# Patient Record
Sex: FEMALE | Race: WHITE | NOT HISPANIC OR LATINO | Employment: OTHER | URBAN - METROPOLITAN AREA
[De-identification: names, ages, dates, MRNs, and addresses within clinical notes are randomized per-mention and may not be internally consistent; named-entity substitution may affect disease eponyms.]

---

## 2017-01-04 ENCOUNTER — CLINICAL SUPPORT (OUTPATIENT)
Dept: FAMILY MEDICINE | Facility: CLINIC | Age: 77
End: 2017-01-04
Payer: MEDICARE

## 2017-01-04 DIAGNOSIS — I48.91 NEW ONSET ATRIAL FIBRILLATION: Primary | ICD-10-CM

## 2017-01-04 LAB
CTP QC/QA: NORMAL
INR PPP: 2.4 (ref 2–3)

## 2017-01-04 PROCEDURE — 85610 PROTHROMBIN TIME: CPT | Mod: ,,, | Performed by: FAMILY MEDICINE

## 2017-01-13 RX ORDER — WARFARIN 6 MG/1
TABLET ORAL
Qty: 90 TABLET | Refills: 3 | Status: SHIPPED | OUTPATIENT
Start: 2017-01-13 | End: 2017-09-15

## 2017-01-13 RX ORDER — PRAMIPEXOLE DIHYDROCHLORIDE 0.25 MG/1
TABLET ORAL
Qty: 90 TABLET | Refills: 3 | Status: SHIPPED | OUTPATIENT
Start: 2017-01-13 | End: 2018-01-18 | Stop reason: SDUPTHER

## 2017-01-28 RX ORDER — ALPRAZOLAM 0.25 MG/1
TABLET ORAL
Qty: 90 TABLET | Refills: 1 | Status: SHIPPED | OUTPATIENT
Start: 2017-01-28 | End: 2017-05-19 | Stop reason: SDUPTHER

## 2017-02-03 ENCOUNTER — CLINICAL SUPPORT (OUTPATIENT)
Dept: FAMILY MEDICINE | Facility: CLINIC | Age: 77
End: 2017-02-03
Payer: MEDICARE

## 2017-02-03 DIAGNOSIS — I48.91 NEW ONSET ATRIAL FIBRILLATION: Primary | ICD-10-CM

## 2017-02-03 LAB — INR PPP: 2.6 (ref 2–3)

## 2017-02-03 PROCEDURE — 85610 PROTHROMBIN TIME: CPT | Mod: ,,, | Performed by: FAMILY MEDICINE

## 2017-02-09 ENCOUNTER — TELEPHONE (OUTPATIENT)
Dept: GASTROENTEROLOGY | Facility: CLINIC | Age: 77
End: 2017-02-09

## 2017-02-09 NOTE — TELEPHONE ENCOUNTER
Patient was cleared per  [facility]ovascular [facility] to hold Coumadin 5 days prior to procedure and to restart as soon as possible after procedure. Patient was called and notified approval was scan in under media.

## 2017-02-09 NOTE — TELEPHONE ENCOUNTER
Patient is scheduled for Colonoscopy at [facility] on 3/3/17, prep information was given and explained. Patient verbalize understanding.

## 2017-03-02 ENCOUNTER — CLINICAL SUPPORT (OUTPATIENT)
Dept: FAMILY MEDICINE | Facility: CLINIC | Age: 77
End: 2017-03-02
Payer: MEDICARE

## 2017-03-02 ENCOUNTER — TELEPHONE (OUTPATIENT)
Dept: FAMILY MEDICINE | Facility: CLINIC | Age: 77
End: 2017-03-02

## 2017-03-02 DIAGNOSIS — I48.0 PAROXYSMAL ATRIAL FIBRILLATION: Primary | ICD-10-CM

## 2017-03-02 LAB — INR PPP: 1.3 (ref 2–3)

## 2017-03-02 PROCEDURE — 85610 PROTHROMBIN TIME: CPT | Mod: ,,, | Performed by: FAMILY MEDICINE

## 2017-03-02 RX ORDER — SCOLOPAMINE TRANSDERMAL SYSTEM 1 MG/1
1 PATCH, EXTENDED RELEASE TRANSDERMAL
Qty: 4 PATCH | Refills: 1 | Status: SHIPPED | OUTPATIENT
Start: 2017-03-02 | End: 2017-07-10

## 2017-03-08 ENCOUNTER — OFFICE VISIT (OUTPATIENT)
Dept: FAMILY MEDICINE | Facility: CLINIC | Age: 77
End: 2017-03-08
Payer: MEDICARE

## 2017-03-08 VITALS
SYSTOLIC BLOOD PRESSURE: 128 MMHG | RESPIRATION RATE: 18 BRPM | BODY MASS INDEX: 36.37 KG/M2 | HEIGHT: 63 IN | DIASTOLIC BLOOD PRESSURE: 72 MMHG | OXYGEN SATURATION: 95 % | WEIGHT: 205.25 LBS | HEART RATE: 74 BPM | TEMPERATURE: 98 F

## 2017-03-08 DIAGNOSIS — G47.33 OSA (OBSTRUCTIVE SLEEP APNEA): ICD-10-CM

## 2017-03-08 DIAGNOSIS — G89.29 CHRONIC BILATERAL BACK PAIN, UNSPECIFIED BACK LOCATION: ICD-10-CM

## 2017-03-08 DIAGNOSIS — I77.1 TORTUOUS AORTA: ICD-10-CM

## 2017-03-08 DIAGNOSIS — M54.31 BILATERAL SCIATICA: ICD-10-CM

## 2017-03-08 DIAGNOSIS — F41.9 ANXIETY: ICD-10-CM

## 2017-03-08 DIAGNOSIS — Z79.01 ANTICOAGULATED ON COUMADIN: ICD-10-CM

## 2017-03-08 DIAGNOSIS — I49.3 PVC (PREMATURE VENTRICULAR CONTRACTION): ICD-10-CM

## 2017-03-08 DIAGNOSIS — I10 HTN (HYPERTENSION), BENIGN: ICD-10-CM

## 2017-03-08 DIAGNOSIS — M54.32 BILATERAL SCIATICA: ICD-10-CM

## 2017-03-08 DIAGNOSIS — M54.9 CHRONIC BILATERAL BACK PAIN, UNSPECIFIED BACK LOCATION: ICD-10-CM

## 2017-03-08 DIAGNOSIS — Z86.010 HISTORY OF ADENOMATOUS POLYP OF COLON: ICD-10-CM

## 2017-03-08 DIAGNOSIS — K57.30 DIVERTICULOSIS OF LARGE INTESTINE WITHOUT HEMORRHAGE: Chronic | ICD-10-CM

## 2017-03-08 DIAGNOSIS — K21.9 GASTROESOPHAGEAL REFLUX DISEASE WITHOUT ESOPHAGITIS: ICD-10-CM

## 2017-03-08 DIAGNOSIS — G25.81 RESTLESS LEG SYNDROME: ICD-10-CM

## 2017-03-08 DIAGNOSIS — I48.91 NEW ONSET ATRIAL FIBRILLATION: ICD-10-CM

## 2017-03-08 DIAGNOSIS — I48.0 PAROXYSMAL ATRIAL FIBRILLATION: ICD-10-CM

## 2017-03-08 DIAGNOSIS — E66.9 NON MORBID OBESITY, UNSPECIFIED OBESITY TYPE: ICD-10-CM

## 2017-03-08 DIAGNOSIS — E78.5 HYPERLIPIDEMIA, UNSPECIFIED HYPERLIPIDEMIA TYPE: ICD-10-CM

## 2017-03-08 DIAGNOSIS — D69.2 SENILE PURPURA: ICD-10-CM

## 2017-03-08 DIAGNOSIS — I10 ESSENTIAL HYPERTENSION: ICD-10-CM

## 2017-03-08 DIAGNOSIS — R00.1 BRADYCARDIA: ICD-10-CM

## 2017-03-08 LAB — INR PPP: 1 (ref 2–3)

## 2017-03-08 PROCEDURE — 3078F DIAST BP <80 MM HG: CPT | Mod: S$GLB,,, | Performed by: FAMILY MEDICINE

## 2017-03-08 PROCEDURE — 1157F ADVNC CARE PLAN IN RCRD: CPT | Mod: S$GLB,,, | Performed by: FAMILY MEDICINE

## 2017-03-08 PROCEDURE — 99213 OFFICE O/P EST LOW 20 MIN: CPT | Mod: S$GLB,,, | Performed by: FAMILY MEDICINE

## 2017-03-08 PROCEDURE — 1126F AMNT PAIN NOTED NONE PRSNT: CPT | Mod: S$GLB,,, | Performed by: FAMILY MEDICINE

## 2017-03-08 PROCEDURE — 1160F RVW MEDS BY RX/DR IN RCRD: CPT | Mod: S$GLB,,, | Performed by: FAMILY MEDICINE

## 2017-03-08 PROCEDURE — 85610 PROTHROMBIN TIME: CPT | Mod: ,,, | Performed by: FAMILY MEDICINE

## 2017-03-08 PROCEDURE — 1159F MED LIST DOCD IN RCRD: CPT | Mod: S$GLB,,, | Performed by: FAMILY MEDICINE

## 2017-03-08 PROCEDURE — 3074F SYST BP LT 130 MM HG: CPT | Mod: S$GLB,,, | Performed by: FAMILY MEDICINE

## 2017-03-08 PROCEDURE — 99499 UNLISTED E&M SERVICE: CPT | Mod: S$GLB,,, | Performed by: FAMILY MEDICINE

## 2017-03-08 NOTE — PROGRESS NOTES
Subjective:      Patient ID: Mis Ca is a 76 y.o. female.    Chief Complaint: Follow-up (3 month follow up)    HPI Comments: 3 month check up; going on a cruise; needs new supplies cpap;     Review of Systems   Constitutional: Negative.    HENT: Negative.    Respiratory: Negative.    Cardiovascular: Negative.    Gastrointestinal: Negative.    Endocrine: Negative.    Genitourinary: Negative.    Musculoskeletal: Negative.    Psychiatric/Behavioral: Negative.    All other systems reviewed and are negative.    Objective:     Physical Exam   Constitutional: She is oriented to person, place, and time. She appears well-developed and well-nourished.   HENT:   Head: Normocephalic.   Eyes: Conjunctivae and EOM are normal. Pupils are equal, round, and reactive to light.   Neck: Normal range of motion. Neck supple.   Cardiovascular: Normal rate, regular rhythm and normal heart sounds.    Pulmonary/Chest: Effort normal and breath sounds normal.   Musculoskeletal: Normal range of motion.   Neurological: She is alert and oriented to person, place, and time. She has normal reflexes.   Skin: Skin is warm and dry.   Psychiatric: She has a normal mood and affect. Her behavior is normal. Judgment and thought content normal.   Nursing note and vitals reviewed.    Assessment:     1. Anxiety    2. Chronic bilateral back pain, unspecified back location    3. JACQUELYN (obstructive sleep apnea)    4. Bilateral sciatica    5. Essential hypertension    6. HTN (hypertension), benign    7. New onset atrial fibrillation    8. Paroxysmal atrial fibrillation    9. PVC (premature ventricular contraction)    10. Diverticulosis of large intestine without hemorrhage    11. Gastroesophageal reflux disease without esophagitis    12. Hyperlipidemia, unspecified hyperlipidemia type    13. Non morbid obesity, unspecified obesity type    14. Anticoagulated on Coumadin    15. Bradycardia    16. History of adenomatous polyp of colon    17. Restless leg  syndrome    18. Tortuous aorta    19. Senile purpura      Plan:     New Prescriptions    No medications on file     Discontinued Medications    No medications on file     Modified Medications    No medications on file       Anxiety  -     POCT INR    Chronic bilateral back pain, unspecified back location  -     POCT INR    JACQUELYN (obstructive sleep apnea)  -     POCT INR  -     CPAP/BIPAP SUPPLIES    Bilateral sciatica  -     POCT INR    Essential hypertension  -     POCT INR    HTN (hypertension), benign  -     POCT INR    New onset atrial fibrillation  -     POCT INR    Paroxysmal atrial fibrillation  -     POCT INR    PVC (premature ventricular contraction)  -     POCT INR    Diverticulosis of large intestine without hemorrhage  -     POCT INR    Gastroesophageal reflux disease without esophagitis  -     POCT INR    Hyperlipidemia, unspecified hyperlipidemia type  -     POCT INR    Non morbid obesity, unspecified obesity type  -     POCT INR    Anticoagulated on Coumadin  -     POCT INR    Bradycardia  -     POCT INR    History of adenomatous polyp of colon  -     POCT INR    Restless leg syndrome  -     POCT INR    Tortuous aorta  -     POCT INR    Senile purpura  -     POCT INR

## 2017-03-08 NOTE — MR AVS SNAPSHOT
[facility]  [address] [place] [address]  Phone: [phone]  Fax: [phone]                  Mis Ca   3/8/2017 1:40 PM   Office Visit    Description:  Female : 1940   Provider:  Scott Dillard MD   Department:  [facility]           Reason for Visit     Follow-up           Diagnoses this Visit        Comments    Anxiety         Chronic bilateral back pain, unspecified back location         JACQUELYN (obstructive sleep apnea)         Bilateral sciatica         Essential hypertension         HTN (hypertension), benign         New onset atrial fibrillation         Paroxysmal atrial fibrillation         PVC (premature ventricular contraction)         Diverticulosis of large intestine without hemorrhage         Gastroesophageal reflux disease without esophagitis         Hyperlipidemia, unspecified hyperlipidemia type         Non morbid obesity, unspecified obesity type         Anticoagulated on Coumadin         Bradycardia         History of adenomatous polyp of colon         Restless leg syndrome         Tortuous aorta         Senile purpura                To Do List           Goals (5 Years of Data)     None      Follow-Up and Disposition     Return in about 4 months (around 2017).      [facility] On Call     [facility] -  Assistance  Registered nurses in the [facility] On Call Center provide clinical advisement, health education, appointment booking, and other advisory services.  Call for this free service at [phone].             Medications           Message regarding Medications     Verify the changes and/or additions to your medication regime listed below are the same as discussed with your clinician today.  If any of these changes or additions are incorrect, please notify your healthcare provider.             Verify that the below list of medications is an accurate representation of the medications you are currently taking.  " If none reported, the list may be blank. If incorrect, please contact your healthcare provider. Carry this list with you in case of emergency.           Current Medications     acetaminophen (TYLENOL) 500 MG tablet Take 1,000 mg by mouth 2 (two) times daily as needed for Pain.    alprazolam (XANAX) 0.25 MG tablet TAKE 1 TABLET ONE TIME DAILY    aspirin (ECOTRIN) 81 MG EC tablet Take 81 mg by mouth once daily.      CARTIA  mg 24 hr capsule TAKE 1 CAPSULE ONE TIME DAILY    gabapentin (NEURONTIN) 300 MG capsule Take 1 capsule (300 mg total) by mouth 2 (two) times daily.    hydrochlorothiazide (MICROZIDE) 12.5 mg capsule TAKE 1 CAPSULE ONE TIME DAILY    latanoprost 0.005 % ophthalmic solution 1 drop every evening.    losartan (COZAAR) 100 MG tablet TAKE 1 TABLET ONE TIME DAILY    lovastatin (MEVACOR) 40 MG tablet TAKE 1 TABLET EVERY EVENING    omeprazole (PRILOSEC) 20 MG capsule TAKE 1 CAPSULE ONE TIME DAILY    oxybutynin (DITROPAN) 5 MG Tab TAKE 1 TABLET ONE TIME DAILY    pramipexole (MIRAPEX) 0.25 MG tablet TAKE 1 TABLET ONE TIME DAILY    scopolamine (TRANSDERM-SCOP) 1.5 mg (1 mg over 3 days) Place 1 patch (1.5 mg total) onto the skin every 72 hours.    timolol maleate 0.5% (TIMOPTIC) 0.5 % Drop Place 1 drop into both eyes once daily.    tramadol (ULTRAM) 50 mg tablet Take 1 tablet (50 mg total) by mouth daily as needed for Pain.    warfarin (COUMADIN) 6 MG tablet TAKE 1 TABLET EVERY DAY           Clinical Reference Information           Your Vitals Were     BP Pulse Temp Resp Height Weight    128/72 (BP Location: Left arm, Patient Position: Sitting, BP Method: Manual) 74 97.7 °F (36.5 °C) (Oral) 18 5' 2.5" (1.588 m) 93.1 kg (205 lb 4 oz)    SpO2 BMI             95% 36.94 kg/m2         Blood Pressure          Most Recent Value    BP  128/72      Allergies as of 3/8/2017     Penicillins    Sulfa (Sulfonamide Antibiotics)      Immunizations Administered on Date of Encounter - 3/8/2017     None      Orders Placed " During Today's Visit      Normal Orders This Visit    CPAP/BIPAP SUPPLIES     POCT INR       Language Assistance Services     ATTENTION: Language assistance services are available, free of charge. Please call [phone].      ATENCIÓN: Si afua alex, tiene a bell disposición servicios gratuitos de asistencia lingüística. Llame al [phone].     CHÚ Ý: N?u b?n nói Ti?ng Vi?t, có các d?ch v? h? tr? ngôn ng? mi?n phí dành cho b?n. G?i s? [phone].         [facility] complies with applicable Federal civil rights laws and does not discriminate on the basis of race, color, national origin, age, disability, or sex.

## 2017-03-09 NOTE — PROGRESS NOTES
Patient, Mis Ca (MRN #[id number]), presented with a recorded BMI of 36.94 kg/m^2 and a documented comorbidity(s):  - Obstructive Sleep Apnea  - Hypertension  - Hyperlipidemia  - Atrial Fibrillation  to which the severe obesity is a contributing factor. This is consistent with the definition of severe obesity (BMI 35.0-35.9) with comorbidity (ICD-10 E66.01, Z68.35). The patient's severe obesity was monitored, evaluated, addressed and/or treated. This addendum to the medical record is made on 03/09/2017.

## 2017-03-13 ENCOUNTER — TELEPHONE (OUTPATIENT)
Dept: GASTROENTEROLOGY | Facility: CLINIC | Age: 77
End: 2017-03-13

## 2017-03-13 NOTE — TELEPHONE ENCOUNTER
Review of pathology report from colonoscopy dated 3 March 2017:  -Descending colon polyps: Tubular adenomas, without high-grade dysplasia or malignancy    IMP: -Benign colon polyps removed    REC: -Follow-up colonoscopy in 3 years    PCP: MD Efren Gillette MD, FACP, FACG, AGAF  [facility]  [address], [address], [place] [address]  Phone: [phone] Fax: [phone]    [address], [address], [place] [address]  Phone: [phone] Fax: [phone]    - Portions of this note were dictated using voice recognition software and may contain dictation related errors in spelling / grammar / syntax not discovered on text review.

## 2017-03-16 ENCOUNTER — TELEPHONE (OUTPATIENT)
Dept: GASTROENTEROLOGY | Facility: CLINIC | Age: 77
End: 2017-03-16

## 2017-03-21 ENCOUNTER — TELEPHONE (OUTPATIENT)
Dept: GASTROENTEROLOGY | Facility: CLINIC | Age: 77
End: 2017-03-21

## 2017-03-22 ENCOUNTER — TELEPHONE (OUTPATIENT)
Dept: GASTROENTEROLOGY | Facility: CLINIC | Age: 77
End: 2017-03-22

## 2017-03-22 NOTE — TELEPHONE ENCOUNTER
Spoke with patient in regards to her Pathology Report results. Patient is aware to f/u in 3 years.

## 2017-03-28 ENCOUNTER — TELEPHONE (OUTPATIENT)
Dept: FAMILY MEDICINE | Facility: CLINIC | Age: 77
End: 2017-03-28

## 2017-03-28 NOTE — TELEPHONE ENCOUNTER
----- Message from Courtney Morrell MA sent at 3/28/2017 10:51 AM CDT -----  Contact: Self / [phone]  Patient states she is having eye surgery within the next 2 weeks. Would like to know how long should she be off of her coumadin?

## 2017-04-06 ENCOUNTER — OFFICE VISIT (OUTPATIENT)
Dept: FAMILY MEDICINE | Facility: CLINIC | Age: 77
End: 2017-04-06
Payer: MEDICARE

## 2017-04-06 VITALS
WEIGHT: 206.13 LBS | SYSTOLIC BLOOD PRESSURE: 124 MMHG | HEIGHT: 63 IN | BODY MASS INDEX: 36.52 KG/M2 | TEMPERATURE: 98 F | OXYGEN SATURATION: 98 % | DIASTOLIC BLOOD PRESSURE: 76 MMHG | HEART RATE: 75 BPM

## 2017-04-06 DIAGNOSIS — F41.9 ANXIETY: ICD-10-CM

## 2017-04-06 DIAGNOSIS — I77.1 TORTUOUS AORTA: ICD-10-CM

## 2017-04-06 DIAGNOSIS — E78.5 HYPERLIPIDEMIA, UNSPECIFIED HYPERLIPIDEMIA TYPE: ICD-10-CM

## 2017-04-06 DIAGNOSIS — I48.0 PAROXYSMAL ATRIAL FIBRILLATION: ICD-10-CM

## 2017-04-06 DIAGNOSIS — G25.81 RESTLESS LEG SYNDROME: ICD-10-CM

## 2017-04-06 DIAGNOSIS — G47.33 OSA (OBSTRUCTIVE SLEEP APNEA): ICD-10-CM

## 2017-04-06 DIAGNOSIS — D69.2 SENILE PURPURA: ICD-10-CM

## 2017-04-06 DIAGNOSIS — Z86.010 HISTORY OF ADENOMATOUS POLYP OF COLON: ICD-10-CM

## 2017-04-06 DIAGNOSIS — I48.91 NEW ONSET ATRIAL FIBRILLATION: ICD-10-CM

## 2017-04-06 DIAGNOSIS — I10 HTN (HYPERTENSION), BENIGN: ICD-10-CM

## 2017-04-06 DIAGNOSIS — H40.9 GLAUCOMA, UNSPECIFIED GLAUCOMA, UNSPECIFIED LATERALITY: ICD-10-CM

## 2017-04-06 DIAGNOSIS — K57.30 DIVERTICULOSIS OF LARGE INTESTINE WITHOUT HEMORRHAGE: Chronic | ICD-10-CM

## 2017-04-06 DIAGNOSIS — I10 ESSENTIAL HYPERTENSION: ICD-10-CM

## 2017-04-06 DIAGNOSIS — Z79.01 ANTICOAGULATED ON COUMADIN: ICD-10-CM

## 2017-04-06 DIAGNOSIS — Z01.818 PRE-OP EXAM: Primary | ICD-10-CM

## 2017-04-06 DIAGNOSIS — K21.9 GASTROESOPHAGEAL REFLUX DISEASE WITHOUT ESOPHAGITIS: ICD-10-CM

## 2017-04-06 PROCEDURE — 99213 OFFICE O/P EST LOW 20 MIN: CPT | Mod: S$GLB,,, | Performed by: FAMILY MEDICINE

## 2017-04-06 PROCEDURE — 1125F AMNT PAIN NOTED PAIN PRSNT: CPT | Mod: S$GLB,,, | Performed by: FAMILY MEDICINE

## 2017-04-06 PROCEDURE — 99499 UNLISTED E&M SERVICE: CPT | Mod: S$GLB,,, | Performed by: FAMILY MEDICINE

## 2017-04-06 PROCEDURE — 3078F DIAST BP <80 MM HG: CPT | Mod: S$GLB,,, | Performed by: FAMILY MEDICINE

## 2017-04-06 PROCEDURE — 3074F SYST BP LT 130 MM HG: CPT | Mod: S$GLB,,, | Performed by: FAMILY MEDICINE

## 2017-04-06 PROCEDURE — 1159F MED LIST DOCD IN RCRD: CPT | Mod: S$GLB,,, | Performed by: FAMILY MEDICINE

## 2017-04-06 PROCEDURE — 1160F RVW MEDS BY RX/DR IN RCRD: CPT | Mod: S$GLB,,, | Performed by: FAMILY MEDICINE

## 2017-04-06 PROCEDURE — 1157F ADVNC CARE PLAN IN RCRD: CPT | Mod: S$GLB,,, | Performed by: FAMILY MEDICINE

## 2017-04-06 RX ORDER — PREDNISOLONE ACETATE 10 MG/ML
SUSPENSION/ DROPS OPHTHALMIC
Refills: 0 | COMMUNITY
Start: 2017-03-20 | End: 2017-08-31

## 2017-04-06 RX ORDER — ACETAZOLAMIDE 250 MG/1
250 TABLET ORAL 3 TIMES DAILY
Refills: 0 | Status: ON HOLD | COMMUNITY
Start: 2017-03-27 | End: 2017-09-05 | Stop reason: HOSPADM

## 2017-04-06 RX ORDER — CIPROFLOXACIN HYDROCHLORIDE 3 MG/ML
SOLUTION/ DROPS OPHTHALMIC
Refills: 0 | COMMUNITY
Start: 2017-04-03 | End: 2017-07-10

## 2017-04-06 NOTE — MR AVS SNAPSHOT
[address] [address]  Phone: [phone]  Fax: [phone]                  Mis Ca   2017 3:40 PM   Office Visit    Description:  Female : 1940   Provider:  Scott Dillard MD   Department:  [facility]           Reason for Visit     Pre-op Exam           Diagnoses this Visit        Comments    Pre-op exam    -  Primary     Diverticulosis of large intestine without hemorrhage         New onset atrial fibrillation         Paroxysmal atrial fibrillation         HTN (hypertension), benign         JACQUELYN (obstructive sleep apnea)         Anticoagulated on Coumadin         Anxiety         Restless leg syndrome         Hyperlipidemia, unspecified hyperlipidemia type         Gastroesophageal reflux disease without esophagitis         Essential hypertension         History of adenomatous polyp of colon         Senile purpura         Tortuous aorta         Glaucoma, unspecified glaucoma, unspecified laterality                To Do List           Future Appointments        Provider Department Dept Phone    7/10/2017 1:40 PM Scott Dillard MD [facility] [phone]      Goals (5 Years of Data)     None      Follow-Up and Disposition     Return in about 3 months (around 2017).      [facility] On Call     [facility] On Call Nurse Care Line -  Assistance  Unless otherwise directed by your provider, please contact [facility] On-Call, our nurse care line that is available for  assistance.     Registered nurses in the [facility] On Call Center provide: appointment scheduling, clinical advisement, health education, and other advisory services.  Call: [phone] (toll free)               Medications           Message regarding Medications     Verify the changes and/or additions to your medication regime listed below are the same as discussed with your clinician today.  If any of these changes or additions are incorrect, please  notify your healthcare provider.             Verify that the below list of medications is an accurate representation of the medications you are currently taking.  If none reported, the list may be blank. If incorrect, please contact your healthcare provider. Carry this list with you in case of emergency.           Current Medications     acetaminophen (TYLENOL) 500 MG tablet Take 1,000 mg by mouth 2 (two) times daily as needed for Pain.    acetaZOLAMIDE (DIAMOX) 250 MG tablet Take 250 mg by mouth 3 (three) times daily.    alprazolam (XANAX) 0.25 MG tablet TAKE 1 TABLET ONE TIME DAILY    aspirin (ECOTRIN) 81 MG EC tablet Take 81 mg by mouth once daily.      CARTIA  mg 24 hr capsule TAKE 1 CAPSULE ONE TIME DAILY    ciprofloxacin HCl (CILOXAN) 0.3 % ophthalmic solution Instill 1 drop in right eye four times a day    gabapentin (NEURONTIN) 300 MG capsule Take 1 capsule (300 mg total) by mouth 2 (two) times daily.    hydrochlorothiazide (MICROZIDE) 12.5 mg capsule TAKE 1 CAPSULE ONE TIME DAILY    latanoprost 0.005 % ophthalmic solution 1 drop every evening.    losartan (COZAAR) 100 MG tablet TAKE 1 TABLET ONE TIME DAILY    lovastatin (MEVACOR) 40 MG tablet TAKE 1 TABLET EVERY EVENING    omeprazole (PRILOSEC) 20 MG capsule TAKE 1 CAPSULE ONE TIME DAILY    oxybutynin (DITROPAN) 5 MG Tab TAKE 1 TABLET ONE TIME DAILY    pramipexole (MIRAPEX) 0.25 MG tablet TAKE 1 TABLET ONE TIME DAILY    prednisoLONE acetate (PRED FORTE) 1 % DrpS place 1 drop in right eye three times a day    scopolamine (TRANSDERM-SCOP) 1.5 mg (1 mg over 3 days) Place 1 patch (1.5 mg total) onto the skin every 72 hours.    timolol maleate 0.5% (TIMOPTIC) 0.5 % Drop Place 1 drop into both eyes once daily.    tramadol (ULTRAM) 50 mg tablet Take 1 tablet (50 mg total) by mouth daily as needed for Pain.    warfarin (COUMADIN) 6 MG tablet TAKE 1 TABLET EVERY DAY           Clinical Reference Information           Your Vitals Were     BP Pulse Temp Height  "Weight SpO2    124/76 75 98.1 °F (36.7 °C) (Oral) 5' 2.5" (1.588 m) 93.5 kg (206 lb 2.1 oz) 98%    BMI                37.1 kg/m2          Blood Pressure          Most Recent Value    BP  124/76      Allergies as of 4/6/2017     Penicillins    Sulfa (Sulfonamide Antibiotics)      Immunizations Administered on Date of Encounter - 4/6/2017     None      Language Assistance Services     ATTENTION: Language assistance services are available, free of charge. Please call [phone].      ATENCIÓN: Si faua alex, tiene a bell disposición servicios gratuitos de asistencia lingüística. Llame al [phone].     CHÚ Ý: N?u b?n nói Ti?ng Vi?t, có các d?ch v? h? tr? ngôn ng? mi?n phí dành cho b?n. G?i s? [phone].         [facility] complies with applicable Federal civil rights laws and does not discriminate on the basis of race, color, national origin, age, disability, or sex.        "

## 2017-04-06 NOTE — PROGRESS NOTES
Subjective:      Patient ID: Mis Ca is a 76 y.o. female.    Chief Complaint: Pre-op Exam (surgery clearance (eye surgery))    HPI Comments: preop clearance for eye surgery for glaucoma;no history of surgery complications in past    Review of Systems   Constitutional: Negative.    HENT: Negative.    Respiratory: Negative.    Cardiovascular: Negative.    Gastrointestinal: Negative.    Endocrine: Negative.    Genitourinary: Negative.    Musculoskeletal: Negative.    Psychiatric/Behavioral: Negative.    All other systems reviewed and are negative.    Objective:     Physical Exam   Constitutional: She is oriented to person, place, and time. She appears well-developed and well-nourished.   obese   HENT:   Head: Normocephalic.   Eyes: Conjunctivae and EOM are normal. Pupils are equal, round, and reactive to light.   Neck: Normal range of motion. Neck supple.   Cardiovascular: Normal rate, regular rhythm and intact distal pulses.  Exam reveals no gallop and no friction rub.    Murmur heard.  Pulmonary/Chest: Effort normal and breath sounds normal.   Musculoskeletal: Normal range of motion.   Neurological: She is alert and oriented to person, place, and time. She has normal reflexes.   Skin: Skin is warm and dry.   Psychiatric: She has a normal mood and affect. Her behavior is normal. Judgment and thought content normal.   Nursing note and vitals reviewed.    Assessment:     1. Pre-op exam    2. Diverticulosis of large intestine without hemorrhage    3. New onset atrial fibrillation    4. Paroxysmal atrial fibrillation    5. HTN (hypertension), benign    6. JACQUELYN (obstructive sleep apnea)    7. Anticoagulated on Coumadin    8. Anxiety    9. Restless leg syndrome    10. Hyperlipidemia, unspecified hyperlipidemia type    11. Gastroesophageal reflux disease without esophagitis    12. Essential hypertension    13. History of adenomatous polyp of colon    14. Senile purpura    15. Tortuous aorta    16. Glaucoma,  unspecified glaucoma, unspecified laterality      Plan:     New Prescriptions    No medications on file     Discontinued Medications    No medications on file     Modified Medications    No medications on file       Pre-op exam    Diverticulosis of large intestine without hemorrhage    New onset atrial fibrillation    Paroxysmal atrial fibrillation    HTN (hypertension), benign    JACQUELYN (obstructive sleep apnea)    Anticoagulated on Coumadin    Anxiety    Restless leg syndrome    Hyperlipidemia, unspecified hyperlipidemia type    Gastroesophageal reflux disease without esophagitis    Essential hypertension    History of adenomatous polyp of colon    Senile purpura    Tortuous aorta    Glaucoma, unspecified glaucoma, unspecified laterality    Pt medically clear for eye surgery and general anesthesia; Is off coumadin.

## 2017-04-18 ENCOUNTER — CLINICAL SUPPORT (OUTPATIENT)
Dept: FAMILY MEDICINE | Facility: CLINIC | Age: 77
End: 2017-04-18
Payer: MEDICARE

## 2017-04-18 DIAGNOSIS — I48.0 PAROXYSMAL ATRIAL FIBRILLATION: Primary | ICD-10-CM

## 2017-04-18 LAB — INR PPP: 1.1 (ref 2–3)

## 2017-04-18 PROCEDURE — 85610 PROTHROMBIN TIME: CPT | Mod: ,,, | Performed by: FAMILY MEDICINE

## 2017-05-02 ENCOUNTER — CLINICAL SUPPORT (OUTPATIENT)
Dept: FAMILY MEDICINE | Facility: CLINIC | Age: 77
End: 2017-05-02
Payer: MEDICARE

## 2017-05-02 DIAGNOSIS — Z79.01 ANTICOAGULATED ON COUMADIN: Primary | ICD-10-CM

## 2017-05-02 DIAGNOSIS — I48.0 PAROXYSMAL ATRIAL FIBRILLATION: ICD-10-CM

## 2017-05-02 LAB — INR PPP: 2.4 (ref 2–3)

## 2017-05-02 PROCEDURE — 85610 PROTHROMBIN TIME: CPT | Mod: ,,, | Performed by: FAMILY MEDICINE

## 2017-05-19 DIAGNOSIS — I48.0 PAROXYSMAL ATRIAL FIBRILLATION: ICD-10-CM

## 2017-05-19 RX ORDER — LOVASTATIN 40 MG/1
TABLET ORAL
Qty: 90 TABLET | Refills: 3 | Status: SHIPPED | OUTPATIENT
Start: 2017-05-19 | End: 2017-05-19 | Stop reason: SDUPTHER

## 2017-05-19 RX ORDER — LOVASTATIN 40 MG/1
TABLET ORAL
Qty: 90 TABLET | Refills: 3 | Status: SHIPPED | OUTPATIENT
Start: 2017-05-19 | End: 2017-05-23 | Stop reason: SDUPTHER

## 2017-05-19 RX ORDER — ALPRAZOLAM 0.25 MG/1
TABLET ORAL
Qty: 90 TABLET | Refills: 1 | Status: SHIPPED | OUTPATIENT
Start: 2017-05-19 | End: 2017-11-16 | Stop reason: SDUPTHER

## 2017-05-19 RX ORDER — LOSARTAN POTASSIUM 100 MG/1
TABLET ORAL
Qty: 90 TABLET | Refills: 3 | Status: SHIPPED | OUTPATIENT
Start: 2017-05-19 | End: 2017-09-15 | Stop reason: SDUPTHER

## 2017-05-19 RX ORDER — LOSARTAN POTASSIUM 100 MG/1
TABLET ORAL
Qty: 90 TABLET | Refills: 3 | Status: SHIPPED | OUTPATIENT
Start: 2017-05-19 | End: 2017-05-19 | Stop reason: SDUPTHER

## 2017-05-21 RX ORDER — DILTIAZEM HYDROCHLORIDE 180 MG/1
CAPSULE, EXTENDED RELEASE ORAL
Qty: 90 CAPSULE | Refills: 3 | OUTPATIENT
Start: 2017-05-21

## 2017-05-25 RX ORDER — LOVASTATIN 40 MG/1
TABLET ORAL
Qty: 90 TABLET | Refills: 3 | Status: SHIPPED | OUTPATIENT
Start: 2017-05-25 | End: 2018-08-31 | Stop reason: SDUPTHER

## 2017-05-26 ENCOUNTER — CLINICAL SUPPORT (OUTPATIENT)
Dept: FAMILY MEDICINE | Facility: CLINIC | Age: 77
End: 2017-05-26
Payer: MEDICARE

## 2017-05-26 DIAGNOSIS — I48.91 NEW ONSET ATRIAL FIBRILLATION: ICD-10-CM

## 2017-05-26 DIAGNOSIS — Z79.01 ANTICOAGULATED ON COUMADIN: Primary | ICD-10-CM

## 2017-05-26 PROCEDURE — 85610 PROTHROMBIN TIME: CPT | Mod: ,,, | Performed by: FAMILY MEDICINE

## 2017-05-30 LAB — INR PPP: 3 (ref 2–3)

## 2017-06-16 ENCOUNTER — CLINICAL SUPPORT (OUTPATIENT)
Dept: FAMILY MEDICINE | Facility: CLINIC | Age: 77
End: 2017-06-16
Payer: MEDICARE

## 2017-06-16 DIAGNOSIS — I48.0 PAROXYSMAL ATRIAL FIBRILLATION: ICD-10-CM

## 2017-06-16 DIAGNOSIS — I48.91 NEW ONSET ATRIAL FIBRILLATION: ICD-10-CM

## 2017-06-16 DIAGNOSIS — Z79.01 ANTICOAGULATED ON COUMADIN: Primary | ICD-10-CM

## 2017-06-16 LAB — INR PPP: 2.5 (ref 2–3)

## 2017-06-16 PROCEDURE — 85610 PROTHROMBIN TIME: CPT | Mod: ,,, | Performed by: FAMILY MEDICINE

## 2017-07-10 ENCOUNTER — OFFICE VISIT (OUTPATIENT)
Dept: FAMILY MEDICINE | Facility: CLINIC | Age: 77
End: 2017-07-10
Payer: MEDICARE

## 2017-07-10 VITALS
OXYGEN SATURATION: 92 % | SYSTOLIC BLOOD PRESSURE: 130 MMHG | TEMPERATURE: 99 F | DIASTOLIC BLOOD PRESSURE: 82 MMHG | BODY MASS INDEX: 37.85 KG/M2 | HEIGHT: 63 IN | RESPIRATION RATE: 18 BRPM | HEART RATE: 89 BPM | WEIGHT: 213.63 LBS

## 2017-07-10 DIAGNOSIS — Z12.31 ENCOUNTER FOR SCREENING MAMMOGRAM FOR BREAST CANCER: ICD-10-CM

## 2017-07-10 DIAGNOSIS — E66.9 NON MORBID OBESITY, UNSPECIFIED OBESITY TYPE: ICD-10-CM

## 2017-07-10 DIAGNOSIS — G47.33 OSA (OBSTRUCTIVE SLEEP APNEA): Primary | ICD-10-CM

## 2017-07-10 DIAGNOSIS — Z79.01 ANTICOAGULATED ON COUMADIN: ICD-10-CM

## 2017-07-10 DIAGNOSIS — H54.40 BLIND RIGHT EYE: ICD-10-CM

## 2017-07-10 DIAGNOSIS — I48.0 PAROXYSMAL ATRIAL FIBRILLATION: ICD-10-CM

## 2017-07-10 DIAGNOSIS — G25.81 RESTLESS LEG SYNDROME: ICD-10-CM

## 2017-07-10 LAB — INR PPP: 2.4 (ref 2–3)

## 2017-07-10 PROCEDURE — 1159F MED LIST DOCD IN RCRD: CPT | Mod: S$GLB,,, | Performed by: FAMILY MEDICINE

## 2017-07-10 PROCEDURE — 99499 UNLISTED E&M SERVICE: CPT | Mod: S$GLB,,, | Performed by: FAMILY MEDICINE

## 2017-07-10 PROCEDURE — 99213 OFFICE O/P EST LOW 20 MIN: CPT | Mod: S$GLB,,, | Performed by: FAMILY MEDICINE

## 2017-07-10 PROCEDURE — 1126F AMNT PAIN NOTED NONE PRSNT: CPT | Mod: S$GLB,,, | Performed by: FAMILY MEDICINE

## 2017-07-10 PROCEDURE — 85610 PROTHROMBIN TIME: CPT | Mod: ,,, | Performed by: FAMILY MEDICINE

## 2017-07-10 RX ORDER — DILTIAZEM HYDROCHLORIDE 180 MG/1
180 CAPSULE, COATED, EXTENDED RELEASE ORAL DAILY
Qty: 90 CAPSULE | Refills: 3 | Status: SHIPPED | OUTPATIENT
Start: 2017-07-10 | End: 2017-07-10 | Stop reason: SDUPTHER

## 2017-07-10 RX ORDER — KETOROLAC TROMETHAMINE 5 MG/ML
SOLUTION OPHTHALMIC
COMMUNITY
Start: 2017-04-14 | End: 2017-07-10

## 2017-07-10 RX ORDER — DILTIAZEM HYDROCHLORIDE 180 MG/1
180 CAPSULE, COATED, EXTENDED RELEASE ORAL DAILY
Qty: 90 CAPSULE | Refills: 3 | Status: ON HOLD | OUTPATIENT
Start: 2017-07-10 | End: 2017-09-05 | Stop reason: HOSPADM

## 2017-07-10 NOTE — PROGRESS NOTES
Subjective:      Patient ID: Mis Ca is a 76 y.o. female.    Chief Complaint: Follow-up    Check up; fell out of sisters bed; pt is gaining weight; cpap not working right; machine not working correctly; ends up coming off, apprently due to now working; feels better when using it and it is working properly; dull ache down both legs when sittig; and restless elgs; has to stand due to not abeing able to sit, miserable and has to fidget; dry mouth, with or with out cpap; clumps of dried uup stuff in throat that comes up when drinks; hoarse for years;       Review of Systems   Constitutional: Negative.    HENT: Negative.    Respiratory: Negative.    Cardiovascular: Positive for palpitations.   Gastrointestinal: Negative.    Endocrine: Negative.    Genitourinary: Negative.    Musculoskeletal: Negative.    Psychiatric/Behavioral: Negative.    All other systems reviewed and are negative.    Objective:     Physical Exam   Constitutional: She is oriented to person, place, and time. She appears well-developed and well-nourished.   obese   HENT:   Head: Normocephalic.   Eyes: Conjunctivae and EOM are normal. Pupils are equal, round, and reactive to light.   Neck: Normal range of motion. Neck supple.   Cardiovascular: Normal rate, regular rhythm and normal heart sounds.    Pulmonary/Chest: Effort normal and breath sounds normal.   Musculoskeletal: Normal range of motion.   Neurological: She is alert and oriented to person, place, and time. She has normal reflexes.   Skin: Skin is warm and dry.   Psychiatric: She has a normal mood and affect. Her behavior is normal. Judgment and thought content normal.   Nursing note and vitals reviewed.    Assessment:     1. JACQUELYN (obstructive sleep apnea)    2. Paroxysmal atrial fibrillation    3. Blind right eye    4. Non morbid obesity, unspecified obesity type    5. Anticoagulated on Coumadin    6. Restless leg syndrome    7. Encounter for screening mammogram for breast cancer       Plan:     New Prescriptions    No medications on file     Discontinued Medications    CIPROFLOXACIN HCL (CILOXAN) 0.3 % OPHTHALMIC SOLUTION    Instill 1 drop in right eye four times a day    KETOROLAC 0.5% (ACULAR) 0.5 % DROP        SCOPOLAMINE (TRANSDERM-SCOP) 1.5 MG (1 MG OVER 3 DAYS)    Place 1 patch (1.5 mg total) onto the skin every 72 hours.     Modified Medications    Modified Medication Previous Medication    DILTIAZEM (CARTIA XT) 180 MG 24 HR CAPSULE CARTIA  mg 24 hr capsule       Take 1 capsule (180 mg total) by mouth once daily.    TAKE 1 CAPSULE ONE TIME DAILY       JACQUELYN (obstructive sleep apnea)  -     CPAP titration (Must have diagnosis of JACQUELYN from previous sleep study.); Future  -     CBC auto differential; Future; Expected date: 07/10/2017  -     Comprehensive metabolic panel; Future; Expected date: 07/10/2017  -     Lipid panel; Future  -     TSH; Future  -     Ambulatory Referral to Sleep Education    Paroxysmal atrial fibrillation  -     Discontinue: diltiaZEM (CARTIA XT) 180 MG 24 hr capsule; Take 1 capsule (180 mg total) by mouth once daily.  Dispense: 90 capsule; Refill: 3  -     POCT INR  -     CPAP titration (Must have diagnosis of JACQUELYN from previous sleep study.); Future  -     CBC auto differential; Future; Expected date: 07/10/2017  -     Comprehensive metabolic panel; Future; Expected date: 07/10/2017  -     Lipid panel; Future  -     TSH; Future  -     diltiaZEM (CARTIA XT) 180 MG 24 hr capsule; Take 1 capsule (180 mg total) by mouth once daily.  Dispense: 90 capsule; Refill: 3    Blind right eye  -     CPAP titration (Must have diagnosis of JACQUELYN from previous sleep study.); Future  -     CBC auto differential; Future; Expected date: 07/10/2017  -     Comprehensive metabolic panel; Future; Expected date: 07/10/2017  -     Lipid panel; Future  -     TSH; Future    Non morbid obesity, unspecified obesity type  -     CBC auto differential; Future; Expected date: 07/10/2017  -      Comprehensive metabolic panel; Future; Expected date: 07/10/2017  -     Lipid panel; Future  -     TSH; Future    Anticoagulated on Coumadin  -     CBC auto differential; Future; Expected date: 07/10/2017  -     Comprehensive metabolic panel; Future; Expected date: 07/10/2017  -     Lipid panel; Future  -     TSH; Future    Restless leg syndrome  -     CBC auto differential; Future; Expected date: 07/10/2017  -     Comprehensive metabolic panel; Future; Expected date: 07/10/2017  -     Lipid panel; Future  -     TSH; Future    Encounter for screening mammogram for breast cancer  -     Mammo Digital Screening Bilateral With CAD; Future; Expected date: 07/10/2017

## 2017-07-17 ENCOUNTER — TELEPHONE (OUTPATIENT)
Dept: SLEEP MEDICINE | Facility: OTHER | Age: 77
End: 2017-07-17

## 2017-07-18 ENCOUNTER — HOSPITAL ENCOUNTER (OUTPATIENT)
Dept: RADIOLOGY | Facility: HOSPITAL | Age: 77
Discharge: HOME | End: 2017-07-18
Attending: FAMILY MEDICINE
Payer: MEDICARE

## 2017-07-18 VITALS — HEIGHT: 62 IN | BODY MASS INDEX: 39.2 KG/M2 | WEIGHT: 213 LBS

## 2017-07-18 DIAGNOSIS — Z12.31 ENCOUNTER FOR SCREENING MAMMOGRAM FOR BREAST CANCER: ICD-10-CM

## 2017-07-18 PROCEDURE — 77067 SCR MAMMO BI INCL CAD: CPT | Mod: TC

## 2017-07-19 ENCOUNTER — TELEPHONE (OUTPATIENT)
Dept: FAMILY MEDICINE | Facility: CLINIC | Age: 77
End: 2017-07-19

## 2017-07-19 ENCOUNTER — OFFICE VISIT (OUTPATIENT)
Dept: SLEEP MEDICINE | Facility: CLINIC | Age: 77
End: 2017-07-19
Payer: MEDICARE

## 2017-07-19 VITALS
HEART RATE: 78 BPM | SYSTOLIC BLOOD PRESSURE: 140 MMHG | BODY MASS INDEX: 39.19 KG/M2 | DIASTOLIC BLOOD PRESSURE: 78 MMHG | WEIGHT: 212.94 LBS | HEIGHT: 62 IN

## 2017-07-19 DIAGNOSIS — G47.33 OSA (OBSTRUCTIVE SLEEP APNEA): Primary | ICD-10-CM

## 2017-07-19 PROCEDURE — 1159F MED LIST DOCD IN RCRD: CPT | Mod: S$GLB,,, | Performed by: PSYCHIATRY & NEUROLOGY

## 2017-07-19 PROCEDURE — 99214 OFFICE O/P EST MOD 30 MIN: CPT | Mod: S$GLB,,, | Performed by: PSYCHIATRY & NEUROLOGY

## 2017-07-19 PROCEDURE — 1125F AMNT PAIN NOTED PAIN PRSNT: CPT | Mod: S$GLB,,, | Performed by: PSYCHIATRY & NEUROLOGY

## 2017-07-19 PROCEDURE — 99499 UNLISTED E&M SERVICE: CPT | Mod: S$GLB,,, | Performed by: PSYCHIATRY & NEUROLOGY

## 2017-07-19 PROCEDURE — 99999 PR PBB SHADOW E&M-EST. PATIENT-LVL III: CPT | Mod: PBBFAC,,, | Performed by: PSYCHIATRY & NEUROLOGY

## 2017-07-19 NOTE — PATIENT INSTRUCTIONS
1. Pressure 7.5-16  Ramp 6.5  Will get a full face mask  Try to do nasal rinse.  Will see you back in 2 months

## 2017-07-19 NOTE — TELEPHONE ENCOUNTER
----- Message from Scott Dillard MD sent at 7/19/2017  6:44 AM CDT -----  CALL PT TESTS ARE NORMAL

## 2017-07-19 NOTE — PROGRESS NOTES
Mis Ca  was seen as a follow up.    CHIEF COMPLAINT:    Chief Complaint   Patient presents with    Sleep Apnea       HISTORY OF PRESENT ILLNESS: Mis Ca is a 76 y.o. female is here for sleep evaluation.   Patient often wake up after 4-4.5 hours sleep in a panic.  + numbness in foot, fingers and arms.  +palpitation upon awake.  Diagnosed with afib 11/14.  Symptoms improve after wake getting up and walk around.  +occasional tremor upon awake.  +loud snoring.  +witnessed sleep apnea by sister.  +fatigue upon awake.  No parasomnia.     +crawling sensation in legs.  Worse at night.  Improve with mirapex.    Muir Sleepiness Scale score during initial sleep evaluation was 10.  Patient s/p sleep study 9/15.  No new issue.      INTERVAL HISTORY:    07/19/2017 Dr Burnett:   The patient has not presented any new complaints since the previous visit. Not able to use her machine later - pressure feels too low - but still trying to put it on once in awhile. Had been battling with glaucoma.   CPAP 5-12. 90% - 7.5-9 cm.  JACQUELYN seems to run in her family. Nasal congestion on  And off. Has not tried [facility] yet.   Gained 10 lbs since last visit.   RLS still controlled by Mirapex at night, but worse during the day.    Therapy Event Summary  Date Range: 5/22/2017 - 6/20/2017    Hide            Compliance Summary  Apnea Indices  Ventilator Statistics       Days with Device Usage:  7 days  Average AHI:  4.0  Average Breath Rate:  15.4 bpm       Percentage of Days >=4 Hours:  3.3%  Average OA Index:  0.7  Average % Patient Triggered Breaths:  N/A       Average Usage (Days Used):  2 hrs. 6 mins. 22 secs.  Average CA Index:  0.1  Average Tidal Volume:  371.2 ml       Average Usage (All Days):  29 mins. 29 secs.    Average Minute Vent:  N/A              Large Leak  Periodic Breathing        Average Time in Large Leak:  9 secs.  Average % of Night in PB:  0.7%        Average % of Night in Large Leak:  0.1%                                 SLEEP ROUTINE:  Activity the hour prior to sleep: angelita or SRE [place] - 2    Bed partner:  alone  Time to bed:  10-11 pm   Lights off:  tv is on  Sleep onset latency:  2 minutes        Disruptions or awakenings:    4 minutes (few minutes to 60 minutes to go back to sleep)    Wakeup time:      7 am   Perceived sleep quality:  tire       Daytime naps:      none  Weekend sleep routine:      same  Caffeine use: none  exercise habit:   none      PAST MEDICAL HISTORY:    Active Ambulatory Problems     Diagnosis Date Noted    New onset atrial fibrillation 11/29/2014    Paroxysmal atrial fibrillation 12/16/2014    HTN (hypertension), benign 12/16/2014    JACQUELYN (obstructive sleep apnea) 02/17/2016    Anticoagulated on Coumadin 02/17/2016    Anxiety 02/17/2016    Restless leg syndrome 02/17/2016    Hyperlipidemia 02/17/2016    Non morbid obesity 02/17/2016    Essential hypertension 02/17/2016    Gastroesophageal reflux disease without esophagitis 02/17/2016    History of adenomatous polyp of colon 12/09/2016    Diverticulosis of large intestine without hemorrhage 12/09/2016    Senile purpura 03/08/2017    Tortuous aorta 03/08/2017    Glaucoma 04/06/2017    Blind right eye 07/10/2017     Resolved Ambulatory Problems     Diagnosis Date Noted    Bradycardia 12/03/2012    PVC (premature ventricular contraction) 12/03/2012    Bilateral back pain 02/17/2016    Sciatica 02/17/2016    Lipoma of right upper extremity 06/09/2016     Past Medical History:   Diagnosis Date    Anxiety     Atrial fibrillation     Blind right eye     Bradycardia     Hyperlipidemia     Hypertension     PVC (premature ventricular contraction)     RLS (restless legs syndrome)                 PAST SURGICAL HISTORY:    Past Surgical History:   Procedure Laterality Date    APPENDECTOMY      BREAST BIOPSY Left     times two    CATARACT EXTRACTION BILATERAL W/ ANTERIOR VITRECTOMY      EYE SURGERY      HYSTERECTOMY      orif  of left forearm           FAMILY HISTORY:                Family History   Problem Relation Age of Onset    Aneurysm Mother     Cancer Father     Cancer Sister      Breast    Colon polyps Sister     Colon polyps Brother     COPD Sister     Heart disease Sister     Kidney disease Sister     Colon polyps Sister     Breast cancer Sister        SOCIAL HISTORY:          Tobacco:   History   Smoking Status    Never Smoker   Smokeless Tobacco    Not on file       alcohol use:    History   Alcohol Use No                 Occupation:  Former  and book keeper    ALLERGIES:    Allergies   Allergen Reactions    Penicillins     Sulfa (Sulfonamide Antibiotics)        CURRENT MEDICATIONS:    Current Outpatient Prescriptions   Medication Sig Dispense Refill    acetaminophen (TYLENOL) 500 MG tablet Take 1,000 mg by mouth 2 (two) times daily as needed for Pain.      acetaZOLAMIDE (DIAMOX) 250 MG tablet Take 250 mg by mouth 3 (three) times daily.  0    alprazolam (XANAX) 0.25 MG tablet TAKE 1 TABLET ONE TIME DAILY 90 tablet 1    aspirin (ECOTRIN) 81 MG EC tablet Take 81 mg by mouth once daily.        diltiaZEM (CARTIA XT) 180 MG 24 hr capsule Take 1 capsule (180 mg total) by mouth once daily. 90 capsule 3    gabapentin (NEURONTIN) 300 MG capsule Take 1 capsule (300 mg total) by mouth 2 (two) times daily. 180 capsule 3    hydrochlorothiazide (MICROZIDE) 12.5 mg capsule TAKE 1 CAPSULE ONE TIME DAILY 90 capsule 3    latanoprost 0.005 % ophthalmic solution 1 drop every evening.      losartan (COZAAR) 100 MG tablet TAKE 1 TABLET ONE TIME DAILY 90 tablet 3    lovastatin (MEVACOR) 40 MG tablet TAKE 1 TABLET EVERY EVENING 90 tablet 3    omeprazole (PRILOSEC) 20 MG capsule TAKE 1 CAPSULE ONE TIME DAILY 90 capsule 3    oxybutynin (DITROPAN) 5 MG Tab TAKE 1 TABLET ONE TIME DAILY 90 tablet 3    pramipexole (MIRAPEX) 0.25 MG tablet TAKE 1 TABLET ONE TIME DAILY 90 tablet 3    prednisoLONE acetate (PRED FORTE) 1 %  "DrpS place 1 drop in right eye three times a day  0    timolol maleate 0.5% (TIMOPTIC) 0.5 % Drop Place 1 drop into both eyes once daily.  0    tramadol (ULTRAM) 50 mg tablet Take 1 tablet (50 mg total) by mouth daily as needed for Pain. 30 tablet 0    warfarin (COUMADIN) 6 MG tablet TAKE 1 TABLET EVERY DAY 90 tablet 3     No current facility-administered medications for this visit.                   REVIEW OF SYSTEMS:   No acute changes from previous encounter dated 7/13/2015 with exceptions mentioned in HPI.      PHYSICAL EXAM:  Vitals:    07/19/17 1330   BP: (!) 140/78   Pulse: 78   Weight: 96.6 kg (212 lb 15.4 oz)   Height: 5' 2" (1.575 m)   PainSc:   2   PainLoc: Chest     Body mass index is 38.95 kg/m².     GENERAL: Normal development, well groomed  HEENT:  Conjunctivae are non-erythematous; Pupils equal, round, and reactive to light; Nose is symmetrical; Nasal mucosa is pink and moist; Septum is midline; Inferior turbinates are normal; Nasal airflow is normal; Posterior pharynx is pink; Modified Mallampati: 1; Posterior palate is normal; Tonsils +1; Uvula is normal and pink;Tongue is normal; Dentition is fair; No TMJ tenderness; Jaw opening and protrusion without click and without discomfort.  NECK: Supple. Neck circumference is 15 inches. No thyromegaly. No palpable nodes.     SKIN: On face and neck: No abrasions, no rashes, no lesions.  No subcutaneous nodules are palpable.  RESPIRATORY: Chest is clear to auscultation.  Normal chest expansion and non-labored breathing at rest.  CARDIOVASCULAR: Normal S1, S2.  No murmurs, gallops or rubs. No carotid bruits bilaterally.  EXTREMITIES: No edema. No clubbing. No cyanosis. Station normal. Gait normal.        NEURO/PSYCH: Oriented to time, place and person. Normal attention span and concentration. Affect is full. Mood is normal.                                              DATA   9/28/15 ahi of 8.9 with rem ahi of 25  Echo 12/1/14  1 - Normal left ventricular " systolic function (EF 60-65%).   2 - Normal right ventricular systolic function .   3 - Moderate left atrial enlargement.   4 - Atrial septal aneurysm .   5 - Concentric remodeling.   Lab Results   Component Value Date    TSH 1.780 06/09/2016     ASSESSMENT  No diagnosis found.    PLAN:    Sleep Apnea - result of sleep study d/w patient.  +mild severity a/w htn and pulmonary htn.  Recommend treatment.  Patient agree to pap.  Will increase APAPfrom 6-12 to 7.5-16 cm H2o. Will increase ramp to 6.5. Will advise on increasing humidity. Will order a FFM.     Numbness - improved    Insomnia - maintenance is the issue.  DDx: hyper-arousal from untreated gabriel vs conditioned vs neuropathic pain.  Will address after pap.    rls - mirapex at bedtime 0.25 mg - but sometimes RLS bothers her during the day      Education: During our discussion today, we talked about the etiology of obstructive sleep apnea as well as the potential ramifications of untreated sleep apnea, which could include daytime sleepiness, hypertension, heart disease and/or stroke.     Precautions: The patient was advised to abstain from driving should they feel sleepy or drowsy.       Patient will No Follow-up on file.    This is 25 minutes visit, over 50% of time spent in direct consultation with patient.

## 2017-07-28 ENCOUNTER — TELEPHONE (OUTPATIENT)
Dept: SLEEP MEDICINE | Facility: OTHER | Age: 77
End: 2017-07-28

## 2017-08-01 ENCOUNTER — TELEPHONE (OUTPATIENT)
Dept: SLEEP MEDICINE | Facility: OTHER | Age: 77
End: 2017-08-01

## 2017-08-10 ENCOUNTER — CLINICAL SUPPORT (OUTPATIENT)
Dept: FAMILY MEDICINE | Facility: CLINIC | Age: 77
End: 2017-08-10
Payer: MEDICARE

## 2017-08-10 DIAGNOSIS — I48.0 PAROXYSMAL ATRIAL FIBRILLATION: Primary | ICD-10-CM

## 2017-08-10 LAB — INR PPP: 1.6 (ref 2–3)

## 2017-08-10 PROCEDURE — 85610 PROTHROMBIN TIME: CPT | Mod: ,,, | Performed by: FAMILY MEDICINE

## 2017-08-11 LAB
ALBUMIN SERPL-MCNC: 4.6 G/DL (ref 3.6–5.1)
ALBUMIN/GLOB SERPL: 2 (CALC) (ref 1–2.5)
ALP SERPL-CCNC: 85 U/L (ref 33–130)
ALT SERPL-CCNC: 14 U/L (ref 6–29)
AST SERPL-CCNC: 15 U/L (ref 10–35)
BASOPHILS # BLD AUTO: 29 CELLS/UL (ref 0–200)
BASOPHILS NFR BLD AUTO: 0.5 %
BILIRUB SERPL-MCNC: 0.4 MG/DL (ref 0.2–1.2)
BUN SERPL-MCNC: 20 MG/DL (ref 7–25)
BUN/CREAT SERPL: ABNORMAL (CALC) (ref 6–22)
CALCIUM SERPL-MCNC: 10.1 MG/DL (ref 8.6–10.4)
CHLORIDE SERPL-SCNC: 104 MMOL/L (ref 98–110)
CHOLEST SERPL-MCNC: 166 MG/DL (ref 125–200)
CHOLEST/HDLC SERPL: 4.3 (CALC)
CO2 SERPL-SCNC: 30 MMOL/L (ref 20–31)
CREAT SERPL-MCNC: 0.83 MG/DL (ref 0.6–0.93)
EOSINOPHIL # BLD AUTO: 131 CELLS/UL (ref 15–500)
EOSINOPHIL NFR BLD AUTO: 2.3 %
ERYTHROCYTE [DISTWIDTH] IN BLOOD BY AUTOMATED COUNT: 12.7 % (ref 11–15)
GFR SERPL CREATININE-BSD FRML MDRD: 69 ML/MIN/1.73M2
GLOBULIN SER CALC-MCNC: 2.3 G/DL (CALC) (ref 1.9–3.7)
GLUCOSE SERPL-MCNC: 106 MG/DL (ref 65–99)
HCT VFR BLD AUTO: 40.1 % (ref 35–45)
HDLC SERPL-MCNC: 39 MG/DL
HGB BLD-MCNC: 13.5 G/DL (ref 11.7–15.5)
LDLC SERPL CALC-MCNC: 101 MG/DL (CALC)
LYMPHOCYTES # BLD AUTO: 1887 CELLS/UL (ref 850–3900)
LYMPHOCYTES NFR BLD AUTO: 33.1 %
MCH RBC QN AUTO: 30.8 PG (ref 27–33)
MCHC RBC AUTO-ENTMCNC: 33.7 G/DL (ref 32–36)
MCV RBC AUTO: 91.6 FL (ref 80–100)
MONOCYTES # BLD AUTO: 519 CELLS/UL (ref 200–950)
MONOCYTES NFR BLD AUTO: 9.1 %
NEUTROPHILS # BLD AUTO: 3135 CELLS/UL (ref 1500–7800)
NEUTROPHILS NFR BLD AUTO: 55 %
NONHDLC SERPL-MCNC: 127 MG/DL (CALC)
PLATELET # BLD AUTO: 221 THOUSAND/UL (ref 140–400)
PMV BLD REES-ECKER: 10.9 FL (ref 7.5–12.5)
POTASSIUM SERPL-SCNC: 4 MMOL/L (ref 3.5–5.3)
PROT SERPL-MCNC: 6.9 G/DL (ref 6.1–8.1)
RBC # BLD AUTO: 4.38 MILLION/UL (ref 3.8–5.1)
SODIUM SERPL-SCNC: 142 MMOL/L (ref 135–146)
TRIGL SERPL-MCNC: 130 MG/DL
TSH SERPL-ACNC: 1.95 MIU/L (ref 0.4–4.5)
WBC # BLD AUTO: 5.7 THOUSAND/UL (ref 3.8–10.8)

## 2017-08-25 ENCOUNTER — CLINICAL SUPPORT (OUTPATIENT)
Dept: FAMILY MEDICINE | Facility: CLINIC | Age: 77
End: 2017-08-25
Payer: MEDICARE

## 2017-08-25 ENCOUNTER — TELEPHONE (OUTPATIENT)
Dept: SLEEP MEDICINE | Facility: OTHER | Age: 77
End: 2017-08-25

## 2017-08-25 DIAGNOSIS — I48.0 PAROXYSMAL ATRIAL FIBRILLATION: Primary | ICD-10-CM

## 2017-08-25 LAB — INR PPP: 1.8 (ref 2–3)

## 2017-08-25 PROCEDURE — 85610 PROTHROMBIN TIME: CPT | Mod: ,,, | Performed by: FAMILY MEDICINE

## 2017-08-27 RX ORDER — OMEPRAZOLE 20 MG/1
CAPSULE, DELAYED RELEASE ORAL
Qty: 90 CAPSULE | Refills: 3 | Status: SHIPPED | OUTPATIENT
Start: 2017-08-27 | End: 2018-09-04 | Stop reason: SDUPTHER

## 2017-08-31 ENCOUNTER — OFFICE VISIT (OUTPATIENT)
Dept: CARDIOLOGY | Facility: CLINIC | Age: 77
End: 2017-08-31
Payer: MEDICARE

## 2017-08-31 VITALS
DIASTOLIC BLOOD PRESSURE: 76 MMHG | WEIGHT: 199.31 LBS | HEART RATE: 65 BPM | BODY MASS INDEX: 36.68 KG/M2 | OXYGEN SATURATION: 96 % | SYSTOLIC BLOOD PRESSURE: 113 MMHG | HEIGHT: 62 IN

## 2017-08-31 DIAGNOSIS — I10 ESSENTIAL HYPERTENSION: ICD-10-CM

## 2017-08-31 DIAGNOSIS — I48.0 PAROXYSMAL ATRIAL FIBRILLATION: Primary | ICD-10-CM

## 2017-08-31 DIAGNOSIS — I10 HTN (HYPERTENSION), BENIGN: ICD-10-CM

## 2017-08-31 DIAGNOSIS — G47.33 OSA (OBSTRUCTIVE SLEEP APNEA): ICD-10-CM

## 2017-08-31 DIAGNOSIS — Z79.01 ANTICOAGULATED ON COUMADIN: ICD-10-CM

## 2017-08-31 DIAGNOSIS — E78.2 MIXED HYPERLIPIDEMIA: ICD-10-CM

## 2017-08-31 PROCEDURE — 3078F DIAST BP <80 MM HG: CPT | Mod: S$GLB,,, | Performed by: INTERNAL MEDICINE

## 2017-08-31 PROCEDURE — 3008F BODY MASS INDEX DOCD: CPT | Mod: S$GLB,,, | Performed by: INTERNAL MEDICINE

## 2017-08-31 PROCEDURE — 99499 UNLISTED E&M SERVICE: CPT | Mod: S$GLB,,, | Performed by: INTERNAL MEDICINE

## 2017-08-31 PROCEDURE — 93000 ELECTROCARDIOGRAM COMPLETE: CPT | Mod: S$GLB,,, | Performed by: INTERNAL MEDICINE

## 2017-08-31 PROCEDURE — 1159F MED LIST DOCD IN RCRD: CPT | Mod: S$GLB,,, | Performed by: INTERNAL MEDICINE

## 2017-08-31 PROCEDURE — 99999 PR PBB SHADOW E&M-EST. PATIENT-LVL III: CPT | Mod: PBBFAC,,, | Performed by: INTERNAL MEDICINE

## 2017-08-31 PROCEDURE — 1126F AMNT PAIN NOTED NONE PRSNT: CPT | Mod: S$GLB,,, | Performed by: INTERNAL MEDICINE

## 2017-08-31 PROCEDURE — 99214 OFFICE O/P EST MOD 30 MIN: CPT | Mod: S$GLB,,, | Performed by: INTERNAL MEDICINE

## 2017-08-31 PROCEDURE — 3074F SYST BP LT 130 MM HG: CPT | Mod: S$GLB,,, | Performed by: INTERNAL MEDICINE

## 2017-08-31 NOTE — PROGRESS NOTES
Subjective:   Patient ID:  Mis Ca is a 76 y.o. female who presents for follow-up of [facility]      Problem List Items Addressed This Visit        Pulmonary    JACQUELYN (obstructive sleep apnea)       Cardiac/Vascular    Paroxysmal atrial fibrillation - Primary    HTN (hypertension), benign    Hyperlipidemia    Essential hypertension       Hematology    Anticoagulated on Coumadin      Other Visit Diagnoses    None.         HPI: Patient here for f/u of HTN, and PAF. Patient is doing well with acute complaints. No chest pain or dyspnea.   She has JACQUELYN and uses CPAP nightly. BP and HR is controlled. No orthopnea or PND. She is not very active.      Review of Systems   Constitution: Negative.   HENT: Negative.    Eyes: Negative.    Cardiovascular: Negative.    Respiratory: Negative.    Endocrine: Negative.    Hematologic/Lymphatic: Negative.    Skin: Negative.    Musculoskeletal: Negative.    Gastrointestinal: Negative.    Neurological: Negative.    Psychiatric/Behavioral: Negative.      Patient's Medications   New Prescriptions    No medications on file   Previous Medications    ACETAMINOPHEN (TYLENOL) 500 MG TABLET    Take 1,000 mg by mouth 2 (two) times daily as needed for Pain.    ACETAZOLAMIDE (DIAMOX) 250 MG TABLET    Take 250 mg by mouth 3 (three) times daily.    ALPRAZOLAM (XANAX) 0.25 MG TABLET    TAKE 1 TABLET ONE TIME DAILY    ASPIRIN (ECOTRIN) 81 MG EC TABLET    Take 81 mg by mouth once daily.      DILTIAZEM (CARTIA XT) 180 MG 24 HR CAPSULE    Take 1 capsule (180 mg total) by mouth once daily.    GABAPENTIN (NEURONTIN) 300 MG CAPSULE    Take 1 capsule (300 mg total) by mouth 2 (two) times daily.    HYDROCHLOROTHIAZIDE (MICROZIDE) 12.5 MG CAPSULE    TAKE 1 CAPSULE ONE TIME DAILY    LATANOPROST 0.005 % OPHTHALMIC SOLUTION    1 drop every evening.    LOSARTAN (COZAAR) 100 MG TABLET    TAKE 1 TABLET ONE TIME DAILY    LOVASTATIN (MEVACOR) 40 MG TABLET    TAKE 1 TABLET EVERY EVENING    OMEPRAZOLE  (PRILOSEC) 20 MG CAPSULE    TAKE 1 CAPSULE ONE TIME DAILY    OXYBUTYNIN (DITROPAN) 5 MG TAB    TAKE 1 TABLET ONE TIME DAILY    PRAMIPEXOLE (MIRAPEX) 0.25 MG TABLET    TAKE 1 TABLET ONE TIME DAILY    TIMOLOL MALEATE 0.5% (TIMOPTIC) 0.5 % DROP    Place 1 drop into both eyes once daily.    TRAMADOL (ULTRAM) 50 MG TABLET    Take 1 tablet (50 mg total) by mouth daily as needed for Pain.    WARFARIN (COUMADIN) 6 MG TABLET    TAKE 1 TABLET EVERY DAY   Modified Medications    No medications on file   Discontinued Medications    PREDNISOLONE ACETATE (PRED FORTE) 1 % DRPS    place 1 drop in right eye three times a day       Objective:   Physical Exam   Constitutional: She is oriented to person, place, and time. She appears well-developed and well-nourished. No distress.   Examination of the digits showed no clubbing or cyanosis   HENT:   Head: Normocephalic and atraumatic.   Eyes: Conjunctivae are normal. Pupils are equal, round, and reactive to light. Right eye exhibits no discharge.   Neck: Normal range of motion. Neck supple. No JVD present. No thyromegaly present.   No carotid bruits   Cardiovascular: Normal rate, regular rhythm, S1 normal, S2 normal, normal heart sounds, intact distal pulses and normal pulses.  PMI is not displaced.  Exam reveals no gallop, no friction rub and no opening snap.    No murmur heard.  Pulmonary/Chest: Effort normal and breath sounds normal. No respiratory distress. She has no wheezes. She has no rales. She exhibits no tenderness.   Abdominal: Soft. Bowel sounds are normal. She exhibits no distension and no mass. There is no tenderness. There is no guarding.   No hepatosplenomegaly   Musculoskeletal: Normal range of motion. She exhibits no edema or tenderness.   Lymphadenopathy:     She has no cervical adenopathy.   Neurological: She is alert and oriented to person, place, and time.   Skin: Skin is warm. No rash noted. She is not diaphoretic. No erythema.   Psychiatric: She has a normal mood  and affect.   Nursing note and vitals reviewed.      ECGs reviewed-NSR with LAFB  LABS reviewed  Imaging including Echoes reviewed- ef 60%    Assessment:     1. Paroxysmal atrial fibrillation    2. HTN (hypertension), benign    3. PVC (premature ventricular contraction)    4. Edema of left lower extremity -       Plan:     Continue current medications  Encourage weight loss and exercise  2d echo complete  Low salt diet  F/u in 6 months.

## 2017-09-01 DIAGNOSIS — I10 ESSENTIAL HYPERTENSION: Primary | ICD-10-CM

## 2017-09-04 ENCOUNTER — HOSPITAL ENCOUNTER (INPATIENT)
Facility: HOSPITAL | Age: 77
LOS: 1 days | Discharge: HOME | DRG: 309 | End: 2017-09-05
Attending: EMERGENCY MEDICINE | Admitting: INTERNAL MEDICINE
Payer: MEDICARE

## 2017-09-04 DIAGNOSIS — I48.0 PAROXYSMAL ATRIAL FIBRILLATION: ICD-10-CM

## 2017-09-04 DIAGNOSIS — G24.02 DYSTONIC DRUG REACTION: ICD-10-CM

## 2017-09-04 DIAGNOSIS — I48.91 ATRIAL FIBRILLATION WITH RVR: Primary | ICD-10-CM

## 2017-09-04 DIAGNOSIS — I48.91 A-FIB: ICD-10-CM

## 2017-09-04 LAB
ALBUMIN SERPL BCP-MCNC: 4 G/DL
ALP SERPL-CCNC: 94 U/L
ALT SERPL W/O P-5'-P-CCNC: 13 U/L
ANION GAP SERPL CALC-SCNC: 11 MMOL/L
APTT BLDCRRT: 39.5 SEC
AST SERPL-CCNC: 14 U/L
BASOPHILS # BLD AUTO: 0.03 K/UL
BASOPHILS NFR BLD: 0.5 %
BILIRUB SERPL-MCNC: 0.3 MG/DL
BILIRUB UR QL STRIP: NEGATIVE
BNP SERPL-MCNC: 118 PG/ML
BUN SERPL-MCNC: 23 MG/DL
CALCIUM SERPL-MCNC: 10.2 MG/DL
CHLORIDE SERPL-SCNC: 106 MMOL/L
CLARITY UR: CLEAR
CO2 SERPL-SCNC: 22 MMOL/L
COLOR UR: YELLOW
CREAT SERPL-MCNC: 1 MG/DL
DIFFERENTIAL METHOD: ABNORMAL
EOSINOPHIL # BLD AUTO: 0.2 K/UL
EOSINOPHIL NFR BLD: 2.4 %
ERYTHROCYTE [DISTWIDTH] IN BLOOD BY AUTOMATED COUNT: 13.3 %
EST. GFR  (AFRICAN AMERICAN): >60 ML/MIN/1.73 M^2
EST. GFR  (NON AFRICAN AMERICAN): 55 ML/MIN/1.73 M^2
GLUCOSE SERPL-MCNC: 117 MG/DL
GLUCOSE UR QL STRIP: NEGATIVE
HCT VFR BLD AUTO: 41.5 %
HGB BLD-MCNC: 13.9 G/DL
HGB UR QL STRIP: ABNORMAL
INR PPP: 2.5
KETONES UR QL STRIP: ABNORMAL
LEUKOCYTE ESTERASE UR QL STRIP: NEGATIVE
LYMPHOCYTES # BLD AUTO: 2.4 K/UL
LYMPHOCYTES NFR BLD: 37.2 %
MCH RBC QN AUTO: 31.4 PG
MCHC RBC AUTO-ENTMCNC: 33.5 G/DL
MCV RBC AUTO: 94 FL
MONOCYTES # BLD AUTO: 0.6 K/UL
MONOCYTES NFR BLD: 9.5 %
NEUTROPHILS # BLD AUTO: 3.2 K/UL
NEUTROPHILS NFR BLD: 50.2 %
NITRITE UR QL STRIP: NEGATIVE
PH UR STRIP: 6 [PH] (ref 5–8)
PLATELET # BLD AUTO: 209 K/UL
PMV BLD AUTO: 10.5 FL
POTASSIUM SERPL-SCNC: 3.9 MMOL/L
PROT SERPL-MCNC: 7.2 G/DL
PROT UR QL STRIP: NEGATIVE
PROTHROMBIN TIME: 25.4 SEC
RBC # BLD AUTO: 4.42 M/UL
SODIUM SERPL-SCNC: 139 MMOL/L
SP GR UR STRIP: 1.01 (ref 1–1.03)
TROPONIN I SERPL DL<=0.01 NG/ML-MCNC: 0.15 NG/ML
TROPONIN I SERPL DL<=0.01 NG/ML-MCNC: 0.18 NG/ML
TROPONIN I SERPL DL<=0.01 NG/ML-MCNC: <0.006 NG/ML
TROPONIN I SERPL DL<=0.01 NG/ML-MCNC: <0.006 NG/ML
URN SPEC COLLECT METH UR: ABNORMAL
UROBILINOGEN UR STRIP-ACNC: NEGATIVE EU/DL
WBC # BLD AUTO: 6.31 K/UL

## 2017-09-04 PROCEDURE — 94761 N-INVAS EAR/PLS OXIMETRY MLT: CPT

## 2017-09-04 PROCEDURE — 85610 PROTHROMBIN TIME: CPT

## 2017-09-04 PROCEDURE — 85025 COMPLETE CBC W/AUTO DIFF WBC: CPT

## 2017-09-04 PROCEDURE — 83880 ASSAY OF NATRIURETIC PEPTIDE: CPT

## 2017-09-04 PROCEDURE — 25000003 PHARM REV CODE 250: Performed by: INTERNAL MEDICINE

## 2017-09-04 PROCEDURE — 63600175 PHARM REV CODE 636 W HCPCS: Performed by: EMERGENCY MEDICINE

## 2017-09-04 PROCEDURE — 81003 URINALYSIS AUTO W/O SCOPE: CPT

## 2017-09-04 PROCEDURE — 84484 ASSAY OF TROPONIN QUANT: CPT | Mod: 91

## 2017-09-04 PROCEDURE — 96365 THER/PROPH/DIAG IV INF INIT: CPT

## 2017-09-04 PROCEDURE — 96361 HYDRATE IV INFUSION ADD-ON: CPT

## 2017-09-04 PROCEDURE — 93005 ELECTROCARDIOGRAM TRACING: CPT

## 2017-09-04 PROCEDURE — 25000003 PHARM REV CODE 250: Performed by: EMERGENCY MEDICINE

## 2017-09-04 PROCEDURE — 85730 THROMBOPLASTIN TIME PARTIAL: CPT

## 2017-09-04 PROCEDURE — 96375 TX/PRO/DX INJ NEW DRUG ADDON: CPT

## 2017-09-04 PROCEDURE — 20000000 HC ICU ROOM

## 2017-09-04 PROCEDURE — 80053 COMPREHEN METABOLIC PANEL: CPT

## 2017-09-04 PROCEDURE — 93010 ELECTROCARDIOGRAM REPORT: CPT | Mod: ,,, | Performed by: INTERNAL MEDICINE

## 2017-09-04 PROCEDURE — 96376 TX/PRO/DX INJ SAME DRUG ADON: CPT

## 2017-09-04 PROCEDURE — 93010 ELECTROCARDIOGRAM REPORT: CPT | Mod: 77,,, | Performed by: INTERNAL MEDICINE

## 2017-09-04 PROCEDURE — 99285 EMERGENCY DEPT VISIT HI MDM: CPT | Mod: 25

## 2017-09-04 PROCEDURE — 36415 COLL VENOUS BLD VENIPUNCTURE: CPT

## 2017-09-04 RX ORDER — DILTIAZEM HCL 1 MG/ML
5 INJECTION, SOLUTION INTRAVENOUS CONTINUOUS
Status: DISCONTINUED | OUTPATIENT
Start: 2017-09-04 | End: 2017-09-05

## 2017-09-04 RX ORDER — HYDROCHLOROTHIAZIDE 12.5 MG/1
12.5 TABLET ORAL DAILY
Status: DISCONTINUED | OUTPATIENT
Start: 2017-09-05 | End: 2017-09-05 | Stop reason: HOSPADM

## 2017-09-04 RX ORDER — DILTIAZEM HYDROCHLORIDE 5 MG/ML
25 INJECTION INTRAVENOUS
Status: COMPLETED | OUTPATIENT
Start: 2017-09-04 | End: 2017-09-04

## 2017-09-04 RX ORDER — PREDNISONE 20 MG/1
40 TABLET ORAL
Status: COMPLETED | OUTPATIENT
Start: 2017-09-04 | End: 2017-09-04

## 2017-09-04 RX ORDER — MORPHINE SULFATE 2 MG/ML
4 INJECTION, SOLUTION INTRAMUSCULAR; INTRAVENOUS EVERY 4 HOURS PRN
Status: DISCONTINUED | OUTPATIENT
Start: 2017-09-04 | End: 2017-09-05 | Stop reason: HOSPADM

## 2017-09-04 RX ORDER — LORAZEPAM 2 MG/ML
INJECTION INTRAMUSCULAR
Status: DISPENSED
Start: 2017-09-04 | End: 2017-09-04

## 2017-09-04 RX ORDER — LOSARTAN POTASSIUM 50 MG/1
100 TABLET ORAL DAILY
Status: DISCONTINUED | OUTPATIENT
Start: 2017-09-05 | End: 2017-09-05 | Stop reason: HOSPADM

## 2017-09-04 RX ORDER — DILTIAZEM HCL 1 MG/ML
5 INJECTION, SOLUTION INTRAVENOUS
Status: DISCONTINUED | OUTPATIENT
Start: 2017-09-04 | End: 2017-09-04

## 2017-09-04 RX ORDER — DILTIAZEM HCL 1 MG/ML
5 INJECTION, SOLUTION INTRAVENOUS
Status: COMPLETED | OUTPATIENT
Start: 2017-09-04 | End: 2017-09-04

## 2017-09-04 RX ORDER — ACETAZOLAMIDE 250 MG/1
250 TABLET ORAL 3 TIMES DAILY
Status: DISCONTINUED | OUTPATIENT
Start: 2017-09-04 | End: 2017-09-05 | Stop reason: HOSPADM

## 2017-09-04 RX ORDER — GABAPENTIN 300 MG/1
300 CAPSULE ORAL 2 TIMES DAILY
Status: DISCONTINUED | OUTPATIENT
Start: 2017-09-04 | End: 2017-09-05 | Stop reason: HOSPADM

## 2017-09-04 RX ORDER — SODIUM CHLORIDE 9 MG/ML
500 INJECTION, SOLUTION INTRAVENOUS
Status: COMPLETED | OUTPATIENT
Start: 2017-09-04 | End: 2017-09-04

## 2017-09-04 RX ORDER — ONDANSETRON 2 MG/ML
4 INJECTION INTRAMUSCULAR; INTRAVENOUS EVERY 6 HOURS PRN
Status: DISCONTINUED | OUTPATIENT
Start: 2017-09-04 | End: 2017-09-05 | Stop reason: HOSPADM

## 2017-09-04 RX ORDER — ALPRAZOLAM 0.25 MG/1
0.25 TABLET ORAL ONCE
Status: COMPLETED | OUTPATIENT
Start: 2017-09-04 | End: 2017-09-04

## 2017-09-04 RX ORDER — ACETAMINOPHEN 325 MG/1
650 TABLET ORAL EVERY 6 HOURS PRN
Status: DISCONTINUED | OUTPATIENT
Start: 2017-09-04 | End: 2017-09-05 | Stop reason: HOSPADM

## 2017-09-04 RX ORDER — DIPHENHYDRAMINE HYDROCHLORIDE 50 MG/ML
12.5 INJECTION INTRAMUSCULAR; INTRAVENOUS
Status: COMPLETED | OUTPATIENT
Start: 2017-09-04 | End: 2017-09-04

## 2017-09-04 RX ORDER — DILTIAZEM HYDROCHLORIDE 5 MG/ML
20 INJECTION INTRAVENOUS
Status: COMPLETED | OUTPATIENT
Start: 2017-09-04 | End: 2017-09-04

## 2017-09-04 RX ORDER — WARFARIN 6 MG/1
6 TABLET ORAL DAILY
Status: DISCONTINUED | OUTPATIENT
Start: 2017-09-04 | End: 2017-09-05 | Stop reason: HOSPADM

## 2017-09-04 RX ORDER — PANTOPRAZOLE SODIUM 40 MG/1
40 TABLET, DELAYED RELEASE ORAL DAILY
Status: DISCONTINUED | OUTPATIENT
Start: 2017-09-05 | End: 2017-09-05 | Stop reason: HOSPADM

## 2017-09-04 RX ORDER — PROCHLORPERAZINE EDISYLATE 5 MG/ML
10 INJECTION INTRAMUSCULAR; INTRAVENOUS
Status: COMPLETED | OUTPATIENT
Start: 2017-09-04 | End: 2017-09-04

## 2017-09-04 RX ORDER — SODIUM CHLORIDE 9 MG/ML
INJECTION, SOLUTION INTRAVENOUS CONTINUOUS
Status: DISCONTINUED | OUTPATIENT
Start: 2017-09-04 | End: 2017-09-05

## 2017-09-04 RX ORDER — MORPHINE SULFATE 2 MG/ML
2 INJECTION, SOLUTION INTRAMUSCULAR; INTRAVENOUS EVERY 6 HOURS PRN
Status: DISCONTINUED | OUTPATIENT
Start: 2017-09-04 | End: 2017-09-05 | Stop reason: HOSPADM

## 2017-09-04 RX ORDER — HALOPERIDOL 5 MG/ML
5 INJECTION INTRAMUSCULAR
Status: COMPLETED | OUTPATIENT
Start: 2017-09-04 | End: 2017-09-04

## 2017-09-04 RX ORDER — BENZTROPINE MESYLATE 1 MG/ML
1 INJECTION, SOLUTION INTRAMUSCULAR; INTRAVENOUS
Status: COMPLETED | OUTPATIENT
Start: 2017-09-04 | End: 2017-09-04

## 2017-09-04 RX ORDER — PRAMIPEXOLE DIHYDROCHLORIDE 0.12 MG/1
0.25 TABLET ORAL ONCE
Status: COMPLETED | OUTPATIENT
Start: 2017-09-04 | End: 2017-09-04

## 2017-09-04 RX ORDER — ASPIRIN 325 MG
325 TABLET ORAL
Status: COMPLETED | OUTPATIENT
Start: 2017-09-04 | End: 2017-09-04

## 2017-09-04 RX ADMIN — PROMETHAZINE HYDROCHLORIDE 12.5 MG: 25 INJECTION, SOLUTION INTRAMUSCULAR; INTRAVENOUS at 02:09

## 2017-09-04 RX ADMIN — ALPRAZOLAM 0.25 MG: 0.25 TABLET ORAL at 10:09

## 2017-09-04 RX ADMIN — WARFARIN SODIUM 6 MG: 6 TABLET ORAL at 04:09

## 2017-09-04 RX ADMIN — DIPHENHYDRAMINE HYDROCHLORIDE 12.5 MG: 50 INJECTION, SOLUTION INTRAMUSCULAR; INTRAVENOUS at 10:09

## 2017-09-04 RX ADMIN — DILTIAZEM HYDROCHLORIDE 5 MG/HR: 5 INJECTION INTRAVENOUS at 03:09

## 2017-09-04 RX ADMIN — HALOPERIDOL LACTATE 5 MG: 5 INJECTION, SOLUTION INTRAMUSCULAR at 11:09

## 2017-09-04 RX ADMIN — SODIUM CHLORIDE 500 ML: 0.9 INJECTION, SOLUTION INTRAVENOUS at 12:09

## 2017-09-04 RX ADMIN — GABAPENTIN 300 MG: 300 CAPSULE ORAL at 10:09

## 2017-09-04 RX ADMIN — DILTIAZEM HYDROCHLORIDE 10 MG/HR: 5 INJECTION INTRAVENOUS at 10:09

## 2017-09-04 RX ADMIN — SODIUM CHLORIDE 500 ML: 0.9 INJECTION, SOLUTION INTRAVENOUS at 10:09

## 2017-09-04 RX ADMIN — ACETAZOLAMIDE 250 MG: 250 TABLET ORAL at 10:09

## 2017-09-04 RX ADMIN — LORAZEPAM 1 MG: 2 INJECTION INTRAMUSCULAR; INTRAVENOUS at 10:09

## 2017-09-04 RX ADMIN — DILTIAZEM HYDROCHLORIDE 25 MG: 5 INJECTION INTRAVENOUS at 02:09

## 2017-09-04 RX ADMIN — ASPIRIN 325 MG ORAL TABLET 325 MG: 325 PILL ORAL at 08:09

## 2017-09-04 RX ADMIN — ACETAMINOPHEN 650 MG: 325 TABLET ORAL at 10:09

## 2017-09-04 RX ADMIN — BENZTROPINE MESYLATE 1 MG: 1 INJECTION INTRAMUSCULAR; INTRAVENOUS at 11:09

## 2017-09-04 RX ADMIN — LORAZEPAM 1 MG: 2 INJECTION INTRAMUSCULAR; INTRAVENOUS at 11:09

## 2017-09-04 RX ADMIN — PREDNISONE 40 MG: 20 TABLET ORAL at 11:09

## 2017-09-04 RX ADMIN — PROCHLORPERAZINE EDISYLATE 10 MG: 5 INJECTION INTRAMUSCULAR; INTRAVENOUS at 10:09

## 2017-09-04 RX ADMIN — DILTIAZEM HYDROCHLORIDE 20 MG: 5 INJECTION INTRAVENOUS at 12:09

## 2017-09-04 RX ADMIN — PRAMIPEXOLE DIHYDROCHLORIDE 0.25 MG: 0.12 TABLET ORAL at 10:09

## 2017-09-04 RX ADMIN — SODIUM CHLORIDE: 0.9 INJECTION, SOLUTION INTRAVENOUS at 04:09

## 2017-09-04 NOTE — ED NOTES
Pt continues to have uncontrollable jerky movements. Dr. Moore is aware. On cardiac monitor, sister is at bedside. SR up and pt is sitting on side of bed with legs through the side rails. Instructed pt that this is not safe. Pt verbalizes understanding however will not get back into bed properly.

## 2017-09-04 NOTE — ED PROVIDER NOTES
Encounter Date: 9/4/2017       History     Chief Complaint   Patient presents with    atrial fib     c/o rapid heart rate, h/o afib, + chest heaviness and sob.     76-year-old female presents the emergency department complaining of palpitations, chest pressure, shortness of breath.  Onset this morning, when she woke up.  Reports the chest pressure is constant.  Somewhat worse with activity, no alleviating factors reported.  Reports the palpitations with her initially when she woke up but have resolved, as has the shortness of breath.  She reports some nausea as well.  Has a history of paroxysmal A. fib.  No other symptoms reported.  Denies any headache, lightheadedness, dizziness, sore throat, cough, congestion, abdominal pain, constipation, diarrhea, dysuria, hematuria, fever, chills, vomiting.          Review of patient's allergies indicates:   Allergen Reactions    Penicillins     Sulfa (sulfonamide antibiotics)     Compazine [prochlorperazine edisylate] Anxiety     Past Medical History:   Diagnosis Date    Anxiety     Atrial fibrillation     Blind right eye     Bradycardia     Hyperlipidemia     Hypertension     PVC (premature ventricular contraction)     RLS (restless legs syndrome)      Past Surgical History:   Procedure Laterality Date    APPENDECTOMY      BREAST BIOPSY Left     times two    CATARACT EXTRACTION BILATERAL W/ ANTERIOR VITRECTOMY      EYE SURGERY      HYSTERECTOMY      orif of left forearm       Family History   Problem Relation Age of Onset    Aneurysm Mother     Cancer Father     Cancer Sister      Breast    Colon polyps Sister     Colon polyps Brother     COPD Sister     Heart disease Sister     Kidney disease Sister     Colon polyps Sister     Breast cancer Sister      Social History   Substance Use Topics    Smoking status: Never Smoker    Smokeless tobacco: Never Used    Alcohol use No     Review of Systems   Constitutional: Negative for chills, fatigue and  fever.   HENT: Negative for congestion, sore throat and voice change.    Eyes: Negative for photophobia, pain and redness.   Respiratory: Positive for shortness of breath. Negative for cough and choking.    Cardiovascular: Positive for chest pain and palpitations. Negative for leg swelling.   Gastrointestinal: Positive for nausea. Negative for abdominal pain, constipation, diarrhea and vomiting.   Genitourinary: Negative for dysuria, frequency and urgency.   Musculoskeletal: Negative for back pain, neck pain and neck stiffness.   Neurological: Negative for seizures, speech difficulty, light-headedness, numbness and headaches.   All other systems reviewed and are negative.      Physical Exam     Initial Vitals [09/04/17 0827]   BP Pulse Resp Temp SpO2   (!) 141/68 93 18 98.2 °F (36.8 °C) 97 %      MAP       92.33         Physical Exam    Nursing note and vitals reviewed.  Constitutional: She appears well-developed and well-nourished. She appears distressed (Somewhat anxious).   HENT:   Head: Normocephalic and atraumatic.   Oropharynx clear; Dry MM   Eyes: Conjunctivae and EOM are normal. Pupils are equal, round, and reactive to light.   Neck: Normal range of motion. Neck supple. No tracheal deviation present.   Cardiovascular: Normal heart sounds and intact distal pulses.   Tachycardic; Irregular   Pulmonary/Chest: Breath sounds normal. No respiratory distress. She has no wheezes. She has no rhonchi. She has no rales.   Abdominal: Soft. Bowel sounds are normal. She exhibits no distension. There is no tenderness. There is no rebound and no guarding.   Musculoskeletal: Normal range of motion. She exhibits no edema or tenderness.   Neurological: She is alert and oriented to person, place, and time. No cranial nerve deficit or sensory deficit.   Skin: Skin is warm and dry. Capillary refill takes less than 2 seconds.         ED Course   Critical Care  Date/Time: 9/4/2017 3:36 PM  Performed by: BLOSSOM SANDERS  LOI.  Authorized by: BLOSSOM SANDERS   Direct patient critical care time: 15 minutes  Additional history critical care time: 15 minutes  Ordering / reviewing critical care time: 15 minutes  Documentation critical care time: 15 minutes  Consulting other physicians critical care time: 15 minutes  Consult with family critical care time: 10 minutes  Total critical care time (exclusive of procedural time) : 85 minutes  Critical care time was exclusive of separately billable procedures and treating other patients.  Critical care was necessary to treat or prevent imminent or life-threatening deterioration of the following conditions: cardiac failure.  Critical care was time spent personally by me on the following activities: discussions with consultants, evaluation of patient's response to treatment, obtaining history from patient or surrogate, ordering and review of laboratory studies, pulse oximetry, review of old charts, re-evaluation of patient's condition, ordering and review of radiographic studies, ordering and performing treatments and interventions, examination of patient and interpretation of cardiac output measurements.        Labs Reviewed   CBC W/ AUTO DIFFERENTIAL - Abnormal; Notable for the following:        Result Value    MCH 31.4 (*)     All other components within normal limits   COMPREHENSIVE METABOLIC PANEL - Abnormal; Notable for the following:     CO2 22 (*)     Glucose 117 (*)     eGFR if non  55 (*)     All other components within normal limits   URINALYSIS - Abnormal; Notable for the following:     Ketones, UA 1+ (*)     Occult Blood UA Trace (*)     All other components within normal limits   APTT - Abnormal; Notable for the following:     aPTT 39.5 (*)     All other components within normal limits   PROTIME-INR - Abnormal; Notable for the following:     Prothrombin Time 25.4 (*)     INR 2.5 (*)     All other components within normal limits   B-TYPE NATRIURETIC PEPTIDE -  Abnormal; Notable for the following:      (*)     All other components within normal limits   TROPONIN I   TROPONIN I     EKG Readings: (Independently Interpreted)   Initial Reading: No STEMI. Previous EKG: Compared with most recent EKG Previous EKG Date: 8/31/17 (Unchanged). Rhythm: Atrial Fibrillation. Heart Rate: 81. Ectopy: No Ectopy. Conduction: LAFP. ST Segments: Normal ST Segments. T Waves: Normal. Axis: Normal.   Repeat EKG at rate of 150 - Afib with RVR       X-Rays:   Independently Interpreted Readings:   Chest X-Ray: Chest x-ray interpreted by radiologist and visualized by me:     Imaging Results          X-Ray Chest AP Portable (Final result)  Result time 09/04/17 09:16:16    Final result by Lizet Dorado MD (09/04/17 09:16:16)                 Impression:     No acute finding to correlate with history of pain.      Electronically signed by: Lizet Dorado MD  Date:     09/04/17  Time:    09:16              Narrative:    Single view compared to November 2014.    Cardiac size is not enlarged.  There is calcification along the wall of the aorta.  No pleural fluid is present on this single view.  Lungs are clear with some minor bibasilar atelectasis..  Osseous structures are intact.  There is degenerative change in the spine.                              Medical Decision Making:   Initial Assessment:   76-year-old female presents the emergency department complaining of chest pain and palpitations and shortness of breath  Differential Diagnosis:   ACS, dissection, pneumonia, dehydration, A. fib with RVR, demand ischemia, arrhythmia  Independently Interpreted Test(s):   I have ordered and independently interpreted X-rays - see prior notes.  I have ordered and independently interpreted EKG Reading(s) - see prior notes  Clinical Tests:   Lab Tests: Reviewed       <> Summary of Lab: Benign  ED Management:  After the initial negative troponin, patient was given some IV fluid with some Compazine to  which she had a dystonic reaction.  She was given several medications afterward, and in the midst of her evaluation she again went into A. fib with RVR, which did not respond to the Cardizem 20 and 25 g IV.  She was started on a Cardizem drip.  I discussed the case with Dr. Benton who agrees with admission into the ICU at this time.  I discussed this with the family and they're comfortable with admission at this time.                   ED Course      Clinical Impression:   The primary encounter diagnosis was Atrial fibrillation with RVR. A diagnosis of Dystonic drug reaction was also pertinent to this visit.    Disposition:   Disposition: Admitted  Condition: Stable                        Ariel Moore MD  09/04/17 4605

## 2017-09-04 NOTE — ED NOTES
Pt is not responding to medications. Family at bedside. Dr. Moore informed. Radha RN at bedside. SR up and CB in reach. On cardiac monitor. Heart rate is elevated. Pt is moving about the bed, legs are uncontrollable kicking movements, arms are having uncontrollable movements. Continuing to monitor.

## 2017-09-04 NOTE — ED NOTES
Pt presents to ED with c/o A Fib since 2 am this morning. Pt states this has happened to her only one other time. Denies any dizziness, weakness, N/V. States she could feel it in her chest and that she felt a heaviness. States that her HR got up to 120 at home. Pt AAO at present. Pt placed on cardiac monitor, bp cuff and continuous pulse ox. Will continue to monitor.

## 2017-09-04 NOTE — ED NOTES
Pt continues with the uncontrollable body movements. Sister at bedside. Pt on cardiac monitor. SR up and CB in reach.

## 2017-09-04 NOTE — ED NOTES
Pt is now a fall risk. Fall precautions initiated. Pt refuses to adhere to fall precatutions- sister got her up, side rails down, put her in a chair by the bed. Instructed pt and sister that pt is a fall risk and needs to be in bed with side rails up. They Verbalize understanding however pt remains on chair. Dr. Moore notified.

## 2017-09-04 NOTE — ED NOTES
Pt is very anxious at this time, unable to sit still, sister at bedside and also very anxious. Pt heart rate up. Dr. Moore notified.

## 2017-09-04 NOTE — ED NOTES
Pt continues to have uncontrollable body movements, jerking leg and arm movements, on Cardiac monitor. Instructed pt she must stay in bed due to fall risk. Pt placed in bed and side rails up. Sister at bedside. SR up and CB in reach.

## 2017-09-04 NOTE — ED NOTES
Pt continues to have uncontrollable movements. Pt is very tired and face is red. HR is elevated. Dr. Moore aware. SR up and CB in reach. Sister at bedside.

## 2017-09-04 NOTE — ED NOTES
Cold compresses on her forehead and neck, pillows given, head of bed up. Pt continues to have uncontrollable movements, face is red. Pt states she can't lie down because she gets short of breath. Dr. Moore informed. Radha Charge Nurse RN informed.

## 2017-09-04 NOTE — ED NOTES
Pt continues with the uncontrollable movements. Pt had an incontinent episode at this time. Pt cleaned and dried. Pt on cardiac monitor. SR up and CB in reach. Family at bedside.

## 2017-09-04 NOTE — ED NOTES
Pt states she is having some sort of reaction to the Compazine- restless leg syndrome- kicking her legs and can't control the movements. Dr. Moore informed.

## 2017-09-05 VITALS
HEART RATE: 71 BPM | DIASTOLIC BLOOD PRESSURE: 53 MMHG | SYSTOLIC BLOOD PRESSURE: 112 MMHG | WEIGHT: 201.75 LBS | TEMPERATURE: 98 F | RESPIRATION RATE: 11 BRPM | BODY MASS INDEX: 37.13 KG/M2 | HEIGHT: 62 IN | OXYGEN SATURATION: 96 %

## 2017-09-05 LAB
ANION GAP SERPL CALC-SCNC: 9 MMOL/L
AORTIC VALVE REGURGITATION: ABNORMAL
BASOPHILS # BLD AUTO: 0.01 K/UL
BASOPHILS NFR BLD: 0.1 %
BUN SERPL-MCNC: 14 MG/DL
CALCIUM SERPL-MCNC: 9.6 MG/DL
CHLORIDE SERPL-SCNC: 110 MMOL/L
CO2 SERPL-SCNC: 23 MMOL/L
CREAT SERPL-MCNC: 0.9 MG/DL
DIASTOLIC DYSFUNCTION: YES
DIFFERENTIAL METHOD: ABNORMAL
EOSINOPHIL # BLD AUTO: 0 K/UL
EOSINOPHIL NFR BLD: 0.1 %
ERYTHROCYTE [DISTWIDTH] IN BLOOD BY AUTOMATED COUNT: 13.6 %
EST. GFR  (AFRICAN AMERICAN): >60 ML/MIN/1.73 M^2
EST. GFR  (NON AFRICAN AMERICAN): >60 ML/MIN/1.73 M^2
ESTIMATED PA SYSTOLIC PRESSURE: 35.72
GLUCOSE SERPL-MCNC: 102 MG/DL
HCT VFR BLD AUTO: 37.8 %
HGB BLD-MCNC: 12.5 G/DL
INR PPP: 3.2
LYMPHOCYTES # BLD AUTO: 2.1 K/UL
LYMPHOCYTES NFR BLD: 19.3 %
MCH RBC QN AUTO: 31.1 PG
MCHC RBC AUTO-ENTMCNC: 33.1 G/DL
MCV RBC AUTO: 94 FL
MITRAL VALVE MOBILITY: NORMAL
MITRAL VALVE REGURGITATION: ABNORMAL
MONOCYTES # BLD AUTO: 1.7 K/UL
MONOCYTES NFR BLD: 15.3 %
NEUTROPHILS # BLD AUTO: 7.1 K/UL
NEUTROPHILS NFR BLD: 65.2 %
PLATELET # BLD AUTO: 214 K/UL
PMV BLD AUTO: 10.8 FL
POTASSIUM SERPL-SCNC: 3.6 MMOL/L
PROTHROMBIN TIME: 32.8 SEC
RBC # BLD AUTO: 4.02 M/UL
RETIRED EF AND QEF - SEE NOTES: 60 (ref 55–65)
SODIUM SERPL-SCNC: 142 MMOL/L
TRICUSPID VALVE REGURGITATION: ABNORMAL
TROPONIN I SERPL DL<=0.01 NG/ML-MCNC: 0.19 NG/ML
WBC # BLD AUTO: 10.81 K/UL

## 2017-09-05 PROCEDURE — 25000003 PHARM REV CODE 250: Performed by: NURSE PRACTITIONER

## 2017-09-05 PROCEDURE — 93010 ELECTROCARDIOGRAM REPORT: CPT | Mod: ,,, | Performed by: INTERNAL MEDICINE

## 2017-09-05 PROCEDURE — 99239 HOSP IP/OBS DSCHRG MGMT >30: CPT | Mod: ,,, | Performed by: INTERNAL MEDICINE

## 2017-09-05 PROCEDURE — 85610 PROTHROMBIN TIME: CPT

## 2017-09-05 PROCEDURE — 93306 TTE W/DOPPLER COMPLETE: CPT

## 2017-09-05 PROCEDURE — 85025 COMPLETE CBC W/AUTO DIFF WBC: CPT

## 2017-09-05 PROCEDURE — 93005 ELECTROCARDIOGRAM TRACING: CPT

## 2017-09-05 PROCEDURE — 80048 BASIC METABOLIC PNL TOTAL CA: CPT

## 2017-09-05 PROCEDURE — 94761 N-INVAS EAR/PLS OXIMETRY MLT: CPT

## 2017-09-05 PROCEDURE — 93306 TTE W/DOPPLER COMPLETE: CPT | Mod: 26,,, | Performed by: INTERNAL MEDICINE

## 2017-09-05 PROCEDURE — 25000003 PHARM REV CODE 250: Performed by: EMERGENCY MEDICINE

## 2017-09-05 PROCEDURE — 36415 COLL VENOUS BLD VENIPUNCTURE: CPT

## 2017-09-05 RX ORDER — DILTIAZEM HYDROCHLORIDE 240 MG/1
240 CAPSULE, COATED, EXTENDED RELEASE ORAL DAILY
Qty: 30 CAPSULE | Refills: 11 | Status: SHIPPED | OUTPATIENT
Start: 2017-09-06 | End: 2017-09-20 | Stop reason: SDUPTHER

## 2017-09-05 RX ORDER — DILTIAZEM HYDROCHLORIDE 120 MG/1
240 CAPSULE, COATED, EXTENDED RELEASE ORAL ONCE
Status: COMPLETED | OUTPATIENT
Start: 2017-09-05 | End: 2017-09-05

## 2017-09-05 RX ORDER — OXYBUTYNIN CHLORIDE 5 MG/1
5 TABLET ORAL DAILY
Status: DISCONTINUED | OUTPATIENT
Start: 2017-09-05 | End: 2017-09-05 | Stop reason: HOSPADM

## 2017-09-05 RX ORDER — WARFARIN 6 MG/1
6 TABLET ORAL DAILY
COMMUNITY
End: 2018-01-18 | Stop reason: SDUPTHER

## 2017-09-05 RX ORDER — DILTIAZEM HYDROCHLORIDE 120 MG/1
240 CAPSULE, COATED, EXTENDED RELEASE ORAL DAILY
Status: DISCONTINUED | OUTPATIENT
Start: 2017-09-06 | End: 2017-09-05 | Stop reason: HOSPADM

## 2017-09-05 RX ORDER — DILTIAZEM HYDROCHLORIDE 30 MG/1
60 TABLET, FILM COATED ORAL EVERY 6 HOURS
Status: DISCONTINUED | OUTPATIENT
Start: 2017-09-05 | End: 2017-09-05

## 2017-09-05 RX ADMIN — ACETAZOLAMIDE 250 MG: 250 TABLET ORAL at 06:09

## 2017-09-05 RX ADMIN — LOSARTAN POTASSIUM 100 MG: 50 TABLET, FILM COATED ORAL at 09:09

## 2017-09-05 RX ADMIN — DILTIAZEM HYDROCHLORIDE 240 MG: 120 CAPSULE, EXTENDED RELEASE ORAL at 01:09

## 2017-09-05 RX ADMIN — DILTIAZEM HYDROCHLORIDE 60 MG: 30 TABLET, FILM COATED ORAL at 10:09

## 2017-09-05 RX ADMIN — HYDROCHLOROTHIAZIDE 12.5 MG: 12.5 TABLET ORAL at 09:09

## 2017-09-05 RX ADMIN — GABAPENTIN 300 MG: 300 CAPSULE ORAL at 09:09

## 2017-09-05 RX ADMIN — OXYBUTYNIN CHLORIDE 5 MG: 5 TABLET ORAL at 10:09

## 2017-09-05 RX ADMIN — SODIUM CHLORIDE: 0.9 INJECTION, SOLUTION INTRAVENOUS at 04:09

## 2017-09-05 RX ADMIN — PANTOPRAZOLE SODIUM 40 MG: 40 TABLET, DELAYED RELEASE ORAL at 09:09

## 2017-09-05 NOTE — NURSING
Converted to SR-SA. Pt still with jerky movements and unable to lay still. Bed alarm on. Family member at .

## 2017-09-05 NOTE — PLAN OF CARE
Patient ambulated in hallway, no signs of comfort/distress; HR 70s-80s; no CP or SOB; meds to be administered them patient will be d/c'd to home

## 2017-09-05 NOTE — ASSESSMENT & PLAN NOTE
-admitted with afib with RVR with HR up to 140s  -spontaneous conversion this AM to NSR  -place on Cardizem 60mg po Q6hrs with plans to increase Cardizem CD to 240mg from 180mg upon discharge  -continue Coumadin at home dose with daily INR monitoring

## 2017-09-05 NOTE — SUBJECTIVE & OBJECTIVE
Past Medical History:   Diagnosis Date    Anxiety     Atrial fibrillation     Blind right eye     Bradycardia     Hyperlipidemia     Hypertension     PVC (premature ventricular contraction)     RLS (restless legs syndrome)        Past Surgical History:   Procedure Laterality Date    APPENDECTOMY      BREAST BIOPSY Left     times two    CATARACT EXTRACTION BILATERAL W/ ANTERIOR VITRECTOMY      EYE SURGERY      HYSTERECTOMY      orif of left forearm         Review of patient's allergies indicates:   Allergen Reactions    Penicillins     Sulfa (sulfonamide antibiotics)     Compazine [prochlorperazine edisylate] Anxiety       No current facility-administered medications on file prior to encounter.      Current Outpatient Prescriptions on File Prior to Encounter   Medication Sig    acetaminophen (TYLENOL) 500 MG tablet Take 1,000 mg by mouth 2 (two) times daily as needed for Pain.    acetaZOLAMIDE (DIAMOX) 250 MG tablet Take 250 mg by mouth 3 (three) times daily.    alprazolam (XANAX) 0.25 MG tablet TAKE 1 TABLET ONE TIME DAILY    aspirin (ECOTRIN) 81 MG EC tablet Take 81 mg by mouth once daily.      diltiaZEM (CARTIA XT) 180 MG 24 hr capsule Take 1 capsule (180 mg total) by mouth once daily.    gabapentin (NEURONTIN) 300 MG capsule Take 1 capsule (300 mg total) by mouth 2 (two) times daily.    hydrochlorothiazide (MICROZIDE) 12.5 mg capsule TAKE 1 CAPSULE ONE TIME DAILY    latanoprost 0.005 % ophthalmic solution 1 drop every evening.    losartan (COZAAR) 100 MG tablet TAKE 1 TABLET ONE TIME DAILY    lovastatin (MEVACOR) 40 MG tablet TAKE 1 TABLET EVERY EVENING    omeprazole (PRILOSEC) 20 MG capsule TAKE 1 CAPSULE ONE TIME DAILY    oxybutynin (DITROPAN) 5 MG Tab TAKE 1 TABLET ONE TIME DAILY    pramipexole (MIRAPEX) 0.25 MG tablet TAKE 1 TABLET ONE TIME DAILY    timolol maleate 0.5% (TIMOPTIC) 0.5 % Drop Place 1 drop into both eyes once daily.    tramadol (ULTRAM) 50 mg tablet Take 1 tablet  (50 mg total) by mouth daily as needed for Pain.    warfarin (COUMADIN) 6 MG tablet TAKE 1 TABLET EVERY DAY     Family History     Problem Relation (Age of Onset)    Aneurysm Mother    Breast cancer Sister    COPD Sister    Cancer Father, Sister    Colon polyps Sister, Brother, Sister    Heart disease Sister    Kidney disease Sister        Social History Main Topics    Smoking status: Never Smoker    Smokeless tobacco: Never Used    Alcohol use No    Drug use: No    Sexual activity: Yes     Partners: Male     Review of Systems   Constitution: Negative for chills, decreased appetite, diaphoresis, fever and weakness.   Cardiovascular: Positive for palpitations. Negative for chest pain, claudication, cyanosis, dyspnea on exertion, irregular heartbeat, leg swelling, near-syncope, orthopnea, paroxysmal nocturnal dyspnea and syncope.   Respiratory: Negative for cough, hemoptysis, shortness of breath and wheezing.    Gastrointestinal: Negative for bloating, abdominal pain, constipation, diarrhea, melena, nausea and vomiting.   Neurological: Negative for dizziness.     Objective:     Vital Signs (Most Recent):  Temp: 97.8 °F (36.6 °C) (09/05/17 0705)  Pulse: 65 (09/05/17 0830)  Resp: 17 (09/05/17 0830)  BP: 135/73 (09/05/17 0700)  SpO2: (!) 93 % (09/05/17 0700) Vital Signs (24h Range):  Temp:  [97.8 °F (36.6 °C)-99.6 °F (37.6 °C)] 97.8 °F (36.6 °C)  Pulse:  [] 65  Resp:  [17-80] 17  SpO2:  [73 %-98 %] 93 %  BP: (107-200)/() 135/73     Weight: 91.5 kg (201 lb 11.5 oz)  Body mass index is 36.9 kg/m².    SpO2: (!) 93 %  O2 Device (Oxygen Therapy): room air      Intake/Output Summary (Last 24 hours) at 09/05/17 1037  Last data filed at 09/05/17 0600   Gross per 24 hour   Intake          1713.67 ml   Output              750 ml   Net           963.67 ml       Lines/Drains/Airways     Peripheral Intravenous Line                 Peripheral IV - Single Lumen 09/04/17 0843 Right Forearm 1 day         Peripheral IV -  Single Lumen 09/04/17 1613 Left Wrist less than 1 day                Physical Exam   Constitutional: She is oriented to person, place, and time. She appears well-developed and well-nourished. No distress.   Neck: No JVD present.   Cardiovascular: Normal rate and regular rhythm.  Exam reveals no gallop.    No murmur heard.  Pulmonary/Chest: Effort normal and breath sounds normal. No respiratory distress. She has no wheezes.   Abdominal: Soft. Bowel sounds are normal. She exhibits no distension. There is no tenderness.   Musculoskeletal: She exhibits no edema.   Neurological: She is alert and oriented to person, place, and time.   Skin: Skin is warm and dry.       Significant Labs:       Recent Labs  Lab 09/05/17  0303      K 3.6      CO2 23   BUN 14   CREATININE 0.9       Recent Labs  Lab 09/05/17  0303   WBC 10.81   RBC 4.02   HGB 12.5   HCT 37.8      MCV 94   MCH 31.1*   MCHC 33.1     No results for input(s): INR, APTT in the last 24 hours.    Invalid input(s): PT    Significant Imaging: Echocardiogram:   2D echo with color flow doppler:   Results for orders placed or performed during the hospital encounter of 11/29/14   2D echo with color flow doppler   Result Value Ref Range    EF 60 55 - 65    Diastolic Dysfunction No     Est. PA Systolic Pressure 21.84     Mitral Valve Mobility NORMAL    repeat echocardiogram today with results pending

## 2017-09-05 NOTE — ASSESSMENT & PLAN NOTE
-on Mevacor 40mg daily at home  -recent FLP in August 2017 with   -continue current statin therapy

## 2017-09-05 NOTE — H&P
[facility]  Cardiology  History and Physical     Patient Name: Mis Ca  MRN: [id number]  Admission Date: 9/4/2017  Code Status: Full Code   Attending Provider: Pantera Benton MD   Primary Care Physician: Scott Dillard MD  Principal Problem:Atrial fibrillation with RVR    Patient information was obtained from patient and past medical records.     Subjective:     Chief Complaint:  palpitations     HPI:  77yo female with history of paroxysmal atrial fibrillation, HTN, HLP, JACQUELYN, GERD, obesity and diverticulosis who presented to the ER with complaints of palpitations. Ms. Ca reports awakening yesterday morning at 230am with palpitations described as racing sensation. The palpitations were persisting and not relieving therefore she presented to the ER. She reported mild SOB with the palpitations but denied any chest pain, dizziness or syncope. She is independent, active and does all of her own housework and errand running with no complaints of chest pain or SOB with activity. She denies any orthopnea, PND or edema and is faithful with her CPAP machine nightly. She denies any decreased appetite or po fluid intake, nausea, vomiting, diarrhea, fever, chills, increased caffeine intake or missed medications    Past Medical History:   Diagnosis Date    Atrial fibrillation     Hyperlipidemia     Hypertension     PVC (premature ventricular contraction)     RLS (restless legs syndrome)        Past Surgical History:   Procedure Laterality Date    APPENDECTOMY      BREAST BIOPSY Left     times two    CATARACT EXTRACTION BILATERAL W/ ANTERIOR VITRECTOMY      EYE SURGERY      HYSTERECTOMY      orif of left forearm         Review of patient's allergies indicates:   Allergen Reactions    Penicillins     Sulfa (sulfonamide antibiotics)     Compazine [prochlorperazine edisylate] Anxiety         Current Outpatient Prescriptions on File Prior to Encounter   Medication Sig         acetaZOLAMIDE  (DIAMOX) 250 MG tablet Take 250 mg by mouth 3 (three) times daily.    alprazolam (XANAX) 0.25 MG tablet TAKE 1 TABLET ONE TIME DAILY    aspirin (ECOTRIN) 81 MG EC tablet Take 81 mg by mouth once daily.      diltiaZEM (CARTIA XT) 180 MG 24 hr capsule Take 1 capsule (180 mg total) by mouth once daily.         hydrochlorothiazide (MICROZIDE) 12.5 mg capsule TAKE 1 CAPSULE ONE TIME DAILY    latanoprost 0.005 % ophthalmic solution 1 drop every evening.    losartan (COZAAR) 100 MG tablet TAKE 1 TABLET ONE TIME DAILY    lovastatin (MEVACOR) 40 MG tablet TAKE 1 TABLET EVERY EVENING    omeprazole (PRILOSEC) 20 MG capsule TAKE 1 CAPSULE ONE TIME DAILY    oxybutynin (DITROPAN) 5 MG Tab TAKE 1 TABLET ONE TIME DAILY    pramipexole (MIRAPEX) 0.25 MG tablet TAKE 1 TABLET ONE TIME DAILY    timolol maleate 0.5% (TIMOPTIC) 0.5 % Drop Place 1 drop into both eyes once daily.    tramadol (ULTRAM) 50 mg tablet Take 1 tablet (50 mg total) by mouth daily as needed for Pain.    warfarin (COUMADIN) 6 MG tablet TAKE 1 TABLET EVERY DAY     Family History     Problem Relation (Age of Onset)    Aneurysm Mother    Breast cancer Sister    COPD Sister    Cancer Father, Sister    Colon polyps Sister, Brother, Sister    Heart disease Sister    Kidney disease Sister        Social History Main Topics    Smoking status: Never Smoker    Smokeless tobacco: Never Used    Alcohol use No    Drug use: No    Sexual activity: Yes     Partners: Male     Review of Systems   Constitution: Negative for chills, decreased appetite, diaphoresis, fever and weakness.   Cardiovascular: Positive for palpitations. Negative for chest pain, claudication, cyanosis, dyspnea on exertion, irregular heartbeat, leg swelling, near-syncope, orthopnea, paroxysmal nocturnal dyspnea and syncope.   Respiratory: Negative for cough, hemoptysis, shortness of breath and wheezing.    Gastrointestinal: Negative for bloating, abdominal pain, constipation, diarrhea, melena,  nausea and vomiting.   Neurological: Negative for dizziness.     Objective:     Vital Signs (Most Recent):  Temp: 97.8 °F (36.6 °C) (09/05/17 0705)  Pulse: 65 (09/05/17 0830)  Resp: 17 (09/05/17 0830)  BP: 135/73 (09/05/17 0700)  SpO2: (!) 93 % (09/05/17 0700) Vital Signs (24h Range):  Temp:  [97.8 °F (36.6 °C)-99.6 °F (37.6 °C)] 97.8 °F (36.6 °C)  Pulse:  [] 65  Resp:  [17-80] 17  SpO2:  [73 %-98 %] 93 %  BP: (107-200)/() 135/73     Weight: 91.5 kg (201 lb 11.5 oz)  Body mass index is 36.9 kg/m².    SpO2: (!) 93 %  O2 Device (Oxygen Therapy): room air      Intake/Output Summary (Last 24 hours) at 09/05/17 1037  Last data filed at 09/05/17 0600   Gross per 24 hour   Intake          1713.67 ml   Output              750 ml   Net           963.67 ml       Lines/Drains/Airways     Peripheral Intravenous Line                 Peripheral IV - Single Lumen 09/04/17 0843 Right Forearm 1 day         Peripheral IV - Single Lumen 09/04/17 1613 Left Wrist less than 1 day                Physical Exam   Constitutional: She is oriented to person, place, and time. She appears well-developed and well-nourished. No distress.   Neck: No JVD present.   Cardiovascular: Normal rate and regular rhythm.  Exam reveals no gallop.    No murmur heard.  Pulmonary/Chest: Effort normal and breath sounds normal. No respiratory distress. She has no wheezes.   Abdominal: Soft. Bowel sounds are normal. She exhibits no distension. There is no tenderness.   Musculoskeletal: She exhibits no edema.   Neurological: She is alert and oriented to person, place, and time.   Skin: Skin is warm and dry.       Significant Labs:       Recent Labs  Lab 09/05/17  0303      K 3.6      CO2 23   BUN 14   CREATININE 0.9       Recent Labs  Lab 09/05/17  0303   WBC 10.81   RBC 4.02   HGB 12.5   HCT 37.8      MCV 94   MCH 31.1*   MCHC 33.1     No results for input(s): INR, APTT in the last 24 hours.    Invalid input(s): PT    Significant  Imaging: Echocardiogram:   2D echo with color flow doppler:   Results for orders placed or performed during the hospital encounter of 11/29/14   2D echo with color flow doppler   Result Value Ref Range    EF 60 55 - 65    Diastolic Dysfunction No     Est. PA Systolic Pressure 21.84     Mitral Valve Mobility NORMAL    repeat echocardiogram today with results pending     Assessment and Plan:     Hyperlipidemia    -on Mevacor 40mg daily at home  -recent FLP in August 2017 with   -continue current statin therapy         JACQUELYN (obstructive sleep apnea)    -continue nightly CPAP         HTN (hypertension), benign    -/57 currently  -on HCTZ, Losartan and Cardizem CD at home as well as Diamox TID but patient reports no longer takine  -will resume Losartan and HCTZ along with increased dose of Cardizem with close BP monitoring         Paroxysmal atrial fibrillation    -admitted with afib with RVR with HR up to 140s  -spontaneous conversion this AM to NSR  -place on Cardizem 60mg po Q6hrs with plans to increase Cardizem CD to 240mg from 180mg upon discharge  -continue Coumadin at home dose with daily INR monitoring         * Atrial fibrillation with RVR    -presented with afib with RVR with HR up to 140s  -placed on IV Cardizem drip with spontaneous conversion to NSR this AM with HR 70s-80s  -will discontinue IV Cardizem drip and place on Cardizem 60mg po Q6hrs with plans for transition to Cardizem CD 240mg from 180mg upon discharge  -PAUL/DCCV cancelled; will proceed with echocardiogram today for evaluation of LVEF and valve structure  -CHADSVAS score 4 (HTN, age 76, female)  -on Coumadin 6mg po daily; INR 2.5 yesterday; will continue home Coumadin dose with daily INR monitoring while admitted             VTE Risk Mitigation         Ordered     Medium Risk of VTE  Once      09/04/17 1546     Place sequential compression device  Until discontinued      09/04/17 1546          ANURAG Menezes, ANP  Cardiology    [facility]

## 2017-09-05 NOTE — HPI
77yo female with history of paroxysmal atrial fibrillation, HTN, HLP, JACQUELYN, GERD, obesity and diverticulosis who presented to the ER with complaints of palpitations. Ms. Ca reports awakening yesterday morning at 230am with palpitations described as racing sensation. The palpitations were persisting and not relieving therefore she presented to the ER. She reported mild SOB with the palpitations but denied any chest pain, dizziness or syncope. She is independent, active and does all of her own housework and errand running with no complaints of chest pain or SOB with activity. She denies any orthopnea, PND or edema and is faithful with her CPAP machine nightly. She denies any decreased appetite or po fluid intake, nausea, vomiting, diarrhea, fever, chills, increased caffeine intake or missed medications

## 2017-09-05 NOTE — NURSING
CASE MANAGEMENT UPDATE  PATIENT IN THE ICU. TEAM MEMBER AT BEDSIDE DOING A TEST ON PATIENT AT THIS TIME.

## 2017-09-05 NOTE — DISCHARGE SUMMARY
[facility]  Cardiology  Discharge Summary      Patient Name: Mis Ca  MRN: [id number]  Admission Date: 9/4/2017  Hospital Length of Stay: 1 days  Discharge Date and Time: 9/5/2017  Attending Physician: Pantera Benton MD  Discharging Provider: ANURAG Menezes, ANP  Primary Care Physician: Scott Dillard MD    HPI: 75yo female with history of paroxysmal atrial fibrillation, HTN, HLP, JACQUELYN, GERD, obesity and diverticulosis who presented to the ER with complaints of palpitations. Ms. Ca reports awakening yesterday morning at 230am with palpitations described as racing sensation. The palpitations were persisting and not relieving therefore she presented to the ER. She reported mild SOB with the palpitations but denied any chest pain, dizziness or syncope. She is independent, active and does all of her own housework and errand running with no complaints of chest pain or SOB with activity. She denies any orthopnea, PND or edema and is faithful with her CPAP machine nightly. She denies any decreased appetite or po fluid intake, nausea, vomiting, diarrhea, fever, chills, increased caffeine intake or missed medications    Procedure(s) (LRB):  TRANSESOPHAGEAL ECHOCARDIOGRAM (PAUL) (N/A) cancelled spontaneously converted to NSR     Indwelling Lines/Drains at time of discharge: none       Hospital Course 9/4/2017 Presented with palpitations with no relief. Presented to the ER with afib with RVR with HR up to 140s. Placed on IVF as well as Cardizem drip for rate control. CBC WNL, Troponin <.006 with normal BMP. INR 2.5. Admitted to ICU under care of [facility] 9/5/2017 Spontaneous conversion overnight with HR to 70s this AM. Remains on IV Cardizem drip with plans for conversion to oral Cardizem today. Echocardiogram today as well. Troponin .184-.188 this AM with no complaints of chest pain. Felt to be related to demand etiology in setting of RVR. Will get OOB to chair this morning and  evaluate for discharge vs transfer to the floor this afternoon. Ambulated entire hallway in ICU with continue NSR with HR 80s with no recurrent afib. No complaints of chest pain or SOB. Echocardiogram with normal LVEF and no WMA. Deemed ready for discharge and sent home on current medication regimen with increased dose of Cardizem CD to 240mg from 180mg. Will follow up with PCP as scheduled on Friday for INR management and will follow up with Dr. Benton in one month     Consults: none    Significant Diagnostic Studies: Cardiac Graphics: Echocardiogram:   2D echo with color flow doppler:   Results for orders placed or performed during the hospital encounter of 09/04/17   2D echo with color flow doppler   Result Value Ref Range    EF 60 55 - 65    Mitral Valve Regurgitation TRIVIAL     Diastolic Dysfunction Yes (A)     Aortic Valve Regurgitation TRIVIAL     Est. PA Systolic Pressure 35.72     Mitral Valve Mobility NORMAL     Tricuspid Valve Regurgitation TRIVIAL        Pending Diagnostic Studies:     Procedure Component Value Units Date/Time    Transesophageal echo [[id number]]     Order Status:  Sent Lab Status:  No result           Final Active Diagnoses:    Diagnosis Date Noted POA    PRINCIPAL PROBLEM:  Atrial fibrillation with RVR [I48.91] 09/04/2017 Yes    JACQUELYN (obstructive sleep apnea) [G47.33] 02/17/2016 Yes    Hyperlipidemia [E78.5] 02/17/2016 Yes    Paroxysmal atrial fibrillation [I48.0] 12/16/2014 Yes    HTN (hypertension), benign [I10] 12/16/2014 Yes      Problems Resolved During this Admission:    Diagnosis Date Noted Date Resolved POA       Discharged Condition: good    Follow Up:  Follow-up Information     Pantera Benton MD. Schedule an appointment as soon as possible for a visit in 2 weeks.    Specialty:  Cardiology  Contact information:  [address]  [address]  [place] [address] [phone]                 Patient Instructions:     Diet general   Order Specific Question Answer Comments    Additional restrictions: Cardiac (Low Na/Chol)      Activity as tolerated       Medications:  Reconciled Home Medications:   Current Discharge Medication List      CONTINUE these medications which have CHANGED    Details   diltiaZEM (CARDIZEM CD) 240 MG 24 hr capsule Take 1 capsule (240 mg total) by mouth once daily.  Qty: 30 capsule, Refills: 11         CONTINUE these medications which have NOT CHANGED    Details   acetaminophen (TYLENOL) 500 MG tablet Take 1,000 mg by mouth 2 (two) times daily as needed for Pain.      alprazolam (XANAX) 0.25 MG tablet TAKE 1 TABLET ONE TIME DAILY  Qty: 90 tablet, Refills: 1      aspirin (ECOTRIN) 81 MG EC tablet Take 81 mg by mouth once daily.        gabapentin (NEURONTIN) 300 MG capsule Take 1 capsule (300 mg total) by mouth 2 (two) times daily.  Qty: 180 capsule, Refills: 3      hydrochlorothiazide (MICROZIDE) 12.5 mg capsule TAKE 1 CAPSULE ONE TIME DAILY  Qty: 90 capsule, Refills: 3      latanoprost 0.005 % ophthalmic solution 1 drop every evening.      losartan (COZAAR) 100 MG tablet TAKE 1 TABLET ONE TIME DAILY  Qty: 90 tablet, Refills: 3      lovastatin (MEVACOR) 40 MG tablet TAKE 1 TABLET EVERY EVENING  Qty: 90 tablet, Refills: 3      omeprazole (PRILOSEC) 20 MG capsule TAKE 1 CAPSULE ONE TIME DAILY  Qty: 90 capsule, Refills: 3      oxybutynin (DITROPAN) 5 MG Tab TAKE 1 TABLET ONE TIME DAILY  Qty: 90 tablet, Refills: 3      pramipexole (MIRAPEX) 0.25 MG tablet TAKE 1 TABLET ONE TIME DAILY  Qty: 90 tablet, Refills: 3      timolol maleate 0.5% (TIMOPTIC) 0.5 % Drop Place 1 drop into both eyes once daily.  Refills: 0      tramadol (ULTRAM) 50 mg tablet Take 1 tablet (50 mg total) by mouth daily as needed for Pain.  Qty: 30 tablet, Refills: 0      !! warfarin (COUMADIN) 6 MG tablet Take 6 mg by mouth once daily.      !! warfarin (COUMADIN) 6 MG tablet TAKE 1 TABLET EVERY DAY  Qty: 90 tablet, Refills: 3       !! - Potential duplicate medications found. Please discuss with  provider.      STOP taking these medications       acetaZOLAMIDE (DIAMOX) 250 MG tablet Comments:   Reason for Stopping:               Time spent on the discharge of patient: 30> minutes    ANURAG Menezes, ANP  Cardiology  [facility]

## 2017-09-05 NOTE — ASSESSMENT & PLAN NOTE
-/57 currently  -on HCTZ, Losartan and Cardizem CD at home as well as Diamox TID but patient reports no longer takine  -will resume Losartan and HCTZ along with increased dose of Cardizem with close BP monitoring

## 2017-09-05 NOTE — ASSESSMENT & PLAN NOTE
-presented with afib with RVR with HR up to 140s  -placed on IV Cardizem drip with spontaneous conversion to NSR this AM with HR 70s-80s  -will discontinue IV Cardizem drip and place on Cardizem 60mg po Q6hrs with plans for transition to Cardizem CD 240mg from 180mg upon discharge  -PAUL/DCCV cancelled; will proceed with echocardiogram today for evaluation of LVEF and valve structure  -CHADSVAS score 4 (HTN, age 76, female)  -on Coumadin 6mg po daily; INR 2.5 yesterday; will continue home Coumadin dose with daily INR monitoring while admitted

## 2017-09-05 NOTE — HOSPITAL COURSE
9/4/2017 Presented with palpitations with no relief. Presented to the ER with afib with RVR with HR up to 140s. Placed on IVF as well as Cardizem drip for rate control. CBC WNL, Troponin <.006 with normal BMP. INR 2.5. Admitted to ICU under care of [facility] 9/5/2017 Spontaneous conversion overnight with HR to 70s this AM. Remains on IV Cardizem drip with plans for conversion to oral Cardizem today. Echocardiogram today as well. Troponin .184-.188 this AM with no complaints of chest pain. Felt to be related to demand etiology in setting of RVR. Will get OOB to chair this morning and evaluate for discharge vs transfer to the floor this afternoon

## 2017-09-08 ENCOUNTER — PATIENT OUTREACH (OUTPATIENT)
Dept: ADMINISTRATIVE | Facility: CLINIC | Age: 77
End: 2017-09-08

## 2017-09-08 ENCOUNTER — CLINICAL SUPPORT (OUTPATIENT)
Dept: FAMILY MEDICINE | Facility: CLINIC | Age: 77
End: 2017-09-08
Payer: MEDICARE

## 2017-09-08 DIAGNOSIS — I48.0 PAROXYSMAL ATRIAL FIBRILLATION: Primary | ICD-10-CM

## 2017-09-08 LAB — INR PPP: 2.7 (ref 2–3)

## 2017-09-08 PROCEDURE — 85610 PROTHROMBIN TIME: CPT | Mod: ,,, | Performed by: FAMILY MEDICINE

## 2017-09-08 NOTE — PATIENT INSTRUCTIONS
Discharge Instructions for Atrial Fibrillation  You have been diagnosed with an abnormal heart rhythm called atrial fibrillation. With this condition, your hearts 2 upper chambers quiver rather than squeeze the blood out in a normal pattern. This leads to an irregular and sometimes rapid heartbeat. Some people will develop associated symptoms such as a flip-flopping heartbeat, chest pain, lightheadedness, or shortness of breath. Other people may have no symptoms at all. Atrial fibrillation is serious because it affects the hearts ability to fill with blood as it should. Blood clots may form. This increases the risk for stroke. Untreated atrial fibrillation can also lead to heart failure. Atrial fibrillation can be controlled. With treatment, most people with atrial fibrillation lead normal lives.  Treatment options  Recommended treatment for atrial fibrillation depends on your age, symptoms, how long you have had atrial fibrillation, and other factors. You will have a complete evaluation to find out if you have any abnormalities that caused your heart to go into atrial fibrillation. This might be blocked heart arteries or a thyroid problem. Your doctor will assess your particular case and discuss choices with you.  Treatment choices may include:  · Treating an underlying disorder that puts you at risk for atrial fibrillation. For example, correcting an abnormal thyroid or electrolyte problem, or treating a blocked heart artery.  · Restoring a normal heart rhythm with an electrical shock (cardioversion) or with an antiarrhythmic medicine (chemical cardioversion)  · Using medicine to control your heart rate in atrial fibrillation.  · Preventing the risk for blood clot and stroke using blood-thinning medicines. Your doctor will tell you what he or she recommends. Choices may include aspirin, clopidogrel, warfarin, dabigatran, rivaroxaban, apixaban, and edoxaban.  · Doing catheter ablation or a surgical maze  procedure. These use different methods to destroy certain areas of heart tissue. This interrupts the electrical signals causing atrial fibrillation. One of these procedures may be a choice when medicines do not work, or as an alternative to long-term medicine.  · Other treatment choices may be recommended for you by your doctor.  Managing risk factors for stroke and preventing heart failure are important parts of any treatment plan for atrial fibrillation.  Home care  · Take your medicines exactly as directed. Dont skip doses.  · Work with your doctor to find the right medicines and doses for you.  · Learn to take your own pulse. Keep a record of your results. Ask your doctor which pulse rates mean that you need medical attention. Slowing your pulse is often the goal of treatment. Ask your doctor if its OK for you to use an automatic machine to check your pulse at home. Sometimes these machines dont count the pulse correctly when you have atrial fibrillation.  · Limit your intake of coffee, tea, cola, and other beverages with caffeine. Talk with your doctor about whether you should eliminate caffeine.  · Avoid over-the-counter medicines that have caffeine in them.  · Let your doctor know what medicines you take, including prescription and over-the-counter medicines, as well as any supplements. They interfere with some medicines given for atrial fibrillation.  · Ask your doctor about whether you can drink alcohol. Some people need to avoid alcohol to better treat atrial fibrillation. If you are taking blood-thinner medicines, alcohol may interfere with them by increasing their effect.  · Never take stimulants such as amphetamines or cocaine. These drugs can speed up your heart rate and trigger atrial fibrillation.  Follow-up care  Follow up with your doctor, or as advised.     When should I call my healthcare provider  Call your healthcare provider right away if you have any of the  following:  · Weakness  · Dizziness  · Fainting  · Fatigue  · Shortness of breath  · Chest pain with increased activity  · A change in the usual regularity of your heartbeat, or an unusually fast heartbeat   Date Last Reviewed: 4/23/2017  © 5830-7990 [employer]. [address] [place] [address], [place] [address]. All rights reserved. This information is not intended as a substitute for professional medical care. Always follow your healthcare professional's instructions.

## 2017-09-15 ENCOUNTER — OFFICE VISIT (OUTPATIENT)
Dept: FAMILY MEDICINE | Facility: CLINIC | Age: 77
End: 2017-09-15
Payer: MEDICARE

## 2017-09-15 VITALS
HEART RATE: 67 BPM | OXYGEN SATURATION: 95 % | WEIGHT: 195.75 LBS | DIASTOLIC BLOOD PRESSURE: 64 MMHG | BODY MASS INDEX: 34.68 KG/M2 | TEMPERATURE: 98 F | SYSTOLIC BLOOD PRESSURE: 108 MMHG | HEIGHT: 63 IN

## 2017-09-15 DIAGNOSIS — I48.0 PAROXYSMAL ATRIAL FIBRILLATION: Primary | ICD-10-CM

## 2017-09-15 DIAGNOSIS — Z23 NEED FOR PNEUMOCOCCAL VACCINATION: ICD-10-CM

## 2017-09-15 LAB — INR PPP: 2.9 (ref 2–3)

## 2017-09-15 PROCEDURE — 99213 OFFICE O/P EST LOW 20 MIN: CPT | Mod: S$GLB,,, | Performed by: FAMILY MEDICINE

## 2017-09-15 PROCEDURE — 99499 UNLISTED E&M SERVICE: CPT | Mod: S$GLB,,, | Performed by: FAMILY MEDICINE

## 2017-09-15 PROCEDURE — G0009 ADMIN PNEUMOCOCCAL VACCINE: HCPCS | Mod: S$GLB,,, | Performed by: FAMILY MEDICINE

## 2017-09-15 PROCEDURE — 3078F DIAST BP <80 MM HG: CPT | Mod: S$GLB,,, | Performed by: FAMILY MEDICINE

## 2017-09-15 PROCEDURE — 1159F MED LIST DOCD IN RCRD: CPT | Mod: S$GLB,,, | Performed by: FAMILY MEDICINE

## 2017-09-15 PROCEDURE — 3074F SYST BP LT 130 MM HG: CPT | Mod: S$GLB,,, | Performed by: FAMILY MEDICINE

## 2017-09-15 PROCEDURE — 1126F AMNT PAIN NOTED NONE PRSNT: CPT | Mod: S$GLB,,, | Performed by: FAMILY MEDICINE

## 2017-09-15 PROCEDURE — 90732 PPSV23 VACC 2 YRS+ SUBQ/IM: CPT | Mod: S$GLB,,, | Performed by: FAMILY MEDICINE

## 2017-09-15 PROCEDURE — 3008F BODY MASS INDEX DOCD: CPT | Mod: S$GLB,,, | Performed by: FAMILY MEDICINE

## 2017-09-15 PROCEDURE — 85610 PROTHROMBIN TIME: CPT | Mod: ,,, | Performed by: FAMILY MEDICINE

## 2017-09-15 RX ORDER — LOSARTAN POTASSIUM 100 MG/1
50 TABLET ORAL DAILY
Qty: 45 TABLET | Refills: 3 | Status: SHIPPED | OUTPATIENT
Start: 2017-09-15 | End: 2018-10-04 | Stop reason: SDUPTHER

## 2017-09-15 NOTE — PROGRESS NOTES
Subjective:      Patient ID: Mis Ca is a 76 y.o. female.    Chief Complaint: hospital follow up    Was in hospital for a fib; here for follow up; converted spontaneously; cardizem increased to 240; INR today 2.9; no symptoms since home; lost 20 pounds since July; on purpose; bp a little on the low side now since lost weight; will decrease losartan to 50 form 100      Review of Systems   Constitutional: Negative.    HENT: Negative.    Respiratory: Negative.    Cardiovascular: Negative.    Gastrointestinal: Negative.    Endocrine: Negative.    Genitourinary: Negative.    Musculoskeletal: Negative.    Neurological: Positive for dizziness.   Psychiatric/Behavioral: Negative.    All other systems reviewed and are negative.    Objective:     Physical Exam   Constitutional: She is oriented to person, place, and time. She appears well-developed and well-nourished.   HENT:   Head: Normocephalic.   Eyes: Conjunctivae and EOM are normal. Pupils are equal, round, and reactive to light.   Neck: Normal range of motion. Neck supple.   Cardiovascular: Normal rate, regular rhythm and normal heart sounds.    Pulmonary/Chest: Effort normal and breath sounds normal.   Musculoskeletal: Normal range of motion.   Neurological: She is alert and oriented to person, place, and time. She has normal reflexes.   Skin: Skin is warm and dry.   Psychiatric: She has a normal mood and affect. Her behavior is normal. Judgment and thought content normal.   Nursing note and vitals reviewed.    Assessment:     1. Paroxysmal atrial fibrillation    2. Need for pneumococcal vaccination      Plan:     New Prescriptions    No medications on file     Discontinued Medications    WARFARIN (COUMADIN) 6 MG TABLET    TAKE 1 TABLET EVERY DAY     Modified Medications    Modified Medication Previous Medication    LOSARTAN (COZAAR) 100 MG TABLET losartan (COZAAR) 100 MG tablet       Take 0.5 tablets (50 mg total) by mouth once daily.    TAKE 1 TABLET ONE  TIME DAILY       Paroxysmal atrial fibrillation  -     POCT INR    Need for pneumococcal vaccination    Other orders  -     (In Office Administered) Pneumococcal Polysaccharide Vaccine (23 Valent) (SQ/IM)  -     losartan (COZAAR) 100 MG tablet; Take 0.5 tablets (50 mg total) by mouth once daily.  Dispense: 45 tablet; Refill: 3    decrease losartan to 50 mg from;

## 2017-09-19 ENCOUNTER — OFFICE VISIT (OUTPATIENT)
Dept: SLEEP MEDICINE | Facility: CLINIC | Age: 77
End: 2017-09-19
Payer: MEDICARE

## 2017-09-19 VITALS
HEIGHT: 63 IN | DIASTOLIC BLOOD PRESSURE: 63 MMHG | WEIGHT: 194.44 LBS | BODY MASS INDEX: 34.45 KG/M2 | SYSTOLIC BLOOD PRESSURE: 120 MMHG | HEART RATE: 55 BPM

## 2017-09-19 DIAGNOSIS — G47.33 OSA (OBSTRUCTIVE SLEEP APNEA): Primary | ICD-10-CM

## 2017-09-19 PROCEDURE — 1159F MED LIST DOCD IN RCRD: CPT | Mod: S$GLB,,, | Performed by: PSYCHIATRY & NEUROLOGY

## 2017-09-19 PROCEDURE — 3074F SYST BP LT 130 MM HG: CPT | Mod: S$GLB,,, | Performed by: PSYCHIATRY & NEUROLOGY

## 2017-09-19 PROCEDURE — 3078F DIAST BP <80 MM HG: CPT | Mod: S$GLB,,, | Performed by: PSYCHIATRY & NEUROLOGY

## 2017-09-19 PROCEDURE — 3008F BODY MASS INDEX DOCD: CPT | Mod: S$GLB,,, | Performed by: PSYCHIATRY & NEUROLOGY

## 2017-09-19 PROCEDURE — 99214 OFFICE O/P EST MOD 30 MIN: CPT | Mod: S$GLB,,, | Performed by: PSYCHIATRY & NEUROLOGY

## 2017-09-19 PROCEDURE — 1126F AMNT PAIN NOTED NONE PRSNT: CPT | Mod: S$GLB,,, | Performed by: PSYCHIATRY & NEUROLOGY

## 2017-09-19 PROCEDURE — 99499 UNLISTED E&M SERVICE: CPT | Mod: S$GLB,,, | Performed by: PSYCHIATRY & NEUROLOGY

## 2017-09-19 PROCEDURE — 99999 PR PBB SHADOW E&M-EST. PATIENT-LVL III: CPT | Mod: PBBFAC,,, | Performed by: PSYCHIATRY & NEUROLOGY

## 2017-09-19 NOTE — PROGRESS NOTES
Mis Ca  was seen as a follow up.    CHIEF COMPLAINT:    Chief Complaint   Patient presents with    Sleep Apnea       HISTORY OF PRESENT ILLNESS: Mis Ca is a 76 y.o. female is here for sleep evaluation.   Patient often wake up after 4-4.5 hours sleep in a panic.  + numbness in foot, fingers and arms.  +palpitation upon awake.  Diagnosed with afib 11/14.  Symptoms improve after wake getting up and walk around.  +occasional tremor upon awake.  +loud snoring.  +witnessed sleep apnea by sister.  +fatigue upon awake.  No parasomnia.     +crawling sensation in legs.  Worse at night.  Improve with mirapex.    [place] Sleepiness Scale score during initial sleep evaluation was 10.  Patient s/p sleep study 9/15.  No new issue.      INTERVAL HISTORY:    07/19/2017 Dr Burnett:   The patient has not presented any new complaints since the previous visit. Not able to use her machine later - pressure feels too low - but still trying to put it on once in awhile. Had been battling with glaucoma.   CPAP 5-12. 90% - 7.5-9 cm.  JACQUELYN seems to run in her family. Nasal congestion on  And off. Has not tried Neil[facility] yet.   Gained 10 lbs since last visit.   RLS still controlled by Mirapex at night, but worse during the day.    Therapy Event Summary  Date Range: 5/22/2017 - 6/20/2017    Hide            Compliance Summary  Apnea Indices  Ventilator Statistics       Days with Device Usage:  7 days  Average AHI:  4.0  Average Breath Rate:  15.4 bpm       Percentage of Days >=4 Hours:  3.3%  Average OA Index:  0.7  Average % Patient Triggered Breaths:  N/A       Average Usage (Days Used):  2 hrs. 6 mins. 22 secs.  Average CA Index:  0.1  Average Tidal Volume:  371.2 ml       Average Usage (All Days):  29 mins. 29 secs.    Average Minute Vent:  N/A              Large Leak  Periodic Breathing        Average Time in Large Leak:  9 secs.  Average % of Night in PB:  0.7%        Average % of Night in Large Leak:  0.1%                     "      09/19/2017  Lost 21lbs; more exercise   ESS is 12/24 - sometimes tired in the afternoon. Increased CPAP.  Use to 6 hrs - benefits from using CPAP and remains complaints.    Has 2 masks - FFm and nasal  APAP 7.5 - 15 - once in a great while 15 is not enough. 90% is 9-10 cm H2O.    Some mask leaks with both masks.    Occasionally gasping for air whne on her back. Recently went to ER for AFIB episode in the middle of night.    RLS well controlled on Mirapex.    Therapy Event Summary (Last 14 Days)     Hide      Compliance Summary  Apnea Indices  Ventilator Statistics    Days with Device Usage:  13 days  Average AHI:  3.9  Average Breath Rate:  15.4 bpm    Percentage of Days >=4 Hours:  85.7%  Average OA Index:  1.1  Average % Patient Triggered Breaths:  N/A    Average Usage (Days Used):  6 hrs. 36 mins. 39 secs.  Average CA Index:  0.2  Average Tidal Volume:  334.3 ml    Average Usage (All Days):  6 hrs. 8 mins. 19 secs.    Average Minute Vent:  N/A        Large Leak  Periodic Breathing     Average Time in Large Leak:  0 secs.  Average % of Night in PB:  0.6%     Average % of Night in Large Leak:  0.0%              ADDENDUM 11/22/2017: Mis Ca states that recent pressure increase ws beneficial, however lately was unable to use her machine, because it would give her "runny nose" in the morning on many occasions.     Download showed good JACQUELYN control before she developed that problem.     I have suggested to try cleaning her CPAp water chamber with vinegar (since she already tried Joanna and baby shampoo), and, since she was not using humidifier lately, adding some humidity. She was also suggested switching between the mask that she has at home. She is currently in [place] - will be back in town December 15.       SLEEP ROUTINE:  Activity the hour prior to sleep: angelita or facebook    Bed partner:  alone  Time to bed:  10-11 pm   Lights off:  tv is on  Sleep onset latency:  2 minutes      "   Disruptions or awakenings:    4 minutes (few minutes to 60 minutes to go back to sleep)    Wakeup time:      7 am   Perceived sleep quality:  tire       Daytime naps:      none  Weekend sleep routine:      same  Caffeine use: none  exercise habit:   none      PAST MEDICAL HISTORY:    Active Ambulatory Problems     Diagnosis Date Noted    New onset atrial fibrillation 11/29/2014    Paroxysmal atrial fibrillation 12/16/2014    HTN (hypertension), benign 12/16/2014    JACQUELYN (obstructive sleep apnea) 02/17/2016    Anticoagulated on Coumadin 02/17/2016    Anxiety 02/17/2016    Restless leg syndrome 02/17/2016    Hyperlipidemia 02/17/2016    Non morbid obesity 02/17/2016    Essential hypertension 02/17/2016    Gastroesophageal reflux disease without esophagitis 02/17/2016    History of adenomatous polyp of colon 12/09/2016    Diverticulosis of large intestine without hemorrhage 12/09/2016    Senile purpura 03/08/2017    Tortuous aorta 03/08/2017    Glaucoma 04/06/2017    Blind right eye 07/10/2017    Atrial fibrillation with RVR 09/04/2017     Resolved Ambulatory Problems     Diagnosis Date Noted    Bradycardia 12/03/2012    PVC (premature ventricular contraction) 12/03/2012    Bilateral back pain 02/17/2016    Sciatica 02/17/2016    Lipoma of right upper extremity 06/09/2016     Past Medical History:   Diagnosis Date    Anxiety     Atrial fibrillation     Blind right eye     Bradycardia     Hyperlipidemia     Hypertension     PVC (premature ventricular contraction)     RLS (restless legs syndrome)                 PAST SURGICAL HISTORY:    Past Surgical History:   Procedure Laterality Date    APPENDECTOMY      BREAST BIOPSY Left     times two    CATARACT EXTRACTION BILATERAL W/ ANTERIOR VITRECTOMY      EYE SURGERY      HYSTERECTOMY      orif of left forearm           FAMILY HISTORY:                Family History   Problem Relation Age of Onset    Aneurysm Mother     Cancer Father      Cancer Sister      Breast    Colon polyps Sister     Colon polyps Brother     COPD Sister     Heart disease Sister     Kidney disease Sister     Colon polyps Sister     Breast cancer Sister        SOCIAL HISTORY:          Tobacco:   History   Smoking Status    Never Smoker   Smokeless Tobacco    Never Used       alcohol use:    History   Alcohol Use No                 Occupation:  Former  and book keeper    ALLERGIES:    Allergies   Allergen Reactions    Penicillins     Sulfa (Sulfonamide Antibiotics)        CURRENT MEDICATIONS:    Current Outpatient Prescriptions   Medication Sig Dispense Refill    acetaminophen (TYLENOL) 500 MG tablet Take 1,000 mg by mouth 2 (two) times daily as needed for Pain.      alprazolam (XANAX) 0.25 MG tablet TAKE 1 TABLET ONE TIME DAILY 90 tablet 1    aspirin (ECOTRIN) 81 MG EC tablet Take 81 mg by mouth once daily.        diltiaZEM (CARDIZEM CD) 240 MG 24 hr capsule Take 1 capsule (240 mg total) by mouth once daily. 30 capsule 11    gabapentin (NEURONTIN) 300 MG capsule Take 1 capsule (300 mg total) by mouth 2 (two) times daily. 180 capsule 3    hydrochlorothiazide (MICROZIDE) 12.5 mg capsule TAKE 1 CAPSULE ONE TIME DAILY 90 capsule 3    latanoprost 0.005 % ophthalmic solution 1 drop every evening.      losartan (COZAAR) 100 MG tablet Take 0.5 tablets (50 mg total) by mouth once daily. 45 tablet 3    lovastatin (MEVACOR) 40 MG tablet TAKE 1 TABLET EVERY EVENING 90 tablet 3    omeprazole (PRILOSEC) 20 MG capsule TAKE 1 CAPSULE ONE TIME DAILY 90 capsule 3    oxybutynin (DITROPAN) 5 MG Tab TAKE 1 TABLET ONE TIME DAILY 90 tablet 3    pramipexole (MIRAPEX) 0.25 MG tablet TAKE 1 TABLET ONE TIME DAILY 90 tablet 3    timolol maleate 0.5% (TIMOPTIC) 0.5 % Drop Place 1 drop into both eyes once daily.  0    tramadol (ULTRAM) 50 mg tablet Take 1 tablet (50 mg total) by mouth daily as needed for Pain. 30 tablet 0    warfarin (COUMADIN) 6 MG tablet Take 6 mg by  "mouth once daily.        No current facility-administered medications for this visit.                   REVIEW OF SYSTEMS:   No acute changes from previous encounter dated 7/13/2015 with exceptions mentioned in HPI.      PHYSICAL EXAM:  Vitals:    09/19/17 1309   BP: 120/63   Pulse: (!) 55   Weight: 88.2 kg (194 lb 7.1 oz)   Height: 5' 2.5" (1.588 m)   PainSc: 0-No pain     Body mass index is 35 kg/m².     GENERAL: Normal development, well groomed  HEENT:  Conjunctivae are non-erythematous; Pupils equal, round, and reactive to light; Nose is symmetrical; Nasal mucosa is pink and moist; Septum is midline; Inferior turbinates are normal; Nasal airflow is normal; Posterior pharynx is pink; Modified Mallampati: 1; Posterior palate is normal; Tonsils +1; Uvula is normal and pink;Tongue is normal; Dentition is fair; No TMJ tenderness; Jaw opening and protrusion without click and without discomfort.  NECK: Supple. Neck circumference is 15 inches. No thyromegaly. No palpable nodes.     SKIN: On face and neck: No abrasions, no rashes, no lesions.  No subcutaneous nodules are palpable.  RESPIRATORY: Chest is clear to auscultation.  Normal chest expansion and non-labored breathing at rest.  CARDIOVASCULAR: Normal S1, S2.  No murmurs, gallops or rubs. No carotid bruits bilaterally.  EXTREMITIES: No edema. No clubbing. No cyanosis. Station normal. Gait normal.        NEURO/PSYCH: Oriented to time, place and person. Normal attention span and concentration. Affect is full. Mood is normal.                                              DATA   9/28/15 ahi of 8.9 with rem ahi of 25  Echo 12/1/14  1 - Normal left ventricular systolic function (EF 60-65%).   2 - Normal right ventricular systolic function .   3 - Moderate left atrial enlargement.   4 - Atrial septal aneurysm .   5 - Concentric remodeling.   Lab Results   Component Value Date    TSH 1.95 08/10/2017     ASSESSMENT:    JACQUELYN. Improved compliance; mild residual " sleepiness.    PLAN:    Will recommend tightening top Wisp mask traps. May consider CPAP comfort cover.  Will change pressure to 8.5-18 cm H2O to address residual sleepiness.    Sleep Apnea - result of sleep study d/w patient.  +mild severity a/w htn and pulmonary htn.  Recommend treatment.  Patient agree to pap.  Will increase APAPfrom 6-12 to 7.5-16 cm H2o. Will increase ramp to 6.5. Will advise on increasing humidity. Will order a FFM.     Numbness - improved    Insomnia - maintenance is the issue.  DDx: hyper-arousal from untreated gabriel vs conditioned vs neuropathic pain.  Will address after pap.    rls - mirapex at bedtime 0.25 mg - but sometimes RLS bothers her during the day      Education: During our discussion today, we talked about the etiology of obstructive sleep apnea as well as the potential ramifications of untreated sleep apnea, which could include daytime sleepiness, hypertension, heart disease and/or stroke.     Precautions: The patient was advised to abstain from driving should they feel sleepy or drowsy.       Patient will  6 months.    This is 25 minutes visit, over 50% of time spent in direct consultation with patient.

## 2017-09-20 ENCOUNTER — OFFICE VISIT (OUTPATIENT)
Dept: CARDIOLOGY | Facility: CLINIC | Age: 77
End: 2017-09-20
Payer: MEDICARE

## 2017-09-20 VITALS
WEIGHT: 196.63 LBS | DIASTOLIC BLOOD PRESSURE: 69 MMHG | HEART RATE: 54 BPM | BODY MASS INDEX: 34.84 KG/M2 | SYSTOLIC BLOOD PRESSURE: 110 MMHG | OXYGEN SATURATION: 95 % | HEIGHT: 63 IN

## 2017-09-20 DIAGNOSIS — E78.2 MIXED HYPERLIPIDEMIA: ICD-10-CM

## 2017-09-20 DIAGNOSIS — I51.89 DIASTOLIC DYSFUNCTION: ICD-10-CM

## 2017-09-20 DIAGNOSIS — I48.0 PAROXYSMAL ATRIAL FIBRILLATION: Primary | ICD-10-CM

## 2017-09-20 DIAGNOSIS — I10 HTN (HYPERTENSION), BENIGN: ICD-10-CM

## 2017-09-20 DIAGNOSIS — I10 ESSENTIAL HYPERTENSION: ICD-10-CM

## 2017-09-20 DIAGNOSIS — I48.91 ATRIAL FIBRILLATION WITH RVR: ICD-10-CM

## 2017-09-20 PROCEDURE — 99214 OFFICE O/P EST MOD 30 MIN: CPT | Mod: S$GLB,,, | Performed by: INTERNAL MEDICINE

## 2017-09-20 PROCEDURE — 3078F DIAST BP <80 MM HG: CPT | Mod: S$GLB,,, | Performed by: INTERNAL MEDICINE

## 2017-09-20 PROCEDURE — 99499 UNLISTED E&M SERVICE: CPT | Mod: S$GLB,,, | Performed by: INTERNAL MEDICINE

## 2017-09-20 PROCEDURE — 99999 PR PBB SHADOW E&M-EST. PATIENT-LVL III: CPT | Mod: PBBFAC,,, | Performed by: INTERNAL MEDICINE

## 2017-09-20 PROCEDURE — 1126F AMNT PAIN NOTED NONE PRSNT: CPT | Mod: S$GLB,,, | Performed by: INTERNAL MEDICINE

## 2017-09-20 PROCEDURE — 3074F SYST BP LT 130 MM HG: CPT | Mod: S$GLB,,, | Performed by: INTERNAL MEDICINE

## 2017-09-20 PROCEDURE — 3008F BODY MASS INDEX DOCD: CPT | Mod: S$GLB,,, | Performed by: INTERNAL MEDICINE

## 2017-09-20 PROCEDURE — 1159F MED LIST DOCD IN RCRD: CPT | Mod: S$GLB,,, | Performed by: INTERNAL MEDICINE

## 2017-09-20 RX ORDER — DILTIAZEM HYDROCHLORIDE 240 MG/1
240 CAPSULE, COATED, EXTENDED RELEASE ORAL DAILY
Qty: 90 CAPSULE | Refills: 3 | Status: SHIPPED | OUTPATIENT
Start: 2017-09-20 | End: 2018-07-06 | Stop reason: SDUPTHER

## 2017-09-20 NOTE — PROGRESS NOTES
Subjective:   Patient ID:  Mis Ca is a 76 y.o. female who presents for follow-up of Hospital Follow Up      Problem List Items Addressed This Visit        Cardiac/Vascular    Paroxysmal atrial fibrillation - Primary    Relevant Medications    diltiaZEM (CARDIZEM CD) 240 MG 24 hr capsule    HTN (hypertension), benign    Hyperlipidemia    Essential hypertension    Atrial fibrillation with RVR    Diastolic dysfunction      Other Visit Diagnoses    None.         HPI: Patient here for f/u of HTN, and PAF. Since previous visit she was hospitalized for Afib with RVR. She was placed on Cardizem drip and cardioverted. No PAUL/DCCV needed. She is doing well except for URI. No chest pain, dyspnea or palpitations. BP and HR is controlled. Losartan was decreased  She has JACQUELYN and uses CPAP nightly.      Review of Systems   Constitution: Negative.   HENT: Negative.    Eyes: Negative.    Cardiovascular: Negative.    Respiratory: Negative.    Endocrine: Negative.    Hematologic/Lymphatic: Negative.    Skin: Negative.    Musculoskeletal: Negative.    Gastrointestinal: Negative.    Neurological: Negative.    Psychiatric/Behavioral: Negative.      Patient's Medications   New Prescriptions    No medications on file   Previous Medications    ACETAMINOPHEN (TYLENOL) 500 MG TABLET    Take 1,000 mg by mouth 2 (two) times daily as needed for Pain.    ALPRAZOLAM (XANAX) 0.25 MG TABLET    TAKE 1 TABLET ONE TIME DAILY    ASPIRIN (ECOTRIN) 81 MG EC TABLET    Take 81 mg by mouth once daily.      GABAPENTIN (NEURONTIN) 300 MG CAPSULE    Take 1 capsule (300 mg total) by mouth 2 (two) times daily.    HYDROCHLOROTHIAZIDE (MICROZIDE) 12.5 MG CAPSULE    TAKE 1 CAPSULE ONE TIME DAILY    LATANOPROST 0.005 % OPHTHALMIC SOLUTION    1 drop every evening.    LOSARTAN (COZAAR) 100 MG TABLET    Take 0.5 tablets (50 mg total) by mouth once daily.    LOVASTATIN (MEVACOR) 40 MG TABLET    TAKE 1 TABLET EVERY EVENING    OMEPRAZOLE (PRILOSEC) 20 MG CAPSULE     TAKE 1 CAPSULE ONE TIME DAILY    OXYBUTYNIN (DITROPAN) 5 MG TAB    TAKE 1 TABLET ONE TIME DAILY    PRAMIPEXOLE (MIRAPEX) 0.25 MG TABLET    TAKE 1 TABLET ONE TIME DAILY    TIMOLOL MALEATE 0.5% (TIMOPTIC) 0.5 % DROP    Place 1 drop into both eyes once daily.    TRAMADOL (ULTRAM) 50 MG TABLET    Take 1 tablet (50 mg total) by mouth daily as needed for Pain.    WARFARIN (COUMADIN) 6 MG TABLET    Take 6 mg by mouth once daily.    Modified Medications    Modified Medication Previous Medication    DILTIAZEM (CARDIZEM CD) 240 MG 24 HR CAPSULE diltiaZEM (CARDIZEM CD) 240 MG 24 hr capsule       Take 1 capsule (240 mg total) by mouth once daily.    Take 1 capsule (240 mg total) by mouth once daily.   Discontinued Medications    No medications on file       Objective:   Physical Exam   Constitutional: She is oriented to person, place, and time. She appears well-developed and well-nourished. No distress.   Examination of the digits showed no clubbing or cyanosis   HENT:   Head: Normocephalic and atraumatic.   Eyes: Conjunctivae are normal. Pupils are equal, round, and reactive to light. Right eye exhibits no discharge.   Neck: Normal range of motion. Neck supple. No JVD present. No thyromegaly present.   No carotid bruits   Cardiovascular: Normal rate, regular rhythm, S1 normal, S2 normal, normal heart sounds, intact distal pulses and normal pulses.  PMI is not displaced.  Exam reveals no gallop, no friction rub and no opening snap.    No murmur heard.  Pulmonary/Chest: Effort normal and breath sounds normal. No respiratory distress. She has no wheezes. She has no rales. She exhibits no tenderness.   Abdominal: Soft. Bowel sounds are normal. She exhibits no distension and no mass. There is no tenderness. There is no guarding.   No hepatosplenomegaly   Musculoskeletal: Normal range of motion. She exhibits no edema or tenderness.   Lymphadenopathy:     She has no cervical adenopathy.   Neurological: She is alert and oriented to  person, place, and time.   Skin: Skin is warm. No rash noted. She is not diaphoretic. No erythema.   Psychiatric: She has a normal mood and affect.   Nursing note and vitals reviewed.      ECGs reviewed-NSR with LAFB  LABS reviewed  Imaging including Echoes reviewed- ef 60%    Assessment:     1. Paroxysmal atrial fibrillation    2. HTN (hypertension), benign    3. PVC (premature ventricular contraction)    4. Edema of left lower extremity -   5. AFib with RVR  6. Diastolic dysfunction without CHF    Plan:     Continue current medications  Encourage weight loss and exercise  Low salt diet  F/u in 3 months.

## 2017-10-02 ENCOUNTER — TELEPHONE (OUTPATIENT)
Dept: SLEEP MEDICINE | Facility: OTHER | Age: 77
End: 2017-10-02

## 2017-10-04 ENCOUNTER — CLINICAL SUPPORT (OUTPATIENT)
Dept: FAMILY MEDICINE | Facility: CLINIC | Age: 77
End: 2017-10-04
Payer: MEDICARE

## 2017-10-04 DIAGNOSIS — I48.0 PAROXYSMAL ATRIAL FIBRILLATION: Primary | ICD-10-CM

## 2017-10-04 LAB — INR PPP: 3.1 (ref 2–3)

## 2017-10-04 PROCEDURE — G0008 ADMIN INFLUENZA VIRUS VAC: HCPCS | Mod: S$GLB,,, | Performed by: FAMILY MEDICINE

## 2017-10-04 PROCEDURE — 85610 PROTHROMBIN TIME: CPT | Mod: ,,, | Performed by: FAMILY MEDICINE

## 2017-10-04 PROCEDURE — 90662 IIV NO PRSV INCREASED AG IM: CPT | Mod: S$GLB,,, | Performed by: FAMILY MEDICINE

## 2017-10-16 ENCOUNTER — TELEPHONE (OUTPATIENT)
Dept: FAMILY MEDICINE | Facility: CLINIC | Age: 77
End: 2017-10-16

## 2017-10-16 NOTE — TELEPHONE ENCOUNTER
----- Message from Joanna Mccain sent at 10/16/2017  3:29 PM CDT -----  Patient wanted you to know she did her INR in [place]. They will be sending you the results  If there is any change, please call her on her sister's phone @ [phone]

## 2017-10-24 ENCOUNTER — TELEPHONE (OUTPATIENT)
Dept: SLEEP MEDICINE | Facility: OTHER | Age: 77
End: 2017-10-24

## 2017-11-15 ENCOUNTER — TELEPHONE (OUTPATIENT)
Dept: FAMILY MEDICINE | Facility: CLINIC | Age: 77
End: 2017-11-15

## 2017-11-15 NOTE — TELEPHONE ENCOUNTER
----- Message from Joanna Mccain sent at 11/15/2017  2:10 PM CST -----  Please call patient with INR results. Did at [facility] in [place].

## 2017-11-16 NOTE — TELEPHONE ENCOUNTER
INR done in [place] on 11/14/2017 - It was 2.1    Current dose is 6mg po qd except 9mg on Friday.    Pt notified to continue the same dose and repeat in 1 month.     _________________________________________________    Pt states that she had an episode on yesterday where she had a stinging feeling in her toes followed by a sharp pain in her head.  She checked her BP and it was 146/90.      It lasted for a lil while and then it got better.  Then she had another episode on this morning.  She wanted to let you know.      Please advise!

## 2017-11-18 RX ORDER — HYDROCHLOROTHIAZIDE 12.5 MG/1
CAPSULE ORAL
Qty: 90 CAPSULE | Refills: 3 | Status: SHIPPED | OUTPATIENT
Start: 2017-11-18 | End: 2018-03-14 | Stop reason: SDUPTHER

## 2017-11-18 RX ORDER — ALPRAZOLAM 0.25 MG/1
TABLET ORAL
Qty: 90 TABLET | Refills: 1 | Status: SHIPPED | OUTPATIENT
Start: 2017-11-18 | End: 2018-01-23 | Stop reason: SDUPTHER

## 2017-11-18 RX ORDER — OXYBUTYNIN CHLORIDE 5 MG/1
TABLET ORAL
Qty: 90 TABLET | Refills: 3 | Status: SHIPPED | OUTPATIENT
Start: 2017-11-18 | End: 2018-07-12

## 2017-11-22 ENCOUNTER — TELEPHONE (OUTPATIENT)
Dept: SLEEP MEDICINE | Facility: CLINIC | Age: 77
End: 2017-11-22

## 2017-11-22 ENCOUNTER — TELEPHONE (OUTPATIENT)
Dept: FAMILY MEDICINE | Facility: CLINIC | Age: 77
End: 2017-11-22

## 2017-11-22 NOTE — TELEPHONE ENCOUNTER
"Mis Ca states that recent pressure increase ws beneficial, however lately was unable to use her machine, because it would give her "runny nose" in the morning on many occasions.    Download showed good JACQUELYN control before she developed that problem.    I have suggested to try cleaning her CPAp water chamber with vinegar (since she already tried Joanna and baby shampoo), and, since she was not using humidifier lately, adding some humidity. She was also suggested switching between the mask that she has at home. She is currently in [place] - will be back in town December 15.   "

## 2017-11-24 ENCOUNTER — TELEPHONE (OUTPATIENT)
Dept: SLEEP MEDICINE | Facility: OTHER | Age: 77
End: 2017-11-24

## 2017-12-03 ENCOUNTER — TELEPHONE (OUTPATIENT)
Dept: FAMILY MEDICINE | Facility: CLINIC | Age: 77
End: 2017-12-03

## 2017-12-18 ENCOUNTER — HOSPITAL ENCOUNTER (OUTPATIENT)
Dept: CARDIOLOGY | Facility: HOSPITAL | Age: 77
Discharge: HOME | End: 2017-12-18
Attending: INTERNAL MEDICINE
Payer: MEDICARE

## 2017-12-18 DIAGNOSIS — I10 HTN (HYPERTENSION), BENIGN: ICD-10-CM

## 2017-12-18 DIAGNOSIS — I48.0 PAROXYSMAL ATRIAL FIBRILLATION: ICD-10-CM

## 2017-12-18 LAB
DIASTOLIC DYSFUNCTION: YES
ESTIMATED PA SYSTOLIC PRESSURE: 28
MITRAL VALVE MOBILITY: NORMAL
RETIRED EF AND QEF - SEE NOTES: 65 (ref 55–65)
TRICUSPID VALVE REGURGITATION: ABNORMAL

## 2017-12-18 PROCEDURE — 93306 TTE W/DOPPLER COMPLETE: CPT | Mod: PO

## 2017-12-18 PROCEDURE — 93306 TTE W/DOPPLER COMPLETE: CPT | Mod: 26,,, | Performed by: INTERNAL MEDICINE

## 2017-12-19 ENCOUNTER — OFFICE VISIT (OUTPATIENT)
Dept: FAMILY MEDICINE | Facility: CLINIC | Age: 77
End: 2017-12-19
Payer: MEDICARE

## 2017-12-19 VITALS
SYSTOLIC BLOOD PRESSURE: 122 MMHG | DIASTOLIC BLOOD PRESSURE: 84 MMHG | HEART RATE: 58 BPM | BODY MASS INDEX: 33.18 KG/M2 | WEIGHT: 180.31 LBS | HEIGHT: 62 IN | OXYGEN SATURATION: 96 % | TEMPERATURE: 98 F

## 2017-12-19 DIAGNOSIS — I70.0 AORTIC ATHEROSCLEROSIS: ICD-10-CM

## 2017-12-19 DIAGNOSIS — I10 ESSENTIAL HYPERTENSION: ICD-10-CM

## 2017-12-19 DIAGNOSIS — Z00.00 ENCOUNTER FOR PREVENTIVE HEALTH EXAMINATION: Primary | ICD-10-CM

## 2017-12-19 DIAGNOSIS — E78.2 MIXED HYPERLIPIDEMIA: ICD-10-CM

## 2017-12-19 DIAGNOSIS — K21.9 GASTROESOPHAGEAL REFLUX DISEASE WITHOUT ESOPHAGITIS: ICD-10-CM

## 2017-12-19 DIAGNOSIS — G47.33 OSA (OBSTRUCTIVE SLEEP APNEA): ICD-10-CM

## 2017-12-19 DIAGNOSIS — F41.9 ANXIETY: ICD-10-CM

## 2017-12-19 DIAGNOSIS — E66.9 OBESITY (BMI 30.0-34.9): ICD-10-CM

## 2017-12-19 DIAGNOSIS — I48.0 PAROXYSMAL ATRIAL FIBRILLATION: ICD-10-CM

## 2017-12-19 PROCEDURE — 99999 PR PBB SHADOW E&M-EST. PATIENT-LVL V: CPT | Mod: PBBFAC,,, | Performed by: NURSE PRACTITIONER

## 2017-12-19 PROCEDURE — 99499 UNLISTED E&M SERVICE: CPT | Mod: S$GLB,,, | Performed by: NURSE PRACTITIONER

## 2017-12-19 PROCEDURE — G0439 PPPS, SUBSEQ VISIT: HCPCS | Mod: S$GLB,,, | Performed by: NURSE PRACTITIONER

## 2017-12-19 RX ORDER — MULTIVITAMIN
1 TABLET ORAL DAILY
Status: ON HOLD | COMMUNITY
End: 2022-02-11 | Stop reason: CLARIF

## 2017-12-19 RX ORDER — PREDNISOLONE ACETATE 10 MG/ML
1 SUSPENSION/ DROPS OPHTHALMIC DAILY
COMMUNITY
End: 2018-01-09

## 2017-12-19 NOTE — PATIENT INSTRUCTIONS
Counseling and Referral of Other Preventative  (Italic type indicates deductible and co-insurance are waived)    Patient Name: Mis Ca  Today's Date: 12/19/2017      SERVICE LIMITATIONS RECOMMENDATION    Vaccines    · Pneumococcal (once after 65)    · Influenza (annually)    · Hepatitis B (if medium/high risk)    · Prevnar 13      Hepatitis B medium/high risk factors:       - End-stage renal disease       - Hemophiliacs who received Factor VII or         IX concentrates       - Clients of institutions for the mentally             retarded       - Persons who live in the same house as          a HepB carrier       - Homosexual men       - Illicit injectable drug abusers     Pneumococcal: Done, no repeat necessary     Influenza: Done, repeat in one year     Hepatitis B: N/A     Prevnar 13: Done, no repeat necessary    Mammogram (biennial age 50-74)  Annually (age 40 or over)  Done this year, repeat every year    Pap (up to age 70 and after 70 if unknown history or abnormal study last 10 years)    N/A     The USPSTF recommends against screening for cervical cancer in women older than age 65 years who have had adequate prior screening and are not otherwise at high risk for cervical cancer.      Colorectal cancer screening (to age 75)    · Fecal occult blood test (annual)  · Flexible sigmoidoscopy (5y)  · Screening colonoscopy (10y)  · Barium enema   Last done 2017, recommend to repeat every 10  years    Diabetes self-management training (no USPSTF recommendations)  Requires referral by treating physician for patient with diabetes or renal disease. 10 hours of initial DSMT sessions of no less than 30 minutes each in a continuous 12-month period. 2 hours of follow-up DSMT in subsequent years.  N/A    Bone mass measurements (age 65 & older, biennial)  Requires diagnosis related to osteoporosis or estrogen deficiency. Biennial benefit unless patient has history of long-term glucocorticoid  Last done 06/28/2016,  recommend to repeat every 3  years    Glaucoma screening (no USPSTF recommendation)  Diabetes mellitus, family history   , age 50 or over    American, age 65 or over  Done this year, repeat every year    Medical nutrition therapy for diabetes or renal disease (no recommended schedule)  Requires referral by treating physician for patient with diabetes or renal disease or kidney transplant within the past 3 years.  Can be provided in same year as diabetes self-management training (DSMT), and CMS recommends medical nutrition therapy take place after DSMT. Up to 3 hours for initial year and 2 hours in subsequent years.  N/A    Cardiovascular screening blood tests (every 5 years)  · Fasting lipid panel  Order as a panel if possible  Done this year, repeat every year    Diabetes screening tests (at least every 3 years, Medicare covers annually or at 6-month intervals for prediabetic patients)  · Fasting blood sugar (FBS) or glucose tolerance test (GTT)  Patient must be diagnosed with one of the following:       - Hypertension       - Dyslipidemia       - Obesity (BMI 30kg/m2)       - Previous elevated impaired FBS or GTT       ... or any two of the following:       - Overweight (BMI 25 but <30)       - Family history of diabetes       - Age 65 or older       - History of gestational diabetes or birth of baby weighing more than 9 pounds  Done this year, repeat every year    HIV screening (annually for increased risk patients)  · HIV-1 and HIV-2 by EIA, or LANI, rapid antibody test or oral mucosa transudate  Patients must be at increased risk for HIV infection per USPSTF guidelines or pregnant. Tests covered annually for patient at increased risk or as requested by the patient. Pregnant patients may receive up to 3 tests during pregnancy.  Risks discussed, screening is not recommended    Smoking cessation counseling (up to 8 sessions per year)  Patients must be asymptomatic of tobacco-related  conditions to receive as a preventative service.  Non-smoker    Subsequent annual wellness visit  At least 12 months since last AWV  Return in one year     The following information is provided to all patients.  This information is to help you find resources for any of the problems found today that may be affecting your health:                Living healthy guide: www.[facility].[place].[facility]      Understanding Diabetes: www.diabetes.org      Eating healthy: www.cdc.gov/healthyweight      CDC home safety checklist: www.cdc.gov/steadi/patient.html      Agency on Aging: www.goea.[place].[facility]      Alcoholics anonymous (AA): www.aa.org      Physical Activity: www.delfino.nih.gov/ud4nydb      Tobacco use: www.quitwithusla.org

## 2017-12-20 ENCOUNTER — OFFICE VISIT (OUTPATIENT)
Dept: CARDIOLOGY | Facility: CLINIC | Age: 77
End: 2017-12-20
Payer: MEDICARE

## 2017-12-20 VITALS
SYSTOLIC BLOOD PRESSURE: 113 MMHG | HEIGHT: 62 IN | WEIGHT: 180.19 LBS | BODY MASS INDEX: 33.16 KG/M2 | DIASTOLIC BLOOD PRESSURE: 71 MMHG | OXYGEN SATURATION: 95 % | HEART RATE: 58 BPM

## 2017-12-20 DIAGNOSIS — I51.89 DIASTOLIC DYSFUNCTION: ICD-10-CM

## 2017-12-20 DIAGNOSIS — E78.2 MIXED HYPERLIPIDEMIA: ICD-10-CM

## 2017-12-20 DIAGNOSIS — I10 HTN (HYPERTENSION), BENIGN: ICD-10-CM

## 2017-12-20 DIAGNOSIS — G47.33 OSA (OBSTRUCTIVE SLEEP APNEA): ICD-10-CM

## 2017-12-20 DIAGNOSIS — I48.0 PAROXYSMAL ATRIAL FIBRILLATION: Primary | ICD-10-CM

## 2017-12-20 PROCEDURE — 99499 UNLISTED E&M SERVICE: CPT | Mod: S$GLB,,, | Performed by: INTERNAL MEDICINE

## 2017-12-20 PROCEDURE — 99999 PR PBB SHADOW E&M-EST. PATIENT-LVL III: CPT | Mod: PBBFAC,,, | Performed by: INTERNAL MEDICINE

## 2017-12-20 PROCEDURE — 99214 OFFICE O/P EST MOD 30 MIN: CPT | Mod: S$GLB,,, | Performed by: INTERNAL MEDICINE

## 2017-12-20 NOTE — PROGRESS NOTES
Subjective:   Patient ID:  Mis Ca is a 77 y.o. female who presents for follow-up of Follow-up      Problem List Items Addressed This Visit        Cardiac/Vascular    Paroxysmal atrial fibrillation - Primary    HTN (hypertension), benign    Hyperlipidemia    Diastolic dysfunction       Other    JACQUELYN (obstructive sleep apnea)          HPI: Patient here for f/u of HTN, and PAF. Since previous she is doing well with no complaints. No chest pain, dyspnea, palpitations, dizziness or syncope. No orthopnea or PND.  BP and HR is controlled.  She has JACQUELYN and uses CPAP nightly. She have lost 16 lbs since previous visit.     Review of Systems   Constitution: Negative.   HENT: Negative.    Eyes: Negative.    Cardiovascular: Negative.    Respiratory: Negative.    Endocrine: Negative.    Hematologic/Lymphatic: Negative.    Skin: Negative.    Musculoskeletal: Negative.    Gastrointestinal: Negative.    Neurological: Negative.    Psychiatric/Behavioral: Negative.      Patient's Medications   New Prescriptions    No medications on file   Previous Medications    ACETAMINOPHEN (TYLENOL) 500 MG TABLET    Take 1,000 mg by mouth 2 (two) times daily as needed for Pain.    ALPRAZOLAM (XANAX) 0.25 MG TABLET    TAKE 1 TABLET EVERY DAY    ASPIRIN (ECOTRIN) 81 MG EC TABLET    Take 81 mg by mouth once daily.      DILTIAZEM (CARDIZEM CD) 240 MG 24 HR CAPSULE    Take 1 capsule (240 mg total) by mouth once daily.    GABAPENTIN (NEURONTIN) 300 MG CAPSULE    Take 1 capsule (300 mg total) by mouth 2 (two) times daily.    HYDROCHLOROTHIAZIDE (MICROZIDE) 12.5 MG CAPSULE    TAKE 1 CAPSULE ONE TIME DAILY    LATANOPROST 0.005 % OPHTHALMIC SOLUTION    1 drop every evening.    LOSARTAN (COZAAR) 100 MG TABLET    Take 0.5 tablets (50 mg total) by mouth once daily.    LOVASTATIN (MEVACOR) 40 MG TABLET    TAKE 1 TABLET EVERY EVENING    MULTIVITAMIN (ONE DAILY MULTIVITAMIN) PER TABLET    Take 1 tablet by mouth once daily.    OMEPRAZOLE (PRILOSEC) 20 MG  CAPSULE    TAKE 1 CAPSULE ONE TIME DAILY    OXYBUTYNIN (DITROPAN) 5 MG TAB    TAKE 1 TABLET ONE TIME DAILY    PRAMIPEXOLE (MIRAPEX) 0.25 MG TABLET    TAKE 1 TABLET ONE TIME DAILY    PREDNISOLONE ACETATE (PRED FORTE) 1 % DRPS    Place 1 drop into the right eye once daily.    TIMOLOL MALEATE 0.5% (TIMOPTIC) 0.5 % DROP    Place 1 drop into both eyes once daily.    WARFARIN (COUMADIN) 6 MG TABLET    Take 6 mg by mouth once daily.    Modified Medications    No medications on file   Discontinued Medications    No medications on file       Objective:   Physical Exam   Constitutional: She is oriented to person, place, and time. She appears well-developed and well-nourished. No distress.   Examination of the digits showed no clubbing or cyanosis   HENT:   Head: Normocephalic and atraumatic.   Eyes: Conjunctivae are normal. Pupils are equal, round, and reactive to light. Right eye exhibits no discharge.   Neck: Normal range of motion. Neck supple. No JVD present. No thyromegaly present.   No carotid bruits   Cardiovascular: Normal rate, regular rhythm, S1 normal, S2 normal, normal heart sounds, intact distal pulses and normal pulses.  PMI is not displaced.  Exam reveals no gallop, no friction rub and no opening snap.    No murmur heard.  Pulmonary/Chest: Effort normal and breath sounds normal. No respiratory distress. She has no wheezes. She has no rales. She exhibits no tenderness.   Abdominal: Soft. Bowel sounds are normal. She exhibits no distension and no mass. There is no tenderness. There is no guarding.   No hepatosplenomegaly   Musculoskeletal: Normal range of motion. She exhibits no edema or tenderness.   Lymphadenopathy:     She has no cervical adenopathy.   Neurological: She is alert and oriented to person, place, and time.   Skin: Skin is warm. No rash noted. She is not diaphoretic. No erythema.   Psychiatric: She has a normal mood and affect.   Nursing note and vitals reviewed.      ECGs reviewed-NSR with  LAFB  LABS reviewed  Imaging including Echoes reviewed- ef 60% with diastolic dysfunction    Assessment:     1. Paroxysmal atrial fibrillation    2. HTN (hypertension), benign    3. PVC (premature ventricular contraction)    4. Edema of left lower extremity -   5. AFib with RVR  6. Diastolic dysfunction without CHF    Plan:     Continue current medications  Encourage weight loss and exercise  Low salt diet  F/u in 6 months.

## 2017-12-21 PROBLEM — E66.9 OBESITY (BMI 30.0-34.9): Status: ACTIVE | Noted: 2017-12-21

## 2017-12-21 PROBLEM — E66.811 OBESITY (BMI 30.0-34.9): Status: ACTIVE | Noted: 2017-12-21

## 2017-12-21 NOTE — PROGRESS NOTES
"Mis Ca presented for a  Medicare AWV and comprehensive Health Risk Assessment today. The following components were reviewed and updated:    · Medical history  · Family History  · Social history  · Allergies and Current Medications  · Health Risk Assessment  · Health Maintenance  · Care Team     ** See Completed Assessments for Annual Wellness Visit within the encounter summary.**       The following assessments were completed:  · Living Situation  · CAGE  · Depression Screening  · Timed Get Up and Go  · Whisper Test  · Cognitive Function Screening  · Nutrition Screening  · ADL Screening  · PAQ Screening    Vitals:    12/19/17 1312   BP: 122/84   BP Location: Right arm   Patient Position: Sitting   BP Method: Medium (Manual)   Pulse: (!) 58   Temp: 98.2 °F (36.8 °C)   TempSrc: Oral   SpO2: 96%   Weight: 81.8 kg (180 lb 5.4 oz)   Height: 5' 2" (1.575 m)     Body mass index is 32.98 kg/m².     Physical Exam   Constitutional: She is oriented to person, place, and time. She appears well-developed. No distress.   obese   HENT:   Head: Normocephalic and atraumatic.   Eyes: EOM are normal. Pupils are equal, round, and reactive to light.   Neck: Neck supple. No JVD present. No tracheal deviation present.   Cardiovascular: Normal rate, regular rhythm, normal heart sounds and intact distal pulses.    No murmur heard.  Pulmonary/Chest: Effort normal and breath sounds normal. No respiratory distress. She has no wheezes. She has no rales.   Abdominal: Soft. Bowel sounds are normal. She exhibits no distension and no mass. There is no tenderness.   Musculoskeletal: Normal range of motion. She exhibits no edema or tenderness.   Neurological: She is alert and oriented to person, place, and time. Coordination normal.   Skin: Skin is warm and dry. No erythema. No pallor.   Psychiatric: She has a normal mood and affect. Her behavior is normal. Judgment and thought content normal. Cognition and memory are normal. She expresses no " homicidal and no suicidal ideation.   Nursing note and vitals reviewed.        Diagnoses and health risks identified today and associated recommendations/orders:    1. Encounter for preventive health examination    2. Essential hypertension  Chronic; stable on medication.  Followed by PCP.    3. Mixed hyperlipidemia  Chronic; stable on medication.  Followed by PCP.    4. Aortic atherosclerosis  Chronic; stable.  As seen on imaging dated 09/04/17.  Followed by Cardiology.    5. Paroxysmal atrial fibrillation  Chronic; stable on medication.  Followed by Cardiology.    6. JACQUELYN (obstructive sleep apnea)  Chronic; stable.  Followed by Sleep Medicine.    7. Gastroesophageal reflux disease without esophagitis  Chronic; stable on medication.  Followed by PCP.    8. Anxiety  Chronic; stable on medication.  Followed by PCP.    9. Obesity (BMI 30.0-34.9)  Chronic, stable. Therapeutic lifestyle changes discussed. Followed by PCP.      Provided Mis with a 5-10 year written screening schedule and personal prevention plan. Recommendations were developed using the USPSTF age appropriate recommendations. Education, counseling, and referrals were provided as needed. After Visit Summary printed and given to patient which includes a list of additional screenings\tests needed.    Return in about 3 weeks (around 1/9/2018) for follow-up with PCP, HRA visit in 1 year.    Deepa Loya NP

## 2017-12-22 ENCOUNTER — CLINICAL SUPPORT (OUTPATIENT)
Dept: FAMILY MEDICINE | Facility: CLINIC | Age: 77
End: 2017-12-22
Payer: MEDICARE

## 2017-12-22 DIAGNOSIS — I48.0 PAROXYSMAL ATRIAL FIBRILLATION: Primary | ICD-10-CM

## 2017-12-22 LAB — INR PPP: 2 (ref 2–3)

## 2017-12-22 PROCEDURE — 85610 PROTHROMBIN TIME: CPT | Mod: ,,, | Performed by: FAMILY MEDICINE

## 2017-12-26 ENCOUNTER — TELEPHONE (OUTPATIENT)
Dept: SLEEP MEDICINE | Facility: OTHER | Age: 77
End: 2017-12-26

## 2017-12-26 ENCOUNTER — TELEPHONE (OUTPATIENT)
Dept: SLEEP MEDICINE | Facility: CLINIC | Age: 77
End: 2017-12-26

## 2018-01-09 ENCOUNTER — OFFICE VISIT (OUTPATIENT)
Dept: FAMILY MEDICINE | Facility: CLINIC | Age: 78
End: 2018-01-09
Payer: MEDICARE

## 2018-01-09 VITALS
OXYGEN SATURATION: 97 % | BODY MASS INDEX: 32.79 KG/M2 | WEIGHT: 178.19 LBS | TEMPERATURE: 99 F | HEART RATE: 65 BPM | SYSTOLIC BLOOD PRESSURE: 112 MMHG | HEIGHT: 62 IN | DIASTOLIC BLOOD PRESSURE: 70 MMHG

## 2018-01-09 DIAGNOSIS — E66.9 OBESITY (BMI 30.0-34.9): ICD-10-CM

## 2018-01-09 DIAGNOSIS — D69.2 SENILE PURPURA: ICD-10-CM

## 2018-01-09 DIAGNOSIS — F41.9 ANXIETY: ICD-10-CM

## 2018-01-09 DIAGNOSIS — I51.89 DIASTOLIC DYSFUNCTION: ICD-10-CM

## 2018-01-09 DIAGNOSIS — I10 ESSENTIAL HYPERTENSION: ICD-10-CM

## 2018-01-09 DIAGNOSIS — I48.91 ATRIAL FIBRILLATION WITH RVR: ICD-10-CM

## 2018-01-09 DIAGNOSIS — K21.9 GASTROESOPHAGEAL REFLUX DISEASE WITHOUT ESOPHAGITIS: ICD-10-CM

## 2018-01-09 DIAGNOSIS — I48.0 PAROXYSMAL ATRIAL FIBRILLATION: ICD-10-CM

## 2018-01-09 DIAGNOSIS — G44.1 OTHER VASCULAR HEADACHE: Primary | ICD-10-CM

## 2018-01-09 DIAGNOSIS — I77.1 TORTUOUS AORTA: ICD-10-CM

## 2018-01-09 DIAGNOSIS — G25.81 RESTLESS LEG SYNDROME: ICD-10-CM

## 2018-01-09 DIAGNOSIS — Z79.01 ANTICOAGULATED ON COUMADIN: ICD-10-CM

## 2018-01-09 DIAGNOSIS — I70.0 AORTIC ATHEROSCLEROSIS: ICD-10-CM

## 2018-01-09 DIAGNOSIS — Z86.010 HISTORY OF ADENOMATOUS POLYP OF COLON: ICD-10-CM

## 2018-01-09 DIAGNOSIS — E78.2 MIXED HYPERLIPIDEMIA: ICD-10-CM

## 2018-01-09 DIAGNOSIS — H54.40 BLIND RIGHT EYE: ICD-10-CM

## 2018-01-09 DIAGNOSIS — K57.30 DIVERTICULOSIS OF LARGE INTESTINE WITHOUT HEMORRHAGE: Chronic | ICD-10-CM

## 2018-01-09 DIAGNOSIS — G47.33 OSA (OBSTRUCTIVE SLEEP APNEA): ICD-10-CM

## 2018-01-09 DIAGNOSIS — H40.9 GLAUCOMA, UNSPECIFIED GLAUCOMA TYPE, UNSPECIFIED LATERALITY: ICD-10-CM

## 2018-01-09 PROCEDURE — 99499 UNLISTED E&M SERVICE: CPT | Mod: S$GLB,,, | Performed by: FAMILY MEDICINE

## 2018-01-09 PROCEDURE — 99213 OFFICE O/P EST LOW 20 MIN: CPT | Mod: S$GLB,,, | Performed by: FAMILY MEDICINE

## 2018-01-09 NOTE — PROGRESS NOTES
Subjective:      Patient ID: Mis Ca is a 77 y.o. female.    Chief Complaint: Follow-up and Palpitations (last night)    Check up; saw cardiologist; lost40 pounds since August on purpose; 2 months ago had sharp pain right parietal associated with bees stinging sensation left forefoot; bp was high diastolic 101; this occurred in [place]; did not go to hospital; felt weak with first spell; lasted 10 mintues and resolved; no more in 2 months; pt not worries about it; had energy when got home; will need MRI and CT angiogram if happens again; on coumadin for atrial fibrillation; it was therpaeutic in [place];       Palpitations        Review of Systems   Constitutional: Negative.    HENT: Negative.    Respiratory: Negative.    Cardiovascular: Positive for palpitations.   Gastrointestinal: Negative.    Endocrine: Negative.    Genitourinary: Negative.    Musculoskeletal: Negative.    Psychiatric/Behavioral: Negative.    All other systems reviewed and are negative.    Objective:     Physical Exam   Constitutional: She is oriented to person, place, and time. She appears well-developed and well-nourished.   HENT:   Head: Normocephalic.   Eyes: Conjunctivae and EOM are normal. Pupils are equal, round, and reactive to light.   Neck: Normal range of motion. Neck supple.   Cardiovascular: Normal rate, regular rhythm and normal heart sounds.    Pulmonary/Chest: Effort normal and breath sounds normal.   Musculoskeletal: Normal range of motion.   Neurological: She is alert and oriented to person, place, and time. She has normal reflexes.   Skin: Skin is warm and dry.   Psychiatric: She has a normal mood and affect. Her behavior is normal. Judgment and thought content normal.   Nursing note and vitals reviewed.    Assessment:     1. Other vascular headache    2. History of adenomatous polyp of colon    3. Restless leg syndrome    4. Anxiety    5. Blind right eye    6. Glaucoma, unspecified glaucoma type, unspecified  laterality    7. Tortuous aorta    8. Paroxysmal atrial fibrillation    9. Mixed hyperlipidemia    10. Essential hypertension    11. Diastolic dysfunction    12. Atrial fibrillation with RVR    13. Aortic atherosclerosis    14. Senile purpura    15. Anticoagulated on Coumadin    16. Obesity (BMI 30.0-34.9)    17. Gastroesophageal reflux disease without esophagitis    18. Diverticulosis of large intestine without hemorrhage    19. JACQUELYN (obstructive sleep apnea)      Plan:     New Prescriptions    No medications on file     Discontinued Medications    PREDNISOLONE ACETATE (PRED FORTE) 1 % DRPS    Place 1 drop into the right eye once daily.     Modified Medications    Modified Medication Previous Medication    GABAPENTIN (NEURONTIN) 300 MG CAPSULE gabapentin (NEURONTIN) 300 MG capsule       TAKE 1 CAPSULE TWICE DAILY    Take 1 capsule (300 mg total) by mouth 2 (two) times daily.    PRAMIPEXOLE (MIRAPEX) 0.25 MG TABLET pramipexole (MIRAPEX) 0.25 MG tablet       TAKE 1 TABLET EVERY DAY    TAKE 1 TABLET ONE TIME DAILY    WARFARIN (COUMADIN) 6 MG TABLET warfarin (COUMADIN) 6 MG tablet       TAKE 1 TABLET EVERY DAY    Take 6 mg by mouth once daily.        Other vascular headache    History of adenomatous polyp of colon    Restless leg syndrome    Anxiety    Blind right eye    Glaucoma, unspecified glaucoma type, unspecified laterality    Tortuous aorta    Paroxysmal atrial fibrillation    Mixed hyperlipidemia    Essential hypertension    Diastolic dysfunction    Atrial fibrillation with RVR    Aortic atherosclerosis    Senile purpura    Anticoagulated on Coumadin    Obesity (BMI 30.0-34.9)    Gastroesophageal reflux disease without esophagitis    Diverticulosis of large intestine without hemorrhage    JACQUELYN (obstructive sleep apnea)

## 2018-01-18 RX ORDER — PRAMIPEXOLE DIHYDROCHLORIDE 0.25 MG/1
TABLET ORAL
Qty: 90 TABLET | Refills: 3 | Status: SHIPPED | OUTPATIENT
Start: 2018-01-18 | End: 2019-01-29 | Stop reason: SDUPTHER

## 2018-01-18 RX ORDER — GABAPENTIN 300 MG/1
CAPSULE ORAL
Qty: 180 CAPSULE | Refills: 3 | Status: SHIPPED | OUTPATIENT
Start: 2018-01-18 | End: 2019-01-28 | Stop reason: SDUPTHER

## 2018-01-18 RX ORDER — WARFARIN 6 MG/1
TABLET ORAL
Qty: 90 TABLET | Refills: 3 | Status: SHIPPED | OUTPATIENT
Start: 2018-01-18 | End: 2019-01-28 | Stop reason: SDUPTHER

## 2018-01-19 ENCOUNTER — CLINICAL SUPPORT (OUTPATIENT)
Dept: FAMILY MEDICINE | Facility: CLINIC | Age: 78
End: 2018-01-19
Payer: MEDICARE

## 2018-01-19 DIAGNOSIS — I48.0 PAROXYSMAL ATRIAL FIBRILLATION: Primary | ICD-10-CM

## 2018-01-19 LAB — INR PPP: 1.9 (ref 2–3)

## 2018-01-19 PROCEDURE — 85610 PROTHROMBIN TIME: CPT | Mod: ,,, | Performed by: FAMILY MEDICINE

## 2018-01-23 RX ORDER — ALPRAZOLAM 0.25 MG/1
TABLET ORAL
Qty: 90 TABLET | Refills: 1 | Status: SHIPPED | OUTPATIENT
Start: 2018-01-23 | End: 2018-12-07 | Stop reason: SDUPTHER

## 2018-02-09 ENCOUNTER — CLINICAL SUPPORT (OUTPATIENT)
Dept: FAMILY MEDICINE | Facility: CLINIC | Age: 78
End: 2018-02-09
Payer: MEDICARE

## 2018-02-09 DIAGNOSIS — D64.9 ANEMIA, UNSPECIFIED TYPE: ICD-10-CM

## 2018-02-09 DIAGNOSIS — I48.0 PAROXYSMAL ATRIAL FIBRILLATION: ICD-10-CM

## 2018-02-09 LAB — INR PPP: 2.3 (ref 2–3)

## 2018-02-09 PROCEDURE — 96372 THER/PROPH/DIAG INJ SC/IM: CPT | Mod: S$GLB,,, | Performed by: FAMILY MEDICINE

## 2018-02-09 PROCEDURE — 85610 PROTHROMBIN TIME: CPT | Mod: ,,, | Performed by: FAMILY MEDICINE

## 2018-02-09 RX ORDER — CYANOCOBALAMIN 1000 UG/ML
1000 INJECTION, SOLUTION INTRAMUSCULAR; SUBCUTANEOUS ONCE
Status: COMPLETED | OUTPATIENT
Start: 2018-02-09 | End: 2018-02-09

## 2018-02-09 RX ADMIN — CYANOCOBALAMIN 1000 MCG: 1000 INJECTION, SOLUTION INTRAMUSCULAR; SUBCUTANEOUS at 08:02

## 2018-02-27 ENCOUNTER — HOSPITAL ENCOUNTER (OUTPATIENT)
Dept: SLEEP MEDICINE | Facility: HOSPITAL | Age: 78
Discharge: HOME | End: 2018-02-27
Attending: FAMILY MEDICINE
Payer: MEDICARE

## 2018-02-27 DIAGNOSIS — H54.40 BLIND RIGHT EYE: ICD-10-CM

## 2018-02-27 DIAGNOSIS — G47.33 OSA (OBSTRUCTIVE SLEEP APNEA): ICD-10-CM

## 2018-02-27 DIAGNOSIS — I48.0 PAROXYSMAL ATRIAL FIBRILLATION: ICD-10-CM

## 2018-02-27 PROCEDURE — 95811 PR POLYSOMNOGRAPHY W/CPAP: ICD-10-PCS | Mod: 26,,, | Performed by: INTERNAL MEDICINE

## 2018-02-27 PROCEDURE — 95811 POLYSOM 6/>YRS CPAP 4/> PARM: CPT | Mod: 26,,, | Performed by: INTERNAL MEDICINE

## 2018-02-27 PROCEDURE — 95811 POLYSOM 6/>YRS CPAP 4/> PARM: CPT

## 2018-02-28 NOTE — PROGRESS NOTES
Patient was educated about the purpose of the wires and what data was being collected.  Patient was informed that the technician may need to enter the room during the night to fix leads or make adjustments to the equipment.  Patient started the study on cpap with a full face mask. Patient uses a full face mask at home and prefers this device. Frequent leg movement was observed during the study.       EKG appears to be A-Fib/Bradycardia    Small Simplus full face mask in use. Humidification3/Cflex3    EKG wires and electrodes changed for poor reading. Leg electrode resecured for lost signal    Optimal pressure 11 cmH20    Supine Rem observed at optimal pressure    Follow up instructions were provided the patient    Patient stated that cpap was fine

## 2018-03-08 ENCOUNTER — CLINICAL SUPPORT (OUTPATIENT)
Dept: FAMILY MEDICINE | Facility: CLINIC | Age: 78
End: 2018-03-08
Payer: MEDICARE

## 2018-03-08 DIAGNOSIS — L23.7 POISON IVY: ICD-10-CM

## 2018-03-08 DIAGNOSIS — I48.0 PAROXYSMAL ATRIAL FIBRILLATION: Primary | ICD-10-CM

## 2018-03-08 LAB — INR PPP: 2 (ref 2–3)

## 2018-03-08 PROCEDURE — 85610 PROTHROMBIN TIME: CPT | Mod: QW,,, | Performed by: FAMILY MEDICINE

## 2018-03-08 PROCEDURE — 96372 THER/PROPH/DIAG INJ SC/IM: CPT | Mod: S$GLB,,, | Performed by: FAMILY MEDICINE

## 2018-03-08 RX ORDER — TRIAMCINOLONE ACETONIDE 40 MG/ML
80 INJECTION, SUSPENSION INTRA-ARTICULAR; INTRAMUSCULAR ONCE
Status: COMPLETED | OUTPATIENT
Start: 2018-03-08 | End: 2018-03-08

## 2018-03-08 RX ADMIN — TRIAMCINOLONE ACETONIDE 80 MG: 40 INJECTION, SUSPENSION INTRA-ARTICULAR; INTRAMUSCULAR at 02:03

## 2018-03-08 NOTE — PROGRESS NOTES
Pt here for inr check  Pt c/o poison ivy  2 cc kenalog 40 mg/ml given to the pt per dr leon  [place] of [place] sister who is having open heart sx next week  Orders placed at [facility] for the pt to have INR checks x 2 while out of town

## 2018-03-08 NOTE — PROCEDURES
"See imported Sleep Study result in "Chart Review" under the "Media tab."    (This Sleep Study was interpreted by a Board Certified Sleep Specialist who conducted an epoch-by-epoch review of the entire raw data recording.)    (The indication for this sleep study was reviewed and deemed appropriate by AASM practice Parameters or other reasons by a Board Certified Sleep Specialist.) n  "

## 2018-03-14 NOTE — TELEPHONE ENCOUNTER
----- Message from Chiqui Fernandez sent at 3/14/2018  4:23 PM CDT -----  Contact: The pt   Patient is requesting a medication refill.     RX name:  hydroCHLOROthiazide   Strength: 12.5 mg  Quantity: 1 month supply  Directions:  TAKE 1 CAPSULE ONE TIME DAILY    [facility]      Phone number where patient can be reached: [phone]    In [place] with her sister that is in ICU.  1 pill left.

## 2018-03-15 RX ORDER — HYDROCHLOROTHIAZIDE 12.5 MG/1
12.5 CAPSULE ORAL DAILY
Qty: 90 CAPSULE | Refills: 0 | Status: SHIPPED | OUTPATIENT
Start: 2018-03-15 | End: 2018-06-20 | Stop reason: SDUPTHER

## 2018-04-12 ENCOUNTER — TELEPHONE (OUTPATIENT)
Dept: FAMILY MEDICINE | Facility: CLINIC | Age: 78
End: 2018-04-12

## 2018-04-18 ENCOUNTER — TELEPHONE (OUTPATIENT)
Dept: FAMILY MEDICINE | Facility: CLINIC | Age: 78
End: 2018-04-18

## 2018-04-18 NOTE — TELEPHONE ENCOUNTER
----- Message from Scott Dillard MD sent at 4/17/2018 10:15 PM CDT -----  INR normal  ----- Message -----  From: Joanna Mccain  Sent: 4/16/2018   3:06 PM  To: Scott Dillard MD     - [facility]/INR 4/11/2018

## 2018-05-03 ENCOUNTER — OFFICE VISIT (OUTPATIENT)
Dept: FAMILY MEDICINE | Facility: CLINIC | Age: 78
End: 2018-05-03
Payer: MEDICARE

## 2018-05-03 VITALS
TEMPERATURE: 98 F | HEART RATE: 65 BPM | BODY MASS INDEX: 32.92 KG/M2 | SYSTOLIC BLOOD PRESSURE: 121 MMHG | RESPIRATION RATE: 15 BRPM | DIASTOLIC BLOOD PRESSURE: 82 MMHG | OXYGEN SATURATION: 98 % | WEIGHT: 180 LBS

## 2018-05-03 DIAGNOSIS — I48.0 PAROXYSMAL ATRIAL FIBRILLATION: ICD-10-CM
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
## 2018-05-03 LAB — INR PPP: 2.5 (ref 2–3)

## 2018-05-03 PROCEDURE — 3079F DIAST BP 80-89 MM HG: CPT | Mod: CPTII,S$GLB,, | Performed by: NURSE PRACTITIONER

## 2018-05-03 PROCEDURE — 3074F SYST BP LT 130 MM HG: CPT | Mod: CPTII,S$GLB,, | Performed by: NURSE PRACTITIONER

## 2018-05-03 PROCEDURE — 99213 OFFICE O/P EST LOW 20 MIN: CPT | Mod: S$GLB,,, | Performed by: NURSE PRACTITIONER

## 2018-05-03 PROCEDURE — 85610 PROTHROMBIN TIME: CPT | Mod: QW,,, | Performed by: NURSE PRACTITIONER

## 2018-05-03 PROCEDURE — 99499 UNLISTED E&M SERVICE: CPT | Mod: S$GLB,,, | Performed by: NURSE PRACTITIONER

## 2018-05-03 RX ORDER — TRIAMCINOLONE ACETONIDE 1 MG/G
CREAM TOPICAL 2 TIMES DAILY
Qty: 80 G | Refills: 0 | Status: SHIPPED | OUTPATIENT
Start: 2018-05-03 | End: 2018-07-16 | Stop reason: ALTCHOICE

## 2018-05-03 NOTE — PROGRESS NOTES
Subjective:       Patient ID: Mis Ca is a 77 y.o. female.    Chief Complaint: Insect Bite    Insect Bite   This is a new problem. The current episode started yesterday. The problem occurs constantly. The problem has been unchanged. Pertinent negatives include no abdominal pain, anorexia, arthralgias, change in bowel habit, chest pain, chills, congestion, coughing, diaphoresis, fatigue, fever, headaches, joint swelling, myalgias, nausea, neck pain, numbness, rash, sore throat, swollen glands, urinary symptoms, vertigo, visual change, vomiting or weakness. Nothing aggravates the symptoms. Treatments tried: antiseptic. The treatment provided no relief.     Review of Systems   Constitutional: Negative for chills, diaphoresis, fatigue and fever.   HENT: Negative for congestion and sore throat.    Respiratory: Negative for cough.    Cardiovascular: Negative for chest pain.   Gastrointestinal: Negative for abdominal pain, anorexia, change in bowel habit, nausea and vomiting.   Musculoskeletal: Negative for arthralgias, joint swelling, myalgias and neck pain.   Skin: Negative for rash.        Bites to right and left lower legs     Neurological: Negative for vertigo, weakness, numbness and headaches.       Objective:      Physical Exam   Constitutional: She is oriented to person, place, and time. She appears well-developed and well-nourished. No distress.   HENT:   Right Ear: External ear normal.   Left Ear: External ear normal.   Cardiovascular: Normal rate, regular rhythm and normal heart sounds.    Pulmonary/Chest: Effort normal and breath sounds normal. No respiratory distress. She has no wheezes.   Neurological: She is alert and oriented to person, place, and time.   Skin: Skin is warm. No rash noted. She is not diaphoretic. There is erythema. No pallor.   Redness, papular bites to bilateral lower legs     Psychiatric: She has a normal mood and affect. Her behavior is normal. Judgment and thought content  normal.   Vitals reviewed.      Assessment:       1. Insect bite, initial encounter    2. Paroxysmal atrial fibrillation        Plan:       Insect bite, initial encounter    Paroxysmal atrial fibrillation  -     POCT INR    Other orders  -     triamcinolone acetonide 0.1% (KENALOG) 0.1 % cream; Apply topically 2 (two) times daily.  Dispense: 80 g; Refill: 0      Monitor for increase redness, swelling, and warmth if worsens call the clinic

## 2018-05-31 ENCOUNTER — TELEPHONE (OUTPATIENT)
Dept: FAMILY MEDICINE | Facility: CLINIC | Age: 78
End: 2018-05-31

## 2018-05-31 ENCOUNTER — CLINICAL SUPPORT (OUTPATIENT)
Dept: FAMILY MEDICINE | Facility: CLINIC | Age: 78
End: 2018-05-31
Payer: MEDICARE

## 2018-05-31 DIAGNOSIS — I48.91 ATRIAL FIBRILLATION WITH RVR: Primary | ICD-10-CM

## 2018-05-31 LAB — INR PPP: 1.7 (ref 2–3)

## 2018-05-31 PROCEDURE — 85610 PROTHROMBIN TIME: CPT | Mod: QW,,, | Performed by: FAMILY MEDICINE

## 2018-05-31 NOTE — TELEPHONE ENCOUNTER
Patient is here for INR check  Patient is currently taking 6mg of coumadin daily  Patient denies any changes in diet, pt denies any missed doses, and pt denies any signs of bleeding.  INR is 1.7  Will send Dr Dillard a message in regards to INR today and see what he would like her to do and when he would like her to come back in for a recheck      Lab Results       Component                Value               Date                       INR                      1.7                 05/31/2018                 INR                      2.5                 05/03/2018                 INR                      2.0                 03/08/2018                 INR                      2.3                 02/09/2018                 INR                      1.9                 01/19/2018         She told to call her if needing an extra dose and when she should come back

## 2018-05-31 NOTE — PROGRESS NOTES
Patient is here for INR check  Patient is currently taking 6mg of coumadin daily  Patient denies any changes in diet, pt denies any missed doses, and pt denies any signs of bleeding.  INR is 1.7  Will send Dr Dillard a message in regards to INR today and see what he would like her to do and when he would like her to come back in for a recheck      Lab Results       Component                Value               Date                       INR                      1.7                 05/31/2018                 INR                      2.5                 05/03/2018                 INR                      2.0                 03/08/2018                 INR                      2.3                 02/09/2018                 INR                      1.9                 01/19/2018

## 2018-06-01 NOTE — TELEPHONE ENCOUNTER
----- Message from Joanna Mccain sent at 6/1/2018 10:31 AM CDT -----  Patient following up. Was here yesterday to have INR done. Was told by nurse that she would get a call after they discussed results with Dr Dillard

## 2018-06-01 NOTE — PROGRESS NOTES
Pt notified of Dr Dillard's recommendation.     Take an extra 6mg and then resume 6mg qd.   Repeat in 2 weeks.

## 2018-06-01 NOTE — TELEPHONE ENCOUNTER
Take an extra 6 mg today, so that would be a total of 12 mg today, then resume 6 mg daily starting Saturday and repeat INR 2 weeks

## 2018-06-14 ENCOUNTER — CLINICAL SUPPORT (OUTPATIENT)
Dept: FAMILY MEDICINE | Facility: CLINIC | Age: 78
End: 2018-06-14
Payer: MEDICARE

## 2018-06-14 DIAGNOSIS — I48.91 ATRIAL FIBRILLATION WITH RVR: Primary | ICD-10-CM

## 2018-06-14 LAB — INR PPP: 2.5 (ref 2–3)

## 2018-06-14 PROCEDURE — 85610 PROTHROMBIN TIME: CPT | Mod: QW,,, | Performed by: FAMILY MEDICINE

## 2018-06-20 NOTE — TELEPHONE ENCOUNTER
----- Message from Chiqui Fernandez sent at 6/20/2018  3:03 PM CDT -----  Contact: self  Patient is requesting a medication refill.     RX name: hydroCHLOROthiazide  Strength: 12.5 mg capsule  Quantity: 90 pills  Directions: Take 1 capsule (12.5 mg total) by mouth once daily    Pharmacy name: [facility]      Phone number where patient can be reached: [phone]

## 2018-06-21 RX ORDER — HYDROCHLOROTHIAZIDE 12.5 MG/1
12.5 CAPSULE ORAL DAILY
Qty: 90 CAPSULE | Refills: 0 | Status: SHIPPED | OUTPATIENT
Start: 2018-06-21 | End: 2018-06-21 | Stop reason: SDUPTHER

## 2018-06-25 RX ORDER — HYDROCHLOROTHIAZIDE 12.5 MG/1
CAPSULE ORAL
Qty: 90 CAPSULE | Refills: 1 | Status: SHIPPED | OUTPATIENT
Start: 2018-06-25 | End: 2019-01-29 | Stop reason: SDUPTHER

## 2018-07-05 ENCOUNTER — PES CALL (OUTPATIENT)
Dept: ADMINISTRATIVE | Facility: CLINIC | Age: 78
End: 2018-07-05

## 2018-07-05 ENCOUNTER — LAB VISIT (OUTPATIENT)
Dept: LAB | Facility: HOSPITAL | Age: 78
End: 2018-07-05
Attending: INTERNAL MEDICINE
Payer: MEDICARE

## 2018-07-05 ENCOUNTER — OFFICE VISIT (OUTPATIENT)
Dept: CARDIOLOGY | Facility: CLINIC | Age: 78
End: 2018-07-05
Payer: MEDICARE

## 2018-07-05 ENCOUNTER — CLINICAL SUPPORT (OUTPATIENT)
Dept: FAMILY MEDICINE | Facility: CLINIC | Age: 78
End: 2018-07-05
Payer: MEDICARE

## 2018-07-05 VITALS
BODY MASS INDEX: 34.78 KG/M2 | DIASTOLIC BLOOD PRESSURE: 73 MMHG | WEIGHT: 189 LBS | HEIGHT: 62 IN | HEART RATE: 61 BPM | SYSTOLIC BLOOD PRESSURE: 115 MMHG | OXYGEN SATURATION: 98 %

## 2018-07-05 DIAGNOSIS — I10 ESSENTIAL HYPERTENSION: ICD-10-CM

## 2018-07-05 DIAGNOSIS — Z79.01 ANTICOAGULATED ON COUMADIN: ICD-10-CM

## 2018-07-05 DIAGNOSIS — I48.0 PAROXYSMAL ATRIAL FIBRILLATION: ICD-10-CM

## 2018-07-05 DIAGNOSIS — I48.0 PAROXYSMAL ATRIAL FIBRILLATION: Primary | ICD-10-CM

## 2018-07-05 DIAGNOSIS — I51.89 DIASTOLIC DYSFUNCTION: ICD-10-CM

## 2018-07-05 DIAGNOSIS — E78.2 MIXED HYPERLIPIDEMIA: ICD-10-CM

## 2018-07-05 DIAGNOSIS — I48.91 ATRIAL FIBRILLATION WITH RVR: Primary | ICD-10-CM

## 2018-07-05 DIAGNOSIS — G47.33 OSA (OBSTRUCTIVE SLEEP APNEA): ICD-10-CM

## 2018-07-05 LAB
ALBUMIN SERPL BCP-MCNC: 3.8 G/DL
ALP SERPL-CCNC: 76 U/L
ALT SERPL W/O P-5'-P-CCNC: 17 U/L
ANION GAP SERPL CALC-SCNC: 7 MMOL/L
AST SERPL-CCNC: 17 U/L
BASOPHILS # BLD AUTO: 0.02 K/UL
BASOPHILS NFR BLD: 0.3 %
BILIRUB SERPL-MCNC: 0.3 MG/DL
BUN SERPL-MCNC: 26 MG/DL
CALCIUM SERPL-MCNC: 9.7 MG/DL
CHLORIDE SERPL-SCNC: 103 MMOL/L
CO2 SERPL-SCNC: 31 MMOL/L
CREAT SERPL-MCNC: 0.9 MG/DL
DIFFERENTIAL METHOD: ABNORMAL
EOSINOPHIL # BLD AUTO: 0.1 K/UL
EOSINOPHIL NFR BLD: 1.8 %
ERYTHROCYTE [DISTWIDTH] IN BLOOD BY AUTOMATED COUNT: 13.7 %
EST. GFR  (AFRICAN AMERICAN): >60 ML/MIN/1.73 M^2
EST. GFR  (NON AFRICAN AMERICAN): >60 ML/MIN/1.73 M^2
GLUCOSE SERPL-MCNC: 88 MG/DL
HCT VFR BLD AUTO: 37.1 %
HGB BLD-MCNC: 12.1 G/DL
INR PPP: 1.9 (ref 2–3)
LYMPHOCYTES # BLD AUTO: 2.4 K/UL
LYMPHOCYTES NFR BLD: 33.3 %
MCH RBC QN AUTO: 31.8 PG
MCHC RBC AUTO-ENTMCNC: 32.6 G/DL
MCV RBC AUTO: 98 FL
MONOCYTES # BLD AUTO: 0.7 K/UL
MONOCYTES NFR BLD: 10 %
NEUTROPHILS # BLD AUTO: 3.9 K/UL
NEUTROPHILS NFR BLD: 54.3 %
PLATELET # BLD AUTO: 231 K/UL
PMV BLD AUTO: 10.2 FL
POTASSIUM SERPL-SCNC: 4 MMOL/L
PROT SERPL-MCNC: 6.7 G/DL
RBC # BLD AUTO: 3.8 M/UL
SODIUM SERPL-SCNC: 141 MMOL/L
TSH SERPL DL<=0.005 MIU/L-ACNC: 1.3 UIU/ML
WBC # BLD AUTO: 7.21 K/UL

## 2018-07-05 PROCEDURE — 80053 COMPREHEN METABOLIC PANEL: CPT

## 2018-07-05 PROCEDURE — 85610 PROTHROMBIN TIME: CPT | Mod: QW,,, | Performed by: FAMILY MEDICINE

## 2018-07-05 PROCEDURE — 3078F DIAST BP <80 MM HG: CPT | Mod: CPTII,S$GLB,, | Performed by: INTERNAL MEDICINE

## 2018-07-05 PROCEDURE — 36415 COLL VENOUS BLD VENIPUNCTURE: CPT

## 2018-07-05 PROCEDURE — 84443 ASSAY THYROID STIM HORMONE: CPT

## 2018-07-05 PROCEDURE — 99999 PR PBB SHADOW E&M-EST. PATIENT-LVL III: CPT | Mod: PBBFAC,,, | Performed by: INTERNAL MEDICINE

## 2018-07-05 PROCEDURE — 99214 OFFICE O/P EST MOD 30 MIN: CPT | Mod: S$GLB,,, | Performed by: INTERNAL MEDICINE

## 2018-07-05 PROCEDURE — 3074F SYST BP LT 130 MM HG: CPT | Mod: CPTII,S$GLB,, | Performed by: INTERNAL MEDICINE

## 2018-07-05 PROCEDURE — 85025 COMPLETE CBC W/AUTO DIFF WBC: CPT

## 2018-07-05 NOTE — PROGRESS NOTES
Subjective:   Patient ID:  Mis Ca is a 77 y.o. female who presents for follow-up of Follow-up      Problem List Items Addressed This Visit        Cardiac/Vascular    Paroxysmal atrial fibrillation - Primary    Relevant Orders    CBC auto differential    Comprehensive metabolic panel    TSH    Hyperlipidemia    Essential hypertension    Relevant Orders    CBC auto differential    Comprehensive metabolic panel    TSH    Diastolic dysfunction       Hematology    Anticoagulated on Coumadin       Other    JACQUELYN (obstructive sleep apnea)          HPI: Patient here for f/u of HTN, and PAF. Since previous visit she is doing well with no complaints. No chest pain, dyspnea, palpitations, dizziness or syncope. No orthopnea or PND.  BP and HR is controlled.  She has JACQUELYN and uses CPAP nightly. She is compliant with medications.      Review of Systems   Constitution: Negative.   HENT: Negative.    Eyes: Negative.    Cardiovascular: Negative.    Respiratory: Negative.    Endocrine: Negative.    Hematologic/Lymphatic: Negative.    Skin: Negative.    Musculoskeletal: Negative.    Gastrointestinal: Negative.    Neurological: Negative.    Psychiatric/Behavioral: Negative.      Patient's Medications   New Prescriptions    No medications on file   Previous Medications    ACETAMINOPHEN (TYLENOL) 500 MG TABLET    Take 1,000 mg by mouth 2 (two) times daily as needed for Pain.    ALPRAZOLAM (XANAX) 0.25 MG TABLET    TAKE 1 TABLET EVERY DAY    ASPIRIN (ECOTRIN) 81 MG EC TABLET    Take 81 mg by mouth once daily.      DILTIAZEM (CARDIZEM CD) 240 MG 24 HR CAPSULE    Take 1 capsule (240 mg total) by mouth once daily.    GABAPENTIN (NEURONTIN) 300 MG CAPSULE    TAKE 1 CAPSULE TWICE DAILY    HYDROCHLOROTHIAZIDE (MICROZIDE) 12.5 MG CAPSULE    Take 1 capsule by mouth once daily.    LATANOPROST 0.005 % OPHTHALMIC SOLUTION    1 drop every evening.    LOSARTAN (COZAAR) 100 MG TABLET    Take 0.5 tablets (50 mg total) by mouth once daily.     LOVASTATIN (MEVACOR) 40 MG TABLET    TAKE 1 TABLET EVERY EVENING    MULTIVITAMIN (ONE DAILY MULTIVITAMIN) PER TABLET    Take 1 tablet by mouth once daily.    OMEPRAZOLE (PRILOSEC) 20 MG CAPSULE    TAKE 1 CAPSULE ONE TIME DAILY    OXYBUTYNIN (DITROPAN) 5 MG TAB    TAKE 1 TABLET ONE TIME DAILY    PRAMIPEXOLE (MIRAPEX) 0.25 MG TABLET    TAKE 1 TABLET EVERY DAY    TIMOLOL MALEATE 0.5% (TIMOPTIC) 0.5 % DROP    Place 1 drop into both eyes once daily.    TRIAMCINOLONE ACETONIDE 0.1% (KENALOG) 0.1 % CREAM    Apply topically 2 (two) times daily.    WARFARIN (COUMADIN) 6 MG TABLET    TAKE 1 TABLET EVERY DAY   Modified Medications    No medications on file   Discontinued Medications    No medications on file       Objective:   Physical Exam   Constitutional: She is oriented to person, place, and time. She appears well-developed and well-nourished. No distress.   Examination of the digits showed no clubbing or cyanosis   HENT:   Head: Normocephalic and atraumatic.   Eyes: Conjunctivae are normal. Pupils are equal, round, and reactive to light. Right eye exhibits no discharge.   Neck: Normal range of motion. Neck supple. No JVD present. No thyromegaly present.   No carotid bruits   Cardiovascular: Normal rate, regular rhythm, S1 normal, S2 normal, normal heart sounds, intact distal pulses and normal pulses.  PMI is not displaced.  Exam reveals no gallop, no friction rub and no opening snap.    No murmur heard.  Pulmonary/Chest: Effort normal and breath sounds normal. No respiratory distress. She has no wheezes. She has no rales. She exhibits no tenderness.   Abdominal: Soft. Bowel sounds are normal. She exhibits no distension and no mass. There is no tenderness. There is no guarding.   No hepatosplenomegaly   Musculoskeletal: Normal range of motion. She exhibits no edema or tenderness.   Lymphadenopathy:     She has no cervical adenopathy.   Neurological: She is alert and oriented to person, place, and time.   Skin: Skin is  warm. No rash noted. She is not diaphoretic. No erythema.   Psychiatric: She has a normal mood and affect.   Nursing note and vitals reviewed.      ECGs reviewed-NSR with LAFB  LABS reviewed  Imaging including Echoes reviewed- ef 60% with diastolic dysfunction    Assessment:     1. Paroxysmal atrial fibrillation    2. HTN (hypertension), benign    3. PVC (premature ventricular contraction)    4. Edema of left lower extremity -   5. AFib with RVR  6. Diastolic dysfunction without CHF    Plan:   Patient doing well  Continue current medications  CBC, CMP  Encourage weight loss and exercise  Low salt diet  F/u in 6 months.

## 2018-07-06 DIAGNOSIS — I48.0 PAROXYSMAL ATRIAL FIBRILLATION: ICD-10-CM

## 2018-07-06 RX ORDER — DILTIAZEM HYDROCHLORIDE 240 MG/1
240 CAPSULE, COATED, EXTENDED RELEASE ORAL DAILY
Qty: 30 CAPSULE | Refills: 5 | Status: SHIPPED | OUTPATIENT
Start: 2018-07-06 | End: 2018-09-04 | Stop reason: SDUPTHER

## 2018-07-06 NOTE — TELEPHONE ENCOUNTER
----- Message from Odilia Landeros sent at 7/6/2018  8:21 AM CDT -----  Contact: self/[phone]  Patient is requesting a 1 week refill on her medication below as she is waiting on her mail order to come and it will not be to her for another week.      Please call and advise when sent to pharmacy.    diltiaZEM (CARDIZEM CD) 240 MG 24 hr capsule    [facility] - [place] - [address]

## 2018-07-12 ENCOUNTER — OFFICE VISIT (OUTPATIENT)
Dept: FAMILY MEDICINE | Facility: CLINIC | Age: 78
End: 2018-07-12
Payer: MEDICARE

## 2018-07-12 VITALS
BODY MASS INDEX: 35.15 KG/M2 | SYSTOLIC BLOOD PRESSURE: 132 MMHG | WEIGHT: 191 LBS | HEIGHT: 62 IN | HEART RATE: 75 BPM | TEMPERATURE: 98 F | OXYGEN SATURATION: 95 % | DIASTOLIC BLOOD PRESSURE: 76 MMHG

## 2018-07-12 DIAGNOSIS — H40.9 GLAUCOMA, UNSPECIFIED GLAUCOMA TYPE, UNSPECIFIED LATERALITY: ICD-10-CM

## 2018-07-12 DIAGNOSIS — H54.40 BLIND RIGHT EYE: ICD-10-CM

## 2018-07-12 DIAGNOSIS — F41.9 ANXIETY: ICD-10-CM

## 2018-07-12 DIAGNOSIS — I48.91 ATRIAL FIBRILLATION WITH RVR: ICD-10-CM

## 2018-07-12 DIAGNOSIS — I10 ESSENTIAL HYPERTENSION: ICD-10-CM

## 2018-07-12 DIAGNOSIS — E78.2 MIXED HYPERLIPIDEMIA: ICD-10-CM

## 2018-07-12 DIAGNOSIS — Z79.01 ANTICOAGULATED ON COUMADIN: ICD-10-CM

## 2018-07-12 DIAGNOSIS — E66.9 OBESITY (BMI 30.0-34.9): ICD-10-CM

## 2018-07-12 DIAGNOSIS — G25.81 RESTLESS LEG SYNDROME: Primary | ICD-10-CM

## 2018-07-12 DIAGNOSIS — I51.89 DIASTOLIC DYSFUNCTION: ICD-10-CM

## 2018-07-12 DIAGNOSIS — I48.0 PAROXYSMAL ATRIAL FIBRILLATION: ICD-10-CM

## 2018-07-12 DIAGNOSIS — K14.0 GLOSSITIS: ICD-10-CM

## 2018-07-12 DIAGNOSIS — Z12.39 BREAST CANCER SCREENING: ICD-10-CM

## 2018-07-12 DIAGNOSIS — I77.1 TORTUOUS AORTA: ICD-10-CM

## 2018-07-12 DIAGNOSIS — D69.2 SENILE PURPURA: ICD-10-CM

## 2018-07-12 PROCEDURE — 99213 OFFICE O/P EST LOW 20 MIN: CPT | Mod: S$GLB,,, | Performed by: FAMILY MEDICINE

## 2018-07-12 PROCEDURE — 3078F DIAST BP <80 MM HG: CPT | Mod: CPTII,S$GLB,, | Performed by: FAMILY MEDICINE

## 2018-07-12 PROCEDURE — 3075F SYST BP GE 130 - 139MM HG: CPT | Mod: CPTII,S$GLB,, | Performed by: FAMILY MEDICINE

## 2018-07-12 NOTE — PROGRESS NOTES
Subjective:      Patient ID: Mis Ca is a 77 y.o. female.    Chief Complaint: Follow-up (dry mouth)      HPI   Burning mouth still hurts, wven to drink cold water, balance is off, no falls yet, stumbling, bruising a lot on arms, saw Dr Benton last week and had labs, salt taste corner of mouth on outside, tongue with fissuring, throat burning today;     Review of Systems   Constitutional: Negative.    HENT: Negative.    Respiratory: Negative.    Cardiovascular: Negative.    Gastrointestinal: Negative.    Endocrine: Negative.    Genitourinary: Negative.    Musculoskeletal: Negative.    Hematological: Bruises/bleeds easily.   Psychiatric/Behavioral: Negative.    All other systems reviewed and are negative.    Objective:     Physical Exam   Constitutional: She is oriented to person, place, and time. She appears well-developed and well-nourished.   HENT:   Head: Normocephalic.   Eyes: Conjunctivae and EOM are normal. Pupils are equal, round, and reactive to light.   Neck: Normal range of motion. Neck supple.   Cardiovascular: Normal rate, regular rhythm and normal heart sounds.    Pulmonary/Chest: Effort normal and breath sounds normal.   Musculoskeletal: Normal range of motion.   Neurological: She is alert and oriented to person, place, and time. She has normal reflexes.   Skin: Skin is warm and dry.   Psychiatric: She has a normal mood and affect. Her behavior is normal. Judgment and thought content normal.   Nursing note and vitals reviewed.    Assessment:     1. Restless leg syndrome    2. Anxiety    3. Blind right eye    4. Glaucoma, unspecified glaucoma type, unspecified laterality    5. Atrial fibrillation with RVR    6. Diastolic dysfunction    7. Essential hypertension    8. Mixed hyperlipidemia    9. 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 Anticoagulated on Coumadin    12. Senile purpura    13. Obesity (BMI 30.0-34.9)    14. Breast cancer screening    15. Glossitis       Plan:      New Prescriptions    No medications on file     Discontinued Medications    OXYBUTYNIN (DITROPAN) 5 MG TAB    TAKE 1 TABLET ONE TIME DAILY     Modified Medications    No medications on file       Restless leg syndrome  -     VITAMIN B1; Future; Expected date: 07/12/2018  -     Vitamin B12; Future; Expected date: 07/12/2018    Anxiety  -     VITAMIN B1; Future; Expected date: 07/12/2018  -     Vitamin B12; Future; Expected date: 07/12/2018    Blind right eye  -     VITAMIN B1; Future; Expected date: 07/12/2018  -     Vitamin B12; Future; Expected date: 07/12/2018    Glaucoma, unspecified glaucoma type, unspecified laterality  -     VITAMIN B1; Future; Expected date: 07/12/2018  -     Vitamin B12; Future; Expected date: 07/12/2018    Atrial fibrillation with RVR  -     VITAMIN B1; Future; Expected date: 07/12/2018  -     Vitamin B12; Future; Expected date: 07/12/2018    Diastolic dysfunction  -     VITAMIN B1; Future; Expected date: 07/12/2018  -     Vitamin B12; Future; Expected date: 07/12/2018    Essential hypertension  -     VITAMIN B1; Future; Expected date: 07/12/2018  -     Vitamin B12; Future; Expected date: 07/12/2018    Mixed hyperlipidemia  -     VITAMIN B1; Future; Expected date: 07/12/2018  -     Vitamin B12; Future; Expected date: 07/12/2018    Paroxysmal atrial fibrillation  -     VITAMIN B1; Future; Expected date: 07/12/2018  -     Vitamin B12; Future; Expected date: 07/12/2018    Tortuous aorta  -     VITAMIN B1; Future; Expected date: 07/12/2018  -     Vitamin B12; Future; Expected date: 07/12/2018    Anticoagulated on Coumadin  -     VITAMIN B1; Future; Expected date: 07/12/2018  -     Vitamin B12; Future; Expected date: 07/12/2018    Senile purpura  -     VITAMIN B1; Future; Expected date: 07/12/2018  -     Vitamin B12; Future; Expected date: 07/12/2018    Obesity (BMI 30.0-34.9)  -     VITAMIN B1; Future; Expected date: 07/12/2018  -     Vitamin B12; Future; Expected date: 07/12/2018    Breast  cancer screening  -     Mammo Digital Screening Bilat with Tomosynthesis CAD; Future; Expected date: 07/12/2018  -     VITAMIN B1; Future; Expected date: 07/12/2018  -     Vitamin B12; Future; Expected date: 07/12/2018    Glossitis   -     Vitamin B12; Future; Expected date: 07/12/2018

## 2018-07-16 ENCOUNTER — OFFICE VISIT (OUTPATIENT)
Dept: INTERNAL MEDICINE | Facility: CLINIC | Age: 78
End: 2018-07-16
Payer: MEDICARE

## 2018-07-16 VITALS
HEIGHT: 62 IN | DIASTOLIC BLOOD PRESSURE: 94 MMHG | WEIGHT: 193.69 LBS | SYSTOLIC BLOOD PRESSURE: 138 MMHG | RESPIRATION RATE: 20 BRPM | BODY MASS INDEX: 35.64 KG/M2 | HEART RATE: 72 BPM

## 2018-07-16 DIAGNOSIS — D69.2 SENILE PURPURA: ICD-10-CM

## 2018-07-16 DIAGNOSIS — I70.0 AORTIC ATHEROSCLEROSIS: ICD-10-CM

## 2018-07-16 DIAGNOSIS — H40.9 GLAUCOMA, UNSPECIFIED GLAUCOMA TYPE, UNSPECIFIED LATERALITY: ICD-10-CM

## 2018-07-16 DIAGNOSIS — Z85.828 HISTORY OF SKIN CANCER: ICD-10-CM

## 2018-07-16 DIAGNOSIS — Z79.01 ANTICOAGULATED ON COUMADIN: ICD-10-CM

## 2018-07-16 DIAGNOSIS — E78.2 MIXED HYPERLIPIDEMIA: ICD-10-CM

## 2018-07-16 DIAGNOSIS — G47.33 OSA (OBSTRUCTIVE SLEEP APNEA): ICD-10-CM

## 2018-07-16 DIAGNOSIS — E66.01 SEVERE OBESITY (BMI 35.0-39.9): ICD-10-CM

## 2018-07-16 DIAGNOSIS — I48.0 PAROXYSMAL ATRIAL FIBRILLATION: ICD-10-CM

## 2018-07-16 DIAGNOSIS — H54.40 BLIND RIGHT EYE: ICD-10-CM

## 2018-07-16 DIAGNOSIS — F41.9 ANXIETY: ICD-10-CM

## 2018-07-16 DIAGNOSIS — I10 ESSENTIAL HYPERTENSION: ICD-10-CM

## 2018-07-16 DIAGNOSIS — Z86.010 HISTORY OF ADENOMATOUS POLYP OF COLON: ICD-10-CM

## 2018-07-16 DIAGNOSIS — G25.81 RESTLESS LEG SYNDROME: ICD-10-CM

## 2018-07-16 DIAGNOSIS — I77.1 TORTUOUS AORTA: ICD-10-CM

## 2018-07-16 DIAGNOSIS — Z00.00 ENCOUNTER FOR PREVENTIVE HEALTH EXAMINATION: Primary | ICD-10-CM

## 2018-07-16 DIAGNOSIS — K21.9 GASTROESOPHAGEAL REFLUX DISEASE WITHOUT ESOPHAGITIS: ICD-10-CM

## 2018-07-16 PROCEDURE — 3080F DIAST BP >= 90 MM HG: CPT | Mod: CPTII,S$GLB,, | Performed by: NURSE PRACTITIONER

## 2018-07-16 PROCEDURE — 3075F SYST BP GE 130 - 139MM HG: CPT | Mod: CPTII,S$GLB,, | Performed by: NURSE PRACTITIONER

## 2018-07-16 PROCEDURE — 99499 UNLISTED E&M SERVICE: CPT | Mod: HCNC,S$GLB,, | Performed by: NURSE PRACTITIONER

## 2018-07-16 PROCEDURE — 99999 PR PBB SHADOW E&M-EST. PATIENT-LVL IV: CPT | Mod: PBBFAC,,, | Performed by: NURSE PRACTITIONER

## 2018-07-16 PROCEDURE — G0439 PPPS, SUBSEQ VISIT: HCPCS | Mod: S$GLB,,, | Performed by: NURSE PRACTITIONER

## 2018-07-16 NOTE — PROGRESS NOTES
"Mis Ca presented for a  Medicare AWV and comprehensive Health Risk Assessment today. The following components were reviewed and updated:    · Medical history  · Family History  · Social history  · Allergies and Current Medications  · Health Risk Assessment  · Health Maintenance  · Care Team     ** See Completed Assessments for Annual Wellness Visit within the encounter summary.**       The following assessments were completed:  · Living Situation  · CAGE  · Depression Screening  · Timed Get Up and Go  · Whisper Test  · Cognitive Function Screening  · Nutrition Screening  · ADL Screening  · PAQ Screening    Vitals:    07/16/18 1307   BP: (!) 138/94   Pulse: 72   Resp: 20   Weight: 87.9 kg (193 lb 10.8 oz)   Height: 5' 2" (1.575 m)     Body mass index is 35.42 kg/m².  Physical Exam   Constitutional: She is oriented to person, place, and time. She appears well-developed and well-nourished.   HENT:   Head: Normocephalic and atraumatic.   Eyes: EOM are normal. Pupils are equal, round, and reactive to light.   Neck: Normal range of motion.   Cardiovascular: Normal rate, regular rhythm and normal heart sounds.    Pulmonary/Chest: Effort normal and breath sounds normal. No respiratory distress.   Musculoskeletal: Normal range of motion.   Neurological: She is alert and oriented to person, place, and time. Coordination normal.   Skin: Skin is warm and dry.   Psychiatric: She has a normal mood and affect. Her behavior is normal. Judgment and thought content normal.   Nursing note and vitals reviewed.        Diagnoses and health risks identified today and associated recommendations/orders:    1. Encounter for preventive health examination    2. Severe obesity (BMI 35.0-39.9)  Current BMI 35.42. Lifestyle modifications discussed with patient.      3. Senile purpura  Chronic; stable. Continue current treatment plan as previously prescribed by PCP.     4. Paroxysmal atrial fibrillation  Chronic; stable. Continue current " treatment plan as previously prescribed by Cardiology (Dr. Benton).     5. Tortuous aorta  Noted on chest xray dated 11/29/2014. Patient followed by Cardiology.     6. Aortic atherosclerosis  Noted on chest xray dated 9/4/2017. Patient followed by Cardiology.    7. Anticoagulated on Coumadin  Chronic; stable. Continue current treatment plan as previously prescribed by PCP.     8. Essential hypertension  Chronic; stable. Continue current treatment plan as previously prescribed by PCP.     9. Mixed hyperlipidemia  Chronic; stable. Continue current treatment plan as previously prescribed by PCP.     10. Gastroesophageal reflux disease without esophagitis  Chronic; stable. Continue current treatment plan as previously prescribed by PCP.     11. History of adenomatous polyp of colon  Chronic; stable. Patient was followed by Gastroenterology (Dr. Rizo).    12. JACQUELYN (obstructive sleep apnea)  Chronic; stable. Patient has a CPAP machine, but currently not using. Continue current treatment plan as previously prescribed by Sleep Medicine (Dr. Burnett).     13. Glaucoma, unspecified glaucoma type, unspecified laterality  Chronic; stable. Continue current treatment plan as previously prescribed by Ophthalmology (Dr. Coles).    14. Blind right eye  Chronic; stable. Continue current treatment plan as previously prescribed by Ophthalmology.    15. Anxiety  Chronic; stable. Continue current treatment plan as previously prescribed by PCP.     16. Restless leg syndrome  Chronic; stable. Continue current treatment plan as previously prescribed by PCP.     17. History of skin cancer  Chronic; stable. Patient was followed by Dermatology (Dr. Levin).        Provided Mis with a 5-10 year written screening schedule and personal prevention plan. Recommendations were developed using the USPSTF age appropriate recommendations. Education, counseling, and referrals were provided as needed. After Visit Summary printed and given to  patient which includes a list of additional screenings\tests needed.    Follow-up for HRA visit in 1 year.    Harrison Bocanegra NP

## 2018-07-16 NOTE — PATIENT INSTRUCTIONS
Counseling and Referral of Other Preventative  (Italic type indicates deductible and co-insurance are waived)    Patient Name: Mis Ca  Today's Date: 7/16/2018    Health Maintenance       Date Due Completion Date    Influenza Vaccine 08/01/2018 10/4/2017    Override on 12/2/2016: Done    DEXA SCAN 06/28/2019 6/28/2016    Override on 6/9/2016: Done    Lipid Panel 08/10/2022 8/10/2017    TETANUS VACCINE 12/13/2026 12/13/2016    Override on 12/2/2016: Done    Colonoscopy 03/03/2027 3/3/2017 (Done)    Override on 3/3/2017: Done ([facility]    Override on 6/9/2016: Done        No orders of the defined types were placed in this encounter.    The following information is provided to all patients.  This information is to help you find resources for any of the problems found today that may be affecting your health:                Living healthy guide: www.[facility].[place].[facility]      Understanding Diabetes: www.diabetes.org      Eating healthy: www.cdc.gov/healthyweight      CDC home safety checklist: www.cdc.gov/steadi/patient.html      Agency on Aging: www.goea.[place].gov      Alcoholics anonymous (AA): www.aa.org      Physical Activity: www.delfino.nih.gov/cm3pjlq      Tobacco use: www.quitwithusla.org

## 2018-07-19 ENCOUNTER — CLINICAL SUPPORT (OUTPATIENT)
Dept: FAMILY MEDICINE | Facility: CLINIC | Age: 78
End: 2018-07-19
Payer: MEDICARE

## 2018-07-19 DIAGNOSIS — I48.91 ATRIAL FIBRILLATION WITH RVR: Primary | ICD-10-CM

## 2018-07-19 LAB — INR PPP: 2.4 (ref 2–3)

## 2018-07-19 PROCEDURE — 85610 PROTHROMBIN TIME: CPT | Mod: QW,,, | Performed by: FAMILY MEDICINE

## 2018-07-25 ENCOUNTER — HOSPITAL ENCOUNTER (OUTPATIENT)
Dept: RADIOLOGY | Facility: HOSPITAL | Age: 78
Discharge: HOME | End: 2018-07-25
Attending: FAMILY MEDICINE
Payer: MEDICARE

## 2018-07-25 DIAGNOSIS — Z12.39 BREAST CANCER SCREENING: ICD-10-CM

## 2018-07-25 PROCEDURE — 77067 SCR MAMMO BI INCL CAD: CPT | Mod: TC,PO

## 2018-07-26 ENCOUNTER — TELEPHONE (OUTPATIENT)
Dept: FAMILY MEDICINE | Facility: CLINIC | Age: 78
End: 2018-07-26

## 2018-07-26 DIAGNOSIS — R92.8 ABNORMAL MAMMOGRAM: Primary | ICD-10-CM

## 2018-07-26 RX ORDER — CYANOCOBALAMIN 1000 UG/ML
INJECTION, SOLUTION INTRAMUSCULAR; SUBCUTANEOUS
Qty: 10 ML | Refills: 1 | Status: SHIPPED | OUTPATIENT
Start: 2018-07-26 | End: 2019-08-12 | Stop reason: SDUPTHER

## 2018-07-27 ENCOUNTER — TELEPHONE (OUTPATIENT)
Dept: FAMILY MEDICINE | Facility: CLINIC | Age: 78
End: 2018-07-27

## 2018-07-27 ENCOUNTER — HOSPITAL ENCOUNTER (OUTPATIENT)
Dept: RADIOLOGY | Facility: HOSPITAL | Age: 78
Discharge: HOME | End: 2018-07-27
Attending: FAMILY MEDICINE
Payer: MEDICARE

## 2018-07-27 DIAGNOSIS — R92.8 ABNORMAL MAMMOGRAM: ICD-10-CM

## 2018-07-27 DIAGNOSIS — R92.0 BREAST MICROCALCIFICATIONS: Primary | ICD-10-CM

## 2018-07-27 PROCEDURE — 77065 DX MAMMO INCL CAD UNI: CPT | Mod: TC,PO,LT

## 2018-07-27 NOTE — TELEPHONE ENCOUNTER
Pt will call Dr Vergara for possible brest Bx since this mammogram shows microcalcifications; has had twice already same breast    Referral placed; report sent to Dr Vergara.

## 2018-07-30 ENCOUNTER — TELEPHONE (OUTPATIENT)
Dept: RADIOLOGY | Facility: HOSPITAL | Age: 78
End: 2018-07-30

## 2018-07-30 NOTE — TELEPHONE ENCOUNTER
Called patient in reference to scheduling a breast biopsy at the breast center. Patient called and scheduled an appointment Dr Vergara for breast biopsy.

## 2018-08-02 ENCOUNTER — TELEPHONE (OUTPATIENT)
Dept: FAMILY MEDICINE | Facility: CLINIC | Age: 78
End: 2018-08-02

## 2018-08-02 ENCOUNTER — TELEPHONE (OUTPATIENT)
Dept: CARDIOLOGY | Facility: CLINIC | Age: 78
End: 2018-08-02

## 2018-08-02 NOTE — TELEPHONE ENCOUNTER
Called pt back she states that she has to have a breast biopsy on 8/9 so she needed to reschedule her INR/BP check they want her to hold the coumadin for 5 days prior to the biopsy and maybe a day or so after so I rescheduled her for INR/BP check on the 16th and she wants to start the B12 shots tomorrow and she will need to come every week for one month and then see what her labs say

## 2018-08-02 NOTE — TELEPHONE ENCOUNTER
Dr. Omalley's office  [phone] fax [phone]  Calling to see if its okay for the patient to stop taking her coumadin for 5 days before her procedure.   Please advice

## 2018-08-02 NOTE — TELEPHONE ENCOUNTER
Natalia notified that patient can hold blood thinner for 5 days and start back after the procedure.

## 2018-08-02 NOTE — TELEPHONE ENCOUNTER
----- Message from Negrita Cleveland sent at 8/2/2018  3:30 PM CDT -----  Contact: Self. [phone]  Patient would like to speak with you about rescheduling her appointment and receiving the B12 shots and she does not know what to do with them. Please advise

## 2018-08-02 NOTE — TELEPHONE ENCOUNTER
----- Message from Linda Lane sent at 8/2/2018  8:20 AM CDT -----  Contact: Natalia Calling from Dr. Omalley [phone] fax [phone]  Calling to talk to nurse concerning to see if its okay for the patient to stop taking her coumadin for 5 days before her procedure. Please advice

## 2018-08-03 ENCOUNTER — CLINICAL SUPPORT (OUTPATIENT)
Dept: FAMILY MEDICINE | Facility: CLINIC | Age: 78
End: 2018-08-03
Payer: MEDICARE

## 2018-08-03 DIAGNOSIS — I48.91 ATRIAL FIBRILLATION WITH RVR: Primary | ICD-10-CM

## 2018-08-03 PROCEDURE — 96372 THER/PROPH/DIAG INJ SC/IM: CPT | Mod: S$GLB,,, | Performed by: FAMILY MEDICINE

## 2018-08-03 RX ORDER — CYANOCOBALAMIN 1000 UG/ML
1000 INJECTION, SOLUTION INTRAMUSCULAR; SUBCUTANEOUS ONCE
Status: COMPLETED | OUTPATIENT
Start: 2018-08-03 | End: 2018-08-03

## 2018-08-03 RX ADMIN — CYANOCOBALAMIN 1000 MCG: 1000 INJECTION, SOLUTION INTRAMUSCULAR; SUBCUTANEOUS at 04:08

## 2018-08-03 NOTE — PROGRESS NOTES
Pt comes in for B12 injection. Pt is to come in once a week for 1 month and then re-test. Pt supplied medication. Right dose, right route, right medication all verified. 2 pt identifier used. No pain or redness at injection site. Given ULG. Pt scheduled for remaining injections this month. Instructed to wait 15 mins following injection

## 2018-08-10 ENCOUNTER — CLINICAL SUPPORT (OUTPATIENT)
Dept: FAMILY MEDICINE | Facility: CLINIC | Age: 78
End: 2018-08-10
Payer: MEDICARE

## 2018-08-10 DIAGNOSIS — D64.9 ANEMIA, UNSPECIFIED TYPE: Primary | ICD-10-CM

## 2018-08-10 PROCEDURE — 96372 THER/PROPH/DIAG INJ SC/IM: CPT | Mod: S$GLB,,, | Performed by: FAMILY MEDICINE

## 2018-08-10 RX ORDER — CYANOCOBALAMIN 1000 UG/ML
1000 INJECTION, SOLUTION INTRAMUSCULAR; SUBCUTANEOUS ONCE
Status: COMPLETED | OUTPATIENT
Start: 2018-08-10 | End: 2018-08-10

## 2018-08-10 RX ADMIN — CYANOCOBALAMIN 1000 MCG: 1000 INJECTION, SOLUTION INTRAMUSCULAR; SUBCUTANEOUS at 09:08

## 2018-08-16 ENCOUNTER — CLINICAL SUPPORT (OUTPATIENT)
Dept: FAMILY MEDICINE | Facility: CLINIC | Age: 78
End: 2018-08-16
Payer: MEDICARE

## 2018-08-16 DIAGNOSIS — I48.91 ATRIAL FIBRILLATION WITH RVR: ICD-10-CM

## 2018-08-16 DIAGNOSIS — D64.9 ANEMIA, UNSPECIFIED TYPE: Primary | ICD-10-CM

## 2018-08-16 LAB — INR PPP: 1.3 (ref 2–3)

## 2018-08-16 PROCEDURE — 96372 THER/PROPH/DIAG INJ SC/IM: CPT | Mod: S$GLB,,, | Performed by: FAMILY MEDICINE

## 2018-08-16 PROCEDURE — 85610 PROTHROMBIN TIME: CPT | Mod: QW,,, | Performed by: FAMILY MEDICINE

## 2018-08-16 RX ORDER — CYANOCOBALAMIN 1000 UG/ML
1000 INJECTION, SOLUTION INTRAMUSCULAR; SUBCUTANEOUS
Status: COMPLETED | OUTPATIENT
Start: 2018-08-16 | End: 2018-08-16

## 2018-08-16 RX ADMIN — CYANOCOBALAMIN 1000 MCG: 1000 INJECTION, SOLUTION INTRAMUSCULAR; SUBCUTANEOUS at 02:08

## 2018-08-21 ENCOUNTER — TELEPHONE (OUTPATIENT)
Dept: RADIOLOGY | Facility: HOSPITAL | Age: 78
End: 2018-08-21

## 2018-08-24 ENCOUNTER — CLINICAL SUPPORT (OUTPATIENT)
Dept: FAMILY MEDICINE | Facility: CLINIC | Age: 78
End: 2018-08-24
Payer: MEDICARE

## 2018-08-24 DIAGNOSIS — D64.9 ANEMIA, UNSPECIFIED TYPE: Primary | ICD-10-CM

## 2018-08-24 PROCEDURE — 96372 THER/PROPH/DIAG INJ SC/IM: CPT | Mod: S$GLB,,, | Performed by: FAMILY MEDICINE

## 2018-08-24 RX ORDER — CYANOCOBALAMIN 1000 UG/ML
1000 INJECTION, SOLUTION INTRAMUSCULAR; SUBCUTANEOUS ONCE
Status: COMPLETED | OUTPATIENT
Start: 2018-08-24 | End: 2018-08-24

## 2018-08-24 RX ADMIN — CYANOCOBALAMIN 1000 MCG: 1000 INJECTION, SOLUTION INTRAMUSCULAR; SUBCUTANEOUS at 02:08

## 2018-08-29 ENCOUNTER — TELEPHONE (OUTPATIENT)
Dept: RADIOLOGY | Facility: HOSPITAL | Age: 78
End: 2018-08-29

## 2018-08-31 NOTE — TELEPHONE ENCOUNTER
----- Message from Ana Vasquez sent at 8/31/2018  9:09 AM CDT -----  Contact: [phone]/self  Patient is requesting to have a refill of   lovastatin (MEVACOR) 40 MG tablet. TAKE 1 TABLET EVERY EVENING      Send a 30 day supply sent to [facility] - [place] - [address]     Please send refills to [facility] - [facility] [address] Please advise.

## 2018-09-03 RX ORDER — LOVASTATIN 40 MG/1
40 TABLET ORAL NIGHTLY
Qty: 90 TABLET | Refills: 1 | Status: SHIPPED | OUTPATIENT
Start: 2018-09-03 | End: 2018-11-12

## 2018-09-04 DIAGNOSIS — I48.0 PAROXYSMAL ATRIAL FIBRILLATION: ICD-10-CM

## 2018-09-04 RX ORDER — DILTIAZEM HYDROCHLORIDE 240 MG/1
CAPSULE, EXTENDED RELEASE ORAL
Qty: 90 CAPSULE | Refills: 3 | Status: SHIPPED | OUTPATIENT
Start: 2018-09-04 | End: 2019-09-05 | Stop reason: SDUPTHER

## 2018-09-04 RX ORDER — OMEPRAZOLE 20 MG/1
CAPSULE, DELAYED RELEASE ORAL
Qty: 90 CAPSULE | Refills: 3 | Status: SHIPPED | OUTPATIENT
Start: 2018-09-04 | End: 2019-11-07 | Stop reason: SDUPTHER

## 2018-09-05 RX ORDER — OXYBUTYNIN CHLORIDE 5 MG/1
TABLET ORAL
Qty: 90 TABLET | Refills: 1 | Status: SHIPPED | OUTPATIENT
Start: 2018-09-05 | End: 2019-04-29 | Stop reason: SDUPTHER

## 2018-09-11 ENCOUNTER — CLINICAL SUPPORT (OUTPATIENT)
Dept: FAMILY MEDICINE | Facility: CLINIC | Age: 78
End: 2018-09-11
Payer: MEDICARE

## 2018-09-11 DIAGNOSIS — I48.91 ATRIAL FIBRILLATION WITH RVR: Primary | ICD-10-CM

## 2018-09-11 DIAGNOSIS — D64.9 ANEMIA, UNSPECIFIED TYPE: ICD-10-CM

## 2018-09-11 DIAGNOSIS — Z79.01 ANTICOAGULATED ON COUMADIN: ICD-10-CM

## 2018-09-11 LAB — INR PPP: 3.2 (ref 2–3)

## 2018-09-11 PROCEDURE — 96372 THER/PROPH/DIAG INJ SC/IM: CPT | Mod: S$GLB,,, | Performed by: FAMILY MEDICINE

## 2018-09-11 PROCEDURE — 85610 PROTHROMBIN TIME: CPT | Mod: QW,,, | Performed by: FAMILY MEDICINE

## 2018-09-11 RX ORDER — CYANOCOBALAMIN 1000 UG/ML
1000 INJECTION, SOLUTION INTRAMUSCULAR; SUBCUTANEOUS ONCE
Status: COMPLETED | OUTPATIENT
Start: 2018-09-11 | End: 2018-09-11

## 2018-09-11 RX ADMIN — CYANOCOBALAMIN 1000 MCG: 1000 INJECTION, SOLUTION INTRAMUSCULAR; SUBCUTANEOUS at 04:09

## 2018-09-11 NOTE — PROGRESS NOTES
Patient is here for INR check  Patient is currently taking 6mg of coumadin Monday, Tuesday, Wednesday, Thursday, Saturday, and Sunday. 9mg of coumadin on Fridays.  Patient denies any changes in diet, pt denies any missed doses, and pt denies any signs of bleeding.  INR is 3.2   Patient was instructed to speak to Dr. Dillard on what should do in regards to dose of coumadin.  Patient was instructed to return to the clinic 2 weeks to recheck INR.    Lab Results       Component                Value               Date                       INR                      1.3                 08/16/2018                 INR                      2.4                 07/19/2018                 INR                      1.9                 07/05/2018                 INR                      2.5                 06/14/2018                 INR                      1.7                 05/31/2018

## 2018-09-21 ENCOUNTER — HOSPITAL ENCOUNTER (OUTPATIENT)
Dept: RADIOLOGY | Facility: HOSPITAL | Age: 78
Discharge: HOME | End: 2018-09-21
Attending: PODIATRIST
Payer: MEDICARE

## 2018-09-21 ENCOUNTER — OFFICE VISIT (OUTPATIENT)
Dept: PODIATRY | Facility: CLINIC | Age: 78
End: 2018-09-21
Payer: MEDICARE

## 2018-09-21 VITALS
DIASTOLIC BLOOD PRESSURE: 63 MMHG | SYSTOLIC BLOOD PRESSURE: 116 MMHG | BODY MASS INDEX: 35.51 KG/M2 | WEIGHT: 193 LBS | HEART RATE: 70 BPM | HEIGHT: 62 IN

## 2018-09-21 DIAGNOSIS — M20.12 HALLUX VALGUS WITH BUNIONS, LEFT: Primary | ICD-10-CM

## 2018-09-21 DIAGNOSIS — M21.612 HALLUX VALGUS WITH BUNIONS, LEFT: ICD-10-CM

## 2018-09-21 DIAGNOSIS — I87.2 VENOUS INSUFFICIENCY OF BOTH LOWER EXTREMITIES: ICD-10-CM

## 2018-09-21 DIAGNOSIS — M62.462 GASTROCNEMIUS EQUINUS OF LEFT LOWER EXTREMITY: ICD-10-CM

## 2018-09-21 DIAGNOSIS — M20.12 HALLUX VALGUS WITH BUNIONS, LEFT: ICD-10-CM

## 2018-09-21 DIAGNOSIS — M21.612 HALLUX VALGUS WITH BUNIONS, LEFT: Primary | ICD-10-CM

## 2018-09-21 PROCEDURE — 73630 X-RAY EXAM OF FOOT: CPT | Mod: 26,LT,, | Performed by: RADIOLOGY

## 2018-09-21 PROCEDURE — 1101F PT FALLS ASSESS-DOCD LE1/YR: CPT | Mod: CPTII,,, | Performed by: PODIATRIST

## 2018-09-21 PROCEDURE — 99203 OFFICE O/P NEW LOW 30 MIN: CPT | Mod: S$PBB,,, | Performed by: PODIATRIST

## 2018-09-21 PROCEDURE — 3078F DIAST BP <80 MM HG: CPT | Mod: CPTII,,, | Performed by: PODIATRIST

## 2018-09-21 PROCEDURE — 3074F SYST BP LT 130 MM HG: CPT | Mod: CPTII,,, | Performed by: PODIATRIST

## 2018-09-21 PROCEDURE — 99214 OFFICE O/P EST MOD 30 MIN: CPT | Mod: PBBFAC,25,PN | Performed by: PODIATRIST

## 2018-09-21 PROCEDURE — 99999 PR PBB SHADOW E&M-EST. PATIENT-LVL IV: CPT | Mod: PBBFAC,,, | Performed by: PODIATRIST

## 2018-09-21 PROCEDURE — 73630 X-RAY EXAM OF FOOT: CPT | Mod: TC,FY,LT

## 2018-09-23 PROBLEM — M20.12 HALLUX VALGUS WITH BUNIONS, LEFT: Status: ACTIVE | Noted: 2018-09-23

## 2018-09-23 PROBLEM — M21.612 HALLUX VALGUS WITH BUNIONS, LEFT: Status: ACTIVE | Noted: 2018-09-23

## 2018-09-24 NOTE — PROGRESS NOTES
"Subjective:      Patient ID: Mis Ca is a 77 y.o. female.    Chief Complaint: Foot Problem ( left foot bunion)    Complains of a bunion deformity left foot. Pain aggravated by enclosed shoes. Denies trauma. Relates it has been present for many years. Wearing sandals today. Wears toe separators which help only a little.    Vitals:    09/21/18 1410   BP: 116/63   Pulse: 70   Weight: 87.5 kg (193 lb)   Height: 5' 2" (1.575 m)   PainSc:   3      Past Medical History:   Diagnosis Date    Anxiety     Atrial fibrillation     Blind right eye     Bradycardia     Cancer     skin cancer left side of face under eye    Hyperlipidemia     Hypertension     PVC (premature ventricular contraction)     RLS (restless legs syndrome)     Sleep apnea     Does not use CPAP machine.       Past Surgical History:   Procedure Laterality Date    ADENOIDECTOMY      APPENDECTOMY      BREAST BIOPSY Left     times two    CATARACT EXTRACTION BILATERAL W/ ANTERIOR VITRECTOMY      EYE SURGERY Bilateral     cataracts extraction    EYE SURGERY Right     drainage tube (glaucoma)    FOOT SURGERY Left     lesion removed from dorsal area    FRACTURE SURGERY Left     arm    HAND TENDON SURGERY Left     HYSTERECTOMY      OOPHORECTOMY      ORIF FOREARM FRACTURE Left     PALATE SURGERY      Lesion removed    TONSILLECTOMY         Family History   Problem Relation Age of Onset    Aneurysm Mother         AAA    Cancer Father         lung cancer    Lung cancer Father     Cirrhosis Father     Cancer Sister         Breast    Diabetes Sister     Breast cancer Sister     Hypertension Sister     Hyperlipidemia Sister     Colon polyps Brother     Cancer Brother         lung cancer    Diabetes Brother     Hyperlipidemia Brother     Hypertension Brother     Cancer Brother         bladder cancer    Diabetes Brother     Glaucoma Brother     Heart disease Sister         Heart valve repair    Atrial fibrillation Sister     " Diabetes Sister     Heart disease Sister     Heart attack Sister     Bipolar disorder Sister     Depression Sister     Glaucoma Sister     Diabetes Sister     Other Sister         Pituitary tumor    Arthritis Sister     COPD Sister     Heart disease Sister     Kidney disease Sister     Cancer Sister         lung cancer    Diabetes Sister     Lung cancer Sister     Heart attack Sister        Social History     Socioeconomic History    Marital status:      Spouse name: Not on file    Number of children: Not on file    Years of education: Not on file    Highest education level: Not on file   Social Needs    Financial resource strain: Not on file    Food insecurity - worry: Not on file    Food insecurity - inability: Not on file    Transportation needs - medical: Not on file    Transportation needs - non-medical: Not on file   Occupational History    Not on file   Tobacco Use    Smoking status: Never Smoker    Smokeless tobacco: Never Used   Substance and Sexual Activity    Alcohol use: Yes     Comment: Seldomly drinks alcohol (wine)    Drug use: No    Sexual activity: Not Currently     Partners: Male   Other Topics Concern    Not on file   Social History Narrative    Not on file       Current Outpatient Medications   Medication Sig Dispense Refill    acetaminophen (TYLENOL) 500 MG tablet Take 1,000 mg by mouth 2 (two) times daily as needed for Pain.      ALPRAZolam (XANAX) 0.25 MG tablet TAKE 1 TABLET EVERY DAY 90 tablet 1    aspirin (ECOTRIN) 81 MG EC tablet Take 81 mg by mouth once daily.        CARTIA  mg 24 hr capsule TAKE 1 CAPSULE ONCE DAILY. 90 capsule 3    cyanocobalamin (VITAMIN B-12) 1,000 mcg/mL injection 1 cc IM weekly for 4 weeks, then monthly 10 mL 1    gabapentin (NEURONTIN) 300 MG capsule TAKE 1 CAPSULE TWICE DAILY 180 capsule 3    hydroCHLOROthiazide (MICROZIDE) 12.5 mg capsule Take 1 capsule by mouth once daily. 90 capsule 1    latanoprost 0.005 %  ophthalmic solution 1 drop every evening.      losartan (COZAAR) 100 MG tablet Take 0.5 tablets (50 mg total) by mouth once daily. 45 tablet 3    lovastatin (MEVACOR) 40 MG tablet Take 1 tablet (40 mg total) by mouth every evening. 90 tablet 1    lovastatin (MEVACOR) 40 MG tablet Take 1 tablet (40 mg total) by mouth every evening. 90 tablet 1    multivitamin (ONE DAILY MULTIVITAMIN) per tablet Take 1 tablet by mouth once daily.      omeprazole (PRILOSEC) 20 MG capsule TAKE 1 CAPSULE EVERY DAY 90 capsule 3    oxybutynin (DITROPAN) 5 MG Tab TAKE 1 TABLET EVERY DAY 90 tablet 1    pramipexole (MIRAPEX) 0.25 MG tablet TAKE 1 TABLET EVERY DAY 90 tablet 3    timolol maleate 0.5% (TIMOPTIC) 0.5 % Drop Place 1 drop into both eyes once daily.  0    warfarin (COUMADIN) 6 MG tablet TAKE 1 TABLET EVERY DAY 90 tablet 3     No current facility-administered medications for this visit.        Review of patient's allergies indicates:   Allergen Reactions    Penicillins     Sulfa (sulfonamide antibiotics)     Compazine [prochlorperazine edisylate] Anxiety         Review of Systems   Constitution: Negative for chills, fever, weakness and malaise/fatigue.   HENT: Negative for congestion.    Cardiovascular: Negative for chest pain, claudication and leg swelling.   Respiratory: Negative for cough and shortness of breath.    Skin: Negative for flushing, itching, poor wound healing and rash.   Musculoskeletal: Negative for back pain, joint pain, muscle cramps and muscle weakness.   Gastrointestinal: Negative for nausea and vomiting.   Neurological: Negative for numbness and paresthesias.   Psychiatric/Behavioral: Negative for altered mental status.           Objective:      Physical Exam   Constitutional: She is oriented to person, place, and time. She appears well-developed and well-nourished. No distress.   Cardiovascular: Intact distal pulses.   Pulses:       Dorsalis pedis pulses are 2+ on the right side, and 2+ on the left  side.        Posterior tibial pulses are 2+ on the right side, and 2+ on the left side.   CFT< 3 secs all toes bilateral foot, skin temp warm bilateral foot, no hair growth bilateral lower extremity, mild lower extremity edema with telangiectasias and varicosities bilateral.     Musculoskeletal:   Non-track bound hallux valgus with large bony prominence medial first met head left foot. 1 MTP ROM achieves 45-65 deg DF without pain, crepitus. Mild DF of first ray observed.    Left foot achieves -10 deg DF with knee extended and + 10 deg with knee flexed. Right foot achieves 0 deg DF knee extended and 10 deg DF knee flexed.    Mild collapse of the arch with standing bilateral foot.   Neurological: She is alert and oriented to person, place, and time. She has normal strength. No sensory deficit.   Skin: Skin is warm, dry and intact. Capillary refill takes less than 2 seconds. No ecchymosis and no rash noted. She is not diaphoretic. No cyanosis or erythema. No pallor. Nails show no clubbing.   Hyperkeratotic lesion medial left second toe DIPJ, medial hallux and plantar 1 MTP left foot.    No open lesions or macerations bilateral lower extremity.    Skin turgor and elasticity WNLs bilateral lower extremity               Assessment:       Encounter Diagnoses   Name Primary?    Hallux valgus with bunions, left Yes    Gastrocnemius equinus of left lower extremity     Venous insufficiency of both lower extremities          Plan:       Mis was seen today for foot problem.    Diagnoses and all orders for this visit:    Hallux valgus with bunions, left  -     X-Ray Foot Complete Left; Future    Gastrocnemius equinus of left lower extremity    Venous insufficiency of both lower extremities      I counseled the patient on her conditions, their implications and medical management.    Discussed conservative care options such as bunion splint, strapping, shoe gear modifications, orthotics vs surgical intervention in detail  consisting of EGR with bunionectomy.    Weightbearing foot xray ordered.    RTC 1-2 weeks or prn to further evaluate for surgery.    .

## 2018-09-25 ENCOUNTER — CLINICAL SUPPORT (OUTPATIENT)
Dept: FAMILY MEDICINE | Facility: CLINIC | Age: 78
End: 2018-09-25
Payer: MEDICARE

## 2018-09-25 DIAGNOSIS — I48.91 ATRIAL FIBRILLATION WITH RVR: Primary | ICD-10-CM

## 2018-09-25 DIAGNOSIS — Z79.01 ANTICOAGULATED ON COUMADIN: ICD-10-CM

## 2018-09-25 LAB — INR PPP: 2 (ref 2–3)

## 2018-09-25 PROCEDURE — 85610 PROTHROMBIN TIME: CPT | Mod: QW,,, | Performed by: FAMILY MEDICINE

## 2018-09-25 NOTE — PROGRESS NOTES
Patient is here for INR check  Patient is currently taking 6mg of coumadin Monday, Tuesday, Wednesday, Thursday, Saturday, and Sunday And 9mg on Friday  Patient denies any changes in diet, pt denies any missed doses, and pt denies any signs of bleeding.  INR is 2.0  Patient was instructed to take current dose of coumadin.  Patient was instructed to return to the clinic in 1 month to recheck INR.    Lab Results       Component                Value               Date                       INR                      3.2                 09/11/2018                 INR                      1.3                 08/16/2018                 INR                      2.4                 07/19/2018                 INR                      1.9                 07/05/2018                 INR                      2.5                 06/14/2018

## 2018-10-05 ENCOUNTER — OFFICE VISIT (OUTPATIENT)
Dept: PODIATRY | Facility: CLINIC | Age: 78
End: 2018-10-05
Payer: MEDICARE

## 2018-10-05 VITALS
HEART RATE: 76 BPM | SYSTOLIC BLOOD PRESSURE: 109 MMHG | DIASTOLIC BLOOD PRESSURE: 61 MMHG | WEIGHT: 193 LBS | BODY MASS INDEX: 35.51 KG/M2 | HEIGHT: 62 IN

## 2018-10-05 DIAGNOSIS — Z01.818 PREOP EXAMINATION: ICD-10-CM

## 2018-10-05 DIAGNOSIS — M89.9 DISORDER OF BONE: ICD-10-CM

## 2018-10-05 DIAGNOSIS — M62.462 GASTROCNEMIUS EQUINUS OF LEFT LOWER EXTREMITY: ICD-10-CM

## 2018-10-05 DIAGNOSIS — M20.12 HALLUX VALGUS WITH BUNIONS, LEFT: Primary | ICD-10-CM

## 2018-10-05 DIAGNOSIS — M21.612 HALLUX VALGUS WITH BUNIONS, LEFT: Primary | ICD-10-CM

## 2018-10-05 LAB
LEFT EYE DM RETINOPATHY: NEGATIVE
RIGHT EYE DM RETINOPATHY: NEGATIVE

## 2018-10-05 PROCEDURE — 99215 OFFICE O/P EST HI 40 MIN: CPT | Mod: PBBFAC,PN | Performed by: PODIATRIST

## 2018-10-05 PROCEDURE — 3078F DIAST BP <80 MM HG: CPT | Mod: CPTII,,, | Performed by: PODIATRIST

## 2018-10-05 PROCEDURE — 99999 PR PBB SHADOW E&M-EST. PATIENT-LVL V: CPT | Mod: PBBFAC,,, | Performed by: PODIATRIST

## 2018-10-05 PROCEDURE — 99214 OFFICE O/P EST MOD 30 MIN: CPT | Mod: S$PBB,,, | Performed by: PODIATRIST

## 2018-10-05 PROCEDURE — 3074F SYST BP LT 130 MM HG: CPT | Mod: CPTII,,, | Performed by: PODIATRIST

## 2018-10-05 PROCEDURE — 1101F PT FALLS ASSESS-DOCD LE1/YR: CPT | Mod: CPTII,,, | Performed by: PODIATRIST

## 2018-10-05 RX ORDER — LOSARTAN POTASSIUM 100 MG/1
TABLET ORAL
Qty: 45 TABLET | Refills: 3 | Status: SHIPPED | OUTPATIENT
Start: 2018-10-05 | End: 2019-10-21 | Stop reason: SDUPTHER

## 2018-10-06 ENCOUNTER — TELEPHONE (OUTPATIENT)
Dept: FAMILY MEDICINE | Facility: CLINIC | Age: 78
End: 2018-10-06

## 2018-10-06 NOTE — TELEPHONE ENCOUNTER
Left message advising patient to contact office to schedule pre-op appointment.       ----- Message from Camelia Kay MA sent at 10/5/2018  5:14 PM CDT -----  Good afternoon we have a mutual patient and she is having surgery with Dr. Cardozo on 10/31/18, she needs a pre-op appointment before 10/31/18 for clearance. Could you please contact patient to schedule? Her labs and xray has been schedule. Thanks in advance.

## 2018-10-08 ENCOUNTER — TELEPHONE (OUTPATIENT)
Dept: PODIATRY | Facility: CLINIC | Age: 78
End: 2018-10-08

## 2018-10-08 ENCOUNTER — HOSPITAL ENCOUNTER (OUTPATIENT)
Dept: RADIOLOGY | Facility: HOSPITAL | Age: 78
Discharge: HOME | End: 2018-10-08
Attending: PODIATRIST
Payer: MEDICARE

## 2018-10-08 ENCOUNTER — TELEPHONE (OUTPATIENT)
Dept: FAMILY MEDICINE | Facility: CLINIC | Age: 78
End: 2018-10-08

## 2018-10-08 DIAGNOSIS — Z01.818 PREOP EXAMINATION: ICD-10-CM

## 2018-10-08 PROCEDURE — 71045 X-RAY EXAM CHEST 1 VIEW: CPT | Mod: TC,FY,PO

## 2018-10-08 RX ORDER — SODIUM CHLORIDE 0.9 % (FLUSH) 0.9 %
5 SYRINGE (ML) INJECTION
Status: CANCELLED | OUTPATIENT
Start: 2018-10-08

## 2018-10-08 RX ORDER — LIDOCAINE HYDROCHLORIDE 10 MG/ML
1 INJECTION, SOLUTION EPIDURAL; INFILTRATION; INTRACAUDAL; PERINEURAL ONCE
Status: CANCELLED | OUTPATIENT
Start: 2018-10-08 | End: 2018-10-08

## 2018-10-08 RX ORDER — CLINDAMYCIN PHOSPHATE 900 MG/50ML
900 INJECTION, SOLUTION INTRAVENOUS
Status: CANCELLED | OUTPATIENT
Start: 2018-10-08

## 2018-10-08 NOTE — TELEPHONE ENCOUNTER
----- Message from Ana Vasquez sent at 10/8/2018 11:50 AM CDT -----  Contact: [phone]  Patient is coming in tomorrow for B12 shot and would like to get clearance to have surgery also. Please call her.

## 2018-10-08 NOTE — PROGRESS NOTES
"Subjective:      Patient ID: Mis Ca is a 77 y.o. female.    Chief Complaint: Follow-up (left foot bunion)    Complains of a bunion deformity left foot. Pain aggravated by enclosed shoes. Denies trauma. Relates it has been present for many years. Wearing sandals today. Wears toe separators which help only a little.    10/5/18: Returns to review her xray and discuss surgical intervention for her painful bunion left foot. No new complaints.    Vitals:    10/05/18 1607   BP: 109/61   Pulse: 76   Weight: 87.5 kg (193 lb)   Height: 5' 2" (1.575 m)   PainSc:   8      Past Medical History:   Diagnosis Date    Anxiety     Atrial fibrillation     Blind right eye     Bradycardia     Cancer     skin cancer left side of face under eye    Hyperlipidemia     Hypertension     PVC (premature ventricular contraction)     RLS (restless legs syndrome)     Sleep apnea     Does not use CPAP machine.       Past Surgical History:   Procedure Laterality Date    ADENOIDECTOMY      APPENDECTOMY      BREAST BIOPSY Left     times two    CATARACT EXTRACTION BILATERAL W/ ANTERIOR VITRECTOMY      EYE SURGERY Bilateral     cataracts extraction    EYE SURGERY Right     drainage tube (glaucoma)    FOOT SURGERY Left     lesion removed from dorsal area    FRACTURE SURGERY Left     arm    HAND TENDON SURGERY Left     HYSTERECTOMY      OOPHORECTOMY      ORIF FOREARM FRACTURE Left     PALATE SURGERY      Lesion removed    TONSILLECTOMY         Family History   Problem Relation Age of Onset    Aneurysm Mother         AAA    Cancer Father         lung cancer    Lung cancer Father     Cirrhosis Father     Cancer Sister         Breast    Diabetes Sister     Breast cancer Sister     Hypertension Sister     Hyperlipidemia Sister     Colon polyps Brother     Cancer Brother         lung cancer    Diabetes Brother     Hyperlipidemia Brother     Hypertension Brother     Cancer Brother         bladder cancer    " Diabetes Brother     Glaucoma Brother     Heart disease Sister         Heart valve repair    Atrial fibrillation Sister     Diabetes Sister     Heart disease Sister     Heart attack Sister     Bipolar disorder Sister     Depression Sister     Glaucoma Sister     Diabetes Sister     Other Sister         Pituitary tumor    Arthritis Sister     COPD Sister     Heart disease Sister     Kidney disease Sister     Cancer Sister         lung cancer    Diabetes Sister     Lung cancer Sister     Heart attack Sister        Social History     Socioeconomic History    Marital status:      Spouse name: Not on file    Number of children: Not on file    Years of education: Not on file    Highest education level: Not on file   Social Needs    Financial resource strain: Not on file    Food insecurity - worry: Not on file    Food insecurity - inability: Not on file    Transportation needs - medical: Not on file    Transportation needs - non-medical: Not on file   Occupational History    Not on file   Tobacco Use    Smoking status: Never Smoker    Smokeless tobacco: Never Used   Substance and Sexual Activity    Alcohol use: Yes     Comment: Seldomly drinks alcohol (wine)    Drug use: No    Sexual activity: Not Currently     Partners: Male   Other Topics Concern    Not on file   Social History Narrative    Not on file       Current Outpatient Medications   Medication Sig Dispense Refill    acetaminophen (TYLENOL) 500 MG tablet Take 1,000 mg by mouth 2 (two) times daily as needed for Pain.      ALPRAZolam (XANAX) 0.25 MG tablet TAKE 1 TABLET EVERY DAY 90 tablet 1    aspirin (ECOTRIN) 81 MG EC tablet Take 81 mg by mouth once daily.        CARTIA  mg 24 hr capsule TAKE 1 CAPSULE ONCE DAILY. 90 capsule 3    cyanocobalamin (VITAMIN B-12) 1,000 mcg/mL injection 1 cc IM weekly for 4 weeks, then monthly 10 mL 1    gabapentin (NEURONTIN) 300 MG capsule TAKE 1 CAPSULE TWICE DAILY 180 capsule  3    hydroCHLOROthiazide (MICROZIDE) 12.5 mg capsule Take 1 capsule by mouth once daily. 90 capsule 1    latanoprost 0.005 % ophthalmic solution 1 drop every evening.      losartan (COZAAR) 100 MG tablet TAKE 1/2 TABLET EVERY DAY 45 tablet 3    lovastatin (MEVACOR) 40 MG tablet Take 1 tablet (40 mg total) by mouth every evening. 90 tablet 1    lovastatin (MEVACOR) 40 MG tablet Take 1 tablet (40 mg total) by mouth every evening. 90 tablet 1    multivitamin (ONE DAILY MULTIVITAMIN) per tablet Take 1 tablet by mouth once daily.      omeprazole (PRILOSEC) 20 MG capsule TAKE 1 CAPSULE EVERY DAY 90 capsule 3    oxybutynin (DITROPAN) 5 MG Tab TAKE 1 TABLET EVERY DAY 90 tablet 1    pramipexole (MIRAPEX) 0.25 MG tablet TAKE 1 TABLET EVERY DAY 90 tablet 3    timolol maleate 0.5% (TIMOPTIC) 0.5 % Drop Place 1 drop into both eyes once daily.  0    warfarin (COUMADIN) 6 MG tablet TAKE 1 TABLET EVERY DAY 90 tablet 3     No current facility-administered medications for this visit.        Review of patient's allergies indicates:   Allergen Reactions    Penicillins     Sulfa (sulfonamide antibiotics)     Compazine [prochlorperazine edisylate] Anxiety         Review of Systems   Constitution: Negative for chills, fever, weakness and malaise/fatigue.   HENT: Negative for congestion.    Cardiovascular: Negative for chest pain, claudication and leg swelling.   Respiratory: Negative for cough and shortness of breath.    Skin: Negative for flushing, itching, poor wound healing and rash.   Musculoskeletal: Negative for back pain, joint pain, muscle cramps and muscle weakness.   Gastrointestinal: Negative for nausea and vomiting.   Neurological: Negative for numbness and paresthesias.   Psychiatric/Behavioral: Negative for altered mental status.           Objective:      Physical Exam   Constitutional: She is oriented to person, place, and time. She appears well-developed and well-nourished. No distress.   Cardiovascular:  Intact distal pulses.   Pulses:       Dorsalis pedis pulses are 2+ on the right side, and 2+ on the left side.        Posterior tibial pulses are 2+ on the right side, and 2+ on the left side.   CFT< 3 secs all toes bilateral foot, skin temp warm bilateral foot, no hair growth bilateral lower extremity, mild lower extremity edema with telangiectasias and varicosities bilateral.     Musculoskeletal:   Non-track bound hallux valgus with large bony prominence medial first met head left foot. 1 MTP ROM achieves 45-65 deg DF without pain, crepitus. Mild DF of first ray observed.    Left foot achieves -10 deg DF with knee extended and + 10 deg with knee flexed. Right foot achieves 0 deg DF knee extended and 10 deg DF knee flexed.    Mild collapse of the arch with standing bilateral foot.   Neurological: She is alert and oriented to person, place, and time. She has normal strength. No sensory deficit.   Skin: Skin is warm, dry and intact. Capillary refill takes less than 2 seconds. No ecchymosis and no rash noted. She is not diaphoretic. No cyanosis or erythema. No pallor. Nails show no clubbing.   Hyperkeratotic lesion medial left second toe DIPJ, medial hallux and plantar 1 MTP left foot.    No open lesions or macerations bilateral lower extremity.    Skin turgor and elasticity WNLs bilateral lower extremity               Assessment:       Encounter Diagnoses   Name Primary?    Hallux valgus with bunions, left Yes    Gastrocnemius equinus of left lower extremity     Preop examination     Disorder of bone          Plan:       Mis was seen today for follow-up.    Diagnoses and all orders for this visit:    Hallux valgus with bunions, left  -     Ambulatory referral to Family Practice  -     Ambulatory Referral to Cardiology  -     WALKER FOR HOME USE  -     Ambulatory Referral to Physical/Occupational Therapy  -     Case Request Operating Room: BUNIONECTOMY, LAPIDUS, RECESSION, GASTROCNEMIUS, ENDOSCOPIC  -     Full  code; Standing  -     Place in Outpatient; Standing  -     Full code    Gastrocnemius equinus of left lower extremity  -     Ambulatory referral to Family Practice  -     Ambulatory Referral to Cardiology  -     WALKER FOR HOME USE  -     Ambulatory Referral to Physical/Occupational Therapy  -     Case Request Operating Room: BUNIONECTOMY, LAPIDUS, RECESSION, GASTROCNEMIUS, ENDOSCOPIC  -     Full code; Standing  -     Place in Outpatient; Standing  -     Full code    Preop examination  -     Ambulatory referral to Family Practice  -     Ambulatory Referral to Cardiology  -     X-Ray Chest 1 View; Future  -     EKG 12-lead; Future  -     CBC auto differential; Future  -     Comprehensive metabolic panel; Future  -     Urinalysis; Future  -     WALKER FOR HOME USE  -     Ambulatory Referral to Physical/Occupational Therapy    Disorder of bone  -     VITAMIN D; Future    Other orders  -     lidocaine (PF) 10 mg/ml (1%) injection 10 mg; Inject 1 mL (10 mg total) into the skin once.  -     Weight bearing Non Weight Bearing; Lower Extremity; Left; Standing  -     sodium chloride 0.9% flush 5 mL; Inject 5 mLs into the vein as needed for Line Care.  -     clindamycin 900 MG/50 ML D5W 900 mg/50 mL IVPB 900 mg; Inject 50 mLs (900 mg total) into the vein On call Procedure (Surgery).  -     Weight bearing Non Weight Bearing; Lower Extremity; Left      I counseled the patient on her conditions, their implications and medical management.    Reviewed left foot xray noting 1-2 YAKOV 21.7 deg, TSP 4, HAV 35  Deg, TSP 5    Discussed conservative care options such as bunion splint, strapping, shoe gear modifications, orthotics vs surgical intervention in detail consisting of gastrocnemius recession with lapidus bunionectomy. Associated risks, benefits and postop course discussed. No guarantees given or implied.    This procedure has been fully reviewed with the patient and written informed consent has been obtained.    Referred to PCP  and Cardiologist for medical optimization and risk stratification    Scheduled at [facility] on 10/31/18 as MAC with regional anesthesia.    Discussed she will be NWB left foot x 3 weeks followed by protected weightbearing.    RTC 1 week postop.    .

## 2018-10-08 NOTE — H&P (VIEW-ONLY)
"Subjective:      Patient ID: Mis Ca is a 77 y.o. female.    Chief Complaint: Follow-up (left foot bunion)    Complains of a bunion deformity left foot. Pain aggravated by enclosed shoes. Denies trauma. Relates it has been present for many years. Wearing sandals today. Wears toe separators which help only a little.    10/5/18: Returns to review her xray and discuss surgical intervention for her painful bunion left foot. No new complaints.    Vitals:    10/05/18 1607   BP: 109/61   Pulse: 76   Weight: 87.5 kg (193 lb)   Height: 5' 2" (1.575 m)   PainSc:   8      Past Medical History:   Diagnosis Date    Anxiety     Atrial fibrillation     Blind right eye     Bradycardia     Cancer     skin cancer left side of face under eye    Hyperlipidemia     Hypertension     PVC (premature ventricular contraction)     RLS (restless legs syndrome)     Sleep apnea     Does not use CPAP machine.       Past Surgical History:   Procedure Laterality Date    ADENOIDECTOMY      APPENDECTOMY      BREAST BIOPSY Left     times two    CATARACT EXTRACTION BILATERAL W/ ANTERIOR VITRECTOMY      EYE SURGERY Bilateral     cataracts extraction    EYE SURGERY Right     drainage tube (glaucoma)    FOOT SURGERY Left     lesion removed from dorsal area    FRACTURE SURGERY Left     arm    HAND TENDON SURGERY Left     HYSTERECTOMY      OOPHORECTOMY      ORIF FOREARM FRACTURE Left     PALATE SURGERY      Lesion removed    TONSILLECTOMY         Family History   Problem Relation Age of Onset    Aneurysm Mother         AAA    Cancer Father         lung cancer    Lung cancer Father     Cirrhosis Father     Cancer Sister         Breast    Diabetes Sister     Breast cancer Sister     Hypertension Sister     Hyperlipidemia Sister     Colon polyps Brother     Cancer Brother         lung cancer    Diabetes Brother     Hyperlipidemia Brother     Hypertension Brother     Cancer Brother         bladder cancer    " Diabetes Brother     Glaucoma Brother     Heart disease Sister         Heart valve repair    Atrial fibrillation Sister     Diabetes Sister     Heart disease Sister     Heart attack Sister     Bipolar disorder Sister     Depression Sister     Glaucoma Sister     Diabetes Sister     Other Sister         Pituitary tumor    Arthritis Sister     COPD Sister     Heart disease Sister     Kidney disease Sister     Cancer Sister         lung cancer    Diabetes Sister     Lung cancer Sister     Heart attack Sister        Social History     Socioeconomic History    Marital status:      Spouse name: Not on file    Number of children: Not on file    Years of education: Not on file    Highest education level: Not on file   Social Needs    Financial resource strain: Not on file    Food insecurity - worry: Not on file    Food insecurity - inability: Not on file    Transportation needs - medical: Not on file    Transportation needs - non-medical: Not on file   Occupational History    Not on file   Tobacco Use    Smoking status: Never Smoker    Smokeless tobacco: Never Used   Substance and Sexual Activity    Alcohol use: Yes     Comment: Seldomly drinks alcohol (wine)    Drug use: No    Sexual activity: Not Currently     Partners: Male   Other Topics Concern    Not on file   Social History Narrative    Not on file       Current Outpatient Medications   Medication Sig Dispense Refill    acetaminophen (TYLENOL) 500 MG tablet Take 1,000 mg by mouth 2 (two) times daily as needed for Pain.      ALPRAZolam (XANAX) 0.25 MG tablet TAKE 1 TABLET EVERY DAY 90 tablet 1    aspirin (ECOTRIN) 81 MG EC tablet Take 81 mg by mouth once daily.        CARTIA  mg 24 hr capsule TAKE 1 CAPSULE ONCE DAILY. 90 capsule 3    cyanocobalamin (VITAMIN B-12) 1,000 mcg/mL injection 1 cc IM weekly for 4 weeks, then monthly 10 mL 1    gabapentin (NEURONTIN) 300 MG capsule TAKE 1 CAPSULE TWICE DAILY 180 capsule  3    hydroCHLOROthiazide (MICROZIDE) 12.5 mg capsule Take 1 capsule by mouth once daily. 90 capsule 1    latanoprost 0.005 % ophthalmic solution 1 drop every evening.      losartan (COZAAR) 100 MG tablet TAKE 1/2 TABLET EVERY DAY 45 tablet 3    lovastatin (MEVACOR) 40 MG tablet Take 1 tablet (40 mg total) by mouth every evening. 90 tablet 1    lovastatin (MEVACOR) 40 MG tablet Take 1 tablet (40 mg total) by mouth every evening. 90 tablet 1    multivitamin (ONE DAILY MULTIVITAMIN) per tablet Take 1 tablet by mouth once daily.      omeprazole (PRILOSEC) 20 MG capsule TAKE 1 CAPSULE EVERY DAY 90 capsule 3    oxybutynin (DITROPAN) 5 MG Tab TAKE 1 TABLET EVERY DAY 90 tablet 1    pramipexole (MIRAPEX) 0.25 MG tablet TAKE 1 TABLET EVERY DAY 90 tablet 3    timolol maleate 0.5% (TIMOPTIC) 0.5 % Drop Place 1 drop into both eyes once daily.  0    warfarin (COUMADIN) 6 MG tablet TAKE 1 TABLET EVERY DAY 90 tablet 3     No current facility-administered medications for this visit.        Review of patient's allergies indicates:   Allergen Reactions    Penicillins     Sulfa (sulfonamide antibiotics)     Compazine [prochlorperazine edisylate] Anxiety         Review of Systems   Constitution: Negative for chills, fever, weakness and malaise/fatigue.   HENT: Negative for congestion.    Cardiovascular: Negative for chest pain, claudication and leg swelling.   Respiratory: Negative for cough and shortness of breath.    Skin: Negative for flushing, itching, poor wound healing and rash.   Musculoskeletal: Negative for back pain, joint pain, muscle cramps and muscle weakness.   Gastrointestinal: Negative for nausea and vomiting.   Neurological: Negative for numbness and paresthesias.   Psychiatric/Behavioral: Negative for altered mental status.           Objective:      Physical Exam   Constitutional: She is oriented to person, place, and time. She appears well-developed and well-nourished. No distress.   Cardiovascular:  Intact distal pulses.   Pulses:       Dorsalis pedis pulses are 2+ on the right side, and 2+ on the left side.        Posterior tibial pulses are 2+ on the right side, and 2+ on the left side.   CFT< 3 secs all toes bilateral foot, skin temp warm bilateral foot, no hair growth bilateral lower extremity, mild lower extremity edema with telangiectasias and varicosities bilateral.     Musculoskeletal:   Non-track bound hallux valgus with large bony prominence medial first met head left foot. 1 MTP ROM achieves 45-65 deg DF without pain, crepitus. Mild DF of first ray observed.    Left foot achieves -10 deg DF with knee extended and + 10 deg with knee flexed. Right foot achieves 0 deg DF knee extended and 10 deg DF knee flexed.    Mild collapse of the arch with standing bilateral foot.   Neurological: She is alert and oriented to person, place, and time. She has normal strength. No sensory deficit.   Skin: Skin is warm, dry and intact. Capillary refill takes less than 2 seconds. No ecchymosis and no rash noted. She is not diaphoretic. No cyanosis or erythema. No pallor. Nails show no clubbing.   Hyperkeratotic lesion medial left second toe DIPJ, medial hallux and plantar 1 MTP left foot.    No open lesions or macerations bilateral lower extremity.    Skin turgor and elasticity WNLs bilateral lower extremity               Assessment:       Encounter Diagnoses   Name Primary?    Hallux valgus with bunions, left Yes    Gastrocnemius equinus of left lower extremity     Preop examination     Disorder of bone          Plan:       Mis was seen today for follow-up.    Diagnoses and all orders for this visit:    Hallux valgus with bunions, left  -     Ambulatory referral to Family Practice  -     Ambulatory Referral to Cardiology  -     WALKER FOR HOME USE  -     Ambulatory Referral to Physical/Occupational Therapy  -     Case Request Operating Room: BUNIONECTOMY, LAPIDUS, RECESSION, GASTROCNEMIUS, ENDOSCOPIC  -     Full  code; Standing  -     Place in Outpatient; Standing  -     Full code    Gastrocnemius equinus of left lower extremity  -     Ambulatory referral to Family Practice  -     Ambulatory Referral to Cardiology  -     WALKER FOR HOME USE  -     Ambulatory Referral to Physical/Occupational Therapy  -     Case Request Operating Room: BUNIONECTOMY, LAPIDUS, RECESSION, GASTROCNEMIUS, ENDOSCOPIC  -     Full code; Standing  -     Place in Outpatient; Standing  -     Full code    Preop examination  -     Ambulatory referral to Family Practice  -     Ambulatory Referral to Cardiology  -     X-Ray Chest 1 View; Future  -     EKG 12-lead; Future  -     CBC auto differential; Future  -     Comprehensive metabolic panel; Future  -     Urinalysis; Future  -     WALKER FOR HOME USE  -     Ambulatory Referral to Physical/Occupational Therapy    Disorder of bone  -     VITAMIN D; Future    Other orders  -     lidocaine (PF) 10 mg/ml (1%) injection 10 mg; Inject 1 mL (10 mg total) into the skin once.  -     Weight bearing Non Weight Bearing; Lower Extremity; Left; Standing  -     sodium chloride 0.9% flush 5 mL; Inject 5 mLs into the vein as needed for Line Care.  -     clindamycin 900 MG/50 ML D5W 900 mg/50 mL IVPB 900 mg; Inject 50 mLs (900 mg total) into the vein On call Procedure (Surgery).  -     Weight bearing Non Weight Bearing; Lower Extremity; Left      I counseled the patient on her conditions, their implications and medical management.    Reviewed left foot xray noting 1-2 YAKOV 21.7 deg, TSP 4, HAV 35  Deg, TSP 5    Discussed conservative care options such as bunion splint, strapping, shoe gear modifications, orthotics vs surgical intervention in detail consisting of gastrocnemius recession with lapidus bunionectomy. Associated risks, benefits and postop course discussed. No guarantees given or implied.    This procedure has been fully reviewed with the patient and written informed consent has been obtained.    Referred to PCP  and Cardiologist for medical optimization and risk stratification    Scheduled at [facility] on 10/31/18 as MAC with regional anesthesia.    Discussed she will be NWB left foot x 3 weeks followed by protected weightbearing.    RTC 1 week postop.    .

## 2018-10-08 NOTE — TELEPHONE ENCOUNTER
Spoke with pt and advised her that I tentatively scheduled her to see Dr. Dillard on 10/19/18. Pt stated that she would prefer to see Dr. Dillard, and that she would keep appt with him on 10/19/18.

## 2018-10-09 ENCOUNTER — CLINICAL SUPPORT (OUTPATIENT)
Dept: FAMILY MEDICINE | Facility: CLINIC | Age: 78
End: 2018-10-09
Payer: MEDICARE

## 2018-10-09 DIAGNOSIS — Z23 NEED FOR INFLUENZA VACCINATION: ICD-10-CM

## 2018-10-09 DIAGNOSIS — D64.9 ANEMIA, UNSPECIFIED TYPE: Primary | ICD-10-CM

## 2018-10-09 PROCEDURE — 90662 IIV NO PRSV INCREASED AG IM: CPT | Mod: S$GLB,,, | Performed by: FAMILY MEDICINE

## 2018-10-09 PROCEDURE — G0008 ADMIN INFLUENZA VIRUS VAC: HCPCS | Mod: S$GLB,,, | Performed by: FAMILY MEDICINE

## 2018-10-09 PROCEDURE — 96372 THER/PROPH/DIAG INJ SC/IM: CPT | Mod: 59,S$GLB,, | Performed by: FAMILY MEDICINE

## 2018-10-09 RX ORDER — CYANOCOBALAMIN 1000 UG/ML
1000 INJECTION, SOLUTION INTRAMUSCULAR; SUBCUTANEOUS ONCE
Status: COMPLETED | OUTPATIENT
Start: 2018-10-09 | End: 2018-10-09

## 2018-10-09 RX ADMIN — CYANOCOBALAMIN 1000 MCG: 1000 INJECTION, SOLUTION INTRAMUSCULAR; SUBCUTANEOUS at 11:10

## 2018-10-09 NOTE — PROGRESS NOTES
Pt here for flu vaccine and B12 injection   1000 mcg given IM to the [facility]  Pt supplied meds

## 2018-10-10 ENCOUNTER — LAB VISIT (OUTPATIENT)
Dept: LAB | Facility: HOSPITAL | Age: 78
End: 2018-10-10
Attending: PODIATRIST
Payer: MEDICARE

## 2018-10-10 DIAGNOSIS — Z01.818 PREOP EXAMINATION: ICD-10-CM

## 2018-10-10 LAB
ALBUMIN SERPL BCP-MCNC: 4 G/DL
ALP SERPL-CCNC: 86 U/L
ALT SERPL W/O P-5'-P-CCNC: 18 U/L
ANION GAP SERPL CALC-SCNC: 9 MMOL/L
AST SERPL-CCNC: 19 U/L
BASOPHILS # BLD AUTO: 0.01 K/UL
BASOPHILS NFR BLD: 0.2 %
BILIRUB SERPL-MCNC: 0.2 MG/DL
BUN SERPL-MCNC: 23 MG/DL
CALCIUM SERPL-MCNC: 9.1 MG/DL
CHLORIDE SERPL-SCNC: 102 MMOL/L
CO2 SERPL-SCNC: 27 MMOL/L
CREAT SERPL-MCNC: 0.84 MG/DL
DIFFERENTIAL METHOD: ABNORMAL
EOSINOPHIL # BLD AUTO: 0.1 K/UL
EOSINOPHIL NFR BLD: 2.1 %
ERYTHROCYTE [DISTWIDTH] IN BLOOD BY AUTOMATED COUNT: 13.2 %
EST. GFR  (AFRICAN AMERICAN): >60 ML/MIN/1.73 M^2
EST. GFR  (NON AFRICAN AMERICAN): >60 ML/MIN/1.73 M^2
GLUCOSE SERPL-MCNC: 109 MG/DL
HCT VFR BLD AUTO: 39.2 %
HGB BLD-MCNC: 12.5 G/DL
LYMPHOCYTES # BLD AUTO: 1.8 K/UL
LYMPHOCYTES NFR BLD: 27.2 %
MCH RBC QN AUTO: 31.3 PG
MCHC RBC AUTO-ENTMCNC: 31.9 G/DL
MCV RBC AUTO: 98 FL
MONOCYTES # BLD AUTO: 0.7 K/UL
MONOCYTES NFR BLD: 9.9 %
NEUTROPHILS # BLD AUTO: 3.9 K/UL
NEUTROPHILS NFR BLD: 60.3 %
PLATELET # BLD AUTO: 213 K/UL
PMV BLD AUTO: 10.8 FL
POTASSIUM SERPL-SCNC: 3.8 MMOL/L
PROT SERPL-MCNC: 6.8 G/DL
RBC # BLD AUTO: 3.99 M/UL
SODIUM SERPL-SCNC: 138 MMOL/L
WBC # BLD AUTO: 6.54 K/UL

## 2018-10-10 PROCEDURE — 80053 COMPREHEN METABOLIC PANEL: CPT | Mod: PO

## 2018-10-10 PROCEDURE — 85025 COMPLETE CBC W/AUTO DIFF WBC: CPT | Mod: PO

## 2018-10-11 ENCOUNTER — OFFICE VISIT (OUTPATIENT)
Dept: CARDIOLOGY | Facility: CLINIC | Age: 78
End: 2018-10-11
Payer: MEDICARE

## 2018-10-11 VITALS
BODY MASS INDEX: 36.25 KG/M2 | DIASTOLIC BLOOD PRESSURE: 80 MMHG | HEART RATE: 66 BPM | SYSTOLIC BLOOD PRESSURE: 126 MMHG | WEIGHT: 198.19 LBS | OXYGEN SATURATION: 96 %

## 2018-10-11 DIAGNOSIS — I51.89 DIASTOLIC DYSFUNCTION: ICD-10-CM

## 2018-10-11 DIAGNOSIS — Z01.818 PREOP EXAMINATION: ICD-10-CM

## 2018-10-11 DIAGNOSIS — I70.0 AORTIC ATHEROSCLEROSIS: ICD-10-CM

## 2018-10-11 DIAGNOSIS — I10 ESSENTIAL HYPERTENSION: ICD-10-CM

## 2018-10-11 DIAGNOSIS — E78.2 MIXED HYPERLIPIDEMIA: ICD-10-CM

## 2018-10-11 DIAGNOSIS — I48.0 PAROXYSMAL ATRIAL FIBRILLATION: Primary | ICD-10-CM

## 2018-10-11 PROCEDURE — 99999 PR PBB SHADOW E&M-EST. PATIENT-LVL III: CPT | Mod: PBBFAC,,, | Performed by: INTERNAL MEDICINE

## 2018-10-11 PROCEDURE — 93005 ELECTROCARDIOGRAM TRACING: CPT | Mod: PBBFAC,PO | Performed by: INTERNAL MEDICINE

## 2018-10-11 PROCEDURE — 99214 OFFICE O/P EST MOD 30 MIN: CPT | Mod: S$PBB,25,, | Performed by: INTERNAL MEDICINE

## 2018-10-11 PROCEDURE — 3074F SYST BP LT 130 MM HG: CPT | Mod: CPTII,,, | Performed by: INTERNAL MEDICINE

## 2018-10-11 PROCEDURE — 3079F DIAST BP 80-89 MM HG: CPT | Mod: CPTII,,, | Performed by: INTERNAL MEDICINE

## 2018-10-11 PROCEDURE — 1101F PT FALLS ASSESS-DOCD LE1/YR: CPT | Mod: CPTII,,, | Performed by: INTERNAL MEDICINE

## 2018-10-11 PROCEDURE — 99213 OFFICE O/P EST LOW 20 MIN: CPT | Mod: PBBFAC,PO | Performed by: INTERNAL MEDICINE

## 2018-10-11 PROCEDURE — 93010 ELECTROCARDIOGRAM REPORT: CPT | Mod: S$PBB,,, | Performed by: INTERNAL MEDICINE

## 2018-10-11 NOTE — PROGRESS NOTES
Subjective:   Patient ID:  Mis Ca is a 77 y.o. female who presents for follow-up of Pre-op Exam      Problem List Items Addressed This Visit        Cardiac/Vascular    Paroxysmal atrial fibrillation - Primary    Hyperlipidemia    Essential hypertension    Diastolic dysfunction    Aortic atherosclerosis          HPI: Patient with the above medical problems is here for f/u and doing well since previous visit. She has no complaints and denies chest pain or dyspnea. BP is controlled and HR is controlled. No orthopnea or PND. She is complaint with medications and low salt diet. No bleeding on coumadin.   She is planning on having left foot surgery.     Review of Systems   Constitution: Negative.   HENT: Negative.    Eyes: Negative.    Cardiovascular: Negative.    Respiratory: Negative.    Endocrine: Negative.    Hematologic/Lymphatic: Negative.    Skin: Negative.    Musculoskeletal: Negative.    Gastrointestinal: Negative.    Neurological: Negative.    Psychiatric/Behavioral: Negative.    Allergic/Immunologic: Negative.           Medication List           Accurate as of 10/11/18 10:39 AM. If you have any questions, ask your nurse or doctor.               CONTINUE taking these medications    acetaminophen 500 MG tablet  Commonly known as:  TYLENOL     ALPRAZolam 0.25 MG tablet  Commonly known as:  XANAX  TAKE 1 TABLET EVERY DAY     aspirin 81 MG EC tablet  Commonly known as:  ECOTRIN     CARTIA  MG 24 hr capsule  Generic drug:  diltiaZEM  TAKE 1 CAPSULE ONCE DAILY.     cyanocobalamin 1,000 mcg/mL injection  Commonly known as:  VITAMIN B-12  1 cc IM weekly for 4 weeks, then monthly     gabapentin 300 MG capsule  Commonly known as:  NEURONTIN  TAKE 1 CAPSULE TWICE DAILY     hydroCHLOROthiazide 12.5 mg capsule  Commonly known as:  MICROZIDE  Take 1 capsule by mouth once daily.     latanoprost 0.005 % ophthalmic solution     losartan 100 MG tablet  Commonly known as:  COZAAR  TAKE 1/2 TABLET EVERY DAY     *  lovastatin 40 MG tablet  Commonly known as:  MEVACOR  Take 1 tablet (40 mg total) by mouth every evening.     * lovastatin 40 MG tablet  Commonly known as:  MEVACOR  Take 1 tablet (40 mg total) by mouth every evening.     omeprazole 20 MG capsule  Commonly known as:  PRILOSEC  TAKE 1 CAPSULE EVERY DAY     ONE DAILY MULTIVITAMIN per tablet  Generic drug:  multivitamin     oxybutynin 5 MG Tab  Commonly known as:  DITROPAN  TAKE 1 TABLET EVERY DAY     pramipexole 0.25 MG tablet  Commonly known as:  MIRAPEX  TAKE 1 TABLET EVERY DAY     timolol maleate 0.5% 0.5 % Drop  Commonly known as:  TIMOPTIC     warfarin 6 MG tablet  Commonly known as:  COUMADIN  TAKE 1 TABLET EVERY DAY         * This list has 2 medication(s) that are the same as other medications prescribed for you. Read the directions carefully, and ask your doctor or other care provider to review them with you.                Objective:   Physical Exam   Constitutional: She is oriented to person, place, and time. She appears well-developed and well-nourished. No distress.   Examination of the digits showed no clubbing or cyanosis   HENT:   Head: Normocephalic and atraumatic.   Eyes: Conjunctivae are normal. Pupils are equal, round, and reactive to light. Right eye exhibits no discharge.   Neck: Normal range of motion. Neck supple. No JVD present. No thyromegaly present.   No carotid bruits   Cardiovascular: Normal rate, regular rhythm, S1 normal, S2 normal, normal heart sounds, intact distal pulses and normal pulses. PMI is not displaced. Exam reveals no gallop, no friction rub and no opening snap.   No murmur heard.  Pulmonary/Chest: Effort normal and breath sounds normal. No respiratory distress. She has no wheezes. She has no rales. She exhibits no tenderness.   Abdominal: Soft. Bowel sounds are normal. She exhibits no distension and no mass. There is no tenderness. There is no guarding.   No hepatosplenomegaly   Musculoskeletal: Normal range of motion. She  exhibits no edema or tenderness.   Lymphadenopathy:     She has no cervical adenopathy.   Neurological: She is alert and oriented to person, place, and time.   Skin: Skin is warm. No rash noted. She is not diaphoretic. No erythema.   Psychiatric: She has a normal mood and affect.   Nursing note and vitals reviewed.      ECGs reviewed-NSR with LAD  LABS reviewed  Imaging including Echoes reviewed  CONCLUSIONS     1 - Normal left ventricular systolic function (EF 60-65%).     2 - Impaired LV relaxation, normal LAP (grade 1 diastolic dysfunction).     3 - Normal right ventricular systolic function .     4 - The estimated PA systolic pressure is 28 mmHg.     5 - Mild left atrial enlargement.     Assessment:     1. Paroxysmal atrial fibrillation    2. Mixed hyperlipidemia    3. Essential hypertension    4. Diastolic dysfunction    5. Aortic atherosclerosis        Plan:     Patient is moderate risk for intermediae risk surgery to left foot and leg. No contraindication to surgery such as recent MI, decompensated CHF, severe obstructive valve disease or tachyarrhythmias. Mets>4    Continue current medications  Weight loss  Activity as tolerate f/u in 6 months    The importance anticoagulation was explained to patient in details. Anticoagulation is to prevent CVA and PE and Sudden death. With anticoagulation there is risk of bleeding but benefits clearly out weight any risk. Patient advice to report any bleeding to cardiologist as soon as possible and do not discontinue medication without consent of cardiologist. All patient questions were answered regarding medication and patient verbalized understanding.      Clinic time spent with patient discussing and treating medical condition including reviewing images, ECG, labs and medications > 20 minutes.

## 2018-10-11 NOTE — LETTER
October 11, 2018      Rudolph Cardozo, DPM  [address] [address]           [facility]  [address], [address]  [place] [address]  Phone: [phone]          Patient: Mis Ca   MR Number: [id number]   YOB: 1940   Date of Visit: 10/11/2018       Dear Dr. Rudolph Cardozo:    Thank you for referring Mis Ca to me for evaluation. Attached you will find relevant portions of my assessment and plan of care.      ECGs reviewed-NSR with LAD  LABS reviewed  Imaging including Echoes reviewed  CONCLUSIONS     1 - Normal left ventricular systolic function (EF 60-65%).     2 - Impaired LV relaxation, normal LAP (grade 1 diastolic dysfunction).     3 - Normal right ventricular systolic function .     4 - The estimated PA systolic pressure is 28 mmHg.     5 - Mild left atrial enlargement.     Assessment:     1. Paroxysmal atrial fibrillation    2. Mixed hyperlipidemia    3. Essential hypertension    4. Diastolic dysfunction    5. Aortic atherosclerosis        Plan:     Patient is moderate risk for intermediae risk surgery to left foot and leg. No contraindication to surgery such as recent MI, decompensated CHF, severe obstructive valve disease or tachyarrhythmias. Mets>4    Continue current medications  Weight loss  Activity as tolerate f/u in 6 months  If you have questions, please do not hesitate to call me. I look forward to following Mis Ca along with you.    Sincerely,    Pantera Benton MD    Enclosure  CC:  No Recipients    If you would like to receive this communication electronically, please contact [email or web address] or [phone] to request more information on [employer] access.    For providers and/or their staff who would like to refer a patient to [facility], please contact us through our one-stop-shop provider referral line, [facility], at [phone].    If you feel you have received this communication in error  or would no longer like to receive these types of communications, please e-mail [email or web address]

## 2018-10-15 ENCOUNTER — PATIENT MESSAGE (OUTPATIENT)
Dept: PODIATRY | Facility: CLINIC | Age: 78
End: 2018-10-15

## 2018-10-15 RX ORDER — CHOLECALCIFEROL (VITAMIN D3) 1250 MCG
1 TABLET ORAL
Qty: 4 TABLET | Refills: 3 | Status: SHIPPED | OUTPATIENT
Start: 2018-10-15 | End: 2019-04-04 | Stop reason: ALTCHOICE

## 2018-10-19 ENCOUNTER — OFFICE VISIT (OUTPATIENT)
Dept: FAMILY MEDICINE | Facility: CLINIC | Age: 78
End: 2018-10-19
Payer: MEDICARE

## 2018-10-19 VITALS
SYSTOLIC BLOOD PRESSURE: 110 MMHG | BODY MASS INDEX: 36.29 KG/M2 | WEIGHT: 197.19 LBS | DIASTOLIC BLOOD PRESSURE: 72 MMHG | HEIGHT: 62 IN | HEART RATE: 82 BPM | OXYGEN SATURATION: 95 % | TEMPERATURE: 98 F

## 2018-10-19 DIAGNOSIS — I48.0 PAROXYSMAL ATRIAL FIBRILLATION: ICD-10-CM

## 2018-10-19 DIAGNOSIS — Z01.818 PRE-OPERATIVE CLEARANCE: Primary | ICD-10-CM

## 2018-10-19 LAB — INR PPP: 2.3 (ref 2–3)

## 2018-10-19 PROCEDURE — 1101F PT FALLS ASSESS-DOCD LE1/YR: CPT | Mod: CPTII,S$GLB,, | Performed by: FAMILY MEDICINE

## 2018-10-19 PROCEDURE — 3078F DIAST BP <80 MM HG: CPT | Mod: CPTII,S$GLB,, | Performed by: FAMILY MEDICINE

## 2018-10-19 PROCEDURE — 99213 OFFICE O/P EST LOW 20 MIN: CPT | Mod: S$GLB,,, | Performed by: FAMILY MEDICINE

## 2018-10-19 PROCEDURE — 3074F SYST BP LT 130 MM HG: CPT | Mod: CPTII,S$GLB,, | Performed by: FAMILY MEDICINE

## 2018-10-19 PROCEDURE — 85610 PROTHROMBIN TIME: CPT | Mod: QW,,, | Performed by: FAMILY MEDICINE

## 2018-10-19 RX ORDER — PREDNISOLONE ACETATE 10 MG/ML
SUSPENSION/ DROPS OPHTHALMIC
Refills: 0 | COMMUNITY
Start: 2018-10-05 | End: 2019-08-23

## 2018-10-19 NOTE — PROGRESS NOTES
Subjective:      Patient ID: Mis Ca is a 77 y.o. female.    Chief Complaint: Pre-op Exam (Left foot surgery DOS: 10/31/18; Dr. Cardozo)      HPI   Pre op for foot surgery; had labs, saw cardiologist  Will be getting of coumadin and asa for surgery  Had EKG  No hx of surgery complications       Review of Systems   Constitutional: Negative.    HENT: Positive for trouble swallowing.    Respiratory: Negative.    Cardiovascular: Negative.    Gastrointestinal: Negative.    Endocrine: Negative.    Genitourinary: Negative.    Musculoskeletal: Negative.    Psychiatric/Behavioral: Negative.    All other systems reviewed and are negative.    Objective:     Physical Exam   Constitutional: She is oriented to person, place, and time. She appears well-developed and well-nourished.   HENT:   Head: Normocephalic.   Eyes: Conjunctivae and EOM are normal. Pupils are equal, round, and reactive to light.   Neck: Normal range of motion. Neck supple.   Cardiovascular: Normal rate, regular rhythm and normal heart sounds.   Pulmonary/Chest: Effort normal and breath sounds normal.   Musculoskeletal: Normal range of motion.   Neurological: She is alert and oriented to person, place, and time. She has normal reflexes.   Skin: Skin is warm and dry.   Psychiatric: She has a normal mood and affect. Her behavior is normal. Judgment and thought content normal.   Nursing note and vitals reviewed.    Assessment:     1. Pre-operative clearance      Plan:        Medication List           Accurate as of 10/19/18 11:50 AM. If you have any questions, ask your nurse or doctor.               CONTINUE taking these medications    acetaminophen 500 MG tablet  Commonly known as:  TYLENOL     ALPRAZolam 0.25 MG tablet  Commonly known as:  XANAX  TAKE 1 TABLET EVERY DAY     aspirin 81 MG EC tablet  Commonly known as:  ECOTRIN     CARTIA  MG 24 hr capsule  Generic drug:  diltiaZEM  TAKE 1 CAPSULE ONCE DAILY.     cholecalciferol (vitamin D3) 50,000  unit Tab  Take 1 tablet by mouth every 7 days.     cyanocobalamin 1,000 mcg/mL injection  Commonly known as:  VITAMIN B-12  1 cc IM weekly for 4 weeks, then monthly     gabapentin 300 MG capsule  Commonly known as:  NEURONTIN  TAKE 1 CAPSULE TWICE DAILY     hydroCHLOROthiazide 12.5 mg capsule  Commonly known as:  MICROZIDE  Take 1 capsule by mouth once daily.     latanoprost 0.005 % ophthalmic solution     losartan 100 MG tablet  Commonly known as:  COZAAR  TAKE 1/2 TABLET EVERY DAY     * lovastatin 40 MG tablet  Commonly known as:  MEVACOR  Take 1 tablet (40 mg total) by mouth every evening.     * lovastatin 40 MG tablet  Commonly known as:  MEVACOR  Take 1 tablet (40 mg total) by mouth every evening.     omeprazole 20 MG capsule  Commonly known as:  PRILOSEC  TAKE 1 CAPSULE EVERY DAY     ONE DAILY MULTIVITAMIN per tablet  Generic drug:  multivitamin     oxybutynin 5 MG Tab  Commonly known as:  DITROPAN  TAKE 1 TABLET EVERY DAY     pramipexole 0.25 MG tablet  Commonly known as:  MIRAPEX  TAKE 1 TABLET EVERY DAY     prednisoLONE acetate 1 % Drps  Commonly known as:  PRED FORTE     timolol maleate 0.5% 0.5 % Drop  Commonly known as:  TIMOPTIC     warfarin 6 MG tablet  Commonly known as:  COUMADIN  TAKE 1 TABLET EVERY DAY         * This list has 2 medication(s) that are the same as other medications prescribed for you. Read the directions carefully, and ask your doctor or other care provider to review them with you.              Pre-operative clearance      Pt is cleared for general anesthesia and foot surgery

## 2018-10-25 ENCOUNTER — TELEPHONE (OUTPATIENT)
Dept: PODIATRY | Facility: CLINIC | Age: 78
End: 2018-10-25

## 2018-10-25 LAB
LEFT EYE DM RETINOPATHY: NEGATIVE
RIGHT EYE DM RETINOPATHY: NEGATIVE

## 2018-10-25 NOTE — TELEPHONE ENCOUNTER
spoke with pt and she thought she had an appt with pre op today but its the surgery department for tomorrow explain to pt and she understood.

## 2018-10-25 NOTE — TELEPHONE ENCOUNTER
----- Message from Roxie Herrera sent at 10/25/2018 11:02 AM CDT -----  Contact: Self/ [phone]  Patient asked to speak with you about her pre opt appointment.     Please call.

## 2018-10-26 ENCOUNTER — ANESTHESIA EVENT (OUTPATIENT)
Dept: SURGERY | Facility: HOSPITAL | Age: 78
End: 2018-10-26
Payer: MEDICARE

## 2018-10-26 ENCOUNTER — HOSPITAL ENCOUNTER (OUTPATIENT)
Dept: PREADMISSION TESTING | Facility: HOSPITAL | Age: 78
Discharge: HOME | End: 2018-10-26
Attending: PODIATRIST
Payer: MEDICARE

## 2018-10-26 NOTE — DISCHARGE INSTRUCTIONS
Your surgery is scheduled for 10/31/18.    Please report to Outpatient Surgery Intake Office on the 2nd FLOOR at 6:00 a.m.          INSTRUCTIONS IMPORTANT!!!  ¨ Do not eat or drink after 12 midnight-including water. OK to brush teeth, no   gum, candy or mints!    ¨ Take only these medicines with a small swallow of water-morning of surgery: Cartia, omeprazole, and losartan            ____  Do not wear makeup, including mascara.  ____  No powder, lotions or creams to surgical area.  ____  Please remove all jewelry, including piercings and leave at home.  ____  No money or valuables needed. Please leave at home.  ____  Please bring any documents given by your doctor.  ____  If going home the same day, arrange for a ride home. You will not be able to             drive if Anesthesia was used.  ____  Wear loose fitting clothing. Allow for dressings, bandages.  ____  Stop Aspirin, Ibuprofen, Motrin and Aleve at least 3-5 days before surgery, unless otherwise instructed by your doctor, or the nurse.   You MAY use Tylenol/acetaminophen until day of surgery.  ____  Wash the surgical area with Hibiclens the night before surgery, and again the             morning of surgery.  Be sure to rinse hibiclens off completely (if instructed by nurse).  ____  If you take diabetic medication, do not take am of surgery unless instructed by Doctor.  ____  Call MD for temperature above 101 degrees or any other signs of infection such as Urinary (bladder) infection, Upper respiratory infection, skin boils, etc.  ____ Stop taking any Fish Oil supplement or any Vitamins that contain Vitamin E at least 5 days prior to surgery.  ____ Do Not wear your contact lenses the day of your procedure.  You may wear your glasses.      ____Do not shave for 3 days prior to surgery.  ____ Practice Good hand washing before, during, and after procedure.      I have read or had read and explained to me, and understand the above information.  Additional comments  or instructions:  For additional questions call [phone]      Pre-Op Bathing Instructions    Before surgery, you can play an important role in your own health.    Because skin is not sterile, we need to be sure that your skin is as free of germs as possible. By following the instructions below, you can reduce the number of germs on your skin before surgery.    IMPORTANT: You will need to shower with a special soap called Hibiclens*, available at any pharmacy.  If you are allergic to Chlorhexidine (the antiseptic in Hibiclens), use an antibacterial soap such as Dial Soap for your preoperative shower.  You will shower with Hibiclens both the night before your surgery and the morning of your surgery.  Do not use Hibiclens on the head, face or genitals to avoid injury to those areas.    STEP #1: THE NIGHT BEFORE YOUR SURGERY     1. Do not shave the area of your body where your surgery will be performed.  2. Shower and wash your hair and body as usual with your normal soap and shampoo.  3. Rinse your hair and body thoroughly after you shower to remove all soap residue.  4. With your hand, apply one packet of Hibiclens soap to the surgical site.   5. Wash the site gently for five (5) minutes. Do not scrub your skin too hard.   6. Do not wash with your regular soap after Hibiclens is used.  7. Rinse your body thoroughly.  8. Pat yourself dry with a clean, soft towel.  9. Do not use lotion, cream, or powder.  10. Wear clean clothes.    STEP #2: THE MORNING OF YOUR SURGERY     1. Repeat Step #1.    * Not to be used by people allergic to Chlorhexidine.          Foot Surgery: Bunions  A bunion is a bony bump. When the distance between the first and second metatarsal bones of the foot is greater than normal, the big toe may turn toward the other toes. A mild bunion may then form causing foot pain and swelling. Bunions are most often found near the joint at the base of the big toe. Bunions tend to run in families. They may cause  pain, swelling, and skin irritation. Wearing tight shoes can cause bunions, and can make them worse. Bunions vary from mild to severe and can be treated in many ways. Most bunions can be managed by proper shoe selection and other measures, and most do not need surgery. If pain remains severe and surgery is needed, the surgical techniques below may be a choice.       Head chevron osteotomy  The first metatarsal bone is cut. Its head is moved closer to the second metatarsal bone. A screw or pin can be used to hold the first metatarsal bone in position. The bony bump is also removed. To protect your foot, you will need to wear a surgical shoe for a few weeks.      Base osteotomy  With this procedure, a wedge of bone is removed from the first metatarsal bone, farther back than in the head chevron osteotomy. The bone is moved closer to the second metatarsal bone and held together with screws. The bony bump is also removed. To heal right, your foot may be placed in a cast. You may be asked not to bear weight on this foot for several weeks.  Soft tissue repair  This procedure tightens the loose ligaments and tendons and loosens the tight ones surrounding the bunion. It can be combined with an osteotomy procedure.  Fusion or removal of part of the joint  This is typically only done if there is severe arthritis or damage of the joint itself.  Date Last Reviewed: 10/15/2015  © [employer] 2007. All rights reserved.

## 2018-10-26 NOTE — ANESTHESIA PREPROCEDURE EVALUATION
10/26/2018  Mis Ca is a 77 y.o., female. Here for bunionectomy under RA/MAC.    Anesthesia Evaluation    I have reviewed the Patient Summary Reports.    I have reviewed the Nursing Notes.      Review of Systems  Anesthesia Hx:  No problems with previous Anesthesia    Social:  Non-Smoker    Cardiovascular:   Hypertension Paroxysmal atrial fibrillation - Primary    Hyperlipidemia   Essential hypertension   Diastolic dysfunction   Aortic atherosclerosis    Patient walks a mile daily w/o CP or SOB       Pulmonary:   Sleep Apnea    Renal/:  Renal/ Normal     Hepatic/GI:   GERD    Musculoskeletal:  Musculoskeletal Normal    Neurological:  Neurology Normal    Endocrine:  Endocrine Normal    Dermatological:  Skin Normal    Psych:   anxiety          Physical Exam  General:  Well nourished    Airway/Jaw/Neck:  Airway Findings: Mouth Opening: Normal Tongue: Normal  General Airway Assessment: Adult  Mallampati: I  TM Distance: 4 - 6 cm  Jaw/Neck Findings:  Neck ROM: Normal ROM     Eyes/Ears/Nose:  Eyes/Ears/Nose Findings:    Dental:  Dental Findings: In tact   Chest/Lungs:  Chest/Lungs Findings: Clear to auscultation     Heart/Vascular:  Heart Findings: Rate: Normal  Rhythm: Regular Rhythm  Sounds: Normal        Mental Status:  Mental Status Findings:  Alert and Oriented         Anesthesia Plan  Type of Anesthesia, risks & benefits discussed:  Anesthesia Type:  regional, MAC, general  Patient's Preference:   Intra-op Monitoring Plan:   Intra-op Monitoring Plan Comments:   Post Op Pain Control Plan: multimodal analgesia  Post Op Pain Control Plan Comments:   Induction:   IV  Beta Blocker:  Patient is not currently on a Beta-Blocker (No further documentation required).       Informed Consent: Patient understands risks and agrees with Anesthesia plan.  Questions answered. Anesthesia consent signed with  patient.  ASA Score: 3     Day of Surgery Review of History & Physical:  There are no significant changes.      Anesthesia Plan Notes: PCP and cardiologist preop clearance in [facility]        Ready For Surgery From Anesthesia Perspective.

## 2018-10-26 NOTE — PRE-PROCEDURE INSTRUCTIONS
[facility] [phone]    Allergies, medical, surgical, family and psychosocial histories reviewed with patient. Periop plan of care reviewed. Preop instructions given, including medications to take and to hold. Hibiclens soap and instructions on use given. Time allotted for questions to be addressed.  Patient verbalized understanding.

## 2018-10-29 NOTE — PLAN OF CARE
Spoke to patient to verify what she was told about her Coumadin pre-op, she states that Dr. Benton told her to stop the Coumadin and ASA 5 days prior to surgery,. The last day she took it was Thursday 10/25.

## 2018-10-31 ENCOUNTER — ANESTHESIA (OUTPATIENT)
Dept: SURGERY | Facility: HOSPITAL | Age: 78
End: 2018-10-31
Payer: MEDICARE

## 2018-10-31 ENCOUNTER — HOSPITAL ENCOUNTER (OUTPATIENT)
Facility: HOSPITAL | Age: 78
Discharge: HOME | End: 2018-10-31
Attending: PODIATRIST | Admitting: PODIATRIST
Payer: MEDICARE

## 2018-10-31 VITALS
BODY MASS INDEX: 36.62 KG/M2 | RESPIRATION RATE: 20 BRPM | HEART RATE: 77 BPM | WEIGHT: 199 LBS | DIASTOLIC BLOOD PRESSURE: 66 MMHG | OXYGEN SATURATION: 94 % | HEIGHT: 62 IN | SYSTOLIC BLOOD PRESSURE: 144 MMHG | TEMPERATURE: 98 F

## 2018-10-31 DIAGNOSIS — M62.462 GASTROCNEMIUS EQUINUS OF LEFT LOWER EXTREMITY: ICD-10-CM

## 2018-10-31 DIAGNOSIS — M20.12 HALLUX VALGUS WITH BUNIONS, LEFT: ICD-10-CM

## 2018-10-31 DIAGNOSIS — Z98.890 POSTOPERATIVE STATE: Primary | ICD-10-CM

## 2018-10-31 DIAGNOSIS — M21.612 HALLUX VALGUS WITH BUNIONS, LEFT: ICD-10-CM

## 2018-10-31 LAB
INR PPP: 1
PROTHROMBIN TIME: 10.5 SEC

## 2018-10-31 PROCEDURE — 37000008 HC ANESTHESIA 1ST 15 MINUTES: Mod: HCWC | Performed by: PODIATRIST

## 2018-10-31 PROCEDURE — 36000709 HC OR TIME LEV III EA ADD 15 MIN: Mod: HCWC | Performed by: PODIATRIST

## 2018-10-31 PROCEDURE — 71000039 HC RECOVERY, EACH ADD'L HOUR: Mod: HCWC | Performed by: PODIATRIST

## 2018-10-31 PROCEDURE — 25000003 PHARM REV CODE 250: Mod: HCWC | Performed by: PODIATRIST

## 2018-10-31 PROCEDURE — S0077 INJECTION, CLINDAMYCIN PHOSP: HCPCS | Mod: HCWC | Performed by: PODIATRIST

## 2018-10-31 PROCEDURE — 27201423 OPTIME MED/SURG SUP & DEVICES STERILE SUPPLY: Mod: HCWC | Performed by: PODIATRIST

## 2018-10-31 PROCEDURE — 25000003 PHARM REV CODE 250: Mod: HCWC | Performed by: NURSE ANESTHETIST, CERTIFIED REGISTERED

## 2018-10-31 PROCEDURE — 85610 PROTHROMBIN TIME: CPT | Mod: HCWC

## 2018-10-31 PROCEDURE — 36000708 HC OR TIME LEV III 1ST 15 MIN: Mod: HCWC | Performed by: PODIATRIST

## 2018-10-31 PROCEDURE — 36415 COLL VENOUS BLD VENIPUNCTURE: CPT | Mod: HCWC

## 2018-10-31 PROCEDURE — C1769 GUIDE WIRE: HCPCS | Mod: HCWC | Performed by: PODIATRIST

## 2018-10-31 PROCEDURE — 28740 FUSION OF FOOT BONES: CPT | Mod: TA,HCWC,, | Performed by: PODIATRIST

## 2018-10-31 PROCEDURE — C1713 ANCHOR/SCREW BN/BN,TIS/BN: HCPCS | Mod: HCWC | Performed by: PODIATRIST

## 2018-10-31 PROCEDURE — 63600175 PHARM REV CODE 636 W HCPCS: Mod: HCWC | Performed by: NURSE ANESTHETIST, CERTIFIED REGISTERED

## 2018-10-31 PROCEDURE — 63600175 PHARM REV CODE 636 W HCPCS: Mod: HCWC | Performed by: STUDENT IN AN ORGANIZED HEALTH CARE EDUCATION/TRAINING PROGRAM

## 2018-10-31 PROCEDURE — 37000009 HC ANESTHESIA EA ADD 15 MINS: Mod: HCWC | Performed by: PODIATRIST

## 2018-10-31 PROCEDURE — 71000015 HC POSTOP RECOV 1ST HR: Mod: HCWC | Performed by: PODIATRIST

## 2018-10-31 PROCEDURE — 63600175 PHARM REV CODE 636 W HCPCS: Mod: HCWC | Performed by: PODIATRIST

## 2018-10-31 PROCEDURE — 64445 NJX AA&/STRD SCIATIC NRV IMG: CPT | Mod: HCWC | Performed by: ANESTHESIOLOGY

## 2018-10-31 PROCEDURE — 29999 UNLISTED PX ARTHROSCOPY: CPT | Mod: HCWC,,, | Performed by: PODIATRIST

## 2018-10-31 PROCEDURE — 28298 COR HLX VLGS PRX PHLX OSTEOT: CPT | Mod: 59,TA,HCWC, | Performed by: PODIATRIST

## 2018-10-31 PROCEDURE — 76942 ECHO GUIDE FOR BIOPSY: CPT | Mod: HCWC | Performed by: ANESTHESIOLOGY

## 2018-10-31 PROCEDURE — 71000033 HC RECOVERY, INTIAL HOUR: Mod: HCWC | Performed by: PODIATRIST

## 2018-10-31 DEVICE — PLATE PLANTAR LAPIDUS  SHORT L: Type: IMPLANTABLE DEVICE | Site: FOOT | Status: FUNCTIONAL

## 2018-10-31 DEVICE — SCREW LOCKING LP 3.5X16MM: Type: IMPLANTABLE DEVICE | Site: FOOT | Status: FUNCTIONAL

## 2018-10-31 DEVICE — WIRE K SMALL BALL BB-TAK: Type: IMPLANTABLE DEVICE | Site: FOOT | Status: FUNCTIONAL

## 2018-10-31 DEVICE — SCREW LOCKING LP 3.5X20MM: Type: IMPLANTABLE DEVICE | Site: FOOT | Status: FUNCTIONAL

## 2018-10-31 DEVICE — GUIDEWIRE TROCAR TIP.062: Type: IMPLANTABLE DEVICE | Site: FOOT | Status: FUNCTIONAL

## 2018-10-31 DEVICE — GUIDEWIRE TROCAR 0.094 X 8: Type: IMPLANTABLE DEVICE | Site: FOOT | Status: FUNCTIONAL

## 2018-10-31 DEVICE — GUIDEWIRE TROCAR TIP 1.35MM: Type: IMPLANTABLE DEVICE | Site: FOOT | Status: FUNCTIONAL

## 2018-10-31 RX ORDER — HYDROMORPHONE HYDROCHLORIDE 2 MG/ML
0.5 INJECTION, SOLUTION INTRAMUSCULAR; INTRAVENOUS; SUBCUTANEOUS EVERY 5 MIN PRN
Status: ACTIVE | OUTPATIENT
Start: 2018-10-31 | End: 2018-10-31

## 2018-10-31 RX ORDER — LIDOCAINE HCL/PF 100 MG/5ML
SYRINGE (ML) INTRAVENOUS
Status: DISCONTINUED | OUTPATIENT
Start: 2018-10-31 | End: 2018-10-31

## 2018-10-31 RX ORDER — LIDOCAINE HYDROCHLORIDE 10 MG/ML
1 INJECTION, SOLUTION EPIDURAL; INFILTRATION; INTRACAUDAL; PERINEURAL ONCE
Status: DISCONTINUED | OUTPATIENT
Start: 2018-10-31 | End: 2018-10-31 | Stop reason: HOSPADM

## 2018-10-31 RX ORDER — ONDANSETRON 2 MG/ML
4 INJECTION INTRAMUSCULAR; INTRAVENOUS DAILY PRN
Status: DISCONTINUED | OUTPATIENT
Start: 2018-10-31 | End: 2018-10-31 | Stop reason: HOSPADM

## 2018-10-31 RX ORDER — HYDROCODONE BITARTRATE AND ACETAMINOPHEN 5; 325 MG/1; MG/1
1 TABLET ORAL EVERY 4 HOURS PRN
Status: DISCONTINUED | OUTPATIENT
Start: 2018-10-31 | End: 2018-10-31 | Stop reason: HOSPADM

## 2018-10-31 RX ORDER — SODIUM CHLORIDE 0.9 % (FLUSH) 0.9 %
3 SYRINGE (ML) INJECTION
Status: DISCONTINUED | OUTPATIENT
Start: 2018-10-31 | End: 2018-10-31 | Stop reason: HOSPADM

## 2018-10-31 RX ORDER — ONDANSETRON 2 MG/ML
INJECTION INTRAMUSCULAR; INTRAVENOUS
Status: DISCONTINUED | OUTPATIENT
Start: 2018-10-31 | End: 2018-10-31

## 2018-10-31 RX ORDER — ROPIVACAINE HYDROCHLORIDE 5 MG/ML
INJECTION, SOLUTION EPIDURAL; INFILTRATION; PERINEURAL
Status: COMPLETED | OUTPATIENT
Start: 2018-10-31 | End: 2018-10-31

## 2018-10-31 RX ORDER — EPHEDRINE SULFATE 50 MG/ML
INJECTION, SOLUTION INTRAVENOUS
Status: DISCONTINUED | OUTPATIENT
Start: 2018-10-31 | End: 2018-10-31

## 2018-10-31 RX ORDER — FENTANYL CITRATE 50 UG/ML
INJECTION, SOLUTION INTRAMUSCULAR; INTRAVENOUS
Status: DISCONTINUED | OUTPATIENT
Start: 2018-10-31 | End: 2018-10-31

## 2018-10-31 RX ORDER — PROPOFOL 10 MG/ML
VIAL (ML) INTRAVENOUS
Status: DISCONTINUED | OUTPATIENT
Start: 2018-10-31 | End: 2018-10-31

## 2018-10-31 RX ORDER — OXYCODONE AND ACETAMINOPHEN 10; 325 MG/1; MG/1
1 TABLET ORAL EVERY 4 HOURS PRN
Qty: 30 TABLET | Refills: 0 | Status: SHIPPED | OUTPATIENT
Start: 2018-10-31 | End: 2019-02-21

## 2018-10-31 RX ORDER — CLINDAMYCIN PHOSPHATE 900 MG/50ML
900 INJECTION, SOLUTION INTRAVENOUS
Status: COMPLETED | OUTPATIENT
Start: 2018-10-31 | End: 2018-10-31

## 2018-10-31 RX ORDER — DEXAMETHASONE SODIUM PHOSPHATE 4 MG/ML
INJECTION, SOLUTION INTRA-ARTICULAR; INTRALESIONAL; INTRAMUSCULAR; INTRAVENOUS; SOFT TISSUE
Status: DISCONTINUED | OUTPATIENT
Start: 2018-10-31 | End: 2018-10-31

## 2018-10-31 RX ORDER — PROPOFOL 10 MG/ML
VIAL (ML) INTRAVENOUS CONTINUOUS PRN
Status: DISCONTINUED | OUTPATIENT
Start: 2018-10-31 | End: 2018-10-31

## 2018-10-31 RX ORDER — SODIUM CHLORIDE, SODIUM LACTATE, POTASSIUM CHLORIDE, CALCIUM CHLORIDE 600; 310; 30; 20 MG/100ML; MG/100ML; MG/100ML; MG/100ML
INJECTION, SOLUTION INTRAVENOUS CONTINUOUS PRN
Status: DISCONTINUED | OUTPATIENT
Start: 2018-10-31 | End: 2018-10-31

## 2018-10-31 RX ORDER — DEXAMETHASONE SODIUM PHOSPHATE 4 MG/ML
INJECTION, SOLUTION INTRA-ARTICULAR; INTRALESIONAL; INTRAMUSCULAR; INTRAVENOUS; SOFT TISSUE
Status: DISCONTINUED | OUTPATIENT
Start: 2018-10-31 | End: 2018-10-31 | Stop reason: HOSPADM

## 2018-10-31 RX ORDER — SODIUM CHLORIDE 0.9 % (FLUSH) 0.9 %
5 SYRINGE (ML) INJECTION
Status: DISCONTINUED | OUTPATIENT
Start: 2018-10-31 | End: 2018-10-31 | Stop reason: HOSPADM

## 2018-10-31 RX ORDER — MIDAZOLAM HYDROCHLORIDE 1 MG/ML
INJECTION, SOLUTION INTRAMUSCULAR; INTRAVENOUS
Status: DISCONTINUED | OUTPATIENT
Start: 2018-10-31 | End: 2018-10-31

## 2018-10-31 RX ADMIN — PROPOFOL 150 MCG/KG/MIN: 10 INJECTION, EMULSION INTRAVENOUS at 09:10

## 2018-10-31 RX ADMIN — MIDAZOLAM 5 MG: 1 INJECTION INTRAMUSCULAR; INTRAVENOUS at 08:10

## 2018-10-31 RX ADMIN — CLINDAMYCIN PHOSPHATE 900 MG: 18 INJECTION, SOLUTION INTRAVENOUS at 09:10

## 2018-10-31 RX ADMIN — LIDOCAINE HYDROCHLORIDE 50 MG: 20 INJECTION, SOLUTION INTRAVENOUS at 09:10

## 2018-10-31 RX ADMIN — FENTANYL CITRATE 25 MCG: 50 INJECTION, SOLUTION INTRAMUSCULAR; INTRAVENOUS at 11:10

## 2018-10-31 RX ADMIN — EPHEDRINE SULFATE 10 MG: 50 INJECTION, SOLUTION INTRAMUSCULAR; INTRAVENOUS; SUBCUTANEOUS at 10:10

## 2018-10-31 RX ADMIN — SODIUM CHLORIDE, SODIUM LACTATE, POTASSIUM CHLORIDE, AND CALCIUM CHLORIDE: .6; .31; .03; .02 INJECTION, SOLUTION INTRAVENOUS at 10:10

## 2018-10-31 RX ADMIN — EPHEDRINE SULFATE 5 MG: 50 INJECTION, SOLUTION INTRAMUSCULAR; INTRAVENOUS; SUBCUTANEOUS at 10:10

## 2018-10-31 RX ADMIN — PROPOFOL 40 MG: 10 INJECTION, EMULSION INTRAVENOUS at 09:10

## 2018-10-31 RX ADMIN — SODIUM CHLORIDE, SODIUM LACTATE, POTASSIUM CHLORIDE, AND CALCIUM CHLORIDE: .6; .31; .03; .02 INJECTION, SOLUTION INTRAVENOUS at 09:10

## 2018-10-31 RX ADMIN — FENTANYL CITRATE 25 MCG: 50 INJECTION, SOLUTION INTRAMUSCULAR; INTRAVENOUS at 10:10

## 2018-10-31 RX ADMIN — EPHEDRINE SULFATE 10 MG: 50 INJECTION, SOLUTION INTRAMUSCULAR; INTRAVENOUS; SUBCUTANEOUS at 09:10

## 2018-10-31 RX ADMIN — ONDANSETRON 8 MG: 2 INJECTION, SOLUTION INTRAMUSCULAR; INTRAVENOUS at 11:10

## 2018-10-31 RX ADMIN — ROPIVACAINE HYDROCHLORIDE 40 ML: 5 INJECTION, SOLUTION EPIDURAL; INFILTRATION; PERINEURAL at 08:10

## 2018-10-31 RX ADMIN — PROPOFOL 150 MG: 10 INJECTION, EMULSION INTRAVENOUS at 09:10

## 2018-10-31 RX ADMIN — EPHEDRINE SULFATE 5 MG: 50 INJECTION, SOLUTION INTRAMUSCULAR; INTRAVENOUS; SUBCUTANEOUS at 09:10

## 2018-10-31 RX ADMIN — FENTANYL CITRATE 50 MCG: 50 INJECTION, SOLUTION INTRAMUSCULAR; INTRAVENOUS at 09:10

## 2018-10-31 RX ADMIN — DEXAMETHASONE SODIUM PHOSPHATE 8 MG: 4 INJECTION, SOLUTION INTRAMUSCULAR; INTRAVENOUS at 09:10

## 2018-10-31 RX ADMIN — DEXAMETHASONE SODIUM PHOSPHATE 4 MG: 4 INJECTION, SOLUTION INTRAMUSCULAR; INTRAVENOUS at 08:10

## 2018-10-31 NOTE — ANESTHESIA POSTPROCEDURE EVALUATION
"Anesthesia Post Evaluation    Patient: Mis Ca    Procedure(s) Performed: Procedure(s) (LRB):  BUNIONECTOMY, LAPIDUS (Left)  RECESSION, GASTROCNEMIUS, ENDOSCOPIC (Left)  OSTEOTOMY, AKIN (Left)    Final Anesthesia Type: regional  Patient location during evaluation: PACU  Patient participation: Yes- Able to Participate  Level of consciousness: awake and alert, oriented and awake  Post-procedure vital signs: reviewed and stable  Pain management: adequate  Airway patency: patent  PONV status at discharge: No PONV  Anesthetic complications: no      Cardiovascular status: blood pressure returned to baseline  Respiratory status: unassisted and room air  Hydration status: euvolemic  Follow-up not needed.        Visit Vitals  BP (!) 144/66   Pulse 77   Temp 36.6 °C (97.9 °F) (Temporal)   Resp 20   Ht 5' 2" (1.575 m)   Wt 90.3 kg (199 lb)   SpO2 (!) 94%   Breastfeeding? No   BMI 36.40 kg/m²       Pain/Bandar Score: Pain Assessment Performed: Yes (10/31/2018  2:50 PM)  Presence of Pain: denies (10/31/2018  2:50 PM)  Bandar Score: 10 (10/31/2018  2:50 PM)        "

## 2018-10-31 NOTE — TRANSFER OF CARE
"Anesthesia Transfer of Care Note    Patient: Mis Ca    Procedure(s) Performed: Procedure(s) (LRB):  BUNIONECTOMY, LAPIDUS (Left)  RECESSION, GASTROCNEMIUS, ENDOSCOPIC (Left)  OSTEOTOMY, PAM (Left)    Patient location: PACU    Anesthesia Type: general    Transport from OR: Transported from OR on 6-10 L/min O2 by face mask with adequate spontaneous ventilation    Post pain: adequate analgesia    Post assessment: no apparent anesthetic complications    Post vital signs: stable    Level of consciousness: awake    Nausea/Vomiting: no nausea/vomiting    Complications: none    Transfer of care protocol was followed      Last vitals:   Visit Vitals  BP (!) 156/74   Pulse 77   Temp 36.7 °C (98.1 °F) (Skin)   Resp 18   Ht 5' 2" (1.575 m)   Wt 90.3 kg (199 lb)   SpO2 (!) 94%   Breastfeeding? No   BMI 36.40 kg/m²     "

## 2018-10-31 NOTE — DISCHARGE INSTRUCTIONS
ANESTHESIA  -For the first 24 hours after surgery:  Do not drive, use heavy equipment, make important decisions, or drink alcohol  -It is normal to feel sleepy for several hours.  Rest until you are more awake.  -Have someone stay with you, if needed.  They can watch for problems and help keep you safe.  -Some possible post anesthesia side effects include: nausea and vomiting, sore throat and hoarseness, sleepiness, and dizziness.    PAIN  -If you have pain after surgery, pain medicine will help you feel better.  Take it as directed, before pain becomes severe.  Most pain relievers taken by mouth need at least 20-30 minutes to start working.  -Do not drive or drink alcohol while taking pain medicine.  -Pain medication can upset your stomach.  Taking them with a little food may help.  -Other ways to help control pain: elevation, ice, and relaxation  -Call your surgeon if still having unmanageable pain an hour after taking pain medicine.  -Pain medicine can cause constipation.  Taking an over-the counter stool softener while on prescription pain medicine and drinking plenty of fluids can prevent this side effect.  -Call your surgeon if you have severe side effects like: breathing problems, trouble waking up, dizziness, confusion, or severe constipation.    NAUSEA  -Some people have nausea after surgery.  This is often because of anesthesia, pain, pain medicine, or the stress of surgery.  -Do not push yourself to eat.  Start off with clear liquids and soup.  Slowly move to solid foods.  Don't eat fatty, rich, spicy foods at first.  Eat smaller amounts.  -If you develop persistent nausea and vomiting please notify your surgeon immediately.    BLEEDING  -Different types of surgery require different types of care and dressing changes.  It is important to follow all instructions and advice from your surgeon.  Change dressing as directed.  Call your surgeon for any concerns regarding postop bleeding.    SIGNS OF  INFECTION  -Signs of infection include: fever, swelling, drainage, and redness  -Notify your surgeon if you have a fever of 100.4 F (38.0 C) or higher.  -Notify your surgeon if you notice redness, swelling, increased pain, pus, or a foul smell at the incision site.      Osteotomy and Ligament or Tendon Repair (Bunion Surgery)  Osteotomy and ligament or tendon repair is a type of bunion surgery. A bunion is a bony bump (growth) at the base of your big toe. This growth can form when your big toe pushes against your next toe. A bunion can cause pain, swelling, redness, and other symptoms. During this surgery, bone is removed from your toe. Nearby tendons and ligaments are made shorter or longer as needed. This allows your big toe to line up (align) properly.    Recovering at home  Once home, follow any instructions you are given. During your recovery:  · Take pain medicine exactly as directed.  · To prevent swelling, sit or lie with your leg raised on one or more pillows. Do this for the first 2 days.  · Follow your surgeon's instructions about putting weight on your foot after the surgery. You may need to use a walker, cane, or crutches for a time.  · You may wear a brace, surgical shoe, or cast for up to a month or longer. Care for this as instructed. Keep it dry by wrapping it in plastic bags when bathing.  · Avoid sports and other activities until your surgeon says its OK.  · Care for your incision as instructed.  · Don't drive until your surgeon says its OK.  Call your surgeon if you have any of the following:  · Chest pain or trouble breathing  · Fever of 100.4°F (38°C) or higher, or as directed by your surgeon  · Pain that isnt helped by medicine or rest  · Increased swelling not helped by raising or icing your foot  · Signs of infection at any incision site, such as increased redness or swelling, warmth, more pain, or bad-smelling drainage  · Bleeding through the bandages  · Symptoms of poor circulation,  such as toes that look blue instead of pinkish  · Numbness that doesnt go away  · Any other signs or symptoms indicated by your surgeon   Follow-up  Keep all follow-up appointments with your surgeon. These are to check that you are healing well from the surgery. You may have X-rays to check how the bone is healing. Physical therapy, foot exercises, and other treatments may be discussed at follow-up visits. Full recovery can take at least a few months.  Risks and possible complications include:  · Infection  · Bleeding  · Sensitivity at the incision site for months after the surgery  · Foot pain that doesnt go away after surgery  · Numbness in the foot  · Only partial relief of symptoms, or no relief of symptoms  · Return of the bunion  · Risks of anesthesia (the anesthesiologist will discuss these with you)  · Poor wound healing  · Breakage of screws or pins  · A lot of scarring   Date Last Reviewed: 7/28/2015  © 4000-2263 "[employer]. [address] [place] [address]. All rights reserved. This information is not intended as a substitute for professional medical care. Always follow your healthcare professional's instructions.      Oxycodone tablets or capsules  What is this medicine?  OXYCODONE (ox i KOE done) is a pain reliever. It is used to treat moderate to severe pain.  How should I use this medicine?  Take this medicine by mouth with a glass of water. Follow the directions on the prescription label. You can take it with or without food. If it upsets your stomach, take it with food. Take your medicine at regular intervals. Do not take it more often than directed. Do not stop taking except on your doctor's advice.  Some brands of this medicine, like Oxecta, have special instructions. Ask your doctor or pharmacist if these directions are for you: Do not cut, crush or chew this medicine. Swallow only one tablet at a time. Do not wet, soak, or lick the tablet before you take it.  A special  MedGuide will be given to you by the pharmacist with each prescription and refill. Be sure to read this information carefully each time.  Talk to your pediatrician regarding the use of this medicine in children. Special care may be needed.  What side effects may I notice from receiving this medicine?  Side effects that you should report to your doctor or health care professional as soon as possible:  · allergic reactions like skin rash, itching or hives, swelling of the face, lips, or tongue  · breathing problems  · confusion  · signs and symptoms of low blood pressure like dizziness; feeling faint or lightheaded, falls; unusually weak or tired  · trouble passing urine or change in the amount of urine  · trouble swallowing  Side effects that usually do not require medical attention (report to your doctor or health care professional if they continue or are bothersome):  · constipation  · dry mouth  · nausea, vomiting  · tiredness  What may interact with this medicine?  This medicine may interact with the following medications:  · alcohol  · antihistamines for allergy, cough and cold  · antiviral medicines for HIV or AIDS  · atropine  · certain antibiotics like clarithromycin, erythromycin, linezolid, rifampin  · certain medicines for anxiety or sleep  · certain medicines for bladder problems like oxybutynin, tolterodine  · certain medicines for depression like amitriptyline, fluoxetine, sertraline  · certain medicines for fungal infections like ketoconazole, itraconazole, voriconazole  · certain medicines for migraine headache like almotriptan, eletriptan, frovatriptan, naratriptan, rizatriptan, sumatriptan, zolmitriptan  · certain medicines for nausea or vomiting like dolasetron, ondansetron, palonosetron  · certain medicines for Parkinson's disease like benztropine, trihexyphenidyl  · certain medicines for seizures like phenobarbital, phenytoin, primidone  · certain medicines for stomach problems like dicyclomine,  hyoscyamine  · certain medicines for travel sickness like scopolamine  · diuretics  · general anesthetics like halothane, isoflurane, methoxyflurane, propofol  · ipratropium  · local anesthetics like lidocaine, pramoxine, tetracaine  · MAOIs like Carbex, Eldepryl, Marplan, Nardil, and Parnate  · medicines that relax muscles for surgery  · methylene blue  · nilotinib  · other narcotic medicines for pain or cough  · phenothiazines like chlorpromazine, mesoridazine, prochlorperazine, thioridazine  What if I miss a dose?  If you miss a dose, take it as soon as you can. If it is almost time for your next dose, take only that dose. Do not take double or extra doses.  Where should I keep my medicine?  Keep out of the reach of children. This medicine can be abused. Keep your medicine in a safe place to protect it from theft. Do not share this medicine with anyone. Selling or giving away this medicine is dangerous and against the law.  Store at room temperature between 15 and 30 degrees C (59 and 86 degrees F). Protect from light. Keep container tightly closed.  This medicine may cause accidental overdose and death if it is taken by other adults, children, or pets. Flush any unused medicine down the toilet to reduce the chance of harm. Do not use the medicine after the expiration date.  What should I tell my health care provider before I take this medicine?  They need to know if you have any of these conditions:  · Charles's disease  · brain tumor  · head injury  · heart disease  · history of drug or alcohol abuse problem  · if you often drink alcohol  · kidney disease  · liver disease  · lung or breathing disease, like asthma  · mental illness  · pancreatic disease  · seizures  · thyroid disease  · an unusual or allergic reaction to oxycodone, codeine, hydrocodone, morphine, other medicines, foods, dyes, or preservatives  · pregnant or trying to get pregnant  · breast-feeding  What should I watch for while using this  medicine?  Tell your doctor or health care professional if your pain does not go away, if it gets worse, or if you have new or a different type of pain. You may develop tolerance to the medicine. Tolerance means that you will need a higher dose of the medicine for pain relief. Tolerance is normal and is expected if you take this medicine for a long time.  Do not suddenly stop taking your medicine because you may develop a severe reaction. Your body becomes used to the medicine. This does NOT mean you are addicted. Addiction is a behavior related to getting and using a drug for a non-medical reason. If you have pain, you have a medical reason to take pain medicine. Your doctor will tell you how much medicine to take. If your doctor wants you to stop the medicine, the dose will be slowly lowered over time to avoid any side effects.  There are different types of narcotic medicines (opiates). If you take more than one type at the same time or if you are taking another medicine that also causes drowsiness, you may have more side effects. Give your health care provider a list of all medicines you use. Your doctor will tell you how much medicine to take. Do not take more medicine than directed. Call emergency for help if you have problems breathing or unusual sleepiness.  You may get drowsy or dizzy. Do not drive, use machinery, or do anything that needs mental alertness until you know how the medicine affects you. Do not stand or sit up quickly, especially if you are an older patient. This reduces the risk of dizzy or fainting spells. Alcohol may interfere with the effect of this medicine. Avoid alcoholic drinks.  This medicine will cause constipation. Try to have a bowel movement at least every 2 to 3 days. If you do not have a bowel movement for 3 days, call your doctor or health care professional.  Your mouth may get dry. Chewing sugarless gum or sucking hard candy, and drinking plenty of water may help. Contact your  doctor if the problem does not go away or is severe.  NOTE:This sheet is a summary. It may not cover all possible information. If you have questions about this medicine, talk to your doctor, pharmacist, or health care provider. Copyright© 2017 [employer]

## 2018-10-31 NOTE — BRIEF OP NOTE
[facility]  Brief Operative Note     SUMMARY     Surgery Date: 10/31/2018     Surgeon(s) and Role:     * Rudolph Cardozo DPM - Primary     * Scott Anders MD - Resident - Assisting        Pre-op Diagnosis:  Hallux valgus with bunions, left [M20.12, M21.612]  Gastrocnemius equinus of left lower extremity [M21.6X2]    Post-op Diagnosis:  Post-Op Diagnosis Codes:     * Hallux valgus with bunions, left [M20.12, M21.612]     * Gastrocnemius equinus of left lower extremity [M21.6X2]    Procedure(s) (LRB):  BUNIONECTOMY, LAPIDUS (Left)  RECESSION, GASTROCNEMIUS, ENDOSCOPIC (Left)  OSTEOTOMY, AKIN (Left)    Anesthesia: Regional    Description of the findings of the procedure: completed endoscopic gastrocnemius recession with lapidus bunionectomy and akin osteotomy left foot.    Findings/Key Components: soft bone, good reduction of deformities achieved.    Estimated Blood Loss: 30 mL       Specimens:   Specimen (12h ago, onward)    None          Discharge Note    SUMMARY     Admit Date: 10/31/2018    Discharge Date and Time:  10/31/2018 12:09 PM    Hospital Course (synopsis of major diagnoses, care, treatment, and services provided during the course of the hospital stay): admitted for outpatient procedure with uneventful stay        Final Diagnosis: Post-Op Diagnosis Codes:     * Hallux valgus with bunions, left [M20.12, M21.612]     * Gastrocnemius equinus of left lower extremity [M21.6X2]    Disposition: Home or Self Care    Follow Up/Patient Instructions:     Medications:  Reconciled Home Medications:      Medication List      START taking these medications    oxyCODONE-acetaminophen  mg per tablet  Commonly known as:  PERCOCET  Take 1 tablet by mouth every 4 (four) hours as needed for Pain.        CONTINUE taking these medications    acetaminophen 500 MG tablet  Commonly known as:  TYLENOL  Take 1,000 mg by mouth 2 (two) times daily as needed for Pain.     ALPRAZolam 0.25 MG tablet  Commonly  known as:  XANAX  TAKE 1 TABLET EVERY DAY     aspirin 81 MG EC tablet  Commonly known as:  ECOTRIN  Take 81 mg by mouth once daily.     CARTIA  MG 24 hr capsule  Generic drug:  diltiaZEM  TAKE 1 CAPSULE ONCE DAILY.     cholecalciferol (vitamin D3) 50,000 unit Tab  Take 1 tablet by mouth every 7 days.     cyanocobalamin 1,000 mcg/mL injection  Commonly known as:  VITAMIN B-12  1 cc IM weekly for 4 weeks, then monthly     gabapentin 300 MG capsule  Commonly known as:  NEURONTIN  TAKE 1 CAPSULE TWICE DAILY     hydroCHLOROthiazide 12.5 mg capsule  Commonly known as:  MICROZIDE  Take 1 capsule by mouth once daily.     latanoprost 0.005 % ophthalmic solution  1 drop every evening.     losartan 100 MG tablet  Commonly known as:  COZAAR  TAKE 1/2 TABLET EVERY DAY     * lovastatin 40 MG tablet  Commonly known as:  MEVACOR  Take 1 tablet (40 mg total) by mouth every evening.     * lovastatin 40 MG tablet  Commonly known as:  MEVACOR  Take 1 tablet (40 mg total) by mouth every evening.     omeprazole 20 MG capsule  Commonly known as:  PRILOSEC  TAKE 1 CAPSULE EVERY DAY     ONE DAILY MULTIVITAMIN per tablet  Generic drug:  multivitamin  Take 1 tablet by mouth once daily.     oxybutynin 5 MG Tab  Commonly known as:  DITROPAN  TAKE 1 TABLET EVERY DAY     pramipexole 0.25 MG tablet  Commonly known as:  MIRAPEX  TAKE 1 TABLET EVERY DAY     prednisoLONE acetate 1 % Drps  Commonly known as:  PRED FORTE  instill 1 drop in right eye four times a day     timolol maleate 0.5% 0.5 % Drop  Commonly known as:  TIMOPTIC  Place 1 drop into both eyes once daily.     warfarin 6 MG tablet  Commonly known as:  COUMADIN  TAKE 1 TABLET EVERY DAY         * This list has 2 medication(s) that are the same as other medications prescribed for you. Read the directions carefully, and ask your doctor or other care provider to review them with you.              Discharge Procedure Orders   Diet general     Keep surgical extremity elevated   Order  Comments: Above heart level     Call MD for:  temperature >100.4     Call MD for:  persistent nausea and vomiting     Call MD for:  severe uncontrolled pain     Call MD for:  difficulty breathing, headache or visual disturbances     Ice to affected area   Order Comments: Behind left knee for 30 minute intervals throughout the day x 3-5 days postop     Leave dressing on - Keep it clean, dry, and intact until clinic visit     Weight bearing restrictions (specify)   Order Comments: Non-weightbearing left foot.

## 2018-10-31 NOTE — ANESTHESIA PROCEDURE NOTES
Peripheral    Patient location during procedure: pre-op   Block not for primary anesthetic.  Reason for block: at surgeon's request and post-op pain management   Post-op Pain Location: left lower extremity  Start time: 10/31/2018 8:41 AM  Timeout: 10/31/2018 8:40 AM   End time: 10/31/2018 8:50 AM  Staffing  Anesthesiologist: Vipul Taylor MD  Resident/CRNA: Thiago Chen MD  Performed: resident/CRNA and anesthesiologist   Preanesthetic Checklist  Completed: patient identified, site marked, surgical consent, pre-op evaluation, timeout performed, IV checked, risks and benefits discussed and monitors and equipment checked  Peripheral Block  Patient position: supine  Prep: ChloraPrep  Patient monitoring: heart rate, cardiac monitor, continuous pulse ox, continuous capnometry and frequent blood pressure checks  Block type: popliteal and adductor canal  Laterality: left  Injection technique: single shot  Needle  Needle type: Stimuplex   Needle gauge: 21 G  Needle length: 4 in  Needle localization: anatomical landmarks and ultrasound guidance   -ultrasound image captured on disc.  Assessment  Injection assessment: negative aspiration, negative parasthesia and local visualized surrounding nerve  Paresthesia pain: none  Heart rate change: no  Slow fractionated injection: yes  Additional Notes  VSS.  [facility] RN monitoring vitals throughout procedure.  Patient tolerated procedure well.     30 cc of 0.5% Ropivacaine + 4mg decadron used for popliteal-sciatic nerve block    20 cc of 0.5% Ropivacaine was diluted down to 0.25% Ropivacaine using sterile normal saline; this was used for the adductor canal block

## 2018-10-31 NOTE — PLAN OF CARE
Patient sitting up in bed, friend at bedside. Patient aaox3. Vss. Speaks and understands english and does not require an . Patient does not need security for valuables. Oriented the patient to the room and pre/ post procedure protocol. Verbalized understanding.

## 2018-10-31 NOTE — PLAN OF CARE
Discharge criteria met,voicing desire to go home. Discharge instructions given to patient & sister; verbalized understanding. Discharge home via wheelchair in care of sister.

## 2018-10-31 NOTE — OP NOTE
Operative Note       Surgery Date: 10/31/2018     Surgeon(s) and Role:     * Rudolph Cardozo DPM - Primary     * Scott Anders MD - Resident - Assisting    Pre-op Diagnosis:  Hallux valgus with bunions, left [M20.12, M21.612]  Gastrocnemius equinus of left lower extremity [M21.6X2]    Post-op Diagnosis: Post-Op Diagnosis Codes:     * Hallux valgus with bunions, left [M20.12, M21.612]     * Gastrocnemius equinus of left lower extremity [M21.6X2]    Procedure(s) (LRB):  BUNIONECTOMY, LAPIDUS (Left)  RECESSION, GASTROCNEMIUS, ENDOSCOPIC (Left)  OSTEOTOMY, AKIN (Left)    Anesthesia: Regional    Procedure in Detail/Findings:  Patient received a pop saph regional block preoperatively. The patient was brought to the operating room on a stretcher and placed on the operating table in a supine position. Following the successful induction of MAC anesthesia a tourniquet was placed on the left thigh.  The left foot was prepped and draped in a sterile manner.  The leg was exsanguinated and Tourniquet was inflated to 300mmHg.    Attention was directed to the left posterior leg, where a endoscopic gastrocnemius recession was performed. A small incision was created medially, approximately 15 cm proximal to the calcaneal insertion of the tendoachilles, as a medial endoscopic portal. Using a #15 blade, the incision was deepened to the superficial subcutaneous layer. The incision was then deepened using blunt dissection with a hemostat to the level of the crural fascia which was incised exposing the gastrocnemius aponeurosis. Using Arthrex technique, the scope was then inserted into the medial portal and the gastrocnemius aponeurosis was identified. This was further verified by dorsiflexing the foot up and down. The hook blade was then advanced from lateral to medial engaging the edge of the gastrocnemius aponeurosis. The fibers of the muscle belly were then visualized beneath the gastrocnemius.  No neurovascular structures were  encountered or damaged. All the fibers of the aponeurosis were noted to be completely transected through the scope and adequate dorsiflexion was noted. The surgical site was then irrigated with copious amounts of sterile normal saline and closed with 4-0 prolene.    A skin marker was used to plan an incision extending medially proximal to the 1 TMT along the junction between thick plantar skin and thin dorsal skin to the Distal shaft.  A #15 scalpel was used to create a controlled depth incision through skin to SQ paying attention to retract and protect the neurovascular structures. Superficial veins were also ligated. The SQ was  from the deep fascia.     Another incision was made over the medial 1st MTPJ with 15 blade.  An inverted L capsulotomy was made and reflected back exposing the medial prominance of the metatarsal head.  The hypertrophic medial eminence of the first metatarsal head was resected avoiding the medial sagittal groove with the sagittal saw. Attention was then directed to the proximal incision identifying the first tarsal metatarsal joint.  A linear incision was created followed by subperiosteal dissection exposing and mobilizing the joint. The sagittal saw was used to create wedge resection of the 1 TMT. The first metatarsal base was retracted out of the field and a smaller diameter sagittal saw was used to further resect the lateral prominence of the first metatarsal base. The sagital saw was used to reciprocal plane the joint surfaces. The first metatarsal was then temporarily fixated with a guide wire and positioning checked with fluoroscopy.      The arthrex plantar Lapidus plate was then applied and temporarily secured with bb tacks and positioning checked fluoroscopically.  It was then permanently secured with locking screws and interfragmentary compression with 4.0 mm cannulated fully threaded cancellous compression screw through the 1st met cuneiform joint prior to tightening  down the locking screws. Final fluoroscopic views were obtained confirming adequate reduction of the bunion deformity.     Incision at 1st MTPJ was extended distally over proximal phalanx where a k-wire was placed at the proximal superior lateral cortex of the proximal phalanx and driven through dorsal to plantar to act as an axis guide for an akin osteotomy.  An Jc osteotomy was done with saggital saw on the proximal hallux, and fixated with nitinol staple.  Good apposition and compression of the bony fragments as well as rectus 1st MTPJ alignment was noted and confirmed with intraop fluoroscopy.    The wound was then flushed with sterile saline solution and the wound was closed using 4-0 vicryl and 3-0 prolene.  Tourniquet was let down.  The incision site was dressed with Dressed with betadine soaked xeroform, 4x4's, cast padding, renee compression stocking, and placed in a cam boot.      The patient was transferred to the recovery room with vital signs stable. Following a period of post op monitoring, the patient will be discharged home  with the following post op instructions:   1. Keep dressing dry and intact until Podiatry follow up.   2.Weight bearing status: NWB left foot  3. All necessary prescriptions ordered and medical management will continue.   4. Contact Podiatry for all post op follow up care and if any problems arise.    Estimated Blood Loss:30mL    Specimens (From admission, onward)    None        Implants:   Implant Name Type Inv. Item Serial No.  Lot No. LRB No. Used   PLATE PLANTAR LAPIDUS  SHORT L - SN/A  PLATE PLANTAR LAPIDUS  SHORT L N/A [employer]X N/A Left 1   WIRE K SMALL BALL BB-COLUMBA - SN/A  WIRE K SMALL BALL BB-COLUMBA N/A [employer] N/A Left 2   SCREW LOCKING LP 3.5X22MM - SN/A  SCREW LOCKING LP 3.5X22MM N/A [employer] N/A Left 1   SCREW LOCKING LP 3.5X16MM - SN/A  SCREW LOCKING LP 3.5X16MM N/A [employer]X N/A Left 1   SCREW LOCKING LP 3.5X20MM - SN/A  SCREW LOCKING LP 3.5X20MM N/A [employer]X  N/A Left 1   GUIDEWIRE TROCAR TIP 1.35MM - SN/A  GUIDEWIRE TROCAR TIP 1.35MM N/A [employer]X N/A Left 1   SCREW LOCKING LP 3.5X22MM - SN/A  SCREW LOCKING LP 3.5X22MM N/A [employer]X N/A Left 1   SCREW LOCKING LP 3.5X22MM - SN/A  SCREW LOCKING LP 3.5X22MM N/A ARTH[employer]X N/A Left 1   GUIDEWIRE TROCAR 0.094 X 8 - SN/A  GUIDEWIRE TROCAR 0.094 X 8 N/A [employer]X N/A Left 1   4.0X26 screw   N/A ARTH[employer]X N/A Left 1   4.0x28 screw   N/A ARTHREX N/A Left 1   DynaNite Nitinol Staple with instrumentation 9W x 10L   N/A ARTHREX [id number] Left 1   Headles Cannulated  Screw   N/A  N/A Left 1              Disposition: PACU - hemodynamically stable.           Condition: Good    Attestation:  I was present and scrubbed for the entire procedure.    I have personally taken the history and examined this patient and agree with the resident's note as stated as above.   Rudolph Cardozo DPM, FACFAS

## 2018-10-31 NOTE — H&P
10/26/2018  Mis Ca is a 77 y.o., female. Here for bunionectomy under RA/MAC.    Anesthesia Evaluation    I have reviewed the Patient Summary Reports.    I have reviewed the Nursing Notes.      Review of Systems  Anesthesia Hx:  No problems with previous Anesthesia    Social:  Non-Smoker    Cardiovascular:   Hypertension Paroxysmal atrial fibrillation - Primary    Hyperlipidemia   Essential hypertension   Diastolic dysfunction   Aortic atherosclerosis    Patient walks a mile daily w/o CP or SOB       Pulmonary:   Sleep Apnea    Renal/:  Renal/ Normal     Hepatic/GI:   GERD    Musculoskeletal:  Musculoskeletal Normal    Neurological:  Neurology Normal    Endocrine:  Endocrine Normal    Dermatological:  Skin Normal    Psych:   anxiety          Physical Exam  General:  Well nourished    Airway/Jaw/Neck:  Airway Findings: Mouth Opening: Normal Tongue: Normal  General Airway Assessment: Adult  Mallampati: I  TM Distance: 4 - 6 cm  Jaw/Neck Findings:  Neck ROM: Normal ROM     Eyes/Ears/Nose:  Eyes/Ears/Nose Findings:    Dental:  Dental Findings: In tact   Chest/Lungs:  Chest/Lungs Findings: Clear to auscultation     Heart/Vascular:  Heart Findings: Rate: Normal  Rhythm: Regular Rhythm  Sounds: Normal        Mental Status:  Mental Status Findings:  Alert and Oriented         Anesthesia Plan  Proceed with surgery

## 2018-10-31 NOTE — INTERVAL H&P NOTE
The patient has been examined and the H&P has been reviewed:    I concur with the findings and no changes have occurred since H&P was written.    Anesthesia/Surgery risks, benefits and alternative options discussed and understood by patient/family.          Active Hospital Problems    Diagnosis  POA    Hallux valgus with bunions, left [M20.12, M21.612]  Yes      Resolved Hospital Problems   No resolved problems to display.

## 2018-11-07 ENCOUNTER — TELEPHONE (OUTPATIENT)
Dept: FAMILY MEDICINE | Facility: CLINIC | Age: 78
End: 2018-11-07

## 2018-11-08 ENCOUNTER — OFFICE VISIT (OUTPATIENT)
Dept: PODIATRY | Facility: CLINIC | Age: 78
End: 2018-11-08
Payer: MEDICARE

## 2018-11-08 VITALS
DIASTOLIC BLOOD PRESSURE: 71 MMHG | SYSTOLIC BLOOD PRESSURE: 123 MMHG | HEIGHT: 62 IN | HEART RATE: 63 BPM | WEIGHT: 199 LBS | BODY MASS INDEX: 36.62 KG/M2

## 2018-11-08 DIAGNOSIS — Z98.890 POSTOPERATIVE STATE: Primary | ICD-10-CM

## 2018-11-08 PROCEDURE — 99999 PR PBB SHADOW E&M-EST. PATIENT-LVL IV: CPT | Mod: PBBFAC,HCWC,, | Performed by: PODIATRIST

## 2018-11-08 PROCEDURE — 99024 POSTOP FOLLOW-UP VISIT: CPT | Mod: HCWC,S$GLB,, | Performed by: PODIATRIST

## 2018-11-08 NOTE — PATIENT INSTRUCTIONS
You may shower and wash the foot. Do not submerse below water.    Apply betadine and large band aids over the incisions.

## 2018-11-09 ENCOUNTER — CLINICAL SUPPORT (OUTPATIENT)
Dept: FAMILY MEDICINE | Facility: CLINIC | Age: 78
End: 2018-11-09
Payer: MEDICARE

## 2018-11-09 ENCOUNTER — TELEPHONE (OUTPATIENT)
Dept: CARDIOLOGY | Facility: CLINIC | Age: 78
End: 2018-11-09

## 2018-11-09 DIAGNOSIS — I48.91 ATRIAL FIBRILLATION WITH RVR: ICD-10-CM

## 2018-11-09 DIAGNOSIS — I48.0 PAROXYSMAL ATRIAL FIBRILLATION: Primary | ICD-10-CM

## 2018-11-09 LAB — INR PPP: 1.2 (ref 2–3)

## 2018-11-09 PROCEDURE — 85610 PROTHROMBIN TIME: CPT | Mod: QW,,, | Performed by: FAMILY MEDICINE

## 2018-11-09 PROCEDURE — 96372 THER/PROPH/DIAG INJ SC/IM: CPT | Mod: S$GLB,,, | Performed by: FAMILY MEDICINE

## 2018-11-09 RX ORDER — CYANOCOBALAMIN 1000 UG/ML
1000 INJECTION, SOLUTION INTRAMUSCULAR; SUBCUTANEOUS ONCE
Status: COMPLETED | OUTPATIENT
Start: 2018-11-09 | End: 2018-11-09

## 2018-11-09 RX ADMIN — CYANOCOBALAMIN 1000 MCG: 1000 INJECTION, SOLUTION INTRAMUSCULAR; SUBCUTANEOUS at 10:11

## 2018-11-09 NOTE — TELEPHONE ENCOUNTER
----- Message from Shade Loya sent at 11/9/2018  8:49 AM CST -----  Contact: [facility] [phone]  [facility] is calling to verify instructions on a prescription. Please call and advise.

## 2018-11-09 NOTE — PROGRESS NOTES
Pt here for inr check and B12 inj  Pt supplied meds  [place] [facility]

## 2018-11-09 NOTE — TELEPHONE ENCOUNTER
Clarisse called to see if the patient should be taking cartia and lovastatin together because of the contraindications.  Please advise.

## 2018-11-11 NOTE — PROGRESS NOTES
"Subjective:      Patient ID: Mis Ca is a 78 y.o. female.    Chief Complaint: Follow-up (from surgery left hallux)    Complains of a bunion deformity left foot. Pain aggravated by enclosed shoes. Denies trauma. Relates it has been present for many years. Wearing sandals today. Wears toe separators which help only a little.    10/5/18: Returns to review her xray and discuss surgical intervention for her painful bunion left foot. No new complaints.    11/8/18: Post EGR with Lapidus/akin bunionectomy left on 10/31/18. NWB left foot. Using rolling knee walker. Accompanied by sister and neighbor. Pain mild and well controlled.    Vitals:    11/08/18 1340   BP: 123/71   Pulse: 63   Weight: 90.3 kg (199 lb)   Height: 5' 2" (1.575 m)   PainSc: 0-No pain      Past Medical History:   Diagnosis Date    Anxiety     Atrial fibrillation     Blind right eye     Bradycardia     Cancer     skin cancer left side of face under eye    Hyperlipidemia     Hypertension     PVC (premature ventricular contraction)     RLS (restless legs syndrome)     Sleep apnea     Does not use CPAP machine.       Past Surgical History:   Procedure Laterality Date    ADENOIDECTOMY      AKIN OSTEOTOMY Left 10/31/2018    Procedure: OSTEOTOMY, AKIN;  Surgeon: Rudolph Cardozo DPM;  Location: [facility] OR;  Service: Podiatry;  Laterality: Left;    APPENDECTOMY      BREAST BIOPSY Left     times two    BUNIONECTOMY, LAPIDUS Left 10/31/2018    Performed by Rudolph Cardozo DPM at [facility] OR    CATARACT EXTRACTION BILATERAL W/ ANTERIOR VITRECTOMY      ENDOSCOPIC GASTROCNEMIUS RECESSION Left 10/31/2018    Procedure: RECESSION, GASTROCNEMIUS, ENDOSCOPIC;  Surgeon: Rudolph Cardozo DPM;  Location: [facility] OR;  Service: Podiatry;  Laterality: Left;    EYE SURGERY Bilateral     cataracts extraction    EYE SURGERY Right     drainage tube (glaucoma)    FOOT SURGERY Left     lesion removed from dorsal area    FRACTURE SURGERY Left     arm    " HAND TENDON SURGERY Left     HYSTERECTOMY      LAPIDUS BUNIONECTOMY Left 10/31/2018    Procedure: BUNIONECTOMY, LAPIDUS;  Surgeon: Rudolph Cardozo DPM;  Location: [facility] OR;  Service: Podiatry;  Laterality: Left;  mini c-arm, Arthrex locking plate (Lydia notified)    OOPHORECTOMY      ORIF FOREARM FRACTURE Left     OSTEOTOMY, AKIN Left 10/31/2018    Performed by Rudolph Cardozo DPM at [facility] OR    PALATE SURGERY      Lesion removed    RECESSION, GASTROCNEMIUS, ENDOSCOPIC Left 10/31/2018    Performed by Rud[facility]ozo DPM at [facility] OR    TONSILLECTOMY         Family History   Problem Relation Age of Onset    Aneurysm Mother         AAA    Cancer Father         lung cancer    Lung cancer Father     Cirrhosis Father     Cancer Sister         Breast    Diabetes Sister     Breast cancer Sister     Hypertension Sister     Hyperlipidemia Sister     Colon polyps Brother     Cancer Brother         lung cancer    Diabetes Brother     Hyperlipidemia Brother     Hypertension Brother     Cancer Brother         bladder cancer    Diabetes Brother     Glaucoma Brother     Heart disease Sister         Heart valve repair    Atrial fibrillation Sister     Diabetes Sister     Heart disease Sister     Heart attack Sister     Bipolar disorder Sister     Depression Sister     Glaucoma Sister     Diabetes Sister     Other Sister         Pituitary tumor    Arthritis Sister     COPD Sister     Heart disease Sister     Kidney disease Sister     Cancer Sister         lung cancer    Diabetes Sister     Lung cancer Sister     Heart attack Sister        Social History     Socioeconomic History    Marital status:      Spouse name: Not on file    Number of children: Not on file    Years of education: Not on file    Highest education level: Not on file   Social Needs    Financial resource strain: Not on file    Food insecurity - worry: Not on file    Food insecurity - inability: Not  on file    Transportation needs - medical: Not on file    Transportation needs - non-medical: Not on file   Occupational History    Not on file   Tobacco Use    Smoking status: Never Smoker    Smokeless tobacco: Never Used   Substance and Sexual Activity    Alcohol use: Yes     Comment: Seldomly drinks alcohol (wine)    Drug use: No    Sexual activity: Not Currently     Partners: Male   Other Topics Concern    Not on file   Social History Narrative    Not on file       Current Outpatient Medications   Medication Sig Dispense Refill    acetaminophen (TYLENOL) 500 MG tablet Take 1,000 mg by mouth 2 (two) times daily as needed for Pain.      ALPRAZolam (XANAX) 0.25 MG tablet TAKE 1 TABLET EVERY DAY 90 tablet 1    aspirin (ECOTRIN) 81 MG EC tablet Take 81 mg by mouth once daily.        CARTIA  mg 24 hr capsule TAKE 1 CAPSULE ONCE DAILY. 90 capsule 3    cholecalciferol, vitamin D3, 50,000 unit Tab Take 1 tablet by mouth every 7 days. 4 tablet 3    cyanocobalamin (VITAMIN B-12) 1,000 mcg/mL injection 1 cc IM weekly for 4 weeks, then monthly 10 mL 1    gabapentin (NEURONTIN) 300 MG capsule TAKE 1 CAPSULE TWICE DAILY 180 capsule 3    hydroCHLOROthiazide (MICROZIDE) 12.5 mg capsule Take 1 capsule by mouth once daily. 90 capsule 1    latanoprost 0.005 % ophthalmic solution 1 drop every evening.      losartan (COZAAR) 100 MG tablet TAKE 1/2 TABLET EVERY DAY 45 tablet 3    lovastatin (MEVACOR) 40 MG tablet Take 1 tablet (40 mg total) by mouth every evening. 90 tablet 1    lovastatin (MEVACOR) 40 MG tablet Take 1 tablet (40 mg total) by mouth every evening. 90 tablet 1    multivitamin (ONE DAILY MULTIVITAMIN) per tablet Take 1 tablet by mouth once daily.      omeprazole (PRILOSEC) 20 MG capsule TAKE 1 CAPSULE EVERY DAY 90 capsule 3    oxybutynin (DITROPAN) 5 MG Tab TAKE 1 TABLET EVERY DAY 90 tablet 1    oxyCODONE-acetaminophen (PERCOCET)  mg per tablet Take 1 tablet by mouth every 4 (four)  hours as needed for Pain. 30 tablet 0    pramipexole (MIRAPEX) 0.25 MG tablet TAKE 1 TABLET EVERY DAY 90 tablet 3    prednisoLONE acetate (PRED FORTE) 1 % DrpS instill 1 drop in right eye four times a day  0    timolol maleate 0.5% (TIMOPTIC) 0.5 % Drop Place 1 drop into both eyes once daily.  0    warfarin (COUMADIN) 6 MG tablet TAKE 1 TABLET EVERY DAY 90 tablet 3     No current facility-administered medications for this visit.        Review of patient's allergies indicates:   Allergen Reactions    Penicillins     Sulfa (sulfonamide antibiotics)     Compazine [prochlorperazine edisylate] Anxiety         Review of Systems   Constitution: Negative for chills, fever, weakness and malaise/fatigue.   HENT: Negative for congestion.    Cardiovascular: Negative for chest pain, claudication and leg swelling.   Respiratory: Negative for cough and shortness of breath.    Skin: Negative for flushing, itching, poor wound healing and rash.   Musculoskeletal: Negative for back pain, joint pain, muscle cramps and muscle weakness.   Gastrointestinal: Negative for nausea and vomiting.   Neurological: Negative for numbness and paresthesias.   Psychiatric/Behavioral: Negative for altered mental status.           Objective:      Physical Exam   Constitutional: She is oriented to person, place, and time. She appears well-developed and well-nourished. No distress.   Cardiovascular: Intact distal pulses.   Pulses:       Dorsalis pedis pulses are 2+ on the right side, and 2+ on the left side.        Posterior tibial pulses are 2+ on the right side, and 2+ on the left side.   CFT< 3 secs all toes bilateral foot, skin temp warm bilateral foot, no hair growth bilateral lower extremity, mild lower extremity edema with telangiectasias and varicosities bilateral.     Musculoskeletal:   Mild localized pain and edema left foot surgical site. Left hallux well aligned.     Left foot achieves 0 deg DF with knee extended and + 10 deg with knee  flexed. Right foot achieves 0 deg DF knee extended and 10 deg DF knee flexed.    Mild collapse of the arch with standing bilateral foot.   Neurological: She is alert and oriented to person, place, and time. She has normal strength. No sensory deficit.   Skin: Skin is warm, dry and intact. Capillary refill takes less than 2 seconds. No ecchymosis and no rash noted. She is not diaphoretic. No cyanosis or erythema. No pallor. Nails show no clubbing.   Incision medial midfoot and dorsal medial 1 MTP left foot well approximated with sutures, no soi, no gapping, no drainage.     Left leg incision well approximated with sutures, no soi.               Assessment:       Encounter Diagnosis   Name Primary?    Postoperative state Yes         Plan:       Mis was seen today for follow-up.    Diagnoses and all orders for this visit:    Postoperative state      I counseled the patient on her conditions, their implications and medical management.    Dressing removed left lower extremity and cleansed with hibiclens. Applied mepilex Ag to incisions followed by sina [facility].Home care instructions reviewed in detail.    May shower but not submerse into water.    Demonstrated proper application of boot.    NWB x 2 weeks followed by PWB with crutches or walker x 1 week with eventual full weightbearing to tolerance using boot.    RTC 3 weeks or prn.      .

## 2018-11-12 ENCOUNTER — TELEPHONE (OUTPATIENT)
Dept: CARDIOLOGY | Facility: CLINIC | Age: 78
End: 2018-11-12

## 2018-11-12 RX ORDER — ATORVASTATIN CALCIUM 40 MG/1
40 TABLET, FILM COATED ORAL DAILY
Qty: 90 TABLET | Refills: 5 | Status: SHIPPED | OUTPATIENT
Start: 2018-11-12 | End: 2019-04-29 | Stop reason: SDUPTHER

## 2018-11-12 RX ORDER — ATORVASTATIN CALCIUM 40 MG/1
40 TABLET, FILM COATED ORAL DAILY
Qty: 90 TABLET | Refills: 5 | Status: SHIPPED | OUTPATIENT
Start: 2018-11-12 | End: 2018-11-12 | Stop reason: SDUPTHER

## 2018-11-12 NOTE — TELEPHONE ENCOUNTER
----- Message from Catynikki Jones sent at 11/12/2018 10:27 AM CST -----  [phone]  Called in requesting ok to fill Lovastatin 40 mg and Diltiazem 240 mg due to drug interaction. Reference # [id number]. Please advise.

## 2018-11-14 ENCOUNTER — TELEPHONE (OUTPATIENT)
Dept: PODIATRY | Facility: CLINIC | Age: 78
End: 2018-11-14

## 2018-11-14 ENCOUNTER — CLINICAL SUPPORT (OUTPATIENT)
Dept: FAMILY MEDICINE | Facility: CLINIC | Age: 78
End: 2018-11-14
Payer: MEDICARE

## 2018-11-14 DIAGNOSIS — I48.91 ATRIAL FIBRILLATION WITH RVR: Primary | ICD-10-CM

## 2018-11-14 PROCEDURE — 85610 PROTHROMBIN TIME: CPT | Mod: QW,,, | Performed by: FAMILY MEDICINE

## 2018-11-14 NOTE — TELEPHONE ENCOUNTER
Spoke with pt and advised her that she is NWB for 2wks and the after that she can PWD using clutches or a walker then eventually use boot on weightbearing..Pt understood this and advised she will comply

## 2018-11-14 NOTE — TELEPHONE ENCOUNTER
----- Message from Elle Dixon sent at 11/14/2018  3:01 PM CST -----  Contact: [phone]/self  Pt called wants to know is she can put pressure on her foot with a boot next week . Please advise

## 2018-11-15 LAB — INR PPP: 1.8 (ref 2–3)

## 2018-11-30 ENCOUNTER — HOSPITAL ENCOUNTER (OUTPATIENT)
Dept: RADIOLOGY | Facility: HOSPITAL | Age: 78
Discharge: HOME | End: 2018-11-30
Attending: PODIATRIST
Payer: MEDICARE

## 2018-11-30 ENCOUNTER — OFFICE VISIT (OUTPATIENT)
Dept: PODIATRY | Facility: CLINIC | Age: 78
End: 2018-11-30
Payer: MEDICARE

## 2018-11-30 VITALS
HEIGHT: 62 IN | WEIGHT: 199 LBS | DIASTOLIC BLOOD PRESSURE: 72 MMHG | SYSTOLIC BLOOD PRESSURE: 138 MMHG | HEART RATE: 71 BPM | BODY MASS INDEX: 36.62 KG/M2

## 2018-11-30 DIAGNOSIS — Z98.890 POSTOPERATIVE STATE: ICD-10-CM

## 2018-11-30 DIAGNOSIS — Z98.890 POSTOPERATIVE STATE: Primary | ICD-10-CM

## 2018-11-30 PROCEDURE — 73630 X-RAY EXAM OF FOOT: CPT | Mod: TC,FY,HCWC,LT

## 2018-11-30 PROCEDURE — 73630 X-RAY EXAM OF FOOT: CPT | Mod: 26,HCWC,LT, | Performed by: RADIOLOGY

## 2018-11-30 PROCEDURE — 99024 POSTOP FOLLOW-UP VISIT: CPT | Mod: HCWC,S$GLB,, | Performed by: PODIATRIST

## 2018-11-30 PROCEDURE — 99999 PR PBB SHADOW E&M-EST. PATIENT-LVL III: CPT | Mod: PBBFAC,HCWC,, | Performed by: PODIATRIST

## 2018-11-30 RX ORDER — CHOLECALCIFEROL (VITAMIN D3) 1250 MCG
50000 CAPSULE ORAL
Refills: 3 | COMMUNITY
Start: 2018-11-07 | End: 2019-04-04 | Stop reason: SDUPTHER

## 2018-11-30 NOTE — PATIENT INSTRUCTIONS
Transition to full weightbearing with orthopedic boot within 1-2 weeks to tolerance. You can start out partial weightbearing with a walker.

## 2018-12-02 NOTE — PROGRESS NOTES
"Subjective:      Patient ID: Mis Ca is a 78 y.o. female.    Chief Complaint: Post-op Evaluation (3 wk f/u suture removal)    Complains of a bunion deformity left foot. Pain aggravated by enclosed shoes. Denies trauma. Relates it has been present for many years. Wearing sandals today. Wears toe separators which help only a little.    10/5/18: Returns to review her xray and discuss surgical intervention for her painful bunion left foot. No new complaints.    11/8/18: Post EGR with Lapidus/akin bunionectomy left on 10/31/18. NWB left foot. Using rolling knee walker. Accompanied by sister and neighbor. Pain mild and well controlled.    11/30/18: 4 weeks postop. NWB left foot. Accompanied by her friends. Relates no pain today but gets intermittent aching to the foot.    Vitals:    11/30/18 1635   BP: 138/72   Pulse: 71   Weight: 90.3 kg (199 lb)   Height: 5' 2" (1.575 m)   PainSc: 0-No pain      Past Medical History:   Diagnosis Date    Anxiety     Atrial fibrillation     Blind right eye     Bradycardia     Cancer     skin cancer left side of face under eye    Hyperlipidemia     Hypertension     PVC (premature ventricular contraction)     RLS (restless legs syndrome)     Sleep apnea     Does not use CPAP machine.       Past Surgical History:   Procedure Laterality Date    ADENOIDECTOMY      AKIN OSTEOTOMY Left 10/31/2018    Procedure: OSTEOTOMY, AKIN;  Surgeon: Rudolph Cardozo DPM;  Location: [facility] OR;  Service: Podiatry;  Laterality: Left;    APPENDECTOMY      BREAST BIOPSY Left     times two    BUNIONECTOMY, LAPIDUS Left 10/31/2018    Performed by Rudolph Cardozo DPM at [facility] OR    CATARACT EXTRACTION BILATERAL W/ ANTERIOR VITRECTOMY      ENDOSCOPIC GASTROCNEMIUS RECESSION Left 10/31/2018    Procedure: RECESSION, GASTROCNEMIUS, ENDOSCOPIC;  Surgeon: Rudolph Cardozo DPM;  Location: [facility] OR;  Service: Podiatry;  Laterality: Left;    EYE SURGERY Bilateral     cataracts extraction    " EYE SURGERY Right     drainage tube (glaucoma)    FOOT SURGERY Left     lesion removed from dorsal area    FRACTURE SURGERY Left     arm    HAND TENDON SURGERY Left     HYSTERECTOMY      LAPIDUS BUNIONECTOMY Left 10/31/2018    Procedure: BUNIONECTOMY, LAPIDUS;  Surgeon: Rudolph Cardozo DPM;  Location: [facility] OR;  Service: Podiatry;  Laterality: Left;  mini c-arm, Arthrex locking plate (Lydia notified)    OOPHORECTOMY      ORIF FOREARM FRACTURE Left     OSTEOTOMY, AKIN Left 10/31/2018    Performed by Rud[facility]ozo DPM at [facility] OR    PALATE SURGERY      Lesion removed    RECESSION, GASTROCNEMIUS, ENDOSCOPIC Left 10/31/2018    Performed by [facility] D[facility] at [facility] OR    TONSILLECTOMY         Family History   Problem Relation Age of Onset    Aneurysm Mother         AAA    Cancer Father         lung cancer    Lung cancer Father     Cirrhosis Father     Cancer Sister         Breast    Diabetes Sister     Breast cancer Sister     Hypertension Sister     Hyperlipidemia Sister     Colon polyps Brother     Cancer Brother         lung cancer    Diabetes Brother     Hyperlipidemia Brother     Hypertension Brother     Cancer Brother         bladder cancer    Diabetes Brother     Glaucoma Brother     Heart disease Sister         Heart valve repair    Atrial fibrillation Sister     Diabetes Sister     Heart disease Sister     Heart attack Sister     Bipolar disorder Sister     Depression Sister     Glaucoma Sister     Diabetes Sister     Other Sister         Pituitary tumor    Arthritis Sister     COPD Sister     Heart disease Sister     Kidney disease Sister     Cancer Sister         lung cancer    Diabetes Sister     Lung cancer Sister     Heart attack Sister        Social History     Socioeconomic History    Marital status:      Spouse name: None    Number of children: None    Years of education: None    Highest education level: None   Social Needs     Financial resource strain: None    Food insecurity - worry: None    Food insecurity - inability: None    Transportation needs - medical: None    Transportation needs - non-medical: None   Occupational History    None   Tobacco Use    Smoking status: Never Smoker    Smokeless tobacco: Never Used   Substance and Sexual Activity    Alcohol use: Yes     Comment: Seldomly drinks alcohol (wine)    Drug use: No    Sexual activity: Not Currently     Partners: Male   Other Topics Concern    None   Social History Narrative    None       Current Outpatient Medications   Medication Sig Dispense Refill    acetaminophen (TYLENOL) 500 MG tablet Take 1,000 mg by mouth 2 (two) times daily as needed for Pain.      ALPRAZolam (XANAX) 0.25 MG tablet TAKE 1 TABLET EVERY DAY 90 tablet 1    aspirin (ECOTRIN) 81 MG EC tablet Take 81 mg by mouth once daily.        atorvastatin (LIPITOR) 40 MG tablet Take 1 tablet (40 mg total) by mouth once daily. 90 tablet 5    CARTIA  mg 24 hr capsule TAKE 1 CAPSULE ONCE DAILY. 90 capsule 3    cholecalciferol, vitamin D3, 50,000 unit Tab Take 1 tablet by mouth every 7 days. 4 tablet 3    cyanocobalamin (VITAMIN B-12) 1,000 mcg/mL injection 1 cc IM weekly for 4 weeks, then monthly 10 mL 1    DECARA 50,000 unit capsule Take 50,000 Units by mouth every 7 days.  3    gabapentin (NEURONTIN) 300 MG capsule TAKE 1 CAPSULE TWICE DAILY 180 capsule 3    hydroCHLOROthiazide (MICROZIDE) 12.5 mg capsule Take 1 capsule by mouth once daily. 90 capsule 1    latanoprost 0.005 % ophthalmic solution 1 drop every evening.      losartan (COZAAR) 100 MG tablet TAKE 1/2 TABLET EVERY DAY 45 tablet 3    multivitamin (ONE DAILY MULTIVITAMIN) per tablet Take 1 tablet by mouth once daily.      omeprazole (PRILOSEC) 20 MG capsule TAKE 1 CAPSULE EVERY DAY 90 capsule 3    oxybutynin (DITROPAN) 5 MG Tab TAKE 1 TABLET EVERY DAY 90 tablet 1    oxyCODONE-acetaminophen (PERCOCET)  mg per tablet Take 1  tablet by mouth every 4 (four) hours as needed for Pain. 30 tablet 0    pramipexole (MIRAPEX) 0.25 MG tablet TAKE 1 TABLET EVERY DAY 90 tablet 3    prednisoLONE acetate (PRED FORTE) 1 % DrpS instill 1 drop in right eye four times a day  0    timolol maleate 0.5% (TIMOPTIC) 0.5 % Drop Place 1 drop into both eyes once daily.  0    warfarin (COUMADIN) 6 MG tablet TAKE 1 TABLET EVERY DAY 90 tablet 3     No current facility-administered medications for this visit.        Review of patient's allergies indicates:   Allergen Reactions    Penicillins     Sulfa (sulfonamide antibiotics)     Compazine [prochlorperazine edisylate] Anxiety         Review of Systems   Constitution: Negative for chills, fever, weakness and malaise/fatigue.   HENT: Negative for congestion.    Cardiovascular: Negative for chest pain, claudication and leg swelling.   Respiratory: Negative for cough and shortness of breath.    Skin: Negative for flushing, itching, poor wound healing and rash.   Musculoskeletal: Negative for back pain, joint pain, muscle cramps and muscle weakness.   Gastrointestinal: Negative for nausea and vomiting.   Neurological: Negative for numbness and paresthesias.   Psychiatric/Behavioral: Negative for altered mental status.           Objective:      Physical Exam   Constitutional: She is oriented to person, place, and time. She appears well-developed and well-nourished. No distress.   Cardiovascular: Intact distal pulses.   Pulses:       Dorsalis pedis pulses are 2+ on the right side, and 2+ on the left side.        Posterior tibial pulses are 2+ on the right side, and 2+ on the left side.   CFT< 3 secs all toes bilateral foot, skin temp warm bilateral foot, no hair growth bilateral lower extremity, mild lower extremity edema with telangiectasias and varicosities bilateral.     Musculoskeletal:   Mild localized edema left foot surgical site. Left hallux well aligned. 1 MTP left achieves 30 deg DF.    Left foot achieves  0 deg DF with knee extended and + 10 deg with knee flexed. Right foot achieves 0 deg DF knee extended and 10 deg DF knee flexed.    Mild collapse of the arch with standing bilateral foot.   Neurological: She is alert and oriented to person, place, and time. She has normal strength. No sensory deficit.   Skin: Skin is warm, dry and intact. Capillary refill takes less than 2 seconds. No ecchymosis and no rash noted. She is not diaphoretic. No cyanosis or erythema. No pallor. Nails show no clubbing.   Incision medial midfoot and dorsal medial 1 MTP left foot healed with sutures, no soi, no gapping, no drainage.     Left leg incision healed with sutures, no soi.               Assessment:       Encounter Diagnosis   Name Primary?    Postoperative state Yes         Plan:       Mis was seen today for post-op evaluation.    Diagnoses and all orders for this visit:    Postoperative state  -     X-Ray Foot Complete Left; Future  -     X-Ray Foot Complete Left; Future      I counseled the patient on her conditions, their implications and medical management.    Sutures removed.     Patient did not follow previous instructions.    PWB left with boot x 1 week followed by full weightbearing with boot to tolerance.    1 MTP ROM exercises daily reviewed.    Sutures removed.    RTC 4 weeks or prn. Initiate PT at that time to allow for further bony consolidation in interm.     .

## 2018-12-03 ENCOUNTER — TELEPHONE (OUTPATIENT)
Dept: PODIATRY | Facility: CLINIC | Age: 78
End: 2018-12-03

## 2018-12-03 ENCOUNTER — PATIENT MESSAGE (OUTPATIENT)
Dept: PODIATRY | Facility: CLINIC | Age: 78
End: 2018-12-03

## 2018-12-03 NOTE — TELEPHONE ENCOUNTER
----- Message from Ana Vasquez sent at 12/3/2018  4:26 PM CST -----  Contact: [phone] /self  Patient called in returning your call. Please call cell phone.

## 2018-12-03 NOTE — TELEPHONE ENCOUNTER
Spoke with pt and she stated she did have the boot on when she heard a pop.. I advised the pt to rest ice and elevate foot until the appt with Dr. Cardozo for Friday.. Pt stated she been doing this since yesterday and will continue to do so

## 2018-12-03 NOTE — TELEPHONE ENCOUNTER
----- Message from Linda Lane sent at 12/3/2018 10:07 AM CST -----  Contact: Self [phone]  Patient is calling to talk to nurse in regards to her foot is swollen since she stepped on something and felt like her knee popped. Please advice

## 2018-12-04 NOTE — TELEPHONE ENCOUNTER
Patient was called per staff and appt given 12/7/18. She was instructed to rest, ice and elevate and use the orthopedic boot at all times. It is not clear if she stepped down with the boot on or off.

## 2018-12-07 ENCOUNTER — HOSPITAL ENCOUNTER (OUTPATIENT)
Dept: RADIOLOGY | Facility: HOSPITAL | Age: 78
Discharge: HOME | End: 2018-12-07
Attending: PODIATRIST
Payer: MEDICARE

## 2018-12-07 ENCOUNTER — OFFICE VISIT (OUTPATIENT)
Dept: PODIATRY | Facility: CLINIC | Age: 78
End: 2018-12-07
Payer: MEDICARE

## 2018-12-07 VITALS
SYSTOLIC BLOOD PRESSURE: 123 MMHG | BODY MASS INDEX: 36.62 KG/M2 | TEMPERATURE: 98 F | HEIGHT: 62 IN | HEART RATE: 80 BPM | DIASTOLIC BLOOD PRESSURE: 75 MMHG | WEIGHT: 199 LBS

## 2018-12-07 DIAGNOSIS — G89.18 POSTOPERATIVE PAIN: ICD-10-CM

## 2018-12-07 DIAGNOSIS — G89.18 POSTOPERATIVE PAIN: Primary | ICD-10-CM

## 2018-12-07 PROCEDURE — 73630 X-RAY EXAM OF FOOT: CPT | Mod: TC,HCWC,PN,LT

## 2018-12-07 PROCEDURE — 99024 POSTOP FOLLOW-UP VISIT: CPT | Mod: HCWC,S$GLB,, | Performed by: PODIATRIST

## 2018-12-07 PROCEDURE — 99999 PR PBB SHADOW E&M-EST. PATIENT-LVL IV: CPT | Mod: PBBFAC,HCWC,, | Performed by: PODIATRIST

## 2018-12-07 PROCEDURE — 73630 X-RAY EXAM OF FOOT: CPT | Mod: 26,HCWC,LT, | Performed by: RADIOLOGY

## 2018-12-07 NOTE — TELEPHONE ENCOUNTER
----- Message from Elle Dixon sent at 12/7/2018 10:28 AM CST -----  Contact: [phone]  Pt requesting a refill on rx xanax sent to [facility] pharmacy  . Please advise

## 2018-12-08 RX ORDER — ALPRAZOLAM 0.25 MG/1
0.25 TABLET ORAL DAILY
Qty: 90 TABLET | Refills: 1 | Status: SHIPPED | OUTPATIENT
Start: 2018-12-08 | End: 2019-01-31 | Stop reason: SDUPTHER

## 2018-12-09 NOTE — PROGRESS NOTES
"Subjective:      Patient ID: Mis Ca is a 78 y.o. female.    Chief Complaint: Post-op Evaluation (left foot )    Complains of a bunion deformity left foot. Pain aggravated by enclosed shoes. Denies trauma. Relates it has been present for many years. Wearing sandals today. Wears toe separators which help only a little.    10/5/18: Returns to review her xray and discuss surgical intervention for her painful bunion left foot. No new complaints.    11/8/18: Post EGR with Lapidus/akin bunionectomy left on 10/31/18. NWB left foot. Using rolling knee walker. Accompanied by sister and neighbor. Pain mild and well controlled.    11/30/18: 4 weeks postop. NWB left foot. Accompanied by her friends. Relates no pain today but gets intermittent aching to the foot.    12/7/18:  5 weeks postop. Recalls pain and a pop in her left leg a few days ago when putting weight down on the left foot. She has kept the boot on, rested and elevated. Pain improved since.        Vitals:    12/07/18 1535   BP: 123/75   Pulse: 80   Temp: 98 °F (36.7 °C)   TempSrc: Oral   Weight: 90.3 kg (199 lb)   Height: 5' 2" (1.575 m)   PainSc:   2      Past Medical History:   Diagnosis Date    Anxiety     Atrial fibrillation     Blind right eye     Bradycardia     Cancer     skin cancer left side of face under eye    Hyperlipidemia     Hypertension     PVC (premature ventricular contraction)     RLS (restless legs syndrome)     Sleep apnea     Does not use CPAP machine.       Past Surgical History:   Procedure Laterality Date    ADENOIDECTOMY      AKIN OSTEOTOMY Left 10/31/2018    Procedure: OSTEOTOMY, AKIN;  Surgeon: Rudolph Cardozo DPM;  Location: [facility] OR;  Service: Podiatry;  Laterality: Left;    APPENDECTOMY      BREAST BIOPSY Left     times two    BUNIONECTOMY, LAPIDUS Left 10/31/2018    Performed by Rudolph Cardozo DPM at [facility] OR    CATARACT EXTRACTION BILATERAL W/ ANTERIOR VITRECTOMY      ENDOSCOPIC GASTROCNEMIUS " RECESSION Left 10/31/2018    Procedure: RECESSION, GASTROCNEMIUS, ENDOSCOPIC;  Surgeon: Rudolph Cardozo DPM;  Location: [facility] OR;  Service: Podiatry;  Laterality: Left;    EYE SURGERY Bilateral     cataracts extraction    EYE SURGERY Right     drainage tube (glaucoma)    FOOT SURGERY Left     lesion removed from dorsal area    FRACTURE SURGERY Left     arm    HAND TENDON SURGERY Left     HYSTERECTOMY      LAPIDUS BUNIONECTOMY Left 10/31/2018    Procedure: BUNIONECTOMY, LAPIDUS;  Surgeon: Rudolph Cardozo DPM;  Location: [facility] OR;  Service: Podiatry;  Laterality: Left;  mini c-arm, Arthrex locking plate (Lydia notified)    OOPHORECTOMY      ORIF FOREARM FRACTURE Left     OSTEOTOMY, AKIN Left 10/31/2018    Performed by Rudolph Cardozo DPM at [facility]    PALATE SURGERY      Lesion removed    RECESSION, GASTROCNEMIUS, ENDOSCOPIC Left 10/31/2018    Performed by Rudolph Cardozo DPM at [facility]    TONSILLECTOMY         Family History   Problem Relation Age of Onset    Aneurysm Mother         AAA    Cancer Father         lung cancer    Lung cancer Father     Cirrhosis Father     Cancer Sister         Breast    Diabetes Sister     Breast cancer Sister     Hypertension Sister     Hyperlipidemia Sister     Colon polyps Brother     Cancer Brother         lung cancer    Diabetes Brother     Hyperlipidemia Brother     Hypertension Brother     Cancer Brother         bladder cancer    Diabetes Brother     Glaucoma Brother     Heart disease Sister         Heart valve repair    Atrial fibrillation Sister     Diabetes Sister     Heart disease Sister     Heart attack Sister     Bipolar disorder Sister     Depression Sister     Glaucoma Sister     Diabetes Sister     Other Sister         Pituitary tumor    Arthritis Sister     COPD Sister     Heart disease Sister     Kidney disease Sister     Cancer Sister         lung cancer    Diabetes Sister     Lung cancer Sister     Heart  attack Sister        Social History     Socioeconomic History    Marital status:      Spouse name: None    Number of children: None    Years of education: None    Highest education level: None   Social Needs    Financial resource strain: None    Food insecurity - worry: None    Food insecurity - inability: None    Transportation needs - medical: None    Transportation needs - non-medical: None   Occupational History    None   Tobacco Use    Smoking status: Never Smoker    Smokeless tobacco: Never Used   Substance and Sexual Activity    Alcohol use: Yes     Comment: Seldomly drinks alcohol (wine)    Drug use: No    Sexual activity: Not Currently     Partners: Male   Other Topics Concern    None   Social History Narrative    None       Current Outpatient Medications   Medication Sig Dispense Refill    acetaminophen (TYLENOL) 500 MG tablet Take 1,000 mg by mouth 2 (two) times daily as needed for Pain.      aspirin (ECOTRIN) 81 MG EC tablet Take 81 mg by mouth once daily.        atorvastatin (LIPITOR) 40 MG tablet Take 1 tablet (40 mg total) by mouth once daily. 90 tablet 5    CARTIA  mg 24 hr capsule TAKE 1 CAPSULE ONCE DAILY. 90 capsule 3    cholecalciferol, vitamin D3, 50,000 unit Tab Take 1 tablet by mouth every 7 days. 4 tablet 3    cyanocobalamin (VITAMIN B-12) 1,000 mcg/mL injection 1 cc IM weekly for 4 weeks, then monthly 10 mL 1    DECARA 50,000 unit capsule Take 50,000 Units by mouth every 7 days.  3    gabapentin (NEURONTIN) 300 MG capsule TAKE 1 CAPSULE TWICE DAILY 180 capsule 3    hydroCHLOROthiazide (MICROZIDE) 12.5 mg capsule Take 1 capsule by mouth once daily. 90 capsule 1    latanoprost 0.005 % ophthalmic solution 1 drop every evening.      losartan (COZAAR) 100 MG tablet TAKE 1/2 TABLET EVERY DAY 45 tablet 3    multivitamin (ONE DAILY MULTIVITAMIN) per tablet Take 1 tablet by mouth once daily.      omeprazole (PRILOSEC) 20 MG capsule TAKE 1 CAPSULE EVERY DAY  90 capsule 3    oxybutynin (DITROPAN) 5 MG Tab TAKE 1 TABLET EVERY DAY 90 tablet 1    oxyCODONE-acetaminophen (PERCOCET)  mg per tablet Take 1 tablet by mouth every 4 (four) hours as needed for Pain. 30 tablet 0    pramipexole (MIRAPEX) 0.25 MG tablet TAKE 1 TABLET EVERY DAY 90 tablet 3    prednisoLONE acetate (PRED FORTE) 1 % DrpS instill 1 drop in right eye four times a day  0    timolol maleate 0.5% (TIMOPTIC) 0.5 % Drop Place 1 drop into both eyes once daily.  0    warfarin (COUMADIN) 6 MG tablet TAKE 1 TABLET EVERY DAY 90 tablet 3    ALPRAZolam (XANAX) 0.25 MG tablet Take 1 tablet (0.25 mg total) by mouth once daily. 90 tablet 1     No current facility-administered medications for this visit.        Review of patient's allergies indicates:   Allergen Reactions    Penicillins     Sulfa (sulfonamide antibiotics)     Compazine [prochlorperazine edisylate] Anxiety         Review of Systems   Constitution: Negative for chills, fever, weakness and malaise/fatigue.   HENT: Negative for congestion.    Cardiovascular: Negative for chest pain, claudication and leg swelling.   Respiratory: Negative for cough and shortness of breath.    Skin: Negative for flushing, itching, poor wound healing and rash.   Musculoskeletal: Negative for back pain, joint pain, muscle cramps and muscle weakness.   Gastrointestinal: Negative for nausea and vomiting.   Neurological: Negative for numbness and paresthesias.   Psychiatric/Behavioral: Negative for altered mental status.           Objective:      Physical Exam   Constitutional: She is oriented to person, place, and time. She appears well-developed and well-nourished. No distress.   Cardiovascular: Intact distal pulses.   Pulses:       Dorsalis pedis pulses are 2+ on the right side, and 2+ on the left side.        Posterior tibial pulses are 2+ on the right side, and 2+ on the left side.   CFT< 3 secs all toes bilateral foot, skin temp warm bilateral foot, no hair  growth bilateral lower extremity, mild lower extremity edema with telangiectasias and varicosities bilateral.     Musculoskeletal:   Mild localized edema left foot surgical site. Left hallux well aligned. 1 MTP left achieves > 30 deg DF.    Left foot achieves 0 deg DF with knee extended and + 10 deg with knee flexed. Right foot achieves 0 deg DF knee extended and 10 deg DF knee flexed.    Localized pain of posterior mid portion of the leg overlying the gastrocnemius recession surgical site, no discoloration, no significant edema, mild gapping palpated, slight pain with active resisted PF foot. No pain overlying the achilles tendon or defect palpated.    Mild collapse of the arch with standing bilateral foot.   Neurological: She is alert and oriented to person, place, and time. She has normal strength. No sensory deficit.   Skin: Skin is warm, dry and intact. Capillary refill takes less than 2 seconds. No ecchymosis and no rash noted. She is not diaphoretic. No cyanosis or erythema. No pallor. Nails show no clubbing.   Incision medial midfoot and dorsal medial 1 MTP left foot healed with sutures, no soi, no gapping, no drainage.     Left leg incision healed with sutures, no soi.               Assessment:       Encounter Diagnosis   Name Primary?    Postoperative pain Yes         Plan:       Mis was seen today for post-op evaluation.    Diagnoses and all orders for this visit:    Postoperative pain  -     X-Ray Foot Complete Left; Future      I counseled the patient on her conditions, their implications and medical management.    Demonstrated proper application of the tall orthopedic boot and pateint related no pain with ambulation.    RTC as scheduled.    .

## 2018-12-10 LAB
LEFT EYE DM RETINOPATHY: NEGATIVE
RIGHT EYE DM RETINOPATHY: NEGATIVE

## 2018-12-12 ENCOUNTER — CLINICAL SUPPORT (OUTPATIENT)
Dept: FAMILY MEDICINE | Facility: CLINIC | Age: 78
End: 2018-12-12
Payer: MEDICARE

## 2018-12-12 DIAGNOSIS — D64.9 ANEMIA, UNSPECIFIED TYPE: ICD-10-CM

## 2018-12-12 DIAGNOSIS — Z79.01 ANTICOAGULATED ON COUMADIN: ICD-10-CM

## 2018-12-12 DIAGNOSIS — I48.91 ATRIAL FIBRILLATION WITH RVR: Primary | ICD-10-CM

## 2018-12-12 DIAGNOSIS — I48.0 PAROXYSMAL ATRIAL FIBRILLATION: ICD-10-CM

## 2018-12-12 LAB — INR PPP: 2.3 (ref 2–3)

## 2018-12-12 PROCEDURE — 85610 PROTHROMBIN TIME: CPT | Mod: QW,,, | Performed by: FAMILY MEDICINE

## 2018-12-12 PROCEDURE — 96372 THER/PROPH/DIAG INJ SC/IM: CPT | Mod: S$GLB,,, | Performed by: FAMILY MEDICINE

## 2018-12-12 RX ORDER — CYANOCOBALAMIN 1000 UG/ML
1000 INJECTION, SOLUTION INTRAMUSCULAR; SUBCUTANEOUS ONCE
Status: COMPLETED | OUTPATIENT
Start: 2018-12-12 | End: 2018-12-12

## 2018-12-12 RX ADMIN — CYANOCOBALAMIN 1000 MCG: 1000 INJECTION, SOLUTION INTRAMUSCULAR; SUBCUTANEOUS at 09:12

## 2018-12-12 NOTE — PROGRESS NOTES
Pt here for b12 injection. Right dose, right route, right medication all verified. 2 pt identifier used. Pt supplied medication. Instructed to wait 15 mins following injection. Given ULG.          Patient is here for INR check  Patient is currently taking 6mg of coumadin everyday but on Fridays takes 9mg of coumadin  Patient denies any changes in diet, pt denies any missed doses, and pt denies any signs of bleeding.  INR is 2.3  Patient was instructed to take same dose of coumadin.  Patient was instructed to return to the clinic in 1 month to recheck INR.    Lab Results       Component                Value               Date                       INR                      1.8                 11/15/2018                 INR                      1.2                 11/09/2018                 INR                      1.0                 10/31/2018                 INR                      2.3                 10/19/2018                 INR                      2.0                 09/25/2018

## 2018-12-28 ENCOUNTER — HOSPITAL ENCOUNTER (OUTPATIENT)
Dept: RADIOLOGY | Facility: HOSPITAL | Age: 78
Discharge: HOME | End: 2018-12-28
Attending: PODIATRIST
Payer: MEDICARE

## 2018-12-28 ENCOUNTER — OFFICE VISIT (OUTPATIENT)
Dept: PODIATRY | Facility: CLINIC | Age: 78
End: 2018-12-28
Payer: MEDICARE

## 2018-12-28 VITALS — HEIGHT: 62 IN | WEIGHT: 199 LBS | BODY MASS INDEX: 36.62 KG/M2

## 2018-12-28 DIAGNOSIS — M62.81 MUSCLE WEAKNESS OF LOWER EXTREMITY: ICD-10-CM

## 2018-12-28 DIAGNOSIS — Z98.890 POSTOPERATIVE STATE: Primary | ICD-10-CM

## 2018-12-28 DIAGNOSIS — Z98.890 POSTOPERATIVE STATE: ICD-10-CM

## 2018-12-28 PROCEDURE — 99024 POSTOP FOLLOW-UP VISIT: CPT | Mod: S$GLB,,, | Performed by: PODIATRIST

## 2018-12-28 PROCEDURE — 73630 X-RAY EXAM OF FOOT: CPT | Mod: TC,PN,LT

## 2018-12-28 PROCEDURE — 99999 PR PBB SHADOW E&M-EST. PATIENT-LVL V: CPT | Mod: PBBFAC,,, | Performed by: PODIATRIST

## 2018-12-28 PROCEDURE — 73630 X-RAY EXAM OF FOOT: CPT | Mod: 26,LT,, | Performed by: RADIOLOGY

## 2018-12-28 NOTE — PATIENT INSTRUCTIONS
Transition from orthopedic boot to tennis shoe with gradual 1 hour daily increments as discussed. Patient may gradually increase activity as discussed

## 2018-12-30 NOTE — PROGRESS NOTES
"Subjective:      Patient ID: Mis Ca is a 78 y.o. female.    Chief Complaint: Follow-up (left foot)    Complains of a bunion deformity left foot. Pain aggravated by enclosed shoes. Denies trauma. Relates it has been present for many years. Wearing sandals today. Wears toe separators which help only a little.    10/5/18: Returns to review her xray and discuss surgical intervention for her painful bunion left foot. No new complaints.    11/8/18: Post EGR with Lapidus/akin bunionectomy left on 10/31/18. NWB left foot. Using rolling knee walker. Accompanied by sister and neighbor. Pain mild and well controlled.    11/30/18: 4 weeks postop. NWB left foot. Accompanied by her friends. Relates no pain today but gets intermittent aching to the foot.    12/7/18:  5 weeks postop. Recalls pain and a pop in her left leg a few days ago when putting weight down on the left foot. She has kept the boot on, rested and elevated. Pain improved since.    12/28/18: 8 weeks postop. No pain. Ambulating well with orthopedic boot.        Vitals:    12/28/18 1053   Weight: 90.3 kg (199 lb)   Height: 5' 2" (1.575 m)   PainSc: 0-No pain      Past Medical History:   Diagnosis Date    Anxiety     Atrial fibrillation     Blind right eye     Bradycardia     Cancer     skin cancer left side of face under eye    Hyperlipidemia     Hypertension     PVC (premature ventricular contraction)     RLS (restless legs syndrome)     Sleep apnea     Does not use CPAP machine.       Past Surgical History:   Procedure Laterality Date    ADENOIDECTOMY      APPENDECTOMY      BREAST BIOPSY Left     times two    BUNIONECTOMY, LAPIDUS Left 10/31/2018    [facility] at [facility]    CATARACT EXTRACTION BILATERAL W/ ANTERIOR VITRECTOMY      EYE SURGERY Bilateral     cataracts extraction    EYE SURGERY Right     drainage tube (glaucoma)    FOOT SURGERY Left     lesion removed from dorsal area    FRACTURE SURGERY Left     " arm    HAND TENDON SURGERY Left     HYSTERECTOMY      OOPHORECTOMY      ORIF FOREARM FRACTURE Left     OSTEOTOMY, AKIN Left 10/31/2018    [facility]    PALATE SURGERY      Lesion removed    RECESSION, GASTROCNEMIUS, ENDOSCOPIC Left 10/31/2018    [facility] at [facility]    TONSILLECTOMY         Family History   Problem Relation Age of Onset    Aneurysm Mother         AAA    Cancer Father         lung cancer    Lung cancer Father     Cirrhosis Father     Cancer Sister         Breast    Diabetes Sister     Breast cancer Sister     Hypertension Sister     Hyperlipidemia Sister     Colon polyps Brother     Cancer Brother         lung cancer    Diabetes Brother     Hyperlipidemia Brother     Hypertension Brother     Cancer Brother         bladder cancer    Diabetes Brother     Glaucoma Brother     Heart disease Sister         Heart valve repair    Atrial fibrillation Sister     Diabetes Sister     Heart disease Sister     Heart attack Sister     Bipolar disorder Sister     Depression Sister     Glaucoma Sister     Diabetes Sister     Other Sister         Pituitary tumor    Arthritis Sister     COPD Sister     Heart disease Sister     Kidney disease Sister     Cancer Sister         lung cancer    Diabetes Sister     Lung cancer Sister     Heart attack Sister        Social History     Socioeconomic History    Marital status:      Spouse name: Not on file    Number of children: Not on file    Years of education: Not on file    Highest education level: Not on file   Social Needs    Financial resource strain: Not on file    Food insecurity - worry: Not on file    Food insecurity - inability: Not on file    Transportation needs - medical: Not on file    Transportation needs - non-medical: Not on file   Occupational History    Not on file   Tobacco Use    Smoking status: Never Smoker    Smokeless tobacco:  Never Used   Substance and Sexual Activity    Alcohol use: Yes     Comment: Seldomly drinks alcohol (wine)    Drug use: No    Sexual activity: Not Currently     Partners: Male   Other Topics Concern    Not on file   Social History Narrative    Not on file       Current Outpatient Medications   Medication Sig Dispense Refill    acetaminophen (TYLENOL) 500 MG tablet Take 1,000 mg by mouth 2 (two) times daily as needed for Pain.      ALPRAZolam (XANAX) 0.25 MG tablet Take 1 tablet (0.25 mg total) by mouth once daily. 90 tablet 1    aspirin (ECOTRIN) 81 MG EC tablet Take 81 mg by mouth once daily.        atorvastatin (LIPITOR) 40 MG tablet Take 1 tablet (40 mg total) by mouth once daily. 90 tablet 5    CARTIA  mg 24 hr capsule TAKE 1 CAPSULE ONCE DAILY. 90 capsule 3    cholecalciferol, vitamin D3, 50,000 unit Tab Take 1 tablet by mouth every 7 days. 4 tablet 3    cyanocobalamin (VITAMIN B-12) 1,000 mcg/mL injection 1 cc IM weekly for 4 weeks, then monthly 10 mL 1    DECARA 50,000 unit capsule Take 50,000 Units by mouth every 7 days.  3    gabapentin (NEURONTIN) 300 MG capsule TAKE 1 CAPSULE TWICE DAILY 180 capsule 3    hydroCHLOROthiazide (MICROZIDE) 12.5 mg capsule Take 1 capsule by mouth once daily. 90 capsule 1    latanoprost 0.005 % ophthalmic solution 1 drop every evening.      losartan (COZAAR) 100 MG tablet TAKE 1/2 TABLET EVERY DAY 45 tablet 3    multivitamin (ONE DAILY MULTIVITAMIN) per tablet Take 1 tablet by mouth once daily.      omeprazole (PRILOSEC) 20 MG capsule TAKE 1 CAPSULE EVERY DAY 90 capsule 3    oxybutynin (DITROPAN) 5 MG Tab TAKE 1 TABLET EVERY DAY 90 tablet 1    oxyCODONE-acetaminophen (PERCOCET)  mg per tablet Take 1 tablet by mouth every 4 (four) hours as needed for Pain. 30 tablet 0    pramipexole (MIRAPEX) 0.25 MG tablet TAKE 1 TABLET EVERY DAY 90 tablet 3    prednisoLONE acetate (PRED FORTE) 1 % DrpS instill 1 drop in right eye four times a day  0     timolol maleate 0.5% (TIMOPTIC) 0.5 % Drop Place 1 drop into both eyes once daily.  0    warfarin (COUMADIN) 6 MG tablet TAKE 1 TABLET EVERY DAY 90 tablet 3     No current facility-administered medications for this visit.        Review of patient's allergies indicates:   Allergen Reactions    Penicillins     Sulfa (sulfonamide antibiotics)     Compazine [prochlorperazine edisylate] Anxiety         Review of Systems   Constitution: Negative for chills, fever, weakness and malaise/fatigue.   HENT: Negative for congestion.    Cardiovascular: Negative for chest pain, claudication and leg swelling.   Respiratory: Negative for cough and shortness of breath.    Skin: Negative for flushing, itching, poor wound healing and rash.   Musculoskeletal: Negative for back pain, joint pain, muscle cramps and muscle weakness.   Gastrointestinal: Negative for nausea and vomiting.   Neurological: Negative for numbness and paresthesias.   Psychiatric/Behavioral: Negative for altered mental status.           Objective:      Physical Exam   Constitutional: She is oriented to person, place, and time. She appears well-developed and well-nourished. No distress.   Cardiovascular: Intact distal pulses.   Pulses:       Dorsalis pedis pulses are 2+ on the right side, and 2+ on the left side.        Posterior tibial pulses are 2+ on the right side, and 2+ on the left side.   CFT< 3 secs all toes bilateral foot, skin temp warm bilateral foot, no hair growth bilateral lower extremity, mild lower extremity edema with telangiectasias and varicosities bilateral.     Musculoskeletal:   No pain on palpation, MMT or ROM left foot. Left hallux well aligned. 1 MTP left achieves > 45 deg DF.    Left foot achieves 5 deg DF with knee extended and > 10 deg with knee flexed. Right foot achieves 0 deg DF knee extended and 10 deg DF knee flexed.    No pain on palpation of the left leg, no pain with active resisted PF foot.    Mild collapse of the arch with  standing bilateral foot.   Neurological: She is alert and oriented to person, place, and time. She has normal strength. No sensory deficit.   Skin: Skin is warm, dry and intact. Capillary refill takes less than 2 seconds. No ecchymosis and no rash noted. She is not diaphoretic. No cyanosis or erythema. No pallor. Nails show no clubbing.   Normal appearing scars left foot and leg.               Assessment:       Encounter Diagnoses   Name Primary?    Postoperative state Yes    Muscle weakness of lower extremity          Plan:       Mis was seen today for follow-up.    Diagnoses and all orders for this visit:    Postoperative state  -     Cancel: X-Ray Foot Complete Right; Future  -     X-Ray Foot Complete Left; Future  -     Ambulatory Referral to Physical/Occupational Therapy    Muscle weakness of lower extremity  -     Ambulatory Referral to Physical/Occupational Therapy      I counseled the patient on her conditions, their implications and medical management.    X-ray reviewed noting maintained alignment, no hardware failure or loosening and consolidation of 1 TMT arthrodesis left foot.    Transition from orthopedic boot to tennis shoe with gradual 1 hour daily increments as discussed. Patient may gradually increase activity as discussed    Refer to PT for lower extremity strengthening and ROM.    RTC 2 months or prn.    .

## 2019-01-08 ENCOUNTER — CLINICAL SUPPORT (OUTPATIENT)
Dept: REHABILITATION | Facility: HOSPITAL | Age: 79
End: 2019-01-08
Attending: PODIATRIST
Payer: MEDICARE

## 2019-01-08 DIAGNOSIS — M79.672 LEFT FOOT PAIN: ICD-10-CM

## 2019-01-08 DIAGNOSIS — R26.89 ANTALGIC GAIT: ICD-10-CM

## 2019-01-08 PROCEDURE — G8979 MOBILITY GOAL STATUS: HCPCS | Mod: CJ,PO,ER

## 2019-01-08 PROCEDURE — 97161 PT EVAL LOW COMPLEX 20 MIN: CPT | Mod: PO,ER

## 2019-01-08 PROCEDURE — G8978 MOBILITY CURRENT STATUS: HCPCS | Mod: CK,PO,ER

## 2019-01-08 PROCEDURE — 97110 THERAPEUTIC EXERCISES: CPT | Mod: PO,ER

## 2019-01-08 PROCEDURE — 97140 MANUAL THERAPY 1/> REGIONS: CPT | Mod: PO,ER

## 2019-01-09 NOTE — PLAN OF CARE
[facility]  Physical Therapy Initial Evaluation    [id number]    Therapy Diagnosis:   Encounter Diagnoses   Name Primary?    Left foot pain     Antalgic gait      Physician: Rudolph Cardozo DPM    Physician Orders: PT Eval and Treat   Medical Diagnosis from Referral:   Z98.890 (ICD-10-CM) - Postoperative state   M62.81 (ICD-10-CM) - Muscle weakness of lower extremity     Evaluation Date: 1/8/2019  Authorization Period Expiration: 12/28/19  Plan of Care Expiration: 3/7/19  Visit # / Visits authorized: 1/     Time In: 1300  Time Out: 1345  Total Billable Time: 45 minutes    Precautions: Standard    Subjective   Date of onset: Oct 31st, bunionectomy on the Left  History of current condition - Mis reports: that she has been transferring to walking in the boot. Pt sometimes uses the walker when she is at home. Pt reports that she will have some pain with walking at times. Pt reports that her foot is still swelling.        Medical History:   Past Medical History:   Diagnosis Date    Anxiety     Atrial fibrillation     Blind right eye     Bradycardia     Cancer     skin cancer left side of face under eye    Hyperlipidemia     Hypertension     PVC (premature ventricular contraction)     RLS (restless legs syndrome)     Sleep apnea     Does not use CPAP machine.     -Transition from orthopedic boot to tennis shoe with gradual 1 hour daily increments as discussed. Patient may gradually increase activity as discussed     11/8/18: Post EGR with Lapidus/akin bunionectomy left on 10/31/18. NWB left foot. Using rolling knee walker. Accompanied by sister and neighbor. Pain mild and well controlled.     11/30/18: 4 weeks postop. NWB left foot. Accompanied by her friends. Relates no pain today but gets intermittent aching to the foot.     12/7/18:  5 weeks postop. Recalls pain and a pop in her left leg a few days ago when putting weight down on the  left foot. She has kept the boot on, rested and elevated. Pain improved since.     12/28/18: 8 weeks postop. No pain. Ambulating well with orthopedic boot.     Surgical History:   Mis Ca  has a past surgical history that includes Hysterectomy; Appendectomy; Cataract extraction bilateral w/ anterior vitrectomy; Breast biopsy (Left); Fracture surgery (Left); ORIF forearm fracture (Left); Adenoidectomy; Tonsillectomy; Eye surgery (Bilateral); Eye surgery (Right); Palate surgery; Foot surgery (Left); Hand tendon surgery (Left); Oophorectomy; lapidus bunionectomy (Left, 10/31/2018); endoscopic gastrocnemius recession (Left, 10/31/2018); and akin osteotomy (Left, 10/31/2018).    Medications:   Mis has a current medication list which includes the following prescription(s): acetaminophen, alprazolam, aspirin, atorvastatin, cartia xt, cholecalciferol (vitamin d3), cyanocobalamin, decara, gabapentin, hydrochlorothiazide, latanoprost, losartan, multivitamin, omeprazole, oxybutynin, oxycodone-acetaminophen, pramipexole, prednisolone acetate, timolol maleate 0.5%, and warfarin.    Allergies:   Review of patient's allergies indicates:   Allergen Reactions    Adhesive     Penicillins     Sulfa (sulfonamide antibiotics)     Compazine [prochlorperazine edisylate] Anxiety        Imaging, :   bone scan filmsEXAMINATION:  XR FOOT COMPLETE 3 VIEW LEFT    CLINICAL HISTORY:  .  Other acute postprocedural pain    TECHNIQUE:  AP, lateral and oblique views of the left foot were performed.    COMPARISON:  11/30/2018    FINDINGS:  First TMT joint fusion with plate and screws.  Hardware intact.  No evidence of loosening.  Alignment is unchanged.  Prior 1st proximal phalanx osteotomy with staple noted.  First MTP joint degenerative change.  No acute fracture or marrow replacement process.  Prior Therapy: none  Social History:  lives with their family  Occupation: retired   Prior Level of Function: indep  Current Level of  "Function: indep    Pain:  Current 0/10, worst 4/10, best 0/10   Location: left feet   Description: Sharp  Aggravating Factors: Standing and Walking  Easing Factors: pain medication and rest    Pts goals:   To get back to walking     Objective     Postural examination in standing and sitting:  Limited stance time on the Left, step to pattern when coming to the R foot.    -  forward head  -  forward shoulders  -  hip high  - shoulder high  - genu valgus/varus  -increased pronation/Supination          Functional assessment:   - walking/gait:  Gait   Slight shift     - sit to stand:   - sit to supine:        - supine to sit:   - supine to prone:      Ankle Girth: (measured in centimeters)   Ankle RLE LLE   Mid malleoli 22 cm 26 cm   Mid foot 21 cm 24 cm   MT heads     Figure 8     Mid calf              Ankle  Right  Left Pain/Dysfunction with Movement    AROM PROM AROM PROM    Plantarflexion 40  28     Dorsiflexion 5  -2     Inversion 25  15     Eversion 12  5     4 sec pitting edema    Flexibility testing:          - gastrocnemius:   DF ankle R tightness present degrees L tightness present degrees                            1st MTP extension: L 28 R 55    Muscle Strength  MMT R L   Knee extension 4/5 4/5   Knee flexion 4-/5 4-/5   Ankle dorsiflexion 3+/5 3/5   Ankle plantar flexion 4-/5 3/5   Ankle inversion 4-/5 3/5   Ankle eversion 3+/5 3+/5     Endurance is fair    Special tests: Ines"s Sign (DVT)  neg                         Squeeze Test (Sendesmosis between fibula and tibia)neg                         Stoner Test (Aquilles Tendon Rupture)  neg    Palpation: tenderness at the 4th MTP on the L foot    Joint mobility: fair    Sensation: Intact  Reflexes: intact          CMS Impairment/Limitation/Restriction for FOTO Foot Survey    Therapist reviewed FOTO scores for Mis Ca on 1/8/2019.   FOTO documents entered into [employer] - see Media section.    Limitation Score: 55%  Category: Mobility    Current : CK = " at least 40% but < 60% impaired, limited or restricted  Goal: CJ = at least 20% but < 40% impaired, limited or restricted  Discharge: CK = at least 40% but < 60% impaired, limited or restricted         TREATMENT   Treatment Time In: 1315  Treatment Time Out: 1340  Total Treatment time separate from Evaluation: 25 minutes    Mis received therapeutic exercises to develop strength and endurance for 25 minutes including:  Date 1/9/2019   Visit 1   FOTO 1   Gcode Visits  1   POC Exp Date 3/8/19   Medicare Charge 140.66   Medicare Total 140.66   Face to Face    Supine  Ankle pumps X 30   Ankle alphabet X 2   Bridges 2 x 10   Mini squats X 10                           Initials [place]         Mis received the following manual therapy techniques: Joint mobilizations were applied to the: L foot for 10 minutes, including:  PROM into all 4 directions at the foot, soft tissue mob for swelling on the L foot    Home Exercises and Patient Education Provided    Education provided:   - Home exercises provided for pt to begin at home along with demonstration/practice  Limiting time in standing  Ice and elevation for the L foot  -Going into step through pattern for ambulation    Written Home Exercises Provided: Patient instructed to cont prior HEP.  Exercises were reviewed and Mis was able to demonstrate them prior to the end of the session.  Mis demonstrated good  understanding of the education provided.     See EMR under Patient Instructions for exercises provided 1/8/2019.    Assessment   Mis is a 78 y.o. female referred to outpatient Physical Therapy with a medical diagnosis of   Z98.890 (ICD-10-CM) - Postoperative state   M62.81 (ICD-10-CM) - Muscle weakness of lower extremity      Pt presents with s/[ bunionectomy on the Left foot. Pt presents with:  -Antalgic gait with step to pattern, trunk lean to the right to get off the Left foot, and minimal stance time on the left foot.  -Limited ROM on the L foot due to  time spent in boot  -Swelling of the L ankle due to coming back to normal walking time      Pt prognosis is Good.   Pt will benefit from skilled outpatient Physical Therapy to address the deficits stated above and in the chart below, provide pt/family education, and to maximize pt's level of independence.     Plan of care discussed with patient: Yes  Pt's spiritual, cultural and educational needs considered and patient is agreeable to the plan of care and goals as stated below:     Anticipated Barriers for therapy:     Medical Necessity is demonstrated by the following  History  Co-morbidities and personal factors that may impact the plan of care Co-morbidities:   advanced age and high BMI    Personal Factors:   age     moderate   Examination  Body Structures and Functions, activity limitations and participation restrictions that may impact the plan of care Body Regions:   lower extremities    Body Systems:    ROM  gait    Participation Restrictions:       Activity limitations:   Learning and applying knowledge  no deficits    General Tasks and Commands  no deficits    Communication  no deficits    Mobility  walking    Self care  no deficits    Domestic Life  no deficits    Interactions/Relationships  no deficits    Life Areas  no deficits    Community and Social Life  no deficits         low   Clinical Presentation stable and uncomplicated low   Decision Making/ Complexity Score: low     Goals:  Short Term Goals:  3 weeks  Increase range of motion at L 1st MTP 25%  Increase strength 1/2 muscle grade on L LE  Be able to perform HEP with minimal cueing required  Improve functional impairment of mobility to regular ambulation with a cane    Long Term Goals: 7 weeks  Increase range of motion to 75% to 100% full at MTP and L ankle  Improve muscle strength 1 muscle grade  Improve muscle strength with MMT to 4-/5 or better  Restore ability to ambulate with normal pain free gait  Walking for ADL and exercise will be  restored without increased pain  Restore ability to stand for ADL without increased pain  Restore ability to perform sit to stand transfer without increased pain  Restore ability to climb stairs in a reciprocal manner with min to 0 increased pain and/or difficulty  Restore normal sleep habits without disturbances due to pain  Restore ability to perform ADL's and household activities independently and without increased pain  Improve FOTO limitation to 10-15% decrease to show quality of life improvement      Plan   Plan of care Certification: 1/8/2019 to 3/8/19    Pt will be treated by physical therapy 1-3 times a week for 6 weeks to include: Therapeutic exercises to increase ROM, strength and stabilization; joint and soft tissue mobilization with manual therapy techniques to decrease muscle tightness, pain and improve joint mobility; neuromuscular re-education to improve balance, coordination, kinesthetic sense and proprioception, therapeutic activities to improve coordination, strength and function, therapeutic taping to decrease pain, provide support and improve function of the LE(s); modalities such as moist heat, ice, ultrasound and electrical stimulation to increase circulation, decrease pain and inflammation; dry needling with manual therapy techniques to decrease pain, inflammation and swelling, increase circulation and promote healing process will be considered and utilized as needed.  Pt may be seen by PTA to carry out plan of care as part of the Rehab team.    I certify the need for these services furnished under this plan of treatment and while under my care.    ____________________________________ Physician/Referring Practitioner                                  Date of Signature      Peter Zambrano DPT

## 2019-01-09 NOTE — PATIENT INSTRUCTIONS
Access Code: [email or web address]   URL: [email or web address]   Date: 01/08/2019   Prepared by: Peter Zambrano     Exercises  Supine Ankle Pumps - 20 reps - 2 sets - 2x daily - 7x weekly  Seated Ankle Alphabet - 1 reps - 2 sets - 1x daily - 7x weekly  Beginner Bridge - 10 reps - 2 sets - 2x daily - 7x weekly

## 2019-01-15 ENCOUNTER — CLINICAL SUPPORT (OUTPATIENT)
Dept: REHABILITATION | Facility: HOSPITAL | Age: 79
End: 2019-01-15
Attending: PODIATRIST
Payer: MEDICARE

## 2019-01-15 DIAGNOSIS — R26.89 ANTALGIC GAIT: ICD-10-CM

## 2019-01-15 DIAGNOSIS — M79.672 LEFT FOOT PAIN: ICD-10-CM

## 2019-01-15 PROCEDURE — 97140 MANUAL THERAPY 1/> REGIONS: CPT | Mod: PO,ER

## 2019-01-15 PROCEDURE — 97110 THERAPEUTIC EXERCISES: CPT | Mod: PO,ER

## 2019-01-15 NOTE — PROGRESS NOTES
Physical Therapy Daily Treatment Note     Name: Mis Ca  Clinic Number: [id number]    Therapy Diagnosis:   Encounter Diagnoses   Name Primary?    Left foot pain     Antalgic gait      Physician: Rudolph Cardozo DPM    Visit Date: 1/15/2019  Physician Orders: PT Eval and Treat   Medical Diagnosis from Referral:   Z98.890 (ICD-10-CM) - Postoperative state   M62.81 (ICD-10-CM) - Muscle weakness of lower extremity      Evaluation Date: 1/8/2019  Authorization Period Expiration: 12/28/19  Plan of Care Expiration: 3/7/19  Visit # / Visits authorized: 1/          Time In: 1300  Time Out: 1340  Total Billable Time: 30 minutes    Precautions: Standard    Subjective     Pt reports: that she has been wearing compression socks but her ankle is still hurting in places when she pushes off the foot.  She was compliant with home exercise program.  Response to previous treatment: less pain  Functional change: smoother ambulation pattern.    Pain: 3/10  Location: left ankles     Objective     Mis received therapeutic exercises to develop strength and ROM for 30 minutes including:  Date 1/15/19 1/9/2019   Visit 2 1   FOTO 2 1   Gcode Visits  2 1   POC Exp Date 3-8-19 3/8/19   Medicare Charge 57.78 140.66   Medicare Total 198.44 140.66   Face to Face      Supine  Ankle pumps  X 30   Ankle alphabet X 2 X 2   Bridges NT 2 x 10   Mini squats 2 x 10 X 10    BAPS-4 movements  -Rotation     4 x 1 min      4 way ankle  GTB 2 x 20 each way     Ankle circles X 20 each way      Partial forward lunge 2 x 10      Calf raises 3 x 8      Mini squats 3 x 10     Initials [place]       Mis received the following manual therapy techniques: Joint mobilizations were applied to the: L ankle for 10 minutes, including:  Grade 2 mob at the MTP 3rd on 4th joint, for ROM/pain relief        Home Exercises Provided and Patient Education Provided     Education provided:   - Nature of injury  -Making sure the foot feels loose with the new  partial lunge exercises    Written Home Exercises Provided: Patient instructed to cont prior HEP.  Exercises were reviewed and Mis was able to demonstrate them prior to the end of the session.  Mis demonstrated good  understanding of the education provided.     See EMR under Patient Instructions for exercises provided 1/15/2019.    Assessment     Pt still presents with soreness across the dorsal aspect of L foot at the 3rd-4th MTP, but after manual, she did not feel the pain during ambulation. Pt has reduced the step to pattern as well. New exercises added today.      Mis is progressing well towards her goals.   Pt prognosis is Good.     Pt will continue to benefit from skilled outpatient physical therapy to address the deficits listed in the problem list box on initial evaluation, provide pt/family education and to maximize pt's level of independence in the home and community environment.     Pt's spiritual, cultural and educational needs considered and pt agreeable to plan of care and goals.     Anticipated barriers to physical therapy:     Goals:   Goals:   Short Term Goals: 3 weeks   Increase range of motion at L 1st MTP 25%   Increase strength 1/2 muscle grade on L LE   Be able to perform HEP with minimal cueing required   Improve functional impairment of mobility to regular ambulation with a cane     Long Term Goals: 7 weeks   Increase range of motion to 75% to 100% full at MTP and L ankle   Improve muscle strength 1 muscle grade   Improve muscle strength with MMT to 4-/5 or better   Restore ability to ambulate with normal pain free gait   Walking for ADL and exercise will be restored without increased pain   Restore ability to stand for ADL without increased pain   Restore ability to perform sit to stand transfer without increased pain   Restore ability to climb stairs in a reciprocal manner with min to 0 increased pain and/or difficulty   Restore normal sleep habits without disturbances due to  pain   Restore ability to perform ADL's and household activities independently and without increased pain   Improve FOTO limitation to 10-15% decrease to show quality of life improvement       Plan     Cont POC as tolerated. Follow up on referred pain across the dorsal aspect of the foot.    Peter Briscoe, PT

## 2019-01-15 NOTE — PATIENT INSTRUCTIONS
Access Code: [email or web address]   URL: [email or web address]   Date: 01/15/2019   Prepared by: Peter Zambrano     Exercises  Ankle Eversion with Resistance - 10 reps - 1 sets - 1x daily - 7x weekly  Ankle and Toe Plantarflexion with Resistance - 10 reps - 1 sets - 1x daily - 7x weekly  Ankle Dorsiflexion with Resistance - 10 reps - 1 sets - 1x daily - 7x weekly  Ankle Inversion with Resistance - 10 reps - 1 sets - 1x daily - 7x weekly  Forward Lunge with Back Leg Straight and Counter Support - 10 reps - 1 sets - 1x daily - 7x weekly

## 2019-01-22 ENCOUNTER — CLINICAL SUPPORT (OUTPATIENT)
Dept: FAMILY MEDICINE | Facility: CLINIC | Age: 79
End: 2019-01-22
Payer: MEDICARE

## 2019-01-22 ENCOUNTER — CLINICAL SUPPORT (OUTPATIENT)
Dept: REHABILITATION | Facility: HOSPITAL | Age: 79
End: 2019-01-22
Attending: PODIATRIST
Payer: MEDICARE

## 2019-01-22 DIAGNOSIS — I48.91 ATRIAL FIBRILLATION WITH RVR: Primary | ICD-10-CM

## 2019-01-22 DIAGNOSIS — R26.89 ANTALGIC GAIT: ICD-10-CM

## 2019-01-22 DIAGNOSIS — M79.672 LEFT FOOT PAIN: ICD-10-CM

## 2019-01-22 DIAGNOSIS — Z79.01 ANTICOAGULATED ON COUMADIN: ICD-10-CM

## 2019-01-22 DIAGNOSIS — I48.0 PAROXYSMAL ATRIAL FIBRILLATION: ICD-10-CM

## 2019-01-22 DIAGNOSIS — D64.9 ANEMIA, UNSPECIFIED TYPE: ICD-10-CM

## 2019-01-22 LAB — INR PPP: 2.7 (ref 2–3)

## 2019-01-22 PROCEDURE — 97110 THERAPEUTIC EXERCISES: CPT | Mod: PO,ER

## 2019-01-22 PROCEDURE — 96372 THER/PROPH/DIAG INJ SC/IM: CPT | Mod: S$GLB,,, | Performed by: FAMILY MEDICINE

## 2019-01-22 PROCEDURE — 85610 POCT INR: ICD-10-PCS | Mod: QW,,, | Performed by: FAMILY MEDICINE

## 2019-01-22 PROCEDURE — 97140 MANUAL THERAPY 1/> REGIONS: CPT | Mod: PO,ER

## 2019-01-22 PROCEDURE — 96372 PR INJECTION,THERAP/PROPH/DIAG2ST, IM OR SUBCUT: ICD-10-PCS | Mod: S$GLB,,, | Performed by: FAMILY MEDICINE

## 2019-01-22 PROCEDURE — 85610 PROTHROMBIN TIME: CPT | Mod: QW,,, | Performed by: FAMILY MEDICINE

## 2019-01-22 RX ORDER — CYANOCOBALAMIN 1000 UG/ML
1000 INJECTION, SOLUTION INTRAMUSCULAR; SUBCUTANEOUS ONCE
Status: COMPLETED | OUTPATIENT
Start: 2019-01-22 | End: 2019-01-22

## 2019-01-22 RX ADMIN — CYANOCOBALAMIN 1000 MCG: 1000 INJECTION, SOLUTION INTRAMUSCULAR; SUBCUTANEOUS at 09:01

## 2019-01-22 NOTE — PROGRESS NOTES
Physical Therapy Daily Treatment Note     Name: Mis Ca  Clinic Number: [id number]    Therapy Diagnosis:   Encounter Diagnoses   Name Primary?    Left foot pain     Antalgic gait      Physician: Rudolph Cardozo DPM    Visit Date: 1/22/2019  Physician Orders: PT Eval and Treat   Medical Diagnosis from Referral:   Z98.890 (ICD-10-CM) - Postoperative state   M62.81 (ICD-10-CM) - Muscle weakness of lower extremity      Evaluation Date: 1/8/2019  Authorization Period Expiration: 12/28/19  Plan of Care Expiration: 3/7/19  Visit # / Visits authorized: 1/          Time In: 1300  Time Out: 1340  Total Billable Time: 30 minutes    Precautions: Standard    Subjective     Pt reports: that she has been wearing compression socks but her ankle is still hurting in places when she pushes off the foot. Pt has no pain from the surgery.  She was compliant with home exercise program.  Response to previous treatment: less pain  Functional change: smoother ambulation pattern.    Pain: 3/10  Location: left ankles     Objective     Mis received therapeutic exercises to develop strength and ROM for 30 minutes including:  Date 1/22/19 1/15/19 1/9/2019   Visit 3 2 1   FOTO 3 2 1   Gcode Visits  3 2 1   POC Exp Date 3-8-19 3-8-19 3/8/19   Medicare Charge 57.78 57.78 140.66   Medicare Total 256.22 198.44 140.66   Face to Face       Supine  Ankle pumps X 30  X 30   Ankle alphabet NT X 2 X 2   Bridges NT NT 2 x 10   Mini squats 3 x 10  2 x 10 X 10    BAPS-4 movements  -Rotation     4 x 1 min 4 x 1 min      4 way ankle  GTB 2 x 20 GTB 2 x 20 each way     Ankle circles  X 20 each way      Partial forward lunge 2 x 10 2 x 10      Calf raises 3 x 10 3 x 8     Initials [place]       Mis received the following manual therapy techniques: Joint mobilizations were applied to the: L ankle for 10 minutes, including:  Grade 2 mob at the MTP 3rd on 4th joint, for ROM/pain relief        Home Exercises Provided and Patient Education  Provided     Education provided:   - Nature of injury  -Making sure the foot feels loose with the new partial lunge exercises  -Making sure she wears comfortable shoes with ambulation with proper support.    Written Home Exercises Provided: Patient instructed to cont prior HEP.  Exercises were reviewed and Mis was able to demonstrate them prior to the end of the session.  Mis demonstrated good  understanding of the education provided.     See EMR under Patient Instructions for exercises provided 1/15/2019.    Assessment     Pt still presents with soreness across the dorsal aspect of L foot at the 3rd-4th MTP, but after manual, she did not feel the pain during ambulation.   Pt reports that her foot feels better at the end of session.    Mis is progressing well towards her goals.   Pt prognosis is Good.     Pt will continue to benefit from skilled outpatient physical therapy to address the deficits listed in the problem list box on initial evaluation, provide pt/family education and to maximize pt's level of independence in the home and community environment.     Pt's spiritual, cultural and educational needs considered and pt agreeable to plan of care and goals.     Anticipated barriers to physical therapy:     Goals:   Goals:   Short Term Goals: 3 weeks   Increase range of motion at L 1st MTP 25%   Increase strength 1/2 muscle grade on L LE   Be able to perform HEP with minimal cueing required   Improve functional impairment of mobility to regular ambulation with a cane     Long Term Goals: 7 weeks   Increase range of motion to 75% to 100% full at MTP and L ankle   Improve muscle strength 1 muscle grade   Improve muscle strength with MMT to 4-/5 or better   Restore ability to ambulate with normal pain free gait   Walking for ADL and exercise will be restored without increased pain   Restore ability to stand for ADL without increased pain   Restore ability to perform sit to stand transfer without  increased pain   Restore ability to climb stairs in a reciprocal manner with min to 0 increased pain and/or difficulty   Restore normal sleep habits without disturbances due to pain   Restore ability to perform ADL's and household activities independently and without increased pain   Improve FOTO limitation to 10-15% decrease to show quality of life improvement       Plan     Cont POC as tolerated.Progress strengthening on the LE gal Briscoe, PT

## 2019-01-22 NOTE — PROGRESS NOTES
Pt comes in for b12 injection. Pt supplied medication. Right dose, right route, right medication all verified. 2 pt identifier used. Instructed to wait 15 mins following injection. No pain or redness at injections site. Given ULG      Patient is here for INR check  Patient is currently taking 6mg every day but on fridays she takes 9mg of coumadin daily  Patient denies any changes in diet, pt denies any missed doses, and pt denies any signs of bleeding.  INR is 2.7  Patient was instructed to take current dose of coumadin.  Patient was instructed to return to the clinic in 1 month to recheck INR.    Lab Results       Component                Value               Date                       INR                      2.3                 12/12/2018                 INR                      1.8                 11/15/2018                 INR                      1.2                 11/09/2018                 INR                      1.0                 10/31/2018                 INR                      2.3                 10/19/2018

## 2019-01-28 RX ORDER — WARFARIN 6 MG/1
TABLET ORAL
Qty: 90 TABLET | Refills: 3 | Status: SHIPPED | OUTPATIENT
Start: 2019-01-28 | End: 2019-04-29 | Stop reason: SDUPTHER

## 2019-01-28 RX ORDER — GABAPENTIN 300 MG/1
300 CAPSULE ORAL 2 TIMES DAILY
Qty: 180 CAPSULE | Refills: 3 | Status: SHIPPED | OUTPATIENT
Start: 2019-01-28 | End: 2019-04-04 | Stop reason: SDUPTHER

## 2019-01-28 NOTE — TELEPHONE ENCOUNTER
----- Message from Oneida Sommers sent at 1/28/2019  2:39 PM CST -----  Patient would like a refill of gabapentin (NEURONTIN) 300 MG capsule and warfarin (COUMADIN) 6 MG tablet   sent to  [facility] in [place]. Please call.

## 2019-01-29 ENCOUNTER — CLINICAL SUPPORT (OUTPATIENT)
Dept: REHABILITATION | Facility: HOSPITAL | Age: 79
End: 2019-01-29
Attending: PODIATRIST
Payer: MEDICARE

## 2019-01-29 DIAGNOSIS — R26.89 ANTALGIC GAIT: ICD-10-CM

## 2019-01-29 DIAGNOSIS — M79.672 LEFT FOOT PAIN: ICD-10-CM

## 2019-01-29 PROCEDURE — 97110 THERAPEUTIC EXERCISES: CPT | Mod: HCNC,PO

## 2019-01-29 RX ORDER — HYDROCHLOROTHIAZIDE 12.5 MG/1
CAPSULE ORAL
Qty: 90 CAPSULE | Refills: 3 | Status: SHIPPED | OUTPATIENT
Start: 2019-01-29 | End: 2019-06-03

## 2019-01-29 RX ORDER — PRAMIPEXOLE DIHYDROCHLORIDE 0.25 MG/1
TABLET ORAL
Qty: 90 TABLET | Refills: 3 | Status: SHIPPED | OUTPATIENT
Start: 2019-01-29 | End: 2019-06-03 | Stop reason: SDUPTHER

## 2019-01-29 NOTE — PROGRESS NOTES
Physical Therapy Daily Treatment Note     Name: Mis Ca  Clinic Number: [id number]    Therapy Diagnosis:   Encounter Diagnoses   Name Primary?    Left foot pain     Antalgic gait      Physician: Rudolph Cardozo DPM    Visit Date: 1/29/2019  Physician Orders: PT Eval and Treat   Medical Diagnosis from Referral:   Z98.890 (ICD-10-CM) - Postoperative state   M62.81 (ICD-10-CM) - Muscle weakness of lower extremity      Evaluation Date: 1/8/2019  Authorization Period Expiration: 12/28/19  Plan of Care Expiration: 3/7/19  Visit # / Visits authorized: 4/          Time In: 1305  Time Out: 1340  Total Billable Time: 30 minutes    Precautions: Standard    Subjective     Pt reports: nothing from the surgical cut hurts, it is the other part of my foot. The foot is getting better everyday. Push off is the only problem at this time.  She was compliant with home exercise program.  Response to previous treatment: less pain  Functional change: smoother ambulation pattern.    Pain: 3/10  Location: left ankles     Objective     Mis received therapeutic exercises to develop strength and ROM for 30 minutes including:  Date 1/29/19 1/22/19 1/15/19 1/9/2019   Visit 4 3 2 1   FOTO 4 3 2 1   Gcode Visits  4 3 2 1   POC Exp Date 3-8-19 3-8-19 3-8-19 3/8/19   Medicare Charge 60.64 57.78 57.78 140.66   Medicare Total 316.86 256.22 198.44 140.66   Face to Face        Supine  Ankle pumps X 30  X 30  X 30   Ankle alphabet  NT X 2 X 2   Bridges  NT NT 2 x 10   Mini squats 3  X 10 3 x 10  2 x 10 X 10    BAPS-4 movements  -Rotation     4 x 1 min 4 x 1 min 4 x 1 min      4 way ankle  X 20 GTB GTB 2 x 20 GTB 2 x 20 each way     Ankle circles   X 20 each way      Partial forward lunge 2  x10  2 x 10 2 x 10     DF and PF with resisted Inv/sup 3 x 8 each      Step ups 2 x 12       Calf raises 2 x 12 3 x 10 3 x 8     Initials WV WV WV WV       Mis received the following manual therapy techniques: Joint mobilizations were applied to  the: L ankle for 10 minutes, including:      Ended session with ice today x 6 min    Home Exercises Provided and Patient Education Provided     Education provided:   - Nature of injury  -Making sure the foot feels loose with the new partial lunge exercises  -Making sure she wears comfortable shoes with ambulation with proper support.  Ice at home at the end of the day.    Written Home Exercises Provided: Patient instructed to cont prior HEP.  Exercises were reviewed and Mis was able to demonstrate them prior to the end of the session.  Mis demonstrated good  understanding of the education provided.     See EMR under Patient Instructions for exercises provided 1/15/2019.    Assessment     Pain with palpation between L 3-4 metarsal, pain with palpation during DF/inversion, DF/eversion, and PF/inversion. Pt has less pain with ambulation but still the PF is painful. Location of pain appears to be too far to be a neuroma, and there is 2 sec pitting edema in the spot of pain. Continue to monitor for possible Ext HL tendon inflammation.    Mis is progressing well towards her goals.   Pt prognosis is Good.     Pt will continue to benefit from skilled outpatient physical therapy to address the deficits listed in the problem list box on initial evaluation, provide pt/family education and to maximize pt's level of independence in the home and community environment.     Pt's spiritual, cultural and educational needs considered and pt agreeable to plan of care and goals.     Anticipated barriers to physical therapy:     Goals:   Goals:   Short Term Goals: 3 weeks   Increase range of motion at L 1st MTP 25%   Increase strength 1/2 muscle grade on L LE   Be able to perform HEP with minimal cueing required   Improve functional impairment of mobility to regular ambulation with a cane     Long Term Goals: 7 weeks   Increase range of motion to 75% to 100% full at MTP and L ankle   Improve muscle strength 1 muscle grade    Improve muscle strength with MMT to 4-/5 or better   Restore ability to ambulate with normal pain free gait   Walking for ADL and exercise will be restored without increased pain   Restore ability to stand for ADL without increased pain   Restore ability to perform sit to stand transfer without increased pain   Restore ability to climb stairs in a reciprocal manner with min to 0 increased pain and/or difficulty   Restore normal sleep habits without disturbances due to pain   Restore ability to perform ADL's and household activities independently and without increased pain   Improve FOTO limitation to 10-15% decrease to show quality of life improvement       Plan     FOTO on next session. Progress strengthening on the LE bilat and reassess the spot of pain on the foot.    Peter Briscoe, PT

## 2019-01-31 RX ORDER — ALPRAZOLAM 0.25 MG/1
0.25 TABLET ORAL DAILY
Qty: 90 TABLET | Refills: 1 | Status: SHIPPED | OUTPATIENT
Start: 2019-01-31 | End: 2019-03-19 | Stop reason: SDUPTHER

## 2019-01-31 NOTE — TELEPHONE ENCOUNTER
----- Message from Joanna Mccain sent at 1/31/2019 10:08 AM CST -----  Patient is requesting a medication refill.     RX name:ALPRAZolam (XANAX) 0.25 MG tablet   Strength:   Quantity: 90 day  Directions: Take 1 tablet (0.25 mg total) by mouth once daily. - Oral    Pharmacy name: [facility]      Phone number where patient can be reached:

## 2019-02-05 ENCOUNTER — PES CALL (OUTPATIENT)
Dept: ADMINISTRATIVE | Facility: CLINIC | Age: 79
End: 2019-02-05

## 2019-02-19 ENCOUNTER — CLINICAL SUPPORT (OUTPATIENT)
Dept: FAMILY MEDICINE | Facility: CLINIC | Age: 79
End: 2019-02-19
Payer: MEDICARE

## 2019-02-19 DIAGNOSIS — I48.91 ATRIAL FIBRILLATION WITH RVR: Primary | ICD-10-CM

## 2019-02-19 DIAGNOSIS — Z79.01 ANTICOAGULATED ON COUMADIN: ICD-10-CM

## 2019-02-19 DIAGNOSIS — D64.9 ANEMIA, UNSPECIFIED TYPE: ICD-10-CM

## 2019-02-19 LAB — INR PPP: 2 (ref 2–3)

## 2019-02-19 PROCEDURE — 96372 THER/PROPH/DIAG INJ SC/IM: CPT | Mod: S$GLB,,, | Performed by: FAMILY MEDICINE

## 2019-02-19 PROCEDURE — 96372 PR INJECTION,THERAP/PROPH/DIAG2ST, IM OR SUBCUT: ICD-10-PCS | Mod: S$GLB,,, | Performed by: FAMILY MEDICINE

## 2019-02-19 PROCEDURE — 85610 PROTHROMBIN TIME: CPT | Mod: QW,,, | Performed by: FAMILY MEDICINE

## 2019-02-19 PROCEDURE — 85610 POCT INR: ICD-10-PCS | Mod: QW,,, | Performed by: FAMILY MEDICINE

## 2019-02-19 RX ORDER — CYANOCOBALAMIN 1000 UG/ML
1000 INJECTION, SOLUTION INTRAMUSCULAR; SUBCUTANEOUS ONCE
Status: COMPLETED | OUTPATIENT
Start: 2019-02-19 | End: 2019-02-19

## 2019-02-19 RX ADMIN — CYANOCOBALAMIN 1000 MCG: 1000 INJECTION, SOLUTION INTRAMUSCULAR; SUBCUTANEOUS at 09:02

## 2019-02-19 NOTE — PROGRESS NOTES
Patient is here for INR check  Patient is currently taking 6mg of coumadin daily and 9mg of coumadin on Fridays   Patient denies any changes in diet, pt denies any missed doses, and pt denies any signs of bleeding.  INR is 2.0  Patient was instructed to take current dose of coumadin.  Patient was instructed to return to the clinic in 1 month to recheck INR.      Lab Results       Component                Value               Date                       INR                      2.7                 01/22/2019                 INR                      2.3                 12/12/2018                 INR                      1.8                 11/15/2018                 INR                      1.2                 11/09/2018                 INR                      1.0                 10/31/2018              Pt comes in for b12 injection. Pt supplied medication. Right dose, right route, right medication all verified. 2 pt identifier used. Instructed to wait 15 mins following injection. No pain or redness at injections site. Given URG

## 2019-02-21 ENCOUNTER — HOSPITAL ENCOUNTER (OUTPATIENT)
Dept: RADIOLOGY | Facility: HOSPITAL | Age: 79
Discharge: HOME | End: 2019-02-21
Attending: PODIATRIST
Payer: MEDICARE

## 2019-02-21 ENCOUNTER — OFFICE VISIT (OUTPATIENT)
Dept: PODIATRY | Facility: CLINIC | Age: 79
End: 2019-02-21
Payer: MEDICARE

## 2019-02-21 VITALS
BODY MASS INDEX: 36.62 KG/M2 | WEIGHT: 199 LBS | DIASTOLIC BLOOD PRESSURE: 71 MMHG | HEART RATE: 60 BPM | HEIGHT: 62 IN | SYSTOLIC BLOOD PRESSURE: 116 MMHG

## 2019-02-21 DIAGNOSIS — M79.672 LEFT FOOT PAIN: Primary | ICD-10-CM

## 2019-02-21 DIAGNOSIS — Z98.890 POSTOPERATIVE STATE: ICD-10-CM

## 2019-02-21 DIAGNOSIS — M96.89 DELAYED UNION AFTER OSTEOTOMY: ICD-10-CM

## 2019-02-21 DIAGNOSIS — Z98.890 HISTORY OF FOOT SURGERY: ICD-10-CM

## 2019-02-21 PROCEDURE — 73630 XR FOOT COMPLETE 3 VIEW LEFT: ICD-10-PCS | Mod: 26,HCNC,LT, | Performed by: RADIOLOGY

## 2019-02-21 PROCEDURE — 73630 X-RAY EXAM OF FOOT: CPT | Mod: 26,HCNC,LT, | Performed by: RADIOLOGY

## 2019-02-21 PROCEDURE — 73630 X-RAY EXAM OF FOOT: CPT | Mod: TC,HCNC,PN,LT

## 2019-02-21 PROCEDURE — 99213 PR OFFICE/OUTPT VISIT, EST, LEVL III, 20-29 MIN: ICD-10-PCS | Mod: HCNC,S$GLB,, | Performed by: PODIATRIST

## 2019-02-21 PROCEDURE — 3078F DIAST BP <80 MM HG: CPT | Mod: HCNC,CPTII,S$GLB, | Performed by: PODIATRIST

## 2019-02-21 PROCEDURE — 3074F SYST BP LT 130 MM HG: CPT | Mod: HCNC,CPTII,S$GLB, | Performed by: PODIATRIST

## 2019-02-21 PROCEDURE — 99213 OFFICE O/P EST LOW 20 MIN: CPT | Mod: HCNC,S$GLB,, | Performed by: PODIATRIST

## 2019-02-21 PROCEDURE — 3074F PR MOST RECENT SYSTOLIC BLOOD PRESSURE < 130 MM HG: ICD-10-PCS | Mod: HCNC,CPTII,S$GLB, | Performed by: PODIATRIST

## 2019-02-21 PROCEDURE — 3078F PR MOST RECENT DIASTOLIC BLOOD PRESSURE < 80 MM HG: ICD-10-PCS | Mod: HCNC,CPTII,S$GLB, | Performed by: PODIATRIST

## 2019-02-21 PROCEDURE — 1101F PT FALLS ASSESS-DOCD LE1/YR: CPT | Mod: HCNC,CPTII,S$GLB, | Performed by: PODIATRIST

## 2019-02-21 PROCEDURE — 1101F PR PT FALLS ASSESS DOC 0-1 FALLS W/OUT INJ PAST YR: ICD-10-PCS | Mod: HCNC,CPTII,S$GLB, | Performed by: PODIATRIST

## 2019-02-21 PROCEDURE — 99999 PR PBB SHADOW E&M-EST. PATIENT-LVL IV: ICD-10-PCS | Mod: PBBFAC,HCNC,, | Performed by: PODIATRIST

## 2019-02-21 PROCEDURE — 99999 PR PBB SHADOW E&M-EST. PATIENT-LVL IV: CPT | Mod: PBBFAC,HCNC,, | Performed by: PODIATRIST

## 2019-02-21 RX ORDER — LATANOPROST 50 UG/ML
SOLUTION/ DROPS OPHTHALMIC
COMMUNITY
End: 2019-08-23

## 2019-02-21 NOTE — PROGRESS NOTES
"Subjective:      Patient ID: Mis Ca is a 78 y.o. female.    Chief Complaint: Follow-up (Left foot)    Complains of a bunion deformity left foot. Pain aggravated by enclosed shoes. Denies trauma. Relates it has been present for many years. Wearing sandals today. Wears toe separators which help only a little.    10/5/18: Returns to review her xray and discuss surgical intervention for her painful bunion left foot. No new complaints.    11/8/18: Post EGR with Lapidus/akin bunionectomy left on 10/31/18. NWB left foot. Using rolling knee walker. Accompanied by sister and neighbor. Pain mild and well controlled.    11/30/18: 4 weeks postop. NWB left foot. Accompanied by her friends. Relates no pain today but gets intermittent aching to the foot.    12/7/18:  5 weeks postop. Recalls pain and a pop in her left leg a few days ago when putting weight down on the left foot. She has kept the boot on, rested and elevated. Pain improved since.    12/28/18: 8 weeks postop. No pain. Ambulating well with orthopedic boot.    5/21/19: 4 months post op.  No pain to surgical, ambulating well in shoes.  Complains of pain to midfoot that happened when she injured her foot while in boot shortly after surgery.  Pain worse with activity, better with rest. Walks with a limp due to pain.    Vitals:    02/21/19 0849   BP: 116/71   Pulse: 60   Weight: 90.3 kg (199 lb)   Height: 5' 2" (1.575 m)   PainSc: 0-No pain      Past Medical History:   Diagnosis Date    Anxiety     Atrial fibrillation     Blind right eye     Bradycardia     Cancer     skin cancer left side of face under eye    Hyperlipidemia     Hypertension     PVC (premature ventricular contraction)     RLS (restless legs syndrome)     Sleep apnea     Does not use CPAP machine.       Past Surgical History:   Procedure Laterality Date    ADENOIDECTOMY      APPENDECTOMY      BREAST BIOPSY Left     times two    BUNIONECTOMY, LAPIDUS Left 10/31/2018    Performed by " [facility] at [facility]    CATARACT EXTRACTION BILATERAL W/ ANTERIOR VITRECTOMY      EYE SURGERY Bilateral     cataracts extraction    EYE SURGERY Right     drainage tube (glaucoma)    FOOT SURGERY Left     lesion removed from dorsal area    FRACTURE SURGERY Left     arm    HAND TENDON SURGERY Left     HYSTERECTOMY      OOPHORECTOMY      ORIF FOREARM FRACTURE Left     OSTEOTOMY, AKIN Left 10/31/2018    [facility] at [facility]    PALATE SURGERY      Lesion removed    RECESSION, GASTROCNEMIUS, ENDOSCOPIC Left 10/31/2018    Per[facility] at [facility]    TONSILLECTOMY         Family History   Problem Relation Age of Onset    Aneurysm Mother         AAA    Cancer Father         lung cancer    Lung cancer Father     Cirrhosis Father     Cancer Sister         Breast    Diabetes Sister     Breast cancer Sister     Hypertension Sister     Hyperlipidemia Sister     Colon polyps Brother     Cancer Brother         lung cancer    Diabetes Brother     Hyperlipidemia Brother     Hypertension Brother     Cancer Brother         bladder cancer    Diabetes Brother     Glaucoma Brother     Heart disease Sister         Heart valve repair    Atrial fibrillation Sister     Diabetes Sister     Heart disease Sister     Heart attack Sister     Bipolar disorder Sister     Depression Sister     Glaucoma Sister     Diabetes Sister     Other Sister         Pituitary tumor    Arthritis Sister     COPD Sister     Heart disease Sister     Kidney disease Sister     Cancer Sister         lung cancer    Diabetes Sister     Lung cancer Sister     Heart attack Sister        Social History     Socioeconomic History    Marital status:      Spouse name: None    Number of children: None    Years of education: None    Highest education level: None   Social Needs    Financial resource strain: None    Food insecurity - worry: None    Food  insecurity - inability: None    Transportation needs - medical: None    Transportation needs - non-medical: None   Occupational History    None   Tobacco Use    Smoking status: Never Smoker    Smokeless tobacco: Never Used   Substance and Sexual Activity    Alcohol use: Yes     Comment: Seldomly drinks alcohol (wine)    Drug use: No    Sexual activity: Not Currently     Partners: Male   Other Topics Concern    None   Social History Narrative    None       Current Outpatient Medications   Medication Sig Dispense Refill    acetaminophen (TYLENOL) 500 MG tablet Take 1,000 mg by mouth 2 (two) times daily as needed for Pain.      ALPRAZolam (XANAX) 0.25 MG tablet Take 1 tablet (0.25 mg total) by mouth once daily. 90 tablet 1    aspirin (ECOTRIN) 81 MG EC tablet Take 81 mg by mouth once daily.        atorvastatin (LIPITOR) 40 MG tablet Take 1 tablet (40 mg total) by mouth once daily. 90 tablet 5    CARTIA  mg 24 hr capsule TAKE 1 CAPSULE ONCE DAILY. 90 capsule 3    cholecalciferol, vitamin D3, 50,000 unit Tab Take 1 tablet by mouth every 7 days. 4 tablet 3    cyanocobalamin (VITAMIN B-12) 1,000 mcg/mL injection 1 cc IM weekly for 4 weeks, then monthly 10 mL 1    DECARA 50,000 unit capsule Take 50,000 Units by mouth every 7 days.  3    gabapentin (NEURONTIN) 300 MG capsule Take 1 capsule (300 mg total) by mouth 2 (two) times daily. 180 capsule 3    hydroCHLOROthiazide (MICROZIDE) 12.5 mg capsule TAKE 1 CAPSULE EVERY DAY 90 capsule 3    latanoprost 0.005 % ophthalmic solution 1 drop every evening.      latanoprost 0.005 % ophthalmic solution latanoprost 0.005 % eye drops      losartan (COZAAR) 100 MG tablet TAKE 1/2 TABLET EVERY DAY 45 tablet 3    multivitamin (ONE DAILY MULTIVITAMIN) per tablet Take 1 tablet by mouth once daily.      omeprazole (PRILOSEC) 20 MG capsule TAKE 1 CAPSULE EVERY DAY 90 capsule 3    oxybutynin (DITROPAN) 5 MG Tab TAKE 1 TABLET EVERY DAY 90 tablet 1    pramipexole  (MIRAPEX) 0.25 MG tablet TAKE 1 TABLET EVERY DAY 90 tablet 3    prednisoLONE acetate (PRED FORTE) 1 % DrpS instill 1 drop in right eye four times a day  0    timolol maleate 0.5% (TIMOPTIC) 0.5 % Drop Place 1 drop into both eyes once daily.  0    warfarin (COUMADIN) 6 MG tablet TAKE 1 TABLET EVERY DAY 90 tablet 3     No current facility-administered medications for this visit.        Review of patient's allergies indicates:   Allergen Reactions    Penicillins     Sulfa (sulfonamide antibiotics)     Compazine [prochlorperazine edisylate] Anxiety         Review of Systems   Constitution: Negative for chills, fever, weakness and malaise/fatigue.   HENT: Negative for congestion.    Cardiovascular: Negative for chest pain, claudication and leg swelling.   Respiratory: Negative for cough and shortness of breath.    Skin: Negative for flushing, itching, poor wound healing and rash.   Musculoskeletal: Negative for back pain, joint pain, muscle cramps and muscle weakness.   Gastrointestinal: Negative for nausea and vomiting.   Neurological: Negative for numbness and paresthesias.   Psychiatric/Behavioral: Negative for altered mental status.           Objective:      Physical Exam   Constitutional: She is oriented to person, place, and time. She appears well-developed and well-nourished. No distress.   Cardiovascular: Intact distal pulses.   Pulses:       Dorsalis pedis pulses are 2+ on the right side, and 2+ on the left side.        Posterior tibial pulses are 2+ on the right side, and 2+ on the left side.   CFT< 3 secs all toes bilateral foot, skin temp warm bilateral foot, no hair growth bilateral lower extremity, mild lower extremity edema with telangiectasias and varicosities bilateral.     Musculoskeletal: She exhibits edema and tenderness.   No pain with MMT or ROM left foot. Left hallux well aligned. 1 MTP left achieves > 45 deg DF.    Left foot achieves 5 deg DF with knee extended and > 10 deg with knee flexed.  Right foot achieves 0 deg DF knee extended and 10 deg DF knee flexed.    No pain on palpation of the left leg, no pain with active resisted PF foot.    Mild collapse of the arch with standing bilateral foot.    Pain to palpation of 3rd metatarsal base left foot.    Neurological: She is alert and oriented to person, place, and time. She has normal strength. No sensory deficit.   Skin: Skin is warm, dry and intact. Capillary refill takes less than 2 seconds. No ecchymosis and no rash noted. She is not diaphoretic. No cyanosis or erythema. No pallor. Nails show no clubbing.   Normal appearing scars left foot and leg.               Assessment:       Encounter Diagnoses   Name Primary?   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    X-ray reviewed noting maintained alignment, no hardware failure or loosening and consolidation of 1 TMT arthrodesis left foot. Possible fracture in 3rd metatarsal space with radiolucency noted on oblique projection and possible cortical disruption on the AP view at lateral cortex.    -Ordered CT to evaluate possible 3rd metatarsal fracture and 1 TMT arthrodesis.    Activity to tolerance, Use boot for pain prn    RTC 4 weeks    Assisted by Scott Anders, PGY2    I have personally taken the history and examined this patient and agree with the resident's note as stated as above.   Rudolph Cardozo DPM, FACFAS        .

## 2019-02-27 ENCOUNTER — TELEPHONE (OUTPATIENT)
Dept: FAMILY MEDICINE | Facility: CLINIC | Age: 79
End: 2019-02-27

## 2019-02-27 NOTE — TELEPHONE ENCOUNTER
----- Message from Ana Vasquez sent at 2/27/2019  8:34 AM CST -----  Contact: [phone]/self  Patient is requesting to be seen today by any doctor. She has a sore throat with nasal drip for a week. Please call her to schedule. Thanks    I called the pt and LM and advised her to rtn call - I advised her to no appt available today but to rtn call and we can schedule her to see marissa  Tomorrow am

## 2019-02-28 ENCOUNTER — OFFICE VISIT (OUTPATIENT)
Dept: FAMILY MEDICINE | Facility: CLINIC | Age: 79
End: 2019-02-28
Payer: MEDICARE

## 2019-02-28 VITALS
DIASTOLIC BLOOD PRESSURE: 74 MMHG | OXYGEN SATURATION: 97 % | BODY MASS INDEX: 36.26 KG/M2 | SYSTOLIC BLOOD PRESSURE: 120 MMHG | WEIGHT: 197.06 LBS | HEIGHT: 62 IN | TEMPERATURE: 98 F | HEART RATE: 82 BPM

## 2019-02-28 DIAGNOSIS — J02.9 SORE THROAT: ICD-10-CM

## 2019-02-28 DIAGNOSIS — H01.002 BLEPHARITIS OF LOWER EYELIDS OF BOTH EYES, UNSPECIFIED TYPE: Primary | ICD-10-CM

## 2019-02-28 DIAGNOSIS — H65.92 MIDDLE EAR EFFUSION, LEFT: ICD-10-CM

## 2019-02-28 DIAGNOSIS — R09.82 POST-NASAL DRIP: ICD-10-CM

## 2019-02-28 DIAGNOSIS — H01.005 BLEPHARITIS OF LOWER EYELIDS OF BOTH EYES, UNSPECIFIED TYPE: Primary | ICD-10-CM

## 2019-02-28 PROCEDURE — 3078F DIAST BP <80 MM HG: CPT | Mod: CPTII,S$GLB,, | Performed by: NURSE PRACTITIONER

## 2019-02-28 PROCEDURE — 3074F SYST BP LT 130 MM HG: CPT | Mod: CPTII,S$GLB,, | Performed by: NURSE PRACTITIONER

## 2019-02-28 PROCEDURE — 99213 OFFICE O/P EST LOW 20 MIN: CPT | Mod: S$GLB,,, | Performed by: NURSE PRACTITIONER

## 2019-02-28 PROCEDURE — 1101F PT FALLS ASSESS-DOCD LE1/YR: CPT | Mod: CPTII,S$GLB,, | Performed by: NURSE PRACTITIONER

## 2019-02-28 PROCEDURE — 99213 PR OFFICE/OUTPT VISIT, EST, LEVL III, 20-29 MIN: ICD-10-PCS | Mod: S$GLB,,, | Performed by: NURSE PRACTITIONER

## 2019-02-28 PROCEDURE — 1101F PR PT FALLS ASSESS DOC 0-1 FALLS W/OUT INJ PAST YR: ICD-10-PCS | Mod: CPTII,S$GLB,, | Performed by: NURSE PRACTITIONER

## 2019-02-28 PROCEDURE — 3074F PR MOST RECENT SYSTOLIC BLOOD PRESSURE < 130 MM HG: ICD-10-PCS | Mod: CPTII,S$GLB,, | Performed by: NURSE PRACTITIONER

## 2019-02-28 PROCEDURE — 3078F PR MOST RECENT DIASTOLIC BLOOD PRESSURE < 80 MM HG: ICD-10-PCS | Mod: CPTII,S$GLB,, | Performed by: NURSE PRACTITIONER

## 2019-02-28 RX ORDER — LEVOCETIRIZINE DIHYDROCHLORIDE 5 MG/1
5 TABLET, FILM COATED ORAL NIGHTLY
Qty: 14 TABLET | Refills: 1 | Status: SHIPPED | OUTPATIENT
Start: 2019-02-28 | End: 2019-08-23

## 2019-02-28 NOTE — PROGRESS NOTES
Subjective:       Patient ID: Mis Ca is a 78 y.o. female.    Chief Complaint: Other (eyelid redness and sore throat)    Sore Throat    This is a new problem. Episode onset: 1 week ago. The problem has been gradually improving. Neither side of throat is experiencing more pain than the other. There has been no fever. The pain is at a severity of 2/10 (pain on yesterday was a 7/10). The pain is mild. Associated symptoms include congestion, coughing and a hoarse voice. Pertinent negatives include no abdominal pain, diarrhea, ear discharge, ear pain, headaches, plugged ear sensation, shortness of breath, swollen glands, trouble swallowing or vomiting. She has had no exposure to strep or mono. She has tried nothing for the symptoms.     Review of Systems   Constitutional: Negative for appetite change, chills and fever.   HENT: Positive for congestion, hoarse voice and sore throat. Negative for ear discharge, ear pain, hearing loss, postnasal drip, rhinorrhea, sinus pressure, sinus pain, sneezing and trouble swallowing.    Eyes: Positive for itching.        Eyelid crusting in the morning.   Respiratory: Positive for cough. Negative for shortness of breath.    Gastrointestinal: Positive for constipation. Negative for abdominal pain, diarrhea and vomiting.   Neurological: Negative for dizziness and headaches.   Psychiatric/Behavioral: Negative.        Objective:      Physical Exam   Constitutional: She is oriented to person, place, and time. She appears well-developed and well-nourished.   HENT:   Head: Normocephalic and atraumatic.   Left Ear: A middle ear effusion is present.   Nose: Mucosal edema present. No rhinorrhea. Right sinus exhibits no maxillary sinus tenderness and no frontal sinus tenderness. Left sinus exhibits no maxillary sinus tenderness and no frontal sinus tenderness.   Mouth/Throat: Uvula is midline, oropharynx is clear and moist and mucous membranes are normal.   Wax build-up partially blocking  right TM.    Eyes: Conjunctivae and EOM are normal. Pupils are equal, round, and reactive to light.   Bilateral lower eyelid redness.   Neck: Normal range of motion.   Cardiovascular: Normal rate, regular rhythm and normal heart sounds.   Pulmonary/Chest: Effort normal and breath sounds normal. No respiratory distress.   Abdominal: Soft. There is no tenderness.   Musculoskeletal: Normal range of motion. She exhibits no edema.   Neurological: She is alert and oriented to person, place, and time. Coordination normal.   Skin: Skin is warm and dry.   Psychiatric: She has a normal mood and affect. Her behavior is normal. Judgment and thought content normal.   Nursing note and vitals reviewed.      Assessment:       1. Blepharitis of lower eyelids of both eyes, unspecified type    2. Sore throat    3. Post-nasal drip    4. Middle ear effusion, left        Plan:       Mis was seen today for other.    Diagnoses and all orders for this visit:    Blepharitis of lower eyelids of both eyes, unspecified type    Sore throat    Post-nasal drip  -     levocetirizine (XYZAL) 5 MG tablet; Take 1 tablet (5 mg total) by mouth every evening. for 14 days    Middle ear effusion, left    Other orders  1. Warm salt water gargles and throat lozenges may help soothe throat.  2. Start taking Xyzal to help with post-nasal drip  3. Eyelid hygiene measures:  - Warm compresses for 5-10 minutes twice daily.  - Gently scrub the eyelids with a moist washcloth with diluted baby shampoo or other commercially available eyelid-cleansing agents twice daily.    Follow-up in 2 weeks if symptoms worsen or fail to improve.     Harrison Bocanegra NP

## 2019-02-28 NOTE — PATIENT INSTRUCTIONS
1. Warm salt water gargles and throat lozenges may help soothe throat.  2. Start taking Xyzal to help with post-nasal drip  3. Eyelid hygiene measures:  - Warm compresses for 5-10 minutes twice daily.  - Gently scrub the eyelids with a moist washcloth with diluted baby shampoo or other commercially available eyelid-cleansing agents twice daily.  4. Follow-up in 2 weeks if symptoms worsen or fail to improve.

## 2019-03-04 ENCOUNTER — HOSPITAL ENCOUNTER (OUTPATIENT)
Dept: RADIOLOGY | Facility: HOSPITAL | Age: 79
Discharge: HOME | End: 2019-03-04
Attending: PODIATRIST
Payer: MEDICARE

## 2019-03-04 DIAGNOSIS — M96.89 DELAYED UNION AFTER OSTEOTOMY: ICD-10-CM

## 2019-03-04 DIAGNOSIS — Z98.890 HISTORY OF FOOT SURGERY: ICD-10-CM

## 2019-03-04 DIAGNOSIS — M79.672 LEFT FOOT PAIN: ICD-10-CM

## 2019-03-04 PROCEDURE — 73700 CT LOWER EXTREMITY W/O DYE: CPT | Mod: TC,HCNC,LT

## 2019-03-04 PROCEDURE — 73700 CT FOOT WITHOUT CONTRAST LEFT: ICD-10-PCS | Mod: 26,HCNC,LT, | Performed by: RADIOLOGY

## 2019-03-04 PROCEDURE — 73700 CT LOWER EXTREMITY W/O DYE: CPT | Mod: 26,HCNC,LT, | Performed by: RADIOLOGY

## 2019-03-07 ENCOUNTER — PATIENT MESSAGE (OUTPATIENT)
Dept: PODIATRY | Facility: CLINIC | Age: 79
End: 2019-03-07

## 2019-03-19 ENCOUNTER — CLINICAL SUPPORT (OUTPATIENT)
Dept: FAMILY MEDICINE | Facility: CLINIC | Age: 79
End: 2019-03-19
Payer: MEDICARE

## 2019-03-19 DIAGNOSIS — D64.9 ANEMIA, UNSPECIFIED TYPE: Primary | ICD-10-CM

## 2019-03-19 DIAGNOSIS — I48.0 PAROXYSMAL ATRIAL FIBRILLATION: ICD-10-CM

## 2019-03-19 LAB — INR PPP: 2.7 (ref 2–3)

## 2019-03-19 PROCEDURE — 96372 THER/PROPH/DIAG INJ SC/IM: CPT | Mod: S$GLB,,, | Performed by: FAMILY MEDICINE

## 2019-03-19 PROCEDURE — 85610 PROTHROMBIN TIME: CPT | Mod: QW,,, | Performed by: FAMILY MEDICINE

## 2019-03-19 PROCEDURE — 85610 POCT INR: ICD-10-PCS | Mod: QW,,, | Performed by: FAMILY MEDICINE

## 2019-03-19 PROCEDURE — 96372 PR INJECTION,THERAP/PROPH/DIAG2ST, IM OR SUBCUT: ICD-10-PCS | Mod: S$GLB,,, | Performed by: FAMILY MEDICINE

## 2019-03-19 RX ORDER — CYANOCOBALAMIN 1000 UG/ML
1000 INJECTION, SOLUTION INTRAMUSCULAR; SUBCUTANEOUS ONCE
Status: COMPLETED | OUTPATIENT
Start: 2019-03-19 | End: 2019-03-19

## 2019-03-19 RX ADMIN — CYANOCOBALAMIN 1000 MCG: 1000 INJECTION, SOLUTION INTRAMUSCULAR; SUBCUTANEOUS at 10:03

## 2019-03-19 NOTE — PROGRESS NOTES
Pt here for inr check and b12 injection  B12 1000 mcg given IM to the [facility]  Pt supplied meds

## 2019-03-19 NOTE — TELEPHONE ENCOUNTER
----- Message from Ana Vasquez sent at 3/19/2019 10:26 AM CDT -----  Contact: [phone]/selfl  Patient is requesting a new rx for   ALPRAZolam (XANAX) 0.25 MG tablet.  Take 1 tablet (0.25 mg total) by mouth once daily. - Oral  90 tablet    [id number] [place] [facility] [id number] [facility] [address]

## 2019-03-20 RX ORDER — ALPRAZOLAM 0.25 MG/1
0.25 TABLET ORAL DAILY
Qty: 90 TABLET | Refills: 0 | Status: SHIPPED | OUTPATIENT
Start: 2019-03-20 | End: 2019-04-29 | Stop reason: SDUPTHER

## 2019-04-04 ENCOUNTER — TELEPHONE (OUTPATIENT)
Dept: FAMILY MEDICINE | Facility: CLINIC | Age: 79
End: 2019-04-04

## 2019-04-04 ENCOUNTER — OFFICE VISIT (OUTPATIENT)
Dept: FAMILY MEDICINE | Facility: CLINIC | Age: 79
End: 2019-04-04
Payer: MEDICARE

## 2019-04-04 VITALS
TEMPERATURE: 98 F | BODY MASS INDEX: 36.26 KG/M2 | SYSTOLIC BLOOD PRESSURE: 130 MMHG | WEIGHT: 197.06 LBS | DIASTOLIC BLOOD PRESSURE: 70 MMHG | OXYGEN SATURATION: 95 % | HEART RATE: 65 BPM | HEIGHT: 62 IN

## 2019-04-04 DIAGNOSIS — M79.2 NEURALGIA: Primary | ICD-10-CM

## 2019-04-04 PROCEDURE — 1101F PR PT FALLS ASSESS DOC 0-1 FALLS W/OUT INJ PAST YR: ICD-10-PCS | Mod: CPTII,S$GLB,, | Performed by: FAMILY MEDICINE

## 2019-04-04 PROCEDURE — 1101F PT FALLS ASSESS-DOCD LE1/YR: CPT | Mod: CPTII,S$GLB,, | Performed by: FAMILY MEDICINE

## 2019-04-04 PROCEDURE — 3075F SYST BP GE 130 - 139MM HG: CPT | Mod: CPTII,S$GLB,, | Performed by: FAMILY MEDICINE

## 2019-04-04 PROCEDURE — 3078F PR MOST RECENT DIASTOLIC BLOOD PRESSURE < 80 MM HG: ICD-10-PCS | Mod: CPTII,S$GLB,, | Performed by: FAMILY MEDICINE

## 2019-04-04 PROCEDURE — 3078F DIAST BP <80 MM HG: CPT | Mod: CPTII,S$GLB,, | Performed by: FAMILY MEDICINE

## 2019-04-04 PROCEDURE — 99213 OFFICE O/P EST LOW 20 MIN: CPT | Mod: S$GLB,,, | Performed by: FAMILY MEDICINE

## 2019-04-04 PROCEDURE — 3075F PR MOST RECENT SYSTOLIC BLOOD PRESS GE 130-139MM HG: ICD-10-PCS | Mod: CPTII,S$GLB,, | Performed by: FAMILY MEDICINE

## 2019-04-04 PROCEDURE — 99213 PR OFFICE/OUTPT VISIT, EST, LEVL III, 20-29 MIN: ICD-10-PCS | Mod: S$GLB,,, | Performed by: FAMILY MEDICINE

## 2019-04-04 RX ORDER — GABAPENTIN 300 MG/1
600 CAPSULE ORAL 2 TIMES DAILY
Qty: 360 CAPSULE | Refills: 3 | Status: SHIPPED | OUTPATIENT
Start: 2019-04-04 | End: 2020-04-03

## 2019-04-04 RX ORDER — METHYLPREDNISOLONE 4 MG/1
TABLET ORAL
Qty: 1 PACKAGE | Refills: 0 | Status: SHIPPED | OUTPATIENT
Start: 2019-04-04 | End: 2019-08-23

## 2019-04-04 NOTE — TELEPHONE ENCOUNTER
----- Message from Kraig Brown sent at 4/4/2019  3:16 PM CDT -----  Contact: [phone]  Type:  Patient Returning Call    Who Called: Pt    Who Left Message for Patient:Citlaly    Does the patient know what this is regarding?:    Would the patient rather a call back or a response via [facility]? Call back    Best Call Back Number: [phone]    Additional Information:  N/A

## 2019-04-04 NOTE — TELEPHONE ENCOUNTER
----- Message from Kraig Brown sent at 4/4/2019  3:16 PM CDT -----  Contact: [phone]  Type:  Patient Returning Call    Who Called: Pt    Who Left Message for Patient:Citlaly    Does the patient know what this is regarding?:    Would the patient rather a call back or a response via [facility]? Call back    Best Call Back Number: [phone]    Additional Information:  N/A

## 2019-04-04 NOTE — PROGRESS NOTES
Subjective:      Patient ID: Mis Ca is a 78 y.o. female.    Chief Complaint: Neck Pain      HPI   Neck, scalp pain of left since taking care of dying brother for 2 months; has been to urgent care; no trauma; right side ok; no weak or paralyzed    Review of Systems   Constitutional: Negative.    HENT: Negative.    Respiratory: Negative.    Cardiovascular: Negative.    Gastrointestinal: Negative.    Endocrine: Negative.    Genitourinary: Negative.    Musculoskeletal: Positive for neck pain and neck stiffness.   Neurological: Positive for headaches.   Psychiatric/Behavioral: Negative.    All other systems reviewed and are negative.    Objective:     Physical Exam   Constitutional: She is oriented to person, place, and time. She appears well-developed and well-nourished.   HENT:   Head: Normocephalic.   Eyes: Pupils are equal, round, and reactive to light. Conjunctivae and EOM are normal.   Neck: Normal range of motion. Neck supple.   Cardiovascular: Normal rate, regular rhythm and normal heart sounds.   Pulmonary/Chest: Effort normal and breath sounds normal.   Musculoskeletal:   dminished ROM C spine   Neurological: She is alert and oriented to person, place, and time. She has normal reflexes.   Skin: Skin is warm and dry.   Psychiatric: She has a normal mood and affect. Her behavior is normal. Judgment and thought content normal.   Nursing note and vitals reviewed.    Assessment:     1. Neuralgia      Plan:        Medication List           Accurate as of 4/4/19  5:08 PM. If you have any questions, ask your nurse or doctor.               CONTINUE taking these medications    acetaminophen 500 MG tablet  Commonly known as:  TYLENOL     ALPRAZolam 0.25 MG tablet  Commonly known as:  XANAX  Take 1 tablet (0.25 mg total) by mouth once daily.     aspirin 81 MG EC tablet  Commonly known as:  ECOTRIN     atorvastatin 40 MG tablet  Commonly known as:  LIPITOR  Take 1 tablet (40 mg total) by mouth once daily.      CARTIA  MG 24 hr capsule  Generic drug:  diltiaZEM  TAKE 1 CAPSULE ONCE DAILY.     cyanocobalamin 1,000 mcg/mL injection  Commonly known as:  VITAMIN B-12  1 cc IM weekly for 4 weeks, then monthly     gabapentin 300 MG capsule  Commonly known as:  NEURONTIN  Take 1 capsule (300 mg total) by mouth 2 (two) times daily.     hydroCHLOROthiazide 12.5 mg capsule  Commonly known as:  MICROZIDE  TAKE 1 CAPSULE EVERY DAY     * latanoprost 0.005 % ophthalmic solution     * latanoprost 0.005 % ophthalmic solution     levocetirizine 5 MG tablet  Commonly known as:  XYZAL  Take 1 tablet (5 mg total) by mouth every evening. for 14 days     losartan 100 MG tablet  Commonly known as:  COZAAR  TAKE 1/2 TABLET EVERY DAY     omeprazole 20 MG capsule  Commonly known as:  PRILOSEC  TAKE 1 CAPSULE EVERY DAY     ONE DAILY MULTIVITAMIN per tablet  Generic drug:  multivitamin     oxybutynin 5 MG Tab  Commonly known as:  DITROPAN  TAKE 1 TABLET EVERY DAY     pramipexole 0.25 MG tablet  Commonly known as:  MIRAPEX  TAKE 1 TABLET EVERY DAY     prednisoLONE acetate 1 % Drps  Commonly known as:  PRED FORTE     timolol maleate 0.5% 0.5 % Drop  Commonly known as:  TIMOPTIC     warfarin 6 MG tablet  Commonly known as:  COUMADIN  TAKE 1 TABLET EVERY DAY         * This list has 2 medication(s) that are the same as other medications prescribed for you. Read the directions carefully, and ask your doctor or other care provider to review them with you.            STOP taking these medications    cholecalciferol (vitamin D3) 50,000 unit Tab  Stopped by:  Scott Dillard MD          Neuralgia  Comments:  C2    heat, mary 600 bid, medrol dospak, c spine xray  Already has percocet from foot surgery  If persists, need MRI c spine

## 2019-04-05 ENCOUNTER — HOSPITAL ENCOUNTER (OUTPATIENT)
Dept: RADIOLOGY | Facility: HOSPITAL | Age: 79
Discharge: HOME | End: 2019-04-05
Attending: FAMILY MEDICINE
Payer: MEDICARE

## 2019-04-05 DIAGNOSIS — M79.2 NEURALGIA: ICD-10-CM

## 2019-04-05 PROCEDURE — 72050 X-RAY EXAM NECK SPINE 4/5VWS: CPT | Mod: TC,HCNC,FY,PO

## 2019-04-08 LAB
LEFT EYE DM RETINOPATHY: NEGATIVE
RIGHT EYE DM RETINOPATHY: NEGATIVE

## 2019-04-18 ENCOUNTER — CLINICAL SUPPORT (OUTPATIENT)
Dept: FAMILY MEDICINE | Facility: CLINIC | Age: 79
End: 2019-04-18
Payer: MEDICARE

## 2019-04-18 DIAGNOSIS — D64.9 ANEMIA, UNSPECIFIED TYPE: Primary | ICD-10-CM

## 2019-04-18 DIAGNOSIS — I48.0 PAROXYSMAL ATRIAL FIBRILLATION: ICD-10-CM

## 2019-04-18 LAB — INR PPP: 2.6 (ref 2–3)

## 2019-04-18 PROCEDURE — 96372 PR INJECTION,THERAP/PROPH/DIAG2ST, IM OR SUBCUT: ICD-10-PCS | Mod: S$GLB,,, | Performed by: FAMILY MEDICINE

## 2019-04-18 PROCEDURE — 85610 POCT INR: ICD-10-PCS | Mod: QW,,, | Performed by: FAMILY MEDICINE

## 2019-04-18 PROCEDURE — 96372 THER/PROPH/DIAG INJ SC/IM: CPT | Mod: S$GLB,,, | Performed by: FAMILY MEDICINE

## 2019-04-18 PROCEDURE — 85610 PROTHROMBIN TIME: CPT | Mod: QW,,, | Performed by: FAMILY MEDICINE

## 2019-04-18 RX ORDER — CYANOCOBALAMIN 1000 UG/ML
1000 INJECTION, SOLUTION INTRAMUSCULAR; SUBCUTANEOUS ONCE
Status: COMPLETED | OUTPATIENT
Start: 2019-04-18 | End: 2019-04-18

## 2019-04-18 RX ADMIN — CYANOCOBALAMIN 1000 MCG: 1000 INJECTION, SOLUTION INTRAMUSCULAR; SUBCUTANEOUS at 11:04

## 2019-04-18 NOTE — PROGRESS NOTES
Pt comes in for b12 injection. Pt supplied medication. Right dose, right route, right medication all verified. 2 pt identifier used. Instructed to wait 15 mins following injection. No pain or redness at injections site. Given URG       Patient is here for INR check  Patient is currently taking 6mg everyday and 9mg on Friday of coumadin  Patient denies any changes in diet, pt denies any missed doses, and pt denies any signs of bleeding.  INR is 2.6  Patient was instructed to take current dose of coumadin.  Patient was instructed to return to the clinic in 1 month to recheck INR.    Lab Results       Component                Value               Date                       INR                      2.7                 03/19/2019                 INR                      2.0                 02/19/2019                 INR                      2.7                 01/22/2019                 INR                      2.3                 12/12/2018                 INR                      1.8                 11/15/2018

## 2019-04-29 RX ORDER — OXYBUTYNIN CHLORIDE 5 MG/1
5 TABLET ORAL DAILY
Qty: 90 TABLET | Refills: 1 | Status: SHIPPED | OUTPATIENT
Start: 2019-04-29 | End: 2019-10-03 | Stop reason: SDUPTHER

## 2019-04-29 RX ORDER — WARFARIN 6 MG/1
TABLET ORAL
Qty: 90 TABLET | Refills: 3 | Status: SHIPPED | OUTPATIENT
Start: 2019-04-29 | End: 2020-05-18

## 2019-04-29 RX ORDER — ALPRAZOLAM 0.25 MG/1
0.25 TABLET ORAL DAILY
Qty: 90 TABLET | Refills: 0 | Status: SHIPPED | OUTPATIENT
Start: 2019-04-29 | End: 2019-06-03 | Stop reason: SDUPTHER

## 2019-04-29 RX ORDER — ATORVASTATIN CALCIUM 40 MG/1
40 TABLET, FILM COATED ORAL DAILY
Qty: 90 TABLET | Refills: 5 | Status: SHIPPED | OUTPATIENT
Start: 2019-04-29 | End: 2020-05-11

## 2019-04-29 NOTE — TELEPHONE ENCOUNTER
----- Message from Joanna Mccain sent at 4/29/2019  4:29 PM CDT -----  Patient requesting new Rx to go to [facility] mail order. 90 day supply with refills    Atorvastatin 40mg  Alprazolam 0.25mg  Coumadin 6mg  Oxybutinin 5mg

## 2019-05-17 ENCOUNTER — CLINICAL SUPPORT (OUTPATIENT)
Dept: FAMILY MEDICINE | Facility: CLINIC | Age: 79
End: 2019-05-17
Payer: MEDICARE

## 2019-05-17 DIAGNOSIS — D64.9 ANEMIA, UNSPECIFIED TYPE: Primary | ICD-10-CM

## 2019-05-17 DIAGNOSIS — I48.0 PAROXYSMAL ATRIAL FIBRILLATION: ICD-10-CM

## 2019-05-17 LAB — INR PPP: 2.3 (ref 2–3)

## 2019-05-17 PROCEDURE — 96372 PR INJECTION,THERAP/PROPH/DIAG2ST, IM OR SUBCUT: ICD-10-PCS | Mod: S$GLB,,, | Performed by: FAMILY MEDICINE

## 2019-05-17 PROCEDURE — 96372 THER/PROPH/DIAG INJ SC/IM: CPT | Mod: S$GLB,,, | Performed by: FAMILY MEDICINE

## 2019-05-17 PROCEDURE — 85610 POCT INR: ICD-10-PCS | Mod: QW,,, | Performed by: FAMILY MEDICINE

## 2019-05-17 PROCEDURE — 85610 PROTHROMBIN TIME: CPT | Mod: QW,,, | Performed by: FAMILY MEDICINE

## 2019-05-17 RX ORDER — CYANOCOBALAMIN 1000 UG/ML
1000 INJECTION, SOLUTION INTRAMUSCULAR; SUBCUTANEOUS
Status: DISCONTINUED | OUTPATIENT
Start: 2019-05-17 | End: 2020-11-11

## 2019-05-17 RX ADMIN — CYANOCOBALAMIN 1000 MCG: 1000 INJECTION, SOLUTION INTRAMUSCULAR; SUBCUTANEOUS at 11:05

## 2019-05-20 ENCOUNTER — OFFICE VISIT (OUTPATIENT)
Dept: PODIATRY | Facility: CLINIC | Age: 79
End: 2019-05-20
Payer: MEDICARE

## 2019-05-20 VITALS
DIASTOLIC BLOOD PRESSURE: 75 MMHG | WEIGHT: 197 LBS | HEIGHT: 62 IN | BODY MASS INDEX: 36.25 KG/M2 | HEART RATE: 63 BPM | SYSTOLIC BLOOD PRESSURE: 134 MMHG

## 2019-05-20 DIAGNOSIS — T84.84XA PAINFUL ORTHOPAEDIC HARDWARE: Primary | ICD-10-CM

## 2019-05-20 PROCEDURE — 1101F PR PT FALLS ASSESS DOC 0-1 FALLS W/OUT INJ PAST YR: ICD-10-PCS | Mod: HCNC,CPTII,S$GLB, | Performed by: PODIATRIST

## 2019-05-20 PROCEDURE — 3078F DIAST BP <80 MM HG: CPT | Mod: HCNC,CPTII,S$GLB, | Performed by: PODIATRIST

## 2019-05-20 PROCEDURE — 3075F PR MOST RECENT SYSTOLIC BLOOD PRESS GE 130-139MM HG: ICD-10-PCS | Mod: HCNC,CPTII,S$GLB, | Performed by: PODIATRIST

## 2019-05-20 PROCEDURE — 99999 PR PBB SHADOW E&M-EST. PATIENT-LVL IV: ICD-10-PCS | Mod: PBBFAC,HCNC,, | Performed by: PODIATRIST

## 2019-05-20 PROCEDURE — 99499 RISK ADDL DX/OHS AUDIT: ICD-10-PCS | Mod: HCNC,S$GLB,, | Performed by: PODIATRIST

## 2019-05-20 PROCEDURE — 99213 OFFICE O/P EST LOW 20 MIN: CPT | Mod: HCNC,S$GLB,, | Performed by: PODIATRIST

## 2019-05-20 PROCEDURE — 3078F PR MOST RECENT DIASTOLIC BLOOD PRESSURE < 80 MM HG: ICD-10-PCS | Mod: HCNC,CPTII,S$GLB, | Performed by: PODIATRIST

## 2019-05-20 PROCEDURE — 99499 UNLISTED E&M SERVICE: CPT | Mod: HCNC,S$GLB,, | Performed by: PODIATRIST

## 2019-05-20 PROCEDURE — 99213 PR OFFICE/OUTPT VISIT, EST, LEVL III, 20-29 MIN: ICD-10-PCS | Mod: HCNC,S$GLB,, | Performed by: PODIATRIST

## 2019-05-20 PROCEDURE — 1101F PT FALLS ASSESS-DOCD LE1/YR: CPT | Mod: HCNC,CPTII,S$GLB, | Performed by: PODIATRIST

## 2019-05-20 PROCEDURE — 3075F SYST BP GE 130 - 139MM HG: CPT | Mod: HCNC,CPTII,S$GLB, | Performed by: PODIATRIST

## 2019-05-20 PROCEDURE — 99999 PR PBB SHADOW E&M-EST. PATIENT-LVL IV: CPT | Mod: PBBFAC,HCNC,, | Performed by: PODIATRIST

## 2019-05-20 NOTE — PROGRESS NOTES
"Subjective:      Patient ID: Mis Ca is a 78 y.o. female.    Chief Complaint: Follow-up (ct results of left foot pain )    Complains of a bunion deformity left foot. Pain aggravated by enclosed shoes. Denies trauma. Relates it has been present for many years. Wearing sandals today. Wears toe separators which help only a little.    10/5/18: Returns to review her xray and discuss surgical intervention for her painful bunion left foot. No new complaints.    11/8/18: Post EGR with Lapidus/akin bunionectomy left on 10/31/18. NWB left foot. Using rolling knee walker. Accompanied by sister and neighbor. Pain mild and well controlled.    11/30/18: 4 weeks postop. NWB left foot. Accompanied by her friends. Relates no pain today but gets intermittent aching to the foot.    12/7/18:  5 weeks postop. Recalls pain and a pop in her left leg a few days ago when putting weight down on the left foot. She has kept the boot on, rested and elevated. Pain improved since.    12/28/18: 8 weeks postop. No pain. Ambulating well with orthopedic boot.    5/20/19: 4 months post op.  No pain to surgical, ambulating well in shoes.  Complains of pain to midfoot that happened when she injured her foot while in boot shortly after surgery.  Pain worse with activity, better with rest. Walks with a limp due to pain.        Vitals:    05/20/19 1134   BP: 134/75   Pulse: 63   Weight: 89.4 kg (197 lb)   Height: 5' 2" (1.575 m)   PainSc:   3      Past Medical History:   Diagnosis Date    Anxiety     Atrial fibrillation     Blind right eye     Bradycardia     Cancer     skin cancer left side of face under eye    Hyperlipidemia     Hypertension     PVC (premature ventricular contraction)     RLS (restless legs syndrome)     Sleep apnea     Does not use CPAP machine.       Past Surgical History:   Procedure Laterality Date    ADENOIDECTOMY      APPENDECTOMY      BREAST BIOPSY Left     times two    BUNIONECTOMY, LAPIDUS Left 10/31/2018 "    Performed by Rud[facility] at [facility]    CATARACT EXTRACTION BILATERAL W/ ANTERIOR VITRECTOMY      EYE SURGERY Bilateral     cataracts extraction    EYE SURGERY Right     drainage tube (glaucoma)    FOOT SURGERY Left     lesion removed from dorsal area    FRACTURE SURGERY Left     arm    HAND TENDON SURGERY Left     HYSTERECTOMY      OOPHORECTOMY      ORIF FOREARM FRACTURE Left     OSTEOTOMY, AKIN Left 10/31/2018    Performed by Rud[facility]ozo [facility] at [facility] OR    PALATE SURGERY      Lesion removed    RECESSION, GASTROCNEMIUS, ENDOSCOPIC Left 10/31/2018    Performed by Rud[facility]ozo [facility] at [facility] OR    TONSILLECTOMY         Family History   Problem Relation Age of Onset    Aneurysm Mother         AAA    Cancer Father         lung cancer    Lung cancer Father     Cirrhosis Father     Cancer Sister         Breast    Diabetes Sister     Breast cancer Sister     Hypertension Sister     Hyperlipidemia Sister     Colon polyps Brother     Cancer Brother         lung cancer    Diabetes Brother     Hyperlipidemia Brother     Hypertension Brother     Cancer Brother         bladder cancer    Diabetes Brother     Glaucoma Brother     Heart disease Sister         Heart valve repair    Atrial fibrillation Sister     Diabetes Sister     Heart disease Sister     Heart attack Sister     Bipolar disorder Sister     Depression Sister     Glaucoma Sister     Diabetes Sister     Other Sister         Pituitary tumor    Arthritis Sister     COPD Sister     Heart disease Sister     Kidney disease Sister     Cancer Sister         lung cancer    Diabetes Sister     Lung cancer Sister     Heart attack Sister        Social History     Socioeconomic History    Marital status:      Spouse name: Not on file    Number of children: Not on file    Years of education: Not on file    Highest education level: Not on file   Occupational History    Not on file   Social Needs     Financial resource strain: Not on file    Food insecurity:     Worry: Not on file     Inability: Not on file    Transportation needs:     Medical: Not on file     Non-medical: Not on file   Tobacco Use    Smoking status: Never Smoker    Smokeless tobacco: Never Used   Substance and Sexual Activity    Alcohol use: Yes     Comment: Seldomly drinks alcohol (wine)    Drug use: No    Sexual activity: Not Currently     Partners: Male   Lifestyle    Physical activity:     Days per week: Not on file     Minutes per session: Not on file    Stress: Not on file   Relationships    Social connections:     Talks on phone: Not on file     Gets together: Not on file     Attends [employer] service: Not on file     Active member of club or organization: Not on file     Attends meetings of clubs or organizations: Not on file     Relationship status: Not on file   Other Topics Concern    Not on file   Social History Narrative    Not on file       Current Outpatient Medications   Medication Sig Dispense Refill    acetaminophen (TYLENOL) 500 MG tablet Take 1,000 mg by mouth 2 (two) times daily as needed for Pain.      ALPRAZolam (XANAX) 0.25 MG tablet Take 1 tablet (0.25 mg total) by mouth once daily. 90 tablet 0    aspirin (ECOTRIN) 81 MG EC tablet Take 81 mg by mouth once daily.        atorvastatin (LIPITOR) 40 MG tablet Take 1 tablet (40 mg total) by mouth once daily. 90 tablet 5    CARTIA  mg 24 hr capsule TAKE 1 CAPSULE ONCE DAILY. 90 capsule 3    cyanocobalamin (VITAMIN B-12) 1,000 mcg/mL injection 1 cc IM weekly for 4 weeks, then monthly 10 mL 1    gabapentin (NEURONTIN) 300 MG capsule Take 2 capsules (600 mg total) by mouth 2 (two) times daily. 360 capsule 3    hydroCHLOROthiazide (MICROZIDE) 12.5 mg capsule TAKE 1 CAPSULE EVERY DAY 90 capsule 3    latanoprost 0.005 % ophthalmic solution 1 drop every evening.      latanoprost 0.005 % ophthalmic solution latanoprost 0.005 % eye drops       levocetirizine (XYZAL) 5 MG tablet Take 1 tablet (5 mg total) by mouth every evening. for 14 days 14 tablet 1    losartan (COZAAR) 100 MG tablet TAKE 1/2 TABLET EVERY DAY 45 tablet 3    multivitamin (ONE DAILY MULTIVITAMIN) per tablet Take 1 tablet by mouth once daily.      omeprazole (PRILOSEC) 20 MG capsule TAKE 1 CAPSULE EVERY DAY 90 capsule 3    oxybutynin (DITROPAN) 5 MG Tab Take 1 tablet (5 mg total) by mouth once daily. 90 tablet 1    pramipexole (MIRAPEX) 0.25 MG tablet TAKE 1 TABLET EVERY DAY 90 tablet 3    prednisoLONE acetate (PRED FORTE) 1 % DrpS instill 1 drop in right eye four times a day every other day  0    timolol maleate 0.5% (TIMOPTIC) 0.5 % Drop Place 1 drop into both eyes once daily.  0    warfarin (COUMADIN) 6 MG tablet TAKE 1 TABLET EVERY DAY 90 tablet 3    methylPREDNISolone (MEDROL DOSEPACK) 4 mg tablet use as directed 1 Package 0     Current Facility-Administered Medications   Medication Dose Route Frequency Provider Last Rate Last Dose    cyanocobalamin injection 1,000 mcg  1,000 mcg Intramuscular Q30 Days Scott Dillard MD   1,000 mcg at 05/17/19 1115       Review of patient's allergies indicates:   Allergen Reactions    Penicillins     Sulfa (sulfonamide antibiotics)     Compazine [prochlorperazine edisylate] Anxiety         Review of Systems   Constitution: Negative for chills, fever and malaise/fatigue.   HENT: Negative for congestion.    Cardiovascular: Negative for chest pain, claudication and leg swelling.   Respiratory: Negative for cough and shortness of breath.    Skin: Negative for flushing, itching, poor wound healing and rash.   Musculoskeletal: Negative for back pain, joint pain, muscle cramps and muscle weakness.   Gastrointestinal: Negative for nausea and vomiting.   Neurological: Negative for numbness, paresthesias and weakness.   Psychiatric/Behavioral: Negative for altered mental status.           Objective:      Physical Exam   Constitutional: She is  oriented to person, place, and time. She appears well-developed and well-nourished. No distress.   Cardiovascular: Intact distal pulses.   Pulses:       Dorsalis pedis pulses are 2+ on the right side, and 2+ on the left side.        Posterior tibial pulses are 2+ on the right side, and 2+ on the left side.   CFT< 3 secs all toes bilateral foot, skin temp warm bilateral foot, no hair growth bilateral lower extremity, mild lower extremity edema with telangiectasias and varicosities bilateral.     Musculoskeletal: She exhibits edema and tenderness.   No pain with MMT or ROM left foot. Left hallux well aligned. 1 MTP left achieves > 45 deg DF.    Left foot achieves 5 deg DF with knee extended and > 10 deg with knee flexed. Right foot achieves 0 deg DF knee extended and 10 deg DF knee flexed.    No pain on palpation of the left leg, no pain with active resisted PF foot.    Mild collapse of the arch with standing bilateral foot.    No localized pain on palpation to the base of the 3rd metatarsal left foot.    Pain on palpation overlying the medial midfoot at the location of hardware plantar 1st tarsometatarsal arthrodesis site left foot.    Neurological: She is alert and oriented to person, place, and time. She has normal strength. No sensory deficit.   Skin: Skin is warm, dry and intact. Capillary refill takes less than 2 seconds. No ecchymosis and no rash noted. She is not diaphoretic. No cyanosis or erythema. No pallor. Nails show no clubbing.   Normal appearing scars left foot and leg.               Assessment:       Encounter Diagnosis   Name Primary?    Painful orthopaedic hardware Yes         Plan:       Mis was seen today for follow-up.    Diagnoses and all orders for this visit:    Painful orthopaedic hardware  -     X-Ray Foot Complete Left; Future      I counseled the patient on her conditions, their implications and medical management.    CT reviewed noting consolidated 1st tarsometatarsal arthrodesis  site.  No fracture identified of the 3rd metatarsal.    Continue activity to tolerance in tennis shoes.    Discussed possible hardware removal with the patient. She would prefer to monitor at this time.    Return to clinic within 6 months or as needed.

## 2019-05-29 ENCOUNTER — TELEPHONE (OUTPATIENT)
Dept: FAMILY MEDICINE | Facility: CLINIC | Age: 79
End: 2019-05-29

## 2019-05-29 NOTE — TELEPHONE ENCOUNTER
----- Message from Ana Vasquez sent at 5/29/2019  8:45 AM CDT -----  Contact: [phone]/self  Type:  Same Day Appointment Request    Caller is requesting a same day appointment.  Caller declined first available appointment listed below.    Name of Caller:self  When is the first available appointment? 06/11  Symptoms:Pain in neck and left hand problems. Fluctuating BP   Best Call Back Number:  Additional Information: Only [facility]

## 2019-05-30 ENCOUNTER — PES CALL (OUTPATIENT)
Dept: ADMINISTRATIVE | Facility: CLINIC | Age: 79
End: 2019-05-30

## 2019-06-03 ENCOUNTER — OFFICE VISIT (OUTPATIENT)
Dept: FAMILY MEDICINE | Facility: CLINIC | Age: 79
End: 2019-06-03
Payer: MEDICARE

## 2019-06-03 VITALS
DIASTOLIC BLOOD PRESSURE: 88 MMHG | SYSTOLIC BLOOD PRESSURE: 138 MMHG | BODY MASS INDEX: 34.08 KG/M2 | OXYGEN SATURATION: 96 % | TEMPERATURE: 98 F | HEIGHT: 64 IN | WEIGHT: 199.63 LBS | HEART RATE: 65 BPM

## 2019-06-03 DIAGNOSIS — E78.2 MIXED HYPERLIPIDEMIA: ICD-10-CM

## 2019-06-03 DIAGNOSIS — I10 ESSENTIAL HYPERTENSION: ICD-10-CM

## 2019-06-03 DIAGNOSIS — M54.2 NECK PAIN: ICD-10-CM

## 2019-06-03 DIAGNOSIS — I70.0 AORTIC ATHEROSCLEROSIS: ICD-10-CM

## 2019-06-03 DIAGNOSIS — G25.81 RESTLESS LEG SYNDROME: ICD-10-CM

## 2019-06-03 DIAGNOSIS — I48.0 PAROXYSMAL ATRIAL FIBRILLATION: ICD-10-CM

## 2019-06-03 DIAGNOSIS — I51.89 DIASTOLIC DYSFUNCTION: ICD-10-CM

## 2019-06-03 DIAGNOSIS — Z78.0 POST-MENOPAUSAL: ICD-10-CM

## 2019-06-03 DIAGNOSIS — I48.91 ATRIAL FIBRILLATION WITH RVR: ICD-10-CM

## 2019-06-03 DIAGNOSIS — R13.19 ESOPHAGEAL DYSPHAGIA: ICD-10-CM

## 2019-06-03 DIAGNOSIS — F41.9 ANXIETY: ICD-10-CM

## 2019-06-03 DIAGNOSIS — M67.40 GANGLION CYST: ICD-10-CM

## 2019-06-03 DIAGNOSIS — K57.30 DIVERTICULOSIS OF LARGE INTESTINE WITHOUT HEMORRHAGE: Primary | Chronic | ICD-10-CM

## 2019-06-03 DIAGNOSIS — M19.032 ARTHRITIS OF BOTH WRISTS: ICD-10-CM

## 2019-06-03 DIAGNOSIS — K21.9 GASTROESOPHAGEAL REFLUX DISEASE WITHOUT ESOPHAGITIS: ICD-10-CM

## 2019-06-03 DIAGNOSIS — M19.031 ARTHRITIS OF BOTH WRISTS: ICD-10-CM

## 2019-06-03 DIAGNOSIS — M79.2 NEURALGIA: ICD-10-CM

## 2019-06-03 DIAGNOSIS — Z86.010 HISTORY OF ADENOMATOUS POLYP OF COLON: ICD-10-CM

## 2019-06-03 PROCEDURE — 99499 UNLISTED E&M SERVICE: CPT | Mod: S$GLB,,, | Performed by: FAMILY MEDICINE

## 2019-06-03 PROCEDURE — 99214 PR OFFICE/OUTPT VISIT, EST, LEVL IV, 30-39 MIN: ICD-10-PCS | Mod: S$GLB,,, | Performed by: FAMILY MEDICINE

## 2019-06-03 PROCEDURE — 3079F DIAST BP 80-89 MM HG: CPT | Mod: CPTII,S$GLB,, | Performed by: FAMILY MEDICINE

## 2019-06-03 PROCEDURE — 99499 RISK ADDL DX/OHS AUDIT: ICD-10-PCS | Mod: S$GLB,,, | Performed by: FAMILY MEDICINE

## 2019-06-03 PROCEDURE — 1101F PT FALLS ASSESS-DOCD LE1/YR: CPT | Mod: CPTII,S$GLB,, | Performed by: FAMILY MEDICINE

## 2019-06-03 PROCEDURE — 1101F PR PT FALLS ASSESS DOC 0-1 FALLS W/OUT INJ PAST YR: ICD-10-PCS | Mod: CPTII,S$GLB,, | Performed by: FAMILY MEDICINE

## 2019-06-03 PROCEDURE — 3079F PR MOST RECENT DIASTOLIC BLOOD PRESSURE 80-89 MM HG: ICD-10-PCS | Mod: CPTII,S$GLB,, | Performed by: FAMILY MEDICINE

## 2019-06-03 PROCEDURE — 3075F SYST BP GE 130 - 139MM HG: CPT | Mod: CPTII,S$GLB,, | Performed by: FAMILY MEDICINE

## 2019-06-03 PROCEDURE — 99214 OFFICE O/P EST MOD 30 MIN: CPT | Mod: S$GLB,,, | Performed by: FAMILY MEDICINE

## 2019-06-03 PROCEDURE — 3075F PR MOST RECENT SYSTOLIC BLOOD PRESS GE 130-139MM HG: ICD-10-PCS | Mod: CPTII,S$GLB,, | Performed by: FAMILY MEDICINE

## 2019-06-03 RX ORDER — PRAMIPEXOLE DIHYDROCHLORIDE 0.25 MG/1
0.25 TABLET ORAL 2 TIMES DAILY
Qty: 180 TABLET | Refills: 1 | Status: SHIPPED | OUTPATIENT
Start: 2019-06-03 | End: 2019-06-03

## 2019-06-03 RX ORDER — ALPRAZOLAM 0.25 MG/1
0.25 TABLET ORAL DAILY
Qty: 90 TABLET | Refills: 1 | Status: SHIPPED | OUTPATIENT
Start: 2019-06-03 | End: 2019-07-31 | Stop reason: SDUPTHER

## 2019-06-03 RX ORDER — PRAMIPEXOLE DIHYDROCHLORIDE 0.5 MG/1
0.5 TABLET ORAL 2 TIMES DAILY
Qty: 180 TABLET | Refills: 3 | Status: SHIPPED | OUTPATIENT
Start: 2019-06-03 | End: 2019-08-30

## 2019-06-03 NOTE — PROGRESS NOTES
Subjective:      Patient ID: Mis Ca is a 78 y.o. female.    Chief Complaint: Neck Pain; Hand Pain; and food been getting caught in throat      HPI   Pain left side head, ear and neck 3 weeks ago, better today  Was doing physical work caring for a dying sister in law  Will get MRI c spine  Also food gets stuck in throat, getting sworse, been quite a while    Had EGD with Dr Rizo few years ago    Both thumbs and wrist; had surgery on left years ago with ruby Rodríguez, plate    bp drops and feelsheavy and weak arms  Also gets high numbers; getting low numbers  Has 3 bp meds; cartia, hctz and losartan 50  Will stop hctz, doesn't have CHF; echo reviewed; she doesn't drink much water, it si summer, so stopping hctz for now  Restless durign day and night  Don't want to stay still, if sitting, uncomfortable, feels like needs to move them          Review of Systems   Constitutional: Negative.    HENT: Negative.    Respiratory: Negative.    Cardiovascular: Negative.    Gastrointestinal: Negative.    Endocrine: Negative.    Genitourinary: Negative.    Musculoskeletal: Negative.    Psychiatric/Behavioral: Negative.    All other systems reviewed and are negative.    Objective:     Physical Exam   Constitutional: She is oriented to person, place, and time. She appears well-developed and well-nourished.   HENT:   Head: Normocephalic.   Eyes: Pupils are equal, round, and reactive to light. Conjunctivae and EOM are normal.   Neck: Normal range of motion. Neck supple.   Cardiovascular: Normal rate, regular rhythm and normal heart sounds.   Pulmonary/Chest: Effort normal and breath sounds normal.   Musculoskeletal: Normal range of motion.   Neurological: She is alert and oriented to person, place, and time. She has normal reflexes.   Skin: Skin is warm and dry.   Psychiatric: She has a normal mood and affect. Her behavior is normal. Judgment and thought content normal.   Nursing note and vitals reviewed.    Assessment:      1. Diverticulosis of large intestine without hemorrhage    2. History of adenomatous polyp of colon    3. Esophageal dysphagia    4. Neuralgia    5. Neck pain    6. Arthritis of both wrists    7. Ganglion cyst    8. Paroxysmal atrial fibrillation    9. Mixed hyperlipidemia    10. Essential hypertension    11. Atrial fibrillation with RVR    12. Diastolic dysfunction    13. Aortic atherosclerosis    14. Restless leg syndrome    15. Anxiety    16. Gastroesophageal reflux disease without esophagitis    17. Post-menopausal      Plan:        Medication List           Accurate as of 6/3/19 10:27 AM. If you have any questions, ask your nurse or doctor.               CHANGE how you take these medications    pramipexole 0.5 MG tablet  Commonly known as:  MIRAPEX  Take 1 tablet (0.5 mg total) by mouth 2 (two) times daily.  What changed:    · medication strength  · how much to take  · when to take this  Changed by:  Scott Dillard MD        CONTINUE taking these medications    acetaminophen 500 MG tablet  Commonly known as:  TYLENOL     ALPRAZolam 0.25 MG tablet  Commonly known as:  XANAX  Take 1 tablet (0.25 mg total) by mouth once daily.     aspirin 81 MG EC tablet  Commonly known as:  ECOTRIN     atorvastatin 40 MG tablet  Commonly known as:  LIPITOR  Take 1 tablet (40 mg total) by mouth once daily.     CARTIA  MG 24 hr capsule  Generic drug:  diltiaZEM  TAKE 1 CAPSULE ONCE DAILY.     cyanocobalamin 1,000 mcg/mL injection  Commonly known as:  VITAMIN B-12  1 cc IM weekly for 4 weeks, then monthly     gabapentin 300 MG capsule  Commonly known as:  NEURONTIN  Take 2 capsules (600 mg total) by mouth 2 (two) times daily.     * latanoprost 0.005 % ophthalmic solution     * latanoprost 0.005 % ophthalmic solution     levocetirizine 5 MG tablet  Commonly known as:  XYZAL  Take 1 tablet (5 mg total) by mouth every evening. for 14 days     losartan 100 MG tablet  Commonly known as:  COZAAR  TAKE 1/2 TABLET EVERY DAY      methylPREDNISolone 4 mg tablet  Commonly known as:  MEDROL DOSEPACK  use as directed     omeprazole 20 MG capsule  Commonly known as:  PRILOSEC  TAKE 1 CAPSULE EVERY DAY     ONE DAILY MULTIVITAMIN per tablet  Generic drug:  multivitamin     oxybutynin 5 MG Tab  Commonly known as:  DITROPAN  Take 1 tablet (5 mg total) by mouth once daily.     prednisoLONE acetate 1 % Drps  Commonly known as:  PRED FORTE     timolol maleate 0.5% 0.5 % Drop  Commonly known as:  TIMOPTIC     warfarin 6 MG tablet  Commonly known as:  COUMADIN  TAKE 1 TABLET EVERY DAY         * This list has 2 medication(s) that are the same as other medications prescribed for you. Read the directions carefully, and ask your doctor or other care provider to review them with you.            STOP taking these medications    hydroCHLOROthiazide 12.5 mg capsule  Commonly known as:  MICROZIDE  Stopped by:  Scott Dillard MD           Where to Get Your Medications      [facility] [address]  [address] [place] [address]    Phone:  [phone]   · ALPRAZolam 0.25 MG tablet  · pramipexole 0.5 MG tablet       Diverticulosis of large intestine without hemorrhage  -     Ambulatory referral to Gastroenterology    History of adenomatous polyp of colon  -     Ambulatory referral to Gastroenterology    Esophageal dysphagia  -     Ambulatory referral to Gastroenterology    Neuralgia    Neck pain  -     MRI Cervical Spine Without Contrast; Future; Expected date: 06/03/2019    Arthritis of both wrists  -     Ambulatory referral to Orthopedics    Ganglion cyst  -     Ambulatory referral to Orthopedics    Paroxysmal atrial fibrillation    Mixed hyperlipidemia    Essential hypertension    Atrial fibrillation with RVR    Diastolic dysfunction    Aortic atherosclerosis    Restless leg syndrome    Anxiety    Gastroesophageal reflux disease without esophagitis    Post-menopausal  -     DXA  Bone Density Spine And Hip; Future; Expected date: 06/03/2019    Other orders  -     Discontinue: pramipexole (MIRAPEX) 0.25 MG tablet; Take 1 tablet (0.25 mg total) by mouth 2 (two) times daily.  Dispense: 180 tablet; Refill: 1  -     pramipexole (MIRAPEX) 0.5 MG tablet; Take 1 tablet (0.5 mg total) by mouth 2 (two) times daily.  Dispense: 180 tablet; Refill: 3  -     ALPRAZolam (XANAX) 0.25 MG tablet; Take 1 tablet (0.25 mg total) by mouth once daily.  Dispense: 90 tablet; Refill: 1

## 2019-06-05 ENCOUNTER — HOSPITAL ENCOUNTER (OUTPATIENT)
Dept: RADIOLOGY | Facility: HOSPITAL | Age: 79
Discharge: HOME | End: 2019-06-05
Attending: FAMILY MEDICINE
Payer: MEDICARE

## 2019-06-05 DIAGNOSIS — M54.2 NECK PAIN: ICD-10-CM

## 2019-06-05 DIAGNOSIS — Z78.0 POST-MENOPAUSAL: ICD-10-CM

## 2019-06-05 PROCEDURE — 72141 MRI NECK SPINE W/O DYE: CPT | Mod: TC,HCNC,PO

## 2019-06-05 PROCEDURE — 77080 DXA BONE DENSITY AXIAL: CPT | Mod: TC,HCNC,PO

## 2019-06-07 ENCOUNTER — TELEPHONE (OUTPATIENT)
Dept: FAMILY MEDICINE | Facility: CLINIC | Age: 79
End: 2019-06-07

## 2019-06-07 NOTE — TELEPHONE ENCOUNTER
----- Message from Scott Dillard MD sent at 6/7/2019  1:47 PM CDT -----  Neck MRI pinched nerves, worn discs and arthritis; no surgery needed    DEXA: Osteopenia; no change from 3 years ago; no new medicines

## 2019-06-14 ENCOUNTER — CLINICAL SUPPORT (OUTPATIENT)
Dept: FAMILY MEDICINE | Facility: CLINIC | Age: 79
End: 2019-06-14
Payer: MEDICARE

## 2019-06-14 DIAGNOSIS — D64.9 ANEMIA, UNSPECIFIED TYPE: ICD-10-CM

## 2019-06-14 DIAGNOSIS — I48.0 PAROXYSMAL ATRIAL FIBRILLATION: Primary | ICD-10-CM

## 2019-06-14 DIAGNOSIS — I48.91 ATRIAL FIBRILLATION WITH RVR: ICD-10-CM

## 2019-06-14 LAB — INR PPP: 2.4 (ref 2–3)

## 2019-06-14 PROCEDURE — 85610 PROTHROMBIN TIME: CPT | Mod: QW,,, | Performed by: FAMILY MEDICINE

## 2019-06-14 PROCEDURE — 96372 PR INJECTION,THERAP/PROPH/DIAG2ST, IM OR SUBCUT: ICD-10-PCS | Mod: S$GLB,,, | Performed by: FAMILY MEDICINE

## 2019-06-14 PROCEDURE — 85610 POCT INR: ICD-10-PCS | Mod: QW,,, | Performed by: FAMILY MEDICINE

## 2019-06-14 PROCEDURE — 96372 THER/PROPH/DIAG INJ SC/IM: CPT | Mod: S$GLB,,, | Performed by: FAMILY MEDICINE

## 2019-06-14 RX ORDER — CYANOCOBALAMIN 1000 UG/ML
1000 INJECTION, SOLUTION INTRAMUSCULAR; SUBCUTANEOUS ONCE
Status: COMPLETED | OUTPATIENT
Start: 2019-06-14 | End: 2019-06-14

## 2019-06-14 RX ADMIN — CYANOCOBALAMIN 1000 MCG: 1000 INJECTION, SOLUTION INTRAMUSCULAR; SUBCUTANEOUS at 09:06

## 2019-06-14 NOTE — PROGRESS NOTES
Pt comes in for b12 injection. Pt supplied medication. Right dose, right route, right medication all verified. 2 pt identifier used. Instructed to wait 15 mins following injection. No pain or redness at injections site. Given URG         Patient is here for INR check  Patient is currently taking 6mg of coumadin every day but 9mg on friday of coumadin  Patient denies any changes in diet, pt denies any missed doses, and pt denies any signs of bleeding.  INR is 2.4  Patient was instructed to take current dose of coumadin.  Patient was instructed to return to the clinic in one month to recheck INR.      Lab Results       Component                Value               Date                       INR                      2.3                 05/17/2019                 INR                      2.6                 04/18/2019                 INR                      2.7                 03/19/2019                 INR                      2.0                 02/19/2019                 INR                      2.7                 01/22/2019

## 2019-06-17 ENCOUNTER — TELEPHONE (OUTPATIENT)
Dept: UROLOGY | Facility: CLINIC | Age: 79
End: 2019-06-17

## 2019-06-17 NOTE — TELEPHONE ENCOUNTER
----- Message from Mamta Kilgore sent at 6/17/2019  1:58 PM CDT -----  Contact: self/[phone]  She is returning Giovanna's call.   PATIENT STATES SHE HAS FELT BAD THE PAST WEEK, STATES TONIGHT SHE STARTED HAVING ABDOMINAL PAIN, COMPLAINS OF NAUSEA, STATES NO VOMITING.      Freda Garnica RN  06/02/17 0027

## 2019-06-18 NOTE — TELEPHONE ENCOUNTER
Patient states she missed a call on 06/07 from office; patient advised of MRI & Bone density results.

## 2019-07-15 ENCOUNTER — CLINICAL SUPPORT (OUTPATIENT)
Dept: FAMILY MEDICINE | Facility: CLINIC | Age: 79
End: 2019-07-15
Payer: MEDICARE

## 2019-07-15 DIAGNOSIS — D64.9 ANEMIA, UNSPECIFIED TYPE: ICD-10-CM

## 2019-07-15 DIAGNOSIS — I48.91 ATRIAL FIBRILLATION WITH RVR: ICD-10-CM

## 2019-07-15 DIAGNOSIS — I48.0 PAROXYSMAL ATRIAL FIBRILLATION: Primary | ICD-10-CM

## 2019-07-15 LAB — INR PPP: 2.5 (ref 2–3)

## 2019-07-15 PROCEDURE — 96372 PR INJECTION,THERAP/PROPH/DIAG2ST, IM OR SUBCUT: ICD-10-PCS | Mod: S$GLB,,, | Performed by: FAMILY MEDICINE

## 2019-07-15 PROCEDURE — 85610 PROTHROMBIN TIME: CPT | Mod: QW,,, | Performed by: FAMILY MEDICINE

## 2019-07-15 PROCEDURE — 85610 POCT INR: ICD-10-PCS | Mod: QW,,, | Performed by: FAMILY MEDICINE

## 2019-07-15 PROCEDURE — 96372 THER/PROPH/DIAG INJ SC/IM: CPT | Mod: S$GLB,,, | Performed by: FAMILY MEDICINE

## 2019-07-15 RX ORDER — CYANOCOBALAMIN 1000 UG/ML
1000 INJECTION, SOLUTION INTRAMUSCULAR; SUBCUTANEOUS ONCE
Status: COMPLETED | OUTPATIENT
Start: 2019-07-15 | End: 2019-07-15

## 2019-07-15 RX ADMIN — CYANOCOBALAMIN 1000 MCG: 1000 INJECTION, SOLUTION INTRAMUSCULAR; SUBCUTANEOUS at 10:07

## 2019-07-15 NOTE — PROGRESS NOTES
Pt comes in for b12 injection. Pt supplied medication. Right dose, right route, right medication all verified. 2 pt identifier used. Instructed to wait 15 mins following injection. No pain or redness at injections site. Given ULG      Patient is here for INR check  Patient is currently taking 6mg every day minus Friday she takes 9mg of coumadin  Patient denies any changes in diet, pt denies any missed doses, and pt denies any signs of bleeding.  INR is 2.5  Patient was instructed to take current dose of coumadin.  Patient was instructed to return to the clinic in 1 month to recheck INR.      Lab Results       Component                Value               Date                       INR                      2.4                 06/14/2019                 INR                      2.3                 05/17/2019                 INR                      2.6                 04/18/2019                 INR                      2.7                 03/19/2019                 INR                      2.0                 02/19/2019

## 2019-07-31 NOTE — TELEPHONE ENCOUNTER
----- Message from Ana Vasquez sent at 7/31/2019  3:11 PM CDT -----  Contact: [phone]/self  Type:  RX Refill Request    Who Called:  self  Refill or New Rx: New  RX Name and Strength: ALPRAZolam (XANAX) 0.25 MG tablet  How is the patient currently taking it? (ex. 1XDay): Take 1 tablet (0.25 mg total) by mouth once daily. - Oral  Is this a 30 day or 90 day RX: 90  Preferred Pharmacy with phone number: [id number] - [id number] - [address] [facility]  Local or Mail Order: local  Ordering Provider:   Would the patient rather a call back or a response via [facility]?  call  Best Call Back Number:   Additional Information:

## 2019-08-01 RX ORDER — ALPRAZOLAM 0.25 MG/1
0.25 TABLET ORAL DAILY
Qty: 90 TABLET | Refills: 1 | Status: SHIPPED | OUTPATIENT
Start: 2019-08-01 | End: 2019-08-30 | Stop reason: SDUPTHER

## 2019-08-14 RX ORDER — CYANOCOBALAMIN 1000 UG/ML
INJECTION, SOLUTION INTRAMUSCULAR; SUBCUTANEOUS
Qty: 3 ML | Refills: 1 | Status: SHIPPED | OUTPATIENT
Start: 2019-08-14 | End: 2020-01-11

## 2019-08-15 LAB
LEFT EYE DM RETINOPATHY: NEGATIVE
RIGHT EYE DM RETINOPATHY: NEGATIVE

## 2019-08-23 ENCOUNTER — OFFICE VISIT (OUTPATIENT)
Dept: INTERNAL MEDICINE | Facility: CLINIC | Age: 79
End: 2019-08-23
Payer: MEDICARE

## 2019-08-23 VITALS
HEIGHT: 62 IN | BODY MASS INDEX: 38.33 KG/M2 | HEART RATE: 69 BPM | DIASTOLIC BLOOD PRESSURE: 88 MMHG | WEIGHT: 208.31 LBS | SYSTOLIC BLOOD PRESSURE: 130 MMHG | OXYGEN SATURATION: 94 %

## 2019-08-23 DIAGNOSIS — K21.9 GASTROESOPHAGEAL REFLUX DISEASE WITHOUT ESOPHAGITIS: ICD-10-CM

## 2019-08-23 DIAGNOSIS — Z85.828 HISTORY OF SKIN CANCER: ICD-10-CM

## 2019-08-23 DIAGNOSIS — I77.1 TORTUOUS AORTA: ICD-10-CM

## 2019-08-23 DIAGNOSIS — I70.0 AORTIC ATHEROSCLEROSIS: ICD-10-CM

## 2019-08-23 DIAGNOSIS — E66.01 SEVERE OBESITY WITH BODY MASS INDEX (BMI) OF 35.0 TO 39.9 WITH SERIOUS COMORBIDITY: ICD-10-CM

## 2019-08-23 DIAGNOSIS — Z00.00 ENCOUNTER FOR PREVENTIVE HEALTH EXAMINATION: Primary | ICD-10-CM

## 2019-08-23 DIAGNOSIS — D69.2 SENILE PURPURA: ICD-10-CM

## 2019-08-23 DIAGNOSIS — I48.0 PAROXYSMAL ATRIAL FIBRILLATION: ICD-10-CM

## 2019-08-23 DIAGNOSIS — I51.89 DIASTOLIC DYSFUNCTION: ICD-10-CM

## 2019-08-23 DIAGNOSIS — H40.9 GLAUCOMA, UNSPECIFIED GLAUCOMA TYPE, UNSPECIFIED LATERALITY: ICD-10-CM

## 2019-08-23 DIAGNOSIS — H54.40 BLIND RIGHT EYE: ICD-10-CM

## 2019-08-23 DIAGNOSIS — M47.819 MULTILEVEL FACET ARTHRITIS: ICD-10-CM

## 2019-08-23 DIAGNOSIS — Z79.01 ANTICOAGULATED ON COUMADIN: ICD-10-CM

## 2019-08-23 DIAGNOSIS — I10 ESSENTIAL HYPERTENSION: ICD-10-CM

## 2019-08-23 DIAGNOSIS — E78.2 MIXED HYPERLIPIDEMIA: ICD-10-CM

## 2019-08-23 DIAGNOSIS — K57.30 DIVERTICULOSIS OF LARGE INTESTINE WITHOUT HEMORRHAGE: Chronic | ICD-10-CM

## 2019-08-23 DIAGNOSIS — F41.9 ANXIETY: ICD-10-CM

## 2019-08-23 DIAGNOSIS — M79.2 NEURALGIA: ICD-10-CM

## 2019-08-23 DIAGNOSIS — M62.462 GASTROCNEMIUS EQUINUS OF LEFT LOWER EXTREMITY: ICD-10-CM

## 2019-08-23 DIAGNOSIS — G47.33 OSA (OBSTRUCTIVE SLEEP APNEA): ICD-10-CM

## 2019-08-23 DIAGNOSIS — G25.81 RESTLESS LEG SYNDROME: ICD-10-CM

## 2019-08-23 DIAGNOSIS — Z86.010 HISTORY OF ADENOMATOUS POLYP OF COLON: ICD-10-CM

## 2019-08-23 PROCEDURE — 3079F DIAST BP 80-89 MM HG: CPT | Mod: HCNC,CPTII,S$GLB, | Performed by: NURSE PRACTITIONER

## 2019-08-23 PROCEDURE — 99499 UNLISTED E&M SERVICE: CPT | Mod: HCNC,S$GLB,, | Performed by: NURSE PRACTITIONER

## 2019-08-23 PROCEDURE — 3079F PR MOST RECENT DIASTOLIC BLOOD PRESSURE 80-89 MM HG: ICD-10-PCS | Mod: HCNC,CPTII,S$GLB, | Performed by: NURSE PRACTITIONER

## 2019-08-23 PROCEDURE — 99999 PR PBB SHADOW E&M-EST. PATIENT-LVL V: ICD-10-PCS | Mod: PBBFAC,HCNC,, | Performed by: NURSE PRACTITIONER

## 2019-08-23 PROCEDURE — 99999 PR PBB SHADOW E&M-EST. PATIENT-LVL V: CPT | Mod: PBBFAC,HCNC,, | Performed by: NURSE PRACTITIONER

## 2019-08-23 PROCEDURE — 99499 RISK ADDL DX/OHS AUDIT: ICD-10-PCS | Mod: HCNC,S$GLB,, | Performed by: NURSE PRACTITIONER

## 2019-08-23 PROCEDURE — G0439 PR MEDICARE ANNUAL WELLNESS SUBSEQUENT VISIT: ICD-10-PCS | Mod: HCNC,S$GLB,, | Performed by: NURSE PRACTITIONER

## 2019-08-23 PROCEDURE — G0439 PPPS, SUBSEQ VISIT: HCPCS | Mod: HCNC,S$GLB,, | Performed by: NURSE PRACTITIONER

## 2019-08-23 PROCEDURE — 3075F SYST BP GE 130 - 139MM HG: CPT | Mod: HCNC,CPTII,S$GLB, | Performed by: NURSE PRACTITIONER

## 2019-08-23 PROCEDURE — 3075F PR MOST RECENT SYSTOLIC BLOOD PRESS GE 130-139MM HG: ICD-10-PCS | Mod: HCNC,CPTII,S$GLB, | Performed by: NURSE PRACTITIONER

## 2019-08-23 RX ORDER — LOTEPREDNOL ETABONATE 10 MG/ML
SUSPENSION TOPICAL
Refills: 3 | Status: ON HOLD | COMMUNITY
Start: 2019-08-19 | End: 2020-03-08 | Stop reason: ALTCHOICE

## 2019-08-23 RX ORDER — HYDROCHLOROTHIAZIDE 12.5 MG/1
CAPSULE ORAL
COMMUNITY
Start: 2019-08-01 | End: 2019-08-30

## 2019-08-23 NOTE — PATIENT INSTRUCTIONS
Counseling and Referral of Other Preventative  (Italic type indicates deductible and co-insurance are waived)    Patient Name: Mis Ca  Today's Date: 8/23/2019    Health Maintenance       Date Due Completion Date    Shingles Vaccine (1 of 2) 09/13/2019 (Originally 11/9/1990) ---    Influenza Vaccine (1) 09/01/2019 10/9/2018    Colonoscopy 03/03/2020 3/3/2017    Override on 3/3/2017: Done (due March 2020 tubular adenoma; [facility])    Override on 6/9/2016: Done    DEXA SCAN 06/05/2022 6/5/2019    Override on 6/9/2016: Done    Lipid Panel 08/10/2022 8/10/2017    TETANUS VACCINE 12/13/2026 12/13/2016    Override on 12/2/2016: Done        No orders of the defined types were placed in this encounter.    The following information is provided to all patients.  This information is to help you find resources for any of the problems found today that may be affecting your health:                Living healthy guide: www.[facility].[place].gov      Understanding Diabetes: www.diabetes.org      Eating healthy: www.cdc.gov/healthyweight      CDC home safety checklist: www.cdc.gov/steadi/patient.html      Agency on Aging: www.goea.[place].gov      Alcoholics anonymous (AA): www.aa.org      Physical Activity: www.delfino.nih.gov/yu2frvt      Tobacco use: www.quitwithusla.org

## 2019-08-23 NOTE — PROGRESS NOTES
"Mis Ca presented for a  Medicare AWV and comprehensive Health Risk Assessment today. The following components were reviewed and updated:    · Medical history  · Family History  · Social history  · Allergies and Current Medications  · Health Risk Assessment  · Health Maintenance  · Care Team     ** See Completed Assessments for Annual Wellness Visit within the encounter summary.**       The following assessments were completed:  · Living Situation  · CAGE  · Depression Screening  · Timed Get Up and Go  · Whisper Test  · Cognitive Function Screening  · Nutrition Screening  · ADL Screening  · PAQ Screening    Vitals:    08/23/19 0901   BP: 130/88   BP Location: Right arm   Patient Position: Sitting   Pulse: 69   SpO2: (!) 94%   Weight: 94.5 kg (208 lb 5.4 oz)   Height: 5' 2" (1.575 m)     Body mass index is 38.1 kg/m².  Physical Exam   Constitutional: She is oriented to person, place, and time. She appears well-developed and well-nourished. No distress.   HENT:   Head: Normocephalic and atraumatic.   Eyes: Pupils are equal, round, and reactive to light. EOM are normal.   Neck: Normal range of motion.   Cardiovascular: Normal rate.   Pulmonary/Chest: Effort normal. No respiratory distress.   Abdominal: Soft. There is no tenderness.   Musculoskeletal: Normal range of motion. She exhibits edema.        Right lower leg: She exhibits edema (+3 pitting).        Left lower leg: She exhibits edema (+3 pitting).   Neurological: She is alert and oriented to person, place, and time. Coordination normal.   Skin: Skin is warm and dry.   Psychiatric: She has a normal mood and affect. Her behavior is normal. Judgment and thought content normal.   Nursing note and vitals reviewed.        Diagnoses and health risks identified today and associated recommendations/orders:    1. Encounter for preventive health examination    2. Senile purpura  Chronic; stable. Continue current treatment plan as previously prescribed by PCP.     3. " Severe obesity with body mass index (BMI) of 35.0 to 39.9 with serious comorbidity  Current BMI 38.10. Lifestyle modifications discussed with patient.      4. Multilevel facet arthritis  Noted on xray cervical spine dated 4/5/19 and xray lumbar spine dated 1/6/16. Patient followed by PCP.    5. Paroxysmal atrial fibrillation  Chronic; stable. Continue current treatment plan as previously prescribed by Cardiology (Dr. Benton).    6. Aortic atherosclerosis  Noted on chest xray dated 10/8/2018. Patient followed by Cardiology.    7. Tortuous aorta  Noted on chest xray dated 10/8/2018. Patient followed by Cardiology.    8. Essential hypertension  Chronic; stable. Continue current treatment plan as previously prescribed by PCP.    9. Mixed hyperlipidemia  Chronic; stable. Continue current treatment plan as previously prescribed by PCP.    10. Diastolic dysfunction  Chronic; stable. Patient followed by Cardiology.    11. Anticoagulated on Coumadin  Chronic; stable. Continue current treatment plan as previously prescribed by Cardiology.    12. Glaucoma, unspecified glaucoma type, unspecified laterality  Chronic; stable. Continue current treatment plan as previously prescribed by Ophthalmology (Dr. Coles).    13. Blind right eye  Chronic; stable. Patient followed by Ophthalmology.    14. Anxiety  Chronic; stable. Continue current treatment plan as previously prescribed by PCP.    15. Restless leg syndrome  Chronic; stable. Continue current treatment plan as previously prescribed by PCP.    16. Neuralgia  Chronic; stable. Continue current treatment plan as previously prescribed by PCP.    17. Gastrocnemius equinus of left lower extremity  Chronic; stable. Continue current treatment plan as previously prescribed by Podiatry (Dr. Cardozo).    18. Gastroesophageal reflux disease without esophagitis  Chronic; stable. Continue current treatment plan as previously prescribed by PCP.    19. Diverticulosis of large intestine without  hemorrhage  Chronic; stable. Patient followed by PCP.    20. History of adenomatous polyp of colon  Chronic; stable. Patient folowed by PCP.    21. History of skin cancer  Chronic; stable. Patient followed by Dermatology (Dr. Levin). .    22. JACQUELYN (obstructive sleep apnea)  Chronic; stable. Continue current treatment plan as previously prescribed by Sleep Medicine (Dr. Burnett).       Provided Mis with a 5-10 year written screening schedule and personal prevention plan. Recommendations were developed using the USPSTF age appropriate recommendations. Education, counseling, and referrals were provided as needed. After Visit Summary printed and given to patient which includes a list of additional screenings\tests needed.    Follow up for HRA visit in 1 year.    Harrison Bocanegra NP  I offered to discuss end of life issues, including information on how to make advance directives that the patient could use to name someone who would make medical decisions on their behalf if they became too ill to make themselves.    ___Patient declined  _X_Patient is interested, I provided paper work and offered to discuss.

## 2019-08-25 PROBLEM — I48.91 ATRIAL FIBRILLATION WITH RVR: Status: RESOLVED | Noted: 2017-09-04 | Resolved: 2019-08-25

## 2019-08-25 PROBLEM — M47.819 MULTILEVEL FACET ARTHRITIS: Status: ACTIVE | Noted: 2019-08-25

## 2019-08-28 ENCOUNTER — TELEPHONE (OUTPATIENT)
Dept: FAMILY MEDICINE | Facility: CLINIC | Age: 79
End: 2019-08-28

## 2019-08-28 NOTE — TELEPHONE ENCOUNTER
----- Message from Marely Powell sent at 8/28/2019  1:27 PM CDT -----  Contact: pt  Type:  Sooner Apoointment Request    Caller is requesting a sooner appointment.  Caller declined first available appointment listed below.  Caller will not accept being placed on the waitlist and is requesting a message be sent to doctor.  Name of Caller: Pt  When is the first available appointment?  Symptoms B12 shot and INR check  Would the patient rather a call back or a response via [facility]?  Call back  Best Call Back [phone]  Additional Information:

## 2019-08-29 ENCOUNTER — CLINICAL SUPPORT (OUTPATIENT)
Dept: FAMILY MEDICINE | Facility: CLINIC | Age: 79
End: 2019-08-29
Payer: MEDICARE

## 2019-08-29 ENCOUNTER — TELEPHONE (OUTPATIENT)
Dept: FAMILY MEDICINE | Facility: CLINIC | Age: 79
End: 2019-08-29

## 2019-08-29 DIAGNOSIS — I48.91 ATRIAL FIBRILLATION WITH RVR: ICD-10-CM

## 2019-08-29 DIAGNOSIS — I48.0 PAROXYSMAL ATRIAL FIBRILLATION: Primary | ICD-10-CM

## 2019-08-29 DIAGNOSIS — D64.9 ANEMIA, UNSPECIFIED TYPE: ICD-10-CM

## 2019-08-29 LAB — INR PPP: 2.1 (ref 2–3)

## 2019-08-29 PROCEDURE — 85610 POCT INR: ICD-10-PCS | Mod: QW,,, | Performed by: FAMILY MEDICINE

## 2019-08-29 PROCEDURE — 85610 PROTHROMBIN TIME: CPT | Mod: QW,,, | Performed by: FAMILY MEDICINE

## 2019-08-29 PROCEDURE — 96372 THER/PROPH/DIAG INJ SC/IM: CPT | Mod: S$GLB,,, | Performed by: FAMILY MEDICINE

## 2019-08-29 PROCEDURE — 96372 PR INJECTION,THERAP/PROPH/DIAG2ST, IM OR SUBCUT: ICD-10-PCS | Mod: S$GLB,,, | Performed by: FAMILY MEDICINE

## 2019-08-29 RX ORDER — CYANOCOBALAMIN 1000 UG/ML
1000 INJECTION, SOLUTION INTRAMUSCULAR; SUBCUTANEOUS ONCE
Status: COMPLETED | OUTPATIENT
Start: 2019-08-29 | End: 2019-08-29

## 2019-08-29 RX ADMIN — CYANOCOBALAMIN 1000 MCG: 1000 INJECTION, SOLUTION INTRAMUSCULAR; SUBCUTANEOUS at 02:08

## 2019-08-29 NOTE — PROGRESS NOTES
Patient is here for INR check  Patient is currently taking Patient is currently taking 6mg every day minus Friday she takes 9mg of coumadin   Patient denies any changes in diet, pt denies any missed doses, and pt denies any signs of bleeding.  INR is 2.1  Patient was instructed to take current dose of coumadin.  Patient was instructed to return to the clinic in 1 month to recheck INR.    Lab Results       Component                Value               Date                       INR                      2.5                 07/15/2019                 INR                      2.4                 06/14/2019                 INR                      2.3                 05/17/2019                 INR                      2.6                 04/18/2019                 INR                      2.7                 03/19/2019              Pt comes in for b12 injection. Pt supplied medication. Right dose, right route, right medication all verified. 2 pt identifier used. Instructed to wait 15 mins following injection. No pain or redness at injections site. Given URG

## 2019-08-29 NOTE — TELEPHONE ENCOUNTER
Pt comes in today for INR check and B12 injection INR was 2.1 Patient is currently taking 6mg every day minus Friday she takes 9mg of coumadin. She states the mirapex is causing her to feel fuzzy headed and headaches, muscle cramps, swelling in her legs and ankles. She would like something different called in for restless legs

## 2019-08-30 ENCOUNTER — OFFICE VISIT (OUTPATIENT)
Dept: FAMILY MEDICINE | Facility: CLINIC | Age: 79
End: 2019-08-30
Payer: MEDICARE

## 2019-08-30 ENCOUNTER — TELEPHONE (OUTPATIENT)
Dept: FAMILY MEDICINE | Facility: CLINIC | Age: 79
End: 2019-08-30

## 2019-08-30 VITALS
BODY MASS INDEX: 38.09 KG/M2 | SYSTOLIC BLOOD PRESSURE: 138 MMHG | TEMPERATURE: 98 F | DIASTOLIC BLOOD PRESSURE: 80 MMHG | OXYGEN SATURATION: 95 % | WEIGHT: 207 LBS | HEIGHT: 62 IN | HEART RATE: 78 BPM

## 2019-08-30 DIAGNOSIS — I48.0 PAROXYSMAL ATRIAL FIBRILLATION: Primary | ICD-10-CM

## 2019-08-30 DIAGNOSIS — G25.81 RESTLESS LEG SYNDROME: ICD-10-CM

## 2019-08-30 DIAGNOSIS — F41.9 ANXIETY: ICD-10-CM

## 2019-08-30 DIAGNOSIS — I51.89 DIASTOLIC DYSFUNCTION: ICD-10-CM

## 2019-08-30 DIAGNOSIS — Z79.01 ANTICOAGULATED ON COUMADIN: ICD-10-CM

## 2019-08-30 DIAGNOSIS — E66.01 SEVERE OBESITY WITH BODY MASS INDEX (BMI) OF 35.0 TO 39.9 WITH SERIOUS COMORBIDITY: ICD-10-CM

## 2019-08-30 DIAGNOSIS — G47.33 OSA (OBSTRUCTIVE SLEEP APNEA): ICD-10-CM

## 2019-08-30 DIAGNOSIS — M94.9 DISORDER OF CARTILAGE: ICD-10-CM

## 2019-08-30 DIAGNOSIS — I10 ESSENTIAL HYPERTENSION: ICD-10-CM

## 2019-08-30 DIAGNOSIS — I48.91 ATRIAL FIBRILLATION WITH RVR: ICD-10-CM

## 2019-08-30 PROCEDURE — 99214 PR OFFICE/OUTPT VISIT, EST, LEVL IV, 30-39 MIN: ICD-10-PCS | Mod: S$GLB,,, | Performed by: FAMILY MEDICINE

## 2019-08-30 PROCEDURE — 1101F PT FALLS ASSESS-DOCD LE1/YR: CPT | Mod: CPTII,S$GLB,, | Performed by: FAMILY MEDICINE

## 2019-08-30 PROCEDURE — 3079F PR MOST RECENT DIASTOLIC BLOOD PRESSURE 80-89 MM HG: ICD-10-PCS | Mod: CPTII,S$GLB,, | Performed by: FAMILY MEDICINE

## 2019-08-30 PROCEDURE — 3079F DIAST BP 80-89 MM HG: CPT | Mod: CPTII,S$GLB,, | Performed by: FAMILY MEDICINE

## 2019-08-30 PROCEDURE — 3075F PR MOST RECENT SYSTOLIC BLOOD PRESS GE 130-139MM HG: ICD-10-PCS | Mod: CPTII,S$GLB,, | Performed by: FAMILY MEDICINE

## 2019-08-30 PROCEDURE — 1101F PR PT FALLS ASSESS DOC 0-1 FALLS W/OUT INJ PAST YR: ICD-10-PCS | Mod: CPTII,S$GLB,, | Performed by: FAMILY MEDICINE

## 2019-08-30 PROCEDURE — 99214 OFFICE O/P EST MOD 30 MIN: CPT | Mod: S$GLB,,, | Performed by: FAMILY MEDICINE

## 2019-08-30 PROCEDURE — 3075F SYST BP GE 130 - 139MM HG: CPT | Mod: CPTII,S$GLB,, | Performed by: FAMILY MEDICINE

## 2019-08-30 RX ORDER — PRAMIPEXOLE DIHYDROCHLORIDE 0.5 MG/1
0.5 TABLET ORAL NIGHTLY
Qty: 90 TABLET | Refills: 3 | Status: SHIPPED | OUTPATIENT
Start: 2019-08-30 | End: 2020-05-30 | Stop reason: SDUPTHER

## 2019-08-30 RX ORDER — ALPRAZOLAM 0.25 MG/1
0.25 TABLET ORAL DAILY
Qty: 90 TABLET | Refills: 1 | Status: SHIPPED | OUTPATIENT
Start: 2019-08-30 | End: 2020-01-10 | Stop reason: SDUPTHER

## 2019-08-30 RX ORDER — FUROSEMIDE 40 MG/1
40 TABLET ORAL 2 TIMES DAILY
Qty: 60 TABLET | Refills: 5 | Status: SHIPPED | OUTPATIENT
Start: 2019-08-30 | End: 2019-09-05

## 2019-08-30 NOTE — TELEPHONE ENCOUNTER
SOB, a lot, feet and ankles with fluid; lots of pain in joints  Come now   I spoke to pt and she is coming now for sob and edema

## 2019-08-30 NOTE — PROGRESS NOTES
"Subjective:      Patient ID: Mis Ca is a 78 y.o. female.    Chief Complaint: swelling of feet and Shortness of Breath      Vitals:    08/30/19 0935 08/30/19 1028 08/30/19 1107   BP: (!) 150/80 (!) 144/80 138/80   Pulse: 78     Temp: 98.3 °F (36.8 °C)     TempSrc: Oral     SpO2: 95%     Weight: 93.9 kg (207 lb)     Height: 5' 2" (1.575 m)          HPI   Swollen legs and sob for 6 weeks; she is taking larger doses of pramiipexole for restless legs; on this for 2 years; so taking 0.5 bid  Has a fivb and is on coumadin  Echo 2 years ago  appt with cardio next week  On coumadin,  No leg pain, just edema  On hctz,for diuretic  On CCB diltiazem    Review of Systems   Constitutional: Positive for unexpected weight change.        8 pouonds in 3 months, gained   HENT: Negative.    Respiratory: Positive for shortness of breath. Negative for chest tightness.    Cardiovascular: Positive for palpitations and leg swelling. Negative for chest pain.   Gastrointestinal: Negative.    Endocrine: Negative.    Genitourinary: Negative.    Musculoskeletal: Negative.    Psychiatric/Behavioral: Negative.    All other systems reviewed and are negative.    Objective:     Physical Exam   Constitutional: She is oriented to person, place, and time. She appears well-developed and well-nourished.   HENT:   Head: Normocephalic.   Eyes: Pupils are equal, round, and reactive to light. Conjunctivae and EOM are normal.   Neck: Normal range of motion. Neck supple.   Cardiovascular: Normal rate, regular rhythm and normal heart sounds.   Pulmonary/Chest: Effort normal and breath sounds normal.   Musculoskeletal: Normal range of motion. She exhibits edema.   Neurological: She is alert and oriented to person, place, and time. She has normal reflexes.   Skin: Skin is warm and dry.   Psychiatric: She has a normal mood and affect. Her behavior is normal. Judgment and thought content normal.   Nursing note and vitals reviewed.    Assessment:     1. " Paroxysmal atrial fibrillation    2. Essential hypertension    3. Diastolic dysfunction    4. Atrial fibrillation with RVR    5. Anticoagulated on Coumadin    6. JACQUELYN (obstructive sleep apnea)    7. Severe obesity with body mass index (BMI) of 35.0 to 39.9 with serious comorbidity    8. Restless leg syndrome    9. Anxiety    10. Disorder of cartilage       Plan:        Medication List           Accurate as of 8/30/19 11:59 PM. If you have any questions, ask your nurse or doctor.               START taking these medications    furosemide 40 MG tablet  Commonly known as:  LASIX  Take 1 tablet (40 mg total) by mouth 2 (two) times daily. As needed for edema  Started by:  Scott Dillard MD        CHANGE how you take these medications    pramipexole 0.5 MG tablet  Commonly known as:  MIRAPEX  Take 1 tablet (0.5 mg total) by mouth every evening.  What changed:  when to take this  Changed by:  Scott Dillard MD        CONTINUE taking these medications    acetaminophen 500 MG tablet  Commonly known as:  TYLENOL     ALPRAZolam 0.25 MG tablet  Commonly known as:  XANAX  Take 1 tablet (0.25 mg total) by mouth once daily.     aspirin 81 MG EC tablet  Commonly known as:  ECOTRIN     atorvastatin 40 MG tablet  Commonly known as:  LIPITOR  Take 1 tablet (40 mg total) by mouth once daily.     CARTIA  MG 24 hr capsule  Generic drug:  diltiaZEM  TAKE 1 CAPSULE ONCE DAILY.     cyanocobalamin 1,000 mcg/mL injection  INJECT 1ML INTRAMUSCULARLY WEEKLY FOR 4 WEEKS, THEN 1ML MONTHLY     gabapentin 300 MG capsule  Commonly known as:  NEURONTIN  Take 2 capsules (600 mg total) by mouth 2 (two) times daily.     INVELTYS 1 % Drps  Generic drug:  loteprednol etabonate     latanoprost 0.005 % ophthalmic solution     losartan 100 MG tablet  Commonly known as:  COZAAR  TAKE 1/2 TABLET EVERY DAY     omeprazole 20 MG capsule  Commonly known as:  PRILOSEC  TAKE 1 CAPSULE EVERY DAY     ONE DAILY MULTIVITAMIN per tablet  Generic drug:   multivitamin     oxybutynin 5 MG Tab  Commonly known as:  DITROPAN  Take 1 tablet (5 mg total) by mouth once daily.     timolol maleate 0.5% 0.5 % Drop  Commonly known as:  TIMOPTIC     warfarin 6 MG tablet  Commonly known as:  COUMADIN  TAKE 1 TABLET EVERY DAY        STOP taking these medications    hydroCHLOROthiazide 12.5 mg capsule  Commonly known as:  MICROZIDE  Stopped by:  Scott Dillard MD           Where to Get Your Medications      These medications were sent to [id number] - [place], [place] - [address], [address]  [address] [address] [facility], [address], [place] [place] [address]    Phone:  [phone]   · furosemide 40 MG tablet  · pramipexole 0.5 MG tablet     Information about where to get these medications is not yet available    Ask your nurse or doctor about these medications  · ALPRAZolam 0.25 MG tablet       Paroxysmal atrial fibrillation  -     Echo; Future  -     CBC auto differential; Future; Expected date: 08/30/2019  -     Comprehensive metabolic panel; Future; Expected date: 08/30/2019  -     Lipid panel; Future  -     TSH; Future  -     Urinalysis; Future  -     Vitamin D; Future  -     Cancel: Brain natriuretic peptide; Future; Expected date: 08/30/2019    Essential hypertension  -     Echo; Future  -     CBC auto differential; Future; Expected date: 08/30/2019  -     Comprehensive metabolic panel; Future; Expected date: 08/30/2019  -     Lipid panel; Future  -     TSH; Future  -     Urinalysis; Future  -     Vitamin D; Future  -     Cancel: Brain natriuretic peptide; Future; Expected date: 08/30/2019    Diastolic dysfunction  -     Echo; Future  -     CBC auto differential; Future; Expected date: 08/30/2019  -     Comprehensive metabolic panel; Future; Expected date: 08/30/2019  -     Lipid panel; Future  -     TSH; Future  -     Urinalysis; Future  -     Vitamin D; Future  -     Cancel: Brain natriuretic peptide; Future; Expected date: 08/30/2019    Atrial fibrillation with RVR  -     Echo;  Future  -     CBC auto differential; Future; Expected date: 08/30/2019  -     Comprehensive metabolic panel; Future; Expected date: 08/30/2019  -     Lipid panel; Future  -     TSH; Future  -     Urinalysis; Future  -     Vitamin D; Future  -     Cancel: Brain natriuretic peptide; Future; Expected date: 08/30/2019    Anticoagulated on Coumadin  -     Echo; Future  -     CBC auto differential; Future; Expected date: 08/30/2019  -     Comprehensive metabolic panel; Future; Expected date: 08/30/2019  -     Lipid panel; Future  -     TSH; Future  -     Urinalysis; Future  -     Vitamin D; Future  -     Cancel: Brain natriuretic peptide; Future; Expected date: 08/30/2019    JACQUELYN (obstructive sleep apnea)  -     Echo; Future  -     CBC auto differential; Future; Expected date: 08/30/2019  -     Comprehensive metabolic panel; Future; Expected date: 08/30/2019  -     Lipid panel; Future  -     TSH; Future  -     Urinalysis; Future  -     Vitamin D; Future  -     Cancel: Brain natriuretic peptide; Future; Expected date: 08/30/2019    Severe obesity with body mass index (BMI) of 35.0 to 39.9 with serious comorbidity  -     Echo; Future  -     CBC auto differential; Future; Expected date: 08/30/2019  -     Comprehensive metabolic panel; Future; Expected date: 08/30/2019  -     Lipid panel; Future  -     TSH; Future  -     Urinalysis; Future  -     Vitamin D; Future  -     Cancel: Brain natriuretic peptide; Future; Expected date: 08/30/2019    Restless leg syndrome  -     Echo; Future  -     CBC auto differential; Future; Expected date: 08/30/2019  -     Comprehensive metabolic panel; Future; Expected date: 08/30/2019  -     Lipid panel; Future  -     TSH; Future  -     Urinalysis; Future  -     Vitamin D; Future  -     Cancel: Brain natriuretic peptide; Future; Expected date: 08/30/2019    Anxiety  -     Echo; Future  -     CBC auto differential; Future; Expected date: 08/30/2019  -     Comprehensive metabolic panel; Future;  Expected date: 08/30/2019  -     Lipid panel; Future  -     TSH; Future  -     Urinalysis; Future  -     Vitamin D; Future  -     Cancel: Brain natriuretic peptide; Future; Expected date: 08/30/2019    Disorder of cartilage   -     Vitamin D; Future    Other orders  -     pramipexole (MIRAPEX) 0.5 MG tablet; Take 1 tablet (0.5 mg total) by mouth every evening.  Dispense: 90 tablet; Refill: 3  -     Discontinue: furosemide (LASIX) 40 MG tablet; Take 1 tablet (40 mg total) by mouth 2 (two) times daily. As needed for edema  Dispense: 60 tablet; Refill: 5  -     ALPRAZolam (XANAX) 0.25 MG tablet; Take 1 tablet (0.25 mg total) by mouth once daily.  Dispense: 90 tablet; Refill: 1    change hctz to lqasix, 40 bid prn edema  Get echo before seeing cardio  Get all labs  decrqase pramipexole to nightly only due to drowsiness.  Lose weight  Avoid salty foot

## 2019-09-03 ENCOUNTER — HOSPITAL ENCOUNTER (OUTPATIENT)
Dept: CARDIOLOGY | Facility: HOSPITAL | Age: 79
Discharge: HOME | End: 2019-09-03
Attending: FAMILY MEDICINE
Payer: MEDICARE

## 2019-09-03 DIAGNOSIS — G25.81 RESTLESS LEG SYNDROME: ICD-10-CM

## 2019-09-03 DIAGNOSIS — E66.01 SEVERE OBESITY WITH BODY MASS INDEX (BMI) OF 35.0 TO 39.9 WITH SERIOUS COMORBIDITY: ICD-10-CM

## 2019-09-03 DIAGNOSIS — Z79.01 ANTICOAGULATED ON COUMADIN: ICD-10-CM

## 2019-09-03 DIAGNOSIS — G47.33 OSA (OBSTRUCTIVE SLEEP APNEA): ICD-10-CM

## 2019-09-03 DIAGNOSIS — I51.89 DIASTOLIC DYSFUNCTION: ICD-10-CM

## 2019-09-03 DIAGNOSIS — I10 ESSENTIAL HYPERTENSION: ICD-10-CM

## 2019-09-03 DIAGNOSIS — I48.91 ATRIAL FIBRILLATION WITH RVR: ICD-10-CM

## 2019-09-03 DIAGNOSIS — I48.0 PAROXYSMAL ATRIAL FIBRILLATION: ICD-10-CM

## 2019-09-03 DIAGNOSIS — F41.9 ANXIETY: ICD-10-CM

## 2019-09-03 LAB
AORTIC ROOT ANNULUS: 2.18 CM
AORTIC VALVE CUSP SEPERATION: 1.29 CM
AV INDEX (PROSTH): 0.91
AV MEAN GRADIENT: 7 MMHG
AV PEAK GRADIENT: 12 MMHG
AV VALVE AREA: 2.83 CM2
AV VELOCITY RATIO: 0.88
CV ECHO LV RWT: 0.57 CM
DOP CALC AO PEAK VEL: 1.73 M/S
DOP CALC AO VTI: 38.87 CM
DOP CALC LVOT AREA: 3.1 CM2
DOP CALC LVOT DIAMETER: 1.99 CM
DOP CALC LVOT PEAK VEL: 1.53 M/S
DOP CALC LVOT STROKE VOLUME: 109.89 CM3
DOP CALCLVOT PEAK VEL VTI: 35.35 CM
E WAVE DECELERATION TIME: 204.44 MSEC
E/A RATIO: 1.34
E/E' RATIO: 12.29 M/S
ECHO LV POSTERIOR WALL: 1.23 CM (ref 0.6–1.1)
FRACTIONAL SHORTENING: 37 % (ref 28–44)
INTERVENTRICULAR SEPTUM: 1.5 CM (ref 0.6–1.1)
LA MAJOR: 5.53 CM
LA MINOR: 5.47 CM
LA WIDTH: 3.2 CM
LEFT ATRIUM SIZE: 3.02 CM
LEFT ATRIUM VOLUME: 45.18 CM3
LEFT INTERNAL DIMENSION IN SYSTOLE: 2.72 CM (ref 2.1–4)
LEFT VENTRICLE DIASTOLIC VOLUME: 85.11 ML
LEFT VENTRICLE SYSTOLIC VOLUME: 27.56 ML
LEFT VENTRICULAR INTERNAL DIMENSION IN DIASTOLE: 4.34 CM (ref 3.5–6)
LEFT VENTRICULAR MASS: 226.61 G
LV LATERAL E/E' RATIO: 9.56 M/S
LV SEPTAL E/E' RATIO: 17.2 M/S
MV A" WAVE DURATION": 128 MSEC
MV PEAK A VEL: 0.64 M/S
MV PEAK E VEL: 0.86 M/S
PISA TR MAX VEL: 2.52 M/S
PULM VEIN S/D RATIO: 1.31
PV PEAK D VEL: 0.45 M/S
PV PEAK S VEL: 0.59 M/S
PV PEAK VELOCITY: 0.94 CM/S
RA MAJOR: 4.64 CM
RA WIDTH: 3.25 CM
RIGHT VENTRICULAR END-DIASTOLIC DIMENSION: 2.84 CM
SINUS: 2.5 CM
TDI LATERAL: 0.09 M/S
TDI SEPTAL: 0.05 M/S
TDI: 0.07 M/S
TR MAX PG: 25 MMHG
TRICUSPID ANNULAR PLANE SYSTOLIC EXCURSION: 1.68 CM

## 2019-09-03 PROCEDURE — 93306 TTE W/DOPPLER COMPLETE: CPT | Mod: HCNC,PO

## 2019-09-03 PROCEDURE — 93306 TTE W/DOPPLER COMPLETE: CPT | Mod: 26,HCNC,, | Performed by: INTERNAL MEDICINE

## 2019-09-03 PROCEDURE — 93306 ECHO (CUPID ONLY): ICD-10-PCS | Mod: 26,HCNC,, | Performed by: INTERNAL MEDICINE

## 2019-09-04 ENCOUNTER — TELEPHONE (OUTPATIENT)
Dept: FAMILY MEDICINE | Facility: CLINIC | Age: 79
End: 2019-09-04

## 2019-09-04 DIAGNOSIS — I51.89 DIASTOLIC DYSFUNCTION: Primary | ICD-10-CM

## 2019-09-04 DIAGNOSIS — R79.89 AZOTEMIA: Primary | ICD-10-CM

## 2019-09-04 NOTE — TELEPHONE ENCOUNTER
I spoke to pt - she states that she started it on Sat and has only been taking it once a day.  Please advise!     ----- Message from Scott Dillard MD sent at 9/4/2019  6:02 AM CDT -----  Too much lasix, too dehydrated; decrease lasix to one a day and repeat BMP 1 month

## 2019-09-05 ENCOUNTER — OFFICE VISIT (OUTPATIENT)
Dept: CARDIOLOGY | Facility: CLINIC | Age: 79
End: 2019-09-05
Payer: MEDICARE

## 2019-09-05 ENCOUNTER — TELEPHONE (OUTPATIENT)
Dept: ADMINISTRATIVE | Facility: OTHER | Age: 79
End: 2019-09-05

## 2019-09-05 VITALS
BODY MASS INDEX: 37.83 KG/M2 | OXYGEN SATURATION: 96 % | HEIGHT: 62 IN | SYSTOLIC BLOOD PRESSURE: 147 MMHG | HEART RATE: 72 BPM | DIASTOLIC BLOOD PRESSURE: 81 MMHG | WEIGHT: 205.56 LBS

## 2019-09-05 DIAGNOSIS — I48.0 PAROXYSMAL ATRIAL FIBRILLATION: ICD-10-CM

## 2019-09-05 DIAGNOSIS — E78.2 MIXED HYPERLIPIDEMIA: ICD-10-CM

## 2019-09-05 DIAGNOSIS — I51.89 DIASTOLIC DYSFUNCTION: ICD-10-CM

## 2019-09-05 DIAGNOSIS — I10 ESSENTIAL HYPERTENSION: Primary | ICD-10-CM

## 2019-09-05 PROCEDURE — 99499 UNLISTED E&M SERVICE: CPT | Mod: HCNC,S$GLB,, | Performed by: INTERNAL MEDICINE

## 2019-09-05 PROCEDURE — 3077F SYST BP >= 140 MM HG: CPT | Mod: HCNC,CPTII,S$GLB, | Performed by: INTERNAL MEDICINE

## 2019-09-05 PROCEDURE — 99999 PR PBB SHADOW E&M-EST. PATIENT-LVL III: ICD-10-PCS | Mod: PBBFAC,HCNC,, | Performed by: INTERNAL MEDICINE

## 2019-09-05 PROCEDURE — 3077F PR MOST RECENT SYSTOLIC BLOOD PRESSURE >= 140 MM HG: ICD-10-PCS | Mod: HCNC,CPTII,S$GLB, | Performed by: INTERNAL MEDICINE

## 2019-09-05 PROCEDURE — 3079F DIAST BP 80-89 MM HG: CPT | Mod: HCNC,CPTII,S$GLB, | Performed by: INTERNAL MEDICINE

## 2019-09-05 PROCEDURE — 1101F PR PT FALLS ASSESS DOC 0-1 FALLS W/OUT INJ PAST YR: ICD-10-PCS | Mod: HCNC,CPTII,S$GLB, | Performed by: INTERNAL MEDICINE

## 2019-09-05 PROCEDURE — 3079F PR MOST RECENT DIASTOLIC BLOOD PRESSURE 80-89 MM HG: ICD-10-PCS | Mod: HCNC,CPTII,S$GLB, | Performed by: INTERNAL MEDICINE

## 2019-09-05 PROCEDURE — 1101F PT FALLS ASSESS-DOCD LE1/YR: CPT | Mod: HCNC,CPTII,S$GLB, | Performed by: INTERNAL MEDICINE

## 2019-09-05 PROCEDURE — 99214 PR OFFICE/OUTPT VISIT, EST, LEVL IV, 30-39 MIN: ICD-10-PCS | Mod: HCNC,S$GLB,, | Performed by: INTERNAL MEDICINE

## 2019-09-05 PROCEDURE — 99214 OFFICE O/P EST MOD 30 MIN: CPT | Mod: HCNC,S$GLB,, | Performed by: INTERNAL MEDICINE

## 2019-09-05 PROCEDURE — 99999 PR PBB SHADOW E&M-EST. PATIENT-LVL III: CPT | Mod: PBBFAC,HCNC,, | Performed by: INTERNAL MEDICINE

## 2019-09-05 PROCEDURE — 99499 RISK ADDL DX/OHS AUDIT: ICD-10-PCS | Mod: HCNC,S$GLB,, | Performed by: INTERNAL MEDICINE

## 2019-09-05 RX ORDER — HYDROCHLOROTHIAZIDE 25 MG/1
25 TABLET ORAL DAILY
Qty: 30 TABLET | Refills: 11 | Status: SHIPPED | OUTPATIENT
Start: 2019-09-05 | End: 2020-01-31 | Stop reason: SDUPTHER

## 2019-09-05 RX ORDER — DILTIAZEM HYDROCHLORIDE 240 MG/1
240 CAPSULE, COATED, EXTENDED RELEASE ORAL DAILY
Qty: 90 CAPSULE | Refills: 3 | Status: SHIPPED | OUTPATIENT
Start: 2019-09-05 | End: 2020-08-06 | Stop reason: SDUPTHER

## 2019-09-05 NOTE — PROGRESS NOTES
Subjective:   Patient ID:  Mis Ca is a 78 y.o. female who presents for follow-up of No chief complaint on file.      Problem List Items Addressed This Visit        Cardiac/Vascular    Paroxysmal atrial fibrillation    Hyperlipidemia    Essential hypertension - Primary    Diastolic dysfunction          HPI: Patient with the above medical problems is here for f/u. She is currently suffering a URI. She was placed on lasix by PCP and now have some TREVOR. Lasix discontinued. No chest pain or dyspnea. No orthopnea or PND. BP is marginally controlled. She is compliant with medication but not on a low salt diet.    Review of Systems   Constitution: Negative.   HENT: Negative.    Eyes: Negative.    Cardiovascular: Negative.    Respiratory: Negative.    Endocrine: Negative.    Hematologic/Lymphatic: Negative.    Skin: Negative.    Musculoskeletal: Negative.    Gastrointestinal: Negative.    Neurological: Negative.    Psychiatric/Behavioral: Negative.    Allergic/Immunologic: Negative.        Patient's Medications   New Prescriptions    No medications on file   Previous Medications    ACETAMINOPHEN (TYLENOL) 500 MG TABLET    Take 1,000 mg by mouth 2 (two) times daily as needed for Pain.    ALPRAZOLAM (XANAX) 0.25 MG TABLET    Take 1 tablet (0.25 mg total) by mouth once daily.    ASPIRIN (ECOTRIN) 81 MG EC TABLET    Take 81 mg by mouth once daily.      ATORVASTATIN (LIPITOR) 40 MG TABLET    Take 1 tablet (40 mg total) by mouth once daily.    CARTIA  MG 24 HR CAPSULE    TAKE 1 CAPSULE ONCE DAILY.    CYANOCOBALAMIN 1,000 MCG/ML INJECTION    INJECT 1ML INTRAMUSCULARLY WEEKLY FOR 4 WEEKS, THEN 1ML MONTHLY    GABAPENTIN (NEURONTIN) 300 MG CAPSULE    Take 2 capsules (600 mg total) by mouth 2 (two) times daily.    INVELTYS 1 % DRPS    INSTILL 1 DROP INTO THE RIGHT EYE THREE TIMES A DAY    LATANOPROST 0.005 % OPHTHALMIC SOLUTION    1 drop every evening.    LOSARTAN (COZAAR) 100 MG TABLET    TAKE 1/2 TABLET EVERY DAY     MULTIVITAMIN (ONE DAILY MULTIVITAMIN) PER TABLET    Take 1 tablet by mouth once daily.    OMEPRAZOLE (PRILOSEC) 20 MG CAPSULE    TAKE 1 CAPSULE EVERY DAY    OXYBUTYNIN (DITROPAN) 5 MG TAB    Take 1 tablet (5 mg total) by mouth once daily.    PRAMIPEXOLE (MIRAPEX) 0.5 MG TABLET    Take 1 tablet (0.5 mg total) by mouth every evening.    TIMOLOL MALEATE 0.5% (TIMOPTIC) 0.5 % DROP    Place 1 drop into both eyes once daily.    WARFARIN (COUMADIN) 6 MG TABLET    TAKE 1 TABLET EVERY DAY   Modified Medications    No medications on file   Discontinued Medications    No medications on file       Objective:   Physical Exam   Constitutional: She is oriented to person, place, and time. She appears well-developed and well-nourished. No distress.   Examination of the digits showed no clubbing or cyanosis   HENT:   Head: Normocephalic and atraumatic.   Eyes: Pupils are equal, round, and reactive to light. Conjunctivae are normal. Right eye exhibits no discharge.   Neck: Normal range of motion. Neck supple. No JVD present. No thyromegaly present.   No carotid bruits   Cardiovascular: Normal rate, regular rhythm, S1 normal, S2 normal, normal heart sounds, intact distal pulses and normal pulses. PMI is not displaced. Exam reveals no gallop, no friction rub and no opening snap.   No murmur heard.  Pulmonary/Chest: Effort normal and breath sounds normal. No respiratory distress. She has no wheezes. She has no rales. She exhibits no tenderness.   Abdominal: Soft. Bowel sounds are normal. She exhibits no distension and no mass. There is no tenderness. There is no guarding.   No hepatosplenomegaly   Musculoskeletal: Normal range of motion. She exhibits no edema or tenderness.   Lymphadenopathy:     She has no cervical adenopathy.   Neurological: She is alert and oriented to person, place, and time.   Skin: Skin is warm. No rash noted. She is not diaphoretic. No erythema.   Psychiatric: She has a normal mood and affect.   Nursing note and  vitals reviewed.      ECGs reviewed-NSR with LAD  LABS reviewed  Imaging including Echoes reviewed  CONCLUSIONS     1 - Normal left ventricular systolic function (EF 60-65%).     2 - Impaired LV relaxation, normal LAP (grade 1 diastolic dysfunction).     3 - Normal right ventricular systolic function .     4 - The estimated PA systolic pressure is 28 mmHg.     5 - Mild left atrial enlargement.     Assessment:     1. Essential hypertension    2. Mixed hyperlipidemia    3. Paroxysmal atrial fibrillation    4. Diastolic dysfunction        Plan:     Patient is doing better except for URI  Continue current medications. Restart HCTZ at 25 mg po daily. Start on Saturday. Give kidneys a few days to recover. No lasix. Leg elevation and compression stockings.   Weight loss  Activity as tolerate f/u in 6 months    The importance anticoagulation was explained to patient in details. Anticoagulation is to prevent CVA and PE and Sudden death. With anticoagulation there is risk of bleeding but benefits clearly out weight any risk. Patient advice to report any bleeding to cardiologist as soon as possible and do not discontinue medication without consent of cardiologist. All patient questions were answered regarding medication and patient verbalized understanding.

## 2019-09-05 NOTE — TELEPHONE ENCOUNTER
Stop lasix all together; get ultrasound of kidneys and drink lots of water and fluid, about 2 quarts/day, at least and repeat BMP 1 week

## 2019-09-10 ENCOUNTER — HOSPITAL ENCOUNTER (OUTPATIENT)
Dept: RADIOLOGY | Facility: HOSPITAL | Age: 79
Discharge: HOME | End: 2019-09-10
Attending: FAMILY MEDICINE
Payer: MEDICARE

## 2019-09-10 ENCOUNTER — TELEPHONE (OUTPATIENT)
Dept: FAMILY MEDICINE | Facility: CLINIC | Age: 79
End: 2019-09-10

## 2019-09-10 DIAGNOSIS — R79.89 AZOTEMIA: ICD-10-CM

## 2019-09-10 DIAGNOSIS — N28.89 RENAL MASS, LEFT: Primary | ICD-10-CM

## 2019-09-10 PROCEDURE — 76770 US EXAM ABDO BACK WALL COMP: CPT | Mod: TC,HCNC,PO

## 2019-09-10 NOTE — TELEPHONE ENCOUNTER
Please assist with scheduling for Urology.  Pt has been notified.     ----- Message from Scott Dillard MD sent at 9/10/2019  4:37 PM CDT -----  Pt has 2 masses on left kidney; pt notified; referral placed; help make appt with urology

## 2019-09-17 ENCOUNTER — OFFICE VISIT (OUTPATIENT)
Dept: UROLOGY | Facility: CLINIC | Age: 79
End: 2019-09-17
Payer: MEDICARE

## 2019-09-17 VITALS
HEIGHT: 62 IN | BODY MASS INDEX: 38.14 KG/M2 | DIASTOLIC BLOOD PRESSURE: 61 MMHG | HEART RATE: 78 BPM | SYSTOLIC BLOOD PRESSURE: 132 MMHG | WEIGHT: 207.25 LBS

## 2019-09-17 DIAGNOSIS — N28.1 RENAL CYST: Primary | ICD-10-CM

## 2019-09-17 PROCEDURE — 99999 PR PBB SHADOW E&M-EST. PATIENT-LVL III: ICD-10-PCS | Mod: PBBFAC,HCNC,, | Performed by: UROLOGY

## 2019-09-17 PROCEDURE — 99203 OFFICE O/P NEW LOW 30 MIN: CPT | Mod: HCNC,S$GLB,, | Performed by: UROLOGY

## 2019-09-17 PROCEDURE — 3078F DIAST BP <80 MM HG: CPT | Mod: HCNC,CPTII,S$GLB, | Performed by: UROLOGY

## 2019-09-17 PROCEDURE — 3075F SYST BP GE 130 - 139MM HG: CPT | Mod: HCNC,CPTII,S$GLB, | Performed by: UROLOGY

## 2019-09-17 PROCEDURE — 99203 PR OFFICE/OUTPT VISIT, NEW, LEVL III, 30-44 MIN: ICD-10-PCS | Mod: HCNC,S$GLB,, | Performed by: UROLOGY

## 2019-09-17 PROCEDURE — 99999 PR PBB SHADOW E&M-EST. PATIENT-LVL III: CPT | Mod: PBBFAC,HCNC,, | Performed by: UROLOGY

## 2019-09-17 PROCEDURE — 1101F PR PT FALLS ASSESS DOC 0-1 FALLS W/OUT INJ PAST YR: ICD-10-PCS | Mod: HCNC,CPTII,S$GLB, | Performed by: UROLOGY

## 2019-09-17 PROCEDURE — 3078F PR MOST RECENT DIASTOLIC BLOOD PRESSURE < 80 MM HG: ICD-10-PCS | Mod: HCNC,CPTII,S$GLB, | Performed by: UROLOGY

## 2019-09-17 PROCEDURE — 1101F PT FALLS ASSESS-DOCD LE1/YR: CPT | Mod: HCNC,CPTII,S$GLB, | Performed by: UROLOGY

## 2019-09-17 PROCEDURE — 3075F PR MOST RECENT SYSTOLIC BLOOD PRESS GE 130-139MM HG: ICD-10-PCS | Mod: HCNC,CPTII,S$GLB, | Performed by: UROLOGY

## 2019-09-17 NOTE — LETTER
September 17, 2019      Scott Dillard MD  [address] [place] [id number]           [facility]  [address]  [place] [address]  Phone: [phone]          Patient: Mis Ca   MR Number: [id number]   YOB: 1940   Date of Visit: 9/17/2019       Dear Dr. Scott Dillard:    Thank you for referring Mis Ca to me for evaluation. Attached you will find relevant portions of my assessment and plan of care.    If you have questions, please do not hesitate to call me. I look forward to following Mis Ca along with you.    Sincerely,    Mitch Garcia MD    Enclosure  CC:  No Recipients    If you would like to receive this communication electronically, please contact [email or web address] or [phone] to request more information on [employer] access.    For providers and/or their staff who would like to refer a patient to [facility], please contact us through our one-stop-shop provider referral line, [facility], at [phone].    If you feel you have received this communication in error or would no longer like to receive these types of communications, please e-mail [email or web address]

## 2019-09-17 NOTE — PROGRESS NOTES
Subjective:       Patient ID: Mis Ca is a 78 y.o. female.    Chief Complaint:  Renal mass.      History of Present Illness  HPI  Patient is a 78 y.o. female who is new to our clinic and referred by their PCP, Dr. Dillard for evaluation of a renal mass.  Patient reports left flank pain. Onset 6 months ago. Has resolved spontaneously.     She previously underwent a hysterectomy remotely for fibroids.    Also has some stress urinary incontinence.   She has a h/o HTN, A-fib, HLD.  Denies any h/o MI or CVA.  She takes coumadin (has been on for 4-5 years).  She has stopped this for a procedure in the past.  She is a former smoker---quit 60 years ago.        Review of Systems  Review of Systems  All other systems reviewed and negative except pertinent positives noted in HPI.       Objective:     Physical Exam   Constitutional: She is oriented to person, place, and time. She appears well-developed and well-nourished. She is cooperative.  Non-toxic appearance.   HENT:   Head: Normocephalic.   Cardiovascular: Normal rate, intact distal pulses and normal pulses.    Pulmonary/Chest: Effort normal. No accessory muscle usage. No tachypnea. No respiratory distress.   Abdominal: Soft. Normal appearance. She exhibits no distension, no fluid wave, no ascites and no mass. There is no tenderness. There is no rebound and no CVA tenderness. No hernia.   Liver/spleen non-palpable   Musculoskeletal: Normal range of motion.   Neurological: She is alert and oriented to person, place, and time. She has normal strength.   Skin: No bruising and no rash noted.     Psychiatric: She has a normal mood and affect. Her speech is normal and behavior is normal. Thought content normal.       Lab Review  Lab Results   Component Value Date    COLORU Yellow 09/03/2019    SPECGRAV 1.015 09/03/2019    PHUR 6.0 09/03/2019    NITRITE Negative 09/03/2019    KETONESU 1+ (A) 09/03/2019    UROBILINOGEN Negative 09/03/2019         Assessment:        1.  Renal cyst            Plan:     Renal cyst  -     CT Abdomen With Without Contrast; Future; Expected date: 09/17/2019    -Ultrasound was independently reviewed today and reveals left renal cysts---minimally complex by my read but will obtain CT Scan.   -will call with results.

## 2019-09-20 ENCOUNTER — HOSPITAL ENCOUNTER (OUTPATIENT)
Dept: RADIOLOGY | Facility: HOSPITAL | Age: 79
Discharge: HOME | End: 2019-09-20
Attending: UROLOGY
Payer: MEDICARE

## 2019-09-20 DIAGNOSIS — N28.1 RENAL CYST: ICD-10-CM

## 2019-09-20 PROCEDURE — 25500020 PHARM REV CODE 255: Mod: HCNC,PO | Performed by: UROLOGY

## 2019-09-20 PROCEDURE — 74170 CT ABD WO CNTRST FLWD CNTRST: CPT | Mod: TC,HCNC,PO

## 2019-09-20 RX ADMIN — IOHEXOL 75 ML: 350 INJECTION, SOLUTION INTRAVENOUS at 10:09

## 2019-09-23 ENCOUNTER — TELEPHONE (OUTPATIENT)
Dept: FAMILY MEDICINE | Facility: CLINIC | Age: 79
End: 2019-09-23

## 2019-09-23 NOTE — TELEPHONE ENCOUNTER
Pt has been scheduled for BP ck.     ----- Message from Odilia Landeros sent at 9/23/2019  1:27 PM CDT -----  Contact: patient  Patient called to speak with Citlaly in regard to her blood pressure.    Please call [phone] to discuss today.

## 2019-09-27 ENCOUNTER — PATIENT OUTREACH (OUTPATIENT)
Dept: ADMINISTRATIVE | Facility: OTHER | Age: 79
End: 2019-09-27

## 2019-09-30 ENCOUNTER — OFFICE VISIT (OUTPATIENT)
Dept: UROLOGY | Facility: CLINIC | Age: 79
End: 2019-09-30
Payer: MEDICARE

## 2019-09-30 ENCOUNTER — CLINICAL SUPPORT (OUTPATIENT)
Dept: FAMILY MEDICINE | Facility: CLINIC | Age: 79
End: 2019-09-30
Payer: MEDICARE

## 2019-09-30 VITALS
WEIGHT: 209.44 LBS | HEIGHT: 62 IN | HEART RATE: 80 BPM | BODY MASS INDEX: 38.54 KG/M2 | SYSTOLIC BLOOD PRESSURE: 137 MMHG | DIASTOLIC BLOOD PRESSURE: 65 MMHG

## 2019-09-30 DIAGNOSIS — Z79.01 ANTICOAGULATED ON COUMADIN: ICD-10-CM

## 2019-09-30 DIAGNOSIS — N28.1 RENAL CYST: Primary | ICD-10-CM

## 2019-09-30 DIAGNOSIS — D64.9 ANEMIA, UNSPECIFIED TYPE: ICD-10-CM

## 2019-09-30 DIAGNOSIS — I48.0 PAROXYSMAL ATRIAL FIBRILLATION: Primary | ICD-10-CM

## 2019-09-30 DIAGNOSIS — I10 ESSENTIAL HYPERTENSION: ICD-10-CM

## 2019-09-30 DIAGNOSIS — I48.91 ATRIAL FIBRILLATION WITH RVR: ICD-10-CM

## 2019-09-30 PROCEDURE — 3075F SYST BP GE 130 - 139MM HG: CPT | Mod: HCNC,CPTII,S$GLB, | Performed by: UROLOGY

## 2019-09-30 PROCEDURE — 99999 PR PBB SHADOW E&M-EST. PATIENT-LVL III: ICD-10-PCS | Mod: PBBFAC,HCNC,, | Performed by: UROLOGY

## 2019-09-30 PROCEDURE — 1101F PR PT FALLS ASSESS DOC 0-1 FALLS W/OUT INJ PAST YR: ICD-10-PCS | Mod: HCNC,CPTII,S$GLB, | Performed by: UROLOGY

## 2019-09-30 PROCEDURE — 3078F PR MOST RECENT DIASTOLIC BLOOD PRESSURE < 80 MM HG: ICD-10-PCS | Mod: HCNC,CPTII,S$GLB, | Performed by: UROLOGY

## 2019-09-30 PROCEDURE — 85610 PROTHROMBIN TIME: CPT | Mod: QW,,, | Performed by: FAMILY MEDICINE

## 2019-09-30 PROCEDURE — 99999 PR PBB SHADOW E&M-EST. PATIENT-LVL III: CPT | Mod: PBBFAC,HCNC,, | Performed by: UROLOGY

## 2019-09-30 PROCEDURE — 96372 PR INJECTION,THERAP/PROPH/DIAG2ST, IM OR SUBCUT: ICD-10-PCS | Mod: S$GLB,,, | Performed by: FAMILY MEDICINE

## 2019-09-30 PROCEDURE — 3075F PR MOST RECENT SYSTOLIC BLOOD PRESS GE 130-139MM HG: ICD-10-PCS | Mod: HCNC,CPTII,S$GLB, | Performed by: UROLOGY

## 2019-09-30 PROCEDURE — 1101F PT FALLS ASSESS-DOCD LE1/YR: CPT | Mod: HCNC,CPTII,S$GLB, | Performed by: UROLOGY

## 2019-09-30 PROCEDURE — 3078F DIAST BP <80 MM HG: CPT | Mod: HCNC,CPTII,S$GLB, | Performed by: UROLOGY

## 2019-09-30 PROCEDURE — 96372 THER/PROPH/DIAG INJ SC/IM: CPT | Mod: S$GLB,,, | Performed by: FAMILY MEDICINE

## 2019-09-30 PROCEDURE — 85610 POCT INR: ICD-10-PCS | Mod: QW,,, | Performed by: FAMILY MEDICINE

## 2019-09-30 PROCEDURE — 99213 OFFICE O/P EST LOW 20 MIN: CPT | Mod: HCNC,S$GLB,, | Performed by: UROLOGY

## 2019-09-30 PROCEDURE — 99213 PR OFFICE/OUTPT VISIT, EST, LEVL III, 20-29 MIN: ICD-10-PCS | Mod: HCNC,S$GLB,, | Performed by: UROLOGY

## 2019-09-30 RX ORDER — CYANOCOBALAMIN 1000 UG/ML
1000 INJECTION, SOLUTION INTRAMUSCULAR; SUBCUTANEOUS ONCE
Status: COMPLETED | OUTPATIENT
Start: 2019-09-30 | End: 2019-09-30

## 2019-09-30 RX ADMIN — CYANOCOBALAMIN 1000 MCG: 1000 INJECTION, SOLUTION INTRAMUSCULAR; SUBCUTANEOUS at 04:09

## 2019-09-30 NOTE — PROGRESS NOTES
Subjective:       Patient ID: Mis Ca is a 78 y.o. female.    Chief Complaint:  Renal mass.      History of Present Illness  HPI  Patient is a 78 y.o. female who is established to our clinic and referred by their PCP, Dr. Dillard for evaluation of a renal mass.  Patient reports left flank pain. Onset 6 months ago. Has resolved spontaneously.     She returns today to review her CT scan results.     She previously underwent a hysterectomy remotely for fibroids.    Also has some stress urinary incontinence.   She has a h/o HTN, A-fib, HLD.  Denies any h/o MI or CVA.  She takes coumadin (has been on for 4-5 years).  She has stopped this for a procedure in the past.  She is a former smoker---quit 60 years ago.        Review of Systems  Review of Systems  All other systems reviewed and negative except pertinent positives noted in HPI.       Objective:     Physical Exam   Constitutional: She is oriented to person, place, and time. She appears well-developed and well-nourished. No distress.   HENT:   Head: Normocephalic and atraumatic.   Eyes: No scleral icterus.   Neck: No tracheal deviation present.   Pulmonary/Chest: Effort normal. No respiratory distress.   Neurological: She is alert and oriented to person, place, and time.   Psychiatric: She has a normal mood and affect. Her behavior is normal. Judgment and thought content normal.       Lab Review  Lab Results   Component Value Date    COLORU Yellow 09/03/2019    SPECGRAV 1.015 09/03/2019    PHUR 6.0 09/03/2019    NITRITE Negative 09/03/2019    KETONESU 1+ (A) 09/03/2019    UROBILINOGEN Negative 09/03/2019         Assessment:        1. Renal cyst            Plan:     Renal cyst  -     CT Abdomen With Without Contrast; Future; Expected date: 03/30/2020  -     Basic metabolic panel; Future; Expected date: 03/30/2020    -CT reviewed with patient.  Explained that this is an atypical/complex left renal cyst.  However, the patient is interested in  surveillance.  -plan CT in 6 months.   -BMP in 6 months.   F/u after imaging.

## 2019-09-30 NOTE — PROGRESS NOTES
Mis Herediasalud 78 y.o. female is here today for Blood Pressure check.   History of HTN yes.    Review of patient's allergies indicates:   Allergen Reactions    Adhesive     Penicillins     Sulfa (sulfonamide antibiotics)     Compazine [prochlorperazine edisylate] Anxiety     Creatinine   Date Value Ref Range Status   09/10/2019 0.85 0.50 - 1.40 mg/dL Final     Sodium   Date Value Ref Range Status   09/10/2019 138 136 - 145 mmol/L Final     Potassium   Date Value Ref Range Status   09/10/2019 4.4 3.5 - 5.1 mmol/L Final   ]  Patient verifies taking blood pressure medications on a regular basis at the same time of the day.     Current Outpatient Medications:     acetaminophen (TYLENOL) 500 MG tablet, Take 1,000 mg by mouth 2 (two) times daily as needed for Pain., Disp: , Rfl:     ALPRAZolam (XANAX) 0.25 MG tablet, Take 1 tablet (0.25 mg total) by mouth once daily., Disp: 90 tablet, Rfl: 1    aspirin (ECOTRIN) 81 MG EC tablet, Take 81 mg by mouth once daily.  , Disp: , Rfl:     atorvastatin (LIPITOR) 40 MG tablet, Take 1 tablet (40 mg total) by mouth once daily., Disp: 90 tablet, Rfl: 5    cyanocobalamin 1,000 mcg/mL injection, INJECT 1ML INTRAMUSCULARLY WEEKLY FOR 4 WEEKS, THEN 1ML MONTHLY, Disp: 3 mL, Rfl: 1    diltiaZEM (CARTIA XT) 240 MG 24 hr capsule, Take 1 capsule (240 mg total) by mouth once daily., Disp: 90 capsule, Rfl: 3    gabapentin (NEURONTIN) 300 MG capsule, Take 2 capsules (600 mg total) by mouth 2 (two) times daily., Disp: 360 capsule, Rfl: 3    hydroCHLOROthiazide (HYDRODIURIL) 25 MG tablet, Take 1 tablet (25 mg total) by mouth once daily., Disp: 30 tablet, Rfl: 11    INVELTYS 1 % DrpS, INSTILL 1 DROP INTO THE RIGHT EYE THREE TIMES A DAY, Disp: , Rfl: 3    latanoprost 0.005 % ophthalmic solution, 1 drop every evening., Disp: , Rfl:     losartan (COZAAR) 100 MG tablet, TAKE 1/2 TABLET EVERY DAY, Disp: 45 tablet, Rfl: 3    multivitamin (ONE DAILY MULTIVITAMIN) per tablet, Take 1 tablet  by mouth once daily., Disp: , Rfl:     omeprazole (PRILOSEC) 20 MG capsule, TAKE 1 CAPSULE EVERY DAY, Disp: 90 capsule, Rfl: 3    oxybutynin (DITROPAN) 5 MG Tab, Take 1 tablet (5 mg total) by mouth once daily., Disp: 90 tablet, Rfl: 1    pramipexole (MIRAPEX) 0.5 MG tablet, Take 1 tablet (0.5 mg total) by mouth every evening., Disp: 90 tablet, Rfl: 3    timolol maleate 0.5% (TIMOPTIC) 0.5 % Drop, Place 1 drop into both eyes once daily., Disp: , Rfl: 0    warfarin (COUMADIN) 6 MG tablet, TAKE 1 TABLET EVERY DAY, Disp: 90 tablet, Rfl: 3    Current Facility-Administered Medications:     cyanocobalamin injection 1,000 mcg, 1,000 mcg, Intramuscular, Q30 Days, Scott Dillard MD, 1,000 mcg at 05/17/19 1115    cyanocobalamin injection 1,000 mcg, 1,000 mcg, Intramuscular, Once, Scott Dillard MD  Does patient have record of home blood pressure readings no.   Last dose of blood pressure medication was taken at 8am.  Patient is asymptomatic.   Complains of nothing at this time.     136 ,86   .      Dr. Dillard notified.                 Patient is here for INR check  Patient is currently taking 6mg of coumadin daily and 9mg on fridays  Patient denies any changes in diet, pt denies any missed doses, and pt denies any signs of bleeding.  INR is 2.0  Patient was instructed to take current dose of coumadin.  Patient was instructed to return to the clinic in 1 month to recheck INR.      Lab Results       Component                Value               Date                       INR                      2.1                 08/29/2019                 INR                      2.5                 07/15/2019                 INR                      2.4                 06/14/2019                 INR                      2.3                 05/17/2019                 INR                      2.6                 04/18/2019                Pt comes in for b12 injection. Pt supplied medication. Right dose, right route, right medication  all verified. 2 pt identifier used. Instructed to wait 15 mins following injection. No pain or redness at injections site. Given URG

## 2019-10-05 RX ORDER — OXYBUTYNIN CHLORIDE 5 MG/1
5 TABLET ORAL DAILY
Qty: 90 TABLET | Refills: 1 | Status: SHIPPED | OUTPATIENT
Start: 2019-10-05 | End: 2020-06-07

## 2019-10-07 ENCOUNTER — OFFICE VISIT (OUTPATIENT)
Dept: FAMILY MEDICINE | Facility: CLINIC | Age: 79
End: 2019-10-07
Payer: MEDICARE

## 2019-10-07 VITALS
DIASTOLIC BLOOD PRESSURE: 70 MMHG | WEIGHT: 207.81 LBS | TEMPERATURE: 99 F | OXYGEN SATURATION: 93 % | HEART RATE: 85 BPM | BODY MASS INDEX: 38.24 KG/M2 | SYSTOLIC BLOOD PRESSURE: 122 MMHG | HEIGHT: 62 IN

## 2019-10-07 DIAGNOSIS — E66.01 SEVERE OBESITY WITH BODY MASS INDEX (BMI) OF 35.0 TO 39.9 WITH SERIOUS COMORBIDITY: ICD-10-CM

## 2019-10-07 DIAGNOSIS — G89.29 CHRONIC NONINTRACTABLE HEADACHE, UNSPECIFIED HEADACHE TYPE: ICD-10-CM

## 2019-10-07 DIAGNOSIS — K57.30 DIVERTICULOSIS OF LARGE INTESTINE WITHOUT HEMORRHAGE: Chronic | ICD-10-CM

## 2019-10-07 DIAGNOSIS — H54.40 BLIND RIGHT EYE: ICD-10-CM

## 2019-10-07 DIAGNOSIS — K80.20 CALCULUS OF GALLBLADDER WITHOUT CHOLECYSTITIS WITHOUT OBSTRUCTION: ICD-10-CM

## 2019-10-07 DIAGNOSIS — R26.89 ANTALGIC GAIT: ICD-10-CM

## 2019-10-07 DIAGNOSIS — K21.9 GASTROESOPHAGEAL REFLUX DISEASE WITHOUT ESOPHAGITIS: ICD-10-CM

## 2019-10-07 DIAGNOSIS — K57.10 DUODENAL DIVERTICULUM: ICD-10-CM

## 2019-10-07 DIAGNOSIS — Z86.010 HISTORY OF ADENOMATOUS POLYP OF COLON: ICD-10-CM

## 2019-10-07 DIAGNOSIS — R51.9 CHRONIC NONINTRACTABLE HEADACHE, UNSPECIFIED HEADACHE TYPE: ICD-10-CM

## 2019-10-07 DIAGNOSIS — I48.0 PAROXYSMAL ATRIAL FIBRILLATION: ICD-10-CM

## 2019-10-07 DIAGNOSIS — Z79.01 ANTICOAGULATED ON COUMADIN: ICD-10-CM

## 2019-10-07 DIAGNOSIS — K76.0 FATTY LIVER: ICD-10-CM

## 2019-10-07 DIAGNOSIS — I10 ESSENTIAL HYPERTENSION: ICD-10-CM

## 2019-10-07 DIAGNOSIS — R79.89 CREATININE ELEVATION: ICD-10-CM

## 2019-10-07 DIAGNOSIS — R53.83 FATIGUE, UNSPECIFIED TYPE: ICD-10-CM

## 2019-10-07 DIAGNOSIS — G47.33 OSA (OBSTRUCTIVE SLEEP APNEA): ICD-10-CM

## 2019-10-07 DIAGNOSIS — R26.89 BALANCE DISORDER: Primary | ICD-10-CM

## 2019-10-07 DIAGNOSIS — G44.89 CHRONIC MIXED HEADACHE SYNDROME: ICD-10-CM

## 2019-10-07 PROCEDURE — 1101F PR PT FALLS ASSESS DOC 0-1 FALLS W/OUT INJ PAST YR: ICD-10-PCS | Mod: CPTII,S$GLB,, | Performed by: FAMILY MEDICINE

## 2019-10-07 PROCEDURE — 99214 PR OFFICE/OUTPT VISIT, EST, LEVL IV, 30-39 MIN: ICD-10-PCS | Mod: S$GLB,,, | Performed by: FAMILY MEDICINE

## 2019-10-07 PROCEDURE — 3074F PR MOST RECENT SYSTOLIC BLOOD PRESSURE < 130 MM HG: ICD-10-PCS | Mod: CPTII,S$GLB,, | Performed by: FAMILY MEDICINE

## 2019-10-07 PROCEDURE — 3078F DIAST BP <80 MM HG: CPT | Mod: CPTII,S$GLB,, | Performed by: FAMILY MEDICINE

## 2019-10-07 PROCEDURE — 1101F PT FALLS ASSESS-DOCD LE1/YR: CPT | Mod: CPTII,S$GLB,, | Performed by: FAMILY MEDICINE

## 2019-10-07 PROCEDURE — 3078F PR MOST RECENT DIASTOLIC BLOOD PRESSURE < 80 MM HG: ICD-10-PCS | Mod: CPTII,S$GLB,, | Performed by: FAMILY MEDICINE

## 2019-10-07 PROCEDURE — 3074F SYST BP LT 130 MM HG: CPT | Mod: CPTII,S$GLB,, | Performed by: FAMILY MEDICINE

## 2019-10-07 PROCEDURE — 99214 OFFICE O/P EST MOD 30 MIN: CPT | Mod: S$GLB,,, | Performed by: FAMILY MEDICINE

## 2019-10-07 RX ORDER — FUROSEMIDE 40 MG/1
TABLET ORAL
COMMUNITY
Start: 2019-09-28 | End: 2019-10-07

## 2019-10-07 NOTE — PROGRESS NOTES
"Subjective:      Patient ID: Mis Ca is a 78 y.o. female.    Chief Complaint: Follow-up; feeling fuzzy headed; and off balance      Vitals:    10/07/19 1053   BP: 122/70   Pulse: 85   Temp: 98.6 °F (37 °C)   SpO2: (!) 93%   Weight: 94.3 kg (207 lb 12.5 oz)   Height: 5' 2" (1.575 m)        HPI   Not doing well; this pt moved to [facility] from house in [place]  Saw Dr Garcia for renal cysts  Also has duodenal diverticulum, gallstones  Report sent to her GI, Darrius, and she'll see him  Last creatinine was 1.6, usually normal, repeat creatinine back to ezequiel  Off lasix and taking hctz 25, taking 2 daily of the 12.5 daily    Review of Systems   Constitutional: Positive for fatigue.   HENT: Negative.    Respiratory: Negative.    Cardiovascular: Positive for leg swelling.   Gastrointestinal: Negative.    Endocrine: Positive for heat intolerance.   Genitourinary: Negative.    Musculoskeletal: Positive for arthralgias.        Feet hurt and rosalind  Had left foot surgery, has metal plate  Sees Dr Cardozo   Neurological:        Off balance  Head doesn't feel right,  occ left sided parietal pain     Psychiatric/Behavioral: Negative.    All other systems reviewed and are negative.    Objective:     Physical Exam   Constitutional: She is oriented to person, place, and time. She appears well-developed and well-nourished.   HENT:   Head: Normocephalic.   Eyes: Pupils are equal, round, and reactive to light. Conjunctivae and EOM are normal.   Neck: Normal range of motion. Neck supple.   Cardiovascular: Normal rate, regular rhythm and normal heart sounds.   Pulmonary/Chest: Effort normal and breath sounds normal.   Musculoskeletal: Normal range of motion.   Neurological: She is alert and oriented to person, place, and time. She has normal reflexes.   Skin: Skin is warm and dry.   Psychiatric: She has a normal mood and affect. Her behavior is normal. Judgment and thought content normal.   Nursing note and vitals " reviewed.    Assessment:     1. Balance disorder    2. Chronic mixed headache syndrome    3. Chronic nonintractable headache, unspecified headache type    4. Fatigue, unspecified type    5. Antalgic gait    6. Blind right eye    7. Paroxysmal atrial fibrillation    8. Essential hypertension    9. Anticoagulated on Coumadin    10. History of adenomatous polyp of colon    11. Gastroesophageal reflux disease without esophagitis    12. Diverticulosis of large intestine without hemorrhage    13. JACQUELYN (obstructive sleep apnea)    14. Severe obesity with body mass index (BMI) of 35.0 to 39.9 with serious comorbidity    15. Fatty liver    16. 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 Creatinine elevation      Plan:        Medication List           Accurate as of October 7, 2019 11:32 AM. If you have any questions, ask your nurse or doctor.               CONTINUE taking these medications    acetaminophen 500 MG tablet  Commonly known as:  TYLENOL     ALPRAZolam 0.25 MG tablet  Commonly known as:  XANAX  Take 1 tablet (0.25 mg total) by mouth once daily.     aspirin 81 MG EC tablet  Commonly known as:  ECOTRIN     atorvastatin 40 MG tablet  Commonly known as:  LIPITOR  Take 1 tablet (40 mg total) by mouth once daily.     cyanocobalamin 1,000 mcg/mL injection  INJECT 1ML INTRAMUSCULARLY WEEKLY FOR 4 WEEKS, THEN 1ML MONTHLY     diltiaZEM 240 MG 24 hr capsule  Commonly known as:  CARTIA XT  Take 1 capsule (240 mg total) by mouth once daily.     gabapentin 300 MG capsule  Commonly known as:  NEURONTIN  Take 2 capsules (600 mg total) by mouth 2 (two) times daily.     hydroCHLOROthiazide 25 MG tablet  Commonly known as:  HYDRODIURIL  Take 1 tablet (25 mg total) by mouth once daily.     INVELTYS 1 % Drkaron  Generic drug:  loteprednol etabonate     latanoprost 0.005 % ophthalmic solution     losartan 100 MG tablet  Commonly known as:  COZAAR  TAKE 1/2 TABLET EVERY DAY      omeprazole 20 MG capsule  Commonly known as:  PRILOSEC  TAKE 1 CAPSULE EVERY DAY     ONE DAILY MULTIVITAMIN per tablet  Generic drug:  multivitamin     oxybutynin 5 MG Tab  Commonly known as:  DITROPAN  TAKE 1 TABLET (5 MG TOTAL) BY MOUTH ONCE DAILY.     pramipexole 0.5 MG tablet  Commonly known as:  MIRAPEX  Take 1 tablet (0.5 mg total) by mouth every evening.     timolol maleate 0.5% 0.5 % Drop  Commonly known as:  TIMOPTIC     warfarin 6 MG tablet  Commonly known as:  COUMADIN  TAKE 1 TABLET EVERY DAY        STOP taking these medications    furosemide 40 MG tablet  Commonly known as:  LASIX  Stopped by:  Scott Dillard MD          Balance disorder    Chronic mixed headache syndrome  -     MRI Brain Without Contrast; Future; Expected date: 10/07/2019    Chronic nonintractable headache, unspecified headache type    Fatigue, unspecified type    Antalgic gait    Blind right eye    Paroxysmal atrial fibrillation    Essential hypertension  -     Cancel: Basic metabolic panel; Future; Expected date: 10/07/2019    Anticoagulated on Coumadin    History of adenomatous polyp of colon    Gastroesophageal reflux disease without esophagitis    Diverticulosis of large intestine without hemorrhage    JACQUELYN (obstructive sleep apnea)    Severe obesity with body mass index (BMI) of 35.0 to 39.9 with serious comorbidity    Fatty liver    Calculus of gallbladder without cholecystitis without obstruction    Duodenal diverticulum    Creatinine elevation  -     Cancel: Basic metabolic panel; Future; Expected date: 10/07/2019

## 2019-10-14 ENCOUNTER — HOSPITAL ENCOUNTER (OUTPATIENT)
Dept: RADIOLOGY | Facility: HOSPITAL | Age: 79
Discharge: HOME | End: 2019-10-14
Attending: FAMILY MEDICINE
Payer: MEDICARE

## 2019-10-14 DIAGNOSIS — G44.89 CHRONIC MIXED HEADACHE SYNDROME: ICD-10-CM

## 2019-10-14 PROCEDURE — 70551 MRI BRAIN STEM W/O DYE: CPT | Mod: TC,HCNC,PO

## 2019-10-22 RX ORDER — LOSARTAN POTASSIUM 100 MG/1
TABLET ORAL
Qty: 45 TABLET | Refills: 3 | Status: SHIPPED | OUTPATIENT
Start: 2019-10-22 | End: 2020-05-27 | Stop reason: SDUPTHER

## 2019-10-29 ENCOUNTER — CLINICAL SUPPORT (OUTPATIENT)
Dept: FAMILY MEDICINE | Facility: CLINIC | Age: 79
End: 2019-10-29
Payer: MEDICARE

## 2019-10-29 DIAGNOSIS — I48.0 PAROXYSMAL ATRIAL FIBRILLATION: Primary | ICD-10-CM

## 2019-10-29 DIAGNOSIS — D64.9 ANEMIA, UNSPECIFIED TYPE: ICD-10-CM

## 2019-10-29 LAB — INR PPP: 2 (ref 2–3)

## 2019-10-29 PROCEDURE — 96372 THER/PROPH/DIAG INJ SC/IM: CPT | Mod: S$GLB,,, | Performed by: FAMILY MEDICINE

## 2019-10-29 PROCEDURE — 85610 POCT INR: ICD-10-PCS | Mod: QW,,, | Performed by: FAMILY MEDICINE

## 2019-10-29 PROCEDURE — 85610 PROTHROMBIN TIME: CPT | Mod: QW,,, | Performed by: FAMILY MEDICINE

## 2019-10-29 PROCEDURE — 96372 PR INJECTION,THERAP/PROPH/DIAG2ST, IM OR SUBCUT: ICD-10-PCS | Mod: S$GLB,,, | Performed by: FAMILY MEDICINE

## 2019-10-29 RX ORDER — CYANOCOBALAMIN 1000 UG/ML
1000 INJECTION, SOLUTION INTRAMUSCULAR; SUBCUTANEOUS ONCE
Status: COMPLETED | OUTPATIENT
Start: 2019-10-29 | End: 2019-10-29

## 2019-10-29 RX ADMIN — CYANOCOBALAMIN 1000 MCG: 1000 INJECTION, SOLUTION INTRAMUSCULAR; SUBCUTANEOUS at 05:10

## 2019-11-08 RX ORDER — OMEPRAZOLE 20 MG/1
20 CAPSULE, DELAYED RELEASE ORAL DAILY
Qty: 90 CAPSULE | Refills: 3 | Status: SHIPPED | OUTPATIENT
Start: 2019-11-08 | End: 2020-12-29 | Stop reason: SDUPTHER

## 2019-11-20 ENCOUNTER — HOSPITAL ENCOUNTER (OUTPATIENT)
Dept: RADIOLOGY | Facility: HOSPITAL | Age: 79
Discharge: HOME | End: 2019-11-20
Attending: PODIATRIST
Payer: MEDICARE

## 2019-11-20 DIAGNOSIS — T84.84XA PAINFUL ORTHOPAEDIC HARDWARE: ICD-10-CM

## 2019-11-20 PROCEDURE — 73630 X-RAY EXAM OF FOOT: CPT | Mod: TC,HCNC,FY,PO,LT

## 2019-11-22 ENCOUNTER — OFFICE VISIT (OUTPATIENT)
Dept: PODIATRY | Facility: CLINIC | Age: 79
End: 2019-11-22
Payer: MEDICARE

## 2019-11-22 VITALS
WEIGHT: 205 LBS | DIASTOLIC BLOOD PRESSURE: 77 MMHG | HEART RATE: 64 BPM | SYSTOLIC BLOOD PRESSURE: 122 MMHG | BODY MASS INDEX: 37.49 KG/M2

## 2019-11-22 DIAGNOSIS — M79.672 LEFT FOOT PAIN: Primary | ICD-10-CM

## 2019-11-22 DIAGNOSIS — Z98.890 HISTORY OF FOOT SURGERY: ICD-10-CM

## 2019-11-22 PROCEDURE — 1101F PR PT FALLS ASSESS DOC 0-1 FALLS W/OUT INJ PAST YR: ICD-10-PCS | Mod: HCNC,CPTII,S$GLB, | Performed by: PODIATRIST

## 2019-11-22 PROCEDURE — 1159F MED LIST DOCD IN RCRD: CPT | Mod: HCNC,S$GLB,, | Performed by: PODIATRIST

## 2019-11-22 PROCEDURE — 3078F PR MOST RECENT DIASTOLIC BLOOD PRESSURE < 80 MM HG: ICD-10-PCS | Mod: HCNC,CPTII,S$GLB, | Performed by: PODIATRIST

## 2019-11-22 PROCEDURE — 1125F PR PAIN SEVERITY QUANTIFIED, PAIN PRESENT: ICD-10-PCS | Mod: HCNC,S$GLB,, | Performed by: PODIATRIST

## 2019-11-22 PROCEDURE — 99213 OFFICE O/P EST LOW 20 MIN: CPT | Mod: HCNC,S$GLB,, | Performed by: PODIATRIST

## 2019-11-22 PROCEDURE — 1101F PT FALLS ASSESS-DOCD LE1/YR: CPT | Mod: HCNC,CPTII,S$GLB, | Performed by: PODIATRIST

## 2019-11-22 PROCEDURE — 3078F DIAST BP <80 MM HG: CPT | Mod: HCNC,CPTII,S$GLB, | Performed by: PODIATRIST

## 2019-11-22 PROCEDURE — 99213 PR OFFICE/OUTPT VISIT, EST, LEVL III, 20-29 MIN: ICD-10-PCS | Mod: HCNC,S$GLB,, | Performed by: PODIATRIST

## 2019-11-22 PROCEDURE — 3074F SYST BP LT 130 MM HG: CPT | Mod: HCNC,CPTII,S$GLB, | Performed by: PODIATRIST

## 2019-11-22 PROCEDURE — 99999 PR PBB SHADOW E&M-EST. PATIENT-LVL IV: CPT | Mod: PBBFAC,HCNC,, | Performed by: PODIATRIST

## 2019-11-22 PROCEDURE — 1159F PR MEDICATION LIST DOCUMENTED IN MEDICAL RECORD: ICD-10-PCS | Mod: HCNC,S$GLB,, | Performed by: PODIATRIST

## 2019-11-22 PROCEDURE — 1125F AMNT PAIN NOTED PAIN PRSNT: CPT | Mod: HCNC,S$GLB,, | Performed by: PODIATRIST

## 2019-11-22 PROCEDURE — 3074F PR MOST RECENT SYSTOLIC BLOOD PRESSURE < 130 MM HG: ICD-10-PCS | Mod: HCNC,CPTII,S$GLB, | Performed by: PODIATRIST

## 2019-11-22 PROCEDURE — 99999 PR PBB SHADOW E&M-EST. PATIENT-LVL IV: ICD-10-PCS | Mod: PBBFAC,HCNC,, | Performed by: PODIATRIST

## 2019-11-22 NOTE — PROGRESS NOTES
Subjective:      Patient ID: Mis Ca is a 79 y.o. female.    Chief Complaint: No chief complaint on file.    Complains of a bunion deformity left foot. Pain aggravated by enclosed shoes. Denies trauma. Relates it has been present for many years. Wearing sandals today. Wears toe separators which help only a little.    10/5/18: Returns to review her xray and discuss surgical intervention for her painful bunion left foot. No new complaints.    11/8/18: Post EGR with Lapidus/akin bunionectomy left on 10/31/18. NWB left foot. Using rolling knee walker. Accompanied by sister and neighbor. Pain mild and well controlled.    11/30/18: 4 weeks postop. NWB left foot. Accompanied by her friends. Relates no pain today but gets intermittent aching to the foot.    12/7/18:  5 weeks postop. Recalls pain and a pop in her left leg a few days ago when putting weight down on the left foot. She has kept the boot on, rested and elevated. Pain improved since.    12/28/18: 8 weeks postop. No pain. Ambulating well with orthopedic boot.    5/20/19: 4 months post op.  No pain to surgical, ambulating well in shoes.  Complains of pain to midfoot that happened when she injured her foot while in boot shortly after surgery.  Pain worse with activity, better with rest. Walks with a limp due to pain.    11/22/2019:  Complains of intermittent burning pain along the bottom of the foot that varies in intensity.  Also complains of pins and needles sensation that travels to the right great toe that is not constant.  No pain at rest.  She is wearing a clog type shoe as recommended.        Vitals:    11/22/19 1127   BP: 122/77   Pulse: 64   Weight: 93 kg (205 lb)   PainSc:   8      Past Medical History:   Diagnosis Date    Anxiety     Atrial fibrillation     Blind right eye     Bradycardia     Cancer     skin cancer left side of face under eye    Hyperlipidemia     Hypertension     PVC (premature ventricular contraction)     RLS (restless  legs syndrome)     Sleep apnea     Does not use CPAP machine.       Past Surgical History:   Procedure Laterality Date    ADENOIDECTOMY      AKIN OSTEOTOMY Left 10/31/2018    Procedure: OSTEOTOMY, AKIN;  Surgeon: Rudolph Cardozo DPM;  Location: [facility] OR;  Service: Podiatry;  Laterality: Left;    APPENDECTOMY      BREAST BIOPSY Left     x3    BREAST SURGERY Left     breast biopsy x3    CATARACT EXTRACTION BILATERAL W/ ANTERIOR VITRECTOMY      ENDOSCOPIC GASTROCNEMIUS RECESSION Left 10/31/2018    Procedure: RECESSION, GASTROCNEMIUS, ENDOSCOPIC;  Surgeon: Rudolph Cardozo DPM;  Location: [facility] OR;  Service: Podiatry;  Laterality: Left;    EYE SURGERY Bilateral     cataracts extraction    EYE SURGERY Right     drainage tube (glaucoma)    FOOT SURGERY Left     lesion removed from dorsal area    FRACTURE SURGERY Left     arm    HAND TENDON SURGERY Left     HYSTERECTOMY      LAPIDUS BUNIONECTOMY Left 10/31/2018    Procedure: BUNIONECTOMY, LAPIDUS;  Surgeon: Rudolph Cardozo DPM;  Location: [facility] OR;  Service: Podiatry;  Laterality: Left;  mini c-arm, Arthrex locking plate (Lydia notified)    LAPIDUS BUNIONECTOMY Left 10/31/2018    Dr. Cardozo    Lymph Gland Removed  Right     groin (performed by Dr. Vergara)    OOPHORECTOMY      ORIF FOREARM FRACTURE Left     PALATE SURGERY      Lesion removed    TONSILLECTOMY         Family History   Problem Relation Age of Onset    Aneurysm Mother         AAA    Cancer Father         lung cancer    Lung cancer Father     Cirrhosis Father     Cancer Sister         Breast and Brain     Diabetes Sister     Breast cancer Sister     Hypertension Sister     Hyperlipidemia Sister     Brain cancer Sister     Colon polyps Brother     Cancer Brother         lung cancer    Diabetes Brother     Hyperlipidemia Brother     Hypertension Brother     Cancer Brother         bladder cancer    Diabetes Brother     Glaucoma Brother     Heart disease Sister          Heart valve repair    Atrial fibrillation Sister     Diabetes Sister     Heart disease Sister     Heart attack Sister     Bipolar disorder Sister     Depression Sister     Glaucoma Sister     Diabetes Sister     Other Sister         Pituitary tumor    Arthritis Sister     COPD Sister     Heart disease Sister     Kidney disease Sister     Cancer Sister         lung cancer    Diabetes Sister     Lung cancer Sister     Heart attack Sister        Social History     Socioeconomic History    Marital status:      Spouse name: Not on file    Number of children: Not on file    Years of education: Not on file    Highest education level: Not on file   Occupational History    Not on file   Social Needs    Financial resource strain: Not on file    Food insecurity:     Worry: Not on file     Inability: Not on file    Transportation needs:     Medical: Not on file     Non-medical: Not on file   Tobacco Use    Smoking status: Never Smoker    Smokeless tobacco: Never Used   Substance and Sexual Activity    Alcohol use: Yes     Comment: Seldomly drinks alcohol (wine)    Drug use: No    Sexual activity: Not Currently     Partners: Male   Lifestyle    Physical activity:     Days per week: Not on file     Minutes per session: Not on file    Stress: Not at all   Relationships    Social connections:     Talks on phone: Not on file     Gets together: Not on file     Attends [employer] service: Not on file     Active member of club or organization: Not on file     Attends meetings of clubs or organizations: Not on file     Relationship status: Not on file   Other Topics Concern    Not on file   Social History Narrative    Not on file       Current Outpatient Medications   Medication Sig Dispense Refill    acetaminophen (TYLENOL) 500 MG tablet Take 1,000 mg by mouth 2 (two) times daily as needed for Pain.      ALPRAZolam (XANAX) 0.25 MG tablet Take 1 tablet (0.25 mg total) by mouth once daily. 90  tablet 1    aspirin (ECOTRIN) 81 MG EC tablet Take 81 mg by mouth once daily.        atorvastatin (LIPITOR) 40 MG tablet Take 1 tablet (40 mg total) by mouth once daily. 90 tablet 5    cyanocobalamin 1,000 mcg/mL injection INJECT 1ML INTRAMUSCULARLY WEEKLY FOR 4 WEEKS, THEN 1ML MONTHLY 3 mL 1    diltiaZEM (CARTIA XT) 240 MG 24 hr capsule Take 1 capsule (240 mg total) by mouth once daily. 90 capsule 3    gabapentin (NEURONTIN) 300 MG capsule Take 2 capsules (600 mg total) by mouth 2 (two) times daily. 360 capsule 3    hydroCHLOROthiazide (HYDRODIURIL) 25 MG tablet Take 1 tablet (25 mg total) by mouth once daily. (Patient taking differently: Take 50 mg by mouth once daily. ) 30 tablet 11    INVELTYS 1 % DrpS INSTILL 1 DROP INTO THE RIGHT EYE THREE TIMES A DAY  3    latanoprost 0.005 % ophthalmic solution 1 drop every evening.      losartan (COZAAR) 100 MG tablet TAKE 1/2 TABLET EVERY DAY 45 tablet 3    multivitamin (ONE DAILY MULTIVITAMIN) per tablet Take 1 tablet by mouth once daily.      omeprazole (PRILOSEC) 20 MG capsule Take 1 capsule (20 mg total) by mouth once daily. 90 capsule 3    oxybutynin (DITROPAN) 5 MG Tab TAKE 1 TABLET (5 MG TOTAL) BY MOUTH ONCE DAILY. 90 tablet 1    pramipexole (MIRAPEX) 0.5 MG tablet Take 1 tablet (0.5 mg total) by mouth every evening. 90 tablet 3    timolol maleate 0.5% (TIMOPTIC) 0.5 % Drop Place 1 drop into both eyes once daily.  0    warfarin (COUMADIN) 6 MG tablet TAKE 1 TABLET EVERY DAY 90 tablet 3     Current Facility-Administered Medications   Medication Dose Route Frequency Provider Last Rate Last Dose    cyanocobalamin injection 1,000 mcg  1,000 mcg Intramuscular Q30 Days Scott Dillard MD   1,000 mcg at 05/17/19 1115       Review of patient's allergies indicates:   Allergen Reactions    Penicillins     Sulfa (sulfonamide antibiotics)     Compazine [prochlorperazine edisylate] Anxiety         Review of Systems   Constitution: Negative for chills, fever  and malaise/fatigue.   HENT: Negative for congestion.    Cardiovascular: Negative for chest pain, claudication and leg swelling.   Respiratory: Negative for cough and shortness of breath.    Skin: Negative for flushing, itching, poor wound healing and rash.   Musculoskeletal: Negative for back pain, joint pain, muscle cramps and muscle weakness.   Gastrointestinal: Negative for nausea and vomiting.   Neurological: Negative for numbness, paresthesias and weakness.   Psychiatric/Behavioral: Negative for altered mental status.           Objective:      Physical Exam   Constitutional: She is oriented to person, place, and time. She appears well-developed and well-nourished. No distress.   Cardiovascular: Intact distal pulses.   Pulses:       Dorsalis pedis pulses are 2+ on the right side, and 2+ on the left side.        Posterior tibial pulses are 2+ on the right side, and 2+ on the left side.   CFT< 3 secs all toes bilateral foot, skin temp warm bilateral foot, no hair growth bilateral lower extremity, mild lower extremity edema with telangiectasias and varicosities bilateral.     Musculoskeletal: She exhibits edema and tenderness.   No pain with MMT or ROM left foot. Left hallux well aligned. 1 MTP left achieves > 45 deg DF.    Left foot achieves 5 deg DF with knee extended and > 10 deg with knee flexed. Right foot achieves 0 deg DF knee extended and 10 deg DF knee flexed.    No pain on palpation of the left leg, no pain with active resisted PF foot.    Mild collapse of the arch with standing bilateral foot.    No localized pain on palpation to the base of the 3rd metatarsal left foot.    Pain on palpation overlying the medial midfoot at the location of hardware plantar 1st tarsometatarsal arthrodesis site left foot.     Positive Tinel sign overlying the dorsal central left midfoot with tingling and pins and needle sensation radiating to the 1st and 2nd toes.    Note medial deviation of toes 2 and 3 left foot when the  forefoot is loaded.    Mild recurrence clinically of hallux valgus left foot.   Neurological: She is alert and oriented to person, place, and time. She has normal strength. No sensory deficit.   Skin: Skin is warm, dry and intact. Capillary refill takes less than 2 seconds. No ecchymosis and no rash noted. She is not diaphoretic. No cyanosis or erythema. No pallor. Nails show no clubbing.   Normal appearing scars left foot and leg.               Assessment:       Encounter Diagnoses   Name Primary?    Left foot pain Yes    History of foot surgery          Plan:       Diagnoses and all orders for this visit:    Left foot pain  -     CT Foot Without Contrast Left; Future    History of foot surgery  -     CT Foot Without Contrast Left; Future      I counseled the patient on her conditions, their implications and medical management.    Previous CT scan of the left foot performed 03/04/2019 had revealed consolidation of the TMT 1 arthrodesis site.  This was noted after the patient encounter and chart check.  We will cancel today CT.  Discussed further with the patient over the phone.  Will most likely plan for hardware removal with external neurolysis/decompression of the deep peroneal nerve and medial dorsal cutaneous nerves left foot. Will also plan for possible base wedge osteotomy 1st metatarsal to reduce the intermetatarsal angle.    Reviewed left foot x-ray noting increase in 1-2 intermetatarsal angle.  Tibial sesamoid position of 3.  Subluxation of the 1 MTP.  There appears to be stable TMT 1 arthrodesis site with no lucency or shifting of the hardware.    Continue activity to tolerance in tennis shoes.    Will contact the patient directly and determined follow-up.    A portion of this note was generated by voice recognition software and may contain topical graphical errors.

## 2019-11-25 ENCOUNTER — HOSPITAL ENCOUNTER (OUTPATIENT)
Dept: RADIOLOGY | Facility: HOSPITAL | Age: 79
Discharge: HOME | End: 2019-11-25
Attending: PODIATRIST
Payer: MEDICARE

## 2019-11-25 ENCOUNTER — TELEPHONE (OUTPATIENT)
Dept: FAMILY MEDICINE | Facility: CLINIC | Age: 79
End: 2019-11-25

## 2019-11-25 ENCOUNTER — CLINICAL SUPPORT (OUTPATIENT)
Dept: FAMILY MEDICINE | Facility: CLINIC | Age: 79
End: 2019-11-25
Payer: MEDICARE

## 2019-11-25 DIAGNOSIS — G25.81 RESTLESS LEG SYNDROME: ICD-10-CM

## 2019-11-25 DIAGNOSIS — I10 ESSENTIAL HYPERTENSION: ICD-10-CM

## 2019-11-25 DIAGNOSIS — E66.01 SEVERE OBESITY WITH BODY MASS INDEX (BMI) OF 35.0 TO 39.9 WITH SERIOUS COMORBIDITY: ICD-10-CM

## 2019-11-25 DIAGNOSIS — D64.9 ANEMIA, UNSPECIFIED TYPE: ICD-10-CM

## 2019-11-25 DIAGNOSIS — M79.2 NEURALGIA: ICD-10-CM

## 2019-11-25 DIAGNOSIS — K57.10 DUODENAL DIVERTICULUM: ICD-10-CM

## 2019-11-25 DIAGNOSIS — M79.672 LEFT FOOT PAIN: ICD-10-CM

## 2019-11-25 DIAGNOSIS — I48.0 PAROXYSMAL ATRIAL FIBRILLATION: Primary | ICD-10-CM

## 2019-11-25 DIAGNOSIS — K76.0 FATTY LIVER: ICD-10-CM

## 2019-11-25 DIAGNOSIS — R53.83 FATIGUE, UNSPECIFIED TYPE: ICD-10-CM

## 2019-11-25 DIAGNOSIS — Z79.01 ANTICOAGULATED ON COUMADIN: ICD-10-CM

## 2019-11-25 DIAGNOSIS — G25.89 OTHER SPECIFIED EXTRAPYRAMIDAL AND MOVEMENT DISORDERS: ICD-10-CM

## 2019-11-25 DIAGNOSIS — Z98.890 HISTORY OF FOOT SURGERY: ICD-10-CM

## 2019-11-25 DIAGNOSIS — Z86.010 HISTORY OF ADENOMATOUS POLYP OF COLON: ICD-10-CM

## 2019-11-25 DIAGNOSIS — I51.89 DIASTOLIC DYSFUNCTION: ICD-10-CM

## 2019-11-25 DIAGNOSIS — R79.89 CREATININE ELEVATION: ICD-10-CM

## 2019-11-25 DIAGNOSIS — I48.0 PAROXYSMAL ATRIAL FIBRILLATION: ICD-10-CM

## 2019-11-25 DIAGNOSIS — K21.9 GASTROESOPHAGEAL REFLUX DISEASE WITHOUT ESOPHAGITIS: ICD-10-CM

## 2019-11-25 DIAGNOSIS — D64.9 ANEMIA, UNSPECIFIED TYPE: Primary | ICD-10-CM

## 2019-11-25 LAB — INR PPP: 2.2 (ref 2–3)

## 2019-11-25 PROCEDURE — 96372 THER/PROPH/DIAG INJ SC/IM: CPT | Mod: S$GLB,,, | Performed by: FAMILY MEDICINE

## 2019-11-25 PROCEDURE — 73700 CT LOWER EXTREMITY W/O DYE: CPT | Mod: TC,HCNC,PO,LT

## 2019-11-25 PROCEDURE — 96372 PR INJECTION,THERAP/PROPH/DIAG2ST, IM OR SUBCUT: ICD-10-PCS | Mod: S$GLB,,, | Performed by: FAMILY MEDICINE

## 2019-11-25 RX ADMIN — CYANOCOBALAMIN 1000 MCG: 1000 INJECTION, SOLUTION INTRAMUSCULAR; SUBCUTANEOUS at 04:11

## 2019-11-25 NOTE — TELEPHONE ENCOUNTER
Pt has been receiving b12 injections since august 2018  Last b12 level was July 2018  When do you want to check it again?  Please advise pt

## 2019-12-05 LAB
LEFT EYE DM RETINOPATHY: NEGATIVE
RIGHT EYE DM RETINOPATHY: NEGATIVE

## 2019-12-09 ENCOUNTER — TELEPHONE (OUTPATIENT)
Dept: FAMILY MEDICINE | Facility: CLINIC | Age: 79
End: 2019-12-09

## 2019-12-09 ENCOUNTER — OFFICE VISIT (OUTPATIENT)
Dept: PODIATRY | Facility: CLINIC | Age: 79
End: 2019-12-09
Payer: MEDICARE

## 2019-12-09 VITALS
HEART RATE: 78 BPM | WEIGHT: 205 LBS | DIASTOLIC BLOOD PRESSURE: 73 MMHG | SYSTOLIC BLOOD PRESSURE: 125 MMHG | HEIGHT: 62 IN | BODY MASS INDEX: 37.73 KG/M2

## 2019-12-09 DIAGNOSIS — M96.89 MALUNION OF BONE AFTER OSTEOTOMY: ICD-10-CM

## 2019-12-09 DIAGNOSIS — G57.92 NERVE ENTRAPMENT SYNDROME OF FOOT, LEFT: ICD-10-CM

## 2019-12-09 DIAGNOSIS — Z01.818 PREOP TESTING: ICD-10-CM

## 2019-12-09 DIAGNOSIS — M20.12 HALLUX VALGUS, LEFT: Primary | ICD-10-CM

## 2019-12-09 PROCEDURE — 1101F PR PT FALLS ASSESS DOC 0-1 FALLS W/OUT INJ PAST YR: ICD-10-PCS | Mod: HCNC,CPTII,S$GLB, | Performed by: PODIATRIST

## 2019-12-09 PROCEDURE — 99214 PR OFFICE/OUTPT VISIT, EST, LEVL IV, 30-39 MIN: ICD-10-PCS | Mod: HCNC,S$GLB,, | Performed by: PODIATRIST

## 2019-12-09 PROCEDURE — 3078F DIAST BP <80 MM HG: CPT | Mod: HCNC,CPTII,S$GLB, | Performed by: PODIATRIST

## 2019-12-09 PROCEDURE — 1125F AMNT PAIN NOTED PAIN PRSNT: CPT | Mod: HCNC,S$GLB,, | Performed by: PODIATRIST

## 2019-12-09 PROCEDURE — 99999 PR PBB SHADOW E&M-EST. PATIENT-LVL V: ICD-10-PCS | Mod: PBBFAC,HCNC,, | Performed by: PODIATRIST

## 2019-12-09 PROCEDURE — 1125F PR PAIN SEVERITY QUANTIFIED, PAIN PRESENT: ICD-10-PCS | Mod: HCNC,S$GLB,, | Performed by: PODIATRIST

## 2019-12-09 PROCEDURE — 3074F SYST BP LT 130 MM HG: CPT | Mod: HCNC,CPTII,S$GLB, | Performed by: PODIATRIST

## 2019-12-09 PROCEDURE — 3078F PR MOST RECENT DIASTOLIC BLOOD PRESSURE < 80 MM HG: ICD-10-PCS | Mod: HCNC,CPTII,S$GLB, | Performed by: PODIATRIST

## 2019-12-09 PROCEDURE — 3074F PR MOST RECENT SYSTOLIC BLOOD PRESSURE < 130 MM HG: ICD-10-PCS | Mod: HCNC,CPTII,S$GLB, | Performed by: PODIATRIST

## 2019-12-09 PROCEDURE — 1159F PR MEDICATION LIST DOCUMENTED IN MEDICAL RECORD: ICD-10-PCS | Mod: HCNC,S$GLB,, | Performed by: PODIATRIST

## 2019-12-09 PROCEDURE — 99214 OFFICE O/P EST MOD 30 MIN: CPT | Mod: HCNC,S$GLB,, | Performed by: PODIATRIST

## 2019-12-09 PROCEDURE — 99999 PR PBB SHADOW E&M-EST. PATIENT-LVL V: CPT | Mod: PBBFAC,HCNC,, | Performed by: PODIATRIST

## 2019-12-09 PROCEDURE — 1159F MED LIST DOCD IN RCRD: CPT | Mod: HCNC,S$GLB,, | Performed by: PODIATRIST

## 2019-12-09 PROCEDURE — 1101F PT FALLS ASSESS-DOCD LE1/YR: CPT | Mod: HCNC,CPTII,S$GLB, | Performed by: PODIATRIST

## 2019-12-09 RX ORDER — CLINDAMYCIN PHOSPHATE 900 MG/50ML
900 INJECTION, SOLUTION INTRAVENOUS
Status: CANCELLED | OUTPATIENT
Start: 2019-12-09

## 2019-12-09 RX ORDER — LIDOCAINE HYDROCHLORIDE 10 MG/ML
1 INJECTION, SOLUTION EPIDURAL; INFILTRATION; INTRACAUDAL; PERINEURAL ONCE
Status: CANCELLED | OUTPATIENT
Start: 2019-12-09 | End: 2019-12-09

## 2019-12-09 RX ORDER — SODIUM CHLORIDE 0.9 % (FLUSH) 0.9 %
10 SYRINGE (ML) INJECTION
Status: DISCONTINUED | OUTPATIENT
Start: 2019-12-09 | End: 2020-03-16 | Stop reason: HOSPADM

## 2019-12-09 NOTE — TELEPHONE ENCOUNTER
----- Message from Camelia Kay MA sent at 12/9/2019  2:39 PM CST -----  Good afternoon, We have a mutual patient and she is having surgery on 1/15/20 with Dr. Cardozo. She needs a pre-op appointment to get cleared for surgery within 30 day before her surgery The referral is in [facility]. Can you please contact the patient at [phone] to schedule?. Thanks in advance

## 2019-12-09 NOTE — PROGRESS NOTES
"Subjective:      Patient ID: Mis Ca is a 79 y.o. female.    Chief Complaint: Follow-up (left foot )    Complains of a bunion deformity left foot. Pain aggravated by enclosed shoes. Denies trauma. Relates it has been present for many years. Wearing sandals today. Wears toe separators which help only a little.    10/5/18: Returns to review her xray and discuss surgical intervention for her painful bunion left foot. No new complaints.    11/8/18: Post EGR with Lapidus/akin bunionectomy left on 10/31/18. NWB left foot. Using rolling knee walker. Accompanied by sister and neighbor. Pain mild and well controlled.    11/30/18: 4 weeks postop. NWB left foot. Accompanied by her friends. Relates no pain today but gets intermittent aching to the foot.    12/7/18:  5 weeks postop. Recalls pain and a pop in her left leg a few days ago when putting weight down on the left foot. She has kept the boot on, rested and elevated. Pain improved since.    12/28/18: 8 weeks postop. No pain. Ambulating well with orthopedic boot.    5/20/19: 4 months post op.  No pain to surgical, ambulating well in shoes.  Complains of pain to midfoot that happened when she injured her foot while in boot shortly after surgery.  Pain worse with activity, better with rest. Walks with a limp due to pain.    11/22/2019:  Complains of intermittent burning pain along the bottom of the foot that varies in intensity.  Also complains of pins and needles sensation that travels to the right great toe that is not constant.  No pain at rest.  She is wearing a clog type shoe as recommended.    12/9/2019: Pain is largely unchanged since last visit. She had CT scan done.  No new complaints.      Vitals:    12/09/19 1352   BP: 125/73   Pulse: 78   Weight: 93 kg (205 lb)   Height: 5' 2" (1.575 m)   PainSc: 10-Worst pain ever      Past Medical History:   Diagnosis Date    Anxiety     Atrial fibrillation     Blind right eye     Bradycardia     Cancer     skin " cancer left side of face under eye    Hyperlipidemia     Hypertension     PVC (premature ventricular contraction)     RLS (restless legs syndrome)     Sleep apnea     Does not use CPAP machine.       Past Surgical History:   Procedure Laterality Date    ADENOIDECTOMY      AKIN OSTEOTOMY Left 10/31/2018    Procedure: OSTEOTOMY, AKIN;  Surgeon: Rudolph Cardozo DPM;  Location: [facility] OR;  Service: Podiatry;  Laterality: Left;    APPENDECTOMY      BREAST BIOPSY Left     x3    BREAST SURGERY Left     breast biopsy x3    CATARACT EXTRACTION BILATERAL W/ ANTERIOR VITRECTOMY      ENDOSCOPIC GASTROCNEMIUS RECESSION Left 10/31/2018    Procedure: RECESSION, GASTROCNEMIUS, ENDOSCOPIC;  Surgeon: Rudolph Cardozo DPM;  Location: [facility] OR;  Service: Podiatry;  Laterality: Left;    EYE SURGERY Bilateral     cataracts extraction    EYE SURGERY Right     drainage tube (glaucoma)    FOOT SURGERY Left     lesion removed from dorsal area    FRACTURE SURGERY Left     arm    HAND TENDON SURGERY Left     HYSTERECTOMY      LAPIDUS BUNIONECTOMY Left 10/31/2018    Procedure: BUNIONECTOMY, LAPIDUS;  Surgeon: Rudolph Cardozo DPM;  Location: [facility] OR;  Service: Podiatry;  Laterality: Left;  mini c-arm, Arthrex locking plate (Lydia notified)    LAPIDUS BUNIONECTOMY Left 10/31/2018    Dr. Cardozo    Lymph Gland Removed  Right     groin (performed by Dr. Vergara)    OOPHORECTOMY      ORIF FOREARM FRACTURE Left     PALATE SURGERY      Lesion removed    TONSILLECTOMY         Family History   Problem Relation Age of Onset    Aneurysm Mother         AAA    Cancer Father         lung cancer    Lung cancer Father     Cirrhosis Father     Cancer Sister         Breast and Brain     Diabetes Sister     Breast cancer Sister     Hypertension Sister     Hyperlipidemia Sister     Brain cancer Sister     Colon polyps Brother     Cancer Brother         lung cancer    Diabetes Brother     Hyperlipidemia Brother      Hypertension Brother     Cancer Brother         bladder cancer    Diabetes Brother     Glaucoma Brother     Heart disease Sister         Heart valve repair    Atrial fibrillation Sister     Diabetes Sister     Heart disease Sister     Heart attack Sister     Bipolar disorder Sister     Depression Sister     Glaucoma Sister     Diabetes Sister     Other Sister         Pituitary tumor    Arthritis Sister     COPD Sister     Heart disease Sister     Kidney disease Sister     Cancer Sister         lung cancer    Diabetes Sister     Lung cancer Sister     Heart attack Sister        Social History     Socioeconomic History    Marital status:      Spouse name: Not on file    Number of children: Not on file    Years of education: Not on file    Highest education level: Not on file   Occupational History    Not on file   Social Needs    Financial resource strain: Not on file    Food insecurity:     Worry: Not on file     Inability: Not on file    Transportation needs:     Medical: Not on file     Non-medical: Not on file   Tobacco Use    Smoking status: Never Smoker    Smokeless tobacco: Never Used   Substance and Sexual Activity    Alcohol use: Yes     Comment: Seldomly drinks alcohol (wine)    Drug use: No    Sexual activity: Not Currently     Partners: Male   Lifestyle    Physical activity:     Days per week: Not on file     Minutes per session: Not on file    Stress: Not at all   Relationships    Social connections:     Talks on phone: Not on file     Gets together: Not on file     Attends Orthodox service: Not on file     Active member of club or organization: Not on file     Attends meetings of clubs or organizations: Not on file     Relationship status: Not on file   Other Topics Concern    Not on file   Social History Narrative    Not on file       Current Outpatient Medications   Medication Sig Dispense Refill    acetaminophen (TYLENOL) 500 MG tablet Take 1,000 mg by  mouth 2 (two) times daily as needed for Pain.      ALPRAZolam (XANAX) 0.25 MG tablet Take 1 tablet (0.25 mg total) by mouth once daily. 90 tablet 1    aspirin (ECOTRIN) 81 MG EC tablet Take 81 mg by mouth once daily.        atorvastatin (LIPITOR) 40 MG tablet Take 1 tablet (40 mg total) by mouth once daily. 90 tablet 5    cyanocobalamin 1,000 mcg/mL injection INJECT 1ML INTRAMUSCULARLY WEEKLY FOR 4 WEEKS, THEN 1ML MONTHLY 3 mL 1    diltiaZEM (CARTIA XT) 240 MG 24 hr capsule Take 1 capsule (240 mg total) by mouth once daily. 90 capsule 3    gabapentin (NEURONTIN) 300 MG capsule Take 2 capsules (600 mg total) by mouth 2 (two) times daily. 360 capsule 3    hydroCHLOROthiazide (HYDRODIURIL) 25 MG tablet Take 1 tablet (25 mg total) by mouth once daily. (Patient taking differently: Take 50 mg by mouth once daily. ) 30 tablet 11    INVELTYS 1 % DrpS INSTILL 1 DROP INTO THE RIGHT EYE THREE TIMES A DAY  3    latanoprost 0.005 % ophthalmic solution 1 drop every evening.      losartan (COZAAR) 100 MG tablet TAKE 1/2 TABLET EVERY DAY 45 tablet 3    multivitamin (ONE DAILY MULTIVITAMIN) per tablet Take 1 tablet by mouth once daily.      omeprazole (PRILOSEC) 20 MG capsule Take 1 capsule (20 mg total) by mouth once daily. 90 capsule 3    oxybutynin (DITROPAN) 5 MG Tab TAKE 1 TABLET (5 MG TOTAL) BY MOUTH ONCE DAILY. 90 tablet 1    pramipexole (MIRAPEX) 0.5 MG tablet Take 1 tablet (0.5 mg total) by mouth every evening. 90 tablet 3    timolol maleate 0.5% (TIMOPTIC) 0.5 % Drop Place 1 drop into both eyes once daily.  0    warfarin (COUMADIN) 6 MG tablet TAKE 1 TABLET EVERY DAY 90 tablet 3     Current Facility-Administered Medications   Medication Dose Route Frequency Provider Last Rate Last Dose    cyanocobalamin injection 1,000 mcg  1,000 mcg Intramuscular Q30 Days Scott Dillard MD   1,000 mcg at 11/25/19 1611    sodium chloride 0.9% flush 10 mL  10 mL Intravenous PRN Rudolph Cardozo DPM           Review of  patient's allergies indicates:   Allergen Reactions    Penicillins     Sulfa (sulfonamide antibiotics)     Compazine [prochlorperazine edisylate] Anxiety         Review of Systems   Constitution: Negative for chills, fever and malaise/fatigue.   HENT: Negative for congestion.    Cardiovascular: Negative for chest pain, claudication and leg swelling.   Respiratory: Negative for cough and shortness of breath.    Skin: Negative for flushing, itching, poor wound healing and rash.   Musculoskeletal: Positive for joint pain. Negative for back pain, muscle cramps and muscle weakness.   Gastrointestinal: Negative for nausea and vomiting.   Neurological: Positive for numbness. Negative for paresthesias and weakness.        Burning sensation   Psychiatric/Behavioral: Negative for altered mental status.           Objective:      Physical Exam   Constitutional: She is oriented to person, place, and time. She appears well-developed and well-nourished. No distress.   Cardiovascular: Intact distal pulses.   Pulses:       Dorsalis pedis pulses are 2+ on the right side, and 2+ on the left side.        Posterior tibial pulses are 2+ on the right side, and 2+ on the left side.   CFT< 3 secs all toes bilateral foot, skin temp warm bilateral foot, no hair growth bilateral lower extremity, mild lower extremity edema with telangiectasias and varicosities bilateral.     Musculoskeletal: She exhibits edema and tenderness.   No pain with MMT or ROM left foot. Left hallux well aligned. 1 MTP left achieves > 45 deg DF.    Left foot achieves 5 deg DF with knee extended and > 10 deg with knee flexed. Right foot achieves 0 deg DF knee extended and 10 deg DF knee flexed.    No pain on palpation of the left leg, no pain with active resisted PF foot.    Mild collapse of the arch with standing bilateral foot.    No localized pain on palpation to the base of the 3rd metatarsal left foot.    Pain on palpation overlying the medial midfoot at the  location of hardware plantar 1st tarsometatarsal arthrodesis site left foot.     Positive Tinel sign overlying the dorsal central left midfoot with tingling and pins and needle sensation radiating to the 1st and 2nd toes.    Note medial deviation of toes 2 and 3 left foot when the forefoot is loaded.    Mild recurrence clinically of hallux valgus left foot.   Neurological: She is alert and oriented to person, place, and time. She has normal strength. No sensory deficit.   Skin: Skin is warm, dry and intact. Capillary refill takes less than 2 seconds. No ecchymosis and no rash noted. She is not diaphoretic. No cyanosis or erythema. No pallor. Nails show no clubbing.   Normal appearing scars left foot and leg.               Assessment:       Encounter Diagnoses   Name Primary?   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    Diagnoses and all orders for this visit:    Hallux valgus, left  -     Ambulatory referral to [facility]  -     Case Request Operating Room: FUSION, FOOT, NEURECTOMY  -     Full code; Standing  -     Place in Outpatient; Standing  -     Insert peripheral IV; Standing  -     Verify surgical site; Standing  -     Verify informed consent; Standing  -     Full code  -     Insert peripheral IV    Nerve entrapment syndrome of foot, left  -     Ambulatory referral to Family Practice  -     Case Request Operating Room: FUSION, FOOT, NEURECTOMY  -     Full code; Standing  -     Place in Outpatient; Standing  -     Insert peripheral IV; Standing  -     Verify surgical site; Standing  -     Verify informed consent; Standing  -     Full code  -     Insert peripheral IV    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  -     Ambulatory referral to Family Practice  -     Case Request Operating Room: FUSION, FOOT, NEURECTOMY  -     Full code; Standing  -     Place in Outpatient;  Standing  -     Insert peripheral IV; Standing  -     Verify surgical site; Standing  -     Verify informed consent; Standing  -     Full code  -     Insert peripheral IV    Preop testing  -     Ambulatory referral to [facility]  -     CBC auto differential; Future  -     Comprehensive metabolic panel; Future  -     Urinalysis; Future  -     EKG 12-lead; Future  -     X-Ray Chest 1 View; Future    Other orders  -     Progressive Mobility Protocol (mobilize patient to their highest level of functioning at least twice daily); Standing  -     sodium chloride 0.9% flush 10 mL  -     lidocaine (PF) 10 mg/ml (1%) injection 10 mg  -     Weight bearing Partial Weight Bearing; Lower Extremity; Left; Standing  -     clindamycin 900 MG/50 ML D5W 900 mg/50 mL IVPB 900 mg  -     Progressive Mobility Protocol (mobilize patient to their highest level of functioning at least twice daily)  -     Progressive Mobility Protocol (mobilize patient to their highest level of functioning at least twice daily)  -     Weight bearing Partial Weight Bearing; Lower Extremity; Left      I counseled the patient on her conditions, their implications and medical management.    Follow up CT scan with partial healing of the 1st TMT athrodesis. Plan for hardware removal with revision of the 1st TMT arthrodesis with external fixator and  external neurolysis/decompression of the deep peroneal nerve and medial dorsal cutaneous nerves left foot.     This procedure has been fully reviewed with the patient and written informed consent has been obtained.      Reviewed left foot x-ray noting increase in 1-2 intermetatarsal angle.  Tibial sesamoid position of 3.  Subluxation of the 1 MTP.  There appears to be stable TMT 1 arthrodesis site with no lucency or shifting of the hardware.    Scheduled at [facility] on 01/15/2020 post mac with local anesthesia around the ankle.  Consider regional versus general.    Continue activity to tolerance in tennis  shoes.    Patient will need surgical clearance from PCP.    Kevin Cat DPM, PGY-2    I have personally taken the history and examined this patient and agree with the resident's note as stated as above.   Rudolph Cardozo DPM, FACFAS        A portion of this note was generated by voice recognition software and may contain topical graphical errors.

## 2019-12-09 NOTE — PATIENT INSTRUCTIONS
1/15/20 for revision surgery left foot. Discussed applying mini external fixator device on foot for 6-8 weeks on average. Also will perform decompression of the deep peroneal nerve.    Discontinue coumadin 1 week prior to surgery. Stop ASA 3 days prior to surgery.

## 2019-12-13 ENCOUNTER — LAB VISIT (OUTPATIENT)
Dept: LAB | Facility: HOSPITAL | Age: 79
End: 2019-12-13
Attending: FAMILY MEDICINE
Payer: MEDICARE

## 2019-12-13 DIAGNOSIS — R79.89 CREATININE ELEVATION: ICD-10-CM

## 2019-12-13 DIAGNOSIS — Z86.010 HISTORY OF ADENOMATOUS POLYP OF COLON: ICD-10-CM

## 2019-12-13 DIAGNOSIS — K57.10 DUODENAL DIVERTICULUM: ICD-10-CM

## 2019-12-13 DIAGNOSIS — R53.83 FATIGUE, UNSPECIFIED TYPE: ICD-10-CM

## 2019-12-13 DIAGNOSIS — I51.89 DIASTOLIC DYSFUNCTION: ICD-10-CM

## 2019-12-13 DIAGNOSIS — D64.9 ANEMIA, UNSPECIFIED TYPE: ICD-10-CM

## 2019-12-13 DIAGNOSIS — I10 ESSENTIAL HYPERTENSION: ICD-10-CM

## 2019-12-13 DIAGNOSIS — Z79.01 ANTICOAGULATED ON COUMADIN: ICD-10-CM

## 2019-12-13 DIAGNOSIS — I48.0 PAROXYSMAL ATRIAL FIBRILLATION: ICD-10-CM

## 2019-12-13 DIAGNOSIS — K76.0 FATTY LIVER: ICD-10-CM

## 2019-12-13 DIAGNOSIS — G25.89 OTHER SPECIFIED EXTRAPYRAMIDAL AND MOVEMENT DISORDERS: ICD-10-CM

## 2019-12-13 DIAGNOSIS — M79.2 NEURALGIA: ICD-10-CM

## 2019-12-13 DIAGNOSIS — E66.01 SEVERE OBESITY WITH BODY MASS INDEX (BMI) OF 35.0 TO 39.9 WITH SERIOUS COMORBIDITY: ICD-10-CM

## 2019-12-13 DIAGNOSIS — G25.81 RESTLESS LEG SYNDROME: ICD-10-CM

## 2019-12-13 DIAGNOSIS — K21.9 GASTROESOPHAGEAL REFLUX DISEASE WITHOUT ESOPHAGITIS: ICD-10-CM

## 2019-12-13 LAB — VIT B12 SERPL-MCNC: 753 PG/ML (ref 210–950)

## 2019-12-13 PROCEDURE — 82607 VITAMIN B-12: CPT | Mod: HCNC,PO

## 2019-12-13 PROCEDURE — 36415 COLL VENOUS BLD VENIPUNCTURE: CPT | Mod: HCNC,PO

## 2019-12-18 ENCOUNTER — TELEPHONE (OUTPATIENT)
Dept: PODIATRY | Facility: CLINIC | Age: 79
End: 2019-12-18

## 2019-12-18 NOTE — TELEPHONE ENCOUNTER
Left message to see when the patient was going to reschedule her pre-op appointment with Dr. Dillard to get cleared for surgery with Dr. Cardozo.

## 2019-12-23 ENCOUNTER — CLINICAL SUPPORT (OUTPATIENT)
Dept: FAMILY MEDICINE | Facility: CLINIC | Age: 79
End: 2019-12-23
Payer: MEDICARE

## 2019-12-23 ENCOUNTER — TELEPHONE (OUTPATIENT)
Dept: FAMILY MEDICINE | Facility: CLINIC | Age: 79
End: 2019-12-23

## 2019-12-23 DIAGNOSIS — I48.0 PAROXYSMAL ATRIAL FIBRILLATION: ICD-10-CM

## 2019-12-23 DIAGNOSIS — D64.9 ANEMIA, UNSPECIFIED TYPE: Primary | ICD-10-CM

## 2019-12-23 LAB — INR PPP: 0.9 (ref 2–3)

## 2019-12-23 PROCEDURE — 96372 THER/PROPH/DIAG INJ SC/IM: CPT | Mod: S$GLB,,, | Performed by: FAMILY MEDICINE

## 2019-12-23 PROCEDURE — 96372 PR INJECTION,THERAP/PROPH/DIAG2ST, IM OR SUBCUT: ICD-10-PCS | Mod: S$GLB,,, | Performed by: FAMILY MEDICINE

## 2019-12-23 PROCEDURE — 85610 POCT INR: ICD-10-PCS | Mod: QW,,, | Performed by: FAMILY MEDICINE

## 2019-12-23 PROCEDURE — 85610 PROTHROMBIN TIME: CPT | Mod: QW,,, | Performed by: FAMILY MEDICINE

## 2019-12-23 RX ORDER — CYANOCOBALAMIN 1000 UG/ML
1000 INJECTION, SOLUTION INTRAMUSCULAR; SUBCUTANEOUS
Status: COMPLETED | OUTPATIENT
Start: 2019-12-23 | End: 2019-12-23

## 2019-12-23 RX ADMIN — CYANOCOBALAMIN 1000 MCG: 1000 INJECTION, SOLUTION INTRAMUSCULAR; SUBCUTANEOUS at 02:12

## 2019-12-23 NOTE — TELEPHONE ENCOUNTER
Pt takes B12 IM monthly. She wants to know if she should continue the injections since her level is now up to 753.

## 2019-12-27 ENCOUNTER — PATIENT OUTREACH (OUTPATIENT)
Dept: ADMINISTRATIVE | Facility: HOSPITAL | Age: 79
End: 2019-12-27

## 2019-12-31 ENCOUNTER — HOSPITAL ENCOUNTER (OUTPATIENT)
Dept: RADIOLOGY | Facility: HOSPITAL | Age: 79
Discharge: HOME | End: 2019-12-31
Attending: PODIATRIST
Payer: MEDICARE

## 2019-12-31 ENCOUNTER — CLINICAL SUPPORT (OUTPATIENT)
Dept: LAB | Facility: HOSPITAL | Age: 79
End: 2019-12-31
Attending: PODIATRIST
Payer: MEDICARE

## 2019-12-31 DIAGNOSIS — Z01.818 PREOP TESTING: ICD-10-CM

## 2019-12-31 PROCEDURE — 71045 X-RAY EXAM CHEST 1 VIEW: CPT | Mod: TC,HCNC,FY

## 2019-12-31 PROCEDURE — 71045 XR CHEST 1 VIEW: ICD-10-PCS | Mod: 26,HCNC,, | Performed by: RADIOLOGY

## 2019-12-31 PROCEDURE — 71045 X-RAY EXAM CHEST 1 VIEW: CPT | Mod: 26,HCNC,, | Performed by: RADIOLOGY

## 2019-12-31 PROCEDURE — 93010 ELECTROCARDIOGRAM REPORT: CPT | Mod: HCNC,,, | Performed by: INTERNAL MEDICINE

## 2019-12-31 PROCEDURE — 93005 ELECTROCARDIOGRAM TRACING: CPT | Mod: HCNC

## 2019-12-31 PROCEDURE — 93010 EKG 12-LEAD: ICD-10-PCS | Mod: HCNC,,, | Performed by: INTERNAL MEDICINE

## 2020-01-08 ENCOUNTER — HOSPITAL ENCOUNTER (OUTPATIENT)
Dept: PREADMISSION TESTING | Facility: HOSPITAL | Age: 80
Discharge: HOME | End: 2020-01-08
Attending: PODIATRIST
Payer: MEDICARE

## 2020-01-08 ENCOUNTER — ANESTHESIA EVENT (OUTPATIENT)
Dept: SURGERY | Facility: HOSPITAL | Age: 80
End: 2020-01-08
Payer: MEDICARE

## 2020-01-08 VITALS
DIASTOLIC BLOOD PRESSURE: 62 MMHG | HEART RATE: 63 BPM | OXYGEN SATURATION: 99 % | SYSTOLIC BLOOD PRESSURE: 135 MMHG | RESPIRATION RATE: 16 BRPM

## 2020-01-08 RX ORDER — SODIUM CHLORIDE, SODIUM LACTATE, POTASSIUM CHLORIDE, CALCIUM CHLORIDE 600; 310; 30; 20 MG/100ML; MG/100ML; MG/100ML; MG/100ML
INJECTION, SOLUTION INTRAVENOUS CONTINUOUS
Status: CANCELLED | OUTPATIENT
Start: 2020-01-08

## 2020-01-08 RX ORDER — LIDOCAINE HYDROCHLORIDE 10 MG/ML
1 INJECTION, SOLUTION EPIDURAL; INFILTRATION; INTRACAUDAL; PERINEURAL ONCE
Status: CANCELLED | OUTPATIENT
Start: 2020-01-08 | End: 2020-01-08

## 2020-01-08 NOTE — PRE-PROCEDURE INSTRUCTIONS
michelle - Daughter - [phone]    Allergies, medical, surgical, family and psychosocial histories reviewed with patient. Periop plan of care reviewed. Preop instructions given, including medications to take and to hold. Hibiclens soap and instructions on use given. Time allotted for questions to be addressed.  Patient verbalized understanding.

## 2020-01-08 NOTE — DISCHARGE INSTRUCTIONS
Your surgery is scheduled for 1/15/20.    Please report to Procedure Check In Room on the 2nd FLOOR at 10:30 a.m.          INSTRUCTIONS IMPORTANT!!!  ¨ Do not eat or drink after 12 midnight-including water. OK to brush teeth, no   gum, candy or mints!    ¨ Take only these medicines with a small swallow of water-morning of surgery: diltiazem, losartan and omeprazole            ____  Do not wear makeup, including mascara.  ____  No powder, lotions or creams to surgical area.  ____  Please remove all jewelry, including piercings and leave at home.  ____  No money or valuables needed. Please leave at home.  ____  Please bring any documents given by your doctor.  ____  If going home the same day, arrange for a ride home. You will not be able to             drive if Anesthesia was used.  ____  Wear loose fitting clothing. Allow for dressings, bandages.  ____  Stop Aspirin, Ibuprofen, Motrin and Aleve at least 3-5 days before surgery, unless otherwise instructed by your doctor, or the nurse.   You MAY use Tylenol/acetaminophen until day of surgery.  ____  Wash the surgical area with Hibiclens the night before surgery, and again the             morning of surgery.  Be sure to rinse hibiclens off completely (if instructed by nurse).  ____  If you take diabetic medication, do not take am of surgery unless instructed by Doctor.  ____  Call MD for temperature above 101 degrees or any other signs of infection such as Urinary (bladder) infection, Upper respiratory infection, skin boils, etc.  ____ Stop taking any Fish Oil supplement or any Vitamins that contain Vitamin E at least 5 days prior to surgery.  ____ Do Not wear your contact lenses the day of your procedure.  You may wear your glasses.      ____Do not shave surgical site for 3 days prior to surgery.  ____ Practice Good hand washing before, during, and after procedure.      I have read or had read and explained to me, and understand the above information.  Additional  comments or instructions:  For additional questions call [phone]      ANESTHESIA SIDE EFFECTS  -For the first 24 hours after surgery:  Do not drive, use heavy equipment, make important decisions, or drink alcohol  -It is normal to feel sleepy for several hours.  Rest until you are more awake.  -Have someone stay with you, if needed.  They can watch for problems and help keep you safe.  -Some possible post anesthesia side effects include: nausea and vomiting, sore throat and hoarseness, sleepiness, and dizziness.        Pre-Op Bathing Instructions    Before surgery, you can play an important role in your own health.    Because skin is not sterile, we need to be sure that your skin is as free of germs as possible. By following the instructions below, you can reduce the number of germs on your skin before surgery.    IMPORTANT: You will need to shower with a special soap called Hibiclens*, available at any pharmacy.  If you are allergic to Chlorhexidine (the antiseptic in Hibiclens), use an antibacterial soap such as Dial Soap for your preoperative shower.  You will shower with Hibiclens both the night before your surgery and the morning of your surgery.  Do not use Hibiclens on the head, face or genitals to avoid injury to those areas.    STEP #1: THE NIGHT BEFORE YOUR SURGERY     1. Do not shave the area of your body where your surgery will be performed.  2. Shower and wash your hair and body as usual with your normal soap and shampoo.  3. Rinse your hair and body thoroughly after you shower to remove all soap residue.  4. With your hand, apply one packet of Hibiclens soap to the surgical site.   5. Wash the site gently for five (5) minutes. Do not scrub your skin too hard.   6. Do not wash with your regular soap after Hibiclens is used.  7. Rinse your body thoroughly.  8. Pat yourself dry with a clean, soft towel.  9. Do not use lotion, cream, or powder.  10. Wear clean clothes.    STEP #2: THE MORNING OF YOUR  SURGERY     1. Repeat Step #1.    * Not to be used by people allergic to Chlorhexidine.

## 2020-01-08 NOTE — ANESTHESIA PREPROCEDURE EVALUATION
01/08/2020  Mis Ca is a 79 y.o., female scheduled for left foot fusion and left neurectomy under local/MAC on 1/15/2020.    Past Medical History:   Diagnosis Date    Anxiety     Atrial fibrillation     Blind right eye     Bradycardia     Cancer     skin cancer left side of face under eye    Hyperlipidemia     Hypertension     PVC (premature ventricular contraction)     RLS (restless legs syndrome)     Sleep apnea     Does not use CPAP machine.     Past Surgical History:   Procedure Laterality Date    ADENOIDECTOMY      AKIN OSTEOTOMY Left 10/31/2018    Procedure: OSTEOTOMY, AKIN;  Surgeon: Rudolph Cardozo DPM;  Location: [facility] OR;  Service: Podiatry;  Laterality: Left;    APPENDECTOMY      BREAST BIOPSY Left     x3    BREAST SURGERY Left     breast biopsy x3    CATARACT EXTRACTION BILATERAL W/ ANTERIOR VITRECTOMY      ENDOSCOPIC GASTROCNEMIUS RECESSION Left 10/31/2018    Procedure: RECESSION, GASTROCNEMIUS, ENDOSCOPIC;  Surgeon: Rudolph Cardozo DPM;  Location: [facility] OR;  Service: Podiatry;  Laterality: Left;    EYE SURGERY Bilateral     cataracts extraction    EYE SURGERY Right     drainage tube (glaucoma)    FOOT SURGERY Left     lesion removed from dorsal area    FRACTURE SURGERY Left     arm    HAND TENDON SURGERY Left     HYSTERECTOMY      LAPIDUS BUNIONECTOMY Left 10/31/2018    Procedure: BUNIONECTOMY, LAPIDUS;  Surgeon: Rudolph Cardozo DPM;  Location: [facility] OR;  Service: Podiatry;  Laterality: Left;  mini c-arm, Arthrex locking plate (Lydia notified)    LAPIDUS BUNIONECTOMY Left 10/31/2018    Dr. Cardozo    Lymph Gland Removed  Right     groin (performed by Dr. Vergara)    OOPHORECTOMY      ORIF FOREARM FRACTURE Left     PALATE SURGERY      Lesion removed    TONSILLECTOMY           Anesthesia Evaluation    I have reviewed the Patient Summary Reports.    I  have reviewed the Nursing Notes.   I have reviewed the Medications.     Review of Systems  Anesthesia Hx:  No problems with previous Anesthesia  Denies Family Hx of Anesthesia complications.    Social:  Non-Smoker, Social Alcohol Use    Hematology/Oncology:  Hematology Normal      Hematology Comments: On Coumadin, will get instructions from PCP   Cardiovascular:   Hypertension Dysrhythmias (PAF)  Denies Angina.    Pulmonary:   Sleep Apnea    Renal/:  Renal/ Normal     Hepatic/GI:   GERD    Neurological:  Neurology Normal    Endocrine:  Endocrine Normal        Physical Exam  General:  Well nourished    Airway/Jaw/Neck:  Airway Findings: Mouth Opening: Normal Mallampati: III       Chest/Lungs:  Chest/Lungs Findings: Clear to auscultation, Normal Respiratory Rate     Heart/Vascular:  Heart Findings: Rate: Normal  Rhythm: Regular Rhythm  Sounds: Normal             Anesthesia Plan  Type of Anesthesia, risks & benefits discussed:  Anesthesia Type:  MAC  Patient's Preference:   Intra-op Monitoring Plan: standard ASA monitors  Intra-op Monitoring Plan Comments:   Post Op Pain Control Plan: multimodal analgesia  Post Op Pain Control Plan Comments:   Induction:   IV  Beta Blocker:  Patient is not currently on a Beta-Blocker (No further documentation required).       Informed Consent:    ASA Score: 3     Day of Surgery Review of History & Physical:        Anesthesia Plan Notes: Anesthesia consent to be signed prior to procedure on 1/15/2020  Pending PCP optimization, appointment scheduled 1/10/20  Reports feeling shaky after colonoscopy 2 months ago, told it was due to propofol.            Ready For Surgery From Anesthesia Perspective.

## 2020-01-10 ENCOUNTER — OFFICE VISIT (OUTPATIENT)
Dept: FAMILY MEDICINE | Facility: CLINIC | Age: 80
End: 2020-01-10
Payer: MEDICARE

## 2020-01-10 VITALS
SYSTOLIC BLOOD PRESSURE: 124 MMHG | TEMPERATURE: 98 F | WEIGHT: 205.69 LBS | HEIGHT: 62 IN | BODY MASS INDEX: 37.85 KG/M2 | HEART RATE: 70 BPM | DIASTOLIC BLOOD PRESSURE: 70 MMHG | OXYGEN SATURATION: 95 %

## 2020-01-10 DIAGNOSIS — Z01.818 PRE-OP EVALUATION: ICD-10-CM

## 2020-01-10 DIAGNOSIS — I48.0 PAROXYSMAL ATRIAL FIBRILLATION: ICD-10-CM

## 2020-01-10 DIAGNOSIS — Z79.01 ANTICOAGULATED ON COUMADIN: Primary | ICD-10-CM

## 2020-01-10 PROCEDURE — 99213 PR OFFICE/OUTPT VISIT, EST, LEVL III, 20-29 MIN: ICD-10-PCS | Mod: S$GLB,,, | Performed by: FAMILY MEDICINE

## 2020-01-10 PROCEDURE — 1126F PR PAIN SEVERITY QUANTIFIED, NO PAIN PRESENT: ICD-10-PCS | Mod: S$GLB,,, | Performed by: FAMILY MEDICINE

## 2020-01-10 PROCEDURE — 1159F PR MEDICATION LIST DOCUMENTED IN MEDICAL RECORD: ICD-10-PCS | Mod: S$GLB,,, | Performed by: FAMILY MEDICINE

## 2020-01-10 PROCEDURE — 99499 RISK ADDL DX/OHS AUDIT: ICD-10-PCS | Mod: S$GLB,,, | Performed by: FAMILY MEDICINE

## 2020-01-10 PROCEDURE — 1126F AMNT PAIN NOTED NONE PRSNT: CPT | Mod: S$GLB,,, | Performed by: FAMILY MEDICINE

## 2020-01-10 PROCEDURE — 3074F SYST BP LT 130 MM HG: CPT | Mod: CPTII,S$GLB,, | Performed by: FAMILY MEDICINE

## 2020-01-10 PROCEDURE — 99499 UNLISTED E&M SERVICE: CPT | Mod: S$GLB,,, | Performed by: FAMILY MEDICINE

## 2020-01-10 PROCEDURE — 3078F PR MOST RECENT DIASTOLIC BLOOD PRESSURE < 80 MM HG: ICD-10-PCS | Mod: CPTII,S$GLB,, | Performed by: FAMILY MEDICINE

## 2020-01-10 PROCEDURE — 1101F PT FALLS ASSESS-DOCD LE1/YR: CPT | Mod: CPTII,S$GLB,, | Performed by: FAMILY MEDICINE

## 2020-01-10 PROCEDURE — 3078F DIAST BP <80 MM HG: CPT | Mod: CPTII,S$GLB,, | Performed by: FAMILY MEDICINE

## 2020-01-10 PROCEDURE — 3074F PR MOST RECENT SYSTOLIC BLOOD PRESSURE < 130 MM HG: ICD-10-PCS | Mod: CPTII,S$GLB,, | Performed by: FAMILY MEDICINE

## 2020-01-10 PROCEDURE — 1159F MED LIST DOCD IN RCRD: CPT | Mod: S$GLB,,, | Performed by: FAMILY MEDICINE

## 2020-01-10 PROCEDURE — 99213 OFFICE O/P EST LOW 20 MIN: CPT | Mod: S$GLB,,, | Performed by: FAMILY MEDICINE

## 2020-01-10 PROCEDURE — 1101F PR PT FALLS ASSESS DOC 0-1 FALLS W/OUT INJ PAST YR: ICD-10-PCS | Mod: CPTII,S$GLB,, | Performed by: FAMILY MEDICINE

## 2020-01-10 RX ORDER — ALPRAZOLAM 0.25 MG/1
0.25 TABLET ORAL 2 TIMES DAILY PRN
Qty: 180 TABLET | Refills: 1 | Status: SHIPPED | OUTPATIENT
Start: 2020-01-10 | End: 2020-07-27

## 2020-01-10 NOTE — PROGRESS NOTES
"Subjective:      Patient ID: Mis Ca is a 79 y.o. female.    Chief Complaint: Pre-op Exam      Vitals:    01/10/20 0932   BP: 124/70   Pulse: 70   Temp: 98 °F (36.7 °C)   TempSrc: Oral   SpO2: 95%   Weight: 93.3 kg (205 lb 11 oz)   Height: 5' 2" (1.575 m)        HPI   HAVING plate removed from left foot and more next week with Dr Cardozo; pt c/o colonoscopy last month post porcedure sensation. i'll call them about what was used and any complications; just got off coumadin, takes for fibrillation.  Never got back to therapeutic dose of coumadin after the colonoscopy;    i'm telling her now, when safe to resume, take 12 mg for 2 days, then 6 mg daily and check one week    revewed labs, cXR, EKG, U/A  INR 1.3 today  Pt has no SOB, CP; no hx of DVT  EKG sinus recently    Review of Systems   Constitutional: Negative.    HENT: Negative.    Respiratory: Negative.    Cardiovascular: Negative.    Gastrointestinal: Negative.    Endocrine: Negative.    Genitourinary: Negative.    Musculoskeletal: Positive for arthralgias and gait problem.   Psychiatric/Behavioral: Negative.    All other systems reviewed and are negative.    Objective:     Physical Exam   Constitutional: She is oriented to person, place, and time. She appears well-developed and well-nourished.   HENT:   Head: Normocephalic.   Eyes: Pupils are equal, round, and reactive to light. Conjunctivae and EOM are normal.   Neck: Normal range of motion. Neck supple.   Cardiovascular: Normal rate, regular rhythm and normal heart sounds.   Pulmonary/Chest: Effort normal and breath sounds normal.   Musculoskeletal: Normal range of motion.   Neurological: She is alert and oriented to person, place, and time. She has normal reflexes.   Skin: Skin is warm and dry.   Psychiatric: She has a normal mood and affect. Her behavior is normal. Judgment and thought content normal.   Nursing note and vitals reviewed.    Assessment:     1. Anticoagulated on Coumadin    2. " Paroxysmal atrial fibrillation    3. Pre-op evaluation      Plan:        Medication List           Accurate as of January 10, 2020 10:08 AM. If you have any questions, ask your nurse or doctor.               CHANGE how you take these medications    hydroCHLOROthiazide 25 MG tablet  Commonly known as:  HYDRODIURIL  Take 1 tablet (25 mg total) by mouth once daily.  What changed:  how much to take        CONTINUE taking these medications    acetaminophen 500 MG tablet  Commonly known as:  TYLENOL     ALPRAZolam 0.25 MG tablet  Commonly known as:  XANAX  Take 1 tablet (0.25 mg total) by mouth once daily.     aspirin 81 MG EC tablet  Commonly known as:  ECOTRIN     atorvastatin 40 MG tablet  Commonly known as:  LIPITOR  Take 1 tablet (40 mg total) by mouth once daily.     cyanocobalamin 1,000 mcg/mL injection  INJECT 1ML INTRAMUSCULARLY WEEKLY FOR 4 WEEKS, THEN 1ML MONTHLY     diltiaZEM 240 MG 24 hr capsule  Commonly known as:  Cartia XT  Take 1 capsule (240 mg total) by mouth once daily.     gabapentin 300 MG capsule  Commonly known as:  NEURONTIN  Take 2 capsules (600 mg total) by mouth 2 (two) times daily.     Inveltys 1 % Drps  Generic drug:  loteprednol etabonate     latanoprost 0.005 % ophthalmic solution     losartan 100 MG tablet  Commonly known as:  COZAAR  TAKE 1/2 TABLET EVERY DAY     omeprazole 20 MG capsule  Commonly known as:  PRILOSEC  Take 1 capsule (20 mg total) by mouth once daily.     One Daily Multivitamin per tablet  Generic drug:  multivitamin     oxybutynin 5 MG Tab  Commonly known as:  DITROPAN  TAKE 1 TABLET (5 MG TOTAL) BY MOUTH ONCE DAILY.     pramipexole 0.5 MG tablet  Commonly known as:  MIRAPEX  Take 1 tablet (0.5 mg total) by mouth every evening.     timolol maleate 0.5% 0.5 % Drop  Commonly known as:  TIMOPTIC     warfarin 6 MG tablet  Commonly known as:  COUMADIN  TAKE 1 TABLET EVERY DAY          Anticoagulated on Coumadin  -     POCT INR    Paroxysmal atrial fibrillation  -     POCT  INR    Pre-op evaluation    pt cleared for foot surgery and MAC and general anesthesia.    She know how to resume coumadin when given clearance by surgeon and recheck INR one week after resuming.

## 2020-01-11 RX ORDER — CYANOCOBALAMIN 1000 UG/ML
INJECTION, SOLUTION INTRAMUSCULAR; SUBCUTANEOUS
Qty: 3 ML | Refills: 1 | Status: SHIPPED | OUTPATIENT
Start: 2020-01-11 | End: 2020-07-30

## 2020-01-14 ENCOUNTER — TELEPHONE (OUTPATIENT)
Dept: FAMILY MEDICINE | Facility: CLINIC | Age: 80
End: 2020-01-14

## 2020-01-14 NOTE — TELEPHONE ENCOUNTER
BLAZE...    [1/14/2020 12:08 PM]  Scott Dillard:    CALL Dr Cardozo's office to tell them Mis Ca that at [facility] after waking up from EGD and colon with propofol, Pt woke up screaming, like a psychosis, Versed helped so he can alert anesthesia team.     [1/14/2020 12:11 PM]  Scott Dillard:    she is having hardward removed by Juliane from her foot and other procedures and pt was worried about having the same reaction as she had at [facility]  i spoke to someone at [facility] and she told me about it and what they did about it and what they would do next time, which would be to premedicate with versed.

## 2020-01-14 NOTE — TELEPHONE ENCOUNTER
Spoke to pt and she states she is having a colonoscopy on tomorrow and she has concerns about the anesthesia. Pt would like a call back from you.

## 2020-01-14 NOTE — TELEPHONE ENCOUNTER
I left message with Bebe at [facility] on 1/10/2020 for one of the anesthesiologist at [facility] to call you back.

## 2020-01-14 NOTE — TELEPHONE ENCOUNTER
----- Message from Betzy Covington sent at 1/14/2020 10:52 AM CST -----  Contact: self / [phone]  Needs to speak with you on the anesthesia for her surgery. Please advise

## 2020-01-15 ENCOUNTER — HOSPITAL ENCOUNTER (OUTPATIENT)
Facility: HOSPITAL | Age: 80
Discharge: HOME | End: 2020-01-15
Attending: PODIATRIST | Admitting: PODIATRIST
Payer: MEDICARE

## 2020-01-15 ENCOUNTER — ANESTHESIA (OUTPATIENT)
Dept: SURGERY | Facility: HOSPITAL | Age: 80
End: 2020-01-15
Payer: MEDICARE

## 2020-01-15 VITALS
TEMPERATURE: 98 F | OXYGEN SATURATION: 93 % | BODY MASS INDEX: 37.73 KG/M2 | HEIGHT: 62 IN | DIASTOLIC BLOOD PRESSURE: 57 MMHG | SYSTOLIC BLOOD PRESSURE: 115 MMHG | RESPIRATION RATE: 20 BRPM | HEART RATE: 86 BPM | WEIGHT: 205 LBS

## 2020-01-15 DIAGNOSIS — G57.92 NERVE ENTRAPMENT SYNDROME OF FOOT, LEFT: ICD-10-CM

## 2020-01-15 DIAGNOSIS — M20.12 HALLUX VALGUS, LEFT: ICD-10-CM

## 2020-01-15 DIAGNOSIS — M96.89 MALUNION OF BONE AFTER OSTEOTOMY: ICD-10-CM

## 2020-01-15 DIAGNOSIS — Z98.890 POSTOPERATIVE STATE: Primary | ICD-10-CM

## 2020-01-15 PROCEDURE — 71000016 HC POSTOP RECOV ADDL HR: Mod: HCNC | Performed by: PODIATRIST

## 2020-01-15 PROCEDURE — 28288 PARTIAL REMOVAL OF FOOT BONE: CPT | Mod: 51,TA,HCNC, | Performed by: PODIATRIST

## 2020-01-15 PROCEDURE — 71000015 HC POSTOP RECOV 1ST HR: Mod: HCNC | Performed by: PODIATRIST

## 2020-01-15 PROCEDURE — 25000242 PHARM REV CODE 250 ALT 637 W/ HCPCS: Mod: HCNC | Performed by: NURSE ANESTHETIST, CERTIFIED REGISTERED

## 2020-01-15 PROCEDURE — 71000039 HC RECOVERY, EACH ADD'L HOUR: Mod: HCNC | Performed by: PODIATRIST

## 2020-01-15 PROCEDURE — 25000003 PHARM REV CODE 250: Mod: HCNC | Performed by: PODIATRIST

## 2020-01-15 PROCEDURE — 37000009 HC ANESTHESIA EA ADD 15 MINS: Mod: HCNC | Performed by: PODIATRIST

## 2020-01-15 PROCEDURE — 28740 FUSION OF FOOT BONES: CPT | Mod: HCNC,LT,, | Performed by: PODIATRIST

## 2020-01-15 PROCEDURE — 20680 PR REMOVAL DEEP IMPLANT: ICD-10-PCS | Mod: 51,HCNC,LT, | Performed by: PODIATRIST

## 2020-01-15 PROCEDURE — C1769 GUIDE WIRE: HCPCS | Mod: HCNC | Performed by: PODIATRIST

## 2020-01-15 PROCEDURE — 71000033 HC RECOVERY, INTIAL HOUR: Mod: HCNC | Performed by: PODIATRIST

## 2020-01-15 PROCEDURE — 28288 PR PART REMV BONE METATARSAL HEAD,EA: ICD-10-PCS | Mod: 51,TA,HCNC, | Performed by: PODIATRIST

## 2020-01-15 PROCEDURE — 64726: ICD-10-PCS | Mod: 51,HCNC,LT, | Performed by: PODIATRIST

## 2020-01-15 PROCEDURE — 27201423 OPTIME MED/SURG SUP & DEVICES STERILE SUPPLY: Mod: HCNC | Performed by: PODIATRIST

## 2020-01-15 PROCEDURE — 36000709 HC OR TIME LEV III EA ADD 15 MIN: Mod: HCNC | Performed by: PODIATRIST

## 2020-01-15 PROCEDURE — 37000008 HC ANESTHESIA 1ST 15 MINUTES: Mod: HCNC | Performed by: PODIATRIST

## 2020-01-15 PROCEDURE — 63600175 PHARM REV CODE 636 W HCPCS: Mod: HCNC | Performed by: NURSE ANESTHETIST, CERTIFIED REGISTERED

## 2020-01-15 PROCEDURE — 20680 REMOVAL OF IMPLANT DEEP: CPT | Mod: 51,HCNC,LT, | Performed by: PODIATRIST

## 2020-01-15 PROCEDURE — S0077 INJECTION, CLINDAMYCIN PHOSP: HCPCS | Mod: HCNC | Performed by: PODIATRIST

## 2020-01-15 PROCEDURE — 28740 PR FUSION FOOT BONE,MIDTARSAL,1 JT: ICD-10-PCS | Mod: HCNC,LT,, | Performed by: PODIATRIST

## 2020-01-15 PROCEDURE — 25000003 PHARM REV CODE 250: Mod: HCNC | Performed by: NURSE ANESTHETIST, CERTIFIED REGISTERED

## 2020-01-15 PROCEDURE — 36000708 HC OR TIME LEV III 1ST 15 MIN: Mod: HCNC | Performed by: PODIATRIST

## 2020-01-15 PROCEDURE — 64726 DCMPRN PLANTAR DIGITAL NERVE: CPT | Mod: 51,HCNC,LT, | Performed by: PODIATRIST

## 2020-01-15 PROCEDURE — 27800903 OPTIME MED/SURG SUP & DEVICES OTHER IMPLANTS: Mod: HCNC | Performed by: PODIATRIST

## 2020-01-15 PROCEDURE — C1713 ANCHOR/SCREW BN/BN,TIS/BN: HCPCS | Mod: HCNC | Performed by: PODIATRIST

## 2020-01-15 DEVICE — IMPLANTABLE DEVICE: Type: IMPLANTABLE DEVICE | Site: FOOT | Status: FUNCTIONAL

## 2020-01-15 DEVICE — PUTTY DBX 1CC: Type: IMPLANTABLE DEVICE | Site: FOOT | Status: FUNCTIONAL

## 2020-01-15 RX ORDER — EPHEDRINE SULFATE 50 MG/ML
INJECTION, SOLUTION INTRAVENOUS
Status: DISCONTINUED | OUTPATIENT
Start: 2020-01-15 | End: 2020-01-15

## 2020-01-15 RX ORDER — GLYCOPYRROLATE 0.2 MG/ML
INJECTION INTRAMUSCULAR; INTRAVENOUS
Status: DISCONTINUED | OUTPATIENT
Start: 2020-01-15 | End: 2020-01-15

## 2020-01-15 RX ORDER — SODIUM CHLORIDE, SODIUM LACTATE, POTASSIUM CHLORIDE, CALCIUM CHLORIDE 600; 310; 30; 20 MG/100ML; MG/100ML; MG/100ML; MG/100ML
INJECTION, SOLUTION INTRAVENOUS CONTINUOUS PRN
Status: DISCONTINUED | OUTPATIENT
Start: 2020-01-15 | End: 2020-01-15

## 2020-01-15 RX ORDER — MIDAZOLAM HYDROCHLORIDE 1 MG/ML
INJECTION, SOLUTION INTRAMUSCULAR; INTRAVENOUS
Status: DISCONTINUED | OUTPATIENT
Start: 2020-01-15 | End: 2020-01-15

## 2020-01-15 RX ORDER — HYDROMORPHONE HYDROCHLORIDE 2 MG/ML
INJECTION, SOLUTION INTRAMUSCULAR; INTRAVENOUS; SUBCUTANEOUS
Status: DISCONTINUED | OUTPATIENT
Start: 2020-01-15 | End: 2020-01-15

## 2020-01-15 RX ORDER — LIDOCAINE HCL/PF 100 MG/5ML
SYRINGE (ML) INTRAVENOUS
Status: DISCONTINUED | OUTPATIENT
Start: 2020-01-15 | End: 2020-01-15

## 2020-01-15 RX ORDER — BUPIVACAINE HYDROCHLORIDE 2.5 MG/ML
INJECTION, SOLUTION EPIDURAL; INFILTRATION; INTRACAUDAL
Status: DISCONTINUED | OUTPATIENT
Start: 2020-01-15 | End: 2020-01-15 | Stop reason: HOSPADM

## 2020-01-15 RX ORDER — DEXAMETHASONE SODIUM PHOSPHATE 4 MG/ML
INJECTION, SOLUTION INTRA-ARTICULAR; INTRALESIONAL; INTRAMUSCULAR; INTRAVENOUS; SOFT TISSUE
Status: DISCONTINUED | OUTPATIENT
Start: 2020-01-15 | End: 2020-01-15

## 2020-01-15 RX ORDER — ETOMIDATE 2 MG/ML
INJECTION INTRAVENOUS
Status: DISCONTINUED | OUTPATIENT
Start: 2020-01-15 | End: 2020-01-15

## 2020-01-15 RX ORDER — HYDROCODONE BITARTRATE AND ACETAMINOPHEN 5; 325 MG/1; MG/1
1 TABLET ORAL EVERY 4 HOURS PRN
Status: DISCONTINUED | OUTPATIENT
Start: 2020-01-15 | End: 2020-01-15 | Stop reason: HOSPADM

## 2020-01-15 RX ORDER — HYDROMORPHONE HYDROCHLORIDE 2 MG/ML
0.5 INJECTION, SOLUTION INTRAMUSCULAR; INTRAVENOUS; SUBCUTANEOUS EVERY 5 MIN PRN
Status: ACTIVE | OUTPATIENT
Start: 2020-01-15 | End: 2020-01-15

## 2020-01-15 RX ORDER — ONDANSETRON 2 MG/ML
INJECTION INTRAMUSCULAR; INTRAVENOUS
Status: DISCONTINUED | OUTPATIENT
Start: 2020-01-15 | End: 2020-01-15

## 2020-01-15 RX ORDER — SUCCINYLCHOLINE CHLORIDE 20 MG/ML
INJECTION INTRAMUSCULAR; INTRAVENOUS
Status: DISCONTINUED | OUTPATIENT
Start: 2020-01-15 | End: 2020-01-15

## 2020-01-15 RX ORDER — ALBUTEROL SULFATE 90 UG/1
AEROSOL, METERED RESPIRATORY (INHALATION)
Status: DISCONTINUED | OUTPATIENT
Start: 2020-01-15 | End: 2020-01-15

## 2020-01-15 RX ORDER — LIDOCAINE HYDROCHLORIDE 10 MG/ML
1 INJECTION, SOLUTION EPIDURAL; INFILTRATION; INTRACAUDAL; PERINEURAL ONCE
Status: DISCONTINUED | OUTPATIENT
Start: 2020-01-15 | End: 2020-01-15 | Stop reason: HOSPADM

## 2020-01-15 RX ORDER — KETOROLAC TROMETHAMINE 30 MG/ML
INJECTION, SOLUTION INTRAMUSCULAR; INTRAVENOUS
Status: DISCONTINUED | OUTPATIENT
Start: 2020-01-15 | End: 2020-01-15

## 2020-01-15 RX ORDER — FENTANYL CITRATE 50 UG/ML
INJECTION, SOLUTION INTRAMUSCULAR; INTRAVENOUS
Status: DISCONTINUED | OUTPATIENT
Start: 2020-01-15 | End: 2020-01-15

## 2020-01-15 RX ORDER — NEOSTIGMINE METHYLSULFATE 1 MG/ML
INJECTION, SOLUTION INTRAVENOUS
Status: DISCONTINUED | OUTPATIENT
Start: 2020-01-15 | End: 2020-01-15

## 2020-01-15 RX ORDER — CLINDAMYCIN PHOSPHATE 900 MG/50ML
900 INJECTION, SOLUTION INTRAVENOUS
Status: COMPLETED | OUTPATIENT
Start: 2020-01-15 | End: 2020-01-15

## 2020-01-15 RX ORDER — ONDANSETRON 2 MG/ML
4 INJECTION INTRAMUSCULAR; INTRAVENOUS ONCE AS NEEDED
Status: DISCONTINUED | OUTPATIENT
Start: 2020-01-15 | End: 2020-01-15 | Stop reason: HOSPADM

## 2020-01-15 RX ORDER — SODIUM CHLORIDE 0.9 % (FLUSH) 0.9 %
10 SYRINGE (ML) INJECTION
Status: DISCONTINUED | OUTPATIENT
Start: 2020-01-15 | End: 2020-01-15 | Stop reason: HOSPADM

## 2020-01-15 RX ORDER — SODIUM CHLORIDE, SODIUM LACTATE, POTASSIUM CHLORIDE, CALCIUM CHLORIDE 600; 310; 30; 20 MG/100ML; MG/100ML; MG/100ML; MG/100ML
INJECTION, SOLUTION INTRAVENOUS CONTINUOUS
Status: DISCONTINUED | OUTPATIENT
Start: 2020-01-15 | End: 2020-01-15 | Stop reason: HOSPADM

## 2020-01-15 RX ORDER — ROCURONIUM BROMIDE 10 MG/ML
INJECTION, SOLUTION INTRAVENOUS
Status: DISCONTINUED | OUTPATIENT
Start: 2020-01-15 | End: 2020-01-15

## 2020-01-15 RX ORDER — OXYCODONE AND ACETAMINOPHEN 5; 325 MG/1; MG/1
1 TABLET ORAL EVERY 4 HOURS PRN
Qty: 30 TABLET | Refills: 0 | Status: SHIPPED | OUTPATIENT
Start: 2020-01-15 | End: 2020-01-23

## 2020-01-15 RX ORDER — CLINDAMYCIN HYDROCHLORIDE 300 MG/1
300 CAPSULE ORAL 3 TIMES DAILY
Qty: 30 CAPSULE | Refills: 0 | Status: ON HOLD | OUTPATIENT
Start: 2020-01-15 | End: 2020-03-08 | Stop reason: ALTCHOICE

## 2020-01-15 RX ADMIN — MIDAZOLAM 2 MG: 1 INJECTION INTRAMUSCULAR; INTRAVENOUS at 07:01

## 2020-01-15 RX ADMIN — DEXAMETHASONE SODIUM PHOSPHATE 4 MG: 4 INJECTION, SOLUTION INTRAMUSCULAR; INTRAVENOUS at 08:01

## 2020-01-15 RX ADMIN — KETOROLAC TROMETHAMINE 30 MG: 30 INJECTION, SOLUTION INTRAMUSCULAR; INTRAVENOUS at 09:01

## 2020-01-15 RX ADMIN — ROCURONIUM BROMIDE 25 MG: 10 INJECTION, SOLUTION INTRAVENOUS at 07:01

## 2020-01-15 RX ADMIN — ROCURONIUM BROMIDE 5 MG: 10 INJECTION, SOLUTION INTRAVENOUS at 07:01

## 2020-01-15 RX ADMIN — EPHEDRINE SULFATE 5 MG: 50 INJECTION, SOLUTION INTRAMUSCULAR; INTRAVENOUS; SUBCUTANEOUS at 09:01

## 2020-01-15 RX ADMIN — LIDOCAINE HYDROCHLORIDE 50 MG: 20 INJECTION, SOLUTION INTRAVENOUS at 07:01

## 2020-01-15 RX ADMIN — HYDROMORPHONE HYDROCHLORIDE 0.4 MG: 2 INJECTION INTRAMUSCULAR; INTRAVENOUS; SUBCUTANEOUS at 09:01

## 2020-01-15 RX ADMIN — EPHEDRINE SULFATE 5 MG: 50 INJECTION, SOLUTION INTRAMUSCULAR; INTRAVENOUS; SUBCUTANEOUS at 08:01

## 2020-01-15 RX ADMIN — CLINDAMYCIN PHOSPHATE 900 MG: 18 INJECTION, SOLUTION INTRAVENOUS at 07:01

## 2020-01-15 RX ADMIN — SODIUM CHLORIDE, SODIUM LACTATE, POTASSIUM CHLORIDE, AND CALCIUM CHLORIDE: .6; .31; .03; .02 INJECTION, SOLUTION INTRAVENOUS at 07:01

## 2020-01-15 RX ADMIN — NEOSTIGMINE METHYLSULFATE 5 MG: 1 INJECTION INTRAVENOUS at 10:01

## 2020-01-15 RX ADMIN — EPHEDRINE SULFATE 15 MG: 50 INJECTION, SOLUTION INTRAMUSCULAR; INTRAVENOUS; SUBCUTANEOUS at 07:01

## 2020-01-15 RX ADMIN — FENTANYL CITRATE 100 MCG: 50 INJECTION, SOLUTION INTRAMUSCULAR; INTRAVENOUS at 07:01

## 2020-01-15 RX ADMIN — EPHEDRINE SULFATE 10 MG: 50 INJECTION, SOLUTION INTRAMUSCULAR; INTRAVENOUS; SUBCUTANEOUS at 08:01

## 2020-01-15 RX ADMIN — ALBUTEROL SULFATE 3 PUFF: 90 AEROSOL, METERED RESPIRATORY (INHALATION) at 09:01

## 2020-01-15 RX ADMIN — GLYCOPYRROLATE 0.2 MG: 0.2 INJECTION, SOLUTION INTRAMUSCULAR; INTRAVENOUS at 07:01

## 2020-01-15 RX ADMIN — ONDANSETRON 8 MG: 2 INJECTION, SOLUTION INTRAMUSCULAR; INTRAVENOUS at 07:01

## 2020-01-15 RX ADMIN — GLYCOPYRROLATE 0.6 MG: 0.2 INJECTION, SOLUTION INTRAMUSCULAR; INTRAVENOUS at 10:01

## 2020-01-15 RX ADMIN — EPHEDRINE SULFATE 10 MG: 50 INJECTION, SOLUTION INTRAMUSCULAR; INTRAVENOUS; SUBCUTANEOUS at 09:01

## 2020-01-15 RX ADMIN — ETOMIDATE 12 MG: 2 INJECTION, SOLUTION INTRAVENOUS at 07:01

## 2020-01-15 RX ADMIN — SUCCINYLCHOLINE CHLORIDE 120 MG: 20 INJECTION, SOLUTION INTRAMUSCULAR; INTRAVENOUS at 07:01

## 2020-01-15 RX ADMIN — SODIUM CHLORIDE, SODIUM LACTATE, POTASSIUM CHLORIDE, AND CALCIUM CHLORIDE: .6; .31; .03; .02 INJECTION, SOLUTION INTRAVENOUS at 10:01

## 2020-01-15 NOTE — OP NOTE
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 Following the successful induction of general anesthesia an ankle tourniquet was placed on the left ankle.  When MAC anesthesia was achieved a block was given using 20ml of 1% lidocaine plain and 0.5% Bupivicain plain.  The left foot was prepped and draped in a sterile manner. Tourniquet was inflated to 250mmHg.    Attention was then directed to the dorsum of the left foot where an incision was performed over the deep peroneal nerve. Meticulous sharp and blunt dissection were utilized to carry the incision down to deep fascia. Care was taken to cauterize all vasculature as necessary with bipolar cautery. Deep peroneal nerve was identified and deep fascia overlying it was transected gently. Fibro-edematous changes were appreciated that the deep peroneal nerve. The extensor hallucis brevis tendon was identified and noted to be lying over top of the deep peroneal nerve. Because of the pathological changes to the nerve, it was necessary to transect the extensor hallucis brevis tendon. At this point vaso nervosa was noted to resanguinate when it and good decompression was achieved. Operative site was lavaged  with copious amounts of saline. 4-0 Monocryl was used to close subcutaneous tissues and 4-0 nylon was used to close skin.    Attention was directed to left dorsal first ray where an incision extending medially proximal to the 1 TMT along the prior surgical scar,  Superficial veins were ligated as necessary. The SQ was  from the deep fascia   plate and screws were then removed using a screwdriver, and using osteotome to free up overgrowth of bone.    Upon removal of hardware the joint which was noted to be partially fused and was opened with a sagittal saw using fluoroscopy  as guidance.  A laterally based wedge was utilized to resect the joint with a microsagittal saw.  A rongeur was then utilized to excise the bony fragments on the resected joint.  Using fluoroscopy the 1st ray was placed and proper rectus position,  And K-wire was used for temporary fixation.     Another incision was made over the medial 1st MTPJ with 15 blade.  A capsulotomy was made and reflected back exposing the medial prominance of the metatarsal head.  The hypertrophic medial eminence of the first metatarsal head was resected with sagittal saw avoiding the medial sagittal groove with the sagittal saw.       The incisions were then washed with sterile saline and closed with 4   Vicryl and Monocryl suture material.     next an Orthofix mono rail   External fixator system was then applied along the 1st ray in a dorsal medial alignment.  Two pins were placed in the medial cuneifrm,  and another 2 pins in the distal 1st metatarsal shaft using proper Ilizarov technique.   Fluoroscopy showed proper alignment and 2 mm of compression was applied by dressing the monorail external fixator.      Incisions were then covered with Steri-Strips, the foot was then dressed with Betadine-soaked Xeroform over the incisions  And around pin sites.  Then was dressed with 4x4s, Adriano, and Ace.    The patient was transferred to the recovery room with vital  signs stable. Following a period of post op monitoring, the patient will be   Discharged home  with the following post op instructions:   1. Keep dressing dry and intact until Podiatry follow up.   2.Weight bearing status:  Minimal  Weight-bearing in Darco shoe  3. All necessary prescriptions ordered and medical management will continue.   4. Contact Podiatry for all post op follow up care and if any problems arise.        Estimated Blood Loss: 20 mL         Specimens (From admission, onward)    None        Implants:   Implant Name Type Inv. Item Serial No.  Lot No. LRB No. Used   K-wire with wire guide [id number]    [employer] [id number] Left 2   [id number]  PUTTY DBX 1CC [id number] MUSCULOSKELETAL TRANSPLANT FND  Left 1   Minirail Template kit    ORTHOFIX  Left 4   Minirail lengthener, Std, 57/5mm    ORTHOFIX  Left 1   Screw, Cortical, 60-25, 3.0/2.5 (Self Drilling)    ORTHOFIX  Left 2   Screw, Cortical, 70/25, 3.0- 2.5mm (Self Drilling)    ORTHOFIX  Left 2              Disposition: PACU - hemodynamically stable.           Condition: Good    Attestation:  I was present and scrubbed for the entire procedure.    I have personally taken the history and examined this patient and agree with the resident's note as stated as above.   Rudolph Cardozo DPM, FACFAS

## 2020-01-15 NOTE — H&P
SUBJECTIVE:     Procedure: hardware removal with revision Lapidus bunionectomy left foot. Release/possible neurectomy of the deep peroneal nerve dorsal left midfoot.    Chief Complaint/Diagnosis: painful hardware left foot and recurrent bunion. 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    Facility-Administered Medications Prior to Admission   Medication    cyanocobalamin injection 1,000 mcg    sodium chloride 0.9% flush 10 mL     PTA Medications   Medication Sig    acetaminophen (TYLENOL) 500 MG tablet Take 1,000 mg by mouth 2 (two) times daily as needed for Pain.    ALPRAZolam (XANAX) 0.25 MG tablet Take 1 tablet (0.25 mg total) by mouth 2 (two) times daily as needed for Anxiety.    aspirin (ECOTRIN) 81 MG EC tablet Take 81 mg by mouth once daily.      atorvastatin (LIPITOR) 40 MG tablet Take 1 tablet (40 mg total) by mouth once daily.    cyanocobalamin 1,000 mcg/mL injection INJECT 1ML INTRAMUSCULARLY WEEKLY FOR 4 WEEKS, THEN 1ML MONTHLY    diltiaZEM (CARTIA XT) 240 MG 24 hr capsule Take 1 capsule (240 mg total) by mouth once daily.    gabapentin (NEURONTIN) 300 MG capsule Take 2 capsules (600 mg total) by mouth 2 (two) times daily.    hydroCHLOROthiazide (HYDRODIURIL) 25 MG tablet Take 1 tablet (25 mg total) by mouth once daily. (Patient taking differently: Take 50 mg by mouth once daily. )    INVELTYS 1 % DrpS INSTILL 1 DROP INTO THE RIGHT EYE THREE TIMES A DAY    latanoprost 0.005 % ophthalmic solution 1 drop every evening.    losartan (COZAAR) 100 MG tablet TAKE 1/2 TABLET EVERY DAY    multivitamin (ONE DAILY MULTIVITAMIN) per tablet Take 1 tablet by mouth once daily.    omeprazole (PRILOSEC) 20 MG capsule Take 1 capsule (20 mg total) by mouth once daily.    oxybutynin (DITROPAN) 5 MG Tab TAKE 1 TABLET (5 MG TOTAL) BY MOUTH ONCE DAILY.    pramipexole (MIRAPEX) 0.5 MG tablet Take 1 tablet (0.5 mg total) by mouth every evening.    timolol maleate 0.5% (TIMOPTIC) 0.5 % Drop  Place 1 drop into both eyes once daily.    warfarin (COUMADIN) 6 MG tablet TAKE 1 TABLET EVERY DAY       Review of patient's allergies indicates:   Allergen Reactions    Propofol analogues      Used for her Colonoscopy.  Extended delirium.  Advised not to take it again.      Adhesive     Penicillins     Sulfa (sulfonamide antibiotics)     Compazine [prochlorperazine edisylate] Anxiety       Past Medical History:   Diagnosis Date    Anxiety     Atrial fibrillation     Blind right eye     Bradycardia     Cancer     skin cancer left side of face under eye    Hyperlipidemia     Hypertension     PVC (premature ventricular contraction)     RLS (restless legs syndrome)     Sleep apnea     Does not use CPAP machine.     Past Surgical History:   Procedure Laterality Date    ADENOIDECTOMY      AKIN OSTEOTOMY Left 10/31/2018    Procedure: OSTEOTOMY, AKIN;  Surgeon: Rudolph Cardozo DPM;  Location: [facility] OR;  Service: Podiatry;  Laterality: Left;    APPENDECTOMY      BREAST BIOPSY Left     x3    BREAST SURGERY Left     breast biopsy x3    CATARACT EXTRACTION BILATERAL W/ ANTERIOR VITRECTOMY      ENDOSCOPIC GASTROCNEMIUS RECESSION Left 10/31/2018    Procedure: RECESSION, GASTROCNEMIUS, ENDOSCOPIC;  Surgeon: Rudolph Cardozo DPM;  Location: [facility] OR;  Service: Podiatry;  Laterality: Left;    EYE SURGERY Bilateral     cataracts extraction    EYE SURGERY Right     drainage tube (glaucoma)    FOOT SURGERY Left     lesion removed from dorsal area    FRACTURE SURGERY Left     arm    HAND TENDON SURGERY Left     HYSTERECTOMY      LAPIDUS BUNIONECTOMY Left 10/31/2018    Procedure: BUNIONECTOMY, LAPIDUS;  Surgeon: Rudolph Cardozo DPM;  Location: [facility] OR;  Service: Podiatry;  Laterality: Left;  mini c-arm, Arthrex locking plate (Lydia notified)    LAPIDUS BUNIONECTOMY Left 10/31/2018    Dr. Cardozo    Lymph Gland Removed  Right     groin (performed by Dr. Vergara)    OOPHORECTOMY      ORIF FOREARM  FRACTURE Left     PALATE SURGERY      Lesion removed    TONSILLECTOMY       Family History   Problem Relation Age of Onset    Aneurysm Mother         AAA    Cancer Father         lung cancer    Lung cancer Father     Cirrhosis Father     Cancer Sister         Breast and Brain     Diabetes Sister     Breast cancer Sister     Hypertension Sister     Hyperlipidemia Sister     Brain cancer Sister     Colon polyps Brother     Cancer Brother         lung cancer    Diabetes Brother     Hyperlipidemia Brother     Hypertension Brother     Cancer Brother         bladder cancer    Diabetes Brother     Glaucoma Brother     Heart disease Sister         Heart valve repair    Atrial fibrillation Sister     Diabetes Sister     Heart disease Sister     Heart attack Sister     Bipolar disorder Sister     Depression Sister     Glaucoma Sister     Diabetes Sister     Other Sister         Pituitary tumor    Arthritis Sister     COPD Sister     Heart disease Sister     Kidney disease Sister     Cancer Sister         lung cancer    Diabetes Sister     Lung cancer Sister     Heart attack Sister      Social History     Tobacco Use    Smoking status: Never Smoker    Smokeless tobacco: Never Used   Substance Use Topics    Alcohol use: Yes     Comment: Seldomly drinks alcohol (wine)    Drug use: No        Review of Systems:  Constitutional: no fever or chills, pain well controlled  Eyes: no visual changes  ENT: no nasal congestion or sore throat  Respiratory: no cough or shortness of breath  Cardiovascular: no chest pain or palpitations  Gastrointestinal: no nausea or vomiting, tolerating diet  Genitourinary: no hematuria or dysuria  Integument/Breast: no rash or pruritis  Hematologic/Lymphatic: no easy bruising or lymphadenopathy  Musculoskeletal: no arthralgias or myalgias  Neurological: no seizures or tremors  Behavioral/Psych: no auditory or visual hallucinations  Endocrine: no heat or cold  intolerance    OBJECTIVE:     Vital Signs (Most Recent)       Physical Exam:  General: well developed, no distress, moderately obese  Extremities: warm, well perfused and no cyanosis or edema, or clubbing  Pulses: 2+ and symmetric  Skin: Skin color, texture, turgor normal. No rashes or lesions  Neurologic: Alert and oriented. Thought content appropriate     No pain with MMT or ROM left foot. Left hallux well aligned. 1 MTP left achieves > 45 deg DF.    Left foot achieves 5 deg DF with knee extended and > 10 deg with knee flexed. Right foot achieves 0 deg DF knee extended and 10 deg DF knee flexed.    No pain on palpation of the left leg, no pain with active resisted PF foot.    Mild collapse of the arch with standing bilateral foot.    No localized pain on palpation to the base of the 3rd metatarsal left foot.    Pain on palpation overlying the medial midfoot at the location of hardware plantar 1st tarsometatarsal arthrodesis site left foot.     Positive Tinel sign overlying the dorsal central left midfoot with tingling and pins and needle sensation radiating to the 1st and 2nd toes.    Note medial deviation of toes 2 and 3 left foot when the forefoot is loaded.    Mild recurrence clinically of hallux valgus left foot.

## 2020-01-15 NOTE — ANESTHESIA POSTPROCEDURE EVALUATION
Anesthesia Post Evaluation    Patient: Mis Ca    Procedure(s) Performed: Procedure(s) (LRB):  FUSION, FOOT (Left)  NEURECTOMY (Left)    Final Anesthesia Type: general    Patient location during evaluation: PACU  Patient participation: Yes- Able to Participate  Level of consciousness: awake and alert  Post-procedure vital signs: reviewed and stable  Pain management: adequate  Airway patency: patent    PONV status at discharge: No PONV  Anesthetic complications: no      Cardiovascular status: blood pressure returned to baseline  Respiratory status: unassisted  Hydration status: euvolemic  Follow-up not needed.          Vitals Value Taken Time   /60 1/15/2020 12:00 PM   Temp 37.2 °C (99 °F) 1/15/2020 11:45 AM   Pulse 90 1/15/2020 12:02 PM   Resp 18 1/15/2020 12:01 PM   SpO2 92 % 1/15/2020 12:02 PM   Vitals shown include unvalidated device data.      Event Time     Out of Recovery 12:02:00          Pain/Bandar Score: Bandar Score: 9 (1/15/2020 11:46 AM)

## 2020-01-15 NOTE — DISCHARGE INSTRUCTIONS
Discharge Instructions for Foot Surgery  Arrange to have an adult drive you home after surgery. If you had general anesthesia, it may take a day or more to fully recover. So, for at least the next 24 hours: Do not drive or use machinery or power tools; do not drink alcohol; and do not make any major decisions.  Diet  Here are some dietary suggestions following surgery:   · Start with liquids and light foods (like dry toast, bananas, and applesauce). As you feel up to it, slowly return to your normal diet.  · Drink at least 6 to 8 glasses of water or other nonalcoholic fluids a day.  · To avoid nausea, eat before taking narcotic pain medicines.  Medicines  It is important to follow these directions:   · Take all medicines as instructed.  · Take pain medicines on time. Do not wait until the pain is bad before taking your medicines.  · Avoid alcohol while on pain medicines.  Activity  These instructions are to help with your recovery:   · Sit or lie down when possible. Put a pillow under your heel to raise your foot above the level of your heart.  · Wrap an ice pack or bag of frozen peas in a thin cloth. Place it over your bandaged foot for no longer than 20 minutes. Do this three times a day.  · You can drive again in seven days or as instructed by your healthcare provider.  · Wear your surgical shoe at all times unless told otherwise by your healthcare provider.  · Use crutches or a cane as directed.  · Follow your healthcare providers instructions about putting weight on your foot.  Bandage and cast care  Here are tips to follow:   · Do not shower for 48 hours.  · When you can shower again, cover the bandage or cast with a plastic bag to keep it dry.  · Dont remove your bandage until your healthcare provider tells you to. If your bandage gets wet or dirty, check with your healthcare provider. You can likely replace it with a clean, dry one.  What to expect  It is normal to have the following:  · Bruising and  slight swelling of the foot and toes  · A small amount of blood on the dressing  Call your healthcare provider   Contact your healthcare provider right away if you have any of the following:   · Continuous bleeding through the bandage  · Excessive swelling, increased bleeding, or redness  · Fever over 100.4°F (38°C) or chills  · Pain unrelieved by pain medicines  · Foot feels cold to the touch or numb  · Increased ache in your leg or foot  · Chest pain or shortness of breath  · Anything unusual that concerns you   Date Last Reviewed: 9/26/2015  © 5642-0073 [employer]. [address] [place] [address]. All rights reserved. This information is not intended as a substitute for professional medical care. Always follow your healthcare professional's instructions.

## 2020-01-15 NOTE — PLAN OF CARE
Patient has met PACU discharge criteria, VSS, pain well controlled. Family updated by phone. Released from PACU by Dr. Sims

## 2020-01-15 NOTE — BRIEF OP NOTE
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 Revision arthrodesis first metatarsal cuneiform joint left foot. Application of monorail external fixator left foot. Cheilectomy first metatarsal phalangeal joint left foot. External neurolysis of the deep peroneal nerve left dorsal midfoot.    Estimated Blood Loss: 20 mL       Specimens:   Specimen (12h ago, onward)    None            Discharge Note    OUTCOME: Patient tolerated treatment/procedure well without complication and is now ready for discharge.    DISPOSITION: Home or Self Care    FINAL DIAGNOSIS:  Hallux valgus, left    FOLLOWUP: In clinic

## 2020-01-16 ENCOUNTER — TELEPHONE (OUTPATIENT)
Dept: PODIATRY | Facility: HOSPITAL | Age: 80
End: 2020-01-16

## 2020-01-16 ENCOUNTER — PATIENT MESSAGE (OUTPATIENT)
Dept: PODIATRY | Facility: CLINIC | Age: 80
End: 2020-01-16

## 2020-01-16 ENCOUNTER — TELEPHONE (OUTPATIENT)
Dept: PODIATRY | Facility: CLINIC | Age: 80
End: 2020-01-16

## 2020-01-16 NOTE — TELEPHONE ENCOUNTER
Patient states that she fell earlier today and hit her head.  She is unsure if she hit her foot when she fell.  Denies any strike through on bandages.  States that she is icing her head and is getting better.  Reassured patient that the external fixation device is very strong, LS she has pain that is not controlled with her pain medicine or strike through of the dressings that she can just follow up with her regular scheduled appointment.  However urge patient to have her head checked out at our urgent care or emergency room, especially if she develops any symptoms such as headaches or dizziness.  Patient states she feels that head is doing better, but states she will go if it worsens.

## 2020-01-16 NOTE — TELEPHONE ENCOUNTER
Spoke with patient and she said she had a fall from the knee walker the wheels is on backwards and she said the external fixator is still intact she just wanted Dr. Cardozo to llok at the picture that she will upload via [facility].

## 2020-01-16 NOTE — TELEPHONE ENCOUNTER
----- Message from Senait Otero sent at 1/16/2020  8:05 AM CST -----  Contact: self  Type:  Needs Medical Advice    Who Called: self  Would the patient rather a call back or a response via [facility]? Call back   Best Call Back Number: [phone]  Additional Information: pt would like to speak with doctor about procedure that was done

## 2020-01-20 ENCOUNTER — TELEPHONE (OUTPATIENT)
Dept: FAMILY MEDICINE | Facility: CLINIC | Age: 80
End: 2020-01-20

## 2020-01-20 NOTE — TELEPHONE ENCOUNTER
----- Message from Melony Anderson sent at 1/20/2020  9:53 AM CST -----  Contact: self  Patient is requesting a call back concerning scheduling an appt for Thursday for a f/u visit after being on Coumadin for one week. Please call

## 2020-01-20 NOTE — TELEPHONE ENCOUNTER
Attempted to call pt.  No answer.  I scheduled her for a INR on Thursday.  I advised her to call us back if that is not what she needed.

## 2020-01-23 ENCOUNTER — CLINICAL SUPPORT (OUTPATIENT)
Dept: FAMILY MEDICINE | Facility: CLINIC | Age: 80
End: 2020-01-23
Payer: MEDICARE

## 2020-01-23 ENCOUNTER — OFFICE VISIT (OUTPATIENT)
Dept: PODIATRY | Facility: CLINIC | Age: 80
End: 2020-01-23
Payer: MEDICARE

## 2020-01-23 ENCOUNTER — TELEPHONE (OUTPATIENT)
Dept: FAMILY MEDICINE | Facility: CLINIC | Age: 80
End: 2020-01-23

## 2020-01-23 ENCOUNTER — HOSPITAL ENCOUNTER (OUTPATIENT)
Dept: RADIOLOGY | Facility: HOSPITAL | Age: 80
Discharge: HOME | End: 2020-01-23
Attending: FAMILY MEDICINE
Payer: MEDICARE

## 2020-01-23 VITALS
DIASTOLIC BLOOD PRESSURE: 65 MMHG | HEIGHT: 62 IN | HEART RATE: 53 BPM | WEIGHT: 205 LBS | SYSTOLIC BLOOD PRESSURE: 127 MMHG | BODY MASS INDEX: 37.73 KG/M2

## 2020-01-23 DIAGNOSIS — D64.9 ANEMIA, UNSPECIFIED TYPE: ICD-10-CM

## 2020-01-23 DIAGNOSIS — Z98.890 POSTOPERATIVE STATE: Primary | ICD-10-CM

## 2020-01-23 DIAGNOSIS — I48.0 PAROXYSMAL ATRIAL FIBRILLATION: Primary | ICD-10-CM

## 2020-01-23 DIAGNOSIS — R27.0 ATAXIA: ICD-10-CM

## 2020-01-23 LAB — INR PPP: 1.3 (ref 2–3)

## 2020-01-23 PROCEDURE — 99024 PR POST-OP FOLLOW-UP VISIT: ICD-10-PCS | Mod: HCNC,S$GLB,, | Performed by: PODIATRIST

## 2020-01-23 PROCEDURE — 85610 POCT INR: ICD-10-PCS | Mod: QW,,, | Performed by: FAMILY MEDICINE

## 2020-01-23 PROCEDURE — 85610 PROTHROMBIN TIME: CPT | Mod: QW,,, | Performed by: FAMILY MEDICINE

## 2020-01-23 PROCEDURE — 96372 THER/PROPH/DIAG INJ SC/IM: CPT | Mod: S$GLB,,, | Performed by: FAMILY MEDICINE

## 2020-01-23 PROCEDURE — 99999 PR PBB SHADOW E&M-EST. PATIENT-LVL III: ICD-10-PCS | Mod: PBBFAC,HCNC,, | Performed by: PODIATRIST

## 2020-01-23 PROCEDURE — 96372 PR INJECTION,THERAP/PROPH/DIAG2ST, IM OR SUBCUT: ICD-10-PCS | Mod: S$GLB,,, | Performed by: FAMILY MEDICINE

## 2020-01-23 PROCEDURE — 70450 CT HEAD/BRAIN W/O DYE: CPT | Mod: TC,HCNC,PO

## 2020-01-23 PROCEDURE — 99999 PR PBB SHADOW E&M-EST. PATIENT-LVL III: CPT | Mod: PBBFAC,HCNC,, | Performed by: PODIATRIST

## 2020-01-23 PROCEDURE — 99024 POSTOP FOLLOW-UP VISIT: CPT | Mod: HCNC,S$GLB,, | Performed by: PODIATRIST

## 2020-01-23 RX ORDER — CYANOCOBALAMIN 1000 UG/ML
1000 INJECTION, SOLUTION INTRAMUSCULAR; SUBCUTANEOUS ONCE
Status: COMPLETED | OUTPATIENT
Start: 2020-01-23 | End: 2020-01-23

## 2020-01-23 RX ADMIN — CYANOCOBALAMIN 1000 MCG: 1000 INJECTION, SOLUTION INTRAMUSCULAR; SUBCUTANEOUS at 02:01

## 2020-01-23 NOTE — TELEPHONE ENCOUNTER
Pt stated that she fell down nearly 1 week ago and hit her head. Today, she just doesn't feel right. She would like to have an xray of her head to see what's going on.     **Pt is currently at the imaging center at [facility]

## 2020-01-23 NOTE — TELEPHONE ENCOUNTER
----- Message from Lesley CHINayan Galindo sent at 1/23/2020  2:22 PM CST -----  Contact: [phone] self   Pt is requesting a x-ray order for her head. Pt stated that she fell and hit her forehead. Please advise

## 2020-01-24 NOTE — PROGRESS NOTES
Subjective:      Patient ID: Mis Ca is a 79 y.o. female.    Chief Complaint: Post-op Evaluation (1wk f/u)    Complains of a bunion deformity left foot. Pain aggravated by enclosed shoes. Denies trauma. Relates it has been present for many years. Wearing sandals today. Wears toe separators which help only a little.    10/5/18: Returns to review her xray and discuss surgical intervention for her painful bunion left foot. No new complaints.    11/8/18: Post EGR with Lapidus/akin bunionectomy left on 10/31/18. NWB left foot. Using rolling knee walker. Accompanied by sister and neighbor. Pain mild and well controlled.    11/30/18: 4 weeks postop. NWB left foot. Accompanied by her friends. Relates no pain today but gets intermittent aching to the foot.    12/7/18:  5 weeks postop. Recalls pain and a pop in her left leg a few days ago when putting weight down on the left foot. She has kept the boot on, rested and elevated. Pain improved since.    12/28/18: 8 weeks postop. No pain. Ambulating well with orthopedic boot.    5/20/19: 4 months post op.  No pain to surgical, ambulating well in shoes.  Complains of pain to midfoot that happened when she injured her foot while in boot shortly after surgery.  Pain worse with activity, better with rest. Walks with a limp due to pain.    11/22/2019:  Complains of intermittent burning pain along the bottom of the foot that varies in intensity.  Also complains of pins and needles sensation that travels to the right great toe that is not constant.  No pain at rest.  She is wearing a clog type shoe as recommended.    12/9/2019: Pain is largely unchanged since last visit. She had CT scan done.  No new complaints.    1/23/19  Patient s/p lapidus revision with mini rail ex fix and deep peroneal nerve decompression 1/15/20.  Patient states she fell and hit her head, states she did not hit her foot.  Otherwise denies F/c/N/V or other trauma.      Vitals:    01/23/20 1313   BP:  "127/65   Pulse: (!) 53   Weight: 93 kg (205 lb)   Height: 5' 2" (1.575 m)   PainSc: 0-No pain      Past Medical History:   Diagnosis Date    Anxiety     Atrial fibrillation     Blind right eye     Bradycardia     Cancer     skin cancer left side of face under eye    Hyperlipidemia     Hypertension     PVC (premature ventricular contraction)     RLS (restless legs syndrome)     Sleep apnea     Does not use CPAP machine.       Past Surgical History:   Procedure Laterality Date    ADENOIDECTOMY      AKIN OSTEOTOMY Left 10/31/2018    Procedure: OSTEOTOMY, AKIN;  Surgeon: Rudolph Cardozo DPM;  Location: [facility] OR;  Service: Podiatry;  Laterality: Left;    APPENDECTOMY      BREAST BIOPSY Left     x3    BREAST SURGERY Left     breast biopsy x3    CATARACT EXTRACTION BILATERAL W/ ANTERIOR VITRECTOMY      ENDOSCOPIC GASTROCNEMIUS RECESSION Left 10/31/2018    Procedure: RECESSION, GASTROCNEMIUS, ENDOSCOPIC;  Surgeon: Rudolph Cardozo DPM;  Location: [facility] OR;  Service: Podiatry;  Laterality: Left;    EYE SURGERY Bilateral     cataracts extraction    EYE SURGERY Right     drainage tube (glaucoma)    FOOT ARTHRODESIS Left 1/15/2020    Procedure: FUSION, FOOT;  Surgeon: Rudolph Cardozo DPM;  Location: [facility] OR;  Service: Podiatry;  Laterality: Left;  mini c-arm, Arthrex screw  for hardware removal (Lydia notified), [employer]ix (Niels notified) min ex-fix    FOOT SURGERY Left     lesion removed from dorsal area    FRACTURE SURGERY Left     arm    HAND TENDON SURGERY Left     HYSTERECTOMY      LAPIDUS BUNIONECTOMY Left 10/31/2018    Procedure: BUNIONECTOMY, LAPIDUS;  Surgeon: Rudolph Cardozo DPM;  Location: [facility] OR;  Service: Podiatry;  Laterality: Left;  mini c-arm, Arthrex locking plate (Lydia notified)    LAPIDUS BUNIONECTOMY Left 10/31/2018    Dr. Cardozo    Lymph Gland Removed  Right     groin (performed by Dr. Vergara)    NEURECTOMY Left 1/15/2020    Procedure: NEURECTOMY;  " Surgeon: Rudolph Cardozo DPM;  Location: [facility];  Service: Podiatry;  Laterality: Left;  bipolar bovie, vessel loop, possible [employer] nerve wrap. Moshe with [employer] notified and will be overnighting graft. MK 1/14/2020    OOPHORECTOMY      ORIF FOREARM FRACTURE Left     PALATE SURGERY      Lesion removed    TONSILLECTOMY         Family History   Problem Relation Age of Onset    Aneurysm Mother         AAA    Cancer Father         lung cancer    Lung cancer Father     Cirrhosis Father     Cancer Sister         Breast and Brain     Diabetes Sister     Breast cancer Sister     Hypertension Sister     Hyperlipidemia Sister     Brain cancer Sister     Colon polyps Brother     Cancer Brother         lung cancer    Diabetes Brother     Hyperlipidemia Brother     Hypertension Brother     Cancer Brother         bladder cancer    Diabetes Brother     Glaucoma Brother     Heart disease Sister         Heart valve repair    Atrial fibrillation Sister     Diabetes Sister     Heart disease Sister     Heart attack Sister     Bipolar disorder Sister     Depression Sister     Glaucoma Sister     Diabetes Sister     Other Sister         Pituitary tumor    Arthritis Sister     COPD Sister     Heart disease Sister     Kidney disease Sister     Cancer Sister         lung cancer    Diabetes Sister     Lung cancer Sister     Heart attack Sister        Social History     Socioeconomic History    Marital status:      Spouse name: Not on file    Number of children: Not on file    Years of education: Not on file    Highest education level: Not on file   Occupational History    Not on file   Social Needs    Financial resource strain: Not on file    Food insecurity:     Worry: Not on file     Inability: Not on file    Transportation needs:     Medical: Not on file     Non-medical: Not on file   Tobacco Use    Smoking status: Never Smoker    Smokeless tobacco: Never Used   Substance  and Sexual Activity    Alcohol use: Yes     Comment: Seldomly drinks alcohol (wine)    Drug use: No    Sexual activity: Not Currently     Partners: Male   Lifestyle    Physical activity:     Days per week: Not on file     Minutes per session: Not on file    Stress: Not at all   Relationships    Social connections:     Talks on phone: Not on file     Gets together: Not on file     Attends [employer] service: Not on file     Active member of club or organization: Not on file     Attends meetings of clubs or organizations: Not on file     Relationship status: Not on file   Other Topics Concern    Not on file   Social History Narrative    Not on file       Current Outpatient Medications   Medication Sig Dispense Refill    acetaminophen (TYLENOL) 500 MG tablet Take 1,000 mg by mouth 2 (two) times daily as needed for Pain.      ALPRAZolam (XANAX) 0.25 MG tablet Take 1 tablet (0.25 mg total) by mouth 2 (two) times daily as needed for Anxiety. 180 tablet 1    aspirin (ECOTRIN) 81 MG EC tablet Take 81 mg by mouth once daily.        atorvastatin (LIPITOR) 40 MG tablet Take 1 tablet (40 mg total) by mouth once daily. 90 tablet 5    clindamycin (CLEOCIN) 300 MG capsule Take 1 capsule (300 mg total) by mouth 3 (three) times daily. 30 capsule 0    cyanocobalamin 1,000 mcg/mL injection INJECT 1ML INTRAMUSCULARLY WEEKLY FOR 4 WEEKS, THEN 1ML MONTHLY 3 mL 1    diltiaZEM (CARTIA XT) 240 MG 24 hr capsule Take 1 capsule (240 mg total) by mouth once daily. 90 capsule 3    gabapentin (NEURONTIN) 300 MG capsule Take 2 capsules (600 mg total) by mouth 2 (two) times daily. 360 capsule 3    hydroCHLOROthiazide (HYDRODIURIL) 25 MG tablet Take 1 tablet (25 mg total) by mouth once daily. (Patient taking differently: Take 50 mg by mouth once daily. ) 30 tablet 11    INVELTYS 1 % DrpS INSTILL 1 DROP INTO THE RIGHT EYE THREE TIMES A DAY  3    latanoprost 0.005 % ophthalmic solution 1 drop every evening.      losartan (COZAAR)  100 MG tablet TAKE 1/2 TABLET EVERY DAY 45 tablet 3    multivitamin (ONE DAILY MULTIVITAMIN) per tablet Take 1 tablet by mouth once daily.      omeprazole (PRILOSEC) 20 MG capsule Take 1 capsule (20 mg total) by mouth once daily. 90 capsule 3    oxybutynin (DITROPAN) 5 MG Tab TAKE 1 TABLET (5 MG TOTAL) BY MOUTH ONCE DAILY. 90 tablet 1    pramipexole (MIRAPEX) 0.5 MG tablet Take 1 tablet (0.5 mg total) by mouth every evening. 90 tablet 3    timolol maleate 0.5% (TIMOPTIC) 0.5 % Drop Place 1 drop into both eyes once daily.  0    warfarin (COUMADIN) 6 MG tablet TAKE 1 TABLET EVERY DAY 90 tablet 3     Current Facility-Administered Medications   Medication Dose Route Frequency Provider Last Rate Last Dose    cyanocobalamin injection 1,000 mcg  1,000 mcg Intramuscular Q30 Days Scott Dillard MD   1,000 mcg at 11/25/19 1611    sodium chloride 0.9% flush 10 mL  10 mL Intravenous PRN Rudolph Cardozo DPM           Review of patient's allergies indicates:   Allergen Reactions    Penicillins     Sulfa (sulfonamide antibiotics)     Compazine [prochlorperazine edisylate] Anxiety         Review of Systems   Constitution: Negative for chills, fever and malaise/fatigue.   HENT: Negative for congestion.    Cardiovascular: Negative for chest pain, claudication and leg swelling.   Respiratory: Negative for cough and shortness of breath.    Skin: Negative for flushing, itching, poor wound healing and rash.   Musculoskeletal: Positive for joint pain. Negative for back pain, muscle cramps and muscle weakness.   Gastrointestinal: Negative for nausea and vomiting.   Neurological: Positive for numbness. Negative for paresthesias and weakness.        Burning sensation   Psychiatric/Behavioral: Negative for altered mental status.           Objective:      Physical Exam   Constitutional: She is oriented to person, place, and time. She appears well-developed and well-nourished. No distress.   Cardiovascular: Intact distal pulses.    Pulses:       Dorsalis pedis pulses are 2+ on the right side, and 2+ on the left side.        Posterior tibial pulses are 2+ on the right side, and 2+ on the left side.   CFT< 3 secs all toes bilateral foot, skin temp warm bilateral foot, no hair growth bilateral lower extremity, mild lower extremity edema with telangiectasias and varicosities bilateral.     Musculoskeletal: She exhibits edema and tenderness.   No pain with MMT or ROM left foot. Left hallux well aligned. 1 MTP left achieves > 45 deg DF.  Mini rail external fixation device is intact without signs of loosening at the pin sites.    Left foot achieves 5 deg DF with knee extended and > 10 deg with knee flexed. Right foot achieves 0 deg DF knee extended and 10 deg DF knee flexed.    No pain on palpation of the left leg, no pain with active resisted PF foot.    Mild collapse of the arch with standing bilateral foot.    No localized pain on palpation to the base of the 3rd metatarsal left foot.    Note medial deviation of toes 2 and 3 left foot when the forefoot is loaded.     Neurological: She is alert and oriented to person, place, and time. She has normal strength. No sensory deficit.   Skin: Skin is warm, dry and intact. Capillary refill takes less than 2 seconds. No ecchymosis and no rash noted. She is not diaphoretic. No cyanosis or erythema. No pallor. Nails show no clubbing.   Incision site dorsal left midfoot well-approximated suture no localized signs infection.  Incision medial 1 TMT surgical site and dorsal 1 MTP surgical sites are well approximated with suture without signs infection.             Assessment:       Encounter Diagnosis   Name Primary?    Postoperative state Yes         Plan:       Mis was seen today for post-op evaluation.    Diagnoses and all orders for this visit:    Postoperative state      I counseled the patient on her conditions, their implications and medical management.    Foot cleansed with Hibiclens.  Mini rail  was compressed a quarter turn until resistance was met.  Dressed with  Betadine and aquacel foam bandages.  Home care instructions reviewed in detail.    Patient ok to weight bear for short distances.  Ok to shower, but not to immerse foot in water.    -rtc as specified    Assisted by Scott Anders, PGY3    I have personally taken the history and examined this patient and agree with the resident's note as stated as above.   Rudolph HUNTERM, FACFAS      A portion of this note was generated by voice recognition software and may contain topical graphical errors.

## 2020-01-30 ENCOUNTER — CLINICAL SUPPORT (OUTPATIENT)
Dept: FAMILY MEDICINE | Facility: CLINIC | Age: 80
End: 2020-01-30
Payer: MEDICARE

## 2020-01-30 DIAGNOSIS — Z79.01 ANTICOAGULATED ON COUMADIN: ICD-10-CM

## 2020-01-30 DIAGNOSIS — I48.0 PAROXYSMAL ATRIAL FIBRILLATION: Primary | ICD-10-CM

## 2020-01-30 LAB — INR PPP: 1.5 (ref 2–3)

## 2020-01-30 PROCEDURE — 99499 UNLISTED E&M SERVICE: CPT | Mod: S$GLB,,, | Performed by: FAMILY MEDICINE

## 2020-01-30 PROCEDURE — 99499 NO LOS: ICD-10-PCS | Mod: S$GLB,,, | Performed by: FAMILY MEDICINE

## 2020-01-30 PROCEDURE — 85610 PROTHROMBIN TIME: CPT | Mod: QW,,, | Performed by: FAMILY MEDICINE

## 2020-01-30 PROCEDURE — 85610 POCT INR: ICD-10-PCS | Mod: QW,,, | Performed by: FAMILY MEDICINE

## 2020-01-30 NOTE — PROGRESS NOTES
Lab Results   Component Value Date    INR 1.5 01/30/2020    INR 1.3 01/23/2020    INR 0.9 12/23/2019    INR 2.2 11/25/2019    INR 2.0 10/29/2019       Patient is here for INR check  Patient is currently taking 6 mg of coumadin daily with 9 mg on Friday.  Patient denies any changes in diet, pt denies any missed doses, and pt denies any signs of bleeding.  INR is 1.5  Patient was instructed to take 12 mg of Coumadin today (1/30/2020), 9 mg of coumadin on Fridays and Tuesdays and continue taking 6 mg other days of the week.  Patient was instructed to return to the clinic 2 weeks to recheck INR.

## 2020-01-31 RX ORDER — HYDROCHLOROTHIAZIDE 25 MG/1
25 TABLET ORAL DAILY
Qty: 90 TABLET | Refills: 3 | Status: SHIPPED | OUTPATIENT
Start: 2020-01-31 | End: 2020-10-07 | Stop reason: SDUPTHER

## 2020-01-31 NOTE — TELEPHONE ENCOUNTER
----- Message from Melony Anderson sent at 1/31/2020 10:08 AM CST -----  Contact: self  Refill request for:  hydroCHLOROthiazide (HYDRODIURIL) 25 MG tablet    Be sent to:  [facility] in [place]

## 2020-02-06 ENCOUNTER — OFFICE VISIT (OUTPATIENT)
Dept: PODIATRY | Facility: CLINIC | Age: 80
End: 2020-02-06
Payer: MEDICARE

## 2020-02-06 VITALS
BODY MASS INDEX: 37.73 KG/M2 | WEIGHT: 205 LBS | HEIGHT: 62 IN | SYSTOLIC BLOOD PRESSURE: 105 MMHG | HEART RATE: 60 BPM | DIASTOLIC BLOOD PRESSURE: 61 MMHG

## 2020-02-06 DIAGNOSIS — Z98.890 POSTOPERATIVE STATE: Primary | ICD-10-CM

## 2020-02-06 PROCEDURE — 99999 PR PBB SHADOW E&M-EST. PATIENT-LVL III: ICD-10-PCS | Mod: PBBFAC,HCNC,, | Performed by: PODIATRIST

## 2020-02-06 PROCEDURE — 99024 POSTOP FOLLOW-UP VISIT: CPT | Mod: HCNC,S$GLB,, | Performed by: PODIATRIST

## 2020-02-06 PROCEDURE — 99999 PR PBB SHADOW E&M-EST. PATIENT-LVL III: CPT | Mod: PBBFAC,HCNC,, | Performed by: PODIATRIST

## 2020-02-06 PROCEDURE — 99024 PR POST-OP FOLLOW-UP VISIT: ICD-10-PCS | Mod: HCNC,S$GLB,, | Performed by: PODIATRIST

## 2020-02-06 NOTE — PATIENT INSTRUCTIONS
Daily soaking instructions: Fill basin with luke warm water to cover the foot. Place one cap full of bleach and 2 cap fulls of epson salk in water. May soak up to 10 minutes once a day.

## 2020-02-07 NOTE — PROGRESS NOTES
Subjective:      Patient ID: Mis Ca is a 79 y.o. female.    Chief Complaint: Follow-up (left foot )    Complains of a bunion deformity left foot. Pain aggravated by enclosed shoes. Denies trauma. Relates it has been present for many years. Wearing sandals today. Wears toe separators which help only a little.    10/5/18: Returns to review her xray and discuss surgical intervention for her painful bunion left foot. No new complaints.    11/8/18: Post EGR with Lapidus/akin bunionectomy left on 10/31/18. NWB left foot. Using rolling knee walker. Accompanied by sister and neighbor. Pain mild and well controlled.    11/30/18: 4 weeks postop. NWB left foot. Accompanied by her friends. Relates no pain today but gets intermittent aching to the foot.    12/7/18:  5 weeks postop. Recalls pain and a pop in her left leg a few days ago when putting weight down on the left foot. She has kept the boot on, rested and elevated. Pain improved since.    12/28/18: 8 weeks postop. No pain. Ambulating well with orthopedic boot.    5/20/19: 4 months post op.  No pain to surgical, ambulating well in shoes.  Complains of pain to midfoot that happened when she injured her foot while in boot shortly after surgery.  Pain worse with activity, better with rest. Walks with a limp due to pain.    11/22/2019:  Complains of intermittent burning pain along the bottom of the foot that varies in intensity.  Also complains of pins and needles sensation that travels to the right great toe that is not constant.  No pain at rest.  She is wearing a clog type shoe as recommended.    12/9/2019: Pain is largely unchanged since last visit. She had CT scan done.  No new complaints.    1/23/19  Patient s/p lapidus revision with mini rail ex fix and deep peroneal nerve decompression 1/15/20.  Patient states she fell and hit her head, states she did not hit her foot.  Otherwise denies F/c/N/V or other trauma.    02/06/2020:  Presents 3 weeks postop.   "Relates mild pain at the proximal pin site left medial longitudinal arch.  States she has been applying pressure towards the heel as instructed.  Denies any slips, trips or falls.  Utilizing a rolling knee walker.  No new complaints      Vitals:    02/06/20 1350   BP: 105/61   Pulse: 60   Weight: 93 kg (205 lb)   Height: 5' 2" (1.575 m)   PainSc:   1      Past Medical History:   Diagnosis Date    Anxiety     Atrial fibrillation     Blind right eye     Bradycardia     Cancer     skin cancer left side of face under eye    Hyperlipidemia     Hypertension     PVC (premature ventricular contraction)     RLS (restless legs syndrome)     Sleep apnea     Does not use CPAP machine.       Past Surgical History:   Procedure Laterality Date    ADENOIDECTOMY      AKIN OSTEOTOMY Left 10/31/2018    Procedure: OSTEOTOMY, AKIN;  Surgeon: Rudolph Cardozo DPM;  Location: [facility] OR;  Service: Podiatry;  Laterality: Left;    APPENDECTOMY      BREAST BIOPSY Left     x3    BREAST SURGERY Left     breast biopsy x3    CATARACT EXTRACTION BILATERAL W/ ANTERIOR VITRECTOMY      ENDOSCOPIC GASTROCNEMIUS RECESSION Left 10/31/2018    Procedure: RECESSION, GASTROCNEMIUS, ENDOSCOPIC;  Surgeon: Rudolph Cardozo DPM;  Location: [facility] OR;  Service: Podiatry;  Laterality: Left;    EYE SURGERY Bilateral     cataracts extraction    EYE SURGERY Right     drainage tube (glaucoma)    FOOT ARTHRODESIS Left 1/15/2020    Procedure: FUSION, FOOT;  Surgeon: Rudolph Cardozo DPM;  Location: [facility] OR;  Service: Podiatry;  Laterality: Left;  mini c-arm, Arthrex screw  for hardware removal (Lydia notified), [employer] (Niels notified) min ex-fix    FOOT SURGERY Left     lesion removed from dorsal area    FRACTURE SURGERY Left     arm    HAND TENDON SURGERY Left     HYSTERECTOMY      LAPIDUS BUNIONECTOMY Left 10/31/2018    Procedure: BUNIONECTOMY, LAPIDUS;  Surgeon: Rudolph Cardozo DPM;  Location: [facility] OR;  Service: Podiatry;  " Laterality: Left;  mini c-arm, Arthrex locking plate (Lydia notified)    LAPIDUS BUNIONECTOMY Left 10/31/2018    Dr. Cardozo    Lymph Gland Removed  Right     groin (performed by Dr. Vergara)    NEURECTOMY Left 1/15/2020    Procedure: NEURECTOMY;  Surgeon: Rudolph Cardozo DPM;  Location: [facility];  Service: Podiatry;  Laterality: Left;  bipolar bovie, vessel loop, possible [place] nerve wrap. Moshe with [employer] notified and will be overnighting graft. MK 1/14/2020    OOPHORECTOMY      ORIF FOREARM FRACTURE Left     PALATE SURGERY      Lesion removed    TONSILLECTOMY         Family History   Problem Relation Age of Onset    Aneurysm Mother         AAA    Cancer Father         lung cancer    Lung cancer Father     Cirrhosis Father     Cancer Sister         Breast and Brain     Diabetes Sister     Breast cancer Sister     Hypertension Sister     Hyperlipidemia Sister     Brain cancer Sister     Colon polyps Brother     Cancer Brother         lung cancer    Diabetes Brother     Hyperlipidemia Brother     Hypertension Brother     Cancer Brother         bladder cancer    Diabetes Brother     Glaucoma Brother     Heart disease Sister         Heart valve repair    Atrial fibrillation Sister     Diabetes Sister     Heart disease Sister     Heart attack Sister     Bipolar disorder Sister     Depression Sister     Glaucoma Sister     Diabetes Sister     Other Sister         Pituitary tumor    Arthritis Sister     COPD Sister     Heart disease Sister     Kidney disease Sister     Cancer Sister         lung cancer    Diabetes Sister     Lung cancer Sister     Heart attack Sister        Social History     Socioeconomic History    Marital status:      Spouse name: Not on file    Number of children: Not on file    Years of education: Not on file    Highest education level: Not on file   Occupational History    Not on file   Social Needs    Financial resource strain: Not on  file    Food insecurity:     Worry: Not on file     Inability: Not on file    Transportation needs:     Medical: Not on file     Non-medical: Not on file   Tobacco Use    Smoking status: Never Smoker    Smokeless tobacco: Never Used   Substance and Sexual Activity    Alcohol use: Yes     Comment: Seldomly drinks alcohol (wine)    Drug use: No    Sexual activity: Not Currently     Partners: Male   Lifestyle    Physical activity:     Days per week: Not on file     Minutes per session: Not on file    Stress: Not at all   Relationships    Social connections:     Talks on phone: Not on file     Gets together: Not on file     Attends Jainism service: Not on file     Active member of club or organization: Not on file     Attends meetings of clubs or organizations: Not on file     Relationship status: Not on file   Other Topics Concern    Not on file   Social History Narrative    Not on file       Current Outpatient Medications   Medication Sig Dispense Refill    acetaminophen (TYLENOL) 500 MG tablet Take 1,000 mg by mouth 2 (two) times daily as needed for Pain.      ALPRAZolam (XANAX) 0.25 MG tablet Take 1 tablet (0.25 mg total) by mouth 2 (two) times daily as needed for Anxiety. 180 tablet 1    aspirin (ECOTRIN) 81 MG EC tablet Take 81 mg by mouth once daily.        atorvastatin (LIPITOR) 40 MG tablet Take 1 tablet (40 mg total) by mouth once daily. 90 tablet 5    cyanocobalamin 1,000 mcg/mL injection INJECT 1ML INTRAMUSCULARLY WEEKLY FOR 4 WEEKS, THEN 1ML MONTHLY 3 mL 1    diltiaZEM (CARTIA XT) 240 MG 24 hr capsule Take 1 capsule (240 mg total) by mouth once daily. 90 capsule 3    gabapentin (NEURONTIN) 300 MG capsule Take 2 capsules (600 mg total) by mouth 2 (two) times daily. 360 capsule 3    hydroCHLOROthiazide (HYDRODIURIL) 25 MG tablet Take 1 tablet (25 mg total) by mouth once daily. 90 tablet 3    INVELTYS 1 % DrpS INSTILL 1 DROP INTO THE RIGHT EYE THREE TIMES A DAY  3    latanoprost 0.005 %  ophthalmic solution 1 drop every evening.      losartan (COZAAR) 100 MG tablet TAKE 1/2 TABLET EVERY DAY 45 tablet 3    multivitamin (ONE DAILY MULTIVITAMIN) per tablet Take 1 tablet by mouth once daily.      omeprazole (PRILOSEC) 20 MG capsule Take 1 capsule (20 mg total) by mouth once daily. 90 capsule 3    oxybutynin (DITROPAN) 5 MG Tab TAKE 1 TABLET (5 MG TOTAL) BY MOUTH ONCE DAILY. 90 tablet 1    pramipexole (MIRAPEX) 0.5 MG tablet Take 1 tablet (0.5 mg total) by mouth every evening. 90 tablet 3    timolol maleate 0.5% (TIMOPTIC) 0.5 % Drop Place 1 drop into both eyes once daily.  0    warfarin (COUMADIN) 6 MG tablet TAKE 1 TABLET EVERY DAY 90 tablet 3    clindamycin (CLEOCIN) 300 MG capsule Take 1 capsule (300 mg total) by mouth 3 (three) times daily. (Patient not taking: Reported on 2/6/2020) 30 capsule 0     Current Facility-Administered Medications   Medication Dose Route Frequency Provider Last Rate Last Dose    cyanocobalamin injection 1,000 mcg  1,000 mcg Intramuscular Q30 Days Scott Dillard MD   1,000 mcg at 11/25/19 1611    sodium chloride 0.9% flush 10 mL  10 mL Intravenous PRN Rudolph Cardozo DPM           Review of patient's allergies indicates:   Allergen Reactions    Penicillins     Sulfa (sulfonamide antibiotics)     Compazine [prochlorperazine edisylate] Anxiety         Review of Systems   Constitution: Negative for chills, fever and malaise/fatigue.   HENT: Negative for congestion.    Cardiovascular: Negative for chest pain, claudication and leg swelling.   Respiratory: Negative for cough and shortness of breath.    Skin: Negative for flushing, itching, poor wound healing and rash.   Musculoskeletal: Positive for joint pain. Negative for back pain, muscle cramps and muscle weakness.   Gastrointestinal: Negative for nausea and vomiting.   Neurological: Positive for numbness. Negative for paresthesias and weakness.        Burning sensation   Psychiatric/Behavioral: Negative for  altered mental status.           Objective:      Physical Exam   Constitutional: She is oriented to person, place, and time. She appears well-developed and well-nourished. No distress.   Cardiovascular: Intact distal pulses.   Pulses:       Dorsalis pedis pulses are 2+ on the right side, and 2+ on the left side.        Posterior tibial pulses are 2+ on the right side, and 2+ on the left side.   CFT< 3 secs all toes bilateral foot, skin temp warm bilateral foot, no hair growth bilateral lower extremity, mild lower extremity edema with telangiectasias and varicosities bilateral.     Musculoskeletal: She exhibits edema and tenderness.   No pain with MMT or ROM left foot. Left hallux well aligned. 1 MTP left achieves > 45 deg DF.  Mini rail external fixation device is intact without signs of loosening at the pin sites.    Left foot achieves 5 deg DF with knee extended and > 10 deg with knee flexed. Right foot achieves 0 deg DF knee extended and 10 deg DF knee flexed.    No pain on palpation of the left leg, no pain with active resisted PF foot.    Mild collapse of the arch with standing bilateral foot.    No localized pain on palpation to the base of the 3rd metatarsal left foot.    Note medial deviation of toes 2 and 3 left foot when the forefoot is loaded.     Neurological: She is alert and oriented to person, place, and time. She has normal strength. No sensory deficit.   Skin: Skin is warm, dry and intact. Capillary refill takes less than 2 seconds. No ecchymosis and no rash noted. She is not diaphoretic. No cyanosis or erythema. No pallor. Nails show no clubbing.   Incision site dorsal left midfoot well-approximated suture no localized signs infection.  Incision medial 1 TMT surgical site and dorsal 1 MTP surgical sites are well approximated with suture without signs infection.    Mild surrounding erythema at the proximal pin site left medial longitudinal arch.  No loosening of the external fixation device.              Assessment:       Encounter Diagnosis   Name Primary?    Postoperative state Yes         Plan:       Mis was seen today for follow-up.    Diagnoses and all orders for this visit:    Postoperative state      I counseled the patient on her conditions, their implications and medical management.    Foot cleansed with Hibiclens.  Mini rail was compressed a full turn until resistance was met.  Dressed with  Betadine and aquacel foam bandages.  Home care instructions reviewed in detail.    Patient ok to weight bear for short distances.  Ok to shower, but not to immerse foot in water.    Reviewed soaking instructions be performed once daily to help with the pin site irritation.    Continue rest and elevation p.r.n..    RTC 2 weeks or p.r.n. as discussed      A portion of this note was generated by voice recognition software and may contain topical graphical errors.

## 2020-02-13 ENCOUNTER — CLINICAL SUPPORT (OUTPATIENT)
Dept: FAMILY MEDICINE | Facility: CLINIC | Age: 80
End: 2020-02-13
Payer: MEDICARE

## 2020-02-13 DIAGNOSIS — I48.0 PAROXYSMAL ATRIAL FIBRILLATION: Primary | ICD-10-CM

## 2020-02-13 LAB — INR PPP: 1.9 (ref 2–3)

## 2020-02-13 PROCEDURE — 85610 POCT INR: ICD-10-PCS | Mod: QW,,, | Performed by: FAMILY MEDICINE

## 2020-02-13 PROCEDURE — 85610 PROTHROMBIN TIME: CPT | Mod: QW,,, | Performed by: FAMILY MEDICINE

## 2020-02-17 ENCOUNTER — OFFICE VISIT (OUTPATIENT)
Dept: PODIATRY | Facility: CLINIC | Age: 80
End: 2020-02-17
Payer: MEDICARE

## 2020-02-17 VITALS
SYSTOLIC BLOOD PRESSURE: 102 MMHG | HEART RATE: 68 BPM | BODY MASS INDEX: 37.73 KG/M2 | HEIGHT: 62 IN | DIASTOLIC BLOOD PRESSURE: 61 MMHG | WEIGHT: 205 LBS

## 2020-02-17 DIAGNOSIS — Z98.890 POSTOPERATIVE STATE: Primary | ICD-10-CM

## 2020-02-17 PROCEDURE — 99024 POSTOP FOLLOW-UP VISIT: CPT | Mod: HCNC,S$GLB,, | Performed by: PODIATRIST

## 2020-02-17 PROCEDURE — 99999 PR PBB SHADOW E&M-EST. PATIENT-LVL III: ICD-10-PCS | Mod: PBBFAC,HCNC,, | Performed by: PODIATRIST

## 2020-02-17 PROCEDURE — 99999 PR PBB SHADOW E&M-EST. PATIENT-LVL III: CPT | Mod: PBBFAC,HCNC,, | Performed by: PODIATRIST

## 2020-02-17 PROCEDURE — 99024 PR POST-OP FOLLOW-UP VISIT: ICD-10-PCS | Mod: HCNC,S$GLB,, | Performed by: PODIATRIST

## 2020-02-17 NOTE — PROGRESS NOTES
Subjective:      Patient ID: Mis Ca is a 79 y.o. female.    Chief Complaint: Post-op Evaluation (5wk f/u)    Complains of a bunion deformity left foot. Pain aggravated by enclosed shoes. Denies trauma. Relates it has been present for many years. Wearing sandals today. Wears toe separators which help only a little.    10/5/18: Returns to review her xray and discuss surgical intervention for her painful bunion left foot. No new complaints.    11/8/18: Post EGR with Lapidus/akin bunionectomy left on 10/31/18. NWB left foot. Using rolling knee walker. Accompanied by sister and neighbor. Pain mild and well controlled.    11/30/18: 4 weeks postop. NWB left foot. Accompanied by her friends. Relates no pain today but gets intermittent aching to the foot.    12/7/18:  5 weeks postop. Recalls pain and a pop in her left leg a few days ago when putting weight down on the left foot. She has kept the boot on, rested and elevated. Pain improved since.    12/28/18: 8 weeks postop. No pain. Ambulating well with orthopedic boot.    5/20/19: 4 months post op.  No pain to surgical, ambulating well in shoes.  Complains of pain to midfoot that happened when she injured her foot while in boot shortly after surgery.  Pain worse with activity, better with rest. Walks with a limp due to pain.    11/22/2019:  Complains of intermittent burning pain along the bottom of the foot that varies in intensity.  Also complains of pins and needles sensation that travels to the right great toe that is not constant.  No pain at rest.  She is wearing a clog type shoe as recommended.    12/9/2019: Pain is largely unchanged since last visit. She had CT scan done.  No new complaints.    1/23/19  Patient s/p lapidus revision with mini rail ex fix and deep peroneal nerve decompression 1/15/20.  Patient states she fell and hit her head, states she did not hit her foot.  Otherwise denies F/c/N/V or other trauma.    02/06/2020:  Presents 3 weeks postop.   "Relates mild pain at the proximal pin site left medial longitudinal arch.  States she has been applying pressure towards the heel as instructed.  Denies any slips, trips or falls.  Utilizing a rolling knee walker.  No new complaints    02/17/2020 20:1 month postop.  Relates no pain.  No new complaints.  She has been performing the daily foot soaks p.r.n..      Vitals:    02/17/20 1338   BP: 102/61   Pulse: 68   Weight: 93 kg (205 lb)   Height: 5' 2" (1.575 m)   PainSc: 0-No pain      Past Medical History:   Diagnosis Date    Anxiety     Atrial fibrillation     Blind right eye     Bradycardia     Cancer     skin cancer left side of face under eye    Hyperlipidemia     Hypertension     PVC (premature ventricular contraction)     RLS (restless legs syndrome)     Sleep apnea     Does not use CPAP machine.       Past Surgical History:   Procedure Laterality Date    ADENOIDECTOMY      AKIN OSTEOTOMY Left 10/31/2018    Procedure: OSTEOTOMY, AKIN;  Surgeon: Rudolph Cardozo DPM;  Location: [facility] OR;  Service: Podiatry;  Laterality: Left;    APPENDECTOMY      BREAST BIOPSY Left     x3    BREAST SURGERY Left     breast biopsy x3    CATARACT EXTRACTION BILATERAL W/ ANTERIOR VITRECTOMY      ENDOSCOPIC GASTROCNEMIUS RECESSION Left 10/31/2018    Procedure: RECESSION, GASTROCNEMIUS, ENDOSCOPIC;  Surgeon: Rudolph Cardozo DPM;  Location: [facility] OR;  Service: Podiatry;  Laterality: Left;    EYE SURGERY Bilateral     cataracts extraction    EYE SURGERY Right     drainage tube (glaucoma)    FOOT ARTHRODESIS Left 1/15/2020    Procedure: FUSION, FOOT;  Surgeon: Rudolph Cardozo DPM;  Location: [facility] OR;  Service: Podiatry;  Laterality: Left;  mini c-arm, Arthrex screw  for hardware removal (Lydia notified), Orthofix (Niels notified) min ex-fix    FOOT SURGERY Left     lesion removed from dorsal area    FRACTURE SURGERY Left     arm    HAND TENDON SURGERY Left     HYSTERECTOMY      LAPIDUS " BUNIONECTOMY Left 10/31/2018    Procedure: BUNIONECTOMY, LAPIDUS;  Surgeon: Rudolph Cardozo DPM;  Location: [facility] OR;  Service: Podiatry;  Laterality: Left;  mini c-arm, Arthrex locking plate (Lydia notified)    LAPIDUS BUNIONECTOMY Left 10/31/2018    Dr. Cardozo    Lymph Gland Removed  Right     groin (performed by Dr. Vergara)    NEURECTOMY Left 1/15/2020    Procedure: NEURECTOMY;  Surgeon: Rudolph Cardozo DPM;  Location: [facility] OR;  Service: Podiatry;  Laterality: Left;  bipolar bovie, vessel loop, possible [place] nerve wrap. Moshe with [place] notified and will be overnighting graft. MK 1/14/2020    OOPHORECTOMY      ORIF FOREARM FRACTURE Left     PALATE SURGERY      Lesion removed    TONSILLECTOMY         Family History   Problem Relation Age of Onset    Aneurysm Mother         AAA    Cancer Father         lung cancer    Lung cancer Father     Cirrhosis Father     Cancer Sister         Breast and Brain     Diabetes Sister     Breast cancer Sister     Hypertension Sister     Hyperlipidemia Sister     Brain cancer Sister     Colon polyps Brother     Cancer Brother         lung cancer    Diabetes Brother     Hyperlipidemia Brother     Hypertension Brother     Cancer Brother         bladder cancer    Diabetes Brother     Glaucoma Brother     Heart disease Sister         Heart valve repair    Atrial fibrillation Sister     Diabetes Sister     Heart disease Sister     Heart attack Sister     Bipolar disorder Sister     Depression Sister     Glaucoma Sister     Diabetes Sister     Other Sister         Pituitary tumor    Arthritis Sister     COPD Sister     Heart disease Sister     Kidney disease Sister     Cancer Sister         lung cancer    Diabetes Sister     Lung cancer Sister     Heart attack Sister        Social History     Socioeconomic History    Marital status:      Spouse name: Not on file    Number of children: Not on file    Years of education: Not  on file    Highest education level: Not on file   Occupational History    Not on file   Social Needs    Financial resource strain: Not on file    Food insecurity:     Worry: Not on file     Inability: Not on file    Transportation needs:     Medical: Not on file     Non-medical: Not on file   Tobacco Use    Smoking status: Never Smoker    Smokeless tobacco: Never Used   Substance and Sexual Activity    Alcohol use: Yes     Comment: Seldomly drinks alcohol (wine)    Drug use: No    Sexual activity: Not Currently     Partners: Male   Lifestyle    Physical activity:     Days per week: Not on file     Minutes per session: Not on file    Stress: Not at all   Relationships    Social connections:     Talks on phone: Not on file     Gets together: Not on file     Attends Yarsanism service: Not on file     Active member of club or organization: Not on file     Attends meetings of clubs or organizations: Not on file     Relationship status: Not on file   Other Topics Concern    Not on file   Social History Narrative    Not on file       Current Outpatient Medications   Medication Sig Dispense Refill    acetaminophen (TYLENOL) 500 MG tablet Take 1,000 mg by mouth 2 (two) times daily as needed for Pain.      ALPRAZolam (XANAX) 0.25 MG tablet Take 1 tablet (0.25 mg total) by mouth 2 (two) times daily as needed for Anxiety. 180 tablet 1    aspirin (ECOTRIN) 81 MG EC tablet Take 81 mg by mouth once daily.        atorvastatin (LIPITOR) 40 MG tablet Take 1 tablet (40 mg total) by mouth once daily. 90 tablet 5    clindamycin (CLEOCIN) 300 MG capsule Take 1 capsule (300 mg total) by mouth 3 (three) times daily. 30 capsule 0    cyanocobalamin 1,000 mcg/mL injection INJECT 1ML INTRAMUSCULARLY WEEKLY FOR 4 WEEKS, THEN 1ML MONTHLY 3 mL 1    diltiaZEM (CARTIA XT) 240 MG 24 hr capsule Take 1 capsule (240 mg total) by mouth once daily. 90 capsule 3    gabapentin (NEURONTIN) 300 MG capsule Take 2 capsules (600 mg total)  by mouth 2 (two) times daily. 360 capsule 3    hydroCHLOROthiazide (HYDRODIURIL) 25 MG tablet Take 1 tablet (25 mg total) by mouth once daily. 90 tablet 3    INVELTYS 1 % DrpS INSTILL 1 DROP INTO THE RIGHT EYE THREE TIMES A DAY  3    latanoprost 0.005 % ophthalmic solution 1 drop every evening.      losartan (COZAAR) 100 MG tablet TAKE 1/2 TABLET EVERY DAY 45 tablet 3    multivitamin (ONE DAILY MULTIVITAMIN) per tablet Take 1 tablet by mouth once daily.      omeprazole (PRILOSEC) 20 MG capsule Take 1 capsule (20 mg total) by mouth once daily. 90 capsule 3    oxybutynin (DITROPAN) 5 MG Tab TAKE 1 TABLET (5 MG TOTAL) BY MOUTH ONCE DAILY. 90 tablet 1    pramipexole (MIRAPEX) 0.5 MG tablet Take 1 tablet (0.5 mg total) by mouth every evening. 90 tablet 3    timolol maleate 0.5% (TIMOPTIC) 0.5 % Drop Place 1 drop into both eyes once daily.  0    warfarin (COUMADIN) 6 MG tablet TAKE 1 TABLET EVERY DAY 90 tablet 3     Current Facility-Administered Medications   Medication Dose Route Frequency Provider Last Rate Last Dose    cyanocobalamin injection 1,000 mcg  1,000 mcg Intramuscular Q30 Days Scott Dillard MD   1,000 mcg at 11/25/19 1611    sodium chloride 0.9% flush 10 mL  10 mL Intravenous PRN Rudolph Cardozo DPM           Review of patient's allergies indicates:   Allergen Reactions    Penicillins     Sulfa (sulfonamide antibiotics)     Compazine [prochlorperazine edisylate] Anxiety         Review of Systems   Constitution: Negative for chills, fever and malaise/fatigue.   HENT: Negative for congestion.    Cardiovascular: Negative for chest pain, claudication and leg swelling.   Respiratory: Negative for cough and shortness of breath.    Skin: Negative for flushing, itching, poor wound healing and rash.   Musculoskeletal: Positive for joint pain. Negative for back pain, muscle cramps and muscle weakness.   Gastrointestinal: Negative for nausea and vomiting.   Neurological: Positive for numbness.  Negative for paresthesias and weakness.        Burning sensation   Psychiatric/Behavioral: Negative for altered mental status.           Objective:      Physical Exam   Constitutional: She is oriented to person, place, and time. She appears well-developed and well-nourished. No distress.   Cardiovascular: Intact distal pulses.   Pulses:       Dorsalis pedis pulses are 2+ on the right side, and 2+ on the left side.        Posterior tibial pulses are 2+ on the right side, and 2+ on the left side.   CFT< 3 secs all toes bilateral foot, skin temp warm bilateral foot, no hair growth bilateral lower extremity, mild lower extremity edema with telangiectasias and varicosities bilateral.     Musculoskeletal: She exhibits edema and tenderness.   No pain with MMT or ROM left foot. Left hallux well aligned. 1 MTP left achieves > 45 deg DF.  Mini rail external fixation device is intact without signs of loosening at the pin sites.    Left foot achieves 5 deg DF with knee extended and > 10 deg with knee flexed. Right foot achieves 0 deg DF knee extended and 10 deg DF knee flexed.    No pain on palpation of the left leg, no pain with active resisted PF foot.    Mild collapse of the arch with standing bilateral foot.    No localized pain on palpation to the base of the 3rd metatarsal left foot.    Note medial deviation of toes 2 and 3 left foot when the forefoot is loaded.     Neurological: She is alert and oriented to person, place, and time. She has normal strength. No sensory deficit.   Skin: Skin is warm, dry and intact. Capillary refill takes less than 2 seconds. No ecchymosis and no rash noted. She is not diaphoretic. No cyanosis or erythema. No pallor. Nails show no clubbing.   Normal appearing scars to the left foot.    No surrounding erythema or edema to the pin sites left foot.             Assessment:       Encounter Diagnosis   Name Primary?    Postoperative state Yes         Plan:       Mis was seen today for  post-op evaluation.    Diagnoses and all orders for this visit:    Postoperative state  -     X-Ray Foot Complete Left; Future      I counseled the patient on her conditions, their implications and medical management.    Foot cleansed with Hibiclens.  Mini rail was compressed a full turn until resistance was met.  Dressed with  Betadine and aquacel foam bandages.  Home care instructions reviewed in detail.    Patient ok to weight bear for short distances.  Ok to shower, but not to immerse foot in water.    Continue soaking p.r.n.    Continue rest and elevation p.r.n..    RTC 2-3 weeks or p.r.n. as discussed.  Follow-up weight-bearing x-ray at next visit.      A portion of this note was generated by voice recognition software and may contain topical graphical errors.

## 2020-02-26 ENCOUNTER — CLINICAL SUPPORT (OUTPATIENT)
Dept: FAMILY MEDICINE | Facility: CLINIC | Age: 80
End: 2020-02-26
Payer: MEDICARE

## 2020-02-26 DIAGNOSIS — I48.0 PAROXYSMAL ATRIAL FIBRILLATION: Primary | ICD-10-CM

## 2020-02-26 DIAGNOSIS — D64.9 ANEMIA, UNSPECIFIED TYPE: ICD-10-CM

## 2020-02-26 LAB — INR PPP: 2.2 (ref 2–3)

## 2020-02-26 PROCEDURE — 85610 POCT INR: ICD-10-PCS | Mod: QW,,, | Performed by: FAMILY MEDICINE

## 2020-02-26 PROCEDURE — 85610 PROTHROMBIN TIME: CPT | Mod: QW,,, | Performed by: FAMILY MEDICINE

## 2020-02-26 PROCEDURE — 96372 PR INJECTION,THERAP/PROPH/DIAG2ST, IM OR SUBCUT: ICD-10-PCS | Mod: S$GLB,,, | Performed by: FAMILY MEDICINE

## 2020-02-26 PROCEDURE — 96372 THER/PROPH/DIAG INJ SC/IM: CPT | Mod: S$GLB,,, | Performed by: FAMILY MEDICINE

## 2020-02-26 RX ORDER — CYANOCOBALAMIN 1000 UG/ML
1000 INJECTION, SOLUTION INTRAMUSCULAR; SUBCUTANEOUS ONCE
Status: COMPLETED | OUTPATIENT
Start: 2020-02-26 | End: 2020-02-26

## 2020-02-26 RX ADMIN — CYANOCOBALAMIN 1000 MCG: 1000 INJECTION, SOLUTION INTRAMUSCULAR; SUBCUTANEOUS at 12:02

## 2020-03-08 ENCOUNTER — HOSPITAL ENCOUNTER (INPATIENT)
Facility: HOSPITAL | Age: 80
LOS: 8 days | Discharge: HOME HEALTH | DRG: 501 | End: 2020-03-16
Attending: EMERGENCY MEDICINE | Admitting: HOSPITALIST
Payer: MEDICARE

## 2020-03-08 ENCOUNTER — NURSE TRIAGE (OUTPATIENT)
Dept: ADMINISTRATIVE | Facility: CLINIC | Age: 80
End: 2020-03-08

## 2020-03-08 DIAGNOSIS — T84.7XXA INFECTION AT SITE OF EXTERNAL FIXATOR PIN, INITIAL ENCOUNTER: Primary | ICD-10-CM

## 2020-03-08 DIAGNOSIS — L03.90 CELLULITIS: ICD-10-CM

## 2020-03-08 LAB
ALBUMIN SERPL BCP-MCNC: 4 G/DL (ref 3.5–5.2)
ALP SERPL-CCNC: 131 U/L (ref 55–135)
ALT SERPL W/O P-5'-P-CCNC: 17 U/L (ref 10–44)
ANION GAP SERPL CALC-SCNC: 12 MMOL/L (ref 8–16)
AST SERPL-CCNC: 15 U/L (ref 10–40)
BASOPHILS # BLD AUTO: 0.03 K/UL (ref 0–0.2)
BASOPHILS NFR BLD: 0.3 % (ref 0–1.9)
BILIRUB SERPL-MCNC: 0.4 MG/DL (ref 0.1–1)
BUN SERPL-MCNC: 14 MG/DL (ref 8–23)
CALCIUM SERPL-MCNC: 10 MG/DL (ref 8.7–10.5)
CHLORIDE SERPL-SCNC: 103 MMOL/L (ref 95–110)
CO2 SERPL-SCNC: 28 MMOL/L (ref 23–29)
CREAT SERPL-MCNC: 0.8 MG/DL (ref 0.5–1.4)
DIFFERENTIAL METHOD: ABNORMAL
EOSINOPHIL # BLD AUTO: 0.2 K/UL (ref 0–0.5)
EOSINOPHIL NFR BLD: 1.8 % (ref 0–8)
ERYTHROCYTE [DISTWIDTH] IN BLOOD BY AUTOMATED COUNT: 13.2 % (ref 11.5–14.5)
EST. GFR  (AFRICAN AMERICAN): >60 ML/MIN/1.73 M^2
EST. GFR  (NON AFRICAN AMERICAN): >60 ML/MIN/1.73 M^2
GLUCOSE SERPL-MCNC: 77 MG/DL (ref 70–110)
HCT VFR BLD AUTO: 39.8 % (ref 37–48.5)
HGB BLD-MCNC: 13.2 G/DL (ref 12–16)
IMM GRANULOCYTES # BLD AUTO: 0.04 K/UL (ref 0–0.04)
IMM GRANULOCYTES NFR BLD AUTO: 0.4 % (ref 0–0.5)
INR PPP: 2.7 (ref 0.8–1.2)
LACTATE SERPL-SCNC: 1.3 MMOL/L (ref 0.5–2.2)
LYMPHOCYTES # BLD AUTO: 2.3 K/UL (ref 1–4.8)
LYMPHOCYTES NFR BLD: 22.7 % (ref 18–48)
MCH RBC QN AUTO: 32 PG (ref 27–31)
MCHC RBC AUTO-ENTMCNC: 33.2 G/DL (ref 32–36)
MCV RBC AUTO: 96 FL (ref 82–98)
MONOCYTES # BLD AUTO: 1 K/UL (ref 0.3–1)
MONOCYTES NFR BLD: 9.6 % (ref 4–15)
NEUTROPHILS # BLD AUTO: 6.6 K/UL (ref 1.8–7.7)
NEUTROPHILS NFR BLD: 65.2 % (ref 38–73)
NRBC BLD-RTO: 0 /100 WBC
PLATELET # BLD AUTO: 208 K/UL (ref 150–350)
PMV BLD AUTO: 10.2 FL (ref 9.2–12.9)
POTASSIUM SERPL-SCNC: 3.7 MMOL/L (ref 3.5–5.1)
PROT SERPL-MCNC: 7.2 G/DL (ref 6–8.4)
PROTHROMBIN TIME: 29.6 SEC (ref 9–12.5)
RBC # BLD AUTO: 4.13 M/UL (ref 4–5.4)
SODIUM SERPL-SCNC: 143 MMOL/L (ref 136–145)
WBC # BLD AUTO: 10.05 K/UL (ref 3.9–12.7)

## 2020-03-08 PROCEDURE — 63600175 PHARM REV CODE 636 W HCPCS: Mod: HCNC | Performed by: EMERGENCY MEDICINE

## 2020-03-08 PROCEDURE — 80053 COMPREHEN METABOLIC PANEL: CPT | Mod: HCNC

## 2020-03-08 PROCEDURE — 25000003 PHARM REV CODE 250: Mod: HCNC | Performed by: NURSE PRACTITIONER

## 2020-03-08 PROCEDURE — 25000003 PHARM REV CODE 250: Mod: HCNC | Performed by: HOSPITALIST

## 2020-03-08 PROCEDURE — 36415 COLL VENOUS BLD VENIPUNCTURE: CPT | Mod: HCNC

## 2020-03-08 PROCEDURE — 87040 BLOOD CULTURE FOR BACTERIA: CPT | Mod: HCNC

## 2020-03-08 PROCEDURE — 83605 ASSAY OF LACTIC ACID: CPT | Mod: HCNC

## 2020-03-08 PROCEDURE — 96365 THER/PROPH/DIAG IV INF INIT: CPT | Mod: HCNC

## 2020-03-08 PROCEDURE — 63600175 PHARM REV CODE 636 W HCPCS: Mod: HCNC | Performed by: HOSPITALIST

## 2020-03-08 PROCEDURE — 11000001 HC ACUTE MED/SURG PRIVATE ROOM: Mod: HCNC

## 2020-03-08 PROCEDURE — 85610 PROTHROMBIN TIME: CPT | Mod: HCNC

## 2020-03-08 PROCEDURE — 99285 EMERGENCY DEPT VISIT HI MDM: CPT | Mod: 25,HCNC

## 2020-03-08 PROCEDURE — 85025 COMPLETE CBC W/AUTO DIFF WBC: CPT | Mod: HCNC

## 2020-03-08 RX ORDER — PANTOPRAZOLE SODIUM 40 MG/1
40 TABLET, DELAYED RELEASE ORAL DAILY
Status: DISCONTINUED | OUTPATIENT
Start: 2020-03-09 | End: 2020-03-16 | Stop reason: HOSPADM

## 2020-03-08 RX ORDER — GABAPENTIN 300 MG/1
600 CAPSULE ORAL 2 TIMES DAILY
Status: DISCONTINUED | OUTPATIENT
Start: 2020-03-08 | End: 2020-03-16 | Stop reason: HOSPADM

## 2020-03-08 RX ORDER — SODIUM CHLORIDE 0.9 % (FLUSH) 0.9 %
10 SYRINGE (ML) INJECTION
Status: DISCONTINUED | OUTPATIENT
Start: 2020-03-08 | End: 2020-03-16 | Stop reason: HOSPADM

## 2020-03-08 RX ORDER — LATANOPROST 50 UG/ML
1 SOLUTION/ DROPS OPHTHALMIC NIGHTLY
Status: DISCONTINUED | OUTPATIENT
Start: 2020-03-09 | End: 2020-03-16 | Stop reason: HOSPADM

## 2020-03-08 RX ORDER — WARFARIN 3 MG/1
6 TABLET ORAL DAILY
Status: DISCONTINUED | OUTPATIENT
Start: 2020-03-08 | End: 2020-03-13

## 2020-03-08 RX ORDER — LATANOPROST 50 UG/ML
1 SOLUTION/ DROPS OPHTHALMIC NIGHTLY
Status: DISCONTINUED | OUTPATIENT
Start: 2020-03-08 | End: 2020-03-08

## 2020-03-08 RX ORDER — LOSARTAN POTASSIUM 50 MG/1
50 TABLET ORAL DAILY
Status: DISCONTINUED | OUTPATIENT
Start: 2020-03-09 | End: 2020-03-16 | Stop reason: HOSPADM

## 2020-03-08 RX ORDER — HYDROCHLOROTHIAZIDE 25 MG/1
25 TABLET ORAL DAILY
Status: DISCONTINUED | OUTPATIENT
Start: 2020-03-09 | End: 2020-03-16 | Stop reason: HOSPADM

## 2020-03-08 RX ORDER — ATORVASTATIN CALCIUM 40 MG/1
40 TABLET, FILM COATED ORAL NIGHTLY
Status: DISCONTINUED | OUTPATIENT
Start: 2020-03-08 | End: 2020-03-16 | Stop reason: HOSPADM

## 2020-03-08 RX ORDER — FLUOROMETHOLONE 1 MG/ML
1 SUSPENSION/ DROPS OPHTHALMIC 3 TIMES DAILY
Status: DISCONTINUED | OUTPATIENT
Start: 2020-03-08 | End: 2020-03-08

## 2020-03-08 RX ORDER — PRAMIPEXOLE DIHYDROCHLORIDE 0.12 MG/1
0.5 TABLET ORAL NIGHTLY
Status: DISCONTINUED | OUTPATIENT
Start: 2020-03-08 | End: 2020-03-16 | Stop reason: HOSPADM

## 2020-03-08 RX ORDER — ACETAMINOPHEN 500 MG
1000 TABLET ORAL 2 TIMES DAILY PRN
Status: DISCONTINUED | OUTPATIENT
Start: 2020-03-09 | End: 2020-03-09

## 2020-03-08 RX ORDER — WARFARIN 6 MG/1
9 TABLET ORAL
Status: ON HOLD | COMMUNITY
End: 2020-03-16 | Stop reason: SDUPTHER

## 2020-03-08 RX ORDER — ASPIRIN 81 MG/1
81 TABLET ORAL DAILY
Status: DISCONTINUED | OUTPATIENT
Start: 2020-03-09 | End: 2020-03-16 | Stop reason: HOSPADM

## 2020-03-08 RX ORDER — OXYBUTYNIN CHLORIDE 5 MG/1
5 TABLET ORAL DAILY
Status: DISCONTINUED | OUTPATIENT
Start: 2020-03-09 | End: 2020-03-16 | Stop reason: HOSPADM

## 2020-03-08 RX ORDER — DILTIAZEM HYDROCHLORIDE 120 MG/1
240 CAPSULE, COATED, EXTENDED RELEASE ORAL DAILY
Status: DISCONTINUED | OUTPATIENT
Start: 2020-03-09 | End: 2020-03-16 | Stop reason: HOSPADM

## 2020-03-08 RX ORDER — ALPRAZOLAM 0.25 MG/1
0.25 TABLET ORAL 2 TIMES DAILY PRN
Status: DISCONTINUED | OUTPATIENT
Start: 2020-03-08 | End: 2020-03-16 | Stop reason: HOSPADM

## 2020-03-08 RX ORDER — ATORVASTATIN CALCIUM 40 MG/1
40 TABLET, FILM COATED ORAL DAILY
Status: DISCONTINUED | OUTPATIENT
Start: 2020-03-09 | End: 2020-03-08

## 2020-03-08 RX ORDER — CIPROFLOXACIN 2 MG/ML
400 INJECTION, SOLUTION INTRAVENOUS
Status: DISCONTINUED | OUTPATIENT
Start: 2020-03-08 | End: 2020-03-14

## 2020-03-08 RX ORDER — TIMOLOL MALEATE 5 MG/ML
1 SOLUTION/ DROPS OPHTHALMIC DAILY
Status: DISCONTINUED | OUTPATIENT
Start: 2020-03-09 | End: 2020-03-08

## 2020-03-08 RX ORDER — TIMOLOL MALEATE 5 MG/ML
1 SOLUTION/ DROPS OPHTHALMIC DAILY
Status: DISCONTINUED | OUTPATIENT
Start: 2020-03-09 | End: 2020-03-16 | Stop reason: HOSPADM

## 2020-03-08 RX ADMIN — VANCOMYCIN HYDROCHLORIDE 2500 MG: 100 INJECTION, POWDER, LYOPHILIZED, FOR SOLUTION INTRAVENOUS at 03:03

## 2020-03-08 RX ADMIN — ACETAMINOPHEN 1000 MG: 500 TABLET ORAL at 11:03

## 2020-03-08 RX ADMIN — ALPRAZOLAM 0.25 MG: 0.25 TABLET ORAL at 10:03

## 2020-03-08 RX ADMIN — PRAMIPEXOLE DIHYDROCHLORIDE 0.5 MG: 0.5 TABLET ORAL at 10:03

## 2020-03-08 RX ADMIN — WARFARIN SODIUM 6 MG: 6 TABLET ORAL at 06:03

## 2020-03-08 RX ADMIN — CIPROFLOXACIN 400 MG: 2 INJECTION, SOLUTION INTRAVENOUS at 06:03

## 2020-03-08 RX ADMIN — LATANOPROST 1 DROP: 50 SOLUTION OPHTHALMIC at 10:03

## 2020-03-08 RX ADMIN — GABAPENTIN 600 MG: 300 CAPSULE ORAL at 10:03

## 2020-03-08 RX ADMIN — ATORVASTATIN CALCIUM 40 MG: 40 TABLET, FILM COATED ORAL at 10:03

## 2020-03-08 NOTE — SUBJECTIVE & OBJECTIVE
Past Medical History:   Diagnosis Date    Anxiety     Atrial fibrillation     Blind right eye     Bradycardia     Cancer     skin cancer left side of face under eye    Hyperlipidemia     Hypertension     PVC (premature ventricular contraction)     RLS (restless legs syndrome)     Sleep apnea     Does not use CPAP machine.       Past Surgical History:   Procedure Laterality Date    ADENOIDECTOMY      AKIN OSTEOTOMY Left 10/31/2018    Procedure: OSTEOTOMY, AKIN;  Surgeon: Rudolph Cardozo DPM;  Location: [facility] OR;  Service: Podiatry;  Laterality: Left;    APPENDECTOMY      BREAST BIOPSY Left     x3    BREAST SURGERY Left     breast biopsy x3    CATARACT EXTRACTION BILATERAL W/ ANTERIOR VITRECTOMY      ENDOSCOPIC GASTROCNEMIUS RECESSION Left 10/31/2018    Procedure: RECESSION, GASTROCNEMIUS, ENDOSCOPIC;  Surgeon: Rudolph Cardozo DPM;  Location: [facility] OR;  Service: Podiatry;  Laterality: Left;    EYE SURGERY Bilateral     cataracts extraction    EYE SURGERY Right     drainage tube (glaucoma)    FOOT ARTHRODESIS Left 1/15/2020    Procedure: FUSION, FOOT;  Surgeon: Rudolph Cardozo DPM;  Location: [facility] OR;  Service: Podiatry;  Laterality: Left;  mini c-arm, Arthrex screw  for hardware removal (Lydia notified), [employer] (Niels notified) min ex-fix    FOOT SURGERY Left     lesion removed from dorsal area    FRACTURE SURGERY Left     arm    HAND TENDON SURGERY Left     HYSTERECTOMY      LAPIDUS BUNIONECTOMY Left 10/31/2018    Procedure: BUNIONECTOMY, LAPIDUS;  Surgeon: Rudolph Cardozo DPM;  Location: [facility] OR;  Service: Podiatry;  Laterality: Left;  mini c-arm, Arthrex locking plate (Lydia notified)    LAPIDUS BUNIONECTOMY Left 10/31/2018    Dr. Cardozo    Lymph Gland Removed  Right     groin (performed by Dr. Vergara)    NEURECTOMY Left 1/15/2020    Procedure: NEURECTOMY;  Surgeon: Rudolph Cardozo DPM;  Location: [facility] OR;  Service: Podiatry;  Laterality: Left;  bipolar  andria, vessel loop, possible [place] nerve wrap. Mohse with [facility] notified and will be overnighting graft. MK 1/14/2020    OOPHORECTOMY      ORIF FOREARM FRACTURE Left     PALATE SURGERY      Lesion removed    TONSILLECTOMY         Review of patient's allergies indicates:   Allergen Reactions    Propofol analogues      Used for her Colonoscopy.  Extended delirium.  Advised not to take it again.      Adhesive     Compazine [prochlorperazine edisylate] Anxiety    Penicillins Rash    Sulfa (sulfonamide antibiotics) Rash       Current Facility-Administered Medications on File Prior to Encounter   Medication    cyanocobalamin injection 1,000 mcg    sodium chloride 0.9% flush 10 mL     Current Outpatient Medications on File Prior to Encounter   Medication Sig    acetaminophen (TYLENOL) 500 MG tablet Take 1,000 mg by mouth 2 (two) times daily as needed for Pain.    ALPRAZolam (XANAX) 0.25 MG tablet Take 1 tablet (0.25 mg total) by mouth 2 (two) times daily as needed for Anxiety.    aspirin (ECOTRIN) 81 MG EC tablet Take 81 mg by mouth once daily.      atorvastatin (LIPITOR) 40 MG tablet Take 1 tablet (40 mg total) by mouth once daily.    clindamycin (CLEOCIN) 300 MG capsule Take 1 capsule (300 mg total) by mouth 3 (three) times daily.    cyanocobalamin 1,000 mcg/mL injection INJECT 1ML INTRAMUSCULARLY WEEKLY FOR 4 WEEKS, THEN 1ML MONTHLY    diltiaZEM (CARTIA XT) 240 MG 24 hr capsule Take 1 capsule (240 mg total) by mouth once daily.    gabapentin (NEURONTIN) 300 MG capsule Take 2 capsules (600 mg total) by mouth 2 (two) times daily.    hydroCHLOROthiazide (HYDRODIURIL) 25 MG tablet Take 1 tablet (25 mg total) by mouth once daily.    INVELTYS 1 % DrpS INSTILL 1 DROP INTO THE RIGHT EYE THREE TIMES A DAY    latanoprost 0.005 % ophthalmic solution 1 drop every evening.    losartan (COZAAR) 100 MG tablet TAKE 1/2 TABLET EVERY DAY    multivitamin (ONE DAILY MULTIVITAMIN) per tablet Take 1 tablet by  mouth once daily.    omeprazole (PRILOSEC) 20 MG capsule Take 1 capsule (20 mg total) by mouth once daily.    oxybutynin (DITROPAN) 5 MG Tab TAKE 1 TABLET (5 MG TOTAL) BY MOUTH ONCE DAILY.    pramipexole (MIRAPEX) 0.5 MG tablet Take 1 tablet (0.5 mg total) by mouth every evening.    timolol maleate 0.5% (TIMOPTIC) 0.5 % Drop Place 1 drop into both eyes once daily.    warfarin (COUMADIN) 6 MG tablet TAKE 1 TABLET EVERY DAY     Family History     Problem Relation (Age of Onset)    Aneurysm Mother    Arthritis Sister    Atrial fibrillation Sister    Bipolar disorder Sister    Brain cancer Sister    Breast cancer Sister    COPD Sister    Cancer Father, Sister, Brother, Brother, Sister    Cirrhosis Father    Colon polyps Brother    Depression Sister    Diabetes Sister, Brother, Brother, Sister, Sister, Sister    Glaucoma Brother, Sister    Heart attack Sister, Sister    Heart disease Sister, Sister, Sister    Hyperlipidemia Sister, Brother    Hypertension Sister, Brother    Kidney disease Sister    Lung cancer Father, Sister    Other Sister        Tobacco Use    Smoking status: Never Smoker    Smokeless tobacco: Never Used   Substance and Sexual Activity    Alcohol use: Yes     Comment: Seldomly drinks alcohol (wine)    Drug use: No    Sexual activity: Not Currently     Partners: Male     Review of Systems   Constitutional: Positive for activity change. Negative for chills, diaphoresis and fatigue.   HENT: Negative for congestion.    Respiratory: Negative for cough, choking, chest tightness and shortness of breath.    Cardiovascular: Positive for leg swelling. Negative for chest pain and palpitations.   Gastrointestinal: Negative for abdominal distention, abdominal pain, blood in stool, nausea and vomiting.   Genitourinary: Negative for difficulty urinating.   Neurological: Negative for dizziness, light-headedness, numbness and headaches.   Psychiatric/Behavioral: Negative for agitation, confusion and decreased  concentration. The patient is nervous/anxious.      Objective:     Vital Signs (Most Recent):  Temp: 99 °F (37.2 °C) (03/08/20 1259)  Pulse: 81 (03/08/20 1722)  Resp: 20 (03/08/20 1259)  BP: (!) 128/58 (03/08/20 1722)  SpO2: 95 % (03/08/20 1722) Vital Signs (24h Range):  Temp:  [99 °F (37.2 °C)] 99 °F (37.2 °C)  Pulse:  [76-81] 81  Resp:  [20] 20  SpO2:  [95 %-96 %] 95 %  BP: (110-134)/(55-68) 128/58     Weight: 93 kg (205 lb)  Body mass index is 37.49 kg/m².    Physical Exam   Constitutional: She is oriented to person, place, and time. She appears well-developed and well-nourished.   HENT:   Head: Normocephalic and atraumatic.   Eyes: EOM are normal.   Neck: Normal range of motion. Neck supple. No JVD present.   Cardiovascular: Normal rate.   Pulmonary/Chest: Effort normal.   Abdominal: Soft.   Musculoskeletal: Normal range of motion. She exhibits deformity (left foot pins,, edema and erythema). She exhibits no edema.   Neurological: She is alert and oriented to person, place, and time.   Skin: Skin is warm.   Psychiatric: She has a normal mood and affect. Her behavior is normal. Judgment and thought content normal.   Nursing note and vitals reviewed.        CRANIAL NERVES     CN III, IV, VI   Extraocular motions are normal.        Significant Labs:  Recent Labs   Lab 03/08/20  1536   WBC 10.05   HGB 13.2   HCT 39.8        Recent Labs   Lab 03/08/20  1536      K 3.7      CO2 28   BUN 14   CREATININE 0.8   GLU 77   CALCIUM 10.0     Recent Labs   Lab 03/08/20  1536   ALKPHOS 131   ALT 17   AST 15   ALBUMIN 4.0   PROT 7.2   BILITOT 0.4      No results for input(s): CPK, CPKMB, MB, TROPONINI in the last 72 hours.  No results for input(s): POCTGLUCOSE in the last 168 hours.  Hemoglobin A1C   Date Value Ref Range Status   06/09/2016 5.8 4.5 - 6.2 % Final   11/06/2015 6.0 4.5 - 6.2 % Final   05/19/2015 5.9 4.5 - 6.2 % Final     Scheduled Meds:   [START ON 3/9/2020] aspirin  81 mg Oral Daily    [START  ON 3/9/2020] atorvastatin  40 mg Oral Daily    ciprofloxacin  400 mg Intravenous Q12H    cyanocobalamin  1,000 mcg Intramuscular Q30 Days    [START ON 3/9/2020] diltiaZEM  240 mg Oral Daily    fluorometholone 0.1%  1 drop Right Eye TID    gabapentin  600 mg Oral BID    [START ON 3/9/2020] hydroCHLOROthiazide  25 mg Oral Daily    latanoprost  1 drop Both Eyes QHS    [START ON 3/9/2020] losartan  50 mg Oral Daily    [START ON 3/9/2020] oxybutynin  5 mg Oral Daily    [START ON 3/9/2020] pantoprazole  40 mg Oral Daily    pramipexole  0.5 mg Oral QHS    [START ON 3/9/2020] timolol maleate 0.5%  1 drop Both Eyes Daily    vancomycin (VANCOCIN) IVPB  2,000 mg Intravenous Q24H    vancomycin (VANCOCIN) IVPB  2,500 mg Intravenous ED 1 Time    warfarin  6 mg Oral Daily     Continuous Infusions:  As Needed: ALPRAZolam, sodium chloride 0.9%, sodium chloride 0.9%, Pharmacy to dose Vancomycin consult **AND** vancomycin - pharmacy to dose     Significant Imaging:   X-Ray Foot Complete Left  Narrative: EXAMINATION:  XR FOOT COMPLETE 3 VIEW LEFT    CLINICAL HISTORY:  Cellulitis, unspecified    TECHNIQUE:  XR FOOT COMPLETE 3 VIEW LEFT    COMPARISON:  11/20/2019    FINDINGS:  In the interim 1st tarsometatarsal arthrodesis hardware has been removed and replaced with an external fixator.  On the oblique view there is cortical irregularity of the medial cortex of the proximal shaft of the 1st metatarsal bone which could be from recent hardware removal.  Infection is difficult to exclude with certainty.    Pre-existing postsurgical changes of bunionectomy and 1st proximal phalanx corrective osteotomy are stable and there is generalized bone demineralization.  Prominent forefoot soft tissue swelling.  Impression: Interval revision of 1st tarsometatarsal joint arthroplasty.  Cortical regularity 1st proximal metatarsal shaft most likely related to hardware removal.  Also consider early osteomyelitis    Forefoot soft tissue  swelling could indicate cellulitis in the proper clinical setting.    Electronically signed by: Mo Zambrano Jr  Date:    03/08/2020  Time:    15:34

## 2020-03-08 NOTE — PROGRESS NOTES
Pharmacokinetic Initial Assessment: IV Vancomycin    Assessment/Plan:    Initiate intravenous vancomycin with loading dose of 2500 mg once followed by a maintenance dose of vancomycin 2000 mg IV every 24 hours  Desired empiric serum trough concentration is 10 to 15 mcg/mL  Draw vancomycin trough level 30 min prior to third dose on 3-10-20 at approximately 1500   Pharmacy will continue to follow and monitor vancomycin.      Please contact pharmacy at extension [phone] with any questions regarding this assessment.     Thank you for the consult,   Omar Voy       Patient brief summary:  Mis Ca is a 79 y.o. female initiated on antimicrobial therapy with IV Vancomycin for treatment of suspected skin & soft tissue infection    Drug Allergies:   Review of patient's allergies indicates:   Allergen Reactions    Propofol analogues      Used for her Colonoscopy.  Extended delirium.  Advised not to take it again.      Adhesive     Compazine [prochlorperazine edisylate] Anxiety    Penicillins Rash    Sulfa (sulfonamide antibiotics) Rash       Actual Body Weight:   93 kg    Renal Function:   Estimated Creatinine Clearance: 61.6 mL/min (based on SCr of 0.8 mg/dL).,     Dialysis Method (if applicable):  N/A    CBC (last 72 hours):  Recent Labs   Lab Result Units 03/08/20  1536   WBC K/uL 10.05   Hemoglobin g/dL 13.2   Hematocrit % 39.8   Platelets K/uL 208   Gran% % 65.2   Lymph% % 22.7   Mono% % 9.6   Eosinophil% % 1.8   Basophil% % 0.3   Differential Method  Automated       Metabolic Panel (last 72 hours):  Recent Labs   Lab Result Units 03/08/20  1536   Sodium mmol/L 143   Potassium mmol/L 3.7   Chloride mmol/L 103   CO2 mmol/L 28   Glucose mg/dL 77   BUN, Bld mg/dL 14   Creatinine mg/dL 0.8   Albumin g/dL 4.0   Total Bilirubin mg/dL 0.4   Alkaline Phosphatase U/L 131   AST U/L 15   ALT U/L 17       Drug levels (last 3 results):  No results for input(s): VANCOMYCINRA, VANCOMYCINPE, VANCOMYCINTR in the last 72  hours.    Microbiologic Results:  Microbiology Results (last 7 days)       Procedure Component Value Units Date/Time    Blood culture (site 2) [[id number]] Collected:  03/08/20 1715    Order Status:  Sent Specimen:  Blood from Peripheral, Forearm, Left Updated:  03/08/20 1715    Blood Culture #2 **CANNOT BE ORDERED STAT** [[id number]] Collected:  03/08/20 1533    Order Status:  Sent Specimen:  Blood from Peripheral, Antecubital, Left Updated:  03/08/20 1555    Blood Culture #1 **CANNOT BE ORDERED STAT** [[id number]] Collected:  03/08/20 1525    Order Status:  Sent Specimen:  Blood from Peripheral, Forearm, Right Updated:  03/08/20 1553

## 2020-03-08 NOTE — ED NOTES
Patient stated that she had bone graft in January with an external device applied to left medial foot. Patient stated that she started with burning, drainage, redness and swelling last night but worsened this am. Patient stated that she has no pain at present time and she has performed wound care daily to the pins and has been compliant with non weight bearing status to the left foot. Patient changed into gown and dressing removed from site on left foot.     APPEARANCE: Alert, oriented and in no acute distress.  CARDIAC: Normal rate and rhythm, no murmur heard.   PERIPHERAL VASCULAR: peripheral pulses present. Normal cap refill. No edema. Warm to touch.    RESPIRATORY:Normal rate and effort, breath sounds clear bilaterally throughout chest. Respirations are equal and unlabored no obvious signs of distress.  GASTRO: soft, bowel sounds normal, no tenderness, no abdominal distention.  MUSC: Full ROM. Left medial foot external fixator intact, and non weight bearing to left lower extremity  SKIN: Skin is warm and dry, normal skin turgor, mucous membranes moist.  NEURO: 5/5 strength major flexors/extensors bilaterally. Sensory intact to light touch bilaterally. Cambria coma scale: eyes open spontaneously-4, oriented & converses-5, obeys commands-6. No neurological abnormalities.   MENTAL STATUS: awake, alert and aware of environment.

## 2020-03-08 NOTE — ASSESSMENT & PLAN NOTE
Will place on vanc and cipro for now  Pt has pen allergy  Consulting Dr Cardozo in am,.  Monitor closely

## 2020-03-08 NOTE — H&P
"[facility] Medicine  History & Physical    Patient Name: Mis Ca  MRN: [id number]  Admission Date: 3/8/2020  Attending Physician: Joe Romero DO  Primary Care Provider: Scott Dillard MD         Patient information was obtained from patient and ER records.     Subjective:     Principal Problem:Infected hardware in left leg, initial encounter    Chief Complaint:   Chief Complaint   Patient presents with    Wound Infection     pt had left foot surgery with Dr. Cardozo in January. States she feels like the foot is becoming infected since yesterday        HPI: The patient is a 79 years old. female with PMH HTN, HLP, anxiety,Atrial fibrillation, Blind right eye, Bradycardia, RLS, and Sleep apnea presents with complaint of possible wound infection to left foot. Patient states she had surgery on January 15, 2020 to foot by Dr. Cardozo to "fix bones that did not heal correctly after bunion surgery".  Patient reports no complications to foot until yesterday when she noticed swelling, erythema and pain worsening. She also reports scant amount of white drainage from wound this morning. Patient denies any fever, chills, abdominal pain, nausea or vomiting. Of note, patient last saw Dr. Cardozo two weeks ago and has upcoming appointment in four days.    The pt does not put weight yet on her foot, and her surgical site is not  Having pain from the sx site itself but the swelling and edema around it    Past Medical History:   Diagnosis Date    Anxiety     Atrial fibrillation     Blind right eye     Bradycardia     Cancer     skin cancer left side of face under eye    Hyperlipidemia     Hypertension     PVC (premature ventricular contraction)     RLS (restless legs syndrome)     Sleep apnea     Does not use CPAP machine.       Past Surgical History:   Procedure Laterality Date    ADENOIDECTOMY      AKIN OSTEOTOMY Left 10/31/2018    Procedure: OSTEOTOMY, AKIN;  Surgeon: Rudolph GOVEA " ALVIN Cardozo;  Location: [facility] OR;  Service: Podiatry;  Laterality: Left;    APPENDECTOMY      BREAST BIOPSY Left     x3    BREAST SURGERY Left     breast biopsy x3    CATARACT EXTRACTION BILATERAL W/ ANTERIOR VITRECTOMY      ENDOSCOPIC GASTROCNEMIUS RECESSION Left 10/31/2018    Procedure: RECESSION, GASTROCNEMIUS, ENDOSCOPIC;  Surgeon: Rudolph Cardozo DPM;  Location: [facility] OR;  Service: Podiatry;  Laterality: Left;    EYE SURGERY Bilateral     cataracts extraction    EYE SURGERY Right     drainage tube (glaucoma)    FOOT ARTHRODESIS Left 1/15/2020    Procedure: FUSION, FOOT;  Surgeon: Rudolph Cardozo DPM;  Location: [facility] OR;  Service: Podiatry;  Laterality: Left;  mini c-arm, Ar[employer] screw  for hardware removal (Lydia notified), Orthofix (Niels notified) min ex-fix    FOOT SURGERY Left     lesion removed from dorsal area    FRACTURE SURGERY Left     arm    HAND TENDON SURGERY Left     HYSTERECTOMY      LAPIDUS BUNIONECTOMY Left 10/31/2018    Procedure: BUNIONECTOMY, LAPIDUS;  Surgeon: Rudolph Cardozo DPM;  Location: [facility] OR;  Service: Podiatry;  Laterality: Left;  mini c-arm, Arthrex locking plate (Lydia notified)    LAPIDUS BUNIONECTOMY Left 10/31/2018    Dr. Cardozo    Lymph Gland Removed  Right     groin (performed by Dr. Vergara)    NEURECTOMY Left 1/15/2020    Procedure: NEURECTOMY;  Surgeon: Rudolph Cardozo DPM;  Location: [facility] OR;  Service: Podiatry;  Laterality: Left;  bipolar bovie, vessel loop, possible [place] nerve wrap. Moshe with [employer] notified and will be overnighting graft. MK 1/14/2020    OOPHORECTOMY      ORIF FOREARM FRACTURE Left     PALATE SURGERY      Lesion removed    TONSILLECTOMY         Review of patient's allergies indicates:   Allergen Reactions    Propofol analogues      Used for her Colonoscopy.  Extended delirium.  Advised not to take it again.      Adhesive     Compazine [prochlorperazine edisylate] Anxiety    Penicillins Rash    Sulfa  (sulfonamide antibiotics) Rash       Current Facility-Administered Medications on File Prior to Encounter   Medication    cyanocobalamin injection 1,000 mcg    sodium chloride 0.9% flush 10 mL     Current Outpatient Medications on File Prior to Encounter   Medication Sig    acetaminophen (TYLENOL) 500 MG tablet Take 1,000 mg by mouth 2 (two) times daily as needed for Pain.    ALPRAZolam (XANAX) 0.25 MG tablet Take 1 tablet (0.25 mg total) by mouth 2 (two) times daily as needed for Anxiety.    aspirin (ECOTRIN) 81 MG EC tablet Take 81 mg by mouth once daily.      atorvastatin (LIPITOR) 40 MG tablet Take 1 tablet (40 mg total) by mouth once daily.    clindamycin (CLEOCIN) 300 MG capsule Take 1 capsule (300 mg total) by mouth 3 (three) times daily.    cyanocobalamin 1,000 mcg/mL injection INJECT 1ML INTRAMUSCULARLY WEEKLY FOR 4 WEEKS, THEN 1ML MONTHLY    diltiaZEM (CARTIA XT) 240 MG 24 hr capsule Take 1 capsule (240 mg total) by mouth once daily.    gabapentin (NEURONTIN) 300 MG capsule Take 2 capsules (600 mg total) by mouth 2 (two) times daily.    hydroCHLOROthiazide (HYDRODIURIL) 25 MG tablet Take 1 tablet (25 mg total) by mouth once daily.    INVELTYS 1 % DrpS INSTILL 1 DROP INTO THE RIGHT EYE THREE TIMES A DAY    latanoprost 0.005 % ophthalmic solution 1 drop every evening.    losartan (COZAAR) 100 MG tablet TAKE 1/2 TABLET EVERY DAY    multivitamin (ONE DAILY MULTIVITAMIN) per tablet Take 1 tablet by mouth once daily.    omeprazole (PRILOSEC) 20 MG capsule Take 1 capsule (20 mg total) by mouth once daily.    oxybutynin (DITROPAN) 5 MG Tab TAKE 1 TABLET (5 MG TOTAL) BY MOUTH ONCE DAILY.    pramipexole (MIRAPEX) 0.5 MG tablet Take 1 tablet (0.5 mg total) by mouth every evening.    timolol maleate 0.5% (TIMOPTIC) 0.5 % Drop Place 1 drop into both eyes once daily.    warfarin (COUMADIN) 6 MG tablet TAKE 1 TABLET EVERY DAY     Family History     Problem Relation (Age of Onset)    Aneurysm Mother     Arthritis Sister    Atrial fibrillation Sister    Bipolar disorder Sister    Brain cancer Sister    Breast cancer Sister    COPD Sister    Cancer Father, Sister, Brother, Brother, Sister    Cirrhosis Father    Colon polyps Brother    Depression Sister    Diabetes Sister, Brother, Brother, Sister, Sister, Sister    Glaucoma Brother, Sister    Heart attack Sister, Sister    Heart disease Sister, Sister, Sister    Hyperlipidemia Sister, Brother    Hypertension Sister, Brother    Kidney disease Sister    Lung cancer Father, Sister    Other Sister        Tobacco Use    Smoking status: Never Smoker    Smokeless tobacco: Never Used   Substance and Sexual Activity    Alcohol use: Yes     Comment: Seldomly drinks alcohol (wine)    Drug use: No    Sexual activity: Not Currently     Partners: Male     Review of Systems   Constitutional: Positive for activity change. Negative for chills, diaphoresis and fatigue.   HENT: Negative for congestion.    Respiratory: Negative for cough, choking, chest tightness and shortness of breath.    Cardiovascular: Positive for leg swelling. Negative for chest pain and palpitations.   Gastrointestinal: Negative for abdominal distention, abdominal pain, blood in stool, nausea and vomiting.   Genitourinary: Negative for difficulty urinating.   Neurological: Negative for dizziness, light-headedness, numbness and headaches.   Psychiatric/Behavioral: Negative for agitation, confusion and decreased concentration. The patient is nervous/anxious.      Objective:     Vital Signs (Most Recent):  Temp: 99 °F (37.2 °C) (03/08/20 1259)  Pulse: 81 (03/08/20 1722)  Resp: 20 (03/08/20 1259)  BP: (!) 128/58 (03/08/20 1722)  SpO2: 95 % (03/08/20 1722) Vital Signs (24h Range):  Temp:  [99 °F (37.2 °C)] 99 °F (37.2 °C)  Pulse:  [76-81] 81  Resp:  [20] 20  SpO2:  [95 %-96 %] 95 %  BP: (110-134)/(55-68) 128/58     Weight: 93 kg (205 lb)  Body mass index is 37.49 kg/m².    Physical Exam   Constitutional: She is  oriented to person, place, and time. She appears well-developed and well-nourished.   HENT:   Head: Normocephalic and atraumatic.   Eyes: EOM are normal.   Neck: Normal range of motion. Neck supple. No JVD present.   Cardiovascular: Normal rate.   Pulmonary/Chest: Effort normal.   Abdominal: Soft.   Musculoskeletal: Normal range of motion. She exhibits deformity (left foot pins,, edema and erythema). She exhibits no edema.   Neurological: She is alert and oriented to person, place, and time.   Skin: Skin is warm.   Psychiatric: She has a normal mood and affect. Her behavior is normal. Judgment and thought content normal.   Nursing note and vitals reviewed.        CRANIAL NERVES     CN III, IV, VI   Extraocular motions are normal.        Significant Labs:  Recent Labs   Lab 03/08/20  1536   WBC 10.05   HGB 13.2   HCT 39.8        Recent Labs   Lab 03/08/20  1536      K 3.7      CO2 28   BUN 14   CREATININE 0.8   GLU 77   CALCIUM 10.0     Recent Labs   Lab 03/08/20  1536   ALKPHOS 131   ALT 17   AST 15   ALBUMIN 4.0   PROT 7.2   BILITOT 0.4      No results for input(s): CPK, CPKMB, MB, TROPONINI in the last 72 hours.  No results for input(s): POCTGLUCOSE in the last 168 hours.  Hemoglobin A1C   Date Value Ref Range Status   06/09/2016 5.8 4.5 - 6.2 % Final   11/06/2015 6.0 4.5 - 6.2 % Final   05/19/2015 5.9 4.5 - 6.2 % Final     Scheduled Meds:   [START ON 3/9/2020] aspirin  81 mg Oral Daily    [START ON 3/9/2020] atorvastatin  40 mg Oral Daily    ciprofloxacin  400 mg Intravenous Q12H    cyanocobalamin  1,000 mcg Intramuscular Q30 Days    [START ON 3/9/2020] diltiaZEM  240 mg Oral Daily    fluorometholone 0.1%  1 drop Right Eye TID    gabapentin  600 mg Oral BID    [START ON 3/9/2020] hydroCHLOROthiazide  25 mg Oral Daily    latanoprost  1 drop Both Eyes QHS    [START ON 3/9/2020] losartan  50 mg Oral Daily    [START ON 3/9/2020] oxybutynin  5 mg Oral Daily    [START ON 3/9/2020]  pantoprazole  40 mg Oral Daily    pramipexole  0.5 mg Oral QHS    [START ON 3/9/2020] timolol maleate 0.5%  1 drop Both Eyes Daily    vancomycin (VANCOCIN) IVPB  2,000 mg Intravenous Q24H    vancomycin (VANCOCIN) IVPB  2,500 mg Intravenous ED 1 Time    warfarin  6 mg Oral Daily     Continuous Infusions:  As Needed: ALPRAZolam, sodium chloride 0.9%, sodium chloride 0.9%, Pharmacy to dose Vancomycin consult **AND** vancomycin - pharmacy to dose     Significant Imaging:   X-Ray Foot Complete Left  Narrative: EXAMINATION:  XR FOOT COMPLETE 3 VIEW LEFT    CLINICAL HISTORY:  Cellulitis, unspecified    TECHNIQUE:  XR FOOT COMPLETE 3 VIEW LEFT    COMPARISON:  11/20/2019    FINDINGS:  In the interim 1st tarsometatarsal arthrodesis hardware has been removed and replaced with an external fixator.  On the oblique view there is cortical irregularity of the medial cortex of the proximal shaft of the 1st metatarsal bone which could be from recent hardware removal.  Infection is difficult to exclude with certainty.    Pre-existing postsurgical changes of bunionectomy and 1st proximal phalanx corrective osteotomy are stable and there is generalized bone demineralization.  Prominent forefoot soft tissue swelling.  Impression: Interval revision of 1st tarsometatarsal joint arthroplasty.  Cortical regularity 1st proximal metatarsal shaft most likely related to hardware removal.  Also consider early osteomyelitis    Forefoot soft tissue swelling could indicate cellulitis in the proper clinical setting.    Electronically signed by: Mo Zambrano Jr  Date:    03/08/2020  Time:    15:34        Assessment/Plan:     * Infected hardware in left leg, initial encounter  Will place on vanc and cipro for now  Pt has pen allergy  Consulting Dr Cardozo in am,.  Monitor closely      Cellulitis  See #1      Diastolic dysfunction  Continue home meds      Essential hypertension  Continue HCTZ  cardiazem      Hyperlipidemia  Continue  statin      Anticoagulated on Coumadin  Goal 2-3      JACQUELYN (obstructive sleep apnea)  Monitor needs for cpap      Paroxysmal atrial fibrillation  copntinue coumadin for anticoagulation  And cardiazen for rate control        VTE Risk Mitigation (From admission, onward)         Ordered     IP VTE HIGH RISK PATIENT  Once      03/08/20 1714     Place sequential compression device  Until discontinued      03/08/20 1714                   Joe Romero DO  Department of Hospital Medicine   [facility]

## 2020-03-08 NOTE — ED PROVIDER NOTES
"Encounter Date: 3/8/2020    SCRIBE #1 NOTE: I, Chelsea Brown, am scribing for, and in the presence of,  Dr. Garay. I have scribed the entire note.       History     Chief Complaint   Patient presents with    Wound Infection     pt had left foot surgery with Dr. Cardozo in January. States she feels like the foot is becoming infected since yesterday     Time seen by provider: 2:19 PM    This is a 79 y.o. female who has a history of history of Anxiety, Atrial fibrillation, Blind right eye, Bradycardia, Cancer, Hyperlipidemia, Hypertension, PVC , RLS, and Sleep apnea presents with complaint of possible wound infection to left foot. Patient states she had surgery on January 15, 2020 to foot by Dr. Cardozo to "fix bones that did not heal correctly after bunion surgery".  Patient reports no complications to foot until yesterday when she noticed swelling, erythema and pain worsening. She also reports scant amount of white drainage from wound this morning. Patient denies any fever, chills, abdominal pain, nausea or vomiting. Of note, patient last saw Dr. Cardozo two weeks ago and has upcoming appointment in four days.     The history is provided by the patient.     Review of patient's allergies indicates:   Allergen Reactions    Propofol analogues      Used for her Colonoscopy.  Extended delirium.  Advised not to take it again.      Adhesive     Compazine [prochlorperazine edisylate] Anxiety    Penicillins Rash    Sulfa (sulfonamide antibiotics) Rash     Past Medical History:   Diagnosis Date    Anxiety     Atrial fibrillation     Blind right eye     Bradycardia     Cancer     skin cancer left side of face under eye    Hyperlipidemia     Hypertension     PVC (premature ventricular contraction)     RLS (restless legs syndrome)     Sleep apnea     Does not use CPAP machine.     Past Surgical History:   Procedure Laterality Date    ADENOIDECTOMY      AKIN OSTEOTOMY Left 10/31/2018    Procedure: OSTEOTOMY, AKIN;  " Surgeon: Rudolph Cardozo DPM;  Location: [facility] OR;  Service: Podiatry;  Laterality: Left;    APPENDECTOMY      BREAST BIOPSY Left     x3    BREAST SURGERY Left     breast biopsy x3    CATARACT EXTRACTION BILATERAL W/ ANTERIOR VITRECTOMY      ENDOSCOPIC GASTROCNEMIUS RECESSION Left 10/31/2018    Procedure: RECESSION, GASTROCNEMIUS, ENDOSCOPIC;  Surgeon: Rudolph Cardozo DPM;  Location: [facility] OR;  Service: Podiatry;  Laterality: Left;    EYE SURGERY Bilateral     cataracts extraction    EYE SURGERY Right     drainage tube (glaucoma)    FOOT ARTHRODESIS Left 1/15/2020    Procedure: FUSION, FOOT;  Surgeon: Rudolph Cardozo DPM;  Location: [facility] OR;  Service: Podiatry;  Laterality: Left;  mini c-arm, Arthrex screw  for hardware removal (Lydia notified), Orthofix (Niels notified) min ex-fix    FOOT SURGERY Left     lesion removed from dorsal area    FRACTURE SURGERY Left     arm    HAND TENDON SURGERY Left     HYSTERECTOMY      LAPIDUS BUNIONECTOMY Left 10/31/2018    Procedure: BUNIONECTOMY, LAPIDUS;  Surgeon: Rudolph Cardozo DPM;  Location: [facility] OR;  Service: Podiatry;  Laterality: Left;  mini c-arm, Arthrex locking plate (Lydia notified)    LAPIDUS BUNIONECTOMY Left 10/31/2018    Dr. Cardozo    Lymph Gland Removed  Right     groin (performed by Dr. Vergara)    NEURECTOMY Left 1/15/2020    Procedure: NEURECTOMY;  Surgeon: Rudolph Cardozo DPM;  Location: [facility] OR;  Service: Podiatry;  Laterality: Left;  bipolar bovie, vessel loop, possible [place] nerve wrap. Moshe with [facility] notified and will be overnighting graft. MK 1/14/2020    OOPHORECTOMY      ORIF FOREARM FRACTURE Left     PALATE SURGERY      Lesion removed    TONSILLECTOMY       Family History   Problem Relation Age of Onset    Aneurysm Mother         AAA    Cancer Father         lung cancer    Lung cancer Father     Cirrhosis Father     Cancer Sister         Breast and Brain     Diabetes Sister     Breast cancer  Sister     Hypertension Sister     Hyperlipidemia Sister     Brain cancer Sister     Colon polyps Brother     Cancer Brother         lung cancer    Diabetes Brother     Hyperlipidemia Brother     Hypertension Brother     Cancer Brother         bladder cancer    Diabetes Brother     Glaucoma Brother     Heart disease Sister         Heart valve repair    Atrial fibrillation Sister     Diabetes Sister     Heart disease Sister     Heart attack Sister     Bipolar disorder Sister     Depression Sister     Glaucoma Sister     Diabetes Sister     Other Sister         Pituitary tumor    Arthritis Sister     COPD Sister     Heart disease Sister     Kidney disease Sister     Cancer Sister         lung cancer    Diabetes Sister     Lung cancer Sister     Heart attack Sister      Social History     Tobacco Use    Smoking status: Never Smoker    Smokeless tobacco: Never Used   Substance Use Topics    Alcohol use: Yes     Comment: Seldomly drinks alcohol (wine)    Drug use: No     Review of Systems   Skin: Positive for color change (erythema) and wound (infection).   All other systems reviewed and are negative.      Physical Exam     Initial Vitals [03/08/20 1259]   BP Pulse Resp Temp SpO2   110/63 80 20 99 °F (37.2 °C) 95 %      MAP       --         Physical Exam    Nursing note and vitals reviewed.  Constitutional: She appears well-developed and well-nourished. No distress.   HENT:   Head: Normocephalic and atraumatic.   Mouth/Throat: Oropharynx is clear and moist.   Eyes: Conjunctivae and EOM are normal. Pupils are equal, round, and reactive to light.   Neck: Normal range of motion. Neck supple.   Cardiovascular: Normal rate, regular rhythm, normal heart sounds and intact distal pulses.   Pulmonary/Chest: Breath sounds normal. No respiratory distress. She has no wheezes. She has no rhonchi. She has no rales.   Abdominal: Soft. Bowel sounds are normal. She exhibits no distension. There is no  tenderness.   Musculoskeletal: Normal range of motion. She exhibits no edema or tenderness.   Left foot with diffused swelling, erythema and tenderness   External fixation device on the first metatarsal without bleeding or discharge from entrance sites.   Palpable DP and PT pulses.   Neurological: She is alert and oriented to person, place, and time. She has normal strength. No cranial nerve deficit.   Skin: Skin is warm and dry. Capillary refill takes less than 2 seconds.         ED Course   Procedures  Labs Reviewed   CBC W/ AUTO DIFFERENTIAL - Abnormal; Notable for the following components:       Result Value    Mean Corpuscular Hemoglobin 32.0 (*)     All other components within normal limits   CULTURE, BLOOD   CULTURE, BLOOD   COMPREHENSIVE METABOLIC PANEL   LACTIC ACID, PLASMA          X-Rays:   Independently Interpreted Readings:   Other Readings:  Reviewed by myself, read by radiology.     Imaging Results          X-Ray Foot Complete Left (Final result)  Result time 03/08/20 15:34:52    Final result by Mo Blanco Jr., MD (03/08/20 15:34:52)                 Impression:      Interval revision of 1st tarsometatarsal joint arthroplasty.  Cortical regularity 1st proximal metatarsal shaft most likely related to hardware removal.  Also consider early osteomyelitis    Forefoot soft tissue swelling could indicate cellulitis in the proper clinical setting.      Electronically signed by: Mo Zambrano Jr  Date:    03/08/2020  Time:    15:34             Narrative:    EXAMINATION:  XR FOOT COMPLETE 3 VIEW LEFT    CLINICAL HISTORY:  Cellulitis, unspecified    TECHNIQUE:  XR FOOT COMPLETE 3 VIEW LEFT    COMPARISON:  11/20/2019    FINDINGS:  In the interim 1st tarsometatarsal arthrodesis hardware has been removed and replaced with an external fixator.  On the oblique view there is cortical irregularity of the medial cortex of the proximal shaft of the 1st metatarsal bone which could be from recent hardware  removal.  Infection is difficult to exclude with certainty.    Pre-existing postsurgical changes of bunionectomy and 1st proximal phalanx corrective osteotomy are stable and there is generalized bone demineralization.  Prominent forefoot soft tissue swelling.                              Medical Decision Making:   Clinical Tests:   Lab Tests: Ordered and Reviewed  Radiological Study: Ordered and Reviewed  ED Management:  79-year-old female who has an external fixator on her left foot presenting now with an infection.  She will be admitted by Dr. Romero, with her podiatrist, Dr. Cardozo consulted.                   ED Course as of Mar 08 1620   Sun Mar 08, 2020   1618 Discusssed with Dr. Romero who will admit the patient.     [AF]      ED Course User Index  [AF] Chelsea Brown                Clinical Impression:       ICD-10-CM ICD-9-CM   1. Infection at site of external fixator pin, initial encounter T84.7XXA 996.78   2. Cellulitis L03.90 682.9         Disposition:   Disposition: Admitted  Condition: Stable     ED Disposition Condition    Admit            I, Dr. Casimiro Garay, personally performed the services described in this documentation. All medical record entries made by the scribe were at my direction and in my presence. I have reviewed the chart and agree that the record reflects my personal performance and is accurate and complete. Casimiro Garay MD.  4:45 PM 03/08/2020               Casimiro Garay MD  03/08/20 6627

## 2020-03-08 NOTE — TELEPHONE ENCOUNTER
Reason for Disposition   Severe pain in the incision    Additional Information   Negative: [1] Major abdominal surgical incision AND [2] wound gaping open AND [3] visible internal organs   Negative: Sounds like a life-threatening emergency to the triager   Negative: [1] Bleeding from incision AND [2] won't stop after 10 minutes of direct pressure   Negative: [1] Widespread rash AND [2] bright red, sunburn-like    Protocols used: POST-OP INCISION SYMPTOMS-A-AH    Pt stated she has external fixator rods placed in her left foot by Dr. Cardozo in January. Pt stated yesterday she started having pain and tenderness in the left foot. Stated she has pus oozing from the rods and the foot is red and warm. Pt advised to go to ED now for evaluation and not to drive. Pt verbalized understanding.

## 2020-03-09 LAB
ANION GAP SERPL CALC-SCNC: 10 MMOL/L (ref 8–16)
BASOPHILS # BLD AUTO: 0.02 K/UL (ref 0–0.2)
BASOPHILS NFR BLD: 0.2 % (ref 0–1.9)
BUN SERPL-MCNC: 14 MG/DL (ref 8–23)
CALCIUM SERPL-MCNC: 9.8 MG/DL (ref 8.7–10.5)
CHLORIDE SERPL-SCNC: 100 MMOL/L (ref 95–110)
CO2 SERPL-SCNC: 29 MMOL/L (ref 23–29)
CREAT SERPL-MCNC: 0.8 MG/DL (ref 0.5–1.4)
CRP SERPL-MCNC: 86.3 MG/L (ref 0–8.2)
DIFFERENTIAL METHOD: ABNORMAL
EOSINOPHIL # BLD AUTO: 0.1 K/UL (ref 0–0.5)
EOSINOPHIL NFR BLD: 1.6 % (ref 0–8)
ERYTHROCYTE [DISTWIDTH] IN BLOOD BY AUTOMATED COUNT: 13.2 % (ref 11.5–14.5)
ERYTHROCYTE [SEDIMENTATION RATE] IN BLOOD BY WESTERGREN METHOD: 38 MM/HR (ref 0–20)
EST. GFR  (AFRICAN AMERICAN): >60 ML/MIN/1.73 M^2
EST. GFR  (NON AFRICAN AMERICAN): >60 ML/MIN/1.73 M^2
GLUCOSE SERPL-MCNC: 110 MG/DL (ref 70–110)
HCT VFR BLD AUTO: 36.4 % (ref 37–48.5)
HGB BLD-MCNC: 12 G/DL (ref 12–16)
IMM GRANULOCYTES # BLD AUTO: 0.03 K/UL (ref 0–0.04)
IMM GRANULOCYTES NFR BLD AUTO: 0.3 % (ref 0–0.5)
INR PPP: 2.4 (ref 0.8–1.2)
LYMPHOCYTES # BLD AUTO: 1.8 K/UL (ref 1–4.8)
LYMPHOCYTES NFR BLD: 20.3 % (ref 18–48)
MAGNESIUM SERPL-MCNC: 1.8 MG/DL (ref 1.6–2.6)
MCH RBC QN AUTO: 31.9 PG (ref 27–31)
MCHC RBC AUTO-ENTMCNC: 33 G/DL (ref 32–36)
MCV RBC AUTO: 97 FL (ref 82–98)
MONOCYTES # BLD AUTO: 0.9 K/UL (ref 0.3–1)
MONOCYTES NFR BLD: 10.1 % (ref 4–15)
NEUTROPHILS # BLD AUTO: 5.9 K/UL (ref 1.8–7.7)
NEUTROPHILS NFR BLD: 67.5 % (ref 38–73)
NRBC BLD-RTO: 0 /100 WBC
PHOSPHATE SERPL-MCNC: 4.3 MG/DL (ref 2.7–4.5)
PLATELET # BLD AUTO: 175 K/UL (ref 150–350)
PMV BLD AUTO: 10.3 FL (ref 9.2–12.9)
POTASSIUM SERPL-SCNC: 3.4 MMOL/L (ref 3.5–5.1)
PROTHROMBIN TIME: 25.8 SEC (ref 9–12.5)
RBC # BLD AUTO: 3.76 M/UL (ref 4–5.4)
SODIUM SERPL-SCNC: 139 MMOL/L (ref 136–145)
WBC # BLD AUTO: 8.7 K/UL (ref 3.9–12.7)

## 2020-03-09 PROCEDURE — 87186 SC STD MICRODIL/AGAR DIL: CPT | Mod: 59,HCNC

## 2020-03-09 PROCEDURE — 87077 CULTURE AEROBIC IDENTIFY: CPT | Mod: 59,HCNC

## 2020-03-09 PROCEDURE — 85652 RBC SED RATE AUTOMATED: CPT | Mod: HCNC

## 2020-03-09 PROCEDURE — 36415 COLL VENOUS BLD VENIPUNCTURE: CPT | Mod: HCNC

## 2020-03-09 PROCEDURE — 80048 BASIC METABOLIC PNL TOTAL CA: CPT | Mod: HCNC

## 2020-03-09 PROCEDURE — 25000003 PHARM REV CODE 250: Mod: HCNC | Performed by: STUDENT IN AN ORGANIZED HEALTH CARE EDUCATION/TRAINING PROGRAM

## 2020-03-09 PROCEDURE — 25000003 PHARM REV CODE 250: Mod: HCNC | Performed by: HOSPITALIST

## 2020-03-09 PROCEDURE — 87075 CULTR BACTERIA EXCEPT BLOOD: CPT | Mod: 59,HCNC

## 2020-03-09 PROCEDURE — 63600175 PHARM REV CODE 636 W HCPCS: Mod: HCNC | Performed by: HOSPITALIST

## 2020-03-09 PROCEDURE — 25000003 PHARM REV CODE 250: Mod: HCNC | Performed by: NURSE PRACTITIONER

## 2020-03-09 PROCEDURE — 21400001 HC TELEMETRY ROOM: Mod: HCNC

## 2020-03-09 PROCEDURE — 85025 COMPLETE CBC W/AUTO DIFF WBC: CPT | Mod: HCNC

## 2020-03-09 PROCEDURE — 86140 C-REACTIVE PROTEIN: CPT | Mod: HCNC

## 2020-03-09 PROCEDURE — 83735 ASSAY OF MAGNESIUM: CPT | Mod: HCNC

## 2020-03-09 PROCEDURE — 87070 CULTURE OTHR SPECIMN AEROBIC: CPT | Mod: HCNC

## 2020-03-09 PROCEDURE — 84100 ASSAY OF PHOSPHORUS: CPT | Mod: HCNC

## 2020-03-09 PROCEDURE — 85610 PROTHROMBIN TIME: CPT | Mod: HCNC

## 2020-03-09 PROCEDURE — 63600175 PHARM REV CODE 636 W HCPCS: Mod: HCNC | Performed by: EMERGENCY MEDICINE

## 2020-03-09 RX ORDER — MAGNESIUM SULFATE HEPTAHYDRATE 40 MG/ML
2 INJECTION, SOLUTION INTRAVENOUS ONCE
Status: COMPLETED | OUTPATIENT
Start: 2020-03-09 | End: 2020-03-09

## 2020-03-09 RX ORDER — ACETAMINOPHEN 325 MG/1
650 TABLET ORAL 2 TIMES DAILY PRN
Status: DISCONTINUED | OUTPATIENT
Start: 2020-03-09 | End: 2020-03-16 | Stop reason: HOSPADM

## 2020-03-09 RX ORDER — POTASSIUM CHLORIDE 20 MEQ/1
40 TABLET, EXTENDED RELEASE ORAL ONCE
Status: COMPLETED | OUTPATIENT
Start: 2020-03-09 | End: 2020-03-09

## 2020-03-09 RX ORDER — LIDOCAINE HYDROCHLORIDE 10 MG/ML
20 INJECTION INFILTRATION; PERINEURAL ONCE
Status: COMPLETED | OUTPATIENT
Start: 2020-03-09 | End: 2020-03-09

## 2020-03-09 RX ORDER — OXYCODONE AND ACETAMINOPHEN 5; 325 MG/1; MG/1
1 TABLET ORAL EVERY 6 HOURS PRN
Status: DISCONTINUED | OUTPATIENT
Start: 2020-03-10 | End: 2020-03-16 | Stop reason: HOSPADM

## 2020-03-09 RX ADMIN — HYDROCHLOROTHIAZIDE 25 MG: 25 TABLET ORAL at 08:03

## 2020-03-09 RX ADMIN — ATORVASTATIN CALCIUM 40 MG: 40 TABLET, FILM COATED ORAL at 09:03

## 2020-03-09 RX ADMIN — CIPROFLOXACIN 400 MG: 2 INJECTION, SOLUTION INTRAVENOUS at 05:03

## 2020-03-09 RX ADMIN — PRAMIPEXOLE DIHYDROCHLORIDE 0.5 MG: 0.5 TABLET ORAL at 09:03

## 2020-03-09 RX ADMIN — VANCOMYCIN HYDROCHLORIDE 2000 MG: 100 INJECTION, POWDER, LYOPHILIZED, FOR SOLUTION INTRAVENOUS at 03:03

## 2020-03-09 RX ADMIN — CIPROFLOXACIN 400 MG: 2 INJECTION, SOLUTION INTRAVENOUS at 06:03

## 2020-03-09 RX ADMIN — LIDOCAINE HYDROCHLORIDE 20 ML: 10 INJECTION, SOLUTION INFILTRATION; PERINEURAL at 01:03

## 2020-03-09 RX ADMIN — LOSARTAN POTASSIUM 50 MG: 50 TABLET ORAL at 08:03

## 2020-03-09 RX ADMIN — GABAPENTIN 600 MG: 300 CAPSULE ORAL at 08:03

## 2020-03-09 RX ADMIN — PANTOPRAZOLE SODIUM 40 MG: 40 TABLET, DELAYED RELEASE ORAL at 08:03

## 2020-03-09 RX ADMIN — ACETAMINOPHEN 1000 MG: 500 TABLET ORAL at 11:03

## 2020-03-09 RX ADMIN — LATANOPROST 1 DROP: 50 SOLUTION OPHTHALMIC at 09:03

## 2020-03-09 RX ADMIN — ALPRAZOLAM 0.25 MG: 0.25 TABLET ORAL at 09:03

## 2020-03-09 RX ADMIN — OXYBUTYNIN CHLORIDE 5 MG: 5 TABLET ORAL at 08:03

## 2020-03-09 RX ADMIN — POTASSIUM CHLORIDE 40 MEQ: 1500 TABLET, EXTENDED RELEASE ORAL at 10:03

## 2020-03-09 RX ADMIN — WARFARIN SODIUM 6 MG: 6 TABLET ORAL at 04:03

## 2020-03-09 RX ADMIN — DILTIAZEM HYDROCHLORIDE 240 MG: 120 CAPSULE, COATED, EXTENDED RELEASE ORAL at 08:03

## 2020-03-09 RX ADMIN — MAGNESIUM SULFATE IN WATER 2 G: 40 INJECTION, SOLUTION INTRAVENOUS at 10:03

## 2020-03-09 RX ADMIN — GABAPENTIN 600 MG: 300 CAPSULE ORAL at 09:03

## 2020-03-09 RX ADMIN — TIMOLOL MALEATE 1 DROP: 5 SOLUTION/ DROPS OPHTHALMIC at 08:03

## 2020-03-09 RX ADMIN — ASPIRIN 81 MG: 81 TABLET, COATED ORAL at 08:03

## 2020-03-09 NOTE — ASSESSMENT & PLAN NOTE
Will place on vanc and cipro for now  Pt has pen allergy  Consulting Dr Cardozo in am,.  Monitor closely  3/9 appreciate DPM input  Continue current abx regimen  F/u recs and foot ct scan results

## 2020-03-09 NOTE — PLAN OF CARE
Patient is awake, alert and oriented.  Denies pain.  Left foot is elevated on pillow.  External fixator removed per MD this shift.  Family members visited.  Receiving antibiotics.  Tolerates all meals without any nausea or vomiting.  Safety is maintained with bed low, wheels locked and side rails up.  Call light within reach.  Up with minimal assistance to bedside commode chair.  Will continue to monitor.

## 2020-03-09 NOTE — PLAN OF CARE
Problem: Fall Injury Risk  Goal: Absence of Fall and Fall-Related Injury  Outcome: Ongoing, Progressing  Intervention: Identify and Manage Contributors to Fall Injury Risk  Flowsheets (Taken 3/8/2020 2032)  Self-Care Promotion: independence encouraged;BADL personal objects within reach;BADL personal routines maintained  Medication Review/Management: medications reviewed;high risk medications identified  Intervention: Promote Injury-Free Environment  Flowsheets (Taken 3/8/2020 2032)  Safety Promotion/Fall Prevention: assistive device/personal item within reach;commode/urinal/bedpan at bedside;diversional activities provided;Fall Risk reviewed with patient/family;Fall Risk signage in place;high risk medications identified;nonskid shoes/socks when out of bed;medications reviewed;side rails raised x 3;supervised activity;instructed to call staff for mobility  Environmental Safety Modification: assistive device/personal items within reach;clutter free environment maintained;room organization consistent     Problem: Adult Inpatient Plan of Care  Goal: Plan of Care Review  Outcome: Ongoing, Progressing  Flowsheets (Taken 3/8/2020 2032)  Plan of Care Reviewed With: patient  Goal: Absence of Hospital-Acquired Illness or Injury  Outcome: Ongoing, Progressing  Intervention: Identify and Manage Fall Risk  Flowsheets (Taken 3/8/2020 2032)  Safety Promotion/Fall Prevention: assistive device/personal item within reach;commode/urinal/bedpan at bedside;diversional activities provided;Fall Risk reviewed with patient/family;Fall Risk signage in place;high risk medications identified;nonskid shoes/socks when out of bed;medications reviewed;side rails raised x 3;supervised activity;instructed to call staff for mobility  Intervention: Prevent VTE (venous thromboembolism)  Flowsheets (Taken 3/8/2020 2032)  VTE Prevention/Management: remove, assess skin and reapply sequential compression device;dorsiflexion/plantar flexion  performed  Goal: Optimal Comfort and Wellbeing  Outcome: Ongoing, Progressing  Intervention: Provide Person-Centered Care  Flowsheets (Taken 3/8/2020 2032)  Trust Relationship/Rapport: care explained;questions answered;choices provided;questions encouraged;reassurance provided;emotional support provided;empathic listening provided;thoughts/feelings acknowledged     Problem: Skin Injury Risk Increased  Goal: Skin Health and Integrity  Outcome: Ongoing, Progressing  Intervention: Optimize Skin Protection  Flowsheets (Taken 3/8/2020 2302)  Pressure Reduction Techniques: frequent weight shift encouraged;positioned off wounds  Pressure Reduction Devices: heel offloading device utilized  Skin Protection: adhesive use limited;protective footwear used;tubing/devices free from skin contact  Head of Bed (HOB): HOB at 60 degrees  Intervention: Promote and Optimize Oral Intake  Flowsheets (Taken 3/8/2020 2032)  Oral Nutrition Promotion: physical activity promoted;rest periods promoted;social interaction promoted     Problem: Skin or Soft Tissue Infection  Goal: Infection Symptom Resolution  Outcome: Ongoing, Progressing  Intervention: Provide Meticulous Infection Site Care  Flowsheets (Taken 3/8/2020 2032)  Infection Prevention: cohorting utilized;environmental surveillance performed;single patient room provided;rest/sleep promoted  Topical Inflammation Care: border marked  Intervention: Minimize and Manage Infection Progression  Flowsheets (Taken 3/8/2020 2032)  Infection Management: aseptic technique maintained     Problem: Infection  Goal: Infection Symptom Resolution  Outcome: Ongoing, Progressing  Intervention: Prevent or Manage Infection  Flowsheets (Taken 3/8/2020 2032)  Infection Management: aseptic technique maintained     Problem: Pain Acute  Goal: Optimal Pain Control  Outcome: Ongoing, Progressing  Intervention: Develop Pain Management Plan  Flowsheets (Taken 3/8/2020 2032)  Pain Management Interventions: care  clustered;diversional activity provided;pain management plan reviewed with patient/caregiver  Intervention: Prevent or Manage Pain  Flowsheets (Taken 3/8/2020 2032)  Sensory Stimulation Regulation: music/television provided for relaxation  Intervention: Optimize Psychosocial Wellbeing  Flowsheets (Taken 3/8/2020 2032)  Supportive Measures: active listening utilized;counseling provided;decision-making supported;goal setting facilitated;problem solving facilitated;self-responsibility promoted  Diversional Activities: television     Cued into patient's room.  Permission received per patient to turn camera to view patient.  Introduced as VN for night shift that will be working with floor nurse and nursing assistant.  OLEKSANDR De Santiago at bedside.  Educated patient on VN's role in patient care. Plan of care reviewed with patient. Education per flowsheet.  Opportunity given for questions and questions answered.  Admission assessment questions answered.  States left foot pain 1/10 with occasional sharp shooting pain.  Instructed to call for assistance.  Will cont to monitor.    Labs, notes, and orders reviewed.

## 2020-03-09 NOTE — PROGRESS NOTES
"[facility] Hospital Medicine  Progress Note    Patient Name: Mis aC  MRN: [id number]  Patient Class: IP- Inpatient   Admission Date: 3/8/2020  Length of Stay: 1 days  Attending Physician: Joe Romero DO  Primary Care Provider: Scott Dillard MD        Subjective:     Principal Problem:Infected hardware in left leg, initial encounter        HPI:  The patient is a 79 years old. female with PMH HTN, HLP, anxiety,Atrial fibrillation, Blind right eye, Bradycardia, RLS, and Sleep apnea presents with complaint of possible wound infection to left foot. Patient states she had surgery on January 15, 2020 to foot by Dr. Cardozo to "fix bones that did not heal correctly after bunion surgery".  Patient reports no complications to foot until yesterday when she noticed swelling, erythema and pain worsening. She also reports scant amount of white drainage from wound this morning. Patient denies any fever, chills, abdominal pain, nausea or vomiting. Of note, patient last saw Dr. Cardozo two weeks ago and has upcoming appointment in four days.    The pt does not put weight yet on her foot, and her surgical site is not  Having pain from the sx site itself but the swelling and edema around it    Overview/Hospital Course:  3/9 the pt seen, no acute events, had Dr Cardozo evaluating her foot, ordered a ct foot, awaiting rwecs. For now continue POC with IV abx    Interval History: good appetite, no fever, postive pain when podiatry examined he foot, awaiting CT scan results    Review of Systems   Constitutional: Negative for activity change, diaphoresis and fatigue.   HENT: Negative for congestion.    Cardiovascular: Positive for leg swelling (left foot).   Gastrointestinal: Negative for abdominal distention and abdominal pain.   Neurological: Negative for dizziness.     Objective:     Vital Signs (Most Recent):  Temp: 98.5 °F (36.9 °C) (03/09/20 1146)  Pulse: 76 (03/09/20 1148)  Resp: 17 (03/09/20 1146)  BP: " 130/65 (03/09/20 1146)  SpO2: 95 % (03/08/20 1949) Vital Signs (24h Range):  Temp:  [97.8 °F (36.6 °C)-99.8 °F (37.7 °C)] 98.5 °F (36.9 °C)  Pulse:  [74-88] 76  Resp:  [16-18] 17  SpO2:  [95 %-97 %] 95 %  BP: (119-159)/(55-84) 130/65     Weight: 96.9 kg (213 lb 10 oz)  Body mass index is 39.07 kg/m².    Intake/Output Summary (Last 24 hours) at 3/9/2020 1451  Last data filed at 3/9/2020 1300  Gross per 24 hour   Intake 1288 ml   Output 2600 ml   Net -1312 ml      Physical Exam   Constitutional: She is oriented to person, place, and time. She appears well-developed and well-nourished.   HENT:   Head: Normocephalic and atraumatic.   Eyes: EOM are normal.   Neck: Neck supple. No JVD present.   Cardiovascular: Normal rate.   Pulmonary/Chest: Effort normal.   Musculoskeletal: Normal range of motion. She exhibits edema (left foot/ wound dressing in place).   Neurological: She is alert and oriented to person, place, and time.   Psychiatric: She has a normal mood and affect. Her behavior is normal. Judgment and thought content normal.   Nursing note and vitals reviewed.      Significant Labs:   Recent Labs   Lab 03/08/20 1536 03/09/20  0535   WBC 10.05 8.70   HGB 13.2 12.0   HCT 39.8 36.4*    175     Recent Labs   Lab 03/08/20  1536 03/09/20  0535    139   K 3.7 3.4*    100   CO2 28 29   BUN 14 14   CREATININE 0.8 0.8   GLU 77 110   CALCIUM 10.0 9.8   MG  --  1.8   PHOS  --  4.3     Recent Labs   Lab 03/08/20  1536 03/08/20 2216 03/09/20  0535   ALKPHOS 131  --   --    ALT 17  --   --    AST 15  --   --    ALBUMIN 4.0  --   --    PROT 7.2  --   --    BILITOT 0.4  --   --    INR  --  2.7* 2.4*      No results for input(s): CPK, CPKMB, MB, TROPONINI in the last 72 hours.  No results for input(s): POCTGLUCOSE in the last 168 hours.  Hemoglobin A1C   Date Value Ref Range Status   06/09/2016 5.8 4.5 - 6.2 % Final   11/06/2015 6.0 4.5 - 6.2 % Final   05/19/2015 5.9 4.5 - 6.2 % Final     Scheduled Meds:    aspirin  81 mg Oral Daily    atorvastatin  40 mg Oral QHS    ciprofloxacin  400 mg Intravenous Q12H    diltiaZEM  240 mg Oral Daily    gabapentin  600 mg Oral BID    hydroCHLOROthiazide  25 mg Oral Daily    latanoprost  1 drop Left Eye QHS    losartan  50 mg Oral Daily    oxybutynin  5 mg Oral Daily    pantoprazole  40 mg Oral Daily    pramipexole  0.5 mg Oral QHS    timolol maleate 0.5%  1 drop Left Eye Daily    vancomycin (VANCOCIN) IVPB  2,000 mg Intravenous Q24H    warfarin  6 mg Oral Daily     Continuous Infusions:  As Needed: acetaminophen, ALPRAZolam, sodium chloride 0.9%, Pharmacy to dose Vancomycin consult **AND** vancomycin - pharmacy to dose  Significant Imaging:  Awaiting ct scan results        Assessment/Plan:      * Infected hardware in left leg, initial encounter  Will place on vanc and cipro for now  Pt has pen allergy  Consulting Dr Cardozo in am,.  Monitor closely  3/9 appreciate DPM input  Continue current abx regimen  F/u recs and foot ct scan results    Cellulitis  See #1      Diastolic dysfunction  Continue home meds      Essential hypertension  Continue HCTZ  cardiazem      Hyperlipidemia  Continue statin      Anticoagulated on Coumadin  Goal 2-3  3/9 INR is therapeutic. Continue current level    JACQUELYN (obstructive sleep apnea)  Monitor needs for cpap      Paroxysmal atrial fibrillation  copntinue coumadin for anticoagulation  And cardiazen for rate control  3/9 no acute matters        VTE Risk Mitigation (From admission, onward)         Ordered     warfarin (COUMADIN) tablet 6 mg  Daily      03/08/20 1714     IP VTE HIGH RISK PATIENT  Once      03/08/20 1714     Place sequential compression device  Until discontinued      03/08/20 1714                      Joe Romero DO  Department of Hospital Medicine   [facility]

## 2020-03-09 NOTE — SUBJECTIVE & OBJECTIVE
Interval History: good appetite, no fever, postive pain when podiatry examined he foot, awaiting CT scan results    Review of Systems   Constitutional: Negative for activity change, diaphoresis and fatigue.   HENT: Negative for congestion.    Cardiovascular: Positive for leg swelling (left foot).   Gastrointestinal: Negative for abdominal distention and abdominal pain.   Neurological: Negative for dizziness.     Objective:     Vital Signs (Most Recent):  Temp: 98.5 °F (36.9 °C) (03/09/20 1146)  Pulse: 76 (03/09/20 1148)  Resp: 17 (03/09/20 1146)  BP: 130/65 (03/09/20 1146)  SpO2: 95 % (03/08/20 1949) Vital Signs (24h Range):  Temp:  [97.8 °F (36.6 °C)-99.8 °F (37.7 °C)] 98.5 °F (36.9 °C)  Pulse:  [74-88] 76  Resp:  [16-18] 17  SpO2:  [95 %-97 %] 95 %  BP: (119-159)/(55-84) 130/65     Weight: 96.9 kg (213 lb 10 oz)  Body mass index is 39.07 kg/m².    Intake/Output Summary (Last 24 hours) at 3/9/2020 1451  Last data filed at 3/9/2020 1300  Gross per 24 hour   Intake 1288 ml   Output 2600 ml   Net -1312 ml      Physical Exam   Constitutional: She is oriented to person, place, and time. She appears well-developed and well-nourished.   HENT:   Head: Normocephalic and atraumatic.   Eyes: EOM are normal.   Neck: Neck supple. No JVD present.   Cardiovascular: Normal rate.   Pulmonary/Chest: Effort normal.   Musculoskeletal: Normal range of motion. She exhibits edema (left foot/ wound dressing in place).   Neurological: She is alert and oriented to person, place, and time.   Psychiatric: She has a normal mood and affect. Her behavior is normal. Judgment and thought content normal.   Nursing note and vitals reviewed.      Significant Labs:   Recent Labs   Lab 03/08/20  1536 03/09/20  0535   WBC 10.05 8.70   HGB 13.2 12.0   HCT 39.8 36.4*    175     Recent Labs   Lab 03/08/20  1536 03/09/20  0535    139   K 3.7 3.4*    100   CO2 28 29   BUN 14 14   CREATININE 0.8 0.8   GLU 77 110   CALCIUM 10.0 9.8   MG  --   1.8   PHOS  --  4.3     Recent Labs   Lab 03/08/20  1536 03/08/20  2216 03/09/20  0535   ALKPHOS 131  --   --    ALT 17  --   --    AST 15  --   --    ALBUMIN 4.0  --   --    PROT 7.2  --   --    BILITOT 0.4  --   --    INR  --  2.7* 2.4*      No results for input(s): CPK, CPKMB, MB, TROPONINI in the last 72 hours.  No results for input(s): POCTGLUCOSE in the last 168 hours.  Hemoglobin A1C   Date Value Ref Range Status   06/09/2016 5.8 4.5 - 6.2 % Final   11/06/2015 6.0 4.5 - 6.2 % Final   05/19/2015 5.9 4.5 - 6.2 % Final     Scheduled Meds:   aspirin  81 mg Oral Daily    atorvastatin  40 mg Oral QHS    ciprofloxacin  400 mg Intravenous Q12H    diltiaZEM  240 mg Oral Daily    gabapentin  600 mg Oral BID    hydroCHLOROthiazide  25 mg Oral Daily    latanoprost  1 drop Left Eye QHS    losartan  50 mg Oral Daily    oxybutynin  5 mg Oral Daily    pantoprazole  40 mg Oral Daily    pramipexole  0.5 mg Oral QHS    timolol maleate 0.5%  1 drop Left Eye Daily    vancomycin (VANCOCIN) IVPB  2,000 mg Intravenous Q24H    warfarin  6 mg Oral Daily     Continuous Infusions:  As Needed: acetaminophen, ALPRAZolam, sodium chloride 0.9%, Pharmacy to dose Vancomycin consult **AND** vancomycin - pharmacy to dose  Significant Imaging:  Awaiting ct scan results

## 2020-03-09 NOTE — HPI
Mis Ca is a 79 y.o. female who  has a past medical history of Anticoagulant long-term use, Anxiety, Arthritis, Atrial fibrillation, Blind right eye, Bradycardia, Cancer, Hyperlipidemia, Hypertension, PVC (premature ventricular contraction), RLS (restless legs syndrome), and Sleep apnea.    Patient admitted for infection of the left foot.  Consulted to Podiatry for the same.  Patient reports increased pain, swelling and redness since Saturday to the left foot. Denies any F/N/V/C at this time. She had surgery in mid January per Dr. Cardozo and at that time, had revision of the 1st TMT fusion site with application of external fixator. She had been healing uneventfully over the last several weeks until this incident. Has been doing the soaks as instructed and has been WB to heel.

## 2020-03-09 NOTE — NURSING
Patient c/o generalized arthritis pain and takes tylenol 1000mg po 2 times daily as needed. Also requesting her medsications to be changed like she normally takes at home.  JOSE Thibodeaux notified; will review chart.

## 2020-03-09 NOTE — SUBJECTIVE & OBJECTIVE
Scheduled Meds:   aspirin  81 mg Oral Daily    atorvastatin  40 mg Oral QHS    ciprofloxacin  400 mg Intravenous Q12H    diltiaZEM  240 mg Oral Daily    gabapentin  600 mg Oral BID    hydroCHLOROthiazide  25 mg Oral Daily    latanoprost  1 drop Left Eye QHS    losartan  50 mg Oral Daily    oxybutynin  5 mg Oral Daily    pantoprazole  40 mg Oral Daily    pramipexole  0.5 mg Oral QHS    timolol maleate 0.5%  1 drop Left Eye Daily    vancomycin (VANCOCIN) IVPB  2,000 mg Intravenous Q24H    warfarin  6 mg Oral Daily     Continuous Infusions:  PRN Meds:acetaminophen, ALPRAZolam, sodium chloride 0.9%, Pharmacy to dose Vancomycin consult **AND** vancomycin - pharmacy to dose    Review of patient's allergies indicates:   Allergen Reactions    Propofol analogues      Used for her Colonoscopy.  Extended delirium.  Advised not to take it again.      Adhesive     Compazine [prochlorperazine edisylate] Anxiety    Penicillins Rash    Sulfa (sulfonamide antibiotics) Rash        Past Medical History:   Diagnosis Date    Anticoagulant long-term use     Anxiety     Arthritis     Atrial fibrillation     Blind right eye     Bradycardia     Cancer     skin cancer left side of face under eye    Hyperlipidemia     Hypertension     PVC (premature ventricular contraction)     RLS (restless legs syndrome)     Sleep apnea     Does not use CPAP machine.     Past Surgical History:   Procedure Laterality Date    ADENOIDECTOMY      AKIN OSTEOTOMY Left 10/31/2018    Procedure: OSTEOTOMY, AKIN;  Surgeon: Rudolph Cardozo DPM;  Location: [facility] OR;  Service: Podiatry;  Laterality: Left;    APPENDECTOMY      BREAST BIOPSY Left     x3    BREAST SURGERY Left     breast biopsy x3    CATARACT EXTRACTION BILATERAL W/ ANTERIOR VITRECTOMY      ENDOSCOPIC GASTROCNEMIUS RECESSION Left 10/31/2018    Procedure: RECESSION, GASTROCNEMIUS, ENDOSCOPIC;  Surgeon: Rudolph Cardozo DPM;  Location: [facility] OR;  Service: Podiatry;   Laterality: Left;    EYE SURGERY Bilateral     cataracts extraction    EYE SURGERY Right     drainage tube (glaucoma)    FOOT ARTHRODESIS Left 1/15/2020    Procedure: FUSION, FOOT;  Surgeon: Rudolph Cardozo DPM;  Location: [facility] OR;  Service: Podiatry;  Laterality: Left;  mini c-arm, Arthrex screw  for hardware removal (Lydia notified), Orthofix (Niels notified) min ex-fix    FOOT SURGERY Left     lesion removed from dorsal area    FRACTURE SURGERY Left     arm    HAND TENDON SURGERY Left     HYSTERECTOMY      LAPIDUS BUNIONECTOMY Left 10/31/2018    Procedure: BUNIONECTOMY, LAPIDUS;  Surgeon: Rudolph Cardozo DPM;  Location: [facility] OR;  Service: Podiatry;  Laterality: Left;  mini c-arm, Arthrex locking plate (Lydia notified)    LAPIDUS BUNIONECTOMY Left 10/31/2018    Dr. Cardozo    Lymph Gland Removed  Right     groin (performed by Dr. Vergara)    NEURECTOMY Left 1/15/2020    Procedure: NEURECTOMY;  Surgeon: Rudolph Cardozo DPM;  Location: [facility] OR;  Service: Podiatry;  Laterality: Left;  bipolar bovie, vessel loop, possible Agnes nerve wrap. Moshe with Agnes notified and will be overnighting graft. MK 1/14/2020    OOPHORECTOMY      ORIF FOREARM FRACTURE Left     PALATE SURGERY      Lesion removed    TONSILLECTOMY         Family History     Problem Relation (Age of Onset)    Aneurysm Mother    Arthritis Sister    Atrial fibrillation Sister    Bipolar disorder Sister    Brain cancer Sister    Breast cancer Sister    COPD Sister    Cancer Father, Sister, Brother, Brother, Sister    Cirrhosis Father    Colon polyps Brother    Depression Sister    Diabetes Sister, Brother, Brother, Sister, Sister, Sister    Glaucoma Brother, Sister    Heart attack Sister, Sister    Heart disease Sister, Sister, Sister    Hyperlipidemia Sister, Brother    Hypertension Sister, Brother    Kidney disease Sister    Lung cancer Father, Sister    Other Sister        Tobacco Use    Smoking status: Never Smoker     Smokeless tobacco: Never Used   Substance and Sexual Activity    Alcohol use: Yes     Comment: Seldomly drinks alcohol (wine)    Drug use: No    Sexual activity: Not Currently     Partners: Male     Review of Systems   Constitutional: Negative for chills and fever.   Cardiovascular: Positive for leg swelling.   Gastrointestinal: Negative for nausea and vomiting.   Skin: Positive for color change. Negative for wound.     Objective:     Vital Signs (Most Recent):  Temp: 97.8 °F (36.6 °C) (03/09/20 0757)  Pulse: 76 (03/09/20 0757)  Resp: 16 (03/09/20 0757)  BP: (!) 158/71 (03/09/20 0757)  SpO2: 95 % (03/08/20 1949) Vital Signs (24h Range):  Temp:  [97.8 °F (36.6 °C)-99.8 °F (37.7 °C)] 97.8 °F (36.6 °C)  Pulse:  [74-88] 76  Resp:  [16-20] 16  SpO2:  [95 %-97 %] 95 %  BP: (110-159)/(55-84) 158/71     Weight: 96.9 kg (213 lb 10 oz)  Body mass index is 39.07 kg/m².    Foot Exam    Left Foot/Ankle      Inspection and Palpation  Tenderness: (Tenderness around the pin site and across the medial dorsal foot.)  Swelling: (Left foot edema)  Skin Exam: cellulitis and erythema; no drainage     Neurovascular  Dorsalis pedis: 2+  Posterior tibial: 2+            Laboratory:  CBC:   Recent Labs   Lab 03/09/20  0535   WBC 8.70   RBC 3.76*   HGB 12.0   HCT 36.4*      MCV 97   MCH 31.9*   MCHC 33.0     CMP:   Recent Labs   Lab 03/08/20  1536 03/09/20  0535   GLU 77 110   CALCIUM 10.0 9.8   ALBUMIN 4.0  --    PROT 7.2  --     139   K 3.7 3.4*   CO2 28 29    100   BUN 14 14   CREATININE 0.8 0.8   ALKPHOS 131  --    ALT 17  --    AST 15  --    BILITOT 0.4  --      CRP: No results for input(s): CRP in the last 168 hours.  ESR: No results for input(s): SEDRATE in the last 168 hours.    Diagnostic Results:  I have reviewed all pertinent imaging results/findings within the past 24 hours.    Clinical Findings:  There is edema and erythema to the left foot. Patient reports it is improved since admission.  No significant  drainage or purulence. No fluctuance or crepitus. Tenderness to the distal pin sites and dorsal medial foot. The ex fix is still well aligned and intact without loosening. No signs of trauma.

## 2020-03-09 NOTE — HOSPITAL COURSE
3/9 the pt seen, no acute events, had Dr Cardozo evaluating her foot, ordered a ct foot, awaiting rwecs. For now continue POC with IV abx    The patient had 2 surgical procedures- one with the removal of the external fixator device (3-9-2020) and then I&D of an abscess (3-). Her cultures grew MRSA.    Per ID:     Patient with external fixator MRSA infection and then an abscess drained on 3-11-20 - Patient still has hardware in the foot - a U-shaped pin - so a longer course of IV vanc is recommended      Needs 6 weeks IV vanc from pin removal and I and D on 3-11-20  So stop date of 20  Needs weekly CBC, chem profile and vanc levels   Follow up with ID clinic - Dr. Krueger at [facility] in 4 weeks     During the stay, she had some shortness of breath. Required oxygen at times.  Had an eval:   Home Oxygen Evaluation     Date Performed: 3/14/2020     1)         Patient's Home O2 Sat on room air, while at rest: 95%                               If O2 sats on room air at rest are 88% or below, patient qualifies. No additional testing needed. Document N/A in steps 2 and 3. If 89% or above, complete steps 2.        2)         Patient's O2 Sat on room air while exercisin-95%                               If O2 sats on room air while exercising remain 89% or above patient does not qualify, no further testing needed Document N/A in step 3. If O2 sats on room air while exercising are 88% or below, continue to step 3.        3)         Patient's O2 Sat while exercising on O2: N/A                                           (Must show improvement from #2 for patients to qualify)     If O2 sats improve on oxygen, patient qualifies for portable oxygen. If not, the patient does not qualify.      Was maintained on Coumadin. Missed one day for surgery.  She was maintained on 6 mg daily.  At home she takes 6 mg daily but 9 mg on Tuesday and Friday.  Her INR drifted down and she had to be placed on a Lovenox bridge    On  the day of discharge: she is afebrile  Vitals are stable  She feels well and denies shortness of breath

## 2020-03-09 NOTE — NURSING
Patient arrived to the [facility]razier from ED around 19:45 pm. Warm welcome given to patient and introduced myself as bedside nurse for tonight. She could move independently from stretcher to bed. She is AAO X 4. Her left foot has metal rods/ External fixators.  Whole foot is swollen, red and hot to touch. Foot is elevated over the pillow. Informed about VN and her role in patient care. Vital signs taken and are within normal limits. Patient says she is very hungry. Meals offered Chicken and pasta. Has tele monitor Box number [id number]. Monitor shows NSR. All bed alarms have been initiated. Will continue to monitor patient.

## 2020-03-09 NOTE — PLAN OF CARE
Patient Ms. Ca is AAO X 4. Vital signs stable. All prescribed medicines given as documented according to MAR. IV antibiotics continued. Left foot is elevated over the pillow. On tele monitor shows NSR. Used bedside commode during night. Safety measures maintained. Will continue to monitor patient.

## 2020-03-09 NOTE — ASSESSMENT & PLAN NOTE
Plan:  - Continue antibiotics for SSTI.  - CT ordered to evaluate for healing of the fusion site. Pending CT results, may be able to remove the ex fix while she is admitted. This procedure can be done at bedside.   - Podiatry will follow.

## 2020-03-09 NOTE — PLAN OF CARE
TN met with patient at bedside. Currently, patient lives at home alone and is independent of ADL's. No DME or HH noted. Upon discharge, patient states that her family will provide transportation home and be available to help the patient at home as needed.Patient PCP is Dr. Stanford.     TN updated whiteboard with contact information. Blue discharge folder and discharge brochure given to patient. TN instructed patient to contact TN if she has any further questions or concerns. TN will continue to follow throughout transitions of care.          03/09/20 9484   Discharge Assessment   Assessment Type Discharge Planning Assessment   Confirmed/corrected address and phone number on facesheet? Yes   Assessment information obtained from? Patient;Medical Record   Expected Length of Stay (days) 3   Communicated expected length of stay with patient/caregiver yes   Prior to hospitilization cognitive status: Alert/Oriented   Prior to hospitalization functional status: Independent   Current cognitive status: Alert/Oriented   Current Functional Status: Needs Assistance   Lives With alone;other (see comments)   Able to Return to Prior Arrangements other (see comments)  (TBD)   Is patient able to care for self after discharge? Unable to determine at this time (comments)   Patient's perception of discharge disposition home or selfcare   Readmission Within the Last 30 Days no previous admission in last 30 days   Patient currently being followed by outpatient case management? No   Patient currently receives any other outside agency services? No   Equipment Currently Used at Home none   Do you have any problems affording any of your prescribed medications? No   Is the patient taking medications as prescribed? no   Does the patient have transportation home? Yes   Transportation Anticipated family or friend will provide   Does the patient receive services at the [facility]? No   Discharge Plan A Home   Discharge Plan B Home with  family;Home Health   DME Needed Upon Discharge  other (see comments)  (TBD)   Patient/Family in Agreement with Plan yes   Does the patient have transportation to healthcare appointments? Yes

## 2020-03-09 NOTE — CONSULTS
[facility]  Podiatry  Consult Note    Patient Name: Mis Ca  MRN: [id number]  Admission Date: 3/8/2020  Hospital Length of Stay: 1 days  Attending Physician: Joe Romero DO  Primary Care Provider: Scott Dillard MD     Inpatient consult to Podiatry  Consult performed by: Kevin Cat MD  Consult ordered by: Joe Romero DO        Subjective:     History of Present Illness:  Mis Ca is a 79 y.o. female who  has a past medical history of Anticoagulant long-term use, Anxiety, Arthritis, Atrial fibrillation, Blind right eye, Bradycardia, Cancer, Hyperlipidemia, Hypertension, PVC (premature ventricular contraction), RLS (restless legs syndrome), and Sleep apnea.    Patient admitted for infection of the left foot.  Consulted to Podiatry for the same.  Patient reports increased pain, swelling and redness since Saturday to the left foot. Denies any F/N/V/C at this time. She had surgery in mid January per Dr. Cardozo and at that time, had revision of the 1st TMT fusion site with application of external fixator. She had been healing uneventfully over the last several weeks until this incident. Has been doing the soaks as instructed and has been WB to heel.        Scheduled Meds:   aspirin  81 mg Oral Daily    atorvastatin  40 mg Oral QHS    ciprofloxacin  400 mg Intravenous Q12H    diltiaZEM  240 mg Oral Daily    gabapentin  600 mg Oral BID    hydroCHLOROthiazide  25 mg Oral Daily    latanoprost  1 drop Left Eye QHS    losartan  50 mg Oral Daily    oxybutynin  5 mg Oral Daily    pantoprazole  40 mg Oral Daily    pramipexole  0.5 mg Oral QHS    timolol maleate 0.5%  1 drop Left Eye Daily    vancomycin (VANCOCIN) IVPB  2,000 mg Intravenous Q24H    warfarin  6 mg Oral Daily     Continuous Infusions:  PRN Meds:acetaminophen, ALPRAZolam, sodium chloride 0.9%, Pharmacy to dose Vancomycin consult **AND** vancomycin - pharmacy to dose    Review of patient's allergies  indicates:   Allergen Reactions    Propofol analogues      Used for her Colonoscopy.  Extended delirium.  Advised not to take it again.      Adhesive     Compazine [prochlorperazine edisylate] Anxiety    Penicillins Rash    Sulfa (sulfonamide antibiotics) Rash        Past Medical History:   Diagnosis Date    Anticoagulant long-term use     Anxiety     Arthritis     Atrial fibrillation     Blind right eye     Bradycardia     Cancer     skin cancer left side of face under eye    Hyperlipidemia     Hypertension     PVC (premature ventricular contraction)     RLS (restless legs syndrome)     Sleep apnea     Does not use CPAP machine.     Past Surgical History:   Procedure Laterality Date    ADENOIDECTOMY      AKIN OSTEOTOMY Left 10/31/2018    Procedure: OSTEOTOMY, AKIN;  Surgeon: Rudolph Cardozo DPM;  Location: [facility] OR;  Service: Podiatry;  Laterality: Left;    APPENDECTOMY      BREAST BIOPSY Left     x3    BREAST SURGERY Left     breast biopsy x3    CATARACT EXTRACTION BILATERAL W/ ANTERIOR VITRECTOMY      ENDOSCOPIC GASTROCNEMIUS RECESSION Left 10/31/2018    Procedure: RECESSION, GASTROCNEMIUS, ENDOSCOPIC;  Surgeon: Rudolph Cardozo DPM;  Location: [facility] OR;  Service: Podiatry;  Laterality: Left;    EYE SURGERY Bilateral     cataracts extraction    EYE SURGERY Right     drainage tube (glaucoma)    FOOT ARTHRODESIS Left 1/15/2020    Procedure: FUSION, FOOT;  Surgeon: Rudolph Cardozo DPM;  Location: [facility] OR;  Service: Podiatry;  Laterality: Left;  mini c-arm, Arthrex screw  for hardware removal (Lydia notified), Orthofix (Niels notified) min ex-fix    FOOT SURGERY Left     lesion removed from dorsal area    FRACTURE SURGERY Left     arm    HAND TENDON SURGERY Left     HYSTERECTOMY      LAPIDUS BUNIONECTOMY Left 10/31/2018    Procedure: BUNIONECTOMY, LAPIDUS;  Surgeon: Rudolph Cardozo DPM;  Location: [facility] OR;  Service: Podiatry;  Laterality: Left;  mini c-arm, Arthrex  locking plate (Lydia notified)    LAPIDUS BUNIONECTOMY Left 10/31/2018    Dr. Cardozo    Lymph Gland Removed  Right     groin (performed by Dr. Vergara)    NEURECTOMY Left 1/15/2020    Procedure: NEURECTOMY;  Surgeon: Rudolph Cardozo DPM;  Location: [facility];  Service: Podiatry;  Laterality: Left;  bipolar bovie, vessel loop, possible Agnes nerve wrap. Moshe with [facility] notified and will be overnighting graft. MK 1/14/2020    OOPHORECTOMY      ORIF FOREARM FRACTURE Left     PALATE SURGERY      Lesion removed    TONSILLECTOMY         Family History     Problem Relation (Age of Onset)    Aneurysm Mother    Arthritis Sister    Atrial fibrillation Sister    Bipolar disorder Sister    Brain cancer Sister    Breast cancer Sister    COPD Sister    Cancer Father, Sister, Brother, Brother, Sister    Cirrhosis Father    Colon polyps Brother    Depression Sister    Diabetes Sister, Brother, Brother, Sister, Sister, Sister    Glaucoma Brother, Sister    Heart attack Sister, Sister    Heart disease Sister, Sister, Sister    Hyperlipidemia Sister, Brother    Hypertension Sister, Brother    Kidney disease Sister    Lung cancer Father, Sister    Other Sister        Tobacco Use    Smoking status: Never Smoker    Smokeless tobacco: Never Used   Substance and Sexual Activity    Alcohol use: Yes     Comment: Seldomly drinks alcohol (wine)    Drug use: No    Sexual activity: Not Currently     Partners: Male     Review of Systems   Constitutional: Negative for chills and fever.   Cardiovascular: Positive for leg swelling.   Gastrointestinal: Negative for nausea and vomiting.   Skin: Positive for color change. Negative for wound.     Objective:     Vital Signs (Most Recent):  Temp: 97.8 °F (36.6 °C) (03/09/20 0757)  Pulse: 76 (03/09/20 0757)  Resp: 16 (03/09/20 0757)  BP: (!) 158/71 (03/09/20 0757)  SpO2: 95 % (03/08/20 1949) Vital Signs (24h Range):  Temp:  [97.8 °F (36.6 °C)-99.8 °F (37.7 °C)] 97.8 °F (36.6 °C)  Pulse:   [74-88] 76  Resp:  [16-20] 16  SpO2:  [95 %-97 %] 95 %  BP: (110-159)/(55-84) 158/71     Weight: 96.9 kg (213 lb 10 oz)  Body mass index is 39.07 kg/m².    Foot Exam    Left Foot/Ankle      Inspection and Palpation  Tenderness: (Tenderness around the pin site and across the medial dorsal foot.)  Swelling: (Left foot edema)  Skin Exam: cellulitis and erythema; no drainage     Neurovascular  Dorsalis pedis: 2+  Posterior tibial: 2+            Laboratory:  CBC:   Recent Labs   Lab 03/09/20  0535   WBC 8.70   RBC 3.76*   HGB 12.0   HCT 36.4*      MCV 97   MCH 31.9*   MCHC 33.0     CMP:   Recent Labs   Lab 03/08/20  1536 03/09/20  0535   GLU 77 110   CALCIUM 10.0 9.8   ALBUMIN 4.0  --    PROT 7.2  --     139   K 3.7 3.4*   CO2 28 29    100   BUN 14 14   CREATININE 0.8 0.8   ALKPHOS 131  --    ALT 17  --    AST 15  --    BILITOT 0.4  --      CRP: No results for input(s): CRP in the last 168 hours.  ESR: No results for input(s): SEDRATE in the last 168 hours.    Diagnostic Results:  I have reviewed all pertinent imaging results/findings within the past 24 hours.    Clinical Findings:  There is edema and erythema to the left foot. Patient reports it is improved since admission.  No significant drainage or purulence. No fluctuance or crepitus. Tenderness to the distal pin sites and dorsal medial foot. The ex fix is still well aligned and intact without loosening. No signs of trauma.                 Assessment/Plan:     * Infected hardware in left leg, initial encounter  See cellulitis    Cellulitis  Plan:  - Continue antibiotics for SSTI.  - CT ordered to evaluate for healing of the fusion site. Pending CT results, may be able to remove the ex fix while she is admitted. This procedure can be done at bedside.   - Podiatry will follow.     Anticoagulated on Coumadin  Per primary      Thank you for your consult. I will follow-up with patient. Please contact us if you have any additional questions.    Kevin  MD [place]  Podiatry  [facility]

## 2020-03-09 NOTE — PROGRESS NOTES
Pharmacy Warfarin Education Note     Mis Ca was offered Warfarin Education on 03/09/2020.  The patient and/or family/caregiver was present and accepted education, using teachback method. .    Patient specific concerns or situations: n/a    Warfarin education materials provided and information discussed during the education process included:  1. What warfarin is  2. Indication, current dose,how to take warfarin, what time of day to take warfarin,      Follow-up appointment for monitoring  3. What to do if you miss a dose  4. Drug interactions associated with the use of anticoagulants (I.e. Warfarin), such as        the use of over the counter medications (e.g. Aspirin, acetaminophen, ibuprofen),        prescription drugs, and herbal supplements. Notify prescribing doctor of any        changes in medications.  5.  Side effects of taking warfarin, increased bleeding risk, when to call the prescribing       physician, when to seek emergency help  6.  Monitoring blood levels (PT/INR)  7.  Eating habits and being consistent, Vitamin K rich foods, effects of diet on blood       Levels  8.  Recommendation of purchasing a medical bracelet or carrying a ID card to alert medical staff if you become ill  9.  Notifying physicians of procedures, medical or dental        Anticoagulants       Ordered     Route Frequency Start Stop    03/08/20 1714  warfarin      Oral Daily 03/08/20 1745 --          Lab Results   Component Value Date/Time    INR 2.4 (H) 03/09/2020 05:35 AM    INR 2.7 (H) 03/08/2020 10:16 PM    INR 2.2 02/26/2020 12:08 PM    INR 1.9 02/13/2020 03:53 PM    INR 1.5 01/30/2020 03:06 PM    INR 1.0 10/31/2018 08:15 AM   ;  Lab Results   Component Value Date/Time    HGB 12.0 03/09/2020 05:35 AM    HGB 13.2 03/08/2020 03:36 PM    HGB 12.9 12/31/2019 08:02 AM

## 2020-03-10 PROBLEM — K59.00 CONSTIPATION: Status: ACTIVE | Noted: 2020-03-10

## 2020-03-10 LAB
ANION GAP SERPL CALC-SCNC: 7 MMOL/L (ref 8–16)
BASOPHILS # BLD AUTO: 0.02 K/UL (ref 0–0.2)
BASOPHILS NFR BLD: 0.2 % (ref 0–1.9)
BUN SERPL-MCNC: 15 MG/DL (ref 8–23)
CALCIUM SERPL-MCNC: 9.7 MG/DL (ref 8.7–10.5)
CHLORIDE SERPL-SCNC: 97 MMOL/L (ref 95–110)
CO2 SERPL-SCNC: 32 MMOL/L (ref 23–29)
CREAT SERPL-MCNC: 1 MG/DL (ref 0.5–1.4)
DIFFERENTIAL METHOD: ABNORMAL
EOSINOPHIL # BLD AUTO: 0.1 K/UL (ref 0–0.5)
EOSINOPHIL NFR BLD: 1.5 % (ref 0–8)
ERYTHROCYTE [DISTWIDTH] IN BLOOD BY AUTOMATED COUNT: 13.2 % (ref 11.5–14.5)
EST. GFR  (AFRICAN AMERICAN): >60 ML/MIN/1.73 M^2
EST. GFR  (NON AFRICAN AMERICAN): 54 ML/MIN/1.73 M^2
GLUCOSE SERPL-MCNC: 132 MG/DL (ref 70–110)
HCT VFR BLD AUTO: 36.4 % (ref 37–48.5)
HGB BLD-MCNC: 11.9 G/DL (ref 12–16)
IMM GRANULOCYTES # BLD AUTO: 0.04 K/UL (ref 0–0.04)
IMM GRANULOCYTES NFR BLD AUTO: 0.4 % (ref 0–0.5)
INR PPP: 2.1 (ref 0.8–1.2)
LYMPHOCYTES # BLD AUTO: 2.1 K/UL (ref 1–4.8)
LYMPHOCYTES NFR BLD: 21.9 % (ref 18–48)
MAGNESIUM SERPL-MCNC: 2.1 MG/DL (ref 1.6–2.6)
MCH RBC QN AUTO: 31.9 PG (ref 27–31)
MCHC RBC AUTO-ENTMCNC: 32.7 G/DL (ref 32–36)
MCV RBC AUTO: 98 FL (ref 82–98)
MONOCYTES # BLD AUTO: 1.1 K/UL (ref 0.3–1)
MONOCYTES NFR BLD: 11.3 % (ref 4–15)
NEUTROPHILS # BLD AUTO: 6.1 K/UL (ref 1.8–7.7)
NEUTROPHILS NFR BLD: 64.7 % (ref 38–73)
NRBC BLD-RTO: 0 /100 WBC
PHOSPHATE SERPL-MCNC: 4.4 MG/DL (ref 2.7–4.5)
PLATELET # BLD AUTO: 189 K/UL (ref 150–350)
PMV BLD AUTO: 10.1 FL (ref 9.2–12.9)
POTASSIUM SERPL-SCNC: 4.4 MMOL/L (ref 3.5–5.1)
PROTHROMBIN TIME: 22.3 SEC (ref 9–12.5)
RBC # BLD AUTO: 3.73 M/UL (ref 4–5.4)
SODIUM SERPL-SCNC: 136 MMOL/L (ref 136–145)
VANCOMYCIN TROUGH SERPL-MCNC: 7.7 UG/ML (ref 10–22)
WBC # BLD AUTO: 9.39 K/UL (ref 3.9–12.7)

## 2020-03-10 PROCEDURE — 83735 ASSAY OF MAGNESIUM: CPT | Mod: HCNC

## 2020-03-10 PROCEDURE — 97530 THERAPEUTIC ACTIVITIES: CPT | Mod: HCNC | Performed by: PHYSICAL THERAPIST

## 2020-03-10 PROCEDURE — 80202 ASSAY OF VANCOMYCIN: CPT | Mod: HCNC

## 2020-03-10 PROCEDURE — 97162 PT EVAL MOD COMPLEX 30 MIN: CPT | Mod: HCNC | Performed by: PHYSICAL THERAPIST

## 2020-03-10 PROCEDURE — 36415 COLL VENOUS BLD VENIPUNCTURE: CPT | Mod: HCNC

## 2020-03-10 PROCEDURE — 63600175 PHARM REV CODE 636 W HCPCS: Mod: HCNC | Performed by: HOSPITALIST

## 2020-03-10 PROCEDURE — 25000003 PHARM REV CODE 250: Mod: HCNC | Performed by: NURSE PRACTITIONER

## 2020-03-10 PROCEDURE — 99024 PR POST-OP FOLLOW-UP VISIT: ICD-10-PCS | Mod: HCNC,,, | Performed by: PODIATRIST

## 2020-03-10 PROCEDURE — 25000003 PHARM REV CODE 250: Mod: HCNC | Performed by: INTERNAL MEDICINE

## 2020-03-10 PROCEDURE — 25000003 PHARM REV CODE 250: Mod: HCNC | Performed by: HOSPITALIST

## 2020-03-10 PROCEDURE — 85610 PROTHROMBIN TIME: CPT | Mod: HCNC

## 2020-03-10 PROCEDURE — 21400001 HC TELEMETRY ROOM: Mod: HCNC

## 2020-03-10 PROCEDURE — 84100 ASSAY OF PHOSPHORUS: CPT | Mod: HCNC

## 2020-03-10 PROCEDURE — 63600175 PHARM REV CODE 636 W HCPCS: Mod: HCNC | Performed by: EMERGENCY MEDICINE

## 2020-03-10 PROCEDURE — 80048 BASIC METABOLIC PNL TOTAL CA: CPT | Mod: HCNC

## 2020-03-10 PROCEDURE — 99024 POSTOP FOLLOW-UP VISIT: CPT | Mod: HCNC,,, | Performed by: PODIATRIST

## 2020-03-10 PROCEDURE — 85025 COMPLETE CBC W/AUTO DIFF WBC: CPT | Mod: HCNC

## 2020-03-10 RX ORDER — DOCUSATE SODIUM 100 MG/1
100 CAPSULE, LIQUID FILLED ORAL 2 TIMES DAILY
Status: DISCONTINUED | OUTPATIENT
Start: 2020-03-10 | End: 2020-03-16 | Stop reason: HOSPADM

## 2020-03-10 RX ADMIN — PANTOPRAZOLE SODIUM 40 MG: 40 TABLET, DELAYED RELEASE ORAL at 08:03

## 2020-03-10 RX ADMIN — CIPROFLOXACIN 400 MG: 2 INJECTION, SOLUTION INTRAVENOUS at 05:03

## 2020-03-10 RX ADMIN — OXYBUTYNIN CHLORIDE 5 MG: 5 TABLET ORAL at 08:03

## 2020-03-10 RX ADMIN — GABAPENTIN 600 MG: 300 CAPSULE ORAL at 10:03

## 2020-03-10 RX ADMIN — OXYCODONE HYDROCHLORIDE AND ACETAMINOPHEN 1 TABLET: 5; 325 TABLET ORAL at 12:03

## 2020-03-10 RX ADMIN — WARFARIN SODIUM 6 MG: 6 TABLET ORAL at 04:03

## 2020-03-10 RX ADMIN — GABAPENTIN 600 MG: 300 CAPSULE ORAL at 08:03

## 2020-03-10 RX ADMIN — LATANOPROST 1 DROP: 50 SOLUTION OPHTHALMIC at 10:03

## 2020-03-10 RX ADMIN — DOCUSATE SODIUM 100 MG: 100 CAPSULE, LIQUID FILLED ORAL at 10:03

## 2020-03-10 RX ADMIN — ATORVASTATIN CALCIUM 40 MG: 40 TABLET, FILM COATED ORAL at 10:03

## 2020-03-10 RX ADMIN — DILTIAZEM HYDROCHLORIDE 240 MG: 120 CAPSULE, COATED, EXTENDED RELEASE ORAL at 08:03

## 2020-03-10 RX ADMIN — VANCOMYCIN HYDROCHLORIDE 2000 MG: 100 INJECTION, POWDER, LYOPHILIZED, FOR SOLUTION INTRAVENOUS at 03:03

## 2020-03-10 RX ADMIN — TIMOLOL MALEATE 1 DROP: 5 SOLUTION/ DROPS OPHTHALMIC at 08:03

## 2020-03-10 RX ADMIN — OXYCODONE HYDROCHLORIDE AND ACETAMINOPHEN 1 TABLET: 5; 325 TABLET ORAL at 05:03

## 2020-03-10 RX ADMIN — ASPIRIN 81 MG: 81 TABLET, COATED ORAL at 08:03

## 2020-03-10 RX ADMIN — PRAMIPEXOLE DIHYDROCHLORIDE 0.5 MG: 0.5 TABLET ORAL at 10:03

## 2020-03-10 RX ADMIN — ALPRAZOLAM 0.25 MG: 0.25 TABLET ORAL at 10:03

## 2020-03-10 RX ADMIN — HYDROCHLOROTHIAZIDE 25 MG: 25 TABLET ORAL at 08:03

## 2020-03-10 RX ADMIN — LOSARTAN POTASSIUM 50 MG: 50 TABLET ORAL at 08:03

## 2020-03-10 NOTE — PLAN OF CARE
Pt vitals were maintained, pt had some moderate pain unrelieved with prn tylenol, NP was contacted and oxy 5mg was ordered. Pt left foot still looks red and swollen. Pt is NWB on L/foot and has been ambulating to bedside commode with assist. Pt has been SOB when ambulating as well, Prn O2 was given. Pt tolerated IV antibiotics well. New IV was started due to some redness and pain around IV site. Will continue to monitor

## 2020-03-10 NOTE — PT/OT/SLP EVAL
"Physical Therapy Evaluation    Patient Name:  Mis Ca   MRN:  [id number]    Recommendations:     Discharge Recommendations:  (home with daughter)   Discharge Equipment Recommendations: bath bench   Barriers to discharge: decreased functional mobility    Assessment:     Mis Ca is a 79 y.o. female admitted with a medical diagnosis of Infected hardware in left leg, initial encounter.  She presents with the following impairments/functional limitations:  weakness, impaired functional mobilty, impaired balance, impaired cardiopulmonary response to activity, impaired self care skills, gait instability, decreased lower extremity function, pain, impaired joint extensibility, impaired endurance.    Rehab Prognosis: Good; patient would benefit from acute skilled PT services to address these deficits and reach maximum level of function.    Recent Surgery: * No surgery found *      Plan:     During this hospitalization, patient to be seen 6 x/week to address the identified rehab impairments via gait training, therapeutic activities, therapeutic exercises, neuromuscular re-education and progress toward the following goals:    · Plan of Care Expires:  04/10/20    Subjective     Chief Complaint: foot pain  Patient/Family Comments/goals: go home with daughter  Pain/Comfort:  · Pain Rating 1: 4/10  · Location - Side 1: Left  · Location 1: foot  · Pain Addressed 1: Distraction, Reposition, Cessation of Activity, Nurse notified  · Pain Rating Post-Intervention 1: 5/10    Patients cultural, spiritual, Hinduism conflicts given the current situation: no    Living Environment:  Pt was living with her daughter in a [facility] with a 3" curb up.  Prior to admission, patients level of function was Ind NWB LLE with use of kneeling walker.  Equipment used at home: (Kneeling walker, RW).  DME owned (not currently used): none.  Upon discharge, patient will have assistance from her daughter.    Objective:     Communicated with nurse " prior to session.  Patient found on bsc with peripheral IV, blood pressure cuff  upon PT entry to room.    General Precautions: Standard, fall   Orthopedic Precautions:    Braces:       Exams:  · Pt is oriented x 4.  Pt has BLE PROM WFL except left ankle/foot has decreased motion.  Pt has 4  to 5/5 BLE strength, left foot/ankle not assessed.    Functional Mobility:  Pt went sit to stand with RW with NWB LLE with CG and stood for 2 minutes for cleaning.  Pt transferred bsc to bed with RW with NWB LLE with CG/supervision.    Therapeutic Activities and Exercises:   See above.  Pt sat on EOB 8 min.     AM-PAC 6 CLICK MOBILITY  Total Score:17     Patient left HOB elevated with all lines intact, call button in reach, bed alarm on, nurse notified and daughter present.    GOALS:   Multidisciplinary Problems     Physical Therapy Goals        Problem: Physical Therapy Goal    Goal Priority Disciplines Outcome Goal Variances Interventions   Physical Therapy Goal     PT, PT/OT Ongoing, Progressing     Description:  Pt would benefit from skilled PT to progress functional mobility.  Goals include:  1.  Sit in chair 2 hours.  2.  Bed to/from chair with supervision with RW with NWB LLE  3.  Ambulate 75' with RW with supervision with NWB LLE  4.  Ind HEP                    History:     Past Medical History:   Diagnosis Date    Anticoagulant long-term use     Anxiety     Arthritis     Atrial fibrillation     Blind right eye     Bradycardia     Cancer     skin cancer left side of face under eye    Hyperlipidemia     Hypertension     PVC (premature ventricular contraction)     RLS (restless legs syndrome)     Sleep apnea     Does not use CPAP machine.       Past Surgical History:   Procedure Laterality Date    ADENOIDECTOMY      AKIN OSTEOTOMY Left 10/31/2018    Procedure: OSTEOTOMY, AKIN;  Surgeon: Rudolph Cardozo DPM;  Location: [facility];  Service: Podiatry;  Laterality: Left;    APPENDECTOMY      BREAST BIOPSY  Left     x3    BREAST SURGERY Left     breast biopsy x3    CATARACT EXTRACTION BILATERAL W/ ANTERIOR VITRECTOMY      ENDOSCOPIC GASTROCNEMIUS RECESSION Left 10/31/2018    Procedure: RECESSION, GASTROCNEMIUS, ENDOSCOPIC;  Surgeon: Rudolph Cardozo DPM;  Location: [facility] OR;  Service: Podiatry;  Laterality: Left;    EYE SURGERY Bilateral     cataracts extraction    EYE SURGERY Right     drainage tube (glaucoma)    FOOT ARTHRODESIS Left 1/15/2020    Procedure: FUSION, FOOT;  Surgeon: Rudolph Cardozo DPM;  Location: [facility] OR;  Service: Podiatry;  Laterality: Left;  mini c-arm, Arthrex screw  for hardware removal (Lydia notified), Orthofix (Niels notified) min ex-fix    FOOT SURGERY Left     lesion removed from dorsal area    FRACTURE SURGERY Left     arm    HAND TENDON SURGERY Left     HYSTERECTOMY      LAPIDUS BUNIONECTOMY Left 10/31/2018    Procedure: BUNIONECTOMY, LAPIDUS;  Surgeon: Rudolph Cardozo DPM;  Location: [facility] OR;  Service: Podiatry;  Laterality: Left;  mini c-arm, Arthrex locking plate (Lydia notified)    LAPIDUS BUNIONECTOMY Left 10/31/2018    Dr. Cardozo    Lymph Gland Removed  Right     groin (performed by Dr. Vergara)    NEURECTOMY Left 1/15/2020    Procedure: NEURECTOMY;  Surgeon: Rudolph Cardozo DPM;  Location: [facility] OR;  Service: Podiatry;  Laterality: Left;  bipolar bovie, vessel loop, possible Agnes nerve wrap. Moshe with [employer] notified and will be overnighting graft. MK 1/14/2020    OOPHORECTOMY      ORIF FOREARM FRACTURE Left     PALATE SURGERY      Lesion removed    TONSILLECTOMY         Time Tracking:     PT Received On: 03/10/20  PT Start Time: 1421     PT Stop Time: 1450  PT Total Time (min): 29 min     Billable Minutes: Evaluation 15 and Therapeutic Activity 14      Ashli Adams, PT  03/10/2020

## 2020-03-10 NOTE — PLAN OF CARE
Problem: Physical Therapy Goal  Goal: Physical Therapy Goal  Description  Pt would benefit from skilled PT to progress functional mobility.  Goals include:  1.  Sit in chair 2 hours.  2.  Bed to/from chair with supervision with RW with NWB LLE  3.  Ambulate 75' with RW with supervision with NWB LLE  4.  Ind HEP   Outcome: Ongoing, Progressing

## 2020-03-10 NOTE — PROGRESS NOTES
Pharmacokinetic Assessment Follow Up: IV Vancomycin    Vancomycin serum concentration assessment(s):    The trough level was drawn correctly and can be used to guide therapy at this time. The measurement is below the desired definitive target range of 10 to 15 mcg/mL.    Vancomycin Regimen Plan:    Continue regimen to Vancomycin 2000 mg IV every 24 hours with next serum trough concentration measured at 1500 prior to third dose on 3/12     Drug levels (last 3 results):  Recent Labs   Lab Result Units 03/10/20  1500   Vancomycin-Trough ug/mL 7.7*       Pharmacy will continue to follow and monitor vancomycin.    Please contact pharmacy at extension [phone] for questions regarding this assessment.    Thank you for the consult,   Link Puente       Patient brief summary:  Mis Ca is a 79 y.o. female initiated on antimicrobial therapy with IV Vancomycin for treatment of infected hardware     The patient's current regimen is vancomycin 2000mg q24h    Drug Allergies:   Review of patient's allergies indicates:   Allergen Reactions    Propofol analogues      Used for her Colonoscopy.  Extended delirium.  Advised not to take it again.      Adhesive     Compazine [prochlorperazine edisylate] Anxiety    Penicillins Rash    Sulfa (sulfonamide antibiotics) Rash       Actual Body Weight:   95.5kg    Renal Function:   Estimated Creatinine Clearance: 50 mL/min (based on SCr of 1 mg/dL).,     Dialysis Method (if applicable):      CBC (last 72 hours):  Recent Labs   Lab Result Units 03/08/20  1536 03/09/20  0535 03/10/20  0550   WBC K/uL 10.05 8.70 9.39   Hemoglobin g/dL 13.2 12.0 11.9*   Hematocrit % 39.8 36.4* 36.4*   Platelets K/uL 208 175 189   Gran% % 65.2 67.5 64.7   Lymph% % 22.7 20.3 21.9   Mono% % 9.6 10.1 11.3   Eosinophil% % 1.8 1.6 1.5   Basophil% % 0.3 0.2 0.2   Differential Method  Automated Automated Automated       Metabolic Panel (last 72 hours):  Recent Labs   Lab Result Units 03/08/20  1536 03/09/20  0535  03/10/20  0550   Sodium mmol/L 143 139 136   Potassium mmol/L 3.7 3.4* 4.4   Chloride mmol/L 103 100 97   CO2 mmol/L 28 29 32*   Glucose mg/dL 77 110 132*   BUN, Bld mg/dL 14 14 15   Creatinine mg/dL 0.8 0.8 1.0   Albumin g/dL 4.0  --   --    Total Bilirubin mg/dL 0.4  --   --    Alkaline Phosphatase U/L 131  --   --    AST U/L 15  --   --    ALT U/L 17  --   --    Magnesium mg/dL  --  1.8 2.1   Phosphorus mg/dL  --  4.3 4.4       Vancomycin Administrations:  vancomycin given in the last 96 hours                     vancomycin (VANCOCIN) 2,000 mg in dextrose 5 % 500 mL IVPB (mg) 2,000 mg New Bag 03/10/20 1525     2,000 mg New Bag 03/09/20 1542    vancomycin (VANCOCIN) 2,500 mg in dextrose 5 % 500 mL IVPB (mg) 2,500 mg New Bag 03/08/20 1540                      Microbiologic Results:  Microbiology Results (last 7 days)       Procedure Component Value Units Date/Time    Blood Culture #2 **CANNOT BE ORDERED STAT** [[id number]] Collected:  03/08/20 1533    Order Status:  Completed Specimen:  Blood from Peripheral, Antecubital, Left Updated:  03/09/20 2212     Blood Culture, Routine No Growth to date      No Growth to date    Blood Culture #1 **CANNOT BE ORDERED STAT** [[id number]] Collected:  03/08/20 1525    Order Status:  Completed Specimen:  Blood from Peripheral, Forearm, Right Updated:  03/09/20 2212     Blood Culture, Routine No Growth to date      No Growth to date    Aerobic culture [[id number]] Collected:  03/09/20 1759    Order Status:  Sent Specimen:  Wound from Foot, Left Updated:  03/09/20 2008    Culture, Anaerobe [[id number]] Collected:  03/09/20 1759    Order Status:  Sent Specimen:  Wound from Foot, Left Updated:  03/09/20 2006    Aerobic culture [[id number]] Collected:  03/09/20 1757    Order Status:  Sent Specimen:  Wound from Foot, Left Updated:  03/09/20 2006    Culture, Anaerobe [[id number]] Collected:  03/09/20 1757    Order Status:  Sent Specimen:  Wound from Foot, Left Updated:  03/09/20 2006    Blood  culture (site 2) [[id number]] Collected:  03/08/20 1715    Order Status:  Sent Specimen:  Blood from Peripheral, Forearm, Left Updated:  03/08/20 1715

## 2020-03-10 NOTE — PLAN OF CARE
VN rounds: VN cued into pt's room with pt's permission. Pt resting in bed. Family at bedside. VN instructed pt to call for assistance. Pt aware and agreeable. Allowed time for questions. Pt reports leg pain 5/10 but refusing any pain meds at this time and prefers to take at time. Will cont to be available as needed.

## 2020-03-10 NOTE — PROGRESS NOTES
"[facility] Medicine  Progress Note    Patient Name: Mis Ca  MRN: [id number]  Patient Class: IP- Inpatient   Admission Date: 3/8/2020  Length of Stay: 2 days  Attending Physician: Anni Cuenca MD  Primary Care Provider: Scott Dillard MD        Subjective:     Principal Problem:Infected hardware in left leg, initial encounter        HPI:  The patient is a 79 years old. female with PMH HTN, HLP, anxiety,Atrial fibrillation, Blind right eye, Bradycardia, RLS, and Sleep apnea presents with complaint of possible wound infection to left foot. Patient states she had surgery on January 15, 2020 to foot by Dr. Cardozo to "fix bones that did not heal correctly after bunion surgery".  Patient reports no complications to foot until yesterday when she noticed swelling, erythema and pain worsening. She also reports scant amount of white drainage from wound this morning. Patient denies any fever, chills, abdominal pain, nausea or vomiting. Of note, patient last saw Dr. Cardozo two weeks ago and has upcoming appointment in four days.    The pt does not put weight yet on her foot, and her surgical site is not  Having pain from the sx site itself but the swelling and edema around it    Overview/Hospital Course:  3/9 the pt seen, no acute events, had Dr Cardozo evaluating her foot, ordered a ct foot, awaiting rwecs. For now continue POC with IV abx    Interval History:  Patient and daughter  Her only complaint is pain in her left foot when it is dependent  Denies diarrhea.  Has not had a BM since admission  She is on oxygen because she was slightly short of breath yesterday    Review of Systems   Constitutional: Negative for fever.   Respiratory: Negative for shortness of breath.    Cardiovascular: Negative for chest pain and leg swelling.   Gastrointestinal: Positive for constipation. Negative for abdominal pain and diarrhea.     Objective:     Vital Signs (Most Recent):  Temp: 97.7 °F (36.5 " °C) (03/10/20 1238)  Pulse: 76 (03/10/20 1238)  Resp: 17 (03/10/20 1238)  BP: (!) 118/54 (03/10/20 1238)  SpO2: 95 % (03/08/20 1949) Vital Signs (24h Range):  Temp:  [97.7 °F (36.5 °C)-99.7 °F (37.6 °C)] 97.7 °F (36.5 °C)  Pulse:  [64-84] 76  Resp:  [16-20] 17  BP: (118-169)/(54-78) 118/54     Weight: 95.5 kg (210 lb 8.6 oz)  Body mass index is 38.51 kg/m².    Intake/Output Summary (Last 24 hours) at 3/10/2020 1326  Last data filed at 3/10/2020 0600  Gross per 24 hour   Intake 1000 ml   Output 1450 ml   Net -450 ml      Physical Exam   Constitutional: She appears well-developed and well-nourished. No distress.   Cardiovascular: Normal rate, regular rhythm and normal heart sounds.   Pulmonary/Chest: Effort normal and breath sounds normal.   Abdominal: Soft. Bowel sounds are normal. There is no tenderness.   Musculoskeletal: She exhibits no edema.   Left foot is wrapped. There is some erythema and warmth distally. Dressings are clean and dry   Lymphadenopathy:     She has no cervical adenopathy.   Skin: Capillary refill takes less than 2 seconds. There is erythema.       Significant Labs:   BMP:   Recent Labs   Lab 03/10/20  0550   *      K 4.4   CL 97   CO2 32*   BUN 15   CREATININE 1.0   CALCIUM 9.7   MG 2.1     CBC:   Recent Labs   Lab 03/08/20  1536 03/09/20  0535 03/10/20  0550   WBC 10.05 8.70 9.39   HGB 13.2 12.0 11.9*   HCT 39.8 36.4* 36.4*    175 189     CMP:   Recent Labs   Lab 03/08/20  1536 03/09/20  0535 03/10/20  0550    139 136   K 3.7 3.4* 4.4    100 97   CO2 28 29 32*   GLU 77 110 132*   BUN 14 14 15   CREATININE 0.8 0.8 1.0   CALCIUM 10.0 9.8 9.7   PROT 7.2  --   --    ALBUMIN 4.0  --   --    BILITOT 0.4  --   --    ALKPHOS 131  --   --    AST 15  --   --    ALT 17  --   --    ANIONGAP 12 10 7*   EGFRNONAA >60 >60 54*       Significant Imaging: I have reviewed all pertinent imaging results/findings within the past 24 hours.     X-Ray Foot Complete Left  Narrative:  EXAMINATION:  XR FOOT COMPLETE 3 VIEW LEFT    CLINICAL HISTORY:  post ex-fix removal;.  Infection and inflammatory reaction due to other internal orthopedic prosthetic devices, implants and grafts, initial encounter    TECHNIQUE:  AP, lateral and oblique views of the left foot were performed.    COMPARISON:  03/08/2020 and CT foot without contrast dated 03/09/2020    FINDINGS:  External fixator device has been removed.  Hardware of the great toe middle phalanx, unchanged.  Redemonstration of cortical irregularity involving the medial cortex of the proximal 1st metatarsal shaft, similar.  Enthesopathic change at the calcaneal plantar fascial attachment.  Persistent soft tissue swelling along the dorsum of the foot.  No new abnormality.  Impression: As above.    Electronically signed by: Rodriguez Bear  Date:    03/10/2020  Time:    11:26        Assessment/Plan:      * Infected hardware in left leg, initial encounter  POD #1 Hardware removal  On vanc and cipro   Dr Cardozo following      Cellulitis  See #1      Diastolic dysfunction  Continue home meds      Essential hypertension  Presently on HCTZ, Losartan, Cardizem      Hyperlipidemia  Continue statin      Anticoagulated on Coumadin  Goal 2-3  Currently at 2.1  Monitor      JACQUELYN (obstructive sleep apnea)  Monitor      Paroxysmal atrial fibrillation  continue coumadin for anticoagulation  And cardiazen for rate control          VTE Risk Mitigation (From admission, onward)         Ordered     warfarin (COUMADIN) tablet 6 mg  Daily      03/08/20 1714     IP VTE HIGH RISK PATIENT  Once      03/08/20 1714     Place sequential compression device  Until discontinued      03/08/20 1714                  MISCELLANEOUS  PT consult for NWB, walker  Monitor for dyspnea  Order tub transfer chair for home use  Follow up - Dr Garcia, calcified renal cyst  Add colace  Monitor urine - patient states urine is dark but no dysuria. Increase fluid intake      Anni Cuenca,  MD  Department of [facility] Medicine   [facility]

## 2020-03-10 NOTE — SUBJECTIVE & OBJECTIVE
Interval History:  Patient and daughter  Her only complaint is pain in her left foot when it is dependent  Denies diarrhea.  Has not had a BM since admission  She is on oxygen because she was slightly short of breath yesterday    Review of Systems   Constitutional: Negative for fever.   Respiratory: Negative for shortness of breath.    Cardiovascular: Negative for chest pain and leg swelling.   Gastrointestinal: Positive for constipation. Negative for abdominal pain and diarrhea.     Objective:     Vital Signs (Most Recent):  Temp: 97.7 °F (36.5 °C) (03/10/20 1238)  Pulse: 76 (03/10/20 1238)  Resp: 17 (03/10/20 1238)  BP: (!) 118/54 (03/10/20 1238)  SpO2: 95 % (03/08/20 1949) Vital Signs (24h Range):  Temp:  [97.7 °F (36.5 °C)-99.7 °F (37.6 °C)] 97.7 °F (36.5 °C)  Pulse:  [64-84] 76  Resp:  [16-20] 17  BP: (118-169)/(54-78) 118/54     Weight: 95.5 kg (210 lb 8.6 oz)  Body mass index is 38.51 kg/m².    Intake/Output Summary (Last 24 hours) at 3/10/2020 1326  Last data filed at 3/10/2020 0600  Gross per 24 hour   Intake 1000 ml   Output 1450 ml   Net -450 ml      Physical Exam   Constitutional: She appears well-developed and well-nourished. No distress.   Cardiovascular: Normal rate, regular rhythm and normal heart sounds.   Pulmonary/Chest: Effort normal and breath sounds normal.   Abdominal: Soft. Bowel sounds are normal. There is no tenderness.   Musculoskeletal: She exhibits no edema.   Left foot is wrapped. There is some erythema and warmth distally. Dressings are clean and dry   Lymphadenopathy:     She has no cervical adenopathy.   Skin: Capillary refill takes less than 2 seconds. There is erythema.       Significant Labs:   BMP:   Recent Labs   Lab 03/10/20  0550   *      K 4.4   CL 97   CO2 32*   BUN 15   CREATININE 1.0   CALCIUM 9.7   MG 2.1     CBC:   Recent Labs   Lab 03/08/20  1536 03/09/20  0535 03/10/20  0550   WBC 10.05 8.70 9.39   HGB 13.2 12.0 11.9*   HCT 39.8 36.4* 36.4*    175  189     CMP:   Recent Labs   Lab 03/08/20  1536 03/09/20  0535 03/10/20  0550    139 136   K 3.7 3.4* 4.4    100 97   CO2 28 29 32*   GLU 77 110 132*   BUN 14 14 15   CREATININE 0.8 0.8 1.0   CALCIUM 10.0 9.8 9.7   PROT 7.2  --   --    ALBUMIN 4.0  --   --    BILITOT 0.4  --   --    ALKPHOS 131  --   --    AST 15  --   --    ALT 17  --   --    ANIONGAP 12 10 7*   EGFRNONAA >60 >60 54*       Significant Imaging: I have reviewed all pertinent imaging results/findings within the past 24 hours.     X-Ray Foot Complete Left  Narrative: EXAMINATION:  XR FOOT COMPLETE 3 VIEW LEFT    CLINICAL HISTORY:  post ex-fix removal;.  Infection and inflammatory reaction due to other internal orthopedic prosthetic devices, implants and grafts, initial encounter    TECHNIQUE:  AP, lateral and oblique views of the left foot were performed.    COMPARISON:  03/08/2020 and CT foot without contrast dated 03/09/2020    FINDINGS:  External fixator device has been removed.  Hardware of the great toe middle phalanx, unchanged.  Redemonstration of cortical irregularity involving the medial cortex of the proximal 1st metatarsal shaft, similar.  Enthesopathic change at the calcaneal plantar fascial attachment.  Persistent soft tissue swelling along the dorsum of the foot.  No new abnormality.  Impression: As above.    Electronically signed by: Rodriguez Bear  Date:    03/10/2020  Time:    11:26

## 2020-03-10 NOTE — SUBJECTIVE & OBJECTIVE
Int Hx: s/p removal of external fixator. Worsening pain, swelling and redness. Continues to deny F/N/V/C.    Scheduled Meds:   aspirin  81 mg Oral Daily    atorvastatin  40 mg Oral QHS    ciprofloxacin  400 mg Intravenous Q12H    diltiaZEM  240 mg Oral Daily    docusate sodium  100 mg Oral BID    gabapentin  600 mg Oral BID    hydroCHLOROthiazide  25 mg Oral Daily    latanoprost  1 drop Left Eye QHS    losartan  50 mg Oral Daily    oxybutynin  5 mg Oral Daily    pantoprazole  40 mg Oral Daily    pramipexole  0.5 mg Oral QHS    timolol maleate 0.5%  1 drop Left Eye Daily    vancomycin (VANCOCIN) IVPB  2,000 mg Intravenous Q24H    warfarin  6 mg Oral Daily     Continuous Infusions:  PRN Meds:acetaminophen, ALPRAZolam, oxyCODONE-acetaminophen, sodium chloride 0.9%    Review of patient's allergies indicates:   Allergen Reactions    Propofol analogues      Used for her Colonoscopy.  Extended delirium.  Advised not to take it again.      Adhesive     Compazine [prochlorperazine edisylate] Anxiety    Penicillins Rash    Sulfa (sulfonamide antibiotics) Rash        Past Medical History:   Diagnosis Date    Anticoagulant long-term use     Anxiety     Arthritis     Atrial fibrillation     Blind right eye     Bradycardia     Cancer     skin cancer left side of face under eye    Hyperlipidemia     Hypertension     PVC (premature ventricular contraction)     RLS (restless legs syndrome)     Sleep apnea     Does not use CPAP machine.     Past Surgical History:   Procedure Laterality Date    ADENOIDECTOMY      AKIN OSTEOTOMY Left 10/31/2018    Procedure: OSTEOTOMY, AKIN;  Surgeon: Rudolph Cardozo DPM;  Location: [facility];  Service: Podiatry;  Laterality: Left;    APPENDECTOMY      BREAST BIOPSY Left     x3    BREAST SURGERY Left     breast biopsy x3    CATARACT EXTRACTION BILATERAL W/ ANTERIOR VITRECTOMY      ENDOSCOPIC GASTROCNEMIUS RECESSION Left 10/31/2018    Procedure: RECESSION,  GASTROCNEMIUS, ENDOSCOPIC;  Surgeon: Rudolph Cardozo DPM;  Location: [facility] OR;  Service: Podiatry;  Laterality: Left;    EYE SURGERY Bilateral     cataracts extraction    EYE SURGERY Right     drainage tube (glaucoma)    FOOT ARTHRODESIS Left 1/15/2020    Procedure: FUSION, FOOT;  Surgeon: Rudolph Cardozo DPM;  Location: [facility] OR;  Service: Podiatry;  Laterality: Left;  mini c-arm, Arthrex screw  for hardware removal (Lydia notified), [employer]ix (Niels notified) min ex-fix    FOOT SURGERY Left     lesion removed from dorsal area    FRACTURE SURGERY Left     arm    HAND TENDON SURGERY Left     HYSTERECTOMY      LAPIDUS BUNIONECTOMY Left 10/31/2018    Procedure: BUNIONECTOMY, LAPIDUS;  Surgeon: Rudolph Cardozo DPM;  Location: [facility] OR;  Service: Podiatry;  Laterality: Left;  mini c-arm, Arthrex locking plate (Lydia notified)    LAPIDUS BUNIONECTOMY Left 10/31/2018    Dr. Cardozo    Lymph Gland Removed  Right     groin (performed by Dr. Vergara)    NEURECTOMY Left 1/15/2020    Procedure: NEURECTOMY;  Surgeon: Rudolph Cardozo DPM;  Location: [facility] OR;  Service: Podiatry;  Laterality: Left;  bipolar bovie, vessel loop, possible [employer] nerve wrap. Moshe with [employer] notified and will be overnighting graft. MK 1/14/2020    OOPHORECTOMY      ORIF FOREARM FRACTURE Left     PALATE SURGERY      Lesion removed    TONSILLECTOMY         Family History     Problem Relation (Age of Onset)    Aneurysm Mother    Arthritis Sister    Atrial fibrillation Sister    Bipolar disorder Sister    Brain cancer Sister    Breast cancer Sister    COPD Sister    Cancer Father, Sister, Brother, Brother, Sister    Cirrhosis Father    Colon polyps Brother    Depression Sister    Diabetes Sister, Brother, Brother, Sister, Sister, Sister    Glaucoma Brother, Sister    Heart attack Sister, Sister    Heart disease Sister, Sister, Sister    Hyperlipidemia Sister, Brother    Hypertension Sister, Brother    Kidney disease  Sister    Lung cancer Father, Sister    Other Sister        Tobacco Use    Smoking status: Never Smoker    Smokeless tobacco: Never Used   Substance and Sexual Activity    Alcohol use: Yes     Comment: Seldomly drinks alcohol (wine)    Drug use: No    Sexual activity: Not Currently     Partners: Male     Review of Systems   Constitutional: Negative for chills and fever.   Cardiovascular: Positive for leg swelling.   Gastrointestinal: Negative for nausea and vomiting.   Musculoskeletal: Positive for gait problem.   Skin: Positive for color change and wound.     Objective:     Vital Signs (Most Recent):  Temp: 98.1 °F (36.7 °C) (03/10/20 1652)  Pulse: 81 (03/10/20 1652)  Resp: 17 (03/10/20 1652)  BP: (!) 142/68 (03/10/20 1652)  SpO2: 95 % (03/08/20 1949) Vital Signs (24h Range):  Temp:  [97.7 °F (36.5 °C)-99.7 °F (37.6 °C)] 98.1 °F (36.7 °C)  Pulse:  [67-84] 81  Resp:  [16-20] 17  BP: (118-169)/(54-78) 142/68     Weight: 95.5 kg (210 lb 8.6 oz)  Body mass index is 38.51 kg/m².    Foot Exam    Left Foot/Ankle      Inspection and Palpation  Tenderness: (Tenderness around the pin site and across the medial dorsal foot.)  Swelling: (Left foot edema)  Skin Exam: cellulitis and erythema; no drainage     Neurovascular  Dorsalis pedis: 2+  Posterior tibial: 2+            Laboratory:  CBC:   Recent Labs   Lab 03/10/20  0550   WBC 9.39   RBC 3.73*   HGB 11.9*   HCT 36.4*      MCV 98   MCH 31.9*   MCHC 32.7     CMP:   Recent Labs   Lab 03/08/20  1536  03/10/20  0550   GLU 77   < > 132*   CALCIUM 10.0   < > 9.7   ALBUMIN 4.0  --   --    PROT 7.2  --   --       < > 136   K 3.7   < > 4.4   CO2 28   < > 32*      < > 97   BUN 14   < > 15   CREATININE 0.8   < > 1.0   ALKPHOS 131  --   --    ALT 17  --   --    AST 15  --   --    BILITOT 0.4  --   --     < > = values in this interval not displayed.     CRP:   Recent Labs   Lab 03/09/20 0939   CRP 86.3*     ESR:   Recent Labs   Lab 03/09/20 0939   SEDRATE 38*        Diagnostic Results:  I have reviewed all pertinent imaging results/findings within the past 24 hours.    Clinical Findings:  There is edema and erythema to the left foot. Patient reports it is improved since admission.  No significant drainage or purulence. No fluctuance or crepitus. Tenderness to the distal pin sites and dorsal medial foot. The ex fix is still well aligned and intact without loosening. No signs of trauma.             3/10/20  Purulent drainage from the distal pin site today. Fluctuance felt at the 1st interspace of the left foot.

## 2020-03-10 NOTE — PLAN OF CARE
Patient is awake, alert and oriented.  Patient has complaints of pain and pain medications given.  Continues to receive antibiotics.  Family members visited.  Left foot is reddened and swollen.  Mepilex intact to instruments removed from foot.  Safety maintained.  Will continue to monitor.

## 2020-03-10 NOTE — PROCEDURES
Procedure: Removal of external fixator left foot    Supervising Provider: Dr. Rudolph Cardozo DPM  Performing Provider: Kevin Cat DPM PGY2  Pre-op Diagnosis: pin site infection of monorail external fixator left foot  Post op Diagnosis: same  Anesthesia: Local infiltrating 10 mL 1% plain lidocaine  Hemostasis: direct pressure  EBL: < 5 mL  Materials: none  Injectables: per above  Complications: none    03/09/2020     Procedure in detail.    Written consent was obtained from the patient. At the bedside a superficial block was applied proximal to the cellulitis of the patient's foot utilizing 10 mL 1% plain lidocaine infiltrated in the SQ layer at the medial hindfoot. The area was thoroughly cleansed with wound cleanser followed by alcohol. A t handle  was then used to remove the pins. The external fixator was removed in total. Cultures were then taken from the distal two pin sites. Patient tolerated the procedure well with no complications. The wound was dressed with hydrafera blue and a mepilex border.    Recommend continuing antibiotics. Follow up on cultures for guidance for antibiotics. Likely for SSTI course. Maintain partial weightbearing to heel of L foot for now. Will obtain CAM boot for patient.

## 2020-03-11 ENCOUNTER — ANESTHESIA EVENT (OUTPATIENT)
Dept: SURGERY | Facility: HOSPITAL | Age: 80
DRG: 501 | End: 2020-03-11
Payer: MEDICARE

## 2020-03-11 ENCOUNTER — ANESTHESIA (OUTPATIENT)
Dept: SURGERY | Facility: HOSPITAL | Age: 80
DRG: 501 | End: 2020-03-11
Payer: MEDICARE

## 2020-03-11 PROBLEM — T84.7XXA INFECTION AT SITE OF EXTERNAL FIXATOR PIN: Status: ACTIVE | Noted: 2020-03-11

## 2020-03-11 LAB
ANION GAP SERPL CALC-SCNC: 11 MMOL/L (ref 8–16)
BASOPHILS # BLD AUTO: 0.03 K/UL (ref 0–0.2)
BASOPHILS NFR BLD: 0.3 % (ref 0–1.9)
BUN SERPL-MCNC: 15 MG/DL (ref 8–23)
CALCIUM SERPL-MCNC: 9.9 MG/DL (ref 8.7–10.5)
CHLORIDE SERPL-SCNC: 96 MMOL/L (ref 95–110)
CO2 SERPL-SCNC: 31 MMOL/L (ref 23–29)
CREAT SERPL-MCNC: 0.8 MG/DL (ref 0.5–1.4)
DIFFERENTIAL METHOD: ABNORMAL
EOSINOPHIL # BLD AUTO: 0.2 K/UL (ref 0–0.5)
EOSINOPHIL NFR BLD: 2.2 % (ref 0–8)
ERYTHROCYTE [DISTWIDTH] IN BLOOD BY AUTOMATED COUNT: 12.8 % (ref 11.5–14.5)
EST. GFR  (AFRICAN AMERICAN): >60 ML/MIN/1.73 M^2
EST. GFR  (NON AFRICAN AMERICAN): >60 ML/MIN/1.73 M^2
GLUCOSE SERPL-MCNC: 116 MG/DL (ref 70–110)
HCT VFR BLD AUTO: 36.8 % (ref 37–48.5)
HGB BLD-MCNC: 12.1 G/DL (ref 12–16)
IMM GRANULOCYTES # BLD AUTO: 0.03 K/UL (ref 0–0.04)
IMM GRANULOCYTES NFR BLD AUTO: 0.3 % (ref 0–0.5)
INR PPP: 1.8 (ref 0.8–1.2)
LYMPHOCYTES # BLD AUTO: 1.5 K/UL (ref 1–4.8)
LYMPHOCYTES NFR BLD: 17.2 % (ref 18–48)
MAGNESIUM SERPL-MCNC: 2 MG/DL (ref 1.6–2.6)
MCH RBC QN AUTO: 31.6 PG (ref 27–31)
MCHC RBC AUTO-ENTMCNC: 32.9 G/DL (ref 32–36)
MCV RBC AUTO: 96 FL (ref 82–98)
MONOCYTES # BLD AUTO: 1 K/UL (ref 0.3–1)
MONOCYTES NFR BLD: 11.5 % (ref 4–15)
NEUTROPHILS # BLD AUTO: 6.1 K/UL (ref 1.8–7.7)
NEUTROPHILS NFR BLD: 68.5 % (ref 38–73)
NRBC BLD-RTO: 0 /100 WBC
PHOSPHATE SERPL-MCNC: 3.4 MG/DL (ref 2.7–4.5)
PLATELET # BLD AUTO: 205 K/UL (ref 150–350)
PMV BLD AUTO: 10.1 FL (ref 9.2–12.9)
POTASSIUM SERPL-SCNC: 3.5 MMOL/L (ref 3.5–5.1)
PROTHROMBIN TIME: 19.2 SEC (ref 9–12.5)
RBC # BLD AUTO: 3.83 M/UL (ref 4–5.4)
SODIUM SERPL-SCNC: 138 MMOL/L (ref 136–145)
WBC # BLD AUTO: 8.9 K/UL (ref 3.9–12.7)

## 2020-03-11 PROCEDURE — 63600175 PHARM REV CODE 636 W HCPCS: Mod: HCNC | Performed by: STUDENT IN AN ORGANIZED HEALTH CARE EDUCATION/TRAINING PROGRAM

## 2020-03-11 PROCEDURE — 87116 MYCOBACTERIA CULTURE: CPT | Mod: HCNC

## 2020-03-11 PROCEDURE — 37000008 HC ANESTHESIA 1ST 15 MINUTES: Mod: HCNC | Performed by: PODIATRIST

## 2020-03-11 PROCEDURE — 87102 FUNGUS ISOLATION CULTURE: CPT | Mod: HCNC

## 2020-03-11 PROCEDURE — 28003 TREATMENT OF FOOT INFECTION: CPT | Mod: 58,HCNC,LT, | Performed by: PODIATRIST

## 2020-03-11 PROCEDURE — 37000009 HC ANESTHESIA EA ADD 15 MINS: Mod: HCNC | Performed by: PODIATRIST

## 2020-03-11 PROCEDURE — 25000003 PHARM REV CODE 250: Mod: HCNC | Performed by: INTERNAL MEDICINE

## 2020-03-11 PROCEDURE — 87070 CULTURE OTHR SPECIMN AEROBIC: CPT | Mod: HCNC

## 2020-03-11 PROCEDURE — 28003 PR DEEP DISSEC FOOT INFEC,MULTIPLE: ICD-10-PCS | Mod: 58,HCNC,LT, | Performed by: PODIATRIST

## 2020-03-11 PROCEDURE — 87205 SMEAR GRAM STAIN: CPT | Mod: HCNC

## 2020-03-11 PROCEDURE — 80048 BASIC METABOLIC PNL TOTAL CA: CPT | Mod: HCNC

## 2020-03-11 PROCEDURE — 84100 ASSAY OF PHOSPHORUS: CPT | Mod: HCNC

## 2020-03-11 PROCEDURE — 25000003 PHARM REV CODE 250: Mod: HCNC | Performed by: PODIATRIST

## 2020-03-11 PROCEDURE — 87206 SMEAR FLUORESCENT/ACID STAI: CPT | Mod: HCNC

## 2020-03-11 PROCEDURE — S0020 INJECTION, BUPIVICAINE HYDRO: HCPCS | Mod: HCNC | Performed by: PODIATRIST

## 2020-03-11 PROCEDURE — 85025 COMPLETE CBC W/AUTO DIFF WBC: CPT | Mod: HCNC

## 2020-03-11 PROCEDURE — 87077 CULTURE AEROBIC IDENTIFY: CPT | Mod: HCNC

## 2020-03-11 PROCEDURE — A9585 GADOBUTROL INJECTION: HCPCS | Mod: HCNC | Performed by: INTERNAL MEDICINE

## 2020-03-11 PROCEDURE — 36415 COLL VENOUS BLD VENIPUNCTURE: CPT | Mod: HCNC

## 2020-03-11 PROCEDURE — 97110 THERAPEUTIC EXERCISES: CPT | Mod: HCNC,CQ

## 2020-03-11 PROCEDURE — 63600175 PHARM REV CODE 636 W HCPCS: Mod: HCNC | Performed by: NURSE ANESTHETIST, CERTIFIED REGISTERED

## 2020-03-11 PROCEDURE — 27200703 HC ULTRASOUND NDL GUIDE: Mod: HCNC | Performed by: STUDENT IN AN ORGANIZED HEALTH CARE EDUCATION/TRAINING PROGRAM

## 2020-03-11 PROCEDURE — 21400001 HC TELEMETRY ROOM: Mod: HCNC

## 2020-03-11 PROCEDURE — 25500020 PHARM REV CODE 255: Mod: HCNC | Performed by: INTERNAL MEDICINE

## 2020-03-11 PROCEDURE — 27200665 HC NERVE BLOCK NEEDLE/ CATHETER: Mod: HCNC | Performed by: STUDENT IN AN ORGANIZED HEALTH CARE EDUCATION/TRAINING PROGRAM

## 2020-03-11 PROCEDURE — 36000704 HC OR TIME LEV I 1ST 15 MIN: Mod: HCNC | Performed by: PODIATRIST

## 2020-03-11 PROCEDURE — 87186 SC STD MICRODIL/AGAR DIL: CPT | Mod: HCNC

## 2020-03-11 PROCEDURE — 25000003 PHARM REV CODE 250: Mod: HCNC | Performed by: HOSPITALIST

## 2020-03-11 PROCEDURE — 83735 ASSAY OF MAGNESIUM: CPT | Mod: HCNC

## 2020-03-11 PROCEDURE — 87015 SPECIMEN INFECT AGNT CONCNTJ: CPT | Mod: HCNC

## 2020-03-11 PROCEDURE — 63600175 PHARM REV CODE 636 W HCPCS: Mod: HCNC | Performed by: HOSPITALIST

## 2020-03-11 PROCEDURE — 85610 PROTHROMBIN TIME: CPT | Mod: HCNC

## 2020-03-11 PROCEDURE — 71000033 HC RECOVERY, INTIAL HOUR: Mod: HCNC | Performed by: PODIATRIST

## 2020-03-11 PROCEDURE — 25000003 PHARM REV CODE 250: Mod: HCNC | Performed by: STUDENT IN AN ORGANIZED HEALTH CARE EDUCATION/TRAINING PROGRAM

## 2020-03-11 PROCEDURE — 97530 THERAPEUTIC ACTIVITIES: CPT | Mod: HCNC,CQ

## 2020-03-11 PROCEDURE — 87075 CULTR BACTERIA EXCEPT BLOOD: CPT | Mod: HCNC

## 2020-03-11 PROCEDURE — 36000705 HC OR TIME LEV I EA ADD 15 MIN: Mod: HCNC | Performed by: PODIATRIST

## 2020-03-11 RX ORDER — SODIUM CHLORIDE 9 MG/ML
INJECTION, SOLUTION INTRAVENOUS CONTINUOUS PRN
Status: DISCONTINUED | OUTPATIENT
Start: 2020-03-11 | End: 2020-03-11

## 2020-03-11 RX ORDER — GADOBUTROL 604.72 MG/ML
10 INJECTION INTRAVENOUS
Status: COMPLETED | OUTPATIENT
Start: 2020-03-11 | End: 2020-03-11

## 2020-03-11 RX ORDER — LIDOCAINE HYDROCHLORIDE 10 MG/ML
INJECTION INFILTRATION; PERINEURAL
Status: DISCONTINUED | OUTPATIENT
Start: 2020-03-11 | End: 2020-03-11 | Stop reason: HOSPADM

## 2020-03-11 RX ORDER — ROPIVACAINE HYDROCHLORIDE 5 MG/ML
INJECTION, SOLUTION EPIDURAL; INFILTRATION; PERINEURAL
Status: DISCONTINUED | OUTPATIENT
Start: 2020-03-11 | End: 2020-03-11

## 2020-03-11 RX ORDER — FENTANYL CITRATE 50 UG/ML
INJECTION, SOLUTION INTRAMUSCULAR; INTRAVENOUS
Status: DISCONTINUED | OUTPATIENT
Start: 2020-03-11 | End: 2020-03-11

## 2020-03-11 RX ORDER — BUPIVACAINE HYDROCHLORIDE 2.5 MG/ML
INJECTION, SOLUTION INFILTRATION; PERINEURAL
Status: DISCONTINUED | OUTPATIENT
Start: 2020-03-11 | End: 2020-03-11 | Stop reason: HOSPADM

## 2020-03-11 RX ORDER — MIDAZOLAM HYDROCHLORIDE 1 MG/ML
INJECTION, SOLUTION INTRAMUSCULAR; INTRAVENOUS
Status: DISCONTINUED | OUTPATIENT
Start: 2020-03-11 | End: 2020-03-11

## 2020-03-11 RX ORDER — LIDOCAINE HCL/EPINEPHRINE/PF 2%-1:200K
VIAL (ML) INJECTION
Status: DISCONTINUED | OUTPATIENT
Start: 2020-03-11 | End: 2020-03-11

## 2020-03-11 RX ADMIN — PRAMIPEXOLE DIHYDROCHLORIDE 0.5 MG: 0.5 TABLET ORAL at 08:03

## 2020-03-11 RX ADMIN — DOCUSATE SODIUM 100 MG: 100 CAPSULE, LIQUID FILLED ORAL at 08:03

## 2020-03-11 RX ADMIN — DILTIAZEM HYDROCHLORIDE 240 MG: 120 CAPSULE, COATED, EXTENDED RELEASE ORAL at 08:03

## 2020-03-11 RX ADMIN — MIDAZOLAM 1 MG: 1 INJECTION INTRAMUSCULAR; INTRAVENOUS at 05:03

## 2020-03-11 RX ADMIN — LATANOPROST 1 DROP: 50 SOLUTION OPHTHALMIC at 08:03

## 2020-03-11 RX ADMIN — FENTANYL CITRATE 25 MCG: 50 INJECTION, SOLUTION INTRAMUSCULAR; INTRAVENOUS at 05:03

## 2020-03-11 RX ADMIN — OXYBUTYNIN CHLORIDE 5 MG: 5 TABLET ORAL at 08:03

## 2020-03-11 RX ADMIN — LIDOCAINE HYDROCHLORIDE,EPINEPHRINE BITARTRATE 20 ML: 20; .005 INJECTION, SOLUTION EPIDURAL; INFILTRATION; INTRACAUDAL; PERINEURAL at 04:03

## 2020-03-11 RX ADMIN — LOSARTAN POTASSIUM 50 MG: 50 TABLET ORAL at 08:03

## 2020-03-11 RX ADMIN — GABAPENTIN 600 MG: 300 CAPSULE ORAL at 08:03

## 2020-03-11 RX ADMIN — FENTANYL CITRATE 75 MCG: 50 INJECTION, SOLUTION INTRAMUSCULAR; INTRAVENOUS at 04:03

## 2020-03-11 RX ADMIN — ROPIVACAINE HYDROCHLORIDE 30 ML: 5 INJECTION, SOLUTION EPIDURAL; INFILTRATION; PERINEURAL at 04:03

## 2020-03-11 RX ADMIN — PANTOPRAZOLE SODIUM 40 MG: 40 TABLET, DELAYED RELEASE ORAL at 08:03

## 2020-03-11 RX ADMIN — TIMOLOL MALEATE 1 DROP: 5 SOLUTION/ DROPS OPHTHALMIC at 08:03

## 2020-03-11 RX ADMIN — CIPROFLOXACIN 400 MG: 2 INJECTION, SOLUTION INTRAVENOUS at 05:03

## 2020-03-11 RX ADMIN — ALPRAZOLAM 0.25 MG: 0.25 TABLET ORAL at 08:03

## 2020-03-11 RX ADMIN — ASPIRIN 81 MG: 81 TABLET, COATED ORAL at 08:03

## 2020-03-11 RX ADMIN — SODIUM CHLORIDE: 0.9 INJECTION, SOLUTION INTRAVENOUS at 04:03

## 2020-03-11 RX ADMIN — ATORVASTATIN CALCIUM 40 MG: 40 TABLET, FILM COATED ORAL at 08:03

## 2020-03-11 RX ADMIN — HYDROCHLOROTHIAZIDE 25 MG: 25 TABLET ORAL at 08:03

## 2020-03-11 RX ADMIN — GADOBUTROL 10 ML: 604.72 INJECTION INTRAVENOUS at 03:03

## 2020-03-11 NOTE — ANESTHESIA POSTPROCEDURE EVALUATION
Anesthesia Post Evaluation    Patient: Mis Ca    Procedure(s) Performed: Procedure(s) (LRB):  INCISION AND DRAINAGE, FOOT (Left)    Final Anesthesia Type: MAC    Patient location during evaluation: PACU  Patient participation: Yes- Able to Participate  Level of consciousness: awake and alert  Post-procedure vital signs: reviewed and stable  Pain management: adequate  Airway patency: patent    PONV status at discharge: No PONV  Anesthetic complications: no      Cardiovascular status: blood pressure returned to baseline  Respiratory status: unassisted  Hydration status: euvolemic            Vitals Value Taken Time   /74 3/11/2020  6:05 PM   Temp 36.6 °C (97.8 °F) 3/11/2020  5:50 PM   Pulse 80 3/11/2020  6:09 PM   Resp 21 3/11/2020  6:09 PM   SpO2 94 % 3/11/2020  6:09 PM   Vitals shown include unvalidated device data.      No case tracking events are documented in the log.      Pain/Bandar Score: Pain Rating Prior to Med Admin: 0 (3/10/2020  7:48 PM)  Pain Rating Post Med Admin: 0 (3/10/2020  7:48 PM)  Bandar Score: 10 (3/11/2020  5:50 PM)

## 2020-03-11 NOTE — PROGRESS NOTES
[facility]  Podiatry  Progress Note    Patient Name: Mis Ca  MRN: [id number]  Admission Date: 3/8/2020  Hospital Length of Stay: 2 days  Attending Physician: Anni Cuenca MD  Primary Care Provider: Scott Dillard MD   Int Hx: s/p removal of external fixator. Worsening pain, swelling and redness. Continues to deny F/N/V/C.    Scheduled Meds:   aspirin  81 mg Oral Daily    atorvastatin  40 mg Oral QHS    ciprofloxacin  400 mg Intravenous Q12H    diltiaZEM  240 mg Oral Daily    docusate sodium  100 mg Oral BID    gabapentin  600 mg Oral BID    hydroCHLOROthiazide  25 mg Oral Daily    latanoprost  1 drop Left Eye QHS    losartan  50 mg Oral Daily    oxybutynin  5 mg Oral Daily    pantoprazole  40 mg Oral Daily    pramipexole  0.5 mg Oral QHS    timolol maleate 0.5%  1 drop Left Eye Daily    vancomycin (VANCOCIN) IVPB  2,000 mg Intravenous Q24H    warfarin  6 mg Oral Daily     Continuous Infusions:  PRN Meds:acetaminophen, ALPRAZolam, oxyCODONE-acetaminophen, sodium chloride 0.9%    Review of patient's allergies indicates:   Allergen Reactions    Propofol analogues      Used for her Colonoscopy.  Extended delirium.  Advised not to take it again.      Adhesive     Compazine [prochlorperazine edisylate] Anxiety    Penicillins Rash    Sulfa (sulfonamide antibiotics) Rash        Past Medical History:   Diagnosis Date    Anticoagulant long-term use     Anxiety     Arthritis     Atrial fibrillation     Blind right eye     Bradycardia     Cancer     skin cancer left side of face under eye    Hyperlipidemia     Hypertension     PVC (premature ventricular contraction)     RLS (restless legs syndrome)     Sleep apnea     Does not use CPAP machine.     Past Surgical History:   Procedure Laterality Date    ADENOIDECTOMY      AKIN OSTEOTOMY Left 10/31/2018    Procedure: OSTEOTOMY, AKIN;  Surgeon: Rudolph Cardozo DPM;  Location: [facility] OR;  Service: Podiatry;   Laterality: Left;    APPENDECTOMY      BREAST BIOPSY Left     x3    BREAST SURGERY Left     breast biopsy x3    CATARACT EXTRACTION BILATERAL W/ ANTERIOR VITRECTOMY      ENDOSCOPIC GASTROCNEMIUS RECESSION Left 10/31/2018    Procedure: RECESSION, GASTROCNEMIUS, ENDOSCOPIC;  Surgeon: Rudolph Cardozo DPM;  Location: [facility] OR;  Service: Podiatry;  Laterality: Left;    EYE SURGERY Bilateral     cataracts extraction    EYE SURGERY Right     drainage tube (glaucoma)    FOOT ARTHRODESIS Left 1/15/2020    Procedure: FUSION, FOOT;  Surgeon: Rudolph Cardozo DPM;  Location: [facility] OR;  Service: Podiatry;  Laterality: Left;  mini c-arm, Arthrex screw  for hardware removal (Lydia notified), Orthofix (Niels notified) min ex-fix    FOOT SURGERY Left     lesion removed from dorsal area    FRACTURE SURGERY Left     arm    HAND TENDON SURGERY Left     HYSTERECTOMY      LAPIDUS BUNIONECTOMY Left 10/31/2018    Procedure: BUNIONECTOMY, LAPIDUS;  Surgeon: Rudolph Cardozo DPM;  Location: [facility] OR;  Service: Podiatry;  Laterality: Left;  mini c-arm, Arthrex locking plate (Lydia notified)    LAPIDUS BUNIONECTOMY Left 10/31/2018    Dr. Cardozo    Lymph Gland Removed  Right     groin (performed by Dr. Vergara)    NEURECTOMY Left 1/15/2020    Procedure: NEURECTOMY;  Surgeon: Rudolph Cardozo DPM;  Location: [facility] OR;  Service: Podiatry;  Laterality: Left;  bipolar bovie, vessel loop, possible [place] nerve wrap. Moshe with [place] notified and will be overnighting graft. MK 1/14/2020    OOPHORECTOMY      ORIF FOREARM FRACTURE Left     PALATE SURGERY      Lesion removed    TONSILLECTOMY         Family History     Problem Relation (Age of Onset)    Aneurysm Mother    Arthritis Sister    Atrial fibrillation Sister    Bipolar disorder Sister    Brain cancer Sister    Breast cancer Sister    COPD Sister    Cancer Father, Sister, Brother, Brother, Sister    Cirrhosis Father    Colon polyps Brother    Depression  Sister    Diabetes Sister, Brother, Brother, Sister, Sister, Sister    Glaucoma Brother, Sister    Heart attack Sister, Sister    Heart disease Sister, Sister, Sister    Hyperlipidemia Sister, Brother    Hypertension Sister, Brother    Kidney disease Sister    Lung cancer Father, Sister    Other Sister        Tobacco Use    Smoking status: Never Smoker    Smokeless tobacco: Never Used   Substance and Sexual Activity    Alcohol use: Yes     Comment: Seldomly drinks alcohol (wine)    Drug use: No    Sexual activity: Not Currently     Partners: Male     Review of Systems   Constitutional: Negative for chills and fever.   Cardiovascular: Positive for leg swelling.   Gastrointestinal: Negative for nausea and vomiting.   Musculoskeletal: Positive for gait problem.   Skin: Positive for color change and wound.     Objective:     Vital Signs (Most Recent):  Temp: 98.1 °F (36.7 °C) (03/10/20 1652)  Pulse: 81 (03/10/20 1652)  Resp: 17 (03/10/20 1652)  BP: (!) 142/68 (03/10/20 1652)  SpO2: 95 % (03/08/20 1949) Vital Signs (24h Range):  Temp:  [97.7 °F (36.5 °C)-99.7 °F (37.6 °C)] 98.1 °F (36.7 °C)  Pulse:  [67-84] 81  Resp:  [16-20] 17  BP: (118-169)/(54-78) 142/68     Weight: 95.5 kg (210 lb 8.6 oz)  Body mass index is 38.51 kg/m².    Foot Exam    Left Foot/Ankle      Inspection and Palpation  Tenderness: (Tenderness around the pin site and across the medial dorsal foot.)  Swelling: (Left foot edema)  Skin Exam: cellulitis and erythema; no drainage     Neurovascular  Dorsalis pedis: 2+  Posterior tibial: 2+            Laboratory:  CBC:   Recent Labs   Lab 03/10/20  0550   WBC 9.39   RBC 3.73*   HGB 11.9*   HCT 36.4*      MCV 98   MCH 31.9*   MCHC 32.7     CMP:   Recent Labs   Lab 03/08/20  1536  03/10/20  0550   GLU 77   < > 132*   CALCIUM 10.0   < > 9.7   ALBUMIN 4.0  --   --    PROT 7.2  --   --       < > 136   K 3.7   < > 4.4   CO2 28   < > 32*      < > 97   BUN 14   < > 15   CREATININE 0.8   < > 1.0    ALKPHOS 131  --   --    ALT 17  --   --    AST 15  --   --    BILITOT 0.4  --   --     < > = values in this interval not displayed.     CRP:   Recent Labs   Lab 03/09/20 0939   CRP 86.3*     ESR:   Recent Labs   Lab 03/09/20 0939   SEDRATE 38*       Diagnostic Results:  I have reviewed all pertinent imaging results/findings within the past 24 hours.    Clinical Findings:  There is edema and erythema to the left foot. Patient reports it is improved since admission.  No significant drainage or purulence. No fluctuance or crepitus. Tenderness to the distal pin sites and dorsal medial foot. The ex fix is still well aligned and intact without loosening. No signs of trauma.             3/10/20  Purulent drainage from the distal pin site today. Fluctuance felt at the 1st interspace of the left foot.               Assessment/Plan:     * Infected hardware in left leg, initial encounter  See cellulitis    Cellulitis  Patient has worsening pain, swelling and cellulitis today with purulent drainage from the pin site. There is new fluctuance over the 1st interspace possibly from new formed abscess.    Plan:  - Continue antibiotics. Pending cultures.  - Possible InD tomorrow in the OR pending MRI results.  - NPO MN.   - Patient can hold off on PT for now. Minimal WB to heel for short transfers only.  - Podiatry will follow.     Anticoagulated on Coumadin  Per primary        Kevin Cat MD  Podiatry  [facility]

## 2020-03-11 NOTE — PLAN OF CARE
"VSS. Denies pain or discomfort @ this time. "Ok to release to room", per Dr Morrell. Report called to pt's nurse OLEKSANDR Leigh.   "

## 2020-03-11 NOTE — PLAN OF CARE
Pt vitals were maintained. Pt has been NPO since midnight due to future I&D on left foot. Pt had a pur wick started due to being SOB when ambulating and NWB on that left foot. Will continue to monitor

## 2020-03-11 NOTE — SUBJECTIVE & OBJECTIVE
Interval History:   Patient and 2 daughters  Complains of pain in the left foot.   Podiatry came by earlier and expressed some pus.  She will be going to surgery today for another washout/debridement    She denies chest pain. Has some shortness of breath when she has to move around a lot.   She has a history of sleep apnea. No home oxygen. States that her oxygen levels usually run low.  She did have a large BM this AM    Review of Systems   Constitutional: Negative for fever.   Respiratory: Positive for shortness of breath. Negative for cough.    Cardiovascular: Negative for chest pain.   Gastrointestinal: Negative for constipation.     Objective:     Vital Signs (Most Recent):  Temp: 97.7 °F (36.5 °C) (03/11/20 1130)  Pulse: 72 (03/11/20 1156)  Resp: 18 (03/11/20 1130)  BP: (!) 119/59 (03/11/20 1130)  SpO2: 95 % (03/08/20 1949) Vital Signs (24h Range):  Temp:  [97.7 °F (36.5 °C)-99.2 °F (37.3 °C)] 97.7 °F (36.5 °C)  Pulse:  [70-81] 72  Resp:  [16-18] 18  BP: (118-142)/(54-68) 119/59     Weight: 96.4 kg (212 lb 8.4 oz)  Body mass index is 38.87 kg/m².    Intake/Output Summary (Last 24 hours) at 3/11/2020 1215  Last data filed at 3/11/2020 0700  Gross per 24 hour   Intake 1110 ml   Output 1400 ml   Net -290 ml      Physical Exam   Constitutional: She appears well-developed and well-nourished. No distress.   Eyes: Conjunctivae are normal.   Cardiovascular: Normal rate, regular rhythm and normal heart sounds.   Pulmonary/Chest: Effort normal and breath sounds normal. She has no wheezes. She has no rales.   Abdominal: There is no tenderness.   Musculoskeletal: She exhibits no edema.   Left foor is dressed over surgical wound but laterally there is persistent erythema and warmth   Lymphadenopathy:     She has no cervical adenopathy.   Nursing note and vitals reviewed.      Significant Labs:   BMP:   Recent Labs   Lab 03/11/20  0755   *      K 3.5   CL 96   CO2 31*   BUN 15   CREATININE 0.8   CALCIUM 9.9   MG  2.0     CBC:   Recent Labs   Lab 03/10/20  0550 03/11/20  0755   WBC 9.39 8.90   HGB 11.9* 12.1   HCT 36.4* 36.8*    205     Microbiology Results (last 7 days)     Procedure Component Value Units Date/Time    Aerobic culture [[id number]]  (Abnormal) Collected:  03/09/20 1757    Order Status:  Completed Specimen:  Wound from Foot, Left Updated:  03/11/20 0916     Aerobic Bacterial Culture STAPHYLOCOCCUS AUREUS  Many  Susceptibility pending      Narrative:       Pin site    Aerobic culture [[id number]]  (Abnormal) Collected:  03/09/20 1759    Order Status:  Completed Specimen:  Wound from Foot, Left Updated:  03/11/20 0828     Aerobic Bacterial Culture STAPHYLOCOCCUS AUREUS  Many  Susceptibility pending      Narrative:       Pin site    Culture, Anaerobe [[id number]] Collected:  03/09/20 1757    Order Status:  Completed Specimen:  Wound from Foot, Left Updated:  03/11/20 0731     Anaerobic Culture Culture in progress    Culture, Anaerobe [[id number]] Collected:  03/09/20 1759    Order Status:  Completed Specimen:  Wound from Foot, Left Updated:  03/11/20 0731     Anaerobic Culture Culture in progress    Blood Culture #2 **CANNOT BE ORDERED STAT** [[id number]] Collected:  03/08/20 1533    Order Status:  Completed Specimen:  Blood from Peripheral, Antecubital, Left Updated:  03/10/20 2212     Blood Culture, Routine No Growth to date      No Growth to date      No Growth to date    Blood Culture #1 **CANNOT BE ORDERED STAT** [[id number]] Collected:  03/08/20 1525    Order Status:  Completed Specimen:  Blood from Peripheral, Forearm, Right Updated:  03/10/20 2212     Blood Culture, Routine No Growth to date      No Growth to date      No Growth to date    Blood culture (site 2) [[id number]] Collected:  03/08/20 1715    Order Status:  Sent Specimen:  Blood from Peripheral, Forearm, Left Updated:  03/08/20 1715          Significant Imaging: I have reviewed all pertinent imaging results/findings within the past 24 hours.

## 2020-03-11 NOTE — BRIEF OP NOTE
[facility]  Brief Operative Note    SUMMARY     Surgery Date: 3/11/2020     Surgeon(s) and Role:     * Rudolph Cardozo DPM - Primary    Assisting Surgeon: None    Pre-op Diagnosis:  Cellulitis [L03.90]  Infection at site of external fixator pin, initial encounter [T84.7XXA]    Post-op Diagnosis:  Post-Op Diagnosis Codes:     * Cellulitis [L03.90]     * Infection at site of external fixator pin, initial encounter [T84.7XXA]    Procedure(s) (LRB):  INCISION AND DRAINAGE, FOOT (Left)    Anesthesia: Regional    Description of Procedure:  incision and drainage dorsal left foot below fascia.    Description of the findings of the procedure:  Infection communicate from distal medial 1st ray pin site to the plantar 1st interspace.  Mild necrosis of the fatty layer and fascial layer around the pin site.  Approximately 5-10 mL of mixed purulent drainage and hematoma observed    Estimated Blood Loss:  Less than 20 mL         Specimens:   Specimen (12h ago, onward)    None

## 2020-03-11 NOTE — ANESTHESIA PROCEDURE NOTES
Peripheral Block    Patient location during procedure: OR   Block not for primary anesthetic.  Reason for block: at surgeon's request and post-op pain management   Post-op Pain Location: Left Foot  Start time: 3/11/2020 4:36 PM  Timeout: 3/11/2020 4:35 PM   End time: 3/11/2020 4:45 PM    Staffing  Authorizing Provider: Ildefonso Dalal MD  Performing Provider: Ildefonso Dalal MD    Staffing  Other anesthesia staff: Shamar Morrell MD  Preanesthetic Checklist  Completed: patient identified, site marked, surgical consent, pre-op evaluation, timeout performed, IV checked, risks and benefits discussed and monitors and equipment checked  Peripheral Block  Patient position: supine  Prep: ChloraPrep  Patient monitoring: cardiac monitor, continuous pulse ox, continuous capnometry, frequent blood pressure checks and heart rate  Block type: adductor canal and popliteal  Laterality: left  Injection technique: single shot  Needle  Needle type: Quincke   Needle gauge: 21 G  Needle length: 3.5 in  Needle localization: anatomical landmarks     Assessment  Injection assessment: negative aspiration  Additional Notes  20 mL of 0.5% ropicavaine/2% lidocaine with epi injected for popliteal nerve block    15 mL of 0.5%/2% lidocaine wip 1:200k epi mixture injected for adductor canal

## 2020-03-11 NOTE — ANESTHESIA PREPROCEDURE EVALUATION
03/11/2020  Mis Ca is a 79 y.o., female scheduled for left foot debridement under MAC/regional.     Past Medical History:   Diagnosis Date    Anticoagulant long-term use     Anxiety     Arthritis     Atrial fibrillation     Blind right eye     Bradycardia     Cancer     skin cancer left side of face under eye    Hyperlipidemia     Hypertension     PVC (premature ventricular contraction)     RLS (restless legs syndrome)     Sleep apnea     Does not use CPAP machine.     Past Surgical History:   Procedure Laterality Date    ADENOIDECTOMY      AKIN OSTEOTOMY Left 10/31/2018    Procedure: OSTEOTOMY, AKIN;  Surgeon: Rudolph Cardozo DPM;  Location: [facility] OR;  Service: Podiatry;  Laterality: Left;    APPENDECTOMY      BREAST BIOPSY Left     x3    BREAST SURGERY Left     breast biopsy x3    CATARACT EXTRACTION BILATERAL W/ ANTERIOR VITRECTOMY      ENDOSCOPIC GASTROCNEMIUS RECESSION Left 10/31/2018    Procedure: RECESSION, GASTROCNEMIUS, ENDOSCOPIC;  Surgeon: Rudolph Cardozo DPM;  Location: [facility] OR;  Service: Podiatry;  Laterality: Left;    EYE SURGERY Bilateral     cataracts extraction    EYE SURGERY Right     drainage tube (glaucoma)    FOOT ARTHRODESIS Left 1/15/2020    Procedure: FUSION, FOOT;  Surgeon: Rudolph Cardozo DPM;  Location: [facility] OR;  Service: Podiatry;  Laterality: Left;  mini c-arm, Arthrex screw  for hardware removal (Lydia notified), Orthofix (Niels notified) min ex-fix    FOOT SURGERY Left     lesion removed from dorsal area    FRACTURE SURGERY Left     arm    HAND TENDON SURGERY Left     HYSTERECTOMY      LAPIDUS BUNIONECTOMY Left 10/31/2018    Procedure: BUNIONECTOMY, LAPIDUS;  Surgeon: Rudolph Cardozo DPM;  Location: [facility] OR;  Service: Podiatry;  Laterality: Left;  mini c-arm, Arthrex locking plate (Lydia notified)    DUANEIDUS  BUNIONECTOMY Left 10/31/2018    Dr. Cardozo    Lymph Gland Removed  Right     groin (performed by Dr. Vergara)    NEURECTOMY Left 1/15/2020    Procedure: NEURECTOMY;  Surgeon: Rudolph Cardozo DPM;  Location: [facility];  Service: Podiatry;  Laterality: Left;  bipolar bovie, vessel loop, possible [place] nerve wrap. Moshe with [place] notified and will be overnighting graft. MK 1/14/2020    OOPHORECTOMY      ORIF FOREARM FRACTURE Left     PALATE SURGERY      Lesion removed    TONSILLECTOMY           Pre-op Assessment    I have reviewed the Patient Summary Reports.     I have reviewed the Nursing Notes.   I have reviewed the Medications.     Review of Systems  Anesthesia Hx:  Hx of Anesthetic complications Post op delerium. Personal Hx of Anesthesia complications   Social:  Non-Smoker, Social Alcohol Use    Hematology/Oncology:  Hematology Normal      Hematology Comments: On Coumadin, will get instructions from PCP   Cardiovascular:   Hypertension Dysrhythmias (PAF)  Denies Angina.    Pulmonary:   Sleep Apnea    Renal/:  Renal/ Normal     Hepatic/GI:   GERD    Neurological:  Neurology Normal    Endocrine:  Endocrine Normal        Physical Exam  General:  Well nourished    Airway/Jaw/Neck:  Airway Findings: Mouth Opening: Normal Mallampati: III       Chest/Lungs:  Chest/Lungs Findings: Clear to auscultation, Normal Respiratory Rate     Heart/Vascular:  Heart Findings: Rate: Normal  Rhythm: Regular Rhythm  Sounds: Normal             Anesthesia Plan  Type of Anesthesia, risks & benefits discussed:  Anesthesia Type:  MAC, regional  Patient's Preference:   Intra-op Monitoring Plan: standard ASA monitors  Intra-op Monitoring Plan Comments:   Post Op Pain Control Plan: multimodal analgesia  Post Op Pain Control Plan Comments:   Induction:   IV  Beta Blocker:  Patient is not currently on a Beta-Blocker (No further documentation required).       Informed Consent:    ASA Score: 3     Day of Surgery Review of History &  Physical:        Anesthesia Plan Notes:           Ready For Surgery From Anesthesia Perspective.

## 2020-03-11 NOTE — PT/OT/SLP PROGRESS
"Physical Therapy Treatment    Patient Name:  Mis Ca   MRN:  [id number]    Recommendations:     Discharge Recommendations:  ( Home with Daughter)   Discharge Equipment Recommendations: bath bench   Barriers to discharge: None    Assessment:     Mis Ca is a 79 y.o. female admitted with a medical diagnosis of Infected hardware in left leg, initial encounter.  She presents with the following impairments/functional limitations:  weakness, impaired endurance, impaired self care skills, impaired functional mobilty, gait instability, impaired balance, decreased coordination, decreased upper extremity function, decreased lower extremity function, decreased safety awareness, pain, decreased ROM, impaired skin, impaired coordination, edema, impaired joint extensibility. Pt able to sit at EOB ~20 minutes and performed seated therapeutic exercises 1 x 10 reps BLE's. Also able to transfer from EOB to B/S chair without AD and requiring CGA/min A. Would benefit from continued PT services to increase pt's functional mobility level prior to admission.    Rehab Prognosis: Good; patient would benefit from acute skilled PT services to address these deficits and reach maximum level of function.    Recent Surgery: Procedure(s) (LRB):  INCISION AND DRAINAGE, FOOT (Left)      Plan:     During this hospitalization, patient to be seen 6 x/week to address the identified rehab impairments via gait training, therapeutic activities, therapeutic exercises, neuromuscular re-education and progress toward the following goals:    · Plan of Care Expires:  04/10/20    Subjective     Chief Complaint: None Expressed  Patient/Family Comments/goals: "Okay I will see what I can do for you".  Pain/Comfort:  · Pain Rating 1: 4/10  · Location - Side 1: Left  · Location - Orientation 1: generalized  · Location 1: foot  · Pain Addressed 1: Distraction, Cessation of Activity, Reposition  · Pain Rating Post-Intervention 1: 8/10      Objective: "     Communicated with  nurse prior to session.  Patient found supine with peripheral IV upon PT entry to room.     General Precautions: Standard, fall   Orthopedic Precautions:LLE non weight bearing   Braces: N/A     Functional Mobility:  · Bed Mobility:     · Rolling Right: supervision and stand by assistance  · Scooting: supervision, stand by assistance and  to EOB  · Supine to Sit: stand by assistance  · Transfers:     · Sit to Stand:  contact guard assistance with no AD and rolling walker  · Gait:  ~2 hop steps from EOB to B/S commode and ~2 hop steps from EOB to B/S chair with and without RW and CGA/min A      AM-PAC 6 CLICK MOBILITY  Turning over in bed (including adjusting bedclothes, sheets and blankets)?: 4  Sitting down on and standing up from a chair with arms (e.g., wheelchair, bedside commode, etc.): 3  Moving from lying on back to sitting on the side of the bed?: 3  Moving to and from a bed to a chair (including a wheelchair)?: 3  Need to walk in hospital room?: 3  Climbing 3-5 steps with a railing?: 1  Basic Mobility Total Score: 17       Therapeutic Activities and Exercises:   Pt sat at EOB ~20 minutes with SBA/sup. Performed seated therapeutic exercises 1 x 10 reps consisting of LAQ's, hip add/abd, hip/knee flexion, also 1 x 10 reps of glut isometrics. 3 trials of sit to stand transfer exercises with RW and CGA. Transferred from EOB to B/S commode with ~2 hop steps and ~2 hop steps LLE NWB from EOB to B/S chair with CGA/Min A WITHOUT RW. When finished on B/S commode pt able to perform self hygiene.     Patient left up in chair with all lines intact, call button in reach, chair alarm on,  nurse notified and  family present..    GOALS:   Multidisciplinary Problems     Physical Therapy Goals        Problem: Physical Therapy Goal    Goal Priority Disciplines Outcome Goal Variances Interventions   Physical Therapy Goal     PT, PT/OT Ongoing, Progressing     Description:  Pt would benefit from skilled PT  to progress functional mobility.  Goals include:  1.  Sit in chair 2 hours.  2.  Bed to/from chair with supervision with RW with NWB LLE  3.  Ambulate 75' with RW with supervision with NWB LLE  4.  Ind HEP                    Time Tracking:     PT Received On: 03/11/20  PT Start Time: 1056     PT Stop Time: 1131  PT Total Time (min): 35 min     Billable Minutes: Therapeutic Activity  20 and Therapeutic Exercise  15    Treatment Type: Treatment  PT/PTA: PTA     PTA Visit Number: 1     Magalys Marshall, PTA  03/11/2020

## 2020-03-11 NOTE — PLAN OF CARE
Problem: Physical Therapy Goal  Goal: Physical Therapy Goal  Description  Pt would benefit from skilled PT to progress functional mobility.  Goals include:  1.  Sit in chair 2 hours.  2.  Bed to/from chair with supervision with RW with NWB LLE  3.  Ambulate 75' with RW with supervision with NWB LLE  4.  Ind HEP   Outcome: Ongoing, Progressing   Pt continues to work and progress toward established goals. Able to transfer from EOB to B/S chair with a step pivot transfer, no AD, LLE NWB, and CGA/min A.

## 2020-03-11 NOTE — ASSESSMENT & PLAN NOTE
Patient has worsening pain, swelling and cellulitis today with purulent drainage from the pin site. There is new fluctuance over the 1st interspace possibly from new formed abscess.    Plan:  - Continue antibiotics. Pending cultures.  - Possible InD tomorrow in the OR pending MRI results.  - NPO MN.   - Patient can hold off on PT for now. Minimal WB to heel for short transfers only.  - Podiatry will follow.

## 2020-03-11 NOTE — ASSESSMENT & PLAN NOTE
POD #2 Hardware removal  On vanc and cipro   Dr Cardozo following  CX with staph, await sens.  For MRI and surgery today

## 2020-03-11 NOTE — NURSING
Patient returned to room from PACU.  Left foot is elevated on pillow and is intact with dressings from surgery.  Denies pain at this time.  Pure-wick applied.  Regular diet given.  Safety maintained.  Will continue to monitor.

## 2020-03-11 NOTE — NURSING
Patient left for surgery per stretcher per Surgery staff.  Daughters accompany patient to 2nd Floor.

## 2020-03-11 NOTE — TRANSFER OF CARE
"Anesthesia Transfer of Care Note    Patient: Mis Ca    Procedure(s) Performed: Procedure(s) (LRB):  INCISION AND DRAINAGE, FOOT (Left)    Patient location: PACU    Anesthesia Type: MAC    Transport from OR: Transported from OR on room air with adequate spontaneous ventilation    Post pain: adequate analgesia    Post assessment: no apparent anesthetic complications and tolerated procedure well    Post vital signs: stable    Level of consciousness: awake, alert and oriented    Nausea/Vomiting: no nausea/vomiting    Complications: none    Transfer of care protocol was followed      Last vitals:   Visit Vitals  BP (!) 149/65   Pulse 79   Temp 36.6 °C (97.8 °F) (Temporal)   Resp 18   Ht 5' 2" (1.575 m)   Wt 96.4 kg (212 lb 8.4 oz)   SpO2 95%   Breastfeeding? No   BMI 38.87 kg/m²     "

## 2020-03-11 NOTE — PROGRESS NOTES
"[facility] Hospital Medicine  Progress Note    Patient Name: Mis Ca  MRN: [id number]  Patient Class: IP- Inpatient   Admission Date: 3/8/2020  Length of Stay: 3 days  Attending Physician: Anni Cuenca MD  Primary Care Provider: Scott Dillard MD        Subjective:     Principal Problem:Infected hardware in left leg, initial encounter        HPI:  The patient is a 79 years old. female with PMH HTN, HLP, anxiety,Atrial fibrillation, Blind right eye, Bradycardia, RLS, and Sleep apnea presents with complaint of possible wound infection to left foot. Patient states she had surgery on January 15, 2020 to foot by Dr. Cardozo to "fix bones that did not heal correctly after bunion surgery".  Patient reports no complications to foot until yesterday when she noticed swelling, erythema and pain worsening. She also reports scant amount of white drainage from wound this morning. Patient denies any fever, chills, abdominal pain, nausea or vomiting. Of note, patient last saw Dr. Cardozo two weeks ago and has upcoming appointment in four days.    The pt does not put weight yet on her foot, and her surgical site is not  Having pain from the sx site itself but the swelling and edema around it    Overview/Hospital Course:  3/9 the pt seen, no acute events, had Dr Cardozo evaluating her foot, ordered a ct foot, awaiting rwecs. For now continue POC with IV abx    Interval History:   Patient and 2 daughters  Complains of pain in the left foot.   Podiatry came by earlier and expressed some pus.  She will be going to surgery today for another washout/debridement    She denies chest pain. Has some shortness of breath when she has to move around a lot.   She has a history of sleep apnea. No home oxygen. States that her oxygen levels usually run low.  She did have a large BM this AM    Review of Systems   Constitutional: Negative for fever.   Respiratory: Positive for shortness of breath. Negative for cough.  "   Cardiovascular: Negative for chest pain.   Gastrointestinal: Negative for constipation.     Objective:     Vital Signs (Most Recent):  Temp: 97.7 °F (36.5 °C) (03/11/20 1130)  Pulse: 72 (03/11/20 1156)  Resp: 18 (03/11/20 1130)  BP: (!) 119/59 (03/11/20 1130)  SpO2: 95 % (03/08/20 1949) Vital Signs (24h Range):  Temp:  [97.7 °F (36.5 °C)-99.2 °F (37.3 °C)] 97.7 °F (36.5 °C)  Pulse:  [70-81] 72  Resp:  [16-18] 18  BP: (118-142)/(54-68) 119/59     Weight: 96.4 kg (212 lb 8.4 oz)  Body mass index is 38.87 kg/m².    Intake/Output Summary (Last 24 hours) at 3/11/2020 1215  Last data filed at 3/11/2020 0700  Gross per 24 hour   Intake 1110 ml   Output 1400 ml   Net -290 ml      Physical Exam   Constitutional: She appears well-developed and well-nourished. No distress.   Eyes: Conjunctivae are normal.   Cardiovascular: Normal rate, regular rhythm and normal heart sounds.   Pulmonary/Chest: Effort normal and breath sounds normal. She has no wheezes. She has no rales.   Abdominal: There is no tenderness.   Musculoskeletal: She exhibits no edema.   Left foor is dressed over surgical wound but laterally there is persistent erythema and warmth   Lymphadenopathy:     She has no cervical adenopathy.   Nursing note and vitals reviewed.      Significant Labs:   BMP:   Recent Labs   Lab 03/11/20  0755   *      K 3.5   CL 96   CO2 31*   BUN 15   CREATININE 0.8   CALCIUM 9.9   MG 2.0     CBC:   Recent Labs   Lab 03/10/20  0550 03/11/20  0755   WBC 9.39 8.90   HGB 11.9* 12.1   HCT 36.4* 36.8*    205     Microbiology Results (last 7 days)     Procedure Component Value Units Date/Time    Aerobic culture [[id number]]  (Abnormal) Collected:  03/09/20 2302    Order Status:  Completed Specimen:  Wound from Foot, Left Updated:  03/11/20 0916     Aerobic Bacterial Culture STAPHYLOCOCCUS AUREUS  Many  Susceptibility pending      Narrative:       Pin site    Aerobic culture [[id number]]  (Abnormal) Collected:  03/09/20 7249     Order Status:  Completed Specimen:  Wound from Foot, Left Updated:  03/11/20 0828     Aerobic Bacterial Culture STAPHYLOCOCCUS AUREUS  Many  Susceptibility pending      Narrative:       Pin site    Culture, Anaerobe [[id number]] Collected:  03/09/20 1757    Order Status:  Completed Specimen:  Wound from Foot, Left Updated:  03/11/20 0731     Anaerobic Culture Culture in progress    Culture, Anaerobe [[id number]] Collected:  03/09/20 1759    Order Status:  Completed Specimen:  Wound from Foot, Left Updated:  03/11/20 0731     Anaerobic Culture Culture in progress    Blood Culture #2 **CANNOT BE ORDERED STAT** [[id number]] Collected:  03/08/20 1533    Order Status:  Completed Specimen:  Blood from Peripheral, Antecubital, Left Updated:  03/10/20 2212     Blood Culture, Routine No Growth to date      No Growth to date      No Growth to date    Blood Culture #1 **CANNOT BE ORDERED STAT** [[id number]] Collected:  03/08/20 1525    Order Status:  Completed Specimen:  Blood from Peripheral, Forearm, Right Updated:  03/10/20 2212     Blood Culture, Routine No Growth to date      No Growth to date      No Growth to date    Blood culture (site 2) [[id number]] Collected:  03/08/20 1715    Order Status:  Sent Specimen:  Blood from Peripheral, Forearm, Left Updated:  03/08/20 1715          Significant Imaging: I have reviewed all pertinent imaging results/findings within the past 24 hours.      Assessment/Plan:      * Infected hardware in left leg, initial encounter  POD #2 Hardware removal  On vanc and cipro   Dr Cardozo following  CX with stapdanny, await sens.  For MRI and surgery today      Constipation    Maintain on colace.  Had a BM 3-11-20    Cellulitis  See #1      Diastolic dysfunction  Continue home meds      Essential hypertension  Presently on HCTZ, Losartan, Cardizem      Hyperlipidemia  Continue statin      Anticoagulated on Coumadin  Goal 2-3  Currently at 1.8  Monitor      JACQUELYN (obstructive sleep apnea)  Monitor  Will  get CXR    Paroxysmal atrial fibrillation  continue coumadin for anticoagulation  And cardiazen for rate control    INR 1.8      VTE Risk Mitigation (From admission, onward)         Ordered     warfarin (COUMADIN) tablet 6 mg  Daily      03/08/20 1714     IP VTE HIGH RISK PATIENT  Once      03/08/20 1714     Place sequential compression device  Until discontinued      03/08/20 1714                      Anni Cuenca MD  Department of Hospital Medicine   [facility]

## 2020-03-12 LAB
ANION GAP SERPL CALC-SCNC: 10 MMOL/L (ref 8–16)
BACTERIA SPEC AEROBE CULT: ABNORMAL
BACTERIA SPEC AEROBE CULT: ABNORMAL
BASOPHILS # BLD AUTO: 0.02 K/UL (ref 0–0.2)
BASOPHILS NFR BLD: 0.2 % (ref 0–1.9)
BUN SERPL-MCNC: 18 MG/DL (ref 8–23)
CALCIUM SERPL-MCNC: 9.8 MG/DL (ref 8.7–10.5)
CHLORIDE SERPL-SCNC: 94 MMOL/L (ref 95–110)
CO2 SERPL-SCNC: 32 MMOL/L (ref 23–29)
CREAT SERPL-MCNC: 0.9 MG/DL (ref 0.5–1.4)
DIFFERENTIAL METHOD: ABNORMAL
EOSINOPHIL # BLD AUTO: 0.2 K/UL (ref 0–0.5)
EOSINOPHIL NFR BLD: 1.8 % (ref 0–8)
ERYTHROCYTE [DISTWIDTH] IN BLOOD BY AUTOMATED COUNT: 12.6 % (ref 11.5–14.5)
EST. GFR  (AFRICAN AMERICAN): >60 ML/MIN/1.73 M^2
EST. GFR  (NON AFRICAN AMERICAN): >60 ML/MIN/1.73 M^2
GLUCOSE SERPL-MCNC: 114 MG/DL (ref 70–110)
GRAM STN SPEC: NORMAL
GRAM STN SPEC: NORMAL
HCT VFR BLD AUTO: 36.4 % (ref 37–48.5)
HGB BLD-MCNC: 12 G/DL (ref 12–16)
IMM GRANULOCYTES # BLD AUTO: 0.03 K/UL (ref 0–0.04)
IMM GRANULOCYTES NFR BLD AUTO: 0.3 % (ref 0–0.5)
INR PPP: 1.5 (ref 0.8–1.2)
LYMPHOCYTES # BLD AUTO: 2 K/UL (ref 1–4.8)
LYMPHOCYTES NFR BLD: 22.2 % (ref 18–48)
MCH RBC QN AUTO: 31.7 PG (ref 27–31)
MCHC RBC AUTO-ENTMCNC: 33 G/DL (ref 32–36)
MCV RBC AUTO: 96 FL (ref 82–98)
MONOCYTES # BLD AUTO: 1.1 K/UL (ref 0.3–1)
MONOCYTES NFR BLD: 11.8 % (ref 4–15)
NEUTROPHILS # BLD AUTO: 5.8 K/UL (ref 1.8–7.7)
NEUTROPHILS NFR BLD: 63.7 % (ref 38–73)
NRBC BLD-RTO: 0 /100 WBC
PLATELET # BLD AUTO: 220 K/UL (ref 150–350)
PMV BLD AUTO: 10 FL (ref 9.2–12.9)
POTASSIUM SERPL-SCNC: 3.5 MMOL/L (ref 3.5–5.1)
PROTHROMBIN TIME: 15.7 SEC (ref 9–12.5)
RBC # BLD AUTO: 3.79 M/UL (ref 4–5.4)
SODIUM SERPL-SCNC: 136 MMOL/L (ref 136–145)
WBC # BLD AUTO: 9.1 K/UL (ref 3.9–12.7)

## 2020-03-12 PROCEDURE — 85610 PROTHROMBIN TIME: CPT | Mod: HCNC

## 2020-03-12 PROCEDURE — 97110 THERAPEUTIC EXERCISES: CPT | Mod: HCNC | Performed by: PHYSICAL THERAPIST

## 2020-03-12 PROCEDURE — 63600175 PHARM REV CODE 636 W HCPCS: Mod: HCNC | Performed by: PODIATRIST

## 2020-03-12 PROCEDURE — 80048 BASIC METABOLIC PNL TOTAL CA: CPT | Mod: HCNC

## 2020-03-12 PROCEDURE — 85025 COMPLETE CBC W/AUTO DIFF WBC: CPT | Mod: HCNC

## 2020-03-12 PROCEDURE — 25000003 PHARM REV CODE 250: Mod: HCNC | Performed by: PODIATRIST

## 2020-03-12 PROCEDURE — 97530 THERAPEUTIC ACTIVITIES: CPT | Mod: HCNC | Performed by: PHYSICAL THERAPIST

## 2020-03-12 PROCEDURE — 99024 PR POST-OP FOLLOW-UP VISIT: ICD-10-PCS | Mod: HCNC,,, | Performed by: PODIATRIST

## 2020-03-12 PROCEDURE — 36415 COLL VENOUS BLD VENIPUNCTURE: CPT | Mod: HCNC

## 2020-03-12 PROCEDURE — 21400001 HC TELEMETRY ROOM: Mod: HCNC

## 2020-03-12 PROCEDURE — 25000003 PHARM REV CODE 250: Mod: HCNC | Performed by: INTERNAL MEDICINE

## 2020-03-12 PROCEDURE — 99024 POSTOP FOLLOW-UP VISIT: CPT | Mod: HCNC,,, | Performed by: PODIATRIST

## 2020-03-12 RX ORDER — WARFARIN 3 MG/1
3 TABLET ORAL ONCE
Status: COMPLETED | OUTPATIENT
Start: 2020-03-12 | End: 2020-03-12

## 2020-03-12 RX ADMIN — OXYCODONE HYDROCHLORIDE AND ACETAMINOPHEN 1 TABLET: 5; 325 TABLET ORAL at 07:03

## 2020-03-12 RX ADMIN — ASPIRIN 81 MG: 81 TABLET, COATED ORAL at 09:03

## 2020-03-12 RX ADMIN — ATORVASTATIN CALCIUM 40 MG: 40 TABLET, FILM COATED ORAL at 10:03

## 2020-03-12 RX ADMIN — WARFARIN SODIUM 6 MG: 6 TABLET ORAL at 05:03

## 2020-03-12 RX ADMIN — OXYCODONE HYDROCHLORIDE AND ACETAMINOPHEN 1 TABLET: 5; 325 TABLET ORAL at 12:03

## 2020-03-12 RX ADMIN — LOSARTAN POTASSIUM 50 MG: 50 TABLET ORAL at 09:03

## 2020-03-12 RX ADMIN — PRAMIPEXOLE DIHYDROCHLORIDE 0.5 MG: 0.5 TABLET ORAL at 10:03

## 2020-03-12 RX ADMIN — DILTIAZEM HYDROCHLORIDE 240 MG: 120 CAPSULE, COATED, EXTENDED RELEASE ORAL at 09:03

## 2020-03-12 RX ADMIN — WARFARIN SODIUM 3 MG: 3 TABLET ORAL at 05:03

## 2020-03-12 RX ADMIN — CIPROFLOXACIN 400 MG: 2 INJECTION, SOLUTION INTRAVENOUS at 05:03

## 2020-03-12 RX ADMIN — OXYCODONE HYDROCHLORIDE AND ACETAMINOPHEN 1 TABLET: 5; 325 TABLET ORAL at 05:03

## 2020-03-12 RX ADMIN — OXYBUTYNIN CHLORIDE 5 MG: 5 TABLET ORAL at 09:03

## 2020-03-12 RX ADMIN — VANCOMYCIN HYDROCHLORIDE 2000 MG: 100 INJECTION, POWDER, LYOPHILIZED, FOR SOLUTION INTRAVENOUS at 12:03

## 2020-03-12 RX ADMIN — GABAPENTIN 600 MG: 300 CAPSULE ORAL at 10:03

## 2020-03-12 RX ADMIN — DOCUSATE SODIUM 100 MG: 100 CAPSULE, LIQUID FILLED ORAL at 09:03

## 2020-03-12 RX ADMIN — GABAPENTIN 600 MG: 300 CAPSULE ORAL at 09:03

## 2020-03-12 RX ADMIN — LATANOPROST 1 DROP: 50 SOLUTION OPHTHALMIC at 10:03

## 2020-03-12 RX ADMIN — HYDROCHLOROTHIAZIDE 25 MG: 25 TABLET ORAL at 09:03

## 2020-03-12 RX ADMIN — ALPRAZOLAM 0.25 MG: 0.25 TABLET ORAL at 10:03

## 2020-03-12 RX ADMIN — DOCUSATE SODIUM 100 MG: 100 CAPSULE, LIQUID FILLED ORAL at 10:03

## 2020-03-12 RX ADMIN — PANTOPRAZOLE SODIUM 40 MG: 40 TABLET, DELAYED RELEASE ORAL at 09:03

## 2020-03-12 RX ADMIN — TIMOLOL MALEATE 1 DROP: 5 SOLUTION/ DROPS OPHTHALMIC at 09:03

## 2020-03-12 NOTE — SUBJECTIVE & OBJECTIVE
Interval History: POD # 1 abscess drainage.  Pain is better    Did feel short of breath this AM.Says she feels wheezy in her throat. Does not really want to try a breathing treatment. States that she feels like this sometimes    CXR is negative    SHE DID NOT RECEIVE HER COUMADIN YESTERDAY SECONDARY TO SURGERY    Review of Systems   Constitutional: Negative for fatigue.   HENT: Positive for congestion and postnasal drip.    Respiratory: Positive for shortness of breath. Negative for chest tightness and stridor.    Cardiovascular: Negative for chest pain.   Gastrointestinal: Negative for abdominal pain.   Genitourinary: Negative for dysuria.     Objective:     Vital Signs (Most Recent):  Temp: 97.8 °F (36.6 °C) (03/12/20 0815)  Pulse: 89 (03/12/20 0815)  Resp: 18 (03/12/20 0815)  BP: 129/66 (03/12/20 0815)  SpO2: 96 % (03/12/20 0815) Vital Signs (24h Range):  Temp:  [97.7 °F (36.5 °C)-99.6 °F (37.6 °C)] 97.8 °F (36.6 °C)  Pulse:  [71-94] 89  Resp:  [14-19] 18  SpO2:  [94 %-96 %] 96 %  BP: (119-149)/(59-93) 129/66     Weight: 97.1 kg (214 lb 1.1 oz)  Body mass index is 39.15 kg/m².    Intake/Output Summary (Last 24 hours) at 3/12/2020 1010  Last data filed at 3/12/2020 0627  Gross per 24 hour   Intake 1915 ml   Output 900 ml   Net 1015 ml      Physical Exam   Constitutional: She is oriented to person, place, and time. She appears well-developed and well-nourished. No distress.   Cardiovascular: Normal rate, regular rhythm and normal heart sounds.   Pulmonary/Chest: Effort normal and breath sounds normal. No stridor. No respiratory distress. She has no wheezes. She has no rales.   Abdominal: Soft. Bowel sounds are normal.   Musculoskeletal: She exhibits no edema.   Left foot is wrapped. Dressing is clean and dry   Neurological: She is alert and oriented to person, place, and time.   Skin: No rash noted.   Psychiatric: She has a normal mood and affect.   Nursing note and vitals reviewed.      Significant Labs:   Blood  Culture: No results for input(s): LABBLOO in the last 48 hours.  BMP:   Recent Labs   Lab 03/11/20  0755 03/12/20  0813   * 114*    136   K 3.5 3.5   CL 96 94*   CO2 31* 32*   BUN 15 18   CREATININE 0.8 0.9   CALCIUM 9.9 9.8   MG 2.0  --      CBC:   Recent Labs   Lab 03/11/20  0755 03/12/20  0813   WBC 8.90 9.10   HGB 12.1 12.0   HCT 36.8* 36.4*    220       Significant Imaging: I have reviewed all pertinent imaging results/findings within the past 24 hours.   MRI Foot (Forefoot) Left W W/O Contrast  Narrative: EXAMINATION:  MRI FOOT (FOREFOOT) LEFT W W/O CONTRAST    CLINICAL HISTORY:  Abscess;    TECHNIQUE:  Multiplanar multisequence imaging of the in left forefoot prior to and following the uneventful administration of intravenous Gadavist 10 cc.    COMPARISON:  Multiple prior x-rays most recently 03/09/2020 and CT of the foot 03/09/2020    FINDINGS:  Multiple linear foci of micrometallic susceptibility artifact are seen along the medial aspect of the forefoot compatible with previous orthopedic hardware that has since been removed.  Well-marginated horizontal linear defects within the 1st metatarsal bone, medial cuneiform, and 1st proximal phalanx are compatible with areas of previous orthopedic hardware that has since been removed.  There is artifact related to the indwelling U shaped pin within the 1st proximal phalanx.  There is diffuse forefoot soft tissue swelling and enhancement compatible with cellulitis and there is a complex multiloculated rim enhancing fluid collection along the medial aspect of the forefoot at the level of the metatarsal shaft measuring approximately 4 x 2 by 2 cm suggestive of abscess.  No definite marrow signal abnormality or enhancement is seen to suggest osteomyelitis but it should be noted that several of the spur pin tract defects within the metatarsal shaft do communicate or are in close approximation with the abscess.    There are severe arthritic changes  with subchondral cystic change, osteophytosis and enhancement of the midfoot suggesting either posttraumatic or Charcot arthropathy.  Multifocal septic arthritis is also considered but felt less likely.    There are chronic atrophic changes of the plantar muscles of the foot compatible with denervation.  Impression: Forefoot cellulitis and medial forefoot soft tissue abscess.    Status post extensive orthopedic hardware removal along the medial forefoot, in close proximity to the abscess without definite evidence of osteomyelitis.    Additional neuropathic changes throughout the bones and soft tissues of the midfoot and forefoot as discussed above.    Electronically signed by: Mo Zambrano Jr  Date:    03/11/2020  Time:    16:19  X-Ray Chest AP Portable  Narrative: EXAMINATION:  XR CHEST AP PORTABLE    CLINICAL HISTORY:  short of breath;    TECHNIQUE:  Single frontal view of the chest was performed.    COMPARISON:  12/31/2019    FINDINGS:  Cardiac silhouette is normal in size.  There is atherosclerosis of the thoracic aorta.    Lungs grossly clear within limitations of portable technique    Pleura, diaphragm, and thoracic cage grossly unremarkable.  Impression: No acute cardiopulmonary disease    Electronically signed by: Mo Zambrano Jr  Date:    03/11/2020  Time:    15:06

## 2020-03-12 NOTE — SUBJECTIVE & OBJECTIVE
Int Hx: s/p ex fix removal and s/p InD for abscess (3/12/20). Admits to some residual pain following the surgery. Described as burning and tingling but denies F/N/V/C at this time.     Scheduled Meds:   aspirin  81 mg Oral Daily    atorvastatin  40 mg Oral QHS    ciprofloxacin  400 mg Intravenous Q12H    diltiaZEM  240 mg Oral Daily    docusate sodium  100 mg Oral BID    gabapentin  600 mg Oral BID    hydroCHLOROthiazide  25 mg Oral Daily    latanoprost  1 drop Left Eye QHS    losartan  50 mg Oral Daily    oxybutynin  5 mg Oral Daily    pantoprazole  40 mg Oral Daily    pramipexole  0.5 mg Oral QHS    timolol maleate 0.5%  1 drop Left Eye Daily    vancomycin (VANCOCIN) IVPB  2,000 mg Intravenous Q24H    warfarin  6 mg Oral Daily     Continuous Infusions:  PRN Meds:acetaminophen, ALPRAZolam, oxyCODONE-acetaminophen, sodium chloride 0.9%    Review of patient's allergies indicates:   Allergen Reactions    Propofol analogues      Used for her Colonoscopy.  Extended delirium.  Advised not to take it again.      Adhesive     Compazine [prochlorperazine edisylate] Anxiety    Penicillins Rash    Sulfa (sulfonamide antibiotics) Rash        Past Medical History:   Diagnosis Date    Anticoagulant long-term use     Anxiety     Arthritis     Atrial fibrillation     Blind right eye     Bradycardia     Cancer     skin cancer left side of face under eye    Hyperlipidemia     Hypertension     PVC (premature ventricular contraction)     RLS (restless legs syndrome)     Sleep apnea     Does not use CPAP machine.     Past Surgical History:   Procedure Laterality Date    ADENOIDECTOMY      AKIN OSTEOTOMY Left 10/31/2018    Procedure: OSTEOTOMY, AKIN;  Surgeon: Rudolph Cardozo DPM;  Location: [facility];  Service: Podiatry;  Laterality: Left;    APPENDECTOMY      BREAST BIOPSY Left     x3    BREAST SURGERY Left     breast biopsy x3    CATARACT EXTRACTION BILATERAL W/ ANTERIOR VITRECTOMY       ENDOSCOPIC GASTROCNEMIUS RECESSION Left 10/31/2018    Procedure: RECESSION, GASTROCNEMIUS, ENDOSCOPIC;  Surgeon: Rudolph Cardozo DPM;  Location: [facility] OR;  Service: Podiatry;  Laterality: Left;    EYE SURGERY Bilateral     cataracts extraction    EYE SURGERY Right     drainage tube (glaucoma)    FOOT ARTHRODESIS Left 1/15/2020    Procedure: FUSION, FOOT;  Surgeon: Rudolph Cardozo DPM;  Location: [facility] OR;  Service: Podiatry;  Laterality: Left;  mini c-arm, Arthrex screw  for hardware removal (Lydia notified), [employer] (Niels notified) min ex-fix    FOOT SURGERY Left     lesion removed from dorsal area    FRACTURE SURGERY Left     arm    HAND TENDON SURGERY Left     HYSTERECTOMY      LAPIDUS BUNIONECTOMY Left 10/31/2018    Procedure: BUNIONECTOMY, LAPIDUS;  Surgeon: Rudolph Cardozo DPM;  Location: [facility] OR;  Service: Podiatry;  Laterality: Left;  mini c-arm, Arthrex locking plate (Lydia notified)    LAPIDUS BUNIONECTOMY Left 10/31/2018    Dr. Cardozo    Lymph Gland Removed  Right     groin (performed by Dr. Vergara)    NEURECTOMY Left 1/15/2020    Procedure: NEURECTOMY;  Surgeon: Rudolph Cardozo DPM;  Location: [facility] OR;  Service: Podiatry;  Laterality: Left;  bipolar bovie, vessel loop, possible Agnes nerve wrap. Moshe with [facility] notified and will be overnighting graft. MK 1/14/2020    OOPHORECTOMY      ORIF FOREARM FRACTURE Left     PALATE SURGERY      Lesion removed    TONSILLECTOMY         Family History     Problem Relation (Age of Onset)    Aneurysm Mother    Arthritis Sister    Atrial fibrillation Sister    Bipolar disorder Sister    Brain cancer Sister    Breast cancer Sister    COPD Sister    Cancer Father, Sister, Brother, Brother, Sister    Cirrhosis Father    Colon polyps Brother    Depression Sister    Diabetes Sister, Brother, Brother, Sister, Sister, Sister    Glaucoma Brother, Sister    Heart attack Sister, Sister    Heart disease Sister, Sister, Sister     Hyperlipidemia Sister, Brother    Hypertension Sister, Brother    Kidney disease Sister    Lung cancer Father, Sister    Other Sister        Tobacco Use    Smoking status: Never Smoker    Smokeless tobacco: Never Used   Substance and Sexual Activity    Alcohol use: Yes     Comment: Seldomly drinks alcohol (wine)    Drug use: No    Sexual activity: Not Currently     Partners: Male     Review of Systems   Constitutional: Negative for chills and fever.   Cardiovascular: Positive for leg swelling.   Gastrointestinal: Negative for nausea and vomiting.   Musculoskeletal: Positive for gait problem.   Skin: Positive for color change and wound.     Objective:     Vital Signs (Most Recent):  Temp: 98.1 °F (36.7 °C) (03/12/20 0428)  Pulse: 94 (03/12/20 0730)  Resp: 17 (03/12/20 0428)  BP: (!) 140/63 (03/12/20 0428)  SpO2: (!) 94 % (03/11/20 1810) Vital Signs (24h Range):  Temp:  [97.7 °F (36.5 °C)-99.6 °F (37.6 °C)] 98.1 °F (36.7 °C)  Pulse:  [71-94] 94  Resp:  [14-19] 17  SpO2:  [94 %-96 %] 94 %  BP: (119-149)/(59-93) 140/63     Weight: 97.1 kg (214 lb 1.1 oz)  Body mass index is 39.15 kg/m².    Foot Exam    Left Foot/Ankle      Inspection and Palpation  Tenderness: (Tenderness around the pin site and across the medial dorsal foot.)  Swelling: (Left foot edema)  Skin Exam: cellulitis and erythema; no drainage     Neurovascular  Dorsalis pedis: 2+  Posterior tibial: 2+            Laboratory:  CBC:   Recent Labs   Lab 03/11/20  0755   WBC 8.90   RBC 3.83*   HGB 12.1   HCT 36.8*      MCV 96   MCH 31.6*   MCHC 32.9     CMP:   Recent Labs   Lab 03/08/20  1536  03/11/20  0755   GLU 77   < > 116*   CALCIUM 10.0   < > 9.9   ALBUMIN 4.0  --   --    PROT 7.2  --   --       < > 138   K 3.7   < > 3.5   CO2 28   < > 31*      < > 96   BUN 14   < > 15   CREATININE 0.8   < > 0.8   ALKPHOS 131  --   --    ALT 17  --   --    AST 15  --   --    BILITOT 0.4  --   --     < > = values in this interval not displayed.      CRP:   Recent Labs   Lab 03/09/20 0939   CRP 86.3*     ESR:   Recent Labs   Lab 03/09/20 0939   SEDRATE 38*       Diagnostic Results:  I have reviewed all pertinent imaging results/findings within the past 24 hours.    Clinical Findings:  There is edema and erythema to the left foot. Patient reports it is improved since admission.  No significant drainage or purulence. No fluctuance or crepitus. Tenderness to the distal pin sites and dorsal medial foot. The ex fix is still well aligned and intact without loosening. No signs of trauma.             3/10/20  Purulent drainage from the distal pin site today. Fluctuance felt at the 1st interspace of the left foot.           3/12/20  Edema has improved. Still has residual erythema.

## 2020-03-12 NOTE — PLAN OF CARE
Pt AAOx3. Medications administered per order. Pain to left foot managed with prn medication; foot elevated, dressing CDI. Regular diet tolerating well. Ambulates with assist to bedside commode. Purewick maintained. Cardiac monitor maintained. Encouraged to call with questions/concerns/assistance. Will continue to monitor. Safety maintained.

## 2020-03-12 NOTE — PROGRESS NOTES
"[facility] Medicine  Progress Note    Patient Name: Mis Ca  MRN: [id number]  Patient Class: IP- Inpatient   Admission Date: 3/8/2020  Length of Stay: 4 days  Attending Physician: Anni Cuenca MD  Primary Care Provider: Scott Dillard MD        Subjective:     Principal Problem:Infected hardware in left leg, initial encounter        HPI:  The patient is a 79 years old. female with PMH HTN, HLP, anxiety,Atrial fibrillation, Blind right eye, Bradycardia, RLS, and Sleep apnea presents with complaint of possible wound infection to left foot. Patient states she had surgery on January 15, 2020 to foot by Dr. Cardozo to "fix bones that did not heal correctly after bunion surgery".  Patient reports no complications to foot until yesterday when she noticed swelling, erythema and pain worsening. She also reports scant amount of white drainage from wound this morning. Patient denies any fever, chills, abdominal pain, nausea or vomiting. Of note, patient last saw Dr. Cardozo two weeks ago and has upcoming appointment in four days.    The pt does not put weight yet on her foot, and her surgical site is not  Having pain from the sx site itself but the swelling and edema around it    Overview/Hospital Course:  3/9 the pt seen, no acute events, had Dr Cardozo evaluating her foot, ordered a ct foot, awaiting rwecs. For now continue POC with IV abx    Interval History: POD # 1 abscess drainage.  Pain is better    Did feel short of breath this AM.Says she feels wheezy in her throat. Does not really want to try a breathing treatment. States that she feels like this sometimes    CXR is negative    SHE DID NOT RECEIVE HER COUMADIN YESTERDAY SECONDARY TO SURGERY    Review of Systems   Constitutional: Negative for fatigue.   HENT: Positive for congestion and postnasal drip.    Respiratory: Positive for shortness of breath. Negative for chest tightness and stridor.    Cardiovascular: Negative for " chest pain.   Gastrointestinal: Negative for abdominal pain.   Genitourinary: Negative for dysuria.     Objective:     Vital Signs (Most Recent):  Temp: 97.8 °F (36.6 °C) (03/12/20 0815)  Pulse: 89 (03/12/20 0815)  Resp: 18 (03/12/20 0815)  BP: 129/66 (03/12/20 0815)  SpO2: 96 % (03/12/20 0815) Vital Signs (24h Range):  Temp:  [97.7 °F (36.5 °C)-99.6 °F (37.6 °C)] 97.8 °F (36.6 °C)  Pulse:  [71-94] 89  Resp:  [14-19] 18  SpO2:  [94 %-96 %] 96 %  BP: (119-149)/(59-93) 129/66     Weight: 97.1 kg (214 lb 1.1 oz)  Body mass index is 39.15 kg/m².    Intake/Output Summary (Last 24 hours) at 3/12/2020 1010  Last data filed at 3/12/2020 0627  Gross per 24 hour   Intake 1915 ml   Output 900 ml   Net 1015 ml      Physical Exam   Constitutional: She is oriented to person, place, and time. She appears well-developed and well-nourished. No distress.   Cardiovascular: Normal rate, regular rhythm and normal heart sounds.   Pulmonary/Chest: Effort normal and breath sounds normal. No stridor. No respiratory distress. She has no wheezes. She has no rales.   Abdominal: Soft. Bowel sounds are normal.   Musculoskeletal: She exhibits no edema.   Left foot is wrapped. Dressing is clean and dry   Neurological: She is alert and oriented to person, place, and time.   Skin: No rash noted.   Psychiatric: She has a normal mood and affect.   Nursing note and vitals reviewed.      Significant Labs:   Blood Culture: No results for input(s): LABBLOO in the last 48 hours.  BMP:   Recent Labs   Lab 03/11/20 0755 03/12/20 0813   * 114*    136   K 3.5 3.5   CL 96 94*   CO2 31* 32*   BUN 15 18   CREATININE 0.8 0.9   CALCIUM 9.9 9.8   MG 2.0  --      CBC:   Recent Labs   Lab 03/11/20  0755 03/12/20 0813   WBC 8.90 9.10   HGB 12.1 12.0   HCT 36.8* 36.4*    220       Significant Imaging: I have reviewed all pertinent imaging results/findings within the past 24 hours.   MRI Foot (Forefoot) Left W W/O Contrast  Narrative:  EXAMINATION:  MRI FOOT (FOREFOOT) LEFT W W/O CONTRAST    CLINICAL HISTORY:  Abscess;    TECHNIQUE:  Multiplanar multisequence imaging of the in left forefoot prior to and following the uneventful administration of intravenous Gadavist 10 cc.    COMPARISON:  Multiple prior x-rays most recently 03/09/2020 and CT of the foot 03/09/2020    FINDINGS:  Multiple linear foci of micrometallic susceptibility artifact are seen along the medial aspect of the forefoot compatible with previous orthopedic hardware that has since been removed.  Well-marginated horizontal linear defects within the 1st metatarsal bone, medial cuneiform, and 1st proximal phalanx are compatible with areas of previous orthopedic hardware that has since been removed.  There is artifact related to the indwelling U shaped pin within the 1st proximal phalanx.  There is diffuse forefoot soft tissue swelling and enhancement compatible with cellulitis and there is a complex multiloculated rim enhancing fluid collection along the medial aspect of the forefoot at the level of the metatarsal shaft measuring approximately 4 x 2 by 2 cm suggestive of abscess.  No definite marrow signal abnormality or enhancement is seen to suggest osteomyelitis but it should be noted that several of the spur pin tract defects within the metatarsal shaft do communicate or are in close approximation with the abscess.    There are severe arthritic changes with subchondral cystic change, osteophytosis and enhancement of the midfoot suggesting either posttraumatic or Charcot arthropathy.  Multifocal septic arthritis is also considered but felt less likely.    There are chronic atrophic changes of the plantar muscles of the foot compatible with denervation.  Impression: Forefoot cellulitis and medial forefoot soft tissue abscess.    Status post extensive orthopedic hardware removal along the medial forefoot, in close proximity to the abscess without definite evidence of  osteomyelitis.    Additional neuropathic changes throughout the bones and soft tissues of the midfoot and forefoot as discussed above.    Electronically signed by: Mo Zambrano Jr  Date:    03/11/2020  Time:    16:19  X-Ray Chest AP Portable  Narrative: EXAMINATION:  XR CHEST AP PORTABLE    CLINICAL HISTORY:  short of breath;    TECHNIQUE:  Single frontal view of the chest was performed.    COMPARISON:  12/31/2019    FINDINGS:  Cardiac silhouette is normal in size.  There is atherosclerosis of the thoracic aorta.    Lungs grossly clear within limitations of portable technique    Pleura, diaphragm, and thoracic cage grossly unremarkable.  Impression: No acute cardiopulmonary disease    Electronically signed by: Mo Zambrano Jr  Date:    03/11/2020  Time:    15:06          Assessment/Plan:      * Infected hardware in left leg, initial encounter  POD #3  Hardware removal POD#1 Abscess drainage  On vanc and cipro   Dr Cardozo following. Case DW him today  CX with staph, await sens.      ID consulted for abx management      Constipation    Maintain on colace.  Had a BM 3-11-20    Cellulitis  See #1      Diastolic dysfunction  Continue home meds  No acute exacerbation      Essential hypertension  Presently on HCTZ, Losartan, Cardizem      Hyperlipidemia  Continue statin      Anticoagulated on Coumadin  Goal 2-3  Currently at 1.5  As above      JACQUELYN (obstructive sleep apnea)  Monitor  CXR Negative    Paroxysmal atrial fibrillation  continue coumadin for anticoagulation  And cardiazen for rate control    INR 1.5  Missed dose from yesterday  Give extra 3 mg today      VTE Risk Mitigation (From admission, onward)         Ordered     warfarin (COUMADIN) tablet 3 mg  Once      03/12/20 1018     warfarin (COUMADIN) tablet 6 mg  Daily      03/08/20 1714     IP VTE HIGH RISK PATIENT  Once      03/08/20 1714     Place sequential compression device  Until discontinued      03/08/20 1714                      Anni Cuenca  MD  Department of [facility] Medicine   [facility]

## 2020-03-12 NOTE — ASSESSMENT & PLAN NOTE
Dressing was changed today and packing was pulled. Still with some residual pain today. Edema has improved.     Plan:  - Continue antibiotics. Pending cultures, currently staph aureus. Follow up on new OR cultures.  - InD with large hematoma and abscess.  - Patient can hold off on PT for now. Minimal WB to heel for short transfers only.  - Podiatry will follow.

## 2020-03-12 NOTE — PLAN OF CARE
Patient aaox4, VSS, Patient free from falls. Patient tolerating diet, no nausea or vomiting. Patient c/o pain, managed with PRN percocet.  Dressing to L foot remains CDI. Call bell in reach. Bed alarm In place. Safety maintained. Will continue to monitor.   Problem: Fall Injury Risk  Goal: Absence of Fall and Fall-Related Injury  Outcome: Ongoing, Progressing     Problem: Adult Inpatient Plan of Care  Goal: Plan of Care Review  Outcome: Ongoing, Progressing  Goal: Patient-Specific Goal (Individualization)  Outcome: Ongoing, Progressing  Goal: Absence of Hospital-Acquired Illness or Injury  Outcome: Ongoing, Progressing  Goal: Optimal Comfort and Wellbeing  Outcome: Ongoing, Progressing  Goal: Readiness for Transition of Care  Outcome: Ongoing, Progressing  Goal: Rounds/Family Conference  Outcome: Ongoing, Progressing     Problem: Pain Acute  Goal: Optimal Pain Control  Outcome: Ongoing, Progressing     Problem: Mobility Impairment  Goal: Optimal Mobility  Outcome: Ongoing, Progressing

## 2020-03-12 NOTE — ASSESSMENT & PLAN NOTE
continue coumadin for anticoagulation  And cardiazen for rate control    INR 1.5  Missed dose from yesterday  Give extra 3 mg today

## 2020-03-12 NOTE — CONSULTS
[facility] Infectious Disease Consult     Primary Team: Anni Cuenca MD  Consultant Attending: Dr. Ruby OCHOA PhD  Date of Admit: 3/8/2020    Reason for Consult     Infected hardware. Abx recs    History of Present Illness   Mis Ca is a 79 y.o. female with a relevant history of atrial fibrillation on warfarin, hypertension, and hyperlipidemia who presents with a one day history of worsening swelling, redness, and pain to left lower extremity. Patient had bunion removal in October of 2018 and had subsequent revision in January of 2020. About two weeks after the revisions, the patient hit her right foot on a wood porch. Patient's food was exposed due to presence of external fixator on foot. Patient denies breaks in the skin, foreign bodies, exposure to fresh water/salt water, any mosquito or animal bites, any purulence wound drainage, or fever.       Review of Systems (positives in bold):  Review of Systems   Constitutional: Negative for chills, fever, malaise/fatigue and weight loss.   Eyes: Negative for blurred vision.   Respiratory: Negative for cough, hemoptysis, sputum production, shortness of breath and wheezing.    Cardiovascular: Negative for chest pain, palpitations, orthopnea and leg swelling.   Gastrointestinal: Negative for abdominal pain, blood in stool, constipation, diarrhea, melena, nausea and vomiting.   Genitourinary: Negative for dysuria, frequency and urgency.   Musculoskeletal: Negative for back pain, falls and myalgias.   Skin: Negative for rash.   Neurological: Negative for dizziness, focal weakness, weakness and headaches.   Psychiatric/Behavioral: Negative for substance abuse.         Allergies:  Review of patient's allergies indicates:   Allergen Reactions    Propofol analogues      Used for her Colonoscopy.  Extended delirium.  Advised not to take it again.      Adhesive     Compazine [prochlorperazine edisylate] Anxiety    Penicillins Rash    Sulfa (sulfonamide antibiotics)  Rash       Medications:   In-Hospital Scheduled Medications:   aspirin  81 mg Oral Daily    atorvastatin  40 mg Oral QHS    ciprofloxacin  400 mg Intravenous Q12H    diltiaZEM  240 mg Oral Daily    docusate sodium  100 mg Oral BID    gabapentin  600 mg Oral BID    hydroCHLOROthiazide  25 mg Oral Daily    latanoprost  1 drop Left Eye QHS    losartan  50 mg Oral Daily    oxybutynin  5 mg Oral Daily    pantoprazole  40 mg Oral Daily    pramipexole  0.5 mg Oral QHS    timolol maleate 0.5%  1 drop Left Eye Daily    vancomycin (VANCOCIN) IVPB  2,000 mg Intravenous Q24H    warfarin  6 mg Oral Daily      In-Hospital PRN Medications:  acetaminophen, ALPRAZolam, oxyCODONE-acetaminophen, sodium chloride 0.9%      Past Medical History:  Past Medical History:   Diagnosis Date    Anticoagulant long-term use     Anxiety     Arthritis     Atrial fibrillation     Blind right eye     Bradycardia     Cancer     skin cancer left side of face under eye    Hyperlipidemia     Hypertension     PVC (premature ventricular contraction)     RLS (restless legs syndrome)     Sleep apnea     Does not use CPAP machine.     Past Surgical History/ObGyn Hx if gender appropriate:  Past Surgical History:   Procedure Laterality Date    ADENOIDECTOMY      AKIN OSTEOTOMY Left 10/31/2018    Procedure: OSTEOTOMY, AKIN;  Surgeon: Rudolph Cardozo DPM;  Location: [facility] OR;  Service: Podiatry;  Laterality: Left;    APPENDECTOMY      BREAST BIOPSY Left     x3    BREAST SURGERY Left     breast biopsy x3    CATARACT EXTRACTION BILATERAL W/ ANTERIOR VITRECTOMY      ENDOSCOPIC GASTROCNEMIUS RECESSION Left 10/31/2018    Procedure: RECESSION, GASTROCNEMIUS, ENDOSCOPIC;  Surgeon: Rudolph Cardozo DPM;  Location: [facility] OR;  Service: Podiatry;  Laterality: Left;    EYE SURGERY Bilateral     cataracts extraction    EYE SURGERY Right     drainage tube (glaucoma)    FOOT ARTHRODESIS Left 1/15/2020    Procedure: FUSION, FOOT;   Surgeon: Rudolph Cardozo DPM;  Location: [facility] OR;  Service: Podiatry;  Laterality: Left;  mini c-arm, Arthrex screw  for hardware removal (Lydia notified), Orthofix (Niels notified) min ex-fix    FOOT SURGERY Left     lesion removed from dorsal area    FRACTURE SURGERY Left     arm    HAND TENDON SURGERY Left     HYSTERECTOMY      INCISION AND DRAINAGE FOOT Left 3/11/2020    Procedure: INCISION AND DRAINAGE, FOOT;  Surgeon: Rudolph Cardozo DPM;  Location: [facility] OR;  Service: Podiatry;  Laterality: Left;    LAPIDUS BUNIONECTOMY Left 10/31/2018    Procedure: BUNIONECTOMY, LAPIDUS;  Surgeon: Rudolph Cardozo DPM;  Location: [facility] OR;  Service: Podiatry;  Laterality: Left;  mini c-arm, Arthrex locking plate (Lydia notified)    LAPIDUS BUNIONECTOMY Left 10/31/2018    Dr. Cardozo    Lymph Gland Removed  Right     groin (performed by Dr. Vergara)    NEURECTOMY Left 1/15/2020    Procedure: NEURECTOMY;  Surgeon: Rudolph Cardozo DPM;  Location: [facility] OR;  Service: Podiatry;  Laterality: Left;  bipolar bovie, vessel loop, possible Agnes nerve wrap. Moshe with [employer] notified and will be overnighting graft. MK 1/14/2020    OOPHORECTOMY      ORIF FOREARM FRACTURE Left     PALATE SURGERY      Lesion removed    TONSILLECTOMY       Family History:  Family History   Problem Relation Age of Onset    Aneurysm Mother         AAA    Cancer Father         lung cancer    Lung cancer Father     Cirrhosis Father     Cancer Sister         Breast and Brain     Diabetes Sister     Breast cancer Sister     Hypertension Sister     Hyperlipidemia Sister     Brain cancer Sister     Colon polyps Brother     Cancer Brother         lung cancer    Diabetes Brother     Hyperlipidemia Brother     Hypertension Brother     Cancer Brother         bladder cancer    Diabetes Brother     Glaucoma Brother     Heart disease Sister         Heart valve repair    Atrial fibrillation Sister     Diabetes  Sister     Heart disease Sister     Heart attack Sister     Bipolar disorder Sister     Depression Sister     Glaucoma Sister     Diabetes Sister     Other Sister         Pituitary tumor    Arthritis Sister     COPD Sister     Heart disease Sister     Kidney disease Sister     Cancer Sister         lung cancer    Diabetes Sister     Lung cancer Sister     Heart attack Sister      Social History:  Social History     Tobacco Use    Smoking status: Never Smoker    Smokeless tobacco: Never Used   Substance Use Topics    Alcohol use: Yes     Comment: Seldomly drinks alcohol (wine)    Drug use: No         Objective   Last 24 Hour Vital Signs:  BP  Min: 119/59  Max: 149/65  Temp  Av.1 °F (36.7 °C)  Min: 97.7 °F (36.5 °C)  Max: 99.6 °F (37.6 °C)  Pulse  Av.4  Min: 71  Max: 94  Resp  Av.5  Min: 14  Max: 19  SpO2  Av %  Min: 94 %  Max: 96 %  Weight  Av.1 kg (214 lb 1.1 oz)  Min: 97.1 kg (214 lb 1.1 oz)  Max: 97.1 kg (214 lb 1.1 oz)        Physical Examination:  Physical Exam   Constitutional: She is oriented to person, place, and time and well-developed, well-nourished, and in no distress. No distress.   HENT:   Head: Normocephalic and atraumatic.   Eyes: EOM are normal. Right eye exhibits no discharge. Left eye exhibits no discharge. No scleral icterus.   Neck: No JVD present.   Cardiovascular: Normal rate, regular rhythm, normal heart sounds and intact distal pulses. Exam reveals no gallop and no friction rub.   No murmur heard.  Pulmonary/Chest: Effort normal and breath sounds normal. No stridor. No respiratory distress. She has no wheezes. She has no rales. She exhibits no tenderness.   Abdominal: Soft. Bowel sounds are normal. She exhibits no distension and no mass. There is no tenderness. There is no rebound and no guarding.   Musculoskeletal: She exhibits no edema or deformity.   Neurological: She is alert and oriented to person, place, and time.   Skin: Skin is warm and dry.  No rash noted. She is not diaphoretic. No erythema. No pallor.   Psychiatric: Mood, memory, affect and judgment normal.   Vitals reviewed.        Laboratory:  CBC:   Lab Results   Component Value Date    WBC 9.10 03/12/2020    HGB 12.0 03/12/2020     03/12/2020    MCV 96 03/12/2020    RDW 12.6 03/12/2020       Estimated Creatinine Clearance: 56 mL/min (based on SCr of 0.9 mg/dL).        Assessment     Mis Ca is a 79 y.o. female with a PMH of a-fib onw arfarin, HTN, & HLD who presents with worsening swelling, redness, and pain to the LLE. On 1/15/20 patient had revision of the left 1st TMT fusion with application of external fixator by Dr. Cardozo.        Recommendations     Left Foot Cellulitis with infected hardware s/p removal & I&D  - 10/31/18: bunionectomy  - 1/15/20: revision of the 1st TMT fusion with application of external fixator by Dr. Cardozo  - 3/8/20: Patient presents with worsening swelling, redness, and pain to the LLE  - 3/9/20: external fixator removed  - 03/09/20 wound cultures from left foot growing staph aureus with sensitivities pending  - 3/11/20: incision and drainage for abscess  - 03/11/20 wound cultures from left foot - gram stain showing Gram-positive cocci in clusters, cultures no growth to date  - need to find out if there is any hardware in foot  - continue ciprofloxacin started on 3/8/20  - continue vancomycin started on 3/8/20    Thank you for this consult. We will follow.      Peter Pop,  PGY-1  [facility]

## 2020-03-12 NOTE — OP NOTE
Operative Note       Surgery Date: 3/11/2020     Surgeon(s) and Role:     * Rudolph Cardozo, ALVIN - Primary  Kevin Cat DPM, PGY-2      Pre-op Diagnosis:  Cellulitis [L03.90]  Infection at site of external fixator pin, initial encounter [T84.7XXA]    Post-op Diagnosis: Post-Op Diagnosis Codes:     * Cellulitis [L03.90]     * Infection at site of external fixator pin, initial encounter [T84.7XXA]    Procedure(s) (LRB):  INCISION AND DRAINAGE, FOOT (Left) below fascia    Anesthesia: Regional    Procedure in Detail/Findings:  In the pre-operative holding area, a regional block was given per anesthesia.    The patient was brought to the operating room on a stretcher and transferred to the OR table. in a  supine position. Following the successful induction of MAC: tourniquet was applied to the left ankle. The left lower extremity was prepped and draped in a sterile manner.     A timeout was performed confirming the patient's identification, procedure, surgical site, medications and allergies. All were in agreement.    The tourniquet was inflated to 250mmHg.    Attention to the left foot. There are four total pin sites to the first ray. The 2nd pin site from the distal 1st metatarsal had some purulent drainage oozing from the pin site. A freer elevator was used to explore the wound and found to track laterally towards the 3rd MTPJ. The decision was made to make a linear incision along the 1nd interspace. Along the incision was copious bloody drainage. With manual compression of the forefoot, about 5-10 mL of next sanguinous and purulent drainage was expressed from the 1st interspace. The decision was made to extend the incision transversely from the linear incision to the pin site ensuring not to transect any tendons or nerves. Further purulence was expressed from this incision. There is mild necrosis of the fatty and fascial layer around the pin site. Cultures were taken from incision. Further exploration of the 1st  interspace incision found deep tracking to the intermetatarsal space proximally and plantarly with mild purulence. An exit incision was made plantarly.     The wound was then copiously flushed with sterile saline solution using a bulb syringe and the wound was closed with 4-0 prolene and packed with 1/2 inch betadine soaked iodoform packing from the linear incision through the plantar exit incision.   The tourniquet was deflated. The incision site was dressed with betadine, 4x4s, kerlix, cast padding, ACE and xeroform.    The patient was transferred to the recovery room with vital signs stable. Following a period of post op monitoring, the patient will be returned to floor with the following post op instructions:   1. Keep dressing dry and intact until Podiatry follow up.   2.Weight bearing status: partial weightbearing to the heel in surgical shoe  3. All necessary prescriptions ordered and medical management will continue.   4. Contact Podiatry for all post op follow up care and if any problems arise.      Estimated Blood Loss: <20mL           Specimens (From admission, onward)    None        Implants: * No implants in log *           Disposition: PACU - hemodynamically stable.           Condition: Good    Attestation:  I was present and scrubbed for the entire procedure.  I have personally taken the history and examined this patient and agree with the resident's note as stated as above.   Rudolph Cardzoo DPM, FACFAS

## 2020-03-12 NOTE — ASSESSMENT & PLAN NOTE
POD #3  Hardware removal POD#1 Abscess drainage  On vanc and cipro   Dr Cardozo following. Case DW him today  CX with staph, await sens.      ID consulted for abx management

## 2020-03-12 NOTE — PT/OT/SLP PROGRESS
Physical Therapy Treatment    Patient Name:  Mis Ca   MRN:  [id number]    Recommendations:     Discharge Recommendations:  (home with daughter)   Discharge Equipment Recommendations: bath bench   Barriers to discharge: None    Assessment:     Mis Ca is a 79 y.o. female admitted with a medical diagnosis of Infected hardware in left leg, initial encounter.  She presents with the following impairments/functional limitations:  weakness, impaired functional mobilty, impaired cardiopulmonary response to activity, impaired endurance, gait instability, impaired balance, impaired self care skills, decreased lower extremity function, orthopedic precautions.  Pt s/p procedure yesterday, not feeling up to walking today.  Pt supine to sit with CG.  Pt sit to stand x 4 with RW with NWB LLE with CG, standing at least 1 min each bout.  Pt reported feeling fatigued and went sit to supine with CG.    Rehab Prognosis: Good; patient would benefit from acute skilled PT services to address these deficits and reach maximum level of function.    Recent Surgery: Procedure(s) (LRB):  INCISION AND DRAINAGE, FOOT (Left) 1 Day Post-Op    Plan:     During this hospitalization, patient to be seen 6 x/week to address the identified rehab impairments via gait training, therapeutic activities, therapeutic exercises, neuromuscular re-education and progress toward the following goals:    · Plan of Care Expires:  04/12/20    Subjective     Chief Complaint: not feeling great today  Patient/Family Comments/goals: go home when better  Pain/Comfort:  · Pain Rating 1: 4/10  · Location - Side 1: Left  · Pain Addressed 1: Distraction, Cessation of Activity, Reposition  · Pain Rating Post-Intervention 1: 1/10  · Location 2: foot      Objective:     Communicated with nurse prior to session.  Patient found HOB elevated with peripheral IV upon PT entry to room.     General Precautions: Standard, fall   Orthopedic Precautions:LLE non weight  bearing   Braces:       Functional Mobility:   Pt s/p procedure yesterday, not feeling up to walking today.  Pt supine to sit with CG.  Pt sit to stand x 4 with RW with NWB LLE with CG, standing at least 1 min each bout.  Pt reported feeling fatigued and went sit to supine with CG.    AM-PAC 6 CLICK MOBILITY  Turning over in bed (including adjusting bedclothes, sheets and blankets)?: 4  Sitting down on and standing up from a chair with arms (e.g., wheelchair, bedside commode, etc.): 3  Moving from lying on back to sitting on the side of the bed?: 3  Moving to and from a bed to a chair (including a wheelchair)?: 3  Need to walk in hospital room?: 3  Climbing 3-5 steps with a railing?: 1  Basic Mobility Total Score: 17       Therapeutic Activities and Exercises:   Pt performed BLE seated exercise x 10 in all available planes.    Patient left HOB elevated with all lines intact, call button in reach, bed alarm on and nurse notified..    GOALS:   Multidisciplinary Problems     Physical Therapy Goals        Problem: Physical Therapy Goal    Goal Priority Disciplines Outcome Goal Variances Interventions   Physical Therapy Goal     PT, PT/OT Ongoing, Progressing     Description:  Pt would benefit from skilled PT to progress functional mobility.  Goals include:  1.  Sit in chair 2 hours.  2.  Bed to/from chair with supervision with RW with NWB LLE  3.  Ambulate 75' with RW with supervision with NWB LLE  4.  Ind HEP                    Time Tracking:     PT Received On: 03/12/20  PT Start Time: 0814     PT Stop Time: 0845  PT Total Time (min): 31 min     Billable Minutes: Therapeutic Activity 16 and Therapeutic Exercise 15    Treatment Type: Treatment  PT/PTA: PT     PTA Visit Number: 0     Ashli Adams, PT  03/12/2020

## 2020-03-13 PROBLEM — M25.512 CHRONIC LEFT SHOULDER PAIN: Status: ACTIVE | Noted: 2020-03-13

## 2020-03-13 PROBLEM — G89.29 CHRONIC LEFT SHOULDER PAIN: Status: ACTIVE | Noted: 2020-03-13

## 2020-03-13 PROBLEM — I80.9 SUPERFICIAL THROMBOPHLEBITIS: Status: ACTIVE | Noted: 2020-03-13

## 2020-03-13 LAB
ANION GAP SERPL CALC-SCNC: 9 MMOL/L (ref 8–16)
BACTERIA BLD CULT: NORMAL
BACTERIA BLD CULT: NORMAL
BASOPHILS # BLD AUTO: 0.03 K/UL (ref 0–0.2)
BASOPHILS NFR BLD: 0.5 % (ref 0–1.9)
BNP SERPL-MCNC: 69 PG/ML (ref 0–99)
BUN SERPL-MCNC: 15 MG/DL (ref 8–23)
CALCIUM SERPL-MCNC: 9.5 MG/DL (ref 8.7–10.5)
CHLORIDE SERPL-SCNC: 93 MMOL/L (ref 95–110)
CO2 SERPL-SCNC: 33 MMOL/L (ref 23–29)
CREAT SERPL-MCNC: 0.8 MG/DL (ref 0.5–1.4)
DIFFERENTIAL METHOD: ABNORMAL
EOSINOPHIL # BLD AUTO: 0.2 K/UL (ref 0–0.5)
EOSINOPHIL NFR BLD: 3.6 % (ref 0–8)
ERYTHROCYTE [DISTWIDTH] IN BLOOD BY AUTOMATED COUNT: 12.6 % (ref 11.5–14.5)
EST. GFR  (AFRICAN AMERICAN): >60 ML/MIN/1.73 M^2
EST. GFR  (NON AFRICAN AMERICAN): >60 ML/MIN/1.73 M^2
GLUCOSE SERPL-MCNC: 115 MG/DL (ref 70–110)
HCT VFR BLD AUTO: 33.8 % (ref 37–48.5)
HGB BLD-MCNC: 11.1 G/DL (ref 12–16)
IMM GRANULOCYTES # BLD AUTO: 0.03 K/UL (ref 0–0.04)
IMM GRANULOCYTES NFR BLD AUTO: 0.5 % (ref 0–0.5)
INR PPP: 1.2 (ref 0.8–1.2)
LYMPHOCYTES # BLD AUTO: 1.6 K/UL (ref 1–4.8)
LYMPHOCYTES NFR BLD: 24.3 % (ref 18–48)
MCH RBC QN AUTO: 31.7 PG (ref 27–31)
MCHC RBC AUTO-ENTMCNC: 32.8 G/DL (ref 32–36)
MCV RBC AUTO: 97 FL (ref 82–98)
MONOCYTES # BLD AUTO: 0.8 K/UL (ref 0.3–1)
MONOCYTES NFR BLD: 11.8 % (ref 4–15)
NEUTROPHILS # BLD AUTO: 3.9 K/UL (ref 1.8–7.7)
NEUTROPHILS NFR BLD: 59.3 % (ref 38–73)
NRBC BLD-RTO: 0 /100 WBC
PLATELET # BLD AUTO: 214 K/UL (ref 150–350)
PMV BLD AUTO: 9.7 FL (ref 9.2–12.9)
POTASSIUM SERPL-SCNC: 3.5 MMOL/L (ref 3.5–5.1)
PROTHROMBIN TIME: 12.8 SEC (ref 9–12.5)
RBC # BLD AUTO: 3.5 M/UL (ref 4–5.4)
SODIUM SERPL-SCNC: 135 MMOL/L (ref 136–145)
VANCOMYCIN TROUGH SERPL-MCNC: 5 UG/ML (ref 10–22)
WBC # BLD AUTO: 6.63 K/UL (ref 3.9–12.7)

## 2020-03-13 PROCEDURE — 83880 ASSAY OF NATRIURETIC PEPTIDE: CPT | Mod: HCNC

## 2020-03-13 PROCEDURE — 63600175 PHARM REV CODE 636 W HCPCS: Mod: HCNC | Performed by: INTERNAL MEDICINE

## 2020-03-13 PROCEDURE — 36415 COLL VENOUS BLD VENIPUNCTURE: CPT | Mod: HCNC

## 2020-03-13 PROCEDURE — 63600175 PHARM REV CODE 636 W HCPCS: Mod: HCNC | Performed by: PODIATRIST

## 2020-03-13 PROCEDURE — 25000003 PHARM REV CODE 250: Mod: HCNC | Performed by: INTERNAL MEDICINE

## 2020-03-13 PROCEDURE — 97116 GAIT TRAINING THERAPY: CPT | Mod: HCNC,CQ

## 2020-03-13 PROCEDURE — 80202 ASSAY OF VANCOMYCIN: CPT | Mod: HCNC

## 2020-03-13 PROCEDURE — 94761 N-INVAS EAR/PLS OXIMETRY MLT: CPT | Mod: HCNC

## 2020-03-13 PROCEDURE — 80048 BASIC METABOLIC PNL TOTAL CA: CPT | Mod: HCNC

## 2020-03-13 PROCEDURE — 85025 COMPLETE CBC W/AUTO DIFF WBC: CPT | Mod: HCNC

## 2020-03-13 PROCEDURE — 25000003 PHARM REV CODE 250: Mod: HCNC | Performed by: NURSE PRACTITIONER

## 2020-03-13 PROCEDURE — 25000003 PHARM REV CODE 250: Mod: HCNC | Performed by: PODIATRIST

## 2020-03-13 PROCEDURE — 85610 PROTHROMBIN TIME: CPT | Mod: HCNC

## 2020-03-13 PROCEDURE — 21400001 HC TELEMETRY ROOM: Mod: HCNC

## 2020-03-13 RX ORDER — WARFARIN 3 MG/1
3 TABLET ORAL ONCE
Status: COMPLETED | OUTPATIENT
Start: 2020-03-13 | End: 2020-03-13

## 2020-03-13 RX ORDER — SODIUM CHLORIDE 0.9 % (FLUSH) 0.9 %
10 SYRINGE (ML) INJECTION EVERY 6 HOURS
Status: DISCONTINUED | OUTPATIENT
Start: 2020-03-14 | End: 2020-03-16 | Stop reason: HOSPADM

## 2020-03-13 RX ORDER — POTASSIUM CHLORIDE 750 MG/1
10 TABLET, EXTENDED RELEASE ORAL DAILY
Status: DISCONTINUED | OUTPATIENT
Start: 2020-03-13 | End: 2020-03-16 | Stop reason: HOSPADM

## 2020-03-13 RX ORDER — ENOXAPARIN SODIUM 100 MG/ML
1 INJECTION SUBCUTANEOUS
Status: DISCONTINUED | OUTPATIENT
Start: 2020-03-13 | End: 2020-03-16 | Stop reason: HOSPADM

## 2020-03-13 RX ORDER — LIDOCAINE 50 MG/G
1 PATCH TOPICAL
Status: DISCONTINUED | OUTPATIENT
Start: 2020-03-13 | End: 2020-03-16 | Stop reason: HOSPADM

## 2020-03-13 RX ORDER — WARFARIN SODIUM 5 MG/1
10 TABLET ORAL DAILY
Status: DISCONTINUED | OUTPATIENT
Start: 2020-03-13 | End: 2020-03-13

## 2020-03-13 RX ORDER — SODIUM CHLORIDE 0.9 % (FLUSH) 0.9 %
10 SYRINGE (ML) INJECTION
Status: DISCONTINUED | OUTPATIENT
Start: 2020-03-13 | End: 2020-03-16 | Stop reason: HOSPADM

## 2020-03-13 RX ORDER — WARFARIN 3 MG/1
6 TABLET ORAL DAILY
Status: DISCONTINUED | OUTPATIENT
Start: 2020-03-13 | End: 2020-03-16 | Stop reason: HOSPADM

## 2020-03-13 RX ADMIN — OXYCODONE HYDROCHLORIDE AND ACETAMINOPHEN 1 TABLET: 5; 325 TABLET ORAL at 12:03

## 2020-03-13 RX ADMIN — OXYBUTYNIN CHLORIDE 5 MG: 5 TABLET ORAL at 08:03

## 2020-03-13 RX ADMIN — WARFARIN SODIUM 6 MG: 3 TABLET ORAL at 06:03

## 2020-03-13 RX ADMIN — TIMOLOL MALEATE 1 DROP: 5 SOLUTION/ DROPS OPHTHALMIC at 08:03

## 2020-03-13 RX ADMIN — VANCOMYCIN HYDROCHLORIDE 1250 MG: 1.25 INJECTION, POWDER, LYOPHILIZED, FOR SOLUTION INTRAVENOUS at 01:03

## 2020-03-13 RX ADMIN — HYDROCHLOROTHIAZIDE 25 MG: 25 TABLET ORAL at 08:03

## 2020-03-13 RX ADMIN — ASPIRIN 81 MG: 81 TABLET, COATED ORAL at 08:03

## 2020-03-13 RX ADMIN — CIPROFLOXACIN 400 MG: 2 INJECTION, SOLUTION INTRAVENOUS at 06:03

## 2020-03-13 RX ADMIN — OXYCODONE HYDROCHLORIDE AND ACETAMINOPHEN 1 TABLET: 5; 325 TABLET ORAL at 10:03

## 2020-03-13 RX ADMIN — LATANOPROST 1 DROP: 50 SOLUTION OPHTHALMIC at 10:03

## 2020-03-13 RX ADMIN — PRAMIPEXOLE DIHYDROCHLORIDE 0.5 MG: 0.5 TABLET ORAL at 10:03

## 2020-03-13 RX ADMIN — LIDOCAINE 1 PATCH: 50 PATCH TOPICAL at 10:03

## 2020-03-13 RX ADMIN — ATORVASTATIN CALCIUM 40 MG: 40 TABLET, FILM COATED ORAL at 10:03

## 2020-03-13 RX ADMIN — DOCUSATE SODIUM 100 MG: 100 CAPSULE, LIQUID FILLED ORAL at 08:03

## 2020-03-13 RX ADMIN — LOSARTAN POTASSIUM 50 MG: 50 TABLET ORAL at 08:03

## 2020-03-13 RX ADMIN — WARFARIN SODIUM 3 MG: 3 TABLET ORAL at 06:03

## 2020-03-13 RX ADMIN — GABAPENTIN 600 MG: 300 CAPSULE ORAL at 10:03

## 2020-03-13 RX ADMIN — OXYCODONE HYDROCHLORIDE AND ACETAMINOPHEN 1 TABLET: 5; 325 TABLET ORAL at 04:03

## 2020-03-13 RX ADMIN — ENOXAPARIN SODIUM 100 MG: 100 INJECTION SUBCUTANEOUS at 06:03

## 2020-03-13 RX ADMIN — CIPROFLOXACIN 400 MG: 2 INJECTION, SOLUTION INTRAVENOUS at 05:03

## 2020-03-13 RX ADMIN — DOCUSATE SODIUM 100 MG: 100 CAPSULE, LIQUID FILLED ORAL at 10:03

## 2020-03-13 RX ADMIN — PANTOPRAZOLE SODIUM 40 MG: 40 TABLET, DELAYED RELEASE ORAL at 08:03

## 2020-03-13 RX ADMIN — VANCOMYCIN HYDROCHLORIDE 2000 MG: 100 INJECTION, POWDER, LYOPHILIZED, FOR SOLUTION INTRAVENOUS at 01:03

## 2020-03-13 RX ADMIN — GABAPENTIN 600 MG: 300 CAPSULE ORAL at 08:03

## 2020-03-13 RX ADMIN — DILTIAZEM HYDROCHLORIDE 240 MG: 120 CAPSULE, COATED, EXTENDED RELEASE ORAL at 08:03

## 2020-03-13 RX ADMIN — POTASSIUM CHLORIDE 10 MEQ: 10 TABLET, EXTENDED RELEASE ORAL at 10:03

## 2020-03-13 RX ADMIN — ALPRAZOLAM 0.25 MG: 0.25 TABLET ORAL at 10:03

## 2020-03-13 NOTE — ASSESSMENT & PLAN NOTE
Monitor  CXR Negative    Will obtain ambulatory desat study  Will likely need to see a pulmonologist as an OP

## 2020-03-13 NOTE — SUBJECTIVE & OBJECTIVE
Interval History:   Patient and daughter  She feels OK but continues to get short of breath, but states that hse has this chronically   Sats drop when she goes to sleep. Has CPAP at home . No pulmonary doctor. Does not run oxygen to the unit    She has some pain in the right arm at the site of her IV    States that at home she was taking warfarin 6 mg daily and 9 mg on Tuesday and Friday.  Her last OP INR was 2.2        Review of Systems   Constitutional: Negative for fever.   Respiratory: Positive for shortness of breath.    Cardiovascular: Negative for chest pain and leg swelling.   Gastrointestinal: Negative for abdominal pain, diarrhea, nausea and vomiting.   Neurological: Negative for dizziness.     Objective:     Vital Signs (Most Recent):  Temp: 98.5 °F (36.9 °C) (03/13/20 1146)  Pulse: 73 (03/13/20 1146)  Resp: 17 (03/13/20 1146)  BP: 125/60 (03/13/20 1146)  SpO2: (!) 94 % (03/13/20 0545) Vital Signs (24h Range):  Temp:  [97.6 °F (36.4 °C)-99.3 °F (37.4 °C)] 98.5 °F (36.9 °C)  Pulse:  [66-88] 73  Resp:  [16-18] 17  SpO2:  [94 %] 94 %  BP: (107-131)/(55-65) 125/60     Weight: 97.1 kg (214 lb 1.1 oz)  Body mass index is 39.15 kg/m².    Intake/Output Summary (Last 24 hours) at 3/13/2020 1521  Last data filed at 3/13/2020 1514  Gross per 24 hour   Intake 400 ml   Output 3700 ml   Net -3300 ml      Physical Exam   Constitutional: She is oriented to person, place, and time. No distress.   Eyes: Conjunctivae are normal.   Cardiovascular: Normal rate and regular rhythm.   Pulmonary/Chest: Effort normal and breath sounds normal.   Abdominal: There is no tenderness.   Musculoskeletal: She exhibits no edema.   Neurological: She is alert and oriented to person, place, and time.       Significant Labs:   BMP:   Recent Labs   Lab 03/13/20  0519   *   *   K 3.5   CL 93*   CO2 33*   BUN 15   CREATININE 0.8   CALCIUM 9.5     CBC:   Recent Labs   Lab 03/12/20  0813 03/13/20  0519   WBC 9.10 6.63   HGB 12.0 11.1*    HCT 36.4* 33.8*    214       Significant Imaging:   MRI Foot (Forefoot) Left W W/O Contrast  Narrative: EXAMINATION:  MRI FOOT (FOREFOOT) LEFT W W/O CONTRAST    CLINICAL HISTORY:  Abscess;    TECHNIQUE:  Multiplanar multisequence imaging of the in left forefoot prior to and following the uneventful administration of intravenous Gadavist 10 cc.    COMPARISON:  Multiple prior x-rays most recently 03/09/2020 and CT of the foot 03/09/2020    FINDINGS:  Multiple linear foci of micrometallic susceptibility artifact are seen along the medial aspect of the forefoot compatible with previous orthopedic hardware that has since been removed.  Well-marginated horizontal linear defects within the 1st metatarsal bone, medial cuneiform, and 1st proximal phalanx are compatible with areas of previous orthopedic hardware that has since been removed.  There is artifact related to the indwelling U shaped pin within the 1st proximal phalanx.  There is diffuse forefoot soft tissue swelling and enhancement compatible with cellulitis and there is a complex multiloculated rim enhancing fluid collection along the medial aspect of the forefoot at the level of the metatarsal shaft measuring approximately 4 x 2 by 2 cm suggestive of abscess.  No definite marrow signal abnormality or enhancement is seen to suggest osteomyelitis but it should be noted that several of the spur pin tract defects within the metatarsal shaft do communicate or are in close approximation with the abscess.    There are severe arthritic changes with subchondral cystic change, osteophytosis and enhancement of the midfoot suggesting either posttraumatic or Charcot arthropathy.  Multifocal septic arthritis is also considered but felt less likely.    There are chronic atrophic changes of the plantar muscles of the foot compatible with denervation.  Impression: Forefoot cellulitis and medial forefoot soft tissue abscess.    Status post extensive orthopedic hardware  removal along the medial forefoot, in close proximity to the abscess without definite evidence of osteomyelitis.    Additional neuropathic changes throughout the bones and soft tissues of the midfoot and forefoot as discussed above.    Electronically signed by: Mo Zambrano Jr  Date:    03/11/2020  Time:    16:19  X-Ray Chest AP Portable  Narrative: EXAMINATION:  XR CHEST AP PORTABLE    CLINICAL HISTORY:  short of breath;    TECHNIQUE:  Single frontal view of the chest was performed.    COMPARISON:  12/31/2019    FINDINGS:  Cardiac silhouette is normal in size.  There is atherosclerosis of the thoracic aorta.    Lungs grossly clear within limitations of portable technique    Pleura, diaphragm, and thoracic cage grossly unremarkable.  Impression: No acute cardiopulmonary disease    Electronically signed by: Mo Zambrano Jr  Date:    03/11/2020  Time:    15:06

## 2020-03-13 NOTE — PLAN OF CARE
Patient AAOx4, VSS, Patient free from falls. IV antibiotics given per MD order. Patient c/o pain, managed with PRN medications. Call bell in reach. Safety maintained. Will continue to monitor.   Problem: Fall Injury Risk  Goal: Absence of Fall and Fall-Related Injury  Outcome: Ongoing, Progressing     Problem: Adult Inpatient Plan of Care  Goal: Plan of Care Review  Outcome: Ongoing, Progressing  Goal: Patient-Specific Goal (Individualization)  Outcome: Ongoing, Progressing  Goal: Absence of Hospital-Acquired Illness or Injury  Outcome: Ongoing, Progressing  Goal: Optimal Comfort and Wellbeing  Outcome: Ongoing, Progressing  Goal: Readiness for Transition of Care  Outcome: Ongoing, Progressing  Goal: Rounds/Family Conference  Outcome: Ongoing, Progressing     Problem: Skin or Soft Tissue Infection  Goal: Infection Symptom Resolution  Outcome: Ongoing, Progressing     Problem: Infection  Goal: Infection Symptom Resolution  Outcome: Ongoing, Progressing     Problem: Skin Injury Risk Increased  Goal: Skin Health and Integrity  Outcome: Ongoing, Progressing     Problem: Pain Acute  Goal: Optimal Pain Control  Outcome: Ongoing, Progressing     Problem: Hypertension Comorbidity  Goal: Blood Pressure in Desired Range  Outcome: Ongoing, Progressing

## 2020-03-13 NOTE — ASSESSMENT & PLAN NOTE
continue coumadin for anticoagulation  And cardiazen for rate control    Got 9 mg yesterday.  Will give 10 mg tonight and reassess Olena  May need Lovenox for bridge  In SR

## 2020-03-13 NOTE — PROGRESS NOTES
Pharmacokinetic Assessment Follow Up: IV Vancomycin    Vancomycin serum concentration assessment(s):    The trough level was drawn correctly and can be used to guide therapy at this time. The measurement is below the desired definitive target range of 10 to 15 mcg/mL.    Vancomycin Regimen Plan:    Change regimen to Vancomycin 1250 mg IV every 12 hours with next serum trough concentration measured at 3/14/20 prior to 4th dose on 1200     Drug levels (last 3 results):  Recent Labs   Lab Result Units 03/10/20  1500 03/13/20  0048   Vancomycin-Trough ug/mL 7.7* 5.0*       Pharmacy will continue to follow and monitor vancomycin.    Please contact pharmacy at extension [phone] for questions regarding this assessment.    Thank you for the consult,   Tamia Morley       Patient brief summary:  Mis Ca is a 79 y.o. female initiated on antimicrobial therapy with IV Vancomycin for treatment of skin & soft tissue infection    The patient's current regimen is 2000mg q24    Drug Allergies:   Review of patient's allergies indicates:   Allergen Reactions    Propofol analogues      Used for her Colonoscopy.  Extended delirium.  Advised not to take it again.      Adhesive     Compazine [prochlorperazine edisylate] Anxiety    Penicillins Rash    Sulfa (sulfonamide antibiotics) Rash       Actual Body Weight:   97kg    Renal Function:   Estimated Creatinine Clearance: 56 mL/min (based on SCr of 0.9 mg/dL).,     Dialysis Method (if applicable):  N/A    CBC (last 72 hours):  Recent Labs   Lab Result Units 03/10/20  0550 03/11/20  0755 03/12/20  0813   WBC K/uL 9.39 8.90 9.10   Hemoglobin g/dL 11.9* 12.1 12.0   Hematocrit % 36.4* 36.8* 36.4*   Platelets K/uL 189 205 220   Gran% % 64.7 68.5 63.7   Lymph% % 21.9 17.2* 22.2   Mono% % 11.3 11.5 11.8   Eosinophil% % 1.5 2.2 1.8   Basophil% % 0.2 0.3 0.2   Differential Method  Automated Automated Automated       Metabolic Panel (last 72 hours):  Recent Labs   Lab Result Units  03/10/20  0550 03/11/20  0755 03/12/20  0813   Sodium mmol/L 136 138 136   Potassium mmol/L 4.4 3.5 3.5   Chloride mmol/L 97 96 94*   CO2 mmol/L 32* 31* 32*   Glucose mg/dL 132* 116* 114*   BUN, Bld mg/dL 15 15 18   Creatinine mg/dL 1.0 0.8 0.9   Magnesium mg/dL 2.1 2.0  --    Phosphorus mg/dL 4.4 3.4  --        Vancomycin Administrations:  vancomycin given in the last 96 hours                     vancomycin (VANCOCIN) 2,000 mg in dextrose 5 % 500 mL IVPB (mg) 2,000 mg New Bag 03/13/20 0112     2,000 mg New Bag 03/12/20 0013     2,000 mg New Bag 03/10/20 1525     2,000 mg New Bag 03/09/20 1542                      Microbiologic Results:  Microbiology Results (last 7 days)       Procedure Component Value Units Date/Time    Blood Culture #2 **CANNOT BE ORDERED STAT** [[id number]] Collected:  03/08/20 1533    Order Status:  Completed Specimen:  Blood from Peripheral, Antecubital, Left Updated:  03/12/20 2212     Blood Culture, Routine No Growth to date      No Growth to date      No Growth to date      No Growth to date      No Growth to date    Blood Culture #1 **CANNOT BE ORDERED STAT** [[id number]] Collected:  03/08/20 1525    Order Status:  Completed Specimen:  Blood from Peripheral, Forearm, Right Updated:  03/12/20 2212     Blood Culture, Routine No Growth to date      No Growth to date      No Growth to date      No Growth to date      No Growth to date    AFB Culture & Smear [[id number]] Collected:  03/11/20 1737    Order Status:  Completed Specimen:  Wound from Foot, Left Updated:  03/12/20 1451     AFB CULTURE STAIN No acid fast bacilli seen.    Culture, Anaerobe [[id number]] Collected:  03/09/20 1759    Order Status:  Completed Specimen:  Wound from Foot, Left Updated:  03/12/20 1055     Anaerobic Culture Culture in progress    Aerobic culture [[id number]]  (Abnormal)  (Susceptibility) Collected:  03/09/20 1757    Order Status:  Completed Specimen:  Wound from Foot, Left Updated:  03/12/20 1055     Aerobic  Bacterial Culture METHICILLIN RESISTANT STAPHYLOCOCCUS AUREUS  Many  No other significant isolate      Narrative:       Pin site    Culture, Anaerobe [[id number]] Collected:  03/09/20 1757    Order Status:  Completed Specimen:  Wound from Foot, Left Updated:  03/12/20 1053     Anaerobic Culture Culture in progress    Aerobic culture [[id number]]  (Abnormal)  (Susceptibility) Collected:  03/09/20 1759    Order Status:  Completed Specimen:  Wound from Foot, Left Updated:  03/12/20 1024     Aerobic Bacterial Culture METHICILLIN RESISTANT STAPHYLOCOCCUS AUREUS  Many      Narrative:       Pin site    Gram stain [[id number]] Collected:  03/11/20 1737    Order Status:  Completed Specimen:  Wound from Foot, Left Updated:  03/12/20 0331     Gram Stain Result Rare WBC's      Rare Gram positive cocci in clusters    Fungus culture [[id number]] Collected:  03/11/20 1737    Order Status:  Sent Specimen:  Wound from Foot, Left Updated:  03/11/20 2305    Aerobic culture [[id number]] Collected:  03/11/20 1737    Order Status:  Sent Specimen:  Wound from Foot, Left Updated:  03/11/20 2305    Culture, Anaerobe [[id number]] Collected:  03/11/20 1737    Order Status:  Sent Specimen:  Wound from Foot, Left Updated:  03/11/20 2303    Blood culture (site 2) [[id number]] Collected:  03/08/20 1715    Order Status:  Sent Specimen:  Blood from Peripheral, Forearm, Left Updated:  03/08/20 1715

## 2020-03-13 NOTE — PLAN OF CARE
VN rounds: VN cued into pt's room with pt's permission. Pt resting in bed. Fall risk protocol discussed with pt. VN instructed pt to call for assistance. Pt aware and agreeable. NAD noted. Allowed time for questions.  Will cont to be available and intervene as needed.

## 2020-03-13 NOTE — PLAN OF CARE
Problem: Physical Therapy Goal  Goal: Physical Therapy Goal  Description  Pt would benefit from skilled PT to progress functional mobility.  Goals include:  1.  Sit in chair 2 hours.  2.  Bed to/from chair with supervision with RW with NWB LLE  3.  Ambulate 75' with RW with supervision with NWB LLE  4.  Ind HEP   Outcome: Ongoing, Progressing   Goals ongoing

## 2020-03-13 NOTE — PROGRESS NOTES
[facility] Consult     Primary Team: Anni Cuenca MD  Consultant Attending: Dr. Ruby OCHOA PhD  Date of Admit: 3/8/2020    Reason for Consult     Infected hardware. Abx recs    History of Present Illness   Mis Ca is a 79 y.o. female with a relevant history of atrial fibrillation on warfarin, hypertension, and hyperlipidemia who presents with a one day history of worsening swelling, redness, and pain to left lower extremity. Patient had bunion removal in October of 2018 and had subsequent revision in January of 2020. About two weeks after the revisions, the patient hit her right foot on a wood porch. Patient's food was exposed due to presence of external fixator on foot. Patient denies breaks in the skin, foreign bodies, exposure to fresh water/salt water, any mosquito or animal bites, any purulence wound drainage, or fever.     Today:  Patient reporting continued pain in left lower extremity which is under control with pain meds.  Sutures were removed from left lower extremity and per patient podiatry thought wound was healing well.  Patient denies any new symptoms, questions, or concerns when asked.    Review of Systems (positives in bold):  Review of Systems   Constitutional: Negative for chills, fever, malaise/fatigue and weight loss.   Eyes: Negative for blurred vision.   Respiratory: Negative for cough, hemoptysis, sputum production, shortness of breath and wheezing.    Cardiovascular: Negative for chest pain, palpitations, orthopnea and leg swelling.   Gastrointestinal: Negative for abdominal pain, blood in stool, constipation, diarrhea, melena, nausea and vomiting.   Genitourinary: Negative for dysuria, frequency and urgency.   Musculoskeletal: Positive for joint pain (Left lower extremity). Negative for back pain, falls and myalgias.   Skin: Negative for rash.   Neurological: Negative for dizziness, focal weakness, weakness and headaches.   Psychiatric/Behavioral: Negative for  substance abuse.           Medications:   In-Hospital Scheduled Medications:   aspirin  81 mg Oral Daily    atorvastatin  40 mg Oral QHS    ciprofloxacin  400 mg Intravenous Q12H    diltiaZEM  240 mg Oral Daily    docusate sodium  100 mg Oral BID    gabapentin  600 mg Oral BID    hydroCHLOROthiazide  25 mg Oral Daily    latanoprost  1 drop Left Eye QHS    losartan  50 mg Oral Daily    oxybutynin  5 mg Oral Daily    pantoprazole  40 mg Oral Daily    pramipexole  0.5 mg Oral QHS    timolol maleate 0.5%  1 drop Left Eye Daily    vancomycin (VANCOCIN) IVPB  1,250 mg Intravenous Q12H    warfarin  6 mg Oral Daily      In-Hospital PRN Medications:  acetaminophen, ALPRAZolam, oxyCODONE-acetaminophen, sodium chloride 0.9%      Past Medical History:  Past Medical History:   Diagnosis Date    Anticoagulant long-term use     Anxiety     Arthritis     Atrial fibrillation     Blind right eye     Bradycardia     Cancer     skin cancer left side of face under eye    Hyperlipidemia     Hypertension     PVC (premature ventricular contraction)     RLS (restless legs syndrome)     Sleep apnea     Does not use CPAP machine.       Objective   Last 24 Hour Vital Signs:  BP  Min: 107/55  Max: 131/60  Temp  Av.2 °F (36.8 °C)  Min: 97.6 °F (36.4 °C)  Max: 99.3 °F (37.4 °C)  Pulse  Av.9  Min: 66  Max: 94  Resp  Av.3  Min: 16  Max: 18  SpO2  Av.3 %  Min: 94 %  Max: 95 %        Physical Examination:  Physical Exam   Constitutional: She is oriented to person, place, and time and well-developed, well-nourished, and in no distress. No distress.   HENT:   Head: Normocephalic and atraumatic.   Eyes: EOM are normal. Right eye exhibits no discharge. Left eye exhibits no discharge. No scleral icterus.   Neck: No JVD present.   Cardiovascular: Normal rate, regular rhythm, normal heart sounds and intact distal pulses. Exam reveals no gallop and no friction rub.   No murmur heard.  Pulmonary/Chest: Effort  normal and breath sounds normal. No stridor. No respiratory distress. She has no wheezes. She has no rales. She exhibits no tenderness.   Abdominal: Soft. Bowel sounds are normal. She exhibits no distension and no mass. There is no tenderness. There is no rebound and no guarding.   Musculoskeletal: She exhibits no edema or deformity.   Neurological: She is alert and oriented to person, place, and time.   Skin: Skin is warm and dry. No rash noted. She is not diaphoretic. No erythema. No pallor.   Psychiatric: Mood, memory, affect and judgment normal.   Vitals reviewed.        Laboratory:  CBC:   Lab Results   Component Value Date    WBC 6.63 03/13/2020    HGB 11.1 (L) 03/13/2020     03/13/2020    MCV 97 03/13/2020    RDW 12.6 03/13/2020       Estimated Creatinine Clearance: 63 mL/min (based on SCr of 0.8 mg/dL).        Assessment     Mis Ca is a 79 y.o. female with a PMH of a-fib onw arfarin, HTN, & HLD who presents with worsening swelling, redness, and pain to the LLE. On 1/15/20 patient had revision of the left 1st TMT fusion with application of external fixator by Dr. Cardozo.        Recommendations     Left Foot Cellulitis with infected hardware s/p removal & I&D  - 10/31/18: bunionectomy  - 1/15/20: revision of the 1st TMT fusion with application of external fixator by Dr. Cardozo  - 3/8/20: Patient presents with worsening swelling, redness, and pain to the LLE  - 3/9/20: external fixator removed  - 03/09/20 wound cultures from left foot - growing MRSA  - 3/11/20: incision and drainage for abscess  - 03/11/20 wound cultures from left foot - growing MRSA  - patient with staple in proximal phalanx  - recommend stopping ciprofloxacin  - ciprofloxacin started on 3/8/20  - continue vancomycin started on 3/8/20    Thank you for this consult. We will follow.      Peter Pop, DO PGY-1  [facility]

## 2020-03-13 NOTE — SUBJECTIVE & OBJECTIVE
Int Hx: s/p ex fix removal and s/p InD for abscess (3/12/20). Still has residual pain and swelling. Has been elevating as instructed. Denies F/N/V/C.    Scheduled Meds:   aspirin  81 mg Oral Daily    atorvastatin  40 mg Oral QHS    ciprofloxacin  400 mg Intravenous Q12H    diltiaZEM  240 mg Oral Daily    docusate sodium  100 mg Oral BID    gabapentin  600 mg Oral BID    hydroCHLOROthiazide  25 mg Oral Daily    latanoprost  1 drop Left Eye QHS    losartan  50 mg Oral Daily    oxybutynin  5 mg Oral Daily    pantoprazole  40 mg Oral Daily    pramipexole  0.5 mg Oral QHS    timolol maleate 0.5%  1 drop Left Eye Daily    vancomycin (VANCOCIN) IVPB  1,250 mg Intravenous Q12H    warfarin  6 mg Oral Daily     Continuous Infusions:  PRN Meds:acetaminophen, ALPRAZolam, oxyCODONE-acetaminophen, sodium chloride 0.9%    Review of patient's allergies indicates:   Allergen Reactions    Propofol analogues      Used for her Colonoscopy.  Extended delirium.  Advised not to take it again.      Adhesive     Compazine [prochlorperazine edisylate] Anxiety    Penicillins Rash    Sulfa (sulfonamide antibiotics) Rash        Past Medical History:   Diagnosis Date    Anticoagulant long-term use     Anxiety     Arthritis     Atrial fibrillation     Blind right eye     Bradycardia     Cancer     skin cancer left side of face under eye    Hyperlipidemia     Hypertension     PVC (premature ventricular contraction)     RLS (restless legs syndrome)     Sleep apnea     Does not use CPAP machine.     Past Surgical History:   Procedure Laterality Date    ADENOIDECTOMY      AKIN OSTEOTOMY Left 10/31/2018    Procedure: OSTEOTOMY, AKIN;  Surgeon: Rudolph Cardozo DPM;  Location: [facility];  Service: Podiatry;  Laterality: Left;    APPENDECTOMY      BREAST BIOPSY Left     x3    BREAST SURGERY Left     breast biopsy x3    CATARACT EXTRACTION BILATERAL W/ ANTERIOR VITRECTOMY      ENDOSCOPIC GASTROCNEMIUS RECESSION  Left 10/31/2018    Procedure: RECESSION, GASTROCNEMIUS, ENDOSCOPIC;  Surgeon: Rudolph Cardozo DPM;  Location: [facility] OR;  Service: Podiatry;  Laterality: Left;    EYE SURGERY Bilateral     cataracts extraction    EYE SURGERY Right     drainage tube (glaucoma)    FOOT ARTHRODESIS Left 1/15/2020    Procedure: FUSION, FOOT;  Surgeon: Rudolph Cardozo DPM;  Location: [facility] OR;  Service: Podiatry;  Laterality: Left;  mini c-arm, Arthrex screw  for hardware removal (Lydia notified), Orthofix (Niels notified) min ex-fix    FOOT SURGERY Left     lesion removed from dorsal area    FRACTURE SURGERY Left     arm    HAND TENDON SURGERY Left     HYSTERECTOMY      INCISION AND DRAINAGE FOOT Left 3/11/2020    Procedure: INCISION AND DRAINAGE, FOOT;  Surgeon: Rudolph Cardozo DPM;  Location: [facility] OR;  Service: Podiatry;  Laterality: Left;    LAPIDUS BUNIONECTOMY Left 10/31/2018    Procedure: BUNIONECTOMY, LAPIDUS;  Surgeon: Rudolph Cardozo DPM;  Location: [facility] OR;  Service: Podiatry;  Laterality: Left;  mini c-arm, Arthrex locking plate (Lydia notified)    LAPIDUS BUNIONECTOMY Left 10/31/2018    Dr. Cardozo    Lymph Gland Removed  Right     groin (performed by Dr. Vergara)    NEURECTOMY Left 1/15/2020    Procedure: NEURECTOMY;  Surgeon: Rudolph Cardozo DPM;  Location: [facility] OR;  Service: Podiatry;  Laterality: Left;  bipolar bovie, vessel loop, possible [place] nerve wrap. Moshe with [place] notified and will be overnighting graft. MK 1/14/2020    OOPHORECTOMY      ORIF FOREARM FRACTURE Left     PALATE SURGERY      Lesion removed    TONSILLECTOMY         Family History     Problem Relation (Age of Onset)    Aneurysm Mother    Arthritis Sister    Atrial fibrillation Sister    Bipolar disorder Sister    Brain cancer Sister    Breast cancer Sister    COPD Sister    Cancer Father, Sister, Brother, Brother, Sister    Cirrhosis Father    Colon polyps Brother    Depression Sister    Diabetes Sister,  Brother, Brother, Sister, Sister, Sister    Glaucoma Brother, Sister    Heart attack Sister, Sister    Heart disease Sister, Sister, Sister    Hyperlipidemia Sister, Brother    Hypertension Sister, Brother    Kidney disease Sister    Lung cancer Father, Sister    Other Sister        Tobacco Use    Smoking status: Never Smoker    Smokeless tobacco: Never Used   Substance and Sexual Activity    Alcohol use: Yes     Comment: Seldomly drinks alcohol (wine)    Drug use: No    Sexual activity: Not Currently     Partners: Male     Review of Systems   Constitutional: Negative for chills and fever.   Cardiovascular: Positive for leg swelling.   Gastrointestinal: Negative for nausea and vomiting.   Musculoskeletal: Positive for gait problem.   Skin: Positive for color change and wound.     Objective:     Vital Signs (Most Recent):  Temp: 97.6 °F (36.4 °C) (03/13/20 0812)  Pulse: 72 (03/13/20 0812)  Resp: 18 (03/13/20 0812)  BP: (!) 115/58 (03/13/20 0812)  SpO2: (!) 94 % (03/13/20 0545) Vital Signs (24h Range):  Temp:  [97.6 °F (36.4 °C)-99.3 °F (37.4 °C)] 97.6 °F (36.4 °C)  Pulse:  [66-94] 72  Resp:  [16-18] 18  SpO2:  [94 %-95 %] 94 %  BP: (115-131)/(56-65) 115/58     Weight: 97.1 kg (214 lb 1.1 oz)  Body mass index is 39.15 kg/m².    Foot Exam    Left Foot/Ankle      Inspection and Palpation  Tenderness: (Tenderness around the pin site and across the medial dorsal foot.)  Swelling: (Left foot edema)  Skin Exam: cellulitis and erythema; no drainage     Neurovascular  Dorsalis pedis: 2+  Posterior tibial: 2+            Laboratory:  CBC:   Recent Labs   Lab 03/13/20 0519   WBC 6.63   RBC 3.50*   HGB 11.1*   HCT 33.8*      MCV 97   MCH 31.7*   MCHC 32.8     CMP:   Recent Labs   Lab 03/08/20  1536  03/13/20  0519   GLU 77   < > 115*   CALCIUM 10.0   < > 9.5   ALBUMIN 4.0  --   --    PROT 7.2  --   --       < > 135*   K 3.7   < > 3.5   CO2 28   < > 33*      < > 93*   BUN 14   < > 15   CREATININE 0.8   < >  0.8   ALKPHOS 131  --   --    ALT 17  --   --    AST 15  --   --    BILITOT 0.4  --   --     < > = values in this interval not displayed.     CRP:   Recent Labs   Lab 03/09/20 0939   CRP 86.3*     ESR:   Recent Labs   Lab 03/09/20 0939   SEDRATE 38*       Diagnostic Results:  I have reviewed all pertinent imaging results/findings within the past 24 hours.    Clinical Findings:  There is edema and erythema to the left foot. Patient reports it is improved since admission.  No significant drainage or purulence. No fluctuance or crepitus. Tenderness to the distal pin sites and dorsal medial foot. The ex fix is still well aligned and intact without loosening. No signs of trauma.             3/10/20  Purulent drainage from the distal pin site today. Fluctuance felt at the 1st interspace of the left foot.           3/12/20  Edema has improved. Still has residual erythema.

## 2020-03-13 NOTE — PLAN OF CARE
Home infusion referral sent to [facility].       03/13/20 6065   Post-Acute Status   Post-Acute Authorization Medications   Home Health/Hospice Status Referrals Sent

## 2020-03-13 NOTE — PROCEDURES
"Mis Ca is a 79 y.o. female patient.    Temp: 98.7 °F (37.1 °C) (03/13/20 1731)  Pulse: 69 (03/13/20 1731)  Resp: 17 (03/13/20 1731)  BP: (!) 95/51 (03/13/20 1731)  SpO2: 95 % (03/13/20 1611)  Weight: 97.1 kg (214 lb 1.1 oz) (03/12/20 0428)  Height: 5' 2" (157.5 cm) (03/08/20 1949)    PICC  Date/Time: 3/13/2020 6:15 PM  Consent Done: Yes  Time out: Immediately prior to procedure a time out was called to verify the correct patient, procedure, equipment, support staff and site/side marked as required  Indications: med administration and vascular access  Anesthesia: local infiltration  Local anesthetic: lidocaine 1% without epinephrine  Anesthetic Total (mL): 2  Preparation: skin prepped with ChloraPrep  Skin prep agent dried: skin prep agent completely dried prior to procedure  Sterile barriers: all five maximum sterile barriers used - cap, mask, sterile gown, sterile gloves, and large sterile sheet  Hand hygiene: hand hygiene performed prior to central venous catheter insertion  Location details: right basilic  Catheter type: double lumen  Catheter size: 5 Fr  Catheter Length: 38cm    Ultrasound guidance: yes  Needle advanced into vessel with real time Ultrasound guidance.  Guidewire confirmed in vessel.  Sterile sheath used.  Number of attempts: 1  Post-procedure: blood return through all ports, chlorhexidine patch and sterile dressing applied    Assessment: placement verified by x-ray  Complications: none          Sunny Armstrong  3/13/2020  "

## 2020-03-13 NOTE — ASSESSMENT & PLAN NOTE
See cellulitis  - External fixator was removed.  - Staple in the proximal phalanx is still in place.

## 2020-03-13 NOTE — ASSESSMENT & PLAN NOTE
POD #4  Hardware removal POD#2 Abscess drainage  On vanc and cipro     MRSA      ID consulted for abx management  PICC placement  Will need home IV abx

## 2020-03-13 NOTE — PROGRESS NOTES
[facility]  Podiatry  Progress Note    Patient Name: Mis Ca  MRN: [id number]  Admission Date: 3/8/2020  Hospital Length of Stay: 5 days  Attending Physician: Anni Cuenca MD  Primary Care Provider: Scott Dillard MD   Int Hx: s/p ex fix removal and s/p InD for abscess (3/12/20). Still has residual pain and swelling. Has been elevating as instructed. Denies F/N/V/C.    Scheduled Meds:   aspirin  81 mg Oral Daily    atorvastatin  40 mg Oral QHS    ciprofloxacin  400 mg Intravenous Q12H    diltiaZEM  240 mg Oral Daily    docusate sodium  100 mg Oral BID    gabapentin  600 mg Oral BID    hydroCHLOROthiazide  25 mg Oral Daily    latanoprost  1 drop Left Eye QHS    losartan  50 mg Oral Daily    oxybutynin  5 mg Oral Daily    pantoprazole  40 mg Oral Daily    pramipexole  0.5 mg Oral QHS    timolol maleate 0.5%  1 drop Left Eye Daily    vancomycin (VANCOCIN) IVPB  1,250 mg Intravenous Q12H    warfarin  6 mg Oral Daily     Continuous Infusions:  PRN Meds:acetaminophen, ALPRAZolam, oxyCODONE-acetaminophen, sodium chloride 0.9%    Review of patient's allergies indicates:   Allergen Reactions    Propofol analogues      Used for her Colonoscopy.  Extended delirium.  Advised not to take it again.      Adhesive     Compazine [prochlorperazine edisylate] Anxiety    Penicillins Rash    Sulfa (sulfonamide antibiotics) Rash        Past Medical History:   Diagnosis Date    Anticoagulant long-term use     Anxiety     Arthritis     Atrial fibrillation     Blind right eye     Bradycardia     Cancer     skin cancer left side of face under eye    Hyperlipidemia     Hypertension     PVC (premature ventricular contraction)     RLS (restless legs syndrome)     Sleep apnea     Does not use CPAP machine.     Past Surgical History:   Procedure Laterality Date    ADENOIDECTOMY      AKIN OSTEOTOMY Left 10/31/2018    Procedure: OSTEOTOMY, AKIN;  Surgeon: Rudolph Cardozo DPM;   Location: [facility] OR;  Service: Podiatry;  Laterality: Left;    APPENDECTOMY      BREAST BIOPSY Left     x3    BREAST SURGERY Left     breast biopsy x3    CATARACT EXTRACTION BILATERAL W/ ANTERIOR VITRECTOMY      ENDOSCOPIC GASTROCNEMIUS RECESSION Left 10/31/2018    Procedure: RECESSION, GASTROCNEMIUS, ENDOSCOPIC;  Surgeon: Rudolph Cardozo DPM;  Location: [facility] OR;  Service: Podiatry;  Laterality: Left;    EYE SURGERY Bilateral     cataracts extraction    EYE SURGERY Right     drainage tube (glaucoma)    FOOT ARTHRODESIS Left 1/15/2020    Procedure: FUSION, FOOT;  Surgeon: Rudolph Cardozo DPM;  Location: [facility] OR;  Service: Podiatry;  Laterality: Left;  mini c-arm, Arthrex screw  for hardware removal (Lydia notified), Orthofix (Niels notified) min ex-fix    FOOT SURGERY Left     lesion removed from dorsal area    FRACTURE SURGERY Left     arm    HAND TENDON SURGERY Left     HYSTERECTOMY      INCISION AND DRAINAGE FOOT Left 3/11/2020    Procedure: INCISION AND DRAINAGE, FOOT;  Surgeon: Rudolph Cardozo DPM;  Location: [facility] OR;  Service: Podiatry;  Laterality: Left;    LAPIDUS BUNIONECTOMY Left 10/31/2018    Procedure: BUNIONECTOMY, LAPIDUS;  Surgeon: Rudolph Cardozo DPM;  Location: [facility] OR;  Service: Podiatry;  Laterality: Left;  mini c-arm, Arthrex locking plate (Lydia notified)    LAPIDUS BUNIONECTOMY Left 10/31/2018    Dr. Cardozo    Lymph Gland Removed  Right     groin (performed by Dr. Vergara)    NEURECTOMY Left 1/15/2020    Procedure: NEURECTOMY;  Surgeon: Rudolph Cardozo DPM;  Location: [facility] OR;  Service: Podiatry;  Laterality: Left;  bipolar bovie, vessel loop, possible Agnes nerve wrap. Moshe with [place] notified and will be overnighting graft. MK 1/14/2020    OOPHORECTOMY      ORIF FOREARM FRACTURE Left     PALATE SURGERY      Lesion removed    TONSILLECTOMY         Family History     Problem Relation (Age of Onset)    Aneurysm Mother    Arthritis Sister     Atrial fibrillation Sister    Bipolar disorder Sister    Brain cancer Sister    Breast cancer Sister    COPD Sister    Cancer Father, Sister, Brother, Brother, Sister    Cirrhosis Father    Colon polyps Brother    Depression Sister    Diabetes Sister, Brother, Brother, Sister, Sister, Sister    Glaucoma Brother, Sister    Heart attack Sister, Sister    Heart disease Sister, Sister, Sister    Hyperlipidemia Sister, Brother    Hypertension Sister, Brother    Kidney disease Sister    Lung cancer Father, Sister    Other Sister        Tobacco Use    Smoking status: Never Smoker    Smokeless tobacco: Never Used   Substance and Sexual Activity    Alcohol use: Yes     Comment: Seldomly drinks alcohol (wine)    Drug use: No    Sexual activity: Not Currently     Partners: Male     Review of Systems   Constitutional: Negative for chills and fever.   Cardiovascular: Positive for leg swelling.   Gastrointestinal: Negative for nausea and vomiting.   Musculoskeletal: Positive for gait problem.   Skin: Positive for color change and wound.     Objective:     Vital Signs (Most Recent):  Temp: 97.6 °F (36.4 °C) (03/13/20 0812)  Pulse: 72 (03/13/20 0812)  Resp: 18 (03/13/20 0812)  BP: (!) 115/58 (03/13/20 0812)  SpO2: (!) 94 % (03/13/20 0545) Vital Signs (24h Range):  Temp:  [97.6 °F (36.4 °C)-99.3 °F (37.4 °C)] 97.6 °F (36.4 °C)  Pulse:  [66-94] 72  Resp:  [16-18] 18  SpO2:  [94 %-95 %] 94 %  BP: (115-131)/(56-65) 115/58     Weight: 97.1 kg (214 lb 1.1 oz)  Body mass index is 39.15 kg/m².    Foot Exam    Left Foot/Ankle      Inspection and Palpation  Tenderness: (Tenderness around the pin site and across the medial dorsal foot.)  Swelling: (Left foot edema)  Skin Exam: cellulitis and erythema; no drainage     Neurovascular  Dorsalis pedis: 2+  Posterior tibial: 2+            Laboratory:  CBC:   Recent Labs   Lab 03/13/20  0519   WBC 6.63   RBC 3.50*   HGB 11.1*   HCT 33.8*      MCV 97   MCH 31.7*   MCHC 32.8     CMP:    Recent Labs   Lab 03/08/20  1536  03/13/20  0519   GLU 77   < > 115*   CALCIUM 10.0   < > 9.5   ALBUMIN 4.0  --   --    PROT 7.2  --   --       < > 135*   K 3.7   < > 3.5   CO2 28   < > 33*      < > 93*   BUN 14   < > 15   CREATININE 0.8   < > 0.8   ALKPHOS 131  --   --    ALT 17  --   --    AST 15  --   --    BILITOT 0.4  --   --     < > = values in this interval not displayed.     CRP:   Recent Labs   Lab 03/09/20  0939   CRP 86.3*     ESR:   Recent Labs   Lab 03/09/20  0939   SEDRATE 38*       Diagnostic Results:  I have reviewed all pertinent imaging results/findings within the past 24 hours.    Clinical Findings:  There is edema and erythema to the left foot. Patient reports it is improved since admission.  No significant drainage or purulence. No fluctuance or crepitus. Tenderness to the distal pin sites and dorsal medial foot. The ex fix is still well aligned and intact without loosening. No signs of trauma.             3/10/20  Purulent drainage from the distal pin site today. Fluctuance felt at the 1st interspace of the left foot.           3/12/20  Edema has improved. Still has residual erythema.          Assessment/Plan:     * Infected hardware in left leg, initial encounter  See cellulitis  - External fixator was removed.  - Staple in the proximal phalanx is still in place.    Infection at site of external fixator pin  As above    Cellulitis  Edema worsened today likely from forming seroma/hematoma. Some sutures were removed to allow better drainage. Compression dressing applied.     Plan:  - Continue antibiotics. ID onboard for recommendations. Cultures growing MRSA.  - Patient can hold off on PT for now. Minimal WB to heel for short transfers only.  - Will order surgical shoe.  - Podiatry will follow.     Patient will need follow up within 1 week of DC with Dr. Cardozo.    Anticoagulated on Coumadin  Per primary        Kevin Cat MD  Podiatry  [facility]

## 2020-03-13 NOTE — PLAN OF CARE
Alert and oriented x4. Scheduled medications administered per MD order. Prn pain medication administered for relief. Afebrile. Voiding without difficulty. Transfers to bedside commode with 1 person assist. Safety maintained. Will continue to monitor.

## 2020-03-13 NOTE — ASSESSMENT & PLAN NOTE
Edema worsened today likely from forming seroma/hematoma. Some sutures were removed to allow better drainage. Compression dressing applied.     Plan:  - Continue antibiotics. ID onboard for recommendations. Cultures growing MRSA.  - Patient can hold off on PT for now. Minimal WB to heel for short transfers only.  - Will order surgical shoe.  - Podiatry will follow.     Patient will need follow up within 1 week of DC with Dr. Cardozo.

## 2020-03-13 NOTE — PROGRESS NOTES
"[facility] Hospital Medicine  Progress Note    Patient Name: Mis Ca  MRN: [id number]  Patient Class: IP- Inpatient   Admission Date: 3/8/2020  Length of Stay: 5 days  Attending Physician: Anni Cuenca MD  Primary Care Provider: Scott Dillard MD        Subjective:     Principal Problem:Infected hardware in left leg, initial encounter        HPI:  The patient is a 79 years old. female with PMH HTN, HLP, anxiety,Atrial fibrillation, Blind right eye, Bradycardia, RLS, and Sleep apnea presents with complaint of possible wound infection to left foot. Patient states she had surgery on January 15, 2020 to foot by Dr. Cardozo to "fix bones that did not heal correctly after bunion surgery".  Patient reports no complications to foot until yesterday when she noticed swelling, erythema and pain worsening. She also reports scant amount of white drainage from wound this morning. Patient denies any fever, chills, abdominal pain, nausea or vomiting. Of note, patient last saw Dr. Cardozo two weeks ago and has upcoming appointment in four days.    The pt does not put weight yet on her foot, and her surgical site is not  Having pain from the sx site itself but the swelling and edema around it    Overview/Hospital Course:  3/9 the pt seen, no acute events, had Dr Cardozo evaluating her foot, ordered a ct foot, awaiting rwecs. For now continue POC with IV abx    Interval History:   Patient and daughter  She feels OK but continues to get short of breath, but states that hse has this chronically   Sats drop when she goes to sleep. Has CPAP at home . No pulmonary doctor. Does not run oxygen to the unit    She has some pain in the right arm at the site of her IV    States that at home she was taking warfarin 6 mg daily and 9 mg on Tuesday and Friday.  Her last OP INR was 2.2        Review of Systems   Constitutional: Negative for fever.   Respiratory: Positive for shortness of breath.    Cardiovascular: " Negative for chest pain and leg swelling.   Gastrointestinal: Negative for abdominal pain, diarrhea, nausea and vomiting.   Neurological: Negative for dizziness.     Objective:     Vital Signs (Most Recent):  Temp: 98.5 °F (36.9 °C) (03/13/20 1146)  Pulse: 73 (03/13/20 1146)  Resp: 17 (03/13/20 1146)  BP: 125/60 (03/13/20 1146)  SpO2: (!) 94 % (03/13/20 0545) Vital Signs (24h Range):  Temp:  [97.6 °F (36.4 °C)-99.3 °F (37.4 °C)] 98.5 °F (36.9 °C)  Pulse:  [66-88] 73  Resp:  [16-18] 17  SpO2:  [94 %] 94 %  BP: (107-131)/(55-65) 125/60     Weight: 97.1 kg (214 lb 1.1 oz)  Body mass index is 39.15 kg/m².    Intake/Output Summary (Last 24 hours) at 3/13/2020 1521  Last data filed at 3/13/2020 1514  Gross per 24 hour   Intake 400 ml   Output 3700 ml   Net -3300 ml      Physical Exam   Constitutional: She is oriented to person, place, and time. No distress.   Eyes: Conjunctivae are normal.   Cardiovascular: Normal rate and regular rhythm.   Pulmonary/Chest: Effort normal and breath sounds normal.   Abdominal: There is no tenderness.   Musculoskeletal: She exhibits no edema.   Neurological: She is alert and oriented to person, place, and time.       Significant Labs:   BMP:   Recent Labs   Lab 03/13/20  0519   *   *   K 3.5   CL 93*   CO2 33*   BUN 15   CREATININE 0.8   CALCIUM 9.5     CBC:   Recent Labs   Lab 03/12/20  0813 03/13/20  0519   WBC 9.10 6.63   HGB 12.0 11.1*   HCT 36.4* 33.8*    214       Significant Imaging:   MRI Foot (Forefoot) Left W W/O Contrast  Narrative: EXAMINATION:  MRI FOOT (FOREFOOT) LEFT W W/O CONTRAST    CLINICAL HISTORY:  Abscess;    TECHNIQUE:  Multiplanar multisequence imaging of the in left forefoot prior to and following the uneventful administration of intravenous Gadavist 10 cc.    COMPARISON:  Multiple prior x-rays most recently 03/09/2020 and CT of the foot 03/09/2020    FINDINGS:  Multiple linear foci of micrometallic susceptibility artifact are seen along the  medial aspect of the forefoot compatible with previous orthopedic hardware that has since been removed.  Well-marginated horizontal linear defects within the 1st metatarsal bone, medial cuneiform, and 1st proximal phalanx are compatible with areas of previous orthopedic hardware that has since been removed.  There is artifact related to the indwelling U shaped pin within the 1st proximal phalanx.  There is diffuse forefoot soft tissue swelling and enhancement compatible with cellulitis and there is a complex multiloculated rim enhancing fluid collection along the medial aspect of the forefoot at the level of the metatarsal shaft measuring approximately 4 x 2 by 2 cm suggestive of abscess.  No definite marrow signal abnormality or enhancement is seen to suggest osteomyelitis but it should be noted that several of the spur pin tract defects within the metatarsal shaft do communicate or are in close approximation with the abscess.    There are severe arthritic changes with subchondral cystic change, osteophytosis and enhancement of the midfoot suggesting either posttraumatic or Charcot arthropathy.  Multifocal septic arthritis is also considered but felt less likely.    There are chronic atrophic changes of the plantar muscles of the foot compatible with denervation.  Impression: Forefoot cellulitis and medial forefoot soft tissue abscess.    Status post extensive orthopedic hardware removal along the medial forefoot, in close proximity to the abscess without definite evidence of osteomyelitis.    Additional neuropathic changes throughout the bones and soft tissues of the midfoot and forefoot as discussed above.    Electronically signed by: Mo Zambrano Jr  Date:    03/11/2020  Time:    16:19  X-Ray Chest AP Portable  Narrative: EXAMINATION:  XR CHEST AP PORTABLE    CLINICAL HISTORY:  short of breath;    TECHNIQUE:  Single frontal view of the chest was performed.    COMPARISON:  12/31/2019    FINDINGS:  Cardiac  silhouette is normal in size.  There is atherosclerosis of the thoracic aorta.    Lungs grossly clear within limitations of portable technique    Pleura, diaphragm, and thoracic cage grossly unremarkable.  Impression: No acute cardiopulmonary disease    Electronically signed by: Mo Zambrano Jr  Date:    03/11/2020  Time:    15:06         Assessment/Plan:      * Infected hardware in left leg, initial encounter  POD #4  Hardware removal POD#2 Abscess drainage  On vanc and cipro     MRSA      ID consulted for abx management  PICC placement  Will need home IV abx    Superficial thrombophlebitis    Warm compresses  Minimal on exam right hand and antecubital    Constipation    Maintain on colace.  Had a BM 3-11-20    Cellulitis  See #1      Diastolic dysfunction  Continue home meds  No acute exacerbation      Essential hypertension  Presently on HCTZ, Losartan, Cardizem      Hyperlipidemia  Continue statin      Anticoagulated on Coumadin    As above      JACQUELYN (obstructive sleep apnea)  Monitor  CXR Negative    Will obtain ambulatory desat study  Will likely need to see a pulmonologist as an OP    Paroxysmal atrial fibrillation  continue coumadin for anticoagulation  And cardiazen for rate control    Got 9 mg yesterday.  Will give 10 mg tonight and reassess Olena  May need Lovenox for bridge  In SR      VTE Risk Mitigation (From admission, onward)         Ordered     warfarin (COUMADIN) tablet 10 mg  Daily      03/13/20 1534     IP VTE HIGH RISK PATIENT  Once      03/08/20 1714     Place sequential compression device  Until discontinued      03/08/20 1714                DISPO:  PICC line.  Ambulatory desat  Home IV abx will need to be set up  Coumadin adjustment          Anni Cuenca MD  Department of Hospital Medicine   [facility]

## 2020-03-13 NOTE — PT/OT/SLP PROGRESS
Physical Therapy Treatment    Patient Name:  Mis Ca   MRN:  [id number]    Recommendations:     Discharge Recommendations:  (home with daughter)   Discharge Equipment Recommendations: bath bench   Barriers to discharge: decreased mobility,strength and endurance    Assessment:     Mis Ca is a 79 y.o. female admitted with a medical diagnosis of Infected hardware in left leg, initial encounter.  She presents with the following impairments/functional limitations:  weakness, impaired endurance, impaired functional mobilty, gait instability, impaired balance, decreased ROM, impaired skin, orthopedic precautions,pt activity limited by MD performing debridement this AM and foot maintaining elevation,pt able to perform le ex's only,pt with possible discharge home today with daughter.    Rehab Prognosis: Good; patient would benefit from acute skilled PT services to address these deficits and reach maximum level of function.    Recent Surgery: Procedure(s) (LRB):  INCISION AND DRAINAGE, FOOT (Left) 2 Days Post-Op    Plan:     During this hospitalization, patient to be seen 6 x/week to address the identified rehab impairments via gait training, therapeutic exercises, therapeutic activities, neuromuscular re-education and progress toward the following goals:    · Plan of Care Expires:  04/12/20    Subjective     Chief Complaint: n/a  Patient/Family Comments/goals: pt has assistance at home.  Pain/Comfort:  · Pain Rating 1: (no rating)  · Location - Side 1: Left  · Location - Orientation 1: generalized  · Location 1: foot  · Pain Addressed 1: Reposition, Distraction      Objective:     Communicated with nsg prior to session.  Patient found supine with peripheral IV upon PT entry to room.     General Precautions: Standard, fall   Orthopedic Precautions:LLE non weight bearing   Braces: N/A     Functional Mobility:  · Gait: n/a      AM-PAC 6 CLICK MOBILITY  Turning over in bed (including adjusting bedclothes, sheets  and blankets)?: 3  Sitting down on and standing up from a chair with arms (e.g., wheelchair, bedside commode, etc.): 3  Moving from lying on back to sitting on the side of the bed?: 3  Moving to and from a bed to a chair (including a wheelchair)?: 3  Need to walk in hospital room?: 3  Climbing 3-5 steps with a railing?: 1  Basic Mobility Total Score: 16       Therapeutic Activities and Exercises: le supine ex's only this AM inc: ap,qs,hs,abd/add and slr,no sitting EOB or standing this AM secondary to foot debridement.       Patient left supine with all lines intact, call button in reach and daughter present..    GOALS: see general POC  Multidisciplinary Problems     Physical Therapy Goals        Problem: Physical Therapy Goal    Goal Priority Disciplines Outcome Goal Variances Interventions   Physical Therapy Goal     PT, PT/OT Ongoing, Progressing     Description:  Pt would benefit from skilled PT to progress functional mobility.  Goals include:  1.  Sit in chair 2 hours.  2.  Bed to/from chair with supervision with RW with NWB LLE  3.  Ambulate 75' with RW with supervision with NWB LLE  4.  Ind HEP                    Time Tracking:     PT Received On: 03/13/20  PT Start Time: 0946     PT Stop Time: 1001  PT Total Time (min): 15 min     Billable Minutes: Therapeutic Exercise 15    Treatment Type: Treatment  PT/PTA: PTA     PTA Visit Number: 1     Regulo Egan, PTA  03/13/2020

## 2020-03-13 NOTE — PLAN OF CARE
VIRTUAL NURSE:  Cued into patient's room.  Permission received per patient to turn camera to view patient.  Introduced as VN for night shift that will be working with floor nurse and nursing assistant.  Educated patient on VN's role in patient care. Plan of care reviewed with patient. Education per flowsheet.  Opportunity given for questions and questions answered.  C/o chronic left shoulder pain 3/10 which is tolerable for her.  Denies n/v and sob.  Instructed to call for assistance.  Will cont to monitor.     Labs, notes, and orders reviewed.

## 2020-03-14 LAB
ANION GAP SERPL CALC-SCNC: 8 MMOL/L (ref 8–16)
BACTERIA SPEC AEROBE CULT: ABNORMAL
BASOPHILS # BLD AUTO: 0.02 K/UL (ref 0–0.2)
BASOPHILS NFR BLD: 0.3 % (ref 0–1.9)
BUN SERPL-MCNC: 15 MG/DL (ref 8–23)
CALCIUM SERPL-MCNC: 9.9 MG/DL (ref 8.7–10.5)
CHLORIDE SERPL-SCNC: 94 MMOL/L (ref 95–110)
CO2 SERPL-SCNC: 35 MMOL/L (ref 23–29)
CREAT SERPL-MCNC: 0.8 MG/DL (ref 0.5–1.4)
DIFFERENTIAL METHOD: ABNORMAL
EOSINOPHIL # BLD AUTO: 0.3 K/UL (ref 0–0.5)
EOSINOPHIL NFR BLD: 4.5 % (ref 0–8)
ERYTHROCYTE [DISTWIDTH] IN BLOOD BY AUTOMATED COUNT: 12.4 % (ref 11.5–14.5)
EST. GFR  (AFRICAN AMERICAN): >60 ML/MIN/1.73 M^2
EST. GFR  (NON AFRICAN AMERICAN): >60 ML/MIN/1.73 M^2
GLUCOSE SERPL-MCNC: 103 MG/DL (ref 70–110)
HCT VFR BLD AUTO: 34 % (ref 37–48.5)
HGB BLD-MCNC: 11.2 G/DL (ref 12–16)
IMM GRANULOCYTES # BLD AUTO: 0.03 K/UL (ref 0–0.04)
IMM GRANULOCYTES NFR BLD AUTO: 0.4 % (ref 0–0.5)
INR PPP: 1.1 (ref 0.8–1.2)
LYMPHOCYTES # BLD AUTO: 2.1 K/UL (ref 1–4.8)
LYMPHOCYTES NFR BLD: 30.7 % (ref 18–48)
MAGNESIUM SERPL-MCNC: 2 MG/DL (ref 1.6–2.6)
MCH RBC QN AUTO: 31.7 PG (ref 27–31)
MCHC RBC AUTO-ENTMCNC: 32.9 G/DL (ref 32–36)
MCV RBC AUTO: 96 FL (ref 82–98)
MONOCYTES # BLD AUTO: 0.8 K/UL (ref 0.3–1)
MONOCYTES NFR BLD: 12 % (ref 4–15)
NEUTROPHILS # BLD AUTO: 3.6 K/UL (ref 1.8–7.7)
NEUTROPHILS NFR BLD: 52.1 % (ref 38–73)
NRBC BLD-RTO: 0 /100 WBC
PLATELET # BLD AUTO: 220 K/UL (ref 150–350)
PMV BLD AUTO: 9.8 FL (ref 9.2–12.9)
POTASSIUM SERPL-SCNC: 3.7 MMOL/L (ref 3.5–5.1)
PROTHROMBIN TIME: 12.3 SEC (ref 9–12.5)
RBC # BLD AUTO: 3.53 M/UL (ref 4–5.4)
SODIUM SERPL-SCNC: 137 MMOL/L (ref 136–145)
VANCOMYCIN TROUGH SERPL-MCNC: 18.5 UG/ML (ref 10–22)
WBC # BLD AUTO: 6.94 K/UL (ref 3.9–12.7)

## 2020-03-14 PROCEDURE — 27000221 HC OXYGEN, UP TO 24 HOURS: Mod: HCNC

## 2020-03-14 PROCEDURE — 25000003 PHARM REV CODE 250: Mod: HCNC | Performed by: PODIATRIST

## 2020-03-14 PROCEDURE — 80048 BASIC METABOLIC PNL TOTAL CA: CPT | Mod: HCNC

## 2020-03-14 PROCEDURE — 63600175 PHARM REV CODE 636 W HCPCS: Mod: HCNC | Performed by: INTERNAL MEDICINE

## 2020-03-14 PROCEDURE — 83735 ASSAY OF MAGNESIUM: CPT | Mod: HCNC

## 2020-03-14 PROCEDURE — 63600175 PHARM REV CODE 636 W HCPCS: Mod: HCNC | Performed by: PODIATRIST

## 2020-03-14 PROCEDURE — 25000003 PHARM REV CODE 250: Mod: HCNC | Performed by: INTERNAL MEDICINE

## 2020-03-14 PROCEDURE — 85610 PROTHROMBIN TIME: CPT | Mod: HCNC

## 2020-03-14 PROCEDURE — 80202 ASSAY OF VANCOMYCIN: CPT | Mod: HCNC

## 2020-03-14 PROCEDURE — 21400001 HC TELEMETRY ROOM: Mod: HCNC

## 2020-03-14 PROCEDURE — 94761 N-INVAS EAR/PLS OXIMETRY MLT: CPT | Mod: HCNC

## 2020-03-14 PROCEDURE — 85025 COMPLETE CBC W/AUTO DIFF WBC: CPT | Mod: HCNC

## 2020-03-14 PROCEDURE — 25000003 PHARM REV CODE 250: Mod: HCNC | Performed by: NURSE PRACTITIONER

## 2020-03-14 PROCEDURE — A4216 STERILE WATER/SALINE, 10 ML: HCPCS | Mod: HCNC | Performed by: INTERNAL MEDICINE

## 2020-03-14 RX ORDER — VANCOMYCIN HCL IN 5 % DEXTROSE 1G/250ML
1000 PLASTIC BAG, INJECTION (ML) INTRAVENOUS
Status: DISCONTINUED | OUTPATIENT
Start: 2020-03-15 | End: 2020-03-16 | Stop reason: HOSPADM

## 2020-03-14 RX ADMIN — Medication 10 ML: at 01:03

## 2020-03-14 RX ADMIN — DILTIAZEM HYDROCHLORIDE 240 MG: 120 CAPSULE, COATED, EXTENDED RELEASE ORAL at 09:03

## 2020-03-14 RX ADMIN — DOCUSATE SODIUM 100 MG: 100 CAPSULE, LIQUID FILLED ORAL at 10:03

## 2020-03-14 RX ADMIN — GABAPENTIN 600 MG: 300 CAPSULE ORAL at 10:03

## 2020-03-14 RX ADMIN — ENOXAPARIN SODIUM 100 MG: 100 INJECTION SUBCUTANEOUS at 05:03

## 2020-03-14 RX ADMIN — ATORVASTATIN CALCIUM 40 MG: 40 TABLET, FILM COATED ORAL at 10:03

## 2020-03-14 RX ADMIN — CIPROFLOXACIN 400 MG: 2 INJECTION, SOLUTION INTRAVENOUS at 05:03

## 2020-03-14 RX ADMIN — LOSARTAN POTASSIUM 50 MG: 50 TABLET ORAL at 09:03

## 2020-03-14 RX ADMIN — Medication 10 ML: at 11:03

## 2020-03-14 RX ADMIN — TIMOLOL MALEATE 1 DROP: 5 SOLUTION/ DROPS OPHTHALMIC at 09:03

## 2020-03-14 RX ADMIN — WARFARIN SODIUM 6 MG: 3 TABLET ORAL at 06:03

## 2020-03-14 RX ADMIN — GABAPENTIN 600 MG: 300 CAPSULE ORAL at 09:03

## 2020-03-14 RX ADMIN — Medication 10 ML: at 06:03

## 2020-03-14 RX ADMIN — LATANOPROST 1 DROP: 50 SOLUTION OPHTHALMIC at 10:03

## 2020-03-14 RX ADMIN — DOCUSATE SODIUM 100 MG: 100 CAPSULE, LIQUID FILLED ORAL at 09:03

## 2020-03-14 RX ADMIN — ASPIRIN 81 MG: 81 TABLET, COATED ORAL at 09:03

## 2020-03-14 RX ADMIN — HYDROCHLOROTHIAZIDE 25 MG: 25 TABLET ORAL at 09:03

## 2020-03-14 RX ADMIN — VANCOMYCIN HYDROCHLORIDE 1250 MG: 1.25 INJECTION, POWDER, LYOPHILIZED, FOR SOLUTION INTRAVENOUS at 01:03

## 2020-03-14 RX ADMIN — ALPRAZOLAM 0.25 MG: 0.25 TABLET ORAL at 10:03

## 2020-03-14 RX ADMIN — OXYCODONE HYDROCHLORIDE AND ACETAMINOPHEN 1 TABLET: 5; 325 TABLET ORAL at 10:03

## 2020-03-14 RX ADMIN — OXYBUTYNIN CHLORIDE 5 MG: 5 TABLET ORAL at 09:03

## 2020-03-14 RX ADMIN — LIDOCAINE 1 PATCH: 50 PATCH TOPICAL at 10:03

## 2020-03-14 RX ADMIN — ENOXAPARIN SODIUM 100 MG: 100 INJECTION SUBCUTANEOUS at 06:03

## 2020-03-14 RX ADMIN — PANTOPRAZOLE SODIUM 40 MG: 40 TABLET, DELAYED RELEASE ORAL at 09:03

## 2020-03-14 RX ADMIN — PRAMIPEXOLE DIHYDROCHLORIDE 0.5 MG: 0.5 TABLET ORAL at 10:03

## 2020-03-14 RX ADMIN — Medication 10 ML: at 05:03

## 2020-03-14 RX ADMIN — POTASSIUM CHLORIDE 10 MEQ: 10 TABLET, EXTENDED RELEASE ORAL at 09:03

## 2020-03-14 RX ADMIN — OXYCODONE HYDROCHLORIDE AND ACETAMINOPHEN 1 TABLET: 5; 325 TABLET ORAL at 11:03

## 2020-03-14 NOTE — PROGRESS NOTES
[facility] Consult     Primary Team: Anni Cuenca MD  Consultant Attending: Dr. Ruby OCHOA PhD  Date of Admit: 3/8/2020    Reason for Consult     Infected hardware. Abx recs    History of Present Illness   Mis Ca is a 79 y.o. female with a relevant history of atrial fibrillation on warfarin, hypertension, and hyperlipidemia who presents with a one day history of worsening swelling, redness, and pain to left lower extremity. Patient had bunion removal in October of 2018 and had subsequent revision in January of 2020. About two weeks after the revisions, the patient hit her right foot on a wood porch. Patient's food was exposed due to presence of external fixator on foot. Patient denies breaks in the skin, foreign bodies, exposure to fresh water/salt water, any mosquito or animal bites, any purulence wound drainage, or fever.     Today:  Explained to patient she would need 6 weeks of IV antibiotics. Patient reporting continued pain in left lower extremity which is under control with pain meds.  Sutures were removed from left lower extremity and per patient podiatry thought wound was healing well.  Patient denies any new symptoms, questions, or concerns when asked.    Review of Systems (positives in bold):  Review of Systems   Constitutional: Negative for chills, fever, malaise/fatigue and weight loss.   Eyes: Negative for blurred vision.   Respiratory: Negative for cough, hemoptysis, sputum production, shortness of breath and wheezing.    Cardiovascular: Negative for chest pain, palpitations, orthopnea and leg swelling.   Gastrointestinal: Negative for abdominal pain, blood in stool, constipation, diarrhea, melena, nausea and vomiting.   Genitourinary: Negative for dysuria, frequency and urgency.   Musculoskeletal: Positive for joint pain (Left lower extremity). Negative for back pain, falls and myalgias.   Skin: Negative for rash.   Neurological: Negative for dizziness, focal weakness,  weakness and headaches.   Psychiatric/Behavioral: Negative for substance abuse.           Medications:   In-Hospital Scheduled Medications:   aspirin  81 mg Oral Daily    atorvastatin  40 mg Oral QHS    diltiaZEM  240 mg Oral Daily    docusate sodium  100 mg Oral BID    enoxaparin  1 mg/kg Subcutaneous Q12H    gabapentin  600 mg Oral BID    hydroCHLOROthiazide  25 mg Oral Daily    latanoprost  1 drop Left Eye QHS    lidocaine  1 patch Transdermal Q24H    losartan  50 mg Oral Daily    oxybutynin  5 mg Oral Daily    pantoprazole  40 mg Oral Daily    potassium chloride  10 mEq Oral Daily    pramipexole  0.5 mg Oral QHS    sodium chloride 0.9%  10 mL Intravenous Q6H    timolol maleate 0.5%  1 drop Left Eye Daily    vancomycin (VANCOCIN) IVPB  1,250 mg Intravenous Q12H    warfarin  6 mg Oral Daily      In-Hospital PRN Medications:  acetaminophen, ALPRAZolam, oxyCODONE-acetaminophen, sodium chloride 0.9%, [place] PICC Protocol **AND** sodium chloride 0.9% **AND** sodium chloride 0.9%      Past Medical History:  Past Medical History:   Diagnosis Date    Anticoagulant long-term use     Anxiety     Arthritis     Atrial fibrillation     Blind right eye     Bradycardia     Cancer     skin cancer left side of face under eye    Hyperlipidemia     Hypertension     PVC (premature ventricular contraction)     RLS (restless legs syndrome)     Sleep apnea     Does not use CPAP machine.       Objective   Last 24 Hour Vital Signs:  BP  Min: 95/51  Max: 141/67  Temp  Av.4 °F (36.9 °C)  Min: 97.7 °F (36.5 °C)  Max: 98.7 °F (37.1 °C)  Pulse  Av.5  Min: 67  Max: 79  Resp  Av.7  Min: 16  Max: 18  SpO2  Av.2 %  Min: 95 %  Max: 99 %        Physical Examination:  Physical Exam   Constitutional: She is oriented to person, place, and time and well-developed, well-nourished, and in no distress. No distress.   HENT:   Head: Normocephalic and atraumatic.   Eyes: EOM are normal. Right eye exhibits  no discharge. Left eye exhibits no discharge. No scleral icterus.   Neck: No JVD present.   Cardiovascular: Normal rate, regular rhythm, normal heart sounds and intact distal pulses. Exam reveals no gallop and no friction rub.   No murmur heard.  Pulmonary/Chest: Effort normal and breath sounds normal. No stridor. No respiratory distress. She has no wheezes. She has no rales. She exhibits no tenderness.   Abdominal: Soft. Bowel sounds are normal. She exhibits no distension and no mass. There is no tenderness. There is no rebound and no guarding.   Musculoskeletal: She exhibits no edema or deformity.   Neurological: She is alert and oriented to person, place, and time.   Skin: Skin is warm and dry. No rash noted. She is not diaphoretic. No erythema. No pallor.   Psychiatric: Mood, memory, affect and judgment normal.   Vitals reviewed.        Laboratory:  CBC:   Lab Results   Component Value Date    WBC 6.94 03/14/2020    HGB 11.2 (L) 03/14/2020     03/14/2020    MCV 96 03/14/2020    RDW 12.4 03/14/2020       Estimated Creatinine Clearance: 63 mL/min (based on SCr of 0.8 mg/dL).        Assessment     Mis Ca is a 79 y.o. female with a PMH of a-fib onw arfarin, HTN, & HLD who presents with worsening swelling, redness, and pain to the LLE. On 1/15/20 patient had revision of the left 1st TMT fusion with application of external fixator by Dr. Cardozo.        Recommendations     Left Foot Cellulitis with infected hardware s/p removal & I&D  - 10/31/18: bunionectomy  - 1/15/20: revision of the 1st TMT fusion with application of external fixator by Dr. Cardozo  - 3/8/20: Patient presents with worsening swelling, redness, and pain to the LLE  - 3/8/20 blood cultures - no growth to date  - 3/9/20: external fixator removed  - 03/09/20 wound cultures from left foot - growing MRSA  - 3/11/20 MRI left foot: Forefoot cellulitis and medial forefoot soft tissue abscess. Status post extensive orthopedic hardware removal  along the medial forefoot, in close proximity to the abscess without definite evidence of osteomyelitis.  - 3/11/20: incision and drainage for abscess  - 03/11/20 wound cultures from left foot - growing MRSA  - on ciprofloxacin from 3/8/20 - 3/14/20  - continue vancomycin started on 3/8/20  - In setting of patient with staple in proximal phalanx, patient will either need hardware removed or IV vancomycin for a total of six weeks    Thank you for this consult. We will follow.      Peter Pop,  PGY-1  [employer] [facility]

## 2020-03-14 NOTE — ASSESSMENT & PLAN NOTE
POD #5  Hardware removal POD#3 Abscess drainage  On vanc and cipro     MRSA      ID consulted for abx management  PICC placement  Will need home IV abx    DC Cipro today. CHERIE Garcia

## 2020-03-14 NOTE — PLAN OF CARE
Pt received on nasal cannula at   2 lpm.  SPO2   96%.  Pt in no apparent respiratory distress.  Will continue to monitor.

## 2020-03-14 NOTE — PLAN OF CARE
Cued into patient's room.  Permission received per patient to turn camera to view patient.  Introduced as VN for night shift that will be working with floor nurse and nursing assistant.  Educated patient on VN's role in patient care. Plan of care reviewed with patient. Education per flowsheet.  Opportunity given for questions and questions answered.  C/o chronic left shoulder pain 4/10; refused offered pain pill.  Offered lidocaine patch; agreed.  Denies pain to left foot.  No other complaints.  Instructed to call for assistance.  Will cont to monitor.    Labs, notes, and orders reviewed.    NP Maksim notified of patient's chronic left shoulder pain 4/10 and requesting lidocaine patch.

## 2020-03-14 NOTE — PROGRESS NOTES
Pharmacokinetic Assessment Follow Up: IV Vancomycin    Vancomycin serum concentration assessment(s):    The trough level was drawn correctly and can be used to guide therapy at this time. The measurement is above the desired definitive target range of 10 to 15 mcg/mL.    Vancomycin Regimen Plan:    Change regimen to Vancomycin 1000 mg IV every 12 hours with next serum trough concentration measured at 3-17-20 prior to 4th dose on 00:00     Drug levels (last 3 results):  Recent Labs   Lab Result Units 03/13/20  0048 03/14/20  1258   Vancomycin-Trough ug/mL 5.0* 18.5       Pharmacy will continue to follow and monitor vancomycin.    Please contact pharmacy at extension [phone] for questions regarding this assessment.    Thank you for the consult,   Omar Andrews       Patient brief summary:  Mis Ca is a 79 y.o. female initiated on antimicrobial therapy with IV Vancomycin for treatment of skin & soft tissue infection    The patient's current regimen is Vancomycin 1250 mg IVPB q12h    Drug Allergies:   Review of patient's allergies indicates:   Allergen Reactions    Propofol analogues      Used for her Colonoscopy.  Extended delirium.  Advised not to take it again.      Adhesive     Compazine [prochlorperazine edisylate] Anxiety    Penicillins Rash    Sulfa (sulfonamide antibiotics) Rash       Actual Body Weight:   97.1 kg    Renal Function:   Estimated Creatinine Clearance: 63 mL/min (based on SCr of 0.8 mg/dL).,     Dialysis Method (if applicable):  N/A    CBC (last 72 hours):  Recent Labs   Lab Result Units 03/12/20  0813 03/13/20  0519 03/14/20  0449   WBC K/uL 9.10 6.63 6.94   Hemoglobin g/dL 12.0 11.1* 11.2*   Hematocrit % 36.4* 33.8* 34.0*   Platelets K/uL 220 214 220   Gran% % 63.7 59.3 52.1   Lymph% % 22.2 24.3 30.7   Mono% % 11.8 11.8 12.0   Eosinophil% % 1.8 3.6 4.5   Basophil% % 0.2 0.5 0.3   Differential Method  Automated Automated Automated       Metabolic Panel (last 72 hours):  Recent Labs   Lab  Result Units 03/12/20  0813 03/13/20  0519 03/14/20  0449   Sodium mmol/L 136 135* 137   Potassium mmol/L 3.5 3.5 3.7   Chloride mmol/L 94* 93* 94*   CO2 mmol/L 32* 33* 35*   Glucose mg/dL 114* 115* 103   BUN, Bld mg/dL 18 15 15   Creatinine mg/dL 0.9 0.8 0.8   Magnesium mg/dL  --   --  2.0       Vancomycin Administrations:  vancomycin given in the last 96 hours                     vancomycin (VANCOCIN) 1,250 mg in dextrose 5 % 250 mL IVPB (mg) 1,250 mg New Bag 03/14/20 1330     1,250 mg New Bag  0142     1,250 mg New Bag 03/13/20 1310    vancomycin (VANCOCIN) 2,000 mg in dextrose 5 % 500 mL IVPB (mg) 2,000 mg New Bag 03/13/20 0112     2,000 mg New Bag 03/12/20 0013     2,000 mg New Bag 03/10/20 1525                      Microbiologic Results:  Microbiology Results (last 7 days)       Procedure Component Value Units Date/Time    Aerobic culture [[id number]]  (Abnormal)  (Susceptibility) Collected:  03/11/20 1737    Order Status:  Completed Specimen:  Wound from Foot, Left Updated:  03/14/20 1421     Aerobic Bacterial Culture METHICILLIN RESISTANT STAPHYLOCOCCUS AUREUS  Moderate      Blood Culture #2 **CANNOT BE ORDERED STAT** [[id number]] Collected:  03/08/20 1533    Order Status:  Completed Specimen:  Blood from Peripheral, Antecubital, Left Updated:  03/13/20 2212     Blood Culture, Routine No growth after 5 days.    Blood Culture #1 **CANNOT BE ORDERED STAT** [[id number]] Collected:  03/08/20 1525    Order Status:  Completed Specimen:  Blood from Peripheral, Forearm, Right Updated:  03/13/20 2212     Blood Culture, Routine No growth after 5 days.    AFB Culture & Smear [[id number]] Collected:  03/11/20 1737    Order Status:  Completed Specimen:  Wound from Foot, Left Updated:  03/13/20 0927     AFB Culture & Smear Culture in progress     AFB CULTURE STAIN No acid fast bacilli seen.    Culture, Anaerobe [[id number]] Collected:  03/11/20 1737    Order Status:  Completed Specimen:  Wound from Foot, Left Updated:   03/13/20 0900     Anaerobic Culture Culture in progress    Culture, Anaerobe [[id number]] Collected:  03/09/20 1759    Order Status:  Completed Specimen:  Wound from Foot, Left Updated:  03/12/20 1055     Anaerobic Culture Culture in progress    Aerobic culture [[id number]]  (Abnormal)  (Susceptibility) Collected:  03/09/20 1757    Order Status:  Completed Specimen:  Wound from Foot, Left Updated:  03/12/20 1055     Aerobic Bacterial Culture METHICILLIN RESISTANT STAPHYLOCOCCUS AUREUS  Many  No other significant isolate      Narrative:       Pin site    Culture, Anaerobe [[id number]] Collected:  03/09/20 1757    Order Status:  Completed Specimen:  Wound from Foot, Left Updated:  03/12/20 1053     Anaerobic Culture Culture in progress    Aerobic culture [[id number]]  (Abnormal)  (Susceptibility) Collected:  03/09/20 1759    Order Status:  Completed Specimen:  Wound from Foot, Left Updated:  03/12/20 1024     Aerobic Bacterial Culture METHICILLIN RESISTANT STAPHYLOCOCCUS AUREUS  Many      Narrative:       Pin site    Gram stain [[id number]] Collected:  03/11/20 1737    Order Status:  Completed Specimen:  Wound from Foot, Left Updated:  03/12/20 0331     Gram Stain Result Rare WBC's      Rare Gram positive cocci in clusters    Fungus culture [[id number]] Collected:  03/11/20 1737    Order Status:  Sent Specimen:  Wound from Foot, Left Updated:  03/11/20 2305    Blood culture (site 2) [[id number]] Collected:  03/08/20 1715    Order Status:  Sent Specimen:  Blood from Peripheral, Forearm, Left Updated:  03/08/20 1715

## 2020-03-14 NOTE — SUBJECTIVE & OBJECTIVE
Interval History:   Patient and daughter  Feels OK  No Chest pain . Breathing stable      Review of Systems   Constitutional: Positive for fatigue. Negative for fever.   Respiratory: Positive for shortness of breath.    Cardiovascular: Negative for chest pain.   Gastrointestinal: Negative for abdominal pain.     Objective:     Vital Signs (Most Recent):  Temp: 97.5 °F (36.4 °C) (03/14/20 1632)  Pulse: 77 (03/14/20 1632)  Resp: 17 (03/14/20 1632)  BP: 139/63 (03/14/20 1632)  SpO2: 96 % (03/14/20 0729) Vital Signs (24h Range):  Temp:  [97.5 °F (36.4 °C)-98.6 °F (37 °C)] 97.5 °F (36.4 °C)  Pulse:  [68-80] 77  Resp:  [15-18] 17  SpO2:  [96 %-99 %] 96 %  BP: (110-141)/(53-79) 139/63     Weight: 97.1 kg (214 lb 1.1 oz)  Body mass index is 39.15 kg/m².    Intake/Output Summary (Last 24 hours) at 3/14/2020 1847  Last data filed at 3/14/2020 0900  Gross per 24 hour   Intake 650 ml   Output 1900 ml   Net -1250 ml      Physical Exam   Constitutional: She appears well-developed and well-nourished. No distress.   PICC line RUE   Neck: Normal range of motion. Neck supple.   Cardiovascular: Normal rate, regular rhythm and normal heart sounds.   Pulmonary/Chest: Effort normal and breath sounds normal.   Abdominal: Soft. Bowel sounds are normal.   Musculoskeletal: She exhibits no edema.   Nursing note and vitals reviewed.      Significant Labs:   BMP:   Recent Labs   Lab 03/14/20  0449         K 3.7   CL 94*   CO2 35*   BUN 15   CREATININE 0.8   CALCIUM 9.9   MG 2.0     CBC:   Recent Labs   Lab 03/13/20  0519 03/14/20  0449   WBC 6.63 6.94   HGB 11.1* 11.2*   HCT 33.8* 34.0*    220

## 2020-03-14 NOTE — NURSING
Home Oxygen Evaluation    Date Performed: 3/14/2020    1) Patient's Home O2 Sat on room air, while at rest: 95%        If O2 sats on room air at rest are 88% or below, patient qualifies. No additional testing needed. Document N/A in steps 2 and 3. If 89% or above, complete steps 2.      2) Patient's O2 Sat on room air while exercisin-95%        If O2 sats on room air while exercising remain 89% or above patient does not qualify, no further testing needed Document N/A in step 3. If O2 sats on room air while exercising are 88% or below, continue to step 3.      3) Patient's O2 Sat while exercising on O2: N/A         (Must show improvement from #2 for patients to qualify)    If O2 sats improve on oxygen, patient qualifies for portable oxygen. If not, the patient does not qualify.

## 2020-03-15 LAB
INR PPP: 1.2 (ref 0.8–1.2)
PROTHROMBIN TIME: 13.2 SEC (ref 9–12.5)

## 2020-03-15 PROCEDURE — 63600175 PHARM REV CODE 636 W HCPCS: Mod: HCNC | Performed by: INTERNAL MEDICINE

## 2020-03-15 PROCEDURE — 25000003 PHARM REV CODE 250: Mod: HCNC | Performed by: PODIATRIST

## 2020-03-15 PROCEDURE — 21400001 HC TELEMETRY ROOM: Mod: HCNC

## 2020-03-15 PROCEDURE — 25000003 PHARM REV CODE 250: Mod: HCNC | Performed by: INTERNAL MEDICINE

## 2020-03-15 PROCEDURE — 25000003 PHARM REV CODE 250: Mod: HCNC | Performed by: NURSE PRACTITIONER

## 2020-03-15 PROCEDURE — A4216 STERILE WATER/SALINE, 10 ML: HCPCS | Mod: HCNC | Performed by: INTERNAL MEDICINE

## 2020-03-15 PROCEDURE — 85610 PROTHROMBIN TIME: CPT | Mod: HCNC

## 2020-03-15 RX ORDER — WARFARIN 3 MG/1
3 TABLET ORAL ONCE
Status: COMPLETED | OUTPATIENT
Start: 2020-03-15 | End: 2020-03-15

## 2020-03-15 RX ADMIN — DOCUSATE SODIUM 100 MG: 100 CAPSULE, LIQUID FILLED ORAL at 08:03

## 2020-03-15 RX ADMIN — Medication 10 ML: at 01:03

## 2020-03-15 RX ADMIN — HYDROCHLOROTHIAZIDE 25 MG: 25 TABLET ORAL at 08:03

## 2020-03-15 RX ADMIN — ASPIRIN 81 MG: 81 TABLET, COATED ORAL at 08:03

## 2020-03-15 RX ADMIN — ALPRAZOLAM 0.25 MG: 0.25 TABLET ORAL at 10:03

## 2020-03-15 RX ADMIN — WARFARIN SODIUM 3 MG: 3 TABLET ORAL at 06:03

## 2020-03-15 RX ADMIN — OXYCODONE HYDROCHLORIDE AND ACETAMINOPHEN 1 TABLET: 5; 325 TABLET ORAL at 07:03

## 2020-03-15 RX ADMIN — Medication 10 ML: at 06:03

## 2020-03-15 RX ADMIN — ENOXAPARIN SODIUM 100 MG: 100 INJECTION SUBCUTANEOUS at 06:03

## 2020-03-15 RX ADMIN — PRAMIPEXOLE DIHYDROCHLORIDE 0.5 MG: 0.5 TABLET ORAL at 10:03

## 2020-03-15 RX ADMIN — LOSARTAN POTASSIUM 50 MG: 50 TABLET ORAL at 08:03

## 2020-03-15 RX ADMIN — VANCOMYCIN HYDROCHLORIDE 1000 MG: 1 INJECTION, POWDER, LYOPHILIZED, FOR SOLUTION INTRAVENOUS at 01:03

## 2020-03-15 RX ADMIN — PANTOPRAZOLE SODIUM 40 MG: 40 TABLET, DELAYED RELEASE ORAL at 08:03

## 2020-03-15 RX ADMIN — POTASSIUM CHLORIDE 10 MEQ: 10 TABLET, EXTENDED RELEASE ORAL at 08:03

## 2020-03-15 RX ADMIN — DILTIAZEM HYDROCHLORIDE 240 MG: 120 CAPSULE, COATED, EXTENDED RELEASE ORAL at 08:03

## 2020-03-15 RX ADMIN — LATANOPROST 1 DROP: 50 SOLUTION OPHTHALMIC at 10:03

## 2020-03-15 RX ADMIN — LIDOCAINE 1 PATCH: 50 PATCH TOPICAL at 10:03

## 2020-03-15 RX ADMIN — TIMOLOL MALEATE 1 DROP: 5 SOLUTION/ DROPS OPHTHALMIC at 08:03

## 2020-03-15 RX ADMIN — DOCUSATE SODIUM 100 MG: 100 CAPSULE, LIQUID FILLED ORAL at 10:03

## 2020-03-15 RX ADMIN — ATORVASTATIN CALCIUM 40 MG: 40 TABLET, FILM COATED ORAL at 10:03

## 2020-03-15 RX ADMIN — GABAPENTIN 600 MG: 300 CAPSULE ORAL at 10:03

## 2020-03-15 RX ADMIN — GABAPENTIN 600 MG: 300 CAPSULE ORAL at 08:03

## 2020-03-15 RX ADMIN — WARFARIN SODIUM 6 MG: 3 TABLET ORAL at 06:03

## 2020-03-15 RX ADMIN — OXYBUTYNIN CHLORIDE 5 MG: 5 TABLET ORAL at 08:03

## 2020-03-15 NOTE — PLAN OF CARE
Problem: Fall Injury Risk  Goal: Absence of Fall and Fall-Related Injury  Outcome: Ongoing, Progressing     Problem: Adult Inpatient Plan of Care  Goal: Plan of Care Review  Outcome: Ongoing, Progressing  Flowsheets (Taken 3/15/2020 0409)  Plan of Care Reviewed With: patient  Goal: Patient-Specific Goal (Individualization)  Outcome: Ongoing, Progressing     Problem: Skin Injury Risk Increased  Goal: Skin Health and Integrity  Outcome: Ongoing, Progressing     Problem: Skin or Soft Tissue Infection  Goal: Infection Symptom Resolution  Outcome: Ongoing, Progressing     Problem: Infection  Goal: Infection Symptom Resolution  Outcome: Ongoing, Progressing     Problem: Pain Acute  Goal: Optimal Pain Control  Outcome: Ongoing, Progressing     Problem: Hypertension Comorbidity  Goal: Blood Pressure in Desired Range  Outcome: Ongoing, Progressing

## 2020-03-15 NOTE — ASSESSMENT & PLAN NOTE
POD #6  Hardware removal POD#4 Abscess drainage  On vanc     MRSA    Will need 6-8 weeks  Followed by ID.     Hopefully DC tomorrow

## 2020-03-15 NOTE — SUBJECTIVE & OBJECTIVE
Interval History:   She states that she feels well. She denies any pain and denies any shortness of breath, She is off of oxygen.  She is anxious to go home    Review of Systems   Constitutional: Negative for fever.   Respiratory: Negative for shortness of breath.    Cardiovascular: Negative for chest pain.   Gastrointestinal: Negative for abdominal pain.   Genitourinary: Negative for dysuria.     Objective:     Vital Signs (Most Recent):  Temp: 98.2 °F (36.8 °C) (03/15/20 0745)  Pulse: 71 (03/15/20 1156)  Resp: 18 (03/15/20 0745)  BP: 125/66 (03/15/20 0745)  SpO2: (!) 93 % (03/14/20 2011) Vital Signs (24h Range):  Temp:  [97.5 °F (36.4 °C)-99.8 °F (37.7 °C)] 98.2 °F (36.8 °C)  Pulse:  [71-97] 71  Resp:  [17-18] 18  SpO2:  [93 %] 93 %  BP: (119-139)/(53-66) 125/66     Weight: 97 kg (213 lb 13.5 oz)  Body mass index is 39.11 kg/m².    Intake/Output Summary (Last 24 hours) at 3/15/2020 1159  Last data filed at 3/15/2020 0623  Gross per 24 hour   Intake 820 ml   Output 1575 ml   Net -755 ml      Physical Exam   Constitutional: She is oriented to person, place, and time. She appears well-developed and well-nourished. No distress.   HENT:   Mouth/Throat: Oropharynx is clear and moist.   Eyes: Conjunctivae are normal.   Cardiovascular: Normal rate and regular rhythm.   Pulmonary/Chest: Effort normal and breath sounds normal.   Abdominal: Soft. There is no tenderness.   Musculoskeletal: She exhibits no edema.   Neurological: She is alert and oriented to person, place, and time.   Vitals reviewed.      Significant Labs:   BMP:   Recent Labs   Lab 03/14/20 0449         K 3.7   CL 94*   CO2 35*   BUN 15   CREATININE 0.8   CALCIUM 9.9   MG 2.0     CBC:   Recent Labs   Lab 03/14/20 0449   WBC 6.94   HGB 11.2*   HCT 34.0*

## 2020-03-15 NOTE — PROGRESS NOTES
"[facility] Hospital Medicine  Progress Note    Patient Name: Mis Ca  MRN: [id number]  Patient Class: IP- Inpatient   Admission Date: 3/8/2020  Length of Stay: 7 days  Attending Physician: Anni Cuenca MD  Primary Care Provider: Scott Dillard MD        Subjective:     Principal Problem:Infected hardware in left leg, initial encounter        HPI:  The patient is a 79 years old. female with PMH HTN, HLP, anxiety,Atrial fibrillation, Blind right eye, Bradycardia, RLS, and Sleep apnea presents with complaint of possible wound infection to left foot. Patient states she had surgery on January 15, 2020 to foot by Dr. Cardozo to "fix bones that did not heal correctly after bunion surgery".  Patient reports no complications to foot until yesterday when she noticed swelling, erythema and pain worsening. She also reports scant amount of white drainage from wound this morning. Patient denies any fever, chills, abdominal pain, nausea or vomiting. Of note, patient last saw Dr. Cardozo two weeks ago and has upcoming appointment in four days.    The pt does not put weight yet on her foot, and her surgical site is not  Having pain from the sx site itself but the swelling and edema around it    Overview/Hospital Course:  3/9 the pt seen, no acute events, had Dr Cardozo evaluating her foot, ordered a ct foot, awaiting rwecs. For now continue POC with IV abx    Interval History:   She states that she feels well. She denies any pain and denies any shortness of breath, She is off of oxygen.  She is anxious to go home    Review of Systems   Constitutional: Negative for fever.   Respiratory: Negative for shortness of breath.    Cardiovascular: Negative for chest pain.   Gastrointestinal: Negative for abdominal pain.   Genitourinary: Negative for dysuria.     Objective:     Vital Signs (Most Recent):  Temp: 98.2 °F (36.8 °C) (03/15/20 0745)  Pulse: 71 (03/15/20 1156)  Resp: 18 (03/15/20 0745)  BP: 125/66 " (03/15/20 0745)  SpO2: (!) 93 % (03/14/20 2011) Vital Signs (24h Range):  Temp:  [97.5 °F (36.4 °C)-99.8 °F (37.7 °C)] 98.2 °F (36.8 °C)  Pulse:  [71-97] 71  Resp:  [17-18] 18  SpO2:  [93 %] 93 %  BP: (119-139)/(53-66) 125/66     Weight: 97 kg (213 lb 13.5 oz)  Body mass index is 39.11 kg/m².    Intake/Output Summary (Last 24 hours) at 3/15/2020 1159  Last data filed at 3/15/2020 0623  Gross per 24 hour   Intake 820 ml   Output 1575 ml   Net -755 ml      Physical Exam   Constitutional: She is oriented to person, place, and time. She appears well-developed and well-nourished. No distress.   HENT:   Mouth/Throat: Oropharynx is clear and moist.   Eyes: Conjunctivae are normal.   Cardiovascular: Normal rate and regular rhythm.   Pulmonary/Chest: Effort normal and breath sounds normal.   Abdominal: Soft. There is no tenderness.   Musculoskeletal: She exhibits no edema.   Neurological: She is alert and oriented to person, place, and time.   Vitals reviewed.      Significant Labs:   BMP:   Recent Labs   Lab 03/14/20  0449         K 3.7   CL 94*   CO2 35*   BUN 15   CREATININE 0.8   CALCIUM 9.9   MG 2.0     CBC:   Recent Labs   Lab 03/14/20  0449   WBC 6.94   HGB 11.2*   HCT 34.0*              Assessment/Plan:      * Infected hardware in left leg, initial encounter  POD #6  Hardware removal POD#4 Abscess drainage  On vanc     MRSA    Will need 6-8 weeks  Followed by ID.     Hopefully DC tomorrow    Superficial thrombophlebitis    Warm compresses  Minimal on exam right hand and antecubital- better today    Constipation    Maintain on colace.  Had a BM 3-11-20    Cellulitis  On Vanco- MRSA      Diastolic dysfunction  Continue home meds  No acute exacerbation      Essential hypertension  Presently on HCTZ, Losartan, Cardizem  Doing well      Hyperlipidemia  Continue statin      Anticoagulated on Coumadin    Coumadin and Lovenox bridge  Additional 3 mg to be given tonight.  Recheck in AM      JACQUELYN  (obstructive sleep apnea)  Monitor  CXR Negative    Will obtain ambulatory desat study  Will likely need to see a pulmonologist as an OP    Paroxysmal atrial fibrillation  continue coumadin for anticoagulation  And cardizem for rate control  Coumadin, Lovenox bridge      VTE Risk Mitigation (From admission, onward)         Ordered     warfarin (COUMADIN) tablet 3 mg  Once      03/15/20 0847     enoxaparin injection 100 mg  Every 12 hours (non-standard times)      03/13/20 1647     warfarin (COUMADIN) tablet 6 mg  Daily      03/13/20 1647     IP VTE HIGH RISK PATIENT  Once      03/08/20 1714     Place sequential compression device  Until discontinued      03/08/20 1714                DISPOSITION  Anticipate discharge tomorrow, once IV abx set up and wound care FU  Will need follow up with Dr aCrdozo  Follow up with ID, Dr Cristiano Cuenca MD  Department of Hospital Medicine   [facility]

## 2020-03-15 NOTE — PROGRESS NOTES
"[facility] Hospital Medicine  Progress Note    Patient Name: Mis Ca  MRN: [id number]  Patient Class: IP- Inpatient   Admission Date: 3/8/2020  Length of Stay: 6 days  Attending Physician: Anni Cuenca MD  Primary Care Provider: Scott Dillard MD        Subjective:     Principal Problem:Infected hardware in left leg, initial encounter        HPI:  The patient is a 79 years old. female with PMH HTN, HLP, anxiety,Atrial fibrillation, Blind right eye, Bradycardia, RLS, and Sleep apnea presents with complaint of possible wound infection to left foot. Patient states she had surgery on January 15, 2020 to foot by Dr. Cardozo to "fix bones that did not heal correctly after bunion surgery".  Patient reports no complications to foot until yesterday when she noticed swelling, erythema and pain worsening. She also reports scant amount of white drainage from wound this morning. Patient denies any fever, chills, abdominal pain, nausea or vomiting. Of note, patient last saw Dr. Cardozo two weeks ago and has upcoming appointment in four days.    The pt does not put weight yet on her foot, and her surgical site is not  Having pain from the sx site itself but the swelling and edema around it    Overview/Hospital Course:  3/9 the pt seen, no acute events, had Dr Cardozo evaluating her foot, ordered a ct foot, awaiting rwecs. For now continue POC with IV abx    Interval History:   Patient and daughter  Feels OK  No Chest pain . Breathing stable      Review of Systems   Constitutional: Positive for fatigue. Negative for fever.   Respiratory: Positive for shortness of breath.    Cardiovascular: Negative for chest pain.   Gastrointestinal: Negative for abdominal pain.     Objective:     Vital Signs (Most Recent):  Temp: 97.5 °F (36.4 °C) (03/14/20 1632)  Pulse: 77 (03/14/20 1632)  Resp: 17 (03/14/20 1632)  BP: 139/63 (03/14/20 1632)  SpO2: 96 % (03/14/20 0729) Vital Signs (24h Range):  Temp:  [97.5 °F (36.4 " °C)-98.6 °F (37 °C)] 97.5 °F (36.4 °C)  Pulse:  [68-80] 77  Resp:  [15-18] 17  SpO2:  [96 %-99 %] 96 %  BP: (110-141)/(53-79) 139/63     Weight: 97.1 kg (214 lb 1.1 oz)  Body mass index is 39.15 kg/m².    Intake/Output Summary (Last 24 hours) at 3/14/2020 1847  Last data filed at 3/14/2020 0900  Gross per 24 hour   Intake 650 ml   Output 1900 ml   Net -1250 ml      Physical Exam   Constitutional: She appears well-developed and well-nourished. No distress.   PICC line RUE   Neck: Normal range of motion. Neck supple.   Cardiovascular: Normal rate, regular rhythm and normal heart sounds.   Pulmonary/Chest: Effort normal and breath sounds normal.   Abdominal: Soft. Bowel sounds are normal.   Musculoskeletal: She exhibits no edema.   Nursing note and vitals reviewed.      Significant Labs:   BMP:   Recent Labs   Lab 03/14/20  0449         K 3.7   CL 94*   CO2 35*   BUN 15   CREATININE 0.8   CALCIUM 9.9   MG 2.0     CBC:   Recent Labs   Lab 03/13/20  0519 03/14/20  0449   WBC 6.63 6.94   HGB 11.1* 11.2*   HCT 33.8* 34.0*    220             Assessment/Plan:      * Infected hardware in left leg, initial encounter  POD #5  Hardware removal POD#3 Abscess drainage  On vanc and cipro     MRSA      ID consulted for abx management  PICC placement  Will need home IV abx    DC Cipro today. CHERIE Garcia    Superficial thrombophlebitis    Warm compresses  Minimal on exam right hand and antecubital    Constipation    Maintain on colace.  Had a BM 3-11-20    Cellulitis  On Vanco      Diastolic dysfunction  Continue home meds  No acute exacerbation      Essential hypertension  Presently on HCTZ, Losartan, Cardizem      Hyperlipidemia  Continue statin      Anticoagulated on Coumadin    Coumadin and Lovenox bridge      JACQUELYN (obstructive sleep apnea)  Monitor  CXR Negative    Will obtain ambulatory desat study  Will likely need to see a pulmonologist as an OP    Paroxysmal atrial fibrillation  continue coumadin for  anticoagulation  And cardizem for rate control  Coumadin, Lovenox bridge      VTE Risk Mitigation (From admission, onward)         Ordered     enoxaparin injection 100 mg  Every 12 hours (non-standard times)      03/13/20 1647     warfarin (COUMADIN) tablet 6 mg  Daily      03/13/20 1647     IP VTE HIGH RISK PATIENT  Once      03/08/20 1714     Place sequential compression device  Until discontinued      03/08/20 1714                      Anni Cuenca MD  Department of Hospital Medicine   [facility]

## 2020-03-15 NOTE — PLAN OF CARE
VN Rounds. VN called into patient's room for rounding and turned camera with permission. Patient resting in bed. Patient has a PICC line and is willing and able to learn how to infusion abx. VN instructed to call for assistance. Call light within reach. Patient verbalized understanding. No acute distress noted. Pain denies pain at present. Allowed time for questions. Will continue to monitor chart and be available and intervene as needed.

## 2020-03-16 ENCOUNTER — TELEPHONE (OUTPATIENT)
Dept: PODIATRY | Facility: CLINIC | Age: 80
End: 2020-03-16

## 2020-03-16 ENCOUNTER — TELEPHONE (OUTPATIENT)
Dept: INFECTIOUS DISEASES | Facility: CLINIC | Age: 80
End: 2020-03-16

## 2020-03-16 VITALS
TEMPERATURE: 98 F | RESPIRATION RATE: 18 BRPM | OXYGEN SATURATION: 93 % | BODY MASS INDEX: 38.7 KG/M2 | WEIGHT: 210.31 LBS | HEART RATE: 87 BPM | HEIGHT: 62 IN | SYSTOLIC BLOOD PRESSURE: 150 MMHG | DIASTOLIC BLOOD PRESSURE: 72 MMHG

## 2020-03-16 LAB
BACTERIA SPEC ANAEROBE CULT: NORMAL
BACTERIA SPEC ANAEROBE CULT: NORMAL
INR PPP: 1.2 (ref 0.8–1.2)
PROTHROMBIN TIME: 12.7 SEC (ref 9–12.5)

## 2020-03-16 PROCEDURE — 25000003 PHARM REV CODE 250: Mod: HCNC | Performed by: PODIATRIST

## 2020-03-16 PROCEDURE — 25000003 PHARM REV CODE 250: Mod: HCNC | Performed by: INTERNAL MEDICINE

## 2020-03-16 PROCEDURE — 85610 PROTHROMBIN TIME: CPT | Mod: HCNC

## 2020-03-16 PROCEDURE — 63600175 PHARM REV CODE 636 W HCPCS: Mod: HCNC | Performed by: INTERNAL MEDICINE

## 2020-03-16 PROCEDURE — A4216 STERILE WATER/SALINE, 10 ML: HCPCS | Mod: HCNC | Performed by: INTERNAL MEDICINE

## 2020-03-16 RX ORDER — WARFARIN 6 MG/1
TABLET ORAL
Start: 2020-03-16 | End: 2020-05-18

## 2020-03-16 RX ORDER — DOCUSATE SODIUM 100 MG/1
100 CAPSULE, LIQUID FILLED ORAL 2 TIMES DAILY
Refills: 0
Start: 2020-03-16 | End: 2020-11-11

## 2020-03-16 RX ORDER — ENOXAPARIN SODIUM 100 MG/ML
1 INJECTION SUBCUTANEOUS EVERY 12 HOURS
Qty: 10 ML | Refills: 0 | Status: SHIPPED | OUTPATIENT
Start: 2020-03-16 | End: 2020-03-24

## 2020-03-16 RX ORDER — OXYCODONE AND ACETAMINOPHEN 5; 325 MG/1; MG/1
1 TABLET ORAL EVERY 6 HOURS PRN
Qty: 15 TABLET | Refills: 0 | Status: SHIPPED | OUTPATIENT
Start: 2020-03-16 | End: 2020-04-06

## 2020-03-16 RX ORDER — VANCOMYCIN HCL IN 5 % DEXTROSE 1G/250ML
1000 PLASTIC BAG, INJECTION (ML) INTRAVENOUS EVERY 12 HOURS
Start: 2020-03-16 | End: 2020-04-16

## 2020-03-16 RX ADMIN — GABAPENTIN 600 MG: 300 CAPSULE ORAL at 09:03

## 2020-03-16 RX ADMIN — TIMOLOL MALEATE 1 DROP: 5 SOLUTION/ DROPS OPHTHALMIC at 09:03

## 2020-03-16 RX ADMIN — VANCOMYCIN HYDROCHLORIDE 1000 MG: 1 INJECTION, POWDER, LYOPHILIZED, FOR SOLUTION INTRAVENOUS at 01:03

## 2020-03-16 RX ADMIN — ENOXAPARIN SODIUM 100 MG: 100 INJECTION SUBCUTANEOUS at 06:03

## 2020-03-16 RX ADMIN — LOSARTAN POTASSIUM 50 MG: 50 TABLET ORAL at 09:03

## 2020-03-16 RX ADMIN — OXYBUTYNIN CHLORIDE 5 MG: 5 TABLET ORAL at 09:03

## 2020-03-16 RX ADMIN — POTASSIUM CHLORIDE 10 MEQ: 10 TABLET, EXTENDED RELEASE ORAL at 09:03

## 2020-03-16 RX ADMIN — Medication 10 ML: at 01:03

## 2020-03-16 RX ADMIN — ASPIRIN 81 MG: 81 TABLET, COATED ORAL at 09:03

## 2020-03-16 RX ADMIN — DOCUSATE SODIUM 100 MG: 100 CAPSULE, LIQUID FILLED ORAL at 09:03

## 2020-03-16 RX ADMIN — PANTOPRAZOLE SODIUM 40 MG: 40 TABLET, DELAYED RELEASE ORAL at 09:03

## 2020-03-16 RX ADMIN — Medication 10 ML: at 06:03

## 2020-03-16 RX ADMIN — DILTIAZEM HYDROCHLORIDE 240 MG: 120 CAPSULE, COATED, EXTENDED RELEASE ORAL at 09:03

## 2020-03-16 RX ADMIN — HYDROCHLOROTHIAZIDE 25 MG: 25 TABLET ORAL at 09:03

## 2020-03-16 NOTE — PLAN OF CARE
Problem: Fall Injury Risk  Goal: Absence of Fall and Fall-Related Injury  Outcome: Ongoing, Progressing     Problem: Adult Inpatient Plan of Care  Goal: Plan of Care Review  Outcome: Ongoing, Progressing  Goal: Patient-Specific Goal (Individualization)  Outcome: Ongoing, Progressing  Goal: Absence of Hospital-Acquired Illness or Injury  Outcome: Ongoing, Progressing  Goal: Optimal Comfort and Wellbeing  Outcome: Ongoing, Progressing  Goal: Readiness for Transition of Care  Outcome: Ongoing, Progressing  Goal: Rounds/Family Conference  Outcome: Ongoing, Progressing     Problem: Skin Injury Risk Increased  Goal: Skin Health and Integrity  Outcome: Ongoing, Progressing   Pt Aao x 4. VSS. Nad. IV antibiotics infusing per MAR. Tolerating regular diet. Cardia monitored. Contact precautions maintained.

## 2020-03-16 NOTE — PLAN OF CARE
Discharge orders noted. Additional clinical references attached. Patient's discharge instructions given by bedside RN and reviewed via this VN.  Education provided on new and previous medications, diagnosis, and follow-up appointments.  New medications delivered by pharmacy. Patient verbalized understanding. Patient's ride/transportation home at bedside. All questions answered. Patient is currently waiting for infusion mediations to be delivered.

## 2020-03-16 NOTE — TELEPHONE ENCOUNTER
Spoke with Stella Aggarwal, san[employer] manager and informed her she has to call David ALVAREZ to schedule an appointment for the pt because the clinic is being run by [facility] providers

## 2020-03-16 NOTE — ASSESSMENT & PLAN NOTE
"The patient is a 79 years old. female with PMH HTN, HLP, anxiety,Atrial fibrillation, Blind right eye, Bradycardia, RLS, and Sleep apnea presents with complaint of possible wound infection to left foot. Patient states she had surgery on January 15, 2020 to foot by Dr. Cardozo to "fix bones that did not heal correctly after bunion surgery".  Patient reports no complications to foot until yesterday when she noticed swelling, erythema and pain worsening. She also reports scant amount of white drainage from wound this morning. Patient denies any fever, chills, abdominal pain, nausea or vomiting. Of note, patient last saw Dr. Cardozo two weeks ago and has upcoming appointment in four days.     The pt does not put weight yet on her foot, and her surgical site is not  Having pain from the sx site itself but the swelling and edema around it     Overview/Hospital Course:  3/9 the pt seen, no acute events, had Dr Cardozo evaluating her foot, ordered a ct foot, awaiting rwecs. For now continue POC with IV abx     Interval History:   She states that she feels well. She denies any pain and denies any shortness of breath, She is off of oxygen.  She is anxious to go home    Patient with external fixator MRSA infection and then an abscess drained on 3-11-20 - Patient still has hardware in the foot - a U-shaped pin - so a longer course of IV vanc is recommended     Needs 6 weeks IV vanc from pin removal and I and D on 3-11-20  So stop date of 4-22-20  Needs weekly CBC, chem profile and vanc levels   Follow up with ID clinic - Dr. Krueger at [facility] in 4 weeks   "

## 2020-03-16 NOTE — PLAN OF CARE
Patient to be discharge home with home health and home infusions. Patient has been accepted by [facility] and [facility] for home infusions. Discussed followup appointments via phone. Dr. Cardozo's office to contact patient with follow up appointment. Discharge nurse will go over home medications and reasons for medications and final discharge instructions. All patient/caregiver questions answered. Patient verbalized understanding.         03/16/20 1443   Final Note   Assessment Type Final Discharge Note   Anticipated Discharge Disposition Home-Health   What phone number can be called within the next 1-3 days to see how you are doing after discharge? [phone]   Hospital Follow Up  Appt(s) scheduled?   (Dr. Castillo office to [facility] patient will follow up appointment)   Discharge plans and expectations educations in teach back method with documentation complete? Yes   Right Care Referral Info   Post Acute Recommendation No Care   Referral Type Home Care   Facility Name [facility]         Follow-up With  Details  Why  Contact Info   Rudolph Cardozo, DPM    Dr. Cardozo's office will call you with your follow up appointment.  [address]  [facility] [address]  [phone]   Scott Dillard MD  On 3/26/2020  For your follow up appointment at 9:30am.  [address] [facility] [place] [address]  [phone]   [facility]    A home health nurse will be out to visit you tomorrow 3/17/2020.  [address]  [place] [address]  [phone]   [facility]    If you have any questions about you IV antibiotics please call [facility] at [phone].     Tabitha Michel MD  On 4/16/2020  For your Infectious disease follow up appointment at 10am.  [facility] [facility] [address]  [phone]

## 2020-03-16 NOTE — PROGRESS NOTES
"[facility]  Infectious Disease  Progress Note    Patient Name: Mis Ca  MRN: [id number]  Admission Date: 3/8/2020  Length of Stay: 8 days  Attending Physician: Anni Cuenca MD  Primary Care Provider: Scott Dillard MD    Isolation Status: Contact  Assessment/Plan:      Infection at site of external fixator pin  The patient is a 79 years old. female with PMH HTN, HLP, anxiety,Atrial fibrillation, Blind right eye, Bradycardia, RLS, and Sleep apnea presents with complaint of possible wound infection to left foot. Patient states she had surgery on January 15, 2020 to foot by Dr. Cardozo to "fix bones that did not heal correctly after bunion surgery".  Patient reports no complications to foot until yesterday when she noticed swelling, erythema and pain worsening. She also reports scant amount of white drainage from wound this morning. Patient denies any fever, chills, abdominal pain, nausea or vomiting. Of note, patient last saw Dr. Cardozo two weeks ago and has upcoming appointment in four days.     The pt does not put weight yet on her foot, and her surgical site is not  Having pain from the sx site itself but the swelling and edema around it     Overview/Hospital Course:  3/9 the pt seen, no acute events, had Dr Cardozo evaluating her foot, ordered a ct foot, awaiting rwecs. For now continue POC with IV abx     Interval History:   She states that she feels well. She denies any pain and denies any shortness of breath, She is off of oxygen.  She is anxious to go home    Patient with external fixator MRSA infection and then an abscess drained on 3-11-20 - Patient still has hardware in the foot - a U-shaped pin - so a longer course of IV vanc is recommended     Needs 6 weeks IV vanc from pin removal and I and D on 3-11-20  So stop date of 4-22-20  Needs weekly CBC, chem profile and vanc levels   Follow up with ID clinic - Dr. Krueger at [facility] in 4 weeks         Anticipated Disposition: " "ID mayra lsign off now - thanks     Thank you for your consult. Mitch Beltran MD  Infectious Disease  [facility]    Subjective:     Principal Problem:Infected hardware in left leg, initial encounter    HPI: The patient is a 79 years old. female with PMH HTN, HLP, anxiety,Atrial fibrillation, Blind right eye, Bradycardia, RLS, and Sleep apnea presents with complaint of possible wound infection to left foot. Patient states she had surgery on January 15, 2020 to foot by Dr. Cardozo to "fix bones that did not heal correctly after bunion surgery".  Patient reports no complications to foot until yesterday when she noticed swelling, erythema and pain worsening. She also reports scant amount of white drainage from wound this morning. Patient denies any fever, chills, abdominal pain, nausea or vomiting. Of note, patient last saw Dr. Cardozo two weeks ago and has upcoming appointment in four days.     The pt does not put weight yet on her foot, and her surgical site is not  Having pain from the sx site itself but the swelling and edema around it     Overview/Hospital Course:  3/9 the pt seen, no acute events, had Dr Cardozo evaluating her foot, ordered a ct foot, awaiting rwecs. For now continue POC with IV abx     Interval History:   She states that she feels well. She denies any pain and denies any shortness of breath, She is off of oxygen.  She is anxious to go home  Interval History: Doing well wants to go home     Review of Systems   Constitutional: Negative.    HENT: Negative.    Eyes: Negative.    Respiratory: Negative.    Cardiovascular: Negative.    Gastrointestinal: Negative.    Endocrine: Negative.    Genitourinary: Negative.    Skin: Positive for rash and wound.   Hematological: Negative.    Psychiatric/Behavioral: Negative.      Objective:     Vital Signs (Most Recent):  Temp: 99 °F (37.2 °C) (03/15/20 2346)  Pulse: 78 (03/16/20 0012)  Resp: 17 (03/15/20 2346)  BP: (!) 141/67 (03/15/20 " 2346)  SpO2: (!) 93 % (03/14/20 2011) Vital Signs (24h Range):  Temp:  [98.2 °F (36.8 °C)-99.8 °F (37.7 °C)] 99 °F (37.2 °C)  Pulse:  [71-87] 78  Resp:  [17-20] 17  BP: (109-147)/(56-80) 141/67     Weight: 97 kg (213 lb 13.5 oz)  Body mass index is 39.11 kg/m².    Estimated Creatinine Clearance: 63 mL/min (based on SCr of 0.8 mg/dL).    Physical Exam   Constitutional: She is oriented to person, place, and time. She appears well-developed.   HENT:   Head: Atraumatic.   Eyes: EOM are normal.   Cardiovascular: Regular rhythm.   Pulmonary/Chest: Effort normal and breath sounds normal.   Abdominal: Soft. Bowel sounds are normal.   Neurological: She is alert and oriented to person, place, and time.   Skin:   Dressing on foot        Significant Labs: All pertinent labs within the past 24 hours have been reviewed.    Significant Imaging: I have reviewed all pertinent imaging results/findings within the past 24 hours.

## 2020-03-16 NOTE — HPI
"The patient is a 79 years old. female with PMH HTN, HLP, anxiety,Atrial fibrillation, Blind right eye, Bradycardia, RLS, and Sleep apnea presents with complaint of possible wound infection to left foot. Patient states she had surgery on January 15, 2020 to foot by Dr. Cardozo to "fix bones that did not heal correctly after bunion surgery".  Patient reports no complications to foot until yesterday when she noticed swelling, erythema and pain worsening. She also reports scant amount of white drainage from wound this morning. Patient denies any fever, chills, abdominal pain, nausea or vomiting. Of note, patient last saw Dr. Cardozo two weeks ago and has upcoming appointment in four days.     The pt does not put weight yet on her foot, and her surgical site is not  Having pain from the sx site itself but the swelling and edema around it     Overview/Hospital Course:  3/9 the pt seen, no acute events, had Dr Cardozo evaluating her foot, ordered a ct foot, awaiting rwecs. For now continue POC with IV abx     Interval History:   She states that she feels well. She denies any pain and denies any shortness of breath, She is off of oxygen.  She is anxious to go home  "

## 2020-03-16 NOTE — PLAN OF CARE
HH referral sent to [facility]. Patient has been accepted to [facility] for HH services.         03/16/20 1434   Post-Acute Status   Post-Acute Authorization Home Health/Hospice   Home Health/Hospice Status Referrals Sent

## 2020-03-16 NOTE — PLAN OF CARE
VIRTUAL NURSE:  Cued into patient's room.  Permission received per patient to turn camera to view patient.  Introduced as VN for night shift that will be working with floor nurse and nursing assistant.  Sitting up in lounge chair with feet elevated.  Educated patient on VN's role in patient care. Plan of care reviewed with patient. Education per flowsheet.  Opportunity given for questions and questions answered.  States getting up to BSC with assist.  Current left foot pain 1/10.  No other complaints.  Instructed to call for assistance.  Will cont to monitor.    Labs, notes, and orders reviewed.

## 2020-03-16 NOTE — PLAN OF CARE
VN rounds:  VN cued into pt's room with pt's permission.  Pt resting in bed in low position with call bell within reach, bed alarm in place. Fall risk protocol discussed with pt.  VN instructed to call for assistance.  Pt aware and agreeable.   No acute distress noted.  Pt's chart, labs and vital signs reviewed.  Allowed time for questions.  Will continue to be available and intervene as needed.

## 2020-03-16 NOTE — TELEPHONE ENCOUNTER
----- Message from Jaqui Huizar sent at 3/16/2020 11:45 AM CDT -----  Contact: Case Management  Hosp discharge   3/18      Pt had foot infection    Removed  Surgery partsn inflection in  left foot     Needs to be seen   In [place]     IN 4 weeks     Please call pt with appt and   polo   xt [phone] with a work in    Thanks

## 2020-03-16 NOTE — SUBJECTIVE & OBJECTIVE
Interval History: Doing well wants to go home     Review of Systems   Constitutional: Negative.    HENT: Negative.    Eyes: Negative.    Respiratory: Negative.    Cardiovascular: Negative.    Gastrointestinal: Negative.    Endocrine: Negative.    Genitourinary: Negative.    Skin: Positive for rash and wound.   Hematological: Negative.    Psychiatric/Behavioral: Negative.      Objective:     Vital Signs (Most Recent):  Temp: 99 °F (37.2 °C) (03/15/20 2346)  Pulse: 78 (03/16/20 0012)  Resp: 17 (03/15/20 2346)  BP: (!) 141/67 (03/15/20 2346)  SpO2: (!) 93 % (03/14/20 2011) Vital Signs (24h Range):  Temp:  [98.2 °F (36.8 °C)-99.8 °F (37.7 °C)] 99 °F (37.2 °C)  Pulse:  [71-87] 78  Resp:  [17-20] 17  BP: (109-147)/(56-80) 141/67     Weight: 97 kg (213 lb 13.5 oz)  Body mass index is 39.11 kg/m².    Estimated Creatinine Clearance: 63 mL/min (based on SCr of 0.8 mg/dL).    Physical Exam   Constitutional: She is oriented to person, place, and time. She appears well-developed.   HENT:   Head: Atraumatic.   Eyes: EOM are normal.   Cardiovascular: Regular rhythm.   Pulmonary/Chest: Effort normal and breath sounds normal.   Abdominal: Soft. Bowel sounds are normal.   Neurological: She is alert and oriented to person, place, and time.   Skin:   Dressing on foot        Significant Labs: All pertinent labs within the past 24 hours have been reviewed.    Significant Imaging: I have reviewed all pertinent imaging results/findings within the past 24 hours.

## 2020-03-16 NOTE — DISCHARGE INSTRUCTIONS
YOU SHOULD TAKE 9 MG DAILY OF COUMADIN FOR 3 DAYS AND THEN REVERT BACK TO YOUR USUAL DOSING. WE WILL BE CHECKING YOUR PT/INR DAILY. ONCE THE INR IS 2-3, WE CAN STOP THE LOVENOX SHOTS.        ?

## 2020-03-16 NOTE — PLAN OF CARE
[facility]    HOME HEALTH ORDERS  FACE TO FACE ENCOUNTER    Patient Name: Mis Ca  YOB: 1940    PCP: Scott Dillard MD   PCP Address: [address]  / [facility] [address]  PCP Phone Number: [phone]  PCP Fax: [phone]    Encounter Date: 03/16/2020    Admit to Home Health    Diagnoses:  Active Hospital Problems    Diagnosis  POA    *Infected hardware in left leg, initial encounter [T84.7XXA]  Yes     Priority: 1 - High    Superficial thrombophlebitis [I80.9]  Unknown    Chronic left shoulder pain [M25.512, G89.29]  Yes    Infection at site of external fixator pin [T84.7XXA]  Yes    Constipation [K59.00]  Yes    Cellulitis [L03.90]  Yes    Diastolic dysfunction [I51.89]  Yes    JACQUELYN (obstructive sleep apnea) [G47.33]  Yes    Anticoagulated on Coumadin [Z79.01]  Not Applicable    Hyperlipidemia [E78.5]  Yes    Essential hypertension [I10]  Yes    Paroxysmal atrial fibrillation [I48.0]  Yes      Resolved Hospital Problems   No resolved problems to display.       Future Appointments   Date Time Provider Department Center   3/26/2020  9:30 AM NURSE SCHEDULE, [facility]     Follow-up Information     Rudolph Cardozo DPM.    Specialty:  Podiatry  Why:  Dr. Cardozo's office will call you with your follow up appointment.  Contact information:  [address]  [address]  [facility] [address]  [phone]             Lance Krueger MD.    Specialty:  Infectious Diseases  Why:  The ID clinic will contact you with your follow up appointment. Your follow up appointment will be at the ID clinic in [place].  Contact information:  [address] [facility] [address] [facility] [address]  [phone]             Scott Dillard MD On 3/26/2020.    Specialty:  Family Medicine  Why:  For your follow up appointment at 9:30am.  Contact information:  [address] [place] [address]  [phone]             Rudolph Cardozo DPM In 1 week.    Specialty:   Podiatry  Contact information:  [address]  [address]  [place] [address]  [phone]                     I have seen and examined this patient face to face today. My clinical findings that support the need for the home health skilled services and home bound status are the following:  Requiring assistive device to leave home due to unsteady gait caused by  Infection.    Allergies:  Review of patient's allergies indicates:   Allergen Reactions    Propofol analogues      Used for her Colonoscopy.  Extended delirium.  Advised not to take it again.      Adhesive     Compazine [prochlorperazine edisylate] Anxiety    Penicillins Rash    Sulfa (sulfonamide antibiotics) Rash       Diet: regular diet    Activities: No weight bearing of the left food    Nursing:     CONTACT PRECAUTIONS FOR MRSA    SN to complete comprehensive assessment including routine vital signs. Instruct on disease process and s/s of complications to report to MD. Review/verify medication list sent home with the patient at time of discharge  and instruct patient/caregiver as needed. Frequency may be adjusted depending on start of care date.    Notify MD if SBP > 160 or < 90; DBP > 90 or < 50; HR > 120 or < 50; Temp > 101;       CONSULTS:    Physical Therapy to evaluate and treat. Evaluate for home safety and equipment needs; Establish/upgrade home exercise program. Perform / instruct on therapeutic exercises, gait training, transfer training, and Range of Motion.  Occupational Therapy to evaluate and treat. Evaluate home environment for safety and equipment needs. Perform/Instruct on transfers, ADL training, ROM, and therapeutic exercises.    MISCELLANEOUS CARE:  Wound Care Orders:  yes: per Dr Cardozo, left foot    WOUND CARE ORDERS  per Dr Cardozo      Medications: Review discharge medications with patient and family and provide education.      Current Discharge Medication List        PLEASE NOTE: WILL TAKE 9 MG DAILY FOR 3 DAYS AND THEN  REVERT BACK TO HER USUAL SCHEDULE BELOW. ONCE HER INR IS 2-3, MAY STOP THE LOVENOX      START taking these medications    Details   docusate sodium (COLACE) 100 MG capsule Take 1 capsule (100 mg total) by mouth 2 (two) times daily.  Refills: 0      enoxaparin (LOVENOX) 100 mg/mL Syrg Inject 1 mL (100 mg total) into the skin every 12 (twelve) hours. for 5 days  Qty: 10 mL, Refills: 0      oxyCODONE-acetaminophen (PERCOCET) 5-325 mg per tablet Take 1 tablet by mouth every 6 (six) hours as needed.  Qty: 15 tablet, Refills: 0      vancomycin HCl in 5 % dextrose (VANCOMYCIN IN DEXTROSE 5 %) 1 gram/250 mL Soln Inject 250 mLs (1,000 mg total) into the vein every 12 (twelve) hours. END DATE IS 4-      END DATE IS 4-   CONTINUE these medications which have CHANGED    Details   !! warfarin (COUMADIN) 6 MG tablet TAKE 6 MG DAILY EXCEPT Every TUES and FRI , WILL TAKE 9 MG       !! - Potential duplicate medications found. Please discuss with provider.      CONTINUE these medications which have NOT CHANGED    Details   acetaminophen (TYLENOL) 500 MG tablet Take 1,000 mg by mouth 2 (two) times daily as needed for Pain.      ALPRAZolam (XANAX) 0.25 MG tablet Take 1 tablet (0.25 mg total) by mouth 2 (two) times daily as needed for Anxiety.  Qty: 180 tablet, Refills: 1      aspirin (ECOTRIN) 81 MG EC tablet Take 81 mg by mouth once daily.        atorvastatin (LIPITOR) 40 MG tablet Take 1 tablet (40 mg total) by mouth once daily.  Qty: 90 tablet, Refills: 5      diltiaZEM (CARTIA XT) 240 MG 24 hr capsule Take 1 capsule (240 mg total) by mouth once daily.  Qty: 90 capsule, Refills: 3    Associated Diagnoses: Paroxysmal atrial fibrillation      gabapentin (NEURONTIN) 300 MG capsule Take 2 capsules (600 mg total) by mouth 2 (two) times daily.  Qty: 360 capsule, Refills: 3      hydroCHLOROthiazide (HYDRODIURIL) 25 MG tablet Take 1 tablet (25 mg total) by mouth once daily.  Qty: 90 tablet, Refills: 3      latanoprost 0.005 %  ophthalmic solution Place 1 drop into the left eye every evening.       losartan (COZAAR) 100 MG tablet TAKE 1/2 TABLET EVERY DAY  Qty: 45 tablet, Refills: 3      multivitamin (ONE DAILY MULTIVITAMIN) per tablet Take 1 tablet by mouth once daily.      omeprazole (PRILOSEC) 20 MG capsule Take 1 capsule (20 mg total) by mouth once daily.  Qty: 90 capsule, Refills: 3      oxybutynin (DITROPAN) 5 MG Tab TAKE 1 TABLET (5 MG TOTAL) BY MOUTH ONCE DAILY.  Qty: 90 tablet, Refills: 1      pramipexole (MIRAPEX) 0.5 MG tablet Take 1 tablet (0.5 mg total) by mouth every evening.  Qty: 90 tablet, Refills: 3      timolol maleate 0.5% (TIMOPTIC) 0.5 % Drop Place 1 drop into the left eye once daily.   Refills: 0      !! warfarin (COUMADIN) 6 MG tablet TAKE 1 TABLET EVERY DAY  Qty: 90 tablet, Refills: 3      cyanocobalamin 1,000 mcg/mL injection INJECT 1ML INTRAMUSCULARLY WEEKLY FOR 4 WEEKS, THEN 1ML MONTHLY  Qty: 3 mL, Refills: 1       !! - Potential duplicate medications found. Please discuss with provider.          I certify that this patient is confined to her home and needs intermittent skilled nursing care, physical therapy and occupational therapy.

## 2020-03-16 NOTE — TELEPHONE ENCOUNTER
----- Message from Eileen Joyner sent at 3/16/2020 11:31 AM CDT -----  Contact: Self  Pt is being discharged and needs an appt in one week for a HOSFU; however,  was unable to make the appt because the calendar rolled out to 4/20/20.      She can be reached at [phone].    Thank you.

## 2020-03-17 ENCOUNTER — TELEPHONE (OUTPATIENT)
Dept: PODIATRY | Facility: CLINIC | Age: 80
End: 2020-03-17

## 2020-03-17 LAB — BACTERIA SPEC ANAEROBE CULT: NORMAL

## 2020-03-17 PROCEDURE — G0180 MD CERTIFICATION HHA PATIENT: HCPCS | Mod: ,,, | Performed by: PODIATRIST

## 2020-03-17 PROCEDURE — G0180 PR HOME HEALTH MD CERTIFICATION: ICD-10-PCS | Mod: ,,, | Performed by: PODIATRIST

## 2020-03-17 NOTE — TELEPHONE ENCOUNTER
----- Message from Odilia Landeros sent at 3/17/2020  1:36 PM CDT -----  Contact: Isabelle from [facility]/[phone]  Isabelle called to get wound care orders sent to the nurse.     Fax number is [phone].  The office number is [phone].

## 2020-03-17 NOTE — ASSESSMENT & PLAN NOTE
POD #7  Hardware removal POD#5 Abscess drainage  On vanc     MRSA    Per Dr Panda's note   Patient with external fixator MRSA infection and then an abscess drained on 3-11-20 - Patient still has hardware in the foot - a U-shaped pin - so a longer course of IV vanc is recommended      Needs 6 weeks IV vanc from pin removal and I and D on 3-11-20  So stop date of 4-22-20  Needs weekly CBC, chem profile and vanc levels   Follow up with ID clinic - Dr. Krueger at [facility] in 4 weeks

## 2020-03-17 NOTE — TELEPHONE ENCOUNTER
----- Message from Shade Loya sent at 3/17/2020  9:21 AM CDT -----  Contact: [facility] [phone]  [facility] is calling to request an order for wound care. Please call and advise.

## 2020-03-17 NOTE — ASSESSMENT & PLAN NOTE
Cardizem  Continue Lovenox bridge (5 days given)  Take 9 mg coumadin for 3 days then back to normal schedule  Daily PT/INR  DC lovenox once inr 2-3

## 2020-03-17 NOTE — DISCHARGE SUMMARY
"[facility] Hospital Medicine  Discharge Summary      Patient Name: Mis Ca  MRN: [id number]  Admission Date: 3/8/2020  Hospital Length of Stay: 8 days  Discharge Date and Time:  3- 12:58 PM  Attending Physician: No att. providers found   Discharging Provider: Anni Cuenca MD  Primary Care Provider: Scott Dillard MD      HPI:   The patient is a 79 years old. female with PMH HTN, HLP, anxiety,Atrial fibrillation, Blind right eye, Bradycardia, RLS, and Sleep apnea presents with complaint of possible wound infection to left foot. Patient states she had surgery on January 15, 2020 to foot by Dr. Cardozo to "fix bones that did not heal correctly after bunion surgery".  Patient reports no complications to foot until yesterday when she noticed swelling, erythema and pain worsening. She also reports scant amount of white drainage from wound this morning. Patient denies any fever, chills, abdominal pain, nausea or vomiting. Of note, patient last saw Dr. Cardozo two weeks ago and has upcoming appointment in four days.    The pt does not put weight yet on her foot, and her surgical site is not  Having pain from the sx site itself but the swelling and edema around it    Procedure(s) (LRB):  INCISION AND DRAINAGE, FOOT (Left)      Hospital Course:   3/9 the pt seen, no acute events, had Dr Cardozo evaluating her foot, ordered a ct foot, awaiting rwecs. For now continue POC with IV abx    The patient had 2 surgical procedures- one with the removal of the external fixator device (3-9-2020) and then I&D of an abscess (3-). Her cultures grew MRSA.    Per ID:     Patient with external fixator MRSA infection and then an abscess drained on 3-11-20 - Patient still has hardware in the foot - a U-shaped pin - so a longer course of IV vanc is recommended      Needs 6 weeks IV vanc from pin removal and I and D on 3-11-20  So stop date of 4-22-20  Needs weekly CBC, chem profile and vanc levels "   Follow up with ID clinic - Dr. Krueger at [facility] in 4 weeks     During the stay, she had some shortness of breath. Required oxygen at times.  Had an eval:   Home Oxygen Evaluation     Date Performed: 3/14/2020     1)         Patient's Home O2 Sat on room air, while at rest: 95%                               If O2 sats on room air at rest are 88% or below, patient qualifies. No additional testing needed. Document N/A in steps 2 and 3. If 89% or above, complete steps 2.        2)         Patient's O2 Sat on room air while exercisin-95%                               If O2 sats on room air while exercising remain 89% or above patient does not qualify, no further testing needed Document N/A in step 3. If O2 sats on room air while exercising are 88% or below, continue to step 3.        3)         Patient's O2 Sat while exercising on O2: N/A                                           (Must show improvement from #2 for patients to qualify)     If O2 sats improve on oxygen, patient qualifies for portable oxygen. If not, the patient does not qualify.      Was maintained on Coumadin. Missed one day for surgery.  She was maintained on 6 mg daily.  At home she takes 6 mg daily but 9 mg on Tuesday and Friday.  Her INR drifted down and she had to be placed on a Lovenox bridge    On the day of discharge: she is afebrile  Vitals are stable  She feels well and denies shortness of breath     Consults:   Consults (From admission, onward)        Status Ordering Provider     Inpatient consult to Infectious Diseases  Once     Provider:  Mitch Beltran MD    Completed SALVADOR AMBROCIO     Inpatient consult to Podiatry  Once     Provider:  (Not yet assigned)    REJI Taylor          * Infected hardware in left leg, initial encounter  POD #7  Hardware removal POD#5 Abscess drainage  On vanc     MRSA    Per Dr Panda's note   Patient with external fixator MRSA infection and then an abscess drained on 3-11-20 -  Patient still has hardware in the foot - a U-shaped pin - so a longer course of IV vanc is recommended      Needs 6 weeks IV vanc from pin removal and I and D on 3-11-20  So stop date of 4-22-20  Needs weekly CBC, chem profile and vanc levels   Follow up with ID clinic - [facility] Kr[facility] at [facility] in 4 weeks         Superficial thrombophlebitis  Much improved now on DC. Right upper arm PICC line in place    Constipation    Maintain on colace.      Cellulitis  On Vanco- MRSA      Diastolic dysfunction  Continue home meds  No acute exacerbation      Essential hypertension  Presently on HCTZ, Losartan, Cardizem  Doing well      Hyperlipidemia  Continue statin      Anticoagulated on Coumadin    As above      JACQUELYN (obstructive sleep apnea)    CXR Negative    Follow up PCP  May need OP pulmonary work up    Paroxysmal atrial fibrillation  Cardizem  Continue Lovenox bridge (5 days given)  Take 9 mg coumadin for 3 days then back to normal schedule  Daily PT/INR  DC lovenox once inr 2-3      Final Active Diagnoses:    Diagnosis Date Noted POA    PRINCIPAL PROBLEM:  Infected hardware in left leg, initial encounter [T84.7XXA] 03/08/2020 Yes    Superficial thrombophlebitis [I80.9] 03/13/2020 No    Chronic left shoulder pain [M25.512, G89.29] 03/13/2020 Yes    Infection at site of external fixator pin [T84.7XXA] 03/11/2020 Yes    Constipation [K59.00] 03/10/2020 Yes    Cellulitis [L03.90] 03/08/2020 Yes    Diastolic dysfunction [I51.89] 09/20/2017 Yes    JACQUELYN (obstructive sleep apnea) [G47.33] 02/17/2016 Yes    Anticoagulated on Coumadin [Z79.01] 02/17/2016 Not Applicable    Hyperlipidemia [E78.5] 02/17/2016 Yes    Essential hypertension [I10] 02/17/2016 Yes    Paroxysmal atrial fibrillation [I48.0] 12/16/2014 Yes      Problems Resolved During this Admission:       Discharged Condition: good    Disposition: Home or Self Care    Follow Up:  Follow-up Information     Rudolph Cardozo DPM.    Specialty:  Podiatry  Why:   Dr. Cardozo's office will call you with your follow up appointment.  Contact information:  [address]  [address]  [place] [address]  [phone]             Scott Dillard MD On 3/26/2020.    Specialty:  Family Medicine  Why:  For your follow up appointment at 9:30am.  Contact information:  [address] [facility] [place] [address]  [phone]             [facility].    Specialty:  Home Health Services  Why:  A home health nurse will be out to visit you tomorrow 3/17/2020.  Contact information:  [address]  [address]  [facility] [address]  [phone]             [facility].    Why:  If you have any questions about you IV antibiotics please call [facility] at [phone].           Tabitha Michel MD On 4/16/2020.    Specialty:  Infectious Diseases  Why:  For your Infectious disease follow up  appointment at 10am.  Contact information:  [address]  [facility] [address]  [phone]                 Patient Instructions:      Ambulatory referral/consult to Home Health   Standing Status: Future   Referral Priority: Routine Referral Type: Home Health Care   Referral Reason: Specialty Services Required   Requested Specialty: Home Health Services   Number of Visits Requested: 1     Diet Adult Regular     Notify your health care provider if you experience any of the following:  temperature >100.4     Notify your health care provider if you experience any of the following:  severe uncontrolled pain     Notify your health care provider if you experience any of the following:  redness, tenderness, or signs of infection (pain, swelling, redness, odor or green/yellow discharge around incision site)     Notify your health care provider if you experience any of the following:  difficulty breathing or increased cough     Leave dressing on - Keep it clean, dry, and intact until clinic visit     Activity as tolerated       Significant Diagnostic Studies: Labs:  BMP: No results for input(s): GLU, NA, K, CL, CO2, BUN, CREATININE, CALCIUM, MG in the last 48 hours., CMP No results for input(s): NA, K, CL, CO2, GLU, BUN, CREATININE, CALCIUM, PROT, ALBUMIN, BILITOT, ALKPHOS, AST, ALT, ANIONGAP, ESTGFRAFRICA, EGFRNONAA in the last 48 hours. and CBC No results for input(s): WBC, HGB, HCT, PLT in the last 48 hours.  Microbiology:   Blood Culture   Lab Results   Component Value Date    LABBLOO No growth after 5 days. 03/08/2020    and Wound Culture: positive for MRSA  Radiology: X-Ray: CXR: X-Ray Chest 1 View (CXR):   Results for orders placed or performed during the hospital encounter of 03/08/20   X-Ray Chest 1 View for PICC_Central line    Narrative    EXAMINATION:  XR CHEST 1 VIEW    CLINICAL HISTORY:  Evaluate PICC line placement;    TECHNIQUE:  Single frontal view of the chest was performed.    COMPARISON:  Chest radiograph 03/11/2020    FINDINGS:  Monitoring leads overlie the chest.  Large body habitus.    Interval placement of right-sided PICC line with tip overlying the proximal SVC.    Cardiomediastinal silhouette is midline and similar to prior.  Bilateral diffuse nonspecific interstitial coarsening slightly increased from prior suggesting worsening pulmonary edema/CHF pattern versus interstitial pneumonia.  No focal consolidation, pleural effusion or pneumothorax.  No acute osseous process seen.      Impression    As above.      Electronically signed by: Osmar Mayfield MD  Date:    03/13/2020  Time:    18:46    and X-Ray Chest PA and Lateral (CXR): No results found for this visit on 03/08/20.    Pending Diagnostic Studies:     None         Medications:  Reconciled Home Medications:      Medication List      START taking these medications    docusate sodium 100 MG capsule  Commonly known as:  COLACE  Take 1 capsule (100 mg total) by mouth 2 (two) times daily.     enoxaparin 100 mg/mL Syrg  Commonly known as:  LOVENOX  Inject 1 mL (100 mg total) into the skin every 12  (twelve) hours. for 5 days     oxyCODONE-acetaminophen 5-325 mg per tablet  Commonly known as:  PERCOCET  Take 1 tablet by mouth every 6 (six) hours as needed.     vancomycin in dextrose 5 % 1 gram/250 mL Soln  Inject 250 mLs (1,000 mg total) into the vein every 12 (twelve) hours. END DATE IS 4-        CHANGE how you take these medications    atorvastatin 40 MG tablet  Commonly known as:  LIPITOR  Take 1 tablet (40 mg total) by mouth once daily.  What changed:  when to take this     losartan 100 MG tablet  Commonly known as:  COZAAR  TAKE 1/2 TABLET EVERY DAY  What changed:    · how much to take  · how to take this  · when to take this  · additional instructions     * warfarin 6 MG tablet  Commonly known as:  COUMADIN  TAKE 1 TABLET EVERY DAY  What changed:    · how much to take  · how to take this  · additional instructions     * warfarin 6 MG tablet  Commonly known as:  COUMADIN  TAKE 6 MG DAILY EXCEPT Every TUES and FRI , WILL TAKE 9 MG  What changed:    · how much to take  · how to take this  · additional instructions         * This list has 2 medication(s) that are the same as other medications prescribed for you. Read the directions carefully, and ask your doctor or other care provider to review them with you.            CONTINUE taking these medications    acetaminophen 500 MG tablet  Commonly known as:  TYLENOL  Take 1,000 mg by mouth 2 (two) times daily as needed for Pain.     ALPRAZolam 0.25 MG tablet  Commonly known as:  XANAX  Take 1 tablet (0.25 mg total) by mouth 2 (two) times daily as needed for Anxiety.     aspirin 81 MG EC tablet  Commonly known as:  ECOTRIN  Take 81 mg by mouth once daily.     cyanocobalamin 1,000 mcg/mL injection  INJECT 1ML INTRAMUSCULARLY WEEKLY FOR 4 WEEKS, THEN 1ML MONTHLY     diltiaZEM 240 MG 24 hr capsule  Commonly known as:  CARTIA XT  Take 1 capsule (240 mg total) by mouth once daily.     gabapentin 300 MG capsule  Commonly known as:  NEURONTIN  Take 2 capsules (600  mg total) by mouth 2 (two) times daily.     hydroCHLOROthiazide 25 MG tablet  Commonly known as:  HYDRODIURIL  Take 1 tablet (25 mg total) by mouth once daily.     latanoprost 0.005 % ophthalmic solution  Place 1 drop into the left eye every evening.     omeprazole 20 MG capsule  Commonly known as:  PRILOSEC  Take 1 capsule (20 mg total) by mouth once daily.     ONE DAILY MULTIVITAMIN per tablet  Generic drug:  multivitamin  Take 1 tablet by mouth once daily.     oxybutynin 5 MG Tab  Commonly known as:  DITROPAN  TAKE 1 TABLET (5 MG TOTAL) BY MOUTH ONCE DAILY.     pramipexole 0.5 MG tablet  Commonly known as:  MIRAPEX  Take 1 tablet (0.5 mg total) by mouth every evening.     timolol maleate 0.5% 0.5 % Drop  Commonly known as:  TIMOPTIC  Place 1 drop into the left eye once daily.            Indwelling Lines/Drains at time of discharge:   Lines/Drains/Airways     Peripherally Inserted Central Catheter Line            PICC Double Lumen 03/13/20 1815 right basilic 3 days          Drain            Female External Urinary Catheter 03/10/20 2200 6 days                Time spent on the discharge of patient: 40 minutes  Patient was seen and examined on the date of discharge and determined to be suitable for discharge.         Anni Cuenca MD  Department of Hospital Medicine  [facility]

## 2020-03-19 ENCOUNTER — TELEPHONE (OUTPATIENT)
Dept: INFECTIOUS DISEASES | Facility: CLINIC | Age: 80
End: 2020-03-19

## 2020-03-19 ENCOUNTER — LAB VISIT (OUTPATIENT)
Dept: LAB | Facility: HOSPITAL | Age: 80
End: 2020-03-19
Attending: PODIATRIST
Payer: MEDICARE

## 2020-03-19 ENCOUNTER — TELEPHONE (OUTPATIENT)
Dept: FAMILY MEDICINE | Facility: CLINIC | Age: 80
End: 2020-03-19

## 2020-03-19 DIAGNOSIS — T84.7XXA INFECTION/INFLAMMATION-INTERNAL ORTHO DEVICE: Primary | ICD-10-CM

## 2020-03-19 LAB
ALBUMIN SERPL BCP-MCNC: 3 G/DL (ref 3.5–5.2)
ALP SERPL-CCNC: 109 U/L (ref 55–135)
ALT SERPL W/O P-5'-P-CCNC: 19 U/L (ref 10–44)
ANION GAP SERPL CALC-SCNC: 10 MMOL/L (ref 8–16)
AST SERPL-CCNC: 18 U/L (ref 10–40)
BASOPHILS # BLD AUTO: 0.01 K/UL (ref 0–0.2)
BASOPHILS NFR BLD: 0.1 % (ref 0–1.9)
BILIRUB SERPL-MCNC: 0.2 MG/DL (ref 0.1–1)
BUN SERPL-MCNC: 17 MG/DL (ref 8–23)
CALCIUM SERPL-MCNC: 9 MG/DL (ref 8.7–10.5)
CHLORIDE SERPL-SCNC: 98 MMOL/L (ref 95–110)
CO2 SERPL-SCNC: 29 MMOL/L (ref 23–29)
CREAT SERPL-MCNC: 1.2 MG/DL (ref 0.5–1.4)
DIFFERENTIAL METHOD: ABNORMAL
EOSINOPHIL # BLD AUTO: 0.3 K/UL (ref 0–0.5)
EOSINOPHIL NFR BLD: 3.8 % (ref 0–8)
ERYTHROCYTE [DISTWIDTH] IN BLOOD BY AUTOMATED COUNT: 12.8 % (ref 11.5–14.5)
EST. GFR  (AFRICAN AMERICAN): 50 ML/MIN/1.73 M^2
EST. GFR  (NON AFRICAN AMERICAN): 43.4 ML/MIN/1.73 M^2
GLUCOSE SERPL-MCNC: 98 MG/DL (ref 70–110)
HCT VFR BLD AUTO: 34.2 % (ref 37–48.5)
HGB BLD-MCNC: 10.7 G/DL (ref 12–16)
IMM GRANULOCYTES # BLD AUTO: 0.06 K/UL (ref 0–0.04)
IMM GRANULOCYTES NFR BLD AUTO: 0.8 % (ref 0–0.5)
LYMPHOCYTES # BLD AUTO: 1.2 K/UL (ref 1–4.8)
LYMPHOCYTES NFR BLD: 16.4 % (ref 18–48)
MCH RBC QN AUTO: 31.8 PG (ref 27–31)
MCHC RBC AUTO-ENTMCNC: 31.3 G/DL (ref 32–36)
MCV RBC AUTO: 102 FL (ref 82–98)
MONOCYTES # BLD AUTO: 1 K/UL (ref 0.3–1)
MONOCYTES NFR BLD: 14 % (ref 4–15)
NEUTROPHILS # BLD AUTO: 4.6 K/UL (ref 1.8–7.7)
NEUTROPHILS NFR BLD: 64.9 % (ref 38–73)
NRBC BLD-RTO: 0 /100 WBC
PLATELET # BLD AUTO: 217 K/UL (ref 150–350)
PMV BLD AUTO: 10.1 FL (ref 9.2–12.9)
POTASSIUM SERPL-SCNC: 3.7 MMOL/L (ref 3.5–5.1)
PROT SERPL-MCNC: 6.7 G/DL (ref 6–8.4)
RBC # BLD AUTO: 3.36 M/UL (ref 4–5.4)
SODIUM SERPL-SCNC: 137 MMOL/L (ref 136–145)
VANCOMYCIN SERPL-MCNC: 19.3 UG/ML
WBC # BLD AUTO: 7.15 K/UL (ref 3.9–12.7)

## 2020-03-19 PROCEDURE — 80053 COMPREHEN METABOLIC PANEL: CPT | Mod: HCNC

## 2020-03-19 PROCEDURE — 80202 ASSAY OF VANCOMYCIN: CPT | Mod: HCNC

## 2020-03-19 PROCEDURE — 85025 COMPLETE CBC W/AUTO DIFF WBC: CPT | Mod: HCNC

## 2020-03-19 NOTE — TELEPHONE ENCOUNTER
----- Message from Ana Vasquez sent at 3/19/2020  9:30 AM CDT -----  Contact: Krys alvarado/[phone]  Home Health nurse is calling about orders for her INR and would like to clarify how often it should be taken. Please call her. Thanks

## 2020-03-19 NOTE — TELEPHONE ENCOUNTER
Dr. Michel,   I spoke with Gwen at [employer] and she stated that she was contacted by patient's home health. Patient's home health said that the pt has a raised red rash on her arm and she thinks it may be an allergy from the Vancomycin. Patient is currently on Voncomycin 1 g q12. Please advise.

## 2020-03-23 ENCOUNTER — TELEPHONE (OUTPATIENT)
Dept: FAMILY MEDICINE | Facility: CLINIC | Age: 80
End: 2020-03-23

## 2020-03-23 ENCOUNTER — OFFICE VISIT (OUTPATIENT)
Dept: PODIATRY | Facility: CLINIC | Age: 80
End: 2020-03-23
Payer: MEDICARE

## 2020-03-23 VITALS
TEMPERATURE: 99 F | HEIGHT: 62 IN | BODY MASS INDEX: 38.64 KG/M2 | HEART RATE: 77 BPM | WEIGHT: 210 LBS | DIASTOLIC BLOOD PRESSURE: 70 MMHG | SYSTOLIC BLOOD PRESSURE: 108 MMHG

## 2020-03-23 DIAGNOSIS — R82.81 PYURIA: Primary | ICD-10-CM

## 2020-03-23 DIAGNOSIS — Z98.890 STATUS POST INCISION AND DRAINAGE: Primary | ICD-10-CM

## 2020-03-23 PROCEDURE — 99024 PR POST-OP FOLLOW-UP VISIT: ICD-10-PCS | Mod: HCNC,S$GLB,, | Performed by: PODIATRIST

## 2020-03-23 PROCEDURE — 99999 PR PBB SHADOW E&M-EST. PATIENT-LVL III: ICD-10-PCS | Mod: PBBFAC,HCNC,, | Performed by: PODIATRIST

## 2020-03-23 PROCEDURE — 99024 POSTOP FOLLOW-UP VISIT: CPT | Mod: HCNC,S$GLB,, | Performed by: PODIATRIST

## 2020-03-23 PROCEDURE — 99999 PR PBB SHADOW E&M-EST. PATIENT-LVL III: CPT | Mod: PBBFAC,HCNC,, | Performed by: PODIATRIST

## 2020-03-23 RX ORDER — VANCOMYCIN HYDROCHLORIDE 5 G/1
INJECTION, POWDER, LYOPHILIZED, FOR SOLUTION INTRAVENOUS
COMMUNITY
Start: 2020-03-16 | End: 2020-04-16

## 2020-03-23 NOTE — TELEPHONE ENCOUNTER
----- Message from Ana Vasquez sent at 3/23/2020  3:36 PM CDT -----  Contact: Yudi/Brannon /[phone]  Home Health nurse Yudi is requesting orders to check her urine for a UTI. Patient is complaining with symptoms of UTI, blood tinged Urine and flank pain. She has Afebrile. She will visit her tomorrow  Fax to [phone]

## 2020-03-23 NOTE — PROGRESS NOTES
Subjective:      Patient ID: Mis Ca is a 79 y.o. female.    Chief Complaint: No chief complaint on file.    Complains of a bunion deformity left foot. Pain aggravated by enclosed shoes. Denies trauma. Relates it has been present for many years. Wearing sandals today. Wears toe separators which help only a little.    10/5/18: Returns to review her xray and discuss surgical intervention for her painful bunion left foot. No new complaints.    11/8/18: Post EGR with Lapidus/akin bunionectomy left on 10/31/18. NWB left foot. Using rolling knee walker. Accompanied by sister and neighbor. Pain mild and well controlled.    11/30/18: 4 weeks postop. NWB left foot. Accompanied by her friends. Relates no pain today but gets intermittent aching to the foot.    12/7/18:  5 weeks postop. Recalls pain and a pop in her left leg a few days ago when putting weight down on the left foot. She has kept the boot on, rested and elevated. Pain improved since.    12/28/18: 8 weeks postop. No pain. Ambulating well with orthopedic boot.    5/20/19: 4 months post op.  No pain to surgical, ambulating well in shoes.  Complains of pain to midfoot that happened when she injured her foot while in boot shortly after surgery.  Pain worse with activity, better with rest. Walks with a limp due to pain.    11/22/2019:  Complains of intermittent burning pain along the bottom of the foot that varies in intensity.  Also complains of pins and needles sensation that travels to the right great toe that is not constant.  No pain at rest.  She is wearing a clog type shoe as recommended.    12/9/2019: Pain is largely unchanged since last visit. She had CT scan done.  No new complaints.    1/23/19  Patient s/p lapidus revision with mini rail ex fix and deep peroneal nerve decompression 1/15/20.  Patient states she fell and hit her head, states she did not hit her foot.  Otherwise denies F/c/N/V or other trauma.    02/06/2020:  Presents 3 weeks postop.   "Relates mild pain at the proximal pin site left medial longitudinal arch.  States she has been applying pressure towards the heel as instructed.  Denies any slips, trips or falls.  Utilizing a rolling knee walker.  No new complaints    02/17/2020 20:1 month postop.  Relates no pain.  No new complaints.  She has been performing the daily foot soaks p.r.n..    3/23/20  S/p 1 week hospital admission and I&D.  Patient doing vancomycin IV via picc line BID.  States she has been keeping off her foot.  Denies trauma, F/C/NV.  States she has a hematoma on her belly that was from a Lovenox injection, but it has gotten better.      Vitals:    03/23/20 1052   BP: 108/70   Pulse: 77   Temp: 98.7 °F (37.1 °C)   Weight: 95.3 kg (210 lb)   Height: 5' 2" (1.575 m)   PainSc:   2   PainLoc: Foot      Past Medical History:   Diagnosis Date    Anticoagulant long-term use     Anxiety     Arthritis     Atrial fibrillation     Blind right eye     Bradycardia     Cancer     skin cancer left side of face under eye    Hyperlipidemia     Hypertension     PVC (premature ventricular contraction)     RLS (restless legs syndrome)     Sleep apnea     Does not use CPAP machine.       Past Surgical History:   Procedure Laterality Date    ADENOIDECTOMY      AKIN OSTEOTOMY Left 10/31/2018    Procedure: OSTEOTOMY, AKIN;  Surgeon: Rudolph Cardozo DPM;  Location: [facility] OR;  Service: Podiatry;  Laterality: Left;    APPENDECTOMY      BREAST BIOPSY Left     x3    BREAST SURGERY Left     breast biopsy x3    CATARACT EXTRACTION BILATERAL W/ ANTERIOR VITRECTOMY      ENDOSCOPIC GASTROCNEMIUS RECESSION Left 10/31/2018    Procedure: RECESSION, GASTROCNEMIUS, ENDOSCOPIC;  Surgeon: Rudolph Cardozo DPM;  Location: [facility] OR;  Service: Podiatry;  Laterality: Left;    EYE SURGERY Bilateral     cataracts extraction    EYE SURGERY Right     drainage tube (glaucoma)    FOOT ARTHRODESIS Left 1/15/2020    Procedure: FUSION, FOOT;  Surgeon: Rudolph" XUAN Cardozo DPM;  Location: [facility] OR;  Service: Podiatry;  Laterality: Left;  mini c-arm, Arthrex screw  for hardware removal (Lydia notified), [employer] (Niels notified) min ex-fix    FOOT SURGERY Left     lesion removed from dorsal area    FRACTURE SURGERY Left     arm    HAND TENDON SURGERY Left     HYSTERECTOMY      INCISION AND DRAINAGE FOOT Left 3/11/2020    Procedure: INCISION AND DRAINAGE, FOOT;  Surgeon: Rudolph Cardozo DPM;  Location: [facility] OR;  Service: Podiatry;  Laterality: Left;    LAPIDUS BUNIONECTOMY Left 10/31/2018    Procedure: BUNIONECTOMY, LAPIDUS;  Surgeon: Rudolph Cardozo DPM;  Location: [facility] OR;  Service: Podiatry;  Laterality: Left;  mini c-arm, Arthrex locking plate (Lydia notified)    LAPIDUS BUNIONECTOMY Left 10/31/2018    Dr. Cardozo    Lymph Gland Removed  Right     groin (performed by Dr. Vergara)    NEURECTOMY Left 1/15/2020    Procedure: NEURECTOMY;  Surgeon: Rudolph Cardozo DPM;  Location: [facility] OR;  Service: Podiatry;  Laterality: Left;  bipolar bovie, vessel loop, possible [place] nerve wrap. Moshe with [facility] notified and will be overnighting graft. MK 1/14/2020    OOPHORECTOMY      ORIF FOREARM FRACTURE Left     PALATE SURGERY      Lesion removed    TONSILLECTOMY         Family History   Problem Relation Age of Onset    Aneurysm Mother         AAA    Cancer Father         lung cancer    Lung cancer Father     Cirrhosis Father     Cancer Sister         Breast and Brain     Diabetes Sister     Breast cancer Sister     Hypertension Sister     Hyperlipidemia Sister     Brain cancer Sister     Colon polyps Brother     Cancer Brother         lung cancer    Diabetes Brother     Hyperlipidemia Brother     Hypertension Brother     Cancer Brother         bladder cancer    Diabetes Brother     Glaucoma Brother     Heart disease Sister         Heart valve repair    Atrial fibrillation Sister     Diabetes Sister     Heart disease Sister      Heart attack Sister     Bipolar disorder Sister     Depression Sister     Glaucoma Sister     Diabetes Sister     Other Sister         Pituitary tumor    Arthritis Sister     COPD Sister     Heart disease Sister     Kidney disease Sister     Cancer Sister         lung cancer    Diabetes Sister     Lung cancer Sister     Heart attack Sister        Social History     Socioeconomic History    Marital status:      Spouse name: Not on file    Number of children: Not on file    Years of education: Not on file    Highest education level: Not on file   Occupational History    Not on file   Social Needs    Financial resource strain: Not on file    Food insecurity:     Worry: Not on file     Inability: Not on file    Transportation needs:     Medical: Not on file     Non-medical: Not on file   Tobacco Use    Smoking status: Never Smoker    Smokeless tobacco: Never Used   Substance and Sexual Activity    Alcohol use: Yes     Comment: Seldomly drinks alcohol (wine)    Drug use: No    Sexual activity: Not Currently     Partners: Male   Lifestyle    Physical activity:     Days per week: Not on file     Minutes per session: Not on file    Stress: Not at all   Relationships    Social connections:     Talks on phone: Not on file     Gets together: Not on file     Attends [employer] service: Not on file     Active member of club or organization: Not on file     Attends meetings of clubs or organizations: Not on file     Relationship status: Not on file   Other Topics Concern    Not on file   Social History Narrative    Not on file       Current Outpatient Medications   Medication Sig Dispense Refill    acetaminophen (TYLENOL) 500 MG tablet Take 1,000 mg by mouth 2 (two) times daily as needed for Pain.      ALPRAZolam (XANAX) 0.25 MG tablet Take 1 tablet (0.25 mg total) by mouth 2 (two) times daily as needed for Anxiety. 180 tablet 1    aspirin (ECOTRIN) 81 MG EC tablet Take 81 mg by mouth  once daily.        atorvastatin (LIPITOR) 40 MG tablet Take 1 tablet (40 mg total) by mouth once daily. (Patient taking differently: Take 40 mg by mouth every evening. ) 90 tablet 5    cyanocobalamin 1,000 mcg/mL injection INJECT 1ML INTRAMUSCULARLY WEEKLY FOR 4 WEEKS, THEN 1ML MONTHLY 3 mL 1    diltiaZEM (CARTIA XT) 240 MG 24 hr capsule Take 1 capsule (240 mg total) by mouth once daily. 90 capsule 3    docusate sodium (COLACE) 100 MG capsule Take 1 capsule (100 mg total) by mouth 2 (two) times daily.  0    enoxaparin (LOVENOX) 100 mg/mL Syrg Inject 1 mL (100 mg total) into the skin every 12 (twelve) hours. for 5 days 10 mL 0    gabapentin (NEURONTIN) 300 MG capsule Take 2 capsules (600 mg total) by mouth 2 (two) times daily. 360 capsule 3    hydroCHLOROthiazide (HYDRODIURIL) 25 MG tablet Take 1 tablet (25 mg total) by mouth once daily. 90 tablet 3    latanoprost 0.005 % ophthalmic solution Place 1 drop into the left eye every evening.       losartan (COZAAR) 100 MG tablet TAKE 1/2 TABLET EVERY DAY (Patient taking differently: 50 mg at night) 45 tablet 3    multivitamin (ONE DAILY MULTIVITAMIN) per tablet Take 1 tablet by mouth once daily.      omeprazole (PRILOSEC) 20 MG capsule Take 1 capsule (20 mg total) by mouth once daily. 90 capsule 3    oxybutynin (DITROPAN) 5 MG Tab TAKE 1 TABLET (5 MG TOTAL) BY MOUTH ONCE DAILY. 90 tablet 1    oxyCODONE-acetaminophen (PERCOCET) 5-325 mg per tablet Take 1 tablet by mouth every 6 (six) hours as needed. 15 tablet 0    pramipexole (MIRAPEX) 0.5 MG tablet Take 1 tablet (0.5 mg total) by mouth every evening. 90 tablet 3    timolol maleate 0.5% (TIMOPTIC) 0.5 % Drop Place 1 drop into the left eye once daily.   0    vancomycin (VANCOCIN) 5 gram injection       vancomycin HCl in 5 % dextrose (VANCOMYCIN IN DEXTROSE 5 %) 1 gram/250 mL Soln Inject 250 mLs (1,000 mg total) into the vein every 12 (twelve) hours. END DATE IS 4-      warfarin (COUMADIN) 6 MG  tablet TAKE 1 TABLET EVERY DAY (Patient taking differently: Take 6 mg by mouth. Takes every MON, WED, THURS, SAT, SUN) 90 tablet 3    warfarin (COUMADIN) 6 MG tablet TAKE 6 MG DAILY EXCEPT Every TUES and FRI , WILL TAKE 9 MG       Current Facility-Administered Medications   Medication Dose Route Frequency Provider Last Rate Last Dose    cyanocobalamin injection 1,000 mcg  1,000 mcg Intramuscular Q30 Days Scott Dillard MD   1,000 mcg at 11/25/19 1611       Review of patient's allergies indicates:   Allergen Reactions    Penicillins     Sulfa (sulfonamide antibiotics)     Compazine [prochlorperazine edisylate] Anxiety         Review of Systems   Constitution: Negative for chills, fever and malaise/fatigue.   HENT: Negative for congestion.    Cardiovascular: Negative for chest pain, claudication and leg swelling.   Respiratory: Negative for cough and shortness of breath.    Skin: Negative for flushing, itching, poor wound healing and rash.   Musculoskeletal: Positive for joint pain. Negative for back pain, muscle cramps and muscle weakness.   Gastrointestinal: Negative for nausea and vomiting.   Neurological: Positive for numbness. Negative for paresthesias and weakness.        Burning sensation   Psychiatric/Behavioral: Negative for altered mental status.           Objective:      Physical Exam   Constitutional: She is oriented to person, place, and time. She appears well-developed and well-nourished. No distress.   Cardiovascular: Intact distal pulses.   Pulses:       Dorsalis pedis pulses are 2+ on the right side, and 2+ on the left side.        Posterior tibial pulses are 2+ on the right side, and 2+ on the left side.   CFT< 3 secs all toes bilateral foot, skin temp warm bilateral foot, no hair growth bilateral lower extremity, mild lower extremity edema with telangiectasias and varicosities bilateral.     Musculoskeletal: She exhibits edema and tenderness.   No pain with MMT or ROM left foot. Left hallux  well aligned. 1 MTP left achieves > 45 deg DF.    Left foot achieves 5 deg DF with knee extended and > 10 deg with knee flexed. Right foot achieves 0 deg DF knee extended and 10 deg DF knee flexed.    No pain on palpation of the left leg, no pain with active resisted PF foot.    Mild collapse of the arch with standing bilateral foot.    No localized pain on palpation to the base of the 3rd metatarsal left foot.    Note medial deviation of toes 2 and 3 left foot when the forefoot is loaded.    No palpable fluctuance or crepitance left foot.     Neurological: She is alert and oriented to person, place, and time. She has normal strength. No sensory deficit.   Skin: Skin is warm, dry and intact. Capillary refill takes less than 2 seconds. No ecchymosis and no rash noted. She is not diaphoretic. No cyanosis or erythema. No pallor. Nails show no clubbing.   Sutures intact, skin well coapted, no signs of hematoma, no pain in calves    No erythema, mild edema.                      Assessment:       Encounter Diagnosis   Name Primary?    Status post incision and drainage - Left Foot Yes         Plan:       Diagnoses and all orders for this visit:    Status post incision and drainage - Left Foot  -     SUBSEQUENT HOME HEALTH ORDERS      I counseled the patient on her conditions, their implications and medical management.    Dressed with mepilex AG, and tubigrit    Dispensed post op shoe, ok to WBAT for short distances up to 20 ft at a time to heel.  Encouraged to use her rolling knee walker for extended periods of time.    Discontinued home health for local wound care to left foot however still required for PICC .      Continue IV antibiotics.    Elevation at rest.    RTC 2 weeks for suture removal    -rtc as specified    Assisted by Scott Anders, PGY3    I have personally taken the history and examined this patient and agree with the resident's note as stated as above.   Rudolph Cardozo DPM, FACFAS

## 2020-03-23 NOTE — PATIENT INSTRUCTIONS
Discussed that you may walk on the left foot with a surgical shoe applying weight towards the left heel as tolerated for up to 15-20 feet at a time around your home. Use the rolling knee walker for any extended period of time.

## 2020-03-23 NOTE — TELEPHONE ENCOUNTER
is requesting orders for a urinalysis & culture, if necessary. Pt is complaining of the following symptoms:    blood tinged Urine and flank pain.       I will fax to [facility]  once order has been placed.

## 2020-03-24 ENCOUNTER — TELEPHONE (OUTPATIENT)
Dept: FAMILY MEDICINE | Facility: CLINIC | Age: 80
End: 2020-03-24

## 2020-03-24 ENCOUNTER — LAB VISIT (OUTPATIENT)
Dept: LAB | Facility: HOSPITAL | Age: 80
End: 2020-03-24
Attending: FAMILY MEDICINE
Payer: MEDICARE

## 2020-03-24 ENCOUNTER — EXTERNAL HOME HEALTH (OUTPATIENT)
Dept: HOME HEALTH SERVICES | Facility: HOSPITAL | Age: 80
End: 2020-03-24
Payer: MEDICARE

## 2020-03-24 DIAGNOSIS — L03.90 CELLULITIS DUE TO MRSA: ICD-10-CM

## 2020-03-24 DIAGNOSIS — B95.62 CELLULITIS DUE TO MRSA: ICD-10-CM

## 2020-03-24 DIAGNOSIS — T84.7XXA INFECTION/INFLAMMATION-INTERNAL ORTHO DEVICE: Primary | ICD-10-CM

## 2020-03-24 DIAGNOSIS — R82.81 PYURIA: ICD-10-CM

## 2020-03-24 LAB
ALBUMIN SERPL BCP-MCNC: 3 G/DL (ref 3.5–5.2)
ALP SERPL-CCNC: 118 U/L (ref 55–135)
ALT SERPL W/O P-5'-P-CCNC: 29 U/L (ref 10–44)
ANION GAP SERPL CALC-SCNC: 10 MMOL/L (ref 8–16)
AST SERPL-CCNC: 21 U/L (ref 10–40)
BACTERIA #/AREA URNS HPF: ABNORMAL /HPF
BASOPHILS # BLD AUTO: 0.03 K/UL (ref 0–0.2)
BASOPHILS NFR BLD: 0.4 % (ref 0–1.9)
BILIRUB SERPL-MCNC: 0.3 MG/DL (ref 0.1–1)
BILIRUB UR QL STRIP: NEGATIVE
BUN SERPL-MCNC: 20 MG/DL (ref 8–23)
CALCIUM SERPL-MCNC: 8.9 MG/DL (ref 8.7–10.5)
CHLORIDE SERPL-SCNC: 99 MMOL/L (ref 95–110)
CLARITY UR: ABNORMAL
CO2 SERPL-SCNC: 29 MMOL/L (ref 23–29)
COLOR UR: YELLOW
CREAT SERPL-MCNC: 1.3 MG/DL (ref 0.5–1.4)
CRP SERPL-MCNC: 46.8 MG/L (ref 0–8.2)
DIFFERENTIAL METHOD: ABNORMAL
EOSINOPHIL # BLD AUTO: 0.2 K/UL (ref 0–0.5)
EOSINOPHIL NFR BLD: 3.3 % (ref 0–8)
ERYTHROCYTE [DISTWIDTH] IN BLOOD BY AUTOMATED COUNT: 12.9 % (ref 11.5–14.5)
ERYTHROCYTE [SEDIMENTATION RATE] IN BLOOD BY WESTERGREN METHOD: 98 MM/HR (ref 0–20)
EST. GFR  (AFRICAN AMERICAN): 45.4 ML/MIN/1.73 M^2
EST. GFR  (NON AFRICAN AMERICAN): 39.4 ML/MIN/1.73 M^2
GLUCOSE SERPL-MCNC: 91 MG/DL (ref 70–110)
GLUCOSE UR QL STRIP: NEGATIVE
HCT VFR BLD AUTO: 30.8 % (ref 37–48.5)
HGB BLD-MCNC: 10.5 G/DL (ref 12–16)
HGB UR QL STRIP: ABNORMAL
KETONES UR QL STRIP: NEGATIVE
LEUKOCYTE ESTERASE UR QL STRIP: NEGATIVE
LYMPHOCYTES # BLD AUTO: 1.2 K/UL (ref 1–4.8)
LYMPHOCYTES NFR BLD: 17.8 % (ref 18–48)
MCH RBC QN AUTO: 32.2 PG (ref 27–31)
MCHC RBC AUTO-ENTMCNC: 34.1 G/DL (ref 32–36)
MCV RBC AUTO: 95 FL (ref 82–98)
MICROSCOPIC COMMENT: ABNORMAL
MONOCYTES # BLD AUTO: 0.8 K/UL (ref 0.3–1)
MONOCYTES NFR BLD: 12.4 % (ref 4–15)
NEUTROPHILS # BLD AUTO: 4.4 K/UL (ref 1.8–7.7)
NEUTROPHILS NFR BLD: 66.1 % (ref 38–73)
NITRITE UR QL STRIP: NEGATIVE
PH UR STRIP: 6 [PH] (ref 5–8)
PLATELET # BLD AUTO: 233 K/UL (ref 150–350)
PMV BLD AUTO: 10.8 FL (ref 9.2–12.9)
POTASSIUM SERPL-SCNC: 3.3 MMOL/L (ref 3.5–5.1)
PROT SERPL-MCNC: 6.7 G/DL (ref 6–8.4)
PROT UR QL STRIP: NEGATIVE
RBC # BLD AUTO: 3.26 M/UL (ref 4–5.4)
RBC #/AREA URNS HPF: 50 /HPF (ref 0–4)
SODIUM SERPL-SCNC: 138 MMOL/L (ref 136–145)
SP GR UR STRIP: 1.01 (ref 1–1.03)
URN SPEC COLLECT METH UR: ABNORMAL
VANCOMYCIN TROUGH SERPL-MCNC: 40.9 UG/ML (ref 10–22)
WBC # BLD AUTO: 6.67 K/UL (ref 3.9–12.7)
WBC #/AREA URNS HPF: 1 /HPF (ref 0–5)

## 2020-03-24 PROCEDURE — 86140 C-REACTIVE PROTEIN: CPT | Mod: HCNC

## 2020-03-24 PROCEDURE — 87086 URINE CULTURE/COLONY COUNT: CPT | Mod: HCNC

## 2020-03-24 PROCEDURE — 85025 COMPLETE CBC W/AUTO DIFF WBC: CPT | Mod: HCNC,PO

## 2020-03-24 PROCEDURE — 80202 ASSAY OF VANCOMYCIN: CPT | Mod: HCNC

## 2020-03-24 PROCEDURE — 81000 URINALYSIS NONAUTO W/SCOPE: CPT | Mod: HCNC,PO

## 2020-03-24 PROCEDURE — 80053 COMPREHEN METABOLIC PANEL: CPT | Mod: HCNC

## 2020-03-24 PROCEDURE — 85651 RBC SED RATE NONAUTOMATED: CPT | Mod: HCNC,PO

## 2020-03-24 NOTE — TELEPHONE ENCOUNTER
----- Message from Ganga Lane sent at 3/24/2020  2:35 PM CDT -----  Contact: [phone]/self  LYDIA HAND calling regarding Orders for [facility] to take her PTINR levels and she's requesting orders for her B 12 injection

## 2020-03-24 NOTE — TELEPHONE ENCOUNTER
Pt requested that we ask [facility]  to administer her B12 injection. I spoke with OLEKSANDR Mesa at [facility] ([place]). Verbal order was given.

## 2020-03-24 NOTE — TELEPHONE ENCOUNTER
Spoke to Merly with home health and she was notified the order was placed for the urine test. She understood an will print it out.

## 2020-03-24 NOTE — TELEPHONE ENCOUNTER
----- Message from Susan Barros sent at 3/24/2020 10:34 AM CDT -----  Contact: [phone]/ Merly with [facility]   [facility] [employer] would like to speak with you in regards to getting orders for a urinalysis because the patient may have a UTI. Please advise.

## 2020-03-25 LAB — BACTERIA UR CULT: NO GROWTH

## 2020-03-25 NOTE — TELEPHONE ENCOUNTER
I was called about a vanc level too high; I am not aware of this issue, her admit, etc. The discharge summary was red and found unable her company who is in charge of her IV antibiotics are call them and a spoke to a pharmacist and he has cannot call the patient have her hold the vancomycin and they will call the correct physician tomorrow morning

## 2020-03-26 ENCOUNTER — TELEPHONE (OUTPATIENT)
Dept: HOME HEALTH SERVICES | Facility: HOSPITAL | Age: 80
End: 2020-03-26

## 2020-03-27 ENCOUNTER — TELEPHONE (OUTPATIENT)
Dept: INFECTIOUS DISEASES | Facility: CLINIC | Age: 80
End: 2020-03-27

## 2020-03-27 NOTE — TELEPHONE ENCOUNTER
Called Gwen with [facility]  Gwen stated Dr Krueger was inform.   The results were not a true trough

## 2020-03-27 NOTE — TELEPHONE ENCOUNTER
----- Message from Tabitha Michel MD sent at 3/26/2020  9:40 PM CDT -----  Contact: Merly lopez/ [facility]  tel;   [phone]    Kristal  If they haven't addressed this on 3/27 am let me know.   K[facility]  ----- Message -----  From: Kristal Cronin MA  Sent: 3/26/2020   2:01 PM CDT  To: Lance Krueger MD, Tabitha Michel MD, #    This pt was last seen by Dr Beltran. Pt has a follow up with Dr Michel.    Called Merly back to inform this massage needs to be seen to Dr Beltran, since Dr Michel has not see the pt in our ID clinic. No answer left a voice message.   ----- Message -----  From: Blossom Hidalgo  Sent: 3/26/2020   8:23 AM CDT  To: Anand Lu Staff    Pt is on vanc iv .  Vanc Trough was high and creatine was high.   Having blood in urine and dark color urine, with kidney pain.      Asking for a return call about this.   Dr. Krueger put pt. On vanc. .

## 2020-03-30 ENCOUNTER — LAB VISIT (OUTPATIENT)
Dept: LAB | Facility: HOSPITAL | Age: 80
End: 2020-03-30
Attending: PODIATRIST
Payer: MEDICARE

## 2020-03-30 DIAGNOSIS — T84.7XXD INFECTION AND INFLAMMATORY REACTION DUE TO INTERNAL ORTHOPEDIC DEVICE, IMPLANT, AND GRAFT, SUBSEQUENT ENCOUNTER: Primary | ICD-10-CM

## 2020-03-30 DIAGNOSIS — T84.7XXA INFECTION/INFLAMMATION-INTERNAL ORTHO DEVICE: ICD-10-CM

## 2020-03-30 LAB
ALBUMIN SERPL BCP-MCNC: 3.3 G/DL (ref 3.5–5.2)
ALP SERPL-CCNC: 125 U/L (ref 55–135)
ALT SERPL W/O P-5'-P-CCNC: 16 U/L (ref 10–44)
ANION GAP SERPL CALC-SCNC: 11 MMOL/L (ref 8–16)
AST SERPL-CCNC: 18 U/L (ref 10–40)
BASOPHILS # BLD AUTO: 0.03 K/UL (ref 0–0.2)
BASOPHILS NFR BLD: 0.6 % (ref 0–1.9)
BILIRUB SERPL-MCNC: 0.3 MG/DL (ref 0.1–1)
BUN SERPL-MCNC: 22 MG/DL (ref 8–23)
CALCIUM SERPL-MCNC: 9.2 MG/DL (ref 8.7–10.5)
CHLORIDE SERPL-SCNC: 98 MMOL/L (ref 95–110)
CO2 SERPL-SCNC: 29 MMOL/L (ref 23–29)
CREAT SERPL-MCNC: 1.1 MG/DL (ref 0.5–1.4)
DIFFERENTIAL METHOD: ABNORMAL
EOSINOPHIL # BLD AUTO: 0.3 K/UL (ref 0–0.5)
EOSINOPHIL NFR BLD: 5.2 % (ref 0–8)
ERYTHROCYTE [DISTWIDTH] IN BLOOD BY AUTOMATED COUNT: 13 % (ref 11.5–14.5)
EST. GFR  (AFRICAN AMERICAN): 55.6 ML/MIN/1.73 M^2
EST. GFR  (NON AFRICAN AMERICAN): 48.2 ML/MIN/1.73 M^2
GLUCOSE SERPL-MCNC: 71 MG/DL (ref 70–110)
HCT VFR BLD AUTO: 32.6 % (ref 37–48.5)
HGB BLD-MCNC: 10.5 G/DL (ref 12–16)
IMM GRANULOCYTES # BLD AUTO: 0.03 K/UL (ref 0–0.04)
IMM GRANULOCYTES NFR BLD AUTO: 0.6 % (ref 0–0.5)
LYMPHOCYTES # BLD AUTO: 1 K/UL (ref 1–4.8)
LYMPHOCYTES NFR BLD: 19.8 % (ref 18–48)
MCH RBC QN AUTO: 31.3 PG (ref 27–31)
MCHC RBC AUTO-ENTMCNC: 32.2 G/DL (ref 32–36)
MCV RBC AUTO: 97 FL (ref 82–98)
MONOCYTES # BLD AUTO: 0.7 K/UL (ref 0.3–1)
MONOCYTES NFR BLD: 13.8 % (ref 4–15)
NEUTROPHILS # BLD AUTO: 2.9 K/UL (ref 1.8–7.7)
NEUTROPHILS NFR BLD: 60 % (ref 38–73)
NRBC BLD-RTO: 0 /100 WBC
PLATELET # BLD AUTO: 197 K/UL (ref 150–350)
PMV BLD AUTO: 10.1 FL (ref 9.2–12.9)
POTASSIUM SERPL-SCNC: 3.4 MMOL/L (ref 3.5–5.1)
PROT SERPL-MCNC: 7.1 G/DL (ref 6–8.4)
RBC # BLD AUTO: 3.36 M/UL (ref 4–5.4)
SODIUM SERPL-SCNC: 138 MMOL/L (ref 136–145)
VANCOMYCIN SERPL-MCNC: 32.8 UG/ML
WBC # BLD AUTO: 4.8 K/UL (ref 3.9–12.7)

## 2020-03-30 PROCEDURE — 80053 COMPREHEN METABOLIC PANEL: CPT | Mod: HCNC

## 2020-03-30 PROCEDURE — 80202 ASSAY OF VANCOMYCIN: CPT | Mod: HCNC

## 2020-03-30 PROCEDURE — 85025 COMPLETE CBC W/AUTO DIFF WBC: CPT | Mod: HCNC

## 2020-04-01 ENCOUNTER — TELEPHONE (OUTPATIENT)
Dept: FAMILY MEDICINE | Facility: CLINIC | Age: 80
End: 2020-04-01

## 2020-04-01 NOTE — TELEPHONE ENCOUNTER
----- Message from Susan Barros sent at 4/1/2020  3:33 PM CDT -----  Contact: [phone]/ Alia with [facility]   Alia with [facility] called to inform you about the patient PT and INR results. Please call.

## 2020-04-03 RX ORDER — GABAPENTIN 300 MG/1
CAPSULE ORAL
Qty: 360 CAPSULE | Refills: 2 | Status: SHIPPED | OUTPATIENT
Start: 2020-04-03 | End: 2021-01-02

## 2020-04-06 ENCOUNTER — OFFICE VISIT (OUTPATIENT)
Dept: PODIATRY | Facility: CLINIC | Age: 80
End: 2020-04-06
Payer: MEDICARE

## 2020-04-06 VITALS — HEIGHT: 62 IN | BODY MASS INDEX: 38.64 KG/M2 | WEIGHT: 210 LBS

## 2020-04-06 DIAGNOSIS — Z98.890 STATUS POST INCISION AND DRAINAGE: Primary | ICD-10-CM

## 2020-04-06 PROCEDURE — 99024 POSTOP FOLLOW-UP VISIT: CPT | Mod: HCNC,S$GLB,, | Performed by: PODIATRIST

## 2020-04-06 PROCEDURE — 99999 PR PBB SHADOW E&M-EST. PATIENT-LVL II: CPT | Mod: PBBFAC,HCNC,, | Performed by: PODIATRIST

## 2020-04-06 PROCEDURE — 99024 PR POST-OP FOLLOW-UP VISIT: ICD-10-PCS | Mod: HCNC,S$GLB,, | Performed by: PODIATRIST

## 2020-04-06 PROCEDURE — 99999 PR PBB SHADOW E&M-EST. PATIENT-LVL II: ICD-10-PCS | Mod: PBBFAC,HCNC,, | Performed by: PODIATRIST

## 2020-04-06 NOTE — PROGRESS NOTES
Subjective:      Patient ID: Mis Ca is a 79 y.o. female.    Chief Complaint: No chief complaint on file.    Complains of a bunion deformity left foot. Pain aggravated by enclosed shoes. Denies trauma. Relates it has been present for many years. Wearing sandals today. Wears toe separators which help only a little.    10/5/18: Returns to review her xray and discuss surgical intervention for her painful bunion left foot. No new complaints.    11/8/18: Post EGR with Lapidus/akin bunionectomy left on 10/31/18. NWB left foot. Using rolling knee walker. Accompanied by sister and neighbor. Pain mild and well controlled.    11/30/18: 4 weeks postop. NWB left foot. Accompanied by her friends. Relates no pain today but gets intermittent aching to the foot.    12/7/18:  5 weeks postop. Recalls pain and a pop in her left leg a few days ago when putting weight down on the left foot. She has kept the boot on, rested and elevated. Pain improved since.    12/28/18: 8 weeks postop. No pain. Ambulating well with orthopedic boot.    5/20/19: 4 months post op.  No pain to surgical, ambulating well in shoes.  Complains of pain to midfoot that happened when she injured her foot while in boot shortly after surgery.  Pain worse with activity, better with rest. Walks with a limp due to pain.    11/22/2019:  Complains of intermittent burning pain along the bottom of the foot that varies in intensity.  Also complains of pins and needles sensation that travels to the right great toe that is not constant.  No pain at rest.  She is wearing a clog type shoe as recommended.    12/9/2019: Pain is largely unchanged since last visit. She had CT scan done.  No new complaints.    1/23/19  Patient s/p lapidus revision with mini rail ex fix and deep peroneal nerve decompression 1/15/20.  Patient states she fell and hit her head, states she did not hit her foot.  Otherwise denies F/c/N/V or other trauma.    02/06/2020:  Presents 3 weeks postop.   "Relates mild pain at the proximal pin site left medial longitudinal arch.  States she has been applying pressure towards the heel as instructed.  Denies any slips, trips or falls.  Utilizing a rolling knee walker.  No new complaints    02/17/2020 20:1 month postop.  Relates no pain.  No new complaints.  She has been performing the daily foot soaks p.r.n..    3/23/20  S/p 1 week hospital admission and I&D.  Patient doing vancomycin IV via picc line BID.  States she has been keeping off her foot.  Denies trauma, F/C/NV.  States she has a hematoma on her belly that was from a Lovenox injection, but it has gotten better.      Vitals:    04/06/20 1458   Weight: 95.3 kg (210 lb)   Height: 5' 2" (1.575 m)      Past Medical History:   Diagnosis Date    Anticoagulant long-term use     Anxiety     Arthritis     Atrial fibrillation     Blind right eye     Bradycardia     Cancer     skin cancer left side of face under eye    Hyperlipidemia     Hypertension     PVC (premature ventricular contraction)     RLS (restless legs syndrome)     Sleep apnea     Does not use CPAP machine.       Past Surgical History:   Procedure Laterality Date    ADENOIDECTOMY      AKIN OSTEOTOMY Left 10/31/2018    Procedure: OSTEOTOMY, AKIN;  Surgeon: Rudolph Cardozo DPM;  Location: [facility] OR;  Service: Podiatry;  Laterality: Left;    APPENDECTOMY      BREAST BIOPSY Left     x3    BREAST SURGERY Left     breast biopsy x3    CATARACT EXTRACTION BILATERAL W/ ANTERIOR VITRECTOMY      ENDOSCOPIC GASTROCNEMIUS RECESSION Left 10/31/2018    Procedure: RECESSION, GASTROCNEMIUS, ENDOSCOPIC;  Surgeon: Rudolph Cardozo DPM;  Location: [facility] OR;  Service: Podiatry;  Laterality: Left;    EYE SURGERY Bilateral     cataracts extraction    EYE SURGERY Right     drainage tube (glaucoma)    FOOT ARTHRODESIS Left 1/15/2020    Procedure: FUSION, FOOT;  Surgeon: Rudolph Cardozo DPM;  Location: [facility] OR;  Service: Podiatry;  Laterality: Left;  " mini c-arm, Arthrex screw  for hardware removal (Lydia notified), [employer]ofix (Niels notified) min ex-fix    FOOT SURGERY Left     lesion removed from dorsal area    FRACTURE SURGERY Left     arm    HAND TENDON SURGERY Left     HYSTERECTOMY      INCISION AND DRAINAGE FOOT Left 3/11/2020    Procedure: INCISION AND DRAINAGE, FOOT;  Surgeon: Rudolph Cardozo DPM;  Location: [facility] OR;  Service: Podiatry;  Laterality: Left;    LAPIDUS BUNIONECTOMY Left 10/31/2018    Procedure: BUNIONECTOMY, LAPIDUS;  Surgeon: Rudolph Cardozo DPM;  Location: [facility] OR;  Service: Podiatry;  Laterality: Left;  mini c-arm, Arthrex locking plate (Lydia notified)    LAPIDUS BUNIONECTOMY Left 10/31/2018    Dr. Cardozo    Lymph Gland Removed  Right     groin (performed by Dr. Vergara)    NEURECTOMY Left 1/15/2020    Procedure: NEURECTOMY;  Surgeon: Rudolph Cardozo DPM;  Location: [facility] OR;  Service: Podiatry;  Laterality: Left;  bipolar bovie, vessel loop, possible Agnes nerve wrap. Moshe with [employer] notified and will be overnighting graft. MK 1/14/2020    OOPHORECTOMY      ORIF FOREARM FRACTURE Left     PALATE SURGERY      Lesion removed    TONSILLECTOMY         Family History   Problem Relation Age of Onset    Aneurysm Mother         AAA    Cancer Father         lung cancer    Lung cancer Father     Cirrhosis Father     Cancer Sister         Breast and Brain     Diabetes Sister     Breast cancer Sister     Hypertension Sister     Hyperlipidemia Sister     Brain cancer Sister     Colon polyps Brother     Cancer Brother         lung cancer    Diabetes Brother     Hyperlipidemia Brother     Hypertension Brother     Cancer Brother         bladder cancer    Diabetes Brother     Glaucoma Brother     Heart disease Sister         Heart valve repair    Atrial fibrillation Sister     Diabetes Sister     Heart disease Sister     Heart attack Sister     Bipolar disorder Sister     Depression Sister      Glaucoma Sister     Diabetes Sister     Other Sister         Pituitary tumor    Arthritis Sister     COPD Sister     Heart disease Sister     Kidney disease Sister     Cancer Sister         lung cancer    Diabetes Sister     Lung cancer Sister     Heart attack Sister        Social History     Socioeconomic History    Marital status:      Spouse name: Not on file    Number of children: Not on file    Years of education: Not on file    Highest education level: Not on file   Occupational History    Not on file   Social Needs    Financial resource strain: Not on file    Food insecurity:     Worry: Not on file     Inability: Not on file    Transportation needs:     Medical: Not on file     Non-medical: Not on file   Tobacco Use    Smoking status: Never Smoker    Smokeless tobacco: Never Used   Substance and Sexual Activity    Alcohol use: Yes     Comment: Seldomly drinks alcohol (wine)    Drug use: No    Sexual activity: Not Currently     Partners: Male   Lifestyle    Physical activity:     Days per week: Not on file     Minutes per session: Not on file    Stress: Not at all   Relationships    Social connections:     Talks on phone: Not on file     Gets together: Not on file     Attends Latter day service: Not on file     Active member of club or organization: Not on file     Attends meetings of clubs or organizations: Not on file     Relationship status: Not on file   Other Topics Concern    Not on file   Social History Narrative    Not on file       Current Outpatient Medications   Medication Sig Dispense Refill    acetaminophen (TYLENOL) 500 MG tablet Take 1,000 mg by mouth 2 (two) times daily as needed for Pain.      ALPRAZolam (XANAX) 0.25 MG tablet Take 1 tablet (0.25 mg total) by mouth 2 (two) times daily as needed for Anxiety. 180 tablet 1    aspirin (ECOTRIN) 81 MG EC tablet Take 81 mg by mouth once daily.        atorvastatin (LIPITOR) 40 MG tablet Take 1 tablet (40 mg  total) by mouth once daily. (Patient taking differently: Take 40 mg by mouth every evening. ) 90 tablet 5    cyanocobalamin 1,000 mcg/mL injection INJECT 1ML INTRAMUSCULARLY WEEKLY FOR 4 WEEKS, THEN 1ML MONTHLY 3 mL 1    diltiaZEM (CARTIA XT) 240 MG 24 hr capsule Take 1 capsule (240 mg total) by mouth once daily. 90 capsule 3    docusate sodium (COLACE) 100 MG capsule Take 1 capsule (100 mg total) by mouth 2 (two) times daily.  0    gabapentin (NEURONTIN) 300 MG capsule TAKE 2 CAPSULES BY MOUTH TWICE DAILY 360 capsule 2    hydroCHLOROthiazide (HYDRODIURIL) 25 MG tablet Take 1 tablet (25 mg total) by mouth once daily. 90 tablet 3    latanoprost 0.005 % ophthalmic solution Place 1 drop into the left eye every evening.       losartan (COZAAR) 100 MG tablet TAKE 1/2 TABLET EVERY DAY (Patient taking differently: 50 mg at night) 45 tablet 3    multivitamin (ONE DAILY MULTIVITAMIN) per tablet Take 1 tablet by mouth once daily.      omeprazole (PRILOSEC) 20 MG capsule Take 1 capsule (20 mg total) by mouth once daily. 90 capsule 3    oxybutynin (DITROPAN) 5 MG Tab TAKE 1 TABLET (5 MG TOTAL) BY MOUTH ONCE DAILY. 90 tablet 1    pramipexole (MIRAPEX) 0.5 MG tablet Take 1 tablet (0.5 mg total) by mouth every evening. 90 tablet 3    timolol maleate 0.5% (TIMOPTIC) 0.5 % Drop Place 1 drop into the left eye once daily.   0    vancomycin (VANCOCIN) 5 gram injection       vancomycin HCl in 5 % dextrose (VANCOMYCIN IN DEXTROSE 5 %) 1 gram/250 mL Soln Inject 250 mLs (1,000 mg total) into the vein every 12 (twelve) hours. END DATE IS 4-      warfarin (COUMADIN) 6 MG tablet TAKE 1 TABLET EVERY DAY (Patient taking differently: Take 6 mg by mouth. Takes every MON, WED, THURS, SAT, SUN) 90 tablet 3    warfarin (COUMADIN) 6 MG tablet TAKE 6 MG DAILY EXCEPT Every TUES and FRI , WILL TAKE 9 MG       Current Facility-Administered Medications   Medication Dose Route Frequency Provider Last Rate Last Dose    cyanocobalamin  injection 1,000 mcg  1,000 mcg Intramuscular Q30 Days Scott Dillard MD   1,000 mcg at 11/25/19 1611       Review of patient's allergies indicates:   Allergen Reactions    Penicillins     Sulfa (sulfonamide antibiotics)     Compazine [prochlorperazine edisylate] Anxiety         Review of Systems   Constitution: Negative for chills, fever and malaise/fatigue.   HENT: Negative for congestion.    Cardiovascular: Negative for chest pain, claudication and leg swelling.   Respiratory: Negative for cough and shortness of breath.    Skin: Negative for flushing, itching, poor wound healing and rash.   Musculoskeletal: Positive for joint pain. Negative for back pain, muscle cramps and muscle weakness.   Gastrointestinal: Negative for nausea and vomiting.   Neurological: Positive for numbness. Negative for paresthesias and weakness.        Burning sensation   Psychiatric/Behavioral: Negative for altered mental status.           Objective:      Physical Exam   Constitutional: She is oriented to person, place, and time. She appears well-developed and well-nourished. No distress.   Cardiovascular: Intact distal pulses.   Pulses:       Dorsalis pedis pulses are 2+ on the right side, and 2+ on the left side.        Posterior tibial pulses are 2+ on the right side, and 2+ on the left side.   CFT< 3 secs all toes bilateral foot, skin temp warm bilateral foot, no hair growth bilateral lower extremity, mild lower extremity edema with telangiectasias and varicosities bilateral.     Musculoskeletal: She exhibits edema and tenderness.   No pain with MMT or ROM left foot. Left hallux well aligned. 1 MTP left achieves 15-20 degrees of dorsiflexion and 5° of plantar flexion without pain.    Left foot achieves 5 deg DF with knee extended and > 10 deg with knee flexed. Right foot achieves 0 deg DF knee extended and 10 deg DF knee flexed.    No pain on palpation of the left leg, no pain with active resisted PF foot.    Mild collapse of the  arch with standing bilateral foot.    No localized pain on palpation to the base of the 3rd metatarsal left foot.    Note medial deviation of toes 2 and 3 left foot when the forefoot is loaded.    No palpable fluctuance or crepitance left foot.     Neurological: She is alert and oriented to person, place, and time. She has normal strength. No sensory deficit.   Skin: Skin is warm, dry and intact. Capillary refill takes less than 2 seconds. No ecchymosis and no rash noted. She is not diaphoretic. No cyanosis or erythema. No pallor. Nails show no clubbing.   Previous incisions dorsal left forefoot are completely healed with sutures intact.  No localized signs infection.  There remains some edema and some mild rubor on dependency.  No palpable fluctuance or crepitance left foot.                     Assessment:       Encounter Diagnosis   Name Primary?    Status post incision and drainage - Left Foot Yes         Plan:       Diagnoses and all orders for this visit:    Status post incision and drainage - Left Foot  -     X-Ray Foot Complete Left; Future      I counseled the patient on her conditions, their implications and medical management.    Sutures removed.  Aquacel border applied.  Reviewed home care instructions in detail.  Patient may resume soaking the foot if necessary.    Patient may transition to full weight-bearing left foot as tolerated.  Discussed wearing her Darco shoe unless she can find her other SAS shoe. We discussed elevation at rest.    Follow-up weight-bearing x-ray left foot and 4 weeks.  Set up virtual visit in 4 weeks.    A portion of this note was generated by voice recognition software and may contain topical graphical errors.

## 2020-04-07 ENCOUNTER — LAB VISIT (OUTPATIENT)
Dept: LAB | Facility: HOSPITAL | Age: 80
End: 2020-04-07
Attending: PODIATRIST
Payer: MEDICARE

## 2020-04-07 ENCOUNTER — TELEPHONE (OUTPATIENT)
Dept: FAMILY MEDICINE | Facility: CLINIC | Age: 80
End: 2020-04-07

## 2020-04-07 DIAGNOSIS — L03.90 CELLULITIS DUE TO MRSA: Primary | ICD-10-CM

## 2020-04-07 DIAGNOSIS — B95.62 CELLULITIS DUE TO MRSA: Primary | ICD-10-CM

## 2020-04-07 LAB
ALBUMIN SERPL BCP-MCNC: 3.3 G/DL (ref 3.5–5.2)
ALP SERPL-CCNC: 122 U/L (ref 55–135)
ALT SERPL W/O P-5'-P-CCNC: 20 U/L (ref 10–44)
ANION GAP SERPL CALC-SCNC: 11 MMOL/L (ref 8–16)
AST SERPL-CCNC: 17 U/L (ref 10–40)
BASOPHILS # BLD AUTO: 0.04 K/UL (ref 0–0.2)
BASOPHILS NFR BLD: 0.6 % (ref 0–1.9)
BILIRUB SERPL-MCNC: 0.3 MG/DL (ref 0.1–1)
BUN SERPL-MCNC: 19 MG/DL (ref 8–23)
CALCIUM SERPL-MCNC: 9.1 MG/DL (ref 8.7–10.5)
CHLORIDE SERPL-SCNC: 102 MMOL/L (ref 95–110)
CO2 SERPL-SCNC: 27 MMOL/L (ref 23–29)
CREAT SERPL-MCNC: 1.3 MG/DL (ref 0.5–1.4)
CRP SERPL-MCNC: 11.9 MG/L (ref 0–8.2)
DIFFERENTIAL METHOD: ABNORMAL
EOSINOPHIL # BLD AUTO: 0.4 K/UL (ref 0–0.5)
EOSINOPHIL NFR BLD: 5.6 % (ref 0–8)
ERYTHROCYTE [DISTWIDTH] IN BLOOD BY AUTOMATED COUNT: 13.6 % (ref 11.5–14.5)
ERYTHROCYTE [SEDIMENTATION RATE] IN BLOOD BY WESTERGREN METHOD: 50 MM/HR (ref 0–20)
EST. GFR  (AFRICAN AMERICAN): 45.4 ML/MIN/1.73 M^2
EST. GFR  (NON AFRICAN AMERICAN): 39.4 ML/MIN/1.73 M^2
GLUCOSE SERPL-MCNC: 135 MG/DL (ref 70–110)
HCT VFR BLD AUTO: 30.8 % (ref 37–48.5)
HGB BLD-MCNC: 9.7 G/DL (ref 12–16)
IMM GRANULOCYTES # BLD AUTO: 0.03 K/UL (ref 0–0.04)
IMM GRANULOCYTES NFR BLD AUTO: 0.4 % (ref 0–0.5)
LYMPHOCYTES # BLD AUTO: 1.2 K/UL (ref 1–4.8)
LYMPHOCYTES NFR BLD: 18.3 % (ref 18–48)
MCH RBC QN AUTO: 31.2 PG (ref 27–31)
MCHC RBC AUTO-ENTMCNC: 31.5 G/DL (ref 32–36)
MCV RBC AUTO: 99 FL (ref 82–98)
MONOCYTES # BLD AUTO: 0.7 K/UL (ref 0.3–1)
MONOCYTES NFR BLD: 10.8 % (ref 4–15)
NEUTROPHILS # BLD AUTO: 4.3 K/UL (ref 1.8–7.7)
NEUTROPHILS NFR BLD: 64.3 % (ref 38–73)
NRBC BLD-RTO: 0 /100 WBC
PLATELET # BLD AUTO: 198 K/UL (ref 150–350)
PMV BLD AUTO: 10.4 FL (ref 9.2–12.9)
POTASSIUM SERPL-SCNC: 3.3 MMOL/L (ref 3.5–5.1)
PROT SERPL-MCNC: 6.5 G/DL (ref 6–8.4)
RBC # BLD AUTO: 3.11 M/UL (ref 4–5.4)
SODIUM SERPL-SCNC: 140 MMOL/L (ref 136–145)
VANCOMYCIN TROUGH SERPL-MCNC: 23.6 UG/ML (ref 10–22)
WBC # BLD AUTO: 6.73 K/UL (ref 3.9–12.7)

## 2020-04-07 PROCEDURE — 85025 COMPLETE CBC W/AUTO DIFF WBC: CPT | Mod: HCNC

## 2020-04-07 PROCEDURE — 86140 C-REACTIVE PROTEIN: CPT | Mod: HCNC

## 2020-04-07 PROCEDURE — 85651 RBC SED RATE NONAUTOMATED: CPT | Mod: HCNC,PO

## 2020-04-07 PROCEDURE — 80202 ASSAY OF VANCOMYCIN: CPT | Mod: HCNC

## 2020-04-07 PROCEDURE — 80053 COMPREHEN METABOLIC PANEL: CPT | Mod: HCNC

## 2020-04-07 NOTE — TELEPHONE ENCOUNTER
I spoke to nurse, yeast from [facility], sending lotrimin and diflucan to [facility]

## 2020-04-07 NOTE — TELEPHONE ENCOUNTER
----- Message from Ana Vasquez sent at 4/7/2020  1:12 PM CDT -----  Contact: [facility]/[phone]  [facility] is requesting a rx to treat a yeast infection and an external cream for her inner thighs. Send to   [id number] - [place], [place] - [address]

## 2020-04-13 ENCOUNTER — TELEPHONE (OUTPATIENT)
Dept: FAMILY MEDICINE | Facility: CLINIC | Age: 80
End: 2020-04-13

## 2020-04-13 ENCOUNTER — DOCUMENT SCAN (OUTPATIENT)
Dept: HOME HEALTH SERVICES | Facility: HOSPITAL | Age: 80
End: 2020-04-13
Payer: MEDICARE

## 2020-04-13 ENCOUNTER — TELEPHONE (OUTPATIENT)
Dept: INFECTIOUS DISEASES | Facility: CLINIC | Age: 80
End: 2020-04-13

## 2020-04-13 NOTE — PROGRESS NOTES
Call by infusion company and home health nurse because patient was seen over the weekend with a rash felt to be secondary to vancomycin. Vancomycin is on hold. Patient cannot afford daptomycin.     Patient scheduled to be seen by Dr. Michel infectious diseases soon.    Will prescribe oral Linezolid 600mg po bid for 7 days until that visit.     Prescription called to [facility] [phone]    Lance Krueger

## 2020-04-13 NOTE — TELEPHONE ENCOUNTER
----- Message from Blossom Gwen sent at 4/13/2020  9:19 AM CDT -----  Contact: Alia lopez/ [facility]   tel:   [phone]   Caller says she is going to visit pt. Now.    Was in the [facility] in [place], [place]   ER  last night due to reaction of Antibiotic.     Needs to know if holding the Antibiotic or what is the plan?   Pls call asap.

## 2020-04-13 NOTE — TELEPHONE ENCOUNTER
Patient went to [facility] ER for a Rash on 04/12/2020    Patient had a INR 2.96  PT 33.9 PTT  45.2.     Patient Taking 8 MG's daily. Please advise      Results and ER visit notes are in Care everywhere       ----- Message from Susan Barros sent at 4/13/2020  3:13 PM CDT -----  Contact: [phone]/ Alia with [facility]   Alia with [facility] would like to speak with you in regards to the patient hospital visit. Please call.

## 2020-04-13 NOTE — TELEPHONE ENCOUNTER
----- Message from Blossom Gwen sent at 4/13/2020  9:19 AM CDT -----  Contact: Alia lopez/ [facility]   tel:   [phone]   Caller says she is going to visit pt. Now.    Was in the [facility] in [place], [place]   ER  last night due to reaction of Antibiotic.     Needs to know if holding the Antibiotic or what is the plan?   Pls call asap.

## 2020-04-13 NOTE — TELEPHONE ENCOUNTER
Spoke with Alia and advised per Dr Michel that she needs to contact Dr Krueger @ [facility], as she has not seen patient yet and Dr Krueger is managing the labs until patient is seen by Dr Michel in clinic on 4/16. Contact number given for Dr Krueger.

## 2020-04-14 LAB — FUNGUS SPEC CULT: NORMAL

## 2020-04-14 NOTE — TELEPHONE ENCOUNTER
I spoke to pt and saw labs from [facility], INR 2.96  She suggests we go back to 6 mg daily except for one day of 9 mg, one day of the week.  Right now she is on 6 mg daily and 9 mg 2 days of the week  Repeat INR 1 week.    Notify Alia, don't know last name, [facility]

## 2020-04-14 NOTE — TELEPHONE ENCOUNTER
Alia ( nurse) has been notified and verbalized understanding of how pt is to take Coumadin and when she's to recheck.

## 2020-04-16 ENCOUNTER — DOCUMENT SCAN (OUTPATIENT)
Dept: HOME HEALTH SERVICES | Facility: HOSPITAL | Age: 80
End: 2020-04-16
Payer: MEDICARE

## 2020-04-16 ENCOUNTER — INFUSION (OUTPATIENT)
Dept: INFECTIOUS DISEASES | Facility: HOSPITAL | Age: 80
End: 2020-04-16
Attending: INTERNAL MEDICINE
Payer: MEDICARE

## 2020-04-16 ENCOUNTER — OFFICE VISIT (OUTPATIENT)
Dept: INFECTIOUS DISEASES | Facility: CLINIC | Age: 80
End: 2020-04-16
Payer: MEDICARE

## 2020-04-16 VITALS
BODY MASS INDEX: 37.9 KG/M2 | WEIGHT: 205.94 LBS | HEART RATE: 66 BPM | TEMPERATURE: 99 F | DIASTOLIC BLOOD PRESSURE: 78 MMHG | SYSTOLIC BLOOD PRESSURE: 145 MMHG | HEIGHT: 62 IN

## 2020-04-16 DIAGNOSIS — A49.02 MRSA INFECTION: ICD-10-CM

## 2020-04-16 DIAGNOSIS — L27.0 DRUG RASH: ICD-10-CM

## 2020-04-16 DIAGNOSIS — T84.7XXA: ICD-10-CM

## 2020-04-16 DIAGNOSIS — T84.7XXD INFECTION AT SITE OF EXTERNAL FIXATOR PIN, SUBSEQUENT ENCOUNTER: Primary | ICD-10-CM

## 2020-04-16 LAB
CRP SERPL-MCNC: 1.6 MG/L (ref 0–8.2)
ERYTHROCYTE [SEDIMENTATION RATE] IN BLOOD BY WESTERGREN METHOD: 34 MM/HR (ref 0–36)

## 2020-04-16 PROCEDURE — 1101F PR PT FALLS ASSESS DOC 0-1 FALLS W/OUT INJ PAST YR: ICD-10-PCS | Mod: HCNC,CPTII,S$GLB, | Performed by: INTERNAL MEDICINE

## 2020-04-16 PROCEDURE — 3078F PR MOST RECENT DIASTOLIC BLOOD PRESSURE < 80 MM HG: ICD-10-PCS | Mod: HCNC,CPTII,S$GLB, | Performed by: INTERNAL MEDICINE

## 2020-04-16 PROCEDURE — 1101F PT FALLS ASSESS-DOCD LE1/YR: CPT | Mod: HCNC,CPTII,S$GLB, | Performed by: INTERNAL MEDICINE

## 2020-04-16 PROCEDURE — 85652 RBC SED RATE AUTOMATED: CPT | Mod: HCNC

## 2020-04-16 PROCEDURE — 99214 PR OFFICE/OUTPT VISIT, EST, LEVL IV, 30-39 MIN: ICD-10-PCS | Mod: HCNC,S$GLB,, | Performed by: INTERNAL MEDICINE

## 2020-04-16 PROCEDURE — 3077F SYST BP >= 140 MM HG: CPT | Mod: HCNC,CPTII,S$GLB, | Performed by: INTERNAL MEDICINE

## 2020-04-16 PROCEDURE — 3077F PR MOST RECENT SYSTOLIC BLOOD PRESSURE >= 140 MM HG: ICD-10-PCS | Mod: HCNC,CPTII,S$GLB, | Performed by: INTERNAL MEDICINE

## 2020-04-16 PROCEDURE — 1126F PR PAIN SEVERITY QUANTIFIED, NO PAIN PRESENT: ICD-10-PCS | Mod: HCNC,S$GLB,, | Performed by: INTERNAL MEDICINE

## 2020-04-16 PROCEDURE — 1159F PR MEDICATION LIST DOCUMENTED IN MEDICAL RECORD: ICD-10-PCS | Mod: HCNC,S$GLB,, | Performed by: INTERNAL MEDICINE

## 2020-04-16 PROCEDURE — 3078F DIAST BP <80 MM HG: CPT | Mod: HCNC,CPTII,S$GLB, | Performed by: INTERNAL MEDICINE

## 2020-04-16 PROCEDURE — 36415 COLL VENOUS BLD VENIPUNCTURE: CPT | Mod: HCNC

## 2020-04-16 PROCEDURE — 99999 PR PBB SHADOW E&M-EST. PATIENT-LVL III: CPT | Mod: PBBFAC,HCNC,, | Performed by: INTERNAL MEDICINE

## 2020-04-16 PROCEDURE — 1159F MED LIST DOCD IN RCRD: CPT | Mod: HCNC,S$GLB,, | Performed by: INTERNAL MEDICINE

## 2020-04-16 PROCEDURE — 36592 COLLECT BLOOD FROM PICC: CPT | Mod: HCNC

## 2020-04-16 PROCEDURE — 99214 OFFICE O/P EST MOD 30 MIN: CPT | Mod: HCNC,S$GLB,, | Performed by: INTERNAL MEDICINE

## 2020-04-16 PROCEDURE — 1126F AMNT PAIN NOTED NONE PRSNT: CPT | Mod: HCNC,S$GLB,, | Performed by: INTERNAL MEDICINE

## 2020-04-16 PROCEDURE — 99999 PR PBB SHADOW E&M-EST. PATIENT-LVL III: ICD-10-PCS | Mod: PBBFAC,HCNC,, | Performed by: INTERNAL MEDICINE

## 2020-04-16 PROCEDURE — 86140 C-REACTIVE PROTEIN: CPT | Mod: HCNC

## 2020-04-16 NOTE — LETTER
April 16, 2020      Anni Ceunca MD  [address]  [address]  [facility] [address]           [facility] - Infectious Diseases  [address]  [place] [address]  Phone: [phone]  Fax: [phone]          Patient: Mis Ca   MR Number: [id number]   YOB: 1940   Date of Visit: 4/16/2020       Dear Dr. Anni Cuenca:    Thank you for referring Mis Ca to me for evaluation. Attached you will find relevant portions of my assessment and plan of care.    If you have questions, please do not hesitate to call me. I look forward to following Mis Ca along with you.    Sincerely,    Tabitha Michel MD    Enclosure  CC:  No Recipients    If you would like to receive this communication electronically, please contact [email or web address] or [phone] to request more information on [facility] access.    For providers and/or their staff who would like to refer a patient to [facility], please contact us through our one-stop-shop provider referral line, [facility], at [phone].    If you feel you have received this communication in error or would no longer like to receive these types of communications, please e-mail [email or web address]

## 2020-04-16 NOTE — PROGRESS NOTES
Labs obtained fro PICC as ordered per MD, tolerated without difficulty, PICC LINE removal to right upper arm, tip intact, removed without any difficulty, no  redness/swelling/bleeding/drainage to site, arm cleaned with saline and gauze, Vaseline gauze to site, 2x2 followed with coban to secure, pt tolerated without any difficulty, pt instructed to keep dressing in place for twenty four hours, pt instructed to call with any questions/concerns, pt verbalized understanding, pt monitored 30 minutes post removal of PICC, no reactions/discomfort noted

## 2020-04-16 NOTE — PROGRESS NOTES
Subjective:      Patient ID: Mis Ca is a 79 y.o. female.    Chief Complaint:Follow-up      History of Present Illness    A 79-year-old woman with HTN, Afib, JACQUELYN, MRSA abscess with cellulitis of the left foot, s/p I&D on 3/11, concern for orthopedic hardware pin infection, and on a 6 week course of vancomycin who is seen as a hospital follow up for continued management of her infection and antibiotic therapy. Mrs. Ca was seen and managed by [facility]; she was discharged vancomycin with a scheduled end date of 4/22. Unfortunately, she developed a rash leading to discontinuation of vancomycin. She was started on linezolid, Pepcid, steroids, and Zyrtec. She notes foot swelling and dark discoloration with gravity       Review of Systems   Constitution: Negative for chills, decreased appetite, fever, malaise/fatigue, night sweats, weight gain and weight loss.   HENT: Positive for tinnitus. Negative for congestion, ear pain, hearing loss, hoarse voice and sore throat.    Eyes: Negative for blurred vision, redness and visual disturbance.   Cardiovascular: Negative for chest pain, leg swelling and palpitations.   Respiratory: Negative for cough, hemoptysis, shortness of breath and sputum production.    Hematologic/Lymphatic: Negative for adenopathy. Does not bruise/bleed easily.   Skin: Positive for itching and rash. Negative for dry skin and suspicious lesions.   Musculoskeletal: Positive for joint pain. Negative for back pain, myalgias and neck pain.   Gastrointestinal: Positive for heartburn. Negative for abdominal pain, constipation, diarrhea, nausea and vomiting.   Genitourinary: Negative for dysuria, flank pain, frequency, hematuria, hesitancy and urgency.   Neurological: Negative for dizziness, headaches, numbness, paresthesias and weakness.   Psychiatric/Behavioral: Negative for depression and memory loss. The patient is nervous/anxious. The patient does not have insomnia.      Objective:   Physical Exam    Constitutional: She is oriented to person, place, and time. She appears well-developed and well-nourished. She is cooperative. She is easily aroused.  Non-toxic appearance. No distress.   HENT:   Head: Normocephalic and atraumatic. Head is without right periorbital erythema and without left periorbital erythema.   Right Ear: Hearing, tympanic membrane, external ear and ear canal normal. No swelling.   Left Ear: Hearing, tympanic membrane, external ear and ear canal normal. No swelling.   Nose: Nose normal. No nasal deformity.   Mouth/Throat: Uvula is midline, oropharynx is clear and moist and mucous membranes are normal. No oropharyngeal exudate.   Eyes: Pupils are equal, round, and reactive to light. Conjunctivae, EOM and lids are normal. Right eye exhibits no discharge and no exudate. Left eye exhibits no discharge and no exudate. Right conjunctiva is not injected. Left conjunctiva is not injected. No scleral icterus.   Neck: Normal range of motion. Neck supple. No tracheal deviation present. No thyromegaly present.   Cardiovascular: Normal rate, regular rhythm, S1 normal, S2 normal, normal heart sounds and intact distal pulses. Exam reveals no gallop and no friction rub.   No murmur heard.  Pulmonary/Chest: Effort normal and breath sounds normal. No accessory muscle usage or stridor. No tachypnea. No respiratory distress. She has no wheezes. She has no rales. She exhibits no tenderness.   Abdominal: Soft. Normal appearance and bowel sounds are normal. She exhibits no distension. There is no tenderness. There is no rebound and no guarding.   Musculoskeletal: Normal range of motion. She exhibits no edema or tenderness.   RUE PICC without erythema or tenderness to palpation. Left foot cool to the touch. Palpable DP pulse. Dark rubor noted about the foot and toes. Healed incisions. No tenderness to palpation, warmth, or induration.   Neurological: She is alert, oriented to person, place, and time and easily  aroused. No cranial nerve deficit. Coordination normal.   Skin: Skin is warm and dry. Rash noted. No lesion noted. She is not diaphoretic. No cyanosis or erythema. No pallor. Nails show no clubbing.   Psychiatric: She has a normal mood and affect. Her speech is normal and behavior is normal. Judgment and thought content normal.   Nursing note and vitals reviewed.            Assessment:       1. Infection at site of external fixator pin, subsequent encounter    2. Infected hardware in left leg, initial encounter    3. MRSA infection    4. Drug rash        Plan:   Patient with MRSA abscess with cellulitis s/p I&D with possible hardware infection. Previously on vancomycin which was stopped due to allergic reaction (rash). She experiences intermittent pain and swelling that improves with elevation.   · Continue linezolid as prescribed by Dr. Krueger.   · Will repeat ESR and CRP today. If elevated could be due to allergic response.   · Continue therapy prescribed by the ED for management of drug rash.   · Discussed warning signs and symptoms of recurrent infection. Patient will call for management should she develop any.   · RTC as needed.

## 2020-04-20 ENCOUNTER — PATIENT MESSAGE (OUTPATIENT)
Dept: FAMILY MEDICINE | Facility: CLINIC | Age: 80
End: 2020-04-20
Payer: MEDICARE

## 2020-04-20 ENCOUNTER — TELEPHONE (OUTPATIENT)
Dept: FAMILY MEDICINE | Facility: CLINIC | Age: 80
End: 2020-04-20

## 2020-04-20 DIAGNOSIS — Z79.01 ANTICOAGULATED ON COUMADIN: ICD-10-CM

## 2020-04-20 DIAGNOSIS — M79.672 LEFT FOOT PAIN: Primary | ICD-10-CM

## 2020-04-20 PROCEDURE — 85610 PROTHROMBIN TIME: CPT | Mod: QW,,, | Performed by: FAMILY MEDICINE

## 2020-04-20 PROCEDURE — 85610 POCT INR: ICD-10-PCS | Mod: QW,,, | Performed by: FAMILY MEDICINE

## 2020-04-20 RX ORDER — FLUCONAZOLE 200 MG/1
200 TABLET ORAL DAILY
Qty: 30 TABLET | Refills: 0 | Status: SHIPPED | OUTPATIENT
Start: 2020-04-20 | End: 2020-04-21 | Stop reason: SDUPTHER

## 2020-04-20 NOTE — TELEPHONE ENCOUNTER
Skip one day of coumadin, resume previous dose and check inr one week    Diflucan sent    Home health PT ordered on prescription pad

## 2020-04-20 NOTE — TELEPHONE ENCOUNTER
Patient's INR level 4.1 and  PTT 48.7. Patient currently taking 6 mg daily and 9 mg only on Friday . Patient finished taking zyvox on yesterday.  nurse believes antibiotics possibly caused patient's elevated coumadin level. Patient also has a yeast infection; she's requesting something sent to [facility] pharmacy.  Patient also needs orders for  physical therapy.       ----- Message from Eder Masterson sent at 4/20/2020 11:37 AM CDT -----  Contact: Alia lopez [facility] / [phone]  Alia would like to speak with your office regarding the patient's PC level being 48.7 and INR level is 4.1. Alia also would like to discuss physical therapy for the patient and a medication for a yeast infection being sent in for the patient. Please Advise.

## 2020-04-21 ENCOUNTER — HOSPITAL ENCOUNTER (OUTPATIENT)
Dept: RADIOLOGY | Facility: HOSPITAL | Age: 80
Discharge: HOME | End: 2020-04-21
Attending: FAMILY MEDICINE
Payer: MEDICARE

## 2020-04-21 DIAGNOSIS — M79.672 LEFT FOOT PAIN: ICD-10-CM

## 2020-04-21 PROCEDURE — 73630 X-RAY EXAM OF FOOT: CPT | Mod: TC,HCNC,FY,PO,LT

## 2020-04-21 RX ORDER — FLUCONAZOLE 200 MG/1
200 TABLET ORAL DAILY
Qty: 30 TABLET | Refills: 0 | Status: SHIPPED | OUTPATIENT
Start: 2020-04-21 | End: 2020-05-20

## 2020-04-22 NOTE — TELEPHONE ENCOUNTER
Spoke with the pt     She had foot xray yesterday   Please review      I called timoteo with [facility] in [place] and advised to recheck inr  In one week - which would be Monday  Ph # [phone]

## 2020-04-23 ENCOUNTER — DOCUMENT SCAN (OUTPATIENT)
Dept: HOME HEALTH SERVICES | Facility: HOSPITAL | Age: 80
End: 2020-04-23
Payer: MEDICARE

## 2020-04-24 ENCOUNTER — DOCUMENT SCAN (OUTPATIENT)
Dept: HOME HEALTH SERVICES | Facility: HOSPITAL | Age: 80
End: 2020-04-24
Payer: MEDICARE

## 2020-04-27 ENCOUNTER — DOCUMENT SCAN (OUTPATIENT)
Dept: HOME HEALTH SERVICES | Facility: HOSPITAL | Age: 80
End: 2020-04-27
Payer: MEDICARE

## 2020-04-27 ENCOUNTER — TELEPHONE (OUTPATIENT)
Dept: FAMILY MEDICINE | Facility: CLINIC | Age: 80
End: 2020-04-27

## 2020-04-27 NOTE — TELEPHONE ENCOUNTER
----- Message from Ana Vasquez sent at 4/27/2020 10:24 AM CDT -----  Contact: Alia Barbosa is reporting her test results from this morning   Alia Barbosa is reporting her test results from this morning   INR 2.0  PT 23  She is taking 6mg of coumadin.   Thanks

## 2020-04-30 ENCOUNTER — DOCUMENT SCAN (OUTPATIENT)
Dept: HOME HEALTH SERVICES | Facility: HOSPITAL | Age: 80
End: 2020-04-30
Payer: MEDICARE

## 2020-05-01 ENCOUNTER — DOCUMENT SCAN (OUTPATIENT)
Dept: HOME HEALTH SERVICES | Facility: HOSPITAL | Age: 80
End: 2020-05-01
Payer: MEDICARE

## 2020-05-04 ENCOUNTER — TELEPHONE (OUTPATIENT)
Dept: PODIATRY | Facility: CLINIC | Age: 80
End: 2020-05-04

## 2020-05-04 ENCOUNTER — OFFICE VISIT (OUTPATIENT)
Dept: PODIATRY | Facility: CLINIC | Age: 80
End: 2020-05-04
Payer: MEDICARE

## 2020-05-04 ENCOUNTER — DOCUMENT SCAN (OUTPATIENT)
Dept: HOME HEALTH SERVICES | Facility: HOSPITAL | Age: 80
End: 2020-05-04
Payer: MEDICARE

## 2020-05-04 DIAGNOSIS — Z98.890 STATUS POST INCISION AND DRAINAGE: Primary | ICD-10-CM

## 2020-05-04 DIAGNOSIS — Z98.890 POSTOPERATIVE STATE: ICD-10-CM

## 2020-05-04 PROCEDURE — 99024 PR POST-OP FOLLOW-UP VISIT: ICD-10-PCS | Mod: HCNC,95,, | Performed by: PODIATRIST

## 2020-05-04 PROCEDURE — 99024 POSTOP FOLLOW-UP VISIT: CPT | Mod: HCNC,95,, | Performed by: PODIATRIST

## 2020-05-04 NOTE — PROGRESS NOTES
Subjective:      Patient ID: Mis Ca is a 79 y.o. female.    Chief Complaint: No chief complaint on file.    Complains of a bunion deformity left foot. Pain aggravated by enclosed shoes. Denies trauma. Relates it has been present for many years. Wearing sandals today. Wears toe separators which help only a little.    10/5/18: Returns to review her xray and discuss surgical intervention for her painful bunion left foot. No new complaints.    11/8/18: Post EGR with Lapidus/akin bunionectomy left on 10/31/18. NWB left foot. Using rolling knee walker. Accompanied by sister and neighbor. Pain mild and well controlled.    11/30/18: 4 weeks postop. NWB left foot. Accompanied by her friends. Relates no pain today but gets intermittent aching to the foot.    12/7/18:  5 weeks postop. Recalls pain and a pop in her left leg a few days ago when putting weight down on the left foot. She has kept the boot on, rested and elevated. Pain improved since.    12/28/18: 8 weeks postop. No pain. Ambulating well with orthopedic boot.    5/20/19: 4 months post op.  No pain to surgical, ambulating well in shoes.  Complains of pain to midfoot that happened when she injured her foot while in boot shortly after surgery.  Pain worse with activity, better with rest. Walks with a limp due to pain.    11/22/2019:  Complains of intermittent burning pain along the bottom of the foot that varies in intensity.  Also complains of pins and needles sensation that travels to the right great toe that is not constant.  No pain at rest.  She is wearing a clog type shoe as recommended.    12/9/2019: Pain is largely unchanged since last visit. She had CT scan done.  No new complaints.    1/23/19  Patient s/p lapidus revision with mini rail ex fix and deep peroneal nerve decompression 1/15/20.  Patient states she fell and hit her head, states she did not hit her foot.  Otherwise denies F/c/N/V or other trauma.    02/06/2020:  Presents 3 weeks postop.   Relates mild pain at the proximal pin site left medial longitudinal arch.  States she has been applying pressure towards the heel as instructed.  Denies any slips, trips or falls.  Utilizing a rolling knee walker.  No new complaints    02/17/2020 20:1 month postop.  Relates no pain.  No new complaints.  She has been performing the daily foot soaks p.r.n..    3/23/20  S/p 1 week hospital admission and I&D.  Patient doing vancomycin IV via picc line BID.  States she has been keeping off her foot.  Denies trauma, F/C/NV.  States she has a hematoma on her belly that was from a Lovenox injection, but it has gotten better.    05/04/2020:  2 months post hardware removal with incision drainage left foot.    Seen today   As virtual visit.    There were no vitals filed for this visit.   Past Medical History:   Diagnosis Date    Anticoagulant long-term use     Anxiety     Arthritis     Atrial fibrillation     Blind right eye     Bradycardia     Cancer     skin cancer left side of face under eye    Hyperlipidemia     Hypertension     PVC (premature ventricular contraction)     RLS (restless legs syndrome)     Sleep apnea     Does not use CPAP machine.       Past Surgical History:   Procedure Laterality Date    ADENOIDECTOMY      AKIN OSTEOTOMY Left 10/31/2018    Procedure: OSTEOTOMY, AKIN;  Surgeon: Rudolph Cardozo DPM;  Location: [facility] OR;  Service: Podiatry;  Laterality: Left;    APPENDECTOMY      BREAST BIOPSY Left     x3    BREAST SURGERY Left     breast biopsy x3    CATARACT EXTRACTION BILATERAL W/ ANTERIOR VITRECTOMY      ENDOSCOPIC GASTROCNEMIUS RECESSION Left 10/31/2018    Procedure: RECESSION, GASTROCNEMIUS, ENDOSCOPIC;  Surgeon: Rudolph Cardozo DPM;  Location: [facility] OR;  Service: Podiatry;  Laterality: Left;    EYE SURGERY Bilateral     cataracts extraction    EYE SURGERY Right     drainage tube (glaucoma)    FOOT ARTHRODESIS Left 1/15/2020    Procedure: FUSION, FOOT;  Surgeon: Rudolph GOVEA  ALVIN Cardozo;  Location: [facility] OR;  Service: Podiatry;  Laterality: Left;  mini c-arm, Arthrex screw  for hardware removal (Lydia notified), Orthofix (Niels notified) min ex-fix    FOOT SURGERY Left     lesion removed from dorsal area    FRACTURE SURGERY Left     arm    HAND TENDON SURGERY Left     HYSTERECTOMY      INCISION AND DRAINAGE FOOT Left 3/11/2020    Procedure: INCISION AND DRAINAGE, FOOT;  Surgeon: Rudolph Cardozo DPM;  Location: [facility] OR;  Service: Podiatry;  Laterality: Left;    LAPIDUS BUNIONECTOMY Left 10/31/2018    Procedure: BUNIONECTOMY, LAPIDUS;  Surgeon: Rudolph Cardozo DPM;  Location: [facility] OR;  Service: Podiatry;  Laterality: Left;  mini c-arm, Arthrex locking plate (Lydia notified)    LAPIDUS BUNIONECTOMY Left 10/31/2018    Dr. Cardozo    Lymph Gland Removed  Right     groin (performed by Dr. Vergara)    NEURECTOMY Left 1/15/2020    Procedure: NEURECTOMY;  Surgeon: Rudolph Cardozo DPM;  Location: [facility] OR;  Service: Podiatry;  Laterality: Left;  bipolar bovie, vessel loop, possible Agnes nerve wrap. Moshe with [place] notified and will be overnighting graft. MK 1/14/2020    OOPHORECTOMY      ORIF FOREARM FRACTURE Left     PALATE SURGERY      Lesion removed    TONSILLECTOMY         Family History   Problem Relation Age of Onset    Aneurysm Mother         AAA    Cancer Father         lung cancer    Lung cancer Father     Cirrhosis Father     Cancer Sister         Breast and Brain     Diabetes Sister     Breast cancer Sister     Hypertension Sister     Hyperlipidemia Sister     Brain cancer Sister     Colon polyps Brother     Cancer Brother         lung cancer    Diabetes Brother     Hyperlipidemia Brother     Hypertension Brother     Cancer Brother         bladder cancer    Diabetes Brother     Glaucoma Brother     Heart disease Sister         Heart valve repair    Atrial fibrillation Sister     Diabetes Sister     Heart disease Sister      Heart attack Sister     Bipolar disorder Sister     Depression Sister     Glaucoma Sister     Diabetes Sister     Other Sister         Pituitary tumor    Arthritis Sister     COPD Sister     Heart disease Sister     Kidney disease Sister     Cancer Sister         lung cancer    Diabetes Sister     Lung cancer Sister     Heart attack Sister        Social History     Socioeconomic History    Marital status:      Spouse name: Not on file    Number of children: Not on file    Years of education: Not on file    Highest education level: Not on file   Occupational History    Not on file   Social Needs    Financial resource strain: Not on file    Food insecurity:     Worry: Not on file     Inability: Not on file    Transportation needs:     Medical: Not on file     Non-medical: Not on file   Tobacco Use    Smoking status: Never Smoker    Smokeless tobacco: Never Used   Substance and Sexual Activity    Alcohol use: Yes     Comment: Seldomly drinks alcohol (wine)    Drug use: No    Sexual activity: Not Currently     Partners: Male   Lifestyle    Physical activity:     Days per week: Not on file     Minutes per session: Not on file    Stress: Not at all   Relationships    Social connections:     Talks on phone: Not on file     Gets together: Not on file     Attends Oriental orthodox service: Not on file     Active member of club or organization: Not on file     Attends meetings of clubs or organizations: Not on file     Relationship status: Not on file   Other Topics Concern    Not on file   Social History Narrative    Not on file       Current Outpatient Medications   Medication Sig Dispense Refill    acetaminophen (TYLENOL) 500 MG tablet Take 1,000 mg by mouth 2 (two) times daily as needed for Pain.      ALPRAZolam (XANAX) 0.25 MG tablet Take 1 tablet (0.25 mg total) by mouth 2 (two) times daily as needed for Anxiety. 180 tablet 1    aspirin (ECOTRIN) 81 MG EC tablet Take 81 mg by mouth once  daily.        atorvastatin (LIPITOR) 40 MG tablet Take 1 tablet (40 mg total) by mouth once daily. (Patient taking differently: Take 40 mg by mouth every evening. ) 90 tablet 5    cyanocobalamin 1,000 mcg/mL injection INJECT 1ML INTRAMUSCULARLY WEEKLY FOR 4 WEEKS, THEN 1ML MONTHLY 3 mL 1    diltiaZEM (CARTIA XT) 240 MG 24 hr capsule Take 1 capsule (240 mg total) by mouth once daily. 90 capsule 3    docusate sodium (COLACE) 100 MG capsule Take 1 capsule (100 mg total) by mouth 2 (two) times daily.  0    fluconazole (DIFLUCAN) 200 MG Tab Take 1 tablet (200 mg total) by mouth once daily. As needed for yeast infection 30 tablet 0    fluconazole (DIFLUCAN) 200 MG Tab Take 1 tablet (200 mg total) by mouth once daily. For yeast as needed 30 tablet 0    gabapentin (NEURONTIN) 300 MG capsule TAKE 2 CAPSULES BY MOUTH TWICE DAILY 360 capsule 2    hydroCHLOROthiazide (HYDRODIURIL) 25 MG tablet Take 1 tablet (25 mg total) by mouth once daily. 90 tablet 3    latanoprost 0.005 % ophthalmic solution Place 1 drop into the left eye every evening.       losartan (COZAAR) 100 MG tablet TAKE 1/2 TABLET EVERY DAY (Patient taking differently: 50 mg at night) 45 tablet 3    multivitamin (ONE DAILY MULTIVITAMIN) per tablet Take 1 tablet by mouth once daily.      omeprazole (PRILOSEC) 20 MG capsule Take 1 capsule (20 mg total) by mouth once daily. 90 capsule 3    oxybutynin (DITROPAN) 5 MG Tab TAKE 1 TABLET (5 MG TOTAL) BY MOUTH ONCE DAILY. 90 tablet 1    pramipexole (MIRAPEX) 0.5 MG tablet Take 1 tablet (0.5 mg total) by mouth every evening. 90 tablet 3    timolol maleate 0.5% (TIMOPTIC) 0.5 % Drop Place 1 drop into the left eye once daily.   0    warfarin (COUMADIN) 6 MG tablet TAKE 1 TABLET EVERY DAY (Patient taking differently: Take 6 mg by mouth. Takes every MON, WED, THURS, SAT, SUN) 90 tablet 3    warfarin (COUMADIN) 6 MG tablet TAKE 6 MG DAILY EXCEPT Every TUES and FRI , WILL TAKE 9 MG       Current  Facility-Administered Medications   Medication Dose Route Frequency Provider Last Rate Last Dose    cyanocobalamin injection 1,000 mcg  1,000 mcg Intramuscular Q30 Days Scott Dillard MD   1,000 mcg at 11/25/19 1611       Review of patient's allergies indicates:   Allergen Reactions    Penicillins     Sulfa (sulfonamide antibiotics)     Compazine [prochlorperazine edisylate] Anxiety         Review of Systems   Constitution: Negative for chills, fever and malaise/fatigue.   HENT: Negative for congestion.    Cardiovascular: Negative for chest pain, claudication and leg swelling.   Respiratory: Negative for cough and shortness of breath.    Skin: Negative for flushing, itching, poor wound healing and rash.   Musculoskeletal: Positive for joint pain. Negative for back pain, muscle cramps and muscle weakness.   Gastrointestinal: Negative for nausea and vomiting.   Neurological: Positive for numbness. Negative for paresthesias and weakness.        Burning sensation   Psychiatric/Behavioral: Negative for altered mental status.           Objective:      Physical Exam   Constitutional: She is oriented to person, place, and time. She appears well-developed and well-nourished. No distress.   Cardiovascular: Intact distal pulses.   Pulses:       Dorsalis pedis pulses are 2+ on the right side, and 2+ on the left side.        Posterior tibial pulses are 2+ on the right side, and 2+ on the left side.   CFT< 3 secs all toes bilateral foot, skin temp warm bilateral foot, no hair growth bilateral lower extremity, mild lower extremity edema with telangiectasias and varicosities bilateral.     Musculoskeletal: She exhibits edema and tenderness.        Neurological: She is alert and oriented to person, place, and time. She has normal strength. No sensory deficit.   Skin: Skin is warm, dry and intact. Capillary refill takes less than 2 seconds. No ecchymosis and no rash noted. She is not diaphoretic. No cyanosis or erythema. No  pallor. Nails show no clubbing.    Normal appearing scars visualized left foot.                     Assessment:       Encounter Diagnoses   Name Primary?    Status post incision and drainage - Left Foot Yes    Postoperative state          Plan:       Diagnoses and all orders for this visit:    Status post incision and drainage - Left Foot    Postoperative state      I counseled the patient on her conditions, their implications and medical management.    The patient location is:   Patient's home  The chief complaint leading to consultation is:  Post incision drainage with hardware removal left foot  Visit type: audiovisual  Total time spent with patient:  10 min  Each patient to whom he or she provides medical services by telemedicine is:  (1) informed of the relationship between the physician and patient and the respective role of any other health care provider with respect to management of the patient; and (2) notified that he or she may decline to receive medical services by telemedicine and may withdraw from such care at any time.    Notes:  Seen today as audio visual visit from her home.  Relates she has pain 1st thing in the morning when she initially steps on the foot that resolved with weight-bearing.  Also says that the swelling to left foot has significantly improved overall and she is able to walk with the shoe.  Swelling resolves with rest. [facility] is performing physical therapy at home.    Ft was directly visualized noting no signs infection.  Range of motion appear to be intact to the 1 MTP left foot.   Overall rectus appearance the left.Patient is pleased overall for progress and has no new complaints.  She has completed oral antibiotic therapy 2 weeks ago.  We discussed follow up p.r.n. With gradual increase in activity as tolerated.    Total time spent with patient 10 mins with 50% of the time spent in counseling and coordination of care

## 2020-05-08 ENCOUNTER — DOCUMENT SCAN (OUTPATIENT)
Dept: HOME HEALTH SERVICES | Facility: HOSPITAL | Age: 80
End: 2020-05-08
Payer: MEDICARE

## 2020-05-11 ENCOUNTER — TELEPHONE (OUTPATIENT)
Dept: FAMILY MEDICINE | Facility: CLINIC | Age: 80
End: 2020-05-11

## 2020-05-11 ENCOUNTER — DOCUMENT SCAN (OUTPATIENT)
Dept: HOME HEALTH SERVICES | Facility: HOSPITAL | Age: 80
End: 2020-05-11
Payer: MEDICARE

## 2020-05-11 RX ORDER — ATORVASTATIN CALCIUM 40 MG/1
40 TABLET, FILM COATED ORAL DAILY
Qty: 90 TABLET | Refills: 3 | Status: SHIPPED | OUTPATIENT
Start: 2020-05-11 | End: 2021-01-29 | Stop reason: SDUPTHER

## 2020-05-11 NOTE — TELEPHONE ENCOUNTER
Spoke with Alia regarding message. Per Dr. Dillard- it is okay for her to take Diflucan and Coumadin.     Alia verbalized understanding of above.

## 2020-05-12 ENCOUNTER — DOCUMENT SCAN (OUTPATIENT)
Dept: HOME HEALTH SERVICES | Facility: HOSPITAL | Age: 80
End: 2020-05-12
Payer: MEDICARE

## 2020-05-14 LAB
ACID FAST MOD KINY STN SPEC: NORMAL
MYCOBACTERIUM SPEC QL CULT: NORMAL

## 2020-05-15 ENCOUNTER — TELEPHONE (OUTPATIENT)
Dept: FAMILY MEDICINE | Facility: CLINIC | Age: 80
End: 2020-05-15

## 2020-05-15 NOTE — TELEPHONE ENCOUNTER
----- Message from Eder Masterson sent at 5/15/2020 10:27 AM CDT -----  Contact: [phone] / self   Patient would like to speak with your office regarding scheduling a INR and B12 injection. Please Advise.

## 2020-05-18 ENCOUNTER — TELEPHONE (OUTPATIENT)
Dept: FAMILY MEDICINE | Facility: CLINIC | Age: 80
End: 2020-05-18

## 2020-05-18 ENCOUNTER — CLINICAL SUPPORT (OUTPATIENT)
Dept: FAMILY MEDICINE | Facility: CLINIC | Age: 80
End: 2020-05-18
Payer: MEDICARE

## 2020-05-18 DIAGNOSIS — Z79.01 ANTICOAGULATED ON COUMADIN: Primary | ICD-10-CM

## 2020-05-18 LAB — INR PPP: 7.8 (ref 2–3)

## 2020-05-18 PROCEDURE — 85610 PROTHROMBIN TIME: CPT | Mod: QW,,, | Performed by: FAMILY MEDICINE

## 2020-05-18 PROCEDURE — 96372 PR INJECTION,THERAP/PROPH/DIAG2ST, IM OR SUBCUT: ICD-10-PCS | Mod: S$GLB,,, | Performed by: FAMILY MEDICINE

## 2020-05-18 PROCEDURE — 85610 POCT INR: ICD-10-PCS | Mod: QW,,, | Performed by: FAMILY MEDICINE

## 2020-05-18 PROCEDURE — 96372 THER/PROPH/DIAG INJ SC/IM: CPT | Mod: S$GLB,,, | Performed by: FAMILY MEDICINE

## 2020-05-18 RX ORDER — WARFARIN SODIUM 5 MG/1
5 TABLET ORAL DAILY
Qty: 90 TABLET | Refills: 3 | Status: SHIPPED | OUTPATIENT
Start: 2020-05-18 | End: 2020-11-11

## 2020-05-18 RX ADMIN — CYANOCOBALAMIN 1000 MCG: 1000 INJECTION, SOLUTION INTRAMUSCULAR; SUBCUTANEOUS at 10:05

## 2020-05-18 NOTE — PROGRESS NOTES
Patient here today for a INR check. INR 7.8. Patient reports no changes in diet. Patient taking 6 mg's coumadin every night.

## 2020-05-18 NOTE — PROGRESS NOTES
Administered B12 injection.  Patient tolerated well.  Applied bandage.  See MAR for administration details.

## 2020-05-18 NOTE — TELEPHONE ENCOUNTER
Pt has been notified and verbalized understanding of how she should take coumadin and when to re-check.    *Pt is in need of a prescription for 5 mg warfarin. (She only has 6 mg available)    Pharmacy: [facility] ([place])

## 2020-05-18 NOTE — TELEPHONE ENCOUNTER
Patient here today for a INR check. INR 7.8. Patient reports no changes in diet. Patient taking 6 mg's coumadin every night.  Please advise

## 2020-05-20 ENCOUNTER — OFFICE VISIT (OUTPATIENT)
Dept: FAMILY MEDICINE | Facility: CLINIC | Age: 80
End: 2020-05-20
Payer: MEDICARE

## 2020-05-20 ENCOUNTER — TELEPHONE (OUTPATIENT)
Dept: FAMILY MEDICINE | Facility: CLINIC | Age: 80
End: 2020-05-20

## 2020-05-20 VITALS
DIASTOLIC BLOOD PRESSURE: 64 MMHG | OXYGEN SATURATION: 97 % | BODY MASS INDEX: 38.54 KG/M2 | TEMPERATURE: 99 F | HEIGHT: 62 IN | SYSTOLIC BLOOD PRESSURE: 118 MMHG | WEIGHT: 209.44 LBS | HEART RATE: 100 BPM

## 2020-05-20 DIAGNOSIS — N90.7 LABIAL CYST: ICD-10-CM

## 2020-05-20 DIAGNOSIS — Z79.01 ANTICOAGULATED ON COUMADIN: Primary | ICD-10-CM

## 2020-05-20 DIAGNOSIS — B35.6 TINEA CRURIS: ICD-10-CM

## 2020-05-20 LAB — INR PPP: 4.8 (ref 2–3)

## 2020-05-20 PROCEDURE — 3078F DIAST BP <80 MM HG: CPT | Mod: CPTII,S$GLB,, | Performed by: FAMILY MEDICINE

## 2020-05-20 PROCEDURE — 1101F PT FALLS ASSESS-DOCD LE1/YR: CPT | Mod: CPTII,S$GLB,, | Performed by: FAMILY MEDICINE

## 2020-05-20 PROCEDURE — 1125F PR PAIN SEVERITY QUANTIFIED, PAIN PRESENT: ICD-10-PCS | Mod: S$GLB,,, | Performed by: FAMILY MEDICINE

## 2020-05-20 PROCEDURE — 99213 PR OFFICE/OUTPT VISIT, EST, LEVL III, 20-29 MIN: ICD-10-PCS | Mod: S$GLB,,, | Performed by: FAMILY MEDICINE

## 2020-05-20 PROCEDURE — 3078F PR MOST RECENT DIASTOLIC BLOOD PRESSURE < 80 MM HG: ICD-10-PCS | Mod: CPTII,S$GLB,, | Performed by: FAMILY MEDICINE

## 2020-05-20 PROCEDURE — 1101F PR PT FALLS ASSESS DOC 0-1 FALLS W/OUT INJ PAST YR: ICD-10-PCS | Mod: CPTII,S$GLB,, | Performed by: FAMILY MEDICINE

## 2020-05-20 PROCEDURE — 1159F PR MEDICATION LIST DOCUMENTED IN MEDICAL RECORD: ICD-10-PCS | Mod: S$GLB,,, | Performed by: FAMILY MEDICINE

## 2020-05-20 PROCEDURE — 85610 POCT INR: ICD-10-PCS | Mod: QW,,, | Performed by: FAMILY MEDICINE

## 2020-05-20 PROCEDURE — 1125F AMNT PAIN NOTED PAIN PRSNT: CPT | Mod: S$GLB,,, | Performed by: FAMILY MEDICINE

## 2020-05-20 PROCEDURE — 3074F SYST BP LT 130 MM HG: CPT | Mod: CPTII,S$GLB,, | Performed by: FAMILY MEDICINE

## 2020-05-20 PROCEDURE — 99213 OFFICE O/P EST LOW 20 MIN: CPT | Mod: S$GLB,,, | Performed by: FAMILY MEDICINE

## 2020-05-20 PROCEDURE — 3074F PR MOST RECENT SYSTOLIC BLOOD PRESSURE < 130 MM HG: ICD-10-PCS | Mod: CPTII,S$GLB,, | Performed by: FAMILY MEDICINE

## 2020-05-20 PROCEDURE — 85610 PROTHROMBIN TIME: CPT | Mod: QW,,, | Performed by: FAMILY MEDICINE

## 2020-05-20 PROCEDURE — 1159F MED LIST DOCD IN RCRD: CPT | Mod: S$GLB,,, | Performed by: FAMILY MEDICINE

## 2020-05-20 RX ORDER — CLOTRIMAZOLE 1 %
CREAM (GRAM) TOPICAL 2 TIMES DAILY
Qty: 60 G | Refills: 2 | Status: SHIPPED | OUTPATIENT
Start: 2020-05-20 | End: 2022-10-15

## 2020-05-20 NOTE — PROGRESS NOTES
Lab Results   Component Value Date    INR 4.8 05/20/2020    INR 7.8 05/18/2020    INR 1.2 03/16/2020    INR 1.2 03/15/2020    INR 1.1 03/14/2020     Patient is here for INR check  Patient is currently holding her daily dose of coumadin.  Patient denies any changes in diet, pt denies any missed doses, and pt denies any signs of bleeding.  INR is 4.8  Patient was instructed to begin taking 5 mg of coumadin beginning today.  Patient was instructed to return to the clinic in 1 week to recheck INR.

## 2020-05-20 NOTE — TELEPHONE ENCOUNTER
Patient scheduled with  today        ----- Message from Melony Anderson sent at 5/20/2020  8:37 AM CDT -----  Contact: [phone]-self  Patient is requesting a call back concerning a Same day appt for having a Bump on her Vaginal Area and the area between her leg and stomach is red and when she wipes it it bleeds. Please call

## 2020-05-20 NOTE — PROGRESS NOTES
"Subjective:      Patient ID: Mis Ca is a 79 y.o. female.    Chief Complaint: Mass (vaginal bump)      Vitals:    05/20/20 1116   BP: 118/64   Pulse: 100   Temp: 98.9 °F (37.2 °C)   TempSrc: Oral   SpO2: 97%   Weight: 95 kg (209 lb 7 oz)   Height: 5' 2" (1.575 m)        HPI   Left foot problem and needs INR; last time to prolonged; had 2 surgeries with Dr Cardozo on left foot; had some infection, MRSA, off ABX; saw ID,     Problem List  Patient Active Problem List   Diagnosis    Paroxysmal atrial fibrillation    JACQUELYN (obstructive sleep apnea)    Anticoagulated on Coumadin    Anxiety    Restless leg syndrome    Hyperlipidemia    Essential hypertension    Gastroesophageal reflux disease without esophagitis    History of adenomatous polyp of colon    Diverticulosis of large intestine without hemorrhage    Senile purpura    Tortuous aorta    Glaucoma    Blind right eye    Diastolic dysfunction    Aortic atherosclerosis    Severe obesity with body mass index (BMI) of 35.0 to 39.9 with serious comorbidity    History of skin cancer    Hallux valgus, left    Pre-operative clearance    Hallux valgus with bunions, left    Gastrocnemius equinus of left lower extremity    Left foot pain    Antalgic gait    Neuralgia    Multilevel facet arthritis    Nerve entrapment syndrome of foot, left    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  Extended delirium.  Advised not to take it again.      Adhesive     Compazine [prochlorperazine edisylate] Anxiety    Penicillins Rash    Sulfa (sulfonamide antibiotics) Rash    Vancomycin analogues Rash       MEDS:   Current Outpatient " Medications:     acetaminophen (TYLENOL) 500 MG tablet, Take 1,000 mg by mouth 2 (two) times daily as needed for Pain., Disp: , Rfl:     ALPRAZolam (XANAX) 0.25 MG tablet, Take 1 tablet (0.25 mg total) by mouth 2 (two) times daily as needed for Anxiety., Disp: 180 tablet, Rfl: 1    aspirin (ECOTRIN) 81 MG EC tablet, Take 81 mg by mouth once daily.  , Disp: , Rfl:     atorvastatin (LIPITOR) 40 MG tablet, TAKE 1 TABLET (40 MG TOTAL) BY MOUTH ONCE DAILY., Disp: 90 tablet, Rfl: 3    cyanocobalamin 1,000 mcg/mL injection, INJECT 1ML INTRAMUSCULARLY WEEKLY FOR 4 WEEKS, THEN 1ML MONTHLY, Disp: 3 mL, Rfl: 1    diltiaZEM (CARTIA XT) 240 MG 24 hr capsule, Take 1 capsule (240 mg total) by mouth once daily., Disp: 90 capsule, Rfl: 3    gabapentin (NEURONTIN) 300 MG capsule, TAKE 2 CAPSULES BY MOUTH TWICE DAILY, Disp: 360 capsule, Rfl: 2    hydroCHLOROthiazide (HYDRODIURIL) 25 MG tablet, Take 1 tablet (25 mg total) by mouth once daily., Disp: 90 tablet, Rfl: 3    latanoprost 0.005 % ophthalmic solution, Place 1 drop into the left eye every evening. , Disp: , Rfl:     losartan (COZAAR) 100 MG tablet, TAKE 1/2 TABLET EVERY DAY (Patient taking differently: 50 mg at night), Disp: 45 tablet, Rfl: 3    omeprazole (PRILOSEC) 20 MG capsule, Take 1 capsule (20 mg total) by mouth once daily., Disp: 90 capsule, Rfl: 3    oxybutynin (DITROPAN) 5 MG Tab, TAKE 1 TABLET (5 MG TOTAL) BY MOUTH ONCE DAILY., Disp: 90 tablet, Rfl: 1    pramipexole (MIRAPEX) 0.5 MG tablet, Take 1 tablet (0.5 mg total) by mouth every evening., Disp: 90 tablet, Rfl: 3    timolol maleate 0.5% (TIMOPTIC) 0.5 % Drop, Place 1 drop into the left eye once daily. , Disp: , Rfl: 0    warfarin (COUMADIN) 5 MG tablet, Take 1 tablet (5 mg total) by mouth Daily., Disp: 90 tablet, Rfl: 3    clotrimazole (LOTRIMIN) 1 % cream, Apply topically 2 (two) times daily., Disp: 60 g, Rfl: 2    docusate sodium (COLACE) 100 MG capsule, Take 1 capsule (100 mg total) by mouth 2  (two) times daily. (Patient not taking: Reported on 5/20/2020), Disp: , Rfl: 0    multivitamin (ONE DAILY MULTIVITAMIN) per tablet, Take 1 tablet by mouth once daily., Disp: , Rfl:     Current Facility-Administered Medications:     cyanocobalamin injection 1,000 mcg, 1,000 mcg, Intramuscular, Q30 Days, Scott Dillard MD, 1,000 mcg at 05/18/20 1023      History:  Current Providers as of 5/20/2020  PCP: Scott Dillard MD  Care Team Provider: Sudhir Coles MD  Care Team Provider: Sergio Viera MD  Care Team Provider: Omar Vergara MD  Care Team Provider: Jolanta Burnett MD  Care Team Provider: Harris Franklin LPN  Care Team Provider: Rudolph Cardozo DPM  Care Team Provider: Dank Levin MD  Care Team Provider: Efren Rizo MD  Encounter Provider: Scott Dillard MD, starting on Wed May 20, 2020 12:00 AM  Referring Provider: not found, starting on Wed May 20, 2020 12:00 AM  Consulting Physician: Scott Dillard MD, starting on Wed May 20, 2020 11:13 AM (Active)   Past Medical History:   Diagnosis Date    Anticoagulant long-term use     Anxiety     Arthritis     Atrial fibrillation     Blind right eye     Bradycardia     Cancer     skin cancer left side of face under eye    Hyperlipidemia     Hypertension     PVC (premature ventricular contraction)     RLS (restless legs syndrome)     Sleep apnea     Does not use CPAP machine.     Past Surgical History:   Procedure Laterality Date    ADENOIDECTOMY      AKIN OSTEOTOMY Left 10/31/2018    Procedure: OSTEOTOMY, AKIN;  Surgeon: Rudolph Cardozo DPM;  Location: [facility] OR;  Service: Podiatry;  Laterality: Left;    APPENDECTOMY      BREAST BIOPSY Left     x3    BREAST SURGERY Left     breast biopsy x3    CATARACT EXTRACTION BILATERAL W/ ANTERIOR VITRECTOMY      ENDOSCOPIC GASTROCNEMIUS RECESSION Left 10/31/2018    Procedure: RECESSION, GASTROCNEMIUS, ENDOSCOPIC;  Surgeon: Rudolph Cardozo DPM;  Location: [facility] OR;  Service:  Podiatry;  Laterality: Left;    EYE SURGERY Bilateral     cataracts extraction    EYE SURGERY Right     drainage tube (glaucoma)    FOOT ARTHRODESIS Left 1/15/2020    Procedure: FUSION, FOOT;  Surgeon: Rudolph Cardozo DPM;  Location: [facility] OR;  Service: Podiatry;  Laterality: Left;  mini c-arm, Arthrex screw  for hardware removal (Lydia notified), Orthofix (Niels notified) min ex-fix    FOOT SURGERY Left     lesion removed from dorsal area    FRACTURE SURGERY Left     arm    HAND TENDON SURGERY Left     HYSTERECTOMY      INCISION AND DRAINAGE FOOT Left 3/11/2020    Procedure: INCISION AND DRAINAGE, FOOT;  Surgeon: Rudolph Cardozo DPM;  Location: [facility] OR;  Service: Podiatry;  Laterality: Left;    LAPIDUS BUNIONECTOMY Left 10/31/2018    Procedure: BUNIONECTOMY, LAPIDUS;  Surgeon: Rudolph Cardozo DPM;  Location: [facility] OR;  Service: Podiatry;  Laterality: Left;  mini c-arm, Arthrex locking plate (Lydia notified)    LAPIDUS BUNIONECTOMY Left 10/31/2018    Dr. Cardozo    Lymph Gland Removed  Right     groin (performed by Dr. Vergara)    NEURECTOMY Left 1/15/2020    Procedure: NEURECTOMY;  Surgeon: Rudolph Cardozo DPM;  Location: [facility] OR;  Service: Podiatry;  Laterality: Left;  bipolar bovie, vessel loop, possible Agnes nerve wrap. Moshe with Agnes notified and will be overnighting graft. MK 1/14/2020    OOPHORECTOMY      ORIF FOREARM FRACTURE Left     PALATE SURGERY      Lesion removed    TONSILLECTOMY       Social History     Tobacco Use    Smoking status: Never Smoker    Smokeless tobacco: Never Used   Substance Use Topics    Alcohol use: Yes     Comment: Seldomly drinks alcohol (wine)    Drug use: No         Review of Systems   Constitutional: Negative.    HENT: Negative.    Respiratory: Negative.    Cardiovascular: Negative.    Gastrointestinal: Negative.    Endocrine: Negative.    Genitourinary: Negative.    Musculoskeletal: Positive for arthralgias.        Left foot sore  when walks in area of surgery   Skin: Positive for color change and wound.   Psychiatric/Behavioral: Negative.    All other systems reviewed and are negative.    Objective:     Physical Exam   Constitutional: She is oriented to person, place, and time. She appears well-developed and well-nourished.   obese   HENT:   Head: Normocephalic.   Eyes: Pupils are equal, round, and reactive to light. Conjunctivae and EOM are normal.   Neck: Normal range of motion. Neck supple.   Cardiovascular: Normal rate, regular rhythm, normal heart sounds and intact distal pulses.   Pulmonary/Chest: Effort normal and breath sounds normal.   Genitourinary:   Genitourinary Comments: Small tender left labial cyst   Musculoskeletal: Normal range of motion.   Neurological: She is alert and oriented to person, place, and time. She has normal reflexes.   Skin: Skin is warm and dry. No rash noted. No erythema. No pallor.   Tinea cruris   Psychiatric: She has a normal mood and affect. Her behavior is normal. Judgment and thought content normal.   Nursing note and vitals reviewed.          Assessment:     1. Anticoagulated on Coumadin    2. Labial cyst    3. Tinea cruris      Plan:        Medication List           Accurate as of May 20, 2020 11:46 AM. If you have any questions, ask your nurse or doctor.               START taking these medications    clotrimazole 1 % cream  Commonly known as:  LOTRIMIN  Apply topically 2 (two) times daily.  Started by:  Scott Dillard MD        CHANGE how you take these medications    losartan 100 MG tablet  Commonly known as:  COZAAR  TAKE 1/2 TABLET EVERY DAY  What changed:    · how much to take  · how to take this  · when to take this  · additional instructions        CONTINUE taking these medications    acetaminophen 500 MG tablet  Commonly known as:  TYLENOL     ALPRAZolam 0.25 MG tablet  Commonly known as:  XANAX  Take 1 tablet (0.25 mg total) by mouth 2 (two) times daily as needed for Anxiety.     aspirin  81 MG EC tablet  Commonly known as:  ECOTRIN     atorvastatin 40 MG tablet  Commonly known as:  LIPITOR  TAKE 1 TABLET (40 MG TOTAL) BY MOUTH ONCE DAILY.     cyanocobalamin 1,000 mcg/mL injection  INJECT 1ML INTRAMUSCULARLY WEEKLY FOR 4 WEEKS, THEN 1ML MONTHLY     diltiaZEM 240 MG 24 hr capsule  Commonly known as:  CARTIA XT  Take 1 capsule (240 mg total) by mouth once daily.     docusate sodium 100 MG capsule  Commonly known as:  COLACE  Take 1 capsule (100 mg total) by mouth 2 (two) times daily.     gabapentin 300 MG capsule  Commonly known as:  NEURONTIN  TAKE 2 CAPSULES BY MOUTH TWICE DAILY     hydroCHLOROthiazide 25 MG tablet  Commonly known as:  HYDRODIURIL  Take 1 tablet (25 mg total) by mouth once daily.     latanoprost 0.005 % ophthalmic solution     omeprazole 20 MG capsule  Commonly known as:  PRILOSEC  Take 1 capsule (20 mg total) by mouth once daily.     ONE DAILY MULTIVITAMIN per tablet  Generic drug:  multivitamin     oxybutynin 5 MG Tab  Commonly known as:  DITROPAN  TAKE 1 TABLET (5 MG TOTAL) BY MOUTH ONCE DAILY.     pramipexole 0.5 MG tablet  Commonly known as:  MIRAPEX  Take 1 tablet (0.5 mg total) by mouth every evening.     timolol maleate 0.5% 0.5 % Drop  Commonly known as:  TIMOPTIC     warfarin 5 MG tablet  Commonly known as:  COUMADIN  Take 1 tablet (5 mg total) by mouth Daily.        STOP taking these medications    fluconazole 200 MG Tab  Commonly known as:  DIFLUCAN  Stopped by:  Scott Dillard MD           Where to Get Your Medications      These medications were sent to [id number] - [address] AT CORNER OF [address] [address]    Phone:  [phone]   · clotrimazole 1 % cream       Anticoagulated on Coumadin  -     POCT INR    Labial cyst    Tinea cruris    Other orders  -     clotrimazole (LOTRIMIN) 1 % cream; Apply topically 2 (two) times daily.  Dispense: 60 g; Refill: 2      inr better, resume coumadin 5 mg,  check one week  lotrimain cream  Warm compresses for cyst; no I and d needed and no abx, yet

## 2020-05-27 ENCOUNTER — CLINICAL SUPPORT (OUTPATIENT)
Dept: FAMILY MEDICINE | Facility: CLINIC | Age: 80
End: 2020-05-27
Payer: MEDICARE

## 2020-05-27 ENCOUNTER — TELEPHONE (OUTPATIENT)
Dept: FAMILY MEDICINE | Facility: CLINIC | Age: 80
End: 2020-05-27

## 2020-05-27 DIAGNOSIS — Z79.01 ANTICOAGULATED ON COUMADIN: Primary | ICD-10-CM

## 2020-05-27 DIAGNOSIS — I48.0 PAROXYSMAL ATRIAL FIBRILLATION: ICD-10-CM

## 2020-05-27 DIAGNOSIS — N76.4 LABIAL ABSCESS: ICD-10-CM

## 2020-05-27 LAB — INR PPP: 2.1 (ref 2–3)

## 2020-05-27 PROCEDURE — 87070 CULTURE OTHR SPECIMN AEROBIC: CPT | Mod: HCNC

## 2020-05-27 PROCEDURE — 87077 CULTURE AEROBIC IDENTIFY: CPT | Mod: HCNC

## 2020-05-27 PROCEDURE — 85610 PROTHROMBIN TIME: CPT | Mod: QW,,, | Performed by: FAMILY MEDICINE

## 2020-05-27 PROCEDURE — 85610 POCT INR: ICD-10-PCS | Mod: QW,,, | Performed by: FAMILY MEDICINE

## 2020-05-27 PROCEDURE — 87186 SC STD MICRODIL/AGAR DIL: CPT | Mod: HCNC

## 2020-05-27 RX ORDER — LOSARTAN POTASSIUM 100 MG/1
50 TABLET ORAL DAILY
Qty: 5 TABLET | Refills: 3 | Status: SHIPPED | OUTPATIENT
Start: 2020-05-27 | End: 2020-09-02 | Stop reason: SDUPTHER

## 2020-05-27 RX ORDER — CLINDAMYCIN HYDROCHLORIDE 300 MG/1
300 CAPSULE ORAL 2 TIMES DAILY
Qty: 20 CAPSULE | Refills: 0 | Status: SHIPPED | OUTPATIENT
Start: 2020-05-27 | End: 2020-06-01

## 2020-05-27 NOTE — TELEPHONE ENCOUNTER
Left labial cyst progrssed to a carbuncle, painful, bled a little, no fever  I and D done p 2 xylocaine with epi 1 cc  No pus, minimal bleeding    I and D performed and C and S obtained    Rx sent, hot soaks, Recheck one week Wednesday

## 2020-05-29 LAB — BACTERIA SPEC AEROBE CULT: ABNORMAL

## 2020-05-30 RX ORDER — PRAMIPEXOLE DIHYDROCHLORIDE 0.5 MG/1
TABLET ORAL
Qty: 180 TABLET | Refills: 3 | Status: SHIPPED | OUTPATIENT
Start: 2020-05-30 | End: 2021-01-29 | Stop reason: SDUPTHER

## 2020-06-01 ENCOUNTER — TELEPHONE (OUTPATIENT)
Dept: FAMILY MEDICINE | Facility: CLINIC | Age: 80
End: 2020-06-01

## 2020-06-01 DIAGNOSIS — M10.9 GOUT, UNSPECIFIED CAUSE, UNSPECIFIED CHRONICITY, UNSPECIFIED SITE: Primary | ICD-10-CM

## 2020-06-01 RX ORDER — TETRACYCLINE HYDROCHLORIDE 500 MG/1
500 CAPSULE ORAL 4 TIMES DAILY
Qty: 40 CAPSULE | Refills: 0 | Status: SHIPPED | OUTPATIENT
Start: 2020-06-01 | End: 2020-06-02

## 2020-06-01 NOTE — TELEPHONE ENCOUNTER
----- Message from Ganga Lane sent at 6/1/2020  9:26 AM CDT -----  Contact: [phone]/self  Patient calling to speak with you regarding test results  Please call back to assist at [phone]

## 2020-06-01 NOTE — TELEPHONE ENCOUNTER
How is her carson doing?  i'm stopping clindamycin, it is resistant, sending tetracycline, it is sensitive and she is not allergic to it.    Toe, if could be gout?  Has she ever had that?

## 2020-06-01 NOTE — TELEPHONE ENCOUNTER
Spoke to pt - she will go get the labs today.     Also she said that the Tetracycline will cost over 500.00 per [facility].      I called [facility] to confirm - They did not answer and message said to call back later.  We will try to call them back later.

## 2020-06-01 NOTE — TELEPHONE ENCOUNTER
I spoke with the pt   She was calling in regards to her abscess culture - it grew MRSA - she is taking clindamycin ( she is allergic to a lot of abx )      C/o foot/ankle swollen started yesterday  Walked a lot saturday    This morning large toe is red and painful  Stinging   She recently had a foot surgery that   Had MRSA but it was healed    Elevating foot helps  She said she will apply ice    Please advise

## 2020-06-02 ENCOUNTER — LAB VISIT (OUTPATIENT)
Dept: LAB | Facility: HOSPITAL | Age: 80
End: 2020-06-02
Attending: FAMILY MEDICINE
Payer: MEDICARE

## 2020-06-02 DIAGNOSIS — M10.9 GOUT, UNSPECIFIED CAUSE, UNSPECIFIED CHRONICITY, UNSPECIFIED SITE: ICD-10-CM

## 2020-06-02 LAB — URATE SERPL-MCNC: 7.5 MG/DL (ref 2.4–5.7)

## 2020-06-02 PROCEDURE — 36415 COLL VENOUS BLD VENIPUNCTURE: CPT | Mod: HCNC,PO

## 2020-06-02 PROCEDURE — 84550 ASSAY OF BLOOD/URIC ACID: CPT | Mod: HCNC

## 2020-06-02 RX ORDER — DOXYCYCLINE 50 MG/1
100 TABLET ORAL 2 TIMES DAILY
Qty: 40 TABLET | Refills: 1 | Status: SHIPPED | OUTPATIENT
Start: 2020-06-02 | End: 2020-11-11

## 2020-06-02 NOTE — TELEPHONE ENCOUNTER
Covered alternatives to tetracycline are     Doxy-100 100 mg intravenous solution    demeclocycline 150 mg tablet   Tier 4  Quantity limit: 240 for 30 days     demeclocycline 300 mg tablet   Tier 4  Quantity limit: 120 for 30 days     minocycline 50 mg capsule and 100 mg capsules  Tier 2    doxycycline hyclate 50 mg capsule and 100 mg - tier 3  doxycycline hyclate 100 mg tablet    doxycycline hyclate 100 mg intravenous powder for solution- tier 4    doxycycline monohydrate 25 mg/5 mL oral suspension - tier 4    doxycycline monohydrate 100 mg tablet - tier 3  doxycycline monohydrate 50 mg tablet    doxycycline monohydrate 50 mg capsule - tier 2      doxycycline monohydrate 75 mg tablet - tier 3

## 2020-06-03 ENCOUNTER — OFFICE VISIT (OUTPATIENT)
Dept: FAMILY MEDICINE | Facility: CLINIC | Age: 80
End: 2020-06-03
Payer: MEDICARE

## 2020-06-03 VITALS
DIASTOLIC BLOOD PRESSURE: 64 MMHG | OXYGEN SATURATION: 95 % | HEIGHT: 62 IN | TEMPERATURE: 99 F | SYSTOLIC BLOOD PRESSURE: 116 MMHG | WEIGHT: 213.5 LBS | HEART RATE: 82 BPM | BODY MASS INDEX: 39.29 KG/M2

## 2020-06-03 DIAGNOSIS — N76.4 LABIAL ABSCESS: ICD-10-CM

## 2020-06-03 DIAGNOSIS — N28.1 RENAL CYSTS, ACQUIRED, BILATERAL: ICD-10-CM

## 2020-06-03 DIAGNOSIS — Z79.01 ANTICOAGULATED ON COUMADIN: ICD-10-CM

## 2020-06-03 DIAGNOSIS — M79.672 LEFT FOOT PAIN: Primary | ICD-10-CM

## 2020-06-03 LAB — INR PPP: 1.8 (ref 2–3)

## 2020-06-03 PROCEDURE — 1125F AMNT PAIN NOTED PAIN PRSNT: CPT | Mod: S$GLB,,, | Performed by: FAMILY MEDICINE

## 2020-06-03 PROCEDURE — 1125F PR PAIN SEVERITY QUANTIFIED, PAIN PRESENT: ICD-10-PCS | Mod: S$GLB,,, | Performed by: FAMILY MEDICINE

## 2020-06-03 PROCEDURE — 3074F SYST BP LT 130 MM HG: CPT | Mod: CPTII,S$GLB,, | Performed by: FAMILY MEDICINE

## 2020-06-03 PROCEDURE — 99213 PR OFFICE/OUTPT VISIT, EST, LEVL III, 20-29 MIN: ICD-10-PCS | Mod: S$GLB,,, | Performed by: FAMILY MEDICINE

## 2020-06-03 PROCEDURE — 3078F DIAST BP <80 MM HG: CPT | Mod: CPTII,S$GLB,, | Performed by: FAMILY MEDICINE

## 2020-06-03 PROCEDURE — 1159F PR MEDICATION LIST DOCUMENTED IN MEDICAL RECORD: ICD-10-PCS | Mod: S$GLB,,, | Performed by: FAMILY MEDICINE

## 2020-06-03 PROCEDURE — 3074F PR MOST RECENT SYSTOLIC BLOOD PRESSURE < 130 MM HG: ICD-10-PCS | Mod: CPTII,S$GLB,, | Performed by: FAMILY MEDICINE

## 2020-06-03 PROCEDURE — 1101F PR PT FALLS ASSESS DOC 0-1 FALLS W/OUT INJ PAST YR: ICD-10-PCS | Mod: CPTII,S$GLB,, | Performed by: FAMILY MEDICINE

## 2020-06-03 PROCEDURE — 1159F MED LIST DOCD IN RCRD: CPT | Mod: S$GLB,,, | Performed by: FAMILY MEDICINE

## 2020-06-03 PROCEDURE — 99213 OFFICE O/P EST LOW 20 MIN: CPT | Mod: S$GLB,,, | Performed by: FAMILY MEDICINE

## 2020-06-03 PROCEDURE — 1101F PT FALLS ASSESS-DOCD LE1/YR: CPT | Mod: CPTII,S$GLB,, | Performed by: FAMILY MEDICINE

## 2020-06-03 PROCEDURE — 3078F PR MOST RECENT DIASTOLIC BLOOD PRESSURE < 80 MM HG: ICD-10-PCS | Mod: CPTII,S$GLB,, | Performed by: FAMILY MEDICINE

## 2020-06-03 NOTE — PROGRESS NOTES
"Subjective:      Patient ID: Mis Ca is a 79 y.o. female.    Chief Complaint: Abscess      Vitals:    06/03/20 1300   BP: 116/64   Pulse: 82   Temp: 98.6 °F (37 °C)   TempSrc: Oral   SpO2: 95%   Weight: 96.9 kg (213 lb 8.3 oz)   Height: 5' 2" (1.575 m)        HPI   Follow up labial abscess left side I and Ded last week, c and S MRSA; on doxycycline 100 mg bid; INR 1.8 today, on 5 mg coumadion; todl to take 10 mg today, resume 5 and repeat 2 weeks; takes for atr fibrillation.  Left big toe got red and painful left big toe, medial over IP joint at tip of one her incision from her prior foot surgeries she had with Dr Cardozo        Problem List  Patient Active Problem List   Diagnosis    Paroxysmal atrial fibrillation    JACQUELYN (obstructive sleep apnea)    Anticoagulated on Coumadin    Anxiety    Restless leg syndrome    Hyperlipidemia    Essential hypertension    Gastroesophageal reflux disease without esophagitis    History of adenomatous polyp of colon    Diverticulosis of large intestine without hemorrhage    Senile purpura    Tortuous aorta    Glaucoma    Blind right eye    Diastolic dysfunction    Aortic atherosclerosis    Severe obesity with body mass index (BMI) of 35.0 to 39.9 with serious comorbidity    History of skin cancer    Hallux valgus, left    Pre-operative clearance    Hallux valgus with bunions, left    Gastrocnemius equinus of left lower extremity    Left foot pain    Antalgic gait    Neuralgia    Multilevel facet arthritis    Nerve entrapment syndrome of foot, left    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  Extended delirium.  Advised not to take " it again.      Adhesive     Compazine [prochlorperazine edisylate] Anxiety    Penicillins Rash    Sulfa (sulfonamide antibiotics) Rash    Vancomycin analogues Rash       MEDS:   Current Outpatient Medications:     acetaminophen (TYLENOL) 500 MG tablet, Take 1,000 mg by mouth 2 (two) times daily as needed for Pain., Disp: , Rfl:     ALPRAZolam (XANAX) 0.25 MG tablet, Take 1 tablet (0.25 mg total) by mouth 2 (two) times daily as needed for Anxiety., Disp: 180 tablet, Rfl: 1    aspirin (ECOTRIN) 81 MG EC tablet, Take 81 mg by mouth once daily.  , Disp: , Rfl:     atorvastatin (LIPITOR) 40 MG tablet, TAKE 1 TABLET (40 MG TOTAL) BY MOUTH ONCE DAILY., Disp: 90 tablet, Rfl: 3    clotrimazole (LOTRIMIN) 1 % cream, Apply topically 2 (two) times daily., Disp: 60 g, Rfl: 2    cyanocobalamin 1,000 mcg/mL injection, INJECT 1ML INTRAMUSCULARLY WEEKLY FOR 4 WEEKS, THEN 1ML MONTHLY, Disp: 3 mL, Rfl: 1    diltiaZEM (CARTIA XT) 240 MG 24 hr capsule, Take 1 capsule (240 mg total) by mouth once daily., Disp: 90 capsule, Rfl: 3    docusate sodium (COLACE) 100 MG capsule, Take 1 capsule (100 mg total) by mouth 2 (two) times daily., Disp: , Rfl: 0    doxycycline (ADOXA) 50 MG tablet, Take 2 tablets (100 mg total) by mouth 2 (two) times daily., Disp: 40 tablet, Rfl: 1    gabapentin (NEURONTIN) 300 MG capsule, TAKE 2 CAPSULES BY MOUTH TWICE DAILY, Disp: 360 capsule, Rfl: 2    hydroCHLOROthiazide (HYDRODIURIL) 25 MG tablet, Take 1 tablet (25 mg total) by mouth once daily., Disp: 90 tablet, Rfl: 3    latanoprost 0.005 % ophthalmic solution, Place 1 drop into the left eye every evening. , Disp: , Rfl:     losartan (COZAAR) 100 MG tablet, Take 0.5 tablets (50 mg total) by mouth once daily., Disp: 5 tablet, Rfl: 3    multivitamin (ONE DAILY MULTIVITAMIN) per tablet, Take 1 tablet by mouth once daily., Disp: , Rfl:     omeprazole (PRILOSEC) 20 MG capsule, Take 1 capsule (20 mg total) by mouth once daily., Disp: 90 capsule,  Rfl: 3    oxybutynin (DITROPAN) 5 MG Tab, TAKE 1 TABLET (5 MG TOTAL) BY MOUTH ONCE DAILY., Disp: 90 tablet, Rfl: 1    pramipexole (MIRAPEX) 0.5 MG tablet, TAKE 1 TABLET TWICE DAILY, Disp: 180 tablet, Rfl: 3    timolol maleate 0.5% (TIMOPTIC) 0.5 % Drop, Place 1 drop into the left eye once daily. , Disp: , Rfl: 0    warfarin (COUMADIN) 5 MG tablet, Take 1 tablet (5 mg total) by mouth Daily., Disp: 90 tablet, Rfl: 3    Current Facility-Administered Medications:     cyanocobalamin injection 1,000 mcg, 1,000 mcg, Intramuscular, Q30 Days, Scott Dillard MD, 1,000 mcg at 05/18/20 1023      History:  Current Providers as of 6/3/2020  PCP: Scott Dillard MD  Care Team Provider: Sudhir Coles MD  Care Team Provider: Sergio Viera MD  Care Team Provider: Omar Vergara MD  Care Team Provider: Jolanta Burnett MD  Care Team Provider: Harris Franklin LPN  Care Team Provider: Rudolph Cardozo DPM  Care Team Provider: Dank Levin MD  Care Team Provider: Efren Rizo MD  Encounter Provider: Scott Dillard MD, starting on Wed Regino 3, 2020 12:00 AM  Referring Provider: not found, starting on Wed Regino 3, 2020 12:00 AM  Consulting Physician: Scott Dillard MD, starting on Wed Regino 3, 2020 12:59 PM (Active)   Past Medical History:   Diagnosis Date    Anticoagulant long-term use     Anxiety     Arthritis     Atrial fibrillation     Blind right eye     Bradycardia     Cancer     skin cancer left side of face under eye    Hyperlipidemia     Hypertension     PVC (premature ventricular contraction)     RLS (restless legs syndrome)     Sleep apnea     Does not use CPAP machine.     Past Surgical History:   Procedure Laterality Date    ADENOIDECTOMY      AKIN OSTEOTOMY Left 10/31/2018    Procedure: OSTEOTOMY, AKIN;  Surgeon: Rudolph Cardozo DPM;  Location: [facility];  Service: Podiatry;  Laterality: Left;    APPENDECTOMY      BREAST BIOPSY Left     x3    BREAST SURGERY Left     breast biopsy x3     CATARACT EXTRACTION BILATERAL W/ ANTERIOR VITRECTOMY      ENDOSCOPIC GASTROCNEMIUS RECESSION Left 10/31/2018    Procedure: RECESSION, GASTROCNEMIUS, ENDOSCOPIC;  Surgeon: Rudolph Cardozo DPM;  Location: [facility] OR;  Service: Podiatry;  Laterality: Left;    EYE SURGERY Bilateral     cataracts extraction    EYE SURGERY Right     drainage tube (glaucoma)    FOOT ARTHRODESIS Left 1/15/2020    Procedure: FUSION, FOOT;  Surgeon: Rudolph Cardozo DPM;  Location: [facility] OR;  Service: Podiatry;  Laterality: Left;  mini c-arm, Arthrex screw  for hardware removal (Lydia notified), [employer] (Niels notified) min ex-fix    FOOT SURGERY Left     lesion removed from dorsal area    FRACTURE SURGERY Left     arm    HAND TENDON SURGERY Left     HYSTERECTOMY      INCISION AND DRAINAGE FOOT Left 3/11/2020    Procedure: INCISION AND DRAINAGE, FOOT;  Surgeon: Rudolph Cardozo DPM;  Location: [facility] OR;  Service: Podiatry;  Laterality: Left;    LAPIDUS BUNIONECTOMY Left 10/31/2018    Procedure: BUNIONECTOMY, LAPIDUS;  Surgeon: Rudolph Cardozo DPM;  Location: [facility] OR;  Service: Podiatry;  Laterality: Left;  mini c-arm, Arthrex locking plate (Lydia notified)    LAPIDUS BUNIONECTOMY Left 10/31/2018    Dr. Cardozo    Lymph Gland Removed  Right     groin (performed by Dr. Vergara)    NEURECTOMY Left 1/15/2020    Procedure: NEURECTOMY;  Surgeon: Rudolph Cardozo DPM;  Location: [facility] OR;  Service: Podiatry;  Laterality: Left;  bipolar bovie, vessel loop, possible Agnes nerve wrap. Moshe with [place] notified and will be overnighting graft. MK 1/14/2020    OOPHORECTOMY      ORIF FOREARM FRACTURE Left     PALATE SURGERY      Lesion removed    TONSILLECTOMY       Social History     Tobacco Use    Smoking status: Never Smoker    Smokeless tobacco: Never Used   Substance Use Topics    Alcohol use: Yes     Comment: Seldomly drinks alcohol (wine)    Drug use: No         Review of Systems   Constitutional:  Negative.    HENT: Negative.    Respiratory: Negative.    Cardiovascular: Negative.    Gastrointestinal: Negative.    Endocrine: Negative.    Genitourinary: Negative.         Red urine at time of low back pain   Musculoskeletal: Positive for arthralgias and back pain.   Skin: Positive for color change and wound.   Psychiatric/Behavioral: Negative.    All other systems reviewed and are negative.    Objective:     Physical Exam   Constitutional: She is oriented to person, place, and time. She appears well-developed and well-nourished.   obese   HENT:   Head: Normocephalic.   Eyes: Pupils are equal, round, and reactive to light. Conjunctivae and EOM are normal.   Neck: Normal range of motion. Neck supple.   Cardiovascular: Normal rate, regular rhythm and normal heart sounds.   Pulmonary/Chest: Effort normal and breath sounds normal.   Genitourinary:   Genitourinary Comments: Abscess looks much better; smaller, minimal drainage, not as red  Left labia   Musculoskeletal: Normal range of motion. She exhibits edema and tenderness.   Left big toe near IP joint sl red/swollen, not severe, not fluctuant   Neurological: She is alert and oriented to person, place, and time. She has normal reflexes.   Skin: Skin is warm and dry.   Psychiatric: She has a normal mood and affect. Her behavior is normal. Judgment and thought content normal.   Nursing note and vitals reviewed.          Assessment:     1. Left foot pain    2. Labial abscess    3. Anticoagulated on Coumadin    4. Renal cysts, acquired, bilateral      Plan:        Medication List           Accurate as of Susanne 3, 2020  1:48 PM. If you have any questions, ask your nurse or doctor.               CONTINUE taking these medications    acetaminophen 500 MG tablet  Commonly known as:  TYLENOL     ALPRAZolam 0.25 MG tablet  Commonly known as:  XANAX  Take 1 tablet (0.25 mg total) by mouth 2 (two) times daily as needed for Anxiety.     aspirin 81 MG EC tablet  Commonly known as:   ECOTRIN     atorvastatin 40 MG tablet  Commonly known as:  LIPITOR  TAKE 1 TABLET (40 MG TOTAL) BY MOUTH ONCE DAILY.     clotrimazole 1 % cream  Commonly known as:  LOTRIMIN  Apply topically 2 (two) times daily.     cyanocobalamin 1,000 mcg/mL injection  INJECT 1ML INTRAMUSCULARLY WEEKLY FOR 4 WEEKS, THEN 1ML MONTHLY     diltiaZEM 240 MG 24 hr capsule  Commonly known as:  CARTIA XT  Take 1 capsule (240 mg total) by mouth once daily.     docusate sodium 100 MG capsule  Commonly known as:  COLACE  Take 1 capsule (100 mg total) by mouth 2 (two) times daily.     doxycycline 50 MG tablet  Commonly known as:  ADOXA  Take 2 tablets (100 mg total) by mouth 2 (two) times daily.     gabapentin 300 MG capsule  Commonly known as:  NEURONTIN  TAKE 2 CAPSULES BY MOUTH TWICE DAILY     hydroCHLOROthiazide 25 MG tablet  Commonly known as:  HYDRODIURIL  Take 1 tablet (25 mg total) by mouth once daily.     latanoprost 0.005 % ophthalmic solution     losartan 100 MG tablet  Commonly known as:  COZAAR  Take 0.5 tablets (50 mg total) by mouth once daily.     omeprazole 20 MG capsule  Commonly known as:  PRILOSEC  Take 1 capsule (20 mg total) by mouth once daily.     ONE DAILY MULTIVITAMIN per tablet  Generic drug:  multivitamin     oxybutynin 5 MG Tab  Commonly known as:  DITROPAN  TAKE 1 TABLET (5 MG TOTAL) BY MOUTH ONCE DAILY.     pramipexole 0.5 MG tablet  Commonly known as:  MIRAPEX  TAKE 1 TABLET TWICE DAILY     timolol maleate 0.5% 0.5 % Drop  Commonly known as:  TIMOPTIC     warfarin 5 MG tablet  Commonly known as:  COUMADIN  Take 1 tablet (5 mg total) by mouth Daily.          Left foot pain    Labial abscess    Anticoagulated on Coumadin    Renal cysts, acquired, bilateral  -     US Retroperitoneal Complete (Kidney and; Future; Expected date: 06/03/2020      Continue doxy until RTC in 2 weeks for lab ia recheck and foot /toe redness

## 2020-06-04 ENCOUNTER — DOCUMENT SCAN (OUTPATIENT)
Dept: HOME HEALTH SERVICES | Facility: HOSPITAL | Age: 80
End: 2020-06-04
Payer: MEDICARE

## 2020-06-04 PROCEDURE — 99499 NO LOS: ICD-10-PCS | Mod: ,,, | Performed by: FAMILY MEDICINE

## 2020-06-04 PROCEDURE — 99499 UNLISTED E&M SERVICE: CPT | Mod: ,,, | Performed by: FAMILY MEDICINE

## 2020-06-07 RX ORDER — OXYBUTYNIN CHLORIDE 5 MG/1
TABLET ORAL
Qty: 90 TABLET | Refills: 3 | Status: SHIPPED | OUTPATIENT
Start: 2020-06-07 | End: 2020-12-29 | Stop reason: SDUPTHER

## 2020-06-09 ENCOUNTER — TELEPHONE (OUTPATIENT)
Dept: FAMILY MEDICINE | Facility: CLINIC | Age: 80
End: 2020-06-09

## 2020-06-09 NOTE — TELEPHONE ENCOUNTER
----- Message from Melony Anderson sent at 6/9/2020  1:51 PM CDT -----  Contact: [phone]-self  Patient is requesting a call back concerning pt would like to Schedule a Nurse Visit for Tomorrow 6/10 for her B-12 Shot. Please call

## 2020-06-10 ENCOUNTER — HOSPITAL ENCOUNTER (OUTPATIENT)
Dept: RADIOLOGY | Facility: HOSPITAL | Age: 80
Discharge: HOME | End: 2020-06-10
Attending: FAMILY MEDICINE
Payer: MEDICARE

## 2020-06-10 ENCOUNTER — CLINICAL SUPPORT (OUTPATIENT)
Dept: FAMILY MEDICINE | Facility: CLINIC | Age: 80
End: 2020-06-10
Payer: MEDICARE

## 2020-06-10 DIAGNOSIS — D64.9 ANEMIA, UNSPECIFIED TYPE: ICD-10-CM

## 2020-06-10 DIAGNOSIS — Z79.01 ANTICOAGULATED ON COUMADIN: Primary | ICD-10-CM

## 2020-06-10 DIAGNOSIS — N28.1 RENAL CYSTS, ACQUIRED, BILATERAL: ICD-10-CM

## 2020-06-10 DIAGNOSIS — I48.0 PAROXYSMAL ATRIAL FIBRILLATION: ICD-10-CM

## 2020-06-10 LAB — INR PPP: 1.9 (ref 2–3)

## 2020-06-10 PROCEDURE — 76770 US EXAM ABDO BACK WALL COMP: CPT | Mod: TC,HCNC,PO

## 2020-06-10 PROCEDURE — 85610 PROTHROMBIN TIME: CPT | Mod: QW,,, | Performed by: FAMILY MEDICINE

## 2020-06-10 PROCEDURE — 96372 PR INJECTION,THERAP/PROPH/DIAG2ST, IM OR SUBCUT: ICD-10-PCS | Mod: S$GLB,,, | Performed by: FAMILY MEDICINE

## 2020-06-10 PROCEDURE — 85610 POCT INR: ICD-10-PCS | Mod: QW,,, | Performed by: FAMILY MEDICINE

## 2020-06-10 PROCEDURE — 96372 THER/PROPH/DIAG INJ SC/IM: CPT | Mod: S$GLB,,, | Performed by: FAMILY MEDICINE

## 2020-06-10 RX ORDER — CYANOCOBALAMIN 1000 UG/ML
1000 INJECTION, SOLUTION INTRAMUSCULAR; SUBCUTANEOUS ONCE
Status: COMPLETED | OUTPATIENT
Start: 2020-06-10 | End: 2020-06-10

## 2020-06-10 RX ADMIN — CYANOCOBALAMIN 1000 MCG: 1000 INJECTION, SOLUTION INTRAMUSCULAR; SUBCUTANEOUS at 04:06

## 2020-06-12 ENCOUNTER — TELEPHONE (OUTPATIENT)
Dept: FAMILY MEDICINE | Facility: CLINIC | Age: 80
End: 2020-06-12

## 2020-06-12 NOTE — TELEPHONE ENCOUNTER
----- Message from Scott Dillard MD sent at 6/12/2020  4:22 PM CDT -----  CALL PT TESTS ARE NORMAL - Ultrasound    I called and notified the pt

## 2020-06-15 ENCOUNTER — PATIENT OUTREACH (OUTPATIENT)
Dept: ADMINISTRATIVE | Facility: HOSPITAL | Age: 80
End: 2020-06-15

## 2020-06-16 NOTE — PROGRESS NOTES
Subjective:   @Patient ID:  Mis Ca is a 79 y.o. female who presents for follow-up of PAF, Hyperlipidemia, HTN, DD      HPI:   Here for f/u  Used to see Dr. Benton  She stated that she is doing ok  No chest pain  CARDOSO at baseline , chronic stable  Since last visit she had left ankle surgery that was complicated by infection and required IV abx  LE swelling is slightly worse.       PAF on Coumadin . Currently SR, occasionally she feels her heart goes in a.fib and last for few seconds. It doesn't bother her.     Prior cardiovascular  Hx  --------------------------------          - ECHO    · 9/2019 Normal left ventricular systolic function. The estimated ejection fraction is 65%  · Normal LV diastolic function.  · Concentric left ventricular hypertrophy.  · Normal right ventricular systolic function.             12/2017  Normal left ventricular systolic function (EF 60-65%).     2 - Impaired LV relaxation, normal LAP (grade 1 diastolic dysfunction).     3 - Normal right ventricular systolic function .     4 - The estimated PA systolic pressure is 28 mmHg.     5 - Mild left atrial enlargement.         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       Neuralgia 06/03/2019    Left foot pain 01/08/2019    Antalgic gait 01/08/2019    Hallux valgus with bunions, left 10/31/2018    Gastrocnemius equinus of left lower extremity 10/31/2018    Pre-operative clearance 10/19/2018    Hallux valgus, left 09/23/2018     History of skin cancer 07/16/2018    Severe obesity with body mass index (BMI) of 35.0 to 39.9 with serious comorbidity 12/21/2017    Diastolic dysfunction 09/20/2017    Aortic atherosclerosis 09/04/2017     Seen on chest xray dated 09/04/17      Blind right eye 07/10/2017    Glaucoma 04/06/2017    Senile purpura 03/08/2017    Tortuous aorta 03/08/2017    History of adenomatous polyp of colon 12/09/2016    Diverticulosis of large intestine without hemorrhage 12/09/2016    JACQUELYN (obstructive sleep apnea) 02/17/2016    Anticoagulated on Coumadin 02/17/2016    Anxiety 02/17/2016    Restless leg syndrome 02/17/2016    Hyperlipidemia 02/17/2016    Essential hypertension 02/17/2016    Gastroesophageal reflux disease without esophagitis 02/17/2016    Paroxysmal atrial fibrillation 12/16/2014                    LAST HbA1c  Lab Results   Component Value Date    HGBA1C 5.8 06/09/2016       Lipid panel  Lab Results   Component Value Date    CHOL 162 09/03/2019    CHOL 166 08/10/2017    CHOL 202 (H) 06/09/2016     Lab Results   Component Value Date    HDL 38 (L) 09/03/2019    HDL 39 (L) 08/10/2017    HDL 44 06/09/2016     Lab Results   Component Value Date    LDLCALC 77.8 09/03/2019    LDLCALC 101 08/10/2017    LDLCALC 119.6 06/09/2016     Lab Results   Component Value Date    TRIG 231 (H) 09/03/2019    TRIG 130 08/10/2017    TRIG 192 (H) 06/09/2016     Lab Results   Component Value Date    CHOLHDL 23.5 09/03/2019    CHOLHDL 4.3 08/10/2017    CHOLHDL 21.8 06/09/2016            Review of Systems   Constitution: Negative for chills and fever.   HENT: Negative for hearing loss and nosebleeds.    Eyes: Negative for blurred vision.   Cardiovascular: Negative for chest pain and palpitations.        As in HPI    Respiratory: Negative for hemoptysis and shortness of breath.    Hematologic/Lymphatic: Negative for bleeding problem.   Skin: Negative for itching.   Musculoskeletal:        As in HPI    Gastrointestinal:  Negative for abdominal pain and hematochezia.   Genitourinary: Negative for hematuria.   Neurological: Negative for dizziness and loss of balance.   Psychiatric/Behavioral: Negative for altered mental status and depression.       Objective:   Physical Exam   Constitutional: She is oriented to person, place, and time. She appears well-developed and well-nourished.   HENT:   Head: Normocephalic and atraumatic.   Eyes: Conjunctivae are normal.   Neck: Neck supple. No JVD present.   Cardiovascular: Normal rate, regular rhythm and normal heart sounds. Exam reveals no gallop and no friction rub.   No murmur heard.  Pulmonary/Chest: Effort normal and breath sounds normal. No stridor. No respiratory distress. She has no wheezes.   Musculoskeletal:         General: Edema (Trace) present.   Neurological: She is alert and oriented to person, place, and time.   Skin: Skin is warm and dry.   Psychiatric: She has a normal mood and affect. Her behavior is normal.       Assessment:     1. Swelling    2. Diastolic dysfunction    3. Tortuous aorta    4. Paroxysmal atrial fibrillation    5. Mixed hyperlipidemia    6. Essential hypertension    7. Anticoagulated on Coumadin        Plan:   Continue current treatment   Continue coumadin   Lasix prn for LE edema or shortness of breath      Pertinent cardiac images and EKG reviewed independently.    Continue with current medical plan and lifestyle changes.  Return sooner for concerns or questions. If symptoms persist go to the ED  I have reviewed all pertinent data including patient's medical history in detail and updated the computerized patient record.     No orders of the defined types were placed in this encounter.      Follow up as scheduled.     She expressed verbal understanding and agreed with the plan    Patient's Medications   New Prescriptions    FUROSEMIDE (LASIX) 20 MG TABLET    Take 1 tablet (20 mg total) by mouth daily as needed.   Previous Medications    ACETAMINOPHEN  (TYLENOL) 500 MG TABLET    Take 1,000 mg by mouth 2 (two) times daily as needed for Pain.    ALPRAZOLAM (XANAX) 0.25 MG TABLET    Take 1 tablet (0.25 mg total) by mouth 2 (two) times daily as needed for Anxiety.    ASPIRIN (ECOTRIN) 81 MG EC TABLET    Take 81 mg by mouth once daily.      ATORVASTATIN (LIPITOR) 40 MG TABLET    TAKE 1 TABLET (40 MG TOTAL) BY MOUTH ONCE DAILY.    CLOTRIMAZOLE (LOTRIMIN) 1 % CREAM    Apply topically 2 (two) times daily.    CYANOCOBALAMIN 1,000 MCG/ML INJECTION    INJECT 1ML INTRAMUSCULARLY WEEKLY FOR 4 WEEKS, THEN 1ML MONTHLY    DILTIAZEM (CARTIA XT) 240 MG 24 HR CAPSULE    Take 1 capsule (240 mg total) by mouth once daily.    DOCUSATE SODIUM (COLACE) 100 MG CAPSULE    Take 1 capsule (100 mg total) by mouth 2 (two) times daily.    DOXYCYCLINE (ADOXA) 50 MG TABLET    Take 2 tablets (100 mg total) by mouth 2 (two) times daily.    GABAPENTIN (NEURONTIN) 300 MG CAPSULE    TAKE 2 CAPSULES BY MOUTH TWICE DAILY    HYDROCHLOROTHIAZIDE (HYDRODIURIL) 25 MG TABLET    Take 1 tablet (25 mg total) by mouth once daily.    LATANOPROST 0.005 % OPHTHALMIC SOLUTION    Place 1 drop into the left eye every evening.     LOSARTAN (COZAAR) 100 MG TABLET    Take 0.5 tablets (50 mg total) by mouth once daily.    MULTIVITAMIN (ONE DAILY MULTIVITAMIN) PER TABLET    Take 1 tablet by mouth once daily.    OMEPRAZOLE (PRILOSEC) 20 MG CAPSULE    Take 1 capsule (20 mg total) by mouth once daily.    OXYBUTYNIN (DITROPAN) 5 MG TAB    TAKE 1 TABLET EVERY DAY    PRAMIPEXOLE (MIRAPEX) 0.5 MG TABLET    TAKE 1 TABLET TWICE DAILY    TIMOLOL MALEATE 0.5% (TIMOPTIC) 0.5 % DROP    Place 1 drop into the left eye once daily.     WARFARIN (COUMADIN) 5 MG TABLET    Take 1 tablet (5 mg total) by mouth Daily.   Modified Medications    No medications on file   Discontinued Medications    No medications on file

## 2020-06-17 ENCOUNTER — OFFICE VISIT (OUTPATIENT)
Dept: CARDIOLOGY | Facility: CLINIC | Age: 80
End: 2020-06-17
Payer: MEDICARE

## 2020-06-17 VITALS
DIASTOLIC BLOOD PRESSURE: 58 MMHG | HEIGHT: 62 IN | OXYGEN SATURATION: 95 % | SYSTOLIC BLOOD PRESSURE: 118 MMHG | BODY MASS INDEX: 38.5 KG/M2 | WEIGHT: 209.19 LBS | HEART RATE: 66 BPM

## 2020-06-17 DIAGNOSIS — I10 ESSENTIAL HYPERTENSION: ICD-10-CM

## 2020-06-17 DIAGNOSIS — Z79.01 ANTICOAGULATED ON COUMADIN: ICD-10-CM

## 2020-06-17 DIAGNOSIS — I51.89 DIASTOLIC DYSFUNCTION: ICD-10-CM

## 2020-06-17 DIAGNOSIS — R60.9 SWELLING: ICD-10-CM

## 2020-06-17 DIAGNOSIS — I77.1 TORTUOUS AORTA: ICD-10-CM

## 2020-06-17 DIAGNOSIS — I48.0 PAROXYSMAL ATRIAL FIBRILLATION: Primary | ICD-10-CM

## 2020-06-17 DIAGNOSIS — E78.2 MIXED HYPERLIPIDEMIA: ICD-10-CM

## 2020-06-17 PROCEDURE — 1101F PT FALLS ASSESS-DOCD LE1/YR: CPT | Mod: HCNC,CPTII,S$GLB, | Performed by: INTERNAL MEDICINE

## 2020-06-17 PROCEDURE — 1159F PR MEDICATION LIST DOCUMENTED IN MEDICAL RECORD: ICD-10-PCS | Mod: HCNC,S$GLB,, | Performed by: INTERNAL MEDICINE

## 2020-06-17 PROCEDURE — 1126F PR PAIN SEVERITY QUANTIFIED, NO PAIN PRESENT: ICD-10-PCS | Mod: HCNC,S$GLB,, | Performed by: INTERNAL MEDICINE

## 2020-06-17 PROCEDURE — 99499 RISK ADDL DX/OHS AUDIT: ICD-10-PCS | Mod: HCNC,S$GLB,, | Performed by: INTERNAL MEDICINE

## 2020-06-17 PROCEDURE — 1101F PR PT FALLS ASSESS DOC 0-1 FALLS W/OUT INJ PAST YR: ICD-10-PCS | Mod: HCNC,CPTII,S$GLB, | Performed by: INTERNAL MEDICINE

## 2020-06-17 PROCEDURE — 3074F SYST BP LT 130 MM HG: CPT | Mod: HCNC,CPTII,S$GLB, | Performed by: INTERNAL MEDICINE

## 2020-06-17 PROCEDURE — 3078F PR MOST RECENT DIASTOLIC BLOOD PRESSURE < 80 MM HG: ICD-10-PCS | Mod: HCNC,CPTII,S$GLB, | Performed by: INTERNAL MEDICINE

## 2020-06-17 PROCEDURE — 99214 PR OFFICE/OUTPT VISIT, EST, LEVL IV, 30-39 MIN: ICD-10-PCS | Mod: HCNC,S$GLB,, | Performed by: INTERNAL MEDICINE

## 2020-06-17 PROCEDURE — 99214 OFFICE O/P EST MOD 30 MIN: CPT | Mod: HCNC,S$GLB,, | Performed by: INTERNAL MEDICINE

## 2020-06-17 PROCEDURE — 3078F DIAST BP <80 MM HG: CPT | Mod: HCNC,CPTII,S$GLB, | Performed by: INTERNAL MEDICINE

## 2020-06-17 PROCEDURE — 1126F AMNT PAIN NOTED NONE PRSNT: CPT | Mod: HCNC,S$GLB,, | Performed by: INTERNAL MEDICINE

## 2020-06-17 PROCEDURE — 99499 UNLISTED E&M SERVICE: CPT | Mod: HCNC,S$GLB,, | Performed by: INTERNAL MEDICINE

## 2020-06-17 PROCEDURE — 1159F MED LIST DOCD IN RCRD: CPT | Mod: HCNC,S$GLB,, | Performed by: INTERNAL MEDICINE

## 2020-06-17 PROCEDURE — 3074F PR MOST RECENT SYSTOLIC BLOOD PRESSURE < 130 MM HG: ICD-10-PCS | Mod: HCNC,CPTII,S$GLB, | Performed by: INTERNAL MEDICINE

## 2020-06-17 RX ORDER — FUROSEMIDE 20 MG/1
20 TABLET ORAL DAILY PRN
Qty: 30 TABLET | Refills: 3 | Status: SHIPPED | OUTPATIENT
Start: 2020-06-17 | End: 2020-12-29 | Stop reason: SDUPTHER

## 2020-06-17 NOTE — PATIENT INSTRUCTIONS

## 2020-06-17 NOTE — PROGRESS NOTES
Patient, Mis Ca (MRN #[id number]), presented with a recorded BMI of 38.26 kg/m^2 and a documented comorbidity(s):  - Hypertension  - Hyperlipidemia  - Atrial Fibrillation  to which the severe obesity is a contributing factor. This is consistent with the definition of severe obesity (BMI 35.0-39.9) with comorbidity (ICD-10 E66.01, Z68.35). The patient's severe obesity was monitored, evaluated, addressed and/or treated. This addendum to the medical record is made on 06/17/2020.

## 2020-06-18 ENCOUNTER — OFFICE VISIT (OUTPATIENT)
Dept: FAMILY MEDICINE | Facility: CLINIC | Age: 80
End: 2020-06-18
Payer: MEDICARE

## 2020-06-18 VITALS
DIASTOLIC BLOOD PRESSURE: 62 MMHG | TEMPERATURE: 97 F | HEART RATE: 81 BPM | HEIGHT: 62 IN | WEIGHT: 210.44 LBS | OXYGEN SATURATION: 95 % | SYSTOLIC BLOOD PRESSURE: 112 MMHG | BODY MASS INDEX: 38.72 KG/M2

## 2020-06-18 DIAGNOSIS — N28.1 RENAL CYSTS, ACQUIRED, BILATERAL: ICD-10-CM

## 2020-06-18 DIAGNOSIS — Z79.01 ANTICOAGULATED ON COUMADIN: Primary | ICD-10-CM

## 2020-06-18 DIAGNOSIS — I48.0 PAROXYSMAL ATRIAL FIBRILLATION: ICD-10-CM

## 2020-06-18 DIAGNOSIS — E66.01 SEVERE OBESITY WITH BODY MASS INDEX (BMI) OF 35.0 TO 39.9 WITH SERIOUS COMORBIDITY: ICD-10-CM

## 2020-06-18 DIAGNOSIS — M79.672 LEFT FOOT PAIN: ICD-10-CM

## 2020-06-18 PROBLEM — Z01.818 PRE-OPERATIVE CLEARANCE: Status: RESOLVED | Noted: 2018-10-19 | Resolved: 2020-06-18

## 2020-06-18 PROBLEM — T84.7XXA: Status: RESOLVED | Noted: 2020-03-08 | Resolved: 2020-06-18

## 2020-06-18 PROBLEM — L03.90 CELLULITIS: Status: RESOLVED | Noted: 2020-03-08 | Resolved: 2020-06-18

## 2020-06-18 LAB — INR PPP: 1.7 (ref 2–3)

## 2020-06-18 PROCEDURE — 3074F PR MOST RECENT SYSTOLIC BLOOD PRESSURE < 130 MM HG: ICD-10-PCS | Mod: CPTII,S$GLB,, | Performed by: FAMILY MEDICINE

## 2020-06-18 PROCEDURE — 1159F MED LIST DOCD IN RCRD: CPT | Mod: S$GLB,,, | Performed by: FAMILY MEDICINE

## 2020-06-18 PROCEDURE — 3074F SYST BP LT 130 MM HG: CPT | Mod: CPTII,S$GLB,, | Performed by: FAMILY MEDICINE

## 2020-06-18 PROCEDURE — 3078F DIAST BP <80 MM HG: CPT | Mod: CPTII,S$GLB,, | Performed by: FAMILY MEDICINE

## 2020-06-18 PROCEDURE — 3078F PR MOST RECENT DIASTOLIC BLOOD PRESSURE < 80 MM HG: ICD-10-PCS | Mod: CPTII,S$GLB,, | Performed by: FAMILY MEDICINE

## 2020-06-18 PROCEDURE — 85610 PROTHROMBIN TIME: CPT | Mod: QW,,, | Performed by: FAMILY MEDICINE

## 2020-06-18 PROCEDURE — 1101F PR PT FALLS ASSESS DOC 0-1 FALLS W/OUT INJ PAST YR: ICD-10-PCS | Mod: CPTII,S$GLB,, | Performed by: FAMILY MEDICINE

## 2020-06-18 PROCEDURE — 1101F PT FALLS ASSESS-DOCD LE1/YR: CPT | Mod: CPTII,S$GLB,, | Performed by: FAMILY MEDICINE

## 2020-06-18 PROCEDURE — 99213 PR OFFICE/OUTPT VISIT, EST, LEVL III, 20-29 MIN: ICD-10-PCS | Mod: S$GLB,,, | Performed by: FAMILY MEDICINE

## 2020-06-18 PROCEDURE — 99213 OFFICE O/P EST LOW 20 MIN: CPT | Mod: S$GLB,,, | Performed by: FAMILY MEDICINE

## 2020-06-18 PROCEDURE — 85610 POCT INR: ICD-10-PCS | Mod: QW,,, | Performed by: FAMILY MEDICINE

## 2020-06-18 PROCEDURE — 1159F PR MEDICATION LIST DOCUMENTED IN MEDICAL RECORD: ICD-10-PCS | Mod: S$GLB,,, | Performed by: FAMILY MEDICINE

## 2020-06-18 RX ORDER — WARFARIN 6 MG/1
6 TABLET ORAL DAILY
Qty: 90 TABLET | Refills: 3 | Status: SHIPPED | OUTPATIENT
Start: 2020-06-18 | End: 2021-01-29 | Stop reason: SDUPTHER

## 2020-06-18 NOTE — PROGRESS NOTES
"Subjective:      Patient ID: Mis Ca is a 79 y.o. female.    Chief Complaint: 2 week f/u/INR check/ Discuss ultrasound results      Vitals:    06/18/20 0852   BP: 112/62   Pulse: 81   Temp: 97.1 °F (36.2 °C)   SpO2: 95%   Weight: 95.5 kg (210 lb 6.9 oz)   Height: 5' 2" (1.575 m)        HPI   2 week follow up; labia infection getting better; told to finish abx; INR 1.7 today; had renal ultrasound and has cysts on left kidney; appears to be a little larger compared to CT Sept 2019, 99 mm then, now 112 mm; saw Dr Garcia last year, unable to see this March, couldn't due to admission to hospital    Problem List  Patient Active Problem List   Diagnosis    Paroxysmal atrial fibrillation    JACQUELYN (obstructive sleep apnea)    Anticoagulated on Coumadin    Anxiety    Restless leg syndrome    Hyperlipidemia    Essential hypertension    Gastroesophageal reflux disease without esophagitis    History of adenomatous polyp of colon    Diverticulosis of large intestine without hemorrhage    Senile purpura    Tortuous aorta    Glaucoma    Blind right eye    Diastolic dysfunction    Aortic atherosclerosis    Severe obesity with body mass index (BMI) of 35.0 to 39.9 with serious comorbidity    History of skin cancer    Hallux valgus, left    Hallux valgus with bunions, left    Gastrocnemius equinus of left lower extremity    Left foot pain    Antalgic gait    Neuralgia    Multilevel facet arthritis    Nerve entrapment syndrome of foot, left    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  Extended delirium.  Advised not to take it again.      Adhesive     Compazine [prochlorperazine edisylate] Anxiety    Penicillins Rash    Sulfa (sulfonamide " antibiotics) Rash    Vancomycin analogues Rash       MEDS:   Current Outpatient Medications:     acetaminophen (TYLENOL) 500 MG tablet, Take 1,000 mg by mouth 2 (two) times daily as needed for Pain., Disp: , Rfl:     ALPRAZolam (XANAX) 0.25 MG tablet, Take 1 tablet (0.25 mg total) by mouth 2 (two) times daily as needed for Anxiety., Disp: 180 tablet, Rfl: 1    aspirin (ECOTRIN) 81 MG EC tablet, Take 81 mg by mouth once daily.  , Disp: , Rfl:     atorvastatin (LIPITOR) 40 MG tablet, TAKE 1 TABLET (40 MG TOTAL) BY MOUTH ONCE DAILY., Disp: 90 tablet, Rfl: 3    clotrimazole (LOTRIMIN) 1 % cream, Apply topically 2 (two) times daily., Disp: 60 g, Rfl: 2    cyanocobalamin 1,000 mcg/mL injection, INJECT 1ML INTRAMUSCULARLY WEEKLY FOR 4 WEEKS, THEN 1ML MONTHLY, Disp: 3 mL, Rfl: 1    diltiaZEM (CARTIA XT) 240 MG 24 hr capsule, Take 1 capsule (240 mg total) by mouth once daily., Disp: 90 capsule, Rfl: 3    docusate sodium (COLACE) 100 MG capsule, Take 1 capsule (100 mg total) by mouth 2 (two) times daily., Disp: , Rfl: 0    doxycycline (ADOXA) 50 MG tablet, Take 2 tablets (100 mg total) by mouth 2 (two) times daily., Disp: 40 tablet, Rfl: 1    furosemide (LASIX) 20 MG tablet, Take 1 tablet (20 mg total) by mouth daily as needed., Disp: 30 tablet, Rfl: 3    gabapentin (NEURONTIN) 300 MG capsule, TAKE 2 CAPSULES BY MOUTH TWICE DAILY, Disp: 360 capsule, Rfl: 2    hydroCHLOROthiazide (HYDRODIURIL) 25 MG tablet, Take 1 tablet (25 mg total) by mouth once daily., Disp: 90 tablet, Rfl: 3    latanoprost 0.005 % ophthalmic solution, Place 1 drop into the left eye every evening. , Disp: , Rfl:     losartan (COZAAR) 100 MG tablet, Take 0.5 tablets (50 mg total) by mouth once daily., Disp: 5 tablet, Rfl: 3    multivitamin (ONE DAILY MULTIVITAMIN) per tablet, Take 1 tablet by mouth once daily., Disp: , Rfl:     omeprazole (PRILOSEC) 20 MG capsule, Take 1 capsule (20 mg total) by mouth once daily., Disp: 90 capsule, Rfl:  3    oxybutynin (DITROPAN) 5 MG Tab, TAKE 1 TABLET EVERY DAY, Disp: 90 tablet, Rfl: 3    pramipexole (MIRAPEX) 0.5 MG tablet, TAKE 1 TABLET TWICE DAILY, Disp: 180 tablet, Rfl: 3    timolol maleate 0.5% (TIMOPTIC) 0.5 % Drop, Place 1 drop into the left eye once daily. , Disp: , Rfl: 0    warfarin (COUMADIN) 5 MG tablet, Take 1 tablet (5 mg total) by mouth Daily., Disp: 90 tablet, Rfl: 3    warfarin (COUMADIN) 6 MG tablet, Take 1 tablet (6 mg total) by mouth Daily., Disp: 90 tablet, Rfl: 3    Current Facility-Administered Medications:     cyanocobalamin injection 1,000 mcg, 1,000 mcg, Intramuscular, Q30 Days, Scott Dillard MD, 1,000 mcg at 05/18/20 1023      History:  Current Providers as of 6/18/2020  PCP: Scott Dillard MD  Care Team Provider: Sudhir Coles MD  Care Team Provider: Sergio Viera MD  Care Team Provider: Omar Vergara MD  Care Team Provider: Jolanta Burnett MD  Care Team Provider: Harris Franklin LPN  Care Team Provider: Rudolph Cardozo DPM  Care Team Provider: Dank Levin MD  Care Team Provider: Efren Rizo MD  Care Team Provider: Lenny Burroughs LPN  Encounter Provider: Scott Dillard MD, starting on Thu Jun 18, 2020 12:00 AM  Referring Provider: not found, starting on Thu Jun 18, 2020 12:00 AM  Consulting Physician: Scott Dillard MD, starting on Thu Jun 18, 2020  8:51 AM (Active)   Past Medical History:   Diagnosis Date    Anticoagulant long-term use     Anxiety     Arthritis     Atrial fibrillation     Blind right eye     Bradycardia     Cancer     skin cancer left side of face under eye    Hyperlipidemia     Hypertension     PVC (premature ventricular contraction)     RLS (restless legs syndrome)     Sleep apnea     Does not use CPAP machine.     Past Surgical History:   Procedure Laterality Date    ADENOIDECTOMY      AKIN OSTEOTOMY Left 10/31/2018    Procedure: OSTEOTOMY, AKIN;  Surgeon: Rudolph Cardozo DPM;  Location: [facility] OR;  Service:  Podiatry;  Laterality: Left;    APPENDECTOMY      BREAST BIOPSY Left     x3    BREAST SURGERY Left     breast biopsy x3    CATARACT EXTRACTION BILATERAL W/ ANTERIOR VITRECTOMY      ENDOSCOPIC GASTROCNEMIUS RECESSION Left 10/31/2018    Procedure: RECESSION, GASTROCNEMIUS, ENDOSCOPIC;  Surgeon: Rudolph Cardozo DPM;  Location: [facility] OR;  Service: Podiatry;  Laterality: Left;    EYE SURGERY Bilateral     cataracts extraction    EYE SURGERY Right     drainage tube (glaucoma)    FOOT ARTHRODESIS Left 1/15/2020    Procedure: FUSION, FOOT;  Surgeon: Rudolph Cardozo DPM;  Location: [facility] OR;  Service: Podiatry;  Laterality: Left;  mini c-arm, Arthrex screw  for hardware removal (Lydia notified), [employer]ofix (Niels notified) min ex-fix    FOOT SURGERY Left     lesion removed from dorsal area    FRACTURE SURGERY Left     arm    HAND TENDON SURGERY Left     HYSTERECTOMY      INCISION AND DRAINAGE FOOT Left 3/11/2020    Procedure: INCISION AND DRAINAGE, FOOT;  Surgeon: Rudolph Cardozo DPM;  Location: [facility] OR;  Service: Podiatry;  Laterality: Left;    LAPIDUS BUNIONECTOMY Left 10/31/2018    Procedure: BUNIONECTOMY, LAPIDUS;  Surgeon: Rudolph Cardozo DPM;  Location: [facility] OR;  Service: Podiatry;  Laterality: Left;  mini c-arm, Arthrex locking plate (Lydia notified)    LAPIDUS BUNIONECTOMY Left 10/31/2018    Dr. Cardozo    Lymph Gland Removed  Right     groin (performed by Dr. Vergara)    NEURECTOMY Left 1/15/2020    Procedure: NEURECTOMY;  Surgeon: Rudolph Cardozo DPM;  Location: [facility] OR;  Service: Podiatry;  Laterality: Left;  bipolar bovie, vessel loop, possible [place] nerve wrap. Moshe with [employer] notified and will be overnighting graft. MK 1/14/2020    OOPHORECTOMY      ORIF FOREARM FRACTURE Left     PALATE SURGERY      Lesion removed    TONSILLECTOMY       Social History     Tobacco Use    Smoking status: Never Smoker    Smokeless tobacco: Never Used   Substance Use Topics     Alcohol use: Yes     Comment: Seldomly drinks alcohol (wine)    Drug use: No         Review of Systems   Constitutional: Positive for fatigue.   HENT: Negative.    Respiratory: Negative.    Cardiovascular: Negative.    Gastrointestinal: Negative.    Endocrine: Negative.    Genitourinary: Negative.    Musculoskeletal: Positive for arthralgias and gait problem.   Psychiatric/Behavioral: Negative.    All other systems reviewed and are negative.    Objective:     Physical Exam  Vitals signs and nursing note reviewed.   Constitutional:       Appearance: She is well-developed. She is obese.   HENT:      Head: Normocephalic.   Eyes:      Conjunctiva/sclera: Conjunctivae normal.      Pupils: Pupils are equal, round, and reactive to light.   Neck:      Musculoskeletal: Normal range of motion and neck supple.   Cardiovascular:      Rate and Rhythm: Normal rate and regular rhythm.      Heart sounds: Normal heart sounds.   Pulmonary:      Effort: Pulmonary effort is normal.      Breath sounds: Normal breath sounds.   Musculoskeletal: Normal range of motion.         General: No swelling, tenderness, deformity or signs of injury.      Comments: Foot incisions look good   Skin:     General: Skin is warm and dry.      Findings: No erythema, lesion or rash.   Neurological:      Mental Status: She is alert and oriented to person, place, and time.      Deep Tendon Reflexes: Reflexes are normal and symmetric.   Psychiatric:         Behavior: Behavior normal.         Thought Content: Thought content normal.         Judgment: Judgment normal.             Assessment:     1. Anticoagulated on Coumadin    2. Renal cysts, acquired, bilateral    3. Paroxysmal atrial fibrillation    4. Severe obesity with body mass index (BMI) of 35.0 to 39.9 with serious comorbidity    5. Left foot pain      Plan:        Medication List          Accurate as of June 18, 2020  9:37 AM. If you have any questions, ask your nurse or doctor.            CHANGE how  you take these medications    * warfarin 5 MG tablet  Commonly known as: COUMADIN  Take 1 tablet (5 mg total) by mouth Daily.  What changed: Another medication with the same name was added. Make sure you understand how and when to take each.  Changed by: Scott Dillard MD     * warfarin 6 MG tablet  Commonly known as: COUMADIN  Take 1 tablet (6 mg total) by mouth Daily.  What changed: You were already taking a medication with the same name, and this prescription was added. Make sure you understand how and when to take each.  Changed by: Scott Dlilard MD         * This list has 2 medication(s) that are the same as other medications prescribed for you. Read the directions carefully, and ask your doctor or other care provider to review them with you.            CONTINUE taking these medications    acetaminophen 500 MG tablet  Commonly known as: TYLENOL     ALPRAZolam 0.25 MG tablet  Commonly known as: XANAX  Take 1 tablet (0.25 mg total) by mouth 2 (two) times daily as needed for Anxiety.     aspirin 81 MG EC tablet  Commonly known as: ECOTRIN     atorvastatin 40 MG tablet  Commonly known as: LIPITOR  TAKE 1 TABLET (40 MG TOTAL) BY MOUTH ONCE DAILY.     clotrimazole 1 % cream  Commonly known as: LOTRIMIN  Apply topically 2 (two) times daily.     cyanocobalamin 1,000 mcg/mL injection  INJECT 1ML INTRAMUSCULARLY WEEKLY FOR 4 WEEKS, THEN 1ML MONTHLY     diltiaZEM 240 MG 24 hr capsule  Commonly known as: CARTIA XT  Take 1 capsule (240 mg total) by mouth once daily.     docusate sodium 100 MG capsule  Commonly known as: COLACE  Take 1 capsule (100 mg total) by mouth 2 (two) times daily.     doxycycline 50 MG tablet  Commonly known as: ADOXA  Take 2 tablets (100 mg total) by mouth 2 (two) times daily.     furosemide 20 MG tablet  Commonly known as: LASIX  Take 1 tablet (20 mg total) by mouth daily as needed.     gabapentin 300 MG capsule  Commonly known as: NEURONTIN  TAKE 2 CAPSULES BY MOUTH TWICE DAILY      hydroCHLOROthiazide 25 MG tablet  Commonly known as: HYDRODIURIL  Take 1 tablet (25 mg total) by mouth once daily.     latanoprost 0.005 % ophthalmic solution     losartan 100 MG tablet  Commonly known as: COZAAR  Take 0.5 tablets (50 mg total) by mouth once daily.     omeprazole 20 MG capsule  Commonly known as: PRILOSEC  Take 1 capsule (20 mg total) by mouth once daily.     ONE DAILY MULTIVITAMIN per tablet  Generic drug: multivitamin     oxybutynin 5 MG Tab  Commonly known as: DITROPAN  TAKE 1 TABLET EVERY DAY     pramipexole 0.5 MG tablet  Commonly known as: MIRAPEX  TAKE 1 TABLET TWICE DAILY     timolol maleate 0.5% 0.5 % Drop  Commonly known as: TIMOPTIC           Where to Get Your Medications      These medications were sent to [facility]/pharmacy #[id number] - [address] AT CORNER OF [address] [address]    Phone: [phone]   · warfarin 6 MG tablet       Anticoagulated on Coumadin  -     POCT INR    Renal cysts, acquired, bilateral  -     Ambulatory referral/consult to Urology; Future; Expected date: 06/25/2020    Paroxysmal atrial fibrillation    Severe obesity with body mass index (BMI) of 35.0 to 39.9 with serious comorbidity    Left foot pain    Other orders  -     warfarin (COUMADIN) 6 MG tablet; Take 1 tablet (6 mg total) by mouth Daily.  Dispense: 90 tablet; Refill: 3    increase warfarin to 6 mg; check 2 weeks;  To Dr Garcia for renal cysts  claudia enrique

## 2020-06-22 ENCOUNTER — OFFICE VISIT (OUTPATIENT)
Dept: UROLOGY | Facility: CLINIC | Age: 80
End: 2020-06-22
Payer: MEDICARE

## 2020-06-22 VITALS — TEMPERATURE: 98 F | HEART RATE: 79 BPM | SYSTOLIC BLOOD PRESSURE: 133 MMHG | DIASTOLIC BLOOD PRESSURE: 84 MMHG

## 2020-06-22 DIAGNOSIS — R31.0 GROSS HEMATURIA: ICD-10-CM

## 2020-06-22 DIAGNOSIS — N28.1 RENAL CYSTS, ACQUIRED, BILATERAL: ICD-10-CM

## 2020-06-22 DIAGNOSIS — N28.1 RENAL CYST: Primary | ICD-10-CM

## 2020-06-22 LAB
BILIRUB SERPL-MCNC: NORMAL MG/DL
BLOOD URINE, POC: NORMAL
CLARITY, POC UA: CLEAR
COLOR, POC UA: YELLOW
GLUCOSE UR QL STRIP: NORMAL
KETONES UR QL STRIP: NORMAL
LEUKOCYTE ESTERASE URINE, POC: NORMAL
NITRITE, POC UA: NORMAL
PH, POC UA: 5
PROTEIN, POC: NORMAL
SPECIFIC GRAVITY, POC UA: 1.01
UROBILINOGEN, POC UA: NORMAL

## 2020-06-22 PROCEDURE — 99999 PR PBB SHADOW E&M-EST. PATIENT-LVL V: CPT | Mod: PBBFAC,HCNC,, | Performed by: NURSE PRACTITIONER

## 2020-06-22 PROCEDURE — 99213 PR OFFICE/OUTPT VISIT, EST, LEVL III, 20-29 MIN: ICD-10-PCS | Mod: HCNC,25,S$GLB, | Performed by: NURSE PRACTITIONER

## 2020-06-22 PROCEDURE — 1159F MED LIST DOCD IN RCRD: CPT | Mod: HCNC,S$GLB,, | Performed by: NURSE PRACTITIONER

## 2020-06-22 PROCEDURE — 3075F SYST BP GE 130 - 139MM HG: CPT | Mod: HCNC,CPTII,S$GLB, | Performed by: NURSE PRACTITIONER

## 2020-06-22 PROCEDURE — 1125F AMNT PAIN NOTED PAIN PRSNT: CPT | Mod: HCNC,S$GLB,, | Performed by: NURSE PRACTITIONER

## 2020-06-22 PROCEDURE — 99213 OFFICE O/P EST LOW 20 MIN: CPT | Mod: HCNC,25,S$GLB, | Performed by: NURSE PRACTITIONER

## 2020-06-22 PROCEDURE — 99999 PR PBB SHADOW E&M-EST. PATIENT-LVL V: ICD-10-PCS | Mod: PBBFAC,HCNC,, | Performed by: NURSE PRACTITIONER

## 2020-06-22 PROCEDURE — 1101F PR PT FALLS ASSESS DOC 0-1 FALLS W/OUT INJ PAST YR: ICD-10-PCS | Mod: HCNC,CPTII,S$GLB, | Performed by: NURSE PRACTITIONER

## 2020-06-22 PROCEDURE — 3075F PR MOST RECENT SYSTOLIC BLOOD PRESS GE 130-139MM HG: ICD-10-PCS | Mod: HCNC,CPTII,S$GLB, | Performed by: NURSE PRACTITIONER

## 2020-06-22 PROCEDURE — 1159F PR MEDICATION LIST DOCUMENTED IN MEDICAL RECORD: ICD-10-PCS | Mod: HCNC,S$GLB,, | Performed by: NURSE PRACTITIONER

## 2020-06-22 PROCEDURE — 81002 POCT URINE DIPSTICK WITHOUT MICROSCOPE: ICD-10-PCS | Mod: HCNC,S$GLB,, | Performed by: NURSE PRACTITIONER

## 2020-06-22 PROCEDURE — 1125F PR PAIN SEVERITY QUANTIFIED, PAIN PRESENT: ICD-10-PCS | Mod: HCNC,S$GLB,, | Performed by: NURSE PRACTITIONER

## 2020-06-22 PROCEDURE — 81002 URINALYSIS NONAUTO W/O SCOPE: CPT | Mod: HCNC,S$GLB,, | Performed by: NURSE PRACTITIONER

## 2020-06-22 PROCEDURE — 3079F DIAST BP 80-89 MM HG: CPT | Mod: HCNC,CPTII,S$GLB, | Performed by: NURSE PRACTITIONER

## 2020-06-22 PROCEDURE — 3079F PR MOST RECENT DIASTOLIC BLOOD PRESSURE 80-89 MM HG: ICD-10-PCS | Mod: HCNC,CPTII,S$GLB, | Performed by: NURSE PRACTITIONER

## 2020-06-22 PROCEDURE — 1101F PT FALLS ASSESS-DOCD LE1/YR: CPT | Mod: HCNC,CPTII,S$GLB, | Performed by: NURSE PRACTITIONER

## 2020-06-22 NOTE — LETTER
June 22, 2020      Scott Dillard MD  [address] [facility] [address]           [place] [facility]  [address]  [facility] [address]  Phone: [phone]          Patient: Mis Ca   MR Number: [id number]   YOB: 1940   Date of Visit: 6/22/2020       Dear Dr. Scott Dillard:    Thank you for referring Mis Ca to me for evaluation. Attached you will find relevant portions of my assessment and plan of care.    If you have questions, please do not hesitate to call me. I look forward to following Mis Ca along with you.    Sincerely,    Agnieszka García, NP    Enclosure  CC:  No Recipients    If you would like to receive this communication electronically, please contact [email or web address] or [phone] to request more information on [employer] access.    For providers and/or their staff who would like to refer a patient to [facility], please contact us through our one-stop-shop provider referral line, [facility] , at [phone].    If you feel you have received this communication in error or would no longer like to receive these types of communications, please e-mail [email or web address]

## 2020-06-22 NOTE — PROGRESS NOTES
Subjective:       Patient ID: Mis Ca is a 79 y.o. female.    Chief Complaint: Renal cyst follow-up    This is a 79 y.o.  female patient that is new to me but not new to the system.  The patient first met Dr. Garcia 9/17/2049 for incidental renal cyst. CT was performed on 9/30/2019, which demonstrated a 50 mm complex mass off of the anterior aspect of the superior pole of the left kidney and several simple cysts. Patient elected for active surveillance. Next CT was ordered for 3/2020, however patient reports that around that time she had a foot procedure and then developed MRSA, which required a PICC line and Vancomycin. 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    6/22/2020  Patient is here today to follow-up on renal cyst. She reports she has been doing well since finishing antibiotics for MRSA infections. She would like to establish urology here at this location as it's closer to her. Denies flank pain or difficulty urinating. Patient does report that she experienced gross hematuria and flank/LBP discomfort for 3 days in March. Symptoms resolved and she has not experienced them again. UA dip: completely benign.       The patient does take blood thinners. Coumadin  The does not have a history of stones.   The patient does not have a history of smoking.  Associated symptoms - LBP  Flank pain - yes and LBP  UTI - no   Dysuria - no     Had workup before- no.          Lab Results   Component Value Date    CREATININE 1.3 04/07/2020     ---  Past Medical History:   Diagnosis Date    Anticoagulant long-term use     Anxiety     Arthritis     Atrial fibrillation     Blind right eye     Bradycardia     Cancer     skin cancer left side of face under eye    Hyperlipidemia     Hypertension     PVC (premature ventricular contraction)     RLS (restless legs syndrome)     Sleep apnea     Does not use CPAP machine.       Past Surgical History:   Procedure Laterality Date    ADENOIDECTOMY      AKIN  OSTEOTOMY Left 10/31/2018    Procedure: OSTEOTOMY, AKIN;  Surgeon: Rudolph Cardozo DPM;  Location: [facility] OR;  Service: Podiatry;  Laterality: Left;    APPENDECTOMY      BREAST BIOPSY Left     x3    BREAST SURGERY Left     breast biopsy x3    CATARACT EXTRACTION BILATERAL W/ ANTERIOR VITRECTOMY      ENDOSCOPIC GASTROCNEMIUS RECESSION Left 10/31/2018    Procedure: RECESSION, GASTROCNEMIUS, ENDOSCOPIC;  Surgeon: Rudolph Cardozo DPM;  Location: [facility] OR;  Service: Podiatry;  Laterality: Left;    EYE SURGERY Bilateral     cataracts extraction    EYE SURGERY Right     drainage tube (glaucoma)    FOOT ARTHRODESIS Left 1/15/2020    Procedure: FUSION, FOOT;  Surgeon: Rudolph Cardozo DPM;  Location: [facility] OR;  Service: Podiatry;  Laterality: Left;  mini c-arm, Arthrex screw  for hardware removal (Lydia notified), Orthofix (Niels notified) min ex-fix    FOOT SURGERY Left     lesion removed from dorsal area    FRACTURE SURGERY Left     arm    HAND TENDON SURGERY Left     HYSTERECTOMY      INCISION AND DRAINAGE FOOT Left 3/11/2020    Procedure: INCISION AND DRAINAGE, FOOT;  Surgeon: Rudolph Cardozo DPM;  Location: [facility] OR;  Service: Podiatry;  Laterality: Left;    LAPIDUS BUNIONECTOMY Left 10/31/2018    Procedure: BUNIONECTOMY, LAPIDUS;  Surgeon: Rudolph Cardozo DPM;  Location: [facility] OR;  Service: Podiatry;  Laterality: Left;  mini c-arm, Arthrex locking plate (Lydia notified)    LAPIDUS BUNIONECTOMY Left 10/31/2018    Dr. Cardozo    Lymph Gland Removed  Right     groin (performed by Dr. Vergara)    NEURECTOMY Left 1/15/2020    Procedure: NEURECTOMY;  Surgeon: Rudolph Cardozo DPM;  Location: [facility] OR;  Service: Podiatry;  Laterality: Left;  bipolar bovie, vessel loop, possible Agnes nerve wrap. Moshe with [facility] notified and will be overnighting graft. MK 1/14/2020    OOPHORECTOMY      ORIF FOREARM FRACTURE Left     PALATE SURGERY      Lesion removed    TONSILLECTOMY          Family History   Problem Relation Age of Onset    Aneurysm Mother         AAA    Cancer Father         lung cancer    Lung cancer Father     Cirrhosis Father     Cancer Sister         Breast and Brain     Diabetes Sister     Breast cancer Sister     Hypertension Sister     Hyperlipidemia Sister     Brain cancer Sister     Colon polyps Brother     Cancer Brother         lung cancer    Diabetes Brother     Hyperlipidemia Brother     Hypertension Brother     Cancer Brother         bladder cancer    Diabetes Brother     Glaucoma Brother     Heart disease Sister         Heart valve repair    Atrial fibrillation Sister     Diabetes Sister     Heart disease Sister     Heart attack Sister     Bipolar disorder Sister     Depression Sister     Glaucoma Sister     Diabetes Sister     Other Sister         Pituitary tumor    Arthritis Sister     COPD Sister     Heart disease Sister     Kidney disease Sister     Cancer Sister         lung cancer    Diabetes Sister     Lung cancer Sister     Heart attack Sister        Social History     Tobacco Use    Smoking status: Never Smoker    Smokeless tobacco: Never Used   Substance Use Topics    Alcohol use: Yes     Comment: Seldomly drinks alcohol (wine)    Drug use: No       Current Outpatient Medications on File Prior to Visit   Medication Sig Dispense Refill    acetaminophen (TYLENOL) 500 MG tablet Take 1,000 mg by mouth 2 (two) times daily as needed for Pain.      ALPRAZolam (XANAX) 0.25 MG tablet Take 1 tablet (0.25 mg total) by mouth 2 (two) times daily as needed for Anxiety. 180 tablet 1    aspirin (ECOTRIN) 81 MG EC tablet Take 81 mg by mouth once daily.        atorvastatin (LIPITOR) 40 MG tablet TAKE 1 TABLET (40 MG TOTAL) BY MOUTH ONCE DAILY. 90 tablet 3    clotrimazole (LOTRIMIN) 1 % cream Apply topically 2 (two) times daily. 60 g 2    cyanocobalamin 1,000 mcg/mL injection INJECT 1ML INTRAMUSCULARLY WEEKLY FOR 4 WEEKS, THEN  1ML MONTHLY 3 mL 1    diltiaZEM (CARTIA XT) 240 MG 24 hr capsule Take 1 capsule (240 mg total) by mouth once daily. 90 capsule 3    docusate sodium (COLACE) 100 MG capsule Take 1 capsule (100 mg total) by mouth 2 (two) times daily.  0    furosemide (LASIX) 20 MG tablet Take 1 tablet (20 mg total) by mouth daily as needed. 30 tablet 3    gabapentin (NEURONTIN) 300 MG capsule TAKE 2 CAPSULES BY MOUTH TWICE DAILY 360 capsule 2    hydroCHLOROthiazide (HYDRODIURIL) 25 MG tablet Take 1 tablet (25 mg total) by mouth once daily. 90 tablet 3    latanoprost 0.005 % ophthalmic solution Place 1 drop into the left eye every evening.       losartan (COZAAR) 100 MG tablet Take 0.5 tablets (50 mg total) by mouth once daily. 5 tablet 3    multivitamin (ONE DAILY MULTIVITAMIN) per tablet Take 1 tablet by mouth once daily.      omeprazole (PRILOSEC) 20 MG capsule Take 1 capsule (20 mg total) by mouth once daily. 90 capsule 3    oxybutynin (DITROPAN) 5 MG Tab TAKE 1 TABLET EVERY DAY 90 tablet 3    pramipexole (MIRAPEX) 0.5 MG tablet TAKE 1 TABLET TWICE DAILY 180 tablet 3    timolol maleate 0.5% (TIMOPTIC) 0.5 % Drop Place 1 drop into the left eye once daily.   0    warfarin (COUMADIN) 5 MG tablet Take 1 tablet (5 mg total) by mouth Daily. 90 tablet 3    warfarin (COUMADIN) 6 MG tablet Take 1 tablet (6 mg total) by mouth Daily. 90 tablet 3    doxycycline (ADOXA) 50 MG tablet Take 2 tablets (100 mg total) by mouth 2 (two) times daily. (Patient not taking: Reported on 6/22/2020) 40 tablet 1     Current Facility-Administered Medications on File Prior to Visit   Medication Dose Route Frequency Provider Last Rate Last Dose    cyanocobalamin injection 1,000 mcg  1,000 mcg Intramuscular Q30 Days Scott Dillard MD   1,000 mcg at 05/18/20 1023       Review of patient's allergies indicates:   Allergen Reactions    Propofol analogues      Used for her Colonoscopy.  Extended delirium.  Advised not to take it again.      Adhesive      Compazine [prochlorperazine edisylate] Anxiety    Penicillins Rash    Sulfa (sulfonamide antibiotics) Rash    Vancomycin analogues Rash       Review of Systems   Constitutional: Negative for activity change.   HENT: Negative for congestion.    Eyes: Negative for visual disturbance.   Respiratory: Negative for shortness of breath.    Cardiovascular: Negative for chest pain.   Gastrointestinal: Negative for abdominal distention.   Genitourinary: Positive for hematuria. Negative for dysuria and flank pain.   Musculoskeletal: Negative for gait problem.   Skin: Negative for color change.   Neurological: Negative for dizziness.   Psychiatric/Behavioral: Negative for agitation.       Objective:      Physical Exam  Constitutional:       Appearance: She is well-developed.   HENT:      Head: Normocephalic and atraumatic.      Nose: Nose normal.   Neck:      Musculoskeletal: Normal range of motion.   Pulmonary:      Effort: Pulmonary effort is normal.   Abdominal:      General: There is no distension.      Palpations: Abdomen is soft.      Tenderness: There is no abdominal tenderness.   Musculoskeletal: Normal range of motion.   Skin:     General: Skin is warm and dry.   Neurological:      Mental Status: She is alert and oriented to person, place, and time.   Psychiatric:         Mood and Affect: Mood normal.         Assessment:       1. Renal cyst    2. Gross hematuria    3. Renal cysts, acquired, bilateral        Plan:         - Patient is due for renal cyst follow-up imaging. She also experienced gross hematuria. The   I counseled the patient on the American Urology Guidelines for a hematuria workup.  The criteria for microscopic hematuria is 3 red blood cells on microscopic urinalysis and the workup is recommended for any patient experiencing gross hematuria. The workup includes a CT urogram and a flexible cystoscopy performed here in the clinic. After answering all of the questions about the workup the patient was  amenable in proceeding.        Renal cyst    Gross hematuria  -     Basic metabolic panel; Future; Expected date: 06/22/2020  -     CT Urogram Abd Pelvis W WO; Future; Expected date: 06/22/2020  -     Cystoscopy; Future  -     POCT URINE DIPSTICK WITHOUT MICROSCOPE    Renal cysts, acquired, bilateral  -     Ambulatory referral/consult to Urology  -     CT Urogram Abd Pelvis W WO; Future; Expected date: 06/22/2020

## 2020-07-02 ENCOUNTER — LAB VISIT (OUTPATIENT)
Dept: LAB | Facility: HOSPITAL | Age: 80
End: 2020-07-02
Attending: NURSE PRACTITIONER
Payer: MEDICARE

## 2020-07-02 DIAGNOSIS — R31.0 GROSS HEMATURIA: ICD-10-CM

## 2020-07-02 LAB
ANION GAP SERPL CALC-SCNC: 7 MMOL/L (ref 8–16)
BUN SERPL-MCNC: 23 MG/DL (ref 7–17)
CALCIUM SERPL-MCNC: 9.7 MG/DL (ref 8.7–10.5)
CHLORIDE SERPL-SCNC: 102 MMOL/L (ref 95–110)
CO2 SERPL-SCNC: 32 MMOL/L (ref 23–29)
CREAT SERPL-MCNC: 0.93 MG/DL (ref 0.5–1.4)
EST. GFR  (AFRICAN AMERICAN): >60 ML/MIN/1.73 M^2
EST. GFR  (NON AFRICAN AMERICAN): 59.1 ML/MIN/1.73 M^2
GLUCOSE SERPL-MCNC: 111 MG/DL (ref 70–110)
POTASSIUM SERPL-SCNC: 3.9 MMOL/L (ref 3.5–5.1)
SODIUM SERPL-SCNC: 141 MMOL/L (ref 136–145)

## 2020-07-02 PROCEDURE — 36415 COLL VENOUS BLD VENIPUNCTURE: CPT | Mod: HCNC,PO

## 2020-07-02 PROCEDURE — 80048 BASIC METABOLIC PNL TOTAL CA: CPT | Mod: HCNC,PO

## 2020-07-08 ENCOUNTER — TELEPHONE (OUTPATIENT)
Dept: FAMILY MEDICINE | Facility: CLINIC | Age: 80
End: 2020-07-08

## 2020-07-08 ENCOUNTER — HOSPITAL ENCOUNTER (OUTPATIENT)
Dept: RADIOLOGY | Facility: HOSPITAL | Age: 80
Discharge: HOME | End: 2020-07-08
Attending: NURSE PRACTITIONER
Payer: MEDICARE

## 2020-07-08 ENCOUNTER — CLINICAL SUPPORT (OUTPATIENT)
Dept: FAMILY MEDICINE | Facility: CLINIC | Age: 80
End: 2020-07-08
Payer: MEDICARE

## 2020-07-08 VITALS — HEART RATE: 84 BPM | SYSTOLIC BLOOD PRESSURE: 136 MMHG | DIASTOLIC BLOOD PRESSURE: 72 MMHG

## 2020-07-08 DIAGNOSIS — D64.9 ANEMIA, UNSPECIFIED TYPE: ICD-10-CM

## 2020-07-08 DIAGNOSIS — Z79.01 ANTICOAGULATED ON COUMADIN: Primary | ICD-10-CM

## 2020-07-08 DIAGNOSIS — R31.0 GROSS HEMATURIA: ICD-10-CM

## 2020-07-08 DIAGNOSIS — N28.1 RENAL CYSTS, ACQUIRED, BILATERAL: ICD-10-CM

## 2020-07-08 DIAGNOSIS — I48.0 PAROXYSMAL ATRIAL FIBRILLATION: ICD-10-CM

## 2020-07-08 LAB — INR PPP: 2.1 (ref 2–3)

## 2020-07-08 PROCEDURE — 74178 CT ABD&PLV WO CNTR FLWD CNTR: CPT | Mod: TC,HCNC,PO

## 2020-07-08 PROCEDURE — 85610 PROTHROMBIN TIME: CPT | Mod: QW,,, | Performed by: FAMILY MEDICINE

## 2020-07-08 PROCEDURE — 96372 PR INJECTION,THERAP/PROPH/DIAG2ST, IM OR SUBCUT: ICD-10-PCS | Mod: S$GLB,,, | Performed by: FAMILY MEDICINE

## 2020-07-08 PROCEDURE — 25500020 PHARM REV CODE 255: Mod: HCNC,PO | Performed by: NURSE PRACTITIONER

## 2020-07-08 PROCEDURE — 96372 THER/PROPH/DIAG INJ SC/IM: CPT | Mod: S$GLB,,, | Performed by: FAMILY MEDICINE

## 2020-07-08 PROCEDURE — 85610 POCT INR: ICD-10-PCS | Mod: QW,,, | Performed by: FAMILY MEDICINE

## 2020-07-08 RX ORDER — CYANOCOBALAMIN 1000 UG/ML
1000 INJECTION, SOLUTION INTRAMUSCULAR; SUBCUTANEOUS ONCE
Status: DISCONTINUED | OUTPATIENT
Start: 2020-07-08 | End: 2020-07-08

## 2020-07-08 RX ADMIN — CYANOCOBALAMIN 1000 MCG: 1000 INJECTION, SOLUTION INTRAMUSCULAR; SUBCUTANEOUS at 02:07

## 2020-07-08 RX ADMIN — IOHEXOL 125 ML: 350 INJECTION, SOLUTION INTRAVENOUS at 10:07

## 2020-07-08 NOTE — TELEPHONE ENCOUNTER
Patient here today for b12 inj and inr check      BLAZE  She stated her bp was low yesterday mid day - 95/57  She said it did return to normal in the evening once she drank water    She said this happened mid day last week  I advised her that she needs to stay hydrated throughout the day    bp today in office prior to meds was 136/72    She will notify us if she has any other problems

## 2020-07-09 ENCOUNTER — PROCEDURE VISIT (OUTPATIENT)
Dept: UROLOGY | Facility: CLINIC | Age: 80
End: 2020-07-09
Payer: MEDICARE

## 2020-07-09 VITALS
SYSTOLIC BLOOD PRESSURE: 124 MMHG | WEIGHT: 210 LBS | BODY MASS INDEX: 38.64 KG/M2 | HEIGHT: 62 IN | DIASTOLIC BLOOD PRESSURE: 75 MMHG | TEMPERATURE: 99 F | HEART RATE: 65 BPM

## 2020-07-09 DIAGNOSIS — N28.1 RENAL CYST: Primary | ICD-10-CM

## 2020-07-09 DIAGNOSIS — R31.0 GROSS HEMATURIA: ICD-10-CM

## 2020-07-09 PROCEDURE — 81002 URINALYSIS NONAUTO W/O SCOPE: CPT | Mod: HCNC,S$GLB,, | Performed by: STUDENT IN AN ORGANIZED HEALTH CARE EDUCATION/TRAINING PROGRAM

## 2020-07-09 PROCEDURE — 81002 POCT URINE DIPSTICK WITHOUT MICROSCOPE: ICD-10-PCS | Mod: HCNC,S$GLB,, | Performed by: STUDENT IN AN ORGANIZED HEALTH CARE EDUCATION/TRAINING PROGRAM

## 2020-07-09 PROCEDURE — 52000 PR CYSTOURETHROSCOPY: ICD-10-PCS | Mod: HCNC,S$GLB,, | Performed by: STUDENT IN AN ORGANIZED HEALTH CARE EDUCATION/TRAINING PROGRAM

## 2020-07-09 PROCEDURE — 52000 CYSTOURETHROSCOPY: CPT | Mod: HCNC,S$GLB,, | Performed by: STUDENT IN AN ORGANIZED HEALTH CARE EDUCATION/TRAINING PROGRAM

## 2020-07-09 RX ORDER — DOXYCYCLINE HYCLATE 100 MG
100 TABLET ORAL EVERY 12 HOURS
Qty: 6 TABLET | Refills: 0 | Status: SHIPPED | OUTPATIENT
Start: 2020-07-09 | End: 2020-07-12

## 2020-07-09 NOTE — PROCEDURES
Procedures     Procedure:   1. Flexible cysto-uretheroscopy    Pre Procedure Diagnosis:  1. Gross hematuria    Post Procedure Diagnosis:  1. Same    Surgeon: Lizet Edge MD    Anesthesia: 2% uro-jet lidocaine jelly for local analgesia    Procedure note:  A flexible cysto-urethroscopy was performed after consent was obtained.  The risks and benefits were explained. 2% lidocaine urojet was used for local analgesia. The genitalia was prepped and draped in the sterile fashion.     The flexible scope was advanced into the urethra and into the bladder. The urethra was clearly visualized but was small in caliber and the scope kept gliding out without entering the urethra. After several attempts the urethra was finally cannulated with the cystoscope. The bilateral ureteral orifices were identified in their normal orthotopic locations. Clear bilateral ureteral efflux was identified. The bladder was completely surveyed in a systematic fashion. No bladder tumors or lesions were seen.     As the flexible cystoscope was being removed, the urethra was evaluated and noted to be normal.     The patient tolerated the procedure well without complication.       Findings in summary:  Difficult to enter urethra initially.  Bladder mucosa normal, no abnormalities  Clear efflux of urine from bilateral UO's.      Assessment: 79 y.o. female with history of gross hematuria, bilateral renal cysts, now completed a workup.     Plan:  1. Cystoscopy findings - benign.  2. CT urogram reviewed - right and left bilateral renal cysts stable. Left sided cysts more complex with a calcification. So should likely be monitored carefully. Next renal US in 6 months with NP. If that is again stable since they have been stable on numerous imaging studies prior, can likely space out to yearly.  3. Will CC Dr. Covington, numerous incidentally discovered non-urologic findings on CT but I believe not clinically significant.  4. Doxy x 3 days for empiric UTI  prophylaxis after cystoscopy since she was dealing with a labial abscess very recently.

## 2020-07-10 LAB
BILIRUB SERPL-MCNC: NORMAL MG/DL
BLOOD URINE, POC: NORMAL
CLARITY, POC UA: CLEAR
COLOR, POC UA: NORMAL
GLUCOSE UR QL STRIP: NORMAL
KETONES UR QL STRIP: NORMAL
LEUKOCYTE ESTERASE URINE, POC: NORMAL
NITRITE, POC UA: NORMAL
PH, POC UA: 5
PROTEIN, POC: NORMAL
SPECIFIC GRAVITY, POC UA: 1.01
UROBILINOGEN, POC UA: NORMAL

## 2020-08-05 ENCOUNTER — CLINICAL SUPPORT (OUTPATIENT)
Dept: FAMILY MEDICINE | Facility: CLINIC | Age: 80
End: 2020-08-05
Payer: MEDICARE

## 2020-08-05 DIAGNOSIS — I48.0 PAROXYSMAL ATRIAL FIBRILLATION: Primary | ICD-10-CM

## 2020-08-05 LAB — INR PPP: 2.2 (ref 2–3)

## 2020-08-05 PROCEDURE — 85610 POCT INR: ICD-10-PCS | Mod: QW,,, | Performed by: FAMILY MEDICINE

## 2020-08-05 PROCEDURE — 96372 PR INJECTION,THERAP/PROPH/DIAG2ST, IM OR SUBCUT: ICD-10-PCS | Mod: S$GLB,,, | Performed by: FAMILY MEDICINE

## 2020-08-05 PROCEDURE — 96372 THER/PROPH/DIAG INJ SC/IM: CPT | Mod: S$GLB,,, | Performed by: FAMILY MEDICINE

## 2020-08-05 PROCEDURE — 85610 PROTHROMBIN TIME: CPT | Mod: QW,,, | Performed by: FAMILY MEDICINE

## 2020-08-05 RX ADMIN — CYANOCOBALAMIN 1000 MCG: 1000 INJECTION, SOLUTION INTRAMUSCULAR; SUBCUTANEOUS at 10:08

## 2020-08-06 DIAGNOSIS — I48.0 PAROXYSMAL ATRIAL FIBRILLATION: ICD-10-CM

## 2020-08-06 RX ORDER — DILTIAZEM HYDROCHLORIDE 240 MG/1
240 CAPSULE, COATED, EXTENDED RELEASE ORAL DAILY
Qty: 90 CAPSULE | Refills: 3 | Status: SHIPPED | OUTPATIENT
Start: 2020-08-06 | End: 2021-01-29 | Stop reason: SDUPTHER

## 2020-08-21 LAB
LEFT EYE DM RETINOPATHY: NEGATIVE
RIGHT EYE DM RETINOPATHY: NEGATIVE

## 2020-09-02 ENCOUNTER — CLINICAL SUPPORT (OUTPATIENT)
Dept: FAMILY MEDICINE | Facility: CLINIC | Age: 80
End: 2020-09-02
Payer: MEDICARE

## 2020-09-02 DIAGNOSIS — I48.0 PAROXYSMAL ATRIAL FIBRILLATION: Primary | ICD-10-CM

## 2020-09-02 LAB — INR PPP: 2.5 (ref 2–3)

## 2020-09-02 PROCEDURE — 96372 PR INJECTION,THERAP/PROPH/DIAG2ST, IM OR SUBCUT: ICD-10-PCS | Mod: S$GLB,,, | Performed by: FAMILY MEDICINE

## 2020-09-02 PROCEDURE — 96372 THER/PROPH/DIAG INJ SC/IM: CPT | Mod: S$GLB,,, | Performed by: FAMILY MEDICINE

## 2020-09-02 PROCEDURE — 85610 POCT INR: ICD-10-PCS | Mod: QW,,, | Performed by: FAMILY MEDICINE

## 2020-09-02 PROCEDURE — 85610 PROTHROMBIN TIME: CPT | Mod: QW,,, | Performed by: FAMILY MEDICINE

## 2020-09-02 RX ADMIN — CYANOCOBALAMIN 1000 MCG: 1000 INJECTION, SOLUTION INTRAMUSCULAR; SUBCUTANEOUS at 10:09

## 2020-09-02 NOTE — TELEPHONE ENCOUNTER
----- Message from Melony Anderson sent at 9/2/2020  8:43 AM CDT -----  Contact: [phone]  Type:  RX Refill Request    Who Called: PT  Refill or New Rx:Refill  RX Name and Strength:losartan (COZAAR) 100 MG tablet  How is the patient currently taking it? (ex. 1XDay):0.5 tablet daily  Is this a 30 day or 90 day RX: 30 day  Preferred Pharmacy with phone number:[facility] #[id number] - [address] AT [facility]  Local or Mail Order:Local  Ordering Provider: Dr Dillard  Would the patient rather a call back or a response via [facility]?  Call back  Best Call Back Number:[phone]

## 2020-09-03 RX ORDER — LOSARTAN POTASSIUM 100 MG/1
50 TABLET ORAL DAILY
Qty: 15 TABLET | Refills: 3 | Status: SHIPPED | OUTPATIENT
Start: 2020-09-03 | End: 2020-10-11

## 2020-09-16 ENCOUNTER — PES CALL (OUTPATIENT)
Dept: ADMINISTRATIVE | Facility: CLINIC | Age: 80
End: 2020-09-16

## 2020-09-18 ENCOUNTER — OFFICE VISIT (OUTPATIENT)
Dept: FAMILY MEDICINE | Facility: CLINIC | Age: 80
End: 2020-09-18
Payer: MEDICARE

## 2020-09-18 VITALS
OXYGEN SATURATION: 95 % | HEIGHT: 62 IN | SYSTOLIC BLOOD PRESSURE: 136 MMHG | DIASTOLIC BLOOD PRESSURE: 76 MMHG | HEART RATE: 84 BPM | BODY MASS INDEX: 40.19 KG/M2 | TEMPERATURE: 98 F | WEIGHT: 218.38 LBS

## 2020-09-18 DIAGNOSIS — I10 ESSENTIAL HYPERTENSION: Primary | ICD-10-CM

## 2020-09-18 DIAGNOSIS — K80.20 GALLSTONES: ICD-10-CM

## 2020-09-18 DIAGNOSIS — I49.3 PVC (PREMATURE VENTRICULAR CONTRACTION): ICD-10-CM

## 2020-09-18 DIAGNOSIS — G47.33 OSA (OBSTRUCTIVE SLEEP APNEA): ICD-10-CM

## 2020-09-18 DIAGNOSIS — Z79.01 ANTICOAGULATED ON COUMADIN: ICD-10-CM

## 2020-09-18 DIAGNOSIS — R16.0 ENLARGED LIVER: ICD-10-CM

## 2020-09-18 DIAGNOSIS — R26.89 ANTALGIC GAIT: ICD-10-CM

## 2020-09-18 DIAGNOSIS — E66.01 SEVERE OBESITY WITH BODY MASS INDEX (BMI) OF 35.0 TO 39.9 WITH SERIOUS COMORBIDITY: ICD-10-CM

## 2020-09-18 PROBLEM — G89.29 CHRONIC LEFT SHOULDER PAIN: Status: RESOLVED | Noted: 2020-03-13 | Resolved: 2020-09-18

## 2020-09-18 PROBLEM — M25.512 CHRONIC LEFT SHOULDER PAIN: Status: RESOLVED | Noted: 2020-03-13 | Resolved: 2020-09-18

## 2020-09-18 PROCEDURE — 1101F PT FALLS ASSESS-DOCD LE1/YR: CPT | Mod: CPTII,S$GLB,, | Performed by: FAMILY MEDICINE

## 2020-09-18 PROCEDURE — 1126F AMNT PAIN NOTED NONE PRSNT: CPT | Mod: S$GLB,,, | Performed by: FAMILY MEDICINE

## 2020-09-18 PROCEDURE — 99213 OFFICE O/P EST LOW 20 MIN: CPT | Mod: S$GLB,,, | Performed by: FAMILY MEDICINE

## 2020-09-18 PROCEDURE — 1159F MED LIST DOCD IN RCRD: CPT | Mod: S$GLB,,, | Performed by: FAMILY MEDICINE

## 2020-09-18 PROCEDURE — 3078F PR MOST RECENT DIASTOLIC BLOOD PRESSURE < 80 MM HG: ICD-10-PCS | Mod: CPTII,S$GLB,, | Performed by: FAMILY MEDICINE

## 2020-09-18 PROCEDURE — 1126F PR PAIN SEVERITY QUANTIFIED, NO PAIN PRESENT: ICD-10-PCS | Mod: S$GLB,,, | Performed by: FAMILY MEDICINE

## 2020-09-18 PROCEDURE — 3075F PR MOST RECENT SYSTOLIC BLOOD PRESS GE 130-139MM HG: ICD-10-PCS | Mod: CPTII,S$GLB,, | Performed by: FAMILY MEDICINE

## 2020-09-18 PROCEDURE — 1101F PR PT FALLS ASSESS DOC 0-1 FALLS W/OUT INJ PAST YR: ICD-10-PCS | Mod: CPTII,S$GLB,, | Performed by: FAMILY MEDICINE

## 2020-09-18 PROCEDURE — 99213 PR OFFICE/OUTPT VISIT, EST, LEVL III, 20-29 MIN: ICD-10-PCS | Mod: S$GLB,,, | Performed by: FAMILY MEDICINE

## 2020-09-18 PROCEDURE — 3078F DIAST BP <80 MM HG: CPT | Mod: CPTII,S$GLB,, | Performed by: FAMILY MEDICINE

## 2020-09-18 PROCEDURE — 3075F SYST BP GE 130 - 139MM HG: CPT | Mod: CPTII,S$GLB,, | Performed by: FAMILY MEDICINE

## 2020-09-18 PROCEDURE — 1159F PR MEDICATION LIST DOCUMENTED IN MEDICAL RECORD: ICD-10-PCS | Mod: S$GLB,,, | Performed by: FAMILY MEDICINE

## 2020-09-18 NOTE — PROGRESS NOTES
"Subjective:      Patient ID: Mis Ca is a 79 y.o. female.    Chief Complaint: Follow-up      Vitals:    09/18/20 1048   BP: 136/76   Pulse: 84   Temp: 97.5 °F (36.4 °C)   TempSrc: Temporal   SpO2: 95%   Weight: 99 kg (218 lb 5.9 oz)   Height: 5' 2" (1.575 m)        HPI   3 montghs check up; c/o gaining weight, swelling of feet and legs;  Gets SOB, lots of palpitations  Flip flop feelingg  lightheadedses Opal feldman, saw him recetnly  Comes and goes  Decaffeinted, no alcohol  Wearing cpap now and fells better in AM  Eats out too much  Crochets a lot   diff walking, uses a cane  Lives at [facility] and exercise eqpt is shut down due to the virus  Low back gets tight waith walking  Cane helps  Not keeping track    Problem List  Patient Active Problem List   Diagnosis    Paroxysmal atrial fibrillation    JACQULEYN (obstructive sleep apnea)    Anticoagulated on Coumadin    Anxiety    Restless leg syndrome    Hyperlipidemia    Essential hypertension    Gastroesophageal reflux disease without esophagitis    History of adenomatous polyp of colon    Diverticulosis of large intestine without hemorrhage    Senile purpura    Tortuous aorta    Glaucoma    Blind right eye    Diastolic dysfunction    Aortic atherosclerosis    Severe obesity with body mass index (BMI) of 35.0 to 39.9 with serious comorbidity    History of skin cancer    Hallux valgus, left    Hallux valgus with bunions, left    Gastrocnemius equinus of left lower extremity    Left foot pain    Antalgic gait    Neuralgia    Multilevel facet arthritis    Nerve entrapment syndrome of foot, left    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  Extended delirium.  Advised not to take it again.      Adhesive     " Compazine [prochlorperazine edisylate] Anxiety    Penicillins Rash    Sulfa (sulfonamide antibiotics) Rash    Vancomycin analogues Rash       MEDS:   Current Outpatient Medications:     acetaminophen (TYLENOL) 500 MG tablet, Take 1,000 mg by mouth 2 (two) times daily as needed for Pain., Disp: , Rfl:     ALPRAZolam (XANAX) 0.25 MG tablet, TAKE ONE TABLET BY MOUTH TWICE DAILY AS NEEDED FOR ANXIETY , Disp: 60 tablet, Rfl: 2    aspirin (ECOTRIN) 81 MG EC tablet, Take 81 mg by mouth once daily.  , Disp: , Rfl:     atorvastatin (LIPITOR) 40 MG tablet, TAKE 1 TABLET (40 MG TOTAL) BY MOUTH ONCE DAILY., Disp: 90 tablet, Rfl: 3    clotrimazole (LOTRIMIN) 1 % cream, Apply topically 2 (two) times daily., Disp: 60 g, Rfl: 2    cyanocobalamin 1,000 mcg/mL injection, INJECT 1ML INTRAMUSCULARLY WEEKLY FOR 4 WEEKS, THEN 1ML MONTHLY, Disp: 3 mL, Rfl: 1    diltiaZEM (CARTIA XT) 240 MG 24 hr capsule, Take 1 capsule (240 mg total) by mouth once daily., Disp: 90 capsule, Rfl: 3    furosemide (LASIX) 20 MG tablet, Take 1 tablet (20 mg total) by mouth daily as needed., Disp: 30 tablet, Rfl: 3    gabapentin (NEURONTIN) 300 MG capsule, TAKE 2 CAPSULES BY MOUTH TWICE DAILY, Disp: 360 capsule, Rfl: 2    hydroCHLOROthiazide (HYDRODIURIL) 25 MG tablet, Take 1 tablet (25 mg total) by mouth once daily., Disp: 90 tablet, Rfl: 3    latanoprost 0.005 % ophthalmic solution, Place 1 drop into the left eye every evening. , Disp: , Rfl:     losartan (COZAAR) 100 MG tablet, Take 0.5 tablets (50 mg total) by mouth once daily., Disp: 15 tablet, Rfl: 3    omeprazole (PRILOSEC) 20 MG capsule, Take 1 capsule (20 mg total) by mouth once daily., Disp: 90 capsule, Rfl: 3    oxybutynin (DITROPAN) 5 MG Tab, TAKE 1 TABLET EVERY DAY, Disp: 90 tablet, Rfl: 3    pramipexole (MIRAPEX) 0.5 MG tablet, TAKE 1 TABLET TWICE DAILY (Patient taking differently: Take 0.5 mg by mouth nightly. ), Disp: 180 tablet, Rfl: 3    timolol maleate 0.5% (TIMOPTIC) 0.5 %  Drop, Place 1 drop into the left eye once daily. , Disp: , Rfl: 0    warfarin (COUMADIN) 6 MG tablet, Take 1 tablet (6 mg total) by mouth Daily., Disp: 90 tablet, Rfl: 3    docusate sodium (COLACE) 100 MG capsule, Take 1 capsule (100 mg total) by mouth 2 (two) times daily. (Patient not taking: Reported on 9/18/2020), Disp: , Rfl: 0    doxycycline (ADOXA) 50 MG tablet, Take 2 tablets (100 mg total) by mouth 2 (two) times daily. (Patient not taking: Reported on 7/9/2020), Disp: 40 tablet, Rfl: 1    multivitamin (ONE DAILY MULTIVITAMIN) per tablet, Take 1 tablet by mouth once daily., Disp: , Rfl:     warfarin (COUMADIN) 5 MG tablet, Take 1 tablet (5 mg total) by mouth Daily. (Patient not taking: Reported on 9/18/2020), Disp: 90 tablet, Rfl: 3    Current Facility-Administered Medications:     cyanocobalamin injection 1,000 mcg, 1,000 mcg, Intramuscular, Q30 Days, Scott Dillard MD, 1,000 mcg at 09/02/20 1033      History:  Current Providers as of 9/18/2020  PCP: Scott Dillard MD  Care Team Provider: Sudhir Coles MD  Care Team Provider: Sergio Viera MD  Care Team Provider: Jolanta Burnett MD  Care Team Provider: Harris Franklin LANA  Care Team Provider: Dank Levin MD  Care Team Provider: Lenny Burroughs LPN  Care Team Provider: Portillo Luis MD  Encounter Provider: Scott Dillard MD, starting on Fri Sep 18, 2020 12:00 AM  Referring Provider: not found, starting on Fri Sep 18, 2020 12:00 AM  Consulting Physician: Scott Dillard MD, starting on Fri Sep 18, 2020 10:46 AM (Active)   Past Medical History:   Diagnosis Date    Anticoagulant long-term use     Anxiety     Arthritis     Atrial fibrillation     Blind right eye     Bradycardia     Cancer     skin cancer left side of face under eye    Hyperlipidemia     Hypertension     PVC (premature ventricular contraction)     RLS (restless legs syndrome)     Sleep apnea     Does not use CPAP machine.     Past Surgical History:   Procedure  Laterality Date    ADENOIDECTOMY      AKIN OSTEOTOMY Left 10/31/2018    Procedure: OSTEOTOMY, AKIN;  Surgeon: Rudolph Cardozo DPM;  Location: [facility] OR;  Service: Podiatry;  Laterality: Left;    APPENDECTOMY      BREAST BIOPSY Left     x3    BREAST SURGERY Left     breast biopsy x3    CATARACT EXTRACTION BILATERAL W/ ANTERIOR VITRECTOMY      ENDOSCOPIC GASTROCNEMIUS RECESSION Left 10/31/2018    Procedure: RECESSION, GASTROCNEMIUS, ENDOSCOPIC;  Surgeon: Rudolph Cardozo DPM;  Location: [facility] OR;  Service: Podiatry;  Laterality: Left;    EYE SURGERY Bilateral     cataracts extraction    EYE SURGERY Right     drainage tube (glaucoma)    FOOT ARTHRODESIS Left 1/15/2020    Procedure: FUSION, FOOT;  Surgeon: Rudolph Cardozo DPM;  Location: [facility] OR;  Service: Podiatry;  Laterality: Left;  mini c-arm, Arthrex screw  for hardware removal (Lydia notified), Orthofix (Niels notified) min ex-fix    FOOT SURGERY Left     lesion removed from dorsal area    FRACTURE SURGERY Left     arm    HAND TENDON SURGERY Left     HYSTERECTOMY      INCISION AND DRAINAGE FOOT Left 3/11/2020    Procedure: INCISION AND DRAINAGE, FOOT;  Surgeon: Rudolph Cardozo DPM;  Location: [facility] OR;  Service: Podiatry;  Laterality: Left;    LAPIDUS BUNIONECTOMY Left 10/31/2018    Procedure: BUNIONECTOMY, LAPIDUS;  Surgeon: Rudolph Cardozo DPM;  Location: [facility] OR;  Service: Podiatry;  Laterality: Left;  mini c-arm, Arthrex locking plate (Lydia notified)    LAPIDUS BUNIONECTOMY Left 10/31/2018    Dr. Cardozo    Lymph Gland Removed  Right     groin (performed by Dr. Vergara)    NEURECTOMY Left 1/15/2020    Procedure: NEURECTOMY;  Surgeon: Rudolph Cardozo DPM;  Location: [facility] OR;  Service: Podiatry;  Laterality: Left;  bipolar bovie, vessel loop, possible Hopkins nerve wrap. Moshe with [facility] notified and will be overnighting graft. MK 1/14/2020    OOPHORECTOMY      ORIF FOREARM FRACTURE Left     PALATE SURGERY       Lesion removed    TONSILLECTOMY       Social History     Tobacco Use    Smoking status: Never Smoker    Smokeless tobacco: Never Used   Substance Use Topics    Alcohol use: Yes     Comment: Seldomly drinks alcohol (wine)    Drug use: No         Review of Systems   Constitutional: Positive for unexpected weight change.   HENT: Negative.    Respiratory: Positive for shortness of breath.    Cardiovascular: Positive for palpitations and leg swelling.   Gastrointestinal: Negative.    Endocrine: Negative.    Genitourinary: Negative.    Musculoskeletal: Positive for back pain.   Psychiatric/Behavioral: Negative.    All other systems reviewed and are negative.    Objective:     Physical Exam  Vitals signs and nursing note reviewed.   Constitutional:       Appearance: She is well-developed. She is obese.   HENT:      Head: Normocephalic.   Eyes:      Conjunctiva/sclera: Conjunctivae normal.      Pupils: Pupils are equal, round, and reactive to light.   Neck:      Musculoskeletal: Normal range of motion and neck supple.   Cardiovascular:      Rate and Rhythm: Normal rate and regular rhythm.      Heart sounds: Murmur present.   Pulmonary:      Effort: Pulmonary effort is normal.      Breath sounds: Normal breath sounds.   Musculoskeletal: Normal range of motion.   Skin:     General: Skin is warm and dry.   Neurological:      Mental Status: She is alert and oriented to person, place, and time.      Deep Tendon Reflexes: Reflexes are normal and symmetric.   Psychiatric:         Behavior: Behavior normal.         Thought Content: Thought content normal.         Judgment: Judgment normal.             Assessment:     1. Essential hypertension    2. PVC (premature ventricular contraction)    3. Anticoagulated on Coumadin    4. Antalgic gait    5. JACQUELYN (obstructive sleep apnea)    6. Severe obesity with body mass index (BMI) of 35.0 to 39.9 with serious comorbidity    7. Gallstones    8. Enlarged liver      Plan:         Medication List          Accurate as of September 18, 2020 11:59 PM. If you have any questions, ask your nurse or doctor.            CHANGE how you take these medications    pramipexole 0.5 MG tablet  Commonly known as: MIRAPEX  TAKE 1 TABLET TWICE DAILY  What changed: when to take this        CONTINUE taking these medications    acetaminophen 500 MG tablet  Commonly known as: TYLENOL     ALPRAZolam 0.25 MG tablet  Commonly known as: XANAX  TAKE ONE TABLET BY MOUTH TWICE DAILY AS NEEDED FOR ANXIETY     aspirin 81 MG EC tablet  Commonly known as: ECOTRIN     atorvastatin 40 MG tablet  Commonly known as: LIPITOR  TAKE 1 TABLET (40 MG TOTAL) BY MOUTH ONCE DAILY.     clotrimazole 1 % cream  Commonly known as: LOTRIMIN  Apply topically 2 (two) times daily.     cyanocobalamin 1,000 mcg/mL injection  INJECT 1ML INTRAMUSCULARLY WEEKLY FOR 4 WEEKS, THEN 1ML MONTHLY     diltiaZEM 240 MG 24 hr capsule  Commonly known as: CARTIA XT  Take 1 capsule (240 mg total) by mouth once daily.     docusate sodium 100 MG capsule  Commonly known as: COLACE  Take 1 capsule (100 mg total) by mouth 2 (two) times daily.     doxycycline 50 MG tablet  Commonly known as: ADOXA  Take 2 tablets (100 mg total) by mouth 2 (two) times daily.     furosemide 20 MG tablet  Commonly known as: LASIX  Take 1 tablet (20 mg total) by mouth daily as needed.     gabapentin 300 MG capsule  Commonly known as: NEURONTIN  TAKE 2 CAPSULES BY MOUTH TWICE DAILY     hydroCHLOROthiazide 25 MG tablet  Commonly known as: HYDRODIURIL  Take 1 tablet (25 mg total) by mouth once daily.     latanoprost 0.005 % ophthalmic solution     losartan 100 MG tablet  Commonly known as: COZAAR  Take 0.5 tablets (50 mg total) by mouth once daily.     omeprazole 20 MG capsule  Commonly known as: PRILOSEC  Take 1 capsule (20 mg total) by mouth once daily.     ONE DAILY MULTIVITAMIN per tablet  Generic drug: multivitamin     oxybutynin 5 MG Tab  Commonly known as: DITROPAN  TAKE 1 TABLET  EVERY DAY     timolol maleate 0.5% 0.5 % Drop  Commonly known as: TIMOPTIC     * warfarin 5 MG tablet  Commonly known as: COUMADIN  Take 1 tablet (5 mg total) by mouth Daily.     * warfarin 6 MG tablet  Commonly known as: COUMADIN  Take 1 tablet (6 mg total) by mouth Daily.         * This list has 2 medication(s) that are the same as other medications prescribed for you. Read the directions carefully, and ask your doctor or other care provider to review them with you.              Essential hypertension  -     CBC auto differential; Future; Expected date: 09/18/2020  -     Comprehensive metabolic panel; Future; Expected date: 09/18/2020  -     TSH; Future  -     Lipid Panel; Future  -     Uric acid; Future; Expected date: 09/18/2020    PVC (premature ventricular contraction)  -     CBC auto differential; Future; Expected date: 09/18/2020  -     Comprehensive metabolic panel; Future; Expected date: 09/18/2020  -     TSH; Future  -     Lipid Panel; Future  -     Uric acid; Future; Expected date: 09/18/2020    Anticoagulated on Coumadin  -     CBC auto differential; Future; Expected date: 09/18/2020  -     Comprehensive metabolic panel; Future; Expected date: 09/18/2020  -     TSH; Future  -     Lipid Panel; Future  -     Uric acid; Future; Expected date: 09/18/2020    Antalgic gait  -     CBC auto differential; Future; Expected date: 09/18/2020  -     Comprehensive metabolic panel; Future; Expected date: 09/18/2020  -     TSH; Future  -     Lipid Panel; Future  -     Uric acid; Future; Expected date: 09/18/2020    JACQUELYN (obstructive sleep apnea)  -     CBC auto differential; Future; Expected date: 09/18/2020  -     Comprehensive metabolic panel; Future; Expected date: 09/18/2020  -     TSH; Future  -     Lipid Panel; Future  -     Uric acid; Future; Expected date: 09/18/2020    Severe obesity with body mass index (BMI) of 35.0 to 39.9 with serious comorbidity  -     CBC auto differential; Future; Expected date:  09/18/2020  -     Comprehensive metabolic panel; Future; Expected date: 09/18/2020  -     TSH; Future  -     Lipid Panel; Future  -     Uric acid; Future; Expected date: 09/18/2020    Gallstones  -     CBC auto differential; Future; Expected date: 09/18/2020  -     Comprehensive metabolic panel; Future; Expected date: 09/18/2020  -     TSH; Future  -     Lipid Panel; Future  -     Uric acid; Future; Expected date: 09/18/2020    Enlarged liver  -     CBC auto differential; Future; Expected date: 09/18/2020  -     Comprehensive metabolic panel; Future; Expected date: 09/18/2020  -     TSH; Future  -     Lipid Panel; Future  -     Uric acid; Future; Expected date: 09/18/2020    Other orders  -     Influenza - High Dose (65+) (PF) (IM)        lsoe weight, get labs, use myfitnesspal

## 2020-09-28 ENCOUNTER — LAB VISIT (OUTPATIENT)
Dept: LAB | Facility: HOSPITAL | Age: 80
End: 2020-09-28
Attending: FAMILY MEDICINE
Payer: MEDICARE

## 2020-09-28 DIAGNOSIS — R26.89 ANTALGIC GAIT: ICD-10-CM

## 2020-09-28 DIAGNOSIS — Z79.01 ANTICOAGULATED ON COUMADIN: ICD-10-CM

## 2020-09-28 DIAGNOSIS — R16.0 ENLARGED LIVER: ICD-10-CM

## 2020-09-28 DIAGNOSIS — E66.01 SEVERE OBESITY WITH BODY MASS INDEX (BMI) OF 35.0 TO 39.9 WITH SERIOUS COMORBIDITY: ICD-10-CM

## 2020-09-28 DIAGNOSIS — G47.33 OSA (OBSTRUCTIVE SLEEP APNEA): ICD-10-CM

## 2020-09-28 DIAGNOSIS — K80.20 GALLSTONES: ICD-10-CM

## 2020-09-28 DIAGNOSIS — I49.3 PVC (PREMATURE VENTRICULAR CONTRACTION): ICD-10-CM

## 2020-09-28 DIAGNOSIS — I10 ESSENTIAL HYPERTENSION: ICD-10-CM

## 2020-09-28 LAB
ALBUMIN SERPL BCP-MCNC: 4.2 G/DL (ref 3.5–5.2)
ALP SERPL-CCNC: 89 U/L (ref 38–126)
ALT SERPL W/O P-5'-P-CCNC: 16 U/L (ref 10–44)
ANION GAP SERPL CALC-SCNC: 9 MMOL/L (ref 8–16)
AST SERPL-CCNC: 20 U/L (ref 15–46)
BASOPHILS # BLD AUTO: 0.04 K/UL (ref 0–0.2)
BASOPHILS NFR BLD: 0.6 % (ref 0–1.9)
BILIRUB SERPL-MCNC: 0.4 MG/DL (ref 0.1–1)
BUN SERPL-MCNC: 25 MG/DL (ref 7–17)
CALCIUM SERPL-MCNC: 9.6 MG/DL (ref 8.7–10.5)
CHLORIDE SERPL-SCNC: 100 MMOL/L (ref 95–110)
CHOLEST SERPL-MCNC: 155 MG/DL (ref 120–199)
CHOLEST/HDLC SERPL: 3.9 {RATIO} (ref 2–5)
CO2 SERPL-SCNC: 31 MMOL/L (ref 23–29)
CREAT SERPL-MCNC: 0.94 MG/DL (ref 0.5–1.4)
DIFFERENTIAL METHOD: ABNORMAL
EOSINOPHIL # BLD AUTO: 0.2 K/UL (ref 0–0.5)
EOSINOPHIL NFR BLD: 3 % (ref 0–8)
ERYTHROCYTE [DISTWIDTH] IN BLOOD BY AUTOMATED COUNT: 13.2 % (ref 11.5–14.5)
EST. GFR  (AFRICAN AMERICAN): >60 ML/MIN/1.73 M^2
EST. GFR  (NON AFRICAN AMERICAN): 58.3 ML/MIN/1.73 M^2
GLUCOSE SERPL-MCNC: 106 MG/DL (ref 70–110)
HCT VFR BLD AUTO: 38.4 % (ref 37–48.5)
HDLC SERPL-MCNC: 40 MG/DL (ref 40–75)
HDLC SERPL: 25.8 % (ref 20–50)
HGB BLD-MCNC: 12.6 G/DL (ref 12–16)
IMM GRANULOCYTES # BLD AUTO: 0.03 K/UL (ref 0–0.04)
IMM GRANULOCYTES NFR BLD AUTO: 0.4 % (ref 0–0.5)
LDLC SERPL CALC-MCNC: 81.6 MG/DL (ref 63–159)
LYMPHOCYTES # BLD AUTO: 2.2 K/UL (ref 1–4.8)
LYMPHOCYTES NFR BLD: 33.1 % (ref 18–48)
MCH RBC QN AUTO: 31.5 PG (ref 27–31)
MCHC RBC AUTO-ENTMCNC: 32.8 G/DL (ref 32–36)
MCV RBC AUTO: 96 FL (ref 82–98)
MONOCYTES # BLD AUTO: 0.7 K/UL (ref 0.3–1)
MONOCYTES NFR BLD: 9.7 % (ref 4–15)
NEUTROPHILS # BLD AUTO: 3.6 K/UL (ref 1.8–7.7)
NEUTROPHILS NFR BLD: 53.2 % (ref 38–73)
NONHDLC SERPL-MCNC: 115 MG/DL
NRBC BLD-RTO: 0 /100 WBC
PLATELET # BLD AUTO: 221 K/UL (ref 150–350)
PMV BLD AUTO: 10.9 FL (ref 9.2–12.9)
POTASSIUM SERPL-SCNC: 4.2 MMOL/L (ref 3.5–5.1)
PROT SERPL-MCNC: 7 G/DL (ref 6–8.4)
RBC # BLD AUTO: 4 M/UL (ref 4–5.4)
SODIUM SERPL-SCNC: 140 MMOL/L (ref 136–145)
TRIGL SERPL-MCNC: 167 MG/DL (ref 30–150)
TSH SERPL DL<=0.005 MIU/L-ACNC: 1.58 UIU/ML (ref 0.4–4)
URATE SERPL-MCNC: 7.1 MG/DL (ref 2.4–5.7)
WBC # BLD AUTO: 6.77 K/UL (ref 3.9–12.7)

## 2020-09-28 PROCEDURE — 84443 ASSAY THYROID STIM HORMONE: CPT | Mod: HCNC,PO

## 2020-09-28 PROCEDURE — 36415 COLL VENOUS BLD VENIPUNCTURE: CPT | Mod: HCNC,PO

## 2020-09-28 PROCEDURE — 80061 LIPID PANEL: CPT | Mod: HCNC

## 2020-09-28 PROCEDURE — 85025 COMPLETE CBC W/AUTO DIFF WBC: CPT | Mod: HCNC,PO

## 2020-09-28 PROCEDURE — 80053 COMPREHEN METABOLIC PANEL: CPT | Mod: HCNC,PO

## 2020-09-28 PROCEDURE — 84550 ASSAY OF BLOOD/URIC ACID: CPT | Mod: HCNC

## 2020-09-29 ENCOUNTER — PATIENT MESSAGE (OUTPATIENT)
Dept: OTHER | Facility: OTHER | Age: 80
End: 2020-09-29

## 2020-09-29 ENCOUNTER — TELEPHONE (OUTPATIENT)
Dept: FAMILY MEDICINE | Facility: CLINIC | Age: 80
End: 2020-09-29

## 2020-09-29 DIAGNOSIS — D64.9 ANEMIA, UNSPECIFIED TYPE: Primary | ICD-10-CM

## 2020-09-29 DIAGNOSIS — E79.0 ELEVATED URIC ACID IN BLOOD: ICD-10-CM

## 2020-09-29 NOTE — TELEPHONE ENCOUNTER
Spoke with patient and test results given with recommendations. Will repeat in 3 months. Patient voices understanding.

## 2020-09-29 NOTE — TELEPHONE ENCOUNTER
----- Message from Scott Dillard MD sent at 9/29/2020  8:09 AM CDT -----  Labs are looking good.  Anemia is much better and uric acid is better.  Repeat labs in 3 months

## 2020-09-30 ENCOUNTER — CLINICAL SUPPORT (OUTPATIENT)
Dept: FAMILY MEDICINE | Facility: CLINIC | Age: 80
End: 2020-09-30
Payer: MEDICARE

## 2020-09-30 DIAGNOSIS — E53.8 B12 DEFICIENCY: ICD-10-CM

## 2020-09-30 DIAGNOSIS — Z79.01 ANTICOAGULATED ON COUMADIN: Primary | ICD-10-CM

## 2020-09-30 LAB — INR PPP: 2 (ref 2–3)

## 2020-09-30 PROCEDURE — 96372 PR INJECTION,THERAP/PROPH/DIAG2ST, IM OR SUBCUT: ICD-10-PCS | Mod: S$GLB,,, | Performed by: FAMILY MEDICINE

## 2020-09-30 PROCEDURE — 85610 POCT INR: ICD-10-PCS | Mod: QW,,, | Performed by: FAMILY MEDICINE

## 2020-09-30 PROCEDURE — 85610 PROTHROMBIN TIME: CPT | Mod: QW,,, | Performed by: FAMILY MEDICINE

## 2020-09-30 PROCEDURE — 96372 THER/PROPH/DIAG INJ SC/IM: CPT | Mod: S$GLB,,, | Performed by: FAMILY MEDICINE

## 2020-09-30 RX ORDER — CYANOCOBALAMIN 1000 UG/ML
1000 INJECTION, SOLUTION INTRAMUSCULAR; SUBCUTANEOUS
Status: DISCONTINUED | OUTPATIENT
Start: 2020-09-30 | End: 2020-11-11

## 2020-09-30 RX ADMIN — CYANOCOBALAMIN 1000 MCG: 1000 INJECTION, SOLUTION INTRAMUSCULAR; SUBCUTANEOUS at 10:09

## 2020-10-06 ENCOUNTER — PES CALL (OUTPATIENT)
Dept: ADMINISTRATIVE | Facility: CLINIC | Age: 80
End: 2020-10-06

## 2020-10-07 NOTE — TELEPHONE ENCOUNTER
----- Message from Qiana Josh sent at 10/7/2020 12:33 PM CDT -----  Type:  RX Refill Request    Who Called: Mis  Refill or New Rx: refill  RX Name and Strength: hydroCHLOROthiazide tablet 25 mg     How is the patient currently taking it? (ex. 1XDay): 1XDay  Is this a 30 day or 90 day RX: 90  Preferred Pharmacy with phone number: [facility]/pharmacy #[id number] - [address] AT [facility] [phone] (Phone)  [phone] (Fax)  Local or Mail Order: local  Ordering Provider: Dr. Dillard  Would the patient rather a call back or a response via [facility]? Call back  Best Call Back Number: [phone]  Additional Information: pt has enough to last her through the weekend, but would like to have the medication just in case

## 2020-10-08 RX ORDER — HYDROCHLOROTHIAZIDE 25 MG/1
25 TABLET ORAL DAILY
Qty: 90 TABLET | Refills: 3 | Status: SHIPPED | OUTPATIENT
Start: 2020-10-08 | End: 2022-01-07 | Stop reason: SDUPTHER

## 2020-10-28 ENCOUNTER — CLINICAL SUPPORT (OUTPATIENT)
Dept: FAMILY MEDICINE | Facility: CLINIC | Age: 80
End: 2020-10-28
Payer: MEDICARE

## 2020-10-28 DIAGNOSIS — E53.8 B12 DEFICIENCY: ICD-10-CM

## 2020-10-28 DIAGNOSIS — Z79.01 ANTICOAGULATED ON COUMADIN: Primary | ICD-10-CM

## 2020-10-28 LAB — INR PPP: 1.7 (ref 2–3)

## 2020-10-28 PROCEDURE — 85610 POCT INR: ICD-10-PCS | Mod: QW,,, | Performed by: FAMILY MEDICINE

## 2020-10-28 PROCEDURE — 85610 PROTHROMBIN TIME: CPT | Mod: QW,,, | Performed by: FAMILY MEDICINE

## 2020-10-28 PROCEDURE — 96372 PR INJECTION,THERAP/PROPH/DIAG2ST, IM OR SUBCUT: ICD-10-PCS | Mod: S$GLB,,, | Performed by: FAMILY MEDICINE

## 2020-10-28 PROCEDURE — 96372 THER/PROPH/DIAG INJ SC/IM: CPT | Mod: S$GLB,,, | Performed by: FAMILY MEDICINE

## 2020-10-28 RX ADMIN — CYANOCOBALAMIN 1000 MCG: 1000 INJECTION, SOLUTION INTRAMUSCULAR; SUBCUTANEOUS at 09:10

## 2020-11-11 ENCOUNTER — OFFICE VISIT (OUTPATIENT)
Dept: FAMILY MEDICINE | Facility: CLINIC | Age: 80
End: 2020-11-11
Payer: MEDICARE

## 2020-11-11 VITALS
TEMPERATURE: 97 F | OXYGEN SATURATION: 95 % | HEART RATE: 66 BPM | SYSTOLIC BLOOD PRESSURE: 126 MMHG | BODY MASS INDEX: 39.78 KG/M2 | WEIGHT: 217.5 LBS | DIASTOLIC BLOOD PRESSURE: 70 MMHG

## 2020-11-11 DIAGNOSIS — G89.29 CHRONIC PAIN OF RIGHT KNEE: ICD-10-CM

## 2020-11-11 DIAGNOSIS — R01.1 HEART MURMUR: ICD-10-CM

## 2020-11-11 DIAGNOSIS — E53.8 B12 DEFICIENCY: Primary | ICD-10-CM

## 2020-11-11 DIAGNOSIS — Z79.01 ANTICOAGULATED ON COUMADIN: ICD-10-CM

## 2020-11-11 DIAGNOSIS — I48.0 PAROXYSMAL ATRIAL FIBRILLATION: ICD-10-CM

## 2020-11-11 DIAGNOSIS — M25.561 CHRONIC PAIN OF RIGHT KNEE: ICD-10-CM

## 2020-11-11 LAB — INR PPP: 2.1 (ref 2–3)

## 2020-11-11 PROCEDURE — 1159F MED LIST DOCD IN RCRD: CPT | Mod: S$GLB,,, | Performed by: STUDENT IN AN ORGANIZED HEALTH CARE EDUCATION/TRAINING PROGRAM

## 2020-11-11 PROCEDURE — 3074F PR MOST RECENT SYSTOLIC BLOOD PRESSURE < 130 MM HG: ICD-10-PCS | Mod: CPTII,S$GLB,, | Performed by: STUDENT IN AN ORGANIZED HEALTH CARE EDUCATION/TRAINING PROGRAM

## 2020-11-11 PROCEDURE — 3078F DIAST BP <80 MM HG: CPT | Mod: CPTII,S$GLB,, | Performed by: STUDENT IN AN ORGANIZED HEALTH CARE EDUCATION/TRAINING PROGRAM

## 2020-11-11 PROCEDURE — 1125F PR PAIN SEVERITY QUANTIFIED, PAIN PRESENT: ICD-10-PCS | Mod: S$GLB,,, | Performed by: STUDENT IN AN ORGANIZED HEALTH CARE EDUCATION/TRAINING PROGRAM

## 2020-11-11 PROCEDURE — 3078F PR MOST RECENT DIASTOLIC BLOOD PRESSURE < 80 MM HG: ICD-10-PCS | Mod: CPTII,S$GLB,, | Performed by: STUDENT IN AN ORGANIZED HEALTH CARE EDUCATION/TRAINING PROGRAM

## 2020-11-11 PROCEDURE — 85610 POCT INR: ICD-10-PCS | Mod: QW,,, | Performed by: STUDENT IN AN ORGANIZED HEALTH CARE EDUCATION/TRAINING PROGRAM

## 2020-11-11 PROCEDURE — 99214 OFFICE O/P EST MOD 30 MIN: CPT | Mod: S$GLB,,, | Performed by: STUDENT IN AN ORGANIZED HEALTH CARE EDUCATION/TRAINING PROGRAM

## 2020-11-11 PROCEDURE — 3074F SYST BP LT 130 MM HG: CPT | Mod: CPTII,S$GLB,, | Performed by: STUDENT IN AN ORGANIZED HEALTH CARE EDUCATION/TRAINING PROGRAM

## 2020-11-11 PROCEDURE — 1125F AMNT PAIN NOTED PAIN PRSNT: CPT | Mod: S$GLB,,, | Performed by: STUDENT IN AN ORGANIZED HEALTH CARE EDUCATION/TRAINING PROGRAM

## 2020-11-11 PROCEDURE — 1159F PR MEDICATION LIST DOCUMENTED IN MEDICAL RECORD: ICD-10-PCS | Mod: S$GLB,,, | Performed by: STUDENT IN AN ORGANIZED HEALTH CARE EDUCATION/TRAINING PROGRAM

## 2020-11-11 PROCEDURE — 85610 PROTHROMBIN TIME: CPT | Mod: QW,,, | Performed by: STUDENT IN AN ORGANIZED HEALTH CARE EDUCATION/TRAINING PROGRAM

## 2020-11-11 PROCEDURE — 1101F PT FALLS ASSESS-DOCD LE1/YR: CPT | Mod: CPTII,S$GLB,, | Performed by: STUDENT IN AN ORGANIZED HEALTH CARE EDUCATION/TRAINING PROGRAM

## 2020-11-11 PROCEDURE — 1101F PR PT FALLS ASSESS DOC 0-1 FALLS W/OUT INJ PAST YR: ICD-10-PCS | Mod: CPTII,S$GLB,, | Performed by: STUDENT IN AN ORGANIZED HEALTH CARE EDUCATION/TRAINING PROGRAM

## 2020-11-11 PROCEDURE — 99214 PR OFFICE/OUTPT VISIT, EST, LEVL IV, 30-39 MIN: ICD-10-PCS | Mod: S$GLB,,, | Performed by: STUDENT IN AN ORGANIZED HEALTH CARE EDUCATION/TRAINING PROGRAM

## 2020-11-12 ENCOUNTER — TELEPHONE (OUTPATIENT)
Dept: CARDIOLOGY | Facility: CLINIC | Age: 80
End: 2020-11-12

## 2020-11-12 ENCOUNTER — HOSPITAL ENCOUNTER (OUTPATIENT)
Dept: CARDIOLOGY | Facility: HOSPITAL | Age: 80
Discharge: HOME | End: 2020-11-12
Attending: STUDENT IN AN ORGANIZED HEALTH CARE EDUCATION/TRAINING PROGRAM
Payer: MEDICARE

## 2020-11-12 VITALS — WEIGHT: 217 LBS | BODY MASS INDEX: 39.93 KG/M2 | HEIGHT: 62 IN

## 2020-11-12 LAB
AORTIC ROOT ANNULUS: 3.3 CM
AORTIC VALVE CUSP SEPERATION: 1.65 CM
AV INDEX (PROSTH): 1.09
AV MEAN GRADIENT: 4 MMHG
AV PEAK GRADIENT: 8 MMHG
AV VALVE AREA: 3.52 CM2
AV VELOCITY RATIO: 1.01
BSA FOR ECHO PROCEDURE: 2.07 M2
CV ECHO LV RWT: 0.78 CM
DOP CALC AO PEAK VEL: 1.43 M/S
DOP CALC AO VTI: 29.43 CM
DOP CALC LVOT AREA: 3.2 CM2
DOP CALC LVOT DIAMETER: 2.03 CM
DOP CALC LVOT PEAK VEL: 1.44 M/S
DOP CALC LVOT STROKE VOLUME: 103.68 CM3
DOP CALCLVOT PEAK VEL VTI: 32.05 CM
E WAVE DECELERATION TIME: 245.34 MSEC
E/A RATIO: 0.73
ECHO LV POSTERIOR WALL: 1.61 CM (ref 0.6–1.1)
FRACTIONAL SHORTENING: 40 % (ref 28–44)
INTERVENTRICULAR SEPTUM: 1.53 CM (ref 0.6–1.1)
LA MAJOR: 4.61 CM
LA MINOR: 5.66 CM
LA WIDTH: 3.57 CM
LEFT ATRIUM SIZE: 4.74 CM
LEFT ATRIUM VOLUME INDEX: 36.9 ML/M2
LEFT ATRIUM VOLUME: 73.09 CM3
LEFT INTERNAL DIMENSION IN SYSTOLE: 2.48 CM (ref 2.1–4)
LEFT VENTRICLE DIASTOLIC VOLUME INDEX: 37.8 ML/M2
LEFT VENTRICLE DIASTOLIC VOLUME: 74.8 ML
LEFT VENTRICLE MASS INDEX: 131 G/M2
LEFT VENTRICLE SYSTOLIC VOLUME INDEX: 11.1 ML/M2
LEFT VENTRICLE SYSTOLIC VOLUME: 21.95 ML
LEFT VENTRICULAR INTERNAL DIMENSION IN DIASTOLE: 4.11 CM (ref 3.5–6)
LEFT VENTRICULAR MASS: 259.89 G
MV PEAK A VEL: 0.82 M/S
MV PEAK E VEL: 0.6 M/S
MV STENOSIS PRESSURE HALF TIME: 71.15 MS
MV VALVE AREA P 1/2 METHOD: 3.09 CM2
PV PEAK VELOCITY: 0.86 CM/S
RA MAJOR: 4.41 CM
RA PRESSURE: 3 MMHG
RA WIDTH: 3.57 CM
RIGHT VENTRICULAR END-DIASTOLIC DIMENSION: 2.61 CM
SINUS: 2.88 CM

## 2020-11-12 PROCEDURE — 93306 TTE W/DOPPLER COMPLETE: CPT | Mod: PO

## 2020-11-12 PROCEDURE — 93306 TTE W/DOPPLER COMPLETE: CPT | Mod: 26,,, | Performed by: INTERNAL MEDICINE

## 2020-11-12 PROCEDURE — 93306 ECHO (CUPID ONLY): ICD-10-PCS | Mod: 26,,, | Performed by: INTERNAL MEDICINE

## 2020-11-13 ENCOUNTER — LAB VISIT (OUTPATIENT)
Dept: LAB | Facility: OTHER | Age: 80
End: 2020-11-13
Payer: MEDICARE

## 2020-11-13 DIAGNOSIS — Z03.818 ENCOUNTER FOR OBSERVATION FOR SUSPECTED EXPOSURE TO OTHER BIOLOGICAL AGENTS RULED OUT: ICD-10-CM

## 2020-11-13 PROCEDURE — U0003 INFECTIOUS AGENT DETECTION BY NUCLEIC ACID (DNA OR RNA); SEVERE ACUTE RESPIRATORY SYNDROME CORONAVIRUS 2 (SARS-COV-2) (CORONAVIRUS DISEASE [COVID-19]), AMPLIFIED PROBE TECHNIQUE, MAKING USE OF HIGH THROUGHPUT TECHNOLOGIES AS DESCRIBED BY CMS-2020-01-R: HCPCS

## 2020-11-15 LAB — SARS-COV-2 RNA RESP QL NAA+PROBE: NOT DETECTED

## 2020-11-25 ENCOUNTER — CLINICAL SUPPORT (OUTPATIENT)
Dept: FAMILY MEDICINE | Facility: CLINIC | Age: 80
End: 2020-11-25
Payer: MEDICARE

## 2020-11-25 DIAGNOSIS — Z79.01 ANTICOAGULATED ON COUMADIN: Primary | ICD-10-CM

## 2020-11-25 DIAGNOSIS — E53.8 B12 DEFICIENCY: ICD-10-CM

## 2020-11-25 LAB — INR PPP: 1.9 (ref 2–3)

## 2020-11-25 PROCEDURE — 85610 PROTHROMBIN TIME: CPT | Mod: QW,,, | Performed by: FAMILY MEDICINE

## 2020-11-25 PROCEDURE — 85610 POCT INR: ICD-10-PCS | Mod: QW,,, | Performed by: FAMILY MEDICINE

## 2020-11-25 PROCEDURE — 96372 THER/PROPH/DIAG INJ SC/IM: CPT | Mod: S$GLB,,, | Performed by: FAMILY MEDICINE

## 2020-11-25 PROCEDURE — 96372 PR INJECTION,THERAP/PROPH/DIAG2ST, IM OR SUBCUT: ICD-10-PCS | Mod: S$GLB,,, | Performed by: FAMILY MEDICINE

## 2020-11-25 RX ORDER — CYANOCOBALAMIN 1000 UG/ML
1000 INJECTION, SOLUTION INTRAMUSCULAR; SUBCUTANEOUS
Status: DISCONTINUED | OUTPATIENT
Start: 2020-11-25 | End: 2021-02-12

## 2020-11-25 RX ADMIN — CYANOCOBALAMIN 1000 MCG: 1000 INJECTION, SOLUTION INTRAMUSCULAR; SUBCUTANEOUS at 08:11

## 2020-12-07 NOTE — PROGRESS NOTES
Subjective:   @Patient ID:  Mis Ca is a 80 y.o. female who presents for follow-up of PAF, Hyperlipidemia, HTN, DD      HPI:   Here for f/u  She stated that she is doing ok  She gained 25 Ibs since last visit. She doesn't take the lasix consistently. She takes it with swelling. She reports CARDOSO which at baseline and slightly worse.   Ocasionaly she feels her heart goes into afib, not often and not bothering her.       PAF on Coumadin . Currently SR.       Prior cardiovascular  Hx  --------------------------------          - ECHO    · 9/2019 Normal left ventricular systolic function. The estimated ejection fraction is 65%  · Normal LV diastolic function.  · Concentric left ventricular hypertrophy.  · Normal right ventricular systolic function.             12/2017  Normal left ventricular systolic function (EF 60-65%).     2 - Impaired LV relaxation, normal LAP (grade 1 diastolic dysfunction).     3 - Normal right ventricular systolic function .     4 - The estimated PA systolic pressure is 28 mmHg.     5 - Mild left atrial enlargement.         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       Neuralgia 06/03/2019    Left foot pain 01/08/2019    Antalgic gait 01/08/2019    Hallux valgus with bunions, left 10/31/2018    Gastrocnemius equinus of left lower extremity 10/31/2018    Hallux valgus, left 09/23/2018    History of skin cancer 07/16/2018    Severe obesity with body mass index (BMI) of 35.0 to 39.9 with serious comorbidity 12/21/2017    Diastolic dysfunction 09/20/2017    Aortic atherosclerosis 09/04/2017     Seen on chest xray  dated 09/04/17      Blind right eye 07/10/2017    Glaucoma 04/06/2017    Senile purpura 03/08/2017    Tortuous aorta 03/08/2017    History of adenomatous polyp of colon 12/09/2016    Diverticulosis of large intestine without hemorrhage 12/09/2016    JACQUELYN (obstructive sleep apnea) 02/17/2016    Anticoagulated on Coumadin 02/17/2016    Anxiety 02/17/2016    Restless leg syndrome 02/17/2016    Hyperlipidemia 02/17/2016    Essential hypertension 02/17/2016    Gastroesophageal reflux disease without esophagitis 02/17/2016    Paroxysmal atrial fibrillation 12/16/2014                    LAST HbA1c  Lab Results   Component Value Date    HGBA1C 5.8 06/09/2016       Lipid panel  Lab Results   Component Value Date    CHOL 155 09/28/2020    CHOL 162 09/03/2019    CHOL 166 08/10/2017     Lab Results   Component Value Date    HDL 40 09/28/2020    HDL 38 (L) 09/03/2019    HDL 39 (L) 08/10/2017     Lab Results   Component Value Date    LDLCALC 81.6 09/28/2020    LDLCALC 77.8 09/03/2019    LDLCALC 101 08/10/2017     Lab Results   Component Value Date    TRIG 167 (H) 09/28/2020    TRIG 231 (H) 09/03/2019    TRIG 130 08/10/2017     Lab Results   Component Value Date    CHOLHDL 25.8 09/28/2020    CHOLHDL 23.5 09/03/2019    CHOLHDL 4.3 08/10/2017            Review of Systems   Constitution: Negative for chills and fever.   HENT: Negative for hearing loss and nosebleeds.    Eyes: Negative for blurred vision.   Cardiovascular: Negative for chest pain and palpitations.        As in HPI    Respiratory: Negative for hemoptysis and shortness of breath.    Hematologic/Lymphatic: Negative for bleeding problem.   Skin: Negative for itching.   Musculoskeletal:        As in HPI    Gastrointestinal: Negative for abdominal pain and hematochezia.   Genitourinary: Negative for hematuria.   Neurological: Negative for dizziness and loss of balance.   Psychiatric/Behavioral: Negative for altered mental status and depression.       Objective:    Physical Exam   Constitutional: She is oriented to person, place, and time. She appears well-developed and well-nourished.   HENT:   Head: Normocephalic and atraumatic.   Eyes: Conjunctivae are normal.   Neck: Neck supple. No JVD present.   Cardiovascular: Normal rate, regular rhythm and normal heart sounds. Exam reveals no gallop and no friction rub.   No murmur heard.  Pulmonary/Chest: Effort normal and breath sounds normal. No stridor. No respiratory distress. She has no wheezes.   Musculoskeletal:         General: Edema (Trace) present.   Neurological: She is alert and oriented to person, place, and time.   Skin: Skin is warm and dry.   Psychiatric: She has a normal mood and affect. Her behavior is normal.       Assessment:     1. Paroxysmal atrial fibrillation    2. Mixed hyperlipidemia    3. Essential hypertension    4. Aortic atherosclerosis    5. Diastolic dysfunction    6. Anticoagulated on Coumadin    7. Severe obesity with body mass index (BMI) of 35.0 to 39.9 with serious comorbidity        Plan:   Continue current treatment   Continue coumadin   Instructed to take the lasix 2 times a week and prn to help with DD.   Monitor BP closely at home      I spent 5-10 minutes asking, assessing, assisting, arranging and advising heart healthy diet improvements. This included low-salt meals, portion control and health food alternatives. I also encourage 30 minutes of moderate exercise 3-4x a week.       Pertinent cardiac images and EKG reviewed independently.    Continue with current medical plan and lifestyle changes.  Return sooner for concerns or questions. If symptoms persist go to the ED  I have reviewed all pertinent data including patient's medical history in detail and updated the computerized patient record.     No orders of the defined types were placed in this encounter.      Follow up as scheduled.     She expressed verbal understanding and agreed with the plan    Patient's Medications   New  Prescriptions    No medications on file   Previous Medications    ACETAMINOPHEN (TYLENOL) 500 MG TABLET    Take 1,000 mg by mouth 2 (two) times daily as needed for Pain.    ALPRAZOLAM (XANAX) 0.25 MG TABLET    TAKE ONE TABLET BY MOUTH TWICE DAILY AS NEEDED FOR ANXIETY     ASPIRIN (ECOTRIN) 81 MG EC TABLET    Take 81 mg by mouth once daily.      ATORVASTATIN (LIPITOR) 40 MG TABLET    TAKE 1 TABLET (40 MG TOTAL) BY MOUTH ONCE DAILY.    CLOTRIMAZOLE (LOTRIMIN) 1 % CREAM    Apply topically 2 (two) times daily.    CYANOCOBALAMIN 1,000 MCG/ML INJECTION    INJECT 1ML INTRAMUSCULARLY WEEKLY FOR 4 WEEKS, THEN 1ML MONTHLY    DILTIAZEM (CARTIA XT) 240 MG 24 HR CAPSULE    Take 1 capsule (240 mg total) by mouth once daily.    FUROSEMIDE (LASIX) 20 MG TABLET    Take 1 tablet (20 mg total) by mouth daily as needed.    GABAPENTIN (NEURONTIN) 300 MG CAPSULE    TAKE 2 CAPSULES BY MOUTH TWICE DAILY    HYDROCHLOROTHIAZIDE (HYDRODIURIL) 25 MG TABLET    Take 1 tablet (25 mg total) by mouth once daily.    LATANOPROST 0.005 % OPHTHALMIC SOLUTION    Place 1 drop into the left eye every evening.     LOSARTAN (COZAAR) 100 MG TABLET    TAKE 0.5 TABLETS (50 MG TOTAL) BY MOUTH ONCE DAILY.    MULTIVITAMIN (ONE DAILY MULTIVITAMIN) PER TABLET    Take 1 tablet by mouth once daily.    OMEPRAZOLE (PRILOSEC) 20 MG CAPSULE    Take 1 capsule (20 mg total) by mouth once daily.    OXYBUTYNIN (DITROPAN) 5 MG TAB    TAKE 1 TABLET EVERY DAY    PRAMIPEXOLE (MIRAPEX) 0.5 MG TABLET    TAKE 1 TABLET TWICE DAILY    TIMOLOL MALEATE 0.5% (TIMOPTIC) 0.5 % DROP    Place 1 drop into the left eye once daily.     WARFARIN (COUMADIN) 6 MG TABLET    Take 1 tablet (6 mg total) by mouth Daily.   Modified Medications    No medications on file   Discontinued Medications    No medications on file

## 2020-12-09 ENCOUNTER — OFFICE VISIT (OUTPATIENT)
Dept: CARDIOLOGY | Facility: CLINIC | Age: 80
End: 2020-12-09
Payer: MEDICARE

## 2020-12-09 VITALS
HEART RATE: 66 BPM | OXYGEN SATURATION: 97 % | HEIGHT: 62 IN | DIASTOLIC BLOOD PRESSURE: 72 MMHG | SYSTOLIC BLOOD PRESSURE: 142 MMHG | WEIGHT: 219.19 LBS | BODY MASS INDEX: 40.33 KG/M2

## 2020-12-09 DIAGNOSIS — I10 ESSENTIAL HYPERTENSION: ICD-10-CM

## 2020-12-09 DIAGNOSIS — E66.01 SEVERE OBESITY WITH BODY MASS INDEX (BMI) OF 35.0 TO 39.9 WITH SERIOUS COMORBIDITY: ICD-10-CM

## 2020-12-09 DIAGNOSIS — I48.0 PAROXYSMAL ATRIAL FIBRILLATION: Primary | ICD-10-CM

## 2020-12-09 DIAGNOSIS — Z79.01 ANTICOAGULATED ON COUMADIN: ICD-10-CM

## 2020-12-09 DIAGNOSIS — E78.2 MIXED HYPERLIPIDEMIA: ICD-10-CM

## 2020-12-09 DIAGNOSIS — I51.89 DIASTOLIC DYSFUNCTION: ICD-10-CM

## 2020-12-09 DIAGNOSIS — I70.0 AORTIC ATHEROSCLEROSIS: ICD-10-CM

## 2020-12-09 PROCEDURE — 1126F AMNT PAIN NOTED NONE PRSNT: CPT | Mod: HCNC,S$GLB,, | Performed by: INTERNAL MEDICINE

## 2020-12-09 PROCEDURE — 1126F PR PAIN SEVERITY QUANTIFIED, NO PAIN PRESENT: ICD-10-PCS | Mod: HCNC,S$GLB,, | Performed by: INTERNAL MEDICINE

## 2020-12-09 PROCEDURE — 1159F MED LIST DOCD IN RCRD: CPT | Mod: HCNC,S$GLB,, | Performed by: INTERNAL MEDICINE

## 2020-12-09 PROCEDURE — 3078F PR MOST RECENT DIASTOLIC BLOOD PRESSURE < 80 MM HG: ICD-10-PCS | Mod: HCNC,CPTII,S$GLB, | Performed by: INTERNAL MEDICINE

## 2020-12-09 PROCEDURE — 3288F PR FALLS RISK ASSESSMENT DOCUMENTED: ICD-10-PCS | Mod: HCNC,CPTII,S$GLB, | Performed by: INTERNAL MEDICINE

## 2020-12-09 PROCEDURE — 1101F PT FALLS ASSESS-DOCD LE1/YR: CPT | Mod: HCNC,CPTII,S$GLB, | Performed by: INTERNAL MEDICINE

## 2020-12-09 PROCEDURE — 1159F PR MEDICATION LIST DOCUMENTED IN MEDICAL RECORD: ICD-10-PCS | Mod: HCNC,S$GLB,, | Performed by: INTERNAL MEDICINE

## 2020-12-09 PROCEDURE — 3288F FALL RISK ASSESSMENT DOCD: CPT | Mod: HCNC,CPTII,S$GLB, | Performed by: INTERNAL MEDICINE

## 2020-12-09 PROCEDURE — 99214 PR OFFICE/OUTPT VISIT, EST, LEVL IV, 30-39 MIN: ICD-10-PCS | Mod: HCNC,S$GLB,, | Performed by: INTERNAL MEDICINE

## 2020-12-09 PROCEDURE — 3078F DIAST BP <80 MM HG: CPT | Mod: HCNC,CPTII,S$GLB, | Performed by: INTERNAL MEDICINE

## 2020-12-09 PROCEDURE — 3077F PR MOST RECENT SYSTOLIC BLOOD PRESSURE >= 140 MM HG: ICD-10-PCS | Mod: HCNC,CPTII,S$GLB, | Performed by: INTERNAL MEDICINE

## 2020-12-09 PROCEDURE — 99214 OFFICE O/P EST MOD 30 MIN: CPT | Mod: HCNC,S$GLB,, | Performed by: INTERNAL MEDICINE

## 2020-12-09 PROCEDURE — 3077F SYST BP >= 140 MM HG: CPT | Mod: HCNC,CPTII,S$GLB, | Performed by: INTERNAL MEDICINE

## 2020-12-09 PROCEDURE — 1101F PR PT FALLS ASSESS DOC 0-1 FALLS W/OUT INJ PAST YR: ICD-10-PCS | Mod: HCNC,CPTII,S$GLB, | Performed by: INTERNAL MEDICINE

## 2020-12-09 NOTE — PATIENT INSTRUCTIONS
Aerobic Exercise for a Healthy Heart  Exercise is a lot more than an energy booster and a stress reliever. It also strengthens your heart muscle, lowers your blood pressure and cholesterol, and burns calories. It can also improve your resting muscle tone, and your mood.     Remember, some activity is better than none.    Choose an aerobic activity  Choose an activity that makes your heart and lungs work harder than they do when you rest or walk normally. This aerobic exercise can improve the way your heart and other muscles use oxygen. Make it fun by exercising with a friend and choosing an activity you enjoy. Here are some ideas:  · Walking  · Swimming  · Bicycling  · Stair climbing  · Dancing  · Jogging  · Gardening  Exercise regularly  If you havent been exercising regularly,  get your doctors OK first. Then start slowly.  Here are some tips:  · Begin exercising 3 times a week for 5 to 10 minutes at a time.  · When you feel comfortable, add a few minutes each session.  · Slowly build up to exercising 3 to 4 times each week. Each session should last for 40 minutes, on average, and involve moderate- to vigorous-intensity physical activity.  · If you have been given nitroglycerin, be sure to carry it when you exercise.  · If you get chest pain (angina) when youre exercising, stop what youre doing, take your nitroglycerin, and call your doctor.  Date Last Reviewed: 6/2/2016  © 5084-4086 [employer]. [address] [place] [address]. All rights reserved. This information is not intended as a substitute for professional medical care. Always follow your healthcare professional's instructions.          Eating Heart-Healthy Foods  Eating has a big impact on your heart health. In fact, eating healthier can improve several of your heart risks at once. For instance, it helps you manage weight, cholesterol, and blood pressure. Here are ideas to help you make heart-healthy changes without giving up  all the foods and flavors you love.  Getting started  · Talk with your health care provider about eating plans, such as the DASH or Mediterranean diet. You may also be referred to a dietitian.  · Change a few things at a time. Give yourself time to get used to a few eating changes before adding more.  · Work to create a tasty, healthy eating plan that you can stick to for the rest of your life.    Goals for healthy eating  Below are some tips to improve your eating habits:  · Limit saturated fats and trans fats. Saturated fats raise your levels of cholesterol, so keep these fats to a minimum. They are found in foods such as fatty meats, whole milk, cheese, and palm and coconut oils. Avoid trans fats because they lower good cholesterol as well as raise bad cholesterol. Trans fats are most often found in processed foods.  · Reduce sodium (salt) intake. Eating too much salt may increase your blood pressure. Limit your sodium intake to 2,300 milligrams (mg) per day, or less if your health care provider recommends it. Dining out less often and eating fewer processed foods are two great ways to decrease the amount of salt you consume.  · Managing calories. A calorie is a unit of energy. Your body burns calories for fuel, but if you eat more calories than your body burns, the extras are stored as fat. Your health care provider can help you create a diet plan to manage your calories. This will likely include eating healthier foods as well as exercising regularly. To help you track your progress, keep a diary to record what you eat and how often you exercise.  Choose the right foods  Aim to make these foods staples of your diet. If you have diabetes, you may have different recommendations than what is listed here:  · Fruits and vegetable provide plenty of nutrients without a lot of calories. At meals, fill half your plate with these foods. Split the other half of your plate between whole grains and lean protein.  · Whole  grains are high in fiber and rich in vitamins and nutrients. Good choices include whole-wheat bread, pasta, and brown rice.  · Lean proteins give you nutrition with less fat. Good choices include fish, skinless chicken, and beans.  · Low-fat or nonfat dairy provides nutrients without a lot of fat. Try low-fat or nonfat milk, cheese, or yogurt.  · Healthy fats can be good for you in small amounts. These are unsaturated fats, such as olive oil, nuts, and fish. Try to have at least 2 servings per week of fatty fish such as salmon, sardines, mackerel, rainbow trout, and albacore tuna. These contain omega-3 fatty acids, which are good for your heart. Flaxseed is another source of a heart-healthy fat.  More on heart healthy eating    Read food labels  Healthy eating starts at the grocery store. Be sure to pay attention to food labels on packaged foods. Look for products that are high in fiber and protein, and low in saturated fat, cholesterol, and sodium. Avoid products that contain trans fat. And pay close attention to serving size. For instance, if you plan to eat two servings, double all the numbers on the label.  Prepare food right  A key part of healthy cooking is cutting down on added fat and salt. Look on the internet for lower-fat, lower-sodium recipes. Also, try these tips:  · Remove fat from meat and skin from poultry before cooking.  · Skim fat from the surface of soups and sauces.  · Broil, boil, bake, steam, grill, and microwave food without added fats.  · Choose ingredients that spice up your food without adding calories, fat, or sodium. Try these items: horseradish, hot sauce, lemon, mustard, nonfat salad dressings, and vinegar. For salt-free herbs and spices, try basil, cilantro, cinnamon, pepper, and rosemary.  Date Last Reviewed: 6/25/2015  © 9060-3361 [employer]. [address] [place] [address], [place] [address]. All rights reserved. This information is not intended as a substitute for  professional medical care. Always follow your healthcare professional's instructions.

## 2020-12-11 ENCOUNTER — PATIENT MESSAGE (OUTPATIENT)
Dept: OTHER | Facility: OTHER | Age: 80
End: 2020-12-11

## 2020-12-18 LAB
LEFT EYE DM RETINOPATHY: NEGATIVE
RIGHT EYE DM RETINOPATHY: NEGATIVE

## 2020-12-22 ENCOUNTER — TELEPHONE (OUTPATIENT)
Dept: FAMILY MEDICINE | Facility: CLINIC | Age: 80
End: 2020-12-22

## 2020-12-22 NOTE — TELEPHONE ENCOUNTER
----- Message from Alfreda Parekh sent at 12/22/2020 10:59 AM CST -----  Type:  Needs Medical Advice    Who Called:[facility] [employer]  Reason:requesting last office notes  Would the patient rather a call back or a response via [facility]? call  Best Call Back Number: [phone]   Additional Information: fax [phone]

## 2020-12-23 ENCOUNTER — TELEPHONE (OUTPATIENT)
Dept: FAMILY MEDICINE | Facility: CLINIC | Age: 80
End: 2020-12-23

## 2020-12-23 DIAGNOSIS — Z98.890 HISTORY OF FOOT SURGERY: Primary | ICD-10-CM

## 2020-12-23 NOTE — TELEPHONE ENCOUNTER
----- Message from Alfreda Parekh sent at 12/23/2020 12:01 PM CST -----  Type:  Needs Medical Advice    Who Called: PT  Reason:Patient  wants to continue PT and her insurance is changing at the beginning of January and they will need a new updated order   Would the patient rather a call back or a response via [facility]? call  Best Call Back Number: [phone]   Additional Information: [phone]/ fax

## 2020-12-23 NOTE — TELEPHONE ENCOUNTER
----- Message from Natasha Andino sent at 12/23/2020  2:11 PM CST -----  Contact: shabnam/[facility]/[phone]  Patient is returning a phone call.  Who left a message for the patient: Citlaly Peraza LPN   Does patient know what this is regarding:  yes  Comments:

## 2020-12-28 ENCOUNTER — LAB VISIT (OUTPATIENT)
Dept: LAB | Facility: HOSPITAL | Age: 80
End: 2020-12-28
Attending: FAMILY MEDICINE
Payer: MEDICARE

## 2020-12-28 DIAGNOSIS — E79.0 ELEVATED URIC ACID IN BLOOD: ICD-10-CM

## 2020-12-28 DIAGNOSIS — D64.9 ANEMIA, UNSPECIFIED TYPE: ICD-10-CM

## 2020-12-28 LAB
BASOPHILS # BLD AUTO: 0.04 K/UL (ref 0–0.2)
BASOPHILS NFR BLD: 0.6 % (ref 0–1.9)
DIFFERENTIAL METHOD: ABNORMAL
EOSINOPHIL # BLD AUTO: 0.3 K/UL (ref 0–0.5)
EOSINOPHIL NFR BLD: 3.4 % (ref 0–8)
ERYTHROCYTE [DISTWIDTH] IN BLOOD BY AUTOMATED COUNT: 12.8 % (ref 11.5–14.5)
HCT VFR BLD AUTO: 39.5 % (ref 37–48.5)
HGB BLD-MCNC: 13 G/DL (ref 12–16)
IMM GRANULOCYTES # BLD AUTO: 0.04 K/UL (ref 0–0.04)
IMM GRANULOCYTES NFR BLD AUTO: 0.6 % (ref 0–0.5)
LYMPHOCYTES # BLD AUTO: 2.3 K/UL (ref 1–4.8)
LYMPHOCYTES NFR BLD: 32.2 % (ref 18–48)
MCH RBC QN AUTO: 32 PG (ref 27–31)
MCHC RBC AUTO-ENTMCNC: 32.9 G/DL (ref 32–36)
MCV RBC AUTO: 97 FL (ref 82–98)
MONOCYTES # BLD AUTO: 0.7 K/UL (ref 0.3–1)
MONOCYTES NFR BLD: 9.4 % (ref 4–15)
NEUTROPHILS # BLD AUTO: 3.9 K/UL (ref 1.8–7.7)
NEUTROPHILS NFR BLD: 53.8 % (ref 38–73)
NRBC BLD-RTO: 0 /100 WBC
PLATELET # BLD AUTO: 220 K/UL (ref 150–350)
PMV BLD AUTO: 10.9 FL (ref 9.2–12.9)
RBC # BLD AUTO: 4.06 M/UL (ref 4–5.4)
URATE SERPL-MCNC: 6.9 MG/DL (ref 2.4–5.7)
WBC # BLD AUTO: 7.27 K/UL (ref 3.9–12.7)

## 2020-12-28 PROCEDURE — 36415 COLL VENOUS BLD VENIPUNCTURE: CPT | Mod: HCNC,PO

## 2020-12-28 PROCEDURE — 85025 COMPLETE CBC W/AUTO DIFF WBC: CPT | Mod: HCNC,PO

## 2020-12-28 PROCEDURE — 84550 ASSAY OF BLOOD/URIC ACID: CPT | Mod: HCNC

## 2020-12-29 DIAGNOSIS — R60.9 SWELLING: ICD-10-CM

## 2020-12-29 NOTE — TELEPHONE ENCOUNTER
----- Message from Chiqui Rebecca sent at 12/29/2020 10:41 AM CST -----  Regarding: meds  Contact: self  The pt is requesting to get her meds sent to [facility].  They were with [employer] in the past.    RX name: oxybutynin (DITROPAN)  Strength: 5 mg  Quantity: 90 pills  Directions: one a day    RX name: omeprazole (PRILOSEC)  Strength: 20 mg  Quantity: 90 pills  Directions:   one a day      RX name: furosemide (LASIX)  Strength: 20 mg  Quantity: 30 pills  Directions: Take 1 tablet (20 mg total) by mouth daily as needed            [facility] name: [facility]      Phone number where patient can be reached: [phone]

## 2020-12-29 NOTE — TELEPHONE ENCOUNTER
----- Message from Scott Dillard MD sent at 12/29/2020  7:01 AM CST -----  Anemia resolved; gout better; no change in meds

## 2020-12-30 ENCOUNTER — HOSPITAL ENCOUNTER (OUTPATIENT)
Dept: RADIOLOGY | Facility: HOSPITAL | Age: 80
Discharge: HOME | End: 2020-12-30
Attending: NURSE PRACTITIONER
Payer: MEDICARE

## 2020-12-30 ENCOUNTER — CLINICAL SUPPORT (OUTPATIENT)
Dept: FAMILY MEDICINE | Facility: CLINIC | Age: 80
End: 2020-12-30
Payer: MEDICARE

## 2020-12-30 DIAGNOSIS — Z79.01 ANTICOAGULATED ON COUMADIN: Primary | ICD-10-CM

## 2020-12-30 DIAGNOSIS — N28.1 RENAL CYST: ICD-10-CM

## 2020-12-30 LAB — INR PPP: 2.2 (ref 2–3)

## 2020-12-30 PROCEDURE — 96372 PR INJECTION,THERAP/PROPH/DIAG2ST, IM OR SUBCUT: ICD-10-PCS | Mod: S$GLB,,, | Performed by: FAMILY MEDICINE

## 2020-12-30 PROCEDURE — 76770 US EXAM ABDO BACK WALL COMP: CPT | Mod: TC,HCNC,PO

## 2020-12-30 PROCEDURE — 85610 PROTHROMBIN TIME: CPT | Mod: QW,,, | Performed by: FAMILY MEDICINE

## 2020-12-30 PROCEDURE — 96372 THER/PROPH/DIAG INJ SC/IM: CPT | Mod: S$GLB,,, | Performed by: FAMILY MEDICINE

## 2020-12-30 PROCEDURE — 85610 POCT INR: ICD-10-PCS | Mod: QW,,, | Performed by: FAMILY MEDICINE

## 2020-12-30 RX ADMIN — CYANOCOBALAMIN 1000 MCG: 1000 INJECTION, SOLUTION INTRAMUSCULAR; SUBCUTANEOUS at 08:12

## 2020-12-31 RX ORDER — OXYBUTYNIN CHLORIDE 5 MG/1
5 TABLET ORAL DAILY
Qty: 90 TABLET | Refills: 3 | Status: SHIPPED | OUTPATIENT
Start: 2020-12-31 | End: 2021-01-29 | Stop reason: SDUPTHER

## 2020-12-31 RX ORDER — OMEPRAZOLE 20 MG/1
20 CAPSULE, DELAYED RELEASE ORAL DAILY
Qty: 90 CAPSULE | Refills: 3 | Status: SHIPPED | OUTPATIENT
Start: 2020-12-31 | End: 2021-01-29 | Stop reason: SDUPTHER

## 2020-12-31 RX ORDER — FUROSEMIDE 20 MG/1
20 TABLET ORAL DAILY PRN
Qty: 30 TABLET | Refills: 3 | Status: SHIPPED | OUTPATIENT
Start: 2020-12-31 | End: 2021-01-29 | Stop reason: SDUPTHER

## 2021-01-07 ENCOUNTER — OFFICE VISIT (OUTPATIENT)
Dept: UROLOGY | Facility: CLINIC | Age: 81
End: 2021-01-07
Payer: MEDICARE

## 2021-01-07 VITALS
DIASTOLIC BLOOD PRESSURE: 82 MMHG | HEIGHT: 62 IN | HEART RATE: 64 BPM | SYSTOLIC BLOOD PRESSURE: 130 MMHG | BODY MASS INDEX: 40.3 KG/M2 | WEIGHT: 219 LBS

## 2021-01-07 DIAGNOSIS — N28.1 RENAL CYST: Primary | ICD-10-CM

## 2021-01-07 PROCEDURE — 3288F FALL RISK ASSESSMENT DOCD: CPT | Mod: CPTII,S$GLB,, | Performed by: STUDENT IN AN ORGANIZED HEALTH CARE EDUCATION/TRAINING PROGRAM

## 2021-01-07 PROCEDURE — 1126F AMNT PAIN NOTED NONE PRSNT: CPT | Mod: S$GLB,,, | Performed by: STUDENT IN AN ORGANIZED HEALTH CARE EDUCATION/TRAINING PROGRAM

## 2021-01-07 PROCEDURE — 3079F PR MOST RECENT DIASTOLIC BLOOD PRESSURE 80-89 MM HG: ICD-10-PCS | Mod: CPTII,S$GLB,, | Performed by: STUDENT IN AN ORGANIZED HEALTH CARE EDUCATION/TRAINING PROGRAM

## 2021-01-07 PROCEDURE — 3079F DIAST BP 80-89 MM HG: CPT | Mod: CPTII,S$GLB,, | Performed by: STUDENT IN AN ORGANIZED HEALTH CARE EDUCATION/TRAINING PROGRAM

## 2021-01-07 PROCEDURE — 1101F PT FALLS ASSESS-DOCD LE1/YR: CPT | Mod: CPTII,S$GLB,, | Performed by: STUDENT IN AN ORGANIZED HEALTH CARE EDUCATION/TRAINING PROGRAM

## 2021-01-07 PROCEDURE — 1126F PR PAIN SEVERITY QUANTIFIED, NO PAIN PRESENT: ICD-10-PCS | Mod: S$GLB,,, | Performed by: STUDENT IN AN ORGANIZED HEALTH CARE EDUCATION/TRAINING PROGRAM

## 2021-01-07 PROCEDURE — 3288F PR FALLS RISK ASSESSMENT DOCUMENTED: ICD-10-PCS | Mod: CPTII,S$GLB,, | Performed by: STUDENT IN AN ORGANIZED HEALTH CARE EDUCATION/TRAINING PROGRAM

## 2021-01-07 PROCEDURE — 1159F MED LIST DOCD IN RCRD: CPT | Mod: S$GLB,,, | Performed by: STUDENT IN AN ORGANIZED HEALTH CARE EDUCATION/TRAINING PROGRAM

## 2021-01-07 PROCEDURE — 99214 PR OFFICE/OUTPT VISIT, EST, LEVL IV, 30-39 MIN: ICD-10-PCS | Mod: S$GLB,,, | Performed by: STUDENT IN AN ORGANIZED HEALTH CARE EDUCATION/TRAINING PROGRAM

## 2021-01-07 PROCEDURE — 3075F SYST BP GE 130 - 139MM HG: CPT | Mod: CPTII,S$GLB,, | Performed by: STUDENT IN AN ORGANIZED HEALTH CARE EDUCATION/TRAINING PROGRAM

## 2021-01-07 PROCEDURE — 1101F PR PT FALLS ASSESS DOC 0-1 FALLS W/OUT INJ PAST YR: ICD-10-PCS | Mod: CPTII,S$GLB,, | Performed by: STUDENT IN AN ORGANIZED HEALTH CARE EDUCATION/TRAINING PROGRAM

## 2021-01-07 PROCEDURE — 99999 PR PBB SHADOW E&M-EST. PATIENT-LVL IV: CPT | Mod: PBBFAC,,, | Performed by: STUDENT IN AN ORGANIZED HEALTH CARE EDUCATION/TRAINING PROGRAM

## 2021-01-07 PROCEDURE — 99214 OFFICE O/P EST MOD 30 MIN: CPT | Mod: S$GLB,,, | Performed by: STUDENT IN AN ORGANIZED HEALTH CARE EDUCATION/TRAINING PROGRAM

## 2021-01-07 PROCEDURE — 3075F PR MOST RECENT SYSTOLIC BLOOD PRESS GE 130-139MM HG: ICD-10-PCS | Mod: CPTII,S$GLB,, | Performed by: STUDENT IN AN ORGANIZED HEALTH CARE EDUCATION/TRAINING PROGRAM

## 2021-01-07 PROCEDURE — 99999 PR PBB SHADOW E&M-EST. PATIENT-LVL IV: ICD-10-PCS | Mod: PBBFAC,,, | Performed by: STUDENT IN AN ORGANIZED HEALTH CARE EDUCATION/TRAINING PROGRAM

## 2021-01-07 PROCEDURE — 1159F PR MEDICATION LIST DOCUMENTED IN MEDICAL RECORD: ICD-10-PCS | Mod: S$GLB,,, | Performed by: STUDENT IN AN ORGANIZED HEALTH CARE EDUCATION/TRAINING PROGRAM

## 2021-01-29 ENCOUNTER — CLINICAL SUPPORT (OUTPATIENT)
Dept: FAMILY MEDICINE | Facility: CLINIC | Age: 81
End: 2021-01-29
Payer: MEDICARE

## 2021-01-29 DIAGNOSIS — R60.9 SWELLING: ICD-10-CM

## 2021-01-29 DIAGNOSIS — E53.8 B12 DEFICIENCY: ICD-10-CM

## 2021-01-29 DIAGNOSIS — Z79.01 ANTICOAGULATED ON COUMADIN: Primary | ICD-10-CM

## 2021-01-29 DIAGNOSIS — I48.0 PAROXYSMAL ATRIAL FIBRILLATION: ICD-10-CM

## 2021-01-29 LAB — INR PPP: 2.3 (ref 2–3)

## 2021-01-29 PROCEDURE — 85610 PROTHROMBIN TIME: CPT | Mod: QW,,, | Performed by: FAMILY MEDICINE

## 2021-01-29 PROCEDURE — 85610 POCT INR: ICD-10-PCS | Mod: QW,,, | Performed by: FAMILY MEDICINE

## 2021-01-31 RX ORDER — LOSARTAN POTASSIUM 100 MG/1
50 TABLET ORAL DAILY
Qty: 45 TABLET | Refills: 1 | Status: SHIPPED | OUTPATIENT
Start: 2021-01-31 | End: 2021-06-07

## 2021-01-31 RX ORDER — OXYBUTYNIN CHLORIDE 5 MG/1
5 TABLET ORAL DAILY
Qty: 90 TABLET | Refills: 3 | Status: SHIPPED | OUTPATIENT
Start: 2021-01-31 | End: 2022-02-15

## 2021-01-31 RX ORDER — DILTIAZEM HYDROCHLORIDE 240 MG/1
240 CAPSULE, COATED, EXTENDED RELEASE ORAL DAILY
Qty: 90 CAPSULE | Refills: 3 | Status: SHIPPED | OUTPATIENT
Start: 2021-01-31 | End: 2021-04-06 | Stop reason: SDUPTHER

## 2021-01-31 RX ORDER — PRAMIPEXOLE DIHYDROCHLORIDE 0.5 MG/1
0.5 TABLET ORAL 2 TIMES DAILY
Qty: 180 TABLET | Refills: 3 | Status: SHIPPED | OUTPATIENT
Start: 2021-01-31 | End: 2021-11-26

## 2021-01-31 RX ORDER — ATORVASTATIN CALCIUM 40 MG/1
40 TABLET, FILM COATED ORAL DAILY
Qty: 90 TABLET | Refills: 3 | Status: SHIPPED | OUTPATIENT
Start: 2021-01-31 | End: 2021-11-26

## 2021-01-31 RX ORDER — ALPRAZOLAM 0.25 MG/1
0.25 TABLET ORAL 2 TIMES DAILY
Qty: 180 TABLET | Refills: 0 | Status: SHIPPED | OUTPATIENT
Start: 2021-01-31 | End: 2021-08-27 | Stop reason: SDUPTHER

## 2021-01-31 RX ORDER — FUROSEMIDE 20 MG/1
20 TABLET ORAL DAILY PRN
Qty: 90 TABLET | Refills: 3 | Status: SHIPPED | OUTPATIENT
Start: 2021-01-31 | End: 2021-03-30

## 2021-01-31 RX ORDER — GABAPENTIN 300 MG/1
600 CAPSULE ORAL 2 TIMES DAILY
Qty: 360 CAPSULE | Refills: 2 | Status: SHIPPED | OUTPATIENT
Start: 2021-01-31 | End: 2021-10-17

## 2021-01-31 RX ORDER — OMEPRAZOLE 20 MG/1
20 CAPSULE, DELAYED RELEASE ORAL DAILY
Qty: 90 CAPSULE | Refills: 3 | Status: SHIPPED | OUTPATIENT
Start: 2021-01-31 | End: 2022-02-09

## 2021-01-31 RX ORDER — WARFARIN 6 MG/1
6 TABLET ORAL DAILY
Qty: 90 TABLET | Refills: 3 | Status: SHIPPED | OUTPATIENT
Start: 2021-01-31 | End: 2021-07-08

## 2021-02-11 ENCOUNTER — TELEPHONE (OUTPATIENT)
Dept: FAMILY MEDICINE | Facility: CLINIC | Age: 81
End: 2021-02-11

## 2021-02-11 DIAGNOSIS — E53.8 B12 DEFICIENCY: ICD-10-CM

## 2021-02-12 RX ORDER — CYANOCOBALAMIN 1000 UG/ML
1000 INJECTION, SOLUTION INTRAMUSCULAR; SUBCUTANEOUS
Qty: 10 ML | Refills: 1 | Status: CANCELLED | OUTPATIENT
Start: 2021-02-12

## 2021-02-12 RX ORDER — CYANOCOBALAMIN 1000 UG/ML
1000 INJECTION, SOLUTION INTRAMUSCULAR; SUBCUTANEOUS
Qty: 10 ML | Refills: 1 | Status: SHIPPED | OUTPATIENT
Start: 2021-02-12 | End: 2021-11-19 | Stop reason: SDUPTHER

## 2021-02-18 ENCOUNTER — PES CALL (OUTPATIENT)
Dept: ADMINISTRATIVE | Facility: CLINIC | Age: 81
End: 2021-02-18

## 2021-02-26 ENCOUNTER — CLINICAL SUPPORT (OUTPATIENT)
Dept: FAMILY MEDICINE | Facility: CLINIC | Age: 81
End: 2021-02-26
Payer: MEDICARE

## 2021-02-26 ENCOUNTER — TELEPHONE (OUTPATIENT)
Dept: FAMILY MEDICINE | Facility: CLINIC | Age: 81
End: 2021-02-26

## 2021-02-26 DIAGNOSIS — E53.8 B12 DEFICIENCY: ICD-10-CM

## 2021-02-26 DIAGNOSIS — I48.0 PAROXYSMAL ATRIAL FIBRILLATION: ICD-10-CM

## 2021-02-26 DIAGNOSIS — Z79.01 ANTICOAGULATED ON COUMADIN: Primary | ICD-10-CM

## 2021-02-26 LAB — INR PPP: 1.9 (ref 2–3)

## 2021-02-26 PROCEDURE — 96372 PR INJECTION,THERAP/PROPH/DIAG2ST, IM OR SUBCUT: ICD-10-PCS | Mod: S$GLB,,, | Performed by: FAMILY MEDICINE

## 2021-02-26 PROCEDURE — 85610 PROTHROMBIN TIME: CPT | Mod: QW,,, | Performed by: FAMILY MEDICINE

## 2021-02-26 PROCEDURE — 96372 THER/PROPH/DIAG INJ SC/IM: CPT | Mod: S$GLB,,, | Performed by: FAMILY MEDICINE

## 2021-02-26 PROCEDURE — 85610 POCT INR: ICD-10-PCS | Mod: QW,,, | Performed by: FAMILY MEDICINE

## 2021-02-26 RX ORDER — CYANOCOBALAMIN 1000 UG/ML
1000 INJECTION, SOLUTION INTRAMUSCULAR; SUBCUTANEOUS
Status: COMPLETED | OUTPATIENT
Start: 2021-02-26 | End: 2021-02-26

## 2021-02-26 RX ADMIN — CYANOCOBALAMIN 1000 MCG: 1000 INJECTION, SOLUTION INTRAMUSCULAR; SUBCUTANEOUS at 03:02

## 2021-03-24 ENCOUNTER — HOSPITAL ENCOUNTER (OUTPATIENT)
Dept: RADIOLOGY | Facility: HOSPITAL | Age: 81
Discharge: HOME | End: 2021-03-24
Attending: PODIATRIST
Payer: MEDICARE

## 2021-03-24 DIAGNOSIS — Z98.890 STATUS POST INCISION AND DRAINAGE: ICD-10-CM

## 2021-03-24 PROCEDURE — 73630 X-RAY EXAM OF FOOT: CPT | Mod: TC,FY,PO,LT

## 2021-03-26 ENCOUNTER — CLINICAL SUPPORT (OUTPATIENT)
Dept: FAMILY MEDICINE | Facility: CLINIC | Age: 81
End: 2021-03-26
Payer: MEDICARE

## 2021-03-26 DIAGNOSIS — E53.8 B12 DEFICIENCY: ICD-10-CM

## 2021-03-26 DIAGNOSIS — Z79.01 ANTICOAGULATED ON COUMADIN: Primary | ICD-10-CM

## 2021-03-26 LAB — INR PPP: 1.9 (ref 2–3)

## 2021-03-26 PROCEDURE — 85610 PROTHROMBIN TIME: CPT | Mod: QW,,, | Performed by: FAMILY MEDICINE

## 2021-03-26 PROCEDURE — 96372 PR INJECTION,THERAP/PROPH/DIAG2ST, IM OR SUBCUT: ICD-10-PCS | Mod: S$GLB,,, | Performed by: FAMILY MEDICINE

## 2021-03-26 PROCEDURE — 85610 POCT INR: ICD-10-PCS | Mod: QW,,, | Performed by: FAMILY MEDICINE

## 2021-03-26 PROCEDURE — 96372 THER/PROPH/DIAG INJ SC/IM: CPT | Mod: S$GLB,,, | Performed by: FAMILY MEDICINE

## 2021-03-26 RX ORDER — CYANOCOBALAMIN 1000 UG/ML
1000 INJECTION, SOLUTION INTRAMUSCULAR; SUBCUTANEOUS ONCE
Status: COMPLETED | OUTPATIENT
Start: 2021-03-26 | End: 2021-03-26

## 2021-03-26 RX ADMIN — CYANOCOBALAMIN 1000 MCG: 1000 INJECTION, SOLUTION INTRAMUSCULAR; SUBCUTANEOUS at 09:03

## 2021-04-06 DIAGNOSIS — I48.0 PAROXYSMAL ATRIAL FIBRILLATION: ICD-10-CM

## 2021-04-07 ENCOUNTER — PATIENT MESSAGE (OUTPATIENT)
Dept: FAMILY MEDICINE | Facility: CLINIC | Age: 81
End: 2021-04-07

## 2021-04-08 RX ORDER — DILTIAZEM HYDROCHLORIDE 240 MG/1
240 CAPSULE, COATED, EXTENDED RELEASE ORAL DAILY
Qty: 14 CAPSULE | Refills: 1 | Status: SHIPPED | OUTPATIENT
Start: 2021-04-08 | End: 2022-02-15

## 2021-04-12 ENCOUNTER — OFFICE VISIT (OUTPATIENT)
Dept: PODIATRY | Facility: CLINIC | Age: 81
End: 2021-04-12
Payer: MEDICARE

## 2021-04-12 ENCOUNTER — HOSPITAL ENCOUNTER (OUTPATIENT)
Dept: RADIOLOGY | Facility: HOSPITAL | Age: 81
Discharge: HOME | End: 2021-04-12
Attending: PODIATRIST
Payer: MEDICARE

## 2021-04-12 VITALS
WEIGHT: 218.94 LBS | BODY MASS INDEX: 40.29 KG/M2 | SYSTOLIC BLOOD PRESSURE: 124 MMHG | HEART RATE: 78 BPM | DIASTOLIC BLOOD PRESSURE: 74 MMHG | HEIGHT: 62 IN

## 2021-04-12 DIAGNOSIS — Z98.890 HISTORY OF FOOT SURGERY: ICD-10-CM

## 2021-04-12 DIAGNOSIS — G57.92 NEURITIS OF LEFT FOOT: ICD-10-CM

## 2021-04-12 DIAGNOSIS — G57.92 NEURITIS OF LEFT FOOT: Primary | ICD-10-CM

## 2021-04-12 PROCEDURE — 3288F FALL RISK ASSESSMENT DOCD: CPT | Mod: CPTII,S$GLB,, | Performed by: PODIATRIST

## 2021-04-12 PROCEDURE — 1159F MED LIST DOCD IN RCRD: CPT | Mod: S$GLB,,, | Performed by: PODIATRIST

## 2021-04-12 PROCEDURE — 73630 X-RAY EXAM OF FOOT: CPT | Mod: TC,PN,LT

## 2021-04-12 PROCEDURE — 1101F PR PT FALLS ASSESS DOC 0-1 FALLS W/OUT INJ PAST YR: ICD-10-PCS | Mod: CPTII,S$GLB,, | Performed by: PODIATRIST

## 2021-04-12 PROCEDURE — 1101F PT FALLS ASSESS-DOCD LE1/YR: CPT | Mod: CPTII,S$GLB,, | Performed by: PODIATRIST

## 2021-04-12 PROCEDURE — 99213 PR OFFICE/OUTPT VISIT, EST, LEVL III, 20-29 MIN: ICD-10-PCS | Mod: S$GLB,,, | Performed by: PODIATRIST

## 2021-04-12 PROCEDURE — 99999 PR PBB SHADOW E&M-EST. PATIENT-LVL IV: CPT | Mod: PBBFAC,,, | Performed by: PODIATRIST

## 2021-04-12 PROCEDURE — 1125F AMNT PAIN NOTED PAIN PRSNT: CPT | Mod: S$GLB,,, | Performed by: PODIATRIST

## 2021-04-12 PROCEDURE — 73630 X-RAY EXAM OF FOOT: CPT | Mod: 26,LT,, | Performed by: RADIOLOGY

## 2021-04-12 PROCEDURE — 1159F PR MEDICATION LIST DOCUMENTED IN MEDICAL RECORD: ICD-10-PCS | Mod: S$GLB,,, | Performed by: PODIATRIST

## 2021-04-12 PROCEDURE — 99999 PR PBB SHADOW E&M-EST. PATIENT-LVL IV: ICD-10-PCS | Mod: PBBFAC,,, | Performed by: PODIATRIST

## 2021-04-12 PROCEDURE — 73630 XR FOOT COMPLETE 3 VIEW LEFT: ICD-10-PCS | Mod: 26,LT,, | Performed by: RADIOLOGY

## 2021-04-12 PROCEDURE — 99213 OFFICE O/P EST LOW 20 MIN: CPT | Mod: S$GLB,,, | Performed by: PODIATRIST

## 2021-04-12 PROCEDURE — 1125F PR PAIN SEVERITY QUANTIFIED, PAIN PRESENT: ICD-10-PCS | Mod: S$GLB,,, | Performed by: PODIATRIST

## 2021-04-12 PROCEDURE — 3288F PR FALLS RISK ASSESSMENT DOCUMENTED: ICD-10-PCS | Mod: CPTII,S$GLB,, | Performed by: PODIATRIST

## 2021-04-12 RX ORDER — DICLOFENAC SODIUM 10 MG/G
2 GEL TOPICAL 4 TIMES DAILY
Qty: 100 G | Refills: 5 | Status: SHIPPED | OUTPATIENT
Start: 2021-04-12 | End: 2022-10-15

## 2021-04-14 ENCOUNTER — PATIENT MESSAGE (OUTPATIENT)
Dept: PODIATRY | Facility: CLINIC | Age: 81
End: 2021-04-14

## 2021-04-26 ENCOUNTER — CLINICAL SUPPORT (OUTPATIENT)
Dept: FAMILY MEDICINE | Facility: CLINIC | Age: 81
End: 2021-04-26
Payer: MEDICARE

## 2021-04-26 DIAGNOSIS — E53.8 B12 DEFICIENCY: ICD-10-CM

## 2021-04-26 DIAGNOSIS — Z79.01 ANTICOAGULATED ON COUMADIN: Primary | ICD-10-CM

## 2021-04-26 LAB — INR PPP: 1.7 (ref 2–3)

## 2021-04-26 PROCEDURE — 85610 POCT INR: ICD-10-PCS | Mod: QW,,, | Performed by: FAMILY MEDICINE

## 2021-04-26 PROCEDURE — 96372 PR INJECTION,THERAP/PROPH/DIAG2ST, IM OR SUBCUT: ICD-10-PCS | Mod: S$GLB,,, | Performed by: FAMILY MEDICINE

## 2021-04-26 PROCEDURE — 85610 PROTHROMBIN TIME: CPT | Mod: QW,,, | Performed by: FAMILY MEDICINE

## 2021-04-26 PROCEDURE — 96372 THER/PROPH/DIAG INJ SC/IM: CPT | Mod: S$GLB,,, | Performed by: FAMILY MEDICINE

## 2021-04-26 RX ORDER — CYANOCOBALAMIN 1000 UG/ML
1000 INJECTION, SOLUTION INTRAMUSCULAR; SUBCUTANEOUS
Status: DISCONTINUED | OUTPATIENT
Start: 2021-04-26 | End: 2022-09-20

## 2021-04-26 RX ADMIN — CYANOCOBALAMIN 1000 MCG: 1000 INJECTION, SOLUTION INTRAMUSCULAR; SUBCUTANEOUS at 08:04

## 2021-05-26 ENCOUNTER — CLINICAL SUPPORT (OUTPATIENT)
Dept: FAMILY MEDICINE | Facility: CLINIC | Age: 81
End: 2021-05-26
Payer: MEDICARE

## 2021-05-26 DIAGNOSIS — Z79.01 ANTICOAGULATED ON COUMADIN: Primary | ICD-10-CM

## 2021-05-26 LAB — INR PPP: 1.6 (ref 2–3)

## 2021-05-26 PROCEDURE — 96372 THER/PROPH/DIAG INJ SC/IM: CPT | Mod: S$GLB,,, | Performed by: FAMILY MEDICINE

## 2021-05-26 PROCEDURE — 85610 POCT INR: ICD-10-PCS | Mod: QW,,, | Performed by: FAMILY MEDICINE

## 2021-05-26 PROCEDURE — 85610 PROTHROMBIN TIME: CPT | Mod: QW,,, | Performed by: FAMILY MEDICINE

## 2021-05-26 PROCEDURE — 96372 PR INJECTION,THERAP/PROPH/DIAG2ST, IM OR SUBCUT: ICD-10-PCS | Mod: S$GLB,,, | Performed by: FAMILY MEDICINE

## 2021-05-26 RX ADMIN — CYANOCOBALAMIN 1000 MCG: 1000 INJECTION, SOLUTION INTRAMUSCULAR; SUBCUTANEOUS at 08:05

## 2021-05-28 ENCOUNTER — TELEPHONE (OUTPATIENT)
Dept: FAMILY MEDICINE | Facility: CLINIC | Age: 81
End: 2021-05-28

## 2021-06-25 ENCOUNTER — CLINICAL SUPPORT (OUTPATIENT)
Dept: FAMILY MEDICINE | Facility: CLINIC | Age: 81
End: 2021-06-25
Payer: MEDICARE

## 2021-06-25 DIAGNOSIS — E53.8 B12 DEFICIENCY: ICD-10-CM

## 2021-06-25 DIAGNOSIS — Z79.01 ANTICOAGULATED ON COUMADIN: Primary | ICD-10-CM

## 2021-06-25 DIAGNOSIS — I48.0 PAROXYSMAL ATRIAL FIBRILLATION: ICD-10-CM

## 2021-06-25 LAB — INR PPP: 2.3 (ref 2–3)

## 2021-06-25 PROCEDURE — 96372 THER/PROPH/DIAG INJ SC/IM: CPT | Mod: S$GLB,,, | Performed by: FAMILY MEDICINE

## 2021-06-25 PROCEDURE — 85610 POCT INR: ICD-10-PCS | Mod: QW,,, | Performed by: FAMILY MEDICINE

## 2021-06-25 PROCEDURE — 96372 PR INJECTION,THERAP/PROPH/DIAG2ST, IM OR SUBCUT: ICD-10-PCS | Mod: S$GLB,,, | Performed by: FAMILY MEDICINE

## 2021-06-25 PROCEDURE — 85610 PROTHROMBIN TIME: CPT | Mod: QW,,, | Performed by: FAMILY MEDICINE

## 2021-06-25 RX ADMIN — CYANOCOBALAMIN 1000 MCG: 1000 INJECTION, SOLUTION INTRAMUSCULAR; SUBCUTANEOUS at 08:06

## 2021-07-06 ENCOUNTER — HOSPITAL ENCOUNTER (OUTPATIENT)
Dept: RADIOLOGY | Facility: HOSPITAL | Age: 81
Discharge: HOME | End: 2021-07-06
Attending: STUDENT IN AN ORGANIZED HEALTH CARE EDUCATION/TRAINING PROGRAM
Payer: MEDICARE

## 2021-07-06 DIAGNOSIS — N28.1 RENAL CYST: ICD-10-CM

## 2021-07-06 PROCEDURE — 76770 US EXAM ABDO BACK WALL COMP: CPT | Mod: TC,PO

## 2021-07-19 ENCOUNTER — OFFICE VISIT (OUTPATIENT)
Dept: UROLOGY | Facility: CLINIC | Age: 81
End: 2021-07-19
Payer: MEDICARE

## 2021-07-19 VITALS
HEART RATE: 56 BPM | WEIGHT: 218 LBS | BODY MASS INDEX: 40.12 KG/M2 | DIASTOLIC BLOOD PRESSURE: 74 MMHG | HEIGHT: 62 IN | SYSTOLIC BLOOD PRESSURE: 171 MMHG

## 2021-07-19 DIAGNOSIS — N28.1 RENAL CYST: Primary | ICD-10-CM

## 2021-07-19 PROCEDURE — 99999 PR PBB SHADOW E&M-EST. PATIENT-LVL IV: CPT | Mod: PBBFAC,,, | Performed by: STUDENT IN AN ORGANIZED HEALTH CARE EDUCATION/TRAINING PROGRAM

## 2021-07-19 PROCEDURE — 1159F MED LIST DOCD IN RCRD: CPT | Mod: CPTII,S$GLB,, | Performed by: STUDENT IN AN ORGANIZED HEALTH CARE EDUCATION/TRAINING PROGRAM

## 2021-07-19 PROCEDURE — 3288F PR FALLS RISK ASSESSMENT DOCUMENTED: ICD-10-PCS | Mod: CPTII,S$GLB,, | Performed by: STUDENT IN AN ORGANIZED HEALTH CARE EDUCATION/TRAINING PROGRAM

## 2021-07-19 PROCEDURE — 99214 PR OFFICE/OUTPT VISIT, EST, LEVL IV, 30-39 MIN: ICD-10-PCS | Mod: S$GLB,,, | Performed by: STUDENT IN AN ORGANIZED HEALTH CARE EDUCATION/TRAINING PROGRAM

## 2021-07-19 PROCEDURE — 1101F PR PT FALLS ASSESS DOC 0-1 FALLS W/OUT INJ PAST YR: ICD-10-PCS | Mod: CPTII,S$GLB,, | Performed by: STUDENT IN AN ORGANIZED HEALTH CARE EDUCATION/TRAINING PROGRAM

## 2021-07-19 PROCEDURE — 99999 PR PBB SHADOW E&M-EST. PATIENT-LVL IV: ICD-10-PCS | Mod: PBBFAC,,, | Performed by: STUDENT IN AN ORGANIZED HEALTH CARE EDUCATION/TRAINING PROGRAM

## 2021-07-19 PROCEDURE — 3288F FALL RISK ASSESSMENT DOCD: CPT | Mod: CPTII,S$GLB,, | Performed by: STUDENT IN AN ORGANIZED HEALTH CARE EDUCATION/TRAINING PROGRAM

## 2021-07-19 PROCEDURE — 1126F AMNT PAIN NOTED NONE PRSNT: CPT | Mod: CPTII,S$GLB,, | Performed by: STUDENT IN AN ORGANIZED HEALTH CARE EDUCATION/TRAINING PROGRAM

## 2021-07-19 PROCEDURE — 1159F PR MEDICATION LIST DOCUMENTED IN MEDICAL RECORD: ICD-10-PCS | Mod: CPTII,S$GLB,, | Performed by: STUDENT IN AN ORGANIZED HEALTH CARE EDUCATION/TRAINING PROGRAM

## 2021-07-19 PROCEDURE — 1126F PR PAIN SEVERITY QUANTIFIED, NO PAIN PRESENT: ICD-10-PCS | Mod: CPTII,S$GLB,, | Performed by: STUDENT IN AN ORGANIZED HEALTH CARE EDUCATION/TRAINING PROGRAM

## 2021-07-19 PROCEDURE — 99214 OFFICE O/P EST MOD 30 MIN: CPT | Mod: S$GLB,,, | Performed by: STUDENT IN AN ORGANIZED HEALTH CARE EDUCATION/TRAINING PROGRAM

## 2021-07-19 PROCEDURE — 1101F PT FALLS ASSESS-DOCD LE1/YR: CPT | Mod: CPTII,S$GLB,, | Performed by: STUDENT IN AN ORGANIZED HEALTH CARE EDUCATION/TRAINING PROGRAM

## 2021-07-23 ENCOUNTER — CLINICAL SUPPORT (OUTPATIENT)
Dept: FAMILY MEDICINE | Facility: CLINIC | Age: 81
End: 2021-07-23
Payer: MEDICARE

## 2021-07-23 DIAGNOSIS — I48.0 PAROXYSMAL ATRIAL FIBRILLATION: ICD-10-CM

## 2021-07-23 DIAGNOSIS — Z79.01 ANTICOAGULATED ON COUMADIN: Primary | ICD-10-CM

## 2021-07-23 LAB — INR PPP: 2 (ref 2–3)

## 2021-07-23 PROCEDURE — 96372 PR INJECTION,THERAP/PROPH/DIAG2ST, IM OR SUBCUT: ICD-10-PCS | Mod: S$GLB,,, | Performed by: FAMILY MEDICINE

## 2021-07-23 PROCEDURE — 85610 PROTHROMBIN TIME: CPT | Mod: QW,,, | Performed by: FAMILY MEDICINE

## 2021-07-23 PROCEDURE — 85610 POCT INR: ICD-10-PCS | Mod: QW,,, | Performed by: FAMILY MEDICINE

## 2021-07-23 PROCEDURE — 96372 THER/PROPH/DIAG INJ SC/IM: CPT | Mod: S$GLB,,, | Performed by: FAMILY MEDICINE

## 2021-07-23 RX ADMIN — CYANOCOBALAMIN 1000 MCG: 1000 INJECTION, SOLUTION INTRAMUSCULAR; SUBCUTANEOUS at 02:07

## 2021-08-19 ENCOUNTER — CLINICAL SUPPORT (OUTPATIENT)
Dept: FAMILY MEDICINE | Facility: CLINIC | Age: 81
End: 2021-08-19
Payer: MEDICARE

## 2021-08-19 DIAGNOSIS — Z79.01 ANTICOAGULATED ON COUMADIN: Primary | ICD-10-CM

## 2021-08-19 DIAGNOSIS — E53.8 B12 DEFICIENCY: ICD-10-CM

## 2021-08-19 LAB — INR PPP: 1.9 (ref 2–3)

## 2021-08-19 PROCEDURE — 85610 PROTHROMBIN TIME: CPT | Mod: QW,,, | Performed by: FAMILY MEDICINE

## 2021-08-19 PROCEDURE — 85610 POCT INR: ICD-10-PCS | Mod: QW,,, | Performed by: FAMILY MEDICINE

## 2021-08-19 RX ORDER — CYANOCOBALAMIN 1000 UG/ML
1000 INJECTION, SOLUTION INTRAMUSCULAR; SUBCUTANEOUS
Status: COMPLETED | OUTPATIENT
Start: 2021-08-19 | End: 2021-11-09

## 2021-08-27 ENCOUNTER — OFFICE VISIT (OUTPATIENT)
Dept: FAMILY MEDICINE | Facility: CLINIC | Age: 81
End: 2021-08-27
Payer: MEDICARE

## 2021-08-27 VITALS
OXYGEN SATURATION: 94 % | HEART RATE: 83 BPM | WEIGHT: 221.56 LBS | BODY MASS INDEX: 40.77 KG/M2 | DIASTOLIC BLOOD PRESSURE: 86 MMHG | TEMPERATURE: 98 F | SYSTOLIC BLOOD PRESSURE: 140 MMHG | HEIGHT: 62 IN

## 2021-08-27 DIAGNOSIS — M94.9 DISORDER OF CARTILAGE, UNSPECIFIED: ICD-10-CM

## 2021-08-27 DIAGNOSIS — F41.9 ANXIETY: ICD-10-CM

## 2021-08-27 DIAGNOSIS — D51.0 VITAMIN B12 DEFICIENCY ANEMIA DUE TO INTRINSIC FACTOR DEFICIENCY: ICD-10-CM

## 2021-08-27 DIAGNOSIS — I10 ESSENTIAL HYPERTENSION: ICD-10-CM

## 2021-08-27 DIAGNOSIS — Z86.16 HISTORY OF COVID-19: ICD-10-CM

## 2021-08-27 DIAGNOSIS — G47.33 OSA (OBSTRUCTIVE SLEEP APNEA): Primary | ICD-10-CM

## 2021-08-27 DIAGNOSIS — R13.10 DYSPHAGIA, UNSPECIFIED TYPE: ICD-10-CM

## 2021-08-27 DIAGNOSIS — Z01.84 ENCOUNTER FOR ANTIBODY RESPONSE EXAMINATION: ICD-10-CM

## 2021-08-27 DIAGNOSIS — E78.2 MIXED HYPERLIPIDEMIA: ICD-10-CM

## 2021-08-27 DIAGNOSIS — I48.0 PAROXYSMAL ATRIAL FIBRILLATION: ICD-10-CM

## 2021-08-27 DIAGNOSIS — R49.0 HOARSENESS: ICD-10-CM

## 2021-08-27 PROCEDURE — 3288F FALL RISK ASSESSMENT DOCD: CPT | Mod: CPTII,S$GLB,, | Performed by: FAMILY MEDICINE

## 2021-08-27 PROCEDURE — 3077F PR MOST RECENT SYSTOLIC BLOOD PRESSURE >= 140 MM HG: ICD-10-PCS | Mod: CPTII,S$GLB,, | Performed by: FAMILY MEDICINE

## 2021-08-27 PROCEDURE — 99214 OFFICE O/P EST MOD 30 MIN: CPT | Mod: S$GLB,,, | Performed by: FAMILY MEDICINE

## 2021-08-27 PROCEDURE — 3288F PR FALLS RISK ASSESSMENT DOCUMENTED: ICD-10-PCS | Mod: CPTII,S$GLB,, | Performed by: FAMILY MEDICINE

## 2021-08-27 PROCEDURE — 3079F PR MOST RECENT DIASTOLIC BLOOD PRESSURE 80-89 MM HG: ICD-10-PCS | Mod: CPTII,S$GLB,, | Performed by: FAMILY MEDICINE

## 2021-08-27 PROCEDURE — 1125F PR PAIN SEVERITY QUANTIFIED, PAIN PRESENT: ICD-10-PCS | Mod: CPTII,S$GLB,, | Performed by: FAMILY MEDICINE

## 2021-08-27 PROCEDURE — 1101F PT FALLS ASSESS-DOCD LE1/YR: CPT | Mod: CPTII,S$GLB,, | Performed by: FAMILY MEDICINE

## 2021-08-27 PROCEDURE — 99499 UNLISTED E&M SERVICE: CPT | Mod: S$GLB,,, | Performed by: FAMILY MEDICINE

## 2021-08-27 PROCEDURE — 3077F SYST BP >= 140 MM HG: CPT | Mod: CPTII,S$GLB,, | Performed by: FAMILY MEDICINE

## 2021-08-27 PROCEDURE — 1159F MED LIST DOCD IN RCRD: CPT | Mod: CPTII,S$GLB,, | Performed by: FAMILY MEDICINE

## 2021-08-27 PROCEDURE — 1101F PR PT FALLS ASSESS DOC 0-1 FALLS W/OUT INJ PAST YR: ICD-10-PCS | Mod: CPTII,S$GLB,, | Performed by: FAMILY MEDICINE

## 2021-08-27 PROCEDURE — 1125F AMNT PAIN NOTED PAIN PRSNT: CPT | Mod: CPTII,S$GLB,, | Performed by: FAMILY MEDICINE

## 2021-08-27 PROCEDURE — 3079F DIAST BP 80-89 MM HG: CPT | Mod: CPTII,S$GLB,, | Performed by: FAMILY MEDICINE

## 2021-08-27 PROCEDURE — 99499 RISK ADDL DX/OHS AUDIT: ICD-10-PCS | Mod: S$GLB,,, | Performed by: FAMILY MEDICINE

## 2021-08-27 PROCEDURE — 99214 PR OFFICE/OUTPT VISIT, EST, LEVL IV, 30-39 MIN: ICD-10-PCS | Mod: S$GLB,,, | Performed by: FAMILY MEDICINE

## 2021-08-27 PROCEDURE — 1159F PR MEDICATION LIST DOCUMENTED IN MEDICAL RECORD: ICD-10-PCS | Mod: CPTII,S$GLB,, | Performed by: FAMILY MEDICINE

## 2021-08-27 RX ORDER — WARFARIN 6 MG/1
TABLET ORAL
Qty: 100 TABLET | Refills: 3 | Status: SHIPPED | OUTPATIENT
Start: 2021-08-27 | End: 2022-01-28

## 2021-08-27 RX ORDER — ALPRAZOLAM 0.25 MG/1
0.25 TABLET ORAL 2 TIMES DAILY
Qty: 180 TABLET | Refills: 1 | Status: SHIPPED | OUTPATIENT
Start: 2021-08-27 | End: 2022-07-10

## 2021-08-27 RX ORDER — ALPRAZOLAM 0.25 MG/1
0.25 TABLET ORAL 2 TIMES DAILY
Qty: 180 TABLET | Refills: 0 | Status: CANCELLED | OUTPATIENT
Start: 2021-08-27

## 2021-08-27 RX ORDER — ALPRAZOLAM 0.25 MG/1
0.25 TABLET ORAL 2 TIMES DAILY
Qty: 180 TABLET | Refills: 0 | OUTPATIENT
Start: 2021-08-27

## 2021-09-10 ENCOUNTER — TELEPHONE (OUTPATIENT)
Dept: ADMINISTRATIVE | Facility: OTHER | Age: 81
End: 2021-09-10

## 2021-09-13 ENCOUNTER — TELEPHONE (OUTPATIENT)
Dept: ADMINISTRATIVE | Facility: OTHER | Age: 81
End: 2021-09-13

## 2021-09-17 ENCOUNTER — CLINICAL SUPPORT (OUTPATIENT)
Dept: FAMILY MEDICINE | Facility: CLINIC | Age: 81
End: 2021-09-17
Payer: MEDICARE

## 2021-09-17 ENCOUNTER — TELEPHONE (OUTPATIENT)
Dept: FAMILY MEDICINE | Facility: CLINIC | Age: 81
End: 2021-09-17

## 2021-09-17 ENCOUNTER — LAB VISIT (OUTPATIENT)
Dept: LAB | Facility: HOSPITAL | Age: 81
End: 2021-09-17
Attending: FAMILY MEDICINE
Payer: MEDICARE

## 2021-09-17 DIAGNOSIS — Z86.16 HISTORY OF COVID-19: ICD-10-CM

## 2021-09-17 DIAGNOSIS — E78.2 MIXED HYPERLIPIDEMIA: ICD-10-CM

## 2021-09-17 DIAGNOSIS — I48.0 PAROXYSMAL ATRIAL FIBRILLATION: Primary | ICD-10-CM

## 2021-09-17 DIAGNOSIS — F41.9 ANXIETY: ICD-10-CM

## 2021-09-17 DIAGNOSIS — Z01.84 ENCOUNTER FOR ANTIBODY RESPONSE EXAMINATION: ICD-10-CM

## 2021-09-17 DIAGNOSIS — D51.0 VITAMIN B12 DEFICIENCY ANEMIA DUE TO INTRINSIC FACTOR DEFICIENCY: ICD-10-CM

## 2021-09-17 DIAGNOSIS — M94.9 DISORDER OF CARTILAGE, UNSPECIFIED: ICD-10-CM

## 2021-09-17 DIAGNOSIS — I48.0 PAROXYSMAL ATRIAL FIBRILLATION: ICD-10-CM

## 2021-09-17 DIAGNOSIS — G47.33 OSA (OBSTRUCTIVE SLEEP APNEA): ICD-10-CM

## 2021-09-17 DIAGNOSIS — I10 ESSENTIAL HYPERTENSION: ICD-10-CM

## 2021-09-17 LAB
ALBUMIN SERPL BCP-MCNC: 4.2 G/DL (ref 3.5–5.2)
ALP SERPL-CCNC: 100 U/L (ref 38–126)
ALT SERPL W/O P-5'-P-CCNC: 19 U/L (ref 10–44)
ANION GAP SERPL CALC-SCNC: 7 MMOL/L (ref 8–16)
AST SERPL-CCNC: 23 U/L (ref 15–46)
BASOPHILS # BLD AUTO: 0.04 K/UL (ref 0–0.2)
BASOPHILS NFR BLD: 0.6 % (ref 0–1.9)
BILIRUB SERPL-MCNC: 0.5 MG/DL (ref 0.1–1)
CALCIUM SERPL-MCNC: 9.6 MG/DL (ref 8.7–10.5)
CHLORIDE SERPL-SCNC: 102 MMOL/L (ref 95–110)
CHOLEST SERPL-MCNC: 148 MG/DL (ref 120–199)
CHOLEST/HDLC SERPL: 3.9 {RATIO} (ref 2–5)
CO2 SERPL-SCNC: 35 MMOL/L (ref 23–29)
CREAT SERPL-MCNC: 0.86 MG/DL (ref 0.5–1.4)
DIFFERENTIAL METHOD: ABNORMAL
EOSINOPHIL # BLD AUTO: 0.2 K/UL (ref 0–0.5)
EOSINOPHIL NFR BLD: 3.2 % (ref 0–8)
ERYTHROCYTE [DISTWIDTH] IN BLOOD BY AUTOMATED COUNT: 13.2 % (ref 11.5–14.5)
EST. GFR  (AFRICAN AMERICAN): >60 ML/MIN/1.73 M^2
EST. GFR  (NON AFRICAN AMERICAN): >60 ML/MIN/1.73 M^2
GLUCOSE SERPL-MCNC: 100 MG/DL (ref 70–110)
HCT VFR BLD AUTO: 42.9 % (ref 37–48.5)
HDLC SERPL-MCNC: 38 MG/DL (ref 40–75)
HDLC SERPL: 25.7 % (ref 20–50)
HGB BLD-MCNC: 13.9 G/DL (ref 12–16)
IMM GRANULOCYTES # BLD AUTO: 0.02 K/UL (ref 0–0.04)
IMM GRANULOCYTES NFR BLD AUTO: 0.3 % (ref 0–0.5)
INR PPP: 2.1 (ref 0.8–1.2)
LDLC SERPL CALC-MCNC: 73.8 MG/DL (ref 63–159)
LYMPHOCYTES # BLD AUTO: 2 K/UL (ref 1–4.8)
LYMPHOCYTES NFR BLD: 32.9 % (ref 18–48)
MCH RBC QN AUTO: 31.8 PG (ref 27–31)
MCHC RBC AUTO-ENTMCNC: 32.4 G/DL (ref 32–36)
MCV RBC AUTO: 98 FL (ref 82–98)
MONOCYTES # BLD AUTO: 0.5 K/UL (ref 0.3–1)
MONOCYTES NFR BLD: 8.7 % (ref 4–15)
NEUTROPHILS # BLD AUTO: 3.4 K/UL (ref 1.8–7.7)
NEUTROPHILS NFR BLD: 54.3 % (ref 38–73)
NONHDLC SERPL-MCNC: 110 MG/DL
NRBC BLD-RTO: 0 /100 WBC
PLATELET # BLD AUTO: 217 K/UL (ref 150–450)
PMV BLD AUTO: 10.5 FL (ref 9.2–12.9)
POTASSIUM SERPL-SCNC: 4.6 MMOL/L (ref 3.5–5.1)
PROT SERPL-MCNC: 6.9 G/DL (ref 6–8.4)
PROTHROMBIN TIME: 21 SEC (ref 9–12.5)
RBC # BLD AUTO: 4.37 M/UL (ref 4–5.4)
SODIUM SERPL-SCNC: 144 MMOL/L (ref 136–145)
TRIGL SERPL-MCNC: 181 MG/DL (ref 30–150)
TSH SERPL DL<=0.005 MIU/L-ACNC: 1.08 UIU/ML (ref 0.4–4)
URATE SERPL-MCNC: 5.9 MG/DL (ref 2.4–5.7)
UUN UR-MCNC: 19 MG/DL (ref 7–17)
WBC # BLD AUTO: 6.2 K/UL (ref 3.9–12.7)

## 2021-09-17 PROCEDURE — 82607 VITAMIN B-12: CPT | Mod: PO | Performed by: FAMILY MEDICINE

## 2021-09-17 PROCEDURE — 85025 COMPLETE CBC W/AUTO DIFF WBC: CPT | Mod: PO | Performed by: FAMILY MEDICINE

## 2021-09-17 PROCEDURE — 84550 ASSAY OF BLOOD/URIC ACID: CPT | Performed by: FAMILY MEDICINE

## 2021-09-17 PROCEDURE — 84443 ASSAY THYROID STIM HORMONE: CPT | Mod: PO | Performed by: FAMILY MEDICINE

## 2021-09-17 PROCEDURE — 86769 SARS-COV-2 COVID-19 ANTIBODY: CPT | Mod: PO | Performed by: FAMILY MEDICINE

## 2021-09-17 PROCEDURE — 80053 COMPREHEN METABOLIC PANEL: CPT | Mod: PO | Performed by: FAMILY MEDICINE

## 2021-09-17 PROCEDURE — 82306 VITAMIN D 25 HYDROXY: CPT | Mod: PO | Performed by: FAMILY MEDICINE

## 2021-09-17 PROCEDURE — 96372 THER/PROPH/DIAG INJ SC/IM: CPT | Mod: S$GLB,,, | Performed by: FAMILY MEDICINE

## 2021-09-17 PROCEDURE — 85610 PROTHROMBIN TIME: CPT | Mod: PO | Performed by: FAMILY MEDICINE

## 2021-09-17 PROCEDURE — 96372 PR INJECTION,THERAP/PROPH/DIAG2ST, IM OR SUBCUT: ICD-10-PCS | Mod: S$GLB,,, | Performed by: FAMILY MEDICINE

## 2021-09-17 PROCEDURE — 80061 LIPID PANEL: CPT | Performed by: FAMILY MEDICINE

## 2021-09-17 PROCEDURE — 36415 COLL VENOUS BLD VENIPUNCTURE: CPT | Mod: PO | Performed by: FAMILY MEDICINE

## 2021-09-17 RX ORDER — CYANOCOBALAMIN 1000 UG/ML
1000 INJECTION, SOLUTION INTRAMUSCULAR; SUBCUTANEOUS ONCE
Status: COMPLETED | OUTPATIENT
Start: 2021-09-17 | End: 2021-09-17

## 2021-09-17 RX ADMIN — CYANOCOBALAMIN 1000 MCG: 1000 INJECTION, SOLUTION INTRAMUSCULAR; SUBCUTANEOUS at 04:09

## 2021-09-18 LAB
25(OH)D3+25(OH)D2 SERPL-MCNC: 37 NG/ML (ref 30–96)
VIT B12 SERPL-MCNC: >2000 PG/ML (ref 210–950)

## 2021-09-20 LAB
SARS-COV-2 IGG SERPL IA-ACNC: <50 AU/ML
SARS-COV-2 IGG SERPL QL IA: NEGATIVE

## 2021-10-05 ENCOUNTER — TELEPHONE (OUTPATIENT)
Dept: GASTROENTEROLOGY | Facility: CLINIC | Age: 81
End: 2021-10-05

## 2021-10-17 RX ORDER — GABAPENTIN 300 MG/1
CAPSULE ORAL
Qty: 360 CAPSULE | Refills: 3 | Status: SHIPPED | OUTPATIENT
Start: 2021-10-17 | End: 2022-09-20

## 2021-10-27 ENCOUNTER — PATIENT OUTREACH (OUTPATIENT)
Dept: ADMINISTRATIVE | Facility: OTHER | Age: 81
End: 2021-10-27
Payer: MEDICARE

## 2021-10-28 ENCOUNTER — OFFICE VISIT (OUTPATIENT)
Dept: CARDIOLOGY | Facility: CLINIC | Age: 81
End: 2021-10-28
Payer: MEDICARE

## 2021-10-28 VITALS
DIASTOLIC BLOOD PRESSURE: 76 MMHG | SYSTOLIC BLOOD PRESSURE: 126 MMHG | WEIGHT: 217.81 LBS | HEIGHT: 62 IN | BODY MASS INDEX: 40.08 KG/M2 | HEART RATE: 73 BPM | OXYGEN SATURATION: 95 %

## 2021-10-28 DIAGNOSIS — G47.33 OSA (OBSTRUCTIVE SLEEP APNEA): ICD-10-CM

## 2021-10-28 DIAGNOSIS — I51.89 DIASTOLIC DYSFUNCTION: ICD-10-CM

## 2021-10-28 DIAGNOSIS — I48.0 PAROXYSMAL ATRIAL FIBRILLATION: ICD-10-CM

## 2021-10-28 DIAGNOSIS — R60.9 SWELLING: ICD-10-CM

## 2021-10-28 DIAGNOSIS — I10 ESSENTIAL HYPERTENSION: ICD-10-CM

## 2021-10-28 DIAGNOSIS — E78.2 MIXED HYPERLIPIDEMIA: Primary | ICD-10-CM

## 2021-10-28 DIAGNOSIS — I77.1 TORTUOUS AORTA: ICD-10-CM

## 2021-10-28 DIAGNOSIS — I70.0 AORTIC ATHEROSCLEROSIS: ICD-10-CM

## 2021-10-28 PROCEDURE — 3078F PR MOST RECENT DIASTOLIC BLOOD PRESSURE < 80 MM HG: ICD-10-PCS | Mod: CPTII,S$GLB,, | Performed by: INTERNAL MEDICINE

## 2021-10-28 PROCEDURE — 99999 PR PBB SHADOW E&M-EST. PATIENT-LVL IV: CPT | Mod: PBBFAC,,, | Performed by: INTERNAL MEDICINE

## 2021-10-28 PROCEDURE — 3288F FALL RISK ASSESSMENT DOCD: CPT | Mod: CPTII,S$GLB,, | Performed by: INTERNAL MEDICINE

## 2021-10-28 PROCEDURE — 1159F MED LIST DOCD IN RCRD: CPT | Mod: CPTII,S$GLB,, | Performed by: INTERNAL MEDICINE

## 2021-10-28 PROCEDURE — 99214 PR OFFICE/OUTPT VISIT, EST, LEVL IV, 30-39 MIN: ICD-10-PCS | Mod: S$GLB,,, | Performed by: INTERNAL MEDICINE

## 2021-10-28 PROCEDURE — 3078F DIAST BP <80 MM HG: CPT | Mod: CPTII,S$GLB,, | Performed by: INTERNAL MEDICINE

## 2021-10-28 PROCEDURE — 1126F PR PAIN SEVERITY QUANTIFIED, NO PAIN PRESENT: ICD-10-PCS | Mod: CPTII,S$GLB,, | Performed by: INTERNAL MEDICINE

## 2021-10-28 PROCEDURE — 1101F PT FALLS ASSESS-DOCD LE1/YR: CPT | Mod: CPTII,S$GLB,, | Performed by: INTERNAL MEDICINE

## 2021-10-28 PROCEDURE — 1159F PR MEDICATION LIST DOCUMENTED IN MEDICAL RECORD: ICD-10-PCS | Mod: CPTII,S$GLB,, | Performed by: INTERNAL MEDICINE

## 2021-10-28 PROCEDURE — 3288F PR FALLS RISK ASSESSMENT DOCUMENTED: ICD-10-PCS | Mod: CPTII,S$GLB,, | Performed by: INTERNAL MEDICINE

## 2021-10-28 PROCEDURE — 1101F PR PT FALLS ASSESS DOC 0-1 FALLS W/OUT INJ PAST YR: ICD-10-PCS | Mod: CPTII,S$GLB,, | Performed by: INTERNAL MEDICINE

## 2021-10-28 PROCEDURE — 3074F SYST BP LT 130 MM HG: CPT | Mod: CPTII,S$GLB,, | Performed by: INTERNAL MEDICINE

## 2021-10-28 PROCEDURE — 99999 PR PBB SHADOW E&M-EST. PATIENT-LVL IV: ICD-10-PCS | Mod: PBBFAC,,, | Performed by: INTERNAL MEDICINE

## 2021-10-28 PROCEDURE — 3074F PR MOST RECENT SYSTOLIC BLOOD PRESSURE < 130 MM HG: ICD-10-PCS | Mod: CPTII,S$GLB,, | Performed by: INTERNAL MEDICINE

## 2021-10-28 PROCEDURE — 99499 RISK ADDL DX/OHS AUDIT: ICD-10-PCS | Mod: S$GLB,,, | Performed by: INTERNAL MEDICINE

## 2021-10-28 PROCEDURE — 1126F AMNT PAIN NOTED NONE PRSNT: CPT | Mod: CPTII,S$GLB,, | Performed by: INTERNAL MEDICINE

## 2021-10-28 PROCEDURE — 99499 UNLISTED E&M SERVICE: CPT | Mod: S$GLB,,, | Performed by: INTERNAL MEDICINE

## 2021-10-28 PROCEDURE — 99214 OFFICE O/P EST MOD 30 MIN: CPT | Mod: S$GLB,,, | Performed by: INTERNAL MEDICINE

## 2021-10-28 RX ORDER — FUROSEMIDE 20 MG/1
20 TABLET ORAL DAILY
Qty: 90 TABLET | Refills: 3 | Status: SHIPPED | OUTPATIENT
Start: 2021-10-28 | End: 2022-08-22

## 2021-11-02 ENCOUNTER — TELEPHONE (OUTPATIENT)
Dept: GASTROENTEROLOGY | Facility: CLINIC | Age: 81
End: 2021-11-02
Payer: MEDICARE

## 2021-11-02 ENCOUNTER — OFFICE VISIT (OUTPATIENT)
Dept: GASTROENTEROLOGY | Facility: CLINIC | Age: 81
End: 2021-11-02
Payer: MEDICARE

## 2021-11-02 VITALS — WEIGHT: 210.75 LBS | HEIGHT: 62 IN | BODY MASS INDEX: 38.78 KG/M2

## 2021-11-02 DIAGNOSIS — R13.10 DYSPHAGIA, UNSPECIFIED TYPE: ICD-10-CM

## 2021-11-02 DIAGNOSIS — Z01.812 PRE-PROCEDURE LAB EXAM: ICD-10-CM

## 2021-11-02 PROCEDURE — 99204 OFFICE O/P NEW MOD 45 MIN: CPT | Mod: S$GLB,,, | Performed by: INTERNAL MEDICINE

## 2021-11-02 PROCEDURE — 3288F PR FALLS RISK ASSESSMENT DOCUMENTED: ICD-10-PCS | Mod: CPTII,S$GLB,, | Performed by: INTERNAL MEDICINE

## 2021-11-02 PROCEDURE — 99999 PR PBB SHADOW E&M-EST. PATIENT-LVL III: ICD-10-PCS | Mod: PBBFAC,,, | Performed by: INTERNAL MEDICINE

## 2021-11-02 PROCEDURE — 99204 PR OFFICE/OUTPT VISIT, NEW, LEVL IV, 45-59 MIN: ICD-10-PCS | Mod: S$GLB,,, | Performed by: INTERNAL MEDICINE

## 2021-11-02 PROCEDURE — 1126F PR PAIN SEVERITY QUANTIFIED, NO PAIN PRESENT: ICD-10-PCS | Mod: CPTII,S$GLB,, | Performed by: INTERNAL MEDICINE

## 2021-11-02 PROCEDURE — 1101F PT FALLS ASSESS-DOCD LE1/YR: CPT | Mod: CPTII,S$GLB,, | Performed by: INTERNAL MEDICINE

## 2021-11-02 PROCEDURE — 1101F PR PT FALLS ASSESS DOC 0-1 FALLS W/OUT INJ PAST YR: ICD-10-PCS | Mod: CPTII,S$GLB,, | Performed by: INTERNAL MEDICINE

## 2021-11-02 PROCEDURE — 99999 PR PBB SHADOW E&M-EST. PATIENT-LVL III: CPT | Mod: PBBFAC,,, | Performed by: INTERNAL MEDICINE

## 2021-11-02 PROCEDURE — 1126F AMNT PAIN NOTED NONE PRSNT: CPT | Mod: CPTII,S$GLB,, | Performed by: INTERNAL MEDICINE

## 2021-11-02 PROCEDURE — 3288F FALL RISK ASSESSMENT DOCD: CPT | Mod: CPTII,S$GLB,, | Performed by: INTERNAL MEDICINE

## 2021-11-03 DIAGNOSIS — R22.1 LOCALIZED SWELLING, MASS OR LUMP OF NECK: Primary | ICD-10-CM

## 2021-11-05 ENCOUNTER — HOSPITAL ENCOUNTER (OUTPATIENT)
Dept: RADIOLOGY | Facility: HOSPITAL | Age: 81
Discharge: HOME | End: 2021-11-05
Attending: OTOLARYNGOLOGY
Payer: MEDICARE

## 2021-11-05 DIAGNOSIS — R22.1 LOCALIZED SWELLING, MASS OR LUMP OF NECK: ICD-10-CM

## 2021-11-05 PROCEDURE — 25500020 PHARM REV CODE 255: Mod: PO

## 2021-11-05 PROCEDURE — 70492 CT SFT TSUE NCK W/O & W/DYE: CPT | Mod: TC,PO

## 2021-11-05 RX ADMIN — IOHEXOL 75 ML: 350 INJECTION, SOLUTION INTRAVENOUS at 10:11

## 2021-11-05 NOTE — TELEPHONE ENCOUNTER
----- Message from Shade Loya sent at 4/4/2019  9:30 AM CDT -----  Contact: self [phone]  Patient is requesting an appointment for tomorrow. She is having pain in the back of her neck again. Please call and advise.   Admission

## 2021-11-08 ENCOUNTER — TELEPHONE (OUTPATIENT)
Dept: FAMILY MEDICINE | Facility: CLINIC | Age: 81
End: 2021-11-08
Payer: MEDICARE

## 2021-11-08 DIAGNOSIS — I48.0 PAROXYSMAL ATRIAL FIBRILLATION: Primary | ICD-10-CM

## 2021-11-09 ENCOUNTER — CLINICAL SUPPORT (OUTPATIENT)
Dept: FAMILY MEDICINE | Facility: CLINIC | Age: 81
End: 2021-11-09
Payer: MEDICARE

## 2021-11-09 ENCOUNTER — LAB VISIT (OUTPATIENT)
Dept: LAB | Facility: HOSPITAL | Age: 81
End: 2021-11-09
Attending: FAMILY MEDICINE
Payer: MEDICARE

## 2021-11-09 DIAGNOSIS — D51.0 VITAMIN B12 DEFICIENCY ANEMIA DUE TO INTRINSIC FACTOR DEFICIENCY: Primary | ICD-10-CM

## 2021-11-09 DIAGNOSIS — I48.0 PAROXYSMAL ATRIAL FIBRILLATION: ICD-10-CM

## 2021-11-09 LAB
INR PPP: 2.4 (ref 0.8–1.2)
PROTHROMBIN TIME: 23.8 SEC (ref 9–12.5)

## 2021-11-09 PROCEDURE — 96372 PR INJECTION,THERAP/PROPH/DIAG2ST, IM OR SUBCUT: ICD-10-PCS | Mod: S$GLB,,, | Performed by: FAMILY MEDICINE

## 2021-11-09 PROCEDURE — 85610 PROTHROMBIN TIME: CPT | Mod: PO | Performed by: FAMILY MEDICINE

## 2021-11-09 PROCEDURE — 96372 THER/PROPH/DIAG INJ SC/IM: CPT | Mod: S$GLB,,, | Performed by: FAMILY MEDICINE

## 2021-11-09 PROCEDURE — 36415 COLL VENOUS BLD VENIPUNCTURE: CPT | Mod: PO | Performed by: FAMILY MEDICINE

## 2021-11-09 RX ADMIN — CYANOCOBALAMIN 1000 MCG: 1000 INJECTION, SOLUTION INTRAMUSCULAR; SUBCUTANEOUS at 04:11

## 2021-11-19 ENCOUNTER — TELEPHONE (OUTPATIENT)
Dept: FAMILY MEDICINE | Facility: CLINIC | Age: 81
End: 2021-11-19
Payer: MEDICARE

## 2021-11-22 RX ORDER — CYANOCOBALAMIN 1000 UG/ML
1000 INJECTION, SOLUTION INTRAMUSCULAR; SUBCUTANEOUS
Qty: 10 ML | Refills: 1 | Status: SHIPPED | OUTPATIENT
Start: 2021-11-22 | End: 2023-02-22

## 2021-11-24 ENCOUNTER — PES CALL (OUTPATIENT)
Dept: ADMINISTRATIVE | Facility: CLINIC | Age: 81
End: 2021-11-24
Payer: MEDICARE

## 2021-12-13 ENCOUNTER — TELEPHONE (OUTPATIENT)
Dept: GASTROENTEROLOGY | Facility: CLINIC | Age: 81
End: 2021-12-13
Payer: MEDICARE

## 2021-12-13 ENCOUNTER — TELEPHONE (OUTPATIENT)
Dept: ENDOSCOPY | Facility: HOSPITAL | Age: 81
End: 2021-12-13
Payer: MEDICARE

## 2021-12-22 ENCOUNTER — TELEPHONE (OUTPATIENT)
Dept: FAMILY MEDICINE | Facility: CLINIC | Age: 81
End: 2021-12-22

## 2021-12-22 ENCOUNTER — CLINICAL SUPPORT (OUTPATIENT)
Dept: FAMILY MEDICINE | Facility: CLINIC | Age: 81
End: 2021-12-22
Payer: MEDICARE

## 2021-12-22 DIAGNOSIS — I48.0 PAROXYSMAL ATRIAL FIBRILLATION: Primary | ICD-10-CM

## 2021-12-22 LAB — INR PPP: 2.6 (ref 2–3)

## 2021-12-22 PROCEDURE — 85610 POCT INR: ICD-10-PCS | Mod: QW,,, | Performed by: FAMILY MEDICINE

## 2021-12-22 PROCEDURE — 85610 PROTHROMBIN TIME: CPT | Mod: QW,,, | Performed by: FAMILY MEDICINE

## 2021-12-22 RX ORDER — RIFAMPIN 300 MG/1
300 CAPSULE ORAL EVERY 12 HOURS
Qty: 30 CAPSULE | Refills: 0 | Status: SHIPPED | OUTPATIENT
Start: 2021-12-22 | End: 2022-01-21 | Stop reason: ALTCHOICE

## 2021-12-29 ENCOUNTER — TELEPHONE (OUTPATIENT)
Dept: FAMILY MEDICINE | Facility: CLINIC | Age: 81
End: 2021-12-29
Payer: MEDICARE

## 2022-01-07 ENCOUNTER — PATIENT MESSAGE (OUTPATIENT)
Dept: CARDIOLOGY | Facility: CLINIC | Age: 82
End: 2022-01-07
Payer: MEDICARE

## 2022-01-07 ENCOUNTER — PATIENT MESSAGE (OUTPATIENT)
Dept: FAMILY MEDICINE | Facility: CLINIC | Age: 82
End: 2022-01-07
Payer: MEDICARE

## 2022-01-07 DIAGNOSIS — R23.8 BLISTERS OF MULTIPLE SITES: Primary | ICD-10-CM

## 2022-01-07 RX ORDER — LOSARTAN POTASSIUM 100 MG/1
100 TABLET ORAL DAILY
Qty: 90 TABLET | Refills: 3 | Status: SHIPPED | OUTPATIENT
Start: 2022-01-07 | End: 2022-04-13

## 2022-01-07 RX ORDER — METHYLPREDNISOLONE 4 MG/1
TABLET ORAL
Qty: 1 EACH | Refills: 0 | Status: SHIPPED | OUTPATIENT
Start: 2022-01-07 | End: 2022-01-21 | Stop reason: ALTCHOICE

## 2022-01-07 RX ORDER — HYDROCHLOROTHIAZIDE 25 MG/1
25 TABLET ORAL DAILY
Qty: 90 TABLET | Refills: 3 | Status: SHIPPED | OUTPATIENT
Start: 2022-01-07 | End: 2022-01-09

## 2022-01-08 NOTE — TELEPHONE ENCOUNTER
Spoke with pt; medrol dospak sdnt and referral to Dr Pop, derm    bp high, resume hctz daily, her rx   Take losartan 100 mg instead of 50 mg daily  Take extra cardizem now

## 2022-01-09 DIAGNOSIS — I10 ESSENTIAL HYPERTENSION: Primary | ICD-10-CM

## 2022-01-09 RX ORDER — CHLORTHALIDONE 50 MG/1
50 TABLET ORAL DAILY
Qty: 30 TABLET | Refills: 11 | Status: SHIPPED | OUTPATIENT
Start: 2022-01-09 | End: 2022-01-19 | Stop reason: SDUPTHER

## 2022-01-11 ENCOUNTER — PES CALL (OUTPATIENT)
Dept: ADMINISTRATIVE | Facility: CLINIC | Age: 82
End: 2022-01-11
Payer: MEDICARE

## 2022-01-19 ENCOUNTER — PATIENT MESSAGE (OUTPATIENT)
Dept: CARDIOLOGY | Facility: CLINIC | Age: 82
End: 2022-01-19
Payer: MEDICARE

## 2022-01-19 RX ORDER — CHLORTHALIDONE 50 MG/1
50 TABLET ORAL DAILY
Qty: 30 TABLET | Refills: 11 | Status: SHIPPED | OUTPATIENT
Start: 2022-01-19 | End: 2022-12-21

## 2022-01-21 ENCOUNTER — OFFICE VISIT (OUTPATIENT)
Dept: INTERNAL MEDICINE | Facility: CLINIC | Age: 82
End: 2022-01-21
Payer: MEDICARE

## 2022-01-21 ENCOUNTER — TELEPHONE (OUTPATIENT)
Dept: FAMILY MEDICINE | Facility: CLINIC | Age: 82
End: 2022-01-21

## 2022-01-21 ENCOUNTER — CLINICAL SUPPORT (OUTPATIENT)
Dept: FAMILY MEDICINE | Facility: CLINIC | Age: 82
End: 2022-01-21
Payer: MEDICARE

## 2022-01-21 VITALS
RESPIRATION RATE: 18 BRPM | BODY MASS INDEX: 39.66 KG/M2 | HEIGHT: 62 IN | SYSTOLIC BLOOD PRESSURE: 142 MMHG | OXYGEN SATURATION: 96 % | HEART RATE: 66 BPM | WEIGHT: 215.5 LBS | DIASTOLIC BLOOD PRESSURE: 94 MMHG

## 2022-01-21 DIAGNOSIS — N28.1 BILATERAL RENAL CYSTS: ICD-10-CM

## 2022-01-21 DIAGNOSIS — D69.2 SENILE PURPURA: ICD-10-CM

## 2022-01-21 DIAGNOSIS — I77.1 TORTUOUS AORTA: ICD-10-CM

## 2022-01-21 DIAGNOSIS — Z79.01 ANTICOAGULATED ON COUMADIN: ICD-10-CM

## 2022-01-21 DIAGNOSIS — M46.99 UNSPECIFIED INFLAMMATORY SPONDYLOPATHY, MULTIPLE SITES IN SPINE: ICD-10-CM

## 2022-01-21 DIAGNOSIS — K21.9 GASTROESOPHAGEAL REFLUX DISEASE WITHOUT ESOPHAGITIS: ICD-10-CM

## 2022-01-21 DIAGNOSIS — Z85.828 HISTORY OF SKIN CANCER: ICD-10-CM

## 2022-01-21 DIAGNOSIS — I51.89 DIASTOLIC DYSFUNCTION: ICD-10-CM

## 2022-01-21 DIAGNOSIS — M47.819 MULTILEVEL FACET ARTHRITIS: ICD-10-CM

## 2022-01-21 DIAGNOSIS — Z00.00 ENCOUNTER FOR PREVENTIVE HEALTH EXAMINATION: Primary | ICD-10-CM

## 2022-01-21 DIAGNOSIS — K57.30 DIVERTICULOSIS OF LARGE INTESTINE WITHOUT HEMORRHAGE: Chronic | ICD-10-CM

## 2022-01-21 DIAGNOSIS — G47.33 OSA (OBSTRUCTIVE SLEEP APNEA): ICD-10-CM

## 2022-01-21 DIAGNOSIS — I48.0 PAROXYSMAL ATRIAL FIBRILLATION: Primary | ICD-10-CM

## 2022-01-21 DIAGNOSIS — M79.2 NEURALGIA: ICD-10-CM

## 2022-01-21 DIAGNOSIS — E27.8 OTHER SPECIFIED DISORDERS OF ADRENAL GLAND: ICD-10-CM

## 2022-01-21 DIAGNOSIS — Z86.010 HISTORY OF ADENOMATOUS POLYP OF COLON: ICD-10-CM

## 2022-01-21 DIAGNOSIS — H40.9 GLAUCOMA, UNSPECIFIED GLAUCOMA TYPE, UNSPECIFIED LATERALITY: ICD-10-CM

## 2022-01-21 DIAGNOSIS — I10 ESSENTIAL HYPERTENSION: ICD-10-CM

## 2022-01-21 DIAGNOSIS — I70.0 AORTIC ATHEROSCLEROSIS: ICD-10-CM

## 2022-01-21 DIAGNOSIS — F41.9 ANXIETY: ICD-10-CM

## 2022-01-21 DIAGNOSIS — E66.01 SEVERE OBESITY WITH BODY MASS INDEX (BMI) OF 35.0 TO 39.9 WITH SERIOUS COMORBIDITY: ICD-10-CM

## 2022-01-21 DIAGNOSIS — G57.92 NERVE ENTRAPMENT SYNDROME OF FOOT, LEFT: ICD-10-CM

## 2022-01-21 DIAGNOSIS — E78.2 MIXED HYPERLIPIDEMIA: ICD-10-CM

## 2022-01-21 DIAGNOSIS — K42.9 UMBILICAL HERNIA WITHOUT OBSTRUCTION AND WITHOUT GANGRENE: ICD-10-CM

## 2022-01-21 DIAGNOSIS — H54.10 BLINDNESS OF RIGHT EYE WITH LOW VISION IN CONTRALATERAL EYE: ICD-10-CM

## 2022-01-21 DIAGNOSIS — R23.8 BLISTERS OF MULTIPLE SITES: Primary | ICD-10-CM

## 2022-01-21 DIAGNOSIS — G61.9 INFLAMMATORY POLYNEUROPATHY, UNSPECIFIED: ICD-10-CM

## 2022-01-21 DIAGNOSIS — G25.81 RESTLESS LEG SYNDROME: ICD-10-CM

## 2022-01-21 LAB — INR PPP: 1.1 (ref 2–3)

## 2022-01-21 PROCEDURE — 85610 PROTHROMBIN TIME: CPT | Mod: QW,,, | Performed by: FAMILY MEDICINE

## 2022-01-21 PROCEDURE — 3077F PR MOST RECENT SYSTOLIC BLOOD PRESSURE >= 140 MM HG: ICD-10-PCS | Mod: CPTII,S$GLB,, | Performed by: NURSE PRACTITIONER

## 2022-01-21 PROCEDURE — G0439 PR MEDICARE ANNUAL WELLNESS SUBSEQUENT VISIT: ICD-10-PCS | Mod: S$GLB,,, | Performed by: NURSE PRACTITIONER

## 2022-01-21 PROCEDURE — 1125F AMNT PAIN NOTED PAIN PRSNT: CPT | Mod: CPTII,S$GLB,, | Performed by: NURSE PRACTITIONER

## 2022-01-21 PROCEDURE — 99999 PR PBB SHADOW E&M-EST. PATIENT-LVL V: CPT | Mod: PBBFAC,,, | Performed by: NURSE PRACTITIONER

## 2022-01-21 PROCEDURE — 3288F PR FALLS RISK ASSESSMENT DOCUMENTED: ICD-10-PCS | Mod: CPTII,S$GLB,, | Performed by: NURSE PRACTITIONER

## 2022-01-21 PROCEDURE — 99999 PR PBB SHADOW E&M-EST. PATIENT-LVL V: ICD-10-PCS | Mod: PBBFAC,,, | Performed by: NURSE PRACTITIONER

## 2022-01-21 PROCEDURE — 1101F PR PT FALLS ASSESS DOC 0-1 FALLS W/OUT INJ PAST YR: ICD-10-PCS | Mod: CPTII,S$GLB,, | Performed by: NURSE PRACTITIONER

## 2022-01-21 PROCEDURE — 85610 POCT INR: ICD-10-PCS | Mod: QW,,, | Performed by: FAMILY MEDICINE

## 2022-01-21 PROCEDURE — 3288F FALL RISK ASSESSMENT DOCD: CPT | Mod: CPTII,S$GLB,, | Performed by: NURSE PRACTITIONER

## 2022-01-21 PROCEDURE — 99499 RISK ADDL DX/OHS AUDIT: ICD-10-PCS | Mod: S$GLB,,, | Performed by: NURSE PRACTITIONER

## 2022-01-21 PROCEDURE — 3077F SYST BP >= 140 MM HG: CPT | Mod: CPTII,S$GLB,, | Performed by: NURSE PRACTITIONER

## 2022-01-21 PROCEDURE — G0439 PPPS, SUBSEQ VISIT: HCPCS | Mod: S$GLB,,, | Performed by: NURSE PRACTITIONER

## 2022-01-21 PROCEDURE — 3080F DIAST BP >= 90 MM HG: CPT | Mod: CPTII,S$GLB,, | Performed by: NURSE PRACTITIONER

## 2022-01-21 PROCEDURE — 99499 UNLISTED E&M SERVICE: CPT | Mod: S$GLB,,, | Performed by: NURSE PRACTITIONER

## 2022-01-21 PROCEDURE — 1125F PR PAIN SEVERITY QUANTIFIED, PAIN PRESENT: ICD-10-PCS | Mod: CPTII,S$GLB,, | Performed by: NURSE PRACTITIONER

## 2022-01-21 PROCEDURE — 1101F PT FALLS ASSESS-DOCD LE1/YR: CPT | Mod: CPTII,S$GLB,, | Performed by: NURSE PRACTITIONER

## 2022-01-21 PROCEDURE — 3080F PR MOST RECENT DIASTOLIC BLOOD PRESSURE >= 90 MM HG: ICD-10-PCS | Mod: CPTII,S$GLB,, | Performed by: NURSE PRACTITIONER

## 2022-01-21 RX ORDER — BETAMETHASONE DIPROPIONATE 0.5 MG/G
1 CREAM TOPICAL 2 TIMES DAILY
COMMUNITY
Start: 2022-01-18 | End: 2022-10-15

## 2022-01-21 NOTE — PATIENT INSTRUCTIONS
Counseling and Referral of Other Preventative  (Italic type indicates deductible and co-insurance are waived)    Patient Name: Mis Ca  Today's Date: 1/21/2022    Health Maintenance       Date Due Completion Date    COVID-19 Vaccine (1) Never done ---    DEXA SCAN 06/05/2022 6/5/2019    Override on 6/9/2016: Done    Colonoscopy 12/20/2022 12/20/2019    Override on 3/3/2017: Done (due March 2020 tubular adenoma; [facility])    Override on 6/9/2016: Done    Lipid Panel 09/17/2026 9/17/2021    TETANUS VACCINE 12/13/2026 12/13/2016    Override on 12/2/2016: Done        No orders of the defined types were placed in this encounter.    The following information is provided to all patients.  This information is to help you find resources for any of the problems found today that may be affecting your health:                Living healthy guide: www.[facility].[place].[facility]      Understanding Diabetes: www.diabetes.org      Eating healthy: www.cdc.gov/healthyweight      CDC home safety checklist: www.cdc.gov/steadi/patient.html      Agency on Aging: www.goea.[place].[facility]      Alcoholics anonymous (AA): www.aa.org      Physical Activity: www.delfino.nih.gov/dk2ycyt      Tobacco use: www.quitwithusla.org

## 2022-01-21 NOTE — PROGRESS NOTES
"  Mis Ca presented for a  Medicare AWV and comprehensive Health Risk Assessment today. The following components were reviewed and updated:    · Medical history  · Family History  · Social history  · Allergies and Current Medications  · Health Risk Assessment  · Health Maintenance  · Care Team         ** See Completed Assessments for Annual Wellness Visit within the encounter summary.**         The following assessments were completed:  · Living Situation  · CAGE  · Depression Screening  · Timed Get Up and Go  · Whisper Test  · Cognitive Function Screening  · Nutrition Screening  · ADL Screening  · PAQ Screening        Vitals:    01/21/22 0922   BP: (!) 142/94   BP Location: Right arm   Patient Position: Sitting   BP Method: Large (Manual)   Pulse: 66   Resp: 18   SpO2: 96%   Weight: 97.7 kg (215 lb 8 oz)   Height: 5' 2" (1.575 m)     Body mass index is 39.42 kg/m².  Physical Exam  Vitals and nursing note reviewed.   Constitutional:       General: She is not in acute distress.     Appearance: She is well-developed. She is obese. She is not ill-appearing.   HENT:      Head: Normocephalic and atraumatic.   Eyes:      Pupils: Pupils are equal, round, and reactive to light.   Cardiovascular:      Rate and Rhythm: Normal rate and regular rhythm.      Heart sounds: Normal heart sounds.   Pulmonary:      Effort: Pulmonary effort is normal. No respiratory distress.      Breath sounds: Normal breath sounds.   Abdominal:      General: Bowel sounds are normal.      Palpations: Abdomen is soft.      Tenderness: There is no abdominal tenderness.   Musculoskeletal:      Cervical back: Normal range of motion.   Skin:     General: Skin is warm and dry.   Neurological:      Mental Status: She is alert and oriented to person, place, and time.      Coordination: Coordination normal.   Psychiatric:         Mood and Affect: Mood normal.         Behavior: Behavior normal.         Thought Content: Thought content normal.         " Judgment: Judgment normal.           Diagnoses and health risks identified today and associated recommendations/orders:    1. Encounter for preventive health examination    2. Severe obesity with body mass index (BMI) of 35.0 to 39.9 with serious comorbidity  Chronic; stable. Continue current treatment plan as previously prescribed by PCP.     3. Senile purpura  Chronic; stable. Continue current treatment plan as previously prescribed by PCP.    4. Tortuous aorta  Chronic; stable. Continue current treatment plan as previously prescribed by Cardiology (Dr. Luis).    5. Aortic atherosclerosis  Chronic; stable. Continue current treatment plan as previously prescribed by Cardiology.    6. Inflammatory polyneuropathy, unspecified  Chronic; stable. Continue current treatment plan as previously prescribed by PCP.    7. 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 Continue current treatment plan as previously prescribed by PCP.    8. Other specified disorders of adrenal gland  Chronic; stable. Continue current treatment plan as previously prescribed by PCP.    9. Restless leg syndrome  Chronic; stable. Continue current treatment plan as previously prescribed by PCP.    10. Neuralgia  Chronic; stable. Continue current treatment plan as previously prescribed by PCP.    11. Multilevel facet arthritis  Chronic; stable. Continue current treatment plan as previously prescribed by PCP.    12. Nerve entrapment syndrome of foot, left  Chronic; stable. Continue current treatment plan as previously prescribed by PCP.    13. Glaucoma, unspecified glaucoma type, unspecified laterality  Chronic; stable. Continue current treatment plan as previously prescribed by PCP.    14. 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 Continue current treatment plan as previously prescribed by PCP.    15. Anxiety  Chronic; stable. Continue current treatment plan as previously  prescribed by PCP.    16. Mixed hyperlipidemia  Chronic; stable. Continue current treatment plan as previously prescribed by PCP.    17. Essential hypertension  Chronic; stable. Continue current treatment plan as previously prescribed by PCP.    18. Diastolic dysfunction  Chronic; stable. Continue current treatment plan as previously prescribed by PCP.    19. Diverticulosis of large intestine without hemorrhage  Chronic; stable. Continue current treatment plan as previously prescribed by Gastro (Dr. Aguilar).    20. Gastroesophageal reflux disease without esophagitis  Chronic; stable. Continue current treatment plan as previously prescribed by Gastro.    21. History of adenomatous polyp of colon  Chronic; stable. Continue current treatment plan as previously prescribed by PCP.    22. Umbilical hernia without obstruction and without gangrene  Chronic; stable. Continue current treatment plan as previously prescribed by PCP.    23. JACQUELYN (obstructive sleep apnea)  Chronic; stable. Continue current treatment plan as previously prescribed by PCP.    24. History of skin cancer  Chronic; stable. Continue current treatment plan as previously prescribed by PCP.    25. Bilateral renal cysts.  Noted on U/S retroperitoneal complete dated 7/6/2021. Patient followed by Urology (Dr. Edge).    Provided Mis with a 5-10 year written screening schedule and personal prevention plan. Recommendations were developed using the USPSTF age appropriate recommendations. Education, counseling, and referrals were provided as needed. After Visit Summary printed and given to patient which includes a list of additional screenings\tests needed.    Follow up for AWV in 1 year.    Harrison Bocanegra NP    I offered to discuss advanced care planning, including how to pick a person who would make decisions for you if you were unable to make them for yourself, called a health care power of , and what kind of decisions you might make such as  use of life sustaining treatments such as ventilators and tube feeding when faced with a life limiting illness recorded on a living will that they will need to know. (How you want to be cared for as you near the end of your natural life)     X Patient is interested in learning more about how to make advanced directives.  I provided them paperwork and offered to discuss this with them.

## 2022-01-21 NOTE — TELEPHONE ENCOUNTER
Pt would like to know if she is a candidate to take a different type of anticoagulant. If so, she's interested in switching to something besides coumadin.

## 2022-01-21 NOTE — PROGRESS NOTES
Lab Results   Component Value Date    INR 1.1 (A) 01/21/2022    INR 2.6 12/22/2021    INR 2.4 (H) 11/09/2021    INR 2.1 (H) 09/17/2021    INR 1.9 (A) 08/19/2021     Patient is here for INR check  Patient is currently taking 6 mg of coumadin daily with 9 mg of coumadin on Fridays.  Patient denies any changes in diet, pt denies any missed doses, and pt denies any signs of bleeding.  INR is 1.1  Patient was instructed to take 12 mg of coumadin today and resume normal dosage on 1/22/22.  Patient was instructed to return to the clinic in 1 week to recheck INR.

## 2022-01-24 NOTE — TELEPHONE ENCOUNTER
Spoke with pt she stated that she stated that she would like to try Xarelto pt also that she Friday she broke out this blister around knee she stated that she seen  and she was given cream for but is does not help and would like to know can  refer her elsewhere

## 2022-01-24 NOTE — TELEPHONE ENCOUNTER
Spoke with pt and informed her referral place pt stated that [facility]

## 2022-01-26 ENCOUNTER — TELEPHONE (OUTPATIENT)
Dept: ENDOSCOPY | Facility: HOSPITAL | Age: 82
End: 2022-01-26
Payer: MEDICARE

## 2022-01-26 DIAGNOSIS — Z01.818 PRE-OP TESTING: ICD-10-CM

## 2022-01-28 ENCOUNTER — CLINICAL SUPPORT (OUTPATIENT)
Dept: FAMILY MEDICINE | Facility: CLINIC | Age: 82
End: 2022-01-28
Payer: MEDICARE

## 2022-01-28 DIAGNOSIS — Z79.01 ANTICOAGULATED ON COUMADIN: ICD-10-CM

## 2022-01-28 DIAGNOSIS — I48.0 PAROXYSMAL ATRIAL FIBRILLATION: Primary | ICD-10-CM

## 2022-01-28 LAB — INR PPP: 1.7 (ref 2–3)

## 2022-01-28 PROCEDURE — 85610 PROTHROMBIN TIME: CPT | Mod: QW,,, | Performed by: FAMILY MEDICINE

## 2022-01-28 PROCEDURE — 85610 POCT INR: ICD-10-PCS | Mod: QW,,, | Performed by: FAMILY MEDICINE

## 2022-01-28 NOTE — PROGRESS NOTES
Lab Results   Component Value Date    INR 1.7 (A) 01/28/2022    INR 1.1 (A) 01/21/2022    INR 2.6 12/22/2021    INR 2.4 (H) 11/09/2021    INR 2.1 (H) 09/17/2021     Patient is here for INR check  Patient is currently taking 6 mg of coumadin daily with 9 mg on Friday.  Patient denies any changes in diet, pt denies any missed doses, and pt denies any signs of bleeding.  INR is 1.7  Patient was instructed to take discontinue taking coumadin today and begin taking Xarelto daily instead of coumadin.

## 2022-02-02 ENCOUNTER — OFFICE VISIT (OUTPATIENT)
Dept: FAMILY MEDICINE | Facility: CLINIC | Age: 82
End: 2022-02-02
Payer: MEDICARE

## 2022-02-02 ENCOUNTER — LAB VISIT (OUTPATIENT)
Dept: LAB | Facility: HOSPITAL | Age: 82
End: 2022-02-02
Attending: INTERNAL MEDICINE
Payer: MEDICARE

## 2022-02-02 VITALS
OXYGEN SATURATION: 96 % | BODY MASS INDEX: 40.05 KG/M2 | SYSTOLIC BLOOD PRESSURE: 110 MMHG | WEIGHT: 217.63 LBS | HEIGHT: 62 IN | HEART RATE: 93 BPM | DIASTOLIC BLOOD PRESSURE: 60 MMHG | TEMPERATURE: 99 F

## 2022-02-02 DIAGNOSIS — I48.0 PAROXYSMAL ATRIAL FIBRILLATION: ICD-10-CM

## 2022-02-02 DIAGNOSIS — E27.8 OTHER SPECIFIED DISORDERS OF ADRENAL GLAND: Primary | ICD-10-CM

## 2022-02-02 DIAGNOSIS — I77.1 TORTUOUS AORTA: ICD-10-CM

## 2022-02-02 DIAGNOSIS — M79.2 NEURALGIA: ICD-10-CM

## 2022-02-02 DIAGNOSIS — T84.7XXA: ICD-10-CM

## 2022-02-02 DIAGNOSIS — E66.01 MORBID (SEVERE) OBESITY DUE TO EXCESS CALORIES: ICD-10-CM

## 2022-02-02 DIAGNOSIS — G47.33 OSA (OBSTRUCTIVE SLEEP APNEA): ICD-10-CM

## 2022-02-02 DIAGNOSIS — E66.01 SEVERE OBESITY WITH BODY MASS INDEX (BMI) OF 35.0 TO 39.9 WITH SERIOUS COMORBIDITY: ICD-10-CM

## 2022-02-02 DIAGNOSIS — I70.0 AORTIC ATHEROSCLEROSIS: ICD-10-CM

## 2022-02-02 DIAGNOSIS — I10 ESSENTIAL HYPERTENSION: ICD-10-CM

## 2022-02-02 DIAGNOSIS — H54.10 BLINDNESS OF RIGHT EYE WITH LOW VISION IN CONTRALATERAL EYE: ICD-10-CM

## 2022-02-02 DIAGNOSIS — E78.2 MIXED HYPERLIPIDEMIA: ICD-10-CM

## 2022-02-02 DIAGNOSIS — I51.89 DIASTOLIC DYSFUNCTION: ICD-10-CM

## 2022-02-02 DIAGNOSIS — G25.81 RESTLESS LEG SYNDROME: ICD-10-CM

## 2022-02-02 DIAGNOSIS — M46.99 UNSPECIFIED INFLAMMATORY SPONDYLOPATHY, MULTIPLE SITES IN SPINE: ICD-10-CM

## 2022-02-02 DIAGNOSIS — E87.6 HYPOKALEMIA: Primary | ICD-10-CM

## 2022-02-02 DIAGNOSIS — G61.9 INFLAMMATORY POLYNEUROPATHY, UNSPECIFIED: ICD-10-CM

## 2022-02-02 DIAGNOSIS — D69.2 OTHER NONTHROMBOCYTOPENIC PURPURA: ICD-10-CM

## 2022-02-02 DIAGNOSIS — E87.6 HYPOKALEMIA: ICD-10-CM

## 2022-02-02 DIAGNOSIS — K42.9 UMBILICAL HERNIA WITHOUT OBSTRUCTION AND WITHOUT GANGRENE: ICD-10-CM

## 2022-02-02 DIAGNOSIS — R26.89 ANTALGIC GAIT: ICD-10-CM

## 2022-02-02 DIAGNOSIS — D69.2 SENILE PURPURA: ICD-10-CM

## 2022-02-02 DIAGNOSIS — F41.9 ANXIETY: ICD-10-CM

## 2022-02-02 LAB
ANION GAP SERPL CALC-SCNC: 9 MMOL/L (ref 8–16)
CALCIUM SERPL-MCNC: 9.4 MG/DL (ref 8.7–10.5)
CHLORIDE SERPL-SCNC: 97 MMOL/L (ref 95–110)
CO2 SERPL-SCNC: 33 MMOL/L (ref 23–29)
CREAT SERPL-MCNC: 1.14 MG/DL (ref 0.5–1.4)
EST. GFR  (AFRICAN AMERICAN): 52.5 ML/MIN/1.73 M^2
EST. GFR  (NON AFRICAN AMERICAN): 45.5 ML/MIN/1.73 M^2
GLUCOSE SERPL-MCNC: 148 MG/DL (ref 70–110)
POTASSIUM SERPL-SCNC: 3.1 MMOL/L (ref 3.5–5.1)
SODIUM SERPL-SCNC: 139 MMOL/L (ref 136–145)
UUN UR-MCNC: 23 MG/DL (ref 7–17)

## 2022-02-02 PROCEDURE — 36415 COLL VENOUS BLD VENIPUNCTURE: CPT | Mod: PO | Performed by: INTERNAL MEDICINE

## 2022-02-02 PROCEDURE — 1160F PR REVIEW ALL MEDS BY PRESCRIBER/CLIN PHARMACIST DOCUMENTED: ICD-10-PCS | Mod: CPTII,S$GLB,, | Performed by: FAMILY MEDICINE

## 2022-02-02 PROCEDURE — 1160F RVW MEDS BY RX/DR IN RCRD: CPT | Mod: CPTII,S$GLB,, | Performed by: FAMILY MEDICINE

## 2022-02-02 PROCEDURE — 3078F DIAST BP <80 MM HG: CPT | Mod: CPTII,S$GLB,, | Performed by: FAMILY MEDICINE

## 2022-02-02 PROCEDURE — 3074F PR MOST RECENT SYSTOLIC BLOOD PRESSURE < 130 MM HG: ICD-10-PCS | Mod: CPTII,S$GLB,, | Performed by: FAMILY MEDICINE

## 2022-02-02 PROCEDURE — 1101F PR PT FALLS ASSESS DOC 0-1 FALLS W/OUT INJ PAST YR: ICD-10-PCS | Mod: CPTII,S$GLB,, | Performed by: FAMILY MEDICINE

## 2022-02-02 PROCEDURE — 1126F PR PAIN SEVERITY QUANTIFIED, NO PAIN PRESENT: ICD-10-PCS | Mod: CPTII,S$GLB,, | Performed by: FAMILY MEDICINE

## 2022-02-02 PROCEDURE — 3288F PR FALLS RISK ASSESSMENT DOCUMENTED: ICD-10-PCS | Mod: CPTII,S$GLB,, | Performed by: FAMILY MEDICINE

## 2022-02-02 PROCEDURE — 1101F PT FALLS ASSESS-DOCD LE1/YR: CPT | Mod: CPTII,S$GLB,, | Performed by: FAMILY MEDICINE

## 2022-02-02 PROCEDURE — 99214 OFFICE O/P EST MOD 30 MIN: CPT | Mod: S$GLB,,, | Performed by: FAMILY MEDICINE

## 2022-02-02 PROCEDURE — 3288F FALL RISK ASSESSMENT DOCD: CPT | Mod: CPTII,S$GLB,, | Performed by: FAMILY MEDICINE

## 2022-02-02 PROCEDURE — 3078F PR MOST RECENT DIASTOLIC BLOOD PRESSURE < 80 MM HG: ICD-10-PCS | Mod: CPTII,S$GLB,, | Performed by: FAMILY MEDICINE

## 2022-02-02 PROCEDURE — 1159F MED LIST DOCD IN RCRD: CPT | Mod: CPTII,S$GLB,, | Performed by: FAMILY MEDICINE

## 2022-02-02 PROCEDURE — 1159F PR MEDICATION LIST DOCUMENTED IN MEDICAL RECORD: ICD-10-PCS | Mod: CPTII,S$GLB,, | Performed by: FAMILY MEDICINE

## 2022-02-02 PROCEDURE — 80048 BASIC METABOLIC PNL TOTAL CA: CPT | Mod: PO | Performed by: INTERNAL MEDICINE

## 2022-02-02 PROCEDURE — 1126F AMNT PAIN NOTED NONE PRSNT: CPT | Mod: CPTII,S$GLB,, | Performed by: FAMILY MEDICINE

## 2022-02-02 PROCEDURE — 3074F SYST BP LT 130 MM HG: CPT | Mod: CPTII,S$GLB,, | Performed by: FAMILY MEDICINE

## 2022-02-02 PROCEDURE — 99214 PR OFFICE/OUTPT VISIT, EST, LEVL IV, 30-39 MIN: ICD-10-PCS | Mod: S$GLB,,, | Performed by: FAMILY MEDICINE

## 2022-02-02 RX ORDER — POTASSIUM CHLORIDE 20 MEQ/1
20 TABLET, EXTENDED RELEASE ORAL DAILY
Qty: 30 TABLET | Refills: 11 | Status: SHIPPED | OUTPATIENT
Start: 2022-02-02 | End: 2022-02-02 | Stop reason: SDUPTHER

## 2022-02-02 RX ORDER — POTASSIUM CHLORIDE 20 MEQ/1
20 TABLET, EXTENDED RELEASE ORAL DAILY
Qty: 30 TABLET | Refills: 11 | Status: SHIPPED | OUTPATIENT
Start: 2022-02-02 | End: 2022-12-21

## 2022-02-02 NOTE — PROGRESS NOTES
Please let her know that her potassium level is low. I would like to start her on potassium tabs and have repeat bmp after 10 days. Thanks

## 2022-02-02 NOTE — PROGRESS NOTES
"Subjective:      Patient ID: Mis Ca is a 81 y.o. female.    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  Back on mirapex bid for not able to sit, med helps  Saw Dr Luis end of October  Had CT soft tissue neck in Nov, reveiwed      Problem List  Patient Active Problem List   Diagnosis    Paroxysmal atrial fibrillation    JACQUELYN (obstructive sleep apnea)    Anticoagulated on Coumadin    Anxiety    Restless leg syndrome    Hyperlipidemia    Essential hypertension    Gastroesophageal reflux disease without esophagitis    History of adenomatous polyp of colon    Diverticulosis of large intestine without hemorrhage    Senile purpura    Tortuous aorta    Glaucoma    Blind right eye    Diastolic dysfunction    Aortic atherosclerosis    Severe obesity with body mass index (BMI) of 35.0 to 39.9 with serious comorbidity    History of skin cancer    Hallux valgus, left    Hallux valgus with bunions, left    Gastrocnemius equinus of left lower extremity    Left foot pain    Antalgic gait    Neuralgia    Multilevel facet arthritis    Nerve entrapment syndrome of foot, left    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  Extended delirium.  Advised not to take it again.      Adhesive     Compazine [prochlorperazine edisylate] Anxiety    Penicillins Rash    Sulfa (sulfonamide antibiotics) Rash    Vancomycin analogues Rash       MEDS: "   Current Outpatient Medications:     acetaminophen (TYLENOL) 500 MG tablet, Take 1,000 mg by mouth 2 (two) times daily as needed for Pain., Disp: , Rfl:     ALPRAZolam (XANAX) 0.25 MG tablet, Take 1 tablet (0.25 mg total) by mouth 2 (two) times daily., Disp: 180 tablet, Rfl: 1    aspirin (ECOTRIN) 81 MG EC tablet, Take 81 mg by mouth once daily., Disp: , Rfl:     atorvastatin (LIPITOR) 40 MG tablet, TAKE 1 TABLET BY MOUTH ONCE DAILY, Disp: 90 tablet, Rfl: 3    augmented betamethasone dipropionate (DIPROLENE-AF) 0.05 % cream, Apply 1 application topically 2 (two) times daily., Disp: , Rfl:     chlorthalidone (HYGROTEN) 50 MG Tab, Take 1 tablet (50 mg total) by mouth once daily., Disp: 30 tablet, Rfl: 11    cyanocobalamin (VITAMIN B-12) 1,000 mcg/mL injection, Inject 1 mL (1,000 mcg total) into the muscle every 30 days., Disp: 10 mL, Rfl: 1    diltiaZEM (CARTIA XT) 240 MG 24 hr capsule, Take 1 capsule (240 mg total) by mouth once daily., Disp: 14 capsule, Rfl: 1    furosemide (LASIX) 20 MG tablet, Take 1 tablet (20 mg total) by mouth once daily., Disp: 90 tablet, Rfl: 3    gabapentin (NEURONTIN) 300 MG capsule, TAKE 2 CAPSULES BY MOUTH  TWICE DAILY, Disp: 360 capsule, Rfl: 3    latanoprost 0.005 % ophthalmic solution, Place 1 drop into the left eye every evening. , Disp: , Rfl:     losartan (COZAAR) 100 MG tablet, Take 1 tablet (100 mg total) by mouth once daily., Disp: 90 tablet, Rfl: 3    multivitamin (THERAGRAN) per tablet, Take 1 tablet by mouth once daily., Disp: , Rfl:     omeprazole (PRILOSEC) 20 MG capsule, Take 1 capsule (20 mg total) by mouth once daily., Disp: 90 capsule, Rfl: 3    oxybutynin (DITROPAN) 5 MG Tab, Take 1 tablet (5 mg total) by mouth once daily., Disp: 90 tablet, Rfl: 3    pramipexole (MIRAPEX) 0.5 MG tablet, TAKE 1 TABLET BY MOUTH  TWICE DAILY, Disp: 180 tablet, Rfl: 3    rivaroxaban (XARELTO) 20 mg Tab, Take 1 tablet (20 mg total) by mouth daily with dinner or evening  meal. To thin blood, prevent strokes, Disp: 30 tablet, Rfl: 11    timolol maleate 0.5% (TIMOPTIC) 0.5 % Drop, Place 1 drop into the left eye once daily. , Disp: , Rfl: 0    clotrimazole (LOTRIMIN) 1 % cream, Apply topically 2 (two) times daily. (Patient not taking: No sig reported), Disp: 60 g, Rfl: 2    diclofenac sodium (VOLTAREN) 1 % Gel, Apply 2 g topically 4 (four) times daily. for 10 days (Patient not taking: Reported on 10/28/2021), Disp: 100 g, Rfl: 5    Current Facility-Administered Medications:     cyanocobalamin injection 1,000 mcg, 1,000 mcg, Intramuscular, Q30 Days, Scott Dillard MD, 1,000 mcg at 07/23/21 1415      History:  Current Providers as of 2/2/2022  PCP: cSott Dillard MD  Care Team Provider: Sudhir Coles MD  Care Team Provider: Sergio Viera MD  Care Team Provider: Jolanta Burnett MD  Care Team Provider: Dank Levin MD  Care Team Provider: Lenny Burroughs LPN  Care Team Provider: Portillo Luis MD  Encounter Provider: Scott Dillard MD, starting on Wed Feb 2, 2022 12:00 AM  Referring Provider: not found, starting on Wed Feb 2, 2022 12:00 AM  Consulting Physician: Scott Dillard MD, starting on Wed Feb 2, 2022 11:03 AM (Active)   Past Medical History:   Diagnosis Date    Anticoagulant long-term use     Anxiety     Arthritis     Atrial fibrillation     Blind right eye     Bradycardia     Cancer     skin cancer left side of face under eye    Hyperlipidemia     Hypertension     PVC (premature ventricular contraction)     RLS (restless legs syndrome)     Sleep apnea     Does not use CPAP machine.     Past Surgical History:   Procedure Laterality Date    ADENOIDECTOMY      AKIN OSTEOTOMY Left 10/31/2018    Procedure: OSTEOTOMY, AKIN;  Surgeon: Rudolph Cardozo DPM;  Location: [facility];  Service: Podiatry;  Laterality: Left;    APPENDECTOMY      BREAST BIOPSY Left     x3    BREAST SURGERY Left     breast biopsy x3    CATARACT EXTRACTION BILATERAL W/ ANTERIOR  VITRECTOMY      ENDOSCOPIC GASTROCNEMIUS RECESSION Left 10/31/2018    Procedure: RECESSION, GASTROCNEMIUS, ENDOSCOPIC;  Surgeon: Rudolph Cardozo DPM;  Location: [facility] OR;  Service: Podiatry;  Laterality: Left;    EYE SURGERY Bilateral     cataracts extraction    EYE SURGERY Right     drainage tube (glaucoma)    FOOT ARTHRODESIS Left 1/15/2020    Procedure: FUSION, FOOT;  Surgeon: Rudolph Cardozo DPM;  Location: [facility] OR;  Service: Podiatry;  Laterality: Left;  mini c-arm, Arthrex screw  for hardware removal (Lydia notified), Orthofix (Niels notified) min ex-fix    FOOT SURGERY Left     lesion removed from dorsal area    FRACTURE SURGERY Left     arm    HAND TENDON SURGERY Left     HYSTERECTOMY      INCISION AND DRAINAGE FOOT Left 3/11/2020    Procedure: INCISION AND DRAINAGE, FOOT;  Surgeon: Rudolph Cardozo DPM;  Location: [facility] OR;  Service: Podiatry;  Laterality: Left;    LAPIDUS BUNIONECTOMY Left 10/31/2018    Procedure: BUNIONECTOMY, LAPIDUS;  Surgeon: Rudolph Cardozo DPM;  Location: [facility] OR;  Service: Podiatry;  Laterality: Left;  mini c-arm, Arthrex locking plate (Lydia notified)    LAPIDUS BUNIONECTOMY Left 10/31/2018    Dr. Cardozo    Lymph Gland Removed  Right     groin (performed by Dr. Vergara)    NEURECTOMY Left 1/15/2020    Procedure: NEURECTOMY;  Surgeon: Rudolph Cardozo DPM;  Location: [facility] OR;  Service: Podiatry;  Laterality: Left;  bipolar bovie, vessel loop, possible Agnes nerve wrap. Moshe with [employer] notified and will be overnighting graft. MK 1/14/2020    OOPHORECTOMY      ORIF FOREARM FRACTURE Left     PALATE SURGERY      Lesion removed    TONSILLECTOMY       Social History     Tobacco Use    Smoking status: Never Smoker    Smokeless tobacco: Never Used   Substance Use Topics    Alcohol use: Not Currently    Drug use: No         Review of Systems   Constitutional: Negative.    HENT: Positive for trouble swallowing.    Respiratory: Negative.     Cardiovascular: Positive for palpitations.   Gastrointestinal: Negative.         Found a umbilical hernia   Endocrine: Negative.    Genitourinary: Negative.    Musculoskeletal: Positive for arthralgias.   Skin: Positive for rash.   Psychiatric/Behavioral: Positive for sleep disturbance.   All other systems reviewed and are negative.    Objective:     Physical Exam  Vitals and nursing note reviewed.   Constitutional:       Appearance: She is well-developed and well-nourished. She is obese.   HENT:      Head: Normocephalic.   Eyes:      Extraocular Movements: EOM normal.      Conjunctiva/sclera: Conjunctivae normal.      Pupils: Pupils are equal, round, and reactive to light.   Cardiovascular:      Rate and Rhythm: Normal rate and regular rhythm.      Heart sounds: Normal heart sounds.   Pulmonary:      Effort: Pulmonary effort is normal.      Breath sounds: Normal breath sounds.   Musculoskeletal:         General: Normal range of motion.      Cervical back: Normal range of motion and neck supple.   Skin:     General: Skin is warm and dry.      Findings: Erythema, lesion and rash present.   Neurological:      Mental Status: She is alert and oriented to person, place, and time.      Deep Tendon Reflexes: Reflexes are normal and symmetric.   Psychiatric:         Mood and Affect: Mood and affect normal.         Behavior: Behavior normal.         Thought Content: Thought content normal.         Judgment: Judgment normal.             Assessment:     No diagnosis found.  Plan:        Medication List          Accurate as of February 2, 2022 11:32 AM. If you have any questions, ask your nurse or doctor.            CONTINUE taking these medications    acetaminophen 500 MG tablet  Commonly known as: TYLENOL     ALPRAZolam 0.25 MG tablet  Commonly known as: XANAX  Take 1 tablet (0.25 mg total) by mouth 2 (two) times daily.     aspirin 81 MG EC tablet  Commonly known as: ECOTRIN     atorvastatin 40 MG tablet  Commonly known as:  LIPITOR  TAKE 1 TABLET BY MOUTH ONCE DAILY     augmented betamethasone dipropionate 0.05 % cream  Commonly known as: DIPROLENE-AF     chlorthalidone 50 MG Tab  Commonly known as: HYGROTEN  Take 1 tablet (50 mg total) by mouth once daily.     clotrimazole 1 % cream  Commonly known as: LOTRIMIN  Apply topically 2 (two) times daily.     cyanocobalamin 1,000 mcg/mL injection  Commonly known as: VITAMIN B-12  Inject 1 mL (1,000 mcg total) into the muscle every 30 days.     diclofenac sodium 1 % Gel  Commonly known as: VOLTAREN  Apply 2 g topically 4 (four) times daily. for 10 days     diltiaZEM 240 MG 24 hr capsule  Commonly known as: CARTIA XT  Take 1 capsule (240 mg total) by mouth once daily.     furosemide 20 MG tablet  Commonly known as: LASIX  Take 1 tablet (20 mg total) by mouth once daily.     gabapentin 300 MG capsule  Commonly known as: NEURONTIN  TAKE 2 CAPSULES BY MOUTH  TWICE DAILY     latanoprost 0.005 % ophthalmic solution     losartan 100 MG tablet  Commonly known as: COZAAR  Take 1 tablet (100 mg total) by mouth once daily.     multivitamin per tablet  Commonly known as: THERAGRAN     omeprazole 20 MG capsule  Commonly known as: PRILOSEC  Take 1 capsule (20 mg total) by mouth once daily.     oxybutynin 5 MG Tab  Commonly known as: DITROPAN  Take 1 tablet (5 mg total) by mouth once daily.     pramipexole 0.5 MG tablet  Commonly known as: MIRAPEX  TAKE 1 TABLET BY MOUTH  TWICE DAILY     rivaroxaban 20 mg Tab  Commonly known as: XARELTO  Take 1 tablet (20 mg total) by mouth daily with dinner or evening meal. To thin blood, prevent strokes     timolol maleate 0.5% 0.5 % Drop  Commonly known as: TIMOPTIC          There are no diagnoses linked to this encounter.

## 2022-02-03 ENCOUNTER — TELEPHONE (OUTPATIENT)
Dept: CARDIOLOGY | Facility: CLINIC | Age: 82
End: 2022-02-03
Payer: MEDICARE

## 2022-02-03 NOTE — TELEPHONE ENCOUNTER
Patient notified that after taking the potassium for  bmp after 10 days she should have her blood work done.  She expressed understanding.

## 2022-02-03 NOTE — TELEPHONE ENCOUNTER
No new care gaps identified.  Powered by [employer]. Reference number: [id number].   2/02/2022 11:38:49 PM CST

## 2022-02-03 NOTE — TELEPHONE ENCOUNTER
----- Message from Jamia Sosa sent at 2/3/2022 10:13 AM CST -----  Type: Requesting to speak with nurse         Who Called: PT  Regarding: pt states she took her first potassium pill and needing to get blood work done.   Would the patient rather a call back or a response via [facility]? Call back  Best Call Back Number: [phone]  Additional Information: n/a

## 2022-02-08 ENCOUNTER — TELEPHONE (OUTPATIENT)
Dept: ENDOSCOPY | Facility: HOSPITAL | Age: 82
End: 2022-02-08
Payer: MEDICARE

## 2022-02-08 ENCOUNTER — LAB VISIT (OUTPATIENT)
Dept: FAMILY MEDICINE | Facility: CLINIC | Age: 82
End: 2022-02-08
Payer: MEDICARE

## 2022-02-08 DIAGNOSIS — Z01.818 PRE-OP TESTING: ICD-10-CM

## 2022-02-08 PROCEDURE — U0005 INFEC AGEN DETEC AMPLI PROBE: HCPCS | Performed by: INTERNAL MEDICINE

## 2022-02-08 PROCEDURE — U0003 INFECTIOUS AGENT DETECTION BY NUCLEIC ACID (DNA OR RNA); SEVERE ACUTE RESPIRATORY SYNDROME CORONAVIRUS 2 (SARS-COV-2) (CORONAVIRUS DISEASE [COVID-19]), AMPLIFIED PROBE TECHNIQUE, MAKING USE OF HIGH THROUGHPUT TECHNOLOGIES AS DESCRIBED BY CMS-2020-01-R: HCPCS | Performed by: INTERNAL MEDICINE

## 2022-02-09 ENCOUNTER — OFFICE VISIT (OUTPATIENT)
Dept: DERMATOLOGY | Facility: CLINIC | Age: 82
End: 2022-02-09
Payer: MEDICARE

## 2022-02-09 ENCOUNTER — TELEPHONE (OUTPATIENT)
Dept: ENDOSCOPY | Facility: HOSPITAL | Age: 82
End: 2022-02-09
Payer: MEDICARE

## 2022-02-09 DIAGNOSIS — L30.9 DERMATITIS: Primary | ICD-10-CM

## 2022-02-09 DIAGNOSIS — R23.8 BLISTERS OF MULTIPLE SITES: ICD-10-CM

## 2022-02-09 LAB — SARS-COV-2 RNA RESP QL NAA+PROBE: NOT DETECTED

## 2022-02-09 PROCEDURE — 99203 PR OFFICE/OUTPT VISIT, NEW, LEVL III, 30-44 MIN: ICD-10-PCS | Mod: S$GLB,,, | Performed by: PHYSICIAN ASSISTANT

## 2022-02-09 PROCEDURE — 1126F PR PAIN SEVERITY QUANTIFIED, NO PAIN PRESENT: ICD-10-PCS | Mod: CPTII,S$GLB,, | Performed by: PHYSICIAN ASSISTANT

## 2022-02-09 PROCEDURE — 3288F FALL RISK ASSESSMENT DOCD: CPT | Mod: CPTII,S$GLB,, | Performed by: PHYSICIAN ASSISTANT

## 2022-02-09 PROCEDURE — 1126F AMNT PAIN NOTED NONE PRSNT: CPT | Mod: CPTII,S$GLB,, | Performed by: PHYSICIAN ASSISTANT

## 2022-02-09 PROCEDURE — 99999 PR PBB SHADOW E&M-EST. PATIENT-LVL III: CPT | Mod: PBBFAC,,, | Performed by: PHYSICIAN ASSISTANT

## 2022-02-09 PROCEDURE — 99999 PR PBB SHADOW E&M-EST. PATIENT-LVL III: ICD-10-PCS | Mod: PBBFAC,,, | Performed by: PHYSICIAN ASSISTANT

## 2022-02-09 PROCEDURE — 1159F MED LIST DOCD IN RCRD: CPT | Mod: CPTII,S$GLB,, | Performed by: PHYSICIAN ASSISTANT

## 2022-02-09 PROCEDURE — 3288F PR FALLS RISK ASSESSMENT DOCUMENTED: ICD-10-PCS | Mod: CPTII,S$GLB,, | Performed by: PHYSICIAN ASSISTANT

## 2022-02-09 PROCEDURE — 1101F PT FALLS ASSESS-DOCD LE1/YR: CPT | Mod: CPTII,S$GLB,, | Performed by: PHYSICIAN ASSISTANT

## 2022-02-09 PROCEDURE — 1101F PR PT FALLS ASSESS DOC 0-1 FALLS W/OUT INJ PAST YR: ICD-10-PCS | Mod: CPTII,S$GLB,, | Performed by: PHYSICIAN ASSISTANT

## 2022-02-09 PROCEDURE — 99203 OFFICE O/P NEW LOW 30 MIN: CPT | Mod: S$GLB,,, | Performed by: PHYSICIAN ASSISTANT

## 2022-02-09 PROCEDURE — 1160F RVW MEDS BY RX/DR IN RCRD: CPT | Mod: CPTII,S$GLB,, | Performed by: PHYSICIAN ASSISTANT

## 2022-02-09 PROCEDURE — 1159F PR MEDICATION LIST DOCUMENTED IN MEDICAL RECORD: ICD-10-PCS | Mod: CPTII,S$GLB,, | Performed by: PHYSICIAN ASSISTANT

## 2022-02-09 PROCEDURE — 1160F PR REVIEW ALL MEDS BY PRESCRIBER/CLIN PHARMACIST DOCUMENTED: ICD-10-PCS | Mod: CPTII,S$GLB,, | Performed by: PHYSICIAN ASSISTANT

## 2022-02-09 RX ORDER — POTASSIUM CHLORIDE 1500 MG/1
20 TABLET, EXTENDED RELEASE ORAL DAILY
COMMUNITY
Start: 2022-02-02 | End: 2022-10-15

## 2022-02-09 RX ORDER — TRIAMCINOLONE ACETONIDE 1 MG/G
CREAM TOPICAL 2 TIMES DAILY
Qty: 45 G | Refills: 0 | Status: SHIPPED | OUTPATIENT
Start: 2022-02-09 | End: 2022-10-15

## 2022-02-09 NOTE — PROGRESS NOTES
"  Subjective:       Patient ID:  Mis Ca is a 81 y.o. female who presents for   Chief Complaint   Patient presents with    Rash     C/o rash on hands, arms, and legs, itching     History of Present Illness: The patient presents with chief complaint of rash.  Location: arms and legs  Duration: 6 months, intermittent but now seems to be more frequent (almost daily flares)  Signs/Symptoms: redness, tenderness, bumps beneath the skin and little blisters that come out. Denies drainage.   She has pictures of old flares which show purplish somewhat edematous appearing, irregular patches of posterior legs and arms. The bumps resolve typically within 3-4 days and some take weeks. "Each one seems different, some itch worse than others." Pt was seen by outside derm, Dr. Pop, about 2 weeks ago, but she is unclear of diagnosis.  States he prescribed a cream and wanted her to f/u if not better.     Denies new meds, soaps, or detergents. Using All free and Clear detergent and Dove sensitive bar. Not using emollients.    Prior treatments: benadryl cream and rx cream, medrol dose pack, oral abx (s/p 10 days), Mometasone ointment (helps w/benadryl cream)    PMHX: +h/o MRSA of bone of left foot      Review of Systems   Constitutional: Negative for fever and chills.   Gastrointestinal: Negative for nausea and vomiting.   Skin: Positive for itching, rash and activity-related sunscreen use. Negative for dry skin, sun sensitivity, daily sunscreen use, recent sunburn, dry lips and abscesses.   Hematologic/Lymphatic: Does not bruise/bleed easily.        Objective:    Physical Exam   Constitutional: She appears well-developed and well-nourished. No distress.   Neurological: She is alert and oriented to person, place, and time. She is not disoriented.   Psychiatric: She has a normal mood and affect.   Skin:   Areas Examined (abnormalities noted in diagram):   Head / Face Inspection Performed  Neck Inspection Performed  Chest / " Axilla Inspection Performed  Back Inspection Performed  RUE Inspected  LUE Inspection Performed              Diagram Legend     Erythematous scaling macule/papule c/w actinic keratosis       Vascular papule c/w angioma      Pigmented verrucoid papule/plaque c/w seborrheic keratosis      Yellow umbilicated papule c/w sebaceous hyperplasia      Irregularly shaped tan macule c/w lentigo     1-2 mm smooth white papules consistent with Milia      Movable subcutaneous cyst with punctum c/w epidermal inclusion cyst      Subcutaneous movable cyst c/w pilar cyst      Firm pink to brown papule c/w dermatofibroma      Pedunculated fleshy papule(s) c/w skin tag(s)      Evenly pigmented macule c/w junctional nevus     Mildly variegated pigmented, slightly irregular-bordered macule c/w mildly atypical nevus      Flesh colored to evenly pigmented papule c/w intradermal nevus       Pink pearly papule/plaque c/w basal cell carcinoma      Erythematous hyperkeratotic cursted plaque c/w SCC      Surgical scar with no sign of skin cancer recurrence      Open and closed comedones      Inflammatory papules and pustules      Verrucoid papule consistent consistent with wart     Erythematous eczematous patches and plaques     Dystrophic onycholytic nail with subungual debris c/w onychomycosis     Umbilicated papule    Erythematous-base heme-crusted tan verrucoid plaque consistent with inflamed seborrheic keratosis     Erythematous Silvery Scaling Plaque c/w Psoriasis     See annotation      Assessment / Plan:        Dermatitis  -     triamcinolone acetonide 0.1% (KENALOG) 0.1 % cream; Apply topically 2 (two) times daily. PRN rash with itching. Moderate steroid.  Dispense: 45 g; Refill: 0  Ddx: urticaria vs. Infectious vs. Atypical GA vs. Other  Unclear etiology. No primary lesion on exam today. Reviewed pictures on pt's phone. Trial of above rx prn flares. Encouraged compliance w/xyzal qd for the next month. Recommend derm MD f/u. If flares  prior to visit, pt to notify. Would reconsider biopsy of primary lesion.     Blisters of multiple sites  -     Ambulatory referral/consult to Dermatology             Follow up for dermatitis, to see Dermatology MD.

## 2022-02-09 NOTE — TELEPHONE ENCOUNTER
Spoke with patient about arrival time @ 5420.   Covid test = negative, 02/08    NPO status reviewed: Patient must have nothing to eat after midnight.  Pt may have CLEAR liquids ONLY until completely NPO 3 hrs @ 1030.    Medications: Do not take Insulin or oral diabetic medications the day of the procedure.  Take as prescribed: heart, seizure and blood pressure medication in the morning with a sip of water (less than an ounce).  Take any breathing medications and bring inhalers to hospital with you Leave all valuables and jewelry at home.     Wear comfortable clothes to procedure to change into hospital gown You cannot drive for 24 hours after your procedure because you will receive sedation for your procedure to make you comfortable.  A ride must be provided at discharge.

## 2022-02-09 NOTE — TELEPHONE ENCOUNTER
It is new because she use to take coumadin for her PAF  Pt is clear and safe to hold xarelto, to resume when Dr Aguilar deems safe from the procedure.

## 2022-02-11 ENCOUNTER — HOSPITAL ENCOUNTER (OUTPATIENT)
Facility: HOSPITAL | Age: 82
Discharge: HOME | End: 2022-02-11
Attending: INTERNAL MEDICINE | Admitting: INTERNAL MEDICINE
Payer: MEDICARE

## 2022-02-11 ENCOUNTER — ANESTHESIA EVENT (OUTPATIENT)
Dept: ENDOSCOPY | Facility: HOSPITAL | Age: 82
End: 2022-02-11
Payer: MEDICARE

## 2022-02-11 ENCOUNTER — ANESTHESIA (OUTPATIENT)
Dept: ENDOSCOPY | Facility: HOSPITAL | Age: 82
End: 2022-02-11
Payer: MEDICARE

## 2022-02-11 VITALS
WEIGHT: 219 LBS | DIASTOLIC BLOOD PRESSURE: 61 MMHG | TEMPERATURE: 98 F | OXYGEN SATURATION: 97 % | SYSTOLIC BLOOD PRESSURE: 135 MMHG | HEART RATE: 75 BPM | BODY MASS INDEX: 40.3 KG/M2 | HEIGHT: 62 IN | RESPIRATION RATE: 12 BRPM

## 2022-02-11 DIAGNOSIS — R47.02 DYSPHASIA: ICD-10-CM

## 2022-02-11 PROCEDURE — 88305 TISSUE EXAM BY PATHOLOGIST: ICD-10-PCS | Mod: 26,,, | Performed by: PATHOLOGY

## 2022-02-11 PROCEDURE — 25000003 PHARM REV CODE 250: Performed by: NURSE ANESTHETIST, CERTIFIED REGISTERED

## 2022-02-11 PROCEDURE — 43251 EGD REMOVE LESION SNARE: CPT | Mod: NSCH,,, | Performed by: INTERNAL MEDICINE

## 2022-02-11 PROCEDURE — 25000003 PHARM REV CODE 250: Performed by: INTERNAL MEDICINE

## 2022-02-11 PROCEDURE — 63600175 PHARM REV CODE 636 W HCPCS: Performed by: NURSE ANESTHETIST, CERTIFIED REGISTERED

## 2022-02-11 PROCEDURE — 43251 EGD REMOVE LESION SNARE: CPT | Performed by: INTERNAL MEDICINE

## 2022-02-11 PROCEDURE — 27201042 HC RETRIEVAL NET: Performed by: INTERNAL MEDICINE

## 2022-02-11 PROCEDURE — 37000008 HC ANESTHESIA 1ST 15 MINUTES: Performed by: INTERNAL MEDICINE

## 2022-02-11 PROCEDURE — 43251 PR EGD, FLEX, W/REMOVAL, TUMOR/POLYP/LESION(S), SNARE: ICD-10-PCS | Mod: NSCH,,, | Performed by: INTERNAL MEDICINE

## 2022-02-11 PROCEDURE — 88305 TISSUE EXAM BY PATHOLOGIST: CPT | Mod: 26,,, | Performed by: PATHOLOGY

## 2022-02-11 PROCEDURE — 88305 TISSUE EXAM BY PATHOLOGIST: CPT | Performed by: PATHOLOGY

## 2022-02-11 PROCEDURE — 37000009 HC ANESTHESIA EA ADD 15 MINS: Performed by: INTERNAL MEDICINE

## 2022-02-11 PROCEDURE — 27201089 HC SNARE, DISP (ANY): Performed by: INTERNAL MEDICINE

## 2022-02-11 RX ORDER — SODIUM CHLORIDE 0.9 % (FLUSH) 0.9 %
3 SYRINGE (ML) INJECTION
Status: DISCONTINUED | OUTPATIENT
Start: 2022-02-11 | End: 2022-02-11 | Stop reason: HOSPADM

## 2022-02-11 RX ORDER — FENTANYL CITRATE 50 UG/ML
INJECTION, SOLUTION INTRAMUSCULAR; INTRAVENOUS
Status: DISCONTINUED | OUTPATIENT
Start: 2022-02-11 | End: 2022-02-11

## 2022-02-11 RX ORDER — ETOMIDATE 2 MG/ML
INJECTION INTRAVENOUS
Status: DISCONTINUED | OUTPATIENT
Start: 2022-02-11 | End: 2022-02-11

## 2022-02-11 RX ORDER — SODIUM CHLORIDE 9 MG/ML
INJECTION, SOLUTION INTRAVENOUS CONTINUOUS
Status: DISCONTINUED | OUTPATIENT
Start: 2022-02-11 | End: 2022-02-11 | Stop reason: HOSPADM

## 2022-02-11 RX ORDER — LIDOCAINE HYDROCHLORIDE 20 MG/ML
INJECTION INTRAVENOUS
Status: DISCONTINUED | OUTPATIENT
Start: 2022-02-11 | End: 2022-02-11

## 2022-02-11 RX ADMIN — ETOMIDATE 4 MG: 2 INJECTION, SOLUTION INTRAVENOUS at 03:02

## 2022-02-11 RX ADMIN — FENTANYL CITRATE 50 MCG: 50 INJECTION, SOLUTION INTRAMUSCULAR; INTRAVENOUS at 03:02

## 2022-02-11 RX ADMIN — LIDOCAINE HYDROCHLORIDE 100 MG: 20 INJECTION, SOLUTION INTRAVENOUS at 03:02

## 2022-02-11 RX ADMIN — SODIUM CHLORIDE: 0.9 INJECTION, SOLUTION INTRAVENOUS at 02:02

## 2022-02-11 NOTE — DISCHARGE INSTRUCTIONS
Patient Education       Upper GI Endoscopy Discharge Instructions   About this topic   This procedure is done to view your upper gastrointestinal (GI) tract. This includes your throat and food pipe (esophagus). It also includes your stomach and the first part of the small bowel. Some people have this test for problems like coughing or throwing up blood. Other people may be having bad belly pain or blood in their stool. You may be having trouble swallowing or problems with acid reflux.  Doctors often use this test to look for problems like:  · Ulcers  · Cancer or tumor growths  · Internal bleeding  · Swelling  · Inflammation  · Infection  · Smith's esophagus  · Gastroesophageal reflux disease or GERD  · Swallowing problems     What care is needed at home?   · Ask your doctor what you need to do when you go home. Make sure you ask questions if you do not understand what the doctor says. This way you will know what you need to do.  · Your throat may feel sore. Your belly may also feel bloated. Your doctor may give you drugs to help with pain.  · Talk to your doctor about when it is safe for you to go back to eating your normal diet and taking your drugs. You can drink fluid once the numbing drugs in your throat wear off.  What follow-up care is needed?   · Your doctor may ask you to make visits to the office to check on your progress. Be sure to keep these visits.  · The results of this test may help your doctor understand what kind of problem you have with your upper GI tract. Together you can make a plan for more care.  What drugs may be needed?   The doctor may order drugs to:  · Help with pain  · Decrease the acid in your stomach  Will physical activity be limited?   You may need to limit your activity for the rest of the day. After that, you will likely be able to go back to your normal activities.  What changes to diet are needed?   Soft foods like pudding or soups may be easier to eat at first. Ask your doctor  if you need to make any changes to your diet.  What problems could happen?   · Painful swallowing  · Upset stomach  · Injury to food pipe   · Throwing up  · Tear in the esophagus  When do I need to call the doctor?   · Signs of infection. These include a fever of 100.4°F (38°C) or higher, chills.  · Very bad belly pain  · Throwing up blood  · Your belly is hard and swollen  · Trouble swallowing or breathing  · Upset stomach and throwing up  · Bloody or black tarry stools  · Cough, shortness of breath, or chest pain  · A crunching feeling under the skin in your neck  · You are not feeling better in 2 to 3 days or you are feeling worse  Teach Back: Helping You Understand   The Teach Back Method helps you understand the information we are giving you. After you talk with the staff, tell them in your own words what you learned. This helps to make sure the staff has described each thing clearly. It also helps to explain things that may have been confusing. Before going home, make sure you can do these:  · I can tell you about my procedure.  · I can tell you what changes I need to make with my diet or drugs.  · I can tell you what I will do if I have very bad belly pain, throwing up blood, or my belly is hard and swollen.  Where can I learn more?   [employer]  https://www.gastro.org/practice-guidance/gi-patient-center/topic/upper-gi-endoscopy   Last Reviewed Date   2021-10-05  Consumer Information Use and Disclaimer   This information is not specific medical advice and does not replace information you receive from your health care provider. This is only a brief summary of general information. It does NOT include all information about conditions, illnesses, injuries, tests, procedures, treatments, therapies, discharge instructions or life-style choices that may apply to you. You must talk with your health care provider for complete information about your health and treatment options. This  information should not be used to decide whether or not to accept your health care providers advice, instructions or recommendations. Only your health care provider has the knowledge and training to provide advice that is right for you.  Copyright   Copyright © 2021 [employer] and its affiliates and/or licensors. All rights reserved.

## 2022-02-11 NOTE — PROVATION PATIENT INSTRUCTIONS
Discharge Summary/Instructions after an Endoscopic Procedure  Patient Name: Mis Ca  Patient MRN: [id number]  Patient YOB: 1940 Friday, February 11, 2022  Efren Aguilar MD  Dear patient,  As a result of recent federal legislation (The Federal Cures Act), you may   receive lab or pathology results from your procedure in your [facility]   account before your physician is able to contact you. Your physician or   their representative will relay the results to you with their   recommendations at their soonest availability.  Thank you,  Your health is very important to us during the Covid Crisis. Following your   procedure today, you will receive a daily text for 2 weeks asking about   signs or symptoms of Covid 19.  Please respond to this text when you   receive it so we can follow up and keep you as safe as possible.   RESTRICTIONS:  During your procedure today, you received medications for sedation.  These   medications may affect your judgment, balance and coordination.  Therefore,   for 24 hours, you have the following restrictions:   - DO NOT drive a car, operate machinery, make legal/financial decisions,   sign important papers or drink alcohol.    ACTIVITY:  Today: no heavy lifting, straining or running due to procedural   sedation/anesthesia.  The following day: return to full activity including work.  DIET:  Eat and drink normally unless instructed otherwise.     TREATMENT FOR COMMON SIDE EFFECTS:  - Mild abdominal pain, nausea, belching, bloating or excessive gas:  rest,   eat lightly and use a heating pad.  - Sore Throat: treat with throat lozenges and/or gargle with warm salt   water.  - Because air was used during the procedure, expelling large amounts of air   from your rectum or belching is normal.  - If a bowel prep was taken, you may not have a bowel movement for 1-3 days.    This is normal.  SYMPTOMS TO WATCH FOR AND REPORT TO YOUR PHYSICIAN:  1. Abdominal pain or bloating,  other than gas cramps.  2. Chest pain.  3. Back pain.  4. Signs of infection such as: chills or fever occurring within 24 hours   after the procedure.  5. Rectal bleeding, which would show as bright red, maroon, or black stools.   (A tablespoon of blood from the rectum is not serious, especially if   hemorrhoids are present.)  6. Vomiting.  7. Weakness or dizziness.  GO DIRECTLY TO THE NEAREST EMERGENCY ROOM IF YOU HAVE ANY OF THE FOLLOWING:      Difficulty breathing              Chills and/or fever over 101 F   Persistent vomiting and/or vomiting blood   Severe abdominal pain   Severe chest pain   Black, tarry stools   Bleeding- more than one tablespoon   Any other symptom or condition that you feel may need urgent attention  Your doctor recommends these additional instructions:  If any biopsies were taken, your doctors clinic will contact you in 1 to 2   weeks with any results.  - Discharge patient to home.   - Resume previous diet.   - Continue present medications.   - Await pathology results.  For questions, problems or results please call your physician - Efren Aguilar MD.  EMERGENCY PHONE NUMBER: [phone],  LAB RESULTS: [phone]  IF A COMPLICATION OR EMERGENCY SITUATION ARISES AND YOU ARE UNABLE TO REACH   YOUR PHYSICIAN - GO DIRECTLY TO THE EMERGENCY ROOM.  Efren Aguilar MD  2/11/2022 3:48:53 PM  This report has been verified and signed electronically.  Dear patient,  As a result of recent federal legislation (The Federal Cures Act), you may   receive lab or pathology results from your procedure in your [facility]   account before your physician is able to contact you. Your physician or   their representative will relay the results to you with their   recommendations at their soonest availability.  Thank you,  PROVATION

## 2022-02-11 NOTE — H&P
Short Stay Endoscopy History and Physical    PCP - Scott Dillard MD    Procedure - EGD  ASA - III  Mallampati - per anesthesia  History of Anesthesia problems - no  Family history Anesthesia problems - no     HPI:  This is a 81 y.o. female here for evaluation of : Dysphagia      ROS:  Constitutional: No fevers, chills, No weight loss  ENT: No allergies  CV: No chest pain  Pulm: No shortness of breath  GI: see HPI  Derm: No rash    Medical History:  has a past medical history of Anticoagulant long-term use, Anxiety, Arthritis, Atrial fibrillation, Blind right eye, Bradycardia, Cancer, Hyperlipidemia, Hypertension, PVC (premature ventricular contraction), RLS (restless legs syndrome), and Sleep apnea.    Surgical History:  has a past surgical history that includes Hysterectomy; Appendectomy; Cataract extraction bilateral w/ anterior vitrectomy; Breast biopsy (Left); Fracture surgery (Left); ORIF forearm fracture (Left); Adenoidectomy; Tonsillectomy; Palate surgery; Foot surgery (Left); Hand tendon surgery (Left); Oophorectomy; Lapidus bunionectomy (Left, 10/31/2018); Endoscopic gastrocnemius recession (Left, 10/31/2018); Akin osteotomy (Left, 10/31/2018); Eye surgery (Bilateral); Eye surgery (Right); Breast surgery (Left); Lymph Gland Removed  (Right); Lapidus bunionectomy (Left, 10/31/2018); Foot arthrodesis (Left, 1/15/2020); Neurectomy (Left, 1/15/2020); and Incision and drainage foot (Left, 3/11/2020).    Family History: family history includes Aneurysm in her mother; Arthritis in her sister; Atrial fibrillation in her sister; Bipolar disorder in her sister; Brain cancer in her sister; Breast cancer in her sister; COPD in her sister; Cancer in her brother, brother, father, sister, and sister; Cirrhosis in her father; Colon polyps in her brother; Depression in her sister; Diabetes in her brother, brother, sister, sister, sister, and sister; Glaucoma in her brother and sister; Heart attack in her sister and  sister; Heart disease in her sister, sister, and sister; Hyperlipidemia in her brother and sister; Hypertension in her brother and sister; Kidney disease in her sister; Lung cancer in her father and sister; Other in her sister.. Otherwise no colon cancer, inflammatory bowel disease, or GI malignancies.    Social History:  reports that she has never smoked. She has never used smokeless tobacco. She reports previous alcohol use. She reports that she does not use drugs.    Review of patient's allergies indicates:   Allergen Reactions    Propofol analogues      Used for her Colonoscopy.  Extended delirium.  Advised not to take it again.      Adhesive     Compazine [prochlorperazine edisylate] Anxiety    Penicillins Rash    Sulfa (sulfonamide antibiotics) Rash    Vancomycin analogues Rash       Medications:   Facility-Administered Medications Prior to Admission   Medication Dose Route Frequency Provider Last Rate Last Admin    cyanocobalamin injection 1,000 mcg  1,000 mcg Intramuscular Q30 Days Scott Dillard MD   1,000 mcg at 07/23/21 1415     Medications Prior to Admission   Medication Sig Dispense Refill Last Dose    acetaminophen (TYLENOL) 500 MG tablet Take 1,000 mg by mouth 2 (two) times daily as needed for Pain.   2/10/2022 at Unknown time    ALPRAZolam (XANAX) 0.25 MG tablet Take 1 tablet (0.25 mg total) by mouth 2 (two) times daily. 180 tablet 1 2/10/2022 at Unknown time    aspirin (ECOTRIN) 81 MG EC tablet Take 81 mg by mouth once daily.   2/10/2022 at Unknown time    chlorthalidone (HYGROTEN) 50 MG Tab Take 1 tablet (50 mg total) by mouth once daily. 30 tablet 11 2/10/2022 at Unknown time    cyanocobalamin (VITAMIN B-12) 1,000 mcg/mL injection Inject 1 mL (1,000 mcg total) into the muscle every 30 days. 10 mL 1 Past Week at Unknown time    diltiaZEM (CARTIA XT) 240 MG 24 hr capsule Take 1 capsule (240 mg total) by mouth once daily. 14 capsule 1 2/11/2022 at 0800    furosemide (LASIX) 20 MG  tablet Take 1 tablet (20 mg total) by mouth once daily. 90 tablet 3 2/10/2022 at Unknown time    gabapentin (NEURONTIN) 300 MG capsule TAKE 2 CAPSULES BY MOUTH  TWICE DAILY 360 capsule 3 2/10/2022 at Unknown time    latanoprost 0.005 % ophthalmic solution Place 1 drop into the left eye every evening.    2/10/2022 at Unknown time    losartan (COZAAR) 100 MG tablet Take 1 tablet (100 mg total) by mouth once daily. 90 tablet 3 2/11/2022 at 0800    omeprazole (PRILOSEC) 20 MG capsule TAKE 1 CAPSULE BY MOUTH  ONCE DAILY 90 capsule 3 2/10/2022 at Unknown time    oxybutynin (DITROPAN) 5 MG Tab Take 1 tablet (5 mg total) by mouth once daily. 90 tablet 3 2/10/2022 at Unknown time    potassium chloride (K-TAB) 20 mEq Take 20 mEq by mouth once daily.   2/10/2022 at Unknown time    potassium chloride SA (K-DUR,KLOR-CON) 20 MEQ tablet Take 1 tablet (20 mEq total) by mouth once daily. 30 tablet 11 2/10/2022 at Unknown time    pramipexole (MIRAPEX) 0.5 MG tablet TAKE 1 TABLET BY MOUTH  TWICE DAILY 180 tablet 3 2/10/2022 at Unknown time    timolol maleate 0.5% (TIMOPTIC) 0.5 % Drop Place 1 drop into the left eye once daily.   0 2/10/2022 at Unknown time    atorvastatin (LIPITOR) 40 MG tablet TAKE 1 TABLET BY MOUTH ONCE DAILY 90 tablet 3     augmented betamethasone dipropionate (DIPROLENE-AF) 0.05 % cream Apply 1 application topically 2 (two) times daily.       clotrimazole (LOTRIMIN) 1 % cream Apply topically 2 (two) times daily. (Patient not taking: No sig reported) 60 g 2     diclofenac sodium (VOLTAREN) 1 % Gel Apply 2 g topically 4 (four) times daily. for 10 days (Patient not taking: Reported on 10/28/2021) 100 g 5     rivaroxaban (XARELTO) 20 mg Tab Take 1 tablet (20 mg total) by mouth daily with dinner or evening meal. To thin blood, prevent strokes 30 tablet 11 2/8/2022    triamcinolone acetonide 0.1% (KENALOG) 0.1 % cream Apply topically 2 (two) times daily. PRN rash with itching. Moderate steroid. 45 g 0           Objective Findings:    Vital Signs: see nursing notes  Physical Exam:  General Appearance: Well appearing in no acute distress  Eyes:    No scleral icterus  ENT: Neck supple  Lungs: CTA anteriorly  Heart:  S1, S2 normal, no murmurs heard  Abdomen: Soft, non tender, non distended with positive bowel sounds. No hepatosplenomegaly, ascites, or mass  Extremities: no edema  Skin: No rash      Labs:  Lab Results   Component Value Date    WBC 6.20 09/17/2021    HGB 13.9 09/17/2021    HCT 42.9 09/17/2021     09/17/2021    CHOL 148 09/17/2021    TRIG 181 (H) 09/17/2021    HDL 38 (L) 09/17/2021    ALT 19 09/17/2021    AST 23 09/17/2021     02/02/2022    K 3.1 (L) 02/02/2022    CL 97 02/02/2022    CREATININE 1.14 02/02/2022    BUN 23 (H) 02/02/2022    CO2 33 (H) 02/02/2022    TSH 1.080 09/17/2021    INR 1.7 (A) 01/28/2022    HGBA1C 5.8 06/09/2016       I have explained the risks and benefits of endoscopy procedures to the patient including but not limited to bleeding, perforation, infection, and death.    Efren Aguilar MD

## 2022-02-11 NOTE — TRANSFER OF CARE
"Anesthesia Transfer of Care Note    Patient: Mis Ca    Procedure(s) Performed: Procedure(s) (LRB):  EGD (ESOPHAGOGASTRODUODENOSCOPY) (N/A)    Patient location: GI    Anesthesia Type: MAC    Transport from OR: Transported from OR on room air with adequate spontaneous ventilation    Post pain: adequate analgesia    Post assessment: no apparent anesthetic complications and tolerated procedure well    Post vital signs: stable    Level of consciousness: awake, alert and oriented    Nausea/Vomiting: no nausea/vomiting    Complications: none    Transfer of care protocol was followed      Last vitals:   Visit Vitals  BP (!) 148/84 (BP Location: Left arm, Patient Position: Lying)   Pulse 66   Temp 36.7 °C (98.1 °F) (Skin)   Resp 18   Ht 5' 2" (1.575 m)   Wt 99.3 kg (219 lb)   SpO2 95%   Breastfeeding No   BMI 40.06 kg/m²     "

## 2022-02-11 NOTE — ANESTHESIA PREPROCEDURE EVALUATION
02/11/2022  Mis Ca is a 81 y.o., female for EGD under MAC. Requests no propofol. Previous anesthetics used versed or GA    Past Medical History:   Diagnosis Date    Anticoagulant long-term use     Anxiety     Arthritis     Atrial fibrillation     Blind right eye     Bradycardia     Cancer     skin cancer left side of face under eye    Hyperlipidemia     Hypertension     PVC (premature ventricular contraction)     RLS (restless legs syndrome)     Sleep apnea     Does not use CPAP machine.     Past Surgical History:   Procedure Laterality Date    ADENOIDECTOMY      AKIN OSTEOTOMY Left 10/31/2018    Procedure: OSTEOTOMY, AKIN;  Surgeon: Rudolph Cardozo DPM;  Location: [facility] OR;  Service: Podiatry;  Laterality: Left;    APPENDECTOMY      BREAST BIOPSY Left     x3    BREAST SURGERY Left     breast biopsy x3    CATARACT EXTRACTION BILATERAL W/ ANTERIOR VITRECTOMY      ENDOSCOPIC GASTROCNEMIUS RECESSION Left 10/31/2018    Procedure: RECESSION, GASTROCNEMIUS, ENDOSCOPIC;  Surgeon: Rudolph Cardozo DPM;  Location: [facility] OR;  Service: Podiatry;  Laterality: Left;    EYE SURGERY Bilateral     cataracts extraction    EYE SURGERY Right     drainage tube (glaucoma)    FOOT ARTHRODESIS Left 1/15/2020    Procedure: FUSION, FOOT;  Surgeon: Rudolph Cardozo DPM;  Location: [facility] OR;  Service: Podiatry;  Laterality: Left;  mini c-arm, Arthrex screw  for hardware removal (Lydia notified), Orthofix (Niels notified) min ex-fix    FOOT SURGERY Left     lesion removed from dorsal area    FRACTURE SURGERY Left     arm    HAND TENDON SURGERY Left     HYSTERECTOMY      INCISION AND DRAINAGE FOOT Left 3/11/2020    Procedure: INCISION AND DRAINAGE, FOOT;  Surgeon: Rudolph Cardozo DPM;  Location: [facility] OR;  Service: Podiatry;  Laterality: Left;    LAPIDUS BUNIONECTOMY Left 10/31/2018     Procedure: BUNIONECTOMY, LAPIDUS;  Surgeon: Rudolph Cardozo DPM;  Location: [facility] OR;  Service: Podiatry;  Laterality: Left;  mini c-arm, Arthrex locking plate (Lydia notified)    LAPIDUS BUNIONECTOMY Left 10/31/2018    Dr. Cardozo    Lymph Gland Removed  Right     groin (performed by Dr. Vergara)    NEURECTOMY Left 1/15/2020    Procedure: NEURECTOMY;  Surgeon: Rudolph Cardozo DPM;  Location: [facility] OR;  Service: Podiatry;  Laterality: Left;  bipolar bovie, vessel loop, possible Agnes nerve wrap. Moshe with Agnes notified and will be overnighting graft. MK 1/14/2020    OOPHORECTOMY      ORIF FOREARM FRACTURE Left     PALATE SURGERY      Lesion removed    TONSILLECTOMY           Anesthesia Evaluation    I have reviewed the Patient Summary Reports.   I have reviewed the NPO Status.   I have reviewed the Medications.     Review of Systems  Anesthesia Hx:  Personal Hx of Anesthesia complications (requests no propofol, says it is hard for her to wake up)   Social:  Non-Smoker        Physical Exam  General:  Obesity    Airway/Jaw/Neck:  Airway Findings: Mallampati: II      Chest/Lungs:  Chest/Lungs Clear    Heart/Vascular:  Heart Findings: Normal            Anesthesia Plan  Type of Anesthesia, risks & benefits discussed:  Anesthesia Type:  MAC    Patient's Preference:   Plan Factors:          Intra-op Monitoring Plan: standard ASA monitors  Intra-op Monitoring Plan Comments:   Post Op Pain Control Plan:   Post Op Pain Control Plan Comments:     Induction:    Beta Blocker:  Patient is not currently on a Beta-Blocker (No further documentation required).       Informed Consent: Patient understands risks and agrees with Anesthesia plan.  Questions answered. Anesthesia consent signed with patient.  ASA Score: 3     Day of Surgery Review of History & Physical:            Ready For Surgery From Anesthesia Perspective.

## 2022-02-11 NOTE — ANESTHESIA POSTPROCEDURE EVALUATION
Anesthesia Post Evaluation    Patient: Mis Ca    Procedure(s) Performed: Procedure(s) (LRB):  EGD (ESOPHAGOGASTRODUODENOSCOPY) (N/A)    Final Anesthesia Type: MAC      Patient location during evaluation: GI PACU  Patient participation: Yes- Able to Participate  Level of consciousness: awake and alert and oriented  Post-procedure vital signs: reviewed and stable  Pain management: adequate  Airway patency: patent    PONV status at discharge: No PONV  Anesthetic complications: no      Cardiovascular status: blood pressure returned to baseline, hemodynamically stable and stable  Respiratory status: unassisted, spontaneous ventilation and room air  Hydration status: euvolemic  Follow-up not needed.          Vitals Value Taken Time   /84 02/11/22 1420   Temp 36.7 °C (98.1 °F) 02/11/22 1420   Pulse 66 02/11/22 1420   Resp 18 02/11/22 1420   SpO2 95 % 02/11/22 1420         No case tracking events are documented in the log.      Pain/Bandar Score: No data recorded

## 2022-02-11 NOTE — PLAN OF CARE
Admission process complete.  Patient ready for procedure.  Plan of care reviewed with patient.  VSS.  NPO status maintained.  Call light in reach.  Bed locked and in lowest position.

## 2022-02-14 ENCOUNTER — TELEPHONE (OUTPATIENT)
Dept: CARDIOLOGY | Facility: CLINIC | Age: 82
End: 2022-02-14
Payer: MEDICARE

## 2022-02-14 ENCOUNTER — LAB VISIT (OUTPATIENT)
Dept: LAB | Facility: HOSPITAL | Age: 82
End: 2022-02-14
Attending: INTERNAL MEDICINE
Payer: MEDICARE

## 2022-02-14 ENCOUNTER — TELEPHONE (OUTPATIENT)
Dept: ENDOSCOPY | Facility: HOSPITAL | Age: 82
End: 2022-02-14
Payer: MEDICARE

## 2022-02-14 DIAGNOSIS — E87.6 HYPOKALEMIA: ICD-10-CM

## 2022-02-14 LAB
ANION GAP SERPL CALC-SCNC: 10 MMOL/L (ref 8–16)
CALCIUM SERPL-MCNC: 9.5 MG/DL (ref 8.7–10.5)
CHLORIDE SERPL-SCNC: 98 MMOL/L (ref 95–110)
CO2 SERPL-SCNC: 32 MMOL/L (ref 23–29)
CREAT SERPL-MCNC: 0.95 MG/DL (ref 0.5–1.4)
EST. GFR  (AFRICAN AMERICAN): >60 ML/MIN/1.73 M^2
EST. GFR  (NON AFRICAN AMERICAN): 56.7 ML/MIN/1.73 M^2
GLUCOSE SERPL-MCNC: 160 MG/DL (ref 70–110)
POTASSIUM SERPL-SCNC: 3.8 MMOL/L (ref 3.5–5.1)
SODIUM SERPL-SCNC: 140 MMOL/L (ref 136–145)
UUN UR-MCNC: 21 MG/DL (ref 7–17)

## 2022-02-14 PROCEDURE — 36415 COLL VENOUS BLD VENIPUNCTURE: CPT | Mod: PO | Performed by: INTERNAL MEDICINE

## 2022-02-14 PROCEDURE — 80048 BASIC METABOLIC PNL TOTAL CA: CPT | Mod: PO | Performed by: INTERNAL MEDICINE

## 2022-02-14 NOTE — TELEPHONE ENCOUNTER
Refill Routing Note   Medication(s) are not appropriate for processing by [facility] for the following reason(s):      - Outside of protocol  - Drug-Drug Interaction (ALPRAZolam and diltiaZEM)    [facility] action(s):  Defer  Route Medication-related problems identified: Drug-drug interaction        --->Care Gap information included in message below if applicable.   Medication reconciliation completed: No   Automatic Epic Generated Protocol Data:        Requested Prescriptions   Pending Prescriptions Disp Refills    oxybutynin (DITROPAN) 5 MG Tab [Pharmacy Med Name: Oxybutynin Chloride 5 MG Oral Tablet] 90 tablet 3     Sig: TAKE 1 TABLET BY MOUTH ONCE DAILY       There is no refill protocol information for this order       diltiaZEM (CARDIZEM CD) 240 MG 24 hr capsule [Pharmacy Med Name: DILTIAZEM  240MG  CAP  24 HR CD] 90 capsule 3     Sig: TAKE 1 CAPSULE BY MOUTH  ONCE DAILY       Cardiovascular:  Calcium Channel Blockers - diltiazem & verapamil Passed - 2/2/2022 11:38 PM        Passed - Patient is at least 18 years old        Passed - Last BP in normal range within 360 days     BP Readings from Last 1 Encounters:   02/11/22 135/61               Passed - Last Heart Rate in normal range within 360 days     Pulse Readings from Last 1 Encounters:   02/11/22 75              Passed - Valid encounter within last 15 months     Recent Visits  Date Type Provider Dept   02/02/22 Office Visit Scott Dillard MD [facility]   08/27/21 Office Visit Scott Dillard MD [facility]   09/18/20 Office Visit Scott Dillard MD [facility]   06/18/20 Office Visit Scott Dillard MD [facility]   06/03/20 Office Visit Scott Dillard MD [facility]   05/20/20 Office Visit Scott Dillard MD [facility]   Showing recent visits within past 720 days and meeting all other requirements  Future Appointments  No visits were found meeting these conditions.  Showing future appointments  within next 150 days and meeting all other requirements      Future Appointments              In 4 weeks Cherelle Miles MD [facility] [place]    In 2 months Scott Dillard MD [facility] [place] [facility]                      Appointments  past 12m or future 3m with PCP    Date Provider   Last Visit   2/2/2022 Scott Dillard MD   Next Visit   5/4/2022 Scott Dillard MD   ED visits in past 90 days: 0        Note composed:11:19 AM 02/14/2022

## 2022-02-14 NOTE — TELEPHONE ENCOUNTER
----- Message from Portillo Luis MD sent at 2/14/2022 11:30 AM CST -----  Please let her know her potassium level is back to normal. Will continue the potassium supplements. Thanks

## 2022-02-14 NOTE — PROGRESS NOTES
Please let her know her potassium level is back to normal. Will continue the potassium supplements. Thanks

## 2022-02-15 RX ORDER — DILTIAZEM HYDROCHLORIDE 240 MG/1
CAPSULE, COATED, EXTENDED RELEASE ORAL
Qty: 90 CAPSULE | Refills: 3 | Status: SHIPPED | OUTPATIENT
Start: 2022-02-15 | End: 2022-10-28 | Stop reason: ALTCHOICE

## 2022-02-15 RX ORDER — OXYBUTYNIN CHLORIDE 5 MG/1
TABLET ORAL
Qty: 90 TABLET | Refills: 3 | Status: SHIPPED | OUTPATIENT
Start: 2022-02-15 | End: 2022-12-22

## 2022-02-17 LAB
FINAL PATHOLOGIC DIAGNOSIS: NORMAL
GROSS: NORMAL
Lab: NORMAL

## 2022-02-18 LAB
LEFT EYE DM RETINOPATHY: NEGATIVE
RIGHT EYE DM RETINOPATHY: NEGATIVE

## 2022-02-21 ENCOUNTER — TELEPHONE (OUTPATIENT)
Dept: GASTROENTEROLOGY | Facility: CLINIC | Age: 82
End: 2022-02-21
Payer: MEDICARE

## 2022-02-21 NOTE — TELEPHONE ENCOUNTER
----- Message from Efren Aguilar MD sent at 2/21/2022 10:36 AM CST -----  Benign gastric polyp. Follow up if symptoms persist.

## 2022-03-14 ENCOUNTER — OFFICE VISIT (OUTPATIENT)
Dept: DERMATOLOGY | Facility: CLINIC | Age: 82
End: 2022-03-14
Payer: MEDICARE

## 2022-03-14 DIAGNOSIS — L98.9 DERMATOSIS: Primary | ICD-10-CM

## 2022-03-14 PROCEDURE — 88305 TISSUE EXAM BY PATHOLOGIST: CPT | Mod: 59 | Performed by: PATHOLOGY

## 2022-03-14 PROCEDURE — 99213 PR OFFICE/OUTPT VISIT, EST, LEVL III, 20-29 MIN: ICD-10-PCS | Mod: 25,S$GLB,, | Performed by: DERMATOLOGY

## 2022-03-14 PROCEDURE — 3288F FALL RISK ASSESSMENT DOCD: CPT | Mod: CPTII,S$GLB,, | Performed by: DERMATOLOGY

## 2022-03-14 PROCEDURE — 88305 TISSUE EXAM BY PATHOLOGIST: ICD-10-PCS | Mod: 26,,, | Performed by: PATHOLOGY

## 2022-03-14 PROCEDURE — 11105 PUNCH BX SKIN EA SEP/ADDL: CPT | Mod: S$GLB,,, | Performed by: DERMATOLOGY

## 2022-03-14 PROCEDURE — 11104 PR PUNCH BIOPSY, SKIN, SINGLE LESION: ICD-10-PCS | Mod: S$GLB,,, | Performed by: DERMATOLOGY

## 2022-03-14 PROCEDURE — 1101F PR PT FALLS ASSESS DOC 0-1 FALLS W/OUT INJ PAST YR: ICD-10-PCS | Mod: CPTII,S$GLB,, | Performed by: DERMATOLOGY

## 2022-03-14 PROCEDURE — 1159F MED LIST DOCD IN RCRD: CPT | Mod: CPTII,S$GLB,, | Performed by: DERMATOLOGY

## 2022-03-14 PROCEDURE — 99213 OFFICE O/P EST LOW 20 MIN: CPT | Mod: 25,S$GLB,, | Performed by: DERMATOLOGY

## 2022-03-14 PROCEDURE — 3288F PR FALLS RISK ASSESSMENT DOCUMENTED: ICD-10-PCS | Mod: CPTII,S$GLB,, | Performed by: DERMATOLOGY

## 2022-03-14 PROCEDURE — 1126F AMNT PAIN NOTED NONE PRSNT: CPT | Mod: CPTII,S$GLB,, | Performed by: DERMATOLOGY

## 2022-03-14 PROCEDURE — 88305 TISSUE EXAM BY PATHOLOGIST: CPT | Mod: 26,,, | Performed by: PATHOLOGY

## 2022-03-14 PROCEDURE — 11104 PUNCH BX SKIN SINGLE LESION: CPT | Mod: S$GLB,,, | Performed by: DERMATOLOGY

## 2022-03-14 PROCEDURE — 1101F PT FALLS ASSESS-DOCD LE1/YR: CPT | Mod: CPTII,S$GLB,, | Performed by: DERMATOLOGY

## 2022-03-14 PROCEDURE — 11105 PR PUNCH BIOPSY, SKIN, EA ADDTL LESION: ICD-10-PCS | Mod: S$GLB,,, | Performed by: DERMATOLOGY

## 2022-03-14 PROCEDURE — 99999 PR PBB SHADOW E&M-EST. PATIENT-LVL III: CPT | Mod: PBBFAC,,, | Performed by: DERMATOLOGY

## 2022-03-14 PROCEDURE — 1126F PR PAIN SEVERITY QUANTIFIED, NO PAIN PRESENT: ICD-10-PCS | Mod: CPTII,S$GLB,, | Performed by: DERMATOLOGY

## 2022-03-14 PROCEDURE — 1160F RVW MEDS BY RX/DR IN RCRD: CPT | Mod: CPTII,S$GLB,, | Performed by: DERMATOLOGY

## 2022-03-14 PROCEDURE — 1160F PR REVIEW ALL MEDS BY PRESCRIBER/CLIN PHARMACIST DOCUMENTED: ICD-10-PCS | Mod: CPTII,S$GLB,, | Performed by: DERMATOLOGY

## 2022-03-14 PROCEDURE — 99999 PR PBB SHADOW E&M-EST. PATIENT-LVL III: ICD-10-PCS | Mod: PBBFAC,,, | Performed by: DERMATOLOGY

## 2022-03-14 PROCEDURE — 1159F PR MEDICATION LIST DOCUMENTED IN MEDICAL RECORD: ICD-10-PCS | Mod: CPTII,S$GLB,, | Performed by: DERMATOLOGY

## 2022-03-14 NOTE — PROGRESS NOTES
Subjective:       Patient ID:  Mis Ca is a 81 y.o. female who presents for   Chief Complaint   Patient presents with    Follow-up     Pt states she still has the rashes but its just a little better.      Hx of rash x 7 months, last seen by JOE Gibbs on 2/9/22.  Pt also previously seen by Dr. Pop.  C/o recurrent flares without resolution.  + pruritus.  Currently tx with benadryl cream with some relief.    Prior treatments: benadryl cream, TAC cream, PO benadryl, medrol dose pack, oral abx (s/p 10 days), Mometasone ointment (helps w/benadryl cream)     Pt states she is up to date with age appropriate cancer screening.       Review of Systems   Constitutional: Negative for fever and chills.   Gastrointestinal: Negative for nausea and vomiting.   Skin: Positive for itching, rash and activity-related sunscreen use. Negative for daily sunscreen use and recent sunburn.   Hematologic/Lymphatic: Does not bruise/bleed easily.        Objective:    Physical Exam   Constitutional: She appears well-developed and well-nourished. No distress.   Neurological: She is alert and oriented to person, place, and time. She is not disoriented.   Psychiatric: She has a normal mood and affect.   Skin:   Areas Examined (abnormalities noted in diagram):   Scalp / Hair Palpated and Inspected  Head / Face Inspection Performed  Neck Inspection Performed  Chest / Axilla Inspection Performed  Abdomen Inspection Performed  Genitals / Buttocks / Groin Inspection Performed  Back Inspection Performed  RUE Inspected  LUE Inspection Performed  RLE Inspected  LLE Inspection Performed  Nails and Digits Inspection Performed              Diagram Legend     Erythematous scaling macule/papule c/w actinic keratosis       Vascular papule c/w angioma      Pigmented verrucoid papule/plaque c/w seborrheic keratosis      Yellow umbilicated papule c/w sebaceous hyperplasia      Irregularly shaped tan macule c/w lentigo     1-2 mm smooth white papules  consistent with Milia      Movable subcutaneous cyst with punctum c/w epidermal inclusion cyst      Subcutaneous movable cyst c/w pilar cyst      Firm pink to brown papule c/w dermatofibroma      Pedunculated fleshy papule(s) c/w skin tag(s)      Evenly pigmented macule c/w junctional nevus     Mildly variegated pigmented, slightly irregular-bordered macule c/w mildly atypical nevus      Flesh colored to evenly pigmented papule c/w intradermal nevus       Pink pearly papule/plaque c/w basal cell carcinoma      Erythematous hyperkeratotic cursted plaque c/w SCC      Surgical scar with no sign of skin cancer recurrence      Open and closed comedones      Inflammatory papules and pustules      Verrucoid papule consistent consistent with wart     Erythematous eczematous patches and plaques     Dystrophic onycholytic nail with subungual debris c/w onychomycosis     Umbilicated papule    Erythematous-base heme-crusted tan verrucoid plaque consistent with inflamed seborrheic keratosis     Erythematous Silvery Scaling Plaque c/w Psoriasis     See annotation      Assessment / Plan:      Pathology Orders:     Normal Orders This Visit    Specimen to Pathology, Dermatology     Comments:    Number of Specimens:->3  ------------------------->-------------------------  Spec 1 Procedure:->Biopsy  Spec 1 Clinical Impression:->sweet's vs. ABR vs. other  Spec 1 Source:->left upper arm  ------------------------->-------------------------  Spec 2 Procedure:->Biopsy  Spec 2 Clinical Impression:->sweet's vs. ABR vs. other  Spec 2 Source:->right chest  ------------------------->-------------------------  Spec 3 Procedure:->Biopsy  Spec 3 Clinical Impression:->sweet's vs. ABR vs. other  Spec 3 Source:->right forearm  Release to patient->Immediate    Questions:    Procedure Type: Dermatology and skin neoplasms    Number of Specimens: 3    ------------------------: -------------------------    Spec 1 Procedure: Biopsy    Spec 1 Clinical  Impression: sweet's vs. ABR vs. other    Spec 1 Source: left upper arm    ------------------------: -------------------------    Spec 2 Procedure: Biopsy    Spec 2 Clinical Impression: sweet's vs. ABR vs. other    Spec 2 Source: right chest    ------------------------: -------------------------    Spec 3 Procedure: Biopsy    Spec 3 Clinical Impression: sweet's vs. ABR vs. other    Spec 3 Source: right forearm    Clinical Information: see above    Release to patient: Immediate        Dermatosis  -     Specimen to Pathology, Dermatology  -     Possible Sweet's vs. ABR. 3 punch biopsies done.  RTC in 2 weeks for s/r and results.           Follow up in about 3 weeks (around 4/4/2022).     PROCEDURE NOTE -- PUNCH BIOPSY  Location: see above    After risks, benefits, and alternatives were discussed with the patient, the patient agrees to the procedure by verbal consent. The area(s) is cleansed with alcohol. A total of 3 cc of plain lidocaine 1% and sodium bicarbonate was injected for local anesthesia. A 3 mm punch biopsy was used to remove part or all of the lesion(s). The specimen was sent for tissue pathology. The defect(s) was then closed with interrupted 4.0 Ethilon sutures. The area was dressed with antibiotic ointment and bandaged. The patient tolerated the procedure well without adverse events.  Wound care instructions were given to the patient on the AVS.  The patient will be notified of pathology results once available. Results will also be available in Epic.

## 2022-03-14 NOTE — PATIENT INSTRUCTIONS
Punch Biopsy Wound Care    Your doctor has performed a punch biopsy today.  A band aid and antibiotic ointment has been placed over the site.  This should remain in place for 24 hours.  It is recommended that you keep the area dry for the first 24 hours.  After 24 hours, you may remove the band aid and wash the area with warm soap and water and apply Vaseline jelly.  Many patients prefer to use Neosporin or Bacitracin ointment.  This is acceptable; however know that you can develop an allergy to this medication even if you have used it safely for years.  It is important to keep the area moist.  Letting it dry out and get air slows healing time, will worsen the scar, and make it more difficult to remove the stitches if they were placed.  Band aid is optional after first 24 hours.      If you notice increasing redness, tenderness, pain, or yellow drainage at the biopsy or surgical site, please notify your doctor.  These are signs of an infection.    If your biopsy/surgical site is bleeding, apply firm pressure for 15 minutes straight.  Repeat for another 15 minutes, if it is still bleeding.   If the surgical site continues to bleed, then please contact your doctor.      [facility] - [facility]   DERMATOLOGY  [address]   [place] [address]   Dept: [phone]   Dept Fax: [phone]

## 2022-03-18 LAB
FINAL PATHOLOGIC DIAGNOSIS: NORMAL
GROSS: NORMAL
Lab: NORMAL
MICROSCOPIC EXAM: NORMAL

## 2022-03-28 ENCOUNTER — OFFICE VISIT (OUTPATIENT)
Dept: DERMATOLOGY | Facility: CLINIC | Age: 82
End: 2022-03-28
Payer: MEDICARE

## 2022-03-28 DIAGNOSIS — L82.1 SEBORRHEIC KERATOSIS: ICD-10-CM
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
## 2022-03-28 PROCEDURE — 1101F PR PT FALLS ASSESS DOC 0-1 FALLS W/OUT INJ PAST YR: ICD-10-PCS | Mod: CPTII,S$GLB,, | Performed by: DERMATOLOGY

## 2022-03-28 PROCEDURE — 99213 PR OFFICE/OUTPT VISIT, EST, LEVL III, 20-29 MIN: ICD-10-PCS | Mod: S$GLB,,, | Performed by: DERMATOLOGY

## 2022-03-28 PROCEDURE — 99999 PR PBB SHADOW E&M-EST. PATIENT-LVL III: CPT | Mod: PBBFAC,,, | Performed by: DERMATOLOGY

## 2022-03-28 PROCEDURE — 1159F PR MEDICATION LIST DOCUMENTED IN MEDICAL RECORD: ICD-10-PCS | Mod: CPTII,S$GLB,, | Performed by: DERMATOLOGY

## 2022-03-28 PROCEDURE — 3288F PR FALLS RISK ASSESSMENT DOCUMENTED: ICD-10-PCS | Mod: CPTII,S$GLB,, | Performed by: DERMATOLOGY

## 2022-03-28 PROCEDURE — 1126F AMNT PAIN NOTED NONE PRSNT: CPT | Mod: CPTII,S$GLB,, | Performed by: DERMATOLOGY

## 2022-03-28 PROCEDURE — 99213 OFFICE O/P EST LOW 20 MIN: CPT | Mod: S$GLB,,, | Performed by: DERMATOLOGY

## 2022-03-28 PROCEDURE — 99999 PR PBB SHADOW E&M-EST. PATIENT-LVL III: ICD-10-PCS | Mod: PBBFAC,,, | Performed by: DERMATOLOGY

## 2022-03-28 PROCEDURE — 1159F MED LIST DOCD IN RCRD: CPT | Mod: CPTII,S$GLB,, | Performed by: DERMATOLOGY

## 2022-03-28 PROCEDURE — 1126F PR PAIN SEVERITY QUANTIFIED, NO PAIN PRESENT: ICD-10-PCS | Mod: CPTII,S$GLB,, | Performed by: DERMATOLOGY

## 2022-03-28 PROCEDURE — 1101F PT FALLS ASSESS-DOCD LE1/YR: CPT | Mod: CPTII,S$GLB,, | Performed by: DERMATOLOGY

## 2022-03-28 PROCEDURE — 1160F PR REVIEW ALL MEDS BY PRESCRIBER/CLIN PHARMACIST DOCUMENTED: ICD-10-PCS | Mod: CPTII,S$GLB,, | Performed by: DERMATOLOGY

## 2022-03-28 PROCEDURE — 1160F RVW MEDS BY RX/DR IN RCRD: CPT | Mod: CPTII,S$GLB,, | Performed by: DERMATOLOGY

## 2022-03-28 PROCEDURE — 3288F FALL RISK ASSESSMENT DOCD: CPT | Mod: CPTII,S$GLB,, | Performed by: DERMATOLOGY

## 2022-03-28 RX ORDER — MOMETASONE FUROATE 1 MG/G
CREAM TOPICAL 2 TIMES DAILY PRN
Qty: 45 G | Refills: 3 | Status: SHIPPED | OUTPATIENT
Start: 2022-03-28 | End: 2022-10-15

## 2022-03-28 NOTE — PROGRESS NOTES
Subjective:       Patient ID:  Mis Ca is a 81 y.o. female who presents for   Chief Complaint   Patient presents with    Follow-up     Hx of rash x 7 months, last seen on 3/14/22.  She is s/p biopsy x3, here today to discuss results.  She saw pathoogy results via the portal and had an  w/ treatment of her apartment.  She states rash had improved. She has been picking up bed bugs with her swifter.    Prior treatments: benadryl cream, TAC cream, PO benadryl, medrol dose pack, oral abx (s/p 10 days), Mometasone ointment (helps w/benadryl cream)      Review of Systems   Constitutional: Negative for fever and chills.   Gastrointestinal: Negative for nausea and vomiting.   Skin: Positive for activity-related sunscreen use. Negative for itching, rash, daily sunscreen use and recent sunburn.   Hematologic/Lymphatic: Does not bruise/bleed easily.        Objective:    Physical Exam   Constitutional: She appears well-developed and well-nourished. No distress.   Neurological: She is alert and oriented to person, place, and time. She is not disoriented.   Psychiatric: She has a normal mood and affect.   Skin:   Areas Examined (abnormalities noted in diagram):   Head / Face Inspection Performed  Neck Inspection Performed  RUE Inspected  LUE Inspection Performed  Nails and Digits Inspection Performed              Diagram Legend     Erythematous scaling macule/papule c/w actinic keratosis       Vascular papule c/w angioma      Pigmented verrucoid papule/plaque c/w seborrheic keratosis      Yellow umbilicated papule c/w sebaceous hyperplasia      Irregularly shaped tan macule c/w lentigo     1-2 mm smooth white papules consistent with Milia      Movable subcutaneous cyst with punctum c/w epidermal inclusion cyst      Subcutaneous movable cyst c/w pilar cyst      Firm pink to brown papule c/w dermatofibroma      Pedunculated fleshy papule(s) c/w skin tag(s)      Evenly pigmented macule c/w junctional nevus      Mildly variegated pigmented, slightly irregular-bordered macule c/w mildly atypical nevus      Flesh colored to evenly pigmented papule c/w intradermal nevus       Pink pearly papule/plaque c/w basal cell carcinoma      Erythematous hyperkeratotic cursted plaque c/w SCC      Surgical scar with no sign of skin cancer recurrence      Open and closed comedones      Inflammatory papules and pustules      Verrucoid papule consistent consistent with wart     Erythematous eczematous patches and plaques     Dystrophic onycholytic nail with subungual debris c/w onychomycosis     Umbilicated papule    Erythematous-base heme-crusted tan verrucoid plaque consistent with inflamed seborrheic keratosis     Erythematous Silvery Scaling Plaque c/w Psoriasis     See annotation       Final Pathologic Diagnosis   Date Value Ref Range Status   03/14/2022   Final    1. Skin, left upper arm, punch biopsy:  -ALLERGIC URTICARIAL REACTION, see comment  2. Skin, right chest, punch biopsy:  -ALLERGIC URTICARIAL REACTION, see comment  3. Skin, right forearm, punch biopsy:  -ALLERGIC URTICARIAL REACTION, see comment  COMMENT:  The histological findings (see microscopic description) suggest a  hypersensitivity reaction.  Given the clinical differential diagnosis along  with the superficial and deep pattern with numerous eosinophils and scattered  neutrophils an arthropod assault is favored.  While there is no evidence of  vasculitis, some early vasculitic processes may exhibit similar findings.  Drug reactions, urticaria, and bullous pemphigoid may, at times, have similar  morphologic findings.  At this time clinical images are not available for  review in epic and therefore correlation is recommended.       Comment:     Interp By Portillo Castellano M.D., Signed on 03/18/2022 at 13:15             Assessment / Plan:        Arthropod bite, initial encounter  -     mometasone 0.1% (ELOCON) 0.1 % cream; Apply topically 2 (two) times daily as needed (for  rash. do not use on face.).  Dispense: 45 g; Refill: 3  -     Improved with several bed bugs noted in home.  Reviewed pathology showing arthropod assault.  Will avoid systemic anti-histamines given worsening of glaucoma risk.  Will start above med prn.     Seborrheic keratosis  Reassurance given. Discussed diagnosis and that lesions are benign.  AAD handout given.              Follow up if symptoms worsen or fail to improve.

## 2022-03-28 NOTE — PATIENT INSTRUCTIONS

## 2022-04-11 NOTE — PROGRESS NOTES
Subjective:   @Patient ID:  Mis Ca is a 81 y.o. female who presents for follow-up of PAF, Hyperlipidemia, HTN, DD      HPI:   Here for f/u  She has been doing ok   Her breathing and LE edema are better with lasix  LE edema is worse as the day progress   She is on Xarelto now   She reports night cramps. She is on Lipitor 40 mg qhs       PAF on Xarelto . Currently SR.       Prior cardiovascular  Hx  --------------------------------          - ECHO    · 9/2019 Normal left ventricular systolic function. The estimated ejection fraction is 65%  · Normal LV diastolic function.  · Concentric left ventricular hypertrophy.  · Normal right ventricular systolic function.             12/2017  Normal left ventricular systolic function (EF 60-65%).     2 - Impaired LV relaxation, normal LAP (grade 1 diastolic dysfunction).     3 - Normal right ventricular systolic function .     4 - The estimated PA systolic pressure is 28 mmHg.     5 - Mild left atrial enlargement.         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       Neuralgia 06/03/2019    Left foot pain 01/08/2019    Antalgic gait 01/08/2019    Hallux valgus with bunions, left 10/31/2018    Gastrocnemius equinus of left lower extremity 10/31/2018    Hallux valgus, left 09/23/2018    History of skin cancer 07/16/2018    Severe obesity with body mass index (BMI) of 35.0 to 39.9 with  serious comorbidity 12/21/2017    Diastolic dysfunction 09/20/2017    Aortic atherosclerosis 09/04/2017     Seen on chest xray dated 09/04/17      Blind right eye 07/10/2017    Glaucoma 04/06/2017    Senile purpura 03/08/2017    Tortuous aorta 03/08/2017    History of adenomatous polyp of colon 12/09/2016    Diverticulosis of large intestine without hemorrhage 12/09/2016    JACQUELYN (obstructive sleep apnea) 02/17/2016    Anxiety 02/17/2016    Restless leg syndrome 02/17/2016    Hyperlipidemia 02/17/2016    Essential hypertension 02/17/2016    Gastroesophageal reflux disease without esophagitis 02/17/2016                    LAST HbA1c  Lab Results   Component Value Date    HGBA1C 5.8 06/09/2016       Lipid panel  Lab Results   Component Value Date    CHOL 148 09/17/2021    CHOL 155 09/28/2020    CHOL 162 09/03/2019     Lab Results   Component Value Date    HDL 38 (L) 09/17/2021    HDL 40 09/28/2020    HDL 38 (L) 09/03/2019     Lab Results   Component Value Date    LDLCALC 73.8 09/17/2021    LDLCALC 81.6 09/28/2020    LDLCALC 77.8 09/03/2019     Lab Results   Component Value Date    TRIG 181 (H) 09/17/2021    TRIG 167 (H) 09/28/2020    TRIG 231 (H) 09/03/2019     Lab Results   Component Value Date    CHOLHDL 25.7 09/17/2021    CHOLHDL 25.8 09/28/2020    CHOLHDL 23.5 09/03/2019            Review of Systems   Constitutional: Negative for chills and fever.   HENT: Negative for hearing loss and nosebleeds.    Eyes: Negative for blurred vision.   Cardiovascular: Negative for chest pain and palpitations.        As in HPI    Respiratory: Negative for hemoptysis and shortness of breath.    Hematologic/Lymphatic: Negative for bleeding problem.   Skin: Negative for itching.   Musculoskeletal:        As in HPI    Gastrointestinal: Negative for abdominal pain and hematochezia.   Genitourinary: Negative for hematuria.   Neurological: Negative for dizziness and loss of balance.   Psychiatric/Behavioral: Negative for  altered mental status and depression.       Objective:   Physical Exam  Constitutional:       Appearance: She is well-developed.   HENT:      Head: Normocephalic and atraumatic.   Eyes:      Conjunctiva/sclera: Conjunctivae normal.   Neck:      Vascular: No JVD.   Cardiovascular:      Rate and Rhythm: Normal rate and regular rhythm.      Heart sounds: Normal heart sounds. No murmur heard.    No friction rub. No gallop.   Pulmonary:      Effort: Pulmonary effort is normal. No respiratory distress.      Breath sounds: Normal breath sounds. No stridor. No wheezing.   Musculoskeletal:      Cervical back: Neck supple.   Skin:     General: Skin is warm and dry.   Neurological:      Mental Status: She is alert and oriented to person, place, and time.   Psychiatric:         Behavior: Behavior normal.         Assessment:     1. Mixed hyperlipidemia    2. Essential hypertension    3. Aortic atherosclerosis    4. Tortuous aorta    5. Paroxysmal atrial fibrillation    6. Diastolic dysfunction        Plan:   Continue current treatment   Continue Xarelto   Continue lasix 20 mg daily  Will stop lipitor. Wait for one week and monitor the night cramps symptoms. If better will start pravastatin.        I spent 5-10 minutes asking, assessing, assisting, arranging and advising heart healthy diet improvements. This included low-salt meals, portion control and health food alternatives. I also encourage 30 minutes of moderate exercise 3-4x a week.       Pertinent cardiac images and EKG reviewed independently.    Continue with current medical plan and lifestyle changes.  Return sooner for concerns or questions. If symptoms persist go to the ED  I have reviewed all pertinent data including patient's medical history in detail and updated the computerized patient record.     No orders of the defined types were placed in this encounter.      Follow up as scheduled.     She expressed verbal understanding and agreed with the plan    Patient's  Medications   New Prescriptions    PRAVASTATIN (PRAVACHOL) 40 MG TABLET    Take 1 tablet (40 mg total) by mouth once daily.   Previous Medications    ACETAMINOPHEN (TYLENOL) 500 MG TABLET    Take 1,000 mg by mouth 2 (two) times daily as needed for Pain.    ALPRAZOLAM (XANAX) 0.25 MG TABLET    Take 1 tablet (0.25 mg total) by mouth 2 (two) times daily.    ASPIRIN (ECOTRIN) 81 MG EC TABLET    Take 81 mg by mouth once daily.    AUGMENTED BETAMETHASONE DIPROPIONATE (DIPROLENE-AF) 0.05 % CREAM    Apply 1 application topically 2 (two) times daily.    CHLORTHALIDONE (HYGROTEN) 50 MG TAB    Take 1 tablet (50 mg total) by mouth once daily.    CLOTRIMAZOLE (LOTRIMIN) 1 % CREAM    Apply topically 2 (two) times daily.    CYANOCOBALAMIN (VITAMIN B-12) 1,000 MCG/ML INJECTION    Inject 1 mL (1,000 mcg total) into the muscle every 30 days.    DICLOFENAC SODIUM (VOLTAREN) 1 % GEL    Apply 2 g topically 4 (four) times daily. for 10 days    DILTIAZEM (CARDIZEM CD) 240 MG 24 HR CAPSULE    TAKE 1 CAPSULE BY MOUTH  ONCE DAILY    FUROSEMIDE (LASIX) 20 MG TABLET    Take 1 tablet (20 mg total) by mouth once daily.    GABAPENTIN (NEURONTIN) 300 MG CAPSULE    TAKE 2 CAPSULES BY MOUTH  TWICE DAILY    LATANOPROST 0.005 % OPHTHALMIC SOLUTION    Place 1 drop into the left eye every evening.     MOMETASONE 0.1% (ELOCON) 0.1 % CREAM    Apply topically 2 (two) times daily as needed (for rash. do not use on face.).    OMEPRAZOLE (PRILOSEC) 20 MG CAPSULE    TAKE 1 CAPSULE BY MOUTH  ONCE DAILY    OXYBUTYNIN (DITROPAN) 5 MG TAB    TAKE 1 TABLET BY MOUTH ONCE DAILY    POTASSIUM CHLORIDE (K-TAB) 20 MEQ    Take 20 mEq by mouth once daily.    POTASSIUM CHLORIDE SA (K-DUR,KLOR-CON) 20 MEQ TABLET    Take 1 tablet (20 mEq total) by mouth once daily.    PRAMIPEXOLE (MIRAPEX) 0.5 MG TABLET    TAKE 1 TABLET BY MOUTH  TWICE DAILY    RIVAROXABAN (XARELTO) 20 MG TAB    Take 1 tablet (20 mg total) by mouth daily with dinner or evening meal. To thin blood, prevent  strokes    TIMOLOL MALEATE 0.5% (TIMOPTIC) 0.5 % DROP    Place 1 drop into the left eye once daily.     TRIAMCINOLONE ACETONIDE 0.1% (KENALOG) 0.1 % CREAM    Apply topically 2 (two) times daily. PRN rash with itching. Moderate steroid.   Modified Medications    Modified Medication Previous Medication    LOSARTAN (COZAAR) 100 MG TABLET losartan (COZAAR) 100 MG tablet       Take 0.5 tablets (50 mg total) by mouth once daily.    Take 1 tablet (100 mg total) by mouth once daily.   Discontinued Medications    No medications on file

## 2022-04-13 ENCOUNTER — OFFICE VISIT (OUTPATIENT)
Dept: CARDIOLOGY | Facility: CLINIC | Age: 82
End: 2022-04-13
Payer: MEDICARE

## 2022-04-13 VITALS
HEART RATE: 75 BPM | WEIGHT: 221.38 LBS | OXYGEN SATURATION: 94 % | BODY MASS INDEX: 40.74 KG/M2 | HEIGHT: 62 IN | SYSTOLIC BLOOD PRESSURE: 126 MMHG | DIASTOLIC BLOOD PRESSURE: 59 MMHG

## 2022-04-13 DIAGNOSIS — I70.0 AORTIC ATHEROSCLEROSIS: ICD-10-CM

## 2022-04-13 DIAGNOSIS — E78.2 MIXED HYPERLIPIDEMIA: Primary | ICD-10-CM

## 2022-04-13 DIAGNOSIS — I48.0 PAROXYSMAL ATRIAL FIBRILLATION: ICD-10-CM

## 2022-04-13 DIAGNOSIS — G47.33 OSA (OBSTRUCTIVE SLEEP APNEA): ICD-10-CM

## 2022-04-13 DIAGNOSIS — I77.1 TORTUOUS AORTA: ICD-10-CM

## 2022-04-13 DIAGNOSIS — I51.89 DIASTOLIC DYSFUNCTION: ICD-10-CM

## 2022-04-13 DIAGNOSIS — I10 ESSENTIAL HYPERTENSION: ICD-10-CM

## 2022-04-13 DIAGNOSIS — E66.01 SEVERE OBESITY WITH BODY MASS INDEX (BMI) OF 35.0 TO 39.9 WITH SERIOUS COMORBIDITY: ICD-10-CM

## 2022-04-13 PROCEDURE — 99214 PR OFFICE/OUTPT VISIT, EST, LEVL IV, 30-39 MIN: ICD-10-PCS | Mod: S$GLB,,, | Performed by: INTERNAL MEDICINE

## 2022-04-13 PROCEDURE — 1101F PT FALLS ASSESS-DOCD LE1/YR: CPT | Mod: CPTII,S$GLB,, | Performed by: INTERNAL MEDICINE

## 2022-04-13 PROCEDURE — 3288F PR FALLS RISK ASSESSMENT DOCUMENTED: ICD-10-PCS | Mod: CPTII,S$GLB,, | Performed by: INTERNAL MEDICINE

## 2022-04-13 PROCEDURE — 99214 OFFICE O/P EST MOD 30 MIN: CPT | Mod: S$GLB,,, | Performed by: INTERNAL MEDICINE

## 2022-04-13 PROCEDURE — 1159F PR MEDICATION LIST DOCUMENTED IN MEDICAL RECORD: ICD-10-PCS | Mod: CPTII,S$GLB,, | Performed by: INTERNAL MEDICINE

## 2022-04-13 PROCEDURE — 3288F FALL RISK ASSESSMENT DOCD: CPT | Mod: CPTII,S$GLB,, | Performed by: INTERNAL MEDICINE

## 2022-04-13 PROCEDURE — 99499 UNLISTED E&M SERVICE: CPT | Mod: S$GLB,,, | Performed by: INTERNAL MEDICINE

## 2022-04-13 PROCEDURE — 3078F PR MOST RECENT DIASTOLIC BLOOD PRESSURE < 80 MM HG: ICD-10-PCS | Mod: CPTII,S$GLB,, | Performed by: INTERNAL MEDICINE

## 2022-04-13 PROCEDURE — 1126F AMNT PAIN NOTED NONE PRSNT: CPT | Mod: CPTII,S$GLB,, | Performed by: INTERNAL MEDICINE

## 2022-04-13 PROCEDURE — 1126F PR PAIN SEVERITY QUANTIFIED, NO PAIN PRESENT: ICD-10-PCS | Mod: CPTII,S$GLB,, | Performed by: INTERNAL MEDICINE

## 2022-04-13 PROCEDURE — 3078F DIAST BP <80 MM HG: CPT | Mod: CPTII,S$GLB,, | Performed by: INTERNAL MEDICINE

## 2022-04-13 PROCEDURE — 1101F PR PT FALLS ASSESS DOC 0-1 FALLS W/OUT INJ PAST YR: ICD-10-PCS | Mod: CPTII,S$GLB,, | Performed by: INTERNAL MEDICINE

## 2022-04-13 PROCEDURE — 99499 RISK ADDL DX/OHS AUDIT: ICD-10-PCS | Mod: S$GLB,,, | Performed by: INTERNAL MEDICINE

## 2022-04-13 PROCEDURE — 3074F PR MOST RECENT SYSTOLIC BLOOD PRESSURE < 130 MM HG: ICD-10-PCS | Mod: CPTII,S$GLB,, | Performed by: INTERNAL MEDICINE

## 2022-04-13 PROCEDURE — 3074F SYST BP LT 130 MM HG: CPT | Mod: CPTII,S$GLB,, | Performed by: INTERNAL MEDICINE

## 2022-04-13 PROCEDURE — 1159F MED LIST DOCD IN RCRD: CPT | Mod: CPTII,S$GLB,, | Performed by: INTERNAL MEDICINE

## 2022-04-13 RX ORDER — LOSARTAN POTASSIUM 100 MG/1
50 TABLET ORAL DAILY
Qty: 90 TABLET | Refills: 3 | Status: SHIPPED | OUTPATIENT
Start: 2022-04-13 | End: 2022-05-13

## 2022-04-13 RX ORDER — PRAVASTATIN SODIUM 40 MG/1
40 TABLET ORAL DAILY
Qty: 90 TABLET | Refills: 3 | Status: SHIPPED | OUTPATIENT
Start: 2022-04-13 | End: 2023-04-17

## 2022-04-13 RX ORDER — LOSARTAN POTASSIUM 100 MG/1
50 TABLET ORAL DAILY
Qty: 90 TABLET | Refills: 3 | Status: SHIPPED | OUTPATIENT
Start: 2022-04-13 | End: 2022-04-13

## 2022-04-22 ENCOUNTER — PATIENT MESSAGE (OUTPATIENT)
Dept: CARDIOLOGY | Facility: CLINIC | Age: 82
End: 2022-04-22
Payer: MEDICARE

## 2022-05-01 PROBLEM — I80.9 SUPERFICIAL THROMBOPHLEBITIS: Status: RESOLVED | Noted: 2020-03-13 | Resolved: 2022-05-01

## 2022-05-01 PROBLEM — N28.1 BILATERAL RENAL CYSTS: Status: ACTIVE | Noted: 2022-05-01

## 2022-05-11 DIAGNOSIS — I10 ESSENTIAL HYPERTENSION: ICD-10-CM

## 2022-05-12 NOTE — TELEPHONE ENCOUNTER
No new care gaps identified.  [facility] Embedded Care Gaps. Reference number: [id number]. 5/11/2022   11:07:57 PM CDT

## 2022-05-12 NOTE — TELEPHONE ENCOUNTER
Refill Routing Note   Medication(s) are not appropriate for processing by [facility] for the following reason(s):      - Patient has been seen in the ED/Hospital since the last PCP visit    [facility] action(s):  Defer          Medication reconciliation completed: No     Appointments  past 12m or future 3m with PCP    Date Provider   Last Visit   2/2/2022 Scott Dillard MD   Next Visit   5/26/2022 Scott Dillard MD   ED visits in past 90 days: 0        Note composed:7:28 AM 05/12/2022

## 2022-05-13 RX ORDER — LOSARTAN POTASSIUM 100 MG/1
TABLET ORAL
Qty: 45 TABLET | Refills: 3 | Status: SHIPPED | OUTPATIENT
Start: 2022-05-13 | End: 2023-02-01 | Stop reason: SDUPTHER

## 2022-05-25 ENCOUNTER — TELEPHONE (OUTPATIENT)
Dept: FAMILY MEDICINE | Facility: CLINIC | Age: 82
End: 2022-05-25
Payer: MEDICARE

## 2022-05-25 ENCOUNTER — HOSPITAL ENCOUNTER (OUTPATIENT)
Dept: RADIOLOGY | Facility: HOSPITAL | Age: 82
Discharge: HOME | End: 2022-05-25
Attending: STUDENT IN AN ORGANIZED HEALTH CARE EDUCATION/TRAINING PROGRAM
Payer: MEDICARE

## 2022-05-25 DIAGNOSIS — N28.1 RENAL CYST: ICD-10-CM

## 2022-05-25 PROCEDURE — 76770 US EXAM ABDO BACK WALL COMP: CPT | Mod: TC,PO

## 2022-05-25 NOTE — TELEPHONE ENCOUNTER
----- Message from [address]anga [address] sent at 5/25/2022  1:59 PM CDT -----  Contact: patient  [phone]  Patient returning Dr Dillard nurse call   Please call back to assist at [phone]

## 2022-05-26 ENCOUNTER — OFFICE VISIT (OUTPATIENT)
Dept: FAMILY MEDICINE | Facility: CLINIC | Age: 82
End: 2022-05-26
Payer: MEDICARE

## 2022-05-26 VITALS
TEMPERATURE: 98 F | DIASTOLIC BLOOD PRESSURE: 64 MMHG | OXYGEN SATURATION: 96 % | HEART RATE: 82 BPM | WEIGHT: 219.94 LBS | BODY MASS INDEX: 40.47 KG/M2 | HEIGHT: 62 IN | SYSTOLIC BLOOD PRESSURE: 114 MMHG

## 2022-05-26 DIAGNOSIS — G47.33 OSA (OBSTRUCTIVE SLEEP APNEA): Primary | ICD-10-CM

## 2022-05-26 DIAGNOSIS — E66.01 SEVERE OBESITY WITH BODY MASS INDEX (BMI) OF 35.0 TO 39.9 WITH SERIOUS COMORBIDITY: ICD-10-CM

## 2022-05-26 DIAGNOSIS — R26.89 ANTALGIC GAIT: ICD-10-CM

## 2022-05-26 PROCEDURE — 3288F FALL RISK ASSESSMENT DOCD: CPT | Mod: CPTII,S$GLB,, | Performed by: FAMILY MEDICINE

## 2022-05-26 PROCEDURE — 1159F PR MEDICATION LIST DOCUMENTED IN MEDICAL RECORD: ICD-10-PCS | Mod: CPTII,S$GLB,, | Performed by: FAMILY MEDICINE

## 2022-05-26 PROCEDURE — 1125F PR PAIN SEVERITY QUANTIFIED, PAIN PRESENT: ICD-10-PCS | Mod: CPTII,S$GLB,, | Performed by: FAMILY MEDICINE

## 2022-05-26 PROCEDURE — 99214 OFFICE O/P EST MOD 30 MIN: CPT | Mod: S$GLB,,, | Performed by: FAMILY MEDICINE

## 2022-05-26 PROCEDURE — 3078F PR MOST RECENT DIASTOLIC BLOOD PRESSURE < 80 MM HG: ICD-10-PCS | Mod: CPTII,S$GLB,, | Performed by: FAMILY MEDICINE

## 2022-05-26 PROCEDURE — 1159F MED LIST DOCD IN RCRD: CPT | Mod: CPTII,S$GLB,, | Performed by: FAMILY MEDICINE

## 2022-05-26 PROCEDURE — 3074F SYST BP LT 130 MM HG: CPT | Mod: CPTII,S$GLB,, | Performed by: FAMILY MEDICINE

## 2022-05-26 PROCEDURE — 1125F AMNT PAIN NOTED PAIN PRSNT: CPT | Mod: CPTII,S$GLB,, | Performed by: FAMILY MEDICINE

## 2022-05-26 PROCEDURE — 1101F PT FALLS ASSESS-DOCD LE1/YR: CPT | Mod: CPTII,S$GLB,, | Performed by: FAMILY MEDICINE

## 2022-05-26 PROCEDURE — 99214 PR OFFICE/OUTPT VISIT, EST, LEVL IV, 30-39 MIN: ICD-10-PCS | Mod: S$GLB,,, | Performed by: FAMILY MEDICINE

## 2022-05-26 PROCEDURE — 3078F DIAST BP <80 MM HG: CPT | Mod: CPTII,S$GLB,, | Performed by: FAMILY MEDICINE

## 2022-05-26 PROCEDURE — 3074F PR MOST RECENT SYSTOLIC BLOOD PRESSURE < 130 MM HG: ICD-10-PCS | Mod: CPTII,S$GLB,, | Performed by: FAMILY MEDICINE

## 2022-05-26 PROCEDURE — 1160F PR REVIEW ALL MEDS BY PRESCRIBER/CLIN PHARMACIST DOCUMENTED: ICD-10-PCS | Mod: CPTII,S$GLB,, | Performed by: FAMILY MEDICINE

## 2022-05-26 PROCEDURE — 3288F PR FALLS RISK ASSESSMENT DOCUMENTED: ICD-10-PCS | Mod: CPTII,S$GLB,, | Performed by: FAMILY MEDICINE

## 2022-05-26 PROCEDURE — 1101F PR PT FALLS ASSESS DOC 0-1 FALLS W/OUT INJ PAST YR: ICD-10-PCS | Mod: CPTII,S$GLB,, | Performed by: FAMILY MEDICINE

## 2022-05-26 PROCEDURE — 1160F RVW MEDS BY RX/DR IN RCRD: CPT | Mod: CPTII,S$GLB,, | Performed by: FAMILY MEDICINE

## 2022-05-26 RX ORDER — OMEPRAZOLE 20 MG/1
20 CAPSULE, DELAYED RELEASE ORAL 2 TIMES DAILY
Qty: 180 CAPSULE | Refills: 3 | Status: SHIPPED | OUTPATIENT
Start: 2022-05-26 | End: 2023-03-27

## 2022-05-26 NOTE — PROGRESS NOTES
"Subjective:      Patient ID: Mis Ca is a 81 y.o. female.    Chief Complaint: Follow-up (3 mon)      Vitals:    05/26/22 0957   BP: 114/64   Pulse: 82   Temp: 98.1 °F (36.7 °C)   TempSrc: Oral   SpO2: 96%   Weight: 99.7 kg (219 lb 14.5 oz)   Height: 5' 2" (1.575 m)        HPI   Had bed bugs causing rash; tx was to tx appt; had a bx  Had chest tightness; gets sob with walking;  Sees Dr mcdaniels  Changed her statin  mirapex bid, getting restless legs again  Has gabriel, no mask; waiting for a new machine    Problem List  Patient Active Problem List   Diagnosis    GABRIEL (obstructive sleep apnea)    Anxiety    Restless leg syndrome    Hyperlipidemia    Essential hypertension    Gastroesophageal reflux disease without esophagitis    History of adenomatous polyp of colon    Diverticulosis of large intestine without hemorrhage    Senile purpura    Tortuous aorta    Glaucoma    Blind right eye    Diastolic dysfunction    Aortic atherosclerosis    Severe obesity with body mass index (BMI) of 35.0 to 39.9 with serious comorbidity    History of skin cancer    Hallux valgus, left    Hallux valgus with bunions, left    Gastrocnemius equinus of left lower extremity    Left foot pain    Antalgic gait    Neuralgia    Multilevel facet arthritis    Nerve entrapment syndrome of foot, left    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  Extended delirium.  Advised not to take it again.      Adhesive     Compazine [prochlorperazine edisylate] Anxiety    Penicillins Rash    Sulfa (sulfonamide antibiotics) Rash    Vancomycin " analogues Rash       MEDS:   Current Outpatient Medications:     acetaminophen (TYLENOL) 500 MG tablet, Take 1,000 mg by mouth 2 (two) times daily as needed for Pain., Disp: , Rfl:     ALPRAZolam (XANAX) 0.25 MG tablet, Take 1 tablet (0.25 mg total) by mouth 2 (two) times daily., Disp: 180 tablet, Rfl: 1    chlorthalidone (HYGROTEN) 50 MG Tab, Take 1 tablet (50 mg total) by mouth once daily., Disp: 30 tablet, Rfl: 11    cyanocobalamin (VITAMIN B-12) 1,000 mcg/mL injection, Inject 1 mL (1,000 mcg total) into the muscle every 30 days., Disp: 10 mL, Rfl: 1    diltiaZEM (CARDIZEM CD) 240 MG 24 hr capsule, TAKE 1 CAPSULE BY MOUTH  ONCE DAILY, Disp: 90 capsule, Rfl: 3    furosemide (LASIX) 20 MG tablet, Take 1 tablet (20 mg total) by mouth once daily., Disp: 90 tablet, Rfl: 3    gabapentin (NEURONTIN) 300 MG capsule, TAKE 2 CAPSULES BY MOUTH  TWICE DAILY, Disp: 360 capsule, Rfl: 3    latanoprost 0.005 % ophthalmic solution, Place 1 drop into the left eye every evening. , Disp: , Rfl:     losartan (COZAAR) 100 MG tablet, TAKE ONE-HALF TABLET BY  MOUTH ONCE DAILY, Disp: 45 tablet, Rfl: 3    oxybutynin (DITROPAN) 5 MG Tab, TAKE 1 TABLET BY MOUTH ONCE DAILY, Disp: 90 tablet, Rfl: 3    potassium chloride (K-TAB) 20 mEq, Take 20 mEq by mouth once daily., Disp: , Rfl:     pramipexole (MIRAPEX) 0.5 MG tablet, TAKE 1 TABLET BY MOUTH  TWICE DAILY, Disp: 180 tablet, Rfl: 3    pravastatin (PRAVACHOL) 40 MG tablet, Take 1 tablet (40 mg total) by mouth once daily., Disp: 90 tablet, Rfl: 3    rivaroxaban (XARELTO) 20 mg Tab, Take 1 tablet (20 mg total) by mouth daily with dinner or evening meal. To thin blood, prevent strokes, Disp: 30 tablet, Rfl: 11    timolol maleate 0.5% (TIMOPTIC) 0.5 % Drop, Place 1 drop into the left eye once daily. , Disp: , Rfl: 0    aspirin (ECOTRIN) 81 MG EC tablet, Take 81 mg by mouth once daily., Disp: , Rfl:     augmented betamethasone dipropionate (DIPROLENE-AF) 0.05 % cream, Apply 1  application topically 2 (two) times daily., Disp: , Rfl:     clotrimazole (LOTRIMIN) 1 % cream, Apply topically 2 (two) times daily. (Patient not taking: No sig reported), Disp: 60 g, Rfl: 2    diclofenac sodium (VOLTAREN) 1 % Gel, Apply 2 g topically 4 (four) times daily. for 10 days (Patient not taking: No sig reported), Disp: 100 g, Rfl: 5    mometasone 0.1% (ELOCON) 0.1 % cream, Apply topically 2 (two) times daily as needed (for rash. do not use on face.). (Patient not taking: No sig reported), Disp: 45 g, Rfl: 3    omeprazole (PRILOSEC) 20 MG capsule, Take 1 capsule (20 mg total) by mouth 2 (two) times a day., Disp: 180 capsule, Rfl: 3    potassium chloride SA (K-DUR,KLOR-CON) 20 MEQ tablet, Take 1 tablet (20 mEq total) by mouth once daily. (Patient not taking: Reported on 5/26/2022), Disp: 30 tablet, Rfl: 11    triamcinolone acetonide 0.1% (KENALOG) 0.1 % cream, Apply topically 2 (two) times daily. PRN rash with itching. Moderate steroid. (Patient not taking: No sig reported), Disp: 45 g, Rfl: 0    Current Facility-Administered Medications:     cyanocobalamin injection 1,000 mcg, 1,000 mcg, Intramuscular, Q30 Days, Scott Dillard MD, 1,000 mcg at 07/23/21 1415      History:  Current Providers as of 5/26/2022  PCP: Scott Dillard MD  Care Team Provider: Sudhir Coles MD  Care Team Provider: Jolanta Burnett MD  Care Team Provider: Lenny Burroughs LPN  Care Team Provider: Portillo Luis MD  Care Team Provider: Efren Aguilar MD  Care Team Provider: Roderick Echevarria MD  Encounter Provider: Scott Dillard MD, starting on Thu May 26, 2022 12:00 AM  Referring Provider: not found, starting on Thu May 26, 2022 12:00 AM  Consulting Physician: Scott Dillard MD, starting on Thu May 26, 2022  9:50 AM (Active)   Past Medical History:   Diagnosis Date    Anticoagulant long-term use     Anxiety     Arthritis     Atrial fibrillation     Bilateral renal cysts 5/1/2022    Blind right eye      Bradycardia     Cancer     skin cancer left side of face under eye    Hyperlipidemia     Hypertension     PVC (premature ventricular contraction)     RLS (restless legs syndrome)     Sleep apnea     Does not use CPAP machine.     Past Surgical History:   Procedure Laterality Date    ADENOIDECTOMY      AKIN OSTEOTOMY Left 10/31/2018    Procedure: OSTEOTOMY, AKIN;  Surgeon: Rudolph Cardozo DPM;  Location: [facility] OR;  Service: Podiatry;  Laterality: Left;    APPENDECTOMY      BREAST BIOPSY Left     x3    BREAST SURGERY Left     breast biopsy x3    CATARACT EXTRACTION BILATERAL W/ ANTERIOR VITRECTOMY      ENDOSCOPIC GASTROCNEMIUS RECESSION Left 10/31/2018    Procedure: RECESSION, GASTROCNEMIUS, ENDOSCOPIC;  Surgeon: Rudolph Cardozo DPM;  Location: [facility] OR;  Service: Podiatry;  Laterality: Left;    ESOPHAGOGASTRODUODENOSCOPY N/A 2/11/2022    Procedure: EGD (ESOPHAGOGASTRODUODENOSCOPY);  Surgeon: Efren Aguilar MD;  Location: [facility];  Service: Endoscopy;  Laterality: N/A;  Patient needs a rapid covid  test done on 12/15/2021. thank you    EYE SURGERY Bilateral     cataracts extraction    EYE SURGERY Right     drainage tube (glaucoma)    FOOT ARTHRODESIS Left 1/15/2020    Procedure: FUSION, FOOT;  Surgeon: Rudolph Cardozo DPM;  Location: [facility] OR;  Service: Podiatry;  Laterality: Left;  mini c-arm, Arthrex screw  for hardware removal (Lydia notified), Orthofix (Niels notified) min ex-fix    FOOT SURGERY Left     lesion removed from dorsal area    FRACTURE SURGERY Left     arm    HAND TENDON SURGERY Left     HYSTERECTOMY      INCISION AND DRAINAGE FOOT Left 3/11/2020    Procedure: INCISION AND DRAINAGE, FOOT;  Surgeon: Rudolph Cardozo DPM;  Location: [facility] OR;  Service: Podiatry;  Laterality: Left;    LAPIDUS BUNIONECTOMY Left 10/31/2018    Procedure: BUNIONECTOMY, LAPIDUS;  Surgeon: Rudolph Cardozo DPM;  Location: [facility] OR;  Service: Podiatry;  Laterality: Left;   mini c-arm, Arthrex locking plate (Lydia notified)    LAPIDUS BUNIONECTOMY Left 10/31/2018    Dr. Cardozo    Lymph Gland Removed  Right     groin (performed by Dr. Vergara)    NEURECTOMY Left 1/15/2020    Procedure: NEURECTOMY;  Surgeon: Ruodlph Cardozo DPM;  Location: [facility] OR;  Service: Podiatry;  Laterality: Left;  bipolar bovie, vessel loop, possible [place] nerve wrap. Moshe with [facility] notified and will be overnighting graft. MK 1/14/2020    OOPHORECTOMY      ORIF FOREARM FRACTURE Left     PALATE SURGERY      Lesion removed    TONSILLECTOMY       Social History     Tobacco Use    Smoking status: Never Smoker    Smokeless tobacco: Never Used   Substance Use Topics    Alcohol use: Not Currently    Drug use: No         Review of Systems   Constitutional: Negative.    HENT: Negative.    Respiratory: Positive for chest tightness and shortness of breath.    Cardiovascular: Negative.    Gastrointestinal: Negative.    Endocrine: Negative.    Genitourinary: Negative.    Musculoskeletal: Positive for myalgias.   Psychiatric/Behavioral: Negative.    All other systems reviewed and are negative.    Objective:     Physical Exam  Vitals and nursing note reviewed.   Constitutional:       Appearance: She is well-developed.   HENT:      Head: Normocephalic.   Eyes:      Conjunctiva/sclera: Conjunctivae normal.      Pupils: Pupils are equal, round, and reactive to light.   Cardiovascular:      Rate and Rhythm: Normal rate and regular rhythm.      Heart sounds: Normal heart sounds.   Pulmonary:      Effort: Pulmonary effort is normal.      Breath sounds: Normal breath sounds.   Musculoskeletal:         General: Normal range of motion.      Cervical back: Normal range of motion and neck supple.   Skin:     General: Skin is warm and dry.   Neurological:      Mental Status: She is alert and oriented to person, place, and time.      Deep Tendon Reflexes: Reflexes are normal and symmetric.   Psychiatric:          Behavior: Behavior normal.         Thought Content: Thought content normal.         Judgment: Judgment normal.             Assessment:     1. JACQUELYN (obstructive sleep apnea)    2. BMI 40.0-44.9, adult    3. Antalgic gait    4. Severe obesity with body mass index (BMI) of 35.0 to 39.9 with serious comorbidity      Plan:        Medication List          Accurate as of May 26, 2022 10:54 AM. If you have any questions, ask your nurse or doctor.            CONTINUE taking these medications    acetaminophen 500 MG tablet  Commonly known as: TYLENOL     ALPRAZolam 0.25 MG tablet  Commonly known as: XANAX  Take 1 tablet (0.25 mg total) by mouth 2 (two) times daily.     aspirin 81 MG EC tablet  Commonly known as: ECOTRIN     augmented betamethasone dipropionate 0.05 % cream  Commonly known as: DIPROLENE-AF     chlorthalidone 50 MG Tab  Commonly known as: HYGROTEN  Take 1 tablet (50 mg total) by mouth once daily.     clotrimazole 1 % cream  Commonly known as: LOTRIMIN  Apply topically 2 (two) times daily.     cyanocobalamin 1,000 mcg/mL injection  Commonly known as: VITAMIN B-12  Inject 1 mL (1,000 mcg total) into the muscle every 30 days.     diclofenac sodium 1 % Gel  Commonly known as: VOLTAREN  Apply 2 g topically 4 (four) times daily. for 10 days     diltiaZEM 240 MG 24 hr capsule  Commonly known as: CARDIZEM CD  TAKE 1 CAPSULE BY MOUTH  ONCE DAILY     furosemide 20 MG tablet  Commonly known as: LASIX  Take 1 tablet (20 mg total) by mouth once daily.     gabapentin 300 MG capsule  Commonly known as: NEURONTIN  TAKE 2 CAPSULES BY MOUTH  TWICE DAILY     latanoprost 0.005 % ophthalmic solution     losartan 100 MG tablet  Commonly known as: COZAAR  TAKE ONE-HALF TABLET BY  MOUTH ONCE DAILY     mometasone 0.1% 0.1 % cream  Commonly known as: ELOCON  Apply topically 2 (two) times daily as needed (for rash. do not use on face.).     omeprazole 20 MG capsule  Commonly known as: PRILOSEC  Take 1 capsule (20 mg total) by mouth 2  (two) times a day.     oxybutynin 5 MG Tab  Commonly known as: DITROPAN  TAKE 1 TABLET BY MOUTH ONCE DAILY     * potassium chloride SA 20 MEQ tablet  Commonly known as: K-DUR,KLOR-CON  Take 1 tablet (20 mEq total) by mouth once daily.     * potassium chloride 20 mEq  Commonly known as: K-TAB     pramipexole 0.5 MG tablet  Commonly known as: MIRAPEX  TAKE 1 TABLET BY MOUTH  TWICE DAILY     pravastatin 40 MG tablet  Commonly known as: PRAVACHOL  Take 1 tablet (40 mg total) by mouth once daily.     rivaroxaban 20 mg Tab  Commonly known as: XARELTO  Take 1 tablet (20 mg total) by mouth daily with dinner or evening meal. To thin blood, prevent strokes     timolol maleate 0.5% 0.5 % Drop  Commonly known as: TIMOPTIC     triamcinolone acetonide 0.1% 0.1 % cream  Commonly known as: KENALOG  Apply topically 2 (two) times daily. PRN rash with itching. Moderate steroid.         * This list has 2 medication(s) that are the same as other medications prescribed for you. Read the directions carefully, and ask your doctor or other care provider to review them with you.               Where to Get Your Medications      These medications were sent to [facility] - [address], [address], [address] [address]    Phone: [phone]   · omeprazole 20 MG capsule       JACQUELYN (obstructive sleep apnea)    BMI 40.0-44.9, adult  -     WALKER FOR HOME USE    Antalgic gait  -     WALKER FOR HOME USE    Severe obesity with body mass index (BMI) of 35.0 to 39.9 with serious comorbidity  -     WALKER FOR HOME USE    Other orders  -     omeprazole (PRILOSEC) 20 MG capsule; Take 1 capsule (20 mg total) by mouth 2 (two) times a day.  Dispense: 180 capsule; Refill: 3

## 2022-07-01 ENCOUNTER — TELEPHONE (OUTPATIENT)
Dept: UROLOGY | Facility: CLINIC | Age: 82
End: 2022-07-01
Payer: MEDICARE

## 2022-07-01 NOTE — TELEPHONE ENCOUNTER
----- Message from Anamika Francis sent at 7/1/2022 11:17 AM CDT -----  Type:  Patient Returning Call    Who Called:Pt   Would the patient rather a call back or a response via [facility]? Call Back  Best Call Back Number: [phone]  Additional Information:         Pt stated she had a flat tire   That is why appt was missed   Pt would like a call if someone cancels

## 2022-07-09 DIAGNOSIS — G47.33 OSA (OBSTRUCTIVE SLEEP APNEA): ICD-10-CM

## 2022-07-09 NOTE — TELEPHONE ENCOUNTER
No new care gaps identified.  [facility] Embedded Care Gaps. Reference number: [id number]. 7/09/2022   10:03:52 AM EDMUND

## 2022-07-10 RX ORDER — ALPRAZOLAM 0.25 MG/1
TABLET ORAL
Qty: 180 TABLET | Refills: 0 | Status: SHIPPED | OUTPATIENT
Start: 2022-07-10 | End: 2022-12-22

## 2022-07-16 LAB
LEFT EYE DM RETINOPATHY: NEGATIVE
RIGHT EYE DM RETINOPATHY: NEGATIVE

## 2022-07-26 ENCOUNTER — OFFICE VISIT (OUTPATIENT)
Dept: UROLOGY | Facility: CLINIC | Age: 82
End: 2022-07-26
Payer: MEDICARE

## 2022-07-26 VITALS — HEART RATE: 65 BPM | SYSTOLIC BLOOD PRESSURE: 117 MMHG | DIASTOLIC BLOOD PRESSURE: 76 MMHG

## 2022-07-26 DIAGNOSIS — N28.1 RENAL CYST: Primary | ICD-10-CM

## 2022-07-26 PROCEDURE — 3078F PR MOST RECENT DIASTOLIC BLOOD PRESSURE < 80 MM HG: ICD-10-PCS | Mod: CPTII,S$GLB,, | Performed by: STUDENT IN AN ORGANIZED HEALTH CARE EDUCATION/TRAINING PROGRAM

## 2022-07-26 PROCEDURE — 1125F PR PAIN SEVERITY QUANTIFIED, PAIN PRESENT: ICD-10-PCS | Mod: CPTII,S$GLB,, | Performed by: STUDENT IN AN ORGANIZED HEALTH CARE EDUCATION/TRAINING PROGRAM

## 2022-07-26 PROCEDURE — 3074F PR MOST RECENT SYSTOLIC BLOOD PRESSURE < 130 MM HG: ICD-10-PCS | Mod: CPTII,S$GLB,, | Performed by: STUDENT IN AN ORGANIZED HEALTH CARE EDUCATION/TRAINING PROGRAM

## 2022-07-26 PROCEDURE — 1160F RVW MEDS BY RX/DR IN RCRD: CPT | Mod: CPTII,S$GLB,, | Performed by: STUDENT IN AN ORGANIZED HEALTH CARE EDUCATION/TRAINING PROGRAM

## 2022-07-26 PROCEDURE — 1159F MED LIST DOCD IN RCRD: CPT | Mod: CPTII,S$GLB,, | Performed by: STUDENT IN AN ORGANIZED HEALTH CARE EDUCATION/TRAINING PROGRAM

## 2022-07-26 PROCEDURE — 99999 PR PBB SHADOW E&M-EST. PATIENT-LVL IV: ICD-10-PCS | Mod: PBBFAC,,, | Performed by: STUDENT IN AN ORGANIZED HEALTH CARE EDUCATION/TRAINING PROGRAM

## 2022-07-26 PROCEDURE — 3078F DIAST BP <80 MM HG: CPT | Mod: CPTII,S$GLB,, | Performed by: STUDENT IN AN ORGANIZED HEALTH CARE EDUCATION/TRAINING PROGRAM

## 2022-07-26 PROCEDURE — 1160F PR REVIEW ALL MEDS BY PRESCRIBER/CLIN PHARMACIST DOCUMENTED: ICD-10-PCS | Mod: CPTII,S$GLB,, | Performed by: STUDENT IN AN ORGANIZED HEALTH CARE EDUCATION/TRAINING PROGRAM

## 2022-07-26 PROCEDURE — 99999 PR PBB SHADOW E&M-EST. PATIENT-LVL IV: CPT | Mod: PBBFAC,,, | Performed by: STUDENT IN AN ORGANIZED HEALTH CARE EDUCATION/TRAINING PROGRAM

## 2022-07-26 PROCEDURE — 99214 PR OFFICE/OUTPT VISIT, EST, LEVL IV, 30-39 MIN: ICD-10-PCS | Mod: S$GLB,,, | Performed by: STUDENT IN AN ORGANIZED HEALTH CARE EDUCATION/TRAINING PROGRAM

## 2022-07-26 PROCEDURE — 3074F SYST BP LT 130 MM HG: CPT | Mod: CPTII,S$GLB,, | Performed by: STUDENT IN AN ORGANIZED HEALTH CARE EDUCATION/TRAINING PROGRAM

## 2022-07-26 PROCEDURE — 99214 OFFICE O/P EST MOD 30 MIN: CPT | Mod: S$GLB,,, | Performed by: STUDENT IN AN ORGANIZED HEALTH CARE EDUCATION/TRAINING PROGRAM

## 2022-07-26 PROCEDURE — 1125F AMNT PAIN NOTED PAIN PRSNT: CPT | Mod: CPTII,S$GLB,, | Performed by: STUDENT IN AN ORGANIZED HEALTH CARE EDUCATION/TRAINING PROGRAM

## 2022-07-26 PROCEDURE — 1159F PR MEDICATION LIST DOCUMENTED IN MEDICAL RECORD: ICD-10-PCS | Mod: CPTII,S$GLB,, | Performed by: STUDENT IN AN ORGANIZED HEALTH CARE EDUCATION/TRAINING PROGRAM

## 2022-07-26 RX ORDER — CALCIUM CITRATE/VITAMIN D3 1000-10/30
LIQUID (ML) ORAL
COMMUNITY
End: 2022-10-15

## 2022-07-26 RX ORDER — ATORVASTATIN CALCIUM 40 MG/1
40 TABLET, FILM COATED ORAL DAILY
COMMUNITY
End: 2022-10-15

## 2022-07-26 NOTE — PROGRESS NOTES
Subjective:       Patient ID: Mis Ca is a 81 y.o. female.    Chief Complaint:  F/u renal US  This is a 81 y.o.  female patient that is an established patient of mine.  The patient first met Dr. Garcia 9/17/2049 for incidental renal cyst. CT was performed on 9/30/2019, which demonstrated a 50 mm complex mass off of the anterior aspect of the superior pole of the left kidney and several simple cysts. Patient elected for active surveillance. Next CT was ordered for 3/2020, however patient reports that around that time she had a foot procedure and then developed MRSA, which required a PICC line and Vancomycin. 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  She saw Agnieszka García in 6/2020 for gross hematuria. Underwent cystoscopy with me on 7/9/20 which was benign. CT urogram reviewed - right and left bilateral renal cysts stable. Left sided cysts more complex with a calcification. So should likely be monitored carefully. Next renal US in 6 months with NP. If that is again stable since they have been stable on numerous imaging studies prior, can likely space out to yearly.    US 6/10/20 - left mid pole anterior cyst with calcification 5.2cm bosniak 2F  Left lower pole posterior 11.2cm bosniak 1     US 12/30/20 -left mid pole anterior cyst with calcification 5.9 cm bosniak 2F  Left lower pole posterior 12 cm bosniak 1    US 7/6/21 - left mid pole anterior cyst with calcification 4.8cm bosniak 2F  Left lower pole posterior 12 cm bosniak 1    US  5/25/22 - left midpole anterior cyst with calcification 4cm bosniak 2F  Left lower pole posterior 12cm bosniak 1 (not commented on by radiologist)         Lab Results   Component Value Date    CREATININE 0.95 02/14/2022       ---  Past Medical History:   Diagnosis Date    Allergy     Anticoagulant long-term use     Anxiety     Arthritis     Atrial fibrillation     Bilateral renal cysts 05/01/2022    Blind right eye     Bradycardia     Cancer     skin cancer  left side of face under eye    Hyperlipidemia     Hypertension     PVC (premature ventricular contraction)     RLS (restless legs syndrome)     Sleep apnea     Does not use CPAP machine.       Past Surgical History:   Procedure Laterality Date    ADENOIDECTOMY      AKIN OSTEOTOMY Left 10/31/2018    Procedure: OSTEOTOMY, AKIN;  Surgeon: Rudolph Cardozo DPM;  Location: [facility] OR;  Service: Podiatry;  Laterality: Left;    APPENDECTOMY      BREAST BIOPSY Left     x3    BREAST SURGERY Left     breast biopsy x3    CATARACT EXTRACTION BILATERAL W/ ANTERIOR VITRECTOMY      ENDOSCOPIC GASTROCNEMIUS RECESSION Left 10/31/2018    Procedure: RECESSION, GASTROCNEMIUS, ENDOSCOPIC;  Surgeon: Rudolph Cardozo DPM;  Location: [facility] OR;  Service: Podiatry;  Laterality: Left;    ESOPHAGOGASTRODUODENOSCOPY N/A 2/11/2022    Procedure: EGD (ESOPHAGOGASTRODUODENOSCOPY);  Surgeon: Efren Aguilar MD;  Location: [facility];  Service: Endoscopy;  Laterality: N/A;  Patient needs a rapid covid  test done on 12/15/2021. thank you    EYE SURGERY Bilateral     cataracts extraction    EYE SURGERY Right     drainage tube (glaucoma)    FOOT ARTHRODESIS Left 1/15/2020    Procedure: FUSION, FOOT;  Surgeon: Rudolph Cardozo DPM;  Location: [facility] OR;  Service: Podiatry;  Laterality: Left;  mini c-arm, Arthrex screw  for hardware removal (Lydia notified), Orthofix (Niels notified) min ex-fix    FOOT SURGERY Left     lesion removed from dorsal area    FRACTURE SURGERY Left     arm    HAND TENDON SURGERY Left     HYSTERECTOMY      INCISION AND DRAINAGE FOOT Left 3/11/2020    Procedure: INCISION AND DRAINAGE, FOOT;  Surgeon: Rudolph Cardozo DPM;  Location: [facility] OR;  Service: Podiatry;  Laterality: Left;    LAPIDUS BUNIONECTOMY Left 10/31/2018    Procedure: BUNIONECTOMY, LAPIDUS;  Surgeon: Rudolph Cardozo DPM;  Location: [facility] OR;  Service: Podiatry;  Laterality: Left;  mini c-arm, Arthrex locking plate  (Lydia notified)    LAPIDUS BUNIONECTOMY Left 10/31/2018    Dr. Cardozo    Lymph Gland Removed  Right     groin (performed by Dr. Vergara)    NEURECTOMY Left 1/15/2020    Procedure: NEURECTOMY;  Surgeon: Rudolph Cardozo DPM;  Location: [facility];  Service: Podiatry;  Laterality: Left;  bipolar bovie, vessel loop, possible Agnes nerve wrap. Moshe with Agnes notified and will be overnighting graft. MK 1/14/2020    OOPHORECTOMY      ORIF FOREARM FRACTURE Left     PALATE SURGERY      Lesion removed    TONSILLECTOMY         Family History   Problem Relation Age of Onset    Aneurysm Mother         AAA    Cancer Father         lung cancer    Lung cancer Father     Cirrhosis Father     Cancer Sister         Breast and Brain     Diabetes Sister     Breast cancer Sister     Hypertension Sister     Hyperlipidemia Sister     Brain cancer Sister     Colon polyps Brother     Cancer Brother         lung cancer    Diabetes Brother     Hyperlipidemia Brother     Hypertension Brother     Cancer Brother         bladder cancer    Diabetes Brother     Glaucoma Brother     Heart disease Sister         Heart valve repair    Atrial fibrillation Sister     Diabetes Sister     Heart disease Sister     Heart attack Sister     Bipolar disorder Sister     Depression Sister     Glaucoma Sister     Diabetes Sister     Other Sister         Pituitary tumor    Arthritis Sister     COPD Sister     Heart disease Sister     Kidney disease Sister     Cancer Sister         lung cancer    Diabetes Sister     Lung cancer Sister     Heart attack Sister        Social History     Tobacco Use    Smoking status: Never Smoker    Smokeless tobacco: Never Used   Substance Use Topics    Alcohol use: Not Currently    Drug use: No       Current Outpatient Medications on File Prior to Visit   Medication Sig Dispense Refill    acetaminophen (TYLENOL) 500 MG tablet Take 1,000 mg by mouth 2 (two) times daily as needed  for Pain.      ALPRAZolam (XANAX) 0.25 MG tablet TAKE 1 TABLET BY MOUTH TWICE A  tablet 0    aspirin (ECOTRIN) 81 MG EC tablet Take 81 mg by mouth once daily.      atorvastatin (LIPITOR) 40 MG tablet Take 40 mg by mouth once daily.      calcium citrate-vitamin D3 1,000 mg-10 mcg /30 mL Liqd Take by mouth.      chlorthalidone (HYGROTEN) 50 MG Tab Take 1 tablet (50 mg total) by mouth once daily. 30 tablet 11    cyanocobalamin (VITAMIN B-12) 1,000 mcg/mL injection Inject 1 mL (1,000 mcg total) into the muscle every 30 days. 10 mL 1    diltiaZEM (CARDIZEM CD) 240 MG 24 hr capsule TAKE 1 CAPSULE BY MOUTH  ONCE DAILY 90 capsule 3    furosemide (LASIX) 20 MG tablet Take 1 tablet (20 mg total) by mouth once daily. 90 tablet 3    gabapentin (NEURONTIN) 300 MG capsule TAKE 2 CAPSULES BY MOUTH  TWICE DAILY 360 capsule 3    latanoprost 0.005 % ophthalmic solution Place 1 drop into the left eye every evening.       losartan (COZAAR) 100 MG tablet TAKE ONE-HALF TABLET BY  MOUTH ONCE DAILY 45 tablet 3    omeprazole (PRILOSEC) 20 MG capsule Take 1 capsule (20 mg total) by mouth 2 (two) times a day. 180 capsule 3    oxybutynin (DITROPAN) 5 MG Tab TAKE 1 TABLET BY MOUTH ONCE DAILY 90 tablet 3    potassium chloride SA (K-DUR,KLOR-CON) 20 MEQ tablet Take 1 tablet (20 mEq total) by mouth once daily. 30 tablet 11    pramipexole (MIRAPEX) 0.5 MG tablet TAKE 1 TABLET BY MOUTH  TWICE DAILY 180 tablet 3    rivaroxaban (XARELTO) 20 mg Tab Take 1 tablet (20 mg total) by mouth daily with dinner or evening meal. To thin blood, prevent strokes 30 tablet 11    timolol maleate 0.5% (TIMOPTIC) 0.5 % Drop Place 1 drop into the left eye once daily.   0    augmented betamethasone dipropionate (DIPROLENE-AF) 0.05 % cream Apply 1 application topically 2 (two) times daily.      clotrimazole (LOTRIMIN) 1 % cream Apply topically 2 (two) times daily. 60 g 2    diclofenac sodium (VOLTAREN) 1 % Gel Apply 2 g topically 4 (four) times  daily. for 10 days (Patient not taking: No sig reported) 100 g 5    mometasone 0.1% (ELOCON) 0.1 % cream Apply topically 2 (two) times daily as needed (for rash. do not use on face.). 45 g 3    potassium chloride (K-TAB) 20 mEq Take 20 mEq by mouth once daily.      pravastatin (PRAVACHOL) 40 MG tablet Take 1 tablet (40 mg total) by mouth once daily. 90 tablet 3    triamcinolone acetonide 0.1% (KENALOG) 0.1 % cream Apply topically 2 (two) times daily. PRN rash with itching. Moderate steroid. 45 g 0     Current Facility-Administered Medications on File Prior to Visit   Medication Dose Route Frequency Provider Last Rate Last Admin    cyanocobalamin injection 1,000 mcg  1,000 mcg Intramuscular Q30 Days Scott Dlilard MD   1,000 mcg at 07/23/21 1415       Review of patient's allergies indicates:   Allergen Reactions    Propofol analogues      Used for her Colonoscopy.  Extended delirium.  Advised not to take it again.      Adhesive     Compazine [prochlorperazine edisylate] Anxiety    Penicillins Rash    Sulfa (sulfonamide antibiotics) Rash    Vancomycin analogues Rash       Review of Systems   Constitutional: Negative for activity change.   HENT: Negative for congestion.    Eyes: Negative for visual disturbance.   Respiratory: Negative for shortness of breath.    Cardiovascular: Negative for chest pain.   Gastrointestinal: Negative for abdominal distention.   Musculoskeletal: Negative for gait problem.   Skin: Negative for color change.   Neurological: Negative for dizziness.   Psychiatric/Behavioral: Negative for agitation.       Objective:      Physical Exam  Constitutional:       Appearance: She is well-developed.   HENT:      Head: Normocephalic and atraumatic.   Pulmonary:      Effort: Pulmonary effort is normal.   Musculoskeletal:         General: Normal range of motion.      Cervical back: Normal range of motion.   Skin:     General: Skin is warm and dry.   Neurological:      Mental Status: She is  alert and oriented to person, place, and time.         Assessment:       1. Renal cyst        Plan:       Plan:  1. bosniak 1 cyst has demonstrated stability. bosniak 2F cyst got slightly smaller. Both stable results.  2. Offered qyear US vs q2 years. Pt would like every 2 years.   3. Recall reminder placed. Pt can message us before visit to request renal US before.           Renal cyst

## 2022-08-31 DIAGNOSIS — Z78.0 MENOPAUSE: ICD-10-CM

## 2022-09-02 LAB
LEFT EYE DM RETINOPATHY: NEGATIVE
RIGHT EYE DM RETINOPATHY: NEGATIVE

## 2022-09-06 ENCOUNTER — OFFICE VISIT (OUTPATIENT)
Dept: FAMILY MEDICINE | Facility: CLINIC | Age: 82
End: 2022-09-06
Payer: MEDICARE

## 2022-09-06 VITALS
WEIGHT: 223.56 LBS | BODY MASS INDEX: 41.14 KG/M2 | DIASTOLIC BLOOD PRESSURE: 64 MMHG | HEIGHT: 62 IN | OXYGEN SATURATION: 95 % | SYSTOLIC BLOOD PRESSURE: 122 MMHG | TEMPERATURE: 98 F | HEART RATE: 76 BPM

## 2022-09-06 DIAGNOSIS — E78.2 MIXED HYPERLIPIDEMIA: ICD-10-CM

## 2022-09-06 DIAGNOSIS — I70.0 AORTIC ATHEROSCLEROSIS: ICD-10-CM

## 2022-09-06 DIAGNOSIS — D69.2 OTHER NONTHROMBOCYTOPENIC PURPURA: ICD-10-CM

## 2022-09-06 DIAGNOSIS — I51.89 DIASTOLIC DYSFUNCTION: ICD-10-CM

## 2022-09-06 DIAGNOSIS — I10 ESSENTIAL HYPERTENSION: ICD-10-CM

## 2022-09-06 DIAGNOSIS — E66.01 SEVERE OBESITY WITH BODY MASS INDEX (BMI) OF 35.0 TO 39.9 WITH SERIOUS COMORBIDITY: ICD-10-CM

## 2022-09-06 DIAGNOSIS — Z86.16 HISTORY OF COVID-19: ICD-10-CM

## 2022-09-06 DIAGNOSIS — M46.99 UNSPECIFIED INFLAMMATORY SPONDYLOPATHY, MULTIPLE SITES IN SPINE: ICD-10-CM

## 2022-09-06 DIAGNOSIS — Z86.39 PERSONAL HISTORY OF OTHER ENDOCRINE, NUTRITIONAL AND METABOLIC DISEASE: ICD-10-CM

## 2022-09-06 DIAGNOSIS — R26.89 ANTALGIC GAIT: ICD-10-CM

## 2022-09-06 DIAGNOSIS — I48.0 PAROXYSMAL ATRIAL FIBRILLATION: ICD-10-CM

## 2022-09-06 DIAGNOSIS — I48.20 CHRONIC ATRIAL FIBRILLATION: Primary | ICD-10-CM

## 2022-09-06 DIAGNOSIS — G61.9 INFLAMMATORY POLYNEUROPATHY, UNSPECIFIED: ICD-10-CM

## 2022-09-06 DIAGNOSIS — G47.33 OSA (OBSTRUCTIVE SLEEP APNEA): ICD-10-CM

## 2022-09-06 PROCEDURE — 3078F DIAST BP <80 MM HG: CPT | Mod: CPTII,S$GLB,, | Performed by: FAMILY MEDICINE

## 2022-09-06 PROCEDURE — 1101F PR PT FALLS ASSESS DOC 0-1 FALLS W/OUT INJ PAST YR: ICD-10-PCS | Mod: CPTII,S$GLB,, | Performed by: FAMILY MEDICINE

## 2022-09-06 PROCEDURE — 1101F PT FALLS ASSESS-DOCD LE1/YR: CPT | Mod: CPTII,S$GLB,, | Performed by: FAMILY MEDICINE

## 2022-09-06 PROCEDURE — 1125F PR PAIN SEVERITY QUANTIFIED, PAIN PRESENT: ICD-10-PCS | Mod: CPTII,S$GLB,, | Performed by: FAMILY MEDICINE

## 2022-09-06 PROCEDURE — 99214 PR OFFICE/OUTPT VISIT, EST, LEVL IV, 30-39 MIN: ICD-10-PCS | Mod: S$GLB,,, | Performed by: FAMILY MEDICINE

## 2022-09-06 PROCEDURE — 1159F MED LIST DOCD IN RCRD: CPT | Mod: CPTII,S$GLB,, | Performed by: FAMILY MEDICINE

## 2022-09-06 PROCEDURE — 3288F PR FALLS RISK ASSESSMENT DOCUMENTED: ICD-10-PCS | Mod: CPTII,S$GLB,, | Performed by: FAMILY MEDICINE

## 2022-09-06 PROCEDURE — 3074F PR MOST RECENT SYSTOLIC BLOOD PRESSURE < 130 MM HG: ICD-10-PCS | Mod: CPTII,S$GLB,, | Performed by: FAMILY MEDICINE

## 2022-09-06 PROCEDURE — 1125F AMNT PAIN NOTED PAIN PRSNT: CPT | Mod: CPTII,S$GLB,, | Performed by: FAMILY MEDICINE

## 2022-09-06 PROCEDURE — 3074F SYST BP LT 130 MM HG: CPT | Mod: CPTII,S$GLB,, | Performed by: FAMILY MEDICINE

## 2022-09-06 PROCEDURE — 3078F PR MOST RECENT DIASTOLIC BLOOD PRESSURE < 80 MM HG: ICD-10-PCS | Mod: CPTII,S$GLB,, | Performed by: FAMILY MEDICINE

## 2022-09-06 PROCEDURE — 1159F PR MEDICATION LIST DOCUMENTED IN MEDICAL RECORD: ICD-10-PCS | Mod: CPTII,S$GLB,, | Performed by: FAMILY MEDICINE

## 2022-09-06 PROCEDURE — 3288F FALL RISK ASSESSMENT DOCD: CPT | Mod: CPTII,S$GLB,, | Performed by: FAMILY MEDICINE

## 2022-09-06 PROCEDURE — 99214 OFFICE O/P EST MOD 30 MIN: CPT | Mod: S$GLB,,, | Performed by: FAMILY MEDICINE

## 2022-09-06 RX ORDER — WARFARIN 6 MG/1
TABLET ORAL
Qty: 100 TABLET | Refills: 3 | Status: SHIPPED | OUTPATIENT
Start: 2022-09-06 | End: 2022-09-06

## 2022-09-06 RX ORDER — WARFARIN 6 MG/1
TABLET ORAL
Qty: 100 TABLET | Refills: 3 | Status: SHIPPED | OUTPATIENT
Start: 2022-09-06 | End: 2023-09-04

## 2022-09-06 NOTE — PROGRESS NOTES
"Subjective:      Patient ID: Mis Ca is a 81 y.o. female.    Chief Complaint: Follow-up (3 MONTH (05/26/22)//Discuss status of CPAP)      Vitals:    09/06/22 1056   BP: 122/64   Pulse: 76   Temp: 98 °F (36.7 °C)   TempSrc: Oral   SpO2: 95%   Weight: 101.4 kg (223 lb 8.7 oz)   Height: 5' 2" (1.575 m)        HPI   Hx of intermitent atr fib and wants to go back to coumadin due to cost of her xarelto  Never got her cpap  Goes to sleep if sits      Problem List  Patient Active Problem List   Diagnosis    JACQUELYN (obstructive sleep apnea)    Anxiety    Restless leg syndrome    Hyperlipidemia    Essential hypertension    Gastroesophageal reflux disease without esophagitis    History of adenomatous polyp of colon    Diverticulosis of large intestine without hemorrhage    Senile purpura    Tortuous aorta    Glaucoma    Blind right eye    Atrial fibrillation    Diastolic dysfunction    Aortic atherosclerosis    Severe obesity with body mass index (BMI) of 35.0 to 39.9 with serious comorbidity    History of skin cancer    Hallux valgus, left    Hallux valgus with bunions, left    Gastrocnemius equinus of left lower extremity    Left foot pain    Antalgic gait    Neuralgia    Multilevel facet arthritis    Nerve entrapment syndrome of foot, left    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  Extended delirium.  Advised not to take it again.      Adhesive     Compazine [prochlorperazine edisylate] Anxiety    Penicillins Rash    Sulfa (sulfonamide antibiotics) Rash    Vancomycin analogues Rash       MEDS:   Current Outpatient Medications:     " acetaminophen (TYLENOL) 500 MG tablet, Take 1,000 mg by mouth 2 (two) times daily as needed for Pain., Disp: , Rfl:     ALPRAZolam (XANAX) 0.25 MG tablet, TAKE 1 TABLET BY MOUTH TWICE A DAY, Disp: 180 tablet, Rfl: 0    aspirin (ECOTRIN) 81 MG EC tablet, Take 81 mg by mouth once daily., Disp: , Rfl:     augmented betamethasone dipropionate (DIPROLENE-AF) 0.05 % cream, Apply 1 application topically 2 (two) times daily., Disp: , Rfl:     calcium citrate-vitamin D3 1,000 mg-10 mcg /30 mL Liqd, Take by mouth., Disp: , Rfl:     chlorthalidone (HYGROTEN) 50 MG Tab, Take 1 tablet (50 mg total) by mouth once daily., Disp: 30 tablet, Rfl: 11    clotrimazole (LOTRIMIN) 1 % cream, Apply topically 2 (two) times daily., Disp: 60 g, Rfl: 2    cyanocobalamin (VITAMIN B-12) 1,000 mcg/mL injection, Inject 1 mL (1,000 mcg total) into the muscle every 30 days., Disp: 10 mL, Rfl: 1    diltiaZEM (CARDIZEM CD) 240 MG 24 hr capsule, TAKE 1 CAPSULE BY MOUTH  ONCE DAILY, Disp: 90 capsule, Rfl: 3    furosemide (LASIX) 20 MG tablet, TAKE 1 TABLET BY MOUTH ONCE DAILY, Disp: 90 tablet, Rfl: 3    gabapentin (NEURONTIN) 300 MG capsule, TAKE 2 CAPSULES BY MOUTH  TWICE DAILY, Disp: 360 capsule, Rfl: 3    latanoprost 0.005 % ophthalmic solution, Place 1 drop into the left eye every evening. , Disp: , Rfl:     losartan (COZAAR) 100 MG tablet, TAKE ONE-HALF TABLET BY  MOUTH ONCE DAILY, Disp: 45 tablet, Rfl: 3    mometasone 0.1% (ELOCON) 0.1 % cream, Apply topically 2 (two) times daily as needed (for rash. do not use on face.)., Disp: 45 g, Rfl: 3    omeprazole (PRILOSEC) 20 MG capsule, Take 1 capsule (20 mg total) by mouth 2 (two) times a day., Disp: 180 capsule, Rfl: 3    oxybutynin (DITROPAN) 5 MG Tab, TAKE 1 TABLET BY MOUTH ONCE DAILY, Disp: 90 tablet, Rfl: 3    potassium chloride (K-TAB) 20 mEq, Take 20 mEq by mouth once daily., Disp: , Rfl:     potassium chloride SA (K-DUR,KLOR-CON) 20 MEQ tablet, Take 1 tablet (20 mEq total) by mouth once daily.,  Disp: 30 tablet, Rfl: 11    pramipexole (MIRAPEX) 0.5 MG tablet, TAKE 1 TABLET BY MOUTH  TWICE DAILY, Disp: 180 tablet, Rfl: 3    pravastatin (PRAVACHOL) 40 MG tablet, Take 1 tablet (40 mg total) by mouth once daily., Disp: 90 tablet, Rfl: 3    timolol maleate 0.5% (TIMOPTIC) 0.5 % Drop, Place 1 drop into the left eye once daily. , Disp: , Rfl: 0    triamcinolone acetonide 0.1% (KENALOG) 0.1 % cream, Apply topically 2 (two) times daily. PRN rash with itching. Moderate steroid., Disp: 45 g, Rfl: 0    atorvastatin (LIPITOR) 40 MG tablet, Take 40 mg by mouth once daily., Disp: , Rfl:     diclofenac sodium (VOLTAREN) 1 % Gel, Apply 2 g topically 4 (four) times daily. for 10 days (Patient not taking: No sig reported), Disp: 100 g, Rfl: 5    warfarin (COUMADIN) 6 MG tablet, 6 mg daily and 9 mg on Friday to thin blood, Disp: 100 tablet, Rfl: 3    Current Facility-Administered Medications:     cyanocobalamin injection 1,000 mcg, 1,000 mcg, Intramuscular, Q30 Days, Scott Dillard MD, 1,000 mcg at 07/23/21 1415      History:  Current Providers as of 9/6/2022  PCP: Scott Dillard MD  Care Team Provider: Sudhir Coles MD  Care Team Provider: Jolanta Burnett MD  Care Team Provider: Lenny Burroughs LPN  Care Team Provider: Portillo Luis MD  Care Team Provider: Efren Aguilar MD  Care Team Provider: Roderick Echevarria MD  Encounter Provider: Scott Dillard MD, starting on Tue Sep 6, 2022 12:00 AM  Referring Provider: not found, starting on Tue Sep 6, 2022 12:00 AM  Consulting Physician: Scott Dillard MD, starting on Tue Sep 6, 2022 10:53 AM (Active)   Past Medical History:   Diagnosis Date    Allergy     Anticoagulant long-term use     Anxiety     Arthritis     Atrial fibrillation     Bilateral renal cysts 05/01/2022    Blind right eye     Bradycardia     Cancer     skin cancer left side of face under eye    Hyperlipidemia     Hypertension     PVC (premature ventricular contraction)     RLS (restless legs  syndrome)     Sleep apnea     Does not use CPAP machine.     Past Surgical History:   Procedure Laterality Date    ADENOIDECTOMY      PAM OSTEOTOMY Left 10/31/2018    Procedure: OSTEOTOMY, AKIN;  Surgeon: Rudolph Cardozo DPM;  Location: [facility];  Service: Podiatry;  Laterality: Left;    APPENDECTOMY      BREAST BIOPSY Left     x3    BREAST SURGERY Left     breast biopsy x3    CATARACT EXTRACTION BILATERAL W/ ANTERIOR VITRECTOMY      ENDOSCOPIC GASTROCNEMIUS RECESSION Left 10/31/2018    Procedure: RECESSION, GASTROCNEMIUS, ENDOSCOPIC;  Surgeon: Rudolph Cardozo DPM;  Location: [facility] OR;  Service: Podiatry;  Laterality: Left;    ESOPHAGOGASTRODUODENOSCOPY N/A 2/11/2022    Procedure: EGD (ESOPHAGOGASTRODUODENOSCOPY);  Surgeon: Efren Aguilar MD;  Location: [facility];  Service: Endoscopy;  Laterality: N/A;  Patient needs a rapid covid  test done on 12/15/2021. thank you    EYE SURGERY Bilateral     cataracts extraction    EYE SURGERY Right     drainage tube (glaucoma)    FOOT ARTHRODESIS Left 1/15/2020    Procedure: FUSION, FOOT;  Surgeon: Rudolph Cardozo DPM;  Location: [facility];  Service: Podiatry;  Laterality: Left;  mini c-arm, Arthrex screw  for hardware removal (Lydia notified), Orthofix (Niels notified) min ex-fix    FOOT SURGERY Left     lesion removed from dorsal area    FRACTURE SURGERY Left     arm    HAND TENDON SURGERY Left     HYSTERECTOMY      INCISION AND DRAINAGE FOOT Left 3/11/2020    Procedure: INCISION AND DRAINAGE, FOOT;  Surgeon: Rudolph Cardozo DPM;  Location: [facility] OR;  Service: Podiatry;  Laterality: Left;    LAPIDUS BUNIONECTOMY Left 10/31/2018    Procedure: BUNIONECTOMY, LAPIDUS;  Surgeon: Rudolph Cardozo DPM;  Location: [facility] OR;  Service: Podiatry;  Laterality: Left;  mini c-arm, Arthrex locking plate (Lydia notified)    LAPIDUS BUNIONECTOMY Left 10/31/2018    Dr. Cardozo    Lymph Gland Removed  Right     groin (performed by Dr. Vergara)    NEURECTOMY Left  1/15/2020    Procedure: NEURECTOMY;  Surgeon: Rudolph Cardozo DPM;  Location: [facility] OR;  Service: Podiatry;  Laterality: Left;  bipolar bovie, vessel loop, possible Agnes nerve wrap. Moshe with Agnes notified and will be overnighting graft. MK 1/14/2020    OOPHORECTOMY      ORIF FOREARM FRACTURE Left     PALATE SURGERY      Lesion removed    TONSILLECTOMY       Social History     Tobacco Use    Smoking status: Never    Smokeless tobacco: Never   Substance Use Topics    Alcohol use: Not Currently    Drug use: No         Review of Systems   Constitutional: Negative.    HENT: Negative.     Respiratory: Negative.     Cardiovascular: Negative.    Gastrointestinal: Negative.    Endocrine: Negative.    Genitourinary: Negative.    Musculoskeletal: Negative.    Psychiatric/Behavioral:  Positive for sleep disturbance.    All other systems reviewed and are negative.  Objective:     Physical Exam  Vitals and nursing note reviewed.   Constitutional:       Appearance: She is well-developed. She is obese.   HENT:      Head: Normocephalic.   Eyes:      Conjunctiva/sclera: Conjunctivae normal.      Pupils: Pupils are equal, round, and reactive to light.   Cardiovascular:      Rate and Rhythm: Normal rate and regular rhythm.      Heart sounds: Normal heart sounds.   Pulmonary:      Effort: Pulmonary effort is normal.      Breath sounds: Normal breath sounds.   Musculoskeletal:         General: Normal range of motion.      Cervical back: Normal range of motion and neck supple.   Skin:     General: Skin is warm and dry.   Neurological:      Mental Status: She is alert and oriented to person, place, and time.      Deep Tendon Reflexes: Reflexes are normal and symmetric.   Psychiatric:         Behavior: Behavior normal.         Thought Content: Thought content normal.         Judgment: Judgment normal.           Assessment:     1. Chronic atrial fibrillation    2. JACQUELYN (obstructive sleep apnea)    3. Severe obesity with body mass  index (BMI) of 35.0 to 39.9 with serious comorbidity    4. 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 Other nonthrombocytopenic purpura    6. BMI 40.0-44.9, adult    7. Inflammatory polyneuropathy, unspecified    8. Paroxysmal atrial fibrillation    9. Antalgic gait    10. Aortic atherosclerosis    11. History of COVID-19    12. Diastolic dysfunction    13. Mixed hyperlipidemia    14. Essential hypertension    15. Personal history of other endocrine, nutritional and metabolic disease      Plan:        Medication List            Accurate as of September 6, 2022 11:49 AM. If you have any questions, ask your nurse or doctor.                START taking these medications      warfarin 6 MG tablet  Commonly known as: COUMADIN  6 mg daily and 9 mg on Friday to thin blood  Started by: Scott Dillard MD            CONTINUE taking these medications      acetaminophen 500 MG tablet  Commonly known as: TYLENOL     ALPRAZolam 0.25 MG tablet  Commonly known as: XANAX  TAKE 1 TABLET BY MOUTH TWICE A DAY     aspirin 81 MG EC tablet  Commonly known as: ECOTRIN     atorvastatin 40 MG tablet  Commonly known as: LIPITOR     augmented betamethasone dipropionate 0.05 % cream  Commonly known as: DIPROLENE-AF     calcium citrate-vitamin D3 1,000 mg-10 mcg /30 mL Liqd     chlorthalidone 50 MG Tab  Commonly known as: HYGROTEN  Take 1 tablet (50 mg total) by mouth once daily.     clotrimazole 1 % cream  Commonly known as: LOTRIMIN  Apply topically 2 (two) times daily.     cyanocobalamin 1,000 mcg/mL injection  Commonly known as: VITAMIN B-12  Inject 1 mL (1,000 mcg total) into the muscle every 30 days.     diclofenac sodium 1 % Gel  Commonly known as: VOLTAREN  Apply 2 g topically 4 (four) times daily. for 10 days     diltiaZEM 240 MG 24 hr capsule  Commonly known as: CARDIZEM CD  TAKE 1 CAPSULE BY MOUTH  ONCE DAILY     furosemide 20 MG tablet  Commonly known as: LASIX  TAKE 1 TABLET BY MOUTH ONCE DAILY      gabapentin 300 MG capsule  Commonly known as: NEURONTIN  TAKE 2 CAPSULES BY MOUTH  TWICE DAILY     latanoprost 0.005 % ophthalmic solution     losartan 100 MG tablet  Commonly known as: COZAAR  TAKE ONE-HALF TABLET BY  MOUTH ONCE DAILY     mometasone 0.1% 0.1 % cream  Commonly known as: ELOCON  Apply topically 2 (two) times daily as needed (for rash. do not use on face.).     omeprazole 20 MG capsule  Commonly known as: PRILOSEC  Take 1 capsule (20 mg total) by mouth 2 (two) times a day.     oxybutynin 5 MG Tab  Commonly known as: DITROPAN  TAKE 1 TABLET BY MOUTH ONCE DAILY     * potassium chloride SA 20 MEQ tablet  Commonly known as: K-DUR,KLOR-CON  Take 1 tablet (20 mEq total) by mouth once daily.     * potassium chloride 20 mEq  Commonly known as: K-TAB     pramipexole 0.5 MG tablet  Commonly known as: MIRAPEX  TAKE 1 TABLET BY MOUTH  TWICE DAILY     pravastatin 40 MG tablet  Commonly known as: PRAVACHOL  Take 1 tablet (40 mg total) by mouth once daily.     timolol maleate 0.5% 0.5 % Drop  Commonly known as: TIMOPTIC     triamcinolone acetonide 0.1% 0.1 % cream  Commonly known as: KENALOG  Apply topically 2 (two) times daily. PRN rash with itching. Moderate steroid.           * This list has 2 medication(s) that are the same as other medications prescribed for you. Read the directions carefully, and ask your doctor or other care provider to review them with you.                STOP taking these medications      rivaroxaban 20 mg Tab  Commonly known as: XARELTO  Stopped by: Scott Dillard MD               Where to Get Your Medications        These medications were sent to [facility]/pharmacy #[id number] - [address] AT CORNER OF [address] [address]      Phone: [phone]   warfarin 6 MG tablet       Chronic atrial fibrillation  -     POCT INR; Standing  -     CBC Auto Differential; Future; Expected date: 09/06/2022  -     Comprehensive Metabolic Panel;  Future; Expected date: 09/06/2022  -     Hemoglobin A1C; Future  -     Lipid Panel; Future  -     TSH; Future  -     Sedimentation rate; Future  -     Urinalysis; Future  -     Vitamin D; Future    JACQUELYN (obstructive sleep apnea)  -     CPAP FOR HOME USE  -     CBC Auto Differential; Future; Expected date: 09/06/2022  -     Comprehensive Metabolic Panel; Future; Expected date: 09/06/2022  -     Hemoglobin A1C; Future  -     Lipid Panel; Future  -     TSH; Future  -     Sedimentation rate; Future  -     Urinalysis; Future  -     Vitamin D; Future    Severe obesity with body mass index (BMI) of 35.0 to 39.9 with serious comorbidity  -     CBC Auto Differential; Future; Expected date: 09/06/2022  -     Comprehensive Metabolic Panel; Future; Expected date: 09/06/2022  -     Hemoglobin A1C; Future  -     Lipid Panel; Future  -     TSH; Future  -     Sedimentation rate; Future  -     Urinalysis; Future  -     Vitamin D; Future    Unspecified inflammatory spondylopathy, 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  -     CBC Auto Differential; Future; Expected date: 09/06/2022  -     Comprehensive Metabolic Panel; Future; Expected date: 09/06/2022  -     Hemoglobin A1C; Future  -     Lipid Panel; Future  -     TSH; Future  -     Sedimentation rate; Future  -     Urinalysis; Future  -     Vitamin D; Future    Other nonthrombocytopenic purpura  -     CBC Auto Differential; Future; Expected date: 09/06/2022  -     Comprehensive Metabolic Panel; Future; Expected date: 09/06/2022  -     Hemoglobin A1C; Future  -     Lipid Panel; Future  -     TSH; Future  -     Sedimentation rate; Future  -     Urinalysis; Future  -     Vitamin D; Future    BMI 40.0-44.9, adult  -     CBC Auto Differential; Future; Expected date: 09/06/2022  -     Comprehensive Metabolic Panel; Future; Expected date: 09/06/2022  -     Hemoglobin A1C; Future  -     Lipid Panel; Future  -     TSH; Future  -     Sedimentation rate; Future  -     Urinalysis; Future  -     Vitamin D;  Future    Inflammatory polyneuropathy, unspecified  -     CBC Auto Differential; Future; Expected date: 09/06/2022  -     Comprehensive Metabolic Panel; Future; Expected date: 09/06/2022  -     Hemoglobin A1C; Future  -     Lipid Panel; Future  -     TSH; Future  -     Sedimentation rate; Future  -     Urinalysis; Future  -     Vitamin D; Future    Paroxysmal atrial fibrillation  -     CBC Auto Differential; Future; Expected date: 09/06/2022  -     Comprehensive Metabolic Panel; Future; Expected date: 09/06/2022  -     Hemoglobin A1C; Future  -     Lipid Panel; Future  -     TSH; Future  -     Sedimentation rate; Future  -     Urinalysis; Future  -     Vitamin D; Future    Antalgic gait  -     CBC Auto Differential; Future; Expected date: 09/06/2022  -     Comprehensive Metabolic Panel; Future; Expected date: 09/06/2022  -     Hemoglobin A1C; Future  -     Lipid Panel; Future  -     TSH; Future  -     Sedimentation rate; Future  -     Urinalysis; Future  -     Vitamin D; Future    Aortic atherosclerosis  -     CBC Auto Differential; Future; Expected date: 09/06/2022  -     Comprehensive Metabolic Panel; Future; Expected date: 09/06/2022  -     Hemoglobin A1C; Future  -     Lipid Panel; Future  -     TSH; Future  -     Sedimentation rate; Future  -     Urinalysis; Future  -     Vitamin D; Future    History of COVID-19  -     CBC Auto Differential; Future; Expected date: 09/06/2022  -     Comprehensive Metabolic Panel; Future; Expected date: 09/06/2022  -     Hemoglobin A1C; Future  -     Lipid Panel; Future  -     TSH; Future  -     Sedimentation rate; Future  -     Urinalysis; Future  -     Vitamin D; Future    Diastolic dysfunction  -     CBC Auto Differential; Future; Expected date: 09/06/2022  -     Comprehensive Metabolic Panel; Future; Expected date: 09/06/2022  -     Hemoglobin A1C; Future  -     Lipid Panel; Future  -     TSH; Future  -     Sedimentation rate; Future  -     Urinalysis; Future  -     Vitamin  D; Future    Mixed hyperlipidemia  -     CBC Auto Differential; Future; Expected date: 09/06/2022  -     Comprehensive Metabolic Panel; Future; Expected date: 09/06/2022  -     Hemoglobin A1C; Future  -     Lipid Panel; Future  -     TSH; Future  -     Sedimentation rate; Future  -     Urinalysis; Future  -     Vitamin D; Future    Essential hypertension  -     CBC Auto Differential; Future; Expected date: 09/06/2022  -     Comprehensive Metabolic Panel; Future; Expected date: 09/06/2022  -     Hemoglobin A1C; Future  -     Lipid Panel; Future  -     TSH; Future  -     Sedimentation rate; Future  -     Urinalysis; Future  -     Vitamin D; Future    Personal history of other endocrine, nutritional and metabolic disease  -     Hemoglobin A1C; Future    Other orders  -     warfarin (COUMADIN) 6 MG tablet; 6 mg daily and 9 mg on Friday to thin blood  Dispense: 100 tablet; Refill: 3      Lose weight, eat healthy, exercise, low salt  Bp goal is less the 140/90  Not a smoker

## 2022-09-07 ENCOUNTER — LAB VISIT (OUTPATIENT)
Dept: LAB | Facility: HOSPITAL | Age: 82
End: 2022-09-07
Attending: FAMILY MEDICINE
Payer: MEDICARE

## 2022-09-07 DIAGNOSIS — I51.89 DIASTOLIC DYSFUNCTION: ICD-10-CM

## 2022-09-07 DIAGNOSIS — D69.2 OTHER NONTHROMBOCYTOPENIC PURPURA: ICD-10-CM

## 2022-09-07 DIAGNOSIS — E66.01 SEVERE OBESITY WITH BODY MASS INDEX (BMI) OF 35.0 TO 39.9 WITH SERIOUS COMORBIDITY: ICD-10-CM

## 2022-09-07 DIAGNOSIS — G61.9 INFLAMMATORY POLYNEUROPATHY, UNSPECIFIED: ICD-10-CM

## 2022-09-07 DIAGNOSIS — M46.99 UNSPECIFIED INFLAMMATORY SPONDYLOPATHY, MULTIPLE SITES IN SPINE: ICD-10-CM

## 2022-09-07 DIAGNOSIS — I48.20 CHRONIC ATRIAL FIBRILLATION: ICD-10-CM

## 2022-09-07 DIAGNOSIS — I70.0 AORTIC ATHEROSCLEROSIS: ICD-10-CM

## 2022-09-07 DIAGNOSIS — R26.89 ANTALGIC GAIT: ICD-10-CM

## 2022-09-07 DIAGNOSIS — E78.2 MIXED HYPERLIPIDEMIA: ICD-10-CM

## 2022-09-07 DIAGNOSIS — I48.0 PAROXYSMAL ATRIAL FIBRILLATION: ICD-10-CM

## 2022-09-07 DIAGNOSIS — Z86.16 HISTORY OF COVID-19: ICD-10-CM

## 2022-09-07 DIAGNOSIS — I10 ESSENTIAL HYPERTENSION: ICD-10-CM

## 2022-09-07 DIAGNOSIS — G47.33 OSA (OBSTRUCTIVE SLEEP APNEA): ICD-10-CM

## 2022-09-07 DIAGNOSIS — Z86.39 PERSONAL HISTORY OF OTHER ENDOCRINE, NUTRITIONAL AND METABOLIC DISEASE: ICD-10-CM

## 2022-09-07 LAB
ALBUMIN SERPL BCP-MCNC: 4.4 G/DL (ref 3.5–5.2)
ALP SERPL-CCNC: 90 U/L (ref 38–126)
ALT SERPL W/O P-5'-P-CCNC: 24 U/L (ref 10–44)
ANION GAP SERPL CALC-SCNC: 10 MMOL/L (ref 8–16)
AST SERPL-CCNC: 27 U/L (ref 15–46)
BASOPHILS # BLD AUTO: 0.05 K/UL (ref 0–0.2)
BASOPHILS NFR BLD: 0.7 % (ref 0–1.9)
BILIRUB SERPL-MCNC: 0.4 MG/DL (ref 0.1–1)
CALCIUM SERPL-MCNC: 9.6 MG/DL (ref 8.7–10.5)
CHLORIDE SERPL-SCNC: 97 MMOL/L (ref 95–110)
CHOLEST SERPL-MCNC: 193 MG/DL (ref 120–199)
CHOLEST/HDLC SERPL: 4.9 {RATIO} (ref 2–5)
CO2 SERPL-SCNC: 34 MMOL/L (ref 23–29)
CREAT SERPL-MCNC: 1.1 MG/DL (ref 0.5–1.4)
DIFFERENTIAL METHOD: ABNORMAL
EOSINOPHIL # BLD AUTO: 0.3 K/UL (ref 0–0.5)
EOSINOPHIL NFR BLD: 3.8 % (ref 0–8)
ERYTHROCYTE [DISTWIDTH] IN BLOOD BY AUTOMATED COUNT: 12.7 % (ref 11.5–14.5)
ERYTHROCYTE [SEDIMENTATION RATE] IN BLOOD BY WESTERGREN METHOD: 16 MM/HR (ref 0–20)
EST. GFR  (NO RACE VARIABLE): 50.8 ML/MIN/1.73 M^2
ESTIMATED AVG GLUCOSE: 146 MG/DL (ref 68–131)
GLUCOSE SERPL-MCNC: 106 MG/DL (ref 70–110)
HBA1C MFR BLD: 6.7 % (ref 4–5.6)
HCT VFR BLD AUTO: 40.3 % (ref 37–48.5)
HDLC SERPL-MCNC: 39 MG/DL (ref 40–75)
HDLC SERPL: 20.2 % (ref 20–50)
HGB BLD-MCNC: 13.3 G/DL (ref 12–16)
IMM GRANULOCYTES # BLD AUTO: 0.02 K/UL (ref 0–0.04)
IMM GRANULOCYTES NFR BLD AUTO: 0.3 % (ref 0–0.5)
LDLC SERPL CALC-MCNC: 106.2 MG/DL (ref 63–159)
LYMPHOCYTES # BLD AUTO: 2.3 K/UL (ref 1–4.8)
LYMPHOCYTES NFR BLD: 33.4 % (ref 18–48)
MCH RBC QN AUTO: 32.4 PG (ref 27–31)
MCHC RBC AUTO-ENTMCNC: 33 G/DL (ref 32–36)
MCV RBC AUTO: 98 FL (ref 82–98)
MONOCYTES # BLD AUTO: 0.6 K/UL (ref 0.3–1)
MONOCYTES NFR BLD: 8.9 % (ref 4–15)
NEUTROPHILS # BLD AUTO: 3.6 K/UL (ref 1.8–7.7)
NEUTROPHILS NFR BLD: 52.9 % (ref 38–73)
NONHDLC SERPL-MCNC: 154 MG/DL
NRBC BLD-RTO: 0 /100 WBC
PLATELET # BLD AUTO: 218 K/UL (ref 150–450)
PMV BLD AUTO: 10.7 FL (ref 9.2–12.9)
POTASSIUM SERPL-SCNC: 4.2 MMOL/L (ref 3.5–5.1)
PROT SERPL-MCNC: 6.8 G/DL (ref 6–8.4)
RBC # BLD AUTO: 4.1 M/UL (ref 4–5.4)
SODIUM SERPL-SCNC: 141 MMOL/L (ref 136–145)
TRIGL SERPL-MCNC: 239 MG/DL (ref 30–150)
TSH SERPL DL<=0.005 MIU/L-ACNC: 1.41 UIU/ML (ref 0.4–4)
UUN UR-MCNC: 23 MG/DL (ref 7–17)
WBC # BLD AUTO: 6.88 K/UL (ref 3.9–12.7)

## 2022-09-07 PROCEDURE — 80061 LIPID PANEL: CPT | Performed by: FAMILY MEDICINE

## 2022-09-07 PROCEDURE — 83036 HEMOGLOBIN GLYCOSYLATED A1C: CPT | Performed by: FAMILY MEDICINE

## 2022-09-07 PROCEDURE — 85652 RBC SED RATE AUTOMATED: CPT | Performed by: FAMILY MEDICINE

## 2022-09-07 PROCEDURE — 85025 COMPLETE CBC W/AUTO DIFF WBC: CPT | Mod: PO | Performed by: FAMILY MEDICINE

## 2022-09-07 PROCEDURE — 36415 COLL VENOUS BLD VENIPUNCTURE: CPT | Mod: PO | Performed by: FAMILY MEDICINE

## 2022-09-07 PROCEDURE — 84443 ASSAY THYROID STIM HORMONE: CPT | Mod: PO | Performed by: FAMILY MEDICINE

## 2022-09-07 PROCEDURE — 80053 COMPREHEN METABOLIC PANEL: CPT | Mod: PO | Performed by: FAMILY MEDICINE

## 2022-09-08 ENCOUNTER — LAB VISIT (OUTPATIENT)
Dept: LAB | Facility: HOSPITAL | Age: 82
End: 2022-09-08
Attending: FAMILY MEDICINE
Payer: MEDICARE

## 2022-09-08 DIAGNOSIS — G61.9 INFLAMMATORY POLYNEUROPATHY, UNSPECIFIED: ICD-10-CM

## 2022-09-08 DIAGNOSIS — M46.99 UNSPECIFIED INFLAMMATORY SPONDYLOPATHY, MULTIPLE SITES IN SPINE: ICD-10-CM

## 2022-09-08 DIAGNOSIS — I48.0 PAROXYSMAL ATRIAL FIBRILLATION: ICD-10-CM

## 2022-09-08 DIAGNOSIS — G47.33 OSA (OBSTRUCTIVE SLEEP APNEA): ICD-10-CM

## 2022-09-08 DIAGNOSIS — I51.89 DIASTOLIC DYSFUNCTION: ICD-10-CM

## 2022-09-08 DIAGNOSIS — R26.89 ANTALGIC GAIT: ICD-10-CM

## 2022-09-08 DIAGNOSIS — E66.01 SEVERE OBESITY WITH BODY MASS INDEX (BMI) OF 35.0 TO 39.9 WITH SERIOUS COMORBIDITY: ICD-10-CM

## 2022-09-08 DIAGNOSIS — E78.2 MIXED HYPERLIPIDEMIA: ICD-10-CM

## 2022-09-08 DIAGNOSIS — I10 ESSENTIAL HYPERTENSION: ICD-10-CM

## 2022-09-08 DIAGNOSIS — I70.0 AORTIC ATHEROSCLEROSIS: ICD-10-CM

## 2022-09-08 DIAGNOSIS — I48.20 CHRONIC ATRIAL FIBRILLATION: ICD-10-CM

## 2022-09-08 DIAGNOSIS — D69.2 OTHER NONTHROMBOCYTOPENIC PURPURA: ICD-10-CM

## 2022-09-08 DIAGNOSIS — Z86.16 HISTORY OF COVID-19: ICD-10-CM

## 2022-09-08 LAB
BILIRUB UR QL STRIP: NEGATIVE
CLARITY UR REFRACT.AUTO: CLEAR
COLOR UR AUTO: YELLOW
GLUCOSE UR QL STRIP: NEGATIVE
HGB UR QL STRIP: NEGATIVE
KETONES UR QL STRIP: NEGATIVE
LEUKOCYTE ESTERASE UR QL STRIP: NEGATIVE
NITRITE UR QL STRIP: NEGATIVE
PH UR STRIP: 6 [PH] (ref 5–8)
PROT UR QL STRIP: NEGATIVE
SP GR UR STRIP: 1.02 (ref 1–1.03)
URN SPEC COLLECT METH UR: NORMAL
UROBILINOGEN UR STRIP-ACNC: NEGATIVE EU/DL

## 2022-09-08 PROCEDURE — 81003 URINALYSIS AUTO W/O SCOPE: CPT | Mod: PO | Performed by: FAMILY MEDICINE

## 2022-09-16 ENCOUNTER — HOSPITAL ENCOUNTER (OUTPATIENT)
Dept: RADIOLOGY | Facility: HOSPITAL | Age: 82
Discharge: HOME | End: 2022-09-16
Attending: FAMILY MEDICINE
Payer: MEDICARE

## 2022-09-16 DIAGNOSIS — Z78.0 MENOPAUSE: ICD-10-CM

## 2022-09-16 PROCEDURE — 77080 DXA BONE DENSITY AXIAL: CPT | Mod: TC,PO

## 2022-09-20 ENCOUNTER — CLINICAL SUPPORT (OUTPATIENT)
Dept: FAMILY MEDICINE | Facility: CLINIC | Age: 82
End: 2022-09-20
Payer: MEDICARE

## 2022-09-20 DIAGNOSIS — I48.20 CHRONIC ATRIAL FIBRILLATION: ICD-10-CM

## 2022-09-20 RX ORDER — GABAPENTIN 300 MG/1
CAPSULE ORAL
Qty: 360 CAPSULE | Refills: 3 | Status: SHIPPED | OUTPATIENT
Start: 2022-09-20 | End: 2023-08-29

## 2022-09-20 NOTE — PROGRESS NOTES
Lab Results   Component Value Date    INR 1.7 (A) 01/28/2022    INR 1.1 (A) 01/21/2022    INR 2.6 12/22/2021    INR 2.4 (H) 11/09/2021    INR 2.1 (H) 09/17/2021     Patient is here for INR check  Patient is currently taking 6 mg of coumadin daily  Patient denies any changes in diet, pt denies any missed doses, and pt denies any signs of bleeding.  INR is 1.7  Patient was instructed to take 6mg extra today  of coumadin.  Patient was instructed to return to the clinic 2 to recheck INR.

## 2022-10-04 ENCOUNTER — CLINICAL SUPPORT (OUTPATIENT)
Dept: FAMILY MEDICINE | Facility: CLINIC | Age: 82
End: 2022-10-04
Payer: MEDICARE

## 2022-10-04 DIAGNOSIS — R05.9 COUGH, UNSPECIFIED TYPE: ICD-10-CM

## 2022-10-04 DIAGNOSIS — I48.20 CHRONIC ATRIAL FIBRILLATION: Primary | ICD-10-CM

## 2022-10-04 LAB
CTP QC/QA: YES
INR PPP: 2.2 (ref 2–3)
SARS-COV-2 RDRP RESP QL NAA+PROBE: NEGATIVE

## 2022-10-04 PROCEDURE — 87635: ICD-10-PCS | Mod: QW,S$GLB,, | Performed by: FAMILY MEDICINE

## 2022-10-04 PROCEDURE — 87635 SARS-COV-2 COVID-19 AMP PRB: CPT | Mod: QW,S$GLB,, | Performed by: FAMILY MEDICINE

## 2022-10-04 PROCEDURE — 85610 POCT INR: ICD-10-PCS | Mod: QW,,, | Performed by: FAMILY MEDICINE

## 2022-10-04 PROCEDURE — 85610 PROTHROMBIN TIME: CPT | Mod: QW,,, | Performed by: FAMILY MEDICINE

## 2022-10-04 NOTE — PROGRESS NOTES
Lab Results   Component Value Date    INR 2.2 10/04/2022    INR 1.7 (A) 01/28/2022    INR 1.1 (A) 01/21/2022    INR 2.6 12/22/2021    INR 2.4 (H) 11/09/2021       Patient is here for INR check  Patient is currently taking 6 mg of coumadin daily with 9 mg on Friday.  Patient denies any changes in diet, pt denies any missed doses, and pt denies any signs of bleeding.  INR is 2.2  Patient was instructed to take the same dose of coumadin.  Patient was instructed to return to the clinic in 4 weeks to recheck INR.

## 2022-10-05 ENCOUNTER — TELEPHONE (OUTPATIENT)
Dept: FAMILY MEDICINE | Facility: CLINIC | Age: 82
End: 2022-10-05
Payer: MEDICARE

## 2022-10-05 ENCOUNTER — PATIENT MESSAGE (OUTPATIENT)
Dept: FAMILY MEDICINE | Facility: CLINIC | Age: 82
End: 2022-10-05
Payer: MEDICARE

## 2022-10-05 RX ORDER — ALENDRONATE SODIUM 70 MG/1
70 TABLET ORAL
Qty: 12 TABLET | Refills: 3 | Status: SHIPPED | OUTPATIENT
Start: 2022-10-05 | End: 2023-09-02

## 2022-10-05 NOTE — TELEPHONE ENCOUNTER
Called patient and discussed INR result with her.  She is currently taking coumadin 6 mg daily and 9 mg on Fridays.  INR is at goal.  She will continue her current dosage and recheck in 2-3 weeks.

## 2022-10-05 NOTE — TELEPHONE ENCOUNTER
Pt stated that she would like to begin Alendronate. This is in regards to recent DEXA.     Please send rx to [facility].

## 2022-10-15 ENCOUNTER — HOSPITAL ENCOUNTER (OUTPATIENT)
Facility: HOSPITAL | Age: 82
Discharge: HOME | End: 2022-10-16
Attending: EMERGENCY MEDICINE | Admitting: INTERNAL MEDICINE
Payer: MEDICARE

## 2022-10-15 DIAGNOSIS — R00.2 PALPITATIONS: ICD-10-CM

## 2022-10-15 DIAGNOSIS — I48.0 PAROXYSMAL ATRIAL FIBRILLATION: Primary | ICD-10-CM

## 2022-10-15 DIAGNOSIS — I95.9 HYPOTENSION, UNSPECIFIED HYPOTENSION TYPE: ICD-10-CM

## 2022-10-15 DIAGNOSIS — R06.02 SHORTNESS OF BREATH: ICD-10-CM

## 2022-10-15 DIAGNOSIS — I49.9 IRREGULAR HEART RATE: ICD-10-CM

## 2022-10-15 PROBLEM — G57.92: Status: RESOLVED | Noted: 2020-01-15 | Resolved: 2022-10-15

## 2022-10-15 PROBLEM — M62.462 GASTROCNEMIUS EQUINUS OF LEFT LOWER EXTREMITY: Status: RESOLVED | Noted: 2018-10-31 | Resolved: 2022-10-15

## 2022-10-15 PROBLEM — Z85.828 HISTORY OF SKIN CANCER: Status: RESOLVED | Noted: 2018-07-16 | Resolved: 2022-10-15

## 2022-10-15 PROBLEM — M20.12 HALLUX VALGUS, LEFT: Status: RESOLVED | Noted: 2018-09-23 | Resolved: 2022-10-15

## 2022-10-15 PROBLEM — D69.2 SENILE PURPURA: Status: RESOLVED | Noted: 2017-03-08 | Resolved: 2022-10-15

## 2022-10-15 PROBLEM — M79.672 LEFT FOOT PAIN: Status: RESOLVED | Noted: 2019-01-08 | Resolved: 2022-10-15

## 2022-10-15 PROBLEM — M79.2 NEURALGIA: Status: RESOLVED | Noted: 2019-06-03 | Resolved: 2022-10-15

## 2022-10-15 PROBLEM — I48.91 ATRIAL FIBRILLATION: Status: RESOLVED | Noted: 2017-09-04 | Resolved: 2022-10-15

## 2022-10-15 PROBLEM — I51.89 DIASTOLIC DYSFUNCTION: Status: RESOLVED | Noted: 2017-09-20 | Resolved: 2022-10-15

## 2022-10-15 PROBLEM — K59.00 CONSTIPATION: Status: RESOLVED | Noted: 2020-03-10 | Resolved: 2022-10-15

## 2022-10-15 LAB
ALBUMIN SERPL BCP-MCNC: 4.5 G/DL (ref 3.5–5.2)
ALP SERPL-CCNC: 88 U/L (ref 38–126)
ALT SERPL W/O P-5'-P-CCNC: 26 U/L (ref 10–44)
ANION GAP SERPL CALC-SCNC: 8 MMOL/L (ref 8–16)
AST SERPL-CCNC: 38 U/L (ref 15–46)
BASOPHILS # BLD AUTO: 0.06 K/UL (ref 0–0.2)
BASOPHILS NFR BLD: 0.8 % (ref 0–1.9)
BILIRUB SERPL-MCNC: 0.5 MG/DL (ref 0.1–1)
CALCIUM SERPL-MCNC: 9.7 MG/DL (ref 8.7–10.5)
CHLORIDE SERPL-SCNC: 101 MMOL/L (ref 95–110)
CO2 SERPL-SCNC: 29 MMOL/L (ref 23–29)
CREAT SERPL-MCNC: 0.97 MG/DL (ref 0.5–1.4)
DIFFERENTIAL METHOD: ABNORMAL
EOSINOPHIL # BLD AUTO: 0.3 K/UL (ref 0–0.5)
EOSINOPHIL NFR BLD: 3.5 % (ref 0–8)
ERYTHROCYTE [DISTWIDTH] IN BLOOD BY AUTOMATED COUNT: 12.8 % (ref 11.5–14.5)
EST. GFR  (NO RACE VARIABLE): 59.1 ML/MIN/1.73 M^2
GLUCOSE SERPL-MCNC: 105 MG/DL (ref 70–110)
HCT VFR BLD AUTO: 42.3 % (ref 37–48.5)
HGB BLD-MCNC: 14.4 G/DL (ref 12–16)
IMM GRANULOCYTES # BLD AUTO: 0.01 K/UL (ref 0–0.04)
IMM GRANULOCYTES NFR BLD AUTO: 0.1 % (ref 0–0.5)
INR PPP: 2.4 (ref 0.8–1.2)
LYMPHOCYTES # BLD AUTO: 2.3 K/UL (ref 1–4.8)
LYMPHOCYTES NFR BLD: 30.1 % (ref 18–48)
MCH RBC QN AUTO: 32.4 PG (ref 27–31)
MCHC RBC AUTO-ENTMCNC: 34 G/DL (ref 32–36)
MCV RBC AUTO: 95 FL (ref 82–98)
MONOCYTES # BLD AUTO: 0.7 K/UL (ref 0.3–1)
MONOCYTES NFR BLD: 9.4 % (ref 4–15)
NEUTROPHILS # BLD AUTO: 4.4 K/UL (ref 1.8–7.7)
NEUTROPHILS NFR BLD: 56.1 % (ref 38–73)
NRBC BLD-RTO: 0 /100 WBC
NT-PROBNP SERPL-MCNC: 1540 PG/ML (ref 5–1800)
PLATELET # BLD AUTO: 251 K/UL (ref 150–450)
PMV BLD AUTO: 10.7 FL (ref 9.2–12.9)
POCT GLUCOSE: 138 MG/DL (ref 70–110)
POTASSIUM SERPL-SCNC: 4.2 MMOL/L (ref 3.5–5.1)
PROT SERPL-MCNC: 7.4 G/DL (ref 6–8.4)
PROTHROMBIN TIME: 23.7 SEC (ref 9–12.5)
RBC # BLD AUTO: 4.45 M/UL (ref 4–5.4)
SARS-COV-2 RDRP RESP QL NAA+PROBE: NEGATIVE
SODIUM SERPL-SCNC: 138 MMOL/L (ref 136–145)
TROPONIN I SERPL-MCNC: <0.012 NG/ML (ref 0.01–0.03)
UUN UR-MCNC: 28 MG/DL (ref 7–17)
WBC # BLD AUTO: 7.77 K/UL (ref 3.9–12.7)

## 2022-10-15 PROCEDURE — 25000003 PHARM REV CODE 250

## 2022-10-15 PROCEDURE — 99900035 HC TECH TIME PER 15 MIN (STAT): Mod: ER

## 2022-10-15 PROCEDURE — 94760 N-INVAS EAR/PLS OXIMETRY 1: CPT | Mod: ER

## 2022-10-15 PROCEDURE — 93010 ELECTROCARDIOGRAM REPORT: CPT | Mod: ,,, | Performed by: INTERNAL MEDICINE

## 2022-10-15 PROCEDURE — U0002 COVID-19 LAB TEST NON-CDC: HCPCS | Mod: ER | Performed by: INTERNAL MEDICINE

## 2022-10-15 PROCEDURE — 36000 PLACE NEEDLE IN VEIN: CPT | Mod: ER

## 2022-10-15 PROCEDURE — G0378 HOSPITAL OBSERVATION PER HR: HCPCS

## 2022-10-15 PROCEDURE — 93005 ELECTROCARDIOGRAM TRACING: CPT | Mod: ER

## 2022-10-15 PROCEDURE — 85610 PROTHROMBIN TIME: CPT | Performed by: INTERNAL MEDICINE

## 2022-10-15 PROCEDURE — 36415 COLL VENOUS BLD VENIPUNCTURE: CPT | Performed by: INTERNAL MEDICINE

## 2022-10-15 PROCEDURE — 27000221 HC OXYGEN, UP TO 24 HOURS: Mod: ER

## 2022-10-15 PROCEDURE — 80053 COMPREHEN METABOLIC PANEL: CPT | Mod: ER | Performed by: PHYSICIAN ASSISTANT

## 2022-10-15 PROCEDURE — 84484 ASSAY OF TROPONIN QUANT: CPT | Mod: ER | Performed by: PHYSICIAN ASSISTANT

## 2022-10-15 PROCEDURE — 25000003 PHARM REV CODE 250: Performed by: STUDENT IN AN ORGANIZED HEALTH CARE EDUCATION/TRAINING PROGRAM

## 2022-10-15 PROCEDURE — 93010 EKG 12-LEAD: ICD-10-PCS | Mod: ,,, | Performed by: INTERNAL MEDICINE

## 2022-10-15 PROCEDURE — 85025 COMPLETE CBC W/AUTO DIFF WBC: CPT | Mod: ER | Performed by: PHYSICIAN ASSISTANT

## 2022-10-15 PROCEDURE — 99285 EMERGENCY DEPT VISIT HI MDM: CPT | Mod: 25,ER

## 2022-10-15 PROCEDURE — 83880 ASSAY OF NATRIURETIC PEPTIDE: CPT | Mod: ER | Performed by: PHYSICIAN ASSISTANT

## 2022-10-15 RX ORDER — GLUCAGON 1 MG
1 KIT INJECTION
Status: DISCONTINUED | OUTPATIENT
Start: 2022-10-15 | End: 2022-10-16 | Stop reason: HOSPADM

## 2022-10-15 RX ORDER — SODIUM CHLORIDE 0.9 % (FLUSH) 0.9 %
10 SYRINGE (ML) INJECTION
Status: DISCONTINUED | OUTPATIENT
Start: 2022-10-15 | End: 2022-10-16 | Stop reason: HOSPADM

## 2022-10-15 RX ORDER — GABAPENTIN 300 MG/1
600 CAPSULE ORAL 2 TIMES DAILY
Status: DISCONTINUED | OUTPATIENT
Start: 2022-10-15 | End: 2022-10-16 | Stop reason: HOSPADM

## 2022-10-15 RX ORDER — CHLORTHALIDONE 25 MG/1
50 TABLET ORAL DAILY
Status: DISCONTINUED | OUTPATIENT
Start: 2022-10-16 | End: 2022-10-15

## 2022-10-15 RX ORDER — DILTIAZEM HYDROCHLORIDE 120 MG/1
240 CAPSULE, COATED, EXTENDED RELEASE ORAL DAILY
Status: DISCONTINUED | OUTPATIENT
Start: 2022-10-16 | End: 2022-10-16 | Stop reason: HOSPADM

## 2022-10-15 RX ORDER — PRAMIPEXOLE DIHYDROCHLORIDE 0.12 MG/1
0.5 TABLET ORAL 2 TIMES DAILY
Status: DISCONTINUED | OUTPATIENT
Start: 2022-10-15 | End: 2022-10-16 | Stop reason: HOSPADM

## 2022-10-15 RX ORDER — LATANOPROST 50 UG/ML
1 SOLUTION/ DROPS OPHTHALMIC NIGHTLY
Status: DISCONTINUED | OUTPATIENT
Start: 2022-10-15 | End: 2022-10-16 | Stop reason: HOSPADM

## 2022-10-15 RX ORDER — PRAVASTATIN SODIUM 40 MG/1
40 TABLET ORAL DAILY
Status: DISCONTINUED | OUTPATIENT
Start: 2022-10-16 | End: 2022-10-16 | Stop reason: HOSPADM

## 2022-10-15 RX ORDER — IBUPROFEN 200 MG
16 TABLET ORAL
Status: DISCONTINUED | OUTPATIENT
Start: 2022-10-15 | End: 2022-10-16 | Stop reason: HOSPADM

## 2022-10-15 RX ORDER — ALPRAZOLAM 0.25 MG/1
0.25 TABLET ORAL NIGHTLY PRN
Status: DISCONTINUED | OUTPATIENT
Start: 2022-10-15 | End: 2022-10-16 | Stop reason: HOSPADM

## 2022-10-15 RX ORDER — IBUPROFEN 200 MG
24 TABLET ORAL
Status: DISCONTINUED | OUTPATIENT
Start: 2022-10-15 | End: 2022-10-16 | Stop reason: HOSPADM

## 2022-10-15 RX ORDER — MULTIVIT,IRON,MINERALS/LUTEIN
1 TABLET ORAL DAILY
COMMUNITY

## 2022-10-15 RX ORDER — OXYBUTYNIN CHLORIDE 5 MG/1
5 TABLET ORAL DAILY
Status: DISCONTINUED | OUTPATIENT
Start: 2022-10-16 | End: 2022-10-16 | Stop reason: HOSPADM

## 2022-10-15 RX ORDER — LOSARTAN POTASSIUM 50 MG/1
50 TABLET ORAL DAILY
Status: DISCONTINUED | OUTPATIENT
Start: 2022-10-16 | End: 2022-10-16 | Stop reason: HOSPADM

## 2022-10-15 RX ORDER — TIMOLOL MALEATE 5 MG/ML
1 SOLUTION/ DROPS OPHTHALMIC DAILY
Status: DISCONTINUED | OUTPATIENT
Start: 2022-10-16 | End: 2022-10-16 | Stop reason: HOSPADM

## 2022-10-15 RX ORDER — ASPIRIN 81 MG/1
81 TABLET ORAL DAILY
Status: DISCONTINUED | OUTPATIENT
Start: 2022-10-16 | End: 2022-10-16 | Stop reason: HOSPADM

## 2022-10-15 RX ORDER — WARFARIN 3 MG/1
6 TABLET ORAL DAILY
Status: DISCONTINUED | OUTPATIENT
Start: 2022-10-16 | End: 2022-10-16 | Stop reason: HOSPADM

## 2022-10-15 RX ORDER — CHOLECALCIFEROL (VITAMIN D3) 25 MCG
1000 TABLET ORAL DAILY
COMMUNITY

## 2022-10-15 RX ORDER — INSULIN ASPART 100 [IU]/ML
0-5 INJECTION, SOLUTION INTRAVENOUS; SUBCUTANEOUS
Status: DISCONTINUED | OUTPATIENT
Start: 2022-10-15 | End: 2022-10-16 | Stop reason: HOSPADM

## 2022-10-15 RX ADMIN — PRAMIPEXOLE DIHYDROCHLORIDE 0.5 MG: 0.12 TABLET ORAL at 09:10

## 2022-10-15 RX ADMIN — ALPRAZOLAM 0.25 MG: 0.25 TABLET ORAL at 09:10

## 2022-10-15 RX ADMIN — GABAPENTIN 600 MG: 300 CAPSULE ORAL at 09:10

## 2022-10-15 NOTE — PHARMACY MED REC
"Admission Medication History     The home medication history was taken by Sofia Sher CPhT.    Medication history obtained from, Patient Verified     You may go to "Admission" then "Reconcile Home Medications" tabs to review and/or act upon these items.     The home medication list has been updated by the Pharmacy department.   Please read ALL comments highlighted in yellow.   Please address this information as you see fit.    Feel free to contact us if you have any questions or require assistance.      The medications listed below were removed from the home medication list.  Please reorder if appropriate:  Patient reports no longer taking the following medication(s):  Atorvastatin 40 mg  Diprolene-AF 0.05% cream  Calcium Citrate-Vitamin D3 1,000 mcg/30ml liquid  Lotrimin 1% cream  Voltaren 1% gel  Mometasone 0.1% cream  Kenalog 0.1% cream    Sofia Sher CPhT.  Ext [phone]               .        "

## 2022-10-15 NOTE — ED NOTES
Bed: Exam 03  Expected date:   Expected time:   Means of arrival:   Comments:  Gonna move room 5 in here

## 2022-10-15 NOTE — Clinical Note
Diagnosis: Paroxysmal atrial fibrillation [499788]   Future Attending Provider: BLAS VAZQUEZ [[id number]]   Admitting Provider:: BLAS VAZQUEZ [[id number]]

## 2022-10-15 NOTE — ED PROVIDER NOTES
Encounter Date: 10/15/2022       History     Chief Complaint   Patient presents with    Irregular Heart Beat     Patient is an 81-year-old female with history of hypertension, hyperlipidemia, diabetes, AFib on chronic anticoagulation who presents to the emergency department with palpitations.  Patient reports yesterday she was playing bingo with some friends.  She reports someone there, upset her.  She reports she felt stressed and overwhelmed and began having palpitations.  She states when she got home she felt very weak and as if she could not catch her breath.  She reports this typically happens when she goes into AFib.  She reports she was not able to sleep well last night.  She reports she woke up this morning feeling the same way.  She denies any true chest pain.  She denies any worsening lower extremity edema.  She denies recent travel, recent surgery, or previous blood clot.  She denies any fevers.    The history is provided by the patient.   Review of patient's allergies indicates:   Allergen Reactions    Propofol analogues      Used for her Colonoscopy.  Extended delirium.  Advised not to take it again.      Adhesive     Compazine [prochlorperazine edisylate] Anxiety    Penicillins Rash    Sulfa (sulfonamide antibiotics) Rash    Vancomycin analogues Rash     Past Medical History:   Diagnosis Date    Allergy     Anticoagulant long-term use     Anxiety     Arthritis     Atrial fibrillation     Bilateral renal cysts 05/01/2022    Blind right eye     Bradycardia     Cancer     skin cancer left side of face under eye    Hyperlipidemia     Hypertension     PVC (premature ventricular contraction)     RLS (restless legs syndrome)     Sleep apnea     Does not use CPAP machine.     Past Surgical History:   Procedure Laterality Date    ADENOIDECTOMY      PAM OSTEOTOMY Left 10/31/2018    Procedure: OSTEOTOMY, AKIN;  Surgeon: Rudolph Cardozo DPM;  Location: [facility];  Service: Podiatry;  Laterality: Left;     APPENDECTOMY      BREAST BIOPSY Left     x3    BREAST SURGERY Left     breast biopsy x3    CATARACT EXTRACTION BILATERAL W/ ANTERIOR VITRECTOMY      ENDOSCOPIC GASTROCNEMIUS RECESSION Left 10/31/2018    Procedure: RECESSION, GASTROCNEMIUS, ENDOSCOPIC;  Surgeon: Rudolph Cardozo DPM;  Location: [facility] OR;  Service: Podiatry;  Laterality: Left;    ESOPHAGOGASTRODUODENOSCOPY N/A 2/11/2022    Procedure: EGD (ESOPHAGOGASTRODUODENOSCOPY);  Surgeon: Efren Aguilar MD;  Location: [facility] ENDO;  Service: Endoscopy;  Laterality: N/A;  Patient needs a rapid covid  test done on 12/15/2021. thank you    EYE SURGERY Bilateral     cataracts extraction    EYE SURGERY Right     drainage tube (glaucoma)    FOOT ARTHRODESIS Left 1/15/2020    Procedure: FUSION, FOOT;  Surgeon: Rudolph Cardozo DPM;  Location: [facility] OR;  Service: Podiatry;  Laterality: Left;  mini c-arm, Arthrex screw  for hardware removal (Lydia notified), Orthofix (Niels notified) min ex-fix    FOOT SURGERY Left     lesion removed from dorsal area    FRACTURE SURGERY Left     arm    HAND TENDON SURGERY Left     HYSTERECTOMY      INCISION AND DRAINAGE FOOT Left 3/11/2020    Procedure: INCISION AND DRAINAGE, FOOT;  Surgeon: Rudolph Cardozo DPM;  Location: [facility] OR;  Service: Podiatry;  Laterality: Left;    LAPIDUS BUNIONECTOMY Left 10/31/2018    Procedure: BUNIONECTOMY, LAPIDUS;  Surgeon: Rudolph Cardozo DPM;  Location: [facility] OR;  Service: Podiatry;  Laterality: Left;  mini c-arm, Arthrex locking plate (Lydia notified)    LAPIDUS BUNIONECTOMY Left 10/31/2018    Dr. Cardozo    Lymph Gland Removed  Right     groin (performed by Dr. Vergara)    NEURECTOMY Left 1/15/2020    Procedure: NEURECTOMY;  Surgeon: Rudolph Cardozo DPM;  Location: [facility] OR;  Service: Podiatry;  Laterality: Left;  bipolar bovie, vessel loop, possible Agnes nerve wrap. Moshe with Agnes notified and will be overnighting graft. MK 1/14/2020    OOPHORECTOMY      ORIF FOREARM  FRACTURE Left     PALATE SURGERY      Lesion removed    TONSILLECTOMY       Family History   Problem Relation Age of Onset    Aneurysm Mother         AAA    Cancer Father         lung cancer    Lung cancer Father     Cirrhosis Father     Cancer Sister         Breast and Brain     Diabetes Sister     Breast cancer Sister     Hypertension Sister     Hyperlipidemia Sister     Brain cancer Sister     Colon polyps Brother     Cancer Brother         lung cancer    Diabetes Brother     Hyperlipidemia Brother     Hypertension Brother     Cancer Brother         bladder cancer    Diabetes Brother     Glaucoma Brother     Heart disease Sister         Heart valve repair    Atrial fibrillation Sister     Diabetes Sister     Heart disease Sister     Heart attack Sister     Bipolar disorder Sister     Depression Sister     Glaucoma Sister     Diabetes Sister     Other Sister         Pituitary tumor    Arthritis Sister     COPD Sister     Heart disease Sister     Kidney disease Sister     Cancer Sister         lung cancer    Diabetes Sister     Lung cancer Sister     Heart attack Sister      Social History     Tobacco Use    Smoking status: Never    Smokeless tobacco: Never   Substance Use Topics    Alcohol use: Not Currently    Drug use: No     Review of Systems   Constitutional:  Negative for activity change, appetite change, chills, fatigue and fever.   HENT:  Negative for congestion, ear discharge, ear pain, postnasal drip, rhinorrhea, sore throat and trouble swallowing.    Respiratory:  Positive for shortness of breath. Negative for chest tightness.    Cardiovascular:  Positive for palpitations. Negative for chest pain and leg swelling.   Gastrointestinal:  Negative for abdominal pain, blood in stool, constipation, diarrhea, nausea and vomiting.   Genitourinary:  Negative for dysuria, flank pain and hematuria.   Musculoskeletal:  Negative for back pain, neck pain and neck stiffness.   Skin:  Negative for rash and wound.    Neurological:  Positive for weakness and light-headedness. Negative for dizziness and headaches.     Physical Exam     Initial Vitals   BP Pulse Resp Temp SpO2   10/15/22 1151 10/15/22 1151 10/15/22 1151 10/15/22 1208 10/15/22 1151   112/68 75 18 98.2 °F (36.8 °C) 95 %      MAP       --                Physical Exam    Nursing note and vitals reviewed.  Constitutional: She appears well-developed and well-nourished. She is not diaphoretic.  Non-toxic appearance. No distress.   HENT:   Head: Normocephalic.   Right Ear: External ear normal.   Left Ear: External ear normal.   Nose: Nose normal.   Mouth/Throat: Oropharynx is clear and moist. No oropharyngeal exudate.   Eyes: Conjunctivae and EOM are normal. Pupils are equal, round, and reactive to light.   Neck:   Normal range of motion.  Cardiovascular:  Normal rate.           Irregular rhythm; no lower extremity edema   Pulmonary/Chest: Breath sounds normal. No respiratory distress. She has no wheezes. She has no rhonchi. She has no rales. She exhibits no tenderness.   Abdominal: Abdomen is soft. Bowel sounds are normal. There is no abdominal tenderness.   Musculoskeletal:      Cervical back: Normal range of motion.     Neurological: She is alert and oriented to person, place, and time. She has normal strength.   Skin: Skin is warm and dry. Capillary refill takes less than 2 seconds.   Psychiatric: She has a normal mood and affect.       ED Course   Procedures  Labs Reviewed   CBC W/ AUTO DIFFERENTIAL - Abnormal; Notable for the following components:       Result Value    MCH 32.4 (*)     All other components within normal limits   COMPREHENSIVE METABOLIC PANEL - Abnormal; Notable for the following components:    BUN 28 (*)     eGFR 59.1 (*)     All other components within normal limits   NT-PRO NATRIURETIC PEPTIDE   TROPONIN I     EKG Readings: (Independently Interpreted)   Rhythm: Atrial Fibrillation. Heart Rate: 82.     Imaging Results              X-Ray Chest AP  Portable (Final result)  Result time 10/15/22 12:12:21      Final result by Efren Guardado MD (10/15/22 12:12:21)                   Impression:      No acute cardiopulmonary abnormality.      Electronically signed by: Efren Guardado  Date:    10/15/2022  Time:    12:12               Narrative:    EXAMINATION:  XR CHEST AP PORTABLE    CLINICAL HISTORY:  Shortness of breath    TECHNIQUE:  Single frontal view of the chest was performed.    COMPARISON:  X-ray dated 03/13/2020    FINDINGS:  Aortic arch calcification noted.  Cardiomediastinal silhouette otherwise within normal limits.  Trachea is midline.  Pulmonary vasculature is unremarkable.  Lungs are clear.  No significant pleural effusion or pneumothorax.  No acute bony abnormality.                                    X-Rays:   Independently Interpreted Readings:   Other Readings:  No acute cardiopulmonary process  Medications - No data to display  Medical Decision Making:   Initial Assessment:   Urgent evaluation of an 81-year-old female with history of hypertension, hyperlipidemia, diabetes, paroxysmal AFib on chronic anticoagulation who presents to the emergency department with generalized weakness and palpitations.  Patient is afebrile, nontoxic appearing, and hemodynamically stable.  Patient is satting at 96% on room air.  Normal lung sounds.  No lower extremity edema.  Patient is not having chest pain.  Patient's rate is controlled.  Labs reveal no significant anemia or kidney insufficiency.  Normal BNP.  Troponin is undetectable.  EKG shows no acute ischemia.  Chest x-ray reveals no acute cardiopulmonary process.  Offered admission to patient to observe and ensure her symptoms improve.  She declines and reports she would rather follow up outpatient.  She is advised to follow up with PCP and cardiology.  Given strict return precautions.  Discussed with supervising physician who agrees with plan.  ED Management:  1:37 PM  At discharge, repeat vitals  show patient to be hypertensive.  She has changed her mind and will be transferred to [place] for observation.  Will discuss with Hospital Medicine at this time.    2:29 PM  Discussed case with hospital medicine.           Attending Attestation:     Physician Attestation Statement for NP/PA:   I discussed this assessment and plan of this patient with the NP/PA, but I did not personally examine the patient. The face to face encounter was performed by the NP/PA.    Other NP/PA Attestation Additions:      Medical Decision Making: Agree with assessment and plan                        Clinical Impression:   Final diagnoses:  [I49.9] Irregular heart rate  [R06.02] Shortness of breath  [I48.0] Paroxysmal atrial fibrillation (Primary)        ED Disposition Condition    Discharge Stable          ED Prescriptions    None       Follow-up Information    None          Fernanda Birmingham PA-C  10/15/22 1333       Fernanda Birmingham PA-C  10/15/22 1338       Fernanda Birmingham PA-C  10/15/22 1430       Reji Gold MD  10/15/22 1507

## 2022-10-16 ENCOUNTER — TELEPHONE (OUTPATIENT)
Dept: FAMILY MEDICINE | Facility: CLINIC | Age: 82
End: 2022-10-16
Payer: MEDICARE

## 2022-10-16 VITALS
HEART RATE: 94 BPM | WEIGHT: 214.75 LBS | OXYGEN SATURATION: 96 % | BODY MASS INDEX: 39.52 KG/M2 | TEMPERATURE: 97 F | HEIGHT: 62 IN | DIASTOLIC BLOOD PRESSURE: 61 MMHG | RESPIRATION RATE: 18 BRPM | SYSTOLIC BLOOD PRESSURE: 96 MMHG

## 2022-10-16 LAB
ALBUMIN SERPL BCP-MCNC: 3.7 G/DL (ref 3.5–5.2)
ALP SERPL-CCNC: 89 U/L (ref 55–135)
ALT SERPL W/O P-5'-P-CCNC: 18 U/L (ref 10–44)
ANION GAP SERPL CALC-SCNC: 11 MMOL/L (ref 8–16)
AST SERPL-CCNC: 17 U/L (ref 10–40)
BASOPHILS # BLD AUTO: 0.04 K/UL (ref 0–0.2)
BASOPHILS NFR BLD: 0.5 % (ref 0–1.9)
BILIRUB SERPL-MCNC: 0.4 MG/DL (ref 0.1–1)
BUN SERPL-MCNC: 22 MG/DL (ref 8–23)
CALCIUM SERPL-MCNC: 9.4 MG/DL (ref 8.7–10.5)
CHLORIDE SERPL-SCNC: 104 MMOL/L (ref 95–110)
CO2 SERPL-SCNC: 25 MMOL/L (ref 23–29)
CREAT SERPL-MCNC: 0.9 MG/DL (ref 0.5–1.4)
DIFFERENTIAL METHOD: ABNORMAL
EOSINOPHIL # BLD AUTO: 0.3 K/UL (ref 0–0.5)
EOSINOPHIL NFR BLD: 3.8 % (ref 0–8)
ERYTHROCYTE [DISTWIDTH] IN BLOOD BY AUTOMATED COUNT: 12.7 % (ref 11.5–14.5)
EST. GFR  (NO RACE VARIABLE): >60 ML/MIN/1.73 M^2
GLUCOSE SERPL-MCNC: 99 MG/DL (ref 70–110)
HCT VFR BLD AUTO: 43.4 % (ref 37–48.5)
HGB BLD-MCNC: 14.2 G/DL (ref 12–16)
IMM GRANULOCYTES # BLD AUTO: 0.02 K/UL (ref 0–0.04)
IMM GRANULOCYTES NFR BLD AUTO: 0.3 % (ref 0–0.5)
INR PPP: 2.2 (ref 0.8–1.2)
LYMPHOCYTES # BLD AUTO: 2.5 K/UL (ref 1–4.8)
LYMPHOCYTES NFR BLD: 31.8 % (ref 18–48)
MCH RBC QN AUTO: 31.6 PG (ref 27–31)
MCHC RBC AUTO-ENTMCNC: 32.7 G/DL (ref 32–36)
MCV RBC AUTO: 97 FL (ref 82–98)
MONOCYTES # BLD AUTO: 0.8 K/UL (ref 0.3–1)
MONOCYTES NFR BLD: 10.5 % (ref 4–15)
NEUTROPHILS # BLD AUTO: 4.2 K/UL (ref 1.8–7.7)
NEUTROPHILS NFR BLD: 53.1 % (ref 38–73)
NRBC BLD-RTO: 0 /100 WBC
PLATELET # BLD AUTO: 203 K/UL (ref 150–450)
PMV BLD AUTO: 10.6 FL (ref 9.2–12.9)
POCT GLUCOSE: 114 MG/DL (ref 70–110)
POCT GLUCOSE: 121 MG/DL (ref 70–110)
POTASSIUM SERPL-SCNC: 3.3 MMOL/L (ref 3.5–5.1)
PROT SERPL-MCNC: 6.2 G/DL (ref 6–8.4)
PROTHROMBIN TIME: 21.4 SEC (ref 9–12.5)
RBC # BLD AUTO: 4.49 M/UL (ref 4–5.4)
SODIUM SERPL-SCNC: 140 MMOL/L (ref 136–145)
WBC # BLD AUTO: 7.87 K/UL (ref 3.9–12.7)

## 2022-10-16 PROCEDURE — 27000221 HC OXYGEN, UP TO 24 HOURS

## 2022-10-16 PROCEDURE — 85610 PROTHROMBIN TIME: CPT | Performed by: INTERNAL MEDICINE

## 2022-10-16 PROCEDURE — 85025 COMPLETE CBC W/AUTO DIFF WBC: CPT | Performed by: STUDENT IN AN ORGANIZED HEALTH CARE EDUCATION/TRAINING PROGRAM

## 2022-10-16 PROCEDURE — 94761 N-INVAS EAR/PLS OXIMETRY MLT: CPT

## 2022-10-16 PROCEDURE — 25000003 PHARM REV CODE 250: Performed by: STUDENT IN AN ORGANIZED HEALTH CARE EDUCATION/TRAINING PROGRAM

## 2022-10-16 PROCEDURE — 36415 COLL VENOUS BLD VENIPUNCTURE: CPT | Performed by: INTERNAL MEDICINE

## 2022-10-16 PROCEDURE — 80053 COMPREHEN METABOLIC PANEL: CPT | Performed by: STUDENT IN AN ORGANIZED HEALTH CARE EDUCATION/TRAINING PROGRAM

## 2022-10-16 PROCEDURE — 36415 COLL VENOUS BLD VENIPUNCTURE: CPT | Performed by: STUDENT IN AN ORGANIZED HEALTH CARE EDUCATION/TRAINING PROGRAM

## 2022-10-16 PROCEDURE — G0378 HOSPITAL OBSERVATION PER HR: HCPCS

## 2022-10-16 PROCEDURE — 99900035 HC TECH TIME PER 15 MIN (STAT)

## 2022-10-16 RX ORDER — POTASSIUM CHLORIDE 20 MEQ/1
20 TABLET, EXTENDED RELEASE ORAL
Status: COMPLETED | OUTPATIENT
Start: 2022-10-16 | End: 2022-10-16

## 2022-10-16 RX ADMIN — LOSARTAN POTASSIUM 50 MG: 50 TABLET, FILM COATED ORAL at 08:10

## 2022-10-16 RX ADMIN — POTASSIUM CHLORIDE 20 MEQ: 1500 TABLET, EXTENDED RELEASE ORAL at 10:10

## 2022-10-16 RX ADMIN — PRAMIPEXOLE DIHYDROCHLORIDE 0.5 MG: 0.12 TABLET ORAL at 08:10

## 2022-10-16 RX ADMIN — OXYBUTYNIN CHLORIDE 5 MG: 5 TABLET ORAL at 08:10

## 2022-10-16 RX ADMIN — TIMOLOL MALEATE 1 DROP: 5 SOLUTION/ DROPS OPHTHALMIC at 09:10

## 2022-10-16 RX ADMIN — ASPIRIN 81 MG: 81 TABLET, COATED ORAL at 08:10

## 2022-10-16 RX ADMIN — PRAVASTATIN SODIUM 40 MG: 40 TABLET ORAL at 08:10

## 2022-10-16 RX ADMIN — GABAPENTIN 600 MG: 300 CAPSULE ORAL at 08:10

## 2022-10-16 RX ADMIN — POTASSIUM CHLORIDE 20 MEQ: 1500 TABLET, EXTENDED RELEASE ORAL at 08:10

## 2022-10-16 RX ADMIN — DILTIAZEM HYDROCHLORIDE 240 MG: 120 CAPSULE, COATED, EXTENDED RELEASE ORAL at 08:10

## 2022-10-16 NOTE — PLAN OF CARE
Pt will dc with no need noted. Pts sister will be at hospital for 12:45pm to transport pt home. Pt did not have any questions for SW.     Cleared from CM . Bedside Nurse and VN notified.    SW requested PCP follow up.     Future Appointments   Date Time Provider Department Center   11/1/2022  9:00 AM NURSE SCHEDULE, [facility]   3/8/2023  9:00 AM Scott Dillard MD [facility]        10/16/22 1117   Final Note   Assessment Type Final Discharge Note   Anticipated Discharge Disposition Home   Hospital Resources/Appts/Education Provided Appointments scheduled by Navigator/Coordinator   Post-Acute Status   Discharge Delays None known at this time

## 2022-10-16 NOTE — PROGRESS NOTES
Discharge orders noted. Additional clinical references attached. Patient's discharge instructions given by bedside RN and reviewed via this VN.  Education provided on new medication, diagnosis, and follow-up appointments.  Teach back method used. Patient verbalized understanding. All questions answered. Patient awaiting family for . Bedside nurse made aware and to request transportation to [facility] when ready.     10/16/22 1227   AVS Confirmation   Discharge instructions and AVS given to and reviewed with patient and/or significant other. Yes

## 2022-10-16 NOTE — PROGRESS NOTES
"[facility] Hospital Medicine Progress Note    Primary Team: [facility] Hospitalist Team A  Attending Physician: Fer Weber MD  Resident: Nickolas  Intern: Marily    Subjective:      NAEO, patient remained stable with slightly increased blood pressures and some anxiety, improved with her xanax she takes PRN at home     Objective:     Last 24 Hour Vital Signs:  BP  Min: 98/69  Max: 158/75  Temp  Av.5 °F (36.4 °C)  Min: 96.4 °F (35.8 °C)  Max: 98.4 °F (36.9 °C)  Pulse  Av.3  Min: 73  Max: 115  Resp  Av.9  Min: 17  Max: 31  SpO2  Av.7 %  Min: 92 %  Max: 97 %  Height  Av' 2" (157.5 cm)  Min: 5' 2" (157.5 cm)  Max: 5' 2" (157.5 cm)  Weight  Av.3 kg (210 lb)  Min: 95.3 kg (210 lb)  Max: 95.3 kg (210 lb)  I/O last 3 completed shifts:  In: -   Out: 250 [Urine:250]    Physical Examination:  General: Alert, no acute distress  HENT: Atraumatic, normocephalic, EOMI, no scleral icterus, MMM, no nasal discharge  Neck: Midline trachea, no obvious goiter or masses, - LAD  CV: Regular rate irregular rhythm; no murmurs, rubs, or gallops.   Resp: CTAB with normal work of breathing and chest excursion. No rhonchi, rales, or wheezing appreciated  Abd: Soft, NTND. Normoactive BS x4. No organomegaly or masses appreciated upon palpation.   MSK: No edema, cyanosis, or erythema noted on extremities.   Neuro: ANOx3. Normal appearing coordination and sensory function. L foot with mild deformity, no erythema or tenderness appreciated  Skin: No rashes or lesions noted.     Laboratory:  Recent Labs   Lab 10/15/22  1221 10/16/22  0343   WBC 7.77 7.87   HGB 14.4 14.2   HCT 42.3 43.4    203   MCV 95 97   RDW 12.8 12.7    140   K 4.2 3.3*    104   CO2 29 25   BUN 28* 22   CREATININE 0.97 0.9    99   PROT 7.4 6.2   ALBUMIN 4.5 3.7   BILITOT 0.5 0.4   AST 38 17   ALKPHOS 88 89   ALT 26 18     Laboratory Data Reviewed:  Pertinent Findings:  PT/INR  23.7/2.4    Microbiology Data Reviewed:  Pertinent " Findings:  Covid negative  UA pending    Other Results:  EKG (my interpretation): no new EKG    Radiology Data Reviewed:   Pertinent Findings:  No new imaging    Current Medications:     Infusions:       Scheduled:   aspirin  81 mg Oral Daily    diltiaZEM  240 mg Oral Daily    gabapentin  600 mg Oral BID    latanoprost  1 drop Both Eyes QHS    losartan  50 mg Oral Daily    oxybutynin  5 mg Oral Daily    potassium chloride  20 mEq Oral Q2H    pramipexole  0.5 mg Oral BID    pravastatin  40 mg Oral Daily    timolol maleate 0.5%  1 drop Left Eye Daily    warfarin  6 mg Oral Daily        PRN:  ALPRAZolam, dextrose 10%, dextrose 10%, glucagon (human recombinant), glucose, glucose, insulin aspart U-100, sodium chloride 0.9%    Antibiotics and Day Number of Therapy:  None    Lines and Day Number of Therapy:  PIV 10/15    Assessment:      Mis Ca is a 81 y.o. woman with a PMH of Afib, DM2, HLD, HTN, RLS, and JACQUELYN who presents for lightheadedness x1 day. PT presented to [facility] for a one day history of lightheadedness x1 day. PT was admitted to [facility] for observation and management of rate controled atrial fibrillation.      Plan:     Palpitations in the setting of paroxysmal Atrial Fibrillation  - Pt with 1 day history of palpitations  - history of Afib  - EKG Afib with rate of 82  - Pro BNP 1540, trop 0.02  - TSH 9/2022 1.41  - currently rate controled with diltiazem and on warfarin for anticoagulation  - Currently follows with Dr. Luis  - Pt placed on telemetry overnight and will continue to monitor for improvement in her symptoms  - Restart home meds     Type 2 diabetes mellitus  - History of DM2, last A1c 6.7 9/2022  - currently diet controlled  - on lipitor 40 and ASA 81, restart home meds  - POCT glucose and SSI PRN during hospitalization     Hypertension  - Currently prescribed losartan 100, chlorthalidone 50 at home  - will continue in the setting of elevated blood pressures      Hyperlipidemia  - last lipid profile Cholesterol 193, HDL 39, , Trigs 239  - currently on pravastatin 40 and asa 81. Continue.     Overactive bladder  - PT with history of overactive bladder, currently on oxybutynin, will continue while inpatient     Restless Leg syndrome  - PT currently on pramipexole in the outpatient, will continue while inpatient     Chronic Kidney Disease 3a  - Baseline renal function Cr 0.9-1.1  - At baseline  - will avoid nephrotoxic agents     Anxiety  - Pt with a reported history of anxiety and is currently prescribed xanax PRN  - will assess and resume as needed     GERD  - Will continue patient's home omeprazole     Glaucoma  - PT with a history of glaucoma and is prescribed latanoprost and timolol  - will continue will inpatient     Healthcare Maintenance  - Pt does not want covid shot  - Flu shot with PCP    Diet: Diabetic/cardiac  DVT Prophylaxis: Warfarin  IVF: None  Code: Full    Dispo: Home today    Denia Calixto MD  [facility] PGY1    [facility] Hospitalist Pager numbers:   [facility] Hospitalist Medicine Team A (Gus/Yee): [phone]  [facility] Hospitalist Medicine Team B (Sofi/Anderson):  [phone]

## 2022-10-16 NOTE — DISCHARGE SUMMARY
"[facility] Hospital Medicine Discharge Summary    Primary Team: [facility] Hospitalist Team A  Attending Physician: Fer Weber MD  Resident: Nickolas  Intern: Marily    Date of Admit: 10/15/2022  Date of Discharge: 10/16/2022    Discharge to: Home  Condition: Stable    Discharge Diagnoses     Patient Active Problem List   Diagnosis    Paroxysmal atrial fibrillation    JACQUELYN (obstructive sleep apnea)    Anxiety    Restless leg syndrome    Hyperlipidemia    Essential hypertension    Gastroesophageal reflux disease without esophagitis    History of adenomatous polyp of colon    Diverticulosis of large intestine without hemorrhage    Tortuous aorta    Glaucoma    Blind right eye    Aortic atherosclerosis    Severe obesity with body mass index (BMI) of 35.0 to 39.9 with serious comorbidity    Hallux valgus with bunions, left    Antalgic gait    Multilevel facet arthritis    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 woman with a PMH of Afib, HLD, HTN, RLS, and JACQUELYN who presents for palpitations x1 day. The patient was in their usual state of health until the day before presentation, she was busy helping an older neighbor and forgot to take her medications, then she proceeded to have a stressful afternoon. At 6pm she began feeling palpitations she identifies as her heart going into afib, with some associated SOB when she walks around. She noted at this point that her BP was low. She states she also felt "heavy" and lightheaded. Usually these symptoms go awayon their own, so she went to bed. When the pt woke up she still felt worse than " baseline and decided to go in for ED evaluation. At the time of this interview patient feels overall well, but still feels the afib palpitations. Denies CP, extremities swelling, HA, blurry vision, urine or bowel changes. She recently had a sinus infection that lasted 3 weeks but felt better last couple of days. Patient has been taking a weight loss supplement and meal plan (golo diet) and has lost 16lb. Before she lost the weight she could not move around without SOB, but it does make her have diarrhea.    Hospital Course:  Patient was admitted to [facility] for observation on episode of lightheadedness.  Patient overnight is normotensive and had complete resolution of her symptoms.  Patient remains in AFib but rate controlled.  Patient ambulated without difficulty and was discharged home with PCP and cardiology follow-up.     For the full HPI please refer to the History & Physical from this admission.    Hospital Course By Problem with Pertinent Findings     Palpitations in the setting of paroxysmal Atrial Fibrillation  - Pt with 1 day history of palpitations  - history of Afib  - EKG Afib with rate of 82  - Pro BNP 1540, trop 0.02  - TSH 9/2022 1.41  - currently rate controled with diltiazem and on warfarin for anticoagulation  - Currently follows with Dr. Luis  - patient remained asymptomatic overnight and was discharged home with Cardiology and PCP follow-up.     Type 2 diabetes mellitus  - History of DM2, last A1c 6.7 9/2022  - currently diet controlled  - on pravastatin 40 and ASA 81, restarted home meds on discharge  - counseled on using caution with diet medications as this was likely a contributing factor to her symptoms     Hypertension  - Currently prescribed losartan 100, chlorthalidone 50 at home  - resumed medications on discharge     Hyperlipidemia  - last lipid profile Cholesterol 193, HDL 39, , Trigs 239  - currently on pravastatin 40 and asa 81. Continue on discharge      Overactive bladder  - PT with history of overactive bladder, currently on oxybutynin. Will continue on discharge     Restless Leg syndrome  - PT currently on pramipexole in the outpatient, will continue on discharge     Chronic Kidney Disease 3a  - Baseline renal function Cr 0.9-1.1  - At baseline  - PT to follow up with PCP for continued monitoring     Anxiety  - Pt with a reported history of anxiety and is currently prescribed xanax PRN, resumed on discharge     GERD  - Will continue patient's home omeprazole on discharge     Glaucoma  - PT with a history of glaucoma and is prescribed latanoprost and timolol  - resumed on discharge     Healthcare Maintenance  - Pt declined Covid shot  - Flu shot with PCP    Discharge Medications        Medication List        ASK your doctor about these medications      acetaminophen 500 MG tablet  Commonly known as: TYLENOL     alendronate 70 MG tablet  Commonly known as: FOSAMAX  Take 1 tablet (70 mg total) by mouth every 7 days.     ALPRAZolam 0.25 MG tablet  Commonly known as: XANAX  TAKE 1 TABLET BY MOUTH TWICE A DAY     aspirin 81 MG EC tablet  Commonly known as: ECOTRIN     CENTRUM SILVER WOMEN 8 mg iron-400 mcg-300 mcg Tab  Generic drug: multivit-min-iron-FA-lutein     chlorthalidone 50 MG Tab  Commonly known as: HYGROTEN  Take 1 tablet (50 mg total) by mouth once daily.     cyanocobalamin 1,000 mcg/mL injection  Commonly known as: VITAMIN B-12  Inject 1 mL (1,000 mcg total) into the muscle every 30 days.     diltiaZEM 240 MG 24 hr capsule  Commonly known as: CARDIZEM CD  TAKE 1 CAPSULE BY MOUTH  ONCE DAILY     furosemide 20 MG tablet  Commonly known as: LASIX  TAKE 1 TABLET BY MOUTH ONCE DAILY     gabapentin 300 MG capsule  Commonly known as: NEURONTIN  TAKE 2 CAPSULES BY MOUTH  TWICE DAILY     latanoprost 0.005 % ophthalmic solution     losartan 100 MG tablet  Commonly known as: COZAAR  TAKE ONE-HALF TABLET BY  MOUTH ONCE DAILY     OCUVITE ADULT 50 PLUS ORAL      omeprazole 20 MG capsule  Commonly known as: PRILOSEC  Take 1 capsule (20 mg total) by mouth 2 (two) times a day.     oxybutynin 5 MG Tab  Commonly known as: DITROPAN  TAKE 1 TABLET BY MOUTH ONCE DAILY     potassium chloride SA 20 MEQ tablet  Commonly known as: K-DUR,KLOR-CON  Take 1 tablet (20 mEq total) by mouth once daily.  Ask about: Which instructions should I use?     pramipexole 0.5 MG tablet  Commonly known as: MIRAPEX  TAKE 1 TABLET BY MOUTH  TWICE DAILY     pravastatin 40 MG tablet  Commonly known as: PRAVACHOL  Take 1 tablet (40 mg total) by mouth once daily.     timolol maleate 0.5% 0.5 % Drop  Commonly known as: TIMOPTIC     UNABLE TO FIND     vitamin D 1000 units Tab  Commonly known as: VITAMIN D3     warfarin 6 MG tablet  Commonly known as: COUMADIN  TAKE 6 MG DAILY EXCEPT FOR 9 MG ON FRIDAY TO THIN BLOOD              Discharge Information:   Diet:  Cardiac and Diabetic Diet    Physical Activity:  As tolerated by patient.              Instructions:  1. Take all medications as prescribed  2. Keep all follow-up appointments  3. Return to the hospital or call your primary care physicians if any worsening symptoms such as fever, chest pain, shortness of breath, return of symptoms, or any other concerns.    Follow-Up Appointments:  PCP  Cardiology    Mo Olmos MD  [facility] HO-3

## 2022-10-16 NOTE — PLAN OF CARE
Problem: Adult Inpatient Plan of Care  Goal: Plan of Care Review  Outcome: Ongoing, Progressing  Goal: Patient-Specific Goal (Individualized)  Outcome: Ongoing, Progressing  Goal: Absence of Hospital-Acquired Illness or Injury  Outcome: Ongoing, Progressing   Patient AAOx4.  No s/s of acute distress.  2L NC.  Anxiety prn effective.  Safety checks performed.  All needs addressed.  WCTM

## 2022-10-16 NOTE — NURSING
Patient discharging home with family. All discharge instructions reviewed with VN. IV and telemetry removed, patient tolerated well.

## 2022-10-16 NOTE — PLAN OF CARE
Problem: Adult Inpatient Plan of Care  Goal: Plan of Care Review  Outcome: Ongoing, Progressing     VIRTUAL NURSE:  Cued into patient's room.  Permission received per patient to turn camera to view patient.  Introduced as VN for night shift that will be working with floor nurse and nursing assistant.  Educated patient on VN's role in patient care and  [facility] model.  Plan of care reviewed with patient.  Education per flowsheet.   Informed patient that staff will round on them every 2 hours but to use call light for any other needs they may have; informed of fall risk and fall precautions.  Patient verbalized understanding.  Call light within reach; bed siderails up x2.  Opportunity given for questions and questions answered.  Admission assessment questions answered.  Patient denies complaints. Instructed to call for assistance.  Will cont to monitor and intervene as needed.    Labs, notes, orders, and careplan reviewed.       10/15/22 2111   Patient Request   Patient Requested no complaints; requesting home xanax dose   Nurse Notification   Bedside Nurse Notified? Yes   Name of Bedside Nurse OLEKSANDR Gallo   Nurse Notfication Method Secure Chat   Nurse Notified Of Patient Request   Provider Notification   Provider Notified? Yes   Name of Provider Dr. Cisneros   Provider Notification Method Telephone   Provider Notified Of Patient Request   Admission   Initial VN Admission Questions Complete   Communication Issues? Technical Issue   Shift   Virtual Nurse - Rounding Complete   Pain Management Interventions pain management plan reviewed with patient/caregiver   Virtual Nurse - Patient Verbalized Approval Of Camera Use;VN Rounding   Type of Frequent Check   Type Patient Rounds   Safety/Activity   Patient Rounds bed in low position;placement of personal items at bedside;call light in patient/parent reach;visualized patient;clutter free environment maintained;bed wheels locked   Safety Promotion/Fall Prevention assistive  device/personal item within reach;diversional activities provided;Fall Risk reviewed with patient/family;high risk medications identified;medications reviewed;nonskid shoes/socks when out of bed;side rails raised x 2;supervised activity;instructed to call staff for mobility   Safety Precautions emergency equipment at bedside   Positioning   Body Position neutral body alignment;neutral head position   Head of Bed (HOB) Positioning HOB at 60 degrees   Pain/Comfort/Sleep   Preferred Pain Scale number (Numeric Rating Pain Scale)   Comfort/Acceptable Pain Level 0   Pain Rating (0-10): Rest 0   Sleep/Rest/Relaxation awake

## 2022-10-16 NOTE — H&P
"[facility] Medicine H&P Note     Admitting Team: [facility] Hospitalist Team A  Attending Physician: Fer Weber MD  Resident: Nickolas  Intern: Marily    Date of Admit: 10/15/2022    Chief Complaint     Palpitations x 1 day    Subjective:      History of Present Illness:  Mis Ca is a 81 y.o. woman with a PMH of Afib, HLD, HTN, RLS, and JACQUELYN who presents for palpitations x1 day.    The patient was in their usual state of health until the day before presentation, she was busy helping an older neighbor and forgot to take her medications, then she proceeded to have a stressful afternoon. At 6pm she began feeling palpitations she identifies as her heart going into afib, with some associated SOB when she walks around. She noted at this point that her BP was low. She states she also felt "heavy" and lightheaded. Usually these symptoms go awayon their own, so she went to bed. When the pt woke up she still felt worse than baseline and decided to go in for ED evaluation. At the time of this interview patient feels overall well, but still feels the afib palpitations. Denies CP, extremities swelling, HA, blurry vision, urine or bowel changes. She recently had a sinus infection that lasted 3 weeks but felt better last couple of days.     Patient has been taking a weight loss supplement and meal plan (golo diet) and has lost 16lb. Before she lost the weight she could not move around without SOB, but it does make her have diarrhea.    Follows with Dr. Luis outpatient for her afib, on coumadin because Xarelto was too expensive.    Past Medical History:  Paroxysmal afib (on warfarin)  HTN  HLD  DM (last A1c 6.7)  RLS  JACQUELYN (doesn't use CPAP)  Anxiety  Obesity  Glaucoma  R eye blindness    Past Surgical History:  Tonsillectomy, adenectomy   Multiple L ankle procedures, hardware present  L arm fracture  Hand tendon surgery  Hysterectomy with oophorectomy  Bilateral cataract  R eye tube for glaucoma  Appendectomy  Breast " biopsies    Allergies:  Review of patient's allergies indicates:   Allergen Reactions    Propofol analogues      Used for her Colonoscopy.  Extended delirium.  Advised not to take it again.      Adhesive     Compazine [prochlorperazine edisylate] Anxiety    Penicillins Rash    Sulfa (sulfonamide antibiotics) Rash    Vancomycin analogues Rash       Home Medications:  Chlorthalidone 50qd  Losartan 50qd  Cardizem 240qd  Warfarin 6mg S-Th, 9mg F  ASA 81 qd  Pravastatin 40mg qd  Pramipexole 0.5mg BID  Gabapentin 300 BID  Omeprazole 20 BID  Oxybutynin 5mg qd  Xanax 0.25 qhs  Alendronate 70mg q7d  Vit D   MVI  Ocuvit  B12    Family History:  Family History   Problem Relation Age of Onset    Aneurysm Mother         AAA    Cancer Father         lung cancer    Lung cancer Father     Cirrhosis Father     Cancer Sister         Breast and Brain     Diabetes Sister     Breast cancer Sister     Hypertension Sister     Hyperlipidemia Sister     Brain cancer Sister     Colon polyps Brother     Cancer Brother         lung cancer    Diabetes Brother     Hyperlipidemia Brother     Hypertension Brother     Cancer Brother         bladder cancer    Diabetes Brother     Glaucoma Brother     Heart disease Sister         Heart valve repair    Atrial fibrillation Sister     Diabetes Sister     Heart disease Sister     Heart attack Sister     Bipolar disorder Sister     Depression Sister     Glaucoma Sister     Diabetes Sister     Other Sister         Pituitary tumor    Arthritis Sister     COPD Sister     Heart disease Sister     Kidney disease Sister     Cancer Sister         lung cancer    Diabetes Sister     Lung cancer Sister     Heart attack Sister      Social History:  Social History     Tobacco Use    Smoking status: Never    Smokeless tobacco: Never   Substance Use Topics    Alcohol use: Not Currently    Drug use: No     Review of Systems:  Noted in HPI.  All other systems are reviewed and are negative.    Health Maintaince :  "  Primary Care Physician: Scott Dillard MD    Immunizations:   TDap UTD  Flu NUTD  Pna UTD  COVID Refuses    Cancer Screening:  MMG: NUTD, used to get breast biopsies, pt unsure what happened   Colonoscopy: UTD     Objective:   Last 24 Hour Vital Signs:  BP  Min: 98/69  Max: 150/70  Temp  Av.3 °F (36.8 °C)  Min: 98.2 °F (36.8 °C)  Max: 98.4 °F (36.9 °C)  Pulse  Av.4  Min: 73  Max: 101  Resp  Av.5  Min: 17  Max: 31  SpO2  Av.2 %  Min: 92 %  Max: 96 %  Height  Av' 2" (157.5 cm)  Min: 5' 2" (157.5 cm)  Max: 5' 2" (157.5 cm)  Weight  Av.3 kg (210 lb)  Min: 95.3 kg (210 lb)  Max: 95.3 kg (210 lb)  Body mass index is 38.41 kg/m².  No intake/output data recorded.    Physical Examination:  Physical Exam  Constitutional:       General: She is not in acute distress.     Appearance: Normal appearance.   HENT:      Head: Normocephalic and atraumatic.      Nose: Nose normal.      Mouth/Throat:      Mouth: Mucous membranes are moist.   Eyes:      Extraocular Movements: Extraocular movements intact.      Conjunctiva/sclera: Conjunctivae normal.   Cardiovascular:      Rate and Rhythm: Normal rate. Rhythm irregular.      Heart sounds: Normal heart sounds.   Pulmonary:      Effort: Pulmonary effort is normal.      Breath sounds: Normal breath sounds.   Abdominal:      General: Abdomen is flat. Bowel sounds are normal.      Palpations: Abdomen is soft.   Musculoskeletal:         General: Normal range of motion.      Cervical back: Normal range of motion.   Skin:     General: Skin is warm and dry.   Neurological:      General: No focal deficit present.      Mental Status: She is alert and oriented to person, place, and time. Mental status is at baseline.   Psychiatric:         Mood and Affect: Mood normal.     Laboratory:  Most Recent Data:  CBC:   Lab Results   Component Value Date    WBC 7.77 10/15/2022    HGB 14.4 10/15/2022    HCT 42.3 10/15/2022     10/15/2022    MCV 95 10/15/2022    RDW 12.8 " 10/15/2022     BMP:   Lab Results   Component Value Date     10/15/2022    K 4.2 10/15/2022     10/15/2022    CO2 29 10/15/2022    BUN 28 (H) 10/15/2022    CREATININE 0.97 10/15/2022     10/15/2022    CALCIUM 9.7 10/15/2022    MG 2.0 03/14/2020    PHOS 3.4 03/11/2020     LFTs:   Lab Results   Component Value Date    PROT 7.4 10/15/2022    ALBUMIN 4.5 10/15/2022    BILITOT 0.5 10/15/2022    AST 38 10/15/2022    ALKPHOS 88 10/15/2022    ALT 26 10/15/2022     Coags:   Lab Results   Component Value Date    INR 2.2 10/04/2022     FLP:   Lab Results   Component Value Date    CHOL 193 09/07/2022    HDL 39 (L) 09/07/2022    LDLCALC 106.2 09/07/2022    TRIG 239 (H) 09/07/2022    CHOLHDL 20.2 09/07/2022     DM:   Lab Results   Component Value Date    HGBA1C 6.7 (H) 09/07/2022    HGBA1C 5.8 06/09/2016    HGBA1C 6.0 11/06/2015    LDLCALC 106.2 09/07/2022    CREATININE 0.97 10/15/2022     Thyroid:   Lab Results   Component Value Date    TSH 1.410 09/07/2022     Anemia:   Lab Results   Component Value Date    IYSTWHFC22 >2000 (H) 09/17/2021     Cardiac:   Lab Results   Component Value Date    TROPONINI <0.012 10/15/2022    BNP 69 03/13/2020     Urinalysis:   Lab Results   Component Value Date    LABURIN No growth 03/24/2020    COLORU Yellow 09/08/2022    CLARITYU Clear 07/10/2020    SPECGRAV 1.025 09/08/2022    NITRITE Negative 09/08/2022    KETONESU Negative 09/08/2022    UROBILINOGEN Negative 09/08/2022    WBCUA 2 03/26/2020     Microbiology Data:  SARS-CoV-2: Negative    Other Results:  EKG (my interpretation): Rate controlled atrial fibrillation with left anterior fascicular block, rate 82.    Radiology:  Imaging Results              X-Ray Chest AP Portable (Final result)  Result time 10/15/22 12:12:21      Final result by Efren Guardado MD (10/15/22 12:12:21)                   Impression:      No acute cardiopulmonary abnormality.      Electronically signed by: Efren  Geo  Date:    10/15/2022  Time:    12:12               Narrative:    EXAMINATION:  XR CHEST AP PORTABLE    CLINICAL HISTORY:  Shortness of breath    TECHNIQUE:  Single frontal view of the chest was performed.    COMPARISON:  X-ray dated 03/13/2020    FINDINGS:  Aortic arch calcification noted.  Cardiomediastinal silhouette otherwise within normal limits.  Trachea is midline.  Pulmonary vasculature is unremarkable.  Lungs are clear.  No significant pleural effusion or pneumothorax.  No acute bony abnormality.                                     Assessment:     Mis Ca is a 81 y.o. woman with a PMH of Afib, DM2, HLD, HTN, RLS, and JACQUELYN who presents for lightheadedness x1 day. PT presented to [facility] for a one day history of lightheadedness x1 day. PT was admitted to [facility] for observation and management of rate controled atrial fibrillation.      Plan:     Palpitations in the setting of paroxysmal Atrial Fibrillation  - Pt with 1 day history of lightheadedness  - history of Afib  - EKG Afib with rate of 82  - Pro BNP 1540, trop 0.02  - TSH 9/2022 1.41  - currently rate controled with diltiazem and on warfarin for anticoagulation  - Currently follows with Dr. Luis  - Pt placed on telemetry overnight and will continue to monitor for improvement in her symptoms  - Restart home meds    Type 2 diabetes mellitus  - History of DM2, last A1c 6.7 9/2022  - currently diet controlled  - on lipitor 40 and ASA 81, restart home meds  - POCT glucose and SSI PRN during hospitalization    Hypertension  - Currently prescribed losartan 100, chlorthalidone 50 at home  - will continue in the setting of elevated blood pressures    Hyperlipidemia  - last lipid profile Cholesterol 193, HDL 39, , Trigs 239  - currently on lipitor 40 and asa 81. Continue.    Overactive bladder  - PT with history of overactive bladder, currently on oxybutynin, will continue while inpatient    Restless Leg syndrome  - PT currently  on pramipexole in the outpatient, will continue while inpatient    Chronic Kidney Disease 3a  - Baseline renal function Cr 0.9-1.1  - At baseline  - will avoid nephrotoxic agents    Anxiety  - Pt with a reported history of anxiety and is currently prescribed xanax PRN  - will assess and resume as needed    GERD  - Will continue patient's home omeprazole    Glaucoma  - PT with a history of glaucoma and is prescribed latanoprost and timolol  - will continue will inpatient    Healthcare Maintenance  - Pt does not want covid shot  - Flu shot with PCP    Diet: Cardiac Diet  DVT: Warfarin  IVF: None  Dispo: Likely discharge home in AM following monitoring on telemetry.    Code Status:     Full    Denia Calixto MD  [facility] PGY1    [facility] Medicine Hospitalist Pager numbers:   [facility] Hospitalist Medicine Team A (Gus/Yee): [phone]  [facility] Hospitalist Medicine Team B (Sofi/Anderson):  [phone]

## 2022-10-16 NOTE — PLAN OF CARE
Patient received on   2 Lpm NC with SpO2    97%. Pt with no apparent distress noted. Will continue to monitor.

## 2022-10-16 NOTE — NURSING
"Piper Cisneros notified, "Patient complaint of tightness in chest that "comes and goes".  Patient has H/O sleep apnea.  States she has not had machine in one year.  Doctor has ordered twice and she still does not have one at home.  She states she is having trouble sleeping.  Notified respiratory for order of 2  liters NC.  Do you want to order CPAP for hospital stay?  Last blood pressure was 158/75.  O2 sat 93-95% while awake.  Temp 96.6 auxiliary.  Respirations 22 bpm.  Patient appears anxious slight and slight paleness to skin."  "

## 2022-10-17 NOTE — TELEPHONE ENCOUNTER
----- Message from Fernanda Birmingham PA-C sent at 10/15/2022  1:33 PM CDT -----  Regarding: ED Follow up  Just wanted to update you all and let you know that I saw Ms. Ca in the emergency department over the weekend.  She was having palpitations.  She was in rate controlled atrial fibrillation.  Troponin and BNP were normal.  Chest x-ray reveals no acute cardiopulmonary process.  Offered admission as she was still having generalized weakness.  She declined.  I just wanted to ensure she had close follow-up.  Thank you hope you all have a good week.

## 2022-10-20 ENCOUNTER — OFFICE VISIT (OUTPATIENT)
Dept: CARDIOLOGY | Facility: CLINIC | Age: 82
End: 2022-10-20
Payer: MEDICARE

## 2022-10-20 VITALS
DIASTOLIC BLOOD PRESSURE: 71 MMHG | HEART RATE: 82 BPM | WEIGHT: 216.5 LBS | OXYGEN SATURATION: 98 % | HEIGHT: 62 IN | SYSTOLIC BLOOD PRESSURE: 108 MMHG | BODY MASS INDEX: 39.84 KG/M2

## 2022-10-20 DIAGNOSIS — E66.01 SEVERE OBESITY WITH BODY MASS INDEX (BMI) OF 35.0 TO 39.9 WITH SERIOUS COMORBIDITY: ICD-10-CM

## 2022-10-20 DIAGNOSIS — I70.0 AORTIC ATHEROSCLEROSIS: ICD-10-CM

## 2022-10-20 DIAGNOSIS — I48.0 PAROXYSMAL ATRIAL FIBRILLATION: Primary | ICD-10-CM

## 2022-10-20 DIAGNOSIS — I10 ESSENTIAL HYPERTENSION: ICD-10-CM

## 2022-10-20 DIAGNOSIS — G47.33 OSA (OBSTRUCTIVE SLEEP APNEA): ICD-10-CM

## 2022-10-20 DIAGNOSIS — E78.2 MIXED HYPERLIPIDEMIA: ICD-10-CM

## 2022-10-20 PROCEDURE — 99999 PR PBB SHADOW E&M-EST. PATIENT-LVL IV: CPT | Mod: PBBFAC,,, | Performed by: INTERNAL MEDICINE

## 2022-10-20 PROCEDURE — 1159F PR MEDICATION LIST DOCUMENTED IN MEDICAL RECORD: ICD-10-PCS | Mod: CPTII,S$GLB,, | Performed by: INTERNAL MEDICINE

## 2022-10-20 PROCEDURE — 3078F DIAST BP <80 MM HG: CPT | Mod: CPTII,S$GLB,, | Performed by: INTERNAL MEDICINE

## 2022-10-20 PROCEDURE — 99214 PR OFFICE/OUTPT VISIT, EST, LEVL IV, 30-39 MIN: ICD-10-PCS | Mod: S$GLB,,, | Performed by: INTERNAL MEDICINE

## 2022-10-20 PROCEDURE — 99214 OFFICE O/P EST MOD 30 MIN: CPT | Mod: S$GLB,,, | Performed by: INTERNAL MEDICINE

## 2022-10-20 PROCEDURE — 1160F RVW MEDS BY RX/DR IN RCRD: CPT | Mod: CPTII,S$GLB,, | Performed by: INTERNAL MEDICINE

## 2022-10-20 PROCEDURE — 3078F PR MOST RECENT DIASTOLIC BLOOD PRESSURE < 80 MM HG: ICD-10-PCS | Mod: CPTII,S$GLB,, | Performed by: INTERNAL MEDICINE

## 2022-10-20 PROCEDURE — 1126F PR PAIN SEVERITY QUANTIFIED, NO PAIN PRESENT: ICD-10-PCS | Mod: CPTII,S$GLB,, | Performed by: INTERNAL MEDICINE

## 2022-10-20 PROCEDURE — 3074F PR MOST RECENT SYSTOLIC BLOOD PRESSURE < 130 MM HG: ICD-10-PCS | Mod: CPTII,S$GLB,, | Performed by: INTERNAL MEDICINE

## 2022-10-20 PROCEDURE — 1160F PR REVIEW ALL MEDS BY PRESCRIBER/CLIN PHARMACIST DOCUMENTED: ICD-10-PCS | Mod: CPTII,S$GLB,, | Performed by: INTERNAL MEDICINE

## 2022-10-20 PROCEDURE — 99999 PR PBB SHADOW E&M-EST. PATIENT-LVL IV: ICD-10-PCS | Mod: PBBFAC,,, | Performed by: INTERNAL MEDICINE

## 2022-10-20 PROCEDURE — 3074F SYST BP LT 130 MM HG: CPT | Mod: CPTII,S$GLB,, | Performed by: INTERNAL MEDICINE

## 2022-10-20 PROCEDURE — 1126F AMNT PAIN NOTED NONE PRSNT: CPT | Mod: CPTII,S$GLB,, | Performed by: INTERNAL MEDICINE

## 2022-10-20 PROCEDURE — 1159F MED LIST DOCD IN RCRD: CPT | Mod: CPTII,S$GLB,, | Performed by: INTERNAL MEDICINE

## 2022-10-20 NOTE — PROGRESS NOTES
Subjective:   @Patient ID:  Mis Ca is a 81 y.o. female who presents for follow-up of PAF, Hyperlipidemia, HTN, DD      HPI:   Here for f/u  Hospitalized with a.fib. She felt her heart when she went into a.fib.   Rate is controlled. But reports shortness of breathe  Compliant with lasix  He was started on diet  and lost significant wt. ( Golo diet). She gets certain supplement with it to help loosing wt)  Back to coumadin due to cost with xarelto   Night cramps better after Lipitor 40 mg qhs was switched to Pravastatin      JACQUELYN : has been off the CPAP since it was recalled    PAF on Xarelto . Currently SR.       Prior cardiovascular  Hx  --------------------------------          - ECHO    9/2019 Normal left ventricular systolic function. The estimated ejection fraction is 65%  Normal LV diastolic function.  Concentric left ventricular hypertrophy.  Normal right ventricular systolic function.             12/2017  Normal left ventricular systolic function (EF 60-65%).     2 - Impaired LV relaxation, normal LAP (grade 1 diastolic dysfunction).     3 - Normal right ventricular systolic function .     4 - The estimated PA systolic pressure is 28 mmHg.     5 - Mild left atrial enlargement.         Patient Active Problem List    Diagnosis Date Noted    Palpitations 10/15/2022    Bilateral renal cysts 05/01/2022     Noted on U/S retroperitoneal complete dated 7/6/2021.      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       Antalgic gait 01/08/2019    Hallux valgus with bunions, left 10/31/2018    Severe obesity with body mass  index (BMI) of 35.0 to 39.9 with serious comorbidity 2017    Aortic atherosclerosis 2017     Seen on chest xray dated 17      Blind right eye 07/10/2017    Glaucoma 2017    Tortuous aorta 2017    History of adenomatous polyp of colon 2016    Diverticulosis of large intestine without hemorrhage 2016    JACQUELYN (obstructive sleep apnea) 2016    Anxiety 2016    Restless leg syndrome 2016    Hyperlipidemia 2016    Essential hypertension 2016    Gastroesophageal reflux disease without esophagitis 2016    Paroxysmal atrial fibrillation 2014           Right Arm BP - Sittin/78  Left Arm BP - Sittin/71        LAST HbA1c  Lab Results   Component Value Date    HGBA1C 6.7 (H) 2022       Lipid panel  Lab Results   Component Value Date    CHOL 193 2022    CHOL 148 2021    CHOL 155 2020     Lab Results   Component Value Date    HDL 39 (L) 2022    HDL 38 (L) 2021    HDL 40 2020     Lab Results   Component Value Date    LDLCALC 106.2 2022    LDLCALC 73.8 2021    LDLCALC 81.6 2020     Lab Results   Component Value Date    TRIG 239 (H) 2022    TRIG 181 (H) 2021    TRIG 167 (H) 2020     Lab Results   Component Value Date    CHOLHDL 20.2 2022    CHOLHDL 25.7 2021    CHOLHDL 25.8 2020            Review of Systems   Constitutional: Negative for chills and fever.   HENT:  Negative for hearing loss and nosebleeds.    Eyes:  Negative for blurred vision.   Cardiovascular:  Negative for chest pain and palpitations.        As in HPI    Respiratory:  Negative for hemoptysis and shortness of breath.    Hematologic/Lymphatic: Negative for bleeding problem.   Skin:  Negative for itching.   Musculoskeletal:         As in HPI    Gastrointestinal:  Negative for abdominal pain and hematochezia.   Genitourinary:  Negative for hematuria.   Neurological:  Negative for  dizziness and loss of balance.   Psychiatric/Behavioral:  Negative for altered mental status and depression.      Objective:   Physical Exam  Constitutional:       Appearance: She is well-developed.   HENT:      Head: Normocephalic and atraumatic.   Eyes:      Conjunctiva/sclera: Conjunctivae normal.   Neck:      Vascular: No JVD.   Cardiovascular:      Rate and Rhythm: Normal rate. Rhythm irregular.      Heart sounds: Normal heart sounds. No murmur heard.    No friction rub. No gallop.   Pulmonary:      Effort: Pulmonary effort is normal. No respiratory distress.      Breath sounds: Normal breath sounds. No stridor. No wheezing.   Musculoskeletal:      Cervical back: Neck supple.   Skin:     General: Skin is warm and dry.   Neurological:      Mental Status: She is alert and oriented to person, place, and time.   Psychiatric:         Behavior: Behavior normal.       Assessment:     1. Paroxysmal atrial fibrillation    2. Essential hypertension    3. Mixed hyperlipidemia    4. Aortic atherosclerosis    5. Severe obesity with body mass index (BMI) of 35.0 to 39.9 with serious comorbidity    6. JACQUELYN (obstructive sleep apnea)          Plan:   Continue current treatment   Will refer to our EP team for rhythm control. DCCV/PVI   Needs JACQUELYN appropriate ttt. She will get back to sleep clinic    Continue Coumadin   Continue lasix 20 mg daily  pravastatin.        I spent 5-10 minutes asking, assessing, assisting, arranging and advising heart healthy diet improvements. This included low-salt meals, portion control and health food alternatives. I also encourage 30 minutes of moderate exercise 3-4x a week.       Pertinent cardiac images and EKG reviewed independently.    Continue with current medical plan and lifestyle changes.  Return sooner for concerns or questions. If symptoms persist go to the ED  I have reviewed all pertinent data including patient's medical history in detail and updated the computerized patient record.      Orders Placed This Encounter   Procedures    Ambulatory referral/consult to Cardiac Electrophysiology     Standing Status:   Future     Standing Expiration Date:   11/20/2023     Referral Priority:   Routine     Referral Type:   Consultation     Referral Reason:   Specialty Services Required     Requested Specialty:   Cardiology     Number of Visits Requested:   1         Follow up as scheduled.     She expressed verbal understanding and agreed with the plan    Patient's Medications   New Prescriptions    No medications on file   Previous Medications    ACETAMINOPHEN (TYLENOL) 500 MG TABLET    Take 1,000 mg by mouth 2 (two) times daily as needed for Pain.    ALENDRONATE (FOSAMAX) 70 MG TABLET    Take 1 tablet (70 mg total) by mouth every 7 days.    ALPRAZOLAM (XANAX) 0.25 MG TABLET    TAKE 1 TABLET BY MOUTH TWICE A DAY    ASPIRIN (ECOTRIN) 81 MG EC TABLET    Take 81 mg by mouth once daily.    CHLORTHALIDONE (HYGROTEN) 50 MG TAB    Take 1 tablet (50 mg total) by mouth once daily.    CYANOCOBALAMIN (VITAMIN B-12) 1,000 MCG/ML INJECTION    Inject 1 mL (1,000 mcg total) into the muscle every 30 days.    DILTIAZEM (CARDIZEM CD) 240 MG 24 HR CAPSULE    TAKE 1 CAPSULE BY MOUTH  ONCE DAILY    FUROSEMIDE (LASIX) 20 MG TABLET    TAKE 1 TABLET BY MOUTH ONCE DAILY    GABAPENTIN (NEURONTIN) 300 MG CAPSULE    TAKE 2 CAPSULES BY MOUTH  TWICE DAILY    LATANOPROST 0.005 % OPHTHALMIC SOLUTION    Place 1 drop into both eyes every evening.    LOSARTAN (COZAAR) 100 MG TABLET    TAKE ONE-HALF TABLET BY  MOUTH ONCE DAILY    MULTIVIT-MIN-IRON-FA-LUTEIN (CENTRUM SILVER WOMEN) 8 MG IRON-400 MCG-300 MCG TAB    Take 1 tablet by mouth once daily.    MV-MN/OM3/DHA/EPA/FISH/LUT/HUDSON (OCUVITE ADULT 50 PLUS ORAL)    Take 1 tablet by mouth once daily.    OMEPRAZOLE (PRILOSEC) 20 MG CAPSULE    Take 1 capsule (20 mg total) by mouth 2 (two) times a day.    OXYBUTYNIN (DITROPAN) 5 MG TAB    TAKE 1 TABLET BY MOUTH ONCE DAILY    POTASSIUM CHLORIDE SA  (K-DUR,KLOR-CON) 20 MEQ TABLET    Take 1 tablet (20 mEq total) by mouth once daily.    PRAMIPEXOLE (MIRAPEX) 0.5 MG TABLET    TAKE 1 TABLET BY MOUTH  TWICE DAILY    PRAVASTATIN (PRAVACHOL) 40 MG TABLET    Take 1 tablet (40 mg total) by mouth once daily.    TIMOLOL MALEATE 0.5% (TIMOPTIC) 0.5 % DROP    Place 1 drop into the left eye once daily.     UNABLE TO FIND    Take 1 capsule by mouth 3 (three) times daily with meals. medication name: Golo Release Diet Capsules    VITAMIN D (VITAMIN D3) 1000 UNITS TAB    Take 1,000 Units by mouth once daily.    WARFARIN (COUMADIN) 6 MG TABLET    TAKE 6 MG DAILY EXCEPT FOR 9 MG ON FRIDAY TO THIN BLOOD   Modified Medications    No medications on file   Discontinued Medications    No medications on file

## 2022-10-24 ENCOUNTER — OFFICE VISIT (OUTPATIENT)
Dept: FAMILY MEDICINE | Facility: CLINIC | Age: 82
End: 2022-10-24
Payer: MEDICARE

## 2022-10-24 ENCOUNTER — TELEPHONE (OUTPATIENT)
Dept: FAMILY MEDICINE | Facility: CLINIC | Age: 82
End: 2022-10-24

## 2022-10-24 VITALS
SYSTOLIC BLOOD PRESSURE: 106 MMHG | DIASTOLIC BLOOD PRESSURE: 72 MMHG | HEIGHT: 62 IN | WEIGHT: 211.06 LBS | BODY MASS INDEX: 38.84 KG/M2 | OXYGEN SATURATION: 96 % | HEART RATE: 99 BPM

## 2022-10-24 DIAGNOSIS — I48.0 PAROXYSMAL ATRIAL FIBRILLATION: ICD-10-CM

## 2022-10-24 DIAGNOSIS — Z23 FLU VACCINE NEED: Primary | ICD-10-CM

## 2022-10-24 DIAGNOSIS — G47.33 OSA (OBSTRUCTIVE SLEEP APNEA): ICD-10-CM

## 2022-10-24 DIAGNOSIS — I10 ESSENTIAL HYPERTENSION: ICD-10-CM

## 2022-10-24 DIAGNOSIS — E66.01 SEVERE OBESITY WITH BODY MASS INDEX (BMI) OF 35.0 TO 39.9 WITH SERIOUS COMORBIDITY: ICD-10-CM

## 2022-10-24 PROCEDURE — 1160F RVW MEDS BY RX/DR IN RCRD: CPT | Mod: CPTII,S$GLB,, | Performed by: FAMILY MEDICINE

## 2022-10-24 PROCEDURE — 99214 PR OFFICE/OUTPT VISIT, EST, LEVL IV, 30-39 MIN: ICD-10-PCS | Mod: 25,S$GLB,, | Performed by: FAMILY MEDICINE

## 2022-10-24 PROCEDURE — 1160F PR REVIEW ALL MEDS BY PRESCRIBER/CLIN PHARMACIST DOCUMENTED: ICD-10-PCS | Mod: CPTII,S$GLB,, | Performed by: FAMILY MEDICINE

## 2022-10-24 PROCEDURE — 3288F FALL RISK ASSESSMENT DOCD: CPT | Mod: CPTII,S$GLB,, | Performed by: FAMILY MEDICINE

## 2022-10-24 PROCEDURE — 90694 VACC AIIV4 NO PRSRV 0.5ML IM: CPT | Mod: S$GLB,,, | Performed by: FAMILY MEDICINE

## 2022-10-24 PROCEDURE — 1125F PR PAIN SEVERITY QUANTIFIED, PAIN PRESENT: ICD-10-PCS | Mod: CPTII,S$GLB,, | Performed by: FAMILY MEDICINE

## 2022-10-24 PROCEDURE — 3074F SYST BP LT 130 MM HG: CPT | Mod: CPTII,S$GLB,, | Performed by: FAMILY MEDICINE

## 2022-10-24 PROCEDURE — 1101F PR PT FALLS ASSESS DOC 0-1 FALLS W/OUT INJ PAST YR: ICD-10-PCS | Mod: CPTII,S$GLB,, | Performed by: FAMILY MEDICINE

## 2022-10-24 PROCEDURE — 1101F PT FALLS ASSESS-DOCD LE1/YR: CPT | Mod: CPTII,S$GLB,, | Performed by: FAMILY MEDICINE

## 2022-10-24 PROCEDURE — G0008 ADMIN INFLUENZA VIRUS VAC: HCPCS | Mod: S$GLB,,, | Performed by: FAMILY MEDICINE

## 2022-10-24 PROCEDURE — 99214 OFFICE O/P EST MOD 30 MIN: CPT | Mod: 25,S$GLB,, | Performed by: FAMILY MEDICINE

## 2022-10-24 PROCEDURE — 3074F PR MOST RECENT SYSTOLIC BLOOD PRESSURE < 130 MM HG: ICD-10-PCS | Mod: CPTII,S$GLB,, | Performed by: FAMILY MEDICINE

## 2022-10-24 PROCEDURE — 3288F PR FALLS RISK ASSESSMENT DOCUMENTED: ICD-10-PCS | Mod: CPTII,S$GLB,, | Performed by: FAMILY MEDICINE

## 2022-10-24 PROCEDURE — 90694 FLU VACCINE - QUADRIVALENT - ADJUVANTED: ICD-10-PCS | Mod: S$GLB,,, | Performed by: FAMILY MEDICINE

## 2022-10-24 PROCEDURE — 1159F MED LIST DOCD IN RCRD: CPT | Mod: CPTII,S$GLB,, | Performed by: FAMILY MEDICINE

## 2022-10-24 PROCEDURE — 1159F PR MEDICATION LIST DOCUMENTED IN MEDICAL RECORD: ICD-10-PCS | Mod: CPTII,S$GLB,, | Performed by: FAMILY MEDICINE

## 2022-10-24 PROCEDURE — 1125F AMNT PAIN NOTED PAIN PRSNT: CPT | Mod: CPTII,S$GLB,, | Performed by: FAMILY MEDICINE

## 2022-10-24 PROCEDURE — 3078F DIAST BP <80 MM HG: CPT | Mod: CPTII,S$GLB,, | Performed by: FAMILY MEDICINE

## 2022-10-24 PROCEDURE — G0008 FLU VACCINE - QUADRIVALENT - ADJUVANTED: ICD-10-PCS | Mod: S$GLB,,, | Performed by: FAMILY MEDICINE

## 2022-10-24 PROCEDURE — 3078F PR MOST RECENT DIASTOLIC BLOOD PRESSURE < 80 MM HG: ICD-10-PCS | Mod: CPTII,S$GLB,, | Performed by: FAMILY MEDICINE

## 2022-10-24 NOTE — PROGRESS NOTES
"Subjective:      Patient ID: Mis Ca is a 81 y.o. female.    Chief Complaint: Hospital Follow Up      Vitals:    10/24/22 0938   BP: 106/72   Pulse: 99   SpO2: 96%   Weight: 95.8 kg (211 lb 1.5 oz)   Height: 5' 2" (1.575 m)        HPI   Follow up atr fib admit, sees Dr dion Luis and Roman  On diltiazem and coumadin  On Golo, lost 16 ounds, now sweets, low cardbs        Problem List  Patient Active Problem List   Diagnosis    Paroxysmal atrial fibrillation    JACQUELYN (obstructive sleep apnea)    Anxiety    Restless leg syndrome    Hyperlipidemia    Essential hypertension    Gastroesophageal reflux disease without esophagitis    History of adenomatous polyp of colon    Diverticulosis of large intestine without hemorrhage    Tortuous aorta    Glaucoma    Blind right eye    Aortic atherosclerosis    Severe obesity with body mass index (BMI) of 35.0 to 39.9 with serious comorbidity    Hallux valgus with bunions, left    Antalgic gait    Multilevel facet arthritis    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    Other specified disorders of adrenal gland    Unspecified inflammatory spondylopathy, 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  Extended delirium.  Advised not to take it again.      Adhesive     Compazine [prochlorperazine edisylate] Anxiety    Penicillins Rash    Sulfa (sulfonamide antibiotics) Rash    Vancomycin analogues Rash       MEDS:   Current Outpatient Medications:     acetaminophen (TYLENOL) 500 MG tablet, Take 1,000 mg by mouth 2 (two) times daily as needed for Pain., Disp: , Rfl:     alendronate (FOSAMAX) 70 MG tablet, Take 1 tablet (70 mg total) by mouth every 7 days. (Patient taking differently: Take 70 mg by mouth Every Friday.), Disp: 12 " tablet, Rfl: 3    ALPRAZolam (XANAX) 0.25 MG tablet, TAKE 1 TABLET BY MOUTH TWICE A DAY (Patient taking differently: Take 0.25 mg by mouth nightly as needed.), Disp: 180 tablet, Rfl: 0    aspirin (ECOTRIN) 81 MG EC tablet, Take 81 mg by mouth once daily., Disp: , Rfl:     chlorthalidone (HYGROTEN) 50 MG Tab, Take 1 tablet (50 mg total) by mouth once daily., Disp: 30 tablet, Rfl: 11    cyanocobalamin (VITAMIN B-12) 1,000 mcg/mL injection, Inject 1 mL (1,000 mcg total) into the muscle every 30 days., Disp: 10 mL, Rfl: 1    diltiaZEM (CARDIZEM CD) 240 MG 24 hr capsule, TAKE 1 CAPSULE BY MOUTH  ONCE DAILY, Disp: 90 capsule, Rfl: 3    furosemide (LASIX) 20 MG tablet, TAKE 1 TABLET BY MOUTH ONCE DAILY, Disp: 90 tablet, Rfl: 3    gabapentin (NEURONTIN) 300 MG capsule, TAKE 2 CAPSULES BY MOUTH  TWICE DAILY, Disp: 360 capsule, Rfl: 3    latanoprost 0.005 % ophthalmic solution, Place 1 drop into both eyes every evening., Disp: , Rfl:     losartan (COZAAR) 100 MG tablet, TAKE ONE-HALF TABLET BY  MOUTH ONCE DAILY, Disp: 45 tablet, Rfl: 3    multivit-min-iron-FA-lutein (CENTRUM SILVER WOMEN) 8 mg iron-400 mcg-300 mcg Tab, Take 1 tablet by mouth once daily., Disp: , Rfl:     mv-mn/om3/dha/epa/fish/lut/geoffrey (OCUVITE ADULT 50 PLUS ORAL), Take 1 tablet by mouth once daily., Disp: , Rfl:     omeprazole (PRILOSEC) 20 MG capsule, Take 1 capsule (20 mg total) by mouth 2 (two) times a day., Disp: 180 capsule, Rfl: 3    oxybutynin (DITROPAN) 5 MG Tab, TAKE 1 TABLET BY MOUTH ONCE DAILY, Disp: 90 tablet, Rfl: 3    potassium chloride SA (K-DUR,KLOR-CON) 20 MEQ tablet, Take 1 tablet (20 mEq total) by mouth once daily., Disp: 30 tablet, Rfl: 11    pramipexole (MIRAPEX) 0.5 MG tablet, TAKE 1 TABLET BY MOUTH  TWICE DAILY, Disp: 180 tablet, Rfl: 3    pravastatin (PRAVACHOL) 40 MG tablet, Take 1 tablet (40 mg total) by mouth once daily., Disp: 90 tablet, Rfl: 3    timolol maleate 0.5% (TIMOPTIC) 0.5 % Drop, Place 1 drop into the left eye once  daily. , Disp: , Rfl: 0    vitamin D (VITAMIN D3) 1000 units Tab, Take 1,000 Units by mouth once daily., Disp: , Rfl:     warfarin (COUMADIN) 6 MG tablet, TAKE 6 MG DAILY EXCEPT FOR 9 MG ON FRIDAY TO THIN BLOOD, Disp: 100 tablet, Rfl: 3    UNABLE TO FIND, Take 1 capsule by mouth 3 (three) times daily with meals. medication name: Golo Release Diet Capsules, Disp: , Rfl:       History:  Current Providers as of 10/24/2022  PCP: Scott Dillard MD  Care Team Provider: Sudhir Coles MD  Care Team Provider: Jolanta Burnett MD  Care Team Provider: Lenny Burroughs LPN  Care Team Provider: Portillo Luis MD  Care Team Provider: Efren Aguilar MD  Care Team Provider: Roderick Echevarria MD  Care Team Provider: Scott Dillard MD  Encounter Provider: Scott Dillard MD, starting on Mon Oct 24, 2022 12:00 AM  Referring Provider: not found, starting on Mon Oct 24, 2022 12:00 AM  Consulting Physician: Scott Dillard MD, starting on Mon Oct 24, 2022  9:18 AM (Active)   Past Medical History:   Diagnosis Date    Allergy     Anticoagulant long-term use     Anxiety     Arthritis     Atrial fibrillation     Bilateral renal cysts 05/01/2022    Blind right eye     Bradycardia     Cancer     skin cancer left side of face under eye    Hyperlipidemia     Hypertension     PVC (premature ventricular contraction)     RLS (restless legs syndrome)     Sleep apnea     Does not use CPAP machine.     Past Surgical History:   Procedure Laterality Date    ADENOIDECTOMY      PAM OSTEOTOMY Left 10/31/2018    Procedure: OSTEOTOMY, AKIN;  Surgeon: Rudolph Cardozo DPM;  Location: [facility] OR;  Service: Podiatry;  Laterality: Left;    APPENDECTOMY      BREAST BIOPSY Left     x3    BREAST SURGERY Left     breast biopsy x3    CATARACT EXTRACTION BILATERAL W/ ANTERIOR VITRECTOMY      ENDOSCOPIC GASTROCNEMIUS RECESSION Left 10/31/2018    Procedure: RECESSION, GASTROCNEMIUS, ENDOSCOPIC;  Surgeon: Rudolph Cardozo DPM;  Location: [facility] OR;   Service: Podiatry;  Laterality: Left;    ESOPHAGOGASTRODUODENOSCOPY N/A 2/11/2022    Procedure: EGD (ESOPHAGOGASTRODUODENOSCOPY);  Surgeon: Efren Aguilar MD;  Location: [facility];  Service: Endoscopy;  Laterality: N/A;  Patient needs a rapid covid  test done on 12/15/2021. thank you    EYE SURGERY Bilateral     cataracts extraction    EYE SURGERY Right     drainage tube (glaucoma)    FOOT ARTHRODESIS Left 1/15/2020    Procedure: FUSION, FOOT;  Surgeon: Rudolph Cardozo DPM;  Location: [facility] OR;  Service: Podiatry;  Laterality: Left;  mini c-arm, Arthrex screw  for hardware removal (Lydia notified), Orthofix (Niels notified) min ex-fix    FOOT SURGERY Left     lesion removed from dorsal area    FRACTURE SURGERY Left     arm    HAND TENDON SURGERY Left     HYSTERECTOMY      INCISION AND DRAINAGE FOOT Left 3/11/2020    Procedure: INCISION AND DRAINAGE, FOOT;  Surgeon: Rudolph Cardozo DPM;  Location: [facility] OR;  Service: Podiatry;  Laterality: Left;    LAPIDUS BUNIONECTOMY Left 10/31/2018    Procedure: BUNIONECTOMY, LAPIDUS;  Surgeon: Rudolph Cardozo DPM;  Location: [facility] OR;  Service: Podiatry;  Laterality: Left;  mini c-arm, Arthrex locking plate (Lydia notified)    LAPIDUS BUNIONECTOMY Left 10/31/2018    Dr. Cardozo    Lymph Gland Removed  Right     groin (performed by Dr. Vergara)    NEURECTOMY Left 1/15/2020    Procedure: NEURECTOMY;  Surgeon: Rudolph Cardozo DPM;  Location: [facility] OR;  Service: Podiatry;  Laterality: Left;  bipolar bovie, vessel loop, possible Agnes nerve wrap. Moshe with [place] notified and will be overnighting graft. MK 1/14/2020    OOPHORECTOMY      ORIF FOREARM FRACTURE Left     PALATE SURGERY      Lesion removed    TONSILLECTOMY       Social History     Tobacco Use    Smoking status: Never    Smokeless tobacco: Never   Substance Use Topics    Alcohol use: Not Currently    Drug use: No         Review of Systems   Constitutional: Negative.    HENT: Negative.      Respiratory: Negative.     Cardiovascular: Negative.    Gastrointestinal:  Positive for abdominal pain and blood in stool.        Mucous and blood once in stool once   Endocrine: Negative.    Genitourinary: Negative.    Musculoskeletal: Negative.    Psychiatric/Behavioral: Negative.     All other systems reviewed and are negative.  Objective:     Physical Exam  Vitals and nursing note reviewed.   Constitutional:       Appearance: She is well-developed.   HENT:      Head: Normocephalic.   Eyes:      Conjunctiva/sclera: Conjunctivae normal.      Pupils: Pupils are equal, round, and reactive to light.   Cardiovascular:      Rate and Rhythm: Normal rate. Rhythm irregular.      Heart sounds: Normal heart sounds.   Pulmonary:      Effort: Pulmonary effort is normal.      Breath sounds: Normal breath sounds.   Musculoskeletal:         General: Normal range of motion.      Cervical back: Normal range of motion and neck supple.   Skin:     General: Skin is warm and dry.   Neurological:      Mental Status: She is alert and oriented to person, place, and time.      Deep Tendon Reflexes: Reflexes are normal and symmetric.   Psychiatric:         Behavior: Behavior normal.         Thought Content: Thought content normal.         Judgment: Judgment normal.           Assessment:     1. Flu vaccine need    2. JACQUELYN (obstructive sleep apnea)    3. Paroxysmal atrial fibrillation    4. Essential hypertension    5. Severe obesity with body mass index (BMI) of 35.0 to 39.9 with serious comorbidity      Plan:        Medication List            Accurate as of October 24, 2022 10:33 AM. If you have any questions, ask your nurse or doctor.                CHANGE how you take these medications      alendronate 70 MG tablet  Commonly known as: FOSAMAX  Take 1 tablet (70 mg total) by mouth every 7 days.  What changed: when to take this     ALPRAZolam 0.25 MG tablet  Commonly known as: XANAX  TAKE 1 TABLET BY MOUTH TWICE A DAY  What changed:    when to take this  reasons to take this            CONTINUE taking these medications      acetaminophen 500 MG tablet  Commonly known as: TYLENOL     aspirin 81 MG EC tablet  Commonly known as: ECOTRIN     CENTRUM SILVER WOMEN 8 mg iron-400 mcg-300 mcg Tab  Generic drug: multivit-min-iron-FA-lutein     chlorthalidone 50 MG Tab  Commonly known as: HYGROTEN  Take 1 tablet (50 mg total) by mouth once daily.     cyanocobalamin 1,000 mcg/mL injection  Commonly known as: VITAMIN B-12  Inject 1 mL (1,000 mcg total) into the muscle every 30 days.     diltiaZEM 240 MG 24 hr capsule  Commonly known as: CARDIZEM CD  TAKE 1 CAPSULE BY MOUTH  ONCE DAILY     furosemide 20 MG tablet  Commonly known as: LASIX  TAKE 1 TABLET BY MOUTH ONCE DAILY     gabapentin 300 MG capsule  Commonly known as: NEURONTIN  TAKE 2 CAPSULES BY MOUTH  TWICE DAILY     latanoprost 0.005 % ophthalmic solution     losartan 100 MG tablet  Commonly known as: COZAAR  TAKE ONE-HALF TABLET BY  MOUTH ONCE DAILY     OCUVITE ADULT 50 PLUS ORAL     omeprazole 20 MG capsule  Commonly known as: PRILOSEC  Take 1 capsule (20 mg total) by mouth 2 (two) times a day.     oxybutynin 5 MG Tab  Commonly known as: DITROPAN  TAKE 1 TABLET BY MOUTH ONCE DAILY     potassium chloride SA 20 MEQ tablet  Commonly known as: K-DUR,KLOR-CON  Take 1 tablet (20 mEq total) by mouth once daily.     pramipexole 0.5 MG tablet  Commonly known as: MIRAPEX  TAKE 1 TABLET BY MOUTH  TWICE DAILY     pravastatin 40 MG tablet  Commonly known as: PRAVACHOL  Take 1 tablet (40 mg total) by mouth once daily.     timolol maleate 0.5% 0.5 % Drop  Commonly known as: TIMOPTIC     UNABLE TO FIND     vitamin D 1000 units Tab  Commonly known as: VITAMIN D3     warfarin 6 MG tablet  Commonly known as: COUMADIN  TAKE 6 MG DAILY EXCEPT FOR 9 MG ON FRIDAY TO THIN BLOOD            Flu vaccine need  -     Influenza (FLUAD) - Quadrivalent (Adjuvanted) *Preferred* (65+) (PF)    JACQUELYN (obstructive sleep apnea)  -      CPAP FOR HOME USE    Paroxysmal atrial fibrillation    Essential hypertension    Severe obesity with body mass index (BMI) of 35.0 to 39.9 with serious comorbidity

## 2022-10-24 NOTE — TELEPHONE ENCOUNTER
[facility] faxed for CPAP machine- requested device to come from [facility].

## 2022-10-28 ENCOUNTER — PATIENT MESSAGE (OUTPATIENT)
Dept: CARDIOLOGY | Facility: CLINIC | Age: 82
End: 2022-10-28
Payer: MEDICARE

## 2022-10-28 ENCOUNTER — ANTI-COAG VISIT (OUTPATIENT)
Dept: CARDIOLOGY | Facility: CLINIC | Age: 82
End: 2022-10-28
Payer: MEDICARE

## 2022-10-28 ENCOUNTER — OFFICE VISIT (OUTPATIENT)
Dept: CARDIOLOGY | Facility: CLINIC | Age: 82
End: 2022-10-28
Payer: MEDICARE

## 2022-10-28 ENCOUNTER — PATIENT MESSAGE (OUTPATIENT)
Dept: FAMILY MEDICINE | Facility: CLINIC | Age: 82
End: 2022-10-28
Payer: MEDICARE

## 2022-10-28 VITALS
BODY MASS INDEX: 39.44 KG/M2 | WEIGHT: 214.31 LBS | HEART RATE: 88 BPM | SYSTOLIC BLOOD PRESSURE: 130 MMHG | DIASTOLIC BLOOD PRESSURE: 81 MMHG | HEIGHT: 62 IN

## 2022-10-28 DIAGNOSIS — I48.0 PAROXYSMAL ATRIAL FIBRILLATION: Primary | ICD-10-CM

## 2022-10-28 DIAGNOSIS — I70.0 AORTIC ATHEROSCLEROSIS: ICD-10-CM

## 2022-10-28 DIAGNOSIS — Z79.01 LONG TERM (CURRENT) USE OF ANTICOAGULANTS: Primary | ICD-10-CM

## 2022-10-28 DIAGNOSIS — E66.01 SEVERE OBESITY WITH BODY MASS INDEX (BMI) OF 35.0 TO 39.9 WITH SERIOUS COMORBIDITY: ICD-10-CM

## 2022-10-28 DIAGNOSIS — I48.19 PERSISTENT ATRIAL FIBRILLATION: Primary | ICD-10-CM

## 2022-10-28 DIAGNOSIS — I48.19 PERSISTENT ATRIAL FIBRILLATION: ICD-10-CM

## 2022-10-28 DIAGNOSIS — I48.91 ATRIAL FIBRILLATION, UNSPECIFIED TYPE: ICD-10-CM

## 2022-10-28 DIAGNOSIS — G47.33 OSA (OBSTRUCTIVE SLEEP APNEA): ICD-10-CM

## 2022-10-28 DIAGNOSIS — I10 ESSENTIAL HYPERTENSION: ICD-10-CM

## 2022-10-28 DIAGNOSIS — I48.0 PAF (PAROXYSMAL ATRIAL FIBRILLATION): ICD-10-CM

## 2022-10-28 PROCEDURE — 1160F RVW MEDS BY RX/DR IN RCRD: CPT | Mod: CPTII,S$GLB,, | Performed by: INTERNAL MEDICINE

## 2022-10-28 PROCEDURE — 99204 OFFICE O/P NEW MOD 45 MIN: CPT | Mod: S$GLB,,, | Performed by: INTERNAL MEDICINE

## 2022-10-28 PROCEDURE — 3288F FALL RISK ASSESSMENT DOCD: CPT | Mod: CPTII,S$GLB,, | Performed by: INTERNAL MEDICINE

## 2022-10-28 PROCEDURE — 3079F PR MOST RECENT DIASTOLIC BLOOD PRESSURE 80-89 MM HG: ICD-10-PCS | Mod: CPTII,S$GLB,, | Performed by: INTERNAL MEDICINE

## 2022-10-28 PROCEDURE — 99204 PR OFFICE/OUTPT VISIT, NEW, LEVL IV, 45-59 MIN: ICD-10-PCS | Mod: S$GLB,,, | Performed by: INTERNAL MEDICINE

## 2022-10-28 PROCEDURE — 99999 PR PBB SHADOW E&M-EST. PATIENT-LVL V: ICD-10-PCS | Mod: PBBFAC,,, | Performed by: INTERNAL MEDICINE

## 2022-10-28 PROCEDURE — 1101F PT FALLS ASSESS-DOCD LE1/YR: CPT | Mod: CPTII,S$GLB,, | Performed by: INTERNAL MEDICINE

## 2022-10-28 PROCEDURE — 93000 ELECTROCARDIOGRAM COMPLETE: CPT | Mod: S$GLB,,, | Performed by: INTERNAL MEDICINE

## 2022-10-28 PROCEDURE — 3288F PR FALLS RISK ASSESSMENT DOCUMENTED: ICD-10-PCS | Mod: CPTII,S$GLB,, | Performed by: INTERNAL MEDICINE

## 2022-10-28 PROCEDURE — 1101F PR PT FALLS ASSESS DOC 0-1 FALLS W/OUT INJ PAST YR: ICD-10-PCS | Mod: CPTII,S$GLB,, | Performed by: INTERNAL MEDICINE

## 2022-10-28 PROCEDURE — 99999 PR PBB SHADOW E&M-EST. PATIENT-LVL V: CPT | Mod: PBBFAC,,, | Performed by: INTERNAL MEDICINE

## 2022-10-28 PROCEDURE — 3075F SYST BP GE 130 - 139MM HG: CPT | Mod: CPTII,S$GLB,, | Performed by: INTERNAL MEDICINE

## 2022-10-28 PROCEDURE — 3079F DIAST BP 80-89 MM HG: CPT | Mod: CPTII,S$GLB,, | Performed by: INTERNAL MEDICINE

## 2022-10-28 PROCEDURE — 1160F PR REVIEW ALL MEDS BY PRESCRIBER/CLIN PHARMACIST DOCUMENTED: ICD-10-PCS | Mod: CPTII,S$GLB,, | Performed by: INTERNAL MEDICINE

## 2022-10-28 PROCEDURE — 1126F PR PAIN SEVERITY QUANTIFIED, NO PAIN PRESENT: ICD-10-PCS | Mod: CPTII,S$GLB,, | Performed by: INTERNAL MEDICINE

## 2022-10-28 PROCEDURE — 1159F PR MEDICATION LIST DOCUMENTED IN MEDICAL RECORD: ICD-10-PCS | Mod: CPTII,S$GLB,, | Performed by: INTERNAL MEDICINE

## 2022-10-28 PROCEDURE — 1126F AMNT PAIN NOTED NONE PRSNT: CPT | Mod: CPTII,S$GLB,, | Performed by: INTERNAL MEDICINE

## 2022-10-28 PROCEDURE — 1159F MED LIST DOCD IN RCRD: CPT | Mod: CPTII,S$GLB,, | Performed by: INTERNAL MEDICINE

## 2022-10-28 PROCEDURE — 3075F PR MOST RECENT SYSTOLIC BLOOD PRESS GE 130-139MM HG: ICD-10-PCS | Mod: CPTII,S$GLB,, | Performed by: INTERNAL MEDICINE

## 2022-10-28 PROCEDURE — 93000 EKG 12-LEAD: ICD-10-PCS | Mod: S$GLB,,, | Performed by: INTERNAL MEDICINE

## 2022-10-28 RX ORDER — AMIODARONE HYDROCHLORIDE 200 MG/1
TABLET ORAL
Qty: 120 TABLET | Refills: 3 | Status: SHIPPED | OUTPATIENT
Start: 2022-10-28 | End: 2022-12-23

## 2022-10-28 RX ORDER — DILTIAZEM HYDROCHLORIDE 240 MG/1
240 CAPSULE, COATED, EXTENDED RELEASE ORAL DAILY
COMMUNITY
Start: 2022-10-28 | End: 2022-11-09

## 2022-10-28 RX ORDER — SODIUM CHLORIDE 9 MG/ML
125 INJECTION, SOLUTION INTRAVENOUS CONTINUOUS
Status: CANCELLED | OUTPATIENT
Start: 2022-10-28 | End: 2022-10-28

## 2022-10-28 RX ORDER — DIPHENHYDRAMINE HCL 50 MG
50 CAPSULE ORAL ONCE
Status: CANCELLED | OUTPATIENT
Start: 2022-10-28 | End: 2022-10-28

## 2022-10-28 NOTE — PROGRESS NOTES
Subjective:    Patient ID:  Mis Ca is a 81 y.o. female who presents for evaluation of Atrial Fibrillation    Referring Cardiologist: oPrtillo Luis MD  Primary Care Physician: Scott Dillard MD    HPI    I had the pleasure of seeing Mrs. Ca in our electrophysiology clinic in consultation for her atrial arrhythmia. As you are aware she is a pleasant 81 year-old woman with paroxysmal atrial fibrillation, hypertension, obstructive sleep apnea, aortic atherosclerosis and obesity. She reports palpitations for over 10 years. Ultimately diagnosed with pAF in 2014, at which time she was hospitalized in the ICU and treated with a diltiazem drip. She has had several paroxysms and hospitalizations for pAF however was not referred to EP until now. Recently admitted for AF with RVR, rate controlled and discharged. She reports ongoing symptoms of dyspnea, palpitations, fatigue and at times hypotension. She is on coumadin for anticoagulation (DOACs are cost prohibitive).    ECHO 11/2020: normal LV function with moderate LVH, normal mitral valve and mild left atrial enlargement.    I have reviewed all available electrocardiograms in Epic which show either sinus rhythm/bradycardia with left anterior fascicular block or AF, at times with RVR (11/2014, 9/2017, 10/2022).    My interpretation of today's in clinic ECG is AF with IVCD and average ventricular of 69 bpm.      Review of Systems   Constitutional: Positive for malaise/fatigue. Negative for fever.   HENT:  Negative for congestion and sore throat.    Eyes:  Negative for blurred vision and visual disturbance.   Cardiovascular:  Positive for dyspnea on exertion, irregular heartbeat and palpitations. Negative for chest pain, near-syncope and syncope.   Respiratory:  Negative for cough and shortness of breath.    Hematologic/Lymphatic: Negative for bleeding problem. Does not bruise/bleed easily.   Skin: Negative.    Musculoskeletal: Negative.    Gastrointestinal:   Negative for bloating, abdominal pain, hematochezia and melena.   Neurological:  Negative for focal weakness and weakness.   Psychiatric/Behavioral: Negative.        Objective:    Physical Exam  Vitals reviewed.   Constitutional:       General: She is not in acute distress.     Appearance: She is well-developed. She is not diaphoretic.   HENT:      Head: Normocephalic and atraumatic.   Eyes:      General:         Right eye: No discharge.         Left eye: No discharge.      Conjunctiva/sclera: Conjunctivae normal.   Cardiovascular:      Rate and Rhythm: Normal rate. Rhythm irregularly irregular.      Heart sounds: No murmur heard.    No friction rub. No gallop.   Pulmonary:      Effort: Pulmonary effort is normal. No respiratory distress.      Breath sounds: Normal breath sounds. No wheezing or rales.   Abdominal:      General: Bowel sounds are normal. There is no distension.      Palpations: Abdomen is soft.      Tenderness: There is no abdominal tenderness.   Musculoskeletal:      Cervical back: Neck supple.   Skin:     General: Skin is warm and dry.   Neurological:      Mental Status: She is alert and oriented to person, place, and time.   Psychiatric:         Behavior: Behavior normal.         Thought Content: Thought content normal.         Judgment: Judgment normal.         Assessment:       1. Persistent atrial fibrillation    2. Essential hypertension    3. Aortic atherosclerosis    4. Severe obesity with body mass index (BMI) of 35.0 to 39.9 with serious comorbidity    5. JACQUELYN (obstructive sleep apnea)         Plan:       In summary, Mrs. Ca is a pleasant 81 year-old woman with paroxysmal atrial fibrillation which is now persistent, hypertension, obstructive sleep apnea, aortic atherosclerosis and obesity. I had a long discussion with the patient about the pathophysiology and risks of atrial fibrillation and its basic pathophysiology, including its health implications and treatment options. Specifically,  I addressed the need for CVA (stroke) prophylaxis with aspirin versus oral anticoagulation (warfarin vs DOACs, discussed bleeding risks, and need to come to the ER for any head trauma for CT scanning even if asymptomatic). Her [id number] score is 5 and indefinite anticoagulation is indicated. I also discussed the goal to reduce symptomatic arrhythmic episodes by pharmacologic and/or procedural methods and utilizing a rhythm versus a rate control strategy. Due to symptoms recommend rhythm control. Unfortunately she is now in the persistent AF phase. Discussed how rhythm control strategies utilizing anti-arrhythmic drugs and/or ablation strategies have lowered efficacy once in the persistent phase compared to paroxysmal phase. Due to LVH noted on ECHO (posterior wall 1.6cm), amiodarone is the only recommend drug. Discussed long-term toxicities related to amiodarone. She elects to proceed. Offered to be done at [facility] by myself or to set it up with Dr. Luis at [facility]. She elects to have it done here at [facility].    Discussed changes in warfarin dosing with amiodarone. I would like to have her enrolled in coumadin clinic for close INR monitoring. She understood.    Plan  PAUL/DCCV with Dr. Luis  Stop diltiaze 2 days prior  Start amiodarone after (400mg bid x 14 days then 200mg daily)  INR 2-3  RTC 4 weeks after DCCV      Thank you for allowing me to participate in the care of this patient. Please do not hesitate to call me with any questions or concerns.    Shamar Owens MD, PhD  Cardiac Electrophysiology

## 2022-10-28 NOTE — PROGRESS NOTES
Spoke with Mis Ca. Pt education reviewed regarding when to call the coumadin clinic as well as diet while taking warfarin. Pt confirmed she has a 6mg warfarin tablet. She takes 9mg on Friday and 6mg all other days. She eats vitamin K foods once a week and does not drink alcohol. Pt's questions addressed and she verbalized understanding of all information provided. 'Diet' and 'When to call' information sheets provided via portal. INR 10/31

## 2022-10-28 NOTE — PATIENT INSTRUCTIONS
1.) Stop diltiazem 2 days before cardioversion. Do not resume after  2.) Start amiodarone after cardioversion. Will start with 2 tablets twice daily for 14 days and then take 1 tablet once daily thereafter for maintenance dosing.  3.) Critical to keep a close eye on your INR/coumadin level when you initially start amiodarone. You have been enrolled in coumadin clinic to help monitor/manage this.

## 2022-10-28 NOTE — PROGRESS NOTES
82 yo female with paroxysmal atrial fibrillation which is now persistent, hypertension, obstructive sleep apnea, aortic atherosclerosis and obesity. Pt is currently on warfarin & managed by PCP. Dr. Owens is now planning PAUL/DCCV and will be starting amiodarone load post procedure. He would like us to manage warfarin for close monitoring for procedure and managing amiodarone interaction. I will message her provider that we will take over.     Per notes, INRs have been good recently and her dose is 6mg daily except 9mg Fri. She was on Xarelto but changed back to warfarin last month due to cost. Please contact patient to enroll and educate. Confirm dose. Plan for INR Monday or beginning of next week. She will need to go to [facility] lab now instead of her MD office.     PAUL/DCCV scheduled 11/4 with Dr. Luis in [place]. Amiodarone load will start 400mg bid x14d then 200mg daily after DCCV.

## 2022-10-31 ENCOUNTER — LAB VISIT (OUTPATIENT)
Dept: LAB | Facility: HOSPITAL | Age: 82
End: 2022-10-31
Attending: INTERNAL MEDICINE
Payer: MEDICARE

## 2022-10-31 ENCOUNTER — ANTI-COAG VISIT (OUTPATIENT)
Dept: CARDIOLOGY | Facility: CLINIC | Age: 82
End: 2022-10-31
Payer: MEDICARE

## 2022-10-31 DIAGNOSIS — I48.19 PERSISTENT ATRIAL FIBRILLATION: ICD-10-CM

## 2022-10-31 DIAGNOSIS — Z79.01 LONG TERM (CURRENT) USE OF ANTICOAGULANTS: ICD-10-CM

## 2022-10-31 DIAGNOSIS — I48.19 PERSISTENT ATRIAL FIBRILLATION: Primary | ICD-10-CM

## 2022-10-31 LAB
INR PPP: 2.3 (ref 0.8–1.2)
PROTHROMBIN TIME: 24.2 SEC (ref 9–12.5)

## 2022-10-31 PROCEDURE — 36415 COLL VENOUS BLD VENIPUNCTURE: CPT | Mod: PO | Performed by: INTERNAL MEDICINE

## 2022-10-31 PROCEDURE — 85610 PROTHROMBIN TIME: CPT | Mod: PO | Performed by: INTERNAL MEDICINE

## 2022-10-31 PROCEDURE — 93793 ANTICOAG MGMT PT WARFARIN: CPT | Mod: S$GLB,,, | Performed by: PHARMACIST

## 2022-10-31 PROCEDURE — 93793 PR ANTICOAGULANT MGMT FOR PT TAKING WARFARIN: ICD-10-PCS | Mod: S$GLB,,, | Performed by: PHARMACIST

## 2022-10-31 NOTE — PROGRESS NOTES
See previous note. Patient is new to coumadin clinic but not new to coumadin. She re-confirms current and stable dose of 6mg daily except 9mg Friday. Per notes she will undergo PAUL/DCCV 11/4/22

## 2022-11-02 DIAGNOSIS — I48.91 ATRIAL FIBRILLATION, UNSPECIFIED TYPE: Primary | ICD-10-CM

## 2022-11-03 ENCOUNTER — LAB VISIT (OUTPATIENT)
Dept: LAB | Facility: HOSPITAL | Age: 82
End: 2022-11-03
Attending: INTERNAL MEDICINE
Payer: MEDICARE

## 2022-11-03 DIAGNOSIS — Z01.818 PREOP TESTING: ICD-10-CM

## 2022-11-03 LAB
ANION GAP SERPL CALC-SCNC: 11 MMOL/L (ref 8–16)
BASOPHILS # BLD AUTO: 0.04 K/UL (ref 0–0.2)
BASOPHILS NFR BLD: 0.6 % (ref 0–1.9)
CALCIUM SERPL-MCNC: 9.9 MG/DL (ref 8.7–10.5)
CHLORIDE SERPL-SCNC: 101 MMOL/L (ref 95–110)
CO2 SERPL-SCNC: 30 MMOL/L (ref 23–29)
CREAT SERPL-MCNC: 1.2 MG/DL (ref 0.5–1.4)
DIFFERENTIAL METHOD: ABNORMAL
EOSINOPHIL # BLD AUTO: 0.3 K/UL (ref 0–0.5)
EOSINOPHIL NFR BLD: 4.8 % (ref 0–8)
ERYTHROCYTE [DISTWIDTH] IN BLOOD BY AUTOMATED COUNT: 13.2 % (ref 11.5–14.5)
EST. GFR  (NO RACE VARIABLE): 45.8 ML/MIN/1.73 M^2
GLUCOSE SERPL-MCNC: 112 MG/DL (ref 70–110)
HCT VFR BLD AUTO: 42.3 % (ref 37–48.5)
HGB BLD-MCNC: 14 G/DL (ref 12–16)
IMM GRANULOCYTES # BLD AUTO: 0.02 K/UL (ref 0–0.04)
IMM GRANULOCYTES NFR BLD AUTO: 0.3 % (ref 0–0.5)
LYMPHOCYTES # BLD AUTO: 1.9 K/UL (ref 1–4.8)
LYMPHOCYTES NFR BLD: 29.4 % (ref 18–48)
MCH RBC QN AUTO: 32.1 PG (ref 27–31)
MCHC RBC AUTO-ENTMCNC: 33.1 G/DL (ref 32–36)
MCV RBC AUTO: 97 FL (ref 82–98)
MONOCYTES # BLD AUTO: 0.7 K/UL (ref 0.3–1)
MONOCYTES NFR BLD: 10.2 % (ref 4–15)
NEUTROPHILS # BLD AUTO: 3.6 K/UL (ref 1.8–7.7)
NEUTROPHILS NFR BLD: 54.7 % (ref 38–73)
NRBC BLD-RTO: 0 /100 WBC
PLATELET # BLD AUTO: 207 K/UL (ref 150–450)
PMV BLD AUTO: 10.5 FL (ref 9.2–12.9)
POTASSIUM SERPL-SCNC: 4.4 MMOL/L (ref 3.5–5.1)
RBC # BLD AUTO: 4.36 M/UL (ref 4–5.4)
SODIUM SERPL-SCNC: 142 MMOL/L (ref 136–145)
UUN UR-MCNC: 27 MG/DL (ref 7–17)
WBC # BLD AUTO: 6.5 K/UL (ref 3.9–12.7)

## 2022-11-03 PROCEDURE — 80048 BASIC METABOLIC PNL TOTAL CA: CPT | Mod: PO | Performed by: INTERNAL MEDICINE

## 2022-11-03 PROCEDURE — 85025 COMPLETE CBC W/AUTO DIFF WBC: CPT | Mod: PO | Performed by: INTERNAL MEDICINE

## 2022-11-03 PROCEDURE — 36415 COLL VENOUS BLD VENIPUNCTURE: CPT | Mod: PO | Performed by: INTERNAL MEDICINE

## 2022-11-04 ENCOUNTER — ANESTHESIA EVENT (OUTPATIENT)
Dept: CARDIOLOGY | Facility: HOSPITAL | Age: 82
End: 2022-11-04
Payer: MEDICARE

## 2022-11-04 ENCOUNTER — HOSPITAL ENCOUNTER (OUTPATIENT)
Facility: HOSPITAL | Age: 82
Discharge: HOME | End: 2022-11-04
Attending: INTERNAL MEDICINE | Admitting: INTERNAL MEDICINE
Payer: MEDICARE

## 2022-11-04 ENCOUNTER — ANESTHESIA (OUTPATIENT)
Dept: CARDIOLOGY | Facility: HOSPITAL | Age: 82
End: 2022-11-04
Payer: MEDICARE

## 2022-11-04 VITALS
TEMPERATURE: 98 F | HEIGHT: 62 IN | DIASTOLIC BLOOD PRESSURE: 67 MMHG | RESPIRATION RATE: 16 BRPM | OXYGEN SATURATION: 98 % | SYSTOLIC BLOOD PRESSURE: 112 MMHG | HEART RATE: 71 BPM | WEIGHT: 211 LBS | BODY MASS INDEX: 38.83 KG/M2

## 2022-11-04 DIAGNOSIS — I48.0 PAROXYSMAL ATRIAL FIBRILLATION: ICD-10-CM

## 2022-11-04 DIAGNOSIS — Z01.818 PREOP TESTING: Primary | ICD-10-CM

## 2022-11-04 DIAGNOSIS — I48.0 PAF (PAROXYSMAL ATRIAL FIBRILLATION): ICD-10-CM

## 2022-11-04 DIAGNOSIS — I48.91 ATRIAL FIBRILLATION STATUS POST CARDIOVERSION: ICD-10-CM

## 2022-11-04 DIAGNOSIS — Z01.818 PRE-OP EVALUATION: ICD-10-CM

## 2022-11-04 PROCEDURE — D9220A PRA ANESTHESIA: ICD-10-PCS | Mod: CRNA,,, | Performed by: NURSE ANESTHETIST, CERTIFIED REGISTERED

## 2022-11-04 PROCEDURE — 25000003 PHARM REV CODE 250: Performed by: NURSE ANESTHETIST, CERTIFIED REGISTERED

## 2022-11-04 PROCEDURE — 25000003 PHARM REV CODE 250: Performed by: INTERNAL MEDICINE

## 2022-11-04 PROCEDURE — 93010 ELECTROCARDIOGRAM REPORT: CPT | Mod: 76,,, | Performed by: INTERNAL MEDICINE

## 2022-11-04 PROCEDURE — D9220A PRA ANESTHESIA: Mod: CRNA,,, | Performed by: NURSE ANESTHETIST, CERTIFIED REGISTERED

## 2022-11-04 PROCEDURE — D9220A PRA ANESTHESIA: Mod: ANES,,, | Performed by: ANESTHESIOLOGY

## 2022-11-04 PROCEDURE — 93005 ELECTROCARDIOGRAM TRACING: CPT

## 2022-11-04 PROCEDURE — 37000008 HC ANESTHESIA 1ST 15 MINUTES: Performed by: INTERNAL MEDICINE

## 2022-11-04 PROCEDURE — 37000009 HC ANESTHESIA EA ADD 15 MINS: Performed by: INTERNAL MEDICINE

## 2022-11-04 PROCEDURE — 63600175 PHARM REV CODE 636 W HCPCS: Performed by: NURSE ANESTHETIST, CERTIFIED REGISTERED

## 2022-11-04 PROCEDURE — D9220A PRA ANESTHESIA: ICD-10-PCS | Mod: ANES,,, | Performed by: ANESTHESIOLOGY

## 2022-11-04 PROCEDURE — 93010 EKG 12-LEAD: ICD-10-PCS | Mod: 76,,, | Performed by: INTERNAL MEDICINE

## 2022-11-04 RX ORDER — PROPOFOL 10 MG/ML
VIAL (ML) INTRAVENOUS
Status: DISCONTINUED | OUTPATIENT
Start: 2022-11-04 | End: 2022-11-04

## 2022-11-04 RX ORDER — AMIODARONE HYDROCHLORIDE 200 MG/1
400 TABLET ORAL DAILY
Status: DISCONTINUED | OUTPATIENT
Start: 2022-11-04 | End: 2022-11-04 | Stop reason: HOSPADM

## 2022-11-04 RX ORDER — SODIUM CHLORIDE 9 MG/ML
125 INJECTION, SOLUTION INTRAVENOUS CONTINUOUS
Status: ACTIVE | OUTPATIENT
Start: 2022-11-04 | End: 2022-11-04

## 2022-11-04 RX ORDER — LIDOCAINE HCL/PF 100 MG/5ML
SYRINGE (ML) INTRAVENOUS
Status: DISCONTINUED | OUTPATIENT
Start: 2022-11-04 | End: 2022-11-04

## 2022-11-04 RX ADMIN — PROPOFOL 20 MG: 10 INJECTION, EMULSION INTRAVENOUS at 09:11

## 2022-11-04 RX ADMIN — PROPOFOL 30 MG: 10 INJECTION, EMULSION INTRAVENOUS at 09:11

## 2022-11-04 RX ADMIN — PROPOFOL 50 MG: 10 INJECTION, EMULSION INTRAVENOUS at 09:11

## 2022-11-04 RX ADMIN — SODIUM CHLORIDE: 0.9 INJECTION, SOLUTION INTRAVENOUS at 09:11

## 2022-11-04 RX ADMIN — AMIODARONE HYDROCHLORIDE 400 MG: 200 TABLET ORAL at 10:11

## 2022-11-04 RX ADMIN — LIDOCAINE HYDROCHLORIDE 50 MG: 20 INJECTION, SOLUTION INTRAVENOUS at 09:11

## 2022-11-04 NOTE — ANESTHESIA PREPROCEDURE EVALUATION
11/04/2022  Mis Ca is a 81 y.o., female presents for PAUL under MAC.    Past Medical History:   Diagnosis Date    Allergy     Anticoagulant long-term use     Anxiety     Arthritis     Atrial fibrillation     Bilateral renal cysts 05/01/2022    Blind right eye     Bradycardia     Cancer     skin cancer left side of face under eye    Hyperlipidemia     Hypertension     PVC (premature ventricular contraction)     RLS (restless legs syndrome)     Sleep apnea     Does not use CPAP machine.     Past Surgical History:   Procedure Laterality Date    ADENOIDECTOMY      AKIN OSTEOTOMY Left 10/31/2018    Procedure: OSTEOTOMY, AKIN;  Surgeon: Rudolph Cardozo DPM;  Location: [facility] OR;  Service: Podiatry;  Laterality: Left;    APPENDECTOMY      BREAST BIOPSY Left     x3    BREAST SURGERY Left     breast biopsy x3    CATARACT EXTRACTION BILATERAL W/ ANTERIOR VITRECTOMY      ENDOSCOPIC GASTROCNEMIUS RECESSION Left 10/31/2018    Procedure: RECESSION, GASTROCNEMIUS, ENDOSCOPIC;  Surgeon: Rudolph Cardozo DPM;  Location: [facility] OR;  Service: Podiatry;  Laterality: Left;    ESOPHAGOGASTRODUODENOSCOPY N/A 2/11/2022    Procedure: EGD (ESOPHAGOGASTRODUODENOSCOPY);  Surgeon: Efren Aguilar MD;  Location: [facility];  Service: Endoscopy;  Laterality: N/A;  Patient needs a rapid covid  test done on 12/15/2021. thank you    EYE SURGERY Bilateral     cataracts extraction    EYE SURGERY Right     drainage tube (glaucoma)    FOOT ARTHRODESIS Left 1/15/2020    Procedure: FUSION, FOOT;  Surgeon: Rudolph Cardozo DPM;  Location: [facility] OR;  Service: Podiatry;  Laterality: Left;  mini c-arm, Arthrex screw  for hardware removal (Lydia notified), Orthofix (Niels notified) min ex-fix    FOOT SURGERY Left     lesion removed from dorsal area    FRACTURE SURGERY Left     arm    HAND TENDON  SURGERY Left     HYSTERECTOMY      INCISION AND DRAINAGE FOOT Left 3/11/2020    Procedure: INCISION AND DRAINAGE, FOOT;  Surgeon: Rudolph Cardozo DPM;  Location: [facility] OR;  Service: Podiatry;  Laterality: Left;    LAPIDUS BUNIONECTOMY Left 10/31/2018    Procedure: BUNIONECTOMY, LAPIDUS;  Surgeon: Rudolph Cardozo DPM;  Location: [facility] OR;  Service: Podiatry;  Laterality: Left;  mini c-arm, Arthrex locking plate (Lydia notified)    LAPIDUS BUNIONECTOMY Left 10/31/2018    Dr. Cardozo    Lymph Gland Removed  Right     groin (performed by Dr. Vergara)    NEURECTOMY Left 1/15/2020    Procedure: NEURECTOMY;  Surgeon: Rudolph Cardozo DPM;  Location: [facility] OR;  Service: Podiatry;  Laterality: Left;  bipolar bovie, vessel loop, possible Agnes nerve wrap. Moshe with Agnes notified and will be overnighting graft. MK 1/14/2020    OOPHORECTOMY      ORIF FOREARM FRACTURE Left     PALATE SURGERY      Lesion removed    TONSILLECTOMY             Pre-op Assessment    I have reviewed the Patient Summary Reports.     I have reviewed the Nursing Notes. I have reviewed the NPO Status.   I have reviewed the Medications.     Review of Systems  Anesthesia Hx:  Personal Hx of Anesthesia complications (requests no propofol, says it is hard for her to wake up)   Social:  Non-Smoker        Physical Exam  General: Well nourished, Cooperative, Alert and Oriented    Airway:  Mallampati: II   Mouth Opening: Normal  TM Distance: Normal  Tongue: Normal  Neck ROM: Normal ROM    Chest/Lungs:  Clear to auscultation, Normal Respiratory Rate    Heart:  Rate: Normal  Rhythm: Regular Rhythm  Sounds: Normal        Anesthesia Plan  Type of Anesthesia, risks & benefits discussed:    Anesthesia Type: MAC  Intra-op Monitoring Plan: Standard ASA Monitors  Post Op Pain Control Plan: multimodal analgesia  Induction:  IV  Informed Consent: Informed consent signed with the Patient and all parties understand the risks and agree with anesthesia  plan.  All questions answered.   ASA Score: 3    Ready For Surgery From Anesthesia Perspective.     .

## 2022-11-04 NOTE — DISCHARGE INSTRUCTIONS
Discharge Instructions:    Do not drive a car, operate heavy equipment, care for a young child, etc for the next 12-24 hours.   Avoid drinking alcohol for 24 hours.  Do not make any important decisions for 24 hours.    Drink fluids to keep hydrated. Resume your usual diet as tolerated.     Rest for today then activity as tolerated.   Do not lift anything over 5 pounds for the first 3 days after procedure.    Remove dressing tomorrow then may shower with warm soapy water. Do not scrub site. Pat dry.   May apply bandaid for 2 days.  No tub baths.  Do not submerge wound in water for 3 days.     Call MD for any unrelieved pain, excessive nausea or vomiting, redness around site, bleeding, or pus or foul smelling drainage, or any other questions or concerns.    Go to the ER for any difficulty breathing or chest pain.      If site swells or bleeds, hold direct pressure to area for 10 full minutes.   If site continues to bleed, continue to hold pressure to site and have someone bring you to the ER.      Follow any additional instructions given to you by MD.      Call MD to schedule a follow up appointment, or follow up as instructed.

## 2022-11-04 NOTE — PLAN OF CARE
Patient lying in bed with no complaints of pain or distress noted.  Monitors applied.  VSS.  Yellow non- skid socks placed on patient. Fall risk review with patient.  Procedure and recovery process explained to patient and all questions answered.  Patient verbalized understanding.  Will continue to monitor.

## 2022-11-04 NOTE — PLAN OF CARE
Patient discharged to home as ordered after recovery per .     All discharge instructions, printed materials given.    Patient instructed on follow-up appointment as ordered.  Pt in agreement with all discharge instructions.  Pt is AAOx3, VSS, denies any pain.  Skin normal in color and warm to touch. Palpated bilateral radial pulses.  Pt drank water and tolerated well with no  n/v. Pt got dressed and moving around without difficulty and no distress noted.  Pt in w/c.  Left AC 20 gauge iv dc'd as ordered with tip intact. dimitrios and koban dressing applied c.d.i.  Pt discharged home via wheelchair to private vehicle by pt's sister.

## 2022-11-04 NOTE — INTERVAL H&P NOTE
The patient has been examined and the H&P has been reviewed:    I concur with the findings and changes have been noted since the H&P was written: Plan for PAUL/DCCV     Anesthesia/Surgery risks, benefits and alternative options discussed and understood by patient/family.          There are no hospital problems to display for this patient.

## 2022-11-04 NOTE — BRIEF OP NOTE
PAUL/DCCV    Sedation: Monitored anesthesia staff    Indication: Atrial fibrillation      PAUL/ Performed without any complication.  No left atrial appendage thrombus identified.     DCCV :Anterio-posterior PAD position.    Shock delivered was 200J    Result: Successful restoration of sinus rhythm.     Complication:   None    Start Amiodarone 400 mg bid for 2 weeks and 200 mg daily  Continue Coumadin   F/U with Coumadin clinic

## 2022-11-04 NOTE — PLAN OF CARE
Pt in recovery post PAUL/DCCV with anesthesia, Dr. Luis and Cardiology. Pt tolerated well.  VSS.  NAD noted.

## 2022-11-04 NOTE — TRANSFER OF CARE
"Anesthesia Transfer of Care Note    Patient: Mis Ca    Procedure(s) Performed: Procedure(s) (LRB):  Transesophageal echo (PAUL) intra-procedure log documentation (N/A)    Patient location: Cath Lab    Anesthesia Type: general    Transport from OR: Transported from OR on room air with adequate spontaneous ventilation    Post pain: adequate analgesia    Post assessment: no apparent anesthetic complications and tolerated procedure well    Post vital signs: stable    Level of consciousness: awake    Nausea/Vomiting: no nausea/vomiting    Complications: none    Transfer of care protocol was followed      Last vitals:   Visit Vitals  /68   Pulse 86   Temp 36.4 °C (97.6 °F) (Temporal)   Resp 16   Ht 5' 2" (1.575 m)   Wt 95.7 kg (211 lb)   SpO2 98%   Breastfeeding No   BMI 38.59 kg/m²     "

## 2022-11-04 NOTE — ANESTHESIA POSTPROCEDURE EVALUATION
Anesthesia Post Evaluation    Patient: Mis Ca    Procedure(s) Performed: Procedure(s) (LRB):  Transesophageal echo (PAUL) intra-procedure log documentation (N/A)    Final Anesthesia Type: general      Patient location during evaluation: Cath Lab  Patient participation: Yes- Able to Participate  Level of consciousness: awake and alert and oriented  Post-procedure vital signs: reviewed and stable  Pain management: adequate  Airway patency: patent    PONV status at discharge: No PONV  Anesthetic complications: no      Cardiovascular status: hemodynamically stable  Respiratory status: room air, unassisted and spontaneous ventilation  Hydration status: euvolemic  Follow-up not needed.          Vitals Value Taken Time   /51 11/04/22 1000   Temp 98.3 11/04/22 1000   Pulse 71 11/04/22 1000   Resp 22 11/04/22 1000   SpO2 95 11/04/22 1000   Vitals shown include unvalidated device data.      No case tracking events are documented in the log.      Pain/Bandar Score: Bandar Score: 10 (11/4/2022  9:34 AM)

## 2022-11-04 NOTE — DISCHARGE SUMMARY
[place] - Lab (Hospital)  Discharge Note  Short Stay    Procedure(s) (LRB):  Transesophageal echo (PAUL) intra-procedure log documentation (N/A)      OUTCOME: Patient tolerated treatment/procedure well without complication and is now ready for discharge.  PAUL/DCCV     Sedation: Monitored anesthesia staff     Indication: Atrial fibrillation        PAUL/ Performed without any complication.  No left atrial appendage thrombus identified.      DCCV :Anterio-posterior PAD position.    Shock delivered was 200J     Result: Successful restoration of sinus rhythm.      Complication:   None     Start Amiodarone 400 mg bid for 2 weeks and 200 mg daily  Continue Coumadin   F/U with Coumadin clinic               DISPOSITION: Home or Self Care    FINAL DIAGNOSIS:  <principal problem not specified>    FOLLOWUP: In clinic    DISCHARGE INSTRUCTIONS:    Discharge Procedure Orders   CBC W/ AUTO DIFFERENTIAL   Standing Status: Future Number of Occurrences: 1 Standing Exp. Date: 12/27/23   Order Comments: preop     BASIC METABOLIC PANEL   Standing Status: Future Number of Occurrences: 1 Standing Exp. Date: 12/27/23   Order Comments: preop        TIME SPENT ON DISCHARGE: 35 minutes

## 2022-11-07 ENCOUNTER — LAB VISIT (OUTPATIENT)
Dept: LAB | Facility: HOSPITAL | Age: 82
End: 2022-11-07
Attending: INTERNAL MEDICINE
Payer: MEDICARE

## 2022-11-07 ENCOUNTER — ANTI-COAG VISIT (OUTPATIENT)
Dept: CARDIOLOGY | Facility: CLINIC | Age: 82
End: 2022-11-07
Payer: MEDICARE

## 2022-11-07 DIAGNOSIS — Z79.01 LONG TERM (CURRENT) USE OF ANTICOAGULANTS: ICD-10-CM

## 2022-11-07 DIAGNOSIS — I48.19 PERSISTENT ATRIAL FIBRILLATION: Primary | ICD-10-CM

## 2022-11-07 DIAGNOSIS — I48.19 PERSISTENT ATRIAL FIBRILLATION: ICD-10-CM

## 2022-11-07 LAB
BSA FOR ECHO PROCEDURE: 2.05 M2
EJECTION FRACTION: 55 %
INR PPP: 2.5 (ref 0.8–1.2)
PROTHROMBIN TIME: 26 SEC (ref 9–12.5)

## 2022-11-07 PROCEDURE — 85610 PROTHROMBIN TIME: CPT | Mod: PO | Performed by: INTERNAL MEDICINE

## 2022-11-07 PROCEDURE — 36415 COLL VENOUS BLD VENIPUNCTURE: CPT | Mod: PO | Performed by: INTERNAL MEDICINE

## 2022-11-07 PROCEDURE — 93793 ANTICOAG MGMT PT WARFARIN: CPT | Mod: S$GLB,,, | Performed by: PHARMACIST

## 2022-11-07 PROCEDURE — 93793 PR ANTICOAGULANT MGMT FOR PT TAKING WARFARIN: ICD-10-PCS | Mod: S$GLB,,, | Performed by: PHARMACIST

## 2022-11-07 NOTE — PROGRESS NOTES
Patient underwent Thony/DCCV 11/4/22. Per discharge instructions:  Patient started Amiodarone 400 mg bid for 2 weeks and 200 mg daily  Continue Coumadin   F/U with Coumadin clinic

## 2022-11-08 ENCOUNTER — PATIENT MESSAGE (OUTPATIENT)
Dept: CARDIOLOGY | Facility: CLINIC | Age: 82
End: 2022-11-08
Payer: MEDICARE

## 2022-11-09 DIAGNOSIS — I10 ESSENTIAL HYPERTENSION: Primary | ICD-10-CM

## 2022-11-09 RX ORDER — AMLODIPINE BESYLATE 5 MG/1
5 TABLET ORAL DAILY
Qty: 30 TABLET | Refills: 11 | Status: SHIPPED | OUTPATIENT
Start: 2022-11-09 | End: 2023-02-01

## 2022-11-09 NOTE — TELEPHONE ENCOUNTER
Discussed with Ms. Ca. Will start Amlodipine 5 mg daily, continue to monitor BP at home and keep us updated.

## 2022-11-10 ENCOUNTER — LAB VISIT (OUTPATIENT)
Dept: LAB | Facility: HOSPITAL | Age: 82
End: 2022-11-10
Attending: INTERNAL MEDICINE
Payer: MEDICARE

## 2022-11-10 ENCOUNTER — ANTI-COAG VISIT (OUTPATIENT)
Dept: CARDIOLOGY | Facility: CLINIC | Age: 82
End: 2022-11-10
Payer: MEDICARE

## 2022-11-10 DIAGNOSIS — Z79.01 LONG TERM (CURRENT) USE OF ANTICOAGULANTS: ICD-10-CM

## 2022-11-10 DIAGNOSIS — I48.19 PERSISTENT ATRIAL FIBRILLATION: Primary | ICD-10-CM

## 2022-11-10 DIAGNOSIS — I48.19 PERSISTENT ATRIAL FIBRILLATION: ICD-10-CM

## 2022-11-10 LAB
INR PPP: 2.8 (ref 0.8–1.2)
PROTHROMBIN TIME: 29 SEC (ref 9–12.5)

## 2022-11-10 PROCEDURE — 93793 ANTICOAG MGMT PT WARFARIN: CPT | Mod: S$GLB,,, | Performed by: PHARMACIST

## 2022-11-10 PROCEDURE — 93793 PR ANTICOAGULANT MGMT FOR PT TAKING WARFARIN: ICD-10-PCS | Mod: S$GLB,,, | Performed by: PHARMACIST

## 2022-11-10 PROCEDURE — 85610 PROTHROMBIN TIME: CPT | Mod: PO | Performed by: INTERNAL MEDICINE

## 2022-11-10 PROCEDURE — 36415 COLL VENOUS BLD VENIPUNCTURE: CPT | Mod: PO | Performed by: INTERNAL MEDICINE

## 2022-11-10 NOTE — PROGRESS NOTES
INR at goal. Medications and chart reviewed. No changes noted to necessitate adjustment of warfarin or follow-up plan. See calendar.  Watch closely for interaction to start with amiodarone/warfarin

## 2022-11-11 ENCOUNTER — TELEPHONE (OUTPATIENT)
Dept: FAMILY MEDICINE | Facility: CLINIC | Age: 82
End: 2022-11-11
Payer: MEDICARE

## 2022-11-11 DIAGNOSIS — G47.33 OSA (OBSTRUCTIVE SLEEP APNEA): Primary | ICD-10-CM

## 2022-11-11 NOTE — TELEPHONE ENCOUNTER
LM for pt explaining cpap order has been faxed to [facility]. If she has any questions to call back clinic    Lyndsey Chavez Staff  Caller: Unspecified (Today, 12:13 PM)  Type:  Patient Returning Call     Who Called:Pt   Who Left Message for Patient:Sarina   Does the patient know what this is regarding?:yes   Would the patient rather a call back or a response via [facility]? Call back   Best Call Back Number:[phone]   Additional Information: call back

## 2022-11-11 NOTE — TELEPHONE ENCOUNTER
I tried to order just a nasal mask; can't; have to order other items to make the red dots go away on the computer; I hope this prints out and goes to where it should.

## 2022-11-11 NOTE — TELEPHONE ENCOUNTER
----- Message from Sherry Kendall, CRT sent at 11/10/2022 10:22 AM CST -----  Regarding: Update script to a nasal mask  Dr. Dillard,    Ms. Lanny came into clinic for a new CPAP set-up. The script was written for a FFM; however, patient states that she has always used a nasal mask. Can you please update the script so we can provide her with a new nasal mask.    Thank you,  Sherry Kendall, CRT

## 2022-11-14 ENCOUNTER — LAB VISIT (OUTPATIENT)
Dept: LAB | Facility: HOSPITAL | Age: 82
End: 2022-11-14
Attending: INTERNAL MEDICINE
Payer: MEDICARE

## 2022-11-14 DIAGNOSIS — I48.19 PERSISTENT ATRIAL FIBRILLATION: ICD-10-CM

## 2022-11-14 DIAGNOSIS — Z79.01 LONG TERM (CURRENT) USE OF ANTICOAGULANTS: ICD-10-CM

## 2022-11-14 LAB
INR PPP: 3.3 (ref 0.8–1.2)
PROTHROMBIN TIME: 33.3 SEC (ref 9–12.5)

## 2022-11-14 PROCEDURE — 36415 COLL VENOUS BLD VENIPUNCTURE: CPT | Mod: PO | Performed by: INTERNAL MEDICINE

## 2022-11-14 PROCEDURE — 85610 PROTHROMBIN TIME: CPT | Mod: PO | Performed by: INTERNAL MEDICINE

## 2022-11-15 ENCOUNTER — ANTI-COAG VISIT (OUTPATIENT)
Dept: CARDIOLOGY | Facility: CLINIC | Age: 82
End: 2022-11-15
Payer: MEDICARE

## 2022-11-15 DIAGNOSIS — Z79.01 LONG TERM (CURRENT) USE OF ANTICOAGULANTS: ICD-10-CM

## 2022-11-15 DIAGNOSIS — I48.19 PERSISTENT ATRIAL FIBRILLATION: Primary | ICD-10-CM

## 2022-11-15 PROCEDURE — 93793 PR ANTICOAGULANT MGMT FOR PT TAKING WARFARIN: ICD-10-PCS | Mod: S$GLB,,, | Performed by: PHARMACIST

## 2022-11-15 PROCEDURE — 93793 ANTICOAG MGMT PT WARFARIN: CPT | Mod: S$GLB,,, | Performed by: PHARMACIST

## 2022-11-15 NOTE — PROGRESS NOTES
Patient states she started taking Amiodarone 11/4 and amlodipine about 4 days ago, took warfarin 3mg on 11/14 and taking 9mg fri and 6mg all other days

## 2022-11-21 ENCOUNTER — ANTI-COAG VISIT (OUTPATIENT)
Dept: CARDIOLOGY | Facility: CLINIC | Age: 82
End: 2022-11-21
Payer: MEDICARE

## 2022-11-21 ENCOUNTER — LAB VISIT (OUTPATIENT)
Dept: LAB | Facility: HOSPITAL | Age: 82
End: 2022-11-21
Attending: INTERNAL MEDICINE
Payer: MEDICARE

## 2022-11-21 DIAGNOSIS — I48.19 PERSISTENT ATRIAL FIBRILLATION: Primary | ICD-10-CM

## 2022-11-21 DIAGNOSIS — Z79.01 LONG TERM (CURRENT) USE OF ANTICOAGULANTS: ICD-10-CM

## 2022-11-21 DIAGNOSIS — I48.19 PERSISTENT ATRIAL FIBRILLATION: ICD-10-CM

## 2022-11-21 LAB
INR PPP: 2.8 (ref 0.8–1.2)
PROTHROMBIN TIME: 28.4 SEC (ref 9–12.5)

## 2022-11-21 PROCEDURE — 93793 ANTICOAG MGMT PT WARFARIN: CPT | Mod: S$GLB,,,

## 2022-11-21 PROCEDURE — 85610 PROTHROMBIN TIME: CPT | Mod: PO | Performed by: INTERNAL MEDICINE

## 2022-11-21 PROCEDURE — 36415 COLL VENOUS BLD VENIPUNCTURE: CPT | Mod: PO | Performed by: INTERNAL MEDICINE

## 2022-11-21 PROCEDURE — 93793 PR ANTICOAGULANT MGMT FOR PT TAKING WARFARIN: ICD-10-PCS | Mod: S$GLB,,,

## 2022-11-28 ENCOUNTER — ANTI-COAG VISIT (OUTPATIENT)
Dept: CARDIOLOGY | Facility: CLINIC | Age: 82
End: 2022-11-28
Payer: MEDICARE

## 2022-11-28 ENCOUNTER — LAB VISIT (OUTPATIENT)
Dept: LAB | Facility: HOSPITAL | Age: 82
End: 2022-11-28
Attending: INTERNAL MEDICINE
Payer: MEDICARE

## 2022-11-28 DIAGNOSIS — Z79.01 LONG TERM (CURRENT) USE OF ANTICOAGULANTS: ICD-10-CM

## 2022-11-28 DIAGNOSIS — I48.19 PERSISTENT ATRIAL FIBRILLATION: Primary | ICD-10-CM

## 2022-11-28 DIAGNOSIS — I48.19 PERSISTENT ATRIAL FIBRILLATION: ICD-10-CM

## 2022-11-28 LAB
INR PPP: 3.1 (ref 0.8–1.2)
PROTHROMBIN TIME: 31.7 SEC (ref 9–12.5)

## 2022-11-28 PROCEDURE — 93793 PR ANTICOAGULANT MGMT FOR PT TAKING WARFARIN: ICD-10-PCS | Mod: S$GLB,,, | Performed by: PHARMACIST

## 2022-11-28 PROCEDURE — 36415 COLL VENOUS BLD VENIPUNCTURE: CPT | Mod: PO | Performed by: INTERNAL MEDICINE

## 2022-11-28 PROCEDURE — 93793 ANTICOAG MGMT PT WARFARIN: CPT | Mod: S$GLB,,, | Performed by: PHARMACIST

## 2022-11-28 PROCEDURE — 85610 PROTHROMBIN TIME: CPT | Mod: PO | Performed by: INTERNAL MEDICINE

## 2022-12-05 ENCOUNTER — ANTI-COAG VISIT (OUTPATIENT)
Dept: CARDIOLOGY | Facility: CLINIC | Age: 82
End: 2022-12-05
Payer: MEDICARE

## 2022-12-05 ENCOUNTER — LAB VISIT (OUTPATIENT)
Dept: LAB | Facility: HOSPITAL | Age: 82
End: 2022-12-05
Attending: INTERNAL MEDICINE
Payer: MEDICARE

## 2022-12-05 DIAGNOSIS — I48.19 PERSISTENT ATRIAL FIBRILLATION: ICD-10-CM

## 2022-12-05 DIAGNOSIS — I48.19 PERSISTENT ATRIAL FIBRILLATION: Primary | ICD-10-CM

## 2022-12-05 DIAGNOSIS — Z79.01 LONG TERM (CURRENT) USE OF ANTICOAGULANTS: ICD-10-CM

## 2022-12-05 LAB
INR PPP: 2.7 (ref 0.8–1.2)
PROTHROMBIN TIME: 28.1 SEC (ref 9–12.5)

## 2022-12-05 PROCEDURE — 85610 PROTHROMBIN TIME: CPT | Mod: PO | Performed by: INTERNAL MEDICINE

## 2022-12-05 PROCEDURE — 93793 ANTICOAG MGMT PT WARFARIN: CPT | Mod: S$GLB,,, | Performed by: PHARMACIST

## 2022-12-05 PROCEDURE — 93793 PR ANTICOAGULANT MGMT FOR PT TAKING WARFARIN: ICD-10-PCS | Mod: S$GLB,,, | Performed by: PHARMACIST

## 2022-12-05 PROCEDURE — 36415 COLL VENOUS BLD VENIPUNCTURE: CPT | Mod: PO | Performed by: INTERNAL MEDICINE

## 2022-12-06 ENCOUNTER — PATIENT MESSAGE (OUTPATIENT)
Dept: CARDIOLOGY | Facility: CLINIC | Age: 82
End: 2022-12-06
Payer: MEDICARE

## 2022-12-06 DIAGNOSIS — R60.9 SWELLING: ICD-10-CM

## 2022-12-06 RX ORDER — FUROSEMIDE 20 MG/1
20 TABLET ORAL DAILY
Qty: 90 TABLET | Refills: 3 | Status: SHIPPED | OUTPATIENT
Start: 2022-12-06 | End: 2023-11-27

## 2022-12-06 NOTE — PROGRESS NOTES
Patient verified warfarin: 6mg daily except 3mg Monday/Friday--but she did not hold dose 11/28, reports a bruise on R arm between elbow and wrist, reports swelling in feet/ankles towards end of the day.

## 2022-12-12 ENCOUNTER — LAB VISIT (OUTPATIENT)
Dept: LAB | Facility: HOSPITAL | Age: 82
End: 2022-12-12
Attending: INTERNAL MEDICINE
Payer: MEDICARE

## 2022-12-12 ENCOUNTER — ANTI-COAG VISIT (OUTPATIENT)
Dept: CARDIOLOGY | Facility: CLINIC | Age: 82
End: 2022-12-12
Payer: MEDICARE

## 2022-12-12 DIAGNOSIS — Z79.01 LONG TERM (CURRENT) USE OF ANTICOAGULANTS: ICD-10-CM

## 2022-12-12 DIAGNOSIS — I48.19 PERSISTENT ATRIAL FIBRILLATION: Primary | ICD-10-CM

## 2022-12-12 DIAGNOSIS — I48.19 PERSISTENT ATRIAL FIBRILLATION: ICD-10-CM

## 2022-12-12 LAB
INR PPP: 2.2 (ref 0.8–1.2)
PROTHROMBIN TIME: 23 SEC (ref 9–12.5)

## 2022-12-12 PROCEDURE — 93793 PR ANTICOAGULANT MGMT FOR PT TAKING WARFARIN: ICD-10-PCS | Mod: S$GLB,,, | Performed by: PHARMACIST

## 2022-12-12 PROCEDURE — 85610 PROTHROMBIN TIME: CPT | Mod: PO | Performed by: INTERNAL MEDICINE

## 2022-12-12 PROCEDURE — 36415 COLL VENOUS BLD VENIPUNCTURE: CPT | Mod: PO | Performed by: INTERNAL MEDICINE

## 2022-12-12 PROCEDURE — 93793 ANTICOAG MGMT PT WARFARIN: CPT | Mod: S$GLB,,, | Performed by: PHARMACIST

## 2022-12-16 DIAGNOSIS — I48.19 PERSISTENT ATRIAL FIBRILLATION: Primary | ICD-10-CM

## 2022-12-19 ENCOUNTER — ANTI-COAG VISIT (OUTPATIENT)
Dept: CARDIOLOGY | Facility: CLINIC | Age: 82
End: 2022-12-19
Payer: MEDICARE

## 2022-12-19 ENCOUNTER — LAB VISIT (OUTPATIENT)
Dept: LAB | Facility: HOSPITAL | Age: 82
End: 2022-12-19
Attending: INTERNAL MEDICINE
Payer: MEDICARE

## 2022-12-19 DIAGNOSIS — Z79.01 LONG TERM (CURRENT) USE OF ANTICOAGULANTS: ICD-10-CM

## 2022-12-19 DIAGNOSIS — I48.19 PERSISTENT ATRIAL FIBRILLATION: Primary | ICD-10-CM

## 2022-12-19 DIAGNOSIS — I48.19 PERSISTENT ATRIAL FIBRILLATION: ICD-10-CM

## 2022-12-19 LAB
INR PPP: 2.1 (ref 0.8–1.2)
PROTHROMBIN TIME: 22.1 SEC (ref 9–12.5)

## 2022-12-19 PROCEDURE — 36415 COLL VENOUS BLD VENIPUNCTURE: CPT | Mod: PO | Performed by: INTERNAL MEDICINE

## 2022-12-19 PROCEDURE — 93793 ANTICOAG MGMT PT WARFARIN: CPT | Mod: S$GLB,,, | Performed by: PHARMACIST

## 2022-12-19 PROCEDURE — 85610 PROTHROMBIN TIME: CPT | Mod: PO | Performed by: INTERNAL MEDICINE

## 2022-12-19 PROCEDURE — 93793 PR ANTICOAGULANT MGMT FOR PT TAKING WARFARIN: ICD-10-PCS | Mod: S$GLB,,, | Performed by: PHARMACIST

## 2022-12-19 NOTE — PROGRESS NOTES
INR at goal. Medications and chart reviewed. No changes noted to necessitate adjustment of warfarin or follow-up plan. See calendar.

## 2022-12-21 DIAGNOSIS — G47.33 OSA (OBSTRUCTIVE SLEEP APNEA): ICD-10-CM

## 2022-12-21 NOTE — TELEPHONE ENCOUNTER
No new care gaps identified.  [facility] Embedded Care Gaps. Reference number: [id number]. 12/21/2022   1:04:07 PM CST

## 2022-12-22 DIAGNOSIS — I48.0 PAROXYSMAL ATRIAL FIBRILLATION: ICD-10-CM

## 2022-12-22 RX ORDER — DILTIAZEM HYDROCHLORIDE 240 MG/1
CAPSULE, COATED, EXTENDED RELEASE ORAL
Qty: 90 CAPSULE | Refills: 3 | OUTPATIENT
Start: 2022-12-22

## 2022-12-22 RX ORDER — OXYBUTYNIN CHLORIDE 5 MG/1
TABLET ORAL
Qty: 90 TABLET | Refills: 3 | Status: SHIPPED | OUTPATIENT
Start: 2022-12-22 | End: 2023-11-19

## 2022-12-22 RX ORDER — ALPRAZOLAM 0.25 MG/1
TABLET ORAL
Qty: 180 TABLET | Refills: 0 | Status: SHIPPED | OUTPATIENT
Start: 2022-12-22 | End: 2023-06-10

## 2022-12-23 ENCOUNTER — OFFICE VISIT (OUTPATIENT)
Dept: CARDIOLOGY | Facility: CLINIC | Age: 82
End: 2022-12-23
Payer: MEDICARE

## 2022-12-23 VITALS — DIASTOLIC BLOOD PRESSURE: 67 MMHG | HEART RATE: 57 BPM | SYSTOLIC BLOOD PRESSURE: 124 MMHG

## 2022-12-23 DIAGNOSIS — Z79.899 AT RISK FOR AMIODARONE TOXICITY WITH LONG TERM USE: ICD-10-CM

## 2022-12-23 DIAGNOSIS — E66.01 SEVERE OBESITY WITH BODY MASS INDEX (BMI) OF 35.0 TO 39.9 WITH SERIOUS COMORBIDITY: ICD-10-CM

## 2022-12-23 DIAGNOSIS — I10 ESSENTIAL HYPERTENSION: ICD-10-CM

## 2022-12-23 DIAGNOSIS — G47.33 OSA (OBSTRUCTIVE SLEEP APNEA): ICD-10-CM

## 2022-12-23 DIAGNOSIS — Z91.89 AT RISK FOR AMIODARONE TOXICITY WITH LONG TERM USE: ICD-10-CM

## 2022-12-23 DIAGNOSIS — I48.19 PERSISTENT ATRIAL FIBRILLATION: Primary | ICD-10-CM

## 2022-12-23 DIAGNOSIS — I70.0 AORTIC ATHEROSCLEROSIS: ICD-10-CM

## 2022-12-23 PROCEDURE — 3078F PR MOST RECENT DIASTOLIC BLOOD PRESSURE < 80 MM HG: ICD-10-PCS | Mod: CPTII,S$GLB,, | Performed by: INTERNAL MEDICINE

## 2022-12-23 PROCEDURE — 1159F PR MEDICATION LIST DOCUMENTED IN MEDICAL RECORD: ICD-10-PCS | Mod: CPTII,S$GLB,, | Performed by: INTERNAL MEDICINE

## 2022-12-23 PROCEDURE — 1126F PR PAIN SEVERITY QUANTIFIED, NO PAIN PRESENT: ICD-10-PCS | Mod: CPTII,S$GLB,, | Performed by: INTERNAL MEDICINE

## 2022-12-23 PROCEDURE — 3288F FALL RISK ASSESSMENT DOCD: CPT | Mod: CPTII,S$GLB,, | Performed by: INTERNAL MEDICINE

## 2022-12-23 PROCEDURE — 1126F AMNT PAIN NOTED NONE PRSNT: CPT | Mod: CPTII,S$GLB,, | Performed by: INTERNAL MEDICINE

## 2022-12-23 PROCEDURE — 3074F SYST BP LT 130 MM HG: CPT | Mod: CPTII,S$GLB,, | Performed by: INTERNAL MEDICINE

## 2022-12-23 PROCEDURE — 93010 ELECTROCARDIOGRAM REPORT: CPT | Mod: S$GLB,,, | Performed by: INTERNAL MEDICINE

## 2022-12-23 PROCEDURE — 99214 PR OFFICE/OUTPT VISIT, EST, LEVL IV, 30-39 MIN: ICD-10-PCS | Mod: S$GLB,,, | Performed by: INTERNAL MEDICINE

## 2022-12-23 PROCEDURE — 99999 PR PBB SHADOW E&M-EST. PATIENT-LVL III: CPT | Mod: PBBFAC,,, | Performed by: INTERNAL MEDICINE

## 2022-12-23 PROCEDURE — 99214 OFFICE O/P EST MOD 30 MIN: CPT | Mod: S$GLB,,, | Performed by: INTERNAL MEDICINE

## 2022-12-23 PROCEDURE — 1101F PT FALLS ASSESS-DOCD LE1/YR: CPT | Mod: CPTII,S$GLB,, | Performed by: INTERNAL MEDICINE

## 2022-12-23 PROCEDURE — 93005 ELECTROCARDIOGRAM TRACING: CPT | Mod: S$GLB,,, | Performed by: INTERNAL MEDICINE

## 2022-12-23 PROCEDURE — 99999 PR PBB SHADOW E&M-EST. PATIENT-LVL III: ICD-10-PCS | Mod: PBBFAC,,, | Performed by: INTERNAL MEDICINE

## 2022-12-23 PROCEDURE — 93005 EKG 12-LEAD: ICD-10-PCS | Mod: S$GLB,,, | Performed by: INTERNAL MEDICINE

## 2022-12-23 PROCEDURE — 3074F PR MOST RECENT SYSTOLIC BLOOD PRESSURE < 130 MM HG: ICD-10-PCS | Mod: CPTII,S$GLB,, | Performed by: INTERNAL MEDICINE

## 2022-12-23 PROCEDURE — 1160F PR REVIEW ALL MEDS BY PRESCRIBER/CLIN PHARMACIST DOCUMENTED: ICD-10-PCS | Mod: CPTII,S$GLB,, | Performed by: INTERNAL MEDICINE

## 2022-12-23 PROCEDURE — 1159F MED LIST DOCD IN RCRD: CPT | Mod: CPTII,S$GLB,, | Performed by: INTERNAL MEDICINE

## 2022-12-23 PROCEDURE — 3288F PR FALLS RISK ASSESSMENT DOCUMENTED: ICD-10-PCS | Mod: CPTII,S$GLB,, | Performed by: INTERNAL MEDICINE

## 2022-12-23 PROCEDURE — 1101F PR PT FALLS ASSESS DOC 0-1 FALLS W/OUT INJ PAST YR: ICD-10-PCS | Mod: CPTII,S$GLB,, | Performed by: INTERNAL MEDICINE

## 2022-12-23 PROCEDURE — 1160F RVW MEDS BY RX/DR IN RCRD: CPT | Mod: CPTII,S$GLB,, | Performed by: INTERNAL MEDICINE

## 2022-12-23 PROCEDURE — 3078F DIAST BP <80 MM HG: CPT | Mod: CPTII,S$GLB,, | Performed by: INTERNAL MEDICINE

## 2022-12-23 PROCEDURE — 93010 EKG 12-LEAD: ICD-10-PCS | Mod: S$GLB,,, | Performed by: INTERNAL MEDICINE

## 2022-12-23 RX ORDER — AMIODARONE HYDROCHLORIDE 200 MG/1
100 TABLET ORAL DAILY
Qty: 45 TABLET | Refills: 3 | Status: SHIPPED | OUTPATIENT
Start: 2022-12-23 | End: 2023-12-01 | Stop reason: SDUPTHER

## 2022-12-23 NOTE — PROGRESS NOTES
Subjective:    Patient ID:  Mis Ca is a 82 y.o. female who presents for evaluation of Atrial Fibrillation      Referring Cardiologist: Portillo Luis MD  Primary Care Physician: Scott Dillard MD    HPI  Prior Hx:  I had the pleasure of seeing Mrs. Ca in our electrophysiology clinic in follow-up for her atrial arrhythmia. As you are aware she is a pleasant 82 year-old woman with paroxysmal atrial fibrillation, hypertension, obstructive sleep apnea, aortic atherosclerosis and obesity. She reported palpitations for over 10 years. Ultimately diagnosed with pAF in 2014, at which time she was hospitalized in the ICU and treated with a diltiazem drip. She had several paroxysms and hospitalizations for pAF however was not referred to EP until 10/2022. Had recently been admitted for AF with RVR, rate controlled and discharged. She reported ongoing symptoms of dyspnea, palpitations, fatigue and at times hypotension. She is on coumadin for anticoagulation (DOACs are cost prohibitive). We discussed how rhythm control strategies utilizing anti-arrhythmic drugs and/or ablation strategies have lowered efficacy once in the persistent phase compared to paroxysmal phase. Due to LVH noted on ECHO (posterior wall 1.6cm), amiodarone is the only recommended drug. Discussed long-term toxicities related to amiodarone. She elected to proceed.    ECHO 11/2020: normal LV function with moderate LVH, normal mitral valve and mild left atrial enlargement.    I have reviewed all available electrocardiograms in Epic which show either sinus rhythm/bradycardia with left anterior fascicular block or AF, at times with RVR (11/2014, 9/2017, 10/2022).    11/4/2022: PAUL/DCCV and initiated on amiodarone    Interim Hx:  Mrs. Ca returns for follow-up. She feels much better in sinus rhythm. Notes chronic leg edema. Has compression stockings but doesn't wear them.    My interpretation of today's in clinic ECG is sinus rhythm with a rate of 63  bpm.      Review of Systems   Constitutional: Negative for fever and malaise/fatigue.   HENT:  Negative for congestion and sore throat.    Eyes:  Negative for blurred vision and visual disturbance.   Cardiovascular:  Positive for leg swelling. Negative for chest pain, dyspnea on exertion, irregular heartbeat, near-syncope, palpitations and syncope.   Respiratory:  Negative for cough and shortness of breath.    Hematologic/Lymphatic: Negative for bleeding problem. Does not bruise/bleed easily.   Skin: Negative.    Musculoskeletal: Negative.    Gastrointestinal:  Negative for bloating, abdominal pain, hematochezia and melena.   Neurological:  Negative for focal weakness and weakness.   Psychiatric/Behavioral: Negative.        Objective:    Physical Exam  Vitals reviewed.   Constitutional:       General: She is not in acute distress.     Appearance: She is well-developed. She is not diaphoretic.   HENT:      Head: Normocephalic and atraumatic.   Eyes:      General:         Right eye: No discharge.         Left eye: No discharge.      Conjunctiva/sclera: Conjunctivae normal.   Cardiovascular:      Rate and Rhythm: Normal rate and regular rhythm.      Heart sounds: No murmur heard.    No friction rub. No gallop.   Pulmonary:      Effort: Pulmonary effort is normal. No respiratory distress.      Breath sounds: Normal breath sounds. No wheezing or rales.   Abdominal:      General: Bowel sounds are normal. There is no distension.      Palpations: Abdomen is soft.      Tenderness: There is no abdominal tenderness.   Musculoskeletal:      Cervical back: Neck supple.   Skin:     General: Skin is warm and dry.   Neurological:      Mental Status: She is alert and oriented to person, place, and time.   Psychiatric:         Behavior: Behavior normal.         Thought Content: Thought content normal.         Judgment: Judgment normal.         Assessment:       1. Persistent atrial fibrillation    2. Essential hypertension    3.  Aortic atherosclerosis    4. Severe obesity with body mass index (BMI) of 35.0 to 39.9 with serious comorbidity    5. JACQUELYN (obstructive sleep apnea)         Plan:       In summary, Mrs. Ca is a pleasant 82 year-old woman with paroxysmal atrial fibrillation which is now persistent, hypertension, obstructive sleep apnea, aortic atherosclerosis and obesity. Her [id number] score is 5 and indefinite anticoagulation is indicated. She has elected for rhythm control. Due to LVH noted on ECHO (posterior wall 1.6cm), amiodarone is the only recommended drug. Discussed long-term toxicities. She is maintaining sinus rhythm on 200mg daily. Will reduce to 100mg daily. Check CMP/TSH.    RTC in 6 months, sooner if needed.    Thank you for allowing me to participate in the care of this patient. Please do not hesitate to call me with any questions or concerns.    Shamar Owens MD, PhD  Cardiac Electrophysiology

## 2022-12-23 NOTE — TELEPHONE ENCOUNTER
Refill Decision Note   Mis Ca  is requesting a refill authorization.  Brief Assessment and Rationale for Refill:  Quick Discontinue  Approve     Medication Therapy Plan:  Diltiazem dc'd by Shamar Owens MD on (10/28/22); [facility] diltiazem    Medication Reconciliation Completed: No   Comments:     No Care Gaps recommended.     Note composed:11:34 PM 12/22/2022

## 2022-12-23 NOTE — PROGRESS NOTES
"Per message from Dr Owens, "we are reducing Mrs. Ca's daily amiodarone dose to 100mg daily.  "    We will monitor closely for the need to make coumadin adjustments. Next INR 12/28    "

## 2022-12-28 ENCOUNTER — LAB VISIT (OUTPATIENT)
Dept: LAB | Facility: HOSPITAL | Age: 82
End: 2022-12-28
Attending: INTERNAL MEDICINE
Payer: MEDICARE

## 2022-12-28 ENCOUNTER — ANTI-COAG VISIT (OUTPATIENT)
Dept: CARDIOLOGY | Facility: CLINIC | Age: 82
End: 2022-12-28
Payer: MEDICARE

## 2022-12-28 ENCOUNTER — PATIENT MESSAGE (OUTPATIENT)
Dept: ELECTROPHYSIOLOGY | Facility: CLINIC | Age: 82
End: 2022-12-28
Payer: MEDICARE

## 2022-12-28 DIAGNOSIS — Z79.01 LONG TERM (CURRENT) USE OF ANTICOAGULANTS: ICD-10-CM

## 2022-12-28 DIAGNOSIS — I48.19 PERSISTENT ATRIAL FIBRILLATION: ICD-10-CM

## 2022-12-28 DIAGNOSIS — Z79.899 AT RISK FOR AMIODARONE TOXICITY WITH LONG TERM USE: ICD-10-CM

## 2022-12-28 DIAGNOSIS — I48.19 PERSISTENT ATRIAL FIBRILLATION: Primary | ICD-10-CM

## 2022-12-28 DIAGNOSIS — Z91.89 AT RISK FOR AMIODARONE TOXICITY WITH LONG TERM USE: ICD-10-CM

## 2022-12-28 LAB
ALBUMIN SERPL BCP-MCNC: 4.8 G/DL (ref 3.5–5.2)
ALP SERPL-CCNC: 82 U/L (ref 38–126)
ALT SERPL W/O P-5'-P-CCNC: 25 U/L (ref 10–44)
ANION GAP SERPL CALC-SCNC: 6 MMOL/L (ref 8–16)
AST SERPL-CCNC: 24 U/L (ref 15–46)
BILIRUB SERPL-MCNC: 0.4 MG/DL (ref 0.1–1)
CALCIUM SERPL-MCNC: 9 MG/DL (ref 8.7–10.5)
CHLORIDE SERPL-SCNC: 101 MMOL/L (ref 95–110)
CO2 SERPL-SCNC: 35 MMOL/L (ref 23–29)
CREAT SERPL-MCNC: 1.07 MG/DL (ref 0.5–1.4)
EST. GFR  (NO RACE VARIABLE): 52.2 ML/MIN/1.73 M^2
GLUCOSE SERPL-MCNC: 109 MG/DL (ref 70–110)
INR PPP: 2.7 (ref 0.8–1.2)
POTASSIUM SERPL-SCNC: 3.8 MMOL/L (ref 3.5–5.1)
PROT SERPL-MCNC: 7.3 G/DL (ref 6–8.4)
PROTHROMBIN TIME: 27.4 SEC (ref 9–12.5)
SODIUM SERPL-SCNC: 142 MMOL/L (ref 136–145)
TSH SERPL DL<=0.005 MIU/L-ACNC: 2.39 UIU/ML (ref 0.4–4)
UUN UR-MCNC: 25 MG/DL (ref 7–17)

## 2022-12-28 PROCEDURE — 36415 COLL VENOUS BLD VENIPUNCTURE: CPT | Mod: PO | Performed by: INTERNAL MEDICINE

## 2022-12-28 PROCEDURE — 93793 ANTICOAG MGMT PT WARFARIN: CPT | Mod: S$GLB,,,

## 2022-12-28 PROCEDURE — 93793 PR ANTICOAGULANT MGMT FOR PT TAKING WARFARIN: ICD-10-PCS | Mod: S$GLB,,,

## 2022-12-28 PROCEDURE — 84443 ASSAY THYROID STIM HORMONE: CPT | Mod: PO | Performed by: INTERNAL MEDICINE

## 2022-12-28 PROCEDURE — 80053 COMPREHEN METABOLIC PANEL: CPT | Mod: PO | Performed by: INTERNAL MEDICINE

## 2022-12-28 PROCEDURE — 85610 PROTHROMBIN TIME: CPT | Mod: PO | Performed by: INTERNAL MEDICINE

## 2022-12-28 NOTE — PROGRESS NOTES
INR at goal. Medications and chart reviewed. No changes noted to necessitate adjustment of warfarin or follow-up plan. See calendar.    Noted amio decrease on 12/23 but effect may be delayed and only be slight. Reevaluate within 2 weeks

## 2023-01-04 ENCOUNTER — PES CALL (OUTPATIENT)
Dept: ADMINISTRATIVE | Facility: CLINIC | Age: 83
End: 2023-01-04
Payer: MEDICARE

## 2023-01-09 ENCOUNTER — ANTI-COAG VISIT (OUTPATIENT)
Dept: CARDIOLOGY | Facility: CLINIC | Age: 83
End: 2023-01-09
Payer: MEDICARE

## 2023-01-09 ENCOUNTER — LAB VISIT (OUTPATIENT)
Dept: LAB | Facility: HOSPITAL | Age: 83
End: 2023-01-09
Attending: INTERNAL MEDICINE
Payer: MEDICARE

## 2023-01-09 DIAGNOSIS — I48.19 PERSISTENT ATRIAL FIBRILLATION: ICD-10-CM

## 2023-01-09 DIAGNOSIS — Z79.01 LONG TERM (CURRENT) USE OF ANTICOAGULANTS: ICD-10-CM

## 2023-01-09 DIAGNOSIS — I48.19 PERSISTENT ATRIAL FIBRILLATION: Primary | ICD-10-CM

## 2023-01-09 LAB
INR PPP: 2.5 (ref 0.8–1.2)
PROTHROMBIN TIME: 25.7 SEC (ref 9–12.5)

## 2023-01-09 PROCEDURE — 36415 COLL VENOUS BLD VENIPUNCTURE: CPT | Mod: PO | Performed by: INTERNAL MEDICINE

## 2023-01-09 PROCEDURE — 93793 ANTICOAG MGMT PT WARFARIN: CPT | Mod: S$GLB,,, | Performed by: PHARMACIST

## 2023-01-09 PROCEDURE — 85610 PROTHROMBIN TIME: CPT | Mod: PO | Performed by: INTERNAL MEDICINE

## 2023-01-09 PROCEDURE — 93793 PR ANTICOAGULANT MGMT FOR PT TAKING WARFARIN: ICD-10-PCS | Mod: S$GLB,,, | Performed by: PHARMACIST

## 2023-01-23 ENCOUNTER — LAB VISIT (OUTPATIENT)
Dept: LAB | Facility: HOSPITAL | Age: 83
End: 2023-01-23
Attending: INTERNAL MEDICINE
Payer: MEDICARE

## 2023-01-23 ENCOUNTER — ANTI-COAG VISIT (OUTPATIENT)
Dept: CARDIOLOGY | Facility: CLINIC | Age: 83
End: 2023-01-23
Payer: MEDICARE

## 2023-01-23 DIAGNOSIS — I48.19 PERSISTENT ATRIAL FIBRILLATION: Primary | ICD-10-CM

## 2023-01-23 DIAGNOSIS — Z79.01 LONG TERM (CURRENT) USE OF ANTICOAGULANTS: ICD-10-CM

## 2023-01-23 DIAGNOSIS — I48.19 PERSISTENT ATRIAL FIBRILLATION: ICD-10-CM

## 2023-01-23 LAB
INR PPP: 2.4 (ref 0.8–1.2)
PROTHROMBIN TIME: 25.3 SEC (ref 9–12.5)

## 2023-01-23 PROCEDURE — 93793 PR ANTICOAGULANT MGMT FOR PT TAKING WARFARIN: ICD-10-PCS | Mod: S$GLB,,, | Performed by: PHARMACIST

## 2023-01-23 PROCEDURE — 36415 COLL VENOUS BLD VENIPUNCTURE: CPT | Mod: PO | Performed by: INTERNAL MEDICINE

## 2023-01-23 PROCEDURE — 85610 PROTHROMBIN TIME: CPT | Mod: PO | Performed by: INTERNAL MEDICINE

## 2023-01-23 PROCEDURE — 93793 ANTICOAG MGMT PT WARFARIN: CPT | Mod: S$GLB,,, | Performed by: PHARMACIST

## 2023-01-30 NOTE — PROGRESS NOTES
Subjective:   @Patient ID:  Mis Ca is a 82 y.o. female who presents for follow-up of PAF, Hyperlipidemia, HTN, DD      HPI:   Here for f/u  S/p DCCV   She remains in SR.  Feeling much better since back in normal rhythm  Noticed swelling in the legs that started after amlodipine was started  Since she did see Dr. Van recently and amiodarone was decreased to 100 mg  Compliant with lasix  Blood pressure is well controlled    Back to coumadin due to cost with xarelto   Night cramps better after Lipitor 40 mg qhs was switched to Pravastatin      JACQUELYN : has been off the CPAP since it was recalled        Prior cardiovascular  Hx  --------------------------------          - ECHO    9/2019 Normal left ventricular systolic function. The estimated ejection fraction is 65%  Normal LV diastolic function.  Concentric left ventricular hypertrophy.  Normal right ventricular systolic function.             12/2017  Normal left ventricular systolic function (EF 60-65%).     2 - Impaired LV relaxation, normal LAP (grade 1 diastolic dysfunction).     3 - Normal right ventricular systolic function .     4 - The estimated PA systolic pressure is 28 mmHg.     5 - Mild left atrial enlargement.         Patient Active Problem List    Diagnosis Date Noted    Palpitations 10/15/2022    Bilateral renal cysts 05/01/2022     Noted on U/S retroperitoneal complete dated 7/6/2021.      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       Antalgic gait 01/08/2019    Hallux valgus with bunions, left 10/31/2018    Severe obesity with body mass index (BMI) of  35.0 to 39.9 with serious comorbidity 2017    Atrial fibrillation 2017    Aortic atherosclerosis 2017     Seen on chest xray dated 17      Blind right eye 07/10/2017    Glaucoma 2017    Tortuous aorta 2017    History of adenomatous polyp of colon 2016    Diverticulosis of large intestine without hemorrhage 2016    JACQUELYN (obstructive sleep apnea) 2016    Long term (current) use of anticoagulants 2016    Anxiety 2016    Restless leg syndrome 2016    Hyperlipidemia 2016    Essential hypertension 2016    Gastroesophageal reflux disease without esophagitis 2016    Paroxysmal atrial fibrillation 2014           Right Arm BP - Sittin/69  Left Arm BP - Sittin/81        LAST HbA1c  Lab Results   Component Value Date    HGBA1C 6.7 (H) 2022       Lipid panel  Lab Results   Component Value Date    CHOL 193 2022    CHOL 148 2021    CHOL 155 2020     Lab Results   Component Value Date    HDL 39 (L) 2022    HDL 38 (L) 2021    HDL 40 2020     Lab Results   Component Value Date    LDLCALC 106.2 2022    LDLCALC 73.8 2021    LDLCALC 81.6 2020     Lab Results   Component Value Date    TRIG 239 (H) 2022    TRIG 181 (H) 2021    TRIG 167 (H) 2020     Lab Results   Component Value Date    CHOLHDL 20.2 2022    CHOLHDL 25.7 2021    CHOLHDL 25.8 2020            Review of Systems   Constitutional: Negative for chills and fever.   HENT:  Negative for hearing loss and nosebleeds.    Eyes:  Negative for blurred vision.   Cardiovascular:  Negative for chest pain and palpitations.        As in HPI    Respiratory:  Negative for hemoptysis and shortness of breath.    Hematologic/Lymphatic: Negative for bleeding problem.   Skin:  Negative for itching.   Musculoskeletal:         As in HPI    Gastrointestinal:  Negative for abdominal pain and hematochezia.    Genitourinary:  Negative for hematuria.   Neurological:  Negative for dizziness and loss of balance.   Psychiatric/Behavioral:  Negative for altered mental status and depression.      Objective:   Physical Exam  Constitutional:       Appearance: She is well-developed.   HENT:      Head: Normocephalic and atraumatic.   Eyes:      Conjunctiva/sclera: Conjunctivae normal.   Neck:      Vascular: No JVD.   Cardiovascular:      Rate and Rhythm: Regular rhythm. Bradycardia present.      Heart sounds: Normal heart sounds. No murmur heard.    No friction rub. No gallop.   Pulmonary:      Effort: Pulmonary effort is normal. No respiratory distress.      Breath sounds: Normal breath sounds. No stridor. No wheezing.   Musculoskeletal:      Cervical back: Neck supple.   Skin:     General: Skin is warm and dry.   Neurological:      Mental Status: She is alert and oriented to person, place, and time.   Psychiatric:         Behavior: Behavior normal.       Assessment:     1. Paroxysmal atrial fibrillation    2. Mixed hyperlipidemia    3. Essential hypertension    4. Tortuous aorta    5. Aortic atherosclerosis    6. Severe obesity with body mass index (BMI) of 35.0 to 39.9 with serious comorbidity          Plan:   Continue current treatment   Currently remains in sinus rhythm with amiodarone 100 mg daily.  Lower extremity edema noted after started amlodipine 1+  We will stop amlodipine.  We will continue losartan 50 mg daily and if blood pressure after 10 days above goal 130/80 then will increase to 100 mg daily  Continue chlorthalidone  Continue Lasix  Needs JACQUELYN appropriate ttt.  Follow-up with sleep clinic    Continue Coumadin   pravastatin.        I spent 5-10 minutes asking, assessing, assisting, arranging and advising heart healthy diet improvements. This included low-salt meals, portion control and health food alternatives. I also encourage 30 minutes of moderate exercise 3-4x a week.       Pertinent cardiac images and EKG  reviewed independently.    Continue with current medical plan and lifestyle changes.  Return sooner for concerns or questions. If symptoms persist go to the ED  I have reviewed all pertinent data including patient's medical history in detail and updated the computerized patient record.     No orders of the defined types were placed in this encounter.        Follow up as scheduled.     She expressed verbal understanding and agreed with the plan    Patient's Medications   New Prescriptions    No medications on file   Previous Medications    ACETAMINOPHEN (TYLENOL) 500 MG TABLET    Take 1,000 mg by mouth 2 (two) times daily as needed for Pain.    ALENDRONATE (FOSAMAX) 70 MG TABLET    Take 1 tablet (70 mg total) by mouth every 7 days.    ALPRAZOLAM (XANAX) 0.25 MG TABLET    TAKE 1 TABLET BY MOUTH TWICE A DAY    AMIODARONE (PACERONE) 200 MG TAB    Take 0.5 tablets (100 mg total) by mouth once daily.    ASPIRIN (ECOTRIN) 81 MG EC TABLET    Take 81 mg by mouth once daily.    CHLORTHALIDONE (HYGROTEN) 50 MG TAB    TAKE 1 TABLET BY MOUTH EVERY DAY    CYANOCOBALAMIN (VITAMIN B-12) 1,000 MCG/ML INJECTION    Inject 1 mL (1,000 mcg total) into the muscle every 30 days.    FUROSEMIDE (LASIX) 20 MG TABLET    Take 1 tablet (20 mg total) by mouth once daily.    GABAPENTIN (NEURONTIN) 300 MG CAPSULE    TAKE 2 CAPSULES BY MOUTH  TWICE DAILY    LATANOPROST 0.005 % OPHTHALMIC SOLUTION    Place 1 drop into both eyes every evening.    MULTIVIT-MIN-IRON-FA-LUTEIN (CENTRUM SILVER WOMEN) 8 MG IRON-400 MCG-300 MCG TAB    Take 1 tablet by mouth once daily.    MV-MN/OM3/DHA/EPA/FISH/LUT/HUDSON (OCUVITE ADULT 50 PLUS ORAL)    Take 1 tablet by mouth once daily.    OMEPRAZOLE (PRILOSEC) 20 MG CAPSULE    Take 1 capsule (20 mg total) by mouth 2 (two) times a day.    OXYBUTYNIN (DITROPAN) 5 MG TAB    TAKE 1 TABLET BY MOUTH ONCE DAILY    POTASSIUM CHLORIDE (K-TAB) 20 MEQ    TAKE 1 TABLET BY MOUTH ONCE DAILY    PRAMIPEXOLE (MIRAPEX) 0.5 MG TABLET    TAKE  1 TABLET BY MOUTH  TWICE DAILY    PRAVASTATIN (PRAVACHOL) 40 MG TABLET    Take 1 tablet (40 mg total) by mouth once daily.    TIMOLOL MALEATE 0.5% (TIMOPTIC) 0.5 % DROP    Place 1 drop into the left eye once daily.     UNABLE TO FIND    Take 1 capsule by mouth 3 (three) times daily with meals. medication name: Golo Release Diet Capsules    VITAMIN D (VITAMIN D3) 1000 UNITS TAB    Take 1,000 Units by mouth once daily.    WARFARIN (COUMADIN) 6 MG TABLET    TAKE 6 MG DAILY EXCEPT FOR 9 MG ON FRIDAY TO THIN BLOOD   Modified Medications    Modified Medication Previous Medication    LOSARTAN (COZAAR) 100 MG TABLET losartan (COZAAR) 100 MG tablet       Take 0.5 tablets (50 mg total) by mouth once daily.    TAKE ONE-HALF TABLET BY  MOUTH ONCE DAILY   Discontinued Medications    AMLODIPINE (NORVASC) 5 MG TABLET    Take 1 tablet (5 mg total) by mouth once daily.

## 2023-02-01 ENCOUNTER — OFFICE VISIT (OUTPATIENT)
Dept: CARDIOLOGY | Facility: CLINIC | Age: 83
End: 2023-02-01
Payer: MEDICARE

## 2023-02-01 VITALS
DIASTOLIC BLOOD PRESSURE: 81 MMHG | OXYGEN SATURATION: 97 % | SYSTOLIC BLOOD PRESSURE: 119 MMHG | BODY MASS INDEX: 39.93 KG/M2 | HEIGHT: 62 IN | WEIGHT: 217 LBS | HEART RATE: 54 BPM

## 2023-02-01 DIAGNOSIS — E66.01 SEVERE OBESITY WITH BODY MASS INDEX (BMI) OF 35.0 TO 39.9 WITH SERIOUS COMORBIDITY: ICD-10-CM

## 2023-02-01 DIAGNOSIS — I10 ESSENTIAL HYPERTENSION: ICD-10-CM

## 2023-02-01 DIAGNOSIS — E78.2 MIXED HYPERLIPIDEMIA: ICD-10-CM

## 2023-02-01 DIAGNOSIS — I70.0 AORTIC ATHEROSCLEROSIS: ICD-10-CM

## 2023-02-01 DIAGNOSIS — I77.1 TORTUOUS AORTA: ICD-10-CM

## 2023-02-01 DIAGNOSIS — I48.0 PAROXYSMAL ATRIAL FIBRILLATION: Primary | Chronic | ICD-10-CM

## 2023-02-01 PROCEDURE — 1126F AMNT PAIN NOTED NONE PRSNT: CPT | Mod: CPTII,S$GLB,, | Performed by: INTERNAL MEDICINE

## 2023-02-01 PROCEDURE — 99999 PR PBB SHADOW E&M-EST. PATIENT-LVL IV: CPT | Mod: PBBFAC,,, | Performed by: INTERNAL MEDICINE

## 2023-02-01 PROCEDURE — 99214 PR OFFICE/OUTPT VISIT, EST, LEVL IV, 30-39 MIN: ICD-10-PCS | Mod: S$GLB,,, | Performed by: INTERNAL MEDICINE

## 2023-02-01 PROCEDURE — 1159F MED LIST DOCD IN RCRD: CPT | Mod: CPTII,S$GLB,, | Performed by: INTERNAL MEDICINE

## 2023-02-01 PROCEDURE — 3074F PR MOST RECENT SYSTOLIC BLOOD PRESSURE < 130 MM HG: ICD-10-PCS | Mod: CPTII,S$GLB,, | Performed by: INTERNAL MEDICINE

## 2023-02-01 PROCEDURE — 3079F PR MOST RECENT DIASTOLIC BLOOD PRESSURE 80-89 MM HG: ICD-10-PCS | Mod: CPTII,S$GLB,, | Performed by: INTERNAL MEDICINE

## 2023-02-01 PROCEDURE — 99999 PR PBB SHADOW E&M-EST. PATIENT-LVL IV: ICD-10-PCS | Mod: PBBFAC,,, | Performed by: INTERNAL MEDICINE

## 2023-02-01 PROCEDURE — 3074F SYST BP LT 130 MM HG: CPT | Mod: CPTII,S$GLB,, | Performed by: INTERNAL MEDICINE

## 2023-02-01 PROCEDURE — 3079F DIAST BP 80-89 MM HG: CPT | Mod: CPTII,S$GLB,, | Performed by: INTERNAL MEDICINE

## 2023-02-01 PROCEDURE — 1126F PR PAIN SEVERITY QUANTIFIED, NO PAIN PRESENT: ICD-10-PCS | Mod: CPTII,S$GLB,, | Performed by: INTERNAL MEDICINE

## 2023-02-01 PROCEDURE — 99214 OFFICE O/P EST MOD 30 MIN: CPT | Mod: S$GLB,,, | Performed by: INTERNAL MEDICINE

## 2023-02-01 PROCEDURE — 1159F PR MEDICATION LIST DOCUMENTED IN MEDICAL RECORD: ICD-10-PCS | Mod: CPTII,S$GLB,, | Performed by: INTERNAL MEDICINE

## 2023-02-01 RX ORDER — LOSARTAN POTASSIUM 100 MG/1
50 TABLET ORAL DAILY
Qty: 45 TABLET | Refills: 3 | Status: SHIPPED | OUTPATIENT
Start: 2023-02-01 | End: 2023-10-09

## 2023-02-06 ENCOUNTER — ANTI-COAG VISIT (OUTPATIENT)
Dept: CARDIOLOGY | Facility: CLINIC | Age: 83
End: 2023-02-06
Payer: MEDICARE

## 2023-02-06 ENCOUNTER — LAB VISIT (OUTPATIENT)
Dept: LAB | Facility: HOSPITAL | Age: 83
End: 2023-02-06
Attending: INTERNAL MEDICINE
Payer: MEDICARE

## 2023-02-06 DIAGNOSIS — Z79.01 LONG TERM (CURRENT) USE OF ANTICOAGULANTS: ICD-10-CM

## 2023-02-06 DIAGNOSIS — I48.19 PERSISTENT ATRIAL FIBRILLATION: ICD-10-CM

## 2023-02-06 DIAGNOSIS — I48.0 PAROXYSMAL ATRIAL FIBRILLATION: Primary | Chronic | ICD-10-CM

## 2023-02-06 LAB
INR PPP: 1.9 (ref 0.8–1.2)
PROTHROMBIN TIME: 20.1 SEC (ref 9–12.5)

## 2023-02-06 PROCEDURE — 93793 PR ANTICOAGULANT MGMT FOR PT TAKING WARFARIN: ICD-10-PCS | Mod: S$GLB,,, | Performed by: PHARMACIST

## 2023-02-06 PROCEDURE — 36415 COLL VENOUS BLD VENIPUNCTURE: CPT | Mod: PO | Performed by: INTERNAL MEDICINE

## 2023-02-06 PROCEDURE — 93793 ANTICOAG MGMT PT WARFARIN: CPT | Mod: S$GLB,,, | Performed by: PHARMACIST

## 2023-02-06 PROCEDURE — 85610 PROTHROMBIN TIME: CPT | Mod: PO | Performed by: INTERNAL MEDICINE

## 2023-02-16 ENCOUNTER — PATIENT MESSAGE (OUTPATIENT)
Dept: CARDIOLOGY | Facility: CLINIC | Age: 83
End: 2023-02-16
Payer: MEDICARE

## 2023-02-16 NOTE — TELEPHONE ENCOUNTER
Blood pressure numbers are okay.  We will not change any medications at this time.     Sincerely,  Portillo Luis MD.   Interventional Cardiologist  [facility], [place]

## 2023-02-20 ENCOUNTER — ANTI-COAG VISIT (OUTPATIENT)
Dept: CARDIOLOGY | Facility: CLINIC | Age: 83
End: 2023-02-20
Payer: MEDICARE

## 2023-02-20 ENCOUNTER — LAB VISIT (OUTPATIENT)
Dept: LAB | Facility: HOSPITAL | Age: 83
End: 2023-02-20
Attending: INTERNAL MEDICINE
Payer: MEDICARE

## 2023-02-20 DIAGNOSIS — Z79.01 LONG TERM (CURRENT) USE OF ANTICOAGULANTS: ICD-10-CM

## 2023-02-20 DIAGNOSIS — I48.19 PERSISTENT ATRIAL FIBRILLATION: ICD-10-CM

## 2023-02-20 DIAGNOSIS — I48.0 PAROXYSMAL ATRIAL FIBRILLATION: Primary | Chronic | ICD-10-CM

## 2023-02-20 LAB
INR PPP: 2.1 (ref 0.8–1.2)
PROTHROMBIN TIME: 22.3 SEC (ref 9–12.5)

## 2023-02-20 PROCEDURE — 93793 PR ANTICOAGULANT MGMT FOR PT TAKING WARFARIN: ICD-10-PCS | Mod: S$GLB,,, | Performed by: PHARMACIST

## 2023-02-20 PROCEDURE — 36415 COLL VENOUS BLD VENIPUNCTURE: CPT | Mod: PO | Performed by: INTERNAL MEDICINE

## 2023-02-20 PROCEDURE — 85610 PROTHROMBIN TIME: CPT | Mod: PO | Performed by: INTERNAL MEDICINE

## 2023-02-20 PROCEDURE — 93793 ANTICOAG MGMT PT WARFARIN: CPT | Mod: S$GLB,,, | Performed by: PHARMACIST

## 2023-02-22 RX ORDER — CYANOCOBALAMIN 1000 UG/ML
1000 INJECTION, SOLUTION INTRAMUSCULAR; SUBCUTANEOUS
Qty: 3 ML | Refills: 19 | Status: SHIPPED | OUTPATIENT
Start: 2023-02-22 | End: 2024-04-03

## 2023-03-01 ENCOUNTER — PATIENT MESSAGE (OUTPATIENT)
Dept: FAMILY MEDICINE | Facility: CLINIC | Age: 83
End: 2023-03-01
Payer: MEDICARE

## 2023-03-02 ENCOUNTER — PES CALL (OUTPATIENT)
Dept: ADMINISTRATIVE | Facility: CLINIC | Age: 83
End: 2023-03-02
Payer: MEDICARE

## 2023-03-04 ENCOUNTER — PATIENT MESSAGE (OUTPATIENT)
Dept: FAMILY MEDICINE | Facility: CLINIC | Age: 83
End: 2023-03-04
Payer: MEDICARE

## 2023-03-04 ENCOUNTER — TELEPHONE (OUTPATIENT)
Dept: FAMILY MEDICINE | Facility: CLINIC | Age: 83
End: 2023-03-04
Payer: MEDICARE

## 2023-03-06 ENCOUNTER — ANTI-COAG VISIT (OUTPATIENT)
Dept: CARDIOLOGY | Facility: CLINIC | Age: 83
End: 2023-03-06
Payer: MEDICARE

## 2023-03-06 ENCOUNTER — LAB VISIT (OUTPATIENT)
Dept: LAB | Facility: HOSPITAL | Age: 83
End: 2023-03-06
Attending: INTERNAL MEDICINE
Payer: MEDICARE

## 2023-03-06 DIAGNOSIS — Z79.01 LONG TERM (CURRENT) USE OF ANTICOAGULANTS: ICD-10-CM

## 2023-03-06 DIAGNOSIS — I48.0 PAROXYSMAL ATRIAL FIBRILLATION: Primary | Chronic | ICD-10-CM

## 2023-03-06 DIAGNOSIS — I48.19 PERSISTENT ATRIAL FIBRILLATION: ICD-10-CM

## 2023-03-06 LAB
INR PPP: 2.3 (ref 0.8–1.2)
PROTHROMBIN TIME: 24 SEC (ref 9–12.5)

## 2023-03-06 PROCEDURE — 85610 PROTHROMBIN TIME: CPT | Mod: PO | Performed by: INTERNAL MEDICINE

## 2023-03-06 PROCEDURE — 93793 PR ANTICOAGULANT MGMT FOR PT TAKING WARFARIN: ICD-10-PCS | Mod: S$GLB,,, | Performed by: PHARMACIST

## 2023-03-06 PROCEDURE — 93793 ANTICOAG MGMT PT WARFARIN: CPT | Mod: S$GLB,,, | Performed by: PHARMACIST

## 2023-03-06 PROCEDURE — 36415 COLL VENOUS BLD VENIPUNCTURE: CPT | Mod: PO | Performed by: INTERNAL MEDICINE

## 2023-03-20 ENCOUNTER — ANTI-COAG VISIT (OUTPATIENT)
Dept: CARDIOLOGY | Facility: CLINIC | Age: 83
End: 2023-03-20
Payer: MEDICARE

## 2023-03-20 ENCOUNTER — LAB VISIT (OUTPATIENT)
Dept: LAB | Facility: HOSPITAL | Age: 83
End: 2023-03-20
Attending: INTERNAL MEDICINE
Payer: MEDICARE

## 2023-03-20 DIAGNOSIS — I48.0 PAROXYSMAL ATRIAL FIBRILLATION: Primary | Chronic | ICD-10-CM

## 2023-03-20 DIAGNOSIS — Z79.01 LONG TERM (CURRENT) USE OF ANTICOAGULANTS: ICD-10-CM

## 2023-03-20 DIAGNOSIS — I48.19 PERSISTENT ATRIAL FIBRILLATION: ICD-10-CM

## 2023-03-20 LAB
INR PPP: 2 (ref 0.8–1.2)
PROTHROMBIN TIME: 21.4 SEC (ref 9–12.5)

## 2023-03-20 PROCEDURE — 85610 PROTHROMBIN TIME: CPT | Mod: PO | Performed by: INTERNAL MEDICINE

## 2023-03-20 PROCEDURE — 93793 ANTICOAG MGMT PT WARFARIN: CPT | Mod: S$GLB,,, | Performed by: PHARMACIST

## 2023-03-20 PROCEDURE — 93793 PR ANTICOAGULANT MGMT FOR PT TAKING WARFARIN: ICD-10-PCS | Mod: S$GLB,,, | Performed by: PHARMACIST

## 2023-03-20 PROCEDURE — 36415 COLL VENOUS BLD VENIPUNCTURE: CPT | Mod: PO | Performed by: INTERNAL MEDICINE

## 2023-03-22 DIAGNOSIS — I48.19 PERSISTENT ATRIAL FIBRILLATION: Primary | ICD-10-CM

## 2023-03-22 DIAGNOSIS — Z79.899 LONG-TERM CURRENT USE OF HIGH RISK MEDICATION OTHER THAN ANTICOAGULANT: ICD-10-CM

## 2023-03-22 DIAGNOSIS — I10 ESSENTIAL HYPERTENSION: ICD-10-CM

## 2023-03-22 DIAGNOSIS — Z79.899 ENCOUNTER FOR LONG-TERM (CURRENT) USE OF OTHER MEDICATIONS: ICD-10-CM

## 2023-03-27 ENCOUNTER — TELEPHONE (OUTPATIENT)
Dept: FAMILY MEDICINE | Facility: CLINIC | Age: 83
End: 2023-03-27
Payer: MEDICARE

## 2023-03-27 ENCOUNTER — LAB VISIT (OUTPATIENT)
Dept: LAB | Facility: HOSPITAL | Age: 83
End: 2023-03-27
Attending: INTERNAL MEDICINE
Payer: MEDICARE

## 2023-03-27 DIAGNOSIS — Z79.899 LONG-TERM CURRENT USE OF HIGH RISK MEDICATION OTHER THAN ANTICOAGULANT: ICD-10-CM

## 2023-03-27 DIAGNOSIS — I10 ESSENTIAL HYPERTENSION: ICD-10-CM

## 2023-03-27 DIAGNOSIS — Z79.01 LONG TERM (CURRENT) USE OF ANTICOAGULANTS: ICD-10-CM

## 2023-03-27 DIAGNOSIS — Z79.899 ENCOUNTER FOR LONG-TERM (CURRENT) USE OF OTHER MEDICATIONS: ICD-10-CM

## 2023-03-27 DIAGNOSIS — I48.19 PERSISTENT ATRIAL FIBRILLATION: ICD-10-CM

## 2023-03-27 LAB
ALBUMIN SERPL BCP-MCNC: 4.9 G/DL (ref 3.5–5.2)
ALP SERPL-CCNC: 68 U/L (ref 38–126)
ALT SERPL W/O P-5'-P-CCNC: 20 U/L (ref 10–44)
ANION GAP SERPL CALC-SCNC: 8 MMOL/L (ref 8–16)
AST SERPL-CCNC: 25 U/L (ref 15–46)
BILIRUB SERPL-MCNC: 0.5 MG/DL (ref 0.1–1)
CALCIUM SERPL-MCNC: 9.7 MG/DL (ref 8.7–10.5)
CHLORIDE SERPL-SCNC: 98 MMOL/L (ref 95–110)
CO2 SERPL-SCNC: 33 MMOL/L (ref 23–29)
CREAT SERPL-MCNC: 1.19 MG/DL (ref 0.5–1.4)
EST. GFR  (NO RACE VARIABLE): 45.9 ML/MIN/1.73 M^2
GLUCOSE SERPL-MCNC: 134 MG/DL (ref 70–110)
INR PPP: 2.2 (ref 0.8–1.2)
POTASSIUM SERPL-SCNC: 3.7 MMOL/L (ref 3.5–5.1)
PROT SERPL-MCNC: 7.4 G/DL (ref 6–8.4)
PROTHROMBIN TIME: 23.2 SEC (ref 9–12.5)
SODIUM SERPL-SCNC: 139 MMOL/L (ref 136–145)
UUN UR-MCNC: 28 MG/DL (ref 7–17)

## 2023-03-27 PROCEDURE — 80053 COMPREHEN METABOLIC PANEL: CPT | Mod: PO | Performed by: INTERNAL MEDICINE

## 2023-03-27 PROCEDURE — 36415 COLL VENOUS BLD VENIPUNCTURE: CPT | Mod: PO | Performed by: INTERNAL MEDICINE

## 2023-03-27 PROCEDURE — 85610 PROTHROMBIN TIME: CPT | Mod: PO | Performed by: INTERNAL MEDICINE

## 2023-03-27 RX ORDER — OMEPRAZOLE 20 MG/1
CAPSULE, DELAYED RELEASE ORAL
Qty: 180 CAPSULE | Refills: 1 | Status: SHIPPED | OUTPATIENT
Start: 2023-03-27 | End: 2023-09-13

## 2023-03-27 NOTE — TELEPHONE ENCOUNTER
Spoke to Black from [facility]. He will be faxing to our office a new Rx for [employer] Cpap    ----- Message from Carmella Prajapati sent at 3/27/2023  1:33 PM CDT -----  Contact: Samuel  .Type:  Needs Medical Advice    Who Called: Samuel w/ [facility]  Would the patient rather a call back or a response via [facility]? call  Best Call Back Number: [phone]  Additional Information:   Caller stated they are calling in regards to a prescription for the pt.

## 2023-03-28 ENCOUNTER — ANTI-COAG VISIT (OUTPATIENT)
Dept: CARDIOLOGY | Facility: CLINIC | Age: 83
End: 2023-03-28
Payer: MEDICARE

## 2023-03-28 DIAGNOSIS — I48.0 PAROXYSMAL ATRIAL FIBRILLATION: Primary | Chronic | ICD-10-CM

## 2023-03-28 DIAGNOSIS — Z79.01 LONG TERM (CURRENT) USE OF ANTICOAGULANTS: ICD-10-CM

## 2023-03-28 PROCEDURE — 93793 PR ANTICOAGULANT MGMT FOR PT TAKING WARFARIN: ICD-10-PCS | Mod: S$GLB,,, | Performed by: PHARMACIST

## 2023-03-28 PROCEDURE — 93793 ANTICOAG MGMT PT WARFARIN: CPT | Mod: S$GLB,,, | Performed by: PHARMACIST

## 2023-03-28 NOTE — TELEPHONE ENCOUNTER
No new care gaps identified.  [facility] Embedded Care Gaps. Reference number: [id number]. 3/27/2023   9:29:34 PM CDT

## 2023-03-28 NOTE — TELEPHONE ENCOUNTER
Refill Decision Note   Mis Ca  is requesting a refill authorization.  Brief Assessment and Rationale for Refill:  Approve     Medication Therapy Plan:       Medication Reconciliation Completed: No   Comments:     No Care Gaps recommended.     Note composed:9:58 PM 03/27/2023

## 2023-04-03 ENCOUNTER — PATIENT MESSAGE (OUTPATIENT)
Dept: SLEEP MEDICINE | Facility: CLINIC | Age: 83
End: 2023-04-03
Payer: MEDICARE

## 2023-04-06 ENCOUNTER — OFFICE VISIT (OUTPATIENT)
Dept: FAMILY MEDICINE | Facility: CLINIC | Age: 83
End: 2023-04-06
Payer: MEDICARE

## 2023-04-06 VITALS
DIASTOLIC BLOOD PRESSURE: 74 MMHG | SYSTOLIC BLOOD PRESSURE: 116 MMHG | TEMPERATURE: 98 F | BODY MASS INDEX: 40.3 KG/M2 | WEIGHT: 219 LBS | OXYGEN SATURATION: 95 % | HEIGHT: 62 IN | HEART RATE: 66 BPM

## 2023-04-06 DIAGNOSIS — E66.01 SEVERE OBESITY WITH BODY MASS INDEX (BMI) OF 35.0 TO 39.9 WITH SERIOUS COMORBIDITY: ICD-10-CM

## 2023-04-06 DIAGNOSIS — G47.33 OSA (OBSTRUCTIVE SLEEP APNEA): Primary | ICD-10-CM

## 2023-04-06 DIAGNOSIS — J30.2 SEASONAL ALLERGIES: ICD-10-CM

## 2023-04-06 DIAGNOSIS — R05.9 COUGH, UNSPECIFIED TYPE: ICD-10-CM

## 2023-04-06 DIAGNOSIS — I48.0 PAROXYSMAL ATRIAL FIBRILLATION: ICD-10-CM

## 2023-04-06 PROCEDURE — 1159F MED LIST DOCD IN RCRD: CPT | Mod: CPTII,S$GLB,, | Performed by: FAMILY MEDICINE

## 2023-04-06 PROCEDURE — 1160F PR REVIEW ALL MEDS BY PRESCRIBER/CLIN PHARMACIST DOCUMENTED: ICD-10-PCS | Mod: CPTII,S$GLB,, | Performed by: FAMILY MEDICINE

## 2023-04-06 PROCEDURE — 3074F SYST BP LT 130 MM HG: CPT | Mod: CPTII,S$GLB,, | Performed by: FAMILY MEDICINE

## 2023-04-06 PROCEDURE — 1126F AMNT PAIN NOTED NONE PRSNT: CPT | Mod: CPTII,S$GLB,, | Performed by: FAMILY MEDICINE

## 2023-04-06 PROCEDURE — 1160F RVW MEDS BY RX/DR IN RCRD: CPT | Mod: CPTII,S$GLB,, | Performed by: FAMILY MEDICINE

## 2023-04-06 PROCEDURE — 3288F PR FALLS RISK ASSESSMENT DOCUMENTED: ICD-10-PCS | Mod: CPTII,S$GLB,, | Performed by: FAMILY MEDICINE

## 2023-04-06 PROCEDURE — 1159F PR MEDICATION LIST DOCUMENTED IN MEDICAL RECORD: ICD-10-PCS | Mod: CPTII,S$GLB,, | Performed by: FAMILY MEDICINE

## 2023-04-06 PROCEDURE — 1101F PT FALLS ASSESS-DOCD LE1/YR: CPT | Mod: CPTII,S$GLB,, | Performed by: FAMILY MEDICINE

## 2023-04-06 PROCEDURE — 3078F DIAST BP <80 MM HG: CPT | Mod: CPTII,S$GLB,, | Performed by: FAMILY MEDICINE

## 2023-04-06 PROCEDURE — 3074F PR MOST RECENT SYSTOLIC BLOOD PRESSURE < 130 MM HG: ICD-10-PCS | Mod: CPTII,S$GLB,, | Performed by: FAMILY MEDICINE

## 2023-04-06 PROCEDURE — 3078F PR MOST RECENT DIASTOLIC BLOOD PRESSURE < 80 MM HG: ICD-10-PCS | Mod: CPTII,S$GLB,, | Performed by: FAMILY MEDICINE

## 2023-04-06 PROCEDURE — 99214 OFFICE O/P EST MOD 30 MIN: CPT | Mod: S$GLB,,, | Performed by: FAMILY MEDICINE

## 2023-04-06 PROCEDURE — 1101F PR PT FALLS ASSESS DOC 0-1 FALLS W/OUT INJ PAST YR: ICD-10-PCS | Mod: CPTII,S$GLB,, | Performed by: FAMILY MEDICINE

## 2023-04-06 PROCEDURE — 3288F FALL RISK ASSESSMENT DOCD: CPT | Mod: CPTII,S$GLB,, | Performed by: FAMILY MEDICINE

## 2023-04-06 PROCEDURE — 1126F PR PAIN SEVERITY QUANTIFIED, NO PAIN PRESENT: ICD-10-PCS | Mod: CPTII,S$GLB,, | Performed by: FAMILY MEDICINE

## 2023-04-06 PROCEDURE — 99214 PR OFFICE/OUTPT VISIT, EST, LEVL IV, 30-39 MIN: ICD-10-PCS | Mod: S$GLB,,, | Performed by: FAMILY MEDICINE

## 2023-04-06 RX ORDER — LEVOCETIRIZINE DIHYDROCHLORIDE 5 MG/1
5 TABLET, FILM COATED ORAL NIGHTLY
Qty: 90 TABLET | Refills: 3 | Status: SHIPPED | OUTPATIENT
Start: 2023-04-06 | End: 2024-04-02

## 2023-04-06 NOTE — PROGRESS NOTES
"Subjective:      Patient ID: Mis Ca is a 82 y.o. female.    Chief Complaint: Follow-up      Vitals:    04/06/23 1616   BP: 116/74   Pulse: 66   Temp: 98.1 °F (36.7 °C)   TempSrc: Oral   SpO2: 95%   Weight: 99.3 kg (219 lb 0.4 oz)   Height: 5' 2" (1.575 m)        HPI   6 months chedck up; feels better since cardioversion on Nov 4 and no more pain; onlosartan, pravstatin and amiodarone; no more leg pain  [facility]  On coumadin and goes to coumadin clinic    Feels good; climbs steps; needs to drink more water  Living with Sofy Meehan  I need to send cpap  setting of 11 cm H2O from 5 years ago  Clears throat and is hoarse; had ENT view    Problem List  Patient Active Problem List   Diagnosis    Paroxysmal atrial fibrillation    JACQUELYN (obstructive sleep apnea)    Long term (current) use of anticoagulants    Anxiety    Restless leg syndrome    Hyperlipidemia    Essential hypertension    Gastroesophageal reflux disease without esophagitis    History of adenomatous polyp of colon    Diverticulosis of large intestine without hemorrhage    Tortuous aorta    Glaucoma    Blind right eye    Atrial fibrillation    Aortic atherosclerosis    Severe obesity with body mass index (BMI) of 35.0 to 39.9 with serious comorbidity    Hallux valgus with bunions, left    Antalgic gait    Multilevel facet arthritis    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    Other specified disorders of adrenal gland    Unspecified inflammatory spondylopathy, 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    Inflammatory polyneuropathy, unspecified    Umbilical hernia without obstruction and without gangrene    Bilateral renal cysts    Palpitations        ALLERGIES:   Review of patient's allergies indicates:   Allergen Reactions    Adhesive     Compazine [prochlorperazine edisylate] Anxiety    Penicillins Rash    Sulfa (sulfonamide antibiotics) Rash    Vancomycin analogues Rash       MEDS:   Current Outpatient Medications:     acetaminophen (TYLENOL) 500 MG " tablet, Take 1,000 mg by mouth 2 (two) times daily as needed for Pain., Disp: , Rfl:     ALPRAZolam (XANAX) 0.25 MG tablet, TAKE 1 TABLET BY MOUTH TWICE A DAY, Disp: 180 tablet, Rfl: 0    amiodarone (PACERONE) 200 MG Tab, Take 0.5 tablets (100 mg total) by mouth once daily., Disp: 45 tablet, Rfl: 3    aspirin (ECOTRIN) 81 MG EC tablet, Take 81 mg by mouth once daily., Disp: , Rfl:     chlorthalidone (HYGROTEN) 50 MG Tab, TAKE 1 TABLET BY MOUTH EVERY DAY, Disp: 90 tablet, Rfl: 3    cyanocobalamin 1,000 mcg/mL injection, INJECT 1 ML (1,000 MCG TOTAL) INTO THE MUSCLE EVERY 30 DAYS., Disp: 3 mL, Rfl: 19    furosemide (LASIX) 20 MG tablet, Take 1 tablet (20 mg total) by mouth once daily., Disp: 90 tablet, Rfl: 3    gabapentin (NEURONTIN) 300 MG capsule, TAKE 2 CAPSULES BY MOUTH  TWICE DAILY, Disp: 360 capsule, Rfl: 3    latanoprost 0.005 % ophthalmic solution, Place 1 drop into both eyes every evening., Disp: , Rfl:     losartan (COZAAR) 100 MG tablet, Take 0.5 tablets (50 mg total) by mouth once daily., Disp: 45 tablet, Rfl: 3    multivit-min-iron-FA-lutein (CENTRUM SILVER WOMEN) 8 mg iron-400 mcg-300 mcg Tab, Take 1 tablet by mouth once daily., Disp: , Rfl:     omeprazole (PRILOSEC) 20 MG capsule, TAKE 1 CAPSULE BY MOUTH  TWICE DAILY, Disp: 180 capsule, Rfl: 1    oxybutynin (DITROPAN) 5 MG Tab, TAKE 1 TABLET BY MOUTH ONCE DAILY, Disp: 90 tablet, Rfl: 3    potassium chloride (K-TAB) 20 mEq, TAKE 1 TABLET BY MOUTH ONCE DAILY, Disp: 90 tablet, Rfl: 3    pramipexole (MIRAPEX) 0.5 MG tablet, TAKE 1 TABLET BY MOUTH  TWICE DAILY, Disp: 180 tablet, Rfl: 3    pravastatin (PRAVACHOL) 40 MG tablet, Take 1 tablet (40 mg total) by mouth once daily., Disp: 90 tablet, Rfl: 3    timolol maleate 0.5% (TIMOPTIC) 0.5 % Drop, Place 1 drop into the left eye once daily. , Disp: , Rfl: 0    vitamin D (VITAMIN D3) 1000 units Tab, Take 1,000 Units by mouth once daily., Disp: , Rfl:     alendronate (FOSAMAX) 70 MG tablet, Take 1 tablet (70 mg  total) by mouth every 7 days. (Patient taking differently: Take 70 mg by mouth every Wednesday.), Disp: 12 tablet, Rfl: 3    mv-mn/om3/dha/epa/fish/lut/geoffrey (OCUVITE ADULT 50 PLUS ORAL), Take 1 tablet by mouth once daily., Disp: , Rfl:     UNABLE TO FIND, Take 1 capsule by mouth 3 (three) times daily with meals. medication name: Golo Release Diet Capsules, Disp: , Rfl:     warfarin (COUMADIN) 6 MG tablet, TAKE 6 MG DAILY EXCEPT FOR 9 MG ON FRIDAY TO THIN BLOOD (Patient taking differently: TAKE 6 MG DAILY EXCEPT Monday and Friday take 3 mg), Disp: 100 tablet, Rfl: 3      History:  Current Providers as of 4/6/2023  PCP: Scott Dillard MD  Care Team Provider: Sudhir Coles MD  Care Team Provider: Jolanta Burnett MD  Care Team Provider: Lenny Burroughs LPN  Care Team Provider: Portillo Luis MD  Care Team Provider: Efren Aguilar MD  Care Team Provider: Roderick Echevarria MD  Care Team Provider: Scott Dillard MD  Care Team Provider: Tino Mendiola, PharmD  Encounter Provider: Scott Dillard MD, starting on Thu Apr 6, 2023 12:00 AM  Referring Provider: not found, starting on Thu Apr 6, 2023 12:00 AM  Consulting Physician: Scott Dillard MD, starting on Thu Apr 6, 2023  4:04 PM (Active)   Past Medical History:   Diagnosis Date    Allergy     Anticoagulant long-term use     Anxiety     Arthritis     Atrial fibrillation     Bilateral renal cysts 05/01/2022    Blind right eye     Bradycardia     Cancer     skin cancer left side of face under eye    Hyperlipidemia     Hypertension     PVC (premature ventricular contraction)     RLS (restless legs syndrome)     Sleep apnea     Does not use CPAP machine.     Past Surgical History:   Procedure Laterality Date    ADENOIDECTOMY      PAM OSTEOTOMY Left 10/31/2018    Procedure: OSTEOTOMY, AKIN;  Surgeon: Rudolph Cardozo DPM;  Location: [facility];  Service: Podiatry;  Laterality: Left;    APPENDECTOMY      BREAST BIOPSY Left     x3    BREAST SURGERY  Left     breast biopsy x3    CATARACT EXTRACTION BILATERAL W/ ANTERIOR VITRECTOMY      ENDOSCOPIC GASTROCNEMIUS RECESSION Left 10/31/2018    Procedure: RECESSION, GASTROCNEMIUS, ENDOSCOPIC;  Surgeon: Rudolph Cardozo DPM;  Location: [facility] OR;  Service: Podiatry;  Laterality: Left;    ESOPHAGOGASTRODUODENOSCOPY N/A 2/11/2022    Procedure: EGD (ESOPHAGOGASTRODUODENOSCOPY);  Surgeon: Efren Aguilar MD;  Location: [facility] ENDO;  Service: Endoscopy;  Laterality: N/A;  Patient needs a rapid covid  test done on 12/15/2021. thank you    EYE SURGERY Bilateral     cataracts extraction    EYE SURGERY Right     drainage tube (glaucoma)    FOOT ARTHRODESIS Left 1/15/2020    Procedure: FUSION, FOOT;  Surgeon: Rudolph Cardozo DPM;  Location: [facility] OR;  Service: Podiatry;  Laterality: Left;  mini c-arm, Arthrex screw  for hardware removal (Lydia notified), Orthofix (Niels notified) min ex-fix    FOOT SURGERY Left     lesion removed from dorsal area    FRACTURE SURGERY Left     arm    HAND TENDON SURGERY Left     HYSTERECTOMY      INCISION AND DRAINAGE FOOT Left 3/11/2020    Procedure: INCISION AND DRAINAGE, FOOT;  Surgeon: Rudolph Cardozo DPM;  Location: [facility] OR;  Service: Podiatry;  Laterality: Left;    LAPIDUS BUNIONECTOMY Left 10/31/2018    Procedure: BUNIONECTOMY, LAPIDUS;  Surgeon: Rudolph Cardozo DPM;  Location: [facility] OR;  Service: Podiatry;  Laterality: Left;  mini c-arm, Arthrex locking plate (Lydia notified)    LAPIDUS BUNIONECTOMY Left 10/31/2018    Dr. Cardozo    Lymph Gland Removed  Right     groin (performed by Dr. Vergara)    NEURECTOMY Left 1/15/2020    Procedure: NEURECTOMY;  Surgeon: Rudolph Cardozo DPM;  Location: [facility] OR;  Service: Podiatry;  Laterality: Left;  bipolar bovie, vessel loop, possible [employer] nerve wrap. Moshe with [employer] notified and will be overnighting graft. MK 1/14/2020    OOPHORECTOMY      ORIF FOREARM FRACTURE Left     PALATE SURGERY      Lesion removed     TONSILLECTOMY       Social History     Tobacco Use    Smoking status: Never    Smokeless tobacco: Never   Substance Use Topics    Alcohol use: Not Currently    Drug use: No         Review of Systems   Constitutional: Negative.    HENT: Negative.     Respiratory: Negative.     Cardiovascular: Negative.    Gastrointestinal: Negative.    Endocrine: Negative.    Genitourinary: Negative.    Musculoskeletal: Negative.    Psychiatric/Behavioral: Negative.     All other systems reviewed and are negative.  Objective:     Physical Exam  Vitals and nursing note reviewed.   Constitutional:       Appearance: She is well-developed. She is obese.   HENT:      Head: Normocephalic.   Eyes:      Conjunctiva/sclera: Conjunctivae normal.      Pupils: Pupils are equal, round, and reactive to light.   Cardiovascular:      Rate and Rhythm: Normal rate and regular rhythm.      Heart sounds: Normal heart sounds.   Pulmonary:      Effort: Pulmonary effort is normal.      Breath sounds: Normal breath sounds.   Musculoskeletal:         General: Normal range of motion.      Cervical back: Normal range of motion and neck supple.   Skin:     General: Skin is warm and dry.   Neurological:      Mental Status: She is alert and oriented to person, place, and time.      Deep Tendon Reflexes: Reflexes are normal and symmetric.   Psychiatric:         Behavior: Behavior normal.         Thought Content: Thought content normal.         Judgment: Judgment normal.           Assessment:     No diagnosis found.  Plan:        Medication List            Accurate as of April 6, 2023  4:48 PM. If you have any questions, ask your nurse or doctor.                CHANGE how you take these medications      alendronate 70 MG tablet  Commonly known as: FOSAMAX  Take 1 tablet (70 mg total) by mouth every 7 days.  What changed: when to take this     warfarin 6 MG tablet  Commonly known as: COUMADIN  TAKE 6 MG DAILY EXCEPT FOR 9 MG ON FRIDAY TO THIN BLOOD  What changed:  additional instructions            CONTINUE taking these medications      acetaminophen 500 MG tablet  Commonly known as: TYLENOL     ALPRAZolam 0.25 MG tablet  Commonly known as: XANAX  TAKE 1 TABLET BY MOUTH TWICE A DAY     amiodarone 200 MG Tab  Commonly known as: PACERONE  Take 0.5 tablets (100 mg total) by mouth once daily.     aspirin 81 MG EC tablet  Commonly known as: ECOTRIN     CENTRUM SILVER WOMEN 8 mg iron-400 mcg-300 mcg Tab  Generic drug: multivit-min-iron-FA-lutein     chlorthalidone 50 MG Tab  Commonly known as: HYGROTEN  TAKE 1 TABLET BY MOUTH EVERY DAY     cyanocobalamin 1,000 mcg/mL injection  INJECT 1 ML (1,000 MCG TOTAL) INTO THE MUSCLE EVERY 30 DAYS.     furosemide 20 MG tablet  Commonly known as: LASIX  Take 1 tablet (20 mg total) by mouth once daily.     gabapentin 300 MG capsule  Commonly known as: NEURONTIN  TAKE 2 CAPSULES BY MOUTH  TWICE DAILY     latanoprost 0.005 % ophthalmic solution     losartan 100 MG tablet  Commonly known as: COZAAR  Take 0.5 tablets (50 mg total) by mouth once daily.     OCUVITE ADULT 50 PLUS ORAL     omeprazole 20 MG capsule  Commonly known as: PRILOSEC  TAKE 1 CAPSULE BY MOUTH  TWICE DAILY     oxybutynin 5 MG Tab  Commonly known as: DITROPAN  TAKE 1 TABLET BY MOUTH ONCE DAILY     potassium chloride 20 mEq  Commonly known as: K-TAB  TAKE 1 TABLET BY MOUTH ONCE DAILY     pramipexole 0.5 MG tablet  Commonly known as: MIRAPEX  TAKE 1 TABLET BY MOUTH  TWICE DAILY     pravastatin 40 MG tablet  Commonly known as: PRAVACHOL  Take 1 tablet (40 mg total) by mouth once daily.     timolol maleate 0.5% 0.5 % Drop  Commonly known as: TIMOPTIC     UNABLE TO FIND     vitamin D 1000 units Tab  Commonly known as: VITAMIN D3            There are no diagnoses linked to this encounter.

## 2023-04-10 ENCOUNTER — LAB VISIT (OUTPATIENT)
Dept: LAB | Facility: HOSPITAL | Age: 83
End: 2023-04-10
Attending: INTERNAL MEDICINE
Payer: MEDICARE

## 2023-04-10 ENCOUNTER — ANTI-COAG VISIT (OUTPATIENT)
Dept: CARDIOLOGY | Facility: CLINIC | Age: 83
End: 2023-04-10
Payer: MEDICARE

## 2023-04-10 DIAGNOSIS — Z79.01 LONG TERM (CURRENT) USE OF ANTICOAGULANTS: ICD-10-CM

## 2023-04-10 DIAGNOSIS — I48.19 PERSISTENT ATRIAL FIBRILLATION: ICD-10-CM

## 2023-04-10 DIAGNOSIS — I48.0 PAROXYSMAL ATRIAL FIBRILLATION: Primary | Chronic | ICD-10-CM

## 2023-04-10 LAB
INR PPP: 2.2 (ref 0.8–1.2)
PROTHROMBIN TIME: 22.8 SEC (ref 9–12.5)

## 2023-04-10 PROCEDURE — 36415 COLL VENOUS BLD VENIPUNCTURE: CPT | Mod: PO | Performed by: INTERNAL MEDICINE

## 2023-04-10 PROCEDURE — 85610 PROTHROMBIN TIME: CPT | Mod: PO | Performed by: INTERNAL MEDICINE

## 2023-04-10 PROCEDURE — 93793 PR ANTICOAGULANT MGMT FOR PT TAKING WARFARIN: ICD-10-PCS | Mod: S$GLB,,, | Performed by: PHARMACIST

## 2023-04-10 PROCEDURE — 93793 ANTICOAG MGMT PT WARFARIN: CPT | Mod: S$GLB,,, | Performed by: PHARMACIST

## 2023-04-14 ENCOUNTER — PES CALL (OUTPATIENT)
Dept: ADMINISTRATIVE | Facility: CLINIC | Age: 83
End: 2023-04-14
Payer: MEDICARE

## 2023-05-01 ENCOUNTER — LAB VISIT (OUTPATIENT)
Dept: LAB | Facility: HOSPITAL | Age: 83
End: 2023-05-01
Attending: INTERNAL MEDICINE
Payer: MEDICARE

## 2023-05-01 DIAGNOSIS — I48.19 PERSISTENT ATRIAL FIBRILLATION: ICD-10-CM

## 2023-05-01 DIAGNOSIS — Z79.01 LONG TERM (CURRENT) USE OF ANTICOAGULANTS: ICD-10-CM

## 2023-05-01 LAB
INR PPP: 3.1 (ref 0.8–1.2)
PROTHROMBIN TIME: 31.7 SEC (ref 9–12.5)

## 2023-05-01 PROCEDURE — 36415 COLL VENOUS BLD VENIPUNCTURE: CPT | Mod: PO | Performed by: INTERNAL MEDICINE

## 2023-05-01 PROCEDURE — 85610 PROTHROMBIN TIME: CPT | Mod: PO | Performed by: INTERNAL MEDICINE

## 2023-05-02 ENCOUNTER — ANTI-COAG VISIT (OUTPATIENT)
Dept: CARDIOLOGY | Facility: CLINIC | Age: 83
End: 2023-05-02
Payer: MEDICARE

## 2023-05-02 DIAGNOSIS — I48.0 PAROXYSMAL ATRIAL FIBRILLATION: Primary | Chronic | ICD-10-CM

## 2023-05-02 DIAGNOSIS — Z79.01 LONG TERM (CURRENT) USE OF ANTICOAGULANTS: ICD-10-CM

## 2023-05-02 PROCEDURE — 93793 PR ANTICOAGULANT MGMT FOR PT TAKING WARFARIN: ICD-10-PCS | Mod: S$GLB,,, | Performed by: PHARMACIST

## 2023-05-02 PROCEDURE — 93793 ANTICOAG MGMT PT WARFARIN: CPT | Mod: S$GLB,,, | Performed by: PHARMACIST

## 2023-05-03 ENCOUNTER — OFFICE VISIT (OUTPATIENT)
Dept: FAMILY MEDICINE | Facility: CLINIC | Age: 83
End: 2023-05-03
Payer: MEDICARE

## 2023-05-03 ENCOUNTER — TELEPHONE (OUTPATIENT)
Dept: ORTHOPEDICS | Facility: CLINIC | Age: 83
End: 2023-05-03
Payer: MEDICARE

## 2023-05-03 VITALS
WEIGHT: 217.38 LBS | HEART RATE: 67 BPM | SYSTOLIC BLOOD PRESSURE: 124 MMHG | HEIGHT: 62 IN | BODY MASS INDEX: 40 KG/M2 | DIASTOLIC BLOOD PRESSURE: 78 MMHG | OXYGEN SATURATION: 95 % | TEMPERATURE: 98 F

## 2023-05-03 DIAGNOSIS — G47.33 OSA (OBSTRUCTIVE SLEEP APNEA): ICD-10-CM

## 2023-05-03 DIAGNOSIS — E27.8 OTHER SPECIFIED DISORDERS OF ADRENAL GLAND: ICD-10-CM

## 2023-05-03 DIAGNOSIS — K57.30 DIVERTICULOSIS OF LARGE INTESTINE WITHOUT HEMORRHAGE: Chronic | ICD-10-CM

## 2023-05-03 DIAGNOSIS — H40.9 GLAUCOMA, UNSPECIFIED GLAUCOMA TYPE, UNSPECIFIED LATERALITY: ICD-10-CM

## 2023-05-03 DIAGNOSIS — G61.9 INFLAMMATORY POLYNEUROPATHY, UNSPECIFIED: ICD-10-CM

## 2023-05-03 DIAGNOSIS — Z74.09 OTHER REDUCED MOBILITY: ICD-10-CM

## 2023-05-03 DIAGNOSIS — F41.9 ANXIETY: ICD-10-CM

## 2023-05-03 DIAGNOSIS — K21.9 GASTROESOPHAGEAL REFLUX DISEASE WITHOUT ESOPHAGITIS: ICD-10-CM

## 2023-05-03 DIAGNOSIS — I48.0 PAROXYSMAL ATRIAL FIBRILLATION: Chronic | ICD-10-CM

## 2023-05-03 DIAGNOSIS — M47.819 MULTILEVEL FACET ARTHRITIS: ICD-10-CM

## 2023-05-03 DIAGNOSIS — I77.1 TORTUOUS AORTA: ICD-10-CM

## 2023-05-03 DIAGNOSIS — H91.90 HEARING LOSS, UNSPECIFIED HEARING LOSS TYPE, UNSPECIFIED LATERALITY: ICD-10-CM

## 2023-05-03 DIAGNOSIS — I70.0 AORTIC ATHEROSCLEROSIS: ICD-10-CM

## 2023-05-03 DIAGNOSIS — M46.99 UNSPECIFIED INFLAMMATORY SPONDYLOPATHY, MULTIPLE SITES IN SPINE: ICD-10-CM

## 2023-05-03 DIAGNOSIS — E66.01 SEVERE OBESITY WITH BODY MASS INDEX (BMI) OF 35.0 TO 39.9 WITH SERIOUS COMORBIDITY: ICD-10-CM

## 2023-05-03 DIAGNOSIS — Z00.00 ENCOUNTER FOR PREVENTIVE HEALTH EXAMINATION: Primary | ICD-10-CM

## 2023-05-03 DIAGNOSIS — E78.2 MIXED HYPERLIPIDEMIA: ICD-10-CM

## 2023-05-03 DIAGNOSIS — N28.1 BILATERAL RENAL CYSTS: ICD-10-CM

## 2023-05-03 DIAGNOSIS — Z79.01 LONG TERM (CURRENT) USE OF ANTICOAGULANTS: ICD-10-CM

## 2023-05-03 DIAGNOSIS — I10 ESSENTIAL HYPERTENSION: ICD-10-CM

## 2023-05-03 DIAGNOSIS — N18.31 CHRONIC KIDNEY DISEASE, STAGE 3A: ICD-10-CM

## 2023-05-03 DIAGNOSIS — M79.642 LEFT HAND PAIN: ICD-10-CM

## 2023-05-03 PROCEDURE — G0439 PR MEDICARE ANNUAL WELLNESS SUBSEQUENT VISIT: ICD-10-PCS | Mod: S$GLB,,, | Performed by: PEDIATRICS

## 2023-05-03 PROCEDURE — 1170F PR FUNCTIONAL STATUS ASSESSED: ICD-10-PCS | Mod: CPTII,S$GLB,, | Performed by: PEDIATRICS

## 2023-05-03 PROCEDURE — 1126F AMNT PAIN NOTED NONE PRSNT: CPT | Mod: CPTII,S$GLB,, | Performed by: PEDIATRICS

## 2023-05-03 PROCEDURE — 3074F PR MOST RECENT SYSTOLIC BLOOD PRESSURE < 130 MM HG: ICD-10-PCS | Mod: CPTII,S$GLB,, | Performed by: PEDIATRICS

## 2023-05-03 PROCEDURE — 1126F PR PAIN SEVERITY QUANTIFIED, NO PAIN PRESENT: ICD-10-PCS | Mod: CPTII,S$GLB,, | Performed by: PEDIATRICS

## 2023-05-03 PROCEDURE — 1160F RVW MEDS BY RX/DR IN RCRD: CPT | Mod: CPTII,S$GLB,, | Performed by: PEDIATRICS

## 2023-05-03 PROCEDURE — 99999 PR PBB SHADOW E&M-EST. PATIENT-LVL V: CPT | Mod: PBBFAC,,, | Performed by: PEDIATRICS

## 2023-05-03 PROCEDURE — 3078F DIAST BP <80 MM HG: CPT | Mod: CPTII,S$GLB,, | Performed by: PEDIATRICS

## 2023-05-03 PROCEDURE — 99999 PR PBB SHADOW E&M-EST. PATIENT-LVL V: ICD-10-PCS | Mod: PBBFAC,,, | Performed by: PEDIATRICS

## 2023-05-03 PROCEDURE — 3288F FALL RISK ASSESSMENT DOCD: CPT | Mod: CPTII,S$GLB,, | Performed by: PEDIATRICS

## 2023-05-03 PROCEDURE — 99499 RISK ADDL DX/OHS AUDIT: ICD-10-PCS | Mod: S$GLB,,, | Performed by: PEDIATRICS

## 2023-05-03 PROCEDURE — 99499 UNLISTED E&M SERVICE: CPT | Mod: S$GLB,,, | Performed by: PEDIATRICS

## 2023-05-03 PROCEDURE — 1170F FXNL STATUS ASSESSED: CPT | Mod: CPTII,S$GLB,, | Performed by: PEDIATRICS

## 2023-05-03 PROCEDURE — 3288F PR FALLS RISK ASSESSMENT DOCUMENTED: ICD-10-PCS | Mod: CPTII,S$GLB,, | Performed by: PEDIATRICS

## 2023-05-03 PROCEDURE — 1159F PR MEDICATION LIST DOCUMENTED IN MEDICAL RECORD: ICD-10-PCS | Mod: CPTII,S$GLB,, | Performed by: PEDIATRICS

## 2023-05-03 PROCEDURE — G0439 PPPS, SUBSEQ VISIT: HCPCS | Mod: S$GLB,,, | Performed by: PEDIATRICS

## 2023-05-03 PROCEDURE — 1159F MED LIST DOCD IN RCRD: CPT | Mod: CPTII,S$GLB,, | Performed by: PEDIATRICS

## 2023-05-03 PROCEDURE — 1160F PR REVIEW ALL MEDS BY PRESCRIBER/CLIN PHARMACIST DOCUMENTED: ICD-10-PCS | Mod: CPTII,S$GLB,, | Performed by: PEDIATRICS

## 2023-05-03 PROCEDURE — 3078F PR MOST RECENT DIASTOLIC BLOOD PRESSURE < 80 MM HG: ICD-10-PCS | Mod: CPTII,S$GLB,, | Performed by: PEDIATRICS

## 2023-05-03 PROCEDURE — 1101F PR PT FALLS ASSESS DOC 0-1 FALLS W/OUT INJ PAST YR: ICD-10-PCS | Mod: CPTII,S$GLB,, | Performed by: PEDIATRICS

## 2023-05-03 PROCEDURE — 3074F SYST BP LT 130 MM HG: CPT | Mod: CPTII,S$GLB,, | Performed by: PEDIATRICS

## 2023-05-03 PROCEDURE — 1101F PT FALLS ASSESS-DOCD LE1/YR: CPT | Mod: CPTII,S$GLB,, | Performed by: PEDIATRICS

## 2023-05-03 NOTE — TELEPHONE ENCOUNTER
----- Message from Skyler Oliva Jr., MD sent at 5/3/2023  3:48 PM CDT -----  Please call her for appt with me or PA in the next 1-2 weeks

## 2023-05-03 NOTE — PATIENT INSTRUCTIONS
Counseling and Referral of Other Preventative  (Italic type indicates deductible and co-insurance are waived)    Patient Name: Mis Ca  Today's Date: 5/3/2023    Health Maintenance       Date Due Completion Date    COVID-19 Vaccine (1) 05/03/2024 (Originally 5/9/1941) ---    DEXA Scan 09/16/2023 9/16/2022    Override on 6/9/2016: Done    TETANUS VACCINE 12/13/2026 12/13/2016    Override on 12/2/2016: Done    Lipid Panel 09/07/2027 9/7/2022        Orders Placed This Encounter   Procedures    Ambulatory referral/consult to Orthopedics    Ambulatory referral/consult to Audiology    Ambulatory referral/consult to ENT     The following information is provided to all patients.  This information is to help you find resources for any of the problems found today that may be affecting your health:                Living healthy guide: www.[facility].[place].[facility]      Understanding Diabetes: www.diabetes.org      Eating healthy: www.cdc.gov/healthyweight      CDC home safety checklist: www.cdc.gov/steadi/patient.html      Agency on Aging: www.goea.[place].[facility]      Alcoholics anonymous (AA): www.aa.org      Physical Activity: www.delfino.nih.gov/iz2yjgd      Tobacco use: www.quitwithusla.org

## 2023-05-03 NOTE — PROGRESS NOTES
"Mis Ca presented for a  Medicare AWV and comprehensive Health Risk Assessment today. The following components were reviewed and updated:    Medical history  Family History  Social history  Allergies and Current Medications  Health Risk Assessment  Health Maintenance  Care Team         ** See Completed Assessments for Annual Wellness Visit within the encounter summary.**         The following assessments were completed:  Living Situation  CAGE  Depression Screening  Timed Get Up and Go  Whisper Test  Cognitive Function Screening  Nutrition Screening  ADL Screening  PAQ Screening    Patient does not have Rx for Opioids  Patient does not use substance     Vitals:    05/03/23 1405   BP: 124/78   Pulse: 67   Temp: 98.1 °F (36.7 °C)   TempSrc: Temporal   SpO2: 95%   Weight: 98.6 kg (217 lb 6 oz)   Height: 5' 2" (1.575 m)     Body mass index is 39.76 kg/m².  Physical Exam  Constitutional:       General: She is not in acute distress.     Appearance: Normal appearance.   HENT:      Head: Normocephalic and atraumatic.      Right Ear: External ear normal.      Left Ear: External ear normal.      Nose: Nose normal.      Mouth/Throat:      Mouth: Mucous membranes are moist.      Pharynx: Oropharynx is clear.   Eyes:      Extraocular Movements: Extraocular movements intact.      Conjunctiva/sclera: Conjunctivae normal.      Pupils: Pupils are equal, round, and reactive to light.   Cardiovascular:      Rate and Rhythm: Normal rate and regular rhythm.      Pulses: Normal pulses.      Heart sounds: Normal heart sounds.   Pulmonary:      Effort: Pulmonary effort is normal. No respiratory distress.      Breath sounds: Normal breath sounds. No wheezing.   Abdominal:      General: Abdomen is flat.   Musculoskeletal:         General: No swelling. Normal range of motion.      Cervical back: Normal range of motion and neck supple.   Skin:     General: Skin is warm and dry.      Findings: No rash.   Neurological:      Mental " Status: She is alert and oriented to person, place, and time.      Gait: Gait normal.   Psychiatric:         Mood and Affect: Mood normal.         Behavior: Behavior normal.             Diagnoses and health risks identified today and associated recommendations/orders:    1. Encounter for preventive health examination  Chart reviewed. Problem list updated. Discussed current medical diagnosis, current medications, medical/surgical/family/social history; updated provider list; documented vital signs; identified any cognitive impairment; and updated risk factor list. Addressed any outstanding health maintenance. Provided patient with personalized health advice. Continue to follow up with PCP and any specialists.     2. Aortic atherosclerosis  Chronic; stable on current treatment plan; follow up as scheduled.  Followed by Dr. Luis  Taking amiodarone, losartan, aspirin, chlorthalidone, potassium, pravastatin, coumadin  Had cardioversion in November for Afib    3. Paroxysmal atrial fibrillation  Chronic; stable on current treatment plan; follow up as scheduled.  Followed by Dr. Luis  Taking amiodarone, losartan, aspirin, chlorthalidone, potassium, pravastatin, coumadin  Had cardioversion in November for Afib    4. Chronic kidney disease, stage 3a  Chronic; stable on current treatment plan; follow up as scheduleded  Followed by Dr. Edge, appointment in July.  Takes oxybutinin    5. Left hand pain  Chronic; stable on current treatment plan; follow up as scheduled.  - Ambulatory referral/consult to Orthopedics; Future    6. Multilevel facet arthritis  Chronic; stable on current treatment plan; follow up as scheduled.  Monitored by Dr. Dillard  Takes tylenol as needed.    7. Inflammatory polyneuropathy, unspecified  Chronic; stable on current treatment plan; follow up as scheduled.    8. Anxiety  Chronic; stable on current treatment plan; follow up as scheduled.  Panic attacks in the past, mostly resolved  Used xanax in the  past, does not need or use now.    9. Glaucoma, unspecified glaucoma type, unspecified laterality  Chronic; stable on current treatment plan; follow up as scheduled.  Sudhir Coles monitors, appointments every 6 months.  Uses timolol and zalatan    10. Mixed hyperlipidemia  Chronic; stable on current treatment plan; follow up as scheduled.  Followed by Dr. Luis  Taking amiodarone, losartan, aspirin, chlorthalidone, potassium, pravastatin, coumadin  Had cardioversion in November for Afib    11. Essential hypertension  Chronic; stable on current treatment plan; follow up as scheduled.  Followed by Dr. Luis  Taking amiodarone, losartan, aspirin, chlorthalidone, potassium, pravastatin, coumadin  Had cardioversion in November for Afib    12. Tortuous aorta  Chronic; stable on current treatment plan; follow up as scheduled.  Followed by Dr. Luis  Taking amiodarone, losartan, aspirin, chlorthalidone, potassium, pravastatin, coumadin  Had cardioversion in November for Afib    13. Bilateral renal cysts  Chronic; stable on current treatment plan; follow up as scheduleded  Followed by Dr. Edge, appointment w/ ultrasount in July.    14. Long term (current) use of anticoagulants  Chronic; stable on current treatment plan; follow up as scheduled.  Followed by Dr. Luis  Taking amiodarone, losartan, aspirin, chlorthalidone, potassium, pravastatin, coumadin  Had cardioversion in November for Afib    15. Severe obesity with body mass index (BMI) of 35.0 to 39.9 with serious comorbidity  Chronic; stable on current treatment plan; follow up as scheduled.    16. Diverticulosis of large intestine without hemorrhage  Chronic; stable on current treatment plan; follow up as scheduled.  Dr. Aguilar did colonoscopy in 2019.  Managed by Dr. Dillard    17. Gastroesophageal reflux disease without esophagitis  Chronic; stable on current treatment plan; follow up as scheduled.  Dr. Aguilar did colonoscopy in 2019.  Takes  omeprazole  Managed by Dr. Dillard    18. JACQUELYN (obstructive sleep apnea)  Chronic; stable on current treatment plan; follow up as scheduled.  Followed by Dr. Dillard  Sleeps w/ CPAP, got new machine this week    19. Hearing loss, unspecified hearing loss type, unspecified laterality  Chronic; stable on current treatment plan; follow up as scheduled.  - Ambulatory referral/consult to Audiology; Future  - Ambulatory referral/consult to ENT; Future    20. Other specified disorders of adrenal gland  Chronic; stable on current treatment plan; follow up as scheduled.  Monitored by Dr. Dillard    21. 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  Chronic; stable on current treatment plan; follow up as scheduled.    22. Other reduced mobility  Chronic; stable on current treatment plan; follow up as scheduled.  Refuses PT/OT at this time.  Encouraged to go to gym for strength, agility.    23. Osteoporosis  Chronic; stable on current treatment plan; follow up as scheduled.  Monitored by Dr. Dillard  Takes fosamax      Provided Mis with a 5-10 year written screening schedule and personal prevention plan. Recommendations were developed using the USPSTF age appropriate recommendations. Education, counseling, and referrals were provided as needed. After Visit Summary printed and given to patient which includes a list of additional screenings\tests needed.    Follow up in about 1 year (around 5/3/2024).      Neno Kinney NP   [facility]  5/3/23           I offered to discuss advanced care planning, including how to pick a person who would make decisions for you if you were unable to make them for yourself, called a health care power of , and what kind of decisions you might make such as use of life sustaining treatments such as ventilators and tube feeding when faced with a life limiting illness recorded  on a living will that they will need to know. (How you want to be cared for as you near the end of your natural life)     X Patient is interested in learning more about how to make advanced directives.  I provided them paperwork and offered to discuss this with them.

## 2023-05-10 ENCOUNTER — ANTI-COAG VISIT (OUTPATIENT)
Dept: CARDIOLOGY | Facility: CLINIC | Age: 83
End: 2023-05-10
Payer: MEDICARE

## 2023-05-10 ENCOUNTER — LAB VISIT (OUTPATIENT)
Dept: LAB | Facility: HOSPITAL | Age: 83
End: 2023-05-10
Attending: INTERNAL MEDICINE
Payer: MEDICARE

## 2023-05-10 DIAGNOSIS — I48.19 PERSISTENT ATRIAL FIBRILLATION: ICD-10-CM

## 2023-05-10 DIAGNOSIS — Z79.01 LONG TERM (CURRENT) USE OF ANTICOAGULANTS: ICD-10-CM

## 2023-05-10 DIAGNOSIS — I48.0 PAROXYSMAL ATRIAL FIBRILLATION: Chronic | ICD-10-CM

## 2023-05-10 DIAGNOSIS — Z79.01 LONG TERM (CURRENT) USE OF ANTICOAGULANTS: Primary | ICD-10-CM

## 2023-05-10 LAB
INR PPP: 2.4 (ref 0.8–1.2)
PROTHROMBIN TIME: 24.7 SEC (ref 9–12.5)

## 2023-05-10 PROCEDURE — 93793 ANTICOAG MGMT PT WARFARIN: CPT | Mod: S$GLB,,,

## 2023-05-10 PROCEDURE — 36415 COLL VENOUS BLD VENIPUNCTURE: CPT | Mod: PO | Performed by: INTERNAL MEDICINE

## 2023-05-10 PROCEDURE — 85610 PROTHROMBIN TIME: CPT | Mod: PO | Performed by: INTERNAL MEDICINE

## 2023-05-10 PROCEDURE — 93793 PR ANTICOAGULANT MGMT FOR PT TAKING WARFARIN: ICD-10-PCS | Mod: S$GLB,,,

## 2023-05-12 ENCOUNTER — OFFICE VISIT (OUTPATIENT)
Dept: CARDIOLOGY | Facility: CLINIC | Age: 83
End: 2023-05-12
Payer: MEDICARE

## 2023-05-12 VITALS
SYSTOLIC BLOOD PRESSURE: 101 MMHG | BODY MASS INDEX: 40 KG/M2 | HEIGHT: 62 IN | WEIGHT: 217.38 LBS | DIASTOLIC BLOOD PRESSURE: 67 MMHG | HEART RATE: 64 BPM

## 2023-05-12 DIAGNOSIS — I48.0 PAROXYSMAL ATRIAL FIBRILLATION: Primary | Chronic | ICD-10-CM

## 2023-05-12 DIAGNOSIS — I48.19 PERSISTENT ATRIAL FIBRILLATION: ICD-10-CM

## 2023-05-12 DIAGNOSIS — G47.33 OSA (OBSTRUCTIVE SLEEP APNEA): ICD-10-CM

## 2023-05-12 DIAGNOSIS — I10 ESSENTIAL HYPERTENSION: ICD-10-CM

## 2023-05-12 DIAGNOSIS — I70.0 AORTIC ATHEROSCLEROSIS: ICD-10-CM

## 2023-05-12 DIAGNOSIS — E66.01 SEVERE OBESITY WITH BODY MASS INDEX (BMI) OF 35.0 TO 39.9 WITH SERIOUS COMORBIDITY: ICD-10-CM

## 2023-05-12 PROCEDURE — 1159F MED LIST DOCD IN RCRD: CPT | Mod: CPTII,S$GLB,, | Performed by: INTERNAL MEDICINE

## 2023-05-12 PROCEDURE — 3288F PR FALLS RISK ASSESSMENT DOCUMENTED: ICD-10-PCS | Mod: CPTII,S$GLB,, | Performed by: INTERNAL MEDICINE

## 2023-05-12 PROCEDURE — 93000 ELECTROCARDIOGRAM COMPLETE: CPT | Mod: ,,, | Performed by: INTERNAL MEDICINE

## 2023-05-12 PROCEDURE — 1126F AMNT PAIN NOTED NONE PRSNT: CPT | Mod: CPTII,S$GLB,, | Performed by: INTERNAL MEDICINE

## 2023-05-12 PROCEDURE — 99214 PR OFFICE/OUTPT VISIT, EST, LEVL IV, 30-39 MIN: ICD-10-PCS | Mod: S$GLB,,, | Performed by: INTERNAL MEDICINE

## 2023-05-12 PROCEDURE — 3078F DIAST BP <80 MM HG: CPT | Mod: CPTII,S$GLB,, | Performed by: INTERNAL MEDICINE

## 2023-05-12 PROCEDURE — 99999 PR PBB SHADOW E&M-EST. PATIENT-LVL IV: ICD-10-PCS | Mod: PBBFAC,,, | Performed by: INTERNAL MEDICINE

## 2023-05-12 PROCEDURE — 3288F FALL RISK ASSESSMENT DOCD: CPT | Mod: CPTII,S$GLB,, | Performed by: INTERNAL MEDICINE

## 2023-05-12 PROCEDURE — 93000 EKG 12-LEAD: ICD-10-PCS | Mod: ,,, | Performed by: INTERNAL MEDICINE

## 2023-05-12 PROCEDURE — 99999 PR PBB SHADOW E&M-EST. PATIENT-LVL IV: CPT | Mod: PBBFAC,,, | Performed by: INTERNAL MEDICINE

## 2023-05-12 PROCEDURE — 1126F PR PAIN SEVERITY QUANTIFIED, NO PAIN PRESENT: ICD-10-PCS | Mod: CPTII,S$GLB,, | Performed by: INTERNAL MEDICINE

## 2023-05-12 PROCEDURE — 99214 OFFICE O/P EST MOD 30 MIN: CPT | Mod: S$GLB,,, | Performed by: INTERNAL MEDICINE

## 2023-05-12 PROCEDURE — 1101F PR PT FALLS ASSESS DOC 0-1 FALLS W/OUT INJ PAST YR: ICD-10-PCS | Mod: CPTII,S$GLB,, | Performed by: INTERNAL MEDICINE

## 2023-05-12 PROCEDURE — 1160F PR REVIEW ALL MEDS BY PRESCRIBER/CLIN PHARMACIST DOCUMENTED: ICD-10-PCS | Mod: CPTII,S$GLB,, | Performed by: INTERNAL MEDICINE

## 2023-05-12 PROCEDURE — 3074F PR MOST RECENT SYSTOLIC BLOOD PRESSURE < 130 MM HG: ICD-10-PCS | Mod: CPTII,S$GLB,, | Performed by: INTERNAL MEDICINE

## 2023-05-12 PROCEDURE — 1159F PR MEDICATION LIST DOCUMENTED IN MEDICAL RECORD: ICD-10-PCS | Mod: CPTII,S$GLB,, | Performed by: INTERNAL MEDICINE

## 2023-05-12 PROCEDURE — 3074F SYST BP LT 130 MM HG: CPT | Mod: CPTII,S$GLB,, | Performed by: INTERNAL MEDICINE

## 2023-05-12 PROCEDURE — 1160F RVW MEDS BY RX/DR IN RCRD: CPT | Mod: CPTII,S$GLB,, | Performed by: INTERNAL MEDICINE

## 2023-05-12 PROCEDURE — 1101F PT FALLS ASSESS-DOCD LE1/YR: CPT | Mod: CPTII,S$GLB,, | Performed by: INTERNAL MEDICINE

## 2023-05-12 PROCEDURE — 3078F PR MOST RECENT DIASTOLIC BLOOD PRESSURE < 80 MM HG: ICD-10-PCS | Mod: CPTII,S$GLB,, | Performed by: INTERNAL MEDICINE

## 2023-05-12 NOTE — PROGRESS NOTES
Subjective:    Patient ID:  Mis Ca is a 82 y.o. female who presents for evaluation of Atrial Fibrillation      Referring Cardiologist: Portillo Luis MD  Primary Care Physician: Scott Dillard MD    HPI  Prior Hx:  I had the pleasure of seeing Mrs. Ca in our electrophysiology clinic in follow-up for her atrial arrhythmia. As you are aware she is a pleasant 82 year-old woman with paroxysmal atrial fibrillation, hypertension, obstructive sleep apnea, aortic atherosclerosis and obesity. She reported palpitations for over 10 years. Ultimately diagnosed with pAF in 2014, at which time she was hospitalized in the ICU and treated with a diltiazem drip. She had several paroxysms and hospitalizations for pAF however was not referred to EP until 10/2022. Had recently been admitted for AF with RVR, rate controlled and discharged. She reported ongoing symptoms of dyspnea, palpitations, fatigue and at times hypotension. She is on coumadin for anticoagulation (DOACs are cost prohibitive). We discussed how rhythm control strategies utilizing anti-arrhythmic drugs and/or ablation strategies have lowered efficacy once in the persistent phase compared to paroxysmal phase. Due to LVH noted on ECHO (posterior wall 1.6cm), amiodarone is the only recommended drug. Discussed long-term toxicities related to amiodarone. She elected to proceed.    ECHO 11/2020: normal LV function with moderate LVH, normal mitral valve and mild left atrial enlargement.    I have reviewed all available electrocardiograms in Epic which show either sinus rhythm/bradycardia with left anterior fascicular block or AF, at times with RVR (11/2014, 9/2017, 10/2022).    11/4/2022: PAUL/DCCV and initiated on amiodarone    12/2022: Mrs. Ca returns for follow-up. She feels much better in sinus rhythm. Notes chronic leg edema. Has compression stockings but doesn't wear them. We reduced amiodarone to 100mg daily.    Interim Hx:  Mrs. Ca returns for  follow-up. LFTs 3/2023 and TSH 12/2022 were normal. She feels well.    My interpretation of today's in clinic ECG is sinus rhythm with a rate of sinus rhythm with RBBB/LAFB      Review of Systems   Constitutional: Negative for fever and malaise/fatigue.   HENT:  Negative for congestion and sore throat.    Eyes:  Negative for blurred vision and visual disturbance.   Cardiovascular:  Positive for leg swelling. Negative for chest pain, dyspnea on exertion, irregular heartbeat, near-syncope, palpitations and syncope.   Respiratory:  Negative for cough and shortness of breath.    Hematologic/Lymphatic: Negative for bleeding problem. Does not bruise/bleed easily.   Skin: Negative.    Musculoskeletal: Negative.    Gastrointestinal:  Negative for bloating, abdominal pain, hematochezia and melena.   Neurological:  Negative for focal weakness and weakness.   Psychiatric/Behavioral: Negative.        Objective:    Physical Exam  Vitals reviewed.   Constitutional:       General: She is not in acute distress.     Appearance: She is well-developed. She is not diaphoretic.   HENT:      Head: Normocephalic and atraumatic.   Eyes:      General:         Right eye: No discharge.         Left eye: No discharge.      Conjunctiva/sclera: Conjunctivae normal.   Cardiovascular:      Rate and Rhythm: Normal rate and regular rhythm.      Heart sounds: No murmur heard.    No friction rub. No gallop.   Pulmonary:      Effort: Pulmonary effort is normal. No respiratory distress.      Breath sounds: Normal breath sounds. No wheezing or rales.   Abdominal:      General: Bowel sounds are normal. There is no distension.      Palpations: Abdomen is soft.      Tenderness: There is no abdominal tenderness.   Musculoskeletal:      Cervical back: Neck supple.   Skin:     General: Skin is warm and dry.   Neurological:      Mental Status: She is alert and oriented to person, place, and time.   Psychiatric:         Behavior: Behavior normal.         Thought  Content: Thought content normal.         Judgment: Judgment normal.         Assessment:       1. Paroxysmal atrial fibrillation    2. Essential hypertension    3. Aortic atherosclerosis    4. Severe obesity with body mass index (BMI) of 35.0 to 39.9 with serious comorbidity    5. JACQUELYN (obstructive sleep apnea)         Plan:       In summary, Mrs. Ca is a pleasant 82 year-old woman with paroxysmal atrial fibrillation which is now persistent, hypertension, obstructive sleep apnea, aortic atherosclerosis and obesity. Her [id number] score is 5 and indefinite anticoagulation is indicated. She has elected for rhythm control. Due to LVH noted on ECHO (posterior wall 1.6cm), amiodarone is the only recommended drug. Discussed long-term toxicities. She is maintaining sinus rhythm on 100mg daily. Check CMP/TSH with next visit.    RTC in 6 months, sooner if needed.    Thank you for allowing me to participate in the care of this patient. Please do not hesitate to call me with any questions or concerns.    Shamar Owens MD, PhD  Cardiac Electrophysiology

## 2023-05-12 NOTE — PROGRESS NOTES
SUBJECTIVE:     Chief Complaint & History of Present Illness:  Mis Ca is an 82 y.o. female who presents today with bilateral thumb pain x multiple years and 6-7mos of L Index extension lag.    The patient notes that the left index digit was injured when she was reaching for an object.  She noted immediate sharp dorsal hand pain, which slowly resolved over time.  She is not complaining of ongoing left index pain now, but notes continued inability to extend the digit.  The patient describes visible dorsal hand defect and a palpable bump.  She is not having any difficulties with digit flexion and/or sensation changes distally.   Hx:  ORIF left distal radius approximately 10 years ago    The patient has bilateral thumb pain has been slow and progressive over time.  It is most prevalent during activities which require a pincher mechanism.  She denies overlying skin changes and/or sensation changes.  The patient does describe  strength weakening over time as well.  She is currently not utilizing bracing.    Review of patient's allergies indicates:   Allergen Reactions    Adhesive     Compazine [prochlorperazine edisylate] Anxiety    Penicillins Rash    Sulfa (sulfonamide antibiotics) Rash    Vancomycin analogues Rash         Current Outpatient Medications   Medication Sig Dispense Refill    acetaminophen (TYLENOL) 500 MG tablet Take 1,000 mg by mouth 2 (two) times daily as needed for Pain.      alendronate (FOSAMAX) 70 MG tablet Take 1 tablet (70 mg total) by mouth every 7 days. (Patient taking differently: Take 70 mg by mouth every Wednesday.) 12 tablet 3    ALPRAZolam (XANAX) 0.25 MG tablet TAKE 1 TABLET BY MOUTH TWICE A  tablet 0    amiodarone (PACERONE) 200 MG Tab Take 0.5 tablets (100 mg total) by mouth once daily. 45 tablet 3    aspirin (ECOTRIN) 81 MG EC tablet Take 81 mg by mouth once daily.      chlorthalidone (HYGROTEN) 50 MG Tab TAKE 1 TABLET BY MOUTH EVERY DAY 90 tablet 3     cyanocobalamin 1,000 mcg/mL injection INJECT 1 ML (1,000 MCG TOTAL) INTO THE MUSCLE EVERY 30 DAYS. 3 mL 19    furosemide (LASIX) 20 MG tablet Take 1 tablet (20 mg total) by mouth once daily. 90 tablet 3    gabapentin (NEURONTIN) 300 MG capsule TAKE 2 CAPSULES BY MOUTH  TWICE DAILY 360 capsule 3    latanoprost 0.005 % ophthalmic solution Place 1 drop into both eyes every evening.      levocetirizine (XYZAL) 5 MG tablet Take 1 tablet (5 mg total) by mouth every evening. 90 tablet 3    losartan (COZAAR) 100 MG tablet Take 0.5 tablets (50 mg total) by mouth once daily. 45 tablet 3    multivit-min-iron-FA-lutein (CENTRUM SILVER WOMEN) 8 mg iron-400 mcg-300 mcg Tab Take 1 tablet by mouth once daily.      mv-mn/om3/dha/epa/fish/lut/geoffrey (OCUVITE ADULT 50 PLUS ORAL) Take 1 tablet by mouth once daily.      omeprazole (PRILOSEC) 20 MG capsule TAKE 1 CAPSULE BY MOUTH  TWICE DAILY 180 capsule 1    oxybutynin (DITROPAN) 5 MG Tab TAKE 1 TABLET BY MOUTH ONCE DAILY 90 tablet 3    potassium chloride (K-TAB) 20 mEq TAKE 1 TABLET BY MOUTH ONCE DAILY 90 tablet 3    pramipexole (MIRAPEX) 0.5 MG tablet TAKE 1 TABLET BY MOUTH  TWICE DAILY 180 tablet 3    pravastatin (PRAVACHOL) 40 MG tablet TAKE 1 TABLET BY MOUTH EVERY DAY 90 tablet 3    timolol maleate 0.5% (TIMOPTIC) 0.5 % Drop Place 1 drop into the left eye once daily.   0    UNABLE TO FIND Take 1 capsule by mouth 3 (three) times daily with meals. medication name: Golo Release Diet Capsules      vitamin D (VITAMIN D3) 1000 units Tab Take 1,000 Units by mouth once daily.      warfarin (COUMADIN) 6 MG tablet TAKE 6 MG DAILY EXCEPT FOR 9 MG ON FRIDAY TO THIN BLOOD (Patient taking differently: TAKE 6 MG DAILY EXCEPT Monday and Friday take 3 mg) 100 tablet 3     No current facility-administered medications for this visit.       Past Medical History:   Diagnosis Date    Allergy     Anticoagulant long-term use     Anxiety     Arthritis     Atrial fibrillation     Bilateral renal cysts  05/01/2022    Blind right eye     Bradycardia     Cancer     skin cancer left side of face under eye    Hyperlipidemia     Hypertension     PVC (premature ventricular contraction)     RLS (restless legs syndrome)     Sleep apnea     Does not use CPAP machine.       Past Surgical History:   Procedure Laterality Date    ADENOIDECTOMY      PAM OSTEOTOMY Left 10/31/2018    Procedure: OSTEOTOMY, AKIN;  Surgeon: Rudolph Cardozo DPM;  Location: [facility] OR;  Service: Podiatry;  Laterality: Left;    APPENDECTOMY      BREAST BIOPSY Left     x3    BREAST SURGERY Left     breast biopsy x3    CATARACT EXTRACTION BILATERAL W/ ANTERIOR VITRECTOMY      ENDOSCOPIC GASTROCNEMIUS RECESSION Left 10/31/2018    Procedure: RECESSION, GASTROCNEMIUS, ENDOSCOPIC;  Surgeon: Rudolph Cardozo DPM;  Location: [facility] OR;  Service: Podiatry;  Laterality: Left;    ESOPHAGOGASTRODUODENOSCOPY N/A 2/11/2022    Procedure: EGD (ESOPHAGOGASTRODUODENOSCOPY);  Surgeon: Efren Aguilar MD;  [facility];  Service: Endoscopy;  Laterality: N/A;  Patient needs a rapid covid  test done on 12/15/2021. thank you    EYE SURGERY Bilateral     cataracts extraction    EYE SURGERY Right     drainage tube (glaucoma)    FOOT ARTHRODESIS Left 1/15/2020    Procedure: FUSION, FOOT;  Surgeon: Rudolph Cardozo DPM;  Location: [facility] OR;  Service: Podiatry;  Laterality: Left;  mini c-arm, Arthrex screw  for hardware removal (Lydia notified), Orthofix (Niels notified) min ex-fix    FOOT SURGERY Left     lesion removed from dorsal area    FRACTURE SURGERY Left     arm    HAND TENDON SURGERY Left     HYSTERECTOMY      INCISION AND DRAINAGE FOOT Left 3/11/2020    Procedure: INCISION AND DRAINAGE, FOOT;  Surgeon: Rudolph Cardozo DPM;  Location: [facility] OR;  Service: Podiatry;  Laterality: Left;    LAPIDUS BUNIONECTOMY Left 10/31/2018    Procedure: BUNIONECTOMY, LAPIDUS;  Surgeon: Rudolph Cardozo DPM;  Location: [facility] OR;  Service: Podiatry;   "Laterality: Left;  mini c-arm, Arthrex locking plate (Lydia notified)    LAPIDUS BUNIONECTOMY Left 10/31/2018    Dr. Cardozo    Lymph Gland Removed  Right     groin (performed by Dr. Vergara)    NEURECTOMY Left 1/15/2020    Procedure: NEURECTOMY;  Surgeon: Rudolph Cardozo DPM;  Location: [facility];  Service: Podiatry;  Laterality: Left;  bipolar bovie, vessel loop, possible Agnes nerve wrap. Moshe with [facility] notified and will be overnighting graft. MK 1/14/2020    OOPHORECTOMY      ORIF FOREARM FRACTURE Left     PALATE SURGERY      Lesion removed    TONSILLECTOMY         Vital Signs (Most Recent)  There were no vitals filed for this visit.    Review of Systems:  Review of Systems   Constitutional:  Negative for chills, fever and weight loss.   HENT:  Negative for congestion, ear discharge and ear pain.    Eyes:  Negative for blurred vision, photophobia and pain.   Respiratory:  Negative for cough, sputum production and shortness of breath.    Cardiovascular:  Negative for chest pain, palpitations and leg swelling.   Gastrointestinal:  Negative for abdominal pain, nausea and vomiting.   Genitourinary:  Negative for dysuria.   Musculoskeletal:  Positive for joint pain. Negative for falls and myalgias.   Skin:  Negative for itching and rash.   Neurological:  Negative for dizziness, tingling, sensory change and focal weakness.   Endo/Heme/Allergies:  Does not bruise/bleed easily.        OBJECTIVE:     PHYSICAL EXAM:  Height: 5' 2" (157.5 cm) Weight: 98.6 kg (217 lb 6 oz)  Ortho/SPM Exam  General: A&O x 4, NAD, sitting comfortably in the exam room  Breathing unlabored  Abd soft and nondistended  Mood and affect appropriate    Bilateral Hands: Skin intact without overlying erythema, rashes or lesions. Swelling negative throughout.  Increased bony prominence to bilateral thumb CMC joint.  (+) visible defect overlying index MC. Palpable grind to thumb CMC range of motion.  TTP to bilateral thumb CMC joint. ROM " incomplete to L Index extension. Otherwise full AROM throughout. Sensation intact distally. Tinels negative. Pulses palpable. Cap refill brisk.     RADIOGRAPHS:  XR HAND COMPLETE 3 VIEWS BILATERAL     CLINICAL HISTORY:  . Pain in left hand     TECHNIQUE:  PA, lateral, and oblique views of both hands were performed.     COMPARISON:  None     FINDINGS:  Left distal radial ORIF hardware which appears intact with accompanying radiocarpal joint space narrowing.  Remote, well corticated fragment at the left ulnar styloid.  Additional bilateral hand degenerative change, most notable at the thumb base and multiple interphalangeal joints with areas of scattered central erosion suggesting superimposed erosive OA component.  No acute fracture    I independently reviewed today's x-rays and agree with the above-mentioned findings.    ASSESSMENT/PLAN:     1. Rupture of extensor tendon of left hand, initial encounter        2. Left hand pain  Ambulatory referral/consult to Orthopedics    X-Ray Hand Complete Left      3. Primary osteoarthritis of both first carpometacarpal joints           The x-rays were reviewed at the bedside.    Bilateral thumb CMC joint arthritis  We discussed the anatomy and pathophysiology of CMC thumb osteoarthritis to bilateral thumbs.  We reviewed both conservative and surgical treatment options.  DME:  3D bracing provided for bilateral thumb CMC joint stability  Pain control: Over-the-counter anti-inflammatories, warm water soaks, activity modification  Injection:  We discussed proceeding with corticosteroid injection to bilateral CMC following upcoming surgery to avoid risk of infection    Extensor tendon rupture to left index digit  Based on today's x-rays it appears as if previous ORIF surgical hardware was the source of patient's spontaneous left index extensor tendon rupture.  To prevent further damage, the patient was recommended surgical hardware removal to the left wrist and extensor tendon  repair to the left index.    Surgical risks, benefits and alternatives were discussed at the bedside.  The patient verbalized understanding and wishes to proceed with surgery.  Dr. Oliva was present during surgical consent.    Consent obtained    Activity as tolerated    Follow-up:  2 weeks following surgery for wound check

## 2023-05-15 ENCOUNTER — HOSPITAL ENCOUNTER (OUTPATIENT)
Dept: RADIOLOGY | Facility: HOSPITAL | Age: 83
Discharge: HOME | End: 2023-05-15
Attending: PHYSICIAN ASSISTANT
Payer: MEDICARE

## 2023-05-15 ENCOUNTER — OFFICE VISIT (OUTPATIENT)
Dept: ORTHOPEDICS | Facility: CLINIC | Age: 83
End: 2023-05-15
Payer: MEDICARE

## 2023-05-15 VITALS — BODY MASS INDEX: 40 KG/M2 | HEIGHT: 62 IN | WEIGHT: 217.38 LBS

## 2023-05-15 DIAGNOSIS — S66.812A RUPTURE OF EXTENSOR TENDON OF LEFT HAND, INITIAL ENCOUNTER: Primary | ICD-10-CM

## 2023-05-15 DIAGNOSIS — M79.642 LEFT HAND PAIN: ICD-10-CM

## 2023-05-15 DIAGNOSIS — M79.642 HAND PAIN, LEFT: ICD-10-CM

## 2023-05-15 DIAGNOSIS — M79.642 HAND PAIN, LEFT: Primary | ICD-10-CM

## 2023-05-15 DIAGNOSIS — M18.0 PRIMARY OSTEOARTHRITIS OF BOTH FIRST CARPOMETACARPAL JOINTS: ICD-10-CM

## 2023-05-15 PROCEDURE — 3288F FALL RISK ASSESSMENT DOCD: CPT | Mod: CPTII,S$GLB,, | Performed by: PHYSICIAN ASSISTANT

## 2023-05-15 PROCEDURE — 99999 PR PBB SHADOW E&M-EST. PATIENT-LVL V: CPT | Mod: PBBFAC,,, | Performed by: PHYSICIAN ASSISTANT

## 2023-05-15 PROCEDURE — 73130 X-RAY EXAM OF HAND: CPT | Mod: TC,50,PN

## 2023-05-15 PROCEDURE — 99999 PR PBB SHADOW E&M-EST. PATIENT-LVL V: ICD-10-PCS | Mod: PBBFAC,,, | Performed by: PHYSICIAN ASSISTANT

## 2023-05-15 PROCEDURE — 1125F PR PAIN SEVERITY QUANTIFIED, PAIN PRESENT: ICD-10-PCS | Mod: CPTII,S$GLB,, | Performed by: PHYSICIAN ASSISTANT

## 2023-05-15 PROCEDURE — 99214 OFFICE O/P EST MOD 30 MIN: CPT | Mod: S$GLB,,, | Performed by: PHYSICIAN ASSISTANT

## 2023-05-15 PROCEDURE — 1160F PR REVIEW ALL MEDS BY PRESCRIBER/CLIN PHARMACIST DOCUMENTED: ICD-10-PCS | Mod: CPTII,S$GLB,, | Performed by: PHYSICIAN ASSISTANT

## 2023-05-15 PROCEDURE — 1159F PR MEDICATION LIST DOCUMENTED IN MEDICAL RECORD: ICD-10-PCS | Mod: CPTII,S$GLB,, | Performed by: PHYSICIAN ASSISTANT

## 2023-05-15 PROCEDURE — 1160F RVW MEDS BY RX/DR IN RCRD: CPT | Mod: CPTII,S$GLB,, | Performed by: PHYSICIAN ASSISTANT

## 2023-05-15 PROCEDURE — 99214 PR OFFICE/OUTPT VISIT, EST, LEVL IV, 30-39 MIN: ICD-10-PCS | Mod: S$GLB,,, | Performed by: PHYSICIAN ASSISTANT

## 2023-05-15 PROCEDURE — 1125F AMNT PAIN NOTED PAIN PRSNT: CPT | Mod: CPTII,S$GLB,, | Performed by: PHYSICIAN ASSISTANT

## 2023-05-15 PROCEDURE — 1159F MED LIST DOCD IN RCRD: CPT | Mod: CPTII,S$GLB,, | Performed by: PHYSICIAN ASSISTANT

## 2023-05-15 PROCEDURE — 3288F PR FALLS RISK ASSESSMENT DOCUMENTED: ICD-10-PCS | Mod: CPTII,S$GLB,, | Performed by: PHYSICIAN ASSISTANT

## 2023-05-15 PROCEDURE — 73130 X-RAY EXAM OF HAND: CPT | Mod: 26,50,, | Performed by: RADIOLOGY

## 2023-05-15 PROCEDURE — 1101F PR PT FALLS ASSESS DOC 0-1 FALLS W/OUT INJ PAST YR: ICD-10-PCS | Mod: CPTII,S$GLB,, | Performed by: PHYSICIAN ASSISTANT

## 2023-05-15 PROCEDURE — 1101F PT FALLS ASSESS-DOCD LE1/YR: CPT | Mod: CPTII,S$GLB,, | Performed by: PHYSICIAN ASSISTANT

## 2023-05-15 PROCEDURE — 73130 XR HAND COMPLETE 3 VIEWS BILATERAL: ICD-10-PCS | Mod: 26,50,, | Performed by: RADIOLOGY

## 2023-05-15 RX ORDER — CEFAZOLIN SODIUM 2 G/50ML
2 SOLUTION INTRAVENOUS
Status: CANCELLED | OUTPATIENT
Start: 2023-05-15

## 2023-05-15 RX ORDER — MUPIROCIN 20 MG/G
OINTMENT TOPICAL
Status: CANCELLED | OUTPATIENT
Start: 2023-05-15

## 2023-05-15 RX ORDER — SODIUM CHLORIDE 9 MG/ML
INJECTION, SOLUTION INTRAVENOUS CONTINUOUS
Status: CANCELLED | OUTPATIENT
Start: 2023-05-15

## 2023-05-22 ENCOUNTER — TELEPHONE (OUTPATIENT)
Dept: PULMONOLOGY | Facility: CLINIC | Age: 83
End: 2023-05-22
Payer: MEDICARE

## 2023-05-22 NOTE — TELEPHONE ENCOUNTER
Patient needs an appt last seen 2015.                    ----- Message from Carly Sims sent at 5/22/2023  3:09 PM CDT -----  .Type: Patient Call Back    Who called: [facility]     What is the request in detail: checking on status of face to face documentation for cpap supplies     Best call back number:[phone]    Additional Information: Ref [id number] fax [phone]

## 2023-05-25 ENCOUNTER — CLINICAL SUPPORT (OUTPATIENT)
Dept: OTOLARYNGOLOGY | Facility: CLINIC | Age: 83
End: 2023-05-25
Payer: MEDICARE

## 2023-05-25 ENCOUNTER — DOCUMENTATION ONLY (OUTPATIENT)
Dept: OTOLARYNGOLOGY | Facility: CLINIC | Age: 83
End: 2023-05-25
Payer: MEDICARE

## 2023-05-25 ENCOUNTER — OFFICE VISIT (OUTPATIENT)
Dept: OTOLARYNGOLOGY | Facility: CLINIC | Age: 83
End: 2023-05-25
Payer: MEDICARE

## 2023-05-25 VITALS
OXYGEN SATURATION: 96 % | HEIGHT: 62 IN | HEART RATE: 60 BPM | DIASTOLIC BLOOD PRESSURE: 74 MMHG | SYSTOLIC BLOOD PRESSURE: 122 MMHG | WEIGHT: 217.38 LBS | BODY MASS INDEX: 40 KG/M2

## 2023-05-25 DIAGNOSIS — J30.89 NON-SEASONAL ALLERGIC RHINITIS, UNSPECIFIED TRIGGER: ICD-10-CM

## 2023-05-25 DIAGNOSIS — H91.90 HEARING LOSS, UNSPECIFIED HEARING LOSS TYPE, UNSPECIFIED LATERALITY: ICD-10-CM

## 2023-05-25 DIAGNOSIS — H91.90 HEARING LOSS, UNSPECIFIED HEARING LOSS TYPE, UNSPECIFIED LATERALITY: Primary | ICD-10-CM

## 2023-05-25 DIAGNOSIS — H90.3 SENSORINEURAL HEARING LOSS (SNHL) OF BOTH EARS: ICD-10-CM

## 2023-05-25 DIAGNOSIS — R09.82 POST-NASAL DRIP: ICD-10-CM

## 2023-05-25 DIAGNOSIS — R49.0 HOARSENESS: ICD-10-CM

## 2023-05-25 DIAGNOSIS — R13.10 DYSPHAGIA, UNSPECIFIED TYPE: ICD-10-CM

## 2023-05-25 PROCEDURE — 3078F PR MOST RECENT DIASTOLIC BLOOD PRESSURE < 80 MM HG: ICD-10-PCS | Mod: CPTII,S$GLB,, | Performed by: NURSE PRACTITIONER

## 2023-05-25 PROCEDURE — 3078F DIAST BP <80 MM HG: CPT | Mod: CPTII,S$GLB,, | Performed by: NURSE PRACTITIONER

## 2023-05-25 PROCEDURE — 1160F RVW MEDS BY RX/DR IN RCRD: CPT | Mod: CPTII,S$GLB,, | Performed by: NURSE PRACTITIONER

## 2023-05-25 PROCEDURE — 1159F MED LIST DOCD IN RCRD: CPT | Mod: CPTII,S$GLB,, | Performed by: NURSE PRACTITIONER

## 2023-05-25 PROCEDURE — 1159F PR MEDICATION LIST DOCUMENTED IN MEDICAL RECORD: ICD-10-PCS | Mod: CPTII,S$GLB,, | Performed by: NURSE PRACTITIONER

## 2023-05-25 PROCEDURE — 99999 PR PBB SHADOW E&M-EST. PATIENT-LVL V: ICD-10-PCS | Mod: PBBFAC,,, | Performed by: NURSE PRACTITIONER

## 2023-05-25 PROCEDURE — 99204 PR OFFICE/OUTPT VISIT, NEW, LEVL IV, 45-59 MIN: ICD-10-PCS | Mod: 25,S$GLB,, | Performed by: NURSE PRACTITIONER

## 2023-05-25 PROCEDURE — 3074F PR MOST RECENT SYSTOLIC BLOOD PRESSURE < 130 MM HG: ICD-10-PCS | Mod: CPTII,S$GLB,, | Performed by: NURSE PRACTITIONER

## 2023-05-25 PROCEDURE — 3288F PR FALLS RISK ASSESSMENT DOCUMENTED: ICD-10-PCS | Mod: CPTII,S$GLB,, | Performed by: NURSE PRACTITIONER

## 2023-05-25 PROCEDURE — 31575 LARYNGOSCOPY: ICD-10-PCS | Mod: S$GLB,,, | Performed by: NURSE PRACTITIONER

## 2023-05-25 PROCEDURE — 3074F SYST BP LT 130 MM HG: CPT | Mod: CPTII,S$GLB,, | Performed by: NURSE PRACTITIONER

## 2023-05-25 PROCEDURE — 1126F AMNT PAIN NOTED NONE PRSNT: CPT | Mod: CPTII,S$GLB,, | Performed by: NURSE PRACTITIONER

## 2023-05-25 PROCEDURE — 3288F FALL RISK ASSESSMENT DOCD: CPT | Mod: CPTII,S$GLB,, | Performed by: NURSE PRACTITIONER

## 2023-05-25 PROCEDURE — 99204 OFFICE O/P NEW MOD 45 MIN: CPT | Mod: 25,S$GLB,, | Performed by: NURSE PRACTITIONER

## 2023-05-25 PROCEDURE — 31575 DIAGNOSTIC LARYNGOSCOPY: CPT | Mod: S$GLB,,, | Performed by: NURSE PRACTITIONER

## 2023-05-25 PROCEDURE — 99999 PR PBB SHADOW E&M-EST. PATIENT-LVL V: CPT | Mod: PBBFAC,,, | Performed by: NURSE PRACTITIONER

## 2023-05-25 PROCEDURE — 1101F PT FALLS ASSESS-DOCD LE1/YR: CPT | Mod: CPTII,S$GLB,, | Performed by: NURSE PRACTITIONER

## 2023-05-25 PROCEDURE — 1101F PR PT FALLS ASSESS DOC 0-1 FALLS W/OUT INJ PAST YR: ICD-10-PCS | Mod: CPTII,S$GLB,, | Performed by: NURSE PRACTITIONER

## 2023-05-25 PROCEDURE — 1160F PR REVIEW ALL MEDS BY PRESCRIBER/CLIN PHARMACIST DOCUMENTED: ICD-10-PCS | Mod: CPTII,S$GLB,, | Performed by: NURSE PRACTITIONER

## 2023-05-25 PROCEDURE — 1126F PR PAIN SEVERITY QUANTIFIED, NO PAIN PRESENT: ICD-10-PCS | Mod: CPTII,S$GLB,, | Performed by: NURSE PRACTITIONER

## 2023-05-25 RX ORDER — AZELASTINE 1 MG/ML
1 SPRAY, METERED NASAL 2 TIMES DAILY
Qty: 30 ML | Refills: 11 | Status: SHIPPED | OUTPATIENT
Start: 2023-05-25 | End: 2024-05-24

## 2023-05-25 NOTE — PROGRESS NOTES
Ms. Abebe was seen in clinic today for audiological evaluation due to concerns with hearing perception. Audiogram and tympanometry was completed and documented via Epic. Due to technical difficulties with Epic, results were not saved on the created image.     Hearing loss noted today was considered to be normal sloping to moderate/severe and primarily sensorineural in nature. An asymmetry at 8000 Hz was noted.    In order to document results properly, Ms. Ca's appointment for today was canceled.  She will return for repeat testing on 5-29-23.    Patient was very understanding of today's technical issues and appear satisfied with the resolution.    ROB Rossi, CCC-A

## 2023-05-25 NOTE — PROGRESS NOTES
Chief Complaint   Patient presents with    Hearing Loss   .     HPI:  Mis Ca is a very pleasant 82 y.o. female referred to me by Neno Kinney in consultation for evaluation of hearing loss.  She reports hearing loss that has been gradually progressing over the last 2 years.  She has noted any difference in hearing between the ears, with the right ear being the worse hearing ear. She has noted any tinnitus in both ears. She has no ear pain or ear drainage. She has a family history of hearing loss (mother with aging), and has not had any previous otologic surgery. She denies a history of significant loud noise exposure. She has issues with dizziness (off-balanced).    She is on Xyzal daily. Reports of throat clearing, clear rhinorrhea, and sneezing.   She is also c/o hoarseness for over 20 years and had a FFL a couple of years ago with Dr. Echevarria. She has dysphagia for many years. Trouble getting the food (meat and bread) down. Denies acid reflux, sore throat, choking or aspirating. Had a upper GI on 2/11/2022 without acute fidnings.       Past Medical History:   Diagnosis Date    Allergy     Anticoagulant long-term use     Anxiety     Arthritis     Atrial fibrillation     Bilateral renal cysts 05/01/2022    Blind right eye     Bradycardia     Cancer     skin cancer left side of face under eye    Hyperlipidemia     Hypertension     PVC (premature ventricular contraction)     RLS (restless legs syndrome)     Sleep apnea     Does not use CPAP machine.     Social History     Socioeconomic History    Marital status:      Spouse name: both   Tobacco Use    Smoking status: Never    Smokeless tobacco: Never   Substance and Sexual Activity    Alcohol use: Not Currently    Drug use: No    Sexual activity: Not Currently     Partners: Male     Social Determinants of Health     Financial Resource Strain: Low Risk     Difficulty of Paying Living Expenses: Not very hard   Food Insecurity: No Food Insecurity     Worried About Running Out of Food in the Last Year: Never true    Ran Out of Food in the Last Year: Never true   Transportation Needs: No Transportation Needs    Lack of Transportation (Medical): No    Lack of Transportation (Non-Medical): No   Physical Activity: Inactive    Days of Exercise per Week: 0 days    Minutes of Exercise per Session: 0 min   Stress: No Stress Concern Present    Feeling of Stress : Not at all   Social Connections: Moderately Integrated    Frequency of Communication with Friends and Family: More than three times a week    Frequency of Social Gatherings with Friends and Family: More than three times a week    Attends Congregational Services: More than 4 times per year    Active Member of Clubs or Organizations: Yes    Attends Club or Organization Meetings: More than 4 times per year    Marital Status:    Housing Stability: Low Risk     Unable to Pay for Housing in the Last Year: No    Number of Places Lived in the Last Year: 2    Unstable Housing in the Last Year: No      Past Surgical History:   Procedure Laterality Date    ADENOIDECTOMY      PAM OSTEOTOMY Left 10/31/2018    Procedure: OSTEOTOMY, AKIN;  Surgeon: Rudolph Cardozo DPM;  Location: [facility] OR;  Service: Podiatry;  Laterality: Left;    APPENDECTOMY      BREAST BIOPSY Left     x3    BREAST SURGERY Left     breast biopsy x3    CATARACT EXTRACTION BILATERAL W/ ANTERIOR VITRECTOMY      ENDOSCOPIC GASTROCNEMIUS RECESSION Left 10/31/2018    Procedure: RECESSION, GASTROCNEMIUS, ENDOSCOPIC;  Surgeon: Rudolph Cardozo DPM;  Location: [facility] OR;  Service: Podiatry;  Laterality: Left;    ESOPHAGOGASTRODUODENOSCOPY N/A 2/11/2022    Procedure: EGD (ESOPHAGOGASTRODUODENOSCOPY);  Surgeon: Efren Aguilar MD;  Location: [facility];  Service: Endoscopy;  Laterality: N/A;  Patient needs a rapid covid  test done on 12/15/2021. thank you    EYE SURGERY Bilateral     cataracts extraction    EYE SURGERY Right     drainage tube  (glaucoma)    FOOT ARTHRODESIS Left 1/15/2020    Procedure: FUSION, FOOT;  Surgeon: Rudolph Cardozo DPM;  Location: [facility] OR;  Service: Podiatry;  Laterality: Left;  mini c-arm, Arthrex screw  for hardware removal (Lydia notified), Orthofix (Niels notified) min ex-fix    FOOT SURGERY Left     lesion removed from dorsal area    FRACTURE SURGERY Left     arm    HAND TENDON SURGERY Left     HYSTERECTOMY      INCISION AND DRAINAGE FOOT Left 3/11/2020    Procedure: INCISION AND DRAINAGE, FOOT;  Surgeon: Rudolph Cardozo DPM;  Location: [facility] OR;  Service: Podiatry;  Laterality: Left;    LAPIDUS BUNIONECTOMY Left 10/31/2018    Procedure: BUNIONECTOMY, LAPIDUS;  Surgeon: Rudolph Cardozo DPM;  Location: [facility] OR;  Service: Podiatry;  Laterality: Left;  mini c-arm, Arthrex locking plate (Lydia notified)    LAPIDUS BUNIONECTOMY Left 10/31/2018    Dr. Cardozo    Lymph Gland Removed  Right     groin (performed by Dr. Vergara)    NEURECTOMY Left 1/15/2020    Procedure: NEURECTOMY;  Surgeon: Rudolph Cardozo DPM;  Location: [facility] OR;  Service: Podiatry;  Laterality: Left;  bipolar bovie, vessel loop, possible [place] nerve wrap. Moshe with [facility] notified and will be overnighting graft. MK 1/14/2020    OOPHORECTOMY      ORIF FOREARM FRACTURE Left     PALATE SURGERY      Lesion removed    TONSILLECTOMY       Family History   Problem Relation Age of Onset    Aneurysm Mother         AAA    Cancer Father         lung cancer    Lung cancer Father     Cirrhosis Father     Cancer Sister         Breast and Brain     Diabetes Sister     Breast cancer Sister     Hypertension Sister     Hyperlipidemia Sister     Brain cancer Sister     Colon polyps Brother     Cancer Brother         lung cancer    Diabetes Brother     Hyperlipidemia Brother     Hypertension Brother     Cancer Brother         bladder cancer    Diabetes Brother     Glaucoma Brother     Heart disease Sister         Heart valve repair    Atrial fibrillation  Sister     Diabetes Sister     Heart disease Sister     Heart attack Sister     Bipolar disorder Sister     Depression Sister     Glaucoma Sister     Diabetes Sister     Other Sister         Pituitary tumor    Arthritis Sister     COPD Sister     Heart disease Sister     Kidney disease Sister     Cancer Sister         lung cancer    Diabetes Sister     Lung cancer Sister     Heart attack Sister          Review of Systems  General: negative for chills, fever or weight loss  Psychological: negative for mood changes or depression  Ophthalmic: negative for blurry vision, photophobia or eye pain  ENT: see HPI  Respiratory: no cough, shortness of breath, or wheezing  Cardiovascular: no chest pain or dyspnea on exertion  Gastrointestinal: no abdominal pain, change in bowel habits, or black/ bloody stools  Musculoskeletal: negative for gait disturbance or muscular weakness  Neurological: no syncope or seizures; no ataxia  Dermatological: negative for puritis,  rash and jaundice  Hematologic/lymphatic: no easy bruising, no new lumps or bumps      Physical Exam:    Vitals:    05/25/23 1044   BP: 122/74   Pulse: 60       Constitutional: Well appearing / communicating without difficutly.  NAD.  Eyes: EOM I Bilaterally  Head/Face: Normocephalic.  Negative paranasal sinus pressure/tenderness.  Salivary glands WNL.  House Brackmann I Bilaterally.    Right Ear: Auricle normal appearance. External Auditory Canal within normal limits no lesions or masses,TM w/o masses/lesions/perforations. TM mobility noted.   Left Ear: Auricle normal appearance. External Auditory Canal within normal limits no lesions or masses,TM w/o masses/lesions/perforations. TM mobility noted.  Rinne Air conduction >bone conduction bilaterally, [place] midline.   Nose: No gross nasal septal deviation. Inferior Turbinates 3+ bilaterally. No septal perforation. No masses/lesions. External nasal skin appears normal without masses/lesions.  Oral Cavity: Gingiva/lips  within normal limits.  Dentition/gingiva healthy appearing. Mucus membranes moist. Floor of mouth soft, no masses palpated. Oral Tongue mobile. Hard Palate appears normal.    Oropharynx: Base of tongue appears normal. No masses/lesions noted. Tonsillar fossa/pharyngeal wall without lesions. Posterior oropharynx WNL.  Soft palate without masses. Midline uvula.   Neck/Lymphatic: No LAD I-VI bilaterally.  No thyromegaly.  No masses noted on exam.      Procedures:    Laryngoscopy    Date/Time: 5/25/2023 10:40 AM  Performed by: Leslie Cisneros NP  Authorized by: Leslie Cisneros NP     Consent Done?:  Yes (Verbal)  Anesthesia:     Local anesthetic:  Lidocaine 2% and Matt-Synephrine 1/2%  Laryngoscopy:     Areas examined:  Nasal cavities, nasopharynx, oropharynx, hypopharynx, larynx and vocal cords  Nose External:      No external nasal deformity  Nose Intranasal:      Mucosa no polyps     Mucosa ulcers not present     No mucosa lesions present     No septum gross deformity     Enlarged turbinates  Nasopharynx:      No mucosa lesions     Adenoids not present     Posterior choanae patent     Eustachian tube patent  Larynx/hypopharynx:      No epiglottis lesions     No epiglottis edema     No AE folds lesions     No vocal cord polyps     Equal and normal bilateral     No hypopharynx lesions     No piriform sinus pooling     No piriform sinus lesions     Post cricoid edema (mild)     Post cricoid erythema (mild)     Cobblestoning, post nasal drip     Diagnostic studies:  Per audiology note, patient has normal sloping to moderate/severe hearing loss which is primarily sensorineural in nature. An asymmetry at 8000 Hz was noted.      Assessment:    ICD-10-CM ICD-9-CM    1. Hearing loss, unspecified hearing loss type, unspecified laterality  H91.90 389.9 Ambulatory referral/consult to ENT      2. Sensorineural hearing loss (SNHL) of both ears  H90.3 389.18       3. Dysphagia, unspecified type  R13.10 787.20 FL Esophagram pharynx  and/or cervical      Laryngoscopy      4. Hoarseness  R49.0 784.42       5. Post-nasal drip  R09.82 784.91       6. Non-seasonal allergic rhinitis, unspecified trigger  J30.89 477.8         The primary encounter diagnosis was Hearing loss, unspecified hearing loss type, unspecified laterality. Diagnoses of Sensorineural hearing loss (SNHL) of both ears, Dysphagia, unspecified type, Hoarseness, Post-nasal drip, and Non-seasonal allergic rhinitis, unspecified trigger were also pertinent to this visit.      Plan:  Orders Placed This Encounter   Procedures    FL Esophagram pharynx and/or cervical    Laryngoscopy       -ordered esophagram   - start on Azelastine 1 spray in each nostril bid  -continue on Xyzal 5 mg daily  -continue on omeprazole 20 mg bid  -Will refer to Dr. Tavares if symptoms do not improve  - I have discussed audiogram results with the patient based on audiologist's verbal report of findings. However, due to technical difficulties with Epic, results were not saved on the created image. Patient is coming back on 5/29/23 for a repeat audiogram.   -f/u 6 weeks or sooner as needed    Thank you kindly for allowing me to participate in the patient's care.       Leslie Cisneros NP

## 2023-05-25 NOTE — PROCEDURES
Laryngoscopy    Date/Time: 5/25/2023 10:40 AM  Performed by: Leslie Cisneros NP  Authorized by: Leslie Cisneros NP     Consent Done?:  Yes (Verbal)  Anesthesia:     Local anesthetic:  Lidocaine 2% and Matt-Synephrine 1/2%  Laryngoscopy:     Areas examined:  Nasal cavities, nasopharynx, oropharynx, hypopharynx, larynx and vocal cords  Nose External:      No external nasal deformity  Nose Intranasal:      Mucosa no polyps     Mucosa ulcers not present     No mucosa lesions present     No septum gross deformity     Enlarged turbinates  Nasopharynx:      No mucosa lesions     Adenoids not present     Posterior choanae patent     Eustachian tube patent  Larynx/hypopharynx:      No epiglottis lesions     No epiglottis edema     No AE folds lesions     No vocal cord polyps     Equal and normal bilateral     No hypopharynx lesions     No piriform sinus pooling     No piriform sinus lesions     Post cricoid edema (mild)     Post cricoid erythema (mild)     Cobblestoning, post nasal drip

## 2023-05-26 ENCOUNTER — TELEPHONE (OUTPATIENT)
Dept: FAMILY MEDICINE | Facility: CLINIC | Age: 83
End: 2023-05-26
Payer: MEDICARE

## 2023-05-26 DIAGNOSIS — G47.33 OSA (OBSTRUCTIVE SLEEP APNEA): Primary | ICD-10-CM

## 2023-05-26 NOTE — TELEPHONE ENCOUNTER
----- Message from Josue Scherer sent at 5/26/2023  8:28 AM CDT -----  Contact: pt  .Type:  Needs Medical Advice    Who Called: pt    Would the patient rather a call back or a response via [facility]?  Call back  Best Call Back Number: [phone]   Additional Information: Pt.  Is returning a call back to the office.

## 2023-05-29 ENCOUNTER — CLINICAL SUPPORT (OUTPATIENT)
Dept: OTOLARYNGOLOGY | Facility: CLINIC | Age: 83
End: 2023-05-29
Payer: MEDICARE

## 2023-05-29 DIAGNOSIS — H93.13 TINNITUS, BILATERAL: ICD-10-CM

## 2023-05-29 DIAGNOSIS — H90.3 SENSORINEURAL HEARING LOSS (SNHL) OF BOTH EARS: Primary | ICD-10-CM

## 2023-05-29 PROCEDURE — 92567 TYMPANOMETRY: CPT | Mod: S$GLB,,,

## 2023-05-29 PROCEDURE — 92557 COMPREHENSIVE HEARING TEST: CPT | Mod: S$GLB,,,

## 2023-05-29 PROCEDURE — 92557 PR COMPREHENSIVE HEARING TEST: ICD-10-PCS | Mod: S$GLB,,,

## 2023-05-29 PROCEDURE — 92567 PR TYMPA2METRY: ICD-10-PCS | Mod: S$GLB,,,

## 2023-05-29 NOTE — PROGRESS NOTES
"Mis Ca, a 82 y.o. female was seen today in the clinic for an audiologic evaluation. She was initially seen on 7-6-23 prior to seeing nurse practitioner, Leslie Cisneros. Due to technical difficulties, the results of the hearing test were not saved. The patient's primary complaint was decrease hearing over the past two years.  Ms. Ca perceives decrease clarity of speech. She reports experiencing tinnitus in both ears which she describes as "cricket sound" in addition to a "clicking" sound in the left ear. She denies, drainage, ear pain, and history of noise exposure. According to patient report, her mother experienced hearing loss with aging.      Pure tone testing revealed normal sloping to severe sensorineural hearing loss, bilaterally. Speech reception thresholds were obtained at 25 dBHL in the right ear and 20 dBHL in the left ear. Speech discrimination scores were 100% in the right ear and 88% in the left ear. Tympanometry revealed Type A tympanograms in both ears.     Results were reviewed with the patient and the recommendation of a hearing aid consultation was discussed.    Recommendations:  Annual audiologic evaluation  Hearing protection in noise  Hearing aid consultation            "

## 2023-05-30 ENCOUNTER — TELEPHONE (OUTPATIENT)
Dept: FAMILY MEDICINE | Facility: CLINIC | Age: 83
End: 2023-05-30
Payer: MEDICARE

## 2023-05-30 ENCOUNTER — HOSPITAL ENCOUNTER (OUTPATIENT)
Dept: RADIOLOGY | Facility: HOSPITAL | Age: 83
Discharge: HOME | End: 2023-05-30
Attending: NURSE PRACTITIONER
Payer: MEDICARE

## 2023-05-30 DIAGNOSIS — R13.10 DYSPHAGIA, UNSPECIFIED TYPE: ICD-10-CM

## 2023-05-30 PROCEDURE — 25500020 PHARM REV CODE 255: Performed by: NURSE PRACTITIONER

## 2023-05-30 PROCEDURE — 74210 X-RAY XM PHRNX&/CRV ESOPH C+: CPT | Mod: TC

## 2023-05-30 PROCEDURE — A9698 NON-RAD CONTRAST MATERIALNOC: HCPCS | Performed by: NURSE PRACTITIONER

## 2023-05-30 PROCEDURE — 74210 X-RAY XM PHRNX&/CRV ESOPH C+: CPT | Mod: 26,,, | Performed by: INTERNAL MEDICINE

## 2023-05-30 PROCEDURE — 74210 FL ESOPHAGRAM PHARYNX AND/OR CERVICAL: ICD-10-PCS | Mod: 26,,, | Performed by: INTERNAL MEDICINE

## 2023-05-30 RX ADMIN — BARIUM SULFATE 137 ML: 980 POWDER, FOR SUSPENSION ORAL at 11:05

## 2023-05-30 RX ADMIN — BARIUM SULFATE 355 ML: 0.6 SUSPENSION ORAL at 11:05

## 2023-05-30 NOTE — TELEPHONE ENCOUNTER
----- Message from Jennifer Dalton sent at 5/30/2023  4:18 PM CDT -----  Type:  Needs Medical Advice    Who Called: [facility]  Symptoms (please be specific):    How long has patient had these symptoms:    Pharmacy name and phone #:    Would the patient rather a call back or a response via [facility]?   Best Call Back Number: [phone]  Additional Information: f/u on cpap supplies  ref [id number]

## 2023-05-31 ENCOUNTER — ANTI-COAG VISIT (OUTPATIENT)
Dept: CARDIOLOGY | Facility: CLINIC | Age: 83
End: 2023-05-31
Payer: MEDICARE

## 2023-05-31 ENCOUNTER — LAB VISIT (OUTPATIENT)
Dept: LAB | Facility: HOSPITAL | Age: 83
End: 2023-05-31
Attending: INTERNAL MEDICINE
Payer: MEDICARE

## 2023-05-31 DIAGNOSIS — I48.0 PAROXYSMAL ATRIAL FIBRILLATION: Chronic | ICD-10-CM

## 2023-05-31 DIAGNOSIS — Z79.01 LONG TERM (CURRENT) USE OF ANTICOAGULANTS: ICD-10-CM

## 2023-05-31 DIAGNOSIS — I48.0 PAROXYSMAL ATRIAL FIBRILLATION: Primary | Chronic | ICD-10-CM

## 2023-05-31 LAB
INR PPP: 2.3 (ref 0.8–1.2)
PROTHROMBIN TIME: 24 SEC (ref 9–12.5)

## 2023-05-31 PROCEDURE — 36415 COLL VENOUS BLD VENIPUNCTURE: CPT | Mod: PO | Performed by: INTERNAL MEDICINE

## 2023-05-31 PROCEDURE — 93793 PR ANTICOAGULANT MGMT FOR PT TAKING WARFARIN: ICD-10-PCS | Mod: S$GLB,,, | Performed by: PHARMACIST

## 2023-05-31 PROCEDURE — 93793 ANTICOAG MGMT PT WARFARIN: CPT | Mod: S$GLB,,, | Performed by: PHARMACIST

## 2023-05-31 PROCEDURE — 85610 PROTHROMBIN TIME: CPT | Mod: PO | Performed by: INTERNAL MEDICINE

## 2023-06-01 ENCOUNTER — TELEPHONE (OUTPATIENT)
Dept: FAMILY MEDICINE | Facility: CLINIC | Age: 83
End: 2023-06-01
Payer: MEDICARE

## 2023-06-01 NOTE — TELEPHONE ENCOUNTER
----- Message from Jayla Bullard sent at 6/1/2023  3:32 PM CDT -----  Name of Who is Calling: [facility]               What is the request in detail: Rep requesting a call back to discuss prescription for CPAP supplies              Can the clinic reply by [facility]: No              [facility]: [phone] Ref Num SO [id number]

## 2023-06-05 ENCOUNTER — PATIENT MESSAGE (OUTPATIENT)
Dept: OTOLARYNGOLOGY | Facility: CLINIC | Age: 83
End: 2023-06-05
Payer: MEDICARE

## 2023-06-05 DIAGNOSIS — K22.4 ESOPHAGEAL DYSMOTILITY: Primary | ICD-10-CM

## 2023-06-05 NOTE — TELEPHONE ENCOUNTER
I spoke with the patient over the phone and reviewed the esophogram results. Esophageal dysmotility but no mechanical obstruction. I will refer patient to GI. She verbally understood and thanked me for the call.

## 2023-06-09 DIAGNOSIS — G47.33 OSA (OBSTRUCTIVE SLEEP APNEA): ICD-10-CM

## 2023-06-09 NOTE — TELEPHONE ENCOUNTER
No care due was identified.  [facility] Embedded Care Due Messages. Reference number: [id number].   6/09/2023 2:22:20 PM CDT

## 2023-06-10 RX ORDER — ALPRAZOLAM 0.25 MG/1
TABLET ORAL
Qty: 180 TABLET | Refills: 0 | Status: SHIPPED | OUTPATIENT
Start: 2023-06-10 | End: 2023-12-13 | Stop reason: SDUPTHER

## 2023-06-21 PROBLEM — I48.91 ATRIAL FIBRILLATION: Status: RESOLVED | Noted: 2017-09-04 | Resolved: 2023-06-21

## 2023-06-21 PROBLEM — R00.2 PALPITATIONS: Status: RESOLVED | Noted: 2022-10-15 | Resolved: 2023-06-21

## 2023-06-21 NOTE — PROGRESS NOTES
Subjective:   @Patient ID:  Mis Ca is a 82 y.o. female who presents for follow-up of PAF, Hyperlipidemia, HTN, DD      HPI:   6/22/2023: Here for f/u. She is doing very well. No chest pain, no significant shortness of breath or LE edema. She has been compliant with her medications. Remains on amio 100 mg daily and maintaining SR.   Compliant with lasix. Blood pressure is well controlled. Plan to have left wrist surgery.         Back to coumadin due to cost with xarelto   Night cramps better after Lipitor 40 mg qhs was switched to Pravastatin      JACQUELYN : has been off the CPAP since it was recalled        Prior cardiovascular  Hx  --------------------------------  - s/p DCCV        - ECHO    9/2019 Normal left ventricular systolic function. The estimated ejection fraction is 65%  Normal LV diastolic function.  Concentric left ventricular hypertrophy.  Normal right ventricular systolic function.             12/2017  Normal left ventricular systolic function (EF 60-65%).     2 - Impaired LV relaxation, normal LAP (grade 1 diastolic dysfunction).     3 - Normal right ventricular systolic function .     4 - The estimated PA systolic pressure is 28 mmHg.     5 - Mild left atrial enlargement.         Patient Active Problem List    Diagnosis Date Noted    Chronic kidney disease, stage 3a 05/03/2023    Bilateral renal cysts 05/01/2022     Noted on U/S retroperitoneal complete dated 7/6/2021.      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       Antalgic gait 01/08/2019    Hallux valgus with bunions, left 10/31/2018    Severe  obesity with body mass index (BMI) of 35.0 to 39.9 with serious comorbidity 12/21/2017    Aortic atherosclerosis 09/04/2017     Seen on chest xray dated 09/04/17      Blind right eye 07/10/2017    Glaucoma 04/06/2017    Tortuous aorta 03/08/2017    History of adenomatous polyp of colon 12/09/2016    Diverticulosis of large intestine without hemorrhage 12/09/2016    JACQUELYN (obstructive sleep apnea) 02/17/2016    Long term (current) use of anticoagulants 02/17/2016    Anxiety 02/17/2016    Restless leg syndrome 02/17/2016    Hyperlipidemia 02/17/2016    Essential hypertension 02/17/2016    Gastroesophageal reflux disease without esophagitis 02/17/2016    Paroxysmal atrial fibrillation 12/16/2014                    LAST HbA1c  Lab Results   Component Value Date    HGBA1C 6.7 (H) 09/07/2022       Lipid panel  Lab Results   Component Value Date    CHOL 193 09/07/2022    CHOL 148 09/17/2021    CHOL 155 09/28/2020     Lab Results   Component Value Date    HDL 39 (L) 09/07/2022    HDL 38 (L) 09/17/2021    HDL 40 09/28/2020     Lab Results   Component Value Date    LDLCALC 106.2 09/07/2022    LDLCALC 73.8 09/17/2021    LDLCALC 81.6 09/28/2020     Lab Results   Component Value Date    TRIG 239 (H) 09/07/2022    TRIG 181 (H) 09/17/2021    TRIG 167 (H) 09/28/2020     Lab Results   Component Value Date    CHOLHDL 20.2 09/07/2022    CHOLHDL 25.7 09/17/2021    CHOLHDL 25.8 09/28/2020            Review of Systems   Constitutional: Negative for chills and fever.   HENT:  Negative for hearing loss and nosebleeds.    Eyes:  Negative for blurred vision.   Cardiovascular:  Negative for chest pain and palpitations.        As in HPI    Respiratory:  Negative for hemoptysis and shortness of breath.    Hematologic/Lymphatic: Negative for bleeding problem.   Skin:  Negative for itching.   Musculoskeletal:         As in HPI    Gastrointestinal:  Negative for abdominal pain and hematochezia.   Genitourinary:  Negative for hematuria.    Neurological:  Negative for dizziness and loss of balance.   Psychiatric/Behavioral:  Negative for altered mental status and depression.      Objective:   Physical Exam  Constitutional:       Appearance: She is well-developed.   HENT:      Head: Normocephalic and atraumatic.   Eyes:      Conjunctiva/sclera: Conjunctivae normal.   Neck:      Vascular: No JVD.   Cardiovascular:      Rate and Rhythm: Normal rate and regular rhythm.      Heart sounds: Normal heart sounds. No murmur heard.    No friction rub. No gallop.   Pulmonary:      Effort: Pulmonary effort is normal. No respiratory distress.      Breath sounds: Normal breath sounds. No stridor. No wheezing.   Musculoskeletal:      Cervical back: Neck supple.   Skin:     General: Skin is warm and dry.   Neurological:      Mental Status: She is alert and oriented to person, place, and time.   Psychiatric:         Behavior: Behavior normal.       Assessment:     1. Paroxysmal atrial fibrillation    2. Mixed hyperlipidemia    3. Essential hypertension    4. Tortuous aorta    5. Aortic atherosclerosis    6. JACQUELYN (obstructive sleep apnea)          Plan:   Continue current treatment   Currently remains in sinus rhythm with amiodarone 100 mg daily.  We will continue losartan 50 mg daily   Continue chlorthalidone  Continue Lasix  Needs JACQUELYN appropriate ttt.  Follow-up with sleep clinic  Continue Coumadin   pravastatin.     OK to proceed with planned wrist surgery  Pt has no active cardiac condition (ACS/USA, decompenstated CHF, significant arrhythmias or severe valvular disease). Patient has acceptable risk for low to moderate risk non cardiovascular surgery. No further cardiac work up required prior to  undergoing the surgical procedure.  These recommendations follow the 2014 ACC/AHA Guideline on Perioperative Cardiovascular Evaluation and Management of Patients Undergoing Noncardiac Surgery. (JACC Vol. 64 No. 22, Dec 9, 2014, pp s01-v600).      Ok to hold coumadin pre op  for 5 days, coordinate with coumadin clinic for INR checks. Restart post when ok with surgery team      I spent 5-10 minutes asking, assessing, assisting, arranging and advising heart healthy diet improvements. This included low-salt meals, portion control and health food alternatives. I also encourage 30 minutes of moderate exercise 3-4x a week.       Pertinent cardiac images and EKG reviewed independently.    Continue with current medical plan and lifestyle changes.  Return sooner for concerns or questions. If symptoms persist go to the ED  I have reviewed all pertinent data including patient's medical history in detail and updated the computerized patient record.     No orders of the defined types were placed in this encounter.        Follow up as scheduled.     She expressed verbal understanding and agreed with the plan    Patient's Medications   New Prescriptions    No medications on file   Previous Medications    ACETAMINOPHEN (TYLENOL) 500 MG TABLET    Take 1,000 mg by mouth 2 (two) times daily as needed for Pain.    ALENDRONATE (FOSAMAX) 70 MG TABLET    Take 1 tablet (70 mg total) by mouth every 7 days.    ALPRAZOLAM (XANAX) 0.25 MG TABLET    TAKE 1 TABLET BY MOUTH TWICE A DAY    AMIODARONE (PACERONE) 200 MG TAB    Take 0.5 tablets (100 mg total) by mouth once daily.    ASPIRIN (ECOTRIN) 81 MG EC TABLET    Take 81 mg by mouth once daily.    AZELASTINE (ASTELIN) 137 MCG (0.1 %) NASAL SPRAY    1 spray (137 mcg total) by Nasal route 2 (two) times daily.    CHLORTHALIDONE (HYGROTEN) 50 MG TAB    TAKE 1 TABLET BY MOUTH EVERY DAY    CYANOCOBALAMIN 1,000 MCG/ML INJECTION    INJECT 1 ML (1,000 MCG TOTAL) INTO THE MUSCLE EVERY 30 DAYS.    FUROSEMIDE (LASIX) 20 MG TABLET    Take 1 tablet (20 mg total) by mouth once daily.    GABAPENTIN (NEURONTIN) 300 MG CAPSULE    TAKE 2 CAPSULES BY MOUTH  TWICE DAILY    LATANOPROST 0.005 % OPHTHALMIC SOLUTION    Place 1 drop into both eyes every evening.    LEVOCETIRIZINE (XYZAL) 5  MG TABLET    Take 1 tablet (5 mg total) by mouth every evening.    LOSARTAN (COZAAR) 100 MG TABLET    Take 0.5 tablets (50 mg total) by mouth once daily.    MULTIVIT-MIN-IRON-FA-LUTEIN (CENTRUM SILVER WOMEN) 8 MG IRON-400 MCG-300 MCG TAB    Take 1 tablet by mouth once daily.    MV-MN/OM3/DHA/EPA/FISH/LUT/HUDSON (OCUVITE ADULT 50 PLUS ORAL)    Take 1 tablet by mouth once daily.    OMEPRAZOLE (PRILOSEC) 20 MG CAPSULE    TAKE 1 CAPSULE BY MOUTH  TWICE DAILY    OXYBUTYNIN (DITROPAN) 5 MG TAB    TAKE 1 TABLET BY MOUTH ONCE DAILY    POTASSIUM CHLORIDE (K-TAB) 20 MEQ    TAKE 1 TABLET BY MOUTH ONCE DAILY    PRAMIPEXOLE (MIRAPEX) 0.5 MG TABLET    TAKE 1 TABLET BY MOUTH  TWICE DAILY    PRAVASTATIN (PRAVACHOL) 40 MG TABLET    TAKE 1 TABLET BY MOUTH EVERY DAY    TIMOLOL MALEATE 0.5% (TIMOPTIC) 0.5 % DROP    Place 1 drop into the left eye once daily.     UNABLE TO FIND    Take 1 capsule by mouth 3 (three) times daily with meals. medication name: Golo Release Diet Capsules    VITAMIN D (VITAMIN D3) 1000 UNITS TAB    Take 1,000 Units by mouth once daily.    WARFARIN (COUMADIN) 6 MG TABLET    TAKE 6 MG DAILY EXCEPT FOR 9 MG ON FRIDAY TO THIN BLOOD   Modified Medications    No medications on file   Discontinued Medications    No medications on file

## 2023-06-22 ENCOUNTER — OFFICE VISIT (OUTPATIENT)
Dept: CARDIOLOGY | Facility: CLINIC | Age: 83
End: 2023-06-22
Payer: MEDICARE

## 2023-06-22 VITALS
HEIGHT: 62 IN | WEIGHT: 218.25 LBS | DIASTOLIC BLOOD PRESSURE: 84 MMHG | OXYGEN SATURATION: 95 % | SYSTOLIC BLOOD PRESSURE: 137 MMHG | BODY MASS INDEX: 40.16 KG/M2 | HEART RATE: 71 BPM

## 2023-06-22 DIAGNOSIS — I70.0 AORTIC ATHEROSCLEROSIS: ICD-10-CM

## 2023-06-22 DIAGNOSIS — I48.0 PAROXYSMAL ATRIAL FIBRILLATION: Primary | Chronic | ICD-10-CM

## 2023-06-22 DIAGNOSIS — G47.33 OSA (OBSTRUCTIVE SLEEP APNEA): ICD-10-CM

## 2023-06-22 DIAGNOSIS — I10 ESSENTIAL HYPERTENSION: ICD-10-CM

## 2023-06-22 DIAGNOSIS — I77.1 TORTUOUS AORTA: ICD-10-CM

## 2023-06-22 DIAGNOSIS — E78.2 MIXED HYPERLIPIDEMIA: ICD-10-CM

## 2023-06-22 PROCEDURE — 99214 OFFICE O/P EST MOD 30 MIN: CPT | Mod: S$GLB,,, | Performed by: INTERNAL MEDICINE

## 2023-06-22 PROCEDURE — 99214 PR OFFICE/OUTPT VISIT, EST, LEVL IV, 30-39 MIN: ICD-10-PCS | Mod: S$GLB,,, | Performed by: INTERNAL MEDICINE

## 2023-06-22 PROCEDURE — 1125F PR PAIN SEVERITY QUANTIFIED, PAIN PRESENT: ICD-10-PCS | Mod: CPTII,S$GLB,, | Performed by: INTERNAL MEDICINE

## 2023-06-22 PROCEDURE — 3079F DIAST BP 80-89 MM HG: CPT | Mod: CPTII,S$GLB,, | Performed by: INTERNAL MEDICINE

## 2023-06-22 PROCEDURE — 3075F PR MOST RECENT SYSTOLIC BLOOD PRESS GE 130-139MM HG: ICD-10-PCS | Mod: CPTII,S$GLB,, | Performed by: INTERNAL MEDICINE

## 2023-06-22 PROCEDURE — 99999 PR PBB SHADOW E&M-EST. PATIENT-LVL II: CPT | Mod: PBBFAC,,, | Performed by: INTERNAL MEDICINE

## 2023-06-22 PROCEDURE — 3079F PR MOST RECENT DIASTOLIC BLOOD PRESSURE 80-89 MM HG: ICD-10-PCS | Mod: CPTII,S$GLB,, | Performed by: INTERNAL MEDICINE

## 2023-06-22 PROCEDURE — 3075F SYST BP GE 130 - 139MM HG: CPT | Mod: CPTII,S$GLB,, | Performed by: INTERNAL MEDICINE

## 2023-06-22 PROCEDURE — 99999 PR PBB SHADOW E&M-EST. PATIENT-LVL II: ICD-10-PCS | Mod: PBBFAC,,, | Performed by: INTERNAL MEDICINE

## 2023-06-22 PROCEDURE — 1125F AMNT PAIN NOTED PAIN PRSNT: CPT | Mod: CPTII,S$GLB,, | Performed by: INTERNAL MEDICINE

## 2023-06-23 ENCOUNTER — ANESTHESIA EVENT (OUTPATIENT)
Dept: SURGERY | Facility: HOSPITAL | Age: 83
End: 2023-06-23
Payer: MEDICARE

## 2023-06-23 ENCOUNTER — HOSPITAL ENCOUNTER (OUTPATIENT)
Dept: PREADMISSION TESTING | Facility: HOSPITAL | Age: 83
Discharge: HOME | End: 2023-06-23
Attending: ORTHOPAEDIC SURGERY
Payer: MEDICARE

## 2023-06-23 NOTE — ANESTHESIA PREPROCEDURE EVALUATION
"                                                                                                             06/30/2023  Mis Ca is a 82 y.o., female scheduled for REPAIR, TENDON, EXTENSOR (Left: Hand) - Left Index Digit and REMOVAL,ORTHOPEDIC HARDWARE,UPPER EXTREMITY (Left) 6/30/23.    Per cardiology Dr. Luis, "OK to proceed with planned wrist surgery".    Past Medical History:   Diagnosis Date    Allergy     Anticoagulant long-term use     Anxiety     Arthritis     Atrial fibrillation     Bilateral renal cysts 05/01/2022    Blind right eye     Bradycardia     Cancer     skin cancer left side of face under eye    Hyperlipidemia     Hypertension     PVC (premature ventricular contraction)     RLS (restless legs syndrome)     Sleep apnea     Does not use CPAP machine.     Past Surgical History:   Procedure Laterality Date    ADENOIDECTOMY      AKIN OSTEOTOMY Left 10/31/2018    Procedure: OSTEOTOMY, AKIN;  Surgeon: Rudolph Cardozo DPM;  Location: [facility];  Service: Podiatry;  Laterality: Left;    APPENDECTOMY      BREAST BIOPSY Left     x3    BREAST SURGERY Left     breast biopsy x3    CATARACT EXTRACTION BILATERAL W/ ANTERIOR VITRECTOMY      ENDOSCOPIC GASTROCNEMIUS RECESSION Left 10/31/2018    Procedure: RECESSION, GASTROCNEMIUS, ENDOSCOPIC;  Surgeon: Rudolph Cardozo DPM;  Location: [facility];  Service: Podiatry;  Laterality: Left;    ESOPHAGOGASTRODUODENOSCOPY N/A 2/11/2022    Procedure: EGD (ESOPHAGOGASTRODUODENOSCOPY);  Surgeon: Efren Aguilar MD;  [facility];  Service: Endoscopy;  Laterality: N/A;  Patient needs a rapid covid  test done on 12/15/2021. thank you    EYE SURGERY Bilateral     cataracts extraction    EYE SURGERY Right     drainage tube (glaucoma)    FOOT ARTHRODESIS Left 1/15/2020    Procedure: FUSION, FOOT;  Surgeon: Rudolph Cardozo DPM;  Location: [facility] OR;  Service: Podiatry;  Laterality: Left;  mini c-arm, Arthrex screw  for " hardware removal (Lydia notified), Orthofix (Niels notified) min ex-fix    FOOT SURGERY Left     lesion removed from dorsal area    FRACTURE SURGERY Left     arm    HAND TENDON SURGERY Left     HYSTERECTOMY      INCISION AND DRAINAGE FOOT Left 3/11/2020    Procedure: INCISION AND DRAINAGE, FOOT;  Surgeon: Rudolph Cardozo DPM;  Location: [facility] OR;  Service: Podiatry;  Laterality: Left;    LAPIDUS BUNIONECTOMY Left 10/31/2018    Procedure: BUNIONECTOMY, LAPIDUS;  Surgeon: Rudolph Cardozo DPM;  Location: [facility] OR;  Service: Podiatry;  Laterality: Left;  mini c-arm, Arthrex locking plate (Lydia notified)    LAPIDUS BUNIONECTOMY Left 10/31/2018    Dr. Cardozo    Lymph Gland Removed  Right     groin (performed by Dr. Vergara)    NEURECTOMY Left 1/15/2020    Procedure: NEURECTOMY;  Surgeon: Rudolph Cardozo DPM;  Location: [facility] OR;  Service: Podiatry;  Laterality: Left;  bipolar bovie, vessel loop, possible [place] nerve wrap. Moshe with [facility] notified and will be overnighting graft. MK 1/14/2020    OOPHORECTOMY      ORIF FOREARM FRACTURE Left     PALATE SURGERY      Lesion removed    TONSILLECTOMY       Current Outpatient Medications   Medication Instructions    acetaminophen (TYLENOL) 1,000 mg, Oral, 2 times daily PRN    alendronate (FOSAMAX) 70 mg, Oral, Every 7 days    ALPRAZolam (XANAX) 0.25 MG tablet TAKE 1 TABLET BY MOUTH TWICE A DAY    amiodarone (PACERONE) 100 mg, Oral, Daily    aspirin (ECOTRIN) 81 mg, Oral, Daily,      azelastine (ASTELIN) 137 mcg, Nasal, 2 times daily    chlorthalidone (HYGROTEN) 50 MG Tab TAKE 1 TABLET BY MOUTH EVERY DAY    cyanocobalamin 1,000 mcg, Intramuscular, Every 30 days    furosemide (LASIX) 20 mg, Oral, Daily    gabapentin (NEURONTIN) 300 MG capsule TAKE 2 CAPSULES BY MOUTH  TWICE DAILY    latanoprost 0.005 % ophthalmic solution 1 drop, Both Eyes, Nightly    levocetirizine (XYZAL) 5 mg, Oral, Nightly    losartan (COZAAR) 50 mg, Oral, Daily     multivit-min-iron-FA-lutein (CENTRUM SILVER WOMEN) 8 mg iron-400 mcg-300 mcg Tab 1 tablet, Oral, Daily    mv-mn/om3/dha/epa/fish/lut/geoffrey (OCUVITE ADULT 50 PLUS ORAL) 1 tablet, Oral, Daily    omeprazole (PRILOSEC) 20 MG capsule TAKE 1 CAPSULE BY MOUTH  TWICE DAILY    oxybutynin (DITROPAN) 5 MG Tab TAKE 1 TABLET BY MOUTH ONCE DAILY    potassium chloride (K-TAB) 20 mEq TAKE 1 TABLET BY MOUTH ONCE DAILY    pramipexole (MIRAPEX) 0.5 MG tablet TAKE 1 TABLET BY MOUTH  TWICE DAILY    pravastatin (PRAVACHOL) 40 MG tablet TAKE 1 TABLET BY MOUTH EVERY DAY    timolol maleate 0.5% (TIMOPTIC) 0.5 % Drop 1 drop, Left Eye, Daily    UNABLE TO FIND 1 capsule, Oral, 3 times daily with meals, medication name: Golo Release Diet Capsules    vitamin D (VITAMIN D3) 1,000 Units, Oral, Daily    warfarin (COUMADIN) 6 MG tablet TAKE 6 MG DAILY EXCEPT FOR 9 MG ON FRIDAY TO THIN BLOOD       Pre-op Assessment    I have reviewed the Patient Summary Reports.     I have reviewed the Nursing Notes. I have reviewed the NPO Status.   I have reviewed the Medications.     Review of Systems  Anesthesia Hx:  History of prior surgery of interest to airway management or planning: Personal Hx of Anesthesia complications (delirium with propofol following EGD)   Social:  Non-Smoker, No Alcohol Use    Cardiovascular:   Exercise tolerance: good Hypertension Dysrhythmias (paroxysmal a fib)  Denies Angina. hyperlipidemia Tortuous aorta   Pulmonary:   Sleep Apnea, CPAP    Education provided regarding risk of obstructive sleep apnea     Renal/:   Chronic Renal Disease, CKD    Hepatic/GI:   GERD, well controlled    Neurological:   Denies TIA. Denies CVA. Neuromuscular Disease,  Denies Seizures. RLS   Endocrine:   Diabetes, well controlled, type 2 Denies Hypothyroidism. Denies Hyperthyroidism.  Morbid Obesity / BMI > 40  Psych:   anxiety        Lab Results   Component Value Date    WBC 6.50 11/03/2022    HGB 14.0 11/03/2022    HCT 42.3 11/03/2022      11/03/2022    CHOL 193 09/07/2022    TRIG 239 (H) 09/07/2022    HDL 39 (L) 09/07/2022    ALT 20 03/27/2023    AST 25 03/27/2023     03/27/2023    K 3.7 03/27/2023    CL 98 03/27/2023    CREATININE 1.19 03/27/2023    BUN 28 (H) 03/27/2023    CO2 33 (H) 03/27/2023    TSH 2.390 12/28/2022    INR 1.2 06/28/2023    HGBA1C 6.7 (H) 09/07/2022       Physical Exam  General: Oriented, Cooperative, Well nourished and Alert    Airway:  Mallampati: III / II  Mouth Opening: Small, but > 3cm  Neck ROM: Normal ROM  Neck: Girth Increased    Dental:  Partial Dentures  Upper and lower partial dentures    Lab Results   Component Value Date    WBC 6.50 11/03/2022    HGB 14.0 11/03/2022    HCT 42.3 11/03/2022     11/03/2022    CHOL 193 09/07/2022    TRIG 239 (H) 09/07/2022    HDL 39 (L) 09/07/2022    ALT 20 03/27/2023    AST 25 03/27/2023     03/27/2023    K 3.7 03/27/2023    CL 98 03/27/2023    CREATININE 1.19 03/27/2023    BUN 28 (H) 03/27/2023    CO2 33 (H) 03/27/2023    TSH 2.390 12/28/2022    INR 1.2 06/28/2023    HGBA1C 6.7 (H) 09/07/2022     Results for orders placed or performed in visit on 05/12/23   EKG 12-lead    Collection Time: 05/12/23 10:42 AM    Narrative    Test Reason : I48.19,    Vent. Rate : 064 BPM     Atrial Rate : 064 BPM     P-R Int : 182 ms          QRS Dur : 150 ms      QT Int : 458 ms       P-R-T Axes : 042 -82 000 degrees     QTc Int : 472 ms    Normal sinus rhythm  Right bundle branch block  Left anterior fascicular block   Bifascicular block   Lateral infarct ,age undetermined  Abnormal ECG  When compared with ECG of 23-DEC-2022 10:47,  Right bundle branch block has replaced Nonspecific intraventricular block  Confirmed by Toby OCHOA, Portillo HAN ([id number]) on 5/17/2023 6:55:52 PM    Referred By:             Confirmed By:Portillo Luis MD         Anesthesia Plan  Type of Anesthesia, risks & benefits discussed:    Anesthesia Type: Regional, Gen Natural Airway  Intra-op Monitoring Plan: Standard  ASA Monitors  Post Op Pain Control Plan: multimodal analgesia, IV/PO Opioids PRN and peripheral nerve block  Induction:  IV  Informed Consent: Informed consent signed with the Patient and all parties understand the risks and agree with anesthesia plan.  All questions answered.   ASA Score: 3  Day of Surgery Review of History & Physical: H&P Update referred to the surgeon/provider.  Anesthesia Plan Notes:       Ready For Surgery From Anesthesia Perspective.     .

## 2023-06-24 ENCOUNTER — TELEPHONE (OUTPATIENT)
Dept: ENDOSCOPY | Facility: HOSPITAL | Age: 83
End: 2023-06-24
Payer: MEDICARE

## 2023-06-28 ENCOUNTER — ANTI-COAG VISIT (OUTPATIENT)
Dept: CARDIOLOGY | Facility: CLINIC | Age: 83
End: 2023-06-28
Payer: MEDICARE

## 2023-06-28 ENCOUNTER — LAB VISIT (OUTPATIENT)
Dept: LAB | Facility: HOSPITAL | Age: 83
End: 2023-06-28
Attending: INTERNAL MEDICINE
Payer: MEDICARE

## 2023-06-28 DIAGNOSIS — I48.0 PAROXYSMAL ATRIAL FIBRILLATION: Primary | Chronic | ICD-10-CM

## 2023-06-28 DIAGNOSIS — I48.0 PAROXYSMAL ATRIAL FIBRILLATION: Chronic | ICD-10-CM

## 2023-06-28 DIAGNOSIS — Z79.01 LONG TERM (CURRENT) USE OF ANTICOAGULANTS: ICD-10-CM

## 2023-06-28 LAB
INR PPP: 1.2 (ref 0.8–1.2)
PROTHROMBIN TIME: 13.3 SEC (ref 9–12.5)

## 2023-06-28 PROCEDURE — 36415 COLL VENOUS BLD VENIPUNCTURE: CPT | Mod: PO | Performed by: INTERNAL MEDICINE

## 2023-06-28 PROCEDURE — 85610 PROTHROMBIN TIME: CPT | Mod: PO | Performed by: INTERNAL MEDICINE

## 2023-06-28 PROCEDURE — 93793 ANTICOAG MGMT PT WARFARIN: CPT | Mod: S$GLB,,, | Performed by: PHARMACIST

## 2023-06-28 PROCEDURE — 93793 PR ANTICOAGULANT MGMT FOR PT TAKING WARFARIN: ICD-10-PCS | Mod: S$GLB,,, | Performed by: PHARMACIST

## 2023-06-28 NOTE — PROGRESS NOTES
"Patient to have wrist surgery (REPAIR, TENDON, EXTENSOR (Left: Hand) - Left Index Digit and REMOVAL,ORTHOPEDIC HARDWARE,UPPER EXTREMITY (Left) on 6/30/23. Patient was seen on 6/23/23 by Dr Luis for clearance. Per note from Dr Luis on 6/23/23:  "Ok to hold coumadin pre op for 5 days, coordinate with coumadin clinic for INR checks. Restart post when ok with surgery team "    Per patient she started holding her coumadin on  6/25 to present and confirms taking a weekly warfarin dose of 3mg mon/fri and 6mg all other days, which is different than prescribed.   Patien will be advised to continue to hold coumadin in preparation for her surgery and to restart as soon as safely possible post surgery.        "

## 2023-06-30 ENCOUNTER — ANESTHESIA (OUTPATIENT)
Dept: SURGERY | Facility: HOSPITAL | Age: 83
End: 2023-06-30
Payer: MEDICARE

## 2023-06-30 ENCOUNTER — HOSPITAL ENCOUNTER (OUTPATIENT)
Facility: HOSPITAL | Age: 83
Discharge: HOME | End: 2023-06-30
Attending: ORTHOPAEDIC SURGERY | Admitting: ORTHOPAEDIC SURGERY
Payer: MEDICARE

## 2023-06-30 VITALS
OXYGEN SATURATION: 96 % | HEIGHT: 62 IN | BODY MASS INDEX: 40.48 KG/M2 | RESPIRATION RATE: 20 BRPM | DIASTOLIC BLOOD PRESSURE: 81 MMHG | HEART RATE: 66 BPM | WEIGHT: 220 LBS | TEMPERATURE: 98 F | SYSTOLIC BLOOD PRESSURE: 175 MMHG

## 2023-06-30 DIAGNOSIS — S66.812D RUPTURE OF EXTENSOR TENDON OF LEFT HAND, SUBSEQUENT ENCOUNTER: Primary | ICD-10-CM

## 2023-06-30 DIAGNOSIS — S66.812A RUPTURE OF EXTENSOR TENDON OF LEFT HAND, INITIAL ENCOUNTER: ICD-10-CM

## 2023-06-30 LAB — POCT GLUCOSE: 100 MG/DL (ref 70–110)

## 2023-06-30 PROCEDURE — D9220A PRA ANESTHESIA: Mod: ANES,,, | Performed by: STUDENT IN AN ORGANIZED HEALTH CARE EDUCATION/TRAINING PROGRAM

## 2023-06-30 PROCEDURE — 71000015 HC POSTOP RECOV 1ST HR: Performed by: ORTHOPAEDIC SURGERY

## 2023-06-30 PROCEDURE — 63600175 PHARM REV CODE 636 W HCPCS: Performed by: NURSE ANESTHETIST, CERTIFIED REGISTERED

## 2023-06-30 PROCEDURE — 36000707: Performed by: ORTHOPAEDIC SURGERY

## 2023-06-30 PROCEDURE — D9220A PRA ANESTHESIA: ICD-10-PCS | Mod: CRNA,,, | Performed by: NURSE ANESTHETIST, CERTIFIED REGISTERED

## 2023-06-30 PROCEDURE — D9220A PRA ANESTHESIA: ICD-10-PCS | Mod: ANES,,, | Performed by: STUDENT IN AN ORGANIZED HEALTH CARE EDUCATION/TRAINING PROGRAM

## 2023-06-30 PROCEDURE — 25000003 PHARM REV CODE 250: Performed by: STUDENT IN AN ORGANIZED HEALTH CARE EDUCATION/TRAINING PROGRAM

## 2023-06-30 PROCEDURE — 63600175 PHARM REV CODE 636 W HCPCS: Performed by: STUDENT IN AN ORGANIZED HEALTH CARE EDUCATION/TRAINING PROGRAM

## 2023-06-30 PROCEDURE — 26418 REPAIR FINGER TENDON: CPT | Mod: 51,F2,, | Performed by: ORTHOPAEDIC SURGERY

## 2023-06-30 PROCEDURE — 25000003 PHARM REV CODE 250: Performed by: ORTHOPAEDIC SURGERY

## 2023-06-30 PROCEDURE — 36000706: Performed by: ORTHOPAEDIC SURGERY

## 2023-06-30 PROCEDURE — 64415 NJX AA&/STRD BRCH PLXS IMG: CPT | Performed by: STUDENT IN AN ORGANIZED HEALTH CARE EDUCATION/TRAINING PROGRAM

## 2023-06-30 PROCEDURE — 37000008 HC ANESTHESIA 1ST 15 MINUTES: Performed by: ORTHOPAEDIC SURGERY

## 2023-06-30 PROCEDURE — 37000009 HC ANESTHESIA EA ADD 15 MINS: Performed by: ORTHOPAEDIC SURGERY

## 2023-06-30 PROCEDURE — 20680 REMOVAL OF IMPLANT DEEP: CPT | Mod: ,,, | Performed by: ORTHOPAEDIC SURGERY

## 2023-06-30 PROCEDURE — 25000003 PHARM REV CODE 250: Performed by: NURSE ANESTHETIST, CERTIFIED REGISTERED

## 2023-06-30 PROCEDURE — 20680 PR REMOVAL DEEP IMPLANT: ICD-10-PCS | Mod: ,,, | Performed by: ORTHOPAEDIC SURGERY

## 2023-06-30 PROCEDURE — 26418 PR REPAIR EXTEN TENDON,DORSUM FINGR,EA: ICD-10-PCS | Mod: 51,F2,, | Performed by: ORTHOPAEDIC SURGERY

## 2023-06-30 PROCEDURE — D9220A PRA ANESTHESIA: Mod: CRNA,,, | Performed by: NURSE ANESTHETIST, CERTIFIED REGISTERED

## 2023-06-30 RX ORDER — OXYCODONE HYDROCHLORIDE 5 MG/1
10 TABLET ORAL EVERY 4 HOURS PRN
Status: DISCONTINUED | OUTPATIENT
Start: 2023-06-30 | End: 2023-06-30 | Stop reason: HOSPADM

## 2023-06-30 RX ORDER — SODIUM CHLORIDE 9 MG/ML
INJECTION, SOLUTION INTRAVENOUS CONTINUOUS
Status: DISCONTINUED | OUTPATIENT
Start: 2023-06-30 | End: 2023-06-30 | Stop reason: HOSPADM

## 2023-06-30 RX ORDER — HYDROMORPHONE HYDROCHLORIDE 2 MG/ML
0.2 INJECTION, SOLUTION INTRAMUSCULAR; INTRAVENOUS; SUBCUTANEOUS EVERY 5 MIN PRN
Status: DISCONTINUED | OUTPATIENT
Start: 2023-06-30 | End: 2023-06-30 | Stop reason: HOSPADM

## 2023-06-30 RX ORDER — ONDANSETRON 8 MG/1
8 TABLET, ORALLY DISINTEGRATING ORAL EVERY 8 HOURS PRN
Status: DISCONTINUED | OUTPATIENT
Start: 2023-06-30 | End: 2023-06-30 | Stop reason: HOSPADM

## 2023-06-30 RX ORDER — CLINDAMYCIN PHOSPHATE 900 MG/50ML
INJECTION, SOLUTION INTRAVENOUS
Status: DISCONTINUED | OUTPATIENT
Start: 2023-06-30 | End: 2023-06-30

## 2023-06-30 RX ORDER — MUPIROCIN 20 MG/G
OINTMENT TOPICAL
Status: DISCONTINUED | OUTPATIENT
Start: 2023-06-30 | End: 2023-06-30 | Stop reason: HOSPADM

## 2023-06-30 RX ORDER — HYDROCODONE BITARTRATE AND ACETAMINOPHEN 5; 325 MG/1; MG/1
1 TABLET ORAL EVERY 4 HOURS PRN
Status: DISCONTINUED | OUTPATIENT
Start: 2023-06-30 | End: 2023-06-30 | Stop reason: HOSPADM

## 2023-06-30 RX ORDER — LIDOCAINE HYDROCHLORIDE 10 MG/ML
1 INJECTION, SOLUTION EPIDURAL; INFILTRATION; INTRACAUDAL; PERINEURAL ONCE
Status: ACTIVE | OUTPATIENT
Start: 2023-06-30

## 2023-06-30 RX ORDER — FENTANYL CITRATE 50 UG/ML
25 INJECTION, SOLUTION INTRAMUSCULAR; INTRAVENOUS EVERY 5 MIN PRN
Status: DISCONTINUED | OUTPATIENT
Start: 2023-06-30 | End: 2023-06-30 | Stop reason: HOSPADM

## 2023-06-30 RX ORDER — ACETAMINOPHEN 325 MG/1
650 TABLET ORAL EVERY 4 HOURS PRN
Status: DISCONTINUED | OUTPATIENT
Start: 2023-06-30 | End: 2023-06-30 | Stop reason: HOSPADM

## 2023-06-30 RX ORDER — KETAMINE HCL IN 0.9 % NACL 50 MG/5 ML
SYRINGE (ML) INTRAVENOUS
Status: DISCONTINUED | OUTPATIENT
Start: 2023-06-30 | End: 2023-06-30

## 2023-06-30 RX ORDER — HYDRALAZINE HYDROCHLORIDE 20 MG/ML
INJECTION INTRAMUSCULAR; INTRAVENOUS
Status: DISCONTINUED | OUTPATIENT
Start: 2023-06-30 | End: 2023-06-30

## 2023-06-30 RX ORDER — SODIUM CHLORIDE, SODIUM LACTATE, POTASSIUM CHLORIDE, CALCIUM CHLORIDE 600; 310; 30; 20 MG/100ML; MG/100ML; MG/100ML; MG/100ML
INJECTION, SOLUTION INTRAVENOUS CONTINUOUS
Status: ACTIVE | OUTPATIENT
Start: 2023-06-30

## 2023-06-30 RX ORDER — CEFAZOLIN SODIUM 2 G/50ML
2 SOLUTION INTRAVENOUS
Status: DISCONTINUED | OUTPATIENT
Start: 2023-06-30 | End: 2023-06-30 | Stop reason: HOSPADM

## 2023-06-30 RX ORDER — PROPOFOL 10 MG/ML
VIAL (ML) INTRAVENOUS
Status: DISCONTINUED | OUTPATIENT
Start: 2023-06-30 | End: 2023-06-30

## 2023-06-30 RX ORDER — BUPIVACAINE HYDROCHLORIDE 5 MG/ML
INJECTION, SOLUTION EPIDURAL; INTRACAUDAL
Status: COMPLETED | OUTPATIENT
Start: 2023-06-30 | End: 2023-06-30

## 2023-06-30 RX ORDER — PROPOFOL 10 MG/ML
VIAL (ML) INTRAVENOUS CONTINUOUS PRN
Status: DISCONTINUED | OUTPATIENT
Start: 2023-06-30 | End: 2023-06-30

## 2023-06-30 RX ORDER — ONDANSETRON 2 MG/ML
4 INJECTION INTRAMUSCULAR; INTRAVENOUS DAILY PRN
Status: DISCONTINUED | OUTPATIENT
Start: 2023-06-30 | End: 2023-06-30 | Stop reason: HOSPADM

## 2023-06-30 RX ORDER — HYDROCODONE BITARTRATE AND ACETAMINOPHEN 5; 325 MG/1; MG/1
1 TABLET ORAL EVERY 4 HOURS PRN
Qty: 20 TABLET | Refills: 0 | Status: SHIPPED | OUTPATIENT
Start: 2023-06-30 | End: 2023-10-09

## 2023-06-30 RX ORDER — LIDOCAINE HYDROCHLORIDE 10 MG/ML
INJECTION, SOLUTION EPIDURAL; INFILTRATION; INTRACAUDAL; PERINEURAL
Status: DISCONTINUED | OUTPATIENT
Start: 2023-06-30 | End: 2023-06-30 | Stop reason: HOSPADM

## 2023-06-30 RX ADMIN — MEPIVACAINE HYDROCHLORIDE 10 ML: 15 INJECTION, SOLUTION EPIDURAL; INFILTRATION at 12:06

## 2023-06-30 RX ADMIN — PROPOFOL 50 MG: 10 INJECTION, EMULSION INTRAVENOUS at 12:06

## 2023-06-30 RX ADMIN — PROPOFOL 25 MCG/KG/MIN: 10 INJECTION, EMULSION INTRAVENOUS at 12:06

## 2023-06-30 RX ADMIN — Medication 25 MG: at 12:06

## 2023-06-30 RX ADMIN — HYDRALAZINE HYDROCHLORIDE 5 MG: 20 INJECTION, SOLUTION INTRAMUSCULAR; INTRAVENOUS at 12:06

## 2023-06-30 RX ADMIN — BUPIVACAINE HYDROCHLORIDE 15 ML: 5 INJECTION, SOLUTION EPIDURAL; INTRACAUDAL; PERINEURAL at 12:06

## 2023-06-30 RX ADMIN — PROPOFOL 20 MG: 10 INJECTION, EMULSION INTRAVENOUS at 12:06

## 2023-06-30 RX ADMIN — CLINDAMYCIN IN 5 PERCENT DEXTROSE 900 MG: 18 INJECTION, SOLUTION INTRAVENOUS at 12:06

## 2023-06-30 RX ADMIN — SODIUM CHLORIDE: 0.9 INJECTION, SOLUTION INTRAVENOUS at 12:06

## 2023-06-30 NOTE — DISCHARGE INSTRUCTIONS
ANESTHESIA  -For the first 24 hours after surgery:  Do not drive, use heavy equipment, make important decisions, or drink alcohol  -It is normal to feel sleepy for several hours.  Rest until you are more awake.  -Have someone stay with you, if needed.  They can watch for problems and help keep you safe.  -Some possible post anesthesia side effects include: nausea and vomiting, sore throat and hoarseness, sleepiness, and dizziness.    PAIN  -If you have pain after surgery, pain medicine will help you feel better.  Take it as directed, before pain becomes severe.  Most pain relievers taken by mouth need at least 20-30 minutes to start working.  -Do not drive or drink alcohol while taking pain medicine.  -Pain medication can upset your stomach.  Taking them with a little food may help.  -Other ways to help control pain: elevation, ice, and relaxation  -Call your surgeon if still having unmanageable pain an hour after taking pain medicine.  -Pain medicine can cause constipation.  Taking an over-the counter stool softener while on prescription pain medicine and drinking plenty of fluids can prevent this side effect.  -Call your surgeon if you have severe side effects like: breathing problems, trouble waking up, dizziness, confusion, or severe constipation.    NAUSEA  -Some people have nausea after surgery.  This is often because of anesthesia, pain, pain medicine, or the stress of surgery.  -Do not push yourself to eat.  Start off with clear liquids and soup.  Slowly move to solid foods.  Don't eat fatty, rich, spicy foods at first.  Eat smaller amounts.  -If you develop persistent nausea and vomiting please notify your surgeon immediately.    BLEEDING  -Different types of surgery require different types of care and dressing changes.  It is important to follow all instructions and advice from your surgeon.  Change dressing as directed.  Call your surgeon for any concerns regarding postop bleeding.    SIGNS OF  INFECTION  -Signs of infection include: fever, swelling, drainage, and redness  -Notify your surgeon if you have a fever of 100.4 F (38.0 C) or higher.  -Notify your surgeon if you notice redness, swelling, increased pain, pus, or a foul smell at the incision site.         After Hand Surgery  After surgery, the better you take care of yourself--especially your hand--the sooner it will heal. Follow your surgeons instructions. Try not to bump your hand, and dont move or lift anything while youre still wearing bandages, a splint, or a cast.  Care for your hand    Keep your hand elevated above heart level as much as possible for the first several days after surgery. This helps reduce swelling and pain.  To help prevent infection and speed healing, take care not to get your cast or bandages wet.  Relieve pain as directed  Your surgeon may prescribe pain medicine or suggest you take an anti-inflammatory medicine. You might also be instructed to apply ice (or another cold source) to your hand. If you use ice cubes, put them in a plastic bag and rest it on top of your bandages. Leave the cold source on your hand for as long as its comfortable. Do this several times a day for the first few days after surgery. It may take several minutes before you can feel the cold through the cast or bandages.  Follow up with your surgeon  During a follow-up visit after surgery, your surgeon will check your progress. The stitches, bandages, splint, or cast may be removed. A new cast or splint may be placed. If your hand has healed enough, your surgeon may prescribe exercises.  Do prescribed hand exercises  Your surgeon may recommend that you do exercises. These may be done under the guidance of a physical or occupational therapist. The exercises strengthen your hand, help you regain flexibility, and restore proper function. Do the exercises as advised.  Call your surgeon if you have...  A fever higher than 100.4°F (38.0°C) taken by  mouth  Side effects from your medicine, such as prolonged nausea  A wet or loose dressing, or a dressing that is too tight  Excessive bleeding  Increased, ongoing pain or numbness  Signs of infection (such as drainage, warmth, or redness) at the incision site

## 2023-06-30 NOTE — OP NOTE
[facility] - [facility] (Hospital)  Operative Note      Date of Procedure: 6/30/2023     Procedure: Procedure(s) (LRB):  REPAIR, TENDON, EXTENSOR (Left)  REMOVAL,ORTHOPEDIC HARDWARE,UPPER EXTREMITY (Left)     Surgeon(s) and Role:     * Skyler Oliva Jr., MD - Primary    Assisting Surgeon: None    Pre-Operative Diagnosis: Rupture of extensor tendon of left hand, initial encounter [S66.812A]    Post-Operative Diagnosis: Post-Op Diagnosis Codes:     * Rupture of extensor tendon of left hand, initial encounter [S66.812A]    Anesthesia: Regional    Operative Findings (including complications, if any):  Retained hardware left wrist with extensor tendon rupture x2    Description of Technical Procedures:     Preop diagnosis:  1. Retained hardware left wrist with prominent screw.      2. Extensor tendon rupture left index and left middle finger.      Postop diagnosis: Same.      Operative procedure: 1.  Removal of hardware volar plate left distal radius.      2. Extensor tendon repair left index finger.      3. Extensor tendon repair left middle finger.      Surgeon: Pj.      First assistant: Mj.      Anesthesia:  Regional block.      EBL:  Minimal.      Complications:  None.      Operative procedure in detail as follows:    After operative consent was obtained the patient brought the operating room placed supine operating table.  Anesthesia by regional block performed by the anesthesia staff.  After the left arm was anesthetized a tourniquet applied to the arm left arm prepped and draped out in the normal sterile fashion.  The Esmarch used to exsanguinated the limb and the tourniquet inflated 225 mmHg.      Following this a volar longitudinal incision made with a 15 blade through the previous surgical scar.  Full-thickness skin flaps raised hemostasis achieved with the Bovie.  The radial artery identified and protected.  Deep dissection used to expose the plate which had some bone overlying it the bone was carefully  chipped away with the osteotome and curette exposing the screws which were then removed sequentially.  Following this the plate was elevated gently and removed without difficulty.  Sharp edges of the of the bone were smoothed down with the rongeur and the bone was noted to be intact.  All hardware was removed the wound irrigated hemostasis achieved with the Bovie.  The deep fascia closed with 2-0 Vicryl the subcutaneous tissue closed with 4-0 Monocryl and Steri-Strips on the skin.      Attention then turned to the dorsum of the left hand where a an incision was made dorsally longitudinally from the extensor retinaculum to the level of the MP joint over the 3rd MP using the 15 blade.  Full-thickness skin flaps were raised hemostasis achieved with the Bovie.  Careful dissection used to expose the extensor tendons to the fingers there was complete rupture of the left middle finger and the left index finger the ring and small finger were intact as was the EPL.  The distal stumps of the extensor tendon to the middle and ring were carefully freed from scar tissue cleaned up freshened up with the scissors and scalpel and then mobilized.  A Pulvertaft weave was then used to weave the extensor tendon to the middle and ring finger together and then incorporating the extensor tendon to the index finger as well as the EIP tendon were all sewn together in a weave using multiple sutures of 3-0 FiberWire.  This was done in a manner to give appropriate tension to all 3 tendons with the tenodesis affect.  Additional sutures were placed around the we have area to tighten up the repair and then finally excess tendon was trimmed to prevent any bunching or clumping around the tendon.  The wound thoroughly irrigated with antibiotic saline solution hemostasis achieved with the Bovie.  The skin closed with a running 5 O nylon horizontal mattress suture.  Sterile dressings applied volar and dorsal followed by a volar splint holding the  fingers in extension.  The tourniquet deflated the patient brought to the recovery room in stable condition all sponge needle counts reported as correct no complications    Significant Surgical Tasks Conducted by the Assistant(s), if Applicable: retraction    Estimated Blood Loss (EBL): * No values recorded between 6/30/2023 12:55 PM and 6/30/2023  2:34 PM *           Implants: * No implants in log *    Specimens:   Specimen (24h ago, onward)      None                    Condition: Good    Disposition: PACU - hemodynamically stable.    Attestation: I was present and scrubbed for the entire procedure.    Discharge Note    OUTCOME: Patient tolerated treatment/procedure well without complication and is now ready for discharge.    DISPOSITION: Home or Self Care    FINAL DIAGNOSIS:  Rupture of extensor tendon of left hand    FOLLOWUP: In clinic    DISCHARGE INSTRUCTIONS:    Discharge Procedure Orders   Diet general     Leave dressing on - Keep it clean, dry, and intact until clinic visit     Call MD for:  temperature >100.4     Call MD for:  persistent nausea and vomiting     Call MD for:  severe uncontrolled pain     Keep surgical extremity elevated

## 2023-06-30 NOTE — H&P
Chief Complaint & History of Present Illness:  Mis Ca is an 82 y.o. female who presents today with bilateral thumb pain x multiple years and 6-7mos of L Index extension lag.     The patient notes that the left index digit was injured when she was reaching for an object.  She noted immediate sharp dorsal hand pain, which slowly resolved over time.  She is not complaining of ongoing left index pain now, but notes continued inability to extend the digit.  The patient describes visible dorsal hand defect and a palpable bump.  She is not having any difficulties with digit flexion and/or sensation changes distally.              Hx:  ORIF left distal radius approximately 10 years ago     The patient has bilateral thumb pain has been slow and progressive over time.  It is most prevalent during activities which require a pincher mechanism.  She denies overlying skin changes and/or sensation changes.  The patient does describe  strength weakening over time as well.  She is currently not utilizing bracing.          Review of patient's allergies indicates:   Allergen Reactions    Adhesive      Compazine [prochlorperazine edisylate] Anxiety    Penicillins Rash    Sulfa (sulfonamide antibiotics) Rash    Vancomycin analogues Rash                 Current Outpatient Medications   Medication Sig Dispense Refill    acetaminophen (TYLENOL) 500 MG tablet Take 1,000 mg by mouth 2 (two) times daily as needed for Pain.        alendronate (FOSAMAX) 70 MG tablet Take 1 tablet (70 mg total) by mouth every 7 days. (Patient taking differently: Take 70 mg by mouth every Wednesday.) 12 tablet 3    ALPRAZolam (XANAX) 0.25 MG tablet TAKE 1 TABLET BY MOUTH TWICE A  tablet 0    amiodarone (PACERONE) 200 MG Tab Take 0.5 tablets (100 mg total) by mouth once daily. 45 tablet 3    aspirin (ECOTRIN) 81 MG EC tablet Take 81 mg by mouth once daily.        chlorthalidone (HYGROTEN) 50 MG Tab TAKE 1 TABLET BY MOUTH EVERY DAY 90 tablet 3     cyanocobalamin 1,000 mcg/mL injection INJECT 1 ML (1,000 MCG TOTAL) INTO THE MUSCLE EVERY 30 DAYS. 3 mL 19    furosemide (LASIX) 20 MG tablet Take 1 tablet (20 mg total) by mouth once daily. 90 tablet 3    gabapentin (NEURONTIN) 300 MG capsule TAKE 2 CAPSULES BY MOUTH  TWICE DAILY 360 capsule 3    latanoprost 0.005 % ophthalmic solution Place 1 drop into both eyes every evening.        levocetirizine (XYZAL) 5 MG tablet Take 1 tablet (5 mg total) by mouth every evening. 90 tablet 3    losartan (COZAAR) 100 MG tablet Take 0.5 tablets (50 mg total) by mouth once daily. 45 tablet 3    multivit-min-iron-FA-lutein (CENTRUM SILVER WOMEN) 8 mg iron-400 mcg-300 mcg Tab Take 1 tablet by mouth once daily.        mv-mn/om3/dha/epa/fish/lut/geoffrey (OCUVITE ADULT 50 PLUS ORAL) Take 1 tablet by mouth once daily.        omeprazole (PRILOSEC) 20 MG capsule TAKE 1 CAPSULE BY MOUTH  TWICE DAILY 180 capsule 1    oxybutynin (DITROPAN) 5 MG Tab TAKE 1 TABLET BY MOUTH ONCE DAILY 90 tablet 3    potassium chloride (K-TAB) 20 mEq TAKE 1 TABLET BY MOUTH ONCE DAILY 90 tablet 3    pramipexole (MIRAPEX) 0.5 MG tablet TAKE 1 TABLET BY MOUTH  TWICE DAILY 180 tablet 3    pravastatin (PRAVACHOL) 40 MG tablet TAKE 1 TABLET BY MOUTH EVERY DAY 90 tablet 3    timolol maleate 0.5% (TIMOPTIC) 0.5 % Drop Place 1 drop into the left eye once daily.    0    UNABLE TO FIND Take 1 capsule by mouth 3 (three) times daily with meals. medication name: Golo Release Diet Capsules        vitamin D (VITAMIN D3) 1000 units Tab Take 1,000 Units by mouth once daily.        warfarin (COUMADIN) 6 MG tablet TAKE 6 MG DAILY EXCEPT FOR 9 MG ON FRIDAY TO THIN BLOOD (Patient taking differently: TAKE 6 MG DAILY EXCEPT Monday and Friday take 3 mg) 100 tablet 3      No current facility-administered medications for this visit.              Past Medical History:   Diagnosis Date    Allergy      Anticoagulant long-term use      Anxiety      Arthritis      Atrial fibrillation       Bilateral renal cysts 05/01/2022    Blind right eye      Bradycardia      Cancer       skin cancer left side of face under eye    Hyperlipidemia      Hypertension      PVC (premature ventricular contraction)      RLS (restless legs syndrome)      Sleep apnea       Does not use CPAP machine.               Past Surgical History:   Procedure Laterality Date    ADENOIDECTOMY        PAM OSTEOTOMY Left 10/31/2018     Procedure: OSTEOTOMY, AKIN;  Surgeon: Rudolph Cardozo DPM;  Location: [facility] OR;  Service: Podiatry;  Laterality: Left;    APPENDECTOMY        BREAST BIOPSY Left       x3    BREAST SURGERY Left       breast biopsy x3    CATARACT EXTRACTION BILATERAL W/ ANTERIOR VITRECTOMY        ENDOSCOPIC GASTROCNEMIUS RECESSION Left 10/31/2018     Procedure: RECESSION, GASTROCNEMIUS, ENDOSCOPIC;  Surgeon: Rudolph Cardozo DPM;  Location: [facility] OR;  Service: Podiatry;  Laterality: Left;    ESOPHAGOGASTRODUODENOSCOPY N/A 2/11/2022     Procedure: EGD (ESOPHAGOGASTRODUODENOSCOPY);  Surgeon: Efren Aguilar MD;  Location: [facility];  Service: Endoscopy;  Laterality: N/A;  Patient needs a rapid covid  test done on 12/15/2021. thank you    EYE SURGERY Bilateral       cataracts extraction    EYE SURGERY Right       drainage tube (glaucoma)    FOOT ARTHRODESIS Left 1/15/2020     Procedure: FUSION, FOOT;  Surgeon: Rudolph Cardozo DPM;  Location: [facility] OR;  Service: Podiatry;  Laterality: Left;  mini c-arm, Arthrex screw  for hardware removal (Lydia notified), Orthofix (Niels notified) min ex-fix    FOOT SURGERY Left       lesion removed from dorsal area    FRACTURE SURGERY Left       arm    HAND TENDON SURGERY Left      HYSTERECTOMY        INCISION AND DRAINAGE FOOT Left 3/11/2020     Procedure: INCISION AND DRAINAGE, FOOT;  Surgeon: Rudolph Cardozo DPM;  Location: [facility] OR;  Service: Podiatry;  Laterality: Left;    LAPIDUS BUNIONECTOMY Left 10/31/2018     Procedure: BUNIONECTOMY, LAPIDUS;  Surgeon: Rudolph  "XUAN Cardozo DPM;  Location: [facility] OR;  Service: Podiatry;  Laterality: Left;  mini c-arm, Arthrex locking plate (Lydia notified)    LAPIDUS BUNIONECTOMY Left 10/31/2018     Dr. Cardozo    Lymph Gland Removed  Right       groin (performed by Dr. Vergara)    NEURECTOMY Left 1/15/2020     Procedure: NEURECTOMY;  Surgeon: Rudolph Cardozo DPM;  Location: [facility] OR;  Service: Podiatry;  Laterality: Left;  bipolar bovie, vessel loop, possible Agnes nerve wrap. Moshe with [employer] notified and will be overnighting graft. MK 1/14/2020    OOPHORECTOMY        ORIF FOREARM FRACTURE Left      PALATE SURGERY         Lesion removed    TONSILLECTOMY             Vital Signs (Most Recent)  There were no vitals filed for this visit.     Review of Systems:  Review of Systems   Constitutional:  Negative for chills, fever and weight loss.   HENT:  Negative for congestion, ear discharge and ear pain.    Eyes:  Negative for blurred vision, photophobia and pain.   Respiratory:  Negative for cough, sputum production and shortness of breath.    Cardiovascular:  Negative for chest pain, palpitations and leg swelling.   Gastrointestinal:  Negative for abdominal pain, nausea and vomiting.   Genitourinary:  Negative for dysuria.   Musculoskeletal:  Positive for joint pain. Negative for falls and myalgias.   Skin:  Negative for itching and rash.   Neurological:  Negative for dizziness, tingling, sensory change and focal weakness.   Endo/Heme/Allergies:  Does not bruise/bleed easily.          OBJECTIVE:      PHYSICAL EXAM:  Height: 5' 2" (157.5 cm) Weight: 98.6 kg (217 lb 6 oz)  Ortho/SPM Exam  General: A&O x 4, NAD, sitting comfortably in the exam room  Breathing unlabored  Abd soft and nondistended  Mood and affect appropriate     Bilateral Hands: Skin intact without overlying erythema, rashes or lesions. Swelling negative throughout.  Increased bony prominence to bilateral thumb CMC joint.  (+) visible defect overlying index MC. Palpable " grind to thumb CMC range of motion.  TTP to bilateral thumb CMC joint. ROM incomplete to L Index extension. Otherwise full AROM throughout. Sensation intact distally. Tinels negative. Pulses palpable. Cap refill brisk.      RADIOGRAPHS:  XR HAND COMPLETE 3 VIEWS BILATERAL     CLINICAL HISTORY:  . Pain in left hand     TECHNIQUE:  PA, lateral, and oblique views of both hands were performed.     COMPARISON:  None     FINDINGS:  Left distal radial ORIF hardware which appears intact with accompanying radiocarpal joint space narrowing.  Remote, well corticated fragment at the left ulnar styloid.  Additional bilateral hand degenerative change, most notable at the thumb base and multiple interphalangeal joints with areas of scattered central erosion suggesting superimposed erosive OA component.  No acute fracture     I independently reviewed today's x-rays and agree with the above-mentioned findings.     ASSESSMENT/PLAN:      1. Rupture of extensor tendon of left hand, initial encounter          2. Left hand pain  Ambulatory referral/consult to Orthopedics     X-Ray Hand Complete Left       3. Primary osteoarthritis of both first carpometacarpal joints             The x-rays were reviewed at the bedside.     Bilateral thumb CMC joint arthritis  We discussed the anatomy and pathophysiology of CMC thumb osteoarthritis to bilateral thumbs.  We reviewed both conservative and surgical treatment options.  DME:  3D bracing provided for bilateral thumb CMC joint stability  Pain control: Over-the-counter anti-inflammatories, warm water soaks, activity modification  Injection:  We discussed proceeding with corticosteroid injection to bilateral CMC following upcoming surgery to avoid risk of infection     Extensor tendon rupture to left index digit  Based on today's x-rays it appears as if previous ORIF surgical hardware was the source of patient's spontaneous left index extensor tendon rupture.  To prevent further damage, the  patient was recommended surgical hardware removal to the left wrist and extensor tendon repair to the left index.     Surgical risks, benefits and alternatives were discussed at the bedside.  The patient verbalized understanding and wishes to proceed with surgery.  Dr. Oliva was present during surgical consent.     Consent obtained     Activity as tolerated

## 2023-06-30 NOTE — TRANSFER OF CARE
"Anesthesia Transfer of Care Note    Patient: Mis Ca    Procedure(s) Performed: Procedure(s) (LRB):  REPAIR, TENDON, EXTENSOR (Left)  REMOVAL,ORTHOPEDIC HARDWARE,UPPER EXTREMITY (Left)    Patient location: OPS    Anesthesia Type: general    Transport from OR: Transported from OR on room air with adequate spontaneous ventilation    Post pain: adequate analgesia    Post assessment: no apparent anesthetic complications and tolerated procedure well    Post vital signs: stable    Level of consciousness: awake, alert and oriented    Nausea/Vomiting: no nausea/vomiting    Complications: none    Transfer of care protocol was followed      Last vitals:   Visit Vitals  BP (!) 145/67 (BP Location: Right arm, Patient Position: Lying)   Pulse 71   Temp 36.7 °C (98.1 °F) (Skin)   Resp 15   Ht 5' 2" (1.575 m)   Wt 99.8 kg (220 lb)   SpO2 (!) 94%   Breastfeeding No   BMI 40.24 kg/m²     "

## 2023-06-30 NOTE — ANESTHESIA POSTPROCEDURE EVALUATION
Anesthesia Post Evaluation    Patient: Mis Ca    Procedure(s) Performed: Procedure(s) (LRB):  REPAIR, TENDON, EXTENSOR (Left)  REMOVAL,ORTHOPEDIC HARDWARE,UPPER EXTREMITY (Left)    Final Anesthesia Type: general (with regional)      Patient location during evaluation: PACU  Patient participation: Yes- Able to Participate  Level of consciousness: awake  Post-procedure vital signs: reviewed and stable  Pain management: adequate  Airway patency: patent  JACQUELYN mitigation strategies: Use of major conduction anesthesia (spinal/epidural) or peripheral nerve block, Multimodal analgesia and Intraoperative administration of CPAP, nasopharyngeal airway, or oral appliance during sedation  PONV status at discharge: No PONV  Anesthetic complications: no      Cardiovascular status: blood pressure returned to baseline, hypertensive and stable  Respiratory status: unassisted  Hydration status: euvolemic  Follow-up not needed.          Vitals Value Taken Time   /81 06/30/23 1530   Temp 36.9 °C (98.4 °F) 06/30/23 1530   Pulse 66 06/30/23 1530   Resp 20 06/30/23 1530   SpO2 96 % 06/30/23 1530         No case tracking events are documented in the log.      Pain/Bandar Score: Bandar Score: 10 (6/30/2023  3:30 PM)

## 2023-06-30 NOTE — OPERATIVE NOTE ADDENDUM
Certification of Assistant at Surgery       Surgery Date: 6/30/2023     Participating Surgeons:  Surgeon(s) and Role:     * Skyler Oliva Jr., MD - Primary    Procedures:  Procedure(s) (LRB):  REPAIR, TENDON, EXTENSOR (Left)  REMOVAL,ORTHOPEDIC HARDWARE,UPPER EXTREMITY (Left)    Assistant Surgeon's Certification of Necessity:  I understand that section 1842 (b) (6) (d) of the Social Security Act generally prohibits Medicare Part B reasonable charge payment for the services of assistants at surgery in [facility] hospitals when qualified residents are available to furnish such services. I certify that the services for which payment is claimed were medically necessary, and that no qualified resident was available to perform the services. I further understand that these services are subject to post-payment review by the Medicare carrier.      Estee Barker PA-C    06/30/2023  2:42 PM

## 2023-06-30 NOTE — ANESTHESIA PROCEDURE NOTES
Left Supraclavicular Peripheral Block    Patient location during procedure: pre-op   Block not for primary anesthetic.  Reason for block: at surgeon's request and post-op pain management   Post-op Pain Location: LEFT HAND   Start time: 6/30/2023 12:19 PM  Timeout: 6/30/2023 12:18 PM   End time: 6/30/2023 12:29 PM    Staffing  Authorizing Provider: Vipul Lazo MD  Performing Provider: Vipul Lazo MD    Preanesthetic Checklist  Completed: patient identified, IV checked, site marked, risks and benefits discussed, surgical consent, monitors and equipment checked, pre-op evaluation and timeout performed  Peripheral Block  Patient position: supine  Prep: ChloraPrep  Patient monitoring: heart rate, cardiac monitor, continuous pulse ox, continuous capnometry and frequent blood pressure checks  Block type: supraclavicular  Laterality: left  Injection technique: single shot  Needle  Needle type: Stimuplex   Needle gauge: 20 G  Needle length: 4 in  Needle localization: anatomical landmarks and ultrasound guidance   -ultrasound image captured on disc.  Assessment  Injection assessment: negative aspiration, negative parasthesia and local visualized surrounding nerve  Paresthesia pain: none  Heart rate change: no  Slow fractionated injection: yes    Medications:    Medications: bupivacaine (pf) (MARCAINE) injection 0.5% - Perineural   15 mL - 6/30/2023 12:25:00 PM  mepivacaine (CARBOCAINE) injection 15 mg/mL (1.5%) - Perineural   10 mL - 6/30/2023 12:25:00 PM    Additional Notes  VSS.  [facility] RN monitoring vitals throughout procedure.  Patient tolerated procedure well.     With lidocaine 1% for skin infiltration.

## 2023-07-03 ENCOUNTER — TELEPHONE (OUTPATIENT)
Dept: ORTHOPEDICS | Facility: CLINIC | Age: 83
End: 2023-07-03
Payer: MEDICARE

## 2023-07-03 NOTE — TELEPHONE ENCOUNTER
----- Message from Charles Loya sent at 7/3/2023  9:57 AM CDT -----  Type:  Needs Medical Advice    Who Called: pt  Would the patient rather a call back or a response via [facility]? call  Best Call Back Number: [phone]  Additional Information: pt would like a call from nurse in office regarding an appointment that is to be schedule please call regarding

## 2023-07-03 NOTE — TELEPHONE ENCOUNTER
Spoke with patient. Informed that post op appointment was scheduled for 7/13 for suture removal.

## 2023-07-05 ENCOUNTER — ANTI-COAG VISIT (OUTPATIENT)
Dept: CARDIOLOGY | Facility: CLINIC | Age: 83
End: 2023-07-05
Payer: MEDICARE

## 2023-07-05 ENCOUNTER — LAB VISIT (OUTPATIENT)
Dept: LAB | Facility: HOSPITAL | Age: 83
End: 2023-07-05
Attending: INTERNAL MEDICINE
Payer: MEDICARE

## 2023-07-05 DIAGNOSIS — Z79.01 LONG TERM (CURRENT) USE OF ANTICOAGULANTS: ICD-10-CM

## 2023-07-05 DIAGNOSIS — I48.0 PAROXYSMAL ATRIAL FIBRILLATION: Chronic | ICD-10-CM

## 2023-07-05 DIAGNOSIS — I48.0 PAROXYSMAL ATRIAL FIBRILLATION: Primary | Chronic | ICD-10-CM

## 2023-07-05 LAB
INR PPP: 1.2 (ref 0.8–1.2)
PROTHROMBIN TIME: 12.8 SEC (ref 9–12.5)

## 2023-07-05 PROCEDURE — 85610 PROTHROMBIN TIME: CPT | Mod: PO | Performed by: INTERNAL MEDICINE

## 2023-07-05 PROCEDURE — 93793 ANTICOAG MGMT PT WARFARIN: CPT | Mod: S$GLB,,, | Performed by: PHARMACIST

## 2023-07-05 PROCEDURE — 93793 PR ANTICOAGULANT MGMT FOR PT TAKING WARFARIN: ICD-10-PCS | Mod: S$GLB,,, | Performed by: PHARMACIST

## 2023-07-05 PROCEDURE — 36415 COLL VENOUS BLD VENIPUNCTURE: CPT | Mod: PO | Performed by: INTERNAL MEDICINE

## 2023-07-06 ENCOUNTER — TELEPHONE (OUTPATIENT)
Dept: OTOLARYNGOLOGY | Facility: CLINIC | Age: 83
End: 2023-07-06
Payer: MEDICARE

## 2023-07-06 ENCOUNTER — OFFICE VISIT (OUTPATIENT)
Dept: OTOLARYNGOLOGY | Facility: CLINIC | Age: 83
End: 2023-07-06
Payer: MEDICARE

## 2023-07-06 VITALS
HEIGHT: 62 IN | WEIGHT: 226.19 LBS | SYSTOLIC BLOOD PRESSURE: 140 MMHG | HEART RATE: 60 BPM | DIASTOLIC BLOOD PRESSURE: 79 MMHG | BODY MASS INDEX: 41.62 KG/M2

## 2023-07-06 DIAGNOSIS — R13.10 DYSPHAGIA, UNSPECIFIED TYPE: ICD-10-CM

## 2023-07-06 DIAGNOSIS — J30.89 NON-SEASONAL ALLERGIC RHINITIS, UNSPECIFIED TRIGGER: ICD-10-CM

## 2023-07-06 DIAGNOSIS — R05.3 CHRONIC COUGH: Primary | ICD-10-CM

## 2023-07-06 DIAGNOSIS — H90.3 SENSORINEURAL HEARING LOSS (SNHL) OF BOTH EARS: ICD-10-CM

## 2023-07-06 DIAGNOSIS — R49.0 HOARSENESS: ICD-10-CM

## 2023-07-06 DIAGNOSIS — K22.4 ESOPHAGEAL DYSMOTILITY: ICD-10-CM

## 2023-07-06 PROCEDURE — 99999 PR PBB SHADOW E&M-EST. PATIENT-LVL V: CPT | Mod: PBBFAC,,, | Performed by: NURSE PRACTITIONER

## 2023-07-06 PROCEDURE — 99999 PR PBB SHADOW E&M-EST. PATIENT-LVL V: ICD-10-PCS | Mod: PBBFAC,,, | Performed by: NURSE PRACTITIONER

## 2023-07-06 PROCEDURE — 1126F PR PAIN SEVERITY QUANTIFIED, NO PAIN PRESENT: ICD-10-PCS | Mod: CPTII,S$GLB,, | Performed by: NURSE PRACTITIONER

## 2023-07-06 PROCEDURE — 99214 OFFICE O/P EST MOD 30 MIN: CPT | Mod: S$GLB,,, | Performed by: NURSE PRACTITIONER

## 2023-07-06 PROCEDURE — 3078F DIAST BP <80 MM HG: CPT | Mod: CPTII,S$GLB,, | Performed by: NURSE PRACTITIONER

## 2023-07-06 PROCEDURE — 1101F PT FALLS ASSESS-DOCD LE1/YR: CPT | Mod: CPTII,S$GLB,, | Performed by: NURSE PRACTITIONER

## 2023-07-06 PROCEDURE — 1126F AMNT PAIN NOTED NONE PRSNT: CPT | Mod: CPTII,S$GLB,, | Performed by: NURSE PRACTITIONER

## 2023-07-06 PROCEDURE — 99214 PR OFFICE/OUTPT VISIT, EST, LEVL IV, 30-39 MIN: ICD-10-PCS | Mod: S$GLB,,, | Performed by: NURSE PRACTITIONER

## 2023-07-06 PROCEDURE — 3288F FALL RISK ASSESSMENT DOCD: CPT | Mod: CPTII,S$GLB,, | Performed by: NURSE PRACTITIONER

## 2023-07-06 PROCEDURE — 1159F PR MEDICATION LIST DOCUMENTED IN MEDICAL RECORD: ICD-10-PCS | Mod: CPTII,S$GLB,, | Performed by: NURSE PRACTITIONER

## 2023-07-06 PROCEDURE — 3078F PR MOST RECENT DIASTOLIC BLOOD PRESSURE < 80 MM HG: ICD-10-PCS | Mod: CPTII,S$GLB,, | Performed by: NURSE PRACTITIONER

## 2023-07-06 PROCEDURE — 1160F RVW MEDS BY RX/DR IN RCRD: CPT | Mod: CPTII,S$GLB,, | Performed by: NURSE PRACTITIONER

## 2023-07-06 PROCEDURE — 1160F PR REVIEW ALL MEDS BY PRESCRIBER/CLIN PHARMACIST DOCUMENTED: ICD-10-PCS | Mod: CPTII,S$GLB,, | Performed by: NURSE PRACTITIONER

## 2023-07-06 PROCEDURE — 1159F MED LIST DOCD IN RCRD: CPT | Mod: CPTII,S$GLB,, | Performed by: NURSE PRACTITIONER

## 2023-07-06 PROCEDURE — 3288F PR FALLS RISK ASSESSMENT DOCUMENTED: ICD-10-PCS | Mod: CPTII,S$GLB,, | Performed by: NURSE PRACTITIONER

## 2023-07-06 PROCEDURE — 3077F PR MOST RECENT SYSTOLIC BLOOD PRESSURE >= 140 MM HG: ICD-10-PCS | Mod: CPTII,S$GLB,, | Performed by: NURSE PRACTITIONER

## 2023-07-06 PROCEDURE — 1101F PR PT FALLS ASSESS DOC 0-1 FALLS W/OUT INJ PAST YR: ICD-10-PCS | Mod: CPTII,S$GLB,, | Performed by: NURSE PRACTITIONER

## 2023-07-06 PROCEDURE — 3077F SYST BP >= 140 MM HG: CPT | Mod: CPTII,S$GLB,, | Performed by: NURSE PRACTITIONER

## 2023-07-06 NOTE — PROGRESS NOTES
Chief Complaint   Patient presents with    Other     Trouble swallowing and hoarsenes- no improvement    .     HPI 5/21/2023:  Mis Ca is a very pleasant 82 y.o. female referred to me by Neno Kinney in consultation for evaluation of hearing loss.  She reports hearing loss that has been gradually progressing over the last 2 years.  She has noted any difference in hearing between the ears, with the right ear being the worse hearing ear. She has noted any tinnitus in both ears. She has no ear pain or ear drainage. She has a family history of hearing loss (mother with aging), and has not had any previous otologic surgery. She denies a history of significant loud noise exposure. She has issues with dizziness (off-balanced).    She is on Xyzal daily. Reports of throat clearing, clear rhinorrhea, and sneezing.   She is also c/o hoarseness for over 20 years and had a FFL a couple of years ago with Dr. Echevarria. She has dysphagia for many years. Trouble getting the food (meat and bread) down. Denies acid reflux, sore throat, choking or aspirating. Had a upper GI on 2/11/2022 without acute fidnings.     Interval HPI 7/6/2023:  Follow up visit. She had a esophogram on 5/30/23 which showed esophageal dysmotility and no mechanical obstruction. She has not seen GI. She reports of dysphagia and globus sensation. No choking, vomiting, pain, or hemoptysis.  She does not smoke. She continues to take omeprazole 20 mg bid.   She reports improvement with post nasal drip and nasal congestion after started on Astelin and Xyzal.       Past Medical History:   Diagnosis Date    Allergy     Anticoagulant long-term use     Anxiety     Arthritis     Atrial fibrillation     Bilateral renal cysts 05/01/2022    Blind right eye     Bradycardia     Cancer     skin cancer left side of face under eye    Hyperlipidemia     Hypertension     PVC (premature ventricular contraction)     RLS (restless legs syndrome)     Sleep apnea     Does  not use CPAP machine.     Social History     Socioeconomic History    Marital status:      Spouse name: both   Tobacco Use    Smoking status: Never    Smokeless tobacco: Never   Substance and Sexual Activity    Alcohol use: Not Currently    Drug use: No    Sexual activity: Not Currently     Partners: Male     Social Determinants of Health     Financial Resource Strain: Low Risk     Difficulty of Paying Living Expenses: Not very hard   Food Insecurity: No Food Insecurity    Worried About Running Out of Food in the Last Year: Never true    Ran Out of Food in the Last Year: Never true   Transportation Needs: No Transportation Needs    Lack of Transportation (Medical): No    Lack of Transportation (Non-Medical): No   Physical Activity: Inactive    Days of Exercise per Week: 0 days    Minutes of Exercise per Session: 0 min   Stress: No Stress Concern Present    Feeling of Stress : Not at all   Social Connections: Moderately Integrated    Frequency of Communication with Friends and Family: More than three times a week    Frequency of Social Gatherings with Friends and Family: More than three times a week    Attends Worship Services: More than 4 times per year    Active Member of Clubs or Organizations: Yes    Attends Club or Organization Meetings: More than 4 times per year    Marital Status:    Housing Stability: Low Risk     Unable to Pay for Housing in the Last Year: No    Number of Places Lived in the Last Year: 2    Unstable Housing in the Last Year: No      Past Surgical History:   Procedure Laterality Date    ADENOIDECTOMY      PAM OSTEOTOMY Left 10/31/2018    Procedure: OSTEOTOMY, AKIN;  Surgeon: Rudolph Cardozo DPM;  Location: [facility];  Service: Podiatry;  Laterality: Left;    APPENDECTOMY      BREAST BIOPSY Left     x3    BREAST SURGERY Left     breast biopsy x3    CATARACT EXTRACTION BILATERAL W/ ANTERIOR VITRECTOMY      ENDOSCOPIC GASTROCNEMIUS RECESSION Left 10/31/2018    Procedure:  RECESSION, GASTROCNEMIUS, ENDOSCOPIC;  Surgeon: Rudolph Cardozo DPM;  Location: [facility] OR;  Service: Podiatry;  Laterality: Left;    ESOPHAGOGASTRODUODENOSCOPY N/A 2/11/2022    Procedure: EGD (ESOPHAGOGASTRODUODENOSCOPY);  Surgeon: Efren Aguilar MD;  Location: [facility] ENDO;  Service: Endoscopy;  Laterality: N/A;  Patient needs a rapid covid  test done on 12/15/2021. thank you    EYE SURGERY Bilateral     cataracts extraction    EYE SURGERY Right     drainage tube (glaucoma)    FOOT ARTHRODESIS Left 1/15/2020    Procedure: FUSION, FOOT;  Surgeon: Rudolph Cardozo DPM;  Location: [facility] OR;  Service: Podiatry;  Laterality: Left;  mini c-arm, Arthrex screw  for hardware removal (Lydia notified), Orthofix (Niels notified) min ex-fix    FOOT SURGERY Left     lesion removed from dorsal area    FRACTURE SURGERY Left     arm    HAND TENDON SURGERY Left     HYSTERECTOMY      INCISION AND DRAINAGE FOOT Left 3/11/2020    Procedure: INCISION AND DRAINAGE, FOOT;  Surgeon: Rudolph Cardozo DPM;  Location: [facility] OR;  Service: Podiatry;  Laterality: Left;    LAPIDUS BUNIONECTOMY Left 10/31/2018    Procedure: BUNIONECTOMY, LAPIDUS;  Surgeon: Rudolph Cardozo DPM;  Location: [facility] OR;  Service: Podiatry;  Laterality: Left;  mini c-arm, Arthrex locking plate (Lydia notified)    LAPIDUS BUNIONECTOMY Left 10/31/2018    Dr. Cardozo    Lymph Gland Removed  Right     groin (performed by Dr. Vergara)    NEURECTOMY Left 1/15/2020    Procedure: NEURECTOMY;  Surgeon: Rudolph Cardozo DPM;  Location: [facility] OR;  Service: Podiatry;  Laterality: Left;  bipolar bovie, vessel loop, possible [place] nerve wrap. Moshe with [facility] notified and will be overnighting graft. MK 1/14/2020    OOPHORECTOMY      ORIF FOREARM FRACTURE Left     PALATE SURGERY      Lesion removed    REMOVAL,ORTHOPEDIC HARDWARE,UPPER EXTREMITY Left 6/30/2023    Procedure: REMOVAL,ORTHOPEDIC HARDWARE,UPPER EXTREMITY;  Surgeon: Skyler Oliva Jr., MD;   Location: [facility] OR;  Service: Orthopedics;  Laterality: Left;  César- [employer] notified cc    REPAIR OF EXTENSOR TENDON Left 6/30/2023    Procedure: REPAIR, TENDON, EXTENSOR;  Surgeon: Skyler Oliva Jr., MD;  Location: [facility] OR;  Service: Orthopedics;  Laterality: Left;  Left Index Digit    TONSILLECTOMY       Family History   Problem Relation Age of Onset    Aneurysm Mother         AAA    Cancer Father         lung cancer    Lung cancer Father     Cirrhosis Father     Cancer Sister         Breast and Brain     Diabetes Sister     Breast cancer Sister     Hypertension Sister     Hyperlipidemia Sister     Brain cancer Sister     Colon polyps Brother     Cancer Brother         lung cancer    Diabetes Brother     Hyperlipidemia Brother     Hypertension Brother     Cancer Brother         bladder cancer    Diabetes Brother     Glaucoma Brother     Heart disease Sister         Heart valve repair    Atrial fibrillation Sister     Diabetes Sister     Heart disease Sister     Heart attack Sister     Bipolar disorder Sister     Depression Sister     Glaucoma Sister     Diabetes Sister     Other Sister         Pituitary tumor    Arthritis Sister     COPD Sister     Heart disease Sister     Kidney disease Sister     Cancer Sister         lung cancer    Diabetes Sister     Lung cancer Sister     Heart attack Sister          Review of Systems  General: negative for chills, fever or weight loss  Psychological: negative for mood changes or depression  Ophthalmic: negative for blurry vision, photophobia or eye pain  ENT: see HPI  Respiratory: no cough, shortness of breath, or wheezing  Cardiovascular: no chest pain or dyspnea on exertion  Gastrointestinal: no abdominal pain, change in bowel habits, or black/ bloody stools  Musculoskeletal: negative for gait disturbance or muscular weakness  Neurological: no syncope or seizures; no ataxia  Dermatological: negative for puritis,  rash and jaundice  Hematologic/lymphatic: no easy  bruising, no new lumps or bumps      Physical Exam:    Vitals:    07/06/23 0932   BP: (!) 140/79   Pulse: 60         Constitutional: Well appearing / communicating without difficutly.  NAD.  Eyes: EOM I Bilaterally  Head/Face: Normocephalic.  Negative paranasal sinus pressure/tenderness.  Salivary glands WNL.  House Brackmann I Bilaterally.    Right Ear: Auricle normal appearance. External Auditory Canal within normal limits no lesions or masses,TM w/o masses/lesions/perforations. TM mobility noted.   Left Ear: Auricle normal appearance. External Auditory Canal within normal limits no lesions or masses,TM w/o masses/lesions/perforations. TM mobility noted.  Rinne Air conduction >bone conduction bilaterally, [place]aza midline.   Nose: No gross nasal septal deviation. Inferior Turbinates 3+ bilaterally. No septal perforation. No masses/lesions. External nasal skin appears normal without masses/lesions.  Oral Cavity: Gingiva/lips within normal limits.  Dentition/gingiva healthy appearing. Mucus membranes moist. Floor of mouth soft, no masses palpated. Oral Tongue mobile. Hard Palate appears normal.    Oropharynx: Base of tongue appears normal. No masses/lesions noted. Tonsillar fossa/pharyngeal wall without lesions. Posterior oropharynx WNL.  Soft palate without masses. Midline uvula.   Neck/Lymphatic: No LAD I-VI bilaterally.  No thyromegaly.  No masses noted on exam.      Diagnostic studies:    FL ESOPHAGRAM PHARYNX AND/OR CERVICAL 5/30/2023    FINDINGS:  Swallow: The swallowing mechanism was grossly normal. No laryngeal penetration or aspiration.  Stasis of contrast in the vallecular and piriform sinuses.     Esophagus: The esophagus was normally distensible and the mucosa was unremarkable. Esophageal dysmotility characterized by stasis and to and fro movement of contrast.  At one point, the patient had to be repositioned upright to clear the esophagus.  There were tertiary contractions.  Holdup of the 13 mm barium  tablet at the gastroesophageal junction.     Gastroesophageal junction: Normal relaxation of the lower esophageal sphincter. No hiatal hernia.     Reflux: No gastroesophageal reflux, either spontaneously or with provocative maneuvers.     Miscellaneous: Visualized portions of the stomach and proximal small bowel are unremarkable.     Impression:     Esophageal dysmotility as described.  No mechanical obstruction.              Assessment:    ICD-10-CM ICD-9-CM    1. Chronic cough  R05.3 786.2 X-Ray Chest PA And Lateral      2. Hoarseness  R49.0 784.42 Ambulatory referral/consult to ENT      3. Esophageal dysmotility  K22.4 530.5       4. Dysphagia, unspecified type  R13.10 787.20       5. Non-seasonal allergic rhinitis, unspecified trigger  J30.89 477.8       6. Sensorineural hearing loss (SNHL) of both ears  H90.3 389.18           The primary encounter diagnosis was Chronic cough. Diagnoses of Hoarseness, Esophageal dysmotility, Dysphagia, unspecified type, Non-seasonal allergic rhinitis, unspecified trigger, and Sensorineural hearing loss (SNHL) of both ears were also pertinent to this visit.      Plan:  Orders Placed This Encounter   Procedures    X-Ray Chest PA And Lateral    Ambulatory referral/consult to ENT     - continue on Azelastine 1 spray in each nostril bid  -continue on Xyzal 5 mg daily  -continue on omeprazole 20 mg bid  -Referral to Dr. Tavares for chronic cough and hoarseness with no improvement with omeprazole and AR treatment  - will re-schedule GI appt for esophageal dysmotility       Leslie Cisneros NP

## 2023-07-06 NOTE — TELEPHONE ENCOUNTER
Spoke with  and informed her that x-ray results had no acute findings and if needed anything to fell free to give us a call.  ----- Message from Leslie Cisneros NP sent at 7/6/2023 10:58 AM CDT -----  Please let her know that the chest Xray was normal and showed no acute findings of the heart and lungs. Thank you.

## 2023-07-11 ENCOUNTER — PATIENT MESSAGE (OUTPATIENT)
Dept: OTOLARYNGOLOGY | Facility: CLINIC | Age: 83
End: 2023-07-11
Payer: MEDICARE

## 2023-07-12 ENCOUNTER — LAB VISIT (OUTPATIENT)
Dept: LAB | Facility: HOSPITAL | Age: 83
End: 2023-07-12
Attending: INTERNAL MEDICINE
Payer: MEDICARE

## 2023-07-12 ENCOUNTER — ANTI-COAG VISIT (OUTPATIENT)
Dept: CARDIOLOGY | Facility: CLINIC | Age: 83
End: 2023-07-12
Payer: MEDICARE

## 2023-07-12 DIAGNOSIS — Z79.01 LONG TERM (CURRENT) USE OF ANTICOAGULANTS: ICD-10-CM

## 2023-07-12 DIAGNOSIS — I48.0 PAROXYSMAL ATRIAL FIBRILLATION: Primary | Chronic | ICD-10-CM

## 2023-07-12 DIAGNOSIS — I48.0 PAROXYSMAL ATRIAL FIBRILLATION: Chronic | ICD-10-CM

## 2023-07-12 LAB
INR PPP: 1.4 (ref 0.8–1.2)
PROTHROMBIN TIME: 15.1 SEC (ref 9–12.5)

## 2023-07-12 PROCEDURE — 36415 COLL VENOUS BLD VENIPUNCTURE: CPT | Mod: PO | Performed by: INTERNAL MEDICINE

## 2023-07-12 PROCEDURE — 93793 PR ANTICOAGULANT MGMT FOR PT TAKING WARFARIN: ICD-10-PCS | Mod: S$GLB,,, | Performed by: PHARMACIST

## 2023-07-12 PROCEDURE — 85610 PROTHROMBIN TIME: CPT | Mod: PO | Performed by: INTERNAL MEDICINE

## 2023-07-12 PROCEDURE — 93793 ANTICOAG MGMT PT WARFARIN: CPT | Mod: S$GLB,,, | Performed by: PHARMACIST

## 2023-07-13 ENCOUNTER — OFFICE VISIT (OUTPATIENT)
Dept: ORTHOPEDICS | Facility: CLINIC | Age: 83
End: 2023-07-13
Payer: MEDICARE

## 2023-07-13 VITALS — BODY MASS INDEX: 41.37 KG/M2 | HEIGHT: 62 IN

## 2023-07-13 DIAGNOSIS — S66.812D RUPTURE OF EXTENSOR TENDON OF LEFT HAND, SUBSEQUENT ENCOUNTER: Primary | ICD-10-CM

## 2023-07-13 PROCEDURE — 1126F AMNT PAIN NOTED NONE PRSNT: CPT | Mod: CPTII,S$GLB,, | Performed by: ORTHOPAEDIC SURGERY

## 2023-07-13 PROCEDURE — 1101F PR PT FALLS ASSESS DOC 0-1 FALLS W/OUT INJ PAST YR: ICD-10-PCS | Mod: CPTII,S$GLB,, | Performed by: ORTHOPAEDIC SURGERY

## 2023-07-13 PROCEDURE — 99999 PR PBB SHADOW E&M-EST. PATIENT-LVL III: CPT | Mod: PBBFAC,,, | Performed by: ORTHOPAEDIC SURGERY

## 2023-07-13 PROCEDURE — 1126F PR PAIN SEVERITY QUANTIFIED, NO PAIN PRESENT: ICD-10-PCS | Mod: CPTII,S$GLB,, | Performed by: ORTHOPAEDIC SURGERY

## 2023-07-13 PROCEDURE — 3288F FALL RISK ASSESSMENT DOCD: CPT | Mod: CPTII,S$GLB,, | Performed by: ORTHOPAEDIC SURGERY

## 2023-07-13 PROCEDURE — 3288F PR FALLS RISK ASSESSMENT DOCUMENTED: ICD-10-PCS | Mod: CPTII,S$GLB,, | Performed by: ORTHOPAEDIC SURGERY

## 2023-07-13 PROCEDURE — 99024 PR POST-OP FOLLOW-UP VISIT: ICD-10-PCS | Mod: S$GLB,,, | Performed by: ORTHOPAEDIC SURGERY

## 2023-07-13 PROCEDURE — 1159F MED LIST DOCD IN RCRD: CPT | Mod: CPTII,S$GLB,, | Performed by: ORTHOPAEDIC SURGERY

## 2023-07-13 PROCEDURE — 1101F PT FALLS ASSESS-DOCD LE1/YR: CPT | Mod: CPTII,S$GLB,, | Performed by: ORTHOPAEDIC SURGERY

## 2023-07-13 PROCEDURE — 99024 POSTOP FOLLOW-UP VISIT: CPT | Mod: S$GLB,,, | Performed by: ORTHOPAEDIC SURGERY

## 2023-07-13 PROCEDURE — 99999 PR PBB SHADOW E&M-EST. PATIENT-LVL III: ICD-10-PCS | Mod: PBBFAC,,, | Performed by: ORTHOPAEDIC SURGERY

## 2023-07-13 PROCEDURE — 1159F PR MEDICATION LIST DOCUMENTED IN MEDICAL RECORD: ICD-10-PCS | Mod: CPTII,S$GLB,, | Performed by: ORTHOPAEDIC SURGERY

## 2023-07-13 NOTE — PROGRESS NOTES
Subjective:      Patient ID: Mis Ca is a 82 y.o. female.  Chief Complaint: Post-op Evaluation (Left hand tendon ( Sx 6/30))      HPI  Mis Ca is a  82 y.o. female presenting today for post op visit.  She is s/p hardware removal from the wrist and extensor tendon transfer for rupture of the extensor tendons to the index and middle finger.     Review of patient's allergies indicates:   Allergen Reactions    Adhesive     Compazine [prochlorperazine edisylate] Anxiety    Penicillins Rash    Sulfa (sulfonamide antibiotics) Rash    Vancomycin analogues Rash         Current Outpatient Medications   Medication Sig Dispense Refill    acetaminophen (TYLENOL) 500 MG tablet Take 1,000 mg by mouth 2 (two) times daily as needed for Pain.      alendronate (FOSAMAX) 70 MG tablet Take 1 tablet (70 mg total) by mouth every 7 days. (Patient taking differently: Take 70 mg by mouth every Wednesday.) 12 tablet 3    ALPRAZolam (XANAX) 0.25 MG tablet TAKE 1 TABLET BY MOUTH TWICE A  tablet 0    amiodarone (PACERONE) 200 MG Tab Take 0.5 tablets (100 mg total) by mouth once daily. 45 tablet 3    aspirin (ECOTRIN) 81 MG EC tablet Take 81 mg by mouth once daily.      azelastine (ASTELIN) 137 mcg (0.1 %) nasal spray 1 spray (137 mcg total) by Nasal route 2 (two) times daily. 30 mL 11    chlorthalidone (HYGROTEN) 50 MG Tab TAKE 1 TABLET BY MOUTH EVERY DAY 90 tablet 3    cyanocobalamin 1,000 mcg/mL injection INJECT 1 ML (1,000 MCG TOTAL) INTO THE MUSCLE EVERY 30 DAYS. 3 mL 19    furosemide (LASIX) 20 MG tablet Take 1 tablet (20 mg total) by mouth once daily. 90 tablet 3    gabapentin (NEURONTIN) 300 MG capsule TAKE 2 CAPSULES BY MOUTH  TWICE DAILY 360 capsule 3    HYDROcodone-acetaminophen (NORCO) 5-325 mg per tablet Take 1 tablet by mouth every 4 (four) hours as needed for Pain. 20 tablet 0    latanoprost 0.005 % ophthalmic solution Place 1 drop into both eyes every evening.      levocetirizine (XYZAL) 5 MG tablet Take 1  tablet (5 mg total) by mouth every evening. 90 tablet 3    losartan (COZAAR) 100 MG tablet Take 0.5 tablets (50 mg total) by mouth once daily. 45 tablet 3    multivit-min-iron-FA-lutein (CENTRUM SILVER WOMEN) 8 mg iron-400 mcg-300 mcg Tab Take 1 tablet by mouth once daily.      mv-mn/om3/dha/epa/fish/lut/geoffrey (OCUVITE ADULT 50 PLUS ORAL) Take 1 tablet by mouth once daily.      omeprazole (PRILOSEC) 20 MG capsule TAKE 1 CAPSULE BY MOUTH  TWICE DAILY 180 capsule 1    oxybutynin (DITROPAN) 5 MG Tab TAKE 1 TABLET BY MOUTH ONCE DAILY 90 tablet 3    potassium chloride (K-TAB) 20 mEq TAKE 1 TABLET BY MOUTH ONCE DAILY 90 tablet 3    pramipexole (MIRAPEX) 0.5 MG tablet TAKE 1 TABLET BY MOUTH  TWICE DAILY 180 tablet 3    pravastatin (PRAVACHOL) 40 MG tablet TAKE 1 TABLET BY MOUTH EVERY DAY 90 tablet 3    timolol maleate 0.5% (TIMOPTIC) 0.5 % Drop Place 1 drop into the left eye once daily.   0    UNABLE TO FIND Take 1 capsule by mouth 3 (three) times daily with meals. medication name: Golo Release Diet Capsules      vitamin D (VITAMIN D3) 1000 units Tab Take 1,000 Units by mouth once daily.      warfarin (COUMADIN) 6 MG tablet TAKE 6 MG DAILY EXCEPT FOR 9 MG ON FRIDAY TO THIN BLOOD (Patient taking differently: TAKE 6 MG DAILY EXCEPT Monday and Friday take 3 mg) 100 tablet 3     No current facility-administered medications for this visit.     Facility-Administered Medications Ordered in Other Visits   Medication Dose Route Frequency Provider Last Rate Last Admin    lactated ringers infusion   Intravenous Continuous Don Ruano DNP        LIDOcaine (PF) 10 mg/ml (1%) injection 10 mg  1 mL Intradermal Once Don Ruano DNP           Past Medical History:   Diagnosis Date    Allergy     Anticoagulant long-term use     Anxiety     Arthritis     Atrial fibrillation     Bilateral renal cysts 05/01/2022    Blind right eye     Bradycardia     Cancer     skin cancer left side of face under eye    Hyperlipidemia     Hypertension      PVC (premature ventricular contraction)     RLS (restless legs syndrome)     Sleep apnea     Does not use CPAP machine.       Past Surgical History:   Procedure Laterality Date    ADENOIDECTOMY      PAM OSTEOTOMY Left 10/31/2018    Procedure: OSTEOTOMY, AKIN;  Surgeon: Rudolph Cardozo DPM;  Location: [facility] OR;  Service: Podiatry;  Laterality: Left;    APPENDECTOMY      BREAST BIOPSY Left     x3    BREAST SURGERY Left     breast biopsy x3    CATARACT EXTRACTION BILATERAL W/ ANTERIOR VITRECTOMY      ENDOSCOPIC GASTROCNEMIUS RECESSION Left 10/31/2018    Procedure: RECESSION, GASTROCNEMIUS, ENDOSCOPIC;  Surgeon: Rudolph Cardozo DPM;  Location: [facility] OR;  Service: Podiatry;  Laterality: Left;    ESOPHAGOGASTRODUODENOSCOPY N/A 2/11/2022    Procedure: EGD (ESOPHAGOGASTRODUODENOSCOPY);  Surgeon: Efren Aguilar MD;  Location: [facility];  Service: Endoscopy;  Laterality: N/A;  Patient needs a rapid covid  test done on 12/15/2021. thank you    EYE SURGERY Bilateral     cataracts extraction    EYE SURGERY Right     drainage tube (glaucoma)    FOOT ARTHRODESIS Left 1/15/2020    Procedure: FUSION, FOOT;  Surgeon: Rudolph Cardozo DPM;  Location: [facility] OR;  Service: Podiatry;  Laterality: Left;  mini c-arm, Arthrex screw  for hardware removal (Lydia notified), Orthofix (Niels notified) min ex-fix    FOOT SURGERY Left     lesion removed from dorsal area    FRACTURE SURGERY Left     arm    HAND TENDON SURGERY Left     HYSTERECTOMY      INCISION AND DRAINAGE FOOT Left 3/11/2020    Procedure: INCISION AND DRAINAGE, FOOT;  Surgeon: Rudolph Cardozo DPM;  Location: [facility] OR;  Service: Podiatry;  Laterality: Left;    LAPIDUS BUNIONECTOMY Left 10/31/2018    Procedure: BUNIONECTOMY, LAPIDUS;  Surgeon: Rudolph Cardozo DPM;  Location: [facility] OR;  Service: Podiatry;  Laterality: Left;  mini c-arm, Arthrex locking plate (Lydia notified)    LAPIDUS BUNIONECTOMY Left 10/31/2018    Dr. Cardozo    Lymph Gland  "Removed  Right     groin (performed by Dr. Vergara)    NEURECTOMY Left 1/15/2020    Procedure: NEURECTOMY;  Surgeon: Rudolph Cardozo DPM;  Location: [facility] OR;  Service: Podiatry;  Laterality: Left;  bipolar bovie, vessel loop, possible [place] nerve wrap. Moshe with [facility] notified and will be overnighting graft. MK 1/14/2020    OOPHORECTOMY      ORIF FOREARM FRACTURE Left     PALATE SURGERY      Lesion removed    REMOVAL,ORTHOPEDIC HARDWARE,UPPER EXTREMITY Left 6/30/2023    Procedure: REMOVAL,ORTHOPEDIC HARDWARE,UPPER EXTREMITY;  Surgeon: Skyler Oliva Jr., MD;  Location: [facility] OR;  Service: Orthopedics;  Laterality: Left;  César- Michael [employer] notified cc    REPAIR OF EXTENSOR TENDON Left 6/30/2023    Procedure: REPAIR, TENDON, EXTENSOR;  Surgeon: Skyler Oliva Jr., MD;  Location: [facility] OR;  Service: Orthopedics;  Laterality: Left;  Left Index Digit    TONSILLECTOMY         OBJECTIVE:   PHYSICAL EXAM:  Height: 5' 2" (157.5 cm)    Vitals:    07/13/23 1306   Height: 5' 2" (1.575 m)   PainSc: 0-No pain   PainLoc: Hand     Ortho/SPM Exam  Examination left hand and wrist the incisions are healing well volar and dorsal   Sutures in place dorsally Steri-Strips volar slight swelling slight bruising no evidence of infection finger extensor good    RADIOGRAPHS:  None  Comments: I have personally reviewed the imaging and I agree with the above radiologist's report.    ASSESSMENT/PLAN:     IMPRESSION:  Status post removal hardware and extensor tendon transfer    PLAN:  Sutures removed instructed in appropriate wound care  Given a wrist brace for full-time use except for bathing and showers   OT ordered  No heavy lifting    FOLLOW UP:  3-4 weeks    Disclaimer: This note has been generated using voice-recognition software. There may be typographical errors that have been missed during proof-reading.     "

## 2023-07-18 DIAGNOSIS — S66.819D: Primary | ICD-10-CM

## 2023-07-19 ENCOUNTER — ANTI-COAG VISIT (OUTPATIENT)
Dept: CARDIOLOGY | Facility: CLINIC | Age: 83
End: 2023-07-19
Payer: MEDICARE

## 2023-07-19 ENCOUNTER — LAB VISIT (OUTPATIENT)
Dept: LAB | Facility: HOSPITAL | Age: 83
End: 2023-07-19
Attending: INTERNAL MEDICINE
Payer: MEDICARE

## 2023-07-19 DIAGNOSIS — I48.0 PAROXYSMAL ATRIAL FIBRILLATION: Primary | Chronic | ICD-10-CM

## 2023-07-19 DIAGNOSIS — Z79.01 LONG TERM (CURRENT) USE OF ANTICOAGULANTS: ICD-10-CM

## 2023-07-19 DIAGNOSIS — I48.0 PAROXYSMAL ATRIAL FIBRILLATION: Chronic | ICD-10-CM

## 2023-07-19 LAB
INR PPP: 2.1 (ref 0.8–1.2)
PROTHROMBIN TIME: 21.7 SEC (ref 9–12.5)

## 2023-07-19 PROCEDURE — 93793 PR ANTICOAGULANT MGMT FOR PT TAKING WARFARIN: ICD-10-PCS | Mod: S$GLB,,,

## 2023-07-19 PROCEDURE — 85610 PROTHROMBIN TIME: CPT | Mod: PO | Performed by: INTERNAL MEDICINE

## 2023-07-19 PROCEDURE — 93793 ANTICOAG MGMT PT WARFARIN: CPT | Mod: S$GLB,,,

## 2023-07-19 PROCEDURE — 36415 COLL VENOUS BLD VENIPUNCTURE: CPT | Mod: PO | Performed by: INTERNAL MEDICINE

## 2023-07-24 ENCOUNTER — OFFICE VISIT (OUTPATIENT)
Dept: GASTROENTEROLOGY | Facility: CLINIC | Age: 83
End: 2023-07-24
Payer: MEDICARE

## 2023-07-24 VITALS
WEIGHT: 221.13 LBS | SYSTOLIC BLOOD PRESSURE: 128 MMHG | DIASTOLIC BLOOD PRESSURE: 58 MMHG | BODY MASS INDEX: 40.69 KG/M2 | OXYGEN SATURATION: 95 % | HEIGHT: 62 IN | HEART RATE: 56 BPM

## 2023-07-24 DIAGNOSIS — K22.4 ESOPHAGEAL DYSMOTILITY: Primary | ICD-10-CM

## 2023-07-24 DIAGNOSIS — R13.19 ESOPHAGEAL DYSPHAGIA: ICD-10-CM

## 2023-07-24 PROCEDURE — 1126F AMNT PAIN NOTED NONE PRSNT: CPT | Mod: CPTII,S$GLB,, | Performed by: NURSE PRACTITIONER

## 2023-07-24 PROCEDURE — 1101F PT FALLS ASSESS-DOCD LE1/YR: CPT | Mod: CPTII,S$GLB,, | Performed by: NURSE PRACTITIONER

## 2023-07-24 PROCEDURE — 3074F SYST BP LT 130 MM HG: CPT | Mod: CPTII,S$GLB,, | Performed by: NURSE PRACTITIONER

## 2023-07-24 PROCEDURE — 3288F FALL RISK ASSESSMENT DOCD: CPT | Mod: CPTII,S$GLB,, | Performed by: NURSE PRACTITIONER

## 2023-07-24 PROCEDURE — 1160F PR REVIEW ALL MEDS BY PRESCRIBER/CLIN PHARMACIST DOCUMENTED: ICD-10-PCS | Mod: CPTII,S$GLB,, | Performed by: NURSE PRACTITIONER

## 2023-07-24 PROCEDURE — 3078F PR MOST RECENT DIASTOLIC BLOOD PRESSURE < 80 MM HG: ICD-10-PCS | Mod: CPTII,S$GLB,, | Performed by: NURSE PRACTITIONER

## 2023-07-24 PROCEDURE — 1101F PR PT FALLS ASSESS DOC 0-1 FALLS W/OUT INJ PAST YR: ICD-10-PCS | Mod: CPTII,S$GLB,, | Performed by: NURSE PRACTITIONER

## 2023-07-24 PROCEDURE — 3288F PR FALLS RISK ASSESSMENT DOCUMENTED: ICD-10-PCS | Mod: CPTII,S$GLB,, | Performed by: NURSE PRACTITIONER

## 2023-07-24 PROCEDURE — 1159F PR MEDICATION LIST DOCUMENTED IN MEDICAL RECORD: ICD-10-PCS | Mod: CPTII,S$GLB,, | Performed by: NURSE PRACTITIONER

## 2023-07-24 PROCEDURE — 1126F PR PAIN SEVERITY QUANTIFIED, NO PAIN PRESENT: ICD-10-PCS | Mod: CPTII,S$GLB,, | Performed by: NURSE PRACTITIONER

## 2023-07-24 PROCEDURE — 99214 OFFICE O/P EST MOD 30 MIN: CPT | Mod: S$GLB,,, | Performed by: NURSE PRACTITIONER

## 2023-07-24 PROCEDURE — 3074F PR MOST RECENT SYSTOLIC BLOOD PRESSURE < 130 MM HG: ICD-10-PCS | Mod: CPTII,S$GLB,, | Performed by: NURSE PRACTITIONER

## 2023-07-24 PROCEDURE — 99214 PR OFFICE/OUTPT VISIT, EST, LEVL IV, 30-39 MIN: ICD-10-PCS | Mod: S$GLB,,, | Performed by: NURSE PRACTITIONER

## 2023-07-24 PROCEDURE — 1159F MED LIST DOCD IN RCRD: CPT | Mod: CPTII,S$GLB,, | Performed by: NURSE PRACTITIONER

## 2023-07-24 PROCEDURE — 99999 PR PBB SHADOW E&M-EST. PATIENT-LVL III: CPT | Mod: PBBFAC,,, | Performed by: NURSE PRACTITIONER

## 2023-07-24 PROCEDURE — 3078F DIAST BP <80 MM HG: CPT | Mod: CPTII,S$GLB,, | Performed by: NURSE PRACTITIONER

## 2023-07-24 PROCEDURE — 99999 PR PBB SHADOW E&M-EST. PATIENT-LVL III: ICD-10-PCS | Mod: PBBFAC,,, | Performed by: NURSE PRACTITIONER

## 2023-07-24 PROCEDURE — 1160F RVW MEDS BY RX/DR IN RCRD: CPT | Mod: CPTII,S$GLB,, | Performed by: NURSE PRACTITIONER

## 2023-07-24 RX ORDER — AMITRIPTYLINE HYDROCHLORIDE 25 MG/1
25 TABLET, FILM COATED ORAL NIGHTLY
Qty: 30 TABLET | Refills: 2 | Status: SHIPPED | OUTPATIENT
Start: 2023-07-24 | End: 2023-08-28 | Stop reason: SDUPTHER

## 2023-07-24 NOTE — PROGRESS NOTES
Subjective:       Patient ID: Mis Ca is a 82 y.o. female.    Chief Complaint: Dysphagia    83 y/o female referred by ENT for esophageal dysmotility. Recent esophagram showed esophageal dysmotility; no mechanical obstruction. History of chronic dysphagia. EGD performed by Dr. Aguilar in 2/2022 negative for any esophageal abnormality to explain patient's dysphagia. Has intermittent difficulty swallowing liquids and solid foods especially meat. Food feels stuck in her throat. Has to cough to resolve globus sensation. Hx of GERD controlled with low dose PPI bid. No unintentional weight loss, abdominal pain, nausea, or vomiting.       Past Medical History:   Diagnosis Date    Allergy     Anticoagulant long-term use     Anxiety     Arthritis     Atrial fibrillation     Bilateral renal cysts 05/01/2022    Blind right eye     Bradycardia     Cancer     skin cancer left side of face under eye    Hyperlipidemia     Hypertension     PVC (premature ventricular contraction)     RLS (restless legs syndrome)     Sleep apnea     Does not use CPAP machine.       Past Surgical History:   Procedure Laterality Date    ADENOIDECTOMY      PAM OSTEOTOMY Left 10/31/2018    Procedure: OSTEOTOMY, AKIN;  Surgeon: Rudolph Cardozo DPM;  Location: [facility] OR;  Service: Podiatry;  Laterality: Left;    APPENDECTOMY      BREAST BIOPSY Left     x3    BREAST SURGERY Left     breast biopsy x3    CATARACT EXTRACTION BILATERAL W/ ANTERIOR VITRECTOMY      ENDOSCOPIC GASTROCNEMIUS RECESSION Left 10/31/2018    Procedure: RECESSION, GASTROCNEMIUS, ENDOSCOPIC;  Surgeon: Rudolph Cardozo DPM;  Location: [facility] OR;  Service: Podiatry;  Laterality: Left;    ESOPHAGOGASTRODUODENOSCOPY N/A 2/11/2022    Procedure: EGD (ESOPHAGOGASTRODUODENOSCOPY);  Surgeon: Efren Aguilar MD;  Location: [facility];  Service: Endoscopy;  Laterality: N/A;  Patient needs a rapid covid  test done on 12/15/2021. thank you    EYE SURGERY Bilateral     cataracts  extraction    EYE SURGERY Right     drainage tube (glaucoma)    FOOT ARTHRODESIS Left 1/15/2020    Procedure: FUSION, FOOT;  Surgeon: Rudolph Cardozo DPM;  Location: [facility] OR;  Service: Podiatry;  Laterality: Left;  mini c-arm, Arthrex screw  for hardware removal (Lydia notified), Orthofix (Niels notified) min ex-fix    FOOT SURGERY Left     lesion removed from dorsal area    FRACTURE SURGERY Left     arm    HAND TENDON SURGERY Left     HYSTERECTOMY      INCISION AND DRAINAGE FOOT Left 3/11/2020    Procedure: INCISION AND DRAINAGE, FOOT;  Surgeon: Rudolph Cardozo DPM;  Location: [facility] OR;  Service: Podiatry;  Laterality: Left;    LAPIDUS BUNIONECTOMY Left 10/31/2018    Procedure: BUNIONECTOMY, LAPIDUS;  Surgeon: Rudolph Cardozo DPM;  Location: [facility] OR;  Service: Podiatry;  Laterality: Left;  mini c-arm, Arthrex locking plate (Lydia notified)    LAPIDUS BUNIONECTOMY Left 10/31/2018    Dr. Cardozo    Lymph Gland Removed  Right     groin (performed by Dr. Vergara)    NEURECTOMY Left 1/15/2020    Procedure: NEURECTOMY;  Surgeon: Rudolph Cardozo DPM;  Location: [facility] OR;  Service: Podiatry;  Laterality: Left;  bipolar bovie, vessel loop, possible Agnes nerve wrap. Moshe with Agnes notified and will be overnighting graft. MK 1/14/2020    OOPHORECTOMY      ORIF FOREARM FRACTURE Left     PALATE SURGERY      Lesion removed    REMOVAL,ORTHOPEDIC HARDWARE,UPPER EXTREMITY Left 6/30/2023    Procedure: REMOVAL,ORTHOPEDIC HARDWARE,UPPER EXTREMITY;  Surgeon: Skyler Oliva Jr., MD;  Location: [facility] OR;  Service: Orthopedics;  Laterality: Left;  César[employer] notified cc    REPAIR OF EXTENSOR TENDON Left 6/30/2023    Procedure: REPAIR, TENDON, EXTENSOR;  Surgeon: Skyler Oliva Jr., MD;  Location: [facility] OR;  Service: Orthopedics;  Laterality: Left;  Left Index Digit    TONSILLECTOMY         Family History   Problem Relation Age of Onset    Aneurysm Mother         AAA    Cancer Father         lung  cancer    Lung cancer Father     Cirrhosis Father     Cancer Sister         Breast and Brain     Diabetes Sister     Breast cancer Sister     Hypertension Sister     Hyperlipidemia Sister     Brain cancer Sister     Colon polyps Brother     Cancer Brother         lung cancer    Diabetes Brother     Hyperlipidemia Brother     Hypertension Brother     Cancer Brother         bladder cancer    Diabetes Brother     Glaucoma Brother     Heart disease Sister         Heart valve repair    Atrial fibrillation Sister     Diabetes Sister     Heart disease Sister     Heart attack Sister     Bipolar disorder Sister     Depression Sister     Glaucoma Sister     Diabetes Sister     Other Sister         Pituitary tumor    Arthritis Sister     COPD Sister     Heart disease Sister     Kidney disease Sister     Cancer Sister         lung cancer    Diabetes Sister     Lung cancer Sister     Heart attack Sister        Social History     Socioeconomic History    Marital status:      Spouse name: both   Tobacco Use    Smoking status: Never    Smokeless tobacco: Never   Substance and Sexual Activity    Alcohol use: Not Currently    Drug use: No    Sexual activity: Not Currently     Partners: Male     Social Determinants of Health     Financial Resource Strain: Low Risk     Difficulty of Paying Living Expenses: Not very hard   Food Insecurity: No Food Insecurity    Worried About Running Out of Food in the Last Year: Never true    Ran Out of Food in the Last Year: Never true   Transportation Needs: No Transportation Needs    Lack of Transportation (Medical): No    Lack of Transportation (Non-Medical): No   Physical Activity: Inactive    Days of Exercise per Week: 0 days    Minutes of Exercise per Session: 0 min   Stress: No Stress Concern Present    Feeling of Stress : Not at all   Social Connections: Moderately Integrated    Frequency of Communication with Friends and Family: More than three times a week    Frequency of Social  "Gatherings with Friends and Family: More than three times a week    Attends [employer] Services: More than 4 times per year    Active Member of Clubs or Organizations: Yes    Attends Club or Organization Meetings: More than 4 times per year    Marital Status:    Housing Stability: Low Risk     Unable to Pay for Housing in the Last Year: No    Number of Places Lived in the Last Year: 2    Unstable Housing in the Last Year: No       Review of Systems   Constitutional:  Negative for appetite change and unexpected weight change.   HENT:  Positive for trouble swallowing.    Respiratory:  Positive for cough. Negative for shortness of breath.    Cardiovascular:  Negative for chest pain.   Gastrointestinal:  Negative for abdominal pain.   Psychiatric/Behavioral:  Negative for dysphoric mood.        Objective:     Vitals:    07/24/23 1313   BP: (!) 128/58   BP Location: Right arm   Patient Position: Sitting   BP Method: Large (Manual)   Pulse: (!) 56   SpO2: 95%   Weight: 100.3 kg (221 lb 1.6 oz)   Height: 5' 2" (1.575 m)          Physical Exam  Constitutional:       Appearance: She is obese.   HENT:      Head: Normocephalic.   Eyes:      Conjunctiva/sclera: Conjunctivae normal.   Pulmonary:      Effort: Pulmonary effort is normal. No respiratory distress.   Musculoskeletal:         General: Normal range of motion.      Cervical back: Normal range of motion.   Skin:     General: Skin is warm and dry.   Neurological:      Mental Status: She is alert and oriented to person, place, and time.   Psychiatric:         Mood and Affect: Mood normal.         Behavior: Behavior normal.             Assessment:         ICD-10-CM ICD-9-CM   1. Esophageal dysmotility  K22.4 530.5   2. Esophageal dysphagia  R13.19 787.29       Plan:       Esophageal dysmotility; Esophageal dysphagia  -     Ambulatory referral/consult to Gastroenterology  -     amitriptyline (ELAVIL) 25 MG tablet; Take 1 tablet (25 mg total) by mouth every evening.  " Dispense: 30 tablet; Refill: 2    - Start trial of Elavil nightly for esophageal dysmotility. Continue PPI as previously prescribed. If no improvement, will consider repeat EGD.     Follow up if symptoms worsen or fail to improve.     Patient's Medications   New Prescriptions    AMITRIPTYLINE (ELAVIL) 25 MG TABLET    Take 1 tablet (25 mg total) by mouth every evening.   Previous Medications    ACETAMINOPHEN (TYLENOL) 500 MG TABLET    Take 1,000 mg by mouth 2 (two) times daily as needed for Pain.    ALENDRONATE (FOSAMAX) 70 MG TABLET    Take 1 tablet (70 mg total) by mouth every 7 days.    ALPRAZOLAM (XANAX) 0.25 MG TABLET    TAKE 1 TABLET BY MOUTH TWICE A DAY    AMIODARONE (PACERONE) 200 MG TAB    Take 0.5 tablets (100 mg total) by mouth once daily.    ASPIRIN (ECOTRIN) 81 MG EC TABLET    Take 81 mg by mouth once daily.    AZELASTINE (ASTELIN) 137 MCG (0.1 %) NASAL SPRAY    1 spray (137 mcg total) by Nasal route 2 (two) times daily.    CHLORTHALIDONE (HYGROTEN) 50 MG TAB    TAKE 1 TABLET BY MOUTH EVERY DAY    CYANOCOBALAMIN 1,000 MCG/ML INJECTION    INJECT 1 ML (1,000 MCG TOTAL) INTO THE MUSCLE EVERY 30 DAYS.    FUROSEMIDE (LASIX) 20 MG TABLET    Take 1 tablet (20 mg total) by mouth once daily.    GABAPENTIN (NEURONTIN) 300 MG CAPSULE    TAKE 2 CAPSULES BY MOUTH  TWICE DAILY    HYDROCODONE-ACETAMINOPHEN (NORCO) 5-325 MG PER TABLET    Take 1 tablet by mouth every 4 (four) hours as needed for Pain.    LATANOPROST 0.005 % OPHTHALMIC SOLUTION    Place 1 drop into both eyes every evening.    LEVOCETIRIZINE (XYZAL) 5 MG TABLET    Take 1 tablet (5 mg total) by mouth every evening.    LOSARTAN (COZAAR) 100 MG TABLET    Take 0.5 tablets (50 mg total) by mouth once daily.    MULTIVIT-MIN-IRON-FA-LUTEIN (CENTRUM SILVER WOMEN) 8 MG IRON-400 MCG-300 MCG TAB    Take 1 tablet by mouth once daily.    MV-MN/OM3/DHA/EPA/FISH/LUT/HUDSON (OCUVITE ADULT 50 PLUS ORAL)    Take 1 tablet by mouth once daily.    OMEPRAZOLE (PRILOSEC) 20 MG  CAPSULE    TAKE 1 CAPSULE BY MOUTH  TWICE DAILY    OXYBUTYNIN (DITROPAN) 5 MG TAB    TAKE 1 TABLET BY MOUTH ONCE DAILY    POTASSIUM CHLORIDE (K-TAB) 20 MEQ    TAKE 1 TABLET BY MOUTH ONCE DAILY    PRAMIPEXOLE (MIRAPEX) 0.5 MG TABLET    TAKE 1 TABLET BY MOUTH  TWICE DAILY    PRAVASTATIN (PRAVACHOL) 40 MG TABLET    TAKE 1 TABLET BY MOUTH EVERY DAY    TIMOLOL MALEATE 0.5% (TIMOPTIC) 0.5 % DROP    Place 1 drop into the left eye once daily.     UNABLE TO FIND    Take 1 capsule by mouth 3 (three) times daily with meals. medication name: Golo Release Diet Capsules    VITAMIN D (VITAMIN D3) 1000 UNITS TAB    Take 1,000 Units by mouth once daily.    WARFARIN (COUMADIN) 6 MG TABLET    TAKE 6 MG DAILY EXCEPT FOR 9 MG ON FRIDAY TO THIN BLOOD   Modified Medications    No medications on file   Discontinued Medications    No medications on file

## 2023-07-26 ENCOUNTER — ANTI-COAG VISIT (OUTPATIENT)
Dept: CARDIOLOGY | Facility: CLINIC | Age: 83
End: 2023-07-26
Payer: MEDICARE

## 2023-07-26 ENCOUNTER — LAB VISIT (OUTPATIENT)
Dept: LAB | Facility: HOSPITAL | Age: 83
End: 2023-07-26
Attending: INTERNAL MEDICINE
Payer: MEDICARE

## 2023-07-26 ENCOUNTER — CLINICAL SUPPORT (OUTPATIENT)
Dept: REHABILITATION | Facility: HOSPITAL | Age: 83
End: 2023-07-26
Attending: ORTHOPAEDIC SURGERY
Payer: MEDICARE

## 2023-07-26 DIAGNOSIS — I48.0 PAROXYSMAL ATRIAL FIBRILLATION: Primary | Chronic | ICD-10-CM

## 2023-07-26 DIAGNOSIS — S66.819D: ICD-10-CM

## 2023-07-26 DIAGNOSIS — M62.81 MUSCLE WEAKNESS: ICD-10-CM

## 2023-07-26 DIAGNOSIS — Z79.01 LONG TERM (CURRENT) USE OF ANTICOAGULANTS: ICD-10-CM

## 2023-07-26 DIAGNOSIS — I48.0 PAROXYSMAL ATRIAL FIBRILLATION: Chronic | ICD-10-CM

## 2023-07-26 DIAGNOSIS — M25.632 STIFFNESS OF LEFT WRIST JOINT: ICD-10-CM

## 2023-07-26 DIAGNOSIS — M25.642 STIFFNESS OF FINGER JOINT, LEFT: ICD-10-CM

## 2023-07-26 LAB
INR PPP: 1.7 (ref 0.8–1.2)
PROTHROMBIN TIME: 17.9 SEC (ref 9–12.5)

## 2023-07-26 PROCEDURE — 93793 ANTICOAG MGMT PT WARFARIN: CPT | Mod: S$GLB,,,

## 2023-07-26 PROCEDURE — 97140 MANUAL THERAPY 1/> REGIONS: CPT | Mod: PN

## 2023-07-26 PROCEDURE — 97165 OT EVAL LOW COMPLEX 30 MIN: CPT | Mod: PN

## 2023-07-26 PROCEDURE — 97110 THERAPEUTIC EXERCISES: CPT | Mod: PN

## 2023-07-26 PROCEDURE — 93793 PR ANTICOAGULANT MGMT FOR PT TAKING WARFARIN: ICD-10-PCS | Mod: S$GLB,,,

## 2023-07-26 PROCEDURE — 36415 COLL VENOUS BLD VENIPUNCTURE: CPT | Mod: PO | Performed by: INTERNAL MEDICINE

## 2023-07-26 PROCEDURE — 85610 PROTHROMBIN TIME: CPT | Mod: PO | Performed by: INTERNAL MEDICINE

## 2023-07-26 NOTE — PLAN OF CARE
[facility]  Occupational Therapy Initial Evaluation     Name: Mis Ca  Clinic Number: [id number]    Therapy Diagnosis:   Encounter Diagnoses   Name Primary?    Rupture of extensor tendon of hand, unspecified laterality, subsequent encounter     Muscle weakness     Stiffness of left wrist joint     Stiffness of finger joint, left      Physician: Sykler Oliva Jr., *    Physician Orders: Eval and Treat  Medical Diagnosis: S66.819D (ICD-10-CM) - Rupture of extensor tendon of hand, unspecified laterality, subsequent encounter   Surgical Procedure and Date: REPAIR, TENDON, EXTENSOR Left Regional Left Index Digit REMOVAL,ORTHOPEDIC HARDWARE,UPPER EXTREMITY Left Regional , 6/30/2023  Date of Injury: about 6 months ago  Evaluation Date: 7/26/2023  Insurance Authorization Period Expiration: 09/20/2023   Plan of Care Certification Period: 10/13/2023  Date of Return to MD: n/a  Visit # / Visits authorized: 1 / 1  FOTO: 1/3    Precautions:  Standard, Fall, and Weightbearing    Time In: 11:35 am  Time Out: 12:15 pm  Total Appointment Time (timed & untimed codes): 40 minutes    Subjective     Date of Onset: about 6 months ago    History of Current Condition/Mechanism of Injury: Mis reports: She previously has surgery on her wrist (about 10y ago). The tendons were rubbing on the internal hardware over the years and about 6 months ago the tendon ruptured and she could not extend her finger. She waited a few months to go to the doctor, but the MD removed the internal hardware as well as repair the extensor tendon on 6/30. She states she is not in much pain, but she is more tight due to arthritis and being immobilized in the brace. Pain is mainly located at the dorsal RIF. She also states she previously couldn't make a fist due to arthritis in the hand.     Involved Side: Left  Dominant Side: Right    Surgical Procedure: REPAIR, TENDON, EXTENSOR Left Regional Left Index Digit  REMOVAL,ORTHOPEDIC HARDWARE,UPPER EXTREMITY Left Regional , 6/30/2023    Prior Therapy: no    Pain:  Functional Pain Scale Rating 0-10:   3/10 on average  3/10 at best  6/10 at worst  Location: L  hand/wrist  Description: soreness  Aggravating Factors: Bending and Lifting  Easing Factors: relaxation, ice, and heating pad    Occupation:  n/a  Working presently: retired   Duties: n/a    Functional Limitations/Social History:    Previous functional status includes: Independent with all ADLs.     Current Functional Status   Home/Living environment: lives with their family      Limitation of Functional Status as follows:   ADLs/IADLs:     - Feeding: mod I      - Bathing: mod I    - Dressing/Grooming: A with hooking bra     - Home Management: mod I    - Driving: dep     Leisure: crocheting, drawing    Patient's Goals for Therapy: get back to crocheting, regain motion    Past Medical History/Physical Systems Review:   Mis Ca  has a past medical history of Allergy, Anticoagulant long-term use, Anxiety, Arthritis, Atrial fibrillation, Bilateral renal cysts, Blind right eye, Bradycardia, Cancer, Hyperlipidemia, Hypertension, PVC (premature ventricular contraction), RLS (restless legs syndrome), and Sleep apnea.    Mis Ca  has a past surgical history that includes Hysterectomy; Appendectomy; Cataract extraction bilateral w/ anterior vitrectomy; Breast biopsy (Left); Fracture surgery (Left); ORIF forearm fracture (Left); Adenoidectomy; Tonsillectomy; Palate surgery; Foot surgery (Left); Hand tendon surgery (Left); Oophorectomy; Lapidus bunionectomy (Left, 10/31/2018); Endoscopic gastrocnemius recession (Left, 10/31/2018); Akin osteotomy (Left, 10/31/2018); Eye surgery (Bilateral); Eye surgery (Right); Breast surgery (Left); Lymph Gland Removed  (Right); Lapidus bunionectomy (Left, 10/31/2018); Foot arthrodesis (Left, 1/15/2020); Neurectomy (Left, 1/15/2020); Incision and drainage foot (Left, 3/11/2020);  Esophagogastroduodenoscopy (N/A, 2/11/2022); Repair of extensor tendon (Left, 6/30/2023); and removal,orthopedic hardware,upper extremity (Left, 6/30/2023).    Mis has a current medication list which includes the following prescription(s): acetaminophen, alendronate, alprazolam, amiodarone, amitriptyline, aspirin, azelastine, chlorthalidone, cyanocobalamin, furosemide, gabapentin, hydrocodone-acetaminophen, latanoprost, levocetirizine, losartan, centrum silver women, mv-mn/om3/dha/epa/fish/lut/geoffrey, omeprazole, oxybutynin, potassium chloride, pramipexole, pravastatin, timolol maleate 0.5%, UNABLE TO FIND, vitamin d, and warfarin, and the following Facility-Administered Medications: lactated ringers and lidocaine (pf) 10 mg/ml (1%).    Review of patient's allergies indicates:   Allergen Reactions    Adhesive     Compazine [prochlorperazine edisylate] Anxiety    Penicillins Rash    Sulfa (sulfonamide antibiotics) Rash    Vancomycin analogues Rash        Objective     Observation/Appearance: patient arrives in prefab wrist brace, scars are well healed with no SOI, tenderness to touch at the scars (more so dorslly than volarly), edema at the wrist crease, she also has a dimple due to scar tissue on the dorsal forearm    Edema. Measured in centimeters.   7/26/2023 7/26/2023      Left Right     Wrist Crease 16.5 19 cm     Distal Palmar Crease 19 cm 19.5 cm         Elbow and Wrist ROM. Measured in degrees.   7/26/2023 7/26/2023      Left Right     Supination/Pronation WNL WNL     Wrist Ext/Flex 40/25 65/65     Wrist RD/UD 25/30 35/45       Hand ROM. Measured in degrees.   7/26/2023 7/26/2023      Left Right            Index: MP  45 90                PIP     50 85                DIP 40 40      MOSS: 135      Long:  MP 55 90                PIP 65 65                DIP 30 40      MOSS: 150      Ring:   MP 60 90                PIP 60 80                DIP 35 40      MOSS: 155      Small:  MP 65 90                 PIP 75 80                  DIP 45 65      MOSS: 185      Thumb: MP 45 60                  IP 35 60         Rad ABD NM NM         Pal ABD NM NM            Opposition Base of SF Base of SF        Strength (Dynamometer) and Pinch Strength (Pinch Gauge)  Measured in pounds.  7/26/2023: deferred due to precxns    7/26/2023 7/26/2023      Left Right     Rung II NM NM     Neal Pinch NM NM     3pt Pinch NM NM       Sensation:  Not formally tested   -some numbness in distal LIF      CMS Impairment/Limitation/Restriction for FOTO hand Survey    Therapist reviewed FOTO scores for Mis Ca on 7/26/2023.   FOTO documents entered into [employer] - see Media section.    Limitation Score: 56%         Treatment     Total Treatment time (time-based codes) separate from Evaluation: 20 minutes    Mis received the following supervised modalities after being cleared for contradictions:    Mis received the following manual therapy techniques for 10 minutes:   -I provide gentle scar mob to decrease scar tenderness, adherence, and hypersensitivity.     -dorsal and volar scars   -patient ed on scar mob    Mis performed therapeutic exercises for 10 minutes including:  - TGEs  - Jt blocking  - digit AROM  - wrist AROM 3 ways   - towel scrunches  - HEP review (see patient instructions)    Patient Education and Home Exercises      Education provided:   -role of OT, goals for OT, scheduling/cancellations, insurance limitations with patient.  -Additional Education provided:   -HEP in place  -pain management strategies  -edema management   -scar management    Written Home Exercises Provided: yes.  Exercises were reviewed and Mis was able to demonstrate them prior to the end of the session.    Mis demonstrated good  understanding of the education provided.     Pt was advised to perform these exercises free of pain, and to stop performing them if pain occurs.    See EMR under Patient Instructions for exercises provided  7/26/2023.    Assessment     Mis Ca is a 82 y.o. female referred to outpatient occupational therapy and presents with a medical diagnosis of Rupture of extensor tendon of L hand. She is currently 3w5d p/o. She arrives with pre-radha wrist brace donned. She states that the initial injury occurred about 6 months ago, but she waited to go see the doctor. She reports a hx of arthritis which also contributes to the stiffness of the hands/digits of both UE.   Upon assessment, deficits include: stiffness of the L wrist and digits, pain along the dorsal LIF, tenderness at the scars, edema of the wrist, and general muscle weakness  Patient also reports functional difficulty with: angelita activities, drawing, holding onto items, opening jars and bottles  I initiate HEP per protocol including: TGEs, wrist/digit ROM, joint blocking, scar mob. Following P/AROM exs today, patient demos an increase in ROM and reports mild tightness, but no true pain. Ed patient on pain/edema management strategies. Patient received written handouts and demos indep with HEP. Skilled education provided to patient as indicated. Continue to progress per protocol as tolerated until patient meets LTGs.      Assessment of Occupational Performance   Performance Deficits    Physical:  Joint Mobility  Joint Stability  Muscle Power/Strength  Muscle Endurance  Skin Integrity/Scar Formation  Edema   Strength  Pinch Strength  Gross Motor Coordination  Fine Motor Coordination  Proprioception Functions  Tactile Functions  Muscle Tone  Postural Control  Pain    Cognitive:  No Deficits    Psychosocial:    No Deficits     Following medical record review it is determined that pt will benefit from occupational therapy services in order to maximize pain free and/or functional use of left wrist/hand. The following goals were discussed with the patient and patient is in agreement with them as to be addressed in the treatment plan. The patient's rehab potential  is Fair.     Anticipated barriers to occupational therapy: age, hx of surgeries    Plan of care discussed with patient: Yes  Patient's spiritual, cultural and educational needs considered and patient is agreeable to the plan of care and goals as stated below:     Medical Necessity is demonstrated by the following  Occupational Profile/History  Co-morbidities and personal factors that may impact the plan of care [x] LOW: Brief chart review  [] MODERATE: Expanded chart review   [] HIGH: Extensive chart review    Moderate / High Support Documentation: n/a     Examination  Performance deficits relating to physical, cognitive or psychosocial skills that result in activity limitations and/or participation restrictions  [x] LOW: addressing 1-3 Performance deficits  [] MODERATE: 3-5 Performance deficits  [] HIGH: 5+ Performance deficits (please support below)    Moderate / High Support Documentation: n/a     Treatment Options [x] LOW: Limited options  [] MODERATE: Several options  [] HIGH: Multiple options      Decision Making/ Complexity Score: low       The following goals were discussed with the patient and patient is in agreement with them as to be addressed in the treatment plan.     Goals:   The following goals were discussed with the patient and patient is in agreement with them as to be addressed in the treatment plan.   Short term Goals:  1) Initiate HEP  2) Pt will increase AROM of R wrist by 5-10 degrees in order to assist with angelita activities by 4 weeks.  3) Pt will reduce edema by .5-1 cm in affected wrist by 4 weeks.  4) Pt will reduce pain to less than 4/10 by 4 weeks.  5) Pt will increase functional  strength by 5# in order to A in opening containers for med management or home management tasks by 4 weeks.   6) Patient will be able to achieve less than or equal to 45% on the FOTO, demonstrating overall improved functional ability with upper extremity. (Self-care category)    Long Term Goals:  Goals to  be met by discharge:  1) Independent with HEP  2) Pt will increase L digit MOSS to at least 190 degrees in each digit degrees in order to increase functional use for grasp with home management or work related tasks by d/c.   3) Pt will decrease edema to trace or none to increase functional ROM by d/c.   4) Pt will decrease pain to trace or none while completing light home management tasks or work related tasks by d/c.   5) Patient will be able to achieve less than or equal to 30% on the FOTO, demonstrating overall improved functional ability with upper extremity.  (Self-care category)      Plan     Certification Period/Plan of care expiration: 7/26/2023 to 10/13/2023.    Outpatient Occupational Therapy 2 times weekly for 8 weeks to include the following interventions: Paraffin, Fluidotherapy, Manual therapy/joint mobilizations, Modalities for pain management, US 3 mhz, Therapeutic exercises/activities., Iontophoresis with 2.0 cc Dexamethasone, Strengthening, Orthotic Fabrication/Fit/Training, Edema Control, Scar Management, Wound Care, Electrical Modalities, Joint Protection, and Energy Conservation.    Lesley Miranda, OT

## 2023-07-26 NOTE — PATIENT INSTRUCTIONS
Home Exercise Program  Perform the following exercises 3-4x a day     1) Tendon Glides   10x3 sec each      2) Finger Extension    3) DIP Flexion    4) PIP Flexion    5) Isolated PIP Flexion    6) Towel Grab                                Lesley Miranda OT

## 2023-07-26 NOTE — PROGRESS NOTES
INR low due to diet. Pt reports eating too much broccoli over the weekend. Bolus dose today then resume dose as planned. Repeat INR within 2 weeks

## 2023-08-02 ENCOUNTER — CLINICAL SUPPORT (OUTPATIENT)
Dept: REHABILITATION | Facility: HOSPITAL | Age: 83
End: 2023-08-02
Payer: MEDICARE

## 2023-08-02 DIAGNOSIS — M25.642 STIFFNESS OF FINGER JOINT, LEFT: ICD-10-CM

## 2023-08-02 DIAGNOSIS — M25.632 STIFFNESS OF LEFT WRIST JOINT: ICD-10-CM

## 2023-08-02 DIAGNOSIS — M62.81 MUSCLE WEAKNESS: Primary | ICD-10-CM

## 2023-08-02 PROCEDURE — 97110 THERAPEUTIC EXERCISES: CPT | Mod: PN

## 2023-08-02 PROCEDURE — 97140 MANUAL THERAPY 1/> REGIONS: CPT | Mod: PN

## 2023-08-02 NOTE — PROGRESS NOTES
"  Occupational Therapy Daily Treatment Note     Name: Mis Ca  Clinic Number: [id number]    Therapy Diagnosis:   Encounter Diagnoses   Name Primary?    Muscle weakness Yes    Stiffness of left wrist joint     Stiffness of finger joint, left      Physician: Skyler Oliva Jr., *    Visit Date: 8/2/2023  Physician: Skyler Oliva Jr., *     Physician Orders: Eval and Treat  Medical Diagnosis: S66.819D (ICD-10-CM) - Rupture of extensor tendon of hand, unspecified laterality, subsequent encounter   Surgical Procedure and Date: REPAIR, TENDON, EXTENSOR Left Regional Left Index Digit REMOVAL,ORTHOPEDIC HARDWARE,UPPER EXTREMITY Left Regional , 6/30/2023  Date of Injury: about 6 months ago  Evaluation Date: 7/26/2023  Insurance Authorization Period Expiration: 09/20/2023   Plan of Care Certification Period: 10/13/2023  Date of Return to MD: n/a  FOTO: 1/3    Visit # / Visits authorized: 2 / 23  Time In:3:15 pm  Time Out: 4:00 pm  Total Billable Time: 45 minutes      Precautions:  Standard, Fall, and Weightbearing    Subjective     Pt reports: "It feels like more bruising soreness than pain."  she was compliant with home exercise program given last session.   Response to previous treatment:increase in ROM  Functional change: consistent symptoms    Pain: 4/10  Location: left wrist    Objective   Observation/Appearance: patient arrives in prefab wrist brace, scars are well healed with no SOI, tenderness to touch at the scars (more so dorslly than volarly), edema at the wrist crease, she also has a dimple due to scar tissue on the dorsal forearm     Edema. Measured in centimeters.    7/26/2023 7/26/2023         Left Right       Wrist Crease 16.5 19 cm       Distal Palmar Crease 19 cm 19.5 cm             Elbow and Wrist ROM. Measured in degrees.    7/26/2023 7/26/2023         Left Right       Supination/Pronation WNL WNL       Wrist Ext/Flex 40/25 65/65       Wrist RD/UD 25/30 35/45          Hand ROM. Measured in " degrees.    7/26/2023 7/26/2023 8/2/2023       Left Right Left                  Index: MP  45 90  70                PIP     50 85  75                DIP 40 40  60       MOSS: 135         Long:  MP 55 90 85                 PIP 65 65 80                 DIP 30 40  50       MOSS: 150         Ring:   MP 60 90 90                 PIP 60 80 65                 DIP 35 40  55       MOSS: 155         Small:  MP 65 90                   PIP 75 80                   DIP 45 65         MOSS: 185         Thumb: MP 45 60                    IP 35 60           Rad ABD NM NM           Pal ABD NM NM              Opposition Base of SF Base of SF           Strength (Dynamometer) and Pinch Strength (Pinch Gauge)  Measured in pounds.  7/26/2023: deferred due to precxns     7/26/2023 7/26/2023         Left Right       Rung II NM NM       Neal Pinch NM NM       3pt Pinch NM NM          Sensation:  Not formally tested   -some numbness in distal LIF        CMS Impairment/Limitation/Restriction for FOTO hand Survey     Therapist reviewed FOTO scores for Mis Ca on 7/26/2023.   FOTO documents entered into [employer] - see Media section.     Limitation Score: 56%            Treatment      Mis received the following supervised modalities after being cleared for contradictions for 10 minutes:   -Patient tolerates paraffin w/ MHP to L hand for 10 min, pre-tx to decrease pain & increase tissue extensibility     Mis received the following manual therapy techniques for 15 minutes:   -I provide gentle STM to L dorsal wrist/forearm in order to increase soft tissue extensibility, decrease pain, and tenderness/hypersensitivity in the stated area, and promote scar tissue remodeling.      Mis received therapeutic exercises for 30 minutes including:  -Therapist A PROM intrinsic stretch and full flexion  -jt blocking  -targets isolated and all fingers  -place and hold with targets  -wrist AROM 3 way   -TGEs     Mis participated in dynamic  "functional therapeutic activities : n/a      Home Exercises and Education Provided     Education provided:   - HEP in place  - Progress towards goals     Written Home Exercises Provided: Patient instructed to cont prior HEP.  Exercises were reviewed and iMs was able to demonstrate them prior to the end of the session.  Mis demonstrated good  understanding of the HEP provided.   .   See EMR under Patient Instructions for exercises provided 8/2/2023.        Assessment   Upon arrival to tx session, patient continues to demo significant stiffness in the L digits and the wrist. States that she continues to feel "soreness" along the dorsum of her wrist and forearm. Main limitation is the stiffness in the digits, but pain is only along the LIF. Wrist is still more stiff with flexion and pain with RD. There is a lot of fibrotic tissue noted throughout STM to the dorsal forearm. She has some dimples from built up scar tissue from over the years. Patient tolerates P/AA/AROM exs to per protocol to increase functional and available ROM of L IF/LF/RF/SF and wrist. Patient demos increase in ROM post exs and reports tightness along the dorsal hand, but no true pain. She demos intrinsic tightness with passive flexion and intrinsic hook exs. She does well with comprehensive/isolated targeting and place and hold exs, ROM is significantly better after these. (See objective measurements for increased ROM). Patient educated to continue TGEs and joint blocking at home to promote carryover of STGs.   I instruct patient on specific HEP to promote carryover of ROM gains.   Pt would continue to benefit from skilled OT. Continue POC and progress as tolerated per protocol.      Mis is progressing well towards her goals and there are no updates to goals at this time. Pt prognosis is Fair.     Pt will continue to benefit from skilled outpatient occupational therapy to address the deficits listed in the problem list on initial " evaluation provide pt/family education and to maximize pt's level of independence in the home and community environment.     Anticipated barriers to occupational therapy: multiple sx, age     Pt's spiritual, cultural and educational needs considered and pt agreeable to plan of care and goals.    Goals:  The following goals were discussed with the patient and patient is in agreement with them as to be addressed in the treatment plan.   Short term Goals:  1) Initiate HEP  2) Pt will increase AROM of R wrist by 5-10 degrees in order to assist with angelita activities by 4 weeks.  3) Pt will reduce edema by .5-1 cm in affected wrist by 4 weeks.  4) Pt will reduce pain to less than 4/10 by 4 weeks.  5) Pt will increase functional  strength by 5# in order to A in opening containers for med management or home management tasks by 4 weeks.   6) Patient will be able to achieve less than or equal to 45% on the FOTO, demonstrating overall improved functional ability with upper extremity. (Self-care category)     Long Term Goals:  Goals to be met by discharge:  1) Independent with HEP  2) Pt will increase L digit MOSS to at least 190 degrees in each digit degrees in order to increase functional use for grasp with home management or work related tasks by d/c.   3) Pt will decrease edema to trace or none to increase functional ROM by d/c.   4) Pt will decrease pain to trace or none while completing light home management tasks or work related tasks by d/c.   5) Patient will be able to achieve less than or equal to 30% on the FOTO, demonstrating overall improved functional ability with upper extremity.  (Self-care category)       Plan   Continue skilled OT POC and progress per protocol as tolerated.   Updates/Grading for next session: continue P/AROM of the digits and wrist       Lesley Miranda, OT

## 2023-08-03 NOTE — PROGRESS NOTES
"  Occupational Therapy Daily Treatment Note     Name: Mis Ca  Clinic Number: [id number]    Therapy Diagnosis:   No diagnosis found.    Physician: Skyler Oliva Jr., *    Visit Date: 8/4/2023  Physician: Skyler Oliva Jr., *     Physician Orders: Eval and Treat  Medical Diagnosis: S66.819D (ICD-10-CM) - Rupture of extensor tendon of hand, unspecified laterality, subsequent encounter   Surgical Procedure and Date: REPAIR, TENDON, EXTENSOR Left [facility] Left Index Digit REMOVAL,ORTHOPEDIC HARDWARE,UPPER EXTREMITY Left [facility] , 6/30/2023  Date of Injury: about 6 months ago  Evaluation Date: 7/26/2023  Insurance Authorization Period Expiration: 09/20/2023   Plan of Care Certification Period: 10/13/2023  Date of Return to MD: n/a  FOTO: 1/3    Visit # / Visits authorized: 3 / 23  Time In: 7:45 am  Time Out: 8:30 am  Total Billable Time: 45 min      Precautions:  Standard, Fall, and Weightbearing    Subjective     Pt reports: "It's feeling good, you'll be surprised with how far I've gotten."   she was compliant with home exercise program given last session.   Response to previous treatment:increase in ROM  Functional change: consistent symptoms    Pain: 4/10  Location: left wrist    Objective   Observation/Appearance: patient arrives in prefab wrist brace, scars are well healed with no SOI, tenderness to touch at the scars (more so dorslly than volarly), edema at the wrist crease, she also has a dimple due to scar tissue on the dorsal forearm     Edema. Measured in centimeters.    7/26/2023 7/26/2023         Left Right       Wrist Crease 16.5 19 cm       Distal Palmar Crease 19 cm 19.5 cm             Elbow and Wrist ROM. Measured in degrees.    7/26/2023 7/26/2023         Left Right       Supination/Pronation WNL WNL       Wrist Ext/Flex 40/25 65/65       Wrist RD/UD 25/30 35/45          Hand ROM. Measured in degrees.    7/26/2023 7/26/2023 8/2/2023       Left Right Left                  Index: MP  45 90  " 70                PIP     50 85  75                DIP 40 40  60       MOSS: 135         Long:  MP 55 90 85                 PIP 65 65 80                 DIP 30 40  50       MOSS: 150         Ring:   MP 60 90 90                 PIP 60 80 65                 DIP 35 40  55       MOSS: 155         Small:  MP 65 90                   PIP 75 80                   DIP 45 65         MOSS: 185         Thumb: MP 45 60                    IP 35 60           Rad ABD NM NM           Pal ABD NM NM              Opposition Base of SF Base of SF           Strength (Dynamometer) and Pinch Strength (Pinch Gauge)  Measured in pounds.  7/26/2023: deferred due to precxns     7/26/2023 7/26/2023         Left Right       Rung II NM NM       Neal Pinch NM NM       3pt Pinch NM NM          Sensation:  Not formally tested   -some numbness in distal LIF        CMS Impairment/Limitation/Restriction for FOTO hand Survey     Therapist reviewed FOTO scores for Mis Ca on 7/26/2023.   FOTO documents entered into [employer] - see Media section.     Limitation Score: 56%            Treatment      Mis received the following supervised modalities after being cleared for contradictions for 10 minutes:   -Patient tolerates MHP x 10 min in order to decrease pain and increase soft tissue extensibility in preparation for ROM, concurrent with assessment of deficits.       Mis received the following manual therapy techniques for 15 minutes:   -I provide gentle STM to L dorsal wrist/forearm in order to increase soft tissue extensibility, decrease pain, and tenderness/hypersensitivity in the stated area, and promote scar tissue remodeling.    I provide gentle scar mob to decrease scar tenderness, adherence, and hypersensitivity.      Mis received therapeutic exercises for 20 minutes including:  -Therapist A PROM intrinsic stretch and full flexion  -jt blocking  -targets isolated and all fingers  -place and hold with targets  -wrist AROM 3 way    -pro/sup    Mis participated in dynamic functional therapeutic activities : x 10 min  -finger<>palm translation with small poms  -TGEs with add squeeze     Home Exercises and Education Provided     Education provided:   - HEP in place  - Progress towards goals     Written Home Exercises Provided: Patient instructed to cont prior HEP.  Exercises were reviewed and Mis was able to demonstrate them prior to the end of the session.  Mis demonstrated good  understanding of the HEP provided.   .   See EMR under Patient Instructions for exercises provided 8/2/2023.        Assessment   Patient arrives to tx session today with increased ROM of the wrist and digits. She notes that she was even able to put her own bra on this morning. She continues to report compliance with HEP and ROM program. Pain is consistently located along the dorsal LIF. Patient tolerates P/AA/AROM exs to per protocol to increase functional and available ROM of L IF/LF/RF/SF and wrist. Patient demos increase in ROM post exs and reports tightness along the dorsal hand, but no true pain. She demos some intrinsic tightness which is improved with intrinsic strengthening for ease with full fist/gripping. She continues to have some fibrotic tissue along the dorsal forearm throughout STM. She demos good performance with FMC and finger dexterity exs today as well. Pt would continue to benefit from skilled OT. Continue POC and progress as tolerated per protocol.     Mis is progressing well towards her goals and there are no updates to goals at this time. Pt prognosis is Fair.     Pt will continue to benefit from skilled outpatient occupational therapy to address the deficits listed in the problem list on initial evaluation provide pt/family education and to maximize pt's level of independence in the home and community environment.     Anticipated barriers to occupational therapy: multiple sx, age     Pt's spiritual, cultural and educational  needs considered and pt agreeable to plan of care and goals.    Goals:  The following goals were discussed with the patient and patient is in agreement with them as to be addressed in the treatment plan.   Short term Goals:  1) Initiate HEP  2) Pt will increase AROM of R wrist by 5-10 degrees in order to assist with angelita activities by 4 weeks.  3) Pt will reduce edema by .5-1 cm in affected wrist by 4 weeks.  4) Pt will reduce pain to less than 4/10 by 4 weeks.  5) Pt will increase functional  strength by 5# in order to A in opening containers for med management or home management tasks by 4 weeks.   6) Patient will be able to achieve less than or equal to 45% on the FOTO, demonstrating overall improved functional ability with upper extremity. (Self-care category)     Long Term Goals:  Goals to be met by discharge:  1) Independent with HEP  2) Pt will increase L digit MOSS to at least 190 degrees in each digit degrees in order to increase functional use for grasp with home management or work related tasks by d/c.   3) Pt will decrease edema to trace or none to increase functional ROM by d/c.   4) Pt will decrease pain to trace or none while completing light home management tasks or work related tasks by d/c.   5) Patient will be able to achieve less than or equal to 30% on the FOTO, demonstrating overall improved functional ability with upper extremity.  (Self-care category)       Plan   Continue skilled OT POC and progress per protocol as tolerated.   Updates/Grading for next session: continue P/AROM of the digits and wrist       Lesley Miranda, OT

## 2023-08-04 ENCOUNTER — CLINICAL SUPPORT (OUTPATIENT)
Dept: REHABILITATION | Facility: HOSPITAL | Age: 83
End: 2023-08-04
Payer: MEDICARE

## 2023-08-04 DIAGNOSIS — M62.81 MUSCLE WEAKNESS: Primary | ICD-10-CM

## 2023-08-04 DIAGNOSIS — M25.632 STIFFNESS OF LEFT WRIST JOINT: ICD-10-CM

## 2023-08-04 DIAGNOSIS — M25.642 STIFFNESS OF FINGER JOINT, LEFT: ICD-10-CM

## 2023-08-04 PROCEDURE — 97140 MANUAL THERAPY 1/> REGIONS: CPT | Mod: PN

## 2023-08-04 PROCEDURE — 97110 THERAPEUTIC EXERCISES: CPT | Mod: PN

## 2023-08-04 PROCEDURE — 97530 THERAPEUTIC ACTIVITIES: CPT | Mod: PN

## 2023-08-04 NOTE — PROGRESS NOTES
"  Occupational Therapy Daily Treatment Note     Name: Mis Ca  Clinic Number: [id number]    Therapy Diagnosis:   Encounter Diagnoses   Name Primary?    Muscle weakness Yes    Stiffness of left wrist joint     Stiffness of finger joint, left        Physician: Skyler Olvia Jr., *    Visit Date: 8/7/2023  Physician: Skyler Oliva Jr., *     Physician Orders: Eval and Treat  Medical Diagnosis: S66.819D (ICD-10-CM) - Rupture of extensor tendon of hand, unspecified laterality, subsequent encounter   Surgical Procedure and Date: REPAIR, TENDON, EXTENSOR Left [facility] Left Index Digit REMOVAL,ORTHOPEDIC HARDWARE,UPPER EXTREMITY Left [facility] , 6/30/2023  Date of Injury: about 6 months ago  Evaluation Date: 7/26/2023  Insurance Authorization Period Expiration: 09/20/2023   Plan of Care Certification Period: 10/13/2023  Date of Return to MD: n/a  FOTO: 1/3    Visit # / Visits authorized: 4 / 23  Time In: 11:00 am  Time Out: 11:45 am  Total Billable Time: 45 min      Precautions:  Standard, Fall, and Weightbearing    Subjective     Pt reports: "I think I may have overworked it this weekend, but I was nearly able to get it to close all the way."  she was compliant with home exercise program given last session.   Response to previous treatment:increase in ROM  Functional change: consistent symptoms    Pain: 3/10  Location: left wrist    Objective   Observation/Appearance: patient arrives in prefab wrist brace, scars are well healed with no SOI, tenderness to touch at the scars (more so dorslly than volarly), edema at the wrist crease, she also has a dimple due to scar tissue on the dorsal forearm     Edema. Measured in centimeters.    7/26/2023 7/26/2023 8/7/2023       right Left  Left     Wrist Crease 16.5 19 cm 18.5     Distal Palmar Crease 19 cm 19.5 cm  19.5           Elbow and Wrist ROM. Measured in degrees.    7/26/2023 7/26/2023         Left Right       Supination/Pronation WNL WNL       Wrist Ext/Flex " 40/25 65/65       Wrist RD/UD 25/30 35/45          Hand ROM. Measured in degrees.    7/26/2023 7/26/2023 8/2/2023 8/7/2023     Left Right Left   Left               Index: MP  45 90  70  70              PIP     50 85  75  80              DIP 40 40  60  85     MOSS: 135         Long:  MP 55 90 85  85               PIP 65 65 80   80              DIP 30 40  50 50      MOSS: 150         Ring:   MP 60 90 90   90              PIP 60 80 65   70              DIP 35 40  55  55     MOSS: 155         Small:  MP 65 90                   PIP 75 80                   DIP 45 65         MOSS: 185         Thumb: MP 45 60                    IP 35 60           Rad ABD NM NM           Pal ABD NM NM              Opposition Base of SF Base of SF           Strength (Dynamometer) and Pinch Strength (Pinch Gauge)  Measured in pounds.  7/26/2023: deferred due to precxns     7/26/2023 7/26/2023         Left Right       Rung II NM NM       Neal Pinch NM NM       3pt Pinch NM NM          Sensation:  Not formally tested   -some numbness in distal LIF        CMS Impairment/Limitation/Restriction for FOTO hand Survey     Therapist reviewed FOTO scores for Mis Ca on 7/26/2023.   FOTO documents entered into [employer] - see Media section.     Limitation Score: 56%            Treatment      Mis received the following supervised modalities after being cleared for contradictions for 10 minutes:   -Patient tolerates MHP x 10 min in order to decrease pain and increase soft tissue extensibility in preparation for ROM, concurrent with assessment of deficits.       Mis received the following manual therapy techniques for 15 minutes:   -I provide gentle STM to L dorsal wrist/forearm in order to increase soft tissue extensibility, decrease pain, and tenderness/hypersensitivity in the stated area, and promote scar tissue remodeling.     -RGS LIF   I provide gentle scar mob to decrease scar tenderness, adherence, and hypersensitivity.      Mis  "received therapeutic exercises for 20 minutes including:  -Therapist A PROM intrinsic stretch and full flexion  -jt blocking  -targets isolated and all fingers  -place and hold with targets  -wrist AROM 3 way   -pro/sup    Mis participated in dynamic functional therapeutic activities : x 10 min  -finger<>palm translation with small poms  -TGEs with add squeeze     Home Exercises and Education Provided     Education provided:   - HEP in place  - Progress towards goals     Written Home Exercises Provided: Patient instructed to cont prior HEP.  Exercises were reviewed and Mis was able to demonstrate them prior to the end of the session.  Mis demonstrated good  understanding of the HEP provided.   .   See EMR under Patient Instructions for exercises provided 8/2/2023.        Assessment   Patient reports to tx session stating she may have "overworked" it over the weekend. The dorsal LIF MPJ is slightly more inflamed than normal, but she contributes this to pushing herself with her exercises this weekend. She tolerates RGM of the LIF to decrease edema in the stated area. Pain is consistently located along the dorsal LIF. Patient tolerates P/AA/AROM exs to per protocol to increase functional and available ROM of L IF/LF/RF/SF and wrist. Patient demos increase in ROM post exs and reports tightness along the dorsal hand, but no true pain. She demos some intrinsic tightness which is improved with intrinsic strengthening for ease with full fist/gripping. She continues to have some fibrotic tissue along the dorsal forearm throughout STM. Ed patient on table top TGEs and add squeezes to include with HEP  today. Patient verbalizes understanding in all ed provided today. Pt would continue to benefit from skilled OT. Continue POC and progress as tolerated per protocol.     Mis is progressing well towards her goals and there are no updates to goals at this time. Pt prognosis is Fair.     Pt will continue to benefit " from skilled outpatient occupational therapy to address the deficits listed in the problem list on initial evaluation provide pt/family education and to maximize pt's level of independence in the home and community environment.     Anticipated barriers to occupational therapy: multiple sx, age     Pt's spiritual, cultural and educational needs considered and pt agreeable to plan of care and goals.    Goals:  The following goals were discussed with the patient and patient is in agreement with them as to be addressed in the treatment plan.   Short term Goals:  1) Initiate HEP Met, 8/7/2023  2) Pt will increase AROM of R wrist by 5-10 degrees in order to assist with angelita activities by 4 weeks.  3) Pt will reduce edema by .5-1 cm in affected wrist by 4 weeks.  4) Pt will reduce pain to less than 4/10 by 4 weeks.Met, 8/7/2023  5) Pt will increase functional  strength by 5# in order to A in opening containers for med management or home management tasks by 4 weeks.   6) Patient will be able to achieve less than or equal to 45% on the FOTO, demonstrating overall improved functional ability with upper extremity. (Self-care category)     Long Term Goals:  Goals to be met by discharge:  1) Independent with HEP  2) Pt will increase L digit MOSS to at least 190 degrees in each digit degrees in order to increase functional use for grasp with home management or work related tasks by d/c.   3) Pt will decrease edema to trace or none to increase functional ROM by d/c.   4) Pt will decrease pain to trace or none while completing light home management tasks or work related tasks by d/c.   5) Patient will be able to achieve less than or equal to 30% on the FOTO, demonstrating overall improved functional ability with upper extremity.  (Self-care category)       Plan   Continue skilled OT POC and progress per protocol as tolerated.   Updates/Grading for next session: continue P/AROM of the digits and wrist       Lesley Miranda  OT

## 2023-08-07 ENCOUNTER — CLINICAL SUPPORT (OUTPATIENT)
Dept: REHABILITATION | Facility: HOSPITAL | Age: 83
End: 2023-08-07
Payer: MEDICARE

## 2023-08-07 ENCOUNTER — LAB VISIT (OUTPATIENT)
Dept: LAB | Facility: HOSPITAL | Age: 83
End: 2023-08-07
Attending: INTERNAL MEDICINE
Payer: MEDICARE

## 2023-08-07 DIAGNOSIS — M25.632 STIFFNESS OF LEFT WRIST JOINT: ICD-10-CM

## 2023-08-07 DIAGNOSIS — M62.81 MUSCLE WEAKNESS: Primary | ICD-10-CM

## 2023-08-07 DIAGNOSIS — I48.0 PAROXYSMAL ATRIAL FIBRILLATION: Chronic | ICD-10-CM

## 2023-08-07 DIAGNOSIS — Z79.01 LONG TERM (CURRENT) USE OF ANTICOAGULANTS: ICD-10-CM

## 2023-08-07 DIAGNOSIS — M25.642 STIFFNESS OF FINGER JOINT, LEFT: ICD-10-CM

## 2023-08-07 LAB
INR PPP: 2.8 (ref 0.8–1.2)
PROTHROMBIN TIME: 28.8 SEC (ref 9–12.5)

## 2023-08-07 PROCEDURE — 85610 PROTHROMBIN TIME: CPT | Mod: PO | Performed by: INTERNAL MEDICINE

## 2023-08-07 PROCEDURE — 36415 COLL VENOUS BLD VENIPUNCTURE: CPT | Mod: PO | Performed by: INTERNAL MEDICINE

## 2023-08-07 PROCEDURE — 97140 MANUAL THERAPY 1/> REGIONS: CPT | Mod: PN

## 2023-08-07 PROCEDURE — 97110 THERAPEUTIC EXERCISES: CPT | Mod: PN

## 2023-08-07 PROCEDURE — 97530 THERAPEUTIC ACTIVITIES: CPT | Mod: PN

## 2023-08-08 ENCOUNTER — ANTI-COAG VISIT (OUTPATIENT)
Dept: CARDIOLOGY | Facility: CLINIC | Age: 83
End: 2023-08-08
Payer: MEDICARE

## 2023-08-08 DIAGNOSIS — Z79.01 LONG TERM (CURRENT) USE OF ANTICOAGULANTS: ICD-10-CM

## 2023-08-08 DIAGNOSIS — I48.0 PAROXYSMAL ATRIAL FIBRILLATION: Primary | Chronic | ICD-10-CM

## 2023-08-08 PROCEDURE — 93793 PR ANTICOAGULANT MGMT FOR PT TAKING WARFARIN: ICD-10-PCS | Mod: S$GLB,,, | Performed by: PHARMACIST

## 2023-08-08 PROCEDURE — 93793 ANTICOAG MGMT PT WARFARIN: CPT | Mod: S$GLB,,, | Performed by: PHARMACIST

## 2023-08-08 NOTE — PROGRESS NOTES
"  Occupational Therapy Daily Treatment Note     Name: Mis Ca  Clinic Number: [id number]    Therapy Diagnosis:   Encounter Diagnoses   Name Primary?    Muscle weakness Yes    Stiffness of left wrist joint     Stiffness of finger joint, left      Physician: Skyler Oliva Jr., *    Visit Date: 8/9/2023  Physician: Skyler Oliva Jr., *     Physician Orders: Eval and Treat  Medical Diagnosis: S66.819D (ICD-10-CM) - Rupture of extensor tendon of hand, unspecified laterality, subsequent encounter   Surgical Procedure and Date: REPAIR, TENDON, EXTENSOR Left Regional Left Index Digit REMOVAL,ORTHOPEDIC HARDWARE,UPPER EXTREMITY Left Regional , 6/30/2023  Date of Injury: about 6 months ago  Evaluation Date: 7/26/2023  Insurance Authorization Period Expiration: 09/20/2023   Plan of Care Certification Period: 10/13/2023  Date of Return to MD: n/a  FOTO: 1/3    Visit # / Visits authorized: 5 / 23  Time In: 1:45 pm  Time Out: 2:30 pm  Total Billable Time: 45 min      Precautions:  Standard, Fall, and Weightbearing    Subjective     Pt reports: "It's feeling pretty good today, I'm not really having any pain."  she was compliant with home exercise program given last session.   Response to previous treatment:increase in ROM  Functional change: consistent symptoms    Pain: 0/10  Location: left wrist    Objective   Observation/Appearance: patient arrives in prefab wrist brace, scars are well healed with no SOI, tenderness to touch at the scars (more so dorsaly than volarly), edema at the wrist crease, she also has a dimple due to scar tissue on the dorsal forearm     Edema. Measured in centimeters.    7/26/2023 7/26/2023 8/7/2023       right Left  Left     Wrist Crease 16.5 19 cm 18.5     Distal Palmar Crease 19 cm 19.5 cm  19.5           Elbow and Wrist ROM. Measured in degrees.    7/26/2023 7/26/2023         Left Right       Supination/Pronation WNL WNL       Wrist Ext/Flex 40/25 65/65       Wrist RD/UD 25/30 35/45    "       Hand ROM. Measured in degrees.    7/26/2023 7/26/2023 8/2/2023 8/7/2023     Left Right Left   Left               Index: MP  45 90  70  70              PIP     50 85  75  80              DIP 40 40  60  85     MOSS: 135         Long:  MP 55 90 85  85               PIP 65 65 80   80              DIP 30 40  50 50      MOSS: 150         Ring:   MP 60 90 90   90              PIP 60 80 65   70              DIP 35 40  55  55     MOSS: 155         Small:  MP 65 90                   PIP 75 80                   DIP 45 65         MOSS: 185         Thumb: MP 45 60                    IP 35 60           Rad ABD NM NM           Pal ABD NM NM              Opposition Base of SF Base of SF           Strength (Dynamometer) and Pinch Strength (Pinch Gauge)  Measured in pounds.  7/26/2023: deferred due to precxns     7/26/2023 7/26/2023         Left Right       Rung II NM NM       Neal Pinch NM NM       3pt Pinch NM NM          Sensation:  Not formally tested   -some numbness in distal LIF        CMS Impairment/Limitation/Restriction for FOTO hand Survey     Therapist reviewed FOTO scores for Mis Ca on 7/26/2023.   FOTO documents entered into [employer] - see Media section.     Limitation Score: 56%            Treatment      Mis received the following supervised modalities after being cleared for contradictions for 10 minutes:   -Patient tolerates paraffin w/ MHP to L hand for 10 min, pre-tx to decrease pain & increase tissue extensibility     Mis received the following manual therapy techniques for 15 minutes:   -I provide gentle STM to L dorsal wrist/forearm in order to increase soft tissue extensibility, decrease pain, and tenderness/hypersensitivity in the stated area, and promote scar tissue remodeling.     -RGS LIF   I provide gentle scar mob to decrease scar tenderness, adherence, and hypersensitivity.      Mis received therapeutic exercises for 20 minutes including:  -Therapist A PROM intrinsic stretch and  full flexion  -jt blocking  -targets isolated and all fingers   -composite fist  -place and hold with targets  -wrist AROM 3 way   -pro/sup    Mis participated in dynamic functional therapeutic activities : x 10 min  -finger<>palm translation with small poms  -TGEs with add squeeze   -intrinsic foam squeeze   -tennis ball mouth and marbles  -light grippers 5x each   -yellow clothespin pickup with small poms   -3pt pinch and ulnar grasp    Home Exercises and Education Provided     Education provided:   - HEP in place  - Progress towards goals     Written Home Exercises Provided: Patient instructed to cont prior HEP.  Exercises were reviewed and Mis was able to demonstrate them prior to the end of the session.  Mis demonstrated good  understanding of the HEP provided.   .   See EMR under Patient Instructions for exercises provided 8/2/2023.        Assessment   Today, patient is 5w5d p/o. She continues to demo some stiffness of the L wrist and digits. Pain is consistently located along the dorsal LIF and there is still some increased swelling in this area. Patient tolerates P/AA/AROM exs to per protocol to increase functional and available ROM of L IF/LF/RF/SF and wrist. Patient demos increase in ROM post exs and reports tightness along the dorsal hand, but no true pain. ROM improves with p/h composite fists and individual targeting exs. She demos some intrinsic tightness which is improved with intrinsic strengthening for ease with full fist/gripping. Patient states main limitations are activities with a forceful  and turning a door knob. I instruct patient on some very light resistance  strengthening exs for ease with jar opening and door turning. She demos good performance with these today and reports some mild fatigue afterwards.   Pt would continue to benefit from skilled OT. Continue POC and progress as tolerated per protocol.     Mis is progressing well towards her goals and there are no  updates to goals at this time. Pt prognosis is Fair.     Pt will continue to benefit from skilled outpatient occupational therapy to address the deficits listed in the problem list on initial evaluation provide pt/family education and to maximize pt's level of independence in the home and community environment.     Anticipated barriers to occupational therapy: multiple sx, age     Pt's spiritual, cultural and educational needs considered and pt agreeable to plan of care and goals.    Goals:  The following goals were discussed with the patient and patient is in agreement with them as to be addressed in the treatment plan.   Short term Goals:  1) Initiate HEP Met, 8/9/2023  2) Pt will increase AROM of R wrist by 5-10 degrees in order to assist with angelita activities by 4 weeks.  3) Pt will reduce edema by .5-1 cm in affected wrist by 4 weeks.  4) Pt will reduce pain to less than 4/10 by 4 weeks.Met, 8/9/2023  5) Pt will increase functional  strength by 5# in order to A in opening containers for med management or home management tasks by 4 weeks.   6) Patient will be able to achieve less than or equal to 45% on the FOTO, demonstrating overall improved functional ability with upper extremity. (Self-care category)     Long Term Goals:  Goals to be met by discharge:  1) Independent with HEP  2) Pt will increase L digit MOSS to at least 190 degrees in each digit degrees in order to increase functional use for grasp with home management or work related tasks by d/c.   3) Pt will decrease edema to trace or none to increase functional ROM by d/c.   4) Pt will decrease pain to trace or none while completing light home management tasks or work related tasks by d/c.   5) Patient will be able to achieve less than or equal to 30% on the FOTO, demonstrating overall improved functional ability with upper extremity.  (Self-care category)       Plan   Continue skilled OT POC and progress per protocol as tolerated.    Updates/Grading for next session: continue P/AROM of the digits and wrist       Lesley Miranda, OT

## 2023-08-09 ENCOUNTER — CLINICAL SUPPORT (OUTPATIENT)
Dept: REHABILITATION | Facility: HOSPITAL | Age: 83
End: 2023-08-09
Payer: MEDICARE

## 2023-08-09 DIAGNOSIS — M25.632 STIFFNESS OF LEFT WRIST JOINT: ICD-10-CM

## 2023-08-09 DIAGNOSIS — M25.642 STIFFNESS OF FINGER JOINT, LEFT: ICD-10-CM

## 2023-08-09 DIAGNOSIS — M62.81 MUSCLE WEAKNESS: Primary | ICD-10-CM

## 2023-08-09 PROCEDURE — 97140 MANUAL THERAPY 1/> REGIONS: CPT | Mod: PN

## 2023-08-09 PROCEDURE — 97530 THERAPEUTIC ACTIVITIES: CPT | Mod: PN

## 2023-08-09 PROCEDURE — 97110 THERAPEUTIC EXERCISES: CPT | Mod: PN

## 2023-08-11 NOTE — PROGRESS NOTES
"  Occupational Therapy Daily Treatment Note     Name: Mis Ca  Clinic Number: [id number]    Therapy Diagnosis:   Encounter Diagnoses   Name Primary?    Muscle weakness Yes    Stiffness of left wrist joint     Stiffness of finger joint, left        Physician: Skyler Oliva Jr., *    Visit Date: 8/14/2023  Physician: Skyler Oliva Jr., *     Physician Orders: Eval and Treat  Medical Diagnosis: S66.819D (ICD-10-CM) - Rupture of extensor tendon of hand, unspecified laterality, subsequent encounter   Surgical Procedure and Date: REPAIR, TENDON, EXTENSOR Left Regional Left Index Digit REMOVAL,ORTHOPEDIC HARDWARE,UPPER EXTREMITY Left Regional , 6/30/2023  Date of Injury: about 6 months ago  Evaluation Date: 7/26/2023  Insurance Authorization Period Expiration: 09/20/2023   Plan of Care Certification Period: 10/13/2023  Date of Return to MD: n/a  FOTO: 1/3    Visit # / Visits authorized:  6 / 23  Time In: 8:45 am  Time Out: 9:30 am   Total Billable Time: 45 min      Precautions:  Standard, Fall, and Weightbearing    Subjective     Pt reports: "My hand was sore over the weekend, but it feels really good right now."  she was compliant with home exercise program given last session.   Response to previous treatment:increase in ROM  Functional change: consistent symptoms    Pain: 0/10  Location: left wrist    Objective   Observation/Appearance: patient arrives in prefab wrist brace, scars are well healed with no SOI, tenderness to touch at the scars (more so dorsaly than volarly), edema at the wrist crease, she also has a dimple due to scar tissue on the dorsal forearm     Edema. Measured in centimeters.    7/26/2023 7/26/2023 8/7/2023       right Left  Left     Wrist Crease 16.5 19 cm 18.5     Distal Palmar Crease 19 cm 19.5 cm  19.5           Elbow and Wrist ROM. Measured in degrees.    7/26/2023 7/26/2023 8/14/2023       Left Right Left      Supination/Pronation WNL WNL  WNL     Wrist Ext/Flex 40/25 65/65 " 55/40      Wrist RD/UD 25/30 35/45  NM        Hand ROM. Measured in degrees.    7/26/2023 7/26/2023 8/2/2023 8/7/2023 8/14/2023     Left Right Left   Left Left                Index: MP  45 90  70  70 70              PIP     50 85  75  80 90              DIP 40 40  60  85 85     MOSS: 135          Long:  MP 55 90 85  85  90              PIP 65 65 80   80 85              DIP 30 40  50 50  50     MOSS: 150          Ring:   MP 60 90 90   90 95              PIP 60 80 65   70 85              DIP 35 40  55  55 55     MOSS: 155          Small:  MP 65 90                    PIP 75 80                    DIP 45 65          MOSS: 185          Thumb: MP 45 60                     IP 35 60            Rad ABD NM NM            Pal ABD NM NM               Opposition Base of SF Base of SF            Strength (Dynamometer) and Pinch Strength (Pinch Gauge)  Measured in pounds.  7/26/2023: deferred due to precxns     7/26/2023 7/26/2023 8/14/2023 8/14/2023     Left Right  L  R   Rung II NM NM  38  45   Neal Pinch NM NM  12 12    3pt Pinch NM NM NM  NM      Sensation:  Not formally tested   -some numbness in distal LIF        CMS Impairment/Limitation/Restriction for FOTO hand Survey     Therapist reviewed FOTO scores for Mis Ca on 7/26/2023.   FOTO documents entered into [employer] - see Media section.     Limitation Score: 56%  8/14/2023: 35%            Treatment      Mis received the following supervised modalities after being cleared for contradictions for 10 minutes:   -Patient tolerates MHP x 10 min in order to decrease pain and increase soft tissue extensibility in preparation for ROM, concurrent with assessment of deficits      Mis received the following manual therapy techniques for 15 minutes:   -I provide gentle STM to L dorsal wrist/forearm in order to increase soft tissue extensibility, decrease pain, and tenderness/hypersensitivity in the stated area, and promote scar tissue remodeling.     -RGS LIF   I provide  gentle scar mob to decrease scar tenderness, adherence, and hypersensitivity.      Mis received therapeutic exercises for 20 minutes including:  -Therapist A PROM intrinsic stretch and full flexion  -reverse jt blocking  -wrist AROM 3 way   -pro/sup    Mis participated in dynamic functional therapeutic activities : x 10 min  Yellow Tputty (+HEP)   - and pinch    -extension   -pancakes    -roll outs and pinch with each finger     Not performed today:  -targets isolated and all fingers   -composite fist  -place and hold with targets  -finger<>palm translation with small poms  -tennis ball mouth and marbles  -light grippers 5x each   -yellow clothespin pickup with small poms   -3pt pinch and ulnar grasp  -TGEs with add squeeze   -intrinsic foam squeeze   Home Exercises and Education Provided     Education provided:   - HEP in place  - Progress towards goals     Written Home Exercises Provided: Patient instructed to cont prior HEP.  Exercises were reviewed and Mis was able to demonstrate them prior to the end of the session.  Mis demonstrated good  understanding of the HEP provided.   .   See EMR under Patient Instructions for exercises provided 8/2/2023.        Assessment   Patient tolerated session well today. Upon assessment, she demos increase in ROM and an overall decrease in pain. Some stiffness in the digits may be also attributed to arthritis in both hands. She still has some difficulty fully extending the R LF/RF, although this has improved from initial evaluation. I ed patient on reverse joint blocking and active resisted ext exs to assist with this deficit.   Main c/o pain/discomfort continues to be located over the RIF. STM/RGM applied in this area to alleviate these symptoms. Patient tolerates P/AA/AROM exs to per protocol to increase functional and available ROM of L IF/LF/RF/SF and wrist. Patient demos increase in ROM post exs and reports tightness along the dorsal hand, but no true  pain. I initiate light Tputty strengthening program to include with home program. She tolerates new strengthening exs well with no c/o pain. Pt would continue to benefit from skilled OT. Continue POC and progress as tolerated per protocol.     Mis is progressing well towards her goals and there are no updates to goals at this time. Pt prognosis is Fair.     Pt will continue to benefit from skilled outpatient occupational therapy to address the deficits listed in the problem list on initial evaluation provide pt/family education and to maximize pt's level of independence in the home and community environment.     Anticipated barriers to occupational therapy: multiple sx, age     Pt's spiritual, cultural and educational needs considered and pt agreeable to plan of care and goals.    Goals:  The following goals were discussed with the patient and patient is in agreement with them as to be addressed in the treatment plan.   Short term Goals:  1) Initiate HEP Met, 8/14/2023  2) Pt will increase AROM of R wrist by 5-10 degrees in order to assist with angelita activities by 4 weeks. Met, 8/14/2023  3) Pt will reduce edema by .5-1 cm in affected wrist by 4 weeks.  4) Pt will reduce pain to less than 4/10 by 4 weeks.Met, 8/14/2023  5) Pt will increase functional  strength by 5# in order to A in opening containers for med management or home management tasks by 4 weeks.   6) Patient will be able to achieve less than or equal to 45% on the FOTO, demonstrating overall improved functional ability with upper extremity. (Self-care category) Met, 8/14/2023     Long Term Goals:  Goals to be met by discharge:  1) Independent with HEP  2) Pt will increase L digit MOSS to at least 190 degrees in each digit degrees in order to increase functional use for grasp with home management or work related tasks by d/c. Met, 8/14/2023  3) Pt will decrease edema to trace or none to increase functional ROM by d/c.   4) Pt will decrease pain to  trace or none while completing light home management tasks or work related tasks by d/c.   5) Patient will be able to achieve less than or equal to 30% on the FOTO, demonstrating overall improved functional ability with upper extremity.  (Self-care category)       Plan   Continue skilled OT POC and progress per protocol as tolerated.   Updates/Grading for next session: continue P/AROM of the digits and wrist       Lesley Miranda, OT

## 2023-08-14 ENCOUNTER — CLINICAL SUPPORT (OUTPATIENT)
Dept: REHABILITATION | Facility: HOSPITAL | Age: 83
End: 2023-08-14
Payer: MEDICARE

## 2023-08-14 DIAGNOSIS — M62.81 MUSCLE WEAKNESS: Primary | ICD-10-CM

## 2023-08-14 DIAGNOSIS — M25.642 STIFFNESS OF FINGER JOINT, LEFT: ICD-10-CM

## 2023-08-14 DIAGNOSIS — M25.632 STIFFNESS OF LEFT WRIST JOINT: ICD-10-CM

## 2023-08-14 PROCEDURE — 97110 THERAPEUTIC EXERCISES: CPT | Mod: PN

## 2023-08-14 PROCEDURE — 97530 THERAPEUTIC ACTIVITIES: CPT | Mod: PN

## 2023-08-14 PROCEDURE — 97140 MANUAL THERAPY 1/> REGIONS: CPT | Mod: PN

## 2023-08-15 NOTE — PROGRESS NOTES
"  Occupational Therapy Daily Treatment Note     Name: Mis Ca  Clinic Number: [id number]    Therapy Diagnosis:   Encounter Diagnoses   Name Primary?    Muscle weakness Yes    Stiffness of left wrist joint     Stiffness of finger joint, left      Physician: Skyler Oliva Jr., *    Visit Date: 8/16/2023  Physician: Skyler Oliva Jr., *     Physician Orders: Eval and Treat  Medical Diagnosis: S66.819D (ICD-10-CM) - Rupture of extensor tendon of hand, unspecified laterality, subsequent encounter   Surgical Procedure and Date: REPAIR, TENDON, EXTENSOR Left Regional Left Index Digit REMOVAL,ORTHOPEDIC HARDWARE,UPPER EXTREMITY Left Regional , 6/30/2023  Date of Injury: about 6 months ago  Evaluation Date: 7/26/2023  Insurance Authorization Period Expiration: 09/20/2023   Plan of Care Certification Period: 10/13/2023  Date of Return to MD: n/a  FOTO: 2/3    Visit # / Visits authorized:  7 / 23  Time In:1:40 pm  Time Out: 2:25 pm  Total Billable Time: 45 min      Precautions:  Standard, Fall, and Weightbearing    Subjective     Pt reports: "I woke up with some pain across my knuckles but I rubbed it a little bit and it went away."  she was compliant with home exercise program given last session.   Response to previous treatment:increase in ROM  Functional change: consistent symptoms    Pain: 0/10  Location: left wrist    Objective   Observation/Appearance: patient arrives in prefab wrist brace, scars are well healed with no SOI, tenderness to touch at the scars (more so dorsaly than volarly), edema at the wrist crease, she also has a dimple due to scar tissue on the dorsal forearm     Edema. Measured in centimeters.    7/26/2023 7/26/2023 8/7/2023       right Left  Left     Wrist Crease 16.5 19 cm 18.5     Distal Palmar Crease 19 cm 19.5 cm  19.5           Elbow and Wrist ROM. Measured in degrees.    7/26/2023 7/26/2023 8/14/2023       Left Right Left      Supination/Pronation WNL WNL  WNL     Wrist Ext/Flex " 40/25 65/65 55/40      Wrist RD/UD 25/30 35/45  NM        Hand ROM. Measured in degrees.    7/26/2023 7/26/2023 8/2/2023 8/7/2023 8/14/2023     Left Right Left   Left Left                Index: MP  45 90  70  70 70              PIP     50 85  75  80 90              DIP 40 40  60  85 85     MOSS: 135          Long:  MP 55 90 85  85  90              PIP 65 65 80   80 85              DIP 30 40  50 50  50     MOSS: 150          Ring:   MP 60 90 90   90 95              PIP 60 80 65   70 85              DIP 35 40  55  55 55     MOSS: 155          Small:  MP 65 90                    PIP 75 80                    DIP 45 65          MOSS: 185          Thumb: MP 45 60                     IP 35 60            Rad ABD NM NM            Pal ABD NM NM               Opposition Base of SF Base of SF            Strength (Dynamometer) and Pinch Strength (Pinch Gauge)  Measured in pounds.  7/26/2023: deferred due to precxns     7/26/2023 7/26/2023 8/14/2023 8/14/2023     Left Right  L  R   Rung II NM NM  38  45   Neal Pinch NM NM  12 12    3pt Pinch NM NM NM  NM      Sensation:  Not formally tested   -some numbness in distal LIF        CMS Impairment/Limitation/Restriction for FOTO hand Survey     Therapist reviewed FOTO scores for Mis Ca on 7/26/2023.   FOTO documents entered into [employer] - see Media section.     Limitation Score: 56%  8/14/2023: 35%            Treatment      Mis received the following supervised modalities after being cleared for contradictions for 10 minutes:   -Patient tolerates MHP x 10 min in order to decrease pain and increase soft tissue extensibility in preparation for ROM, concurrent with assessment of deficits      Mis received the following manual therapy techniques for 15 minutes:   -I provide gentle STM to L dorsal wrist/forearm in order to increase soft tissue extensibility, decrease pain, and tenderness/hypersensitivity in the stated area, and promote scar tissue remodeling.     -RGS LIF      Mis received therapeutic exercises for 10 minutes including:  -Therapist A PROM intrinsic stretch and full flexion  -reverse jt blocking  -wrist AROM 3 way   -pro/sup  -dowel roll with flex stretch     Mis participated in dynamic functional therapeutic activities : x 20 min  - Pink Tputty (+HEP)   - and pinch    -bottle turn   -jar twist   -dowel punch   -intrinsic scrape   - velcro opening x 10  - rubber band extension around jar      Home Exercises and Education Provided     Education provided:   - HEP in place  - Progress towards goals     Written Home Exercises Provided: Patient instructed to cont prior HEP.  Exercises were reviewed and Mis was able to demonstrate them prior to the end of the session.  Mis demonstrated good  understanding of the HEP provided.   .   See EMR under Patient Instructions for exercises provided 8/2/2023.        Assessment     Patient states that she is not feeling any pain except when she wakes up in the morning, she notes some soreness along the MPJs. This diminishes after a little self massage to the area. Currently, she is over 6 weeks p/o, so I advise her to wean off use of the brace. She still has some difficulty actively extending the R LF/RF, although this has continued to improve. She tolerates an increase in resistance to tputty strengthening program as well as an addition of activities simulating bottle opening/jar twisting. Patient provided pink tputty to promote carryover of strength/ROM gains. Pain c/o pain/discomfort continues to be located over the RIF. STM/RGM applied in this area to alleviate these symptoms. Patient tolerates P/AA/AROM exs to per protocol to increase functional and available ROM of L IF/LF/RF/SF and wrist. Patient demos increase in ROM post exs and reports tightness along the dorsal hand, but no true painShe tolerates new strengthening exs well with no c/o pain. Pt would continue to benefit from skilled OT. Continue POC and  progress as tolerated per protocol.     Mis is progressing well towards her goals and there are no updates to goals at this time. Pt prognosis is Fair.     Pt will continue to benefit from skilled outpatient occupational therapy to address the deficits listed in the problem list on initial evaluation provide pt/family education and to maximize pt's level of independence in the home and community environment.     Anticipated barriers to occupational therapy: multiple sx, age     Pt's spiritual, cultural and educational needs considered and pt agreeable to plan of care and goals.    Goals:  The following goals were discussed with the patient and patient is in agreement with them as to be addressed in the treatment plan.   Short term Goals:  1) Initiate HEP Met, 8/16/2023  2) Pt will increase AROM of R wrist by 5-10 degrees in order to assist with angelita activities by 4 weeks. Met, 8/16/2023  3) Pt will reduce edema by .5-1 cm in affected wrist by 4 weeks.  4) Pt will reduce pain to less than 4/10 by 4 weeks.Met, 8/16/2023  5) Pt will increase functional  strength by 5# in order to A in opening containers for med management or home management tasks by 4 weeks. Not met 8/16/2023, continue progressing   6) Patient will be able to achieve less than or equal to 45% on the FOTO, demonstrating overall improved functional ability with upper extremity. (Self-care category) Met, 8/16/2023     Long Term Goals:  Goals to be met by discharge:  1) Independent with HEP  2) Pt will increase L digit MOSS to at least 190 degrees in each digit degrees in order to increase functional use for grasp with home management or work related tasks by d/c. Met, 8/16/2023  3) Pt will decrease edema to trace or none to increase functional ROM by d/c.   4) Pt will decrease pain to trace or none while completing light home management tasks or work related tasks by d/c.   5) Patient will be able to achieve less than or equal to 30% on the  FOTO, demonstrating overall improved functional ability with upper extremity.  (Self-care category)       Plan   Continue skilled OT POC and progress per protocol as tolerated.   Updates/Grading for next session: continue P/AROM of the digits and wrist       Lesley Miranda, OT

## 2023-08-16 ENCOUNTER — CLINICAL SUPPORT (OUTPATIENT)
Dept: REHABILITATION | Facility: HOSPITAL | Age: 83
End: 2023-08-16
Payer: MEDICARE

## 2023-08-16 DIAGNOSIS — M25.632 STIFFNESS OF LEFT WRIST JOINT: ICD-10-CM

## 2023-08-16 DIAGNOSIS — M25.642 STIFFNESS OF FINGER JOINT, LEFT: ICD-10-CM

## 2023-08-16 DIAGNOSIS — M62.81 MUSCLE WEAKNESS: Primary | ICD-10-CM

## 2023-08-16 PROCEDURE — 97530 THERAPEUTIC ACTIVITIES: CPT | Mod: PN

## 2023-08-16 PROCEDURE — 97110 THERAPEUTIC EXERCISES: CPT | Mod: PN

## 2023-08-16 PROCEDURE — 97140 MANUAL THERAPY 1/> REGIONS: CPT | Mod: PN

## 2023-08-21 ENCOUNTER — LAB VISIT (OUTPATIENT)
Dept: LAB | Facility: HOSPITAL | Age: 83
End: 2023-08-21
Attending: INTERNAL MEDICINE
Payer: MEDICARE

## 2023-08-21 ENCOUNTER — ANTI-COAG VISIT (OUTPATIENT)
Dept: CARDIOLOGY | Facility: CLINIC | Age: 83
End: 2023-08-21
Payer: MEDICARE

## 2023-08-21 DIAGNOSIS — Z79.01 LONG TERM (CURRENT) USE OF ANTICOAGULANTS: ICD-10-CM

## 2023-08-21 DIAGNOSIS — I48.0 PAROXYSMAL ATRIAL FIBRILLATION: Chronic | ICD-10-CM

## 2023-08-21 DIAGNOSIS — I48.0 PAROXYSMAL ATRIAL FIBRILLATION: Primary | Chronic | ICD-10-CM

## 2023-08-21 LAB
INR PPP: 3.3 (ref 0.8–1.2)
PROTHROMBIN TIME: 33 SEC (ref 9–12.5)

## 2023-08-21 PROCEDURE — 36415 COLL VENOUS BLD VENIPUNCTURE: CPT | Mod: PO | Performed by: INTERNAL MEDICINE

## 2023-08-21 PROCEDURE — 93793 PR ANTICOAGULANT MGMT FOR PT TAKING WARFARIN: ICD-10-PCS | Mod: S$GLB,,, | Performed by: PHARMACIST

## 2023-08-21 PROCEDURE — 93793 ANTICOAG MGMT PT WARFARIN: CPT | Mod: S$GLB,,, | Performed by: PHARMACIST

## 2023-08-21 PROCEDURE — 85610 PROTHROMBIN TIME: CPT | Mod: PO | Performed by: INTERNAL MEDICINE

## 2023-08-21 NOTE — PROGRESS NOTES
[facility]    08/21/2023 3:02 PM    Assessment/Plan:    Patient presents today with supratherapeutic INR.    Assessment of patient findings and chart review: INR not at goal. Medications, chart, and patient findings reviewed. See calendar for adjustments to dose and follow up plan.      Recommendation for patient's warfarin regimen: Hold dose today then decrease maintenance dose    Recommend repeat INR in 10 days  _________________________________________________________________    Mis Hughesbestsalud (82 y.o.) is followed by the [facility].    Anticoagulation Summary  As of 8/21/2023      INR goal:  2.0-3.0   TTR:  71.7 % (9.6 mo)   INR used for dosing:  3.3 (8/21/2023)   Warfarin maintenance plan:  3 mg (6 mg x 0.5) every Mon, Wed, Fri; 6 mg (6 mg x 1) all other days   Weekly warfarin total:  33 mg   Plan last modified:  Tino Mendiola, PharmD (8/21/2023)   Next INR check:  8/31/2023   Target end date:      Indications    Paroxysmal atrial fibrillation [I48.0]  Long term (current) use of anticoagulants [Z79.01]                 Anticoagulation Episode Summary       INR check location:      Preferred lab:      Send INR reminders to:  [facility] COUMADIN MONITORING POOL    Comments:  [facility]          Anticoagulation Care Providers       Provider Role Specialty Phone number    Shamar Owens MD Responsible Electrophysiology [phone]            Patient Findings       Negatives:  Signs/symptoms of thrombosis, Signs/symptoms of bleeding, Laboratory test error suspected, Change in health, Change in alcohol use, Change in activity, Upcoming invasive procedure, Emergency department visit, Upcoming dental procedure, Missed doses, Extra doses, Change in medications, Change in diet/appetite, Hospital admission, Bruising, Other complaints    Comments:  Pt reports resuming her MVI - [facility] 50 plus.  Pt confirmed correct warfarin dose. Denies any other changes

## 2023-08-21 NOTE — PROGRESS NOTES
"  Occupational Therapy Daily Treatment Note     Name: Mis Ca  Clinic Number: [id number]    Therapy Diagnosis:   Encounter Diagnoses   Name Primary?    Muscle weakness Yes    Stiffness of left wrist joint     Stiffness of finger joint, left        Physician: Skyler Oliva Jr., *    Visit Date: 8/22/2023  Physician: Skyler Oliva Jr., *     Physician Orders: Eval and Treat  Medical Diagnosis: S66.819D (ICD-10-CM) - Rupture of extensor tendon of hand, unspecified laterality, subsequent encounter   Surgical Procedure and Date: REPAIR, TENDON, EXTENSOR Left Regional Left Index Digit REMOVAL,ORTHOPEDIC HARDWARE,UPPER EXTREMITY Left Regional , 6/30/2023  Date of Injury: about 6 months ago  Evaluation Date: 7/26/2023  Insurance Authorization Period Expiration: 09/20/2023   Plan of Care Certification Period: 10/13/2023  Date of Return to MD: n/a  FOTO: 2/3    Visit # / Visits authorized:  8 / 23  Time In: 9:20 am   Time Out: 10:00 am  Total Billable Time: 40 min      Precautions:  Standard, Fall, and Weightbearing    Subjective     Pt reports: "I was really sore this weekend, but I think it was because I played with the putty too much."  she was compliant with home exercise program given last session.   Response to previous treatment:increase in ROM  Functional change: consistent symptoms    Pain: 0/10  Location: left wrist    Objective   Observation/Appearance: patient arrives in prefab wrist brace, scars are well healed with no SOI, tenderness to touch at the scars (more so dorsaly than volarly), edema at the wrist crease, she also has a dimple due to scar tissue on the dorsal forearm     Edema. Measured in centimeters.    7/26/2023 7/26/2023 8/7/2023       right Left  Left     Wrist Crease 16.5 19 cm 18.5     Distal Palmar Crease 19 cm 19.5 cm  19.5           Elbow and Wrist ROM. Measured in degrees.    7/26/2023 7/26/2023 8/14/2023       Left Right Left      Supination/Pronation WNL WNL  WNL     Wrist " Ext/Flex 40/25 65/65 55/40      Wrist RD/UD 25/30 35/45  NM        Hand ROM. Measured in degrees.    7/26/2023 7/26/2023 8/2/2023 8/7/2023 8/14/2023     Left Right Left   Left Left                Index: MP  45 90  70  70 70              PIP     50 85  75  80 90              DIP 40 40  60  85 85     MOSS: 135          Long:  MP 55 90 85  85  90              PIP 65 65 80   80 85              DIP 30 40  50 50  50     MOSS: 150          Ring:   MP 60 90 90   90 95              PIP 60 80 65   70 85              DIP 35 40  55  55 55     MOSS: 155          Small:  MP 65 90                    PIP 75 80                    DIP 45 65          MOSS: 185          Thumb: MP 45 60                     IP 35 60            Rad ABD NM NM            Pal ABD NM NM               Opposition Base of SF Base of SF            Strength (Dynamometer) and Pinch Strength (Pinch Gauge)  Measured in pounds.  7/26/2023: deferred due to precxns     7/26/2023 7/26/2023 8/14/2023 8/14/2023     Left Right  L  R   Rung II NM NM  38  45   Neal Pinch NM NM  12 12    3pt Pinch NM NM NM  NM      Sensation:  Not formally tested   -some numbness in distal LIF        CMS Impairment/Limitation/Restriction for FOTO hand Survey     Therapist reviewed FOTO scores for Mis Ca on 7/26/2023.   FOTO documents entered into [employer] - see Media section.     Limitation Score: 56%  8/14/2023: 35%            Treatment      Mis received the following supervised modalities after being cleared for contradictions for 10 minutes:   -Patient tolerates MHP x 10 min in order to decrease pain and increase soft tissue extensibility in preparation for ROM, concurrent with assessment of deficits      Mis received the following manual therapy techniques for 10 minutes:   -I provide gentle RGM to L hand and forearm in order to decrease edema, increase soft tissue extensibility, decrease pain, and tenderness/hypersensitivity in the stated area, and promote scar tissue  remodeling.      Mis received therapeutic exercises for 15 minutes including:  - Therapist A PROM intrinsic stretch and full flexion  - reverse jt blocking  - wrist AROM 3 way 1#  - pro/sup 1#  - dowel roll with flex stretch   - TGEs with adduction squeeze  - Yellow flexbar- smiles, frowns, twists     Mis participated in dynamic functional therapeutic activities : x 15 min  -yellow clothespin and small poms   -isospheres forearm pronated   -finger<>palm translation with small poms         Home Exercises and Education Provided     Education provided:   - HEP in place  - Progress towards goals     Written Home Exercises Provided: Patient instructed to cont prior HEP.  Exercises were reviewed and Mis was able to demonstrate them prior to the end of the session.  Mis demonstrated good  understanding of the HEP provided.   .   See EMR under Patient Instructions for exercises provided 8/2/2023.        Assessment   Upon arrival, patient demos some increase in swelling, especially in the wrist/forearm. She states that her hand was really sore over the weekend, which she attributes to over working it this weekend. RGM  applied to the dorsal hand/forearm to decrease edema. Educated patient on edema management strategies to use at home with RGM, cryo, and compression. Patient states main c/o of pain is at the dorsal RIF, but only occurs after she has been using it. Patient tolerates P/AA/AROM exs to per protocol to increase functional and available ROM of L IF/LF/RF/SF and wrist. Patient demos increase in ROM post exs and reports tightness along the dorsal hand, but no true pain. She tolerates an addition of resistance to wrist/ forearm exercises with no c/o pain with these. I ed patient to attempt crocheting activities at home, since that is a goal of her therapy. If she has any limitations, we will continue to address these in follow-up tx sessions. She demos some mild difficulty with in hand manipulation  activities, but improves with multiple repetitions. Otherwise patient is progressing well towards her goals. ROM and  strength has increased. Patient would continue to benefit from ongoing OT services. Continue skilled OT POC and progress per protocol as tolerated.     Mis is progressing well towards her goals and there are no updates to goals at this time. Pt prognosis is Fair.     Pt will continue to benefit from skilled outpatient occupational therapy to address the deficits listed in the problem list on initial evaluation provide pt/family education and to maximize pt's level of independence in the home and community environment.     Anticipated barriers to occupational therapy: multiple sx, age     Pt's spiritual, cultural and educational needs considered and pt agreeable to plan of care and goals.    Goals:  The following goals were discussed with the patient and patient is in agreement with them as to be addressed in the treatment plan.   Short term Goals:  1) Initiate HEP Met, 8/22/2023  2) Pt will increase AROM of R wrist by 5-10 degrees in order to assist with angelita activities by 4 weeks. Met, 8/22/2023  3) Pt will reduce edema by .5-1 cm in affected wrist by 4 weeks.  4) Pt will reduce pain to less than 4/10 by 4 weeks.Met, 8/22/2023  5) Pt will increase functional  strength by 5# in order to A in opening containers for med management or home management tasks by 4 weeks. Not met 8/22/2023, continue progressing   6) Patient will be able to achieve less than or equal to 45% on the FOTO, demonstrating overall improved functional ability with upper extremity. (Self-care category) Met, 8/22/2023     Long Term Goals:  Goals to be met by discharge:  1) Independent with HEP  2) Pt will increase L digit MOSS to at least 190 degrees in each digit degrees in order to increase functional use for grasp with home management or work related tasks by d/c. Met, 8/22/2023  3) Pt will decrease edema to trace or  none to increase functional ROM by d/c.   4) Pt will decrease pain to trace or none while completing light home management tasks or work related tasks by d/c.   5) Patient will be able to achieve less than or equal to 30% on the FOTO, demonstrating overall improved functional ability with upper extremity.  (Self-care category)       Plan   Continue skilled OT POC and progress per protocol as tolerated.   Updates/Grading for next session: continue P/AROM of the digits and wrist       Lesley Miranda, OT

## 2023-08-22 ENCOUNTER — CLINICAL SUPPORT (OUTPATIENT)
Dept: REHABILITATION | Facility: HOSPITAL | Age: 83
End: 2023-08-22
Payer: MEDICARE

## 2023-08-22 DIAGNOSIS — M62.81 MUSCLE WEAKNESS: Primary | ICD-10-CM

## 2023-08-22 DIAGNOSIS — M25.632 STIFFNESS OF LEFT WRIST JOINT: ICD-10-CM

## 2023-08-22 DIAGNOSIS — M25.642 STIFFNESS OF FINGER JOINT, LEFT: ICD-10-CM

## 2023-08-22 PROCEDURE — 97530 THERAPEUTIC ACTIVITIES: CPT | Mod: PN

## 2023-08-22 PROCEDURE — 97110 THERAPEUTIC EXERCISES: CPT | Mod: PN

## 2023-08-22 PROCEDURE — 97140 MANUAL THERAPY 1/> REGIONS: CPT | Mod: PN

## 2023-08-23 NOTE — PROGRESS NOTES
"  Occupational Therapy Daily Treatment Note     Name: Mis Ca  Clinic Number: [id number]    Therapy Diagnosis:   Encounter Diagnoses   Name Primary?    Muscle weakness Yes    Stiffness of left wrist joint     Stiffness of finger joint, left      Physician: Skyler Oliva Jr., *    Visit Date: 8/24/2023  Physician: Skyler Oliva Jr., *     Physician Orders: Eval and Treat  Medical Diagnosis: S66.819D (ICD-10-CM) - Rupture of extensor tendon of hand, unspecified laterality, subsequent encounter   Surgical Procedure and Date: REPAIR, TENDON, EXTENSOR Left Regional Left Index Digit REMOVAL,ORTHOPEDIC HARDWARE,UPPER EXTREMITY Left Regional , 6/30/2023  Date of Injury: about 6 months ago  Evaluation Date: 7/26/2023  Insurance Authorization Period Expiration: 09/20/2023   Plan of Care Certification Period: 10/13/2023  Date of Return to MD: n/a  FOTO: 2/3    Visit # / Visits authorized:  9 / 23  Time In: 10:15 am  Time Out: 11:00 am   Total Billable Time: 45 min      Precautions:  Standard, Fall, and Weightbearing    Subjective     Pt reports: "I got this bruise on my hand, but I dont know what its from."   she was compliant with home exercise program given last session.   Response to previous treatment:increase in ROM  Functional change: consistent symptoms    Pain: 1/10  Location: left wrist    Objective   Observation/Appearance: patient arrives in prefab wrist brace, scars are well healed with no SOI, tenderness to touch at the scars (more so dorsaly than volarly), edema at the wrist crease, she also has a dimple due to scar tissue on the dorsal forearm     Edema. Measured in centimeters.    7/26/2023 7/26/2023 8/7/2023       right Left  Left     Wrist Crease 16.5 19 cm 18.5     Distal Palmar Crease 19 cm 19.5 cm  19.5           Elbow and Wrist ROM. Measured in degrees.    7/26/2023 7/26/2023 8/14/2023       Left Right Left      Supination/Pronation WNL WNL  WNL     Wrist Ext/Flex 40/25 65/65 55/40    "   Wrist RD/UD 25/30 35/45  NM        Hand ROM. Measured in degrees.    7/26/2023 7/26/2023 8/2/2023 8/7/2023 8/14/2023 8/24/2023     Left Right Left   Left Left Left                 Index: MP  45 90  70  70 70 75              PIP     50 85  75  80 90 90              DIP 40 40  60  85 85 85     MOSS: 135           Long:  MP 55 90 85  85  90 90              PIP 65 65 80   80 85 85              DIP 30 40  50 50  50 50     MOSS: 150           Ring:   MP 60 90 90   90 95               PIP 60 80 65   70 85               DIP 35 40  55  55 55      MOSS: 155           Small:  MP 65 90                     PIP 75 80                     DIP 45 65           MOSS: 185           Thumb: MP 45 60                      IP 35 60             Rad ABD NM NM             Pal ABD NM NM                Opposition Base of SF Base of SF             Strength (Dynamometer) and Pinch Strength (Pinch Gauge)  Measured in pounds.  7/26/2023: deferred due to precxns     7/26/2023 7/26/2023 8/14/2023 8/14/2023 8/24/2023     Left Right  L  R Left   Rung II NM NM  38  45 41   Neal Pinch NM NM  12 12  12   3pt Pinch NM NM NM  NM 12      Sensation:  Not formally tested   -some numbness in distal LIF        CMS Impairment/Limitation/Restriction for FOTO hand Survey     Therapist reviewed FOTO scores for Mis Ca on 7/26/2023.   FOTO documents entered into [employer] - see Media section.     Limitation Score: 56%  8/14/2023: 35%            Treatment      Mis received the following supervised modalities after being cleared for contradictions for 10 minutes:   -Patient tolerates MHP x 10 min in order to decrease pain and increase soft tissue extensibility in preparation for ROM, concurrent with assessment of deficits      Mis received the following manual therapy techniques for 10 minutes:   -I provide gentle RGM to L hand and forearm in order to decrease edema, increase soft tissue extensibility, decrease pain, and tenderness/hypersensitivity in  the stated area, and promote scar tissue remodeling.      Mis received therapeutic exercises for 15 minutes including:  - Therapist A PROM intrinsic stretch and full flexion  - reverse jt blocking  - TGEs with adduction squeeze  - Yellow flexbar- smiles, frowns, twists     Mis participated in dynamic functional therapeutic activities : x 20 min  -bead search in tputty then string the beads - 15 beads   -putty cones   -taffy pull  -isospheres x 2 min        Home Exercises and Education Provided     Education provided:   - HEP in place  - Progress towards goals     Written Home Exercises Provided: Patient instructed to cont prior HEP.  Exercises were reviewed and Mis was able to demonstrate them prior to the end of the session.  Mis demonstrated good  understanding of the HEP provided.   .   See EMR under Patient Instructions for exercises provided 8/2/2023.        Assessment   Upon arrival, patient has a bruise on the LRF MPJ, which she cant attribute any reason. She states that was trying to pull some elastic out of some pants to put new elastic in, so her hand was sore and she is unsure if she popped a blood vessel or something. This area is also tender to touch. Patient is a little more stiff than usual today, so I perform more PROM and stretching to the digits. ROM has stayed consistent over the past few measurements, but has overall increased since initial evaluation. I instruct patient on activities today that focus on FMC, dexterity, and activity tolerance to simulate desired activities like crocheting, She demos good performance with these today, with no difficulty or c/o pain. Following tx session, I perform a  strength assessment. She shows some improvement in this from prior assessment. Patient would continue to benefit from ongoing OT services. Continue skilled OT POC and progress per protocol as tolerated.     Mis is progressing well towards her goals and there are no updates to  goals at this time. Pt prognosis is Fair.     Pt will continue to benefit from skilled outpatient occupational therapy to address the deficits listed in the problem list on initial evaluation provide pt/family education and to maximize pt's level of independence in the home and community environment.     Anticipated barriers to occupational therapy: multiple sx, age     Pt's spiritual, cultural and educational needs considered and pt agreeable to plan of care and goals.    Goals:  The following goals were discussed with the patient and patient is in agreement with them as to be addressed in the treatment plan.   Short term Goals:  1) Initiate HEP Met, 8/24/2023  2) Pt will increase AROM of R wrist by 5-10 degrees in order to assist with angelita activities by 4 weeks. Met, 8/24/2023  3) Pt will reduce edema by .5-1 cm in affected wrist by 4 weeks.  4) Pt will reduce pain to less than 4/10 by 4 weeks.Met, 8/24/2023  5) Pt will increase functional  strength by 5# in order to A in opening containers for med management or home management tasks by 4 weeks. Nearly met 8/24/2023, continue progressing   6) Patient will be able to achieve less than or equal to 45% on the FOTO, demonstrating overall improved functional ability with upper extremity. (Self-care category) Met, 8/24/2023     Long Term Goals:  Goals to be met by discharge:  1) Independent with HEP  2) Pt will increase L digit MOSS to at least 190 degrees in each digit degrees in order to increase functional use for grasp with home management or work related tasks by d/c. Met, 8/24/2023  3) Pt will decrease edema to trace or none to increase functional ROM by d/c.   4) Pt will decrease pain to trace or none while completing light home management tasks or work related tasks by d/c.   5) Patient will be able to achieve less than or equal to 30% on the FOTO, demonstrating overall improved functional ability with upper extremity.  (Self-care category)       Plan    Continue skilled OT POC and progress per protocol as tolerated.   Updates/Grading for next session: continue P/AROM of the digits and wrist       Lesley Miranda, OT

## 2023-08-24 ENCOUNTER — CLINICAL SUPPORT (OUTPATIENT)
Dept: REHABILITATION | Facility: HOSPITAL | Age: 83
End: 2023-08-24
Payer: MEDICARE

## 2023-08-24 DIAGNOSIS — M25.642 STIFFNESS OF FINGER JOINT, LEFT: ICD-10-CM

## 2023-08-24 DIAGNOSIS — M62.81 MUSCLE WEAKNESS: Primary | ICD-10-CM

## 2023-08-24 DIAGNOSIS — M25.632 STIFFNESS OF LEFT WRIST JOINT: ICD-10-CM

## 2023-08-24 PROCEDURE — 97530 THERAPEUTIC ACTIVITIES: CPT | Mod: PN

## 2023-08-24 PROCEDURE — 97110 THERAPEUTIC EXERCISES: CPT | Mod: PN

## 2023-08-24 PROCEDURE — 97140 MANUAL THERAPY 1/> REGIONS: CPT | Mod: PN

## 2023-08-25 NOTE — PROGRESS NOTES
Subjective:      Patient ID: Mis Ca is a 82 y.o. female.  Chief Complaint: Post-op Evaluation (L Hand )      HPI  Mis Ca is a  82 y.o. female presenting today for post op visit.  She is s/p hardware removal from the wrist and extensor tendon transfer for rupture of the extensor tendons to the index and middle finger approximately 2 mos postop.    She is not experiencing any pain, and continues to work with formal PT on continued digit range of motion and strengthening.  Patient is no longer utilizing upper extremity brace.    Review of patient's allergies indicates:   Allergen Reactions    Adhesive     Compazine [prochlorperazine edisylate] Anxiety    Penicillins Rash    Sulfa (sulfonamide antibiotics) Rash    Vancomycin analogues Rash         Current Outpatient Medications   Medication Sig Dispense Refill    acetaminophen (TYLENOL) 500 MG tablet Take 1,000 mg by mouth 2 (two) times daily as needed for Pain.      alendronate (FOSAMAX) 70 MG tablet Take 1 tablet (70 mg total) by mouth every 7 days. (Patient taking differently: Take 70 mg by mouth every Wednesday.) 12 tablet 3    ALPRAZolam (XANAX) 0.25 MG tablet TAKE 1 TABLET BY MOUTH TWICE A  tablet 0    amiodarone (PACERONE) 200 MG Tab Take 0.5 tablets (100 mg total) by mouth once daily. 45 tablet 3    amitriptyline (ELAVIL) 25 MG tablet Take 1 tablet (25 mg total) by mouth every evening. 30 tablet 5    aspirin (ECOTRIN) 81 MG EC tablet Take 81 mg by mouth once daily.      azelastine (ASTELIN) 137 mcg (0.1 %) nasal spray 1 spray (137 mcg total) by Nasal route 2 (two) times daily. 30 mL 11    chlorthalidone (HYGROTEN) 50 MG Tab TAKE 1 TABLET BY MOUTH EVERY DAY 90 tablet 3    cyanocobalamin 1,000 mcg/mL injection INJECT 1 ML (1,000 MCG TOTAL) INTO THE MUSCLE EVERY 30 DAYS. 3 mL 19    furosemide (LASIX) 20 MG tablet Take 1 tablet (20 mg total) by mouth once daily. 90 tablet 3    gabapentin (NEURONTIN) 300 MG capsule TAKE 2 CAPSULES BY MOUTH   TWICE DAILY 360 capsule 3    HYDROcodone-acetaminophen (NORCO) 5-325 mg per tablet Take 1 tablet by mouth every 4 (four) hours as needed for Pain. 20 tablet 0    latanoprost 0.005 % ophthalmic solution Place 1 drop into both eyes every evening.      levocetirizine (XYZAL) 5 MG tablet Take 1 tablet (5 mg total) by mouth every evening. 90 tablet 3    losartan (COZAAR) 100 MG tablet Take 0.5 tablets (50 mg total) by mouth once daily. 45 tablet 3    multivit-min-iron-FA-lutein (CENTRUM SILVER WOMEN) 8 mg iron-400 mcg-300 mcg Tab Take 1 tablet by mouth once daily.      mv-mn/om3/dha/epa/fish/lut/geoffrey (OCUVITE ADULT 50 PLUS ORAL) Take 1 tablet by mouth once daily.      omeprazole (PRILOSEC) 20 MG capsule TAKE 1 CAPSULE BY MOUTH  TWICE DAILY 180 capsule 1    oxybutynin (DITROPAN) 5 MG Tab TAKE 1 TABLET BY MOUTH ONCE DAILY 90 tablet 3    potassium chloride (K-TAB) 20 mEq TAKE 1 TABLET BY MOUTH ONCE DAILY 90 tablet 3    pramipexole (MIRAPEX) 0.5 MG tablet TAKE 1 TABLET BY MOUTH  TWICE DAILY 180 tablet 3    pravastatin (PRAVACHOL) 40 MG tablet TAKE 1 TABLET BY MOUTH EVERY DAY 90 tablet 3    timolol maleate 0.5% (TIMOPTIC) 0.5 % Drop Place 1 drop into the left eye once daily.   0    UNABLE TO FIND Take 1 capsule by mouth 3 (three) times daily with meals. medication name: Golo Release Diet Capsules      vitamin D (VITAMIN D3) 1000 units Tab Take 1,000 Units by mouth once daily.      warfarin (COUMADIN) 6 MG tablet TAKE 6 MG DAILY EXCEPT FOR 9 MG ON FRIDAY TO THIN BLOOD (Patient taking differently: TAKE 6 MG DAILY EXCEPT Monday and Friday take 3 mg) 100 tablet 3     No current facility-administered medications for this visit.     Facility-Administered Medications Ordered in Other Visits   Medication Dose Route Frequency Provider Last Rate Last Admin    lactated ringers infusion   Intravenous Continuous Don Ruano DNP        LIDOcaine (PF) 10 mg/ml (1%) injection 10 mg  1 mL Intradermal Once Don Ruano DNP            Past Medical History:   Diagnosis Date    Allergy     Anticoagulant long-term use     Anxiety     Arthritis     Atrial fibrillation     Bilateral renal cysts 05/01/2022    Blind right eye     Bradycardia     Cancer     skin cancer left side of face under eye    Hyperlipidemia     Hypertension     PVC (premature ventricular contraction)     RLS (restless legs syndrome)     Sleep apnea     Does not use CPAP machine.       Past Surgical History:   Procedure Laterality Date    ADENOIDECTOMY      PAM OSTEOTOMY Left 10/31/2018    Procedure: OSTEOTOMY, AKIN;  Surgeon: Rudolph Cardozo DPM;  Location: [facility] OR;  Service: Podiatry;  Laterality: Left;    APPENDECTOMY      BREAST BIOPSY Left     x3    BREAST SURGERY Left     breast biopsy x3    CATARACT EXTRACTION BILATERAL W/ ANTERIOR VITRECTOMY      ENDOSCOPIC GASTROCNEMIUS RECESSION Left 10/31/2018    Procedure: RECESSION, GASTROCNEMIUS, ENDOSCOPIC;  Surgeon: Rudolph Cardozo DPM;  Location: [facility] OR;  Service: Podiatry;  Laterality: Left;    ESOPHAGOGASTRODUODENOSCOPY N/A 2/11/2022    Procedure: EGD (ESOPHAGOGASTRODUODENOSCOPY);  Surgeon: Efren Aguilar MD;  Location: [facility];  Service: Endoscopy;  Laterality: N/A;  Patient needs a rapid covid  test done on 12/15/2021. thank you    EYE SURGERY Bilateral     cataracts extraction    EYE SURGERY Right     drainage tube (glaucoma)    FOOT ARTHRODESIS Left 1/15/2020    Procedure: FUSION, FOOT;  Surgeon: Rudolph Cardozo DPM;  Location: [facility] OR;  Service: Podiatry;  Laterality: Left;  mini c-arm, Arthrex screw  for hardware removal (Lydia notified), Orthofix (Niels notified) min ex-fix    FOOT SURGERY Left     lesion removed from dorsal area    FRACTURE SURGERY Left     arm    HAND TENDON SURGERY Left     HYSTERECTOMY      INCISION AND DRAINAGE FOOT Left 3/11/2020    Procedure: INCISION AND DRAINAGE, FOOT;  Surgeon: Rudolph Cardozo DPM;  Location: [facility] OR;  Service: Podiatry;  Laterality:  "Left;    LAPIDUS BUNIONECTOMY Left 10/31/2018    Procedure: BUNIONECTOMY, LAPIDUS;  Surgeon: Rudolph Cardozo DPM;  Location: [facility];  Service: Podiatry;  Laterality: Left;  mini c-arm, Arthrex locking plate (Lydia notified)    LAPIDUS BUNIONECTOMY Left 10/31/2018    Dr. Cardozo    Lymph Gland Removed  Right     groin (performed by Dr. Vergara)    NEURECTOMY Left 1/15/2020    Procedure: NEURECTOMY;  Surgeon: Rudolph Cardozo DPM;  Location: [facility] OR;  Service: Podiatry;  Laterality: Left;  bipolar bovie, vessel loop, possible Agnes nerve wrap. Moshe with [facility] notified and will be overnighting graft. MK 1/14/2020    OOPHORECTOMY      ORIF FOREARM FRACTURE Left     PALATE SURGERY      Lesion removed    REMOVAL,ORTHOPEDIC HARDWARE,UPPER EXTREMITY Left 6/30/2023    Procedure: REMOVAL,ORTHOPEDIC HARDWARE,UPPER EXTREMITY;  Surgeon: Skyler Oliva Jr., MD;  Location: [facility] OR;  Service: Orthopedics;  Laterality: Left;  [facility] notified cc    REPAIR OF EXTENSOR TENDON Left 6/30/2023    Procedure: REPAIR, TENDON, EXTENSOR;  Surgeon: Skyler Oliva Jr., MD;  Location: [facility] OR;  Service: Orthopedics;  Laterality: Left;  Left Index Digit    TONSILLECTOMY         OBJECTIVE:   PHYSICAL EXAM:  Height: 5' 2" (157.5 cm) Weight: 102.1 kg (225 lb)  Vitals:    08/29/23 1359   Weight: 102.1 kg (225 lb)   Height: 5' 2" (1.575 m)   PainSc: 0-No pain       Ortho/SPM Exam  Examination left hand and wrist  Surgical incisions are well healed with appropriate scar formation  Patient is nontender throughout  Ecchymosis noted throughout remaining digit secondary to recent injury.  Sensation grossly intact throughout  Patient able to fully flex digits into a fist and extend digits into a neutral position.  Some weakness to extension past neutral    ASSESSMENT/PLAN:     IMPRESSION:  Status post removal hardware and extensor tendon transfer    PLAN:    Patient may discontinue upper extremity brace   She was encouraged to " continue further digit strengthening with PT  She may slowly continue to advance activities as tolerated    FOLLOW UP:  6 weeks for final exam after completion of PT course

## 2023-08-27 NOTE — TELEPHONE ENCOUNTER
No care due was identified.  [facility] Embedded Care Due Messages. Reference number: [id number].   8/26/2023 9:19:32 PM CDT

## 2023-08-28 ENCOUNTER — OFFICE VISIT (OUTPATIENT)
Dept: GASTROENTEROLOGY | Facility: CLINIC | Age: 83
End: 2023-08-28
Payer: MEDICARE

## 2023-08-28 VITALS
OXYGEN SATURATION: 96 % | DIASTOLIC BLOOD PRESSURE: 80 MMHG | SYSTOLIC BLOOD PRESSURE: 160 MMHG | HEART RATE: 78 BPM | WEIGHT: 225.63 LBS | HEIGHT: 62 IN | BODY MASS INDEX: 41.52 KG/M2

## 2023-08-28 DIAGNOSIS — K22.4 ESOPHAGEAL DYSMOTILITY: Primary | ICD-10-CM

## 2023-08-28 DIAGNOSIS — E66.01 CLASS 3 SEVERE OBESITY DUE TO EXCESS CALORIES WITH SERIOUS COMORBIDITY AND BODY MASS INDEX (BMI) OF 40.0 TO 44.9 IN ADULT: ICD-10-CM

## 2023-08-28 DIAGNOSIS — R13.19 ESOPHAGEAL DYSPHAGIA: ICD-10-CM

## 2023-08-28 PROCEDURE — 1160F PR REVIEW ALL MEDS BY PRESCRIBER/CLIN PHARMACIST DOCUMENTED: ICD-10-PCS | Mod: CPTII,S$GLB,, | Performed by: NURSE PRACTITIONER

## 2023-08-28 PROCEDURE — 1159F MED LIST DOCD IN RCRD: CPT | Mod: CPTII,S$GLB,, | Performed by: NURSE PRACTITIONER

## 2023-08-28 PROCEDURE — 99999 PR PBB SHADOW E&M-EST. PATIENT-LVL III: CPT | Mod: PBBFAC,,, | Performed by: NURSE PRACTITIONER

## 2023-08-28 PROCEDURE — 1126F AMNT PAIN NOTED NONE PRSNT: CPT | Mod: CPTII,S$GLB,, | Performed by: NURSE PRACTITIONER

## 2023-08-28 PROCEDURE — 1101F PR PT FALLS ASSESS DOC 0-1 FALLS W/OUT INJ PAST YR: ICD-10-PCS | Mod: CPTII,S$GLB,, | Performed by: NURSE PRACTITIONER

## 2023-08-28 PROCEDURE — 3079F DIAST BP 80-89 MM HG: CPT | Mod: CPTII,S$GLB,, | Performed by: NURSE PRACTITIONER

## 2023-08-28 PROCEDURE — 1101F PT FALLS ASSESS-DOCD LE1/YR: CPT | Mod: CPTII,S$GLB,, | Performed by: NURSE PRACTITIONER

## 2023-08-28 PROCEDURE — 3288F PR FALLS RISK ASSESSMENT DOCUMENTED: ICD-10-PCS | Mod: CPTII,S$GLB,, | Performed by: NURSE PRACTITIONER

## 2023-08-28 PROCEDURE — 1159F PR MEDICATION LIST DOCUMENTED IN MEDICAL RECORD: ICD-10-PCS | Mod: CPTII,S$GLB,, | Performed by: NURSE PRACTITIONER

## 2023-08-28 PROCEDURE — 3077F PR MOST RECENT SYSTOLIC BLOOD PRESSURE >= 140 MM HG: ICD-10-PCS | Mod: CPTII,S$GLB,, | Performed by: NURSE PRACTITIONER

## 2023-08-28 PROCEDURE — 1160F RVW MEDS BY RX/DR IN RCRD: CPT | Mod: CPTII,S$GLB,, | Performed by: NURSE PRACTITIONER

## 2023-08-28 PROCEDURE — 99999 PR PBB SHADOW E&M-EST. PATIENT-LVL III: ICD-10-PCS | Mod: PBBFAC,,, | Performed by: NURSE PRACTITIONER

## 2023-08-28 PROCEDURE — 3288F FALL RISK ASSESSMENT DOCD: CPT | Mod: CPTII,S$GLB,, | Performed by: NURSE PRACTITIONER

## 2023-08-28 PROCEDURE — 99214 OFFICE O/P EST MOD 30 MIN: CPT | Mod: S$GLB,,, | Performed by: NURSE PRACTITIONER

## 2023-08-28 PROCEDURE — 3079F PR MOST RECENT DIASTOLIC BLOOD PRESSURE 80-89 MM HG: ICD-10-PCS | Mod: CPTII,S$GLB,, | Performed by: NURSE PRACTITIONER

## 2023-08-28 PROCEDURE — 3077F SYST BP >= 140 MM HG: CPT | Mod: CPTII,S$GLB,, | Performed by: NURSE PRACTITIONER

## 2023-08-28 PROCEDURE — 99214 PR OFFICE/OUTPT VISIT, EST, LEVL IV, 30-39 MIN: ICD-10-PCS | Mod: S$GLB,,, | Performed by: NURSE PRACTITIONER

## 2023-08-28 PROCEDURE — 1126F PR PAIN SEVERITY QUANTIFIED, NO PAIN PRESENT: ICD-10-PCS | Mod: CPTII,S$GLB,, | Performed by: NURSE PRACTITIONER

## 2023-08-28 RX ORDER — AMITRIPTYLINE HYDROCHLORIDE 25 MG/1
25 TABLET, FILM COATED ORAL NIGHTLY
Qty: 30 TABLET | Refills: 5 | Status: SHIPPED | OUTPATIENT
Start: 2023-08-28 | End: 2024-01-25

## 2023-08-28 NOTE — PROGRESS NOTES
Subjective:       Patient ID: Mis Ca is a 82 y.o. female.    Chief Complaint: Follow-up and Dysphagia    83 y/o female with esophageal dysmotility presents to clinic for one month follow up. States esophageal spasms have decreased starting Elavil nightly in addition to daily PPI. Still reports globus sensation when eating solid food especially meat. Will often cough and drink additional fluids to resolve.       Previous workup  - 2/11/2022 EGD by Dr. Aguilar revealed no endoscopic esophageal abnormality to explain patient's dysphagia; innumerable gastric polyps; resected and retrieved; normal examined duodenum.   - 5/30/2023 Esophagram: esophageal dysmotility; no mechanical obstruction.        Past Medical History:   Diagnosis Date    Allergy     Anticoagulant long-term use     Anxiety     Arthritis     Atrial fibrillation     Bilateral renal cysts 05/01/2022    Blind right eye     Bradycardia     Cancer     skin cancer left side of face under eye    Hyperlipidemia     Hypertension     PVC (premature ventricular contraction)     RLS (restless legs syndrome)     Sleep apnea     Does not use CPAP machine.       Past Surgical History:   Procedure Laterality Date    ADENOIDECTOMY      PAM OSTEOTOMY Left 10/31/2018    Procedure: OSTEOTOMY, AKIN;  Surgeon: Rudolph Cardozo DPM;  Location: [facility] OR;  Service: Podiatry;  Laterality: Left;    APPENDECTOMY      BREAST BIOPSY Left     x3    BREAST SURGERY Left     breast biopsy x3    CATARACT EXTRACTION BILATERAL W/ ANTERIOR VITRECTOMY      ENDOSCOPIC GASTROCNEMIUS RECESSION Left 10/31/2018    Procedure: RECESSION, GASTROCNEMIUS, ENDOSCOPIC;  Surgeon: Rudolph Cardozo DPM;  Location: [facility] OR;  Service: Podiatry;  Laterality: Left;    ESOPHAGOGASTRODUODENOSCOPY N/A 2/11/2022    Procedure: EGD (ESOPHAGOGASTRODUODENOSCOPY);  Surgeon: Efren Aguilar MD;  Location: [facility];  Service: Endoscopy;  Laterality: N/A;  Patient needs a rapid covid  test done  on 12/15/2021. thank you    EYE SURGERY Bilateral     cataracts extraction    EYE SURGERY Right     drainage tube (glaucoma)    FOOT ARTHRODESIS Left 1/15/2020    Procedure: FUSION, FOOT;  Surgeon: Rudolph Cardozo DPM;  Location: [facility] OR;  Service: Podiatry;  Laterality: Left;  mini c-arm, Arthrex screw  for hardware removal (Lydia notified), Orthofix (Niels notified) min ex-fix    FOOT SURGERY Left     lesion removed from dorsal area    FRACTURE SURGERY Left     arm    HAND TENDON SURGERY Left     HYSTERECTOMY      INCISION AND DRAINAGE FOOT Left 3/11/2020    Procedure: INCISION AND DRAINAGE, FOOT;  Surgeon: Rudolph Cardozo DPM;  Location: [facility] OR;  Service: Podiatry;  Laterality: Left;    LAPIDUS BUNIONECTOMY Left 10/31/2018    Procedure: BUNIONECTOMY, LAPIDUS;  Surgeon: Rudolph Cardozo DPM;  Location: [facility] OR;  Service: Podiatry;  Laterality: Left;  mini c-arm, Arthrex locking plate (Lydia notified)    LAPIDUS BUNIONECTOMY Left 10/31/2018    Dr. Cardozo    Lymph Gland Removed  Right     groin (performed by Dr. Vergara)    NEURECTOMY Left 1/15/2020    Procedure: NEURECTOMY;  Surgeon: Rudolph Cardozo DPM;  Location: [facility] OR;  Service: Podiatry;  Laterality: Left;  bipolar bovie, vessel loop, possible Agnes nerve wrap. Moshe with Agnes notified and will be overnighting graft. MK 1/14/2020    OOPHORECTOMY      ORIF FOREARM FRACTURE Left     PALATE SURGERY      Lesion removed    REMOVAL,ORTHOPEDIC HARDWARE,UPPER EXTREMITY Left 6/30/2023    Procedure: REMOVAL,ORTHOPEDIC HARDWARE,UPPER EXTREMITY;  Surgeon: Skyler Oliva Jr., MD;  Location: [facility] OR;  Service: Orthopedics;  Laterality: Left;  César[employer] notified cc    REPAIR OF EXTENSOR TENDON Left 6/30/2023    Procedure: REPAIR, TENDON, EXTENSOR;  Surgeon: Skyler Oliva Jr., MD;  Location: [facility] OR;  Service: Orthopedics;  Laterality: Left;  Left Index Digit    TONSILLECTOMY         Family History   Problem Relation Age of Onset     Aneurysm Mother         AAA    Cancer Father         lung cancer    Lung cancer Father     Cirrhosis Father     Cancer Sister         Breast and Brain     Diabetes Sister     Breast cancer Sister     Hypertension Sister     Hyperlipidemia Sister     Brain cancer Sister     Colon polyps Brother     Cancer Brother         lung cancer    Diabetes Brother     Hyperlipidemia Brother     Hypertension Brother     Cancer Brother         bladder cancer    Diabetes Brother     Glaucoma Brother     Heart disease Sister         Heart valve repair    Atrial fibrillation Sister     Diabetes Sister     Heart disease Sister     Heart attack Sister     Bipolar disorder Sister     Depression Sister     Glaucoma Sister     Diabetes Sister     Other Sister         Pituitary tumor    Arthritis Sister     COPD Sister     Heart disease Sister     Kidney disease Sister     Cancer Sister         lung cancer    Diabetes Sister     Lung cancer Sister     Heart attack Sister        Social History     Socioeconomic History    Marital status:      Spouse name: both   Tobacco Use    Smoking status: Never    Smokeless tobacco: Never   Substance and Sexual Activity    Alcohol use: Not Currently    Drug use: No    Sexual activity: Not Currently     Partners: Male     Social Determinants of Health     Financial Resource Strain: Low Risk  (5/3/2023)    Overall Financial Resource Strain (CARDIA)     Difficulty of Paying Living Expenses: Not very hard   Food Insecurity: No Food Insecurity (5/3/2023)    Hunger Vital Sign     Worried About Running Out of Food in the Last Year: Never true     Ran Out of Food in the Last Year: Never true   Transportation Needs: No Transportation Needs (5/3/2023)    PRAPARE - Transportation     Lack of Transportation (Medical): No     Lack of Transportation (Non-Medical): No   Physical Activity: Inactive (5/3/2023)    Exercise Vital Sign     Days of Exercise per Week: 0 days     Minutes of Exercise per Session: 0  "min   Stress: No Stress Concern Present (5/3/2023)    [employer] [place] [employer] - Occupational Stress Questionnaire     Feeling of Stress : Not at all   Social Connections: Moderately Integrated (5/3/2023)    Social Connection and Isolation Panel [NHANES]     Frequency of Communication with Friends and Family: More than three times a week     Frequency of Social Gatherings with Friends and Family: More than three times a week     Attends [employer] Services: More than 4 times per year     Active Member of Clubs or Organizations: Yes     Attends Club or Organization Meetings: More than 4 times per year     Marital Status:    Housing Stability: Low Risk  (5/3/2023)    Housing Stability Vital Sign     Unable to Pay for Housing in the Last Year: No     Number of Places Lived in the Last Year: 2     Unstable Housing in the Last Year: No       Review of Systems   Constitutional:  Negative for appetite change and unexpected weight change.   HENT:  Positive for trouble swallowing.    Respiratory:  Positive for cough. Negative for shortness of breath.    Cardiovascular:  Negative for chest pain.   Gastrointestinal:  Negative for abdominal pain.   Psychiatric/Behavioral:  Negative for dysphoric mood.          Objective:     Vitals:    08/28/23 0952   BP: (!) 160/80   BP Location: Right arm   Patient Position: Sitting   BP Method: Large (Manual)   Pulse: 78   SpO2: 96%   Weight: 102.3 kg (225 lb 9.6 oz)   Height: 5' 2" (1.575 m)          Physical Exam  Constitutional:       Appearance: She is obese.   HENT:      Head: Normocephalic.   Eyes:      Conjunctiva/sclera: Conjunctivae normal.   Pulmonary:      Effort: Pulmonary effort is normal. No respiratory distress.   Musculoskeletal:         General: Normal range of motion.      Cervical back: Normal range of motion.   Skin:     General: Skin is warm and dry.   Neurological:      Mental Status: She is alert and oriented to person, place, and time. " "  Psychiatric:         Mood and Affect: Mood normal.         Behavior: Behavior normal.               Assessment:         ICD-10-CM ICD-9-CM   1. Esophageal dysmotility  K22.4 530.5   2. Esophageal dysphagia  R13.19 787.29   3. Class 3 severe obesity due to excess calories with serious comorbidity and body mass index (BMI) of 40.0 to 44.9 in adult  E66.01 278.01    Z68.41 V85.41       Plan:       Esophageal dysmotility; Esophageal dysphagia  -     Continue current medication therapy  -     Be sure to cut all meat and bulky vegetables into small pieces using knife/fork, do not rely on your teeth alone.  Also, try to drink some carbonated water (soda water) after you finish eating; the bubbles will "scrub" your esophagus to help little stuck things go down better.       Class 3 severe obesity due to excess calories with serious comorbidity and body mass index (BMI) of 40.0 to 44.9 in adult  -     Weight loss advised. Dietary and exercise counseling done.    Follow up if symptoms worsen or fail to improve.     Patient's Medications   New Prescriptions    No medications on file   Previous Medications    ACETAMINOPHEN (TYLENOL) 500 MG TABLET    Take 1,000 mg by mouth 2 (two) times daily as needed for Pain.    ALENDRONATE (FOSAMAX) 70 MG TABLET    Take 1 tablet (70 mg total) by mouth every 7 days.    ALPRAZOLAM (XANAX) 0.25 MG TABLET    TAKE 1 TABLET BY MOUTH TWICE A DAY    AMIODARONE (PACERONE) 200 MG TAB    Take 0.5 tablets (100 mg total) by mouth once daily.    ASPIRIN (ECOTRIN) 81 MG EC TABLET    Take 81 mg by mouth once daily.    AZELASTINE (ASTELIN) 137 MCG (0.1 %) NASAL SPRAY    1 spray (137 mcg total) by Nasal route 2 (two) times daily.    CHLORTHALIDONE (HYGROTEN) 50 MG TAB    TAKE 1 TABLET BY MOUTH EVERY DAY    CYANOCOBALAMIN 1,000 MCG/ML INJECTION    INJECT 1 ML (1,000 MCG TOTAL) INTO THE MUSCLE EVERY 30 DAYS.    FUROSEMIDE (LASIX) 20 MG TABLET    Take 1 tablet (20 mg total) by mouth once daily.    GABAPENTIN " (NEURONTIN) 300 MG CAPSULE    TAKE 2 CAPSULES BY MOUTH  TWICE DAILY    HYDROCODONE-ACETAMINOPHEN (NORCO) 5-325 MG PER TABLET    Take 1 tablet by mouth every 4 (four) hours as needed for Pain.    LATANOPROST 0.005 % OPHTHALMIC SOLUTION    Place 1 drop into both eyes every evening.    LEVOCETIRIZINE (XYZAL) 5 MG TABLET    Take 1 tablet (5 mg total) by mouth every evening.    LOSARTAN (COZAAR) 100 MG TABLET    Take 0.5 tablets (50 mg total) by mouth once daily.    MULTIVIT-MIN-IRON-FA-LUTEIN (CENTRUM SILVER WOMEN) 8 MG IRON-400 MCG-300 MCG TAB    Take 1 tablet by mouth once daily.    MV-MN/OM3/DHA/EPA/FISH/LUT/HUDSON (OCUVITE ADULT 50 PLUS ORAL)    Take 1 tablet by mouth once daily.    OMEPRAZOLE (PRILOSEC) 20 MG CAPSULE    TAKE 1 CAPSULE BY MOUTH  TWICE DAILY    OXYBUTYNIN (DITROPAN) 5 MG TAB    TAKE 1 TABLET BY MOUTH ONCE DAILY    POTASSIUM CHLORIDE (K-TAB) 20 MEQ    TAKE 1 TABLET BY MOUTH ONCE DAILY    PRAMIPEXOLE (MIRAPEX) 0.5 MG TABLET    TAKE 1 TABLET BY MOUTH  TWICE DAILY    PRAVASTATIN (PRAVACHOL) 40 MG TABLET    TAKE 1 TABLET BY MOUTH EVERY DAY    TIMOLOL MALEATE 0.5% (TIMOPTIC) 0.5 % DROP    Place 1 drop into the left eye once daily.     UNABLE TO FIND    Take 1 capsule by mouth 3 (three) times daily with meals. medication name: Golo Release Diet Capsules    VITAMIN D (VITAMIN D3) 1000 UNITS TAB    Take 1,000 Units by mouth once daily.    WARFARIN (COUMADIN) 6 MG TABLET    TAKE 6 MG DAILY EXCEPT FOR 9 MG ON FRIDAY TO THIN BLOOD   Modified Medications    Modified Medication Previous Medication    AMITRIPTYLINE (ELAVIL) 25 MG TABLET amitriptyline (ELAVIL) 25 MG tablet       Take 1 tablet (25 mg total) by mouth every evening.    Take 1 tablet (25 mg total) by mouth every evening.   Discontinued Medications    No medications on file

## 2023-08-28 NOTE — PROGRESS NOTES
"  Occupational Therapy Daily Treatment Note     Name: Mis Ca  Clinic Number: [id number]    Therapy Diagnosis:   Encounter Diagnoses   Name Primary?    Muscle weakness Yes    Stiffness of left wrist joint     Stiffness of finger joint, left        Physician: Skyler Oliva Jr., *    Visit Date: 8/29/2023  Physician: Skyler Oliva Jr., *     Physician Orders: Eval and Treat  Medical Diagnosis: S66.819D (ICD-10-CM) - Rupture of extensor tendon of hand, unspecified laterality, subsequent encounter   Surgical Procedure and Date: REPAIR, TENDON, EXTENSOR Left Regional Left Index Digit REMOVAL,ORTHOPEDIC HARDWARE,UPPER EXTREMITY Left Regional , 6/30/2023  Date of Injury: about 6 months ago  Evaluation Date: 7/26/2023  Insurance Authorization Period Expiration: 09/20/2023   Plan of Care Certification Period: 10/13/2023  Date of Return to MD: n/a  FOTO: 2/3    Visit # / Visits authorized:  10 / 23  Time In: 10:00 am  Time Out: 10:45 am  Total Billable Time: 45 min      Precautions:  Standard, Fall, and Weightbearing    Subjective     Pt reports: "I'm going to the doctor after this appointment today."  she was compliant with home exercise program given last session.   Response to previous treatment:increase in ROM  Functional change: consistent symptoms    Pain: 0/10  Location: left wrist    Objective   Observation/Appearance: patient arrives in prefab wrist brace, scars are well healed with no SOI, tenderness to touch at the scars (more so dorsaly than volarly), edema at the wrist crease, she also has a dimple due to scar tissue on the dorsal forearm     Edema. Measured in centimeters.    7/26/2023 7/26/2023 8/7/2023       right Left  Left     Wrist Crease 16.5 19 cm 18.5     Distal Palmar Crease 19 cm 19.5 cm  19.5           Elbow and Wrist ROM. Measured in degrees.    7/26/2023 7/26/2023 8/14/2023 8/29/2023     Left Right Left   Left   Supination/Pronation WNL WNL  WNL WNL    Wrist Ext/Flex 40/25 65/65 " 55/40  65/42    Wrist RD/UD 25/30 35/45  NM  30/43      Hand ROM. Measured in degrees.    7/26/2023 7/26/2023 8/2/2023 8/7/2023 8/14/2023 8/24/2023     Left Right Left   Left Left Left                 Index: MP  45 90  70  70 70 75              PIP     50 85  75  80 90 90              DIP 40 40  60  85 85 85     MOSS: 135           Long:  MP 55 90 85  85  90 90              PIP 65 65 80   80 85 85              DIP 30 40  50 50  50 50     MOSS: 150           Ring:   MP 60 90 90   90 95 95              PIP 60 80 65   70 85 80              DIP 35 40  55  55 55 55     MOSS: 155           Small:  MP 65 90                     PIP 75 80                     DIP 45 65           MOSS: 185           Thumb: MP 45 60                      IP 35 60             Rad ABD NM NM             Pal ABD NM NM                Opposition Base of SF Base of SF             Strength (Dynamometer) and Pinch Strength (Pinch Gauge)  Measured in pounds.  7/26/2023: deferred due to precxns     7/26/2023 7/26/2023 8/14/2023 8/14/2023 8/24/2023     Left Right  L  R Left   Rung II NM NM  38  45 41   Neal Pinch NM NM  12 12  12   3pt Pinch NM NM NM  NM 12      Sensation:  Not formally tested   -some numbness in distal LIF        CMS Impairment/Limitation/Restriction for FOTO hand Survey     Therapist reviewed FOTO scores for Mis Ca on 7/26/2023.   FOTO documents entered into [employer] - see Media section.     Limitation Score: 56%  8/14/2023: 35%            Treatment      Mis received the following supervised modalities after being cleared for contradictions for 10 minutes:   -Patient tolerates MHP x 10 min in order to decrease pain and increase soft tissue extensibility in preparation for ROM, concurrent with assessment of deficits      Mis received the following manual therapy techniques for 15 minutes:   -I provide gentle STM to L volar and dorsal forearm in order to decrease edema, increase soft tissue extensibility, decrease pain, and  tenderness/hypersensitivity in the stated area, and promote scar tissue remodeling.  -concurrent with therapist A digit ROM and anterior/posterior joint mobdion Abebe received therapeutic exercises for 15 minutes including:  - Therapist A PROM intrinsic stretch and full flexion  - reverse/PIP/DIP jt blocking  - TGEs with adduction squeeze  - Wrist ROM 1 #     Mis participated in dynamic functional therapeutic activities : x 15 min  -finger<>palm translation with small poms  -red clothespin and small poms  -RB extension over jar      Home Exercises and Education Provided     Education provided:   - HEP in place  - Progress towards goals     Written Home Exercises Provided: Patient instructed to cont prior HEP.  Exercises were reviewed and Mis was able to demonstrate them prior to the end of the session.  Mis demonstrated good  understanding of the HEP provided.   .   See EMR under Patient Instructions for exercises provided 8/2/2023.        Assessment   Upon assessment, patient demos an overall increase in L wrist/digit AROM, however she is still limited in full A/PROM of all of the digits due to a hx of arthritis and multiple sx. Also, she states that she only has pain when she overworks the hand at home. She has been using her L hand frequently to perform all B/IADLs, and doesn't note any true functional limitations. She has yet to perform any crocheting activities due to being busy. Patient tolerates P/AA/AROM exs to per protocol to increase functional and available ROM of L wrist/digits.  Patient demos increase in ROM post exs and reports slight pulling with PROM. She also demos some tightness in the forearm, so STM provided to decrease tightness in this area. She also tolerates an increase in all strengthening activities today without any increase in pain - also demos good activity tolerance with high repetitions. Also performs well with FMC skills. Today, she returns to the MD to discuss POC and  current status.Continue POC and progress as tolerated per protocol.      Mis is progressing well towards her goals and there are no updates to goals at this time. Pt prognosis is Fair.     Pt will continue to benefit from skilled outpatient occupational therapy to address the deficits listed in the problem list on initial evaluation provide pt/family education and to maximize pt's level of independence in the home and community environment.     Anticipated barriers to occupational therapy: multiple sx, age     Pt's spiritual, cultural and educational needs considered and pt agreeable to plan of care and goals.    Goals:  The following goals were discussed with the patient and patient is in agreement with them as to be addressed in the treatment plan.   Short term Goals:  1) Initiate HEP Met, 8/29/2023  2) Pt will increase AROM of R wrist by 5-10 degrees in order to assist with angelita activities by 4 weeks. Met, 8/29/2023  3) Pt will reduce edema by .5-1 cm in affected wrist by 4 weeks.  4) Pt will reduce pain to less than 4/10 by 4 weeks.Met, 8/29/2023  5) Pt will increase functional  strength by 5# in order to A in opening containers for med management or home management tasks by 4 weeks. Nearly met 8/29/2023, continue progressing   6) Patient will be able to achieve less than or equal to 45% on the FOTO, demonstrating overall improved functional ability with upper extremity. (Self-care category) Met, 8/29/2023     Long Term Goals:  Goals to be met by discharge:  1) Independent with HEP  2) Pt will increase L digit MOSS to at least 190 degrees in each digit degrees in order to increase functional use for grasp with home management or work related tasks by d/c. Met, 8/29/2023  3) Pt will decrease edema to trace or none to increase functional ROM by d/c.   4) Pt will decrease pain to trace or none while completing light home management tasks or work related tasks by d/c. Met, 8/29/2023  5) Patient will be  able to achieve less than or equal to 30% on the FOTO, demonstrating overall improved functional ability with upper extremity.  (Self-care category)       Plan   Continue skilled OT POC and progress per protocol as tolerated.   Updates/Grading for next session: continue P/AROM of the digits and wrist       Lesley Miranda, OT

## 2023-08-29 ENCOUNTER — OFFICE VISIT (OUTPATIENT)
Dept: ORTHOPEDICS | Facility: CLINIC | Age: 83
End: 2023-08-29
Payer: MEDICARE

## 2023-08-29 ENCOUNTER — CLINICAL SUPPORT (OUTPATIENT)
Dept: REHABILITATION | Facility: HOSPITAL | Age: 83
End: 2023-08-29
Payer: MEDICARE

## 2023-08-29 VITALS — WEIGHT: 225 LBS | HEIGHT: 62 IN | BODY MASS INDEX: 41.41 KG/M2

## 2023-08-29 DIAGNOSIS — M62.81 MUSCLE WEAKNESS: Primary | ICD-10-CM

## 2023-08-29 DIAGNOSIS — M25.632 STIFFNESS OF LEFT WRIST JOINT: ICD-10-CM

## 2023-08-29 DIAGNOSIS — M25.642 STIFFNESS OF FINGER JOINT, LEFT: ICD-10-CM

## 2023-08-29 DIAGNOSIS — S66.819D: Primary | ICD-10-CM

## 2023-08-29 PROCEDURE — 99024 PR POST-OP FOLLOW-UP VISIT: ICD-10-PCS | Mod: S$GLB,,, | Performed by: PHYSICIAN ASSISTANT

## 2023-08-29 PROCEDURE — 1160F RVW MEDS BY RX/DR IN RCRD: CPT | Mod: CPTII,S$GLB,, | Performed by: PHYSICIAN ASSISTANT

## 2023-08-29 PROCEDURE — 1126F AMNT PAIN NOTED NONE PRSNT: CPT | Mod: CPTII,S$GLB,, | Performed by: PHYSICIAN ASSISTANT

## 2023-08-29 PROCEDURE — 1101F PR PT FALLS ASSESS DOC 0-1 FALLS W/OUT INJ PAST YR: ICD-10-PCS | Mod: CPTII,S$GLB,, | Performed by: PHYSICIAN ASSISTANT

## 2023-08-29 PROCEDURE — 1159F PR MEDICATION LIST DOCUMENTED IN MEDICAL RECORD: ICD-10-PCS | Mod: CPTII,S$GLB,, | Performed by: PHYSICIAN ASSISTANT

## 2023-08-29 PROCEDURE — 1101F PT FALLS ASSESS-DOCD LE1/YR: CPT | Mod: CPTII,S$GLB,, | Performed by: PHYSICIAN ASSISTANT

## 2023-08-29 PROCEDURE — 99999 PR PBB SHADOW E&M-EST. PATIENT-LVL III: CPT | Mod: PBBFAC,,, | Performed by: PHYSICIAN ASSISTANT

## 2023-08-29 PROCEDURE — 1159F MED LIST DOCD IN RCRD: CPT | Mod: CPTII,S$GLB,, | Performed by: PHYSICIAN ASSISTANT

## 2023-08-29 PROCEDURE — 97530 THERAPEUTIC ACTIVITIES: CPT | Mod: PN

## 2023-08-29 PROCEDURE — 3288F PR FALLS RISK ASSESSMENT DOCUMENTED: ICD-10-PCS | Mod: CPTII,S$GLB,, | Performed by: PHYSICIAN ASSISTANT

## 2023-08-29 PROCEDURE — 1126F PR PAIN SEVERITY QUANTIFIED, NO PAIN PRESENT: ICD-10-PCS | Mod: CPTII,S$GLB,, | Performed by: PHYSICIAN ASSISTANT

## 2023-08-29 PROCEDURE — 99999 PR PBB SHADOW E&M-EST. PATIENT-LVL III: ICD-10-PCS | Mod: PBBFAC,,, | Performed by: PHYSICIAN ASSISTANT

## 2023-08-29 PROCEDURE — 1160F PR REVIEW ALL MEDS BY PRESCRIBER/CLIN PHARMACIST DOCUMENTED: ICD-10-PCS | Mod: CPTII,S$GLB,, | Performed by: PHYSICIAN ASSISTANT

## 2023-08-29 PROCEDURE — 99024 POSTOP FOLLOW-UP VISIT: CPT | Mod: S$GLB,,, | Performed by: PHYSICIAN ASSISTANT

## 2023-08-29 PROCEDURE — 3288F FALL RISK ASSESSMENT DOCD: CPT | Mod: CPTII,S$GLB,, | Performed by: PHYSICIAN ASSISTANT

## 2023-08-29 PROCEDURE — 97140 MANUAL THERAPY 1/> REGIONS: CPT | Mod: PN

## 2023-08-29 PROCEDURE — 97110 THERAPEUTIC EXERCISES: CPT | Mod: PN

## 2023-08-29 RX ORDER — GABAPENTIN 300 MG/1
CAPSULE ORAL
Qty: 360 CAPSULE | Refills: 3 | Status: SHIPPED | OUTPATIENT
Start: 2023-08-29 | End: 2023-10-09

## 2023-08-30 NOTE — PROGRESS NOTES
"  Occupational Therapy Daily Treatment Note     Name: Mis Ca  Clinic Number: [id number]    Therapy Diagnosis:   Encounter Diagnoses   Name Primary?    Muscle weakness Yes    Stiffness of left wrist joint     Stiffness of finger joint, left      Physician: Skyler Oliva Jr., *    Visit Date: 8/31/2023  Physician: Skyler Oliva Jr., *     Physician Orders: Eval and Treat  Medical Diagnosis: S66.819D (ICD-10-CM) - Rupture of extensor tendon of hand, unspecified laterality, subsequent encounter   Surgical Procedure and Date: REPAIR, TENDON, EXTENSOR Left Regional Left Index Digit REMOVAL,ORTHOPEDIC HARDWARE,UPPER EXTREMITY Left Regional , 6/30/2023  Date of Injury: about 6 months ago  Evaluation Date: 7/26/2023  Insurance Authorization Period Expiration: 09/20/2023   Plan of Care Certification Period: 10/13/2023  Date of Return to MD: n/a  FOTO: 2/3    Visit # / Visits authorized:  11 / 23  Time In: 10:15 am   Time Out: 11:00 am  Total Billable Time: 45 min      Precautions:  Standard, Fall, and Weightbearing    Subjective     Pt reports: "I went to the doctor yesterday and she wants me to continue therapy until I go see her again in October."  she was compliant with home exercise program given last session.   Response to previous treatment:increase in ROM  Functional change: consistent symptoms    Pain: 0/10  Location: left wrist    Objective   Observation/Appearance: patient arrives in prefab wrist brace, scars are well healed with no SOI, tenderness to touch at the scars (more so dorsaly than volarly), edema at the wrist crease, she also has a dimple due to scar tissue on the dorsal forearm     Edema. Measured in centimeters.    7/26/2023 7/26/2023 8/7/2023       right Left  Left     Wrist Crease 16.5 19 cm 18.5     Distal Palmar Crease 19 cm 19.5 cm  19.5           Elbow and Wrist ROM. Measured in degrees.    7/26/2023 7/26/2023 8/14/2023 8/29/2023     Left Right Left   Left   Supination/Pronation " WNL WNL  WNL WNL    Wrist Ext/Flex 40/25 65/65 55/40  65/42    Wrist RD/UD 25/30 35/45  NM  30/43      Hand ROM. Measured in degrees.    7/26/2023 7/26/2023 8/2/2023 8/7/2023 8/14/2023 8/24/2023     Left Right Left   Left Left Left                 Index: MP  45 90  70  70 70 75              PIP     50 85  75  80 90 90              DIP 40 40  60  85 85 85     MOSS: 135           Long:  MP 55 90 85  85  90 90              PIP 65 65 80   80 85 85              DIP 30 40  50 50  50 50     MOSS: 150           Ring:   MP 60 90 90   90 95 95              PIP 60 80 65   70 85 80              DIP 35 40  55  55 55 55     MOSS: 155           Small:  MP 65 90                     PIP 75 80                     DIP 45 65           MOSS: 185           Thumb: MP 45 60                      IP 35 60             Rad ABD NM NM             Pal ABD NM NM                Opposition Base of SF Base of SF             Strength (Dynamometer) and Pinch Strength (Pinch Gauge)  Measured in pounds.  7/26/2023: deferred due to precxns     7/26/2023 7/26/2023 8/14/2023 8/14/2023 8/24/2023     Left Right  L  R Left   Rung II NM NM  38  45 41   Neal Pinch NM NM  12 12  12   3pt Pinch NM NM NM  NM 12      Sensation:  Not formally tested   -some numbness in distal LIF        CMS Impairment/Limitation/Restriction for FOTO hand Survey     Therapist reviewed FOTO scores for Mis Ca on 7/26/2023.   FOTO documents entered into [employer] - see Media section.     Limitation Score: 56%  8/14/2023: 35%            Treatment      Mis received the following supervised modalities after being cleared for contradictions for 10 minutes:   -Patient tolerates paraffin w/ MHP to L hand for 10 min, pre-tx to decrease pain & increase tissue extensibility     Mis received the following manual therapy techniques for 15 minutes:   -I provide gentle STM to L volar and dorsal forearm in order to decrease edema, increase soft tissue extensibility, decrease pain, and  tenderness/hypersensitivity in the stated area, and promote scar tissue remodeling.  -concurrent with therapist A digit ROM and anterior/posterior joint mobdion Abebe received therapeutic exercises for 15 minutes including:  - Therapist A PROM intrinsic stretch and full flexion  - reverse/PIP/DIP jt blocking  - TGEs with adduction squeeze  - Wrist ROM 1 # eccentric motions   - reverse tenodesis with dowel     Mis participated in dynamic functional therapeutic activities : x 15 min  -red clothespin and small poms  -RB extension over jar  -pink tputty    -, extension, spreads, adduction threads/squeeze  -hand master ext and squeeze      Home Exercises and Education Provided     Education provided:   - HEP in place  - Progress towards goals     Written Home Exercises Provided: Patient instructed to cont prior HEP.  Exercises were reviewed and Mis was able to demonstrate them prior to the end of the session.  Mis demonstrated good  understanding of the HEP provided.   .   See EMR under Patient Instructions for exercises provided 8/2/2023.        Assessment   Patient states that she went to the MD earlier in the week and she said she is progressing well. She wants Mis to continue therapy until she returns for a follow up visit in October. Patient tolerated session well today. Emphasis on strengthening t/o tx session, specifically on the extensors These are tolerated well with no c/o of pain or difficulty. She is showing some improvement with active finger lifts from table top, but additional strengthening is recommended to achieve this. The volar scar is dense/tight which may contribute to some of the finger stiffness. Scar mob performed today along with STM to the dorsal forearm to prevent scar adherence. No c/o difficulty or fatigue t/o tx session today. Continue POC and progress as tolerated per protocol.      Mis is progressing well towards her goals and there are no updates to goals at this  time. Pt prognosis is Fair.     Pt will continue to benefit from skilled outpatient occupational therapy to address the deficits listed in the problem list on initial evaluation provide pt/family education and to maximize pt's level of independence in the home and community environment.     Anticipated barriers to occupational therapy: multiple sx, age     Pt's spiritual, cultural and educational needs considered and pt agreeable to plan of care and goals.    Goals:  The following goals were discussed with the patient and patient is in agreement with them as to be addressed in the treatment plan.   Short term Goals:  1) Initiate HEP Met, 8/31/2023  2) Pt will increase AROM of R wrist by 5-10 degrees in order to assist with angelita activities by 4 weeks. Met, 8/31/2023  3) Pt will reduce edema by .5-1 cm in affected wrist by 4 weeks.  4) Pt will reduce pain to less than 4/10 by 4 weeks.Met, 8/31/2023  5) Pt will increase functional  strength by 5# in order to A in opening containers for med management or home management tasks by 4 weeks. Nearly met 8/31/2023, continue progressing   6) Patient will be able to achieve less than or equal to 45% on the FOTO, demonstrating overall improved functional ability with upper extremity. (Self-care category) Met, 8/31/2023     Long Term Goals:  Goals to be met by discharge:  1) Independent with HEP  2) Pt will increase L digit MOSS to at least 190 degrees in each digit degrees in order to increase functional use for grasp with home management or work related tasks by d/c. Met, 8/31/2023  3) Pt will decrease edema to trace or none to increase functional ROM by d/c.   4) Pt will decrease pain to trace or none while completing light home management tasks or work related tasks by d/c. Met, 8/31/2023  5) Patient will be able to achieve less than or equal to 30% on the FOTO, demonstrating overall improved functional ability with upper extremity.  (Self-care category)       Plan    Continue skilled OT POC and progress per protocol as tolerated.   Updates/Grading for next session: continue P/AROM of the digits and wrist       Lesley Miranda, OT

## 2023-08-31 ENCOUNTER — CLINICAL SUPPORT (OUTPATIENT)
Dept: REHABILITATION | Facility: HOSPITAL | Age: 83
End: 2023-08-31
Payer: MEDICARE

## 2023-08-31 ENCOUNTER — LAB VISIT (OUTPATIENT)
Dept: LAB | Facility: HOSPITAL | Age: 83
End: 2023-08-31
Attending: INTERNAL MEDICINE
Payer: MEDICARE

## 2023-08-31 ENCOUNTER — ANTI-COAG VISIT (OUTPATIENT)
Dept: CARDIOLOGY | Facility: CLINIC | Age: 83
End: 2023-08-31
Payer: MEDICARE

## 2023-08-31 DIAGNOSIS — M25.642 STIFFNESS OF FINGER JOINT, LEFT: ICD-10-CM

## 2023-08-31 DIAGNOSIS — Z79.01 LONG TERM (CURRENT) USE OF ANTICOAGULANTS: ICD-10-CM

## 2023-08-31 DIAGNOSIS — M62.81 MUSCLE WEAKNESS: Primary | ICD-10-CM

## 2023-08-31 DIAGNOSIS — I48.0 PAROXYSMAL ATRIAL FIBRILLATION: Primary | Chronic | ICD-10-CM

## 2023-08-31 DIAGNOSIS — I48.0 PAROXYSMAL ATRIAL FIBRILLATION: Chronic | ICD-10-CM

## 2023-08-31 DIAGNOSIS — M25.632 STIFFNESS OF LEFT WRIST JOINT: ICD-10-CM

## 2023-08-31 LAB
INR PPP: 2.7 (ref 0.8–1.2)
PROTHROMBIN TIME: 27.7 SEC (ref 9–12.5)

## 2023-08-31 PROCEDURE — 85610 PROTHROMBIN TIME: CPT | Mod: PO | Performed by: INTERNAL MEDICINE

## 2023-08-31 PROCEDURE — 97110 THERAPEUTIC EXERCISES: CPT | Mod: PN

## 2023-08-31 PROCEDURE — 97140 MANUAL THERAPY 1/> REGIONS: CPT | Mod: PN

## 2023-08-31 PROCEDURE — 97530 THERAPEUTIC ACTIVITIES: CPT | Mod: PN

## 2023-08-31 PROCEDURE — 93793 ANTICOAG MGMT PT WARFARIN: CPT | Mod: S$GLB,,, | Performed by: PHARMACIST

## 2023-08-31 PROCEDURE — 36415 COLL VENOUS BLD VENIPUNCTURE: CPT | Mod: PO | Performed by: INTERNAL MEDICINE

## 2023-08-31 PROCEDURE — 93793 PR ANTICOAGULANT MGMT FOR PT TAKING WARFARIN: ICD-10-PCS | Mod: S$GLB,,, | Performed by: PHARMACIST

## 2023-09-02 RX ORDER — ALENDRONATE SODIUM 70 MG/1
70 TABLET ORAL
Qty: 12 TABLET | Refills: 3 | Status: SHIPPED | OUTPATIENT
Start: 2023-09-02 | End: 2024-09-01

## 2023-09-04 RX ORDER — WARFARIN 6 MG/1
TABLET ORAL
Qty: 96 TABLET | Refills: 4 | Status: SHIPPED | OUTPATIENT
Start: 2023-09-04

## 2023-09-07 NOTE — PROGRESS NOTES
"  Occupational Therapy Daily Treatment Note     Name: Mis Ca  [facility] Number: [id number]    Therapy Diagnosis:   Encounter Diagnoses   Name Primary?    Muscle weakness Yes    Stiffness of left wrist joint     Stiffness of finger joint, left        Physician: Skyler Oliva Jr., *    Visit Date: 9/8/2023  Physician: Skyler Oliva Jr., *     Physician Orders: Eval and Treat  Medical Diagnosis: S66.819D (ICD-10-CM) - Rupture of extensor tendon of hand, unspecified laterality, subsequent encounter   Surgical Procedure and Date: REPAIR, TENDON, EXTENSOR Left Regional Left Index Digit REMOVAL,ORTHOPEDIC HARDWARE,UPPER EXTREMITY Left Regional , 6/30/2023  Date of Injury: about 6 months ago  Evaluation Date: 7/26/2023  Insurance Authorization Period Expiration: 09/20/2023   Plan of Care Certification Period: 10/13/2023  Date of Return to MD: n/a  FOTO: 2/3    Visit # / Visits authorized:  11 / 23  Time In: 10:00 am   Time Out: 10:43 am  Total Billable Time: 43 min      Precautions:  Standard, Fall, and Weightbearing    Subjective     Pt reports: "Its still a little stiff and I have a hard time ext the fingers off the table."  she was compliant with home exercise program given last session.   Response to previous treatment:increase in ROM  Functional change: consistent symptoms    Pain: 0/10  Location: left wrist    Objective   Observation/Appearance: patient arrives in prefab wrist brace, scars are well healed with no SOI, tenderness to touch at the scars (more so dorsaly than volarly), edema at the wrist crease, she also has a dimple due to scar tissue on the dorsal forearm     Edema. Measured in centimeters.    7/26/2023 7/26/2023 8/7/2023       right Left  Left     Wrist Crease 16.5 19 cm 18.5     Distal Palmar Crease 19 cm 19.5 cm  19.5           Elbow and Wrist ROM. Measured in degrees.    7/26/2023 7/26/2023 8/14/2023 8/29/2023     Left Right Left   Left   Supination/Pronation WNL WNL  WNL WNL    Wrist " Ext/Flex 40/25 65/65 55/40  65/42    Wrist RD/UD 25/30 35/45  NM  30/43      Hand ROM. Measured in degrees.    7/26/2023 7/26/2023 8/2/2023 8/7/2023 8/14/2023 8/24/2023     Left Right Left   Left Left Left                 Index: MP  45 90  70  70 70 75              PIP     50 85  75  80 90 90              DIP 40 40  60  85 85 85     MOSS: 135           Long:  MP 55 90 85  85  90 90              PIP 65 65 80   80 85 85              DIP 30 40  50 50  50 50     MOSS: 150           Ring:   MP 60 90 90   90 95 95              PIP 60 80 65   70 85 80              DIP 35 40  55  55 55 55     MOSS: 155           Small:  MP 65 90                     PIP 75 80                     DIP 45 65           MOSS: 185           Thumb: MP 45 60                      IP 35 60             Rad ABD NM NM             Pal ABD NM NM                Opposition Base of SF Base of SF             Strength (Dynamometer) and Pinch Strength (Pinch Gauge)  Measured in pounds.  7/26/2023: deferred due to precxns     7/26/2023 7/26/2023 8/14/2023 8/14/2023 8/24/2023     Left Right  L  R Left   Rung II NM NM  38  45 41   Neal Pinch NM NM  12 12  12   3pt Pinch NM NM NM  NM 12      Sensation:  Not formally tested   -some numbness in distal LIF        CMS Impairment/Limitation/Restriction for FOTO hand Survey     Therapist reviewed FOTO scores for Mis aC on 7/26/2023.   FOTO documents entered into [employer] - see Media section.     Limitation Score: 56%  8/14/2023: 35%            Treatment      Mis received the following supervised modalities after being cleared for contradictions for 8 minutes:   -Patient tolerates paraffin w/ MHP to L hand for 10 min, pre-tx to decrease pain & increase tissue extensibility     Mis received the following manual therapy techniques for 10 minutes:   -I provide gentle STM to L volar and dorsal forearm in order to decrease edema, increase soft tissue extensibility, decrease pain, and tenderness/hypersensitivity  in the stated area, and promote scar tissue remodeling.  -concurrent with therapist A digit ROM and anterior/posterior joint evita Abebe received therapeutic exercises for 10 minutes including:  - Therapist A PROM intrinsic stretch and full flexion  - reverse/PIP/DIP jt blocking  - TGEs with adduction squeeze  - Wrist ROM 1 # eccentric motions   - reverse tenodesis with dowel     Mis participated in dynamic functional therapeutic activities : x 15 min  -red clothespin and small poms  -RB extension over jar  -pink tputty    -, extension, spreads, adduction threads/squeeze  -hand master ext and squeeze  -Wrist dextraciser 3 min     Home Exercises and Education Provided     Education provided:   - HEP in place  - Progress towards goals     Written Home Exercises Provided: Patient instructed to cont prior HEP.  Exercises were reviewed and Mis was able to demonstrate them prior to the end of the session.  Mis demonstrated good  understanding of the HEP provided.   .   See EMR under Patient Instructions for exercises provided 8/2/2023.        Assessment   Patient states she does not have many limitations at home. She did very well with established exercises and some added today. Still having some restrictions with ext of IF and LF specifically isolating each due to scar tissue adherence. She is getting better with this. She would benefit from skilled services with focus on continued MT for scar work and strengthening of the L UE.     Mis is progressing well towards her goals and there are no updates to goals at this time. Pt prognosis is Fair.     Pt will continue to benefit from skilled outpatient occupational therapy to address the deficits listed in the problem list on initial evaluation provide pt/family education and to maximize pt's level of independence in the home and community environment.     Anticipated barriers to occupational therapy: multiple sx, age     Pt's spiritual, cultural and  educational needs considered and pt agreeable to plan of care and goals.    Goals:  The following goals were discussed with the patient and patient is in agreement with them as to be addressed in the treatment plan.   Short term Goals:  1) Initiate HEP Met, 9/8/2023  2) Pt will increase AROM of R wrist by 5-10 degrees in order to assist with angelita activities by 4 weeks. Met, 9/8/2023  3) Pt will reduce edema by .5-1 cm in affected wrist by 4 weeks.  4) Pt will reduce pain to less than 4/10 by 4 weeks.Met, 9/8/2023  5) Pt will increase functional  strength by 5# in order to A in opening containers for med management or home management tasks by 4 weeks. Nearly met 9/8/2023, continue progressing   6) Patient will be able to achieve less than or equal to 45% on the FOTO, demonstrating overall improved functional ability with upper extremity. (Self-care category) Met, 9/8/2023     Long Term Goals:  Goals to be met by discharge:  1) Independent with HEP  2) Pt will increase L digit MOSS to at least 190 degrees in each digit degrees in order to increase functional use for grasp with home management or work related tasks by d/c. Met, 9/8/2023  3) Pt will decrease edema to trace or none to increase functional ROM by d/c.   4) Pt will decrease pain to trace or none while completing light home management tasks or work related tasks by d/c. Met, 9/8/2023  5) Patient will be able to achieve less than or equal to 30% on the FOTO, demonstrating overall improved functional ability with upper extremity.  (Self-care category)       Plan   Continue skilled OT POC and progress per protocol as tolerated.   Updates/Grading for next session: continue P/AROM of the digits and wrist       Jose D Helms, OT

## 2023-09-08 ENCOUNTER — CLINICAL SUPPORT (OUTPATIENT)
Dept: REHABILITATION | Facility: HOSPITAL | Age: 83
End: 2023-09-08
Payer: MEDICARE

## 2023-09-08 DIAGNOSIS — M62.81 MUSCLE WEAKNESS: Primary | ICD-10-CM

## 2023-09-08 DIAGNOSIS — M25.632 STIFFNESS OF LEFT WRIST JOINT: ICD-10-CM

## 2023-09-08 DIAGNOSIS — M25.642 STIFFNESS OF FINGER JOINT, LEFT: ICD-10-CM

## 2023-09-08 PROCEDURE — 97140 MANUAL THERAPY 1/> REGIONS: CPT | Mod: PN

## 2023-09-08 PROCEDURE — 97018 PARAFFIN BATH THERAPY: CPT | Mod: PN

## 2023-09-08 PROCEDURE — 97530 THERAPEUTIC ACTIVITIES: CPT | Mod: PN

## 2023-09-12 NOTE — PROGRESS NOTES
"  Occupational Therapy Daily Treatment Note     Name: Mis Ca  Clinic Number: [id number]    Therapy Diagnosis:   Encounter Diagnoses   Name Primary?    Muscle weakness Yes    Stiffness of left wrist joint     Stiffness of finger joint, left      Physician: Skyler Oliva Jr., *    Visit Date: 9/13/2023  Physician: Skyler Oliva Jr., *     Physician Orders: Eval and Treat  Medical Diagnosis: S66.819D (ICD-10-CM) - Rupture of extensor tendon of hand, unspecified laterality, subsequent encounter   Surgical Procedure and Date: REPAIR, TENDON, EXTENSOR Left Regional Left Index Digit REMOVAL,ORTHOPEDIC HARDWARE,UPPER EXTREMITY Left Regional , 6/30/2023  Date of Injury: about 6 months ago  Evaluation Date: 7/26/2023  Insurance Authorization Period Expiration: 09/20/2023   Plan of Care Certification Period: 10/13/2023  Date of Return to MD: n/a  FOTO: 2/3    Visit # / Visits authorized:  12 / 23  Time In: 10:00 am  Time Out: 10:45 am  Total Billable Time: 45 min      Precautions:  Standard, Fall, and Weightbearing    Subjective     Pt reports: "I actually sewed a little this weekend, the only problem I had was my ."  she was compliant with home exercise program given last session.   Response to previous treatment:increase in ROM  Functional change: consistent symptoms    Pain: 0/10  Location: left wrist    Objective   Observation/Appearance: patient arrives in prefab wrist brace, scars are well healed with no SOI, tenderness to touch at the scars (more so dorsaly than volarly), edema at the wrist crease, she also has a dimple due to scar tissue on the dorsal forearm     Edema. Measured in centimeters.    7/26/2023 7/26/2023 8/7/2023       right Left  Left     Wrist Crease 16.5 19 cm 18.5     Distal Palmar Crease 19 cm 19.5 cm  19.5           Elbow and Wrist ROM. Measured in degrees.    7/26/2023 7/26/2023 8/14/2023 8/29/2023     Left Right Left   Left   Supination/Pronation WNL WNL  WNL WNL    Wrist " Ext/Flex 40/25 65/65 55/40  65/42    Wrist RD/UD 25/30 35/45  NM  30/43      Hand ROM. Measured in degrees.    7/26/2023 7/26/2023 8/2/2023 8/7/2023 8/14/2023 8/24/2023     Left Right Left   Left Left Left                 Index: MP  45 90  70  70 70 75              PIP     50 85  75  80 90 90              DIP 40 40  60  85 85 85     MOSS: 135           Long:  MP 55 90 85  85  90 90              PIP 65 65 80   80 85 85              DIP 30 40  50 50  50 50     MOSS: 150           Ring:   MP 60 90 90   90 95 95              PIP 60 80 65   70 85 80              DIP 35 40  55  55 55 55     MOSS: 155           Small:  MP 65 90                     PIP 75 80                     DIP 45 65           MOSS: 185           Thumb: MP 45 60                      IP 35 60             Rad ABD NM NM             Pal ABD NM NM                Opposition Base of SF Base of SF             Strength (Dynamometer) and Pinch Strength (Pinch Gauge)  Measured in pounds.  7/26/2023: deferred due to precxns     7/26/2023 7/26/2023 8/14/2023 8/14/2023 8/24/2023     Left Right  L  R Left   Rung II NM NM  38  45 41   Neal Pinch NM NM  12 12  12   3pt Pinch NM NM NM  NM 12      Sensation:  Not formally tested   -some numbness in distal LIF        CMS Impairment/Limitation/Restriction for FOTO hand Survey     Therapist reviewed FOTO scores for Mis Ca on 7/26/2023.   FOTO documents entered into [employer] - see Media section.     Limitation Score: 56%  8/14/2023: 35%            Treatment      Mis received the following supervised modalities after being cleared for contradictions for 8 minutes:   -Patient tolerates paraffin w/ MHP to L hand for 10 min, pre-tx to decrease pain & increase tissue extensibility     Mis received the following manual therapy techniques for 15 minutes:   -I provide gentle scar mob to decrease scar tenderness, adherence, and hypersensitivity.     -dycem to scar    Mis received therapeutic exercises for 15  minutes including:  - Therapist A PROM intrinsic stretch and full flexion  - TGEs with adduction squeeze  - Wrist ROM 1 # eccentric motions   - intrinisic claw fist   - isometric finger lifts     Mis participated in dynamic functional therapeutic activities : x 15 min  -red clothespin and small poms  -RB extension over jar  -Wrist dextraciser 3 min   -large gripper and small poms     Home Exercises and Education Provided     Education provided:   - HEP in place  - Progress towards goals     Written Home Exercises Provided: Patient instructed to cont prior HEP.  Exercises were reviewed and Mis was able to demonstrate them prior to the end of the session.  Mis demonstrated good  understanding of the HEP provided.   .   See EMR under Patient Instructions for exercises provided 8/2/2023.        Assessment   Patient states that she was able to sew over the weekend, but the only thing she was lacking was full  and pinch strengthening. She also noted that her hand got a little fatigued with prolonged use of the hand. Continued focus today on scar mobility and strengthening of the L hand. I provide patient with dycem to continue scar mobility management at home. She performs all exs well with some addition in resistance to established exs. She also is doing well with [facility] [place] and in hand manipulation skills. Notes some mild fatigue following progressive strengthening today, but no true pain. Continue skilled OT POC and progress per protocol as tolerated.      Mis is progressing well towards her goals and there are no updates to goals at this time. Pt prognosis is Fair.     Pt will continue to benefit from skilled outpatient occupational therapy to address the deficits listed in the problem list on initial evaluation provide pt/family education and to maximize pt's level of independence in the home and community environment.     Anticipated barriers to occupational therapy: multiple sx, age     Pt's  spiritual, cultural and educational needs considered and pt agreeable to plan of care and goals.    Goals:  The following goals were discussed with the patient and patient is in agreement with them as to be addressed in the treatment plan.   Short term Goals:  1) Initiate HEP Met, 9/13/2023  2) Pt will increase AROM of R wrist by 5-10 degrees in order to assist with angelita activities by 4 weeks. Met, 9/13/2023  3) Pt will reduce edema by .5-1 cm in affected wrist by 4 weeks.  4) Pt will reduce pain to less than 4/10 by 4 weeks.Met, 9/13/2023  5) Pt will increase functional  strength by 5# in order to A in opening containers for med management or home management tasks by 4 weeks. Nearly met 9/13/2023, continue progressing   6) Patient will be able to achieve less than or equal to 45% on the FOTO, demonstrating overall improved functional ability with upper extremity. (Self-care category) Met, 9/13/2023     Long Term Goals:  Goals to be met by discharge:  1) Independent with HEP  2) Pt will increase L digit MOSS to at least 190 degrees in each digit degrees in order to increase functional use for grasp with home management or work related tasks by d/c. Met, 9/13/2023  3) Pt will decrease edema to trace or none to increase functional ROM by d/c.   4) Pt will decrease pain to trace or none while completing light home management tasks or work related tasks by d/c. Met, 9/13/2023  5) Patient will be able to achieve less than or equal to 30% on the FOTO, demonstrating overall improved functional ability with upper extremity.  (Self-care category)       Plan   Continue skilled OT POC and progress per protocol as tolerated.   Updates/Grading for next session: continue P/AROM of the digits and wrist       Lesley Miranda, OT

## 2023-09-13 ENCOUNTER — CLINICAL SUPPORT (OUTPATIENT)
Dept: REHABILITATION | Facility: HOSPITAL | Age: 83
End: 2023-09-13
Payer: MEDICARE

## 2023-09-13 DIAGNOSIS — M25.632 STIFFNESS OF LEFT WRIST JOINT: ICD-10-CM

## 2023-09-13 DIAGNOSIS — M25.642 STIFFNESS OF FINGER JOINT, LEFT: ICD-10-CM

## 2023-09-13 DIAGNOSIS — M62.81 MUSCLE WEAKNESS: Primary | ICD-10-CM

## 2023-09-13 PROCEDURE — 97140 MANUAL THERAPY 1/> REGIONS: CPT | Mod: PN

## 2023-09-13 PROCEDURE — 97530 THERAPEUTIC ACTIVITIES: CPT | Mod: PN

## 2023-09-13 PROCEDURE — 97110 THERAPEUTIC EXERCISES: CPT | Mod: PN

## 2023-09-13 RX ORDER — OMEPRAZOLE 20 MG/1
20 CAPSULE, DELAYED RELEASE ORAL 2 TIMES DAILY
Qty: 180 CAPSULE | Refills: 1 | Status: SHIPPED | OUTPATIENT
Start: 2023-09-13 | End: 2024-02-27

## 2023-09-14 ENCOUNTER — LAB VISIT (OUTPATIENT)
Dept: LAB | Facility: HOSPITAL | Age: 83
End: 2023-09-14
Attending: INTERNAL MEDICINE
Payer: MEDICARE

## 2023-09-14 ENCOUNTER — ANTI-COAG VISIT (OUTPATIENT)
Dept: CARDIOLOGY | Facility: CLINIC | Age: 83
End: 2023-09-14
Payer: MEDICARE

## 2023-09-14 DIAGNOSIS — Z79.01 LONG TERM (CURRENT) USE OF ANTICOAGULANTS: ICD-10-CM

## 2023-09-14 DIAGNOSIS — I48.0 PAROXYSMAL ATRIAL FIBRILLATION: Primary | Chronic | ICD-10-CM

## 2023-09-14 DIAGNOSIS — I48.0 PAROXYSMAL ATRIAL FIBRILLATION: Chronic | ICD-10-CM

## 2023-09-14 LAB
INR PPP: 2.3 (ref 0.8–1.2)
PROTHROMBIN TIME: 24.1 SEC (ref 9–12.5)

## 2023-09-14 PROCEDURE — 85610 PROTHROMBIN TIME: CPT | Mod: PO | Performed by: INTERNAL MEDICINE

## 2023-09-14 PROCEDURE — 36415 COLL VENOUS BLD VENIPUNCTURE: CPT | Mod: PO | Performed by: INTERNAL MEDICINE

## 2023-09-14 PROCEDURE — 93793 ANTICOAG MGMT PT WARFARIN: CPT | Mod: S$GLB,,, | Performed by: PHARMACIST

## 2023-09-14 PROCEDURE — 93793 PR ANTICOAGULANT MGMT FOR PT TAKING WARFARIN: ICD-10-PCS | Mod: S$GLB,,, | Performed by: PHARMACIST

## 2023-09-14 NOTE — TELEPHONE ENCOUNTER
Refill Decision Note   Mis Ca  is requesting a refill authorization.  Brief Assessment and Rationale for Refill:  Approve     Medication Therapy Plan:         Comments:     Note composed:11:28 PM 09/13/2023             Appointments     Last Visit   4/6/2023 Scott Dillard MD   Next Visit   10/9/2023 Scott Dillard MD

## 2023-09-14 NOTE — TELEPHONE ENCOUNTER
No care due was identified.  [facility] Embedded Care Due Messages. Reference number: [id number].   9/13/2023 10:22:13 PM CDT

## 2023-09-14 NOTE — PROGRESS NOTES
[facility]    2023 1:09 PM    Assessment/Plan:    Patient presents today with therapeutic INR.    Assessment of patient findings and chart review: INR at goal. Medications and chart reviewed. No changes noted to necessitate adjustment of warfarin or follow-up plan. See calendar.      Recommendation for patient's warfarin regimen: Continue current maintenance dose    Recommend repeat INR in 3 weeks  _________________________________________________________________    Mis L Lanny (82 y.o.) is followed by the [facility].    Anticoagulation Summary  As of 2023      INR goal:  2.0-3.0   TTR:  72.3 % (10.4 mo)   INR used for dosin.3 (2023)   Warfarin maintenance plan:  3 mg (6 mg x 0.5) every Mon, Wed, Fri; 6 mg (6 mg x 1) all other days   Weekly warfarin total:  33 mg   Plan last modified:  Tino Mendiola, PharmD (2023)   Next INR check:  10/5/2023   Target end date:      Indications    Paroxysmal atrial fibrillation [I48.0]  Long term (current) use of anticoagulants [Z79.01]                 Anticoagulation Episode Summary       INR check location:      Preferred lab:      Send INR reminders to:  [facility] COUMADIN MONITORING POOL    Comments:  [facility]          Anticoagulation Care Providers       Provider Role Specialty Phone number    Shamar Owens MD Responsible Electrophysiology [phone]

## 2023-09-18 ENCOUNTER — CLINICAL SUPPORT (OUTPATIENT)
Dept: REHABILITATION | Facility: HOSPITAL | Age: 83
End: 2023-09-18
Payer: MEDICARE

## 2023-09-18 DIAGNOSIS — M25.632 STIFFNESS OF LEFT WRIST JOINT: ICD-10-CM

## 2023-09-18 DIAGNOSIS — M25.642 STIFFNESS OF FINGER JOINT, LEFT: ICD-10-CM

## 2023-09-18 DIAGNOSIS — M62.81 MUSCLE WEAKNESS: Primary | ICD-10-CM

## 2023-09-18 PROCEDURE — 97140 MANUAL THERAPY 1/> REGIONS: CPT | Mod: PN

## 2023-09-18 PROCEDURE — 97530 THERAPEUTIC ACTIVITIES: CPT | Mod: PN

## 2023-09-18 PROCEDURE — 97110 THERAPEUTIC EXERCISES: CPT | Mod: PN

## 2023-09-18 NOTE — PROGRESS NOTES
"  Occupational Therapy Daily Treatment Note     Name: Mis Ca  Clinic Number: [id number]    Therapy Diagnosis:   Encounter Diagnoses   Name Primary?    Muscle weakness Yes    Stiffness of left wrist joint     Stiffness of finger joint, left      Physician: Skyler Oliva Jr., *    Visit Date: 9/18/2023  Physician: Skyler Oliva Jr., *     Physician Orders: Eval and Treat  Medical Diagnosis: S66.819D (ICD-10-CM) - Rupture of extensor tendon of hand, unspecified laterality, subsequent encounter   Surgical Procedure and Date: REPAIR, TENDON, EXTENSOR Left Regional Left Index Digit REMOVAL,ORTHOPEDIC HARDWARE,UPPER EXTREMITY Left Regional , 6/30/2023  Date of Injury: about 6 months ago  Evaluation Date: 7/26/2023  Insurance Authorization Period Expiration: 09/20/2023   Plan of Care Certification Period: 10/13/2023  Date of Return to MD: 10/17/2023  FOTO: 2/3    Visit # / Visits authorized:  13 / 23  Time In: 9:30 am  Time Out: 10:10 am  Total Billable Time: 40 min      Precautions:  Standard, Fall, and Weightbearing    Subjective     Pt reports:"My arm is doing really good, Shanice been working on it a lot."   she was compliant with home exercise program given last session.   Response to previous treatment:increase in ROM  Functional change: consistent symptoms    Pain: 0/10  Location: left wrist    Objective   Observation/Appearance: patient arrives in prefab wrist brace, scars are well healed with no SOI, tenderness to touch at the scars (more so dorsaly than volarly), edema at the wrist crease, she also has a dimple due to scar tissue on the dorsal forearm     Edema. Measured in centimeters.    7/26/2023 7/26/2023 8/7/2023 9/18/2023      right Left  Left left    Wrist Crease 16.5 19 cm 18.5 17.5    Distal Palmar Crease 19 cm 19.5 cm  19.5  19          Elbow and Wrist ROM. Measured in degrees.    7/26/2023 7/26/2023 8/14/2023 8/29/2023 9/18/2023     Left Right Left   Left left   Supination/Pronation WNL " WNL  WNL WNL  WNL   Wrist Ext/Flex 40/25 65/65 55/40  65/42  70/45   Wrist RD/UD 25/30 35/45  NM  30/43 30/45      Hand ROM. Measured in degrees.    7/26/2023 7/26/2023 8/2/2023 8/7/2023 8/14/2023 8/24/2023 9/18/2023     Left Right Left   Left Left Left Left                   Index: MP  45 90  70  70 70 75 77              PIP     50 85  75  80 90 90 85              DIP 40 40  60  85 85 85 80     MOSS: 135            Long:  MP 55 90 85  85  90 90 90              PIP 65 65 80   80 85 85 90              DIP 30 40  50 50  50 50 50     MOSS: 150            Ring:   MP 60 90 90   90 95 95 95              PIP 60 80 65   70 85 80 90              DIP 35 40  55  55 55 55 55     MOSS: 155            Small:  MP 65 90                      PIP 75 80                      DIP 45 65            MOSS: 185            Thumb: MP 45 60                       IP 35 60              Rad ABD NM NM              Pal ABD NM NM                 Opposition Base of SF Base of SF              Strength (Dynamometer) and Pinch Strength (Pinch Gauge)  Measured in pounds.  7/26/2023: deferred due to precxns     7/26/2023 7/26/2023 8/14/2023 8/14/2023 8/24/2023 9/18/2023     Left Right  L  R Left left   Rung II NM NM  38  45 41 47   Neal Pinch NM NM  12 12  12 12   3pt Pinch NM NM NM  NM 12 14      Sensation:  Not formally tested   -some numbness in distal LIF        CMS Impairment/Limitation/Restriction for FOTO hand Survey     Therapist reviewed FOTO scores for Mis Ca on 7/26/2023.   FOTO documents entered into [employer] - see Media section.     Limitation Score: 56%  8/14/2023: 35%  9/18/2023: 2%            Treatment      Mis received the following supervised modalities after being cleared for contradictions for 8 minutes:   -Patient tolerates paraffin w/ MHP to L hand for 10 min, pre-tx to decrease pain & increase tissue extensibility     Mis received the following manual therapy techniques for 15 minutes:   -I provide gentle scar mob to  decrease scar tenderness, adherence, and hypersensitivity.     -dycem to scar    Mis received therapeutic exercises for 15 minutes including:  - Therapist A PROM intrinsic stretch and full flexion  - TGEs with adduction squeeze  - Wrist ROM 1 # eccentric motions   - intrinisic claw fist   - isometric finger lifts   - assessment of deficits  - HEP review     Therapeutic activities to improve functional performance for 10  minutes, including:  -green clothespin and small poms  -large gripper and small poms     Home Exercises and Education Provided     Education provided:   - HEP in place  - Progress towards goals     Written Home Exercises Provided: Patient instructed to cont prior HEP.  Exercises were reviewed and Mis was able to demonstrate them prior to the end of the session.  Mis demonstrated good  understanding of the HEP provided.   .   See EMR under Patient Instructions for exercises provided 8/2/2023.        Assessment   Patient is now 11 weeks post op and has attended skilled OT for 13 visits. Acoording  to FOTO score, patient has minimal fxnl limitations.   Patient demonstrates improvement in the following areas: increased ROM and /pinch strength, decreased edema, no limitations performing B/IADLS, able to sew/angelita now, limited difficulty in opening packages and jars/bottles  Remaining deficits include: she still has some general stiffness in the fingers although improving each day  At this point, patient has met all LTGs and reached max potential with skilled OT. I recommend patient transition to an independent HEP. Patient agrees with this plan. I review and discuss discharge HEP and patient demonstrates and verbalizes understanding and independence with all information presented. This is to serve as a formal discharge from skilled OT.     Anticipated barriers to occupational therapy: multiple sx, age     Pt's spiritual, cultural and educational needs considered and pt agreeable to plan  of care and goals.    Goals:  The following goals were discussed with the patient and patient is in agreement with them as to be addressed in the treatment plan.   Short term Goals:  1) Initiate HEP Met, 9/18/2023  2) Pt will increase AROM of R wrist by 5-10 degrees in order to assist with angelita activities by 4 weeks. Met, 9/18/2023  3) Pt will reduce edema by .5-1 cm in affected wrist by 4 weeks. Met, 9/18/2023  4) Pt will reduce pain to less than 4/10 by 4 weeks.Met, 9/18/2023  5) Pt will increase functional  strength by 5# in order to A in opening containers for med management or home management tasks by 4 weeks. Met, 9/18/2023  6) Patient will be able to achieve less than or equal to 45% on the FOTO, demonstrating overall improved functional ability with upper extremity. (Self-care category) Met, 9/18/2023     Long Term Goals:  Goals to be met by discharge:  1) Independent with HEPMet, 9/18/2023  2) Pt will increase L digit MOSS to at least 190 degrees in each digit degrees in order to increase functional use for grasp with home management or work related tasks by d/c. Met, 9/18/2023  3) Pt will decrease edema to trace or none to increase functional ROM by d/c. Met, 9/18/2023  4) Pt will decrease pain to trace or none while completing light home management tasks or work related tasks by d/c. Met, 9/18/2023  5) Patient will be able to achieve less than or equal to 30% on the FOTO, demonstrating overall improved functional ability with upper extremity.  (Self-care category)Met, 9/18/2023       Plan   D/C today      Lesley Miranda, OT

## 2023-09-19 ENCOUNTER — OFFICE VISIT (OUTPATIENT)
Dept: OTOLARYNGOLOGY | Facility: CLINIC | Age: 83
End: 2023-09-19
Payer: MEDICARE

## 2023-09-19 VITALS — HEIGHT: 62 IN | WEIGHT: 220 LBS | BODY MASS INDEX: 40.48 KG/M2

## 2023-09-19 DIAGNOSIS — R13.10 DYSPHAGIA, UNSPECIFIED TYPE: ICD-10-CM

## 2023-09-19 DIAGNOSIS — J30.89 NON-SEASONAL ALLERGIC RHINITIS, UNSPECIFIED TRIGGER: ICD-10-CM

## 2023-09-19 DIAGNOSIS — R49.0 HOARSENESS: Primary | ICD-10-CM

## 2023-09-19 PROCEDURE — 1160F PR REVIEW ALL MEDS BY PRESCRIBER/CLIN PHARMACIST DOCUMENTED: ICD-10-PCS | Mod: CPTII,S$GLB,, | Performed by: NURSE PRACTITIONER

## 2023-09-19 PROCEDURE — 99999 PR PBB SHADOW E&M-EST. PATIENT-LVL II: ICD-10-PCS | Mod: PBBFAC,,, | Performed by: NURSE PRACTITIONER

## 2023-09-19 PROCEDURE — 3288F FALL RISK ASSESSMENT DOCD: CPT | Mod: CPTII,S$GLB,, | Performed by: NURSE PRACTITIONER

## 2023-09-19 PROCEDURE — 1160F RVW MEDS BY RX/DR IN RCRD: CPT | Mod: CPTII,S$GLB,, | Performed by: NURSE PRACTITIONER

## 2023-09-19 PROCEDURE — 3288F PR FALLS RISK ASSESSMENT DOCUMENTED: ICD-10-PCS | Mod: CPTII,S$GLB,, | Performed by: NURSE PRACTITIONER

## 2023-09-19 PROCEDURE — 1101F PT FALLS ASSESS-DOCD LE1/YR: CPT | Mod: CPTII,S$GLB,, | Performed by: NURSE PRACTITIONER

## 2023-09-19 PROCEDURE — 99999 PR PBB SHADOW E&M-EST. PATIENT-LVL II: CPT | Mod: PBBFAC,,, | Performed by: NURSE PRACTITIONER

## 2023-09-19 PROCEDURE — 1126F AMNT PAIN NOTED NONE PRSNT: CPT | Mod: CPTII,S$GLB,, | Performed by: NURSE PRACTITIONER

## 2023-09-19 PROCEDURE — 1126F PR PAIN SEVERITY QUANTIFIED, NO PAIN PRESENT: ICD-10-PCS | Mod: CPTII,S$GLB,, | Performed by: NURSE PRACTITIONER

## 2023-09-19 PROCEDURE — 1159F PR MEDICATION LIST DOCUMENTED IN MEDICAL RECORD: ICD-10-PCS | Mod: CPTII,S$GLB,, | Performed by: NURSE PRACTITIONER

## 2023-09-19 PROCEDURE — 1159F MED LIST DOCD IN RCRD: CPT | Mod: CPTII,S$GLB,, | Performed by: NURSE PRACTITIONER

## 2023-09-19 PROCEDURE — 1101F PR PT FALLS ASSESS DOC 0-1 FALLS W/OUT INJ PAST YR: ICD-10-PCS | Mod: CPTII,S$GLB,, | Performed by: NURSE PRACTITIONER

## 2023-09-19 PROCEDURE — 99214 OFFICE O/P EST MOD 30 MIN: CPT | Mod: S$GLB,,, | Performed by: NURSE PRACTITIONER

## 2023-09-19 PROCEDURE — 99214 PR OFFICE/OUTPT VISIT, EST, LEVL IV, 30-39 MIN: ICD-10-PCS | Mod: S$GLB,,, | Performed by: NURSE PRACTITIONER

## 2023-09-19 NOTE — PROGRESS NOTES
Chief Complaint   Patient presents with    Sore Throat     Patient presents today for a follow up for sore throat.    .     HPI 5/21/2023:  Mis Ca is a very pleasant 82 y.o. female referred to me by Neno Kinney in consultation for evaluation of hearing loss.  She reports hearing loss that has been gradually progressing over the last 2 years.  She has noted any difference in hearing between the ears, with the right ear being the worse hearing ear. She has noted any tinnitus in both ears. She has no ear pain or ear drainage. She has a family history of hearing loss (mother with aging), and has not had any previous otologic surgery. She denies a history of significant loud noise exposure. She has issues with dizziness (off-balanced).    She is on Xyzal daily. Reports of throat clearing, clear rhinorrhea, and sneezing.   She is also c/o hoarseness for over 20 years and had a FFL a couple of years ago with Dr. Echevarria. She has dysphagia for many years. Trouble getting the food (meat and bread) down. Denies acid reflux, sore throat, choking or aspirating. Had a upper GI on 2/11/2022 without acute fidnings.     Interval HPI 7/6/2023:  Follow up visit. She had a esophogram on 5/30/23 which showed esophageal dysmotility and no mechanical obstruction. She has not seen GI. She reports of dysphagia and globus sensation. No choking, vomiting, pain, or hemoptysis.  She does not smoke. She continues to take omeprazole 20 mg bid.   She reports improvement with post nasal drip and nasal congestion after started on Astelin and Xyzal.     Interval HPI 9/19/2023:  Follow up visit. She reports persistent hoarseness but no sore throat or dysphagia. States that the hoarseness has been going on for 20 years. She has seen Ms. Alfonso, GI FNP, on 8/28/2023 for esophageal spasms. She is currently on Elavil and PPI with improvement.   She is on Xyzal and Astelin. No choking, vomiting, pain, or hemoptysis.   She has a low grade  fever today. Reports dark urine with foul odor for 3 weeks. She has an appt with PCP on 10/9/2023      Past Medical History:   Diagnosis Date    Allergy     Anticoagulant long-term use     Anxiety     Arthritis     Atrial fibrillation     Bilateral renal cysts 05/01/2022    Blind right eye     Bradycardia     Cancer     skin cancer left side of face under eye    Hyperlipidemia     Hypertension     PVC (premature ventricular contraction)     RLS (restless legs syndrome)     Sleep apnea     Does not use CPAP machine.     Social History     Socioeconomic History    Marital status:      Spouse name: both   Tobacco Use    Smoking status: Never    Smokeless tobacco: Never   Substance and Sexual Activity    Alcohol use: Not Currently    Drug use: No    Sexual activity: Not Currently     Partners: Male     Social Determinants of Health     Financial Resource Strain: Low Risk  (5/3/2023)    Overall Financial Resource Strain (CARDIA)     Difficulty of Paying Living Expenses: Not very hard   Food Insecurity: No Food Insecurity (5/3/2023)    Hunger Vital Sign     Worried About Running Out of Food in the Last Year: Never true     Ran Out of Food in the Last Year: Never true   Transportation Needs: No Transportation Needs (5/3/2023)    PRAPARE - Transportation     Lack of Transportation (Medical): No     Lack of Transportation (Non-Medical): No   Physical Activity: Inactive (5/3/2023)    Exercise Vital Sign     Days of Exercise per Week: 0 days     Minutes of Exercise per Session: 0 min   Stress: No Stress Concern Present (5/3/2023)    [place] [employer] - Occupational Stress Questionnaire     Feeling of Stress : Not at all   Social Connections: Moderately Integrated (5/3/2023)    Social Connection and Isolation Panel [NHANES]     Frequency of Communication with Friends and Family: More than three times a week     Frequency of Social Gatherings with Friends and Family: More than three times a week      Attends Gnosticism Services: More than 4 times per year     Active Member of Clubs or Organizations: Yes     Attends Club or Organization Meetings: More than 4 times per year     Marital Status:    Housing Stability: Low Risk  (5/3/2023)    Housing Stability Vital Sign     Unable to Pay for Housing in the Last Year: No     Number of Places Lived in the Last Year: 2     Unstable Housing in the Last Year: No      Past Surgical History:   Procedure Laterality Date    ADENOIDECTOMY      PAM OSTEOTOMY Left 10/31/2018    Procedure: OSTEOTOMY, AKIN;  Surgeon: Rudolph Cardozo DPM;  Location: [facility] OR;  Service: Podiatry;  Laterality: Left;    APPENDECTOMY      BREAST BIOPSY Left     x3    BREAST SURGERY Left     breast biopsy x3    CATARACT EXTRACTION BILATERAL W/ ANTERIOR VITRECTOMY      ENDOSCOPIC GASTROCNEMIUS RECESSION Left 10/31/2018    Procedure: RECESSION, GASTROCNEMIUS, ENDOSCOPIC;  Surgeon: Rudolph Cardozo DPM;  Location: [facility] OR;  Service: Podiatry;  Laterality: Left;    ESOPHAGOGASTRODUODENOSCOPY N/A 2/11/2022    Procedure: EGD (ESOPHAGOGASTRODUODENOSCOPY);  Surgeon: Efren Aguilar MD;  Location: [facility];  Service: Endoscopy;  Laterality: N/A;  Patient needs a rapid covid  test done on 12/15/2021. thank you    EYE SURGERY Bilateral     cataracts extraction    EYE SURGERY Right     drainage tube (glaucoma)    FOOT ARTHRODESIS Left 1/15/2020    Procedure: FUSION, FOOT;  Surgeon: Rudolph Cardozo DPM;  Location: [facility] OR;  Service: Podiatry;  Laterality: Left;  mini c-arm, Arthrex screw  for hardware removal (Lydia notified), Orthofix (Niels notified) min ex-fix    FOOT SURGERY Left     lesion removed from dorsal area    FRACTURE SURGERY Left     arm    HAND TENDON SURGERY Left     HYSTERECTOMY      INCISION AND DRAINAGE FOOT Left 3/11/2020    Procedure: INCISION AND DRAINAGE, FOOT;  Surgeon: Rudolph Cardozo DPM;  Location: [facility] OR;  Service: Podiatry;  Laterality: Left;     LAPIDUS BUNIONECTOMY Left 10/31/2018    Procedure: BUNIONECTOMY, LAPIDUS;  Surgeon: Rudolph Cardozo DPM;  Location: [facility] OR;  Service: Podiatry;  Laterality: Left;  mini c-arm, Arthrex locking plate (Lydia notified)    LAPIDUS BUNIONECTOMY Left 10/31/2018    Dr. Cardozo    Lymph Gland Removed  Right     groin (performed by Dr. Vergara)    NEURECTOMY Left 1/15/2020    Procedure: NEURECTOMY;  Surgeon: Rudolph Cardozo DPM;  Location: [facility] OR;  Service: Podiatry;  Laterality: Left;  bipolar bovie, vessel loop, possible Agnes nerve wrap. Moshe with Agnes notified and will be overnighting graft. MK 1/14/2020    OOPHORECTOMY      ORIF FOREARM FRACTURE Left     PALATE SURGERY      Lesion removed    REMOVAL,ORTHOPEDIC HARDWARE,UPPER EXTREMITY Left 6/30/2023    Procedure: REMOVAL,ORTHOPEDIC HARDWARE,UPPER EXTREMITY;  Surgeon: Skyler Oliva Jr., MD;  Location: [facility] OR;  Service: Orthopedics;  Laterality: Left;  [employer] notified cc    REPAIR OF EXTENSOR TENDON Left 6/30/2023    Procedure: REPAIR, TENDON, EXTENSOR;  Surgeon: Skyler Oliva Jr., MD;  Location: [facility] OR;  Service: Orthopedics;  Laterality: Left;  Left Index Digit    TONSILLECTOMY       Family History   Problem Relation Age of Onset    Aneurysm Mother         AAA    Cancer Father         lung cancer    Lung cancer Father     Cirrhosis Father     Cancer Sister         Breast and Brain     Diabetes Sister     Breast cancer Sister     Hypertension Sister     Hyperlipidemia Sister     Brain cancer Sister     Colon polyps Brother     Cancer Brother         lung cancer    Diabetes Brother     Hyperlipidemia Brother     Hypertension Brother     Cancer Brother         bladder cancer    Diabetes Brother     Glaucoma Brother     Heart disease Sister         Heart valve repair    Atrial fibrillation Sister     Diabetes Sister     Heart disease Sister     Heart attack Sister     Bipolar disorder Sister     Depression Sister     Glaucoma Sister      Diabetes Sister     Other Sister         Pituitary tumor    Arthritis Sister     COPD Sister     Heart disease Sister     Kidney disease Sister     Cancer Sister         lung cancer    Diabetes Sister     Lung cancer Sister     Heart attack Sister          Review of Systems  General: negative for chills, fever or weight loss  Psychological: negative for mood changes or depression  Ophthalmic: negative for blurry vision, photophobia or eye pain  ENT: see HPI  Respiratory: no cough, shortness of breath, or wheezing  Cardiovascular: no chest pain or dyspnea on exertion  Gastrointestinal: no abdominal pain, change in bowel habits, or black/ bloody stools  Musculoskeletal: negative for gait disturbance or muscular weakness  Neurological: no syncope or seizures; no ataxia  Dermatological: negative for puritis,  rash and jaundice  Hematologic/lymphatic: no easy bruising, no new lumps or bumps      Physical Exam:    There were no vitals filed for this visit.        Constitutional: Well appearing / communicating without difficutly.  NAD.  Eyes: EOM I Bilaterally  Head/Face: Normocephalic.  Negative paranasal sinus pressure/tenderness.  Salivary glands WNL.  House Brackmann I Bilaterally.    Right Ear: Auricle normal appearance. External Auditory Canal within normal limits no lesions or masses,TM w/o masses/lesions/perforations. TM mobility noted.   Left Ear: Auricle normal appearance. External Auditory Canal within normal limits no lesions or masses,TM w/o masses/lesions/perforations. TM mobility noted.  Rinne Air conduction >bone conduction bilaterally, [place] midline.   Nose: No gross nasal septal deviation. Inferior Turbinates 3+ bilaterally. No septal perforation. No masses/lesions. External nasal skin appears normal without masses/lesions.  Oral Cavity: Gingiva/lips within normal limits.  Dentition/gingiva healthy appearing. Mucus membranes moist. Floor of mouth soft, no masses palpated. Oral Tongue mobile. Hard Palate  appears normal.    Oropharynx: Base of tongue appears normal. No masses/lesions noted. Tonsillar fossa/pharyngeal wall without lesions. Posterior oropharynx WNL.  Soft palate without masses. Midline uvula.   Neck/Lymphatic: No LAD I-VI bilaterally.  No thyromegaly.  No masses noted on exam.      Diagnostic studies:    FL ESOPHAGRAM PHARYNX AND/OR CERVICAL 5/30/2023    FINDINGS:  Swallow: The swallowing mechanism was grossly normal. No laryngeal penetration or aspiration.  Stasis of contrast in the vallecular and piriform sinuses.     Esophagus: The esophagus was normally distensible and the mucosa was unremarkable. Esophageal dysmotility characterized by stasis and to and fro movement of contrast.  At one point, the patient had to be repositioned upright to clear the esophagus.  There were tertiary contractions.  Holdup of the 13 mm barium tablet at the gastroesophageal junction.     Gastroesophageal junction: Normal relaxation of the lower esophageal sphincter. No hiatal hernia.     Reflux: No gastroesophageal reflux, either spontaneously or with provocative maneuvers.     Miscellaneous: Visualized portions of the stomach and proximal small bowel are unremarkable.     Impression:     Esophageal dysmotility as described.  No mechanical obstruction.          Assessment:    ICD-10-CM ICD-9-CM    1. Hoarseness  R49.0 784.42       2. Non-seasonal allergic rhinitis, unspecified trigger  J30.89 477.8       3. Dysphagia, unspecified type  R13.10 787.20             The primary encounter diagnosis was Hoarseness. Diagnoses of Non-seasonal allergic rhinitis, unspecified trigger and Dysphagia, unspecified type were also pertinent to this visit.      Plan:  No orders of the defined types were placed in this encounter.    - continue on Azelastine 1 spray in each nostril bid  -continue on Xyzal 5 mg daily  -continue on omeprazole 20 mg bid and follow up with GI  -I recommended consultation with  Dr. Tavares for chronic hoarseness  but she does not want an appt as of now.   -Unable to schedule an appt with PCP for her today. I instructed her to go downstairs to the urgent care for possible UTI.       Leslie Cisneros NP

## 2023-09-20 NOTE — PROGRESS NOTES
9/20/23 Patient called to report today she will start Cipro 500 mg twice a day for 5 days, next INR is due 10/05/23

## 2023-09-22 ENCOUNTER — ANTI-COAG VISIT (OUTPATIENT)
Dept: CARDIOLOGY | Facility: CLINIC | Age: 83
End: 2023-09-22
Payer: MEDICARE

## 2023-09-22 ENCOUNTER — LAB VISIT (OUTPATIENT)
Dept: LAB | Facility: HOSPITAL | Age: 83
End: 2023-09-22
Payer: MEDICARE

## 2023-09-22 DIAGNOSIS — I48.0 PAROXYSMAL ATRIAL FIBRILLATION: Chronic | ICD-10-CM

## 2023-09-22 DIAGNOSIS — Z79.01 LONG TERM (CURRENT) USE OF ANTICOAGULANTS: ICD-10-CM

## 2023-09-22 DIAGNOSIS — I48.0 PAROXYSMAL ATRIAL FIBRILLATION: Primary | Chronic | ICD-10-CM

## 2023-09-22 LAB
INR PPP: 2.7 (ref 0.8–1.2)
PROTHROMBIN TIME: 28.2 SEC (ref 9–12.5)

## 2023-09-22 PROCEDURE — 36415 COLL VENOUS BLD VENIPUNCTURE: CPT | Performed by: INTERNAL MEDICINE

## 2023-09-22 PROCEDURE — 93793 PR ANTICOAGULANT MGMT FOR PT TAKING WARFARIN: ICD-10-PCS | Mod: S$GLB,,, | Performed by: PHARMACIST

## 2023-09-22 PROCEDURE — 85610 PROTHROMBIN TIME: CPT | Performed by: INTERNAL MEDICINE

## 2023-09-22 PROCEDURE — 93793 ANTICOAG MGMT PT WARFARIN: CPT | Mod: S$GLB,,, | Performed by: PHARMACIST

## 2023-09-22 NOTE — PROGRESS NOTES
Patient reports starting a 5 day course of CIPRO recently and reportedly has 2 days left. No onteraction seen thus far. Patient elderly and to account for possible delayed interaction, we will lower her coumadin dose slightly for one day this week.

## 2023-09-28 ENCOUNTER — ANTI-COAG VISIT (OUTPATIENT)
Dept: CARDIOLOGY | Facility: CLINIC | Age: 83
End: 2023-09-28
Payer: MEDICARE

## 2023-09-28 ENCOUNTER — LAB VISIT (OUTPATIENT)
Dept: LAB | Facility: HOSPITAL | Age: 83
End: 2023-09-28
Attending: INTERNAL MEDICINE
Payer: MEDICARE

## 2023-09-28 DIAGNOSIS — Z79.01 LONG TERM (CURRENT) USE OF ANTICOAGULANTS: ICD-10-CM

## 2023-09-28 DIAGNOSIS — I48.0 PAROXYSMAL ATRIAL FIBRILLATION: Primary | Chronic | ICD-10-CM

## 2023-09-28 DIAGNOSIS — I48.0 PAROXYSMAL ATRIAL FIBRILLATION: Chronic | ICD-10-CM

## 2023-09-28 LAB
INR PPP: 1.6 (ref 0.8–1.2)
PROTHROMBIN TIME: 17.1 SEC (ref 9–12.5)

## 2023-09-28 PROCEDURE — 85610 PROTHROMBIN TIME: CPT | Mod: PN | Performed by: INTERNAL MEDICINE

## 2023-09-28 PROCEDURE — 36415 COLL VENOUS BLD VENIPUNCTURE: CPT | Mod: PN | Performed by: INTERNAL MEDICINE

## 2023-09-28 PROCEDURE — 93793 ANTICOAG MGMT PT WARFARIN: CPT | Mod: S$GLB,,, | Performed by: PHARMACIST

## 2023-09-28 PROCEDURE — 93793 PR ANTICOAGULANT MGMT FOR PT TAKING WARFARIN: ICD-10-PCS | Mod: S$GLB,,, | Performed by: PHARMACIST

## 2023-10-01 RX ORDER — PRAMIPEXOLE DIHYDROCHLORIDE 0.5 MG/1
TABLET ORAL
Qty: 180 TABLET | Refills: 3 | Status: SHIPPED | OUTPATIENT
Start: 2023-10-01

## 2023-10-05 ENCOUNTER — ANTI-COAG VISIT (OUTPATIENT)
Dept: CARDIOLOGY | Facility: CLINIC | Age: 83
End: 2023-10-05
Payer: MEDICARE

## 2023-10-05 ENCOUNTER — LAB VISIT (OUTPATIENT)
Dept: LAB | Facility: HOSPITAL | Age: 83
End: 2023-10-05
Attending: INTERNAL MEDICINE
Payer: MEDICARE

## 2023-10-05 DIAGNOSIS — Z79.01 LONG TERM (CURRENT) USE OF ANTICOAGULANTS: ICD-10-CM

## 2023-10-05 DIAGNOSIS — I48.0 PAROXYSMAL ATRIAL FIBRILLATION: Chronic | ICD-10-CM

## 2023-10-05 DIAGNOSIS — I48.0 PAROXYSMAL ATRIAL FIBRILLATION: Primary | Chronic | ICD-10-CM

## 2023-10-05 LAB
INR PPP: 2.1 (ref 0.8–1.2)
PROTHROMBIN TIME: 22 SEC (ref 9–12.5)

## 2023-10-05 PROCEDURE — 93793 PR ANTICOAGULANT MGMT FOR PT TAKING WARFARIN: ICD-10-PCS | Mod: S$GLB,,, | Performed by: PHARMACIST

## 2023-10-05 PROCEDURE — 36415 COLL VENOUS BLD VENIPUNCTURE: CPT | Mod: PN | Performed by: INTERNAL MEDICINE

## 2023-10-05 PROCEDURE — 85610 PROTHROMBIN TIME: CPT | Mod: PN | Performed by: INTERNAL MEDICINE

## 2023-10-05 PROCEDURE — 93793 ANTICOAG MGMT PT WARFARIN: CPT | Mod: S$GLB,,, | Performed by: PHARMACIST

## 2023-10-09 ENCOUNTER — OFFICE VISIT (OUTPATIENT)
Dept: FAMILY MEDICINE | Facility: CLINIC | Age: 83
End: 2023-10-09
Payer: MEDICARE

## 2023-10-09 VITALS
WEIGHT: 223 LBS | DIASTOLIC BLOOD PRESSURE: 54 MMHG | SYSTOLIC BLOOD PRESSURE: 98 MMHG | HEART RATE: 85 BPM | TEMPERATURE: 98 F | BODY MASS INDEX: 41.04 KG/M2 | HEIGHT: 62 IN | OXYGEN SATURATION: 96 %

## 2023-10-09 DIAGNOSIS — M47.819 MULTILEVEL FACET ARTHRITIS: ICD-10-CM

## 2023-10-09 DIAGNOSIS — F41.9 ANXIETY: ICD-10-CM

## 2023-10-09 DIAGNOSIS — G47.33 OSA (OBSTRUCTIVE SLEEP APNEA): ICD-10-CM

## 2023-10-09 DIAGNOSIS — E11.9 TYPE 2 DIABETES MELLITUS WITHOUT COMPLICATION, WITHOUT LONG-TERM CURRENT USE OF INSULIN: Primary | ICD-10-CM

## 2023-10-09 DIAGNOSIS — I95.2 HYPOTENSION DUE TO DRUGS: ICD-10-CM

## 2023-10-09 DIAGNOSIS — Z78.0 MENOPAUSE: ICD-10-CM

## 2023-10-09 DIAGNOSIS — I48.0 PAROXYSMAL ATRIAL FIBRILLATION: ICD-10-CM

## 2023-10-09 PROCEDURE — 3288F FALL RISK ASSESSMENT DOCD: CPT | Mod: CPTII,S$GLB,, | Performed by: FAMILY MEDICINE

## 2023-10-09 PROCEDURE — 3074F PR MOST RECENT SYSTOLIC BLOOD PRESSURE < 130 MM HG: ICD-10-PCS | Mod: CPTII,S$GLB,, | Performed by: FAMILY MEDICINE

## 2023-10-09 PROCEDURE — 3078F DIAST BP <80 MM HG: CPT | Mod: CPTII,S$GLB,, | Performed by: FAMILY MEDICINE

## 2023-10-09 PROCEDURE — 1160F RVW MEDS BY RX/DR IN RCRD: CPT | Mod: CPTII,S$GLB,, | Performed by: FAMILY MEDICINE

## 2023-10-09 PROCEDURE — 3074F SYST BP LT 130 MM HG: CPT | Mod: CPTII,S$GLB,, | Performed by: FAMILY MEDICINE

## 2023-10-09 PROCEDURE — 1101F PT FALLS ASSESS-DOCD LE1/YR: CPT | Mod: CPTII,S$GLB,, | Performed by: FAMILY MEDICINE

## 2023-10-09 PROCEDURE — 1101F PR PT FALLS ASSESS DOC 0-1 FALLS W/OUT INJ PAST YR: ICD-10-PCS | Mod: CPTII,S$GLB,, | Performed by: FAMILY MEDICINE

## 2023-10-09 PROCEDURE — 1126F AMNT PAIN NOTED NONE PRSNT: CPT | Mod: CPTII,S$GLB,, | Performed by: FAMILY MEDICINE

## 2023-10-09 PROCEDURE — 1126F PR PAIN SEVERITY QUANTIFIED, NO PAIN PRESENT: ICD-10-PCS | Mod: CPTII,S$GLB,, | Performed by: FAMILY MEDICINE

## 2023-10-09 PROCEDURE — 1159F MED LIST DOCD IN RCRD: CPT | Mod: CPTII,S$GLB,, | Performed by: FAMILY MEDICINE

## 2023-10-09 PROCEDURE — 1159F PR MEDICATION LIST DOCUMENTED IN MEDICAL RECORD: ICD-10-PCS | Mod: CPTII,S$GLB,, | Performed by: FAMILY MEDICINE

## 2023-10-09 PROCEDURE — 1160F PR REVIEW ALL MEDS BY PRESCRIBER/CLIN PHARMACIST DOCUMENTED: ICD-10-PCS | Mod: CPTII,S$GLB,, | Performed by: FAMILY MEDICINE

## 2023-10-09 PROCEDURE — 99214 PR OFFICE/OUTPT VISIT, EST, LEVL IV, 30-39 MIN: ICD-10-PCS | Mod: S$GLB,,, | Performed by: FAMILY MEDICINE

## 2023-10-09 PROCEDURE — 99214 OFFICE O/P EST MOD 30 MIN: CPT | Mod: S$GLB,,, | Performed by: FAMILY MEDICINE

## 2023-10-09 PROCEDURE — 3078F PR MOST RECENT DIASTOLIC BLOOD PRESSURE < 80 MM HG: ICD-10-PCS | Mod: CPTII,S$GLB,, | Performed by: FAMILY MEDICINE

## 2023-10-09 PROCEDURE — 3288F PR FALLS RISK ASSESSMENT DOCUMENTED: ICD-10-PCS | Mod: CPTII,S$GLB,, | Performed by: FAMILY MEDICINE

## 2023-10-09 RX ORDER — GABAPENTIN 300 MG/1
CAPSULE ORAL
Qty: 90 CAPSULE | Refills: 0 | Status: SHIPPED | OUTPATIENT
Start: 2023-10-09

## 2023-10-09 RX ORDER — TIRZEPATIDE 2.5 MG/.5ML
2.5 INJECTION, SOLUTION SUBCUTANEOUS
Qty: 4 PEN | Refills: 11 | Status: SHIPPED | OUTPATIENT
Start: 2023-10-09

## 2023-10-09 NOTE — PROGRESS NOTES
"Subjective:      Patient ID: Mis Ca is a 82 y.o. female.    Chief Complaint: Follow-up (6 mon)      Vitals:    10/09/23 1451   BP: (!) 98/54   Pulse: 85   Temp: 98.4 °F (36.9 °C)   TempSrc: Oral   SpO2: 96%   Weight: 101.1 kg (222 lb 15.9 oz)   Height: 5' 2" (1.575 m)        HPI   Check up; gained few pounds; eats otu  Weakness, weak arms, bp low  On coumadin, inr good  Had finger surgery    Stop losartan, on half ow of the 100 mg, so has been on 50mg  Stop and monitor blood pressure    Adding mounjaro as her last a1c 6.7, repeat this year  Doesnt'need gabapentin anymore, so taper off  Check a1c soon, has been one year  Problem List  Patient Active Problem List   Diagnosis    Paroxysmal atrial fibrillation    JACQUELYN (obstructive sleep apnea)    Long term (current) use of anticoagulants    Anxiety    Restless leg syndrome    Hyperlipidemia    Essential hypertension    Gastroesophageal reflux disease without esophagitis    History of adenomatous polyp of colon    Diverticulosis of large intestine without hemorrhage    Tortuous aorta    Glaucoma    Blind right eye    Aortic atherosclerosis    Severe obesity with body mass index (BMI) of 35.0 to 39.9 with serious comorbidity    Hallux valgus with bunions, left    Antalgic gait    Multilevel facet arthritis    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    Other specified disorders of adrenal gland    Unspecified inflammatory spondylopathy, 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    Inflammatory polyneuropathy, unspecified    Umbilical hernia without obstruction and without gangrene    Bilateral renal cysts    Chronic kidney disease, stage 3a    Rupture of extensor tendon of left hand    Muscle weakness    Stiffness of left wrist joint    Stiffness of finger joint, left        ALLERGIES:   Review of patient's allergies indicates:   Allergen Reactions    Adhesive     Compazine [prochlorperazine edisylate] Anxiety    Penicillins Rash    Sulfa (sulfonamide antibiotics) Rash    Vancomycin " analogues Rash       MEDS:   Current Outpatient Medications:     acetaminophen (TYLENOL) 500 MG tablet, Take 1,000 mg by mouth 2 (two) times daily as needed for Pain., Disp: , Rfl:     alendronate (FOSAMAX) 70 MG tablet, TAKE 1 TABLET (70 MG TOTAL) BY MOUTH EVERY 7 DAYS, Disp: 12 tablet, Rfl: 3    ALPRAZolam (XANAX) 0.25 MG tablet, TAKE 1 TABLET BY MOUTH TWICE A DAY, Disp: 180 tablet, Rfl: 0    amiodarone (PACERONE) 200 MG Tab, Take 0.5 tablets (100 mg total) by mouth once daily., Disp: 45 tablet, Rfl: 3    amitriptyline (ELAVIL) 25 MG tablet, Take 1 tablet (25 mg total) by mouth every evening., Disp: 30 tablet, Rfl: 5    aspirin (ECOTRIN) 81 MG EC tablet, Take 81 mg by mouth once daily., Disp: , Rfl:     azelastine (ASTELIN) 137 mcg (0.1 %) nasal spray, 1 spray (137 mcg total) by Nasal route 2 (two) times daily., Disp: 30 mL, Rfl: 11    chlorthalidone (HYGROTEN) 50 MG Tab, TAKE 1 TABLET BY MOUTH EVERY DAY, Disp: 90 tablet, Rfl: 3    cyanocobalamin 1,000 mcg/mL injection, INJECT 1 ML (1,000 MCG TOTAL) INTO THE MUSCLE EVERY 30 DAYS., Disp: 3 mL, Rfl: 19    furosemide (LASIX) 20 MG tablet, Take 1 tablet (20 mg total) by mouth once daily., Disp: 90 tablet, Rfl: 3    latanoprost 0.005 % ophthalmic solution, Place 1 drop into both eyes every evening., Disp: , Rfl:     levocetirizine (XYZAL) 5 MG tablet, Take 1 tablet (5 mg total) by mouth every evening., Disp: 90 tablet, Rfl: 3    multivit-min-iron-FA-lutein (CENTRUM SILVER WOMEN) 8 mg iron-400 mcg-300 mcg Tab, Take 1 tablet by mouth once daily., Disp: , Rfl:     mv-mn/om3/dha/epa/fish/lut/geoffrey (OCUVITE ADULT 50 PLUS ORAL), Take 1 tablet by mouth once daily., Disp: , Rfl:     omeprazole (PRILOSEC) 20 MG capsule, Take 1 capsule (20 mg total) by mouth 2 (two) times daily., Disp: 180 capsule, Rfl: 1    oxybutynin (DITROPAN) 5 MG Tab, TAKE 1 TABLET BY MOUTH ONCE DAILY, Disp: 90 tablet, Rfl: 3    potassium chloride (K-TAB) 20 mEq, TAKE 1 TABLET BY MOUTH ONCE DAILY, Disp: 90  "tablet, Rfl: 3    pramipexole (MIRAPEX) 0.5 MG tablet, TAKE 1 TABLET BY MOUTH TWICE  DAILY, Disp: 180 tablet, Rfl: 3    pravastatin (PRAVACHOL) 40 MG tablet, TAKE 1 TABLET BY MOUTH EVERY DAY, Disp: 90 tablet, Rfl: 3    timolol maleate 0.5% (TIMOPTIC) 0.5 % Drop, Place 1 drop into the left eye once daily. , Disp: , Rfl: 0    vitamin D (VITAMIN D3) 1000 units Tab, Take 1,000 Units by mouth once daily., Disp: , Rfl:     warfarin (COUMADIN) 6 MG tablet, TAKE 6 MG DAILY EXCEPT FOR 9 MG ON FRIDAY TO THIN BLOOD, Disp: 96 tablet, Rfl: 4    gabapentin (NEURONTIN) 300 MG capsule, One twice a day for one month, then one nightly for one month, then stop, Disp: 90 capsule, Rfl: 0    needle, disp, 25 gauge 25 gauge x 1" Ndle, 1 Needle by Misc.(Non-Drug; Combo Route) route every 30 days., Disp: 25 each, Rfl: 0    tirzepatide (MOUNJARO) 2.5 mg/0.5 mL PnIj, Inject 2.5 mg into the skin every 7 days., Disp: 4 pen , Rfl: 11    UNABLE TO FIND, Take 1 capsule by mouth 3 (three) times daily with meals. medication name: Golo Release Diet Capsules, Disp: , Rfl:   No current facility-administered medications for this visit.    Facility-Administered Medications Ordered in Other Visits:     lactated ringers infusion, , Intravenous, Continuous, Don Ruano DNP    LIDOcaine (PF) 10 mg/ml (1%) injection 10 mg, 1 mL, Intradermal, Once, Don Ruano DNP      History:  Current Providers as of 10/9/2023  PCP: Scott Dillard MD  Care Team Provider: Sudhir Coles MD  Care Team Provider: Jolanta Burnett MD  Care Team Provider: Lenny Burroughs LPN  Care Team Provider: Portillo Luis MD  Care Team Provider: Efren Aguilar MD  Care Team Provider: Roderick Echevarria MD  Care Team Provider: Scott Dillard MD  Care Team Provider: Tino Mendiola, PharmD  Encounter Provider: Scott Dillard MD, starting on Mon Oct 9, 2023 12:00 AM  Referring Provider: not found, starting on Mon Oct 9, 2023 12:00 AM  Consulting " Physician: Scott Dillard MD, starting on Mon Oct 9, 2023  2:45 PM (Active)   Past Medical History:   Diagnosis Date    Allergy     Anticoagulant long-term use     Anxiety     Arthritis     Atrial fibrillation     Bilateral renal cysts 05/01/2022    Blind right eye     Bradycardia     Cancer     skin cancer left side of face under eye    Hyperlipidemia     Hypertension     PVC (premature ventricular contraction)     RLS (restless legs syndrome)     Sleep apnea     Does not use CPAP machine.     Past Surgical History:   Procedure Laterality Date    ADENOIDECTOMY      PAM OSTEOTOMY Left 10/31/2018    Procedure: OSTEOTOMY, AKIN;  Surgeon: Rudolph Cardozo DPM;  Location: [facility] OR;  Service: Podiatry;  Laterality: Left;    APPENDECTOMY      BREAST BIOPSY Left     x3    BREAST SURGERY Left     breast biopsy x3    CATARACT EXTRACTION BILATERAL W/ ANTERIOR VITRECTOMY      ENDOSCOPIC GASTROCNEMIUS RECESSION Left 10/31/2018    Procedure: RECESSION, GASTROCNEMIUS, ENDOSCOPIC;  Surgeon: Rudolph Cardozo DPM;  Location: [facility] OR;  Service: Podiatry;  Laterality: Left;    ESOPHAGOGASTRODUODENOSCOPY N/A 2/11/2022    Procedure: EGD (ESOPHAGOGASTRODUODENOSCOPY);  Surgeon: Efren Aguilar MD;  Location: [facility];  Service: Endoscopy;  Laterality: N/A;  Patient needs a rapid covid  test done on 12/15/2021. thank you    EYE SURGERY Bilateral     cataracts extraction    EYE SURGERY Right     drainage tube (glaucoma)    FOOT ARTHRODESIS Left 1/15/2020    Procedure: FUSION, FOOT;  Surgeon: Rudolph Cardozo DPM;  Location: [facility] OR;  Service: Podiatry;  Laterality: Left;  mini c-arm, Arthrex screw  for hardware removal (Lydia notified), Orthofix (Niels notified) min ex-fix    FOOT SURGERY Left     lesion removed from dorsal area    FRACTURE SURGERY Left     arm    HAND TENDON SURGERY Left     HYSTERECTOMY      INCISION AND DRAINAGE FOOT Left 3/11/2020    Procedure: INCISION AND DRAINAGE, FOOT;  Surgeon: Rudolph  XUAN Cardozo DPM;  Location: [facility] OR;  Service: Podiatry;  Laterality: Left;    LAPIDUS BUNIONECTOMY Left 10/31/2018    Procedure: BUNIONECTOMY, LAPIDUS;  Surgeon: Rudolph Cardozo DPM;  Location: [facility] OR;  Service: Podiatry;  Laterality: Left;  mini c-arm, Arthrex locking plate (Lydia notified)    LAPIDUS BUNIONECTOMY Left 10/31/2018    Dr. Cardozo    Lymph Gland Removed  Right     groin (performed by Dr. Vergara)    NEURECTOMY Left 1/15/2020    Procedure: NEURECTOMY;  Surgeon: Rudolph Cardozo DPM;  Location: [facility] OR;  Service: Podiatry;  Laterality: Left;  bipolar bovie, vessel loop, possible [place] nerve wrap. Moshe with [facility] notified and will be overnighting graft. MK 1/14/2020    OOPHORECTOMY      ORIF FOREARM FRACTURE Left     PALATE SURGERY      Lesion removed    REMOVAL,ORTHOPEDIC HARDWARE,UPPER EXTREMITY Left 6/30/2023    Procedure: REMOVAL,ORTHOPEDIC HARDWARE,UPPER EXTREMITY;  Surgeon: Skyler Oliva Jr., MD;  Location: [facility] OR;  Service: Orthopedics;  Laterality: Left;  César- Michael [employer] notified cc    REPAIR OF EXTENSOR TENDON Left 6/30/2023    Procedure: REPAIR, TENDON, EXTENSOR;  Surgeon: Skyler Oliva Jr., MD;  Location: [facility];  Service: Orthopedics;  Laterality: Left;  Left Index Digit    TONSILLECTOMY       Social History     Tobacco Use    Smoking status: Never     Passive exposure: Never    Smokeless tobacco: Never   Substance Use Topics    Alcohol use: Not Currently    Drug use: No         Review of Systems   Constitutional: Negative.    HENT: Negative.     Respiratory: Negative.     Cardiovascular: Negative.    Gastrointestinal: Negative.    Endocrine: Negative.    Genitourinary: Negative.    Musculoskeletal: Negative.    Neurological:  Positive for weakness.   Psychiatric/Behavioral: Negative.     All other systems reviewed and are negative.    Objective:     Physical Exam  Vitals and nursing note reviewed.   Constitutional:       Appearance: She is well-developed. She is  "obese.   HENT:      Head: Normocephalic.   Eyes:      Conjunctiva/sclera: Conjunctivae normal.      Pupils: Pupils are equal, round, and reactive to light.   Cardiovascular:      Rate and Rhythm: Normal rate and regular rhythm.      Heart sounds: Normal heart sounds.   Pulmonary:      Effort: Pulmonary effort is normal.      Breath sounds: Normal breath sounds.   Musculoskeletal:         General: Normal range of motion.      Cervical back: Normal range of motion and neck supple.   Skin:     General: Skin is warm and dry.   Neurological:      Mental Status: She is alert and oriented to person, place, and time.      Deep Tendon Reflexes: Reflexes are normal and symmetric.   Psychiatric:         Behavior: Behavior normal.         Thought Content: Thought content normal.         Judgment: Judgment normal.             Assessment:     1. Type 2 diabetes mellitus without complication, without long-term current use of insulin    2. Menopause    3. Anxiety    4. JACQUELYN (obstructive sleep apnea)    5. Multilevel facet arthritis    6. Paroxysmal atrial fibrillation    7. Hypotension due to drugs      Plan:        Medication List            Accurate as of October 9, 2023  3:29 PM. If you have any questions, ask your nurse or doctor.                START taking these medications      MOUNJARO 2.5 mg/0.5 mL Pnij  Generic drug: tirzepatide  Inject 2.5 mg into the skin every 7 days.  Started by: Scott Dillard MD     needle (disp) 25 gauge 25 gauge x 1" Ndle  1 Needle by Misc.(Non-Drug; Combo Route) route every 30 days.  Started by: Scott Dillard MD            CHANGE how you take these medications      gabapentin 300 MG capsule  Commonly known as: NEURONTIN  One twice a day for one month, then one nightly for one month, then stop  What changed:   how much to take  how to take this  when to take this  additional instructions  Changed by: Scott Dillard MD            CONTINUE taking these medications      acetaminophen 500 MG " tablet  Commonly known as: TYLENOL     alendronate 70 MG tablet  Commonly known as: FOSAMAX  TAKE 1 TABLET (70 MG TOTAL) BY MOUTH EVERY 7 DAYS     ALPRAZolam 0.25 MG tablet  Commonly known as: XANAX  TAKE 1 TABLET BY MOUTH TWICE A DAY     amiodarone 200 MG Tab  Commonly known as: PACERONE  Take 0.5 tablets (100 mg total) by mouth once daily.     amitriptyline 25 MG tablet  Commonly known as: ELAVIL  Take 1 tablet (25 mg total) by mouth every evening.     aspirin 81 MG EC tablet  Commonly known as: ECOTRIN     azelastine 137 mcg (0.1 %) nasal spray  Commonly known as: ASTELIN  1 spray (137 mcg total) by Nasal route 2 (two) times daily.     CENTRUM SILVER WOMEN 8 mg iron-400 mcg-50 mcg Tab  Generic drug: multivit-min-iron-FA-vit K-lut     chlorthalidone 50 MG Tab  Commonly known as: HYGROTEN  TAKE 1 TABLET BY MOUTH EVERY DAY     cyanocobalamin 1,000 mcg/mL injection  INJECT 1 ML (1,000 MCG TOTAL) INTO THE MUSCLE EVERY 30 DAYS.     furosemide 20 MG tablet  Commonly known as: LASIX  Take 1 tablet (20 mg total) by mouth once daily.     latanoprost 0.005 % ophthalmic solution     levocetirizine 5 MG tablet  Commonly known as: XYZAL  Take 1 tablet (5 mg total) by mouth every evening.     OCUVITE ADULT 50 PLUS ORAL     omeprazole 20 MG capsule  Commonly known as: PRILOSEC  Take 1 capsule (20 mg total) by mouth 2 (two) times daily.     oxybutynin 5 MG Tab  Commonly known as: DITROPAN  TAKE 1 TABLET BY MOUTH ONCE DAILY     potassium chloride 20 mEq  Commonly known as: K-TAB  TAKE 1 TABLET BY MOUTH ONCE DAILY     pramipexole 0.5 MG tablet  Commonly known as: MIRAPEX  TAKE 1 TABLET BY MOUTH TWICE  DAILY     pravastatin 40 MG tablet  Commonly known as: PRAVACHOL  TAKE 1 TABLET BY MOUTH EVERY DAY     timolol maleate 0.5% 0.5 % Drop  Commonly known as: TIMOPTIC     UNABLE TO FIND     vitamin D 1000 units Tab  Commonly known as: VITAMIN D3     warfarin 6 MG tablet  Commonly known as: COUMADIN  TAKE 6 MG DAILY EXCEPT FOR 9 MG ON  "FRIDAY TO THIN BLOOD            STOP taking these medications      losartan 100 MG tablet  Commonly known as: COZAAR  Stopped by: Scott Dillard MD               Where to Get Your Medications        These medications were sent to [facility]/pharmacy #[id number] - [address] AT COR[address] OF [address] [address]      Phone: [phone]   MOUNJARO 2.5 mg/0.5 mL Pnij  needle (disp) 25 gauge 25 gauge x 1" Ndle       Information about where to get these medications is not yet available    Ask your nurse or doctor about these medications  gabapentin 300 MG capsule       Type 2 diabetes mellitus without complication, without long-term current use of insulin  -     Hemoglobin A1C; Future    Menopause  -     DXA Bone Density Axial Skeleton 1 or more sites; Future; Expected date: 10/09/2023    Anxiety    JACQUELYN (obstructive sleep apnea)    Multilevel facet arthritis    Paroxysmal atrial fibrillation    Hypotension due to drugs    Other orders  -     tirzepatide (MOUNJARO) 2.5 mg/0.5 mL PnIj; Inject 2.5 mg into the skin every 7 days.  Dispense: 4 pen ; Refill: 11  -     needle, disp, 25 gauge 25 gauge x 1" Ndle; 1 Needle by Misc.(Non-Drug; Combo Route) route every 30 days.  Dispense: 25 each; Refill: 0  -     gabapentin (NEURONTIN) 300 MG capsule; One twice a day for one month, then one nightly for one month, then stop  Dispense: 90 capsule; Refill: 0          " Satisfactory

## 2023-10-12 ENCOUNTER — LAB VISIT (OUTPATIENT)
Dept: LAB | Facility: HOSPITAL | Age: 83
End: 2023-10-12
Attending: INTERNAL MEDICINE
Payer: MEDICARE

## 2023-10-12 ENCOUNTER — ANTI-COAG VISIT (OUTPATIENT)
Dept: CARDIOLOGY | Facility: CLINIC | Age: 83
End: 2023-10-12
Payer: MEDICARE

## 2023-10-12 DIAGNOSIS — E11.9 TYPE 2 DIABETES MELLITUS WITHOUT COMPLICATION, WITHOUT LONG-TERM CURRENT USE OF INSULIN: ICD-10-CM

## 2023-10-12 DIAGNOSIS — I48.0 PAROXYSMAL ATRIAL FIBRILLATION: Primary | Chronic | ICD-10-CM

## 2023-10-12 DIAGNOSIS — Z79.01 LONG TERM (CURRENT) USE OF ANTICOAGULANTS: ICD-10-CM

## 2023-10-12 DIAGNOSIS — I48.0 PAROXYSMAL ATRIAL FIBRILLATION: Chronic | ICD-10-CM

## 2023-10-12 LAB
ESTIMATED AVG GLUCOSE: 120 MG/DL (ref 68–131)
HBA1C MFR BLD: 5.8 % (ref 4–5.6)
INR PPP: 2.1 (ref 0.8–1.2)
PROTHROMBIN TIME: 21.7 SEC (ref 9–12.5)

## 2023-10-12 PROCEDURE — 93793 ANTICOAG MGMT PT WARFARIN: CPT | Mod: S$GLB,,, | Performed by: PHARMACIST

## 2023-10-12 PROCEDURE — 36415 COLL VENOUS BLD VENIPUNCTURE: CPT | Mod: PN | Performed by: INTERNAL MEDICINE

## 2023-10-12 PROCEDURE — 93793 PR ANTICOAGULANT MGMT FOR PT TAKING WARFARIN: ICD-10-PCS | Mod: S$GLB,,, | Performed by: PHARMACIST

## 2023-10-12 PROCEDURE — 85610 PROTHROMBIN TIME: CPT | Mod: PN | Performed by: INTERNAL MEDICINE

## 2023-10-12 PROCEDURE — 83036 HEMOGLOBIN GLYCOSYLATED A1C: CPT | Performed by: FAMILY MEDICINE

## 2023-10-17 ENCOUNTER — OFFICE VISIT (OUTPATIENT)
Dept: ORTHOPEDICS | Facility: CLINIC | Age: 83
End: 2023-10-17
Payer: MEDICARE

## 2023-10-17 VITALS — HEIGHT: 62 IN | BODY MASS INDEX: 40.48 KG/M2 | WEIGHT: 220 LBS

## 2023-10-17 DIAGNOSIS — S66.819D: Primary | ICD-10-CM

## 2023-10-17 PROCEDURE — 99212 OFFICE O/P EST SF 10 MIN: CPT | Mod: S$GLB,,, | Performed by: PHYSICIAN ASSISTANT

## 2023-10-17 PROCEDURE — 1159F MED LIST DOCD IN RCRD: CPT | Mod: CPTII,S$GLB,, | Performed by: PHYSICIAN ASSISTANT

## 2023-10-17 PROCEDURE — 3288F PR FALLS RISK ASSESSMENT DOCUMENTED: ICD-10-PCS | Mod: CPTII,S$GLB,, | Performed by: PHYSICIAN ASSISTANT

## 2023-10-17 PROCEDURE — 1160F RVW MEDS BY RX/DR IN RCRD: CPT | Mod: CPTII,S$GLB,, | Performed by: PHYSICIAN ASSISTANT

## 2023-10-17 PROCEDURE — 99999 PR PBB SHADOW E&M-EST. PATIENT-LVL II: ICD-10-PCS | Mod: PBBFAC,,, | Performed by: PHYSICIAN ASSISTANT

## 2023-10-17 PROCEDURE — 1101F PT FALLS ASSESS-DOCD LE1/YR: CPT | Mod: CPTII,S$GLB,, | Performed by: PHYSICIAN ASSISTANT

## 2023-10-17 PROCEDURE — 99999 PR PBB SHADOW E&M-EST. PATIENT-LVL II: CPT | Mod: PBBFAC,,, | Performed by: PHYSICIAN ASSISTANT

## 2023-10-17 PROCEDURE — 3288F FALL RISK ASSESSMENT DOCD: CPT | Mod: CPTII,S$GLB,, | Performed by: PHYSICIAN ASSISTANT

## 2023-10-17 PROCEDURE — 1101F PR PT FALLS ASSESS DOC 0-1 FALLS W/OUT INJ PAST YR: ICD-10-PCS | Mod: CPTII,S$GLB,, | Performed by: PHYSICIAN ASSISTANT

## 2023-10-17 PROCEDURE — 1160F PR REVIEW ALL MEDS BY PRESCRIBER/CLIN PHARMACIST DOCUMENTED: ICD-10-PCS | Mod: CPTII,S$GLB,, | Performed by: PHYSICIAN ASSISTANT

## 2023-10-17 PROCEDURE — 1126F AMNT PAIN NOTED NONE PRSNT: CPT | Mod: CPTII,S$GLB,, | Performed by: PHYSICIAN ASSISTANT

## 2023-10-17 PROCEDURE — 99212 PR OFFICE/OUTPT VISIT, EST, LEVL II, 10-19 MIN: ICD-10-PCS | Mod: S$GLB,,, | Performed by: PHYSICIAN ASSISTANT

## 2023-10-17 PROCEDURE — 1126F PR PAIN SEVERITY QUANTIFIED, NO PAIN PRESENT: ICD-10-PCS | Mod: CPTII,S$GLB,, | Performed by: PHYSICIAN ASSISTANT

## 2023-10-17 PROCEDURE — 1159F PR MEDICATION LIST DOCUMENTED IN MEDICAL RECORD: ICD-10-PCS | Mod: CPTII,S$GLB,, | Performed by: PHYSICIAN ASSISTANT

## 2023-10-17 NOTE — PROGRESS NOTES
Subjective:      Patient ID: Mis Ca is a 82 y.o. female.  Chief Complaint: Follow-up of the Left Hand      HPI  Mis Ca is a  82 y.o. female presenting today for post op visit.      She is s/p hardware removal from the wrist and extensor tendon transfer for rupture of the extensor tendons to the index and middle finger (DOS: 6-) approximately 3.5 mos postop.    She is not experiencing any pain, and has completed formal physical therapy for range of motion and strengthening.  She does note continued weakness to index and middle digit extension past neutral.  However, she is not having any difficulties completing daily tasks or activities.    Review of patient's allergies indicates:   Allergen Reactions    Adhesive     Compazine [prochlorperazine edisylate] Anxiety    Penicillins Rash    Sulfa (sulfonamide antibiotics) Rash    Vancomycin analogues Rash         Current Outpatient Medications   Medication Sig Dispense Refill    acetaminophen (TYLENOL) 500 MG tablet Take 1,000 mg by mouth 2 (two) times daily as needed for Pain.      alendronate (FOSAMAX) 70 MG tablet TAKE 1 TABLET (70 MG TOTAL) BY MOUTH EVERY 7 DAYS 12 tablet 3    ALPRAZolam (XANAX) 0.25 MG tablet TAKE 1 TABLET BY MOUTH TWICE A  tablet 0    amiodarone (PACERONE) 200 MG Tab Take 0.5 tablets (100 mg total) by mouth once daily. 45 tablet 3    amitriptyline (ELAVIL) 25 MG tablet Take 1 tablet (25 mg total) by mouth every evening. 30 tablet 5    aspirin (ECOTRIN) 81 MG EC tablet Take 81 mg by mouth once daily.      azelastine (ASTELIN) 137 mcg (0.1 %) nasal spray 1 spray (137 mcg total) by Nasal route 2 (two) times daily. 30 mL 11    chlorthalidone (HYGROTEN) 50 MG Tab TAKE 1 TABLET BY MOUTH EVERY DAY 90 tablet 3    cyanocobalamin 1,000 mcg/mL injection INJECT 1 ML (1,000 MCG TOTAL) INTO THE MUSCLE EVERY 30 DAYS. 3 mL 19    furosemide (LASIX) 20 MG tablet Take 1 tablet (20 mg total) by mouth once daily. 90 tablet 3     "gabapentin (NEURONTIN) 300 MG capsule One twice a day for one month, then one nightly for one month, then stop 90 capsule 0    latanoprost 0.005 % ophthalmic solution Place 1 drop into both eyes every evening.      levocetirizine (XYZAL) 5 MG tablet Take 1 tablet (5 mg total) by mouth every evening. 90 tablet 3    multivit-min-iron-FA-lutein (CENTRUM SILVER WOMEN) 8 mg iron-400 mcg-300 mcg Tab Take 1 tablet by mouth once daily.      mv-mn/om3/dha/epa/fish/lut/geoffrey (OCUVITE ADULT 50 PLUS ORAL) Take 1 tablet by mouth once daily.      needle, disp, 25 gauge 25 gauge x 1" Ndle 1 Needle by Misc.(Non-Drug; Combo Route) route every 30 days. 25 each 0    omeprazole (PRILOSEC) 20 MG capsule Take 1 capsule (20 mg total) by mouth 2 (two) times daily. 180 capsule 1    oxybutynin (DITROPAN) 5 MG Tab TAKE 1 TABLET BY MOUTH ONCE DAILY 90 tablet 3    potassium chloride (K-TAB) 20 mEq TAKE 1 TABLET BY MOUTH ONCE DAILY 90 tablet 3    pramipexole (MIRAPEX) 0.5 MG tablet TAKE 1 TABLET BY MOUTH TWICE  DAILY 180 tablet 3    pravastatin (PRAVACHOL) 40 MG tablet TAKE 1 TABLET BY MOUTH EVERY DAY 90 tablet 3    timolol maleate 0.5% (TIMOPTIC) 0.5 % Drop Place 1 drop into the left eye once daily.   0    UNABLE TO FIND Take 1 capsule by mouth 3 (three) times daily with meals. medication name: Golo Release Diet Capsules      vitamin D (VITAMIN D3) 1000 units Tab Take 1,000 Units by mouth once daily.      warfarin (COUMADIN) 6 MG tablet TAKE 6 MG DAILY EXCEPT FOR 9 MG ON FRIDAY TO THIN BLOOD 96 tablet 4    tirzepatide (MOUNJARO) 2.5 mg/0.5 mL PnIj Inject 2.5 mg into the skin every 7 days. (Patient not taking: Reported on 10/17/2023) 4 pen 11     No current facility-administered medications for this visit.     Facility-Administered Medications Ordered in Other Visits   Medication Dose Route Frequency Provider Last Rate Last Admin    lactated ringers infusion   Intravenous Continuous Don Ruano, MARLIN        LIDOcaine (PF) 10 mg/ml (1%) " injection 10 mg  1 mL Intradermal Once Don Ruano DNP           Past Medical History:   Diagnosis Date    Allergy     Anticoagulant long-term use     Anxiety     Arthritis     Atrial fibrillation     Bilateral renal cysts 05/01/2022    Blind right eye     Bradycardia     Cancer     skin cancer left side of face under eye    Hyperlipidemia     Hypertension     PVC (premature ventricular contraction)     RLS (restless legs syndrome)     Sleep apnea     Does not use CPAP machine.       Past Surgical History:   Procedure Laterality Date    ADENOIDECTOMY      PAM OSTEOTOMY Left 10/31/2018    Procedure: OSTEOTOMY, AKIN;  Surgeon: Rudolph Cardozo DPM;  Location: [facility] OR;  Service: Podiatry;  Laterality: Left;    APPENDECTOMY      BREAST BIOPSY Left     x3    BREAST SURGERY Left     breast biopsy x3    CATARACT EXTRACTION BILATERAL W/ ANTERIOR VITRECTOMY      ENDOSCOPIC GASTROCNEMIUS RECESSION Left 10/31/2018    Procedure: RECESSION, GASTROCNEMIUS, ENDOSCOPIC;  Surgeon: Rudolph Cardozo DPM;  Location: [facility] OR;  Service: Podiatry;  Laterality: Left;    ESOPHAGOGASTRODUODENOSCOPY N/A 2/11/2022    Procedure: EGD (ESOPHAGOGASTRODUODENOSCOPY);  Surgeon: Efren Aguilar MD;  Location: [facility];  Service: Endoscopy;  Laterality: N/A;  Patient needs a rapid covid  test done on 12/15/2021. thank you    EYE SURGERY Bilateral     cataracts extraction    EYE SURGERY Right     drainage tube (glaucoma)    FOOT ARTHRODESIS Left 1/15/2020    Procedure: FUSION, FOOT;  Surgeon: Rudolph Cardozo DPM;  Location: [facility] OR;  Service: Podiatry;  Laterality: Left;  mini c-arm, Arthrex screw  for hardware removal (Lydia notified), Orthof[employer] (Niels notified) min ex-fix    FOOT SURGERY Left     lesion removed from dorsal area    FRACTURE SURGERY Left     arm    HAND TENDON SURGERY Left     HYSTERECTOMY      INCISION AND DRAINAGE FOOT Left 3/11/2020    Procedure: INCISION AND DRAINAGE, FOOT;  Surgeon: Rudolph GOVEA  "ALVIN Cardozo;  Location: [facility] OR;  Service: Podiatry;  Laterality: Left;    LAPIDUS BUNIONECTOMY Left 10/31/2018    Procedure: BUNIONECTOMY, LAPIDUS;  Surgeon: Rudolph Cardozo DPM;  Location: [facility] OR;  Service: Podiatry;  Laterality: Left;  mini c-arm, Arthrex locking plate (Lydia notified)    LAPIDUS BUNIONECTOMY Left 10/31/2018    Dr. Cardozo    Lymph Gland Removed  Right     groin (performed by Dr. Vergara)    NEURECTOMY Left 1/15/2020    Procedure: NEURECTOMY;  Surgeon: Rudolph Cardozo DPM;  Location: [facility] OR;  Service: Podiatry;  Laterality: Left;  bipolar bovie, vessel loop, possible [place] nerve wrap. Moshe with [facility] notified and will be overnighting graft. MK 1/14/2020    OOPHORECTOMY      ORIF FOREARM FRACTURE Left     PALATE SURGERY      Lesion removed    REMOVAL,ORTHOPEDIC HARDWARE,UPPER EXTREMITY Left 6/30/2023    Procedure: REMOVAL,ORTHOPEDIC HARDWARE,UPPER EXTREMITY;  Surgeon: Skyler Oliva Jr., MD;  Location: [facility] OR;  Service: Orthopedics;  Laterality: Left;  César- Michael [employer] notified cc    REPAIR OF EXTENSOR TENDON Left 6/30/2023    Procedure: REPAIR, TENDON, EXTENSOR;  Surgeon: Skyler Oliva Jr., MD;  Location: [facility];  Service: Orthopedics;  Laterality: Left;  Left Index Digit    TONSILLECTOMY         OBJECTIVE:   PHYSICAL EXAM:  Height: 5' 2" (157.5 cm) Weight: 99.8 kg (220 lb)  Vitals:    10/17/23 1309   Weight: 99.8 kg (220 lb)   Height: 5' 2" (1.575 m)   PainSc: 0-No pain         Ortho/SPM Exam  Examination left hand and wrist  Surgical incisions are well healed with appropriate scar formation  Patient is nontender throughout  Sensation grossly intact throughout  Patient able to fully flex digits into a fist and extend digits into a neutral position.  Some weakness to extension past neutral (somewhat improved compared to last exam)    ASSESSMENT/PLAN:     IMPRESSION:  Status post removal hardware and extensor tendon transfer    PLAN:    Advance activity as " tolerated  Continue digit strengthening  No further intervention warranted    Patient encouraged to call with any future questions or concerns that she may have. She verbalized satisfaction with care and outcome.    FOLLOW UP:  Antelmo.

## 2023-10-20 ENCOUNTER — PATIENT MESSAGE (OUTPATIENT)
Dept: CARDIOLOGY | Facility: CLINIC | Age: 83
End: 2023-10-20
Payer: MEDICARE

## 2023-10-20 DIAGNOSIS — E11.9 TYPE 2 DIABETES MELLITUS WITHOUT COMPLICATION, WITHOUT LONG-TERM CURRENT USE OF INSULIN: Primary | ICD-10-CM

## 2023-10-26 ENCOUNTER — LAB VISIT (OUTPATIENT)
Dept: LAB | Facility: HOSPITAL | Age: 83
End: 2023-10-26
Attending: INTERNAL MEDICINE
Payer: MEDICARE

## 2023-10-26 ENCOUNTER — ANTI-COAG VISIT (OUTPATIENT)
Dept: CARDIOLOGY | Facility: CLINIC | Age: 83
End: 2023-10-26
Payer: MEDICARE

## 2023-10-26 DIAGNOSIS — Z79.01 LONG TERM (CURRENT) USE OF ANTICOAGULANTS: ICD-10-CM

## 2023-10-26 DIAGNOSIS — I48.0 PAROXYSMAL ATRIAL FIBRILLATION: Primary | Chronic | ICD-10-CM

## 2023-10-26 DIAGNOSIS — I48.0 PAROXYSMAL ATRIAL FIBRILLATION: Chronic | ICD-10-CM

## 2023-10-26 LAB
INR PPP: 2.4 (ref 0.8–1.2)
PROTHROMBIN TIME: 24.2 SEC (ref 9–12.5)

## 2023-10-26 PROCEDURE — 85610 PROTHROMBIN TIME: CPT | Mod: PN | Performed by: INTERNAL MEDICINE

## 2023-10-26 PROCEDURE — 93793 ANTICOAG MGMT PT WARFARIN: CPT | Mod: S$GLB,,, | Performed by: PHARMACIST

## 2023-10-26 PROCEDURE — 93793 PR ANTICOAGULANT MGMT FOR PT TAKING WARFARIN: ICD-10-PCS | Mod: S$GLB,,, | Performed by: PHARMACIST

## 2023-10-26 PROCEDURE — 36415 COLL VENOUS BLD VENIPUNCTURE: CPT | Mod: PN | Performed by: INTERNAL MEDICINE

## 2023-11-09 ENCOUNTER — ANTI-COAG VISIT (OUTPATIENT)
Dept: CARDIOLOGY | Facility: CLINIC | Age: 83
End: 2023-11-09
Payer: MEDICARE

## 2023-11-09 ENCOUNTER — LAB VISIT (OUTPATIENT)
Dept: LAB | Facility: HOSPITAL | Age: 83
End: 2023-11-09
Attending: INTERNAL MEDICINE
Payer: MEDICARE

## 2023-11-09 DIAGNOSIS — Z79.01 LONG TERM (CURRENT) USE OF ANTICOAGULANTS: ICD-10-CM

## 2023-11-09 DIAGNOSIS — Z79.01 LONG TERM (CURRENT) USE OF ANTICOAGULANTS: Primary | ICD-10-CM

## 2023-11-09 DIAGNOSIS — I48.0 PAROXYSMAL ATRIAL FIBRILLATION: Chronic | ICD-10-CM

## 2023-11-09 LAB
INR PPP: 2.2 (ref 0.8–1.2)
PROTHROMBIN TIME: 22.8 SEC (ref 9–12.5)

## 2023-11-09 PROCEDURE — 85610 PROTHROMBIN TIME: CPT | Mod: PN | Performed by: INTERNAL MEDICINE

## 2023-11-09 PROCEDURE — 36415 COLL VENOUS BLD VENIPUNCTURE: CPT | Mod: PN | Performed by: INTERNAL MEDICINE

## 2023-11-09 PROCEDURE — 93793 ANTICOAG MGMT PT WARFARIN: CPT | Mod: S$GLB,,, | Performed by: PHARMACIST

## 2023-11-09 PROCEDURE — 93793 PR ANTICOAGULANT MGMT FOR PT TAKING WARFARIN: ICD-10-PCS | Mod: S$GLB,,, | Performed by: PHARMACIST

## 2023-11-15 NOTE — PROGRESS NOTES
Patient ID: Mis Ca is a 80 y.o. female. This patient is new to me.    Chief Complaint: Atrial Fibrillation    Atrial Fibrillation  Presents for follow-up visit. Symptoms include dizziness, palpitations and weakness. Symptoms are negative for chest pain, shortness of breath and syncope. The symptoms have been resolved. Past medical history includes atrial fibrillation. There are no medication compliance problems.     Patient also complains of chronic posterior right knee pain. She had bunion surgery on her left foot and then had a subsequent MRSA infection requiring long term antibiotics through a PICC. She had to alter her gait so as not to put pressure on the foot. She is not taking anything for the pain. The pain is worse with weight bearing. The pain does not radiate. It is made better with rest.       Review of Systems  Review of Systems   Constitutional: Negative for fever.   HENT: Negative for ear pain and sinus pain.    Eyes: Negative for discharge.   Respiratory: Negative for cough and shortness of breath.    Cardiovascular: Positive for palpitations. Negative for chest pain, leg swelling and syncope.   Gastrointestinal: Negative for diarrhea, nausea and vomiting.   Genitourinary: Negative for urgency.   Musculoskeletal: Negative for myalgias.   Skin: Negative for rash.   Neurological: Positive for dizziness and weakness. Negative for headaches.   Psychiatric/Behavioral: Negative for depression.   All other systems reviewed and are negative.      Currently Medications  Current Outpatient Medications on File Prior to Visit   Medication Sig Dispense Refill    acetaminophen (TYLENOL) 500 MG tablet Take 1,000 mg by mouth 2 (two) times daily as needed for Pain.      ALPRAZolam (XANAX) 0.25 MG tablet TAKE ONE TABLET BY MOUTH TWICE DAILY AS NEEDED FOR ANXIETY  60 tablet 2    aspirin (ECOTRIN) 81 MG EC tablet Take 81 mg by mouth once daily.        atorvastatin (LIPITOR) 40 MG tablet TAKE 1 TABLET (40  MG TOTAL) BY MOUTH ONCE DAILY. 90 tablet 3    cyanocobalamin 1,000 mcg/mL injection INJECT 1ML INTRAMUSCULARLY WEEKLY FOR 4 WEEKS, THEN 1ML MONTHLY 3 mL 1    diltiaZEM (CARTIA XT) 240 MG 24 hr capsule Take 1 capsule (240 mg total) by mouth once daily. 90 capsule 3    furosemide (LASIX) 20 MG tablet Take 1 tablet (20 mg total) by mouth daily as needed. 30 tablet 3    gabapentin (NEURONTIN) 300 MG capsule TAKE 2 CAPSULES BY MOUTH TWICE DAILY 360 capsule 2    hydroCHLOROthiazide (HYDRODIURIL) 25 MG tablet Take 1 tablet (25 mg total) by mouth once daily. 90 tablet 3    latanoprost 0.005 % ophthalmic solution Place 1 drop into the left eye every evening.       losartan (COZAAR) 100 MG tablet TAKE 0.5 TABLETS (50 MG TOTAL) BY MOUTH ONCE DAILY. 45 tablet 1    omeprazole (PRILOSEC) 20 MG capsule Take 1 capsule (20 mg total) by mouth once daily. 90 capsule 3    oxybutynin (DITROPAN) 5 MG Tab TAKE 1 TABLET EVERY DAY 90 tablet 3    pramipexole (MIRAPEX) 0.5 MG tablet TAKE 1 TABLET TWICE DAILY (Patient taking differently: Take 0.5 mg by mouth every evening. ) 180 tablet 3    timolol maleate 0.5% (TIMOPTIC) 0.5 % Drop Place 1 drop into the left eye once daily.   0    warfarin (COUMADIN) 6 MG tablet Take 1 tablet (6 mg total) by mouth Daily. 90 tablet 3    clotrimazole (LOTRIMIN) 1 % cream Apply topically 2 (two) times daily. 60 g 2    docusate sodium (COLACE) 100 MG capsule Take 1 capsule (100 mg total) by mouth 2 (two) times daily. (Patient not taking: Reported on 9/18/2020)  0    doxycycline (ADOXA) 50 MG tablet Take 2 tablets (100 mg total) by mouth 2 (two) times daily. (Patient not taking: Reported on 7/9/2020) 40 tablet 1    multivitamin (ONE DAILY MULTIVITAMIN) per tablet Take 1 tablet by mouth once daily.      warfarin (COUMADIN) 5 MG tablet Take 1 tablet (5 mg total) by mouth Daily. (Patient not taking: Reported on 9/18/2020) 90 tablet 3     Current Facility-Administered Medications on File Prior to  Visit   Medication Dose Route Frequency Provider Last Rate Last Dose    cyanocobalamin injection 1,000 mcg  1,000 mcg Intramuscular Q30 Days Scott Dillard MD   1,000 mcg at 09/02/20 1033    cyanocobalamin injection 1,000 mcg  1,000 mcg Intramuscular Q30 Days Scott Dillard MD   1,000 mcg at 10/28/20 0904       Physical  Exam  Vitals:    11/11/20 0833 11/11/20 0911   BP: (!) 146/76 126/70   BP Location:  Right arm   Patient Position:  Sitting   Pulse: 66    Temp: 97.2 °F (36.2 °C)    SpO2: 95%    Weight: 98.6 kg (217 lb 7.7 oz)       Physical Exam  Vitals signs and nursing note reviewed.   Constitutional:       General: She is not in acute distress.     Appearance: She is not ill-appearing.   HENT:      Head: Normocephalic and atraumatic.      Right Ear: External ear normal.      Left Ear: External ear normal.      Nose: Nose normal.      Mouth/Throat:      Mouth: Mucous membranes are moist.   Eyes:      Extraocular Movements: Extraocular movements intact.      Conjunctiva/sclera: Conjunctivae normal.   Neck:      Musculoskeletal: Normal range of motion.   Cardiovascular:      Rate and Rhythm: Normal rate and regular rhythm.      Pulses: Normal pulses.      Heart sounds: Murmur (systolic) present.   Pulmonary:      Effort: Pulmonary effort is normal. No respiratory distress.      Breath sounds: No wheezing.   Abdominal:      General: There is no distension.      Palpations: Abdomen is soft. There is no mass.      Tenderness: There is no abdominal tenderness.   Musculoskeletal:         General: No swelling.      Right knee: She exhibits normal range of motion, no swelling and no effusion. No tenderness found.   Skin:     Coloration: Skin is not jaundiced.      Findings: No rash.   Neurological:      General: No focal deficit present.      Mental Status: She is alert and oriented to person, place, and time.   Psychiatric:         Mood and Affect: Mood normal.         Thought Content: Thought content normal.          Labs:    Complete Blood Count  Lab Results   Component Value Date    RBC 4.00 09/28/2020    HGB 12.6 09/28/2020    HCT 38.4 09/28/2020    MCV 96 09/28/2020    MCH 31.5 (H) 09/28/2020    MCHC 32.8 09/28/2020    RDW 13.2 09/28/2020     09/28/2020    MPV 10.9 09/28/2020    GRAN 3.6 09/28/2020    GRAN 53.2 09/28/2020    LYMPH 2.2 09/28/2020    LYMPH 33.1 09/28/2020    MONO 0.7 09/28/2020    MONO 9.7 09/28/2020    EOS 0.2 09/28/2020    BASO 0.04 09/28/2020    EOSINOPHIL 3.0 09/28/2020    BASOPHIL 0.6 09/28/2020    DIFFMETHOD Automated 09/28/2020       Comprehensive Metabolic Panel  Lab Results   Component Value Date     09/28/2020    BUN 25 (H) 09/28/2020    CREATININE 0.94 09/28/2020     09/28/2020    K 4.2 09/28/2020     09/28/2020    PROT 7.0 09/28/2020    ALBUMIN 4.2 09/28/2020    BILITOT 0.4 09/28/2020    AST 20 09/28/2020    ALKPHOS 89 09/28/2020    CO2 31 (H) 09/28/2020    ALT 16 09/28/2020    ANIONGAP 9 09/28/2020    EGFRNONAA 58.3 (A) 09/28/2020    ESTGFRAFRICA >60.0 09/28/2020       TSH  Lab Results   Component Value Date    TSH 1.580 09/28/2020       Imaging:  CT Urogram Abd Pelvis W WO  Narrative: EXAMINATION:  CT UROGRAM ABD PELVIS W WO    CLINICAL HISTORY:  Cyst of kidney, acquiredmonitor renal cyst; gross hematuria; cholelithiasis; appendectomy    TECHNIQUE:  Standard abdomen and pelvis CT protocol without and with IV contrast was performed.  125 mL of Omnipaque 350 contrast material was used for this examination.  There was no oral contrast administered.    COMPARISON:  A CT examination of the abdomen and pelvis performed on 09/20/2019.    FINDINGS:  Finding:    The size of the heart is within normal limits.  There are multiple noncalcified pulmonary nodules scattered throughout the visualized portion of both lungs.  One of the larger ones measures 4 mm and is located in the right lower lobe.  On the prior examination it measured 6 mm.  There are minimal dependent  atelectatic changes in both lungs.  There is no pneumothorax or pleural effusion.    The liver is enlarged.  It measures 20.1 cm in craniocaudal dimension.  There are several oval-shaped areas of hypodensity in the right lobe of the liver.  The larger 1 measures 9 mm.  There are multiple stones in the dependent portion of the gallbladder.  The pancreas and spleen are normal in appearance.  There is an 8 mm hypodense mass in the body of the left adrenal.  This mass has a Hounsfield measurement of -14 on the precontrast examination.  There is a 22 mm mass in the body of the right adrenal.  On the precontrast examination this area has a Hounsfield measurement of 10.  There is an 8 mm oval shaped hypodense area in the medial aspect of the midpole of the right kidney.  On the precontrast examination this area has a Hounsfield measurement of -57.  There is an 18 mm exophytic hypodense mass off of the medial aspect of the inferior pole of the right kidney.  On the precontrast examination this area has a Hounsfield measurement of -6.  There are several hypodense masses off of the left kidney.  The larger 1 measures 109 mm and is located off of the inferior pole of the left kidney.  On the precontrast examination this area has a Hounsfield measurement of 4.  There is a 51 mm complex mass off of the anterior aspect of the superior pole of the left kidney.  On the precontrast examination the hypodense portion of this mass has a Hounsfield measurement of 1.  This mass has areas of calcification associated with it.  The visualized portion of the ureters is normal in appearance.  There is a 30 mm diverticulum off of the superior aspect of the proximal portion of the 3rd portion of the duodenum.  There is no evidence of gastrointestinal obstruction.  There is no ascites.  There is no pneumoperitoneum.  There is a moderate amount of atherosclerosis.  There is grade 1 anterolisthesis of L4 on L5.  There are mild osteoarthritic  changes in the pubic symphysis.  There is a prominent size lymph node in the left groin.  This lymph node has a short axis measurement of 12 mm.  Impression: 1. There is an 18 mm exophytic hypodense mass off of the medial aspect of the inferior pole of the right kidney.  On the precontrast examination this area has a Hounsfield measurement of -6.  There are several hypodense masses off of the left kidney.  The larger 1 measures 109 mm and is located off of the inferior pole of the left kidney.  On the precontrast examination this area has a Hounsfield measurement of 4.  There is a 51 mm complex mass off of the anterior aspect of the superior pole of the left kidney.  On the precontrast examination the hypodense portion of this mass has a Hounsfield measurement of 1. This mass has areas of calcification associated with it.  There has been no significant interval change in the appearance of these cysts.  2. There are multiple stones in the dependent portion of the gallbladder.  3. The liver is enlarged.  It measures 20.1 cm in craniocaudal dimension.  There are several oval-shaped areas of hypodensity in the right lobe of the liver. The larger 1 measures 9 mm.  4. There is an 8 mm hypodense mass in the body of the left adrenal. This mass has a Hounsfield measurement of -14 on the precontrast examination. There is a 22 mm mass in the body of the right adrenal.  On the precontrast examination this area has a Hounsfield measurement of 10.  These are characteristic of adenomas.  5. is a 30 mm diverticulum off of the superior aspect of the proximal portion of the 3rd portion of the duodenum.  6. There is grade 1 anterolisthesis of L4 on L5.  7. There are mild osteoarthritic changes in the pubic symphysis.  8. There is a prominent size lymph node in the left groin. This lymph node has a short axis measurement of 12 mm.  All CT scans at this facility use dose modulation, iterative reconstruction, and/or weight base dosing  when appropriate to reduce radiation dose when appropriate to reduce radiation dose to as low as reasonably achievable.    Electronically signed by: Víctor Pope MD  Date:    07/08/2020  Time:    11:39      Assessment/Plan:      ICD-10-CM ICD-9-CM   1. B12 deficiency  E53.8 266.2   2. Anticoagulated on Coumadin  Z79.01 V58.61   3. Chronic pain of right knee  M25.561 719.46    G89.29 338.29   4. Paroxysmal atrial fibrillation  I48.0 427.31   5. Heart murmur  R01.1 785.2     B12 deficiency    Anticoagulated on Coumadin  -     POCT INR    Chronic pain of right knee  -     Ambulatory referral/consult to Physical/Occupational Therapy; Future; Expected date: 11/18/2020    Paroxysmal atrial fibrillation  -     Echo Color Flow Doppler? Yes    Heart murmur  -     Echo Color Flow Doppler? Yes      Weakness and dizziness  - seems to coincide with episodes of palpitations, likely paroxysmal a fib     Patient to schedule and see Dr. De Leon next week regarding going in and out of atrial fib. She is rate controlled not currently on a beta blocker. With pulse in the 60s today, I do not think it would be safe to add a beta blocker for rate control. Patient to discuss further rhythm control with cardiologist.     R knee pain  - likely due to osteoarthritis as she had increased weight bearing on this knee after foot surgery and subsequent infection  - patient would like referral to [facility]    Serafin Ly MD       20

## 2023-11-19 RX ORDER — OXYBUTYNIN CHLORIDE 5 MG/1
TABLET ORAL
Qty: 90 TABLET | Refills: 3 | Status: SHIPPED | OUTPATIENT
Start: 2023-11-19

## 2023-11-20 NOTE — TELEPHONE ENCOUNTER
Care Due:                  Date            Visit Type   Department     Provider  --------------------------------------------------------------------------------                                EP -                              PRIMARY      [facility] FAMILY  Last Visit: 10-      CARE ([facility])   JOSY Dillard                               -                              PRIMARY      [facility]  Next Visit: 04-      CARE ([facility])   JOSY Dillard                                                            Last  Test          Frequency    Reason                     Performed    Due Date  --------------------------------------------------------------------------------    Mg Level....  12 months..  alendronate..............  Not Found    Overdue    Phosphate...  12 months..  alendronate..............  Not Found    Overdue    Vitamin D...  12 months..  alendronate..............  Not Found    Overdue    [employer] Embedded Care Due Messages. Reference number: [id number].   11/19/2023 9:02:42 PM CST

## 2023-11-20 NOTE — TELEPHONE ENCOUNTER
Provider Staff:  Action required for this patient    Requires labs      Please see care gap opportunities below in Care Due Message.    Thanks!  [facility]     Appointments      Date Provider   Last Visit   10/9/2023 Scott Dillard MD   Next Visit   4/11/2024 Scott Dillard MD     Refill Decision Note   Mis Ca  is requesting a refill authorization.    Brief Assessment and Rationale for Refill:   Approve       Medication Therapy Plan:         Comments:     Note composed:11:11 PM 11/19/2023

## 2023-11-27 ENCOUNTER — LAB VISIT (OUTPATIENT)
Dept: LAB | Facility: HOSPITAL | Age: 83
End: 2023-11-27
Attending: INTERNAL MEDICINE
Payer: MEDICARE

## 2023-11-27 DIAGNOSIS — I48.0 PAROXYSMAL ATRIAL FIBRILLATION: Chronic | ICD-10-CM

## 2023-11-27 LAB
ALBUMIN SERPL BCP-MCNC: 4.2 G/DL (ref 3.5–5.2)
ALP SERPL-CCNC: 61 U/L (ref 38–126)
ALT SERPL W/O P-5'-P-CCNC: 21 U/L (ref 10–44)
ANION GAP SERPL CALC-SCNC: 10 MMOL/L (ref 8–16)
AST SERPL-CCNC: 26 U/L (ref 15–46)
BILIRUB SERPL-MCNC: 0.5 MG/DL (ref 0.1–1)
CALCIUM SERPL-MCNC: 9.6 MG/DL (ref 8.7–10.5)
CHLORIDE SERPL-SCNC: 98 MMOL/L (ref 95–110)
CO2 SERPL-SCNC: 33 MMOL/L (ref 23–29)
CREAT SERPL-MCNC: 1.19 MG/DL (ref 0.5–1.4)
EST. GFR  (NO RACE VARIABLE): 45.7 ML/MIN/1.73 M^2
GLUCOSE SERPL-MCNC: 107 MG/DL (ref 70–110)
POTASSIUM SERPL-SCNC: 4 MMOL/L (ref 3.5–5.1)
PROT SERPL-MCNC: 7 G/DL (ref 6–8.4)
SODIUM SERPL-SCNC: 141 MMOL/L (ref 136–145)
TSH SERPL DL<=0.005 MIU/L-ACNC: 1.91 UIU/ML (ref 0.4–4)
UUN UR-MCNC: 25 MG/DL (ref 7–17)

## 2023-11-27 PROCEDURE — 36415 COLL VENOUS BLD VENIPUNCTURE: CPT | Mod: PN | Performed by: INTERNAL MEDICINE

## 2023-11-27 PROCEDURE — 84443 ASSAY THYROID STIM HORMONE: CPT | Mod: PN | Performed by: INTERNAL MEDICINE

## 2023-11-27 PROCEDURE — 80053 COMPREHEN METABOLIC PANEL: CPT | Mod: PN | Performed by: INTERNAL MEDICINE

## 2023-11-30 ENCOUNTER — ANTI-COAG VISIT (OUTPATIENT)
Dept: CARDIOLOGY | Facility: CLINIC | Age: 83
End: 2023-11-30
Payer: MEDICARE

## 2023-11-30 ENCOUNTER — LAB VISIT (OUTPATIENT)
Dept: LAB | Facility: HOSPITAL | Age: 83
End: 2023-11-30
Attending: INTERNAL MEDICINE
Payer: MEDICARE

## 2023-11-30 DIAGNOSIS — Z79.01 LONG TERM (CURRENT) USE OF ANTICOAGULANTS: ICD-10-CM

## 2023-11-30 DIAGNOSIS — I48.0 PAROXYSMAL ATRIAL FIBRILLATION: Primary | Chronic | ICD-10-CM

## 2023-11-30 DIAGNOSIS — I48.0 PAROXYSMAL ATRIAL FIBRILLATION: Chronic | ICD-10-CM

## 2023-11-30 LAB
INR PPP: 2.4 (ref 0.8–1.2)
PROTHROMBIN TIME: 24.3 SEC (ref 9–12.5)

## 2023-11-30 PROCEDURE — 93793 ANTICOAG MGMT PT WARFARIN: CPT | Mod: S$GLB,,, | Performed by: PHARMACIST

## 2023-11-30 PROCEDURE — 85610 PROTHROMBIN TIME: CPT | Mod: PN | Performed by: INTERNAL MEDICINE

## 2023-11-30 PROCEDURE — 93793 PR ANTICOAGULANT MGMT FOR PT TAKING WARFARIN: ICD-10-PCS | Mod: S$GLB,,, | Performed by: PHARMACIST

## 2023-11-30 PROCEDURE — 36415 COLL VENOUS BLD VENIPUNCTURE: CPT | Mod: PN | Performed by: INTERNAL MEDICINE

## 2023-12-01 ENCOUNTER — OFFICE VISIT (OUTPATIENT)
Dept: CARDIOLOGY | Facility: CLINIC | Age: 83
End: 2023-12-01
Payer: MEDICARE

## 2023-12-01 VITALS
WEIGHT: 217 LBS | SYSTOLIC BLOOD PRESSURE: 120 MMHG | DIASTOLIC BLOOD PRESSURE: 77 MMHG | BODY MASS INDEX: 39.93 KG/M2 | HEIGHT: 62 IN | HEART RATE: 71 BPM

## 2023-12-01 DIAGNOSIS — E66.01 SEVERE OBESITY WITH BODY MASS INDEX (BMI) OF 35.0 TO 39.9 WITH SERIOUS COMORBIDITY: ICD-10-CM

## 2023-12-01 DIAGNOSIS — G47.33 OSA (OBSTRUCTIVE SLEEP APNEA): ICD-10-CM

## 2023-12-01 DIAGNOSIS — I10 ESSENTIAL HYPERTENSION: ICD-10-CM

## 2023-12-01 DIAGNOSIS — N18.31 CHRONIC KIDNEY DISEASE, STAGE 3A: ICD-10-CM

## 2023-12-01 DIAGNOSIS — I48.19 PERSISTENT ATRIAL FIBRILLATION: ICD-10-CM

## 2023-12-01 DIAGNOSIS — I48.0 PAROXYSMAL ATRIAL FIBRILLATION: Primary | Chronic | ICD-10-CM

## 2023-12-01 DIAGNOSIS — I70.0 AORTIC ATHEROSCLEROSIS: ICD-10-CM

## 2023-12-01 PROCEDURE — 3078F DIAST BP <80 MM HG: CPT | Mod: CPTII,S$GLB,, | Performed by: INTERNAL MEDICINE

## 2023-12-01 PROCEDURE — 1160F RVW MEDS BY RX/DR IN RCRD: CPT | Mod: CPTII,S$GLB,, | Performed by: INTERNAL MEDICINE

## 2023-12-01 PROCEDURE — 3288F PR FALLS RISK ASSESSMENT DOCUMENTED: ICD-10-PCS | Mod: CPTII,S$GLB,, | Performed by: INTERNAL MEDICINE

## 2023-12-01 PROCEDURE — 99999 PR PBB SHADOW E&M-EST. PATIENT-LVL IV: ICD-10-PCS | Mod: PBBFAC,,, | Performed by: INTERNAL MEDICINE

## 2023-12-01 PROCEDURE — 99214 PR OFFICE/OUTPT VISIT, EST, LEVL IV, 30-39 MIN: ICD-10-PCS | Mod: S$GLB,,, | Performed by: INTERNAL MEDICINE

## 2023-12-01 PROCEDURE — 1101F PT FALLS ASSESS-DOCD LE1/YR: CPT | Mod: CPTII,S$GLB,, | Performed by: INTERNAL MEDICINE

## 2023-12-01 PROCEDURE — 3288F FALL RISK ASSESSMENT DOCD: CPT | Mod: CPTII,S$GLB,, | Performed by: INTERNAL MEDICINE

## 2023-12-01 PROCEDURE — 1126F PR PAIN SEVERITY QUANTIFIED, NO PAIN PRESENT: ICD-10-PCS | Mod: CPTII,S$GLB,, | Performed by: INTERNAL MEDICINE

## 2023-12-01 PROCEDURE — 3074F SYST BP LT 130 MM HG: CPT | Mod: CPTII,S$GLB,, | Performed by: INTERNAL MEDICINE

## 2023-12-01 PROCEDURE — 1159F MED LIST DOCD IN RCRD: CPT | Mod: CPTII,S$GLB,, | Performed by: INTERNAL MEDICINE

## 2023-12-01 PROCEDURE — 1160F PR REVIEW ALL MEDS BY PRESCRIBER/CLIN PHARMACIST DOCUMENTED: ICD-10-PCS | Mod: CPTII,S$GLB,, | Performed by: INTERNAL MEDICINE

## 2023-12-01 PROCEDURE — 99214 OFFICE O/P EST MOD 30 MIN: CPT | Mod: S$GLB,,, | Performed by: INTERNAL MEDICINE

## 2023-12-01 PROCEDURE — 3074F PR MOST RECENT SYSTOLIC BLOOD PRESSURE < 130 MM HG: ICD-10-PCS | Mod: CPTII,S$GLB,, | Performed by: INTERNAL MEDICINE

## 2023-12-01 PROCEDURE — 99999 PR PBB SHADOW E&M-EST. PATIENT-LVL IV: CPT | Mod: PBBFAC,,, | Performed by: INTERNAL MEDICINE

## 2023-12-01 PROCEDURE — 1101F PR PT FALLS ASSESS DOC 0-1 FALLS W/OUT INJ PAST YR: ICD-10-PCS | Mod: CPTII,S$GLB,, | Performed by: INTERNAL MEDICINE

## 2023-12-01 PROCEDURE — 1159F PR MEDICATION LIST DOCUMENTED IN MEDICAL RECORD: ICD-10-PCS | Mod: CPTII,S$GLB,, | Performed by: INTERNAL MEDICINE

## 2023-12-01 PROCEDURE — 3078F PR MOST RECENT DIASTOLIC BLOOD PRESSURE < 80 MM HG: ICD-10-PCS | Mod: CPTII,S$GLB,, | Performed by: INTERNAL MEDICINE

## 2023-12-01 PROCEDURE — 1126F AMNT PAIN NOTED NONE PRSNT: CPT | Mod: CPTII,S$GLB,, | Performed by: INTERNAL MEDICINE

## 2023-12-01 RX ORDER — AMIODARONE HYDROCHLORIDE 200 MG/1
100 TABLET ORAL DAILY
Qty: 45 TABLET | Refills: 3 | Status: SHIPPED | OUTPATIENT
Start: 2023-12-01

## 2023-12-01 NOTE — PROGRESS NOTES
Subjective:    Patient ID:  Mis Ca is a 83 y.o. female who presents for evaluation of Atrial Fibrillation      Referring Cardiologist: Portillo Luis MD  Primary Care Physician: Scott Dillard MD    HPI  Prior Hx:  I had the pleasure of seeing Mrs. Ca in our electrophysiology clinic in follow-up for her atrial arrhythmia. As you are aware she is a pleasant 83 year-old woman with paroxysmal atrial fibrillation, hypertension, obstructive sleep apnea, aortic atherosclerosis and obesity. She reported palpitations for over 10 years. Ultimately diagnosed with pAF in 2014, at which time she was hospitalized in the ICU and treated with a diltiazem drip. She had several paroxysms and hospitalizations for pAF however was not referred to EP until 10/2022. Had recently been admitted for AF with RVR, rate controlled and discharged. She reported ongoing symptoms of dyspnea, palpitations, fatigue and at times hypotension. She is on coumadin for anticoagulation (DOACs are cost prohibitive). We discussed how rhythm control strategies utilizing anti-arrhythmic drugs and/or ablation strategies have lowered efficacy once in the persistent phase compared to paroxysmal phase. Due to LVH noted on ECHO (posterior wall 1.6cm), amiodarone is the only recommended drug. Discussed long-term toxicities related to amiodarone. She elected to proceed.    ECHO 11/2020: normal LV function with moderate LVH, normal mitral valve and mild left atrial enlargement.    I have reviewed all available electrocardiograms in Epic which show either sinus rhythm/bradycardia with left anterior fascicular block or AF, at times with RVR (11/2014, 9/2017, 10/2022).    11/4/2022: PAUL/DCCV and initiated on amiodarone    12/2022: Mrs. Ca returns for follow-up. She feels much better in sinus rhythm. Notes chronic leg edema. Has compression stockings but doesn't wear them. We reduced amiodarone to 100mg daily.    5/2023: Mrs. Ca returned for  follow-up. LFTs 3/2023 and TSH 12/2022 were normal. She felt well.    Interim Hx:  Mrs. Ca returns for follow-up. Feels well. No symptomatic AF. No bleeding issues on warfarin. Recent LFTs/TSH normal.    My interpretation of today's in clinic ECG is sinus rhythm with a rate of sinus rhythm with RBBB/LAFB      Review of Systems   Constitutional: Negative for fever and malaise/fatigue.   HENT:  Negative for congestion and sore throat.    Eyes:  Negative for blurred vision and visual disturbance.   Cardiovascular:  Positive for leg swelling. Negative for chest pain, dyspnea on exertion, irregular heartbeat, near-syncope, palpitations and syncope.   Respiratory:  Negative for cough and shortness of breath.    Hematologic/Lymphatic: Negative for bleeding problem. Does not bruise/bleed easily.   Skin: Negative.    Musculoskeletal: Negative.    Gastrointestinal:  Negative for bloating, abdominal pain, hematochezia and melena.   Neurological:  Negative for focal weakness and weakness.   Psychiatric/Behavioral: Negative.          Objective:    Physical Exam  Vitals reviewed.   Constitutional:       General: She is not in acute distress.     Appearance: She is well-developed. She is not diaphoretic.   HENT:      Head: Normocephalic and atraumatic.   Eyes:      General:         Right eye: No discharge.         Left eye: No discharge.      Conjunctiva/sclera: Conjunctivae normal.   Cardiovascular:      Rate and Rhythm: Normal rate and regular rhythm.      Heart sounds: No murmur heard.     No friction rub. No gallop.   Pulmonary:      Effort: Pulmonary effort is normal. No respiratory distress.      Breath sounds: Normal breath sounds. No wheezing or rales.   Abdominal:      General: Bowel sounds are normal. There is no distension.      Palpations: Abdomen is soft.      Tenderness: There is no abdominal tenderness.   Musculoskeletal:      Cervical back: Neck supple.   Skin:     General: Skin is warm and dry.   Neurological:       Mental Status: She is alert and oriented to person, place, and time.   Psychiatric:         Behavior: Behavior normal.         Thought Content: Thought content normal.         Judgment: Judgment normal.           Assessment:       1. Paroxysmal atrial fibrillation    2. Essential hypertension    3. Aortic atherosclerosis    4. Chronic kidney disease, stage 3a    5. Severe obesity with body mass index (BMI) of 35.0 to 39.9 with serious comorbidity    6. JACQUELYN (obstructive sleep apnea)         Plan:       In summary, Mrs. Ca is a pleasant 83 year-old woman with paroxysmal atrial fibrillation which is now persistent, hypertension, obstructive sleep apnea, aortic atherosclerosis and obesity. Her [id number] score is 5 and indefinite anticoagulation is indicated. She has elected for rhythm control. Due to LVH noted on ECHO (posterior wall 1.6cm), amiodarone is the only recommended drug. Discussed long-term toxicities. She is maintaining sinus rhythm on 100mg daily. Check CMP/TSH with next visit.    RTC in 6 months, sooner if needed.    Thank you for allowing me to participate in the care of this patient. Please do not hesitate to call me with any questions or concerns.    Shamar Owens MD, PhD  Cardiac Electrophysiology

## 2023-12-13 DIAGNOSIS — G47.33 OSA (OBSTRUCTIVE SLEEP APNEA): ICD-10-CM

## 2023-12-13 NOTE — TELEPHONE ENCOUNTER
No care due was identified.  [facility] Embedded Care Due Messages. Reference number: [id number].   12/13/2023 12:09:53 PM CST

## 2023-12-13 NOTE — TELEPHONE ENCOUNTER
----- Message from Josue Scherer sent at 12/13/2023 11:47 AM CST -----  Contact: Pt  .Type:  Needs Medical Advice    Who Called: pt  Pharmacy name and phone #: [id number] [place] - [address]   Phone: [phone]  Fax: [phone]  Would the patient rather a call back or a response via [facility]?  Call back  Best Call Back Number: [phone]  Additional Information: Pt. Needs a refill on her ALPRAZolam (XANAX) 0.25 MG tablet

## 2023-12-15 RX ORDER — ALPRAZOLAM 0.25 MG/1
0.25 TABLET ORAL 2 TIMES DAILY
Qty: 180 TABLET | Refills: 0 | Status: SHIPPED | OUTPATIENT
Start: 2023-12-15

## 2023-12-20 NOTE — TRANSFER OF CARE
"Anesthesia Transfer of Care Note    Patient: Mis Ca    Procedure(s) Performed: Procedure(s) (LRB):  FUSION, FOOT (Left)  NEURECTOMY (Left)    Patient location: PACU    Anesthesia Type: general    Transport from OR: Transported from OR on 6-10 L/min O2 by face mask with adequate spontaneous ventilation    Post pain: adequate analgesia    Post assessment: no apparent anesthetic complications and tolerated procedure well    Post vital signs: stable    Level of consciousness: responds to stimulation and sedated    Nausea/Vomiting: no nausea/vomiting    Complications: none    Transfer of care protocol was followed      Last vitals:   Visit Vitals  /67   Pulse 66   Temp 37.1 °C (98.8 °F) (Skin)   Resp 18   Ht 5' 2" (1.575 m)   Wt 93 kg (205 lb)   SpO2 (!) 94%   Breastfeeding? No   BMI 37.49 kg/m²     " Well appearing, awake, alert, oriented to person, place, time/situation and in no apparent distress. normal...

## 2023-12-28 ENCOUNTER — LAB VISIT (OUTPATIENT)
Dept: LAB | Facility: HOSPITAL | Age: 83
End: 2023-12-28
Attending: INTERNAL MEDICINE
Payer: MEDICARE

## 2023-12-28 ENCOUNTER — ANTI-COAG VISIT (OUTPATIENT)
Dept: CARDIOLOGY | Facility: CLINIC | Age: 83
End: 2023-12-28
Payer: MEDICARE

## 2023-12-28 DIAGNOSIS — I48.0 PAROXYSMAL ATRIAL FIBRILLATION: Chronic | ICD-10-CM

## 2023-12-28 DIAGNOSIS — Z79.01 LONG TERM (CURRENT) USE OF ANTICOAGULANTS: ICD-10-CM

## 2023-12-28 DIAGNOSIS — I48.0 PAROXYSMAL ATRIAL FIBRILLATION: Primary | Chronic | ICD-10-CM

## 2023-12-28 LAB
INR PPP: 2.4 (ref 0.8–1.2)
PROTHROMBIN TIME: 25.1 SEC (ref 9–12.5)

## 2023-12-28 PROCEDURE — 93793 ANTICOAG MGMT PT WARFARIN: CPT | Mod: S$GLB,,, | Performed by: PHARMACIST

## 2023-12-28 PROCEDURE — 93793 PR ANTICOAGULANT MGMT FOR PT TAKING WARFARIN: ICD-10-PCS | Mod: S$GLB,,, | Performed by: PHARMACIST

## 2023-12-28 PROCEDURE — 36415 COLL VENOUS BLD VENIPUNCTURE: CPT | Mod: PN | Performed by: INTERNAL MEDICINE

## 2023-12-28 PROCEDURE — 85610 PROTHROMBIN TIME: CPT | Mod: PN | Performed by: INTERNAL MEDICINE

## 2024-01-09 ENCOUNTER — TELEPHONE (OUTPATIENT)
Dept: FAMILY MEDICINE | Facility: CLINIC | Age: 84
End: 2024-01-09
Payer: MEDICARE

## 2024-01-09 NOTE — TELEPHONE ENCOUNTER
Returned call to [facility]; spoke with Joyce VIEIRA  Provided clarification of (4) pens per month  All approved  ----- Message from Michelle Grimaldo sent at 1/9/2024 11:06 AM CST -----  Type:  Pharmacy Calling to Clarify an RX    Name of Caller: Sherin  Pharmacy Name: [facility]  Prescription Name:   tirzepatide (MOUNJARO) 2.5 mg/0.5 mL PnIj [[id number]   What do they need to    clarify?: how many pens pt needs for a month.  Best Call Back Number: [phone]  Additional Information:

## 2024-01-25 DIAGNOSIS — R60.9 SWELLING: ICD-10-CM

## 2024-01-25 DIAGNOSIS — K22.4 ESOPHAGEAL DYSMOTILITY: ICD-10-CM

## 2024-01-25 DIAGNOSIS — R13.19 ESOPHAGEAL DYSPHAGIA: ICD-10-CM

## 2024-01-25 RX ORDER — AMITRIPTYLINE HYDROCHLORIDE 25 MG/1
25 TABLET, FILM COATED ORAL NIGHTLY
Qty: 90 TABLET | Refills: 1 | Status: SHIPPED | OUTPATIENT
Start: 2024-01-25

## 2024-01-25 NOTE — TELEPHONE ENCOUNTER
----- Message from Suleman Bruce sent at 1/25/2024 11:56 AM CST -----  .Type:  Needs Medical Advice    Who Called: pt    Would the patient rather a call back or a response via [facility]? Call back  Best Call Back Number: [phone]  Additional Information:     Pt stated she want the doctor to know [facility] for her fluid pill to be approved

## 2024-01-25 NOTE — TELEPHONE ENCOUNTER
Care Due:                  Date            Visit Type   Department     Provider  --------------------------------------------------------------------------------                                EP -                              PRIMARY      [facility] FAMILY  Last Visit: 10-      CARE ([facility])   JOSY Dillard                               -                              [facility]  Next Visit: 04-      CARE ([facility])   JOSY Dillard                                                            Last  Test          Frequency    Reason                     Performed    Due Date  --------------------------------------------------------------------------------    HBA1C.......  6 months...  tirzepatide..............  10-   04-    Mg Level....  12 months..  alendronate..............  Not Found    Overdue    Phosphate...  12 months..  alendronate..............  Not Found    Overdue    Vitamin D...  12 months..  alendronate..............  Not Found    Overdue    [employer] Embedded Care Due Messages. Reference number: [id number].   1/25/2024 12:09:12 PM CST

## 2024-01-26 RX ORDER — FUROSEMIDE 20 MG/1
20 TABLET ORAL DAILY
Qty: 90 TABLET | Refills: 3 | Status: SHIPPED | OUTPATIENT
Start: 2024-01-26

## 2024-01-29 ENCOUNTER — ANTI-COAG VISIT (OUTPATIENT)
Dept: CARDIOLOGY | Facility: CLINIC | Age: 84
End: 2024-01-29
Payer: MEDICARE

## 2024-01-29 ENCOUNTER — LAB VISIT (OUTPATIENT)
Dept: LAB | Facility: HOSPITAL | Age: 84
End: 2024-01-29
Attending: INTERNAL MEDICINE
Payer: MEDICARE

## 2024-01-29 DIAGNOSIS — Z79.01 LONG TERM (CURRENT) USE OF ANTICOAGULANTS: ICD-10-CM

## 2024-01-29 DIAGNOSIS — I48.0 PAROXYSMAL ATRIAL FIBRILLATION: Chronic | ICD-10-CM

## 2024-01-29 DIAGNOSIS — I48.0 PAROXYSMAL ATRIAL FIBRILLATION: Primary | Chronic | ICD-10-CM

## 2024-01-29 LAB
INR PPP: 2.4 (ref 0.8–1.2)
PROTHROMBIN TIME: 24.6 SEC (ref 9–12.5)

## 2024-01-29 PROCEDURE — 85610 PROTHROMBIN TIME: CPT | Mod: PN | Performed by: INTERNAL MEDICINE

## 2024-01-29 PROCEDURE — 93793 ANTICOAG MGMT PT WARFARIN: CPT | Mod: S$GLB,,, | Performed by: PHARMACIST

## 2024-01-29 PROCEDURE — 36415 COLL VENOUS BLD VENIPUNCTURE: CPT | Mod: PN | Performed by: INTERNAL MEDICINE

## 2024-01-30 ENCOUNTER — PATIENT MESSAGE (OUTPATIENT)
Dept: CARDIOLOGY | Facility: CLINIC | Age: 84
End: 2024-01-30
Payer: MEDICARE

## 2024-01-30 DIAGNOSIS — I10 ESSENTIAL HYPERTENSION: Primary | ICD-10-CM

## 2024-01-30 RX ORDER — CHLORTHALIDONE 50 MG/1
50 TABLET ORAL DAILY
Qty: 90 TABLET | Refills: 3 | Status: SHIPPED | OUTPATIENT
Start: 2024-01-30

## 2024-02-06 DIAGNOSIS — E87.6 HYPOKALEMIA: ICD-10-CM

## 2024-02-07 RX ORDER — POTASSIUM CHLORIDE 1500 MG/1
20 TABLET, EXTENDED RELEASE ORAL DAILY
Qty: 90 TABLET | Refills: 3 | Status: SHIPPED | OUTPATIENT
Start: 2024-02-07 | End: 2025-02-01

## 2024-02-27 RX ORDER — OMEPRAZOLE 20 MG/1
20 CAPSULE, DELAYED RELEASE ORAL 2 TIMES DAILY
Qty: 180 CAPSULE | Refills: 2 | Status: SHIPPED | OUTPATIENT
Start: 2024-02-27

## 2024-02-27 NOTE — TELEPHONE ENCOUNTER
Left message for pt to call back- If pt would like to have surgical clearance tomorrow, an appt can be scheduled with Dr. Mclain. An appt with Dr. Dillard has already tentatively been scheduled for 10/19/18.   stated

## 2024-02-28 NOTE — TELEPHONE ENCOUNTER
No care due was identified.  [facility] Embedded Care Due Messages. Reference number: [id number].   2/27/2024 9:44:11 PM CST

## 2024-02-28 NOTE — TELEPHONE ENCOUNTER
Refill Decision Note   Misjoan Hughesjuany  is requesting a refill authorization.  Brief Assessment and Rationale for Refill:  Approve     Medication Therapy Plan:         Comments:     Note composed:9:47 PM 02/27/2024

## 2024-03-04 ENCOUNTER — LAB VISIT (OUTPATIENT)
Dept: LAB | Facility: HOSPITAL | Age: 84
End: 2024-03-04
Attending: INTERNAL MEDICINE
Payer: MEDICARE

## 2024-03-04 ENCOUNTER — ANTI-COAG VISIT (OUTPATIENT)
Dept: CARDIOLOGY | Facility: CLINIC | Age: 84
End: 2024-03-04
Payer: MEDICARE

## 2024-03-04 DIAGNOSIS — Z79.01 LONG TERM (CURRENT) USE OF ANTICOAGULANTS: ICD-10-CM

## 2024-03-04 DIAGNOSIS — I48.0 PAROXYSMAL ATRIAL FIBRILLATION: Chronic | ICD-10-CM

## 2024-03-04 DIAGNOSIS — I48.0 PAROXYSMAL ATRIAL FIBRILLATION: Primary | Chronic | ICD-10-CM

## 2024-03-04 LAB
INR PPP: 2.5 (ref 0.8–1.2)
PROTHROMBIN TIME: 25.8 SEC (ref 9–12.5)

## 2024-03-04 PROCEDURE — 85610 PROTHROMBIN TIME: CPT | Mod: PN | Performed by: INTERNAL MEDICINE

## 2024-03-04 PROCEDURE — 36415 COLL VENOUS BLD VENIPUNCTURE: CPT | Mod: PN | Performed by: INTERNAL MEDICINE

## 2024-03-04 PROCEDURE — 93793 ANTICOAG MGMT PT WARFARIN: CPT | Mod: S$GLB,,, | Performed by: PHARMACIST

## 2024-03-09 DIAGNOSIS — I10 ESSENTIAL HYPERTENSION: ICD-10-CM

## 2024-03-11 RX ORDER — LOSARTAN POTASSIUM 100 MG/1
50 TABLET ORAL DAILY
Qty: 45 TABLET | Refills: 3 | Status: SHIPPED | OUTPATIENT
Start: 2024-03-11 | End: 2024-05-17 | Stop reason: SDUPTHER

## 2024-03-19 ENCOUNTER — PATIENT MESSAGE (OUTPATIENT)
Dept: ADMINISTRATIVE | Facility: HOSPITAL | Age: 84
End: 2024-03-19
Payer: MEDICARE

## 2024-03-20 ENCOUNTER — PATIENT MESSAGE (OUTPATIENT)
Dept: ADMINISTRATIVE | Facility: HOSPITAL | Age: 84
End: 2024-03-20
Payer: MEDICARE

## 2024-03-20 ENCOUNTER — PATIENT OUTREACH (OUTPATIENT)
Dept: ADMINISTRATIVE | Facility: HOSPITAL | Age: 84
End: 2024-03-20
Payer: MEDICARE

## 2024-03-20 NOTE — PROGRESS NOTES
Population Health Chart Review & Patient Outreach Details      Additional Pop Health Notes:               Updates Requested / Reviewed:               Health Maintenance Topics Overdue:      VB Score: 1     Osteoporosis Screening    Influenza Vaccine  RSV Vaccine                  Health Maintenance Topic(s) Outreach Outcomes & Actions Taken:    Osteoporosis Screening - Outreach Outcomes & Actions Taken  : portal sent

## 2024-03-21 ENCOUNTER — PATIENT MESSAGE (OUTPATIENT)
Dept: FAMILY MEDICINE | Facility: CLINIC | Age: 84
End: 2024-03-21
Payer: MEDICARE

## 2024-03-21 DIAGNOSIS — Z12.31 ENCOUNTER FOR SCREENING MAMMOGRAM FOR MALIGNANT NEOPLASM OF BREAST: Primary | ICD-10-CM

## 2024-03-31 DIAGNOSIS — E78.2 MIXED HYPERLIPIDEMIA: ICD-10-CM

## 2024-04-01 RX ORDER — PRAVASTATIN SODIUM 40 MG/1
TABLET ORAL
Qty: 90 TABLET | Refills: 3 | Status: SHIPPED | OUTPATIENT
Start: 2024-04-01

## 2024-04-02 RX ORDER — LEVOCETIRIZINE DIHYDROCHLORIDE 5 MG/1
5 TABLET, FILM COATED ORAL NIGHTLY
Qty: 90 TABLET | Refills: 1 | Status: SHIPPED | OUTPATIENT
Start: 2024-04-02

## 2024-04-02 NOTE — TELEPHONE ENCOUNTER
Provider Staff:  Action required for this patient    Requires labs      Please see care gap opportunities below in Care Due Message.    Thanks!  [facility]     Appointments      Date Provider   Last Visit   10/9/2023 Scott Dillard MD   Next Visit   4/11/2024 Scott Dillard MD     Refill Decision Note   Mis Ca  is requesting a refill authorization.  Brief Assessment and Rationale for Refill:  Approve     Medication Therapy Plan:         Comments:     Note composed:5:52 AM 04/02/2024

## 2024-04-02 NOTE — TELEPHONE ENCOUNTER
Care Due:                  Date            Visit Type   Department     Provider  --------------------------------------------------------------------------------                                EP -                              [facility]  Last Visit: 10-      CARE ([facility])   JOSY Dillard                               -                              [facility]  Next Visit: 04-      CARE ([facility])   JOSY Dillard                                                            Last  Test          Frequency    Reason                     Performed    Due Date  --------------------------------------------------------------------------------    HBA1C.......  6 months...  tirzepatide..............  10-   04-    Mg Level....  12 months..  alendronate..............  Not Found    Overdue    Phosphate...  12 months..  alendronate..............  Not Found    Overdue    Vitamin D...  12 months..  alendronate..............  Not Found    Overdue    [employer] Embedded Care Due Messages. Reference number: [id number].   4/02/2024 12:50:41 AM CDT

## 2024-04-03 RX ORDER — CYANOCOBALAMIN 1000 UG/ML
INJECTION, SOLUTION INTRAMUSCULAR; SUBCUTANEOUS
Qty: 3 ML | Refills: 19 | Status: SHIPPED | OUTPATIENT
Start: 2024-04-03

## 2024-04-08 ENCOUNTER — LAB VISIT (OUTPATIENT)
Dept: LAB | Facility: HOSPITAL | Age: 84
End: 2024-04-08
Attending: INTERNAL MEDICINE
Payer: MEDICARE

## 2024-04-08 ENCOUNTER — ANTI-COAG VISIT (OUTPATIENT)
Dept: CARDIOLOGY | Facility: CLINIC | Age: 84
End: 2024-04-08
Payer: MEDICARE

## 2024-04-08 DIAGNOSIS — I48.0 PAROXYSMAL ATRIAL FIBRILLATION: Chronic | ICD-10-CM

## 2024-04-08 DIAGNOSIS — Z79.01 LONG TERM (CURRENT) USE OF ANTICOAGULANTS: ICD-10-CM

## 2024-04-08 DIAGNOSIS — I48.0 PAROXYSMAL ATRIAL FIBRILLATION: Primary | Chronic | ICD-10-CM

## 2024-04-08 DIAGNOSIS — E11.9 TYPE 2 DIABETES MELLITUS WITHOUT COMPLICATION, WITHOUT LONG-TERM CURRENT USE OF INSULIN: ICD-10-CM

## 2024-04-08 LAB
ESTIMATED AVG GLUCOSE: 111 MG/DL (ref 68–131)
HBA1C MFR BLD: 5.5 % (ref 4–5.6)
INR PPP: 1.8 (ref 0.8–1.2)
PROTHROMBIN TIME: 18.8 SEC (ref 9–12.5)

## 2024-04-08 PROCEDURE — 36415 COLL VENOUS BLD VENIPUNCTURE: CPT | Mod: PN | Performed by: INTERNAL MEDICINE

## 2024-04-08 PROCEDURE — 93793 ANTICOAG MGMT PT WARFARIN: CPT | Mod: S$GLB,,, | Performed by: PHARMACIST

## 2024-04-08 PROCEDURE — 85610 PROTHROMBIN TIME: CPT | Mod: PN | Performed by: INTERNAL MEDICINE

## 2024-04-08 PROCEDURE — 83036 HEMOGLOBIN GLYCOSYLATED A1C: CPT | Performed by: FAMILY MEDICINE

## 2024-04-09 ENCOUNTER — TELEPHONE (OUTPATIENT)
Dept: CARDIOLOGY | Facility: CLINIC | Age: 84
End: 2024-04-09
Payer: MEDICARE

## 2024-04-09 NOTE — TELEPHONE ENCOUNTER
[employer] message sent to patient.    Debbie Ca,  You are scheduled to see Dr Owens on May 24th @ 11:20 am at the [facility].  That is the soonest available and you have been added to the wait list for possible sooner appointment.  Thanks,  Raven    ===View-only below this line===      ----- Message -----       From:Mis Ca       Sent:4/8/2024  5:44 PM CDT         To:Shamar Owens MD    Subject:Appointment Request    Appointment Request From: Mis Ca    With Provider: Shamar Owens MD [[place]]    Preferred Date Range: Any date 6/14/2024 or later    Preferred Times: Any Time    Reason for visit: Follow up    Comments:

## 2024-04-11 ENCOUNTER — TELEPHONE (OUTPATIENT)
Dept: FAMILY MEDICINE | Facility: CLINIC | Age: 84
End: 2024-04-11

## 2024-04-11 ENCOUNTER — OFFICE VISIT (OUTPATIENT)
Dept: FAMILY MEDICINE | Facility: CLINIC | Age: 84
End: 2024-04-11
Payer: MEDICARE

## 2024-04-11 VITALS
SYSTOLIC BLOOD PRESSURE: 102 MMHG | HEART RATE: 78 BPM | DIASTOLIC BLOOD PRESSURE: 66 MMHG | WEIGHT: 213.31 LBS | BODY MASS INDEX: 39.26 KG/M2 | HEIGHT: 62 IN | OXYGEN SATURATION: 95 % | TEMPERATURE: 98 F

## 2024-04-11 DIAGNOSIS — E11.9 TYPE 2 DIABETES MELLITUS WITHOUT COMPLICATION, WITHOUT LONG-TERM CURRENT USE OF INSULIN: Primary | ICD-10-CM

## 2024-04-11 DIAGNOSIS — N28.1 BILATERAL RENAL CYSTS: ICD-10-CM

## 2024-04-11 DIAGNOSIS — G61.9 INFLAMMATORY POLYNEUROPATHY, UNSPECIFIED: ICD-10-CM

## 2024-04-11 DIAGNOSIS — M46.99 UNSPECIFIED INFLAMMATORY SPONDYLOPATHY, MULTIPLE SITES IN SPINE: ICD-10-CM

## 2024-04-11 DIAGNOSIS — I70.0 AORTIC ATHEROSCLEROSIS: ICD-10-CM

## 2024-04-11 DIAGNOSIS — E66.01 SEVERE OBESITY WITH BODY MASS INDEX (BMI) OF 35.0 TO 39.9 WITH SERIOUS COMORBIDITY: ICD-10-CM

## 2024-04-11 DIAGNOSIS — E27.8 OTHER SPECIFIED DISORDERS OF ADRENAL GLAND: ICD-10-CM

## 2024-04-11 DIAGNOSIS — N18.31 CHRONIC KIDNEY DISEASE, STAGE 3A: ICD-10-CM

## 2024-04-11 DIAGNOSIS — I48.0 PAROXYSMAL ATRIAL FIBRILLATION: ICD-10-CM

## 2024-04-11 PROCEDURE — 1160F RVW MEDS BY RX/DR IN RCRD: CPT | Mod: CPTII,S$GLB,, | Performed by: FAMILY MEDICINE

## 2024-04-11 PROCEDURE — 3288F FALL RISK ASSESSMENT DOCD: CPT | Mod: CPTII,S$GLB,, | Performed by: FAMILY MEDICINE

## 2024-04-11 PROCEDURE — 1125F AMNT PAIN NOTED PAIN PRSNT: CPT | Mod: CPTII,S$GLB,, | Performed by: FAMILY MEDICINE

## 2024-04-11 PROCEDURE — 99214 OFFICE O/P EST MOD 30 MIN: CPT | Mod: S$GLB,,, | Performed by: FAMILY MEDICINE

## 2024-04-11 PROCEDURE — 3078F DIAST BP <80 MM HG: CPT | Mod: CPTII,S$GLB,, | Performed by: FAMILY MEDICINE

## 2024-04-11 PROCEDURE — 3074F SYST BP LT 130 MM HG: CPT | Mod: CPTII,S$GLB,, | Performed by: FAMILY MEDICINE

## 2024-04-11 PROCEDURE — 1159F MED LIST DOCD IN RCRD: CPT | Mod: CPTII,S$GLB,, | Performed by: FAMILY MEDICINE

## 2024-04-11 PROCEDURE — 1101F PT FALLS ASSESS-DOCD LE1/YR: CPT | Mod: CPTII,S$GLB,, | Performed by: FAMILY MEDICINE

## 2024-04-11 NOTE — PROGRESS NOTES
"Subjective:      Patient ID: Mis Ca is a 83 y.o. female.    Chief Complaint: Follow-up (6 mon)      Vitals:    24 1434   BP: 102/66   Pulse: 78   Temp: 97.8 °F (36.6 °C)   TempSrc: Oral   SpO2: 95%   Weight: 96.7 kg (213 lb 4.7 oz)   Height: 5' 2" (1.575 m)        HPI   Constipated form xuan, on colace, told to take MOM  Was off gabapentin, resumed due to back paion and weakness, and  jumpy legs and now on 1 bid and this helps  Chest pain HS relieved with water  Gastric polyps  Her sister  in Feb Bernadette Swann, her stop daughter  of uterine cnacer  A1c down to 5.5  Had atr fib, near syncope, heart rate 130 and it resolve      Problem List  Patient Active Problem List   Diagnosis    Paroxysmal atrial fibrillation    JACQUELYN (obstructive sleep apnea)    Long term (current) use of anticoagulants    Anxiety    Restless leg syndrome    Hyperlipidemia    Essential hypertension    Gastroesophageal reflux disease without esophagitis    History of adenomatous polyp of colon    Diverticulosis of large intestine without hemorrhage    Tortuous aorta    Glaucoma    Blind right eye    Aortic atherosclerosis    Severe obesity with body mass index (BMI) of 35.0 to 39.9 with serious comorbidity    Hallux valgus with bunions, left    Antalgic gait    Multilevel facet arthritis    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    Other specified disorders of adrenal gland    Unspecified inflammatory spondylopathy, 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    Inflammatory polyneuropathy, unspecified    Umbilical hernia without obstruction and without gangrene    Bilateral renal cysts    Chronic kidney disease, stage 3a    Rupture of extensor tendon of left hand    Muscle weakness    Stiffness of left wrist joint    Stiffness of finger joint, left    Type 2 diabetes mellitus without complication, without long-term current use of insulin        ALLERGIES:   Review of patient's allergies indicates:   Allergen Reactions    Adhesive     Compazine " "[prochlorperazine edisylate] Anxiety    Penicillins Rash    Sulfa (sulfonamide antibiotics) Rash    Vancomycin analogues Rash       MEDS:   Current Outpatient Medications:     alendronate (FOSAMAX) 70 MG tablet, TAKE 1 TABLET (70 MG TOTAL) BY MOUTH EVERY 7 DAYS, Disp: 12 tablet, Rfl: 3    amitriptyline (ELAVIL) 25 MG tablet, TAKE 1 TABLET BY MOUTH EVERY EVENING, Disp: 90 tablet, Rfl: 1    aspirin (ECOTRIN) 81 MG EC tablet, Take 81 mg by mouth once daily., Disp: , Rfl:     chlorthalidone (HYGROTEN) 50 MG Tab, Take 1 tablet (50 mg total) by mouth once daily., Disp: 90 tablet, Rfl: 3    cyanocobalamin 1,000 mcg/mL injection, INJECT 1 ML (1,000 MCG) INTRAMUSCULARLY EVERY 30 DAYS, Disp: 3 mL, Rfl: 19    furosemide (LASIX) 20 MG tablet, Take 1 tablet (20 mg total) by mouth once daily., Disp: 90 tablet, Rfl: 3    gabapentin (NEURONTIN) 300 MG capsule, One twice a day for one month, then one nightly for one month, then stop, Disp: 90 capsule, Rfl: 0    latanoprost 0.005 % ophthalmic solution, Place 1 drop into both eyes every evening., Disp: , Rfl:     levocetirizine (XYZAL) 5 MG tablet, TAKE 1 TABLET BY MOUTH EVERY DAY IN THE EVENING, Disp: 90 tablet, Rfl: 1    losartan (COZAAR) 100 MG tablet, TAKE 0.5 TABLETS (50 MG TOTAL) BY MOUTH ONCE DAILY, Disp: 45 tablet, Rfl: 3    multivit-min-iron-FA-lutein (CENTRUM SILVER WOMEN) 8 mg iron-400 mcg-300 mcg Tab, Take 1 tablet by mouth once daily., Disp: , Rfl:     mv-mn/om3/dha/epa/fish/lut/geoffrey (OCUVITE ADULT 50 PLUS ORAL), Take 1 tablet by mouth once daily., Disp: , Rfl:     needle, disp, 25 gauge 25 gauge x 1" Ndle, 1 Needle by Misc.(Non-Drug; Combo Route) route every 30 days., Disp: 25 each, Rfl: 0    omeprazole (PRILOSEC) 20 MG capsule, TAKE 1 CAPSULE BY MOUTH TWICE  DAILY, Disp: 180 capsule, Rfl: 2    potassium chloride (K-TAB) 20 mEq, Take 1 tablet (20 mEq total) by mouth once daily., Disp: 90 tablet, Rfl: 3    pramipexole (MIRAPEX) 0.5 MG tablet, TAKE 1 TABLET BY MOUTH TWICE  " DAILY, Disp: 180 tablet, Rfl: 3    pravastatin (PRAVACHOL) 40 MG tablet, TAKE 1 TABLET BY MOUTH EVERY DAY, Disp: 90 tablet, Rfl: 3    timolol maleate 0.5% (TIMOPTIC) 0.5 % Drop, Place 1 drop into the left eye once daily. , Disp: , Rfl: 0    UNABLE TO FIND, Take 1 capsule by mouth 3 (three) times daily with meals. medication name: Golo Release Diet Capsules, Disp: , Rfl:     vitamin D (VITAMIN D3) 1000 units Tab, Take 1,000 Units by mouth once daily., Disp: , Rfl:     warfarin (COUMADIN) 6 MG tablet, TAKE 6 MG DAILY EXCEPT FOR 9 MG ON FRIDAY TO THIN BLOOD, Disp: 96 tablet, Rfl: 4    amiodarone (PACERONE) 200 MG Tab, Take 1 tablet (200 mg total) by mouth 2 (two) times daily for 10 days, THEN 1 tablet (200 mg total) once daily., Disp: 100 tablet, Rfl: 0    tirzepatide (MOUNJARO) 5 mg/0.5 mL PnIj, Inject 5 mg into the skin every 7 days., Disp: 4 Pen, Rfl: 11  No current facility-administered medications for this visit.    Facility-Administered Medications Ordered in Other Visits:     lactated ringers infusion, , Intravenous, Continuous, Don Ruano DNP    LIDOcaine (PF) 10 mg/ml (1%) injection 10 mg, 1 mL, Intradermal, Once, Don Ruano DNP      History:  Current Providers as of 4/11/2024  PCP: Scott Dillard MD  Care Team Provider: Sudhir Coles MD  Care Team Provider: Jolanta Burnett MD  Care Team Provider: Lenny Burroughs LPN  Care Team Provider: Portillo Luis MD  Care Team Provider: Efren Aguilar MD  Care Team Provider: Roderick Echevarria MD  Care Team Provider: Scott Dillard MD  Care Team Provider: Tino Mendiola, PharmD  Encounter Provider: Scott Dillard MD, starting on Thu Apr 11, 2024 12:00 AM  Referring Provider: not found, starting on Thu Apr 11, 2024 12:00 AM  Consulting Physician: Scott Dillard MD, starting on Thu Apr 11, 2024  2:32 PM (Active)   Past Medical History:   Diagnosis Date    Allergy     Anticoagulant long-term use     Anxiety     Arthritis      Atrial fibrillation     Bilateral renal cysts 05/01/2022    Blind right eye     Bradycardia     Cancer     skin cancer left side of face under eye    Hyperlipidemia     Hypertension     PVC (premature ventricular contraction)     RLS (restless legs syndrome)     Sleep apnea     Does not use CPAP machine.    Type 2 diabetes mellitus without complication, without long-term current use of insulin 4/11/2024     Past Surgical History:   Procedure Laterality Date    ADENOIDECTOMY      PAM OSTEOTOMY Left 10/31/2018    Procedure: OSTEOTOMY, AKIN;  Surgeon: Rudolph Cardozo DPM;  Location: [facility] OR;  Service: Podiatry;  Laterality: Left;    APPENDECTOMY      BREAST BIOPSY Left     x3    BREAST SURGERY Left     breast biopsy x3    CATARACT EXTRACTION BILATERAL W/ ANTERIOR VITRECTOMY      ENDOSCOPIC GASTROCNEMIUS RECESSION Left 10/31/2018    Procedure: RECESSION, GASTROCNEMIUS, ENDOSCOPIC;  Surgeon: Rudolph Cardozo DPM;  Location: [facility] OR;  Service: Podiatry;  Laterality: Left;    ESOPHAGOGASTRODUODENOSCOPY N/A 2/11/2022    Procedure: EGD (ESOPHAGOGASTRODUODENOSCOPY);  Surgeon: Efren Aguilar MD;  Location: [facility];  Service: Endoscopy;  Laterality: N/A;  Patient needs a rapid covid  test done on 12/15/2021. thank you    EYE SURGERY Bilateral     cataracts extraction    EYE SURGERY Right     drainage tube (glaucoma)    FOOT ARTHRODESIS Left 1/15/2020    Procedure: FUSION, FOOT;  Surgeon: Rudolph Cardozo DPM;  Location: [facility] OR;  Service: Podiatry;  Laterality: Left;  mini c-arm, Arthrex screw  for hardware removal (Lydia notified), [employer] (Niels notified) min ex-fix    FOOT SURGERY Left     lesion removed from dorsal area    FRACTURE SURGERY Left     arm    HAND TENDON SURGERY Left     HYSTERECTOMY      INCISION AND DRAINAGE FOOT Left 3/11/2020    Procedure: INCISION AND DRAINAGE, FOOT;  Surgeon: Rudolph aCrdozo DPM;  Location: [facility] OR;  Service: Podiatry;  Laterality: Left;    DUANEIDUS  BUNIONECTOMY Left 10/31/2018    Procedure: BUNIONECTOMY, LAPIDUS;  Surgeon: Rudolph Cardozo DPM;  Location: [facility] OR;  Service: Podiatry;  Laterality: Left;  mini c-arm, Arthrex locking plate (Lydia notified)    LAPIDUS BUNIONECTOMY Left 10/31/2018    Dr. Cardozo    Lymph Gland Removed  Right     groin (performed by Dr. Vergara)    NEURECTOMY Left 1/15/2020    Procedure: NEURECTOMY;  Surgeon: Rudolph Cardozo DPM;  Location: [facility] OR;  Service: Podiatry;  Laterality: Left;  bipolar bovie, vessel loop, possible [employer] nerve wrap. Moshe with [employer] notified and will be overnighting graft. MK 1/14/2020    OOPHORECTOMY      ORIF FOREARM FRACTURE Left     PALATE SURGERY      Lesion removed    REMOVAL,ORTHOPEDIC HARDWARE,UPPER EXTREMITY Left 6/30/2023    Procedure: REMOVAL,ORTHOPEDIC HARDWARE,UPPER EXTREMITY;  Surgeon: Skyler Oliva Jr., MD;  Location: [facility] OR;  Service: Orthopedics;  Laterality: Left;  [employer] notified cc    REPAIR OF EXTENSOR TENDON Left 6/30/2023    Procedure: REPAIR, TENDON, EXTENSOR;  Surgeon: Skyler Oliva Jr., MD;  Location: [facility] OR;  Service: Orthopedics;  Laterality: Left;  Left Index Digit    TONSILLECTOMY       Social History     Tobacco Use    Smoking status: Never     Passive exposure: Never    Smokeless tobacco: Never   Substance Use Topics    Alcohol use: Not Currently    Drug use: No         Review of Systems   Constitutional: Negative.    HENT: Negative.     Respiratory: Negative.     Cardiovascular: Negative.    Gastrointestinal: Negative.    Endocrine: Negative.    Genitourinary: Negative.    Musculoskeletal: Negative.    Psychiatric/Behavioral: Negative.     All other systems reviewed and are negative.    Objective:     Physical Exam  Vitals and nursing note reviewed.   Constitutional:       Appearance: She is well-developed. She is obese.   HENT:      Head: Normocephalic.   Eyes:      Conjunctiva/sclera: Conjunctivae normal.      Pupils: Pupils are equal,  round, and reactive to light.   Cardiovascular:      Rate and Rhythm: Normal rate and regular rhythm.      Heart sounds: Normal heart sounds.   Pulmonary:      Effort: Pulmonary effort is normal.      Breath sounds: Normal breath sounds.   Musculoskeletal:         General: Normal range of motion.      Cervical back: Normal range of motion and neck supple.   Skin:     General: Skin is warm and dry.   Neurological:      General: No focal deficit present.      Mental Status: She is alert and oriented to person, place, and time.      Deep Tendon Reflexes: Reflexes are normal and symmetric.   Psychiatric:         Mood and Affect: Mood normal.         Behavior: Behavior normal.         Thought Content: Thought content normal.         Judgment: Judgment normal.             Assessment:     1. Type 2 diabetes mellitus without complication, without long-term current use of insulin    2. Severe obesity with body mass index (BMI) of 35.0 to 39.9 with serious comorbidity    3. Paroxysmal atrial fibrillation    4. Chronic kidney disease, stage 3a    5. Other specified disorders of adrenal gland    6. 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 Aortic atherosclerosis    8. Inflammatory polyneuropathy, unspecified    9. Bilateral renal cysts      Plan:        Medication List            Accurate as of April 11, 2024 11:59 PM. If you have any questions, ask your nurse or doctor.                START taking these medications      MOUNJARO 5 mg/0.5 mL Pnij  Generic drug: tirzepatide  Inject 5 mg into the skin every 7 days.  Replaces: MOUNJARO 2.5 mg/0.5 mL Pnij  Started by: Scott Dillard MD            CONTINUE taking these medications      alendronate 70 MG tablet  Commonly known as: FOSAMAX  TAKE 1 TABLET (70 MG TOTAL) BY MOUTH EVERY 7 DAYS     amiodarone 200 MG Tab  Commonly known as: PACERONE  Take 0.5 tablets (100 mg total) by mouth once daily.     amitriptyline 25 MG tablet  Commonly known as:  "ELAVIL  TAKE 1 TABLET BY MOUTH EVERY EVENING     aspirin 81 MG EC tablet  Commonly known as: ECOTRIN     CENTRUM SILVER WOMEN 8 mg iron-400 mcg-50 mcg Tab  Generic drug: multivit-min-iron-FA-vit K-lut     chlorthalidone 50 MG Tab  Commonly known as: HYGROTEN  Take 1 tablet (50 mg total) by mouth once daily.     cyanocobalamin 1,000 mcg/mL injection  INJECT 1 ML (1,000 MCG) INTRAMUSCULARLY EVERY 30 DAYS     furosemide 20 MG tablet  Commonly known as: LASIX  Take 1 tablet (20 mg total) by mouth once daily.     gabapentin 300 MG capsule  Commonly known as: NEURONTIN  One twice a day for one month, then one nightly for one month, then stop     latanoprost 0.005 % ophthalmic solution     levocetirizine 5 MG tablet  Commonly known as: XYZAL  TAKE 1 TABLET BY MOUTH EVERY DAY IN THE EVENING     losartan 100 MG tablet  Commonly known as: COZAAR  TAKE 0.5 TABLETS (50 MG TOTAL) BY MOUTH ONCE DAILY     needle (disp) 25 gauge 25 gauge x 1" Ndle  1 Needle by Misc.(Non-Drug; Combo Route) route every 30 days.     OCUVITE ADULT 50 PLUS ORAL     omeprazole 20 MG capsule  Commonly known as: PRILOSEC  TAKE 1 CAPSULE BY MOUTH TWICE  DAILY     potassium chloride 20 mEq  Commonly known as: K-TAB  Take 1 tablet (20 mEq total) by mouth once daily.     pramipexole 0.5 MG tablet  Commonly known as: MIRAPEX  TAKE 1 TABLET BY MOUTH TWICE  DAILY     pravastatin 40 MG tablet  Commonly known as: PRAVACHOL  TAKE 1 TABLET BY MOUTH EVERY DAY     timolol maleate 0.5% 0.5 % Drop  Commonly known as: TIMOPTIC     UNABLE TO FIND     vitamin D 1000 units Tab  Commonly known as: VITAMIN D3     warfarin 6 MG tablet  Commonly known as: COUMADIN  TAKE 6 MG DAILY EXCEPT FOR 9 MG ON FRIDAY TO THIN BLOOD            STOP taking these medications      acetaminophen 500 MG tablet  Commonly known as: TYLENOL  Stopped by: Scott Dillard MD     ALPRAZolam 0.25 MG tablet  Commonly known as: XANAX  Stopped by: Scott Dillard MD     azelastine 137 mcg (0.1 %) nasal " spray  Commonly known as: ASTELIN  Stopped by: Scott Dillard MD     MOUNJARO 2.5 mg/0.5 mL Pnij  Generic drug: tirzepatide  Replaced by: MOUNJARO 5 mg/0.5 mL Pnij  Stopped by: Scott Dillard MD     oxybutynin 5 MG Tab  Commonly known as: DITROPAN  Stopped by: Scott Dillard MD               Where to Get Your Medications        These medications were sent to [facility]/pharmacy #[id number] - [place] - [address] [facility]  [address] [facility] [place] [address]      Phone: [phone]   MOUNJARO 5 mg/0.5 mL Pnij       Type 2 diabetes mellitus without complication, without long-term current use of insulin  -     tirzepatide (MOUNJARO) 5 mg/0.5 mL PnIj; Inject 5 mg into the skin every 7 days.  Dispense: 4 Pen; Refill: 11  -     CBC Auto Differential; Future; Expected date: 04/15/2024  -     Comprehensive Metabolic Panel; Future; Expected date: 04/15/2024  -     TSH; Future  -     US Retroperitoneal Complete; Future; Expected date: 04/15/2024    Severe obesity with body mass index (BMI) of 35.0 to 39.9 with serious comorbidity  -     CBC Auto Differential; Future; Expected date: 04/15/2024  -     Comprehensive Metabolic Panel; Future; Expected date: 04/15/2024  -     TSH; Future  -     US Retroperitoneal Complete; Future; Expected date: 04/15/2024    Paroxysmal atrial fibrillation  -     CBC Auto Differential; Future; Expected date: 04/15/2024  -     Comprehensive Metabolic Panel; Future; Expected date: 04/15/2024  -     TSH; Future  -     US Retroperitoneal Complete; Future; Expected date: 04/15/2024    Chronic kidney disease, stage 3a  -     CBC Auto Differential; Future; Expected date: 04/15/2024  -     Comprehensive Metabolic Panel; Future; Expected date: 04/15/2024  -     TSH; Future  -     US Retroperitoneal Complete; Future; Expected date: 04/15/2024    Other specified disorders of adrenal gland  -     CBC Auto Differential; Future; Expected date: 04/15/2024  -     Comprehensive Metabolic Panel; Future; Expected date:  04/15/2024  -     TSH; Future  -     US Retroperitoneal Complete; Future; Expected date: 04/15/2024    Unspecified inflammatory spondylopathy, 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  -     CBC Auto Differential; Future; Expected date: 04/15/2024  -     Comprehensive Metabolic Panel; Future; Expected date: 04/15/2024  -     TSH; Future  -     US Retroperitoneal Complete; Future; Expected date: 04/15/2024    Aortic atherosclerosis  -     CBC Auto Differential; Future; Expected date: 04/15/2024  -     Comprehensive Metabolic Panel; Future; Expected date: 04/15/2024  -     TSH; Future  -     US Retroperitoneal Complete; Future; Expected date: 04/15/2024    Inflammatory polyneuropathy, unspecified  -     CBC Auto Differential; Future; Expected date: 04/15/2024  -     Comprehensive Metabolic Panel; Future; Expected date: 04/15/2024  -     TSH; Future  -     US Retroperitoneal Complete; Future; Expected date: 04/15/2024    Bilateral renal cysts  -     Ambulatory referral/consult to Urology; Future; Expected date: 04/18/2024

## 2024-04-11 NOTE — TELEPHONE ENCOUNTER
Good afternoon  Ms Ca is here today to see Dr Dillard  He asked that we reach out to Dr Owens  Ms Ca has had another Afib episode with tachycardia and  low BP and near syncope.  She is only on 1/2 tablet of amiodarone.     Should she make any changes at this time  Her next appointment with you is May 24.    Pt phone number is [phone]

## 2024-04-12 ENCOUNTER — PATIENT MESSAGE (OUTPATIENT)
Dept: ELECTROPHYSIOLOGY | Facility: CLINIC | Age: 84
End: 2024-04-12
Payer: MEDICARE

## 2024-04-12 ENCOUNTER — TELEPHONE (OUTPATIENT)
Dept: ELECTROPHYSIOLOGY | Facility: CLINIC | Age: 84
End: 2024-04-12
Payer: MEDICARE

## 2024-04-12 RX ORDER — AMIODARONE HYDROCHLORIDE 200 MG/1
TABLET ORAL
Qty: 100 TABLET | Refills: 0 | Status: SHIPPED | OUTPATIENT
Start: 2024-04-12 | End: 2024-05-24

## 2024-04-12 NOTE — TELEPHONE ENCOUNTER
Me     MR    4/12/24  9:06 AM  Note  Attempted to contact patient to discuss Dr Owens' recommendations regarding amiodarone dose change. No answer received. Instructions to increase Amiodarone to 200 mg BID x 10 days, then reduce to 200 mg daily thereafter left on voicemail with instruction to return call if she has any questions regarding the instruction provided. Patient portal message also sent with Dr Owens' recommendations for change in Amiodarone dose.

## 2024-04-12 NOTE — TELEPHONE ENCOUNTER
.Second attempt to contact patient regarding recommendation to increase Amiodarone. Second Voicemail left  with instructions to increase Amiodarone to 200 mg BID x 10 days, then reduce to 200 mg daily thereafter per Dr Owens recommendation, and to call the office if she has any questions.

## 2024-04-12 NOTE — TELEPHONE ENCOUNTER
Attempted to contact patient to discuss Dr Owens' recommendations regarding amiodarone dose change. No answer received. Instructions to increase Amiodarone to 200 mg BID x 10 days, then reduce to 200 mg daily thereafter left on voicemail with instruction to return call if she has any questions regarding the instruction provided. Patient portal message also sent with Dr Owens' recommendations for change in Amiodarone dose.

## 2024-04-14 RX ORDER — TIRZEPATIDE 5 MG/.5ML
5 INJECTION, SOLUTION SUBCUTANEOUS
Qty: 4 PEN | Refills: 11 | Status: SHIPPED | OUTPATIENT
Start: 2024-04-14 | End: 2024-05-31 | Stop reason: ALTCHOICE

## 2024-04-15 ENCOUNTER — PATIENT MESSAGE (OUTPATIENT)
Dept: GASTROENTEROLOGY | Facility: CLINIC | Age: 84
End: 2024-04-15
Payer: MEDICARE

## 2024-04-17 ENCOUNTER — LAB VISIT (OUTPATIENT)
Dept: LAB | Facility: HOSPITAL | Age: 84
End: 2024-04-17
Attending: FAMILY MEDICINE
Payer: MEDICARE

## 2024-04-17 DIAGNOSIS — I70.0 AORTIC ATHEROSCLEROSIS: ICD-10-CM

## 2024-04-17 DIAGNOSIS — G61.9 INFLAMMATORY POLYNEUROPATHY, UNSPECIFIED: ICD-10-CM

## 2024-04-17 DIAGNOSIS — E27.8 OTHER SPECIFIED DISORDERS OF ADRENAL GLAND: ICD-10-CM

## 2024-04-17 DIAGNOSIS — E11.9 TYPE 2 DIABETES MELLITUS WITHOUT COMPLICATION, WITHOUT LONG-TERM CURRENT USE OF INSULIN: ICD-10-CM

## 2024-04-17 DIAGNOSIS — I48.0 PAROXYSMAL ATRIAL FIBRILLATION: ICD-10-CM

## 2024-04-17 DIAGNOSIS — E66.01 SEVERE OBESITY WITH BODY MASS INDEX (BMI) OF 35.0 TO 39.9 WITH SERIOUS COMORBIDITY: ICD-10-CM

## 2024-04-17 DIAGNOSIS — N18.31 CHRONIC KIDNEY DISEASE, STAGE 3A: ICD-10-CM

## 2024-04-17 DIAGNOSIS — M46.99 UNSPECIFIED INFLAMMATORY SPONDYLOPATHY, MULTIPLE SITES IN SPINE: ICD-10-CM

## 2024-04-17 LAB
ALBUMIN SERPL BCP-MCNC: 4.3 G/DL (ref 3.5–5.2)
ALP SERPL-CCNC: 69 U/L (ref 38–126)
ALT SERPL W/O P-5'-P-CCNC: 26 U/L (ref 10–44)
ANION GAP SERPL CALC-SCNC: 11 MMOL/L (ref 8–16)
AST SERPL-CCNC: 27 U/L (ref 15–46)
BASOPHILS # BLD AUTO: 0.03 K/UL (ref 0–0.2)
BASOPHILS NFR BLD: 0.5 % (ref 0–1.9)
BILIRUB SERPL-MCNC: 0.5 MG/DL (ref 0.1–1)
CALCIUM SERPL-MCNC: 9.7 MG/DL (ref 8.7–10.5)
CHLORIDE SERPL-SCNC: 100 MMOL/L (ref 95–110)
CO2 SERPL-SCNC: 31 MMOL/L (ref 23–29)
CREAT SERPL-MCNC: 1.11 MG/DL (ref 0.5–1.4)
DIFFERENTIAL METHOD BLD: ABNORMAL
EOSINOPHIL # BLD AUTO: 0.4 K/UL (ref 0–0.5)
EOSINOPHIL NFR BLD: 6.6 % (ref 0–8)
ERYTHROCYTE [DISTWIDTH] IN BLOOD BY AUTOMATED COUNT: 13.2 % (ref 11.5–14.5)
EST. GFR  (NO RACE VARIABLE): 49.6 ML/MIN/1.73 M^2
GLUCOSE SERPL-MCNC: 116 MG/DL (ref 70–110)
HCT VFR BLD AUTO: 40.6 % (ref 37–48.5)
HGB BLD-MCNC: 13.5 G/DL (ref 12–16)
IMM GRANULOCYTES # BLD AUTO: 0.03 K/UL (ref 0–0.04)
IMM GRANULOCYTES NFR BLD AUTO: 0.5 % (ref 0–0.5)
LYMPHOCYTES # BLD AUTO: 2.1 K/UL (ref 1–4.8)
LYMPHOCYTES NFR BLD: 33.9 % (ref 18–48)
MCH RBC QN AUTO: 32.7 PG (ref 27–31)
MCHC RBC AUTO-ENTMCNC: 33.3 G/DL (ref 32–36)
MCV RBC AUTO: 98 FL (ref 82–98)
MONOCYTES # BLD AUTO: 0.4 K/UL (ref 0.3–1)
MONOCYTES NFR BLD: 6.7 % (ref 4–15)
NEUTROPHILS # BLD AUTO: 3.2 K/UL (ref 1.8–7.7)
NEUTROPHILS NFR BLD: 51.8 % (ref 38–73)
NRBC BLD-RTO: 0 /100 WBC
PLATELET # BLD AUTO: 193 K/UL (ref 150–450)
PMV BLD AUTO: 10.2 FL (ref 9.2–12.9)
POTASSIUM SERPL-SCNC: 3.9 MMOL/L (ref 3.5–5.1)
PROT SERPL-MCNC: 7.2 G/DL (ref 6–8.4)
RBC # BLD AUTO: 4.13 M/UL (ref 4–5.4)
SODIUM SERPL-SCNC: 142 MMOL/L (ref 136–145)
TSH SERPL DL<=0.005 MIU/L-ACNC: 2.44 UIU/ML (ref 0.4–4)
UUN UR-MCNC: 25 MG/DL (ref 7–17)
WBC # BLD AUTO: 6.1 K/UL (ref 3.9–12.7)

## 2024-04-17 PROCEDURE — 84443 ASSAY THYROID STIM HORMONE: CPT | Mod: PN | Performed by: FAMILY MEDICINE

## 2024-04-17 PROCEDURE — 80053 COMPREHEN METABOLIC PANEL: CPT | Mod: PN | Performed by: FAMILY MEDICINE

## 2024-04-17 PROCEDURE — 36415 COLL VENOUS BLD VENIPUNCTURE: CPT | Mod: PN | Performed by: FAMILY MEDICINE

## 2024-04-17 PROCEDURE — 85025 COMPLETE CBC W/AUTO DIFF WBC: CPT | Mod: PN | Performed by: FAMILY MEDICINE

## 2024-04-17 NOTE — PROGRESS NOTES
4/17/24 Patient called to report today she will start Amiodarone 200mg twice for 10 days then will take will take 200mg daily, and 1 week ago resumed Gabapentin, also today -stopping Alprazolam, Mo -nose spray, Oxybutynin, and in 2 weeks will increase Munjaro to 5mg weekly

## 2024-04-17 NOTE — PROGRESS NOTES
Patient reports that today she will start Amiodarone 200mg twice a day for 10 days then will take will take 200mg daily.  Patient elderly, we anticipate interaction with amiodarone but could be delayed. We will watch closely for now.

## 2024-04-22 ENCOUNTER — LAB VISIT (OUTPATIENT)
Dept: LAB | Facility: HOSPITAL | Age: 84
End: 2024-04-22
Attending: FAMILY MEDICINE
Payer: MEDICARE

## 2024-04-22 ENCOUNTER — ANTI-COAG VISIT (OUTPATIENT)
Dept: CARDIOLOGY | Facility: CLINIC | Age: 84
End: 2024-04-22
Payer: MEDICARE

## 2024-04-22 ENCOUNTER — ANTI-COAG VISIT (OUTPATIENT)
Dept: CARDIOLOGY | Facility: CLINIC | Age: 84
End: 2024-04-22

## 2024-04-22 DIAGNOSIS — I48.0 PAROXYSMAL ATRIAL FIBRILLATION: Primary | Chronic | ICD-10-CM

## 2024-04-22 DIAGNOSIS — Z79.01 LONG TERM (CURRENT) USE OF ANTICOAGULANTS: ICD-10-CM

## 2024-04-22 DIAGNOSIS — I48.0 PAROXYSMAL ATRIAL FIBRILLATION: Chronic | ICD-10-CM

## 2024-04-22 LAB
INR PPP: 2.1 (ref 0.8–1.2)
PROTHROMBIN TIME: 22.1 SEC (ref 9–12.5)

## 2024-04-22 PROCEDURE — 85610 PROTHROMBIN TIME: CPT | Mod: PN | Performed by: INTERNAL MEDICINE

## 2024-04-22 PROCEDURE — 93793 ANTICOAG MGMT PT WARFARIN: CPT | Mod: S$GLB,,, | Performed by: PHARMACIST

## 2024-04-22 PROCEDURE — 36415 COLL VENOUS BLD VENIPUNCTURE: CPT | Mod: PN | Performed by: INTERNAL MEDICINE

## 2024-04-22 NOTE — PROGRESS NOTES
INR within therapeutic range, however, patient elderly, recently started amiodarone, which is expected to cause INR increase (although may be delayed). Out of caution, we will begin to lower her warfarin later this week in anticipation for possible interaction. Close watch is required/ repeat INR Monday 4/29

## 2024-04-23 NOTE — PROGRESS NOTES
Pt called and confirmed she started amiodarone 200mg BID on 4/17 and will decreased to 200mg once daily on 4/26

## 2024-04-24 ENCOUNTER — HOSPITAL ENCOUNTER (OUTPATIENT)
Dept: RADIOLOGY | Facility: HOSPITAL | Age: 84
Discharge: HOME | End: 2024-04-24
Attending: FAMILY MEDICINE
Payer: MEDICARE

## 2024-04-24 VITALS — WEIGHT: 213 LBS | HEIGHT: 62 IN | BODY MASS INDEX: 39.2 KG/M2

## 2024-04-24 DIAGNOSIS — E27.8 OTHER SPECIFIED DISORDERS OF ADRENAL GLAND: ICD-10-CM

## 2024-04-24 DIAGNOSIS — I48.0 PAROXYSMAL ATRIAL FIBRILLATION: ICD-10-CM

## 2024-04-24 DIAGNOSIS — E11.9 TYPE 2 DIABETES MELLITUS WITHOUT COMPLICATION, WITHOUT LONG-TERM CURRENT USE OF INSULIN: ICD-10-CM

## 2024-04-24 DIAGNOSIS — M46.99 UNSPECIFIED INFLAMMATORY SPONDYLOPATHY, MULTIPLE SITES IN SPINE: ICD-10-CM

## 2024-04-24 DIAGNOSIS — I70.0 AORTIC ATHEROSCLEROSIS: ICD-10-CM

## 2024-04-24 DIAGNOSIS — N18.31 CHRONIC KIDNEY DISEASE, STAGE 3A: ICD-10-CM

## 2024-04-24 DIAGNOSIS — E66.01 SEVERE OBESITY WITH BODY MASS INDEX (BMI) OF 35.0 TO 39.9 WITH SERIOUS COMORBIDITY: ICD-10-CM

## 2024-04-24 DIAGNOSIS — G61.9 INFLAMMATORY POLYNEUROPATHY, UNSPECIFIED: ICD-10-CM

## 2024-04-24 DIAGNOSIS — Z12.31 ENCOUNTER FOR SCREENING MAMMOGRAM FOR MALIGNANT NEOPLASM OF BREAST: ICD-10-CM

## 2024-04-24 PROCEDURE — 77067 SCR MAMMO BI INCL CAD: CPT | Mod: TC,PN

## 2024-04-24 PROCEDURE — 76770 US EXAM ABDO BACK WALL COMP: CPT | Mod: TC,PN

## 2024-04-24 PROCEDURE — 76770 US EXAM ABDO BACK WALL COMP: CPT | Mod: 26,,, | Performed by: RADIOLOGY

## 2024-04-24 PROCEDURE — 77063 BREAST TOMOSYNTHESIS BI: CPT | Mod: 26,,, | Performed by: RADIOLOGY

## 2024-04-24 PROCEDURE — 77067 SCR MAMMO BI INCL CAD: CPT | Mod: 26,,, | Performed by: RADIOLOGY

## 2024-04-29 ENCOUNTER — LAB VISIT (OUTPATIENT)
Dept: LAB | Facility: HOSPITAL | Age: 84
End: 2024-04-29
Attending: FAMILY MEDICINE
Payer: MEDICARE

## 2024-04-29 ENCOUNTER — ANTI-COAG VISIT (OUTPATIENT)
Dept: CARDIOLOGY | Facility: CLINIC | Age: 84
End: 2024-04-29
Payer: MEDICARE

## 2024-04-29 DIAGNOSIS — Z79.01 LONG TERM (CURRENT) USE OF ANTICOAGULANTS: ICD-10-CM

## 2024-04-29 DIAGNOSIS — I48.0 PAROXYSMAL ATRIAL FIBRILLATION: Primary | Chronic | ICD-10-CM

## 2024-04-29 DIAGNOSIS — I48.0 PAROXYSMAL ATRIAL FIBRILLATION: Chronic | ICD-10-CM

## 2024-04-29 LAB
INR PPP: 1.6 (ref 0.8–1.2)
PROTHROMBIN TIME: 16.8 SEC (ref 9–12.5)

## 2024-04-29 PROCEDURE — 93793 ANTICOAG MGMT PT WARFARIN: CPT | Mod: S$GLB,,, | Performed by: PHARMACIST

## 2024-04-29 PROCEDURE — 85610 PROTHROMBIN TIME: CPT | Mod: PN | Performed by: INTERNAL MEDICINE

## 2024-04-29 PROCEDURE — 36415 COLL VENOUS BLD VENIPUNCTURE: CPT | Mod: PN | Performed by: INTERNAL MEDICINE

## 2024-04-30 ENCOUNTER — TELEPHONE (OUTPATIENT)
Dept: FAMILY MEDICINE | Facility: CLINIC | Age: 84
End: 2024-04-30
Payer: MEDICARE

## 2024-04-30 DIAGNOSIS — R92.8 ABNORMAL MAMMOGRAM OF RIGHT BREAST: Primary | ICD-10-CM

## 2024-04-30 NOTE — TELEPHONE ENCOUNTER
----- Message from Scott Dillard MD sent at 4/30/2024  6:57 AM CDT -----  Right breast spots seen; need a diagnostic mammogram and ultrasound of right breast, ordered

## 2024-05-03 ENCOUNTER — HOSPITAL ENCOUNTER (OUTPATIENT)
Dept: RADIOLOGY | Facility: HOSPITAL | Age: 84
Discharge: HOME | End: 2024-05-03
Attending: FAMILY MEDICINE
Payer: MEDICARE

## 2024-05-03 DIAGNOSIS — R92.8 ABNORMAL MAMMOGRAM: ICD-10-CM

## 2024-05-03 PROCEDURE — 76642 ULTRASOUND BREAST LIMITED: CPT | Mod: 26,RT,, | Performed by: RADIOLOGY

## 2024-05-03 PROCEDURE — 76642 ULTRASOUND BREAST LIMITED: CPT | Mod: TC,PN,RT

## 2024-05-03 PROCEDURE — 77065 DX MAMMO INCL CAD UNI: CPT | Mod: TC,PN,RT

## 2024-05-03 PROCEDURE — 77061 BREAST TOMOSYNTHESIS UNI: CPT | Mod: TC,PN,RT

## 2024-05-03 PROCEDURE — 77065 DX MAMMO INCL CAD UNI: CPT | Mod: 26,RT,, | Performed by: RADIOLOGY

## 2024-05-03 PROCEDURE — 77061 BREAST TOMOSYNTHESIS UNI: CPT | Mod: 26,RT,, | Performed by: RADIOLOGY

## 2024-05-06 ENCOUNTER — PATIENT MESSAGE (OUTPATIENT)
Dept: CARDIOLOGY | Facility: CLINIC | Age: 84
End: 2024-05-06

## 2024-05-06 ENCOUNTER — LAB VISIT (OUTPATIENT)
Dept: LAB | Facility: HOSPITAL | Age: 84
End: 2024-05-06
Attending: INTERNAL MEDICINE
Payer: MEDICARE

## 2024-05-06 ENCOUNTER — ANTI-COAG VISIT (OUTPATIENT)
Dept: CARDIOLOGY | Facility: CLINIC | Age: 84
End: 2024-05-06
Payer: MEDICARE

## 2024-05-06 DIAGNOSIS — Z79.01 LONG TERM (CURRENT) USE OF ANTICOAGULANTS: ICD-10-CM

## 2024-05-06 DIAGNOSIS — I48.0 PAROXYSMAL ATRIAL FIBRILLATION: Primary | Chronic | ICD-10-CM

## 2024-05-06 DIAGNOSIS — I48.0 PAROXYSMAL ATRIAL FIBRILLATION: Chronic | ICD-10-CM

## 2024-05-06 LAB
INR PPP: 2 (ref 0.8–1.2)
PROTHROMBIN TIME: 20.3 SEC (ref 9–12.5)

## 2024-05-06 PROCEDURE — 85610 PROTHROMBIN TIME: CPT | Mod: PN | Performed by: INTERNAL MEDICINE

## 2024-05-06 PROCEDURE — 36415 COLL VENOUS BLD VENIPUNCTURE: CPT | Mod: PN | Performed by: INTERNAL MEDICINE

## 2024-05-06 PROCEDURE — 93793 ANTICOAG MGMT PT WARFARIN: CPT | Mod: S$GLB,,, | Performed by: PHARMACIST

## 2024-05-06 NOTE — PROGRESS NOTES
I spoke to patient about the information she gave us on holding 10 days for a breast biopsy. 10 day holding of warfarin is excessive, unless of course a patient-specific reason that they anticipate excessive bleeding. I called Rajwinder Lemos ([phone]) [facility]). She reports holding 5 days prior and 24 hours post procedure is acceptable.   Her biopsy is scheduled for 1pm on 5/14. Therefore, she will hold coumadin 5 days prior and restart at 5pm on 5/15 per calendar.

## 2024-05-06 NOTE — PROGRESS NOTES
5/06/24 Patient called to report on 5/14 she's having a Breast Biopsy and states was instructed to hold warfarin 10 days prior starting tonight as per Dr Scott Dillard?, INR is due today

## 2024-05-14 ENCOUNTER — HOSPITAL ENCOUNTER (OUTPATIENT)
Dept: RADIOLOGY | Facility: HOSPITAL | Age: 84
Discharge: HOME | End: 2024-05-14
Attending: FAMILY MEDICINE
Payer: MEDICARE

## 2024-05-14 DIAGNOSIS — R92.8 ABNORMAL MAMMOGRAM: ICD-10-CM

## 2024-05-14 PROCEDURE — 19083 BX BREAST 1ST LESION US IMAG: CPT | Mod: RT,,, | Performed by: RADIOLOGY

## 2024-05-14 PROCEDURE — 88341 IMHCHEM/IMCYTCHM EA ADD ANTB: CPT | Mod: 26,59,, | Performed by: STUDENT IN AN ORGANIZED HEALTH CARE EDUCATION/TRAINING PROGRAM

## 2024-05-14 PROCEDURE — 27200940 US BREAST BIOPSY WITH IMAGING EA ADDITIONAL

## 2024-05-14 PROCEDURE — 88360 TUMOR IMMUNOHISTOCHEM/MANUAL: CPT | Mod: 26,,, | Performed by: STUDENT IN AN ORGANIZED HEALTH CARE EDUCATION/TRAINING PROGRAM

## 2024-05-14 PROCEDURE — 27200934 US BREAST BIOPSY WITH IMAGING 1ST SITE RIGHT

## 2024-05-14 PROCEDURE — 88305 TISSUE EXAM BY PATHOLOGIST: CPT | Performed by: STUDENT IN AN ORGANIZED HEALTH CARE EDUCATION/TRAINING PROGRAM

## 2024-05-14 PROCEDURE — 88305 TISSUE EXAM BY PATHOLOGIST: CPT | Mod: 26,,, | Performed by: STUDENT IN AN ORGANIZED HEALTH CARE EDUCATION/TRAINING PROGRAM

## 2024-05-14 PROCEDURE — 88342 IMHCHEM/IMCYTCHM 1ST ANTB: CPT | Mod: 59 | Performed by: STUDENT IN AN ORGANIZED HEALTH CARE EDUCATION/TRAINING PROGRAM

## 2024-05-14 PROCEDURE — 88360 TUMOR IMMUNOHISTOCHEM/MANUAL: CPT | Performed by: STUDENT IN AN ORGANIZED HEALTH CARE EDUCATION/TRAINING PROGRAM

## 2024-05-14 PROCEDURE — 77065 DX MAMMO INCL CAD UNI: CPT | Mod: TC,RT

## 2024-05-14 PROCEDURE — 77065 DX MAMMO INCL CAD UNI: CPT | Mod: 26,RT,, | Performed by: RADIOLOGY

## 2024-05-14 PROCEDURE — 88342 IMHCHEM/IMCYTCHM 1ST ANTB: CPT | Mod: 26,XU,, | Performed by: STUDENT IN AN ORGANIZED HEALTH CARE EDUCATION/TRAINING PROGRAM

## 2024-05-14 PROCEDURE — 88341 IMHCHEM/IMCYTCHM EA ADD ANTB: CPT | Performed by: STUDENT IN AN ORGANIZED HEALTH CARE EDUCATION/TRAINING PROGRAM

## 2024-05-14 PROCEDURE — 19084 BX BREAST ADD LESION US IMAG: CPT | Mod: ,,, | Performed by: RADIOLOGY

## 2024-05-14 PROCEDURE — 27200934 US BREAST BIOPSY WITH IMAGING EA ADDITIONAL

## 2024-05-16 ENCOUNTER — TELEPHONE (OUTPATIENT)
Dept: FAMILY MEDICINE | Facility: CLINIC | Age: 84
End: 2024-05-16
Payer: MEDICARE

## 2024-05-16 NOTE — TELEPHONE ENCOUNTER
----- Message from Chiqui Fernandez sent at 5/16/2024  2:51 PM CDT -----  Regarding: shamir  Contact: self/ walked in  The pt states that the monjauro is to expensive and she cannot afford it.  She is going to stop taking because of that .  Just wanted you to know

## 2024-05-17 ENCOUNTER — PATIENT MESSAGE (OUTPATIENT)
Dept: FAMILY MEDICINE | Facility: CLINIC | Age: 84
End: 2024-05-17
Payer: MEDICARE

## 2024-05-17 ENCOUNTER — PATIENT MESSAGE (OUTPATIENT)
Dept: CARDIOLOGY | Facility: CLINIC | Age: 84
End: 2024-05-17
Payer: MEDICARE

## 2024-05-17 DIAGNOSIS — C50.919 MALIGNANT NEOPLASM OF BREAST IN FEMALE, ESTROGEN RECEPTOR POSITIVE, UNSPECIFIED LATERALITY, UNSPECIFIED SITE OF BREAST: Primary | ICD-10-CM

## 2024-05-17 DIAGNOSIS — I10 ESSENTIAL HYPERTENSION: ICD-10-CM

## 2024-05-17 DIAGNOSIS — Z17.0 MALIGNANT NEOPLASM OF BREAST IN FEMALE, ESTROGEN RECEPTOR POSITIVE, UNSPECIFIED LATERALITY, UNSPECIFIED SITE OF BREAST: Primary | ICD-10-CM

## 2024-05-17 RX ORDER — LOSARTAN POTASSIUM 100 MG/1
50 TABLET ORAL DAILY
Qty: 45 TABLET | Refills: 3 | Status: SHIPPED | OUTPATIENT
Start: 2024-05-17

## 2024-05-19 PROBLEM — C50.919 MALIGNANT NEOPLASM OF BREAST IN FEMALE, ESTROGEN RECEPTOR POSITIVE: Status: ACTIVE | Noted: 2024-05-19

## 2024-05-19 PROBLEM — Z17.0 MALIGNANT NEOPLASM OF BREAST IN FEMALE, ESTROGEN RECEPTOR POSITIVE: Status: ACTIVE | Noted: 2024-05-19

## 2024-05-20 ENCOUNTER — LAB VISIT (OUTPATIENT)
Dept: LAB | Facility: HOSPITAL | Age: 84
End: 2024-05-20
Attending: INTERNAL MEDICINE
Payer: MEDICARE

## 2024-05-20 ENCOUNTER — DOCUMENTATION ONLY (OUTPATIENT)
Dept: SURGERY | Facility: CLINIC | Age: 84
End: 2024-05-20
Payer: MEDICARE

## 2024-05-20 ENCOUNTER — ANTI-COAG VISIT (OUTPATIENT)
Dept: CARDIOLOGY | Facility: CLINIC | Age: 84
End: 2024-05-20
Payer: MEDICARE

## 2024-05-20 ENCOUNTER — PATIENT MESSAGE (OUTPATIENT)
Dept: SURGERY | Facility: CLINIC | Age: 84
End: 2024-05-20
Payer: MEDICARE

## 2024-05-20 DIAGNOSIS — I48.0 PAROXYSMAL ATRIAL FIBRILLATION: Primary | Chronic | ICD-10-CM

## 2024-05-20 DIAGNOSIS — Z79.01 LONG TERM (CURRENT) USE OF ANTICOAGULANTS: ICD-10-CM

## 2024-05-20 DIAGNOSIS — I48.0 PAROXYSMAL ATRIAL FIBRILLATION: Chronic | ICD-10-CM

## 2024-05-20 LAB
INR PPP: 1.1 (ref 0.8–1.2)
PROTHROMBIN TIME: 11.9 SEC (ref 9–12.5)

## 2024-05-20 PROCEDURE — 85610 PROTHROMBIN TIME: CPT | Mod: PN | Performed by: INTERNAL MEDICINE

## 2024-05-20 PROCEDURE — 93793 ANTICOAG MGMT PT WARFARIN: CPT | Mod: S$GLB,,, | Performed by: PHARMACIST

## 2024-05-20 PROCEDURE — 36415 COLL VENOUS BLD VENIPUNCTURE: CPT | Mod: PN | Performed by: INTERNAL MEDICINE

## 2024-05-20 NOTE — PROGRESS NOTES
Called Patient with results of breast biopsy from 5/14/24.  Explained that the biopsy showed invasive ductal carcinoma . Discussed what this means and that the next step is to meet with a breast surgeon. An appt was made for 5/29/24 with Dr. Laird.  Reviewed location of breast center. Patient verbalized understanding. Contact information to be sent via portal per pt request.    Oncology Navigation   Intake  Date of Diagnosis: 05/14/24  Cancer Type: Breast  Type of Referral: Internal  Date of Referral: 05/20/24  Initial Nurse Navigator Contact: 05/20/24  Referral to Initial Contact Timeline (days): 0  First Appointment Available: 05/29/24  Appointment Date: 05/29/24  First Available Date vs. Scheduled Date (days): 0     Treatment  Current Status: Staging work-up    Surgical Oncologist: Dr. Laird  Consult Date: 05/29/24          Procedures: Biopsy; Diagnostic Mammogram; Screening Mammogram; Ultrasound  Biopsy Schedule Date: 05/14/24  Diagnostic Mammo Schedule Date: 05/03/24  Ultrasound Schedule Date: 05/03/24       ER: Positive  IA: Positive  Her2: -- (pending)       Support Systems: Family members     Acuity      Follow Up  Follow up in about 9 days (around 5/29/2024) for [facility].

## 2024-05-21 ENCOUNTER — TELEPHONE (OUTPATIENT)
Dept: SURGERY | Facility: CLINIC | Age: 84
End: 2024-05-21
Payer: MEDICARE

## 2024-05-21 NOTE — TELEPHONE ENCOUNTER
Spoke with pt regarding later appt. Explained nothing else is available that day, but she could have a 1 pm on Monday, 6/3/24. She decided to keep her original appt. Verbalized understanding and agreement.

## 2024-05-21 NOTE — TELEPHONE ENCOUNTER
Message received from patient requesting to re-schedule appt to 1 pm same day. Called pt and LVM requesting call back to discuss further.

## 2024-05-22 DIAGNOSIS — I48.19 PERSISTENT ATRIAL FIBRILLATION: Primary | ICD-10-CM

## 2024-05-23 ENCOUNTER — ANTI-COAG VISIT (OUTPATIENT)
Dept: CARDIOLOGY | Facility: CLINIC | Age: 84
End: 2024-05-23
Payer: MEDICARE

## 2024-05-23 ENCOUNTER — LAB VISIT (OUTPATIENT)
Dept: LAB | Facility: HOSPITAL | Age: 84
End: 2024-05-23
Attending: INTERNAL MEDICINE
Payer: MEDICARE

## 2024-05-23 DIAGNOSIS — Z79.01 LONG TERM (CURRENT) USE OF ANTICOAGULANTS: ICD-10-CM

## 2024-05-23 DIAGNOSIS — I48.0 PAROXYSMAL ATRIAL FIBRILLATION: Chronic | ICD-10-CM

## 2024-05-23 DIAGNOSIS — I48.0 PAROXYSMAL ATRIAL FIBRILLATION: Primary | Chronic | ICD-10-CM

## 2024-05-23 LAB
INR PPP: 1.5 (ref 0.8–1.2)
PROTHROMBIN TIME: 16.3 SEC (ref 9–12.5)

## 2024-05-23 PROCEDURE — 85610 PROTHROMBIN TIME: CPT | Mod: PN | Performed by: INTERNAL MEDICINE

## 2024-05-23 PROCEDURE — 36415 COLL VENOUS BLD VENIPUNCTURE: CPT | Mod: PN | Performed by: INTERNAL MEDICINE

## 2024-05-23 PROCEDURE — 93793 ANTICOAG MGMT PT WARFARIN: CPT | Mod: S$GLB,,, | Performed by: PHARMACIST

## 2024-05-23 NOTE — PROGRESS NOTES
Pt is requesting INR be done on 5/30. I offered to schedule with her other appt on 5/29, but pt would like it to be done for 5/30.

## 2024-05-23 NOTE — PROGRESS NOTES
[facility]    2024 3:47 PM    Assessment/Plan:    Patient presents today with subtherapeutic  INR.    Assessment of patient findings and chart review: INR not at goal. Medications, chart, and patient findings reviewed. See calendar for adjustments to dose and follow up plan.      Recommendation for patient's warfarin regimen:  per calendar    Recommend repeat INR in 1 week  _________________________________________________________________    Mis Ca (83 y.o.) is followed by the [facility].    Anticoagulation Summary  As of 2024      INR goal:  2.0-3.0   TTR:  74.6% (1.5 y)   INR used for dosin.5 (2024)   Warfarin maintenance plan:  3 mg (6 mg x 0.5) every Thu, Sat; 6 mg (6 mg x 1) all other days   Weekly warfarin total:  36 mg   Plan last modified:  Tino Mendiola, PharmD (2024)   Next INR check:  2024   Target end date:      Indications    Paroxysmal atrial fibrillation [I48.0]  Long term (current) use of anticoagulants [Z79.01]                 Anticoagulation Episode Summary       INR check location:      Preferred lab:      Send INR reminders to:  [facility] COUMADIN MONITORING POOL    Comments:  [facility]          Anticoagulation Care Providers       Provider Role Specialty Phone number    Shamar Owens MD Responsible Electrophysiology [phone]            Patient Findings       Negatives:  Signs/symptoms of thrombosis, Signs/symptoms of bleeding, Laboratory test error suspected, Change in health, Change in alcohol use, Change in activity, Upcoming invasive procedure, Emergency department visit, Upcoming dental procedure, Missed doses, Extra doses, Change in medications, Change in diet/appetite, Hospital admission, Bruising, Other complaints    Comments:  Pt states she may have missed a dose on  but is not sure. All other doses taken correctly per  calendar. She does use a pillbox but is not sure she put the pill in. She denies any other changes

## 2024-05-24 ENCOUNTER — OFFICE VISIT (OUTPATIENT)
Dept: CARDIOLOGY | Facility: CLINIC | Age: 84
End: 2024-05-24
Payer: MEDICARE

## 2024-05-24 VITALS
SYSTOLIC BLOOD PRESSURE: 123 MMHG | HEIGHT: 62 IN | DIASTOLIC BLOOD PRESSURE: 79 MMHG | HEART RATE: 65 BPM | BODY MASS INDEX: 38.99 KG/M2 | WEIGHT: 211.88 LBS

## 2024-05-24 DIAGNOSIS — E11.9 TYPE 2 DIABETES MELLITUS WITHOUT COMPLICATION, WITHOUT LONG-TERM CURRENT USE OF INSULIN: ICD-10-CM

## 2024-05-24 DIAGNOSIS — I70.0 AORTIC ATHEROSCLEROSIS: ICD-10-CM

## 2024-05-24 DIAGNOSIS — E66.01 SEVERE OBESITY WITH BODY MASS INDEX (BMI) OF 35.0 TO 39.9 WITH SERIOUS COMORBIDITY: ICD-10-CM

## 2024-05-24 DIAGNOSIS — I48.19 PERSISTENT ATRIAL FIBRILLATION: ICD-10-CM

## 2024-05-24 DIAGNOSIS — I48.0 PAROXYSMAL ATRIAL FIBRILLATION: Primary | Chronic | ICD-10-CM

## 2024-05-24 DIAGNOSIS — G47.33 OSA (OBSTRUCTIVE SLEEP APNEA): ICD-10-CM

## 2024-05-24 DIAGNOSIS — I10 ESSENTIAL HYPERTENSION: ICD-10-CM

## 2024-05-24 PROCEDURE — 99999 PR PBB SHADOW E&M-EST. PATIENT-LVL IV: CPT | Mod: PBBFAC,,, | Performed by: INTERNAL MEDICINE

## 2024-05-24 PROCEDURE — 1160F RVW MEDS BY RX/DR IN RCRD: CPT | Mod: CPTII,S$GLB,, | Performed by: INTERNAL MEDICINE

## 2024-05-24 PROCEDURE — 93005 ELECTROCARDIOGRAM TRACING: CPT | Mod: S$GLB,,, | Performed by: INTERNAL MEDICINE

## 2024-05-24 PROCEDURE — 93010 ELECTROCARDIOGRAM REPORT: CPT | Mod: S$GLB,,, | Performed by: INTERNAL MEDICINE

## 2024-05-24 PROCEDURE — 3074F SYST BP LT 130 MM HG: CPT | Mod: CPTII,S$GLB,, | Performed by: INTERNAL MEDICINE

## 2024-05-24 PROCEDURE — 3078F DIAST BP <80 MM HG: CPT | Mod: CPTII,S$GLB,, | Performed by: INTERNAL MEDICINE

## 2024-05-24 PROCEDURE — 1159F MED LIST DOCD IN RCRD: CPT | Mod: CPTII,S$GLB,, | Performed by: INTERNAL MEDICINE

## 2024-05-24 PROCEDURE — 1126F AMNT PAIN NOTED NONE PRSNT: CPT | Mod: CPTII,S$GLB,, | Performed by: INTERNAL MEDICINE

## 2024-05-24 PROCEDURE — 99214 OFFICE O/P EST MOD 30 MIN: CPT | Mod: S$GLB,,, | Performed by: INTERNAL MEDICINE

## 2024-05-24 RX ORDER — AMIODARONE HYDROCHLORIDE 100 MG/1
100 TABLET ORAL DAILY
Qty: 90 TABLET | Refills: 3 | Status: SHIPPED | OUTPATIENT
Start: 2024-05-24 | End: 2024-05-24

## 2024-05-24 RX ORDER — AMIODARONE HYDROCHLORIDE 100 MG/1
100 TABLET ORAL
Qty: 90 TABLET | Refills: 3 | Status: SHIPPED | OUTPATIENT
Start: 2024-05-24 | End: 2024-06-11 | Stop reason: CLARIF

## 2024-05-24 NOTE — PROGRESS NOTES
Subjective:    Patient ID:  Mis Ca is a 83 y.o. female who presents for evaluation of No chief complaint on file.      Referring Cardiologist: Portillo Luis MD  Primary Care Physician: Scott Dillard MD    HPI  Prior Hx:  I had the pleasure of seeing Mrs. Ca in our electrophysiology clinic in follow-up for her atrial arrhythmia. As you are aware she is a pleasant 83 year-old woman with paroxysmal atrial fibrillation, hypertension, obstructive sleep apnea, aortic atherosclerosis and obesity. She reported palpitations for over 10 years. Ultimately diagnosed with pAF in 2014, at which time she was hospitalized in the ICU and treated with a diltiazem drip. She had several paroxysms and hospitalizations for pAF however was not referred to EP until 10/2022. Had recently been admitted for AF with RVR, rate controlled and discharged. She reported ongoing symptoms of dyspnea, palpitations, fatigue and at times hypotension. She is on coumadin for anticoagulation (DOACs are cost prohibitive). We discussed how rhythm control strategies utilizing anti-arrhythmic drugs and/or ablation strategies have lowered efficacy once in the persistent phase compared to paroxysmal phase. Due to LVH noted on ECHO (posterior wall 1.6cm), amiodarone is the only recommended drug. Discussed long-term toxicities related to amiodarone. She elected to proceed.    ECHO 11/2020: normal LV function with moderate LVH, normal mitral valve and mild left atrial enlargement.    I have reviewed all available electrocardiograms in Epic which show either sinus rhythm/bradycardia with left anterior fascicular block or AF, at times with RVR (11/2014, 9/2017, 10/2022).    11/4/2022: PAUL/DCCV and initiated on amiodarone    12/2022: Mrs. Ca returns for follow-up. She feels much better in sinus rhythm. Notes chronic leg edema. Has compression stockings but doesn't wear them. We reduced amiodarone to 100mg daily.    5/2023: Mrs. Ca returned for  follow-up. LFTs 3/2023 and TSH 12/2022 were normal. She felt well.    12/2023: Mrs. Ca returned for follow-up. Feels well. No symptomatic AF. No bleeding issues on warfarin. Recent LFTs/TSH normal.    Interim Hx:  Mrs. Ca returns for follow-up. LFTs/TSH 4/2024 were normal. Recently diagnosed with breast cancer. Awaiting consultation with breast oncology. Notes some mild shortness of breath with exertion.    My interpretation of today's in clinic ECG is sinus rhythm with a rate of sinus rhythm with RBBB/LAFB      Review of Systems   Constitutional: Negative for fever and malaise/fatigue.   HENT:  Negative for congestion and sore throat.    Eyes:  Negative for blurred vision and visual disturbance.   Cardiovascular:  Positive for leg swelling. Negative for chest pain, dyspnea on exertion, irregular heartbeat, near-syncope, palpitations and syncope.   Respiratory:  Negative for cough and shortness of breath.    Hematologic/Lymphatic: Negative for bleeding problem. Does not bruise/bleed easily.   Skin: Negative.    Musculoskeletal: Negative.    Gastrointestinal:  Negative for bloating, abdominal pain, hematochezia and melena.   Neurological:  Negative for focal weakness and weakness.   Psychiatric/Behavioral: Negative.          Objective:    Physical Exam  Vitals reviewed.   Constitutional:       General: She is not in acute distress.     Appearance: She is well-developed. She is not diaphoretic.   HENT:      Head: Normocephalic and atraumatic.   Eyes:      General:         Right eye: No discharge.         Left eye: No discharge.      Conjunctiva/sclera: Conjunctivae normal.   Cardiovascular:      Rate and Rhythm: Normal rate and regular rhythm.      Heart sounds: No murmur heard.     No friction rub. No gallop.   Pulmonary:      Effort: Pulmonary effort is normal. No respiratory distress.      Breath sounds: Normal breath sounds. No wheezing or rales.   Abdominal:      General: Bowel sounds are normal. There is  no distension.      Palpations: Abdomen is soft.      Tenderness: There is no abdominal tenderness.   Musculoskeletal:      Cervical back: Neck supple.   Skin:     General: Skin is warm and dry.   Neurological:      Mental Status: She is alert and oriented to person, place, and time.   Psychiatric:         Behavior: Behavior normal.         Thought Content: Thought content normal.         Judgment: Judgment normal.           Assessment:       1. Paroxysmal atrial fibrillation    2. Essential hypertension    3. Aortic atherosclerosis    4. Type 2 diabetes mellitus without complication, without long-term current use of insulin    5. Severe obesity with body mass index (BMI) of 35.0 to 39.9 with serious comorbidity    6. JACQUELYN (obstructive sleep apnea)         Plan:       In summary, Mrs. Ca is a pleasant 83 year-old woman with paroxysmal atrial fibrillation which is now persistent, hypertension, obstructive sleep apnea, aortic atherosclerosis and obesity. Her [id number] score is 5 and indefinite anticoagulation is indicated. She has elected for rhythm control. Due to LVH noted on ECHO (posterior wall 1.6cm), amiodarone is the only recommended drug. Discussed long-term toxicities. She is maintaining sinus rhythm on 100mg daily. Check CMP/TSH with next visit.    RTC in 6 months, sooner if needed.    Thank you for allowing me to participate in the care of this patient. Please do not hesitate to call me with any questions or concerns.    Shamar Owens MD, PhD  Cardiac Electrophysiology

## 2024-05-26 LAB
OHS QRS DURATION: 148 MS
OHS QTC CALCULATION: 477 MS

## 2024-05-28 DIAGNOSIS — Z00.00 ENCOUNTER FOR MEDICARE ANNUAL WELLNESS EXAM: ICD-10-CM

## 2024-05-29 ENCOUNTER — LAB VISIT (OUTPATIENT)
Dept: LAB | Facility: HOSPITAL | Age: 84
End: 2024-05-29
Attending: SURGERY
Payer: MEDICARE

## 2024-05-29 ENCOUNTER — PATIENT MESSAGE (OUTPATIENT)
Dept: SURGERY | Facility: CLINIC | Age: 84
End: 2024-05-29

## 2024-05-29 ENCOUNTER — DOCUMENTATION ONLY (OUTPATIENT)
Dept: SURGERY | Facility: CLINIC | Age: 84
End: 2024-05-29

## 2024-05-29 ENCOUNTER — OFFICE VISIT (OUTPATIENT)
Dept: SURGERY | Facility: CLINIC | Age: 84
End: 2024-05-29
Payer: MEDICARE

## 2024-05-29 VITALS
BODY MASS INDEX: 38.83 KG/M2 | WEIGHT: 211 LBS | HEIGHT: 62 IN | HEART RATE: 60 BPM | SYSTOLIC BLOOD PRESSURE: 150 MMHG | DIASTOLIC BLOOD PRESSURE: 70 MMHG | OXYGEN SATURATION: 97 %

## 2024-05-29 DIAGNOSIS — C50.911 INFILTRATING DUCTAL CARCINOMA OF RIGHT BREAST: Primary | ICD-10-CM

## 2024-05-29 DIAGNOSIS — C50.911 INVASIVE DUCTAL CARCINOMA OF BREAST, RIGHT: ICD-10-CM

## 2024-05-29 DIAGNOSIS — C50.811 MALIGNANT NEOPLASM OF OVERLAPPING SITES OF RIGHT BREAST IN FEMALE, ESTROGEN RECEPTOR POSITIVE: Primary | ICD-10-CM

## 2024-05-29 DIAGNOSIS — Z17.0 MALIGNANT NEOPLASM OF OVERLAPPING SITES OF RIGHT BREAST IN FEMALE, ESTROGEN RECEPTOR POSITIVE: Primary | ICD-10-CM

## 2024-05-29 DIAGNOSIS — C50.911 INVASIVE DUCTAL CARCINOMA OF BREAST, RIGHT: Primary | ICD-10-CM

## 2024-05-29 PROCEDURE — 99205 OFFICE O/P NEW HI 60 MIN: CPT | Mod: S$GLB,,, | Performed by: SURGERY

## 2024-05-29 PROCEDURE — 1160F RVW MEDS BY RX/DR IN RCRD: CPT | Mod: CPTII,S$GLB,, | Performed by: SURGERY

## 2024-05-29 PROCEDURE — 1159F MED LIST DOCD IN RCRD: CPT | Mod: CPTII,S$GLB,, | Performed by: SURGERY

## 2024-05-29 PROCEDURE — 3078F DIAST BP <80 MM HG: CPT | Mod: CPTII,S$GLB,, | Performed by: SURGERY

## 2024-05-29 PROCEDURE — 1126F AMNT PAIN NOTED NONE PRSNT: CPT | Mod: CPTII,S$GLB,, | Performed by: SURGERY

## 2024-05-29 PROCEDURE — 36415 COLL VENOUS BLD VENIPUNCTURE: CPT | Performed by: SURGERY

## 2024-05-29 PROCEDURE — 3288F FALL RISK ASSESSMENT DOCD: CPT | Mod: CPTII,S$GLB,, | Performed by: SURGERY

## 2024-05-29 PROCEDURE — 1101F PT FALLS ASSESS-DOCD LE1/YR: CPT | Mod: CPTII,S$GLB,, | Performed by: SURGERY

## 2024-05-29 PROCEDURE — 3077F SYST BP >= 140 MM HG: CPT | Mod: CPTII,S$GLB,, | Performed by: SURGERY

## 2024-05-29 PROCEDURE — 99999 PR PBB SHADOW E&M-EST. PATIENT-LVL V: CPT | Mod: PBBFAC,,, | Performed by: SURGERY

## 2024-05-29 NOTE — H&P (VIEW-ONLY)
Breast Surgery  [facility]  Department of Surgery      REFERRING PROVIDER: Scott Dillard MD  [address] [place] [id number]    Chief Complaint: Breast Cancer (New Patient Right Breast Invasive Ductal Carcinoma  .)      Subjective:      Patient ID: Mis Ca is a 83 y.o. female who presents with right breast Invasive Ductal Carcinoma.     She presented for screening mammogram on 04/24/2024 for yearly scheduled screening.. This identified masses in the right breast. Follow-up mammogram and ultrasound on 05/03/2024 showed 3 masses in the right breast, right retroareolar measuring 1.9 cm, right 04:00 o'clock, 4 cm from nipple measuring 1.1 cm and right 05:00 o'clock, 6 cm from the nipple measuring 3 cm. A ultrasound guided biopsy was performed on 05/14/2024 of the 2 most distant masses.  Both biopsy showed invasive ductal carcinoma.    Findings at that time were the following:   Lesion 1:    Location:  Right, retroareolar   Clip:  Infinity, in expected position  Tumor size:  1.9 cm   Tumor thgthrthathdtheth:th th4th Estrogen Receptor: +   Progesterone Receptor: +   Her-2 micheal:  Fish pending   Lymph node status:  Clinically negative     Lesion 2:    Location:  Right, 05:00 o'clock, 6 cm from the nipple   Clip:  Twirl, in expected position  Tumor size:  3 cm   Tumor thgthrthathdtheth:th th4th Estrogen Receptor: +   Progesterone Receptor: +   Her-2 micheal:  Pending   Lymph node status:  Clinically negative     Lesion 3:    Location:  Right, 04:00 o'clock, 2 cm from the nipple   Clip:  Not biopsied  Tumor size:  1.1 cm       Patient had noted a change on breast exam.  Patient denies recent nipple discharge but had when she was younger.  Multiple benign left surgical biopsies. Patient denies a personal history of breast cancer. Family history includes a sister with breast cancer at age 70, father with prostate and lung cancer, brother with bladder cancer, and brother with lung cancer.     GYN History:  Age of menarche was 12. Age of  menopause was 41.  Patient denies hormonal therapy. Patient is .     Past Medical History:   Diagnosis Date    Allergy     Anticoagulant long-term use     Anxiety     Arthritis     Atrial fibrillation     Bilateral renal cysts 2022    Blind right eye     Bradycardia     Cancer     skin cancer left side of face under eye    Hyperlipidemia     Hypertension     PVC (premature ventricular contraction)     RLS (restless legs syndrome)     Sleep apnea     Does not use CPAP machine.    Type 2 diabetes mellitus without complication, without long-term current use of insulin 2024     Past Surgical History:   Procedure Laterality Date    ADENOIDECTOMY      PAM OSTEOTOMY Left 10/31/2018    Procedure: OSTEOTOMY, AKIN;  Surgeon: Rudolph Cardozo DPM;  Location: [facility] OR;  Service: Podiatry;  Laterality: Left;    APPENDECTOMY      BREAST BIOPSY Left     x3    BREAST SURGERY Left     breast biopsy x3    CATARACT EXTRACTION BILATERAL W/ ANTERIOR VITRECTOMY      ENDOSCOPIC GASTROCNEMIUS RECESSION Left 10/31/2018    Procedure: RECESSION, GASTROCNEMIUS, ENDOSCOPIC;  Surgeon: Rudolph Cardozo DPM;  Location: [facility] OR;  Service: Podiatry;  Laterality: Left;    ESOPHAGOGASTRODUODENOSCOPY N/A 2022    Procedure: EGD (ESOPHAGOGASTRODUODENOSCOPY);  Surgeon: Efren Aguilar MD;  Location: [facility];  Service: Endoscopy;  Laterality: N/A;  Patient needs a rapid covid  test done on 12/15/2021. thank you    EYE SURGERY Bilateral     cataracts extraction    EYE SURGERY Right     drainage tube (glaucoma)    FOOT ARTHRODESIS Left 1/15/2020    Procedure: FUSION, FOOT;  Surgeon: Rudolph Cardozo DPM;  Location: [facility] OR;  Service: Podiatry;  Laterality: Left;  mini c-arm, Arthrex screw  for hardware removal (Lydia notified), Orthofix (Niels notified) min ex-fix    FOOT SURGERY Left     lesion removed from dorsal area    FRACTURE SURGERY Left     arm    HAND TENDON SURGERY Left     HYSTERECTOMY      INCISION  AND DRAINAGE FOOT Left 3/11/2020    Procedure: INCISION AND DRAINAGE, FOOT;  Surgeon: Rudolph Cardozo DPM;  Location: [facility] OR;  Service: Podiatry;  Laterality: Left;    LAPIDUS BUNIONECTOMY Left 10/31/2018    Procedure: BUNIONECTOMY, LAPIDUS;  Surgeon: Rudolph Cardozo DPM;  Location: [facility] OR;  Service: Podiatry;  Laterality: Left;  mini c-arm, Arthrex locking plate (Lydia notified)    LAPIDUS BUNIONECTOMY Left 10/31/2018    Dr. Cardozo    Lymph Gland Removed  Right     groin (performed by Dr. Vergara)    NEURECTOMY Left 1/15/2020    Procedure: NEURECTOMY;  Surgeon: Rudolph Cardozo DPM;  Location: [facility] OR;  Service: Podiatry;  Laterality: Left;  bipolar bovie, vessel loop, possible Tieton nerve wrap. Moshe with [facility] notified and will be overnighting graft. MK 1/14/2020    OOPHORECTOMY      ORIF FOREARM FRACTURE Left     PALATE SURGERY      Lesion removed    REMOVAL,ORTHOPEDIC HARDWARE,UPPER EXTREMITY Left 6/30/2023    Procedure: REMOVAL,ORTHOPEDIC HARDWARE,UPPER EXTREMITY;  Surgeon: Skyler Oliva Jr., MD;  Location: [facility] OR;  Service: Orthopedics;  Laterality: Left;  César- Michael [employer] notified cc    REPAIR OF EXTENSOR TENDON Left 6/30/2023    Procedure: REPAIR, TENDON, EXTENSOR;  Surgeon: Skyler Oliva Jr., MD;  Location: [facility] OR;  Service: Orthopedics;  Laterality: Left;  Left Index Digit    TONSILLECTOMY       Current Outpatient Medications on File Prior to Visit   Medication Sig Dispense Refill    alendronate (FOSAMAX) 70 MG tablet TAKE 1 TABLET (70 MG TOTAL) BY MOUTH EVERY 7 DAYS 12 tablet 3    amiodarone (PACERONE) 100 MG Tab TAKE 1 TABLET BY MOUTH EVERY DAY 90 tablet 3    amitriptyline (ELAVIL) 25 MG tablet TAKE 1 TABLET BY MOUTH EVERY EVENING 90 tablet 1    aspirin (ECOTRIN) 81 MG EC tablet Take 81 mg by mouth once daily.      cyanocobalamin 1,000 mcg/mL injection INJECT 1 ML (1,000 MCG) INTRAMUSCULARLY EVERY 30 DAYS 3 mL 19    furosemide (LASIX) 20 MG tablet Take 1 tablet (20 mg total)  "by mouth once daily. 90 tablet 3    gabapentin (NEURONTIN) 300 MG capsule One twice a day for one month, then one nightly for one month, then stop 90 capsule 0    latanoprost 0.005 % ophthalmic solution Place 1 drop into both eyes every evening.      levocetirizine (XYZAL) 5 MG tablet TAKE 1 TABLET BY MOUTH EVERY DAY IN THE EVENING 90 tablet 1    losartan (COZAAR) 100 MG tablet Take 0.5 tablets (50 mg total) by mouth once daily. 45 tablet 3    multivit-min-iron-FA-lutein (CENTRUM SILVER WOMEN) 8 mg iron-400 mcg-300 mcg Tab Take 1 tablet by mouth once daily.      omeprazole (PRILOSEC) 20 MG capsule TAKE 1 CAPSULE BY MOUTH TWICE  DAILY 180 capsule 2    potassium chloride (K-TAB) 20 mEq Take 1 tablet (20 mEq total) by mouth once daily. 90 tablet 3    pramipexole (MIRAPEX) 0.5 MG tablet TAKE 1 TABLET BY MOUTH TWICE  DAILY 180 tablet 3    pravastatin (PRAVACHOL) 40 MG tablet TAKE 1 TABLET BY MOUTH EVERY DAY 90 tablet 3    timolol maleate 0.5% (TIMOPTIC) 0.5 % Drop Place 1 drop into the left eye once daily.   0    vitamin D (VITAMIN D3) 1000 units Tab Take 1,000 Units by mouth once daily.      warfarin (COUMADIN) 6 MG tablet TAKE 6 MG DAILY EXCEPT FOR 9 MG ON FRIDAY TO THIN BLOOD 96 tablet 4    chlorthalidone (HYGROTEN) 50 MG Tab Take 1 tablet (50 mg total) by mouth once daily. 90 tablet 3    mv-mn/om3/dha/epa/fish/lut/geoffrey (OCUVITE ADULT 50 PLUS ORAL) Take 1 tablet by mouth once daily.      needle, disp, 25 gauge 25 gauge x 1" Ndle 1 Needle by Misc.(Non-Drug; Combo Route) route every 30 days. 25 each 0    tirzepatide (MOUNJARO) 5 mg/0.5 mL PnIj Inject 5 mg into the skin every 7 days. (Patient not taking: Reported on 5/29/2024) 4 Pen 11    UNABLE TO FIND Take 1 capsule by mouth 3 (three) times daily with meals. medication name: Golo Release Diet Capsules       Current Facility-Administered Medications on File Prior to Visit   Medication Dose Route Frequency Provider Last Rate Last Admin    lactated ringers infusion   " Intravenous Continuous Don Ruano DNP        LIDOcaine (PF) 10 mg/ml (1%) injection 10 mg  1 mL Intradermal Once Don Ruano DNP         Social History     Socioeconomic History    Marital status:      Spouse name: both   Tobacco Use    Smoking status: Never     Passive exposure: Never    Smokeless tobacco: Never   Substance and Sexual Activity    Alcohol use: Not Currently    Drug use: No    Sexual activity: Not Currently     Partners: Male     Social Determinants of Health     Financial Resource Strain: Low Risk  (5/3/2023)    Overall Financial Resource Strain (CARDIA)     Difficulty of Paying Living Expenses: Not very hard   Food Insecurity: No Food Insecurity (5/3/2023)    Hunger Vital Sign     Worried About Running Out of Food in the Last Year: Never true     Ran Out of Food in the Last Year: Never true   Transportation Needs: No Transportation Needs (5/3/2023)    PRAPARE - Transportation     Lack of Transportation (Medical): No     Lack of Transportation (Non-Medical): No   Physical Activity: Inactive (5/3/2023)    Exercise Vital Sign     Days of Exercise per Week: 0 days     Minutes of Exercise per Session: 0 min   Stress: No Stress Concern Present (5/3/2023)    [employer] - Occupational Stress Questionnaire     Feeling of Stress : Not at all   Housing Stability: Low Risk  (5/3/2023)    Housing Stability Vital Sign     Unable to Pay for Housing in the Last Year: No     Number of Places Lived in the Last Year: 2     Unstable Housing in the Last Year: No     Family History   Problem Relation Name Age of Onset    Aneurysm Mother          AAA    Cancer Father          lung cancer    Lung cancer Father      Cirrhosis Father      Cancer Sister Bernadette         Breast and Brain     Diabetes Sister Bernadette     Breast cancer Sister Bernadette     Hypertension Sister Bernadette     Hyperlipidemia Sister Bernadette     Brain cancer Sister Bernadette     Heart disease Sister Aysha          "Heart valve repair    Atrial fibrillation Sister Aysha     Diabetes Sister Aysha     Heart disease Sister Molly     Heart attack Sister Molly     Bipolar disorder Sister Molly     Depression Sister Molly     Glaucoma Sister Molly     Diabetes Sister Gely Ruiz     Other Sister Gely Ruiz         Pituitary tumor    Arthritis Sister Gely Ruiz     COPD Sister Carito     Heart disease Sister Carito     Kidney disease Sister Carito     Cancer Sister Carito         lung cancer    Diabetes Sister Carito     Lung cancer Sister Carito     Heart attack Sister Carito     Colon polyps Brother Matt     Cancer Brother Matt         lung cancer    Diabetes Brother Matt     Hyperlipidemia Brother Matt     Hypertension Brother Matt     Cancer Brother Luis Antonio         bladder cancer    Diabetes Brother Luis Antonio     Glaucoma Brother Luis Antonio         Review of Systems   All other systems reviewed and are negative.    Objective:   BP (!) 150/70 (BP Location: Left arm, Patient Position: Sitting, BP Method: Large (Automatic))   Pulse 60   Ht 5' 2" (1.575 m)   Wt 95.7 kg (211 lb)   SpO2 97%   BMI 38.59 kg/m²     Physical Exam   Vitals reviewed.  Cardiovascular:  Normal rate.            Pulmonary/Chest: Effort normal. Right breast exhibits mass. Right breast exhibits no inverted nipple, no nipple discharge, no skin change and no tenderness. Left breast exhibits no inverted nipple, no mass, no nipple discharge, no skin change and no tenderness.       Abdominal: Normal appearance.   Musculoskeletal: Lymphadenopathy:      Cervical: No cervical adenopathy.      Upper Body:      Right upper body: No supraclavicular or axillary adenopathy.      Left upper body: No supraclavicular or axillary adenopathy.     Neurological: She is alert.   Skin: Skin is warm and dry.     Psychiatric: Her behavior is normal. Mood normal.       Radiology review: Images personally reviewed by me in the clinic and shown to the patient during the consultation. "     Assessment:       1. Malignant neoplasm of overlapping sites of right breast in female, estrogen receptor positive        Plan:     Multidisciplinary nature of breast cancer care was discussed in detail at today's visit.     Her HER2 testing is pending. We discussed that if this is found to be positive then there may be impacts to her treatment plan. I will call her if the results are positive to discuss. If HER2 testing is negative then we will proceed with the treatment plan as discussed.       We discussed that typically surgical options would include a lumpectomy versus a mastectomy.  We discussed there is no survival benefit to undergoing a mastectomy compared to lumpectomy.  Based on clinical exam and imaging, she would not be a candidate for breast conservation.  We discussed  mastectomy.  We discussed the risks and benefits of mastectomy. Risks include but are not limited to risk of bleeding, infection, poor cosmesis, need for additional surgery, clip placement, scaring, pain including phantom pain, fluid collections, shoulder stiffness, prolonged healing, and recurrence. Reconstruction after mastectomy was discussed.  Reconstructive generally include implant or autologous tissue reconstruction.  She has not interested in reconstruction.  We discussed breast prosthesis.  We discussed the option for contralateral prophylactic mastectomy.  Undergoing a contralateral prophylactic mastectomy does not improve the cure rate for the known cancer or reduce the risk of the that cancer returning.  Overall most women diagnosed with breast cancer have a 2-6% risk of developing a breast cancer in the opposite breast in the next 10 years.  Contralateral prophylactic mastectomy is not 100% protected against development of a new breast cancer.  Undergoing surgery on the contralateral breast has the risk of surgical site complications which could delay cancer treatments.     We also discussed axillary staging using  sentinel node biopsy.  [place] lymph node biopsies performed utilizing the injection of blue and radioactive dye.  This dye travels to the 1st few lymph nodes that drain the breast.  Lymph nodes that uptake the blue or radioactive dye or are palpable are surgically removed and sent to pathology.  Typically 1-5 lymph nodes are removed during this procedure although exact numbers vary depending on the patient.  This procedure allows sampling of the lymph nodes most at risk for metastasis.  The risks and benefits of the procedure discussed the patient.  Risks include but are not limited to lymphedema, bleeding, infection, poor cosmesis, numbness of the incision site, seroma, failure of dye to map, nerve or vascular injury leading to permanent arm numbness and or muscle weakness, possibility of a false negative finding, blood clots and need for additional surgery.  If metastatic disease is identified on pathology of the lymph nodes been axillary dissection (a second surgery) may be recommended.  Side effects of the blue dye which include: discoloration of the urine, stool and breast and a small risk of anaphylaxis.  There is a possibility that the axilla would not map, which could necessitate another surgery to assess the axilla.  Basics of lymphedema prevention discussed.     Comordities include BMI 38. Based on her medical history she is a low risk of complications. Materials utilized in the surgery include surgical metal clips, suture material, and skin adhesives. These materials can lead to allergic reactions, skin irration, and other complications. Medications utilized in surgery include but are not limited to antibiotics, isosulfan blue dye, and technetium sulfa colloid.  Given the patient's allergy history to adhesive and sulfa the we will avoid these medications.  We will need utilize technetium sulfur colloid for sentinel lymph node biopsy.  Low risk of cross-reactivity with sulfa allergy.  Discussed with the  patient.    Radiation therapy is sometimes recommended after mastectomy depending on final pathology details including cancer size and lymph node status.  We discussed that final recommendations on radiation would be based on final surgical pathology. She'll be referred to radiation oncology post-operatively if needed for further discussion of her options.     We also discussed the role of systemic therapy in the treatment of early stage breast cancer. We discussed that this is based on tumor biology and natali status and will be determined based on final pathology. She will be recommended for anti-estrogen therapy. We discussed that if the cancer is hormone positive, endocrine therapy would be recommended in most cases and its use can reduce the risk of recurrence as well as improve survival. Side effects of treatment were briefly discussed. She'll be referred to medical oncology post-operatively for further discussion of her options.     We discussed genetic testing at length and she will like to proceed.     Following her discussion today, she will be scheduled for right unilateral mastectomy and sentinel lymph node biopsy.  Results still pending include HER2 fish testing.    Surgery will be scheduled. Follow-up in clinic roughly 14 days after surgery.     Patient [facility]  All her questions were answered.    Total time spent with the patient: 60 minutes.  45 minutes of face to face consultation and 15 minutes of chart review and coordination of care.

## 2024-05-29 NOTE — PROGRESS NOTES
Nurse Navigator Note:     Met with patient during her consult with Dr. Laird.  Patient and I reviewed the information she discussed with Dr. Laird, including treatment options, diagnosis, and future plans for workup. Patient and I went through the new patient booklet, explained some of the information and why it is provided.     Also offered patient consults with our other specialty clinics: Integrative Oncology, Survivorship and/or Women's Gynecologic needs, our breast physical therapy department for pre-op and post-operative assessments, Oncologic Psychology for psychological support, and Oncologic Nutrition for nutritional counseling. Explained to patient that all of these support services are completely optional. Discussed that physical therapy may call patient to offer pre-op appt, and what that appt would entail.     Patient was given a copy of her appointments, Dr. Laird's card, and my card. Encouraged her to call me if she has any questions or concerns or would like to schedule any additional appointments. Verbalized understanding of all information.     Referrals placed for PT and integrative oncology. Email added to support group. Genetics completed.    Oncology Navigation   Intake  Date of Diagnosis: 05/14/24  Cancer Type: Breast  Type of Referral: Internal  Date of Referral: 05/20/24  Initial Nurse Navigator Contact: 05/20/24  Referral to Initial Contact Timeline (days): 0  First Appointment Available: 05/29/24  Appointment Date: 05/29/24  First Available Date vs. Scheduled Date (days): 0     Treatment  Current Status: Staging work-up    Surgical Oncologist: Dr. Laird  Type of Surgery: Right mastectomy with SLNB  Consult Date: 05/29/24  Surgery Schedule Date: 06/24/24          Procedures: Genetic test  Biopsy Schedule Date: 05/14/24  Genetic Testing Date Sent: 05/29/24  Diagnostic Mammo Schedule Date: 05/03/24  Ultrasound Schedule Date: 05/03/24    General Referrals: Physical Therapy; Support Group;  Integrative Medicine  Physical Therapy Referral Date: 05/29/24    ER: Positive  TN: Positive  Her2: -- (pending)       Support Systems: Family members     Acuity      Follow Up  Follow up in about 2 days (around 5/31/2024) for HER 2 .

## 2024-05-29 NOTE — PROGRESS NOTES
Breast Surgery  [facility] [[email or web address]]  Department of Surgery      REFERRING PROVIDER: Scott Dillard MD  [address] [place] [id number]    Chief Complaint: Breast Cancer (New Patient Right Breast Invasive Ductal Carcinoma  .)      Subjective:      Patient ID: Mis Ca is a 83 y.o. female who presents with right breast Invasive Ductal Carcinoma.     She presented for screening mammogram on 04/24/2024 for yearly scheduled screening.. This identified masses in the right breast. Follow-up mammogram and ultrasound on 05/03/2024 showed 3 masses in the right breast, right retroareolar measuring 1.9 cm, right 04:00 o'clock, 4 cm from nipple measuring 1.1 cm and right 05:00 o'clock, 6 cm from the nipple measuring 3 cm. A ultrasound guided biopsy was performed on 05/14/2024 of the 2 most distant masses.  Both biopsy showed invasive ductal carcinoma.    Findings at that time were the following:   Lesion 1:    Location:  Right, retroareolar   Clip:  Infinity, in expected position  Tumor size:  1.9 cm   Tumor thgthrthathdtheth:th th4th Estrogen Receptor: +   Progesterone Receptor: +   Her-2 micheal:  Fish pending   Lymph node status:  Clinically negative     Lesion 2:    Location:  Right, 05:00 o'clock, 6 cm from the nipple   Clip:  Twirl, in expected position  Tumor size:  3 cm   Tumor thgthrthathdtheth:th th4th Estrogen Receptor: +   Progesterone Receptor: +   Her-2 micheal:  Pending   Lymph node status:  Clinically negative     Lesion 3:    Location:  Right, 04:00 o'clock, 2 cm from the nipple   Clip:  Not biopsied  Tumor size:  1.1 cm       Patient had noted a change on breast exam.  Patient denies recent nipple discharge but had when she was younger.  Multiple benign left surgical biopsies. Patient denies a personal history of breast cancer. Family history includes a sister with breast cancer at age 70, father with prostate and lung cancer, brother with bladder cancer, and brother with lung cancer.     GYN History:  Age of menarche was 12. Age of  menopause was 41.  Patient denies hormonal therapy. Patient is .     Past Medical History:   Diagnosis Date    Allergy     Anticoagulant long-term use     Anxiety     Arthritis     Atrial fibrillation     Bilateral renal cysts 2022    Blind right eye     Bradycardia     Cancer     skin cancer left side of face under eye    Hyperlipidemia     Hypertension     PVC (premature ventricular contraction)     RLS (restless legs syndrome)     Sleep apnea     Does not use CPAP machine.    Type 2 diabetes mellitus without complication, without long-term current use of insulin 2024     Past Surgical History:   Procedure Laterality Date    ADENOIDECTOMY      PAM OSTEOTOMY Left 10/31/2018    Procedure: OSTEOTOMY, AKIN;  Surgeon: Rudolph Cardozo DPM;  Location: [facility] OR;  Service: Podiatry;  Laterality: Left;    APPENDECTOMY      BREAST BIOPSY Left     x3    BREAST SURGERY Left     breast biopsy x3    CATARACT EXTRACTION BILATERAL W/ ANTERIOR VITRECTOMY      ENDOSCOPIC GASTROCNEMIUS RECESSION Left 10/31/2018    Procedure: RECESSION, GASTROCNEMIUS, ENDOSCOPIC;  Surgeon: Rudolph Cardozo DPM;  Location: [facility] OR;  Service: Podiatry;  Laterality: Left;    ESOPHAGOGASTRODUODENOSCOPY N/A 2022    Procedure: EGD (ESOPHAGOGASTRODUODENOSCOPY);  Surgeon: Efren Aguilar MD;  Location: [facility];  Service: Endoscopy;  Laterality: N/A;  Patient needs a rapid covid  test done on 12/15/2021. thank you    EYE SURGERY Bilateral     cataracts extraction    EYE SURGERY Right     drainage tube (glaucoma)    FOOT ARTHRODESIS Left 1/15/2020    Procedure: FUSION, FOOT;  Surgeon: Rudolph Cardozo DPM;  Location: [facility] OR;  Service: Podiatry;  Laterality: Left;  mini c-arm, Arthrex screw  for hardware removal (Lydia notified), Orthofix (Niels notified) min ex-fix    FOOT SURGERY Left     lesion removed from dorsal area    FRACTURE SURGERY Left     arm    HAND TENDON SURGERY Left     HYSTERECTOMY      INCISION  AND DRAINAGE FOOT Left 3/11/2020    Procedure: INCISION AND DRAINAGE, FOOT;  Surgeon: Rudolph Cardozo DPM;  Location: [facility] OR;  Service: Podiatry;  Laterality: Left;    LAPIDUS BUNIONECTOMY Left 10/31/2018    Procedure: BUNIONECTOMY, LAPIDUS;  Surgeon: Rudolph Cardozo DPM;  Location: [facility] OR;  Service: Podiatry;  Laterality: Left;  mini c-arm, Arthrex locking plate (Lydia notified)    LAPIDUS BUNIONECTOMY Left 10/31/2018    Dr. Cardozo    Lymph Gland Removed  Right     groin (performed by Dr. Vergara)    NEURECTOMY Left 1/15/2020    Procedure: NEURECTOMY;  Surgeon: Rudolph Cardozo DPM;  Location: [facility] OR;  Service: Podiatry;  Laterality: Left;  bipolar bovie, vessel loop, possible [place] nerve wrap. Moshe with [facility] notified and will be overnighting graft. MK 1/14/2020    OOPHORECTOMY      ORIF FOREARM FRACTURE Left     PALATE SURGERY      Lesion removed    REMOVAL,ORTHOPEDIC HARDWARE,UPPER EXTREMITY Left 6/30/2023    Procedure: REMOVAL,ORTHOPEDIC HARDWARE,UPPER EXTREMITY;  Surgeon: Skyler Oliva Jr., MD;  Location: [facility] OR;  Service: Orthopedics;  Laterality: Left;  César- Michael [employer] notified cc    REPAIR OF EXTENSOR TENDON Left 6/30/2023    Procedure: REPAIR, TENDON, EXTENSOR;  Surgeon: Skyler Oliva Jr., MD;  Location: [facility] OR;  Service: Orthopedics;  Laterality: Left;  Left Index Digit    TONSILLECTOMY       Current Outpatient Medications on File Prior to Visit   Medication Sig Dispense Refill    alendronate (FOSAMAX) 70 MG tablet TAKE 1 TABLET (70 MG TOTAL) BY MOUTH EVERY 7 DAYS 12 tablet 3    amiodarone (PACERONE) 100 MG Tab TAKE 1 TABLET BY MOUTH EVERY DAY 90 tablet 3    amitriptyline (ELAVIL) 25 MG tablet TAKE 1 TABLET BY MOUTH EVERY EVENING 90 tablet 1    aspirin (ECOTRIN) 81 MG EC tablet Take 81 mg by mouth once daily.      cyanocobalamin 1,000 mcg/mL injection INJECT 1 ML (1,000 MCG) INTRAMUSCULARLY EVERY 30 DAYS 3 mL 19    furosemide (LASIX) 20 MG tablet Take 1 tablet (20 mg total)  "by mouth once daily. 90 tablet 3    gabapentin (NEURONTIN) 300 MG capsule One twice a day for one month, then one nightly for one month, then stop 90 capsule 0    latanoprost 0.005 % ophthalmic solution Place 1 drop into both eyes every evening.      levocetirizine (XYZAL) 5 MG tablet TAKE 1 TABLET BY MOUTH EVERY DAY IN THE EVENING 90 tablet 1    losartan (COZAAR) 100 MG tablet Take 0.5 tablets (50 mg total) by mouth once daily. 45 tablet 3    multivit-min-iron-FA-lutein (CENTRUM SILVER WOMEN) 8 mg iron-400 mcg-300 mcg Tab Take 1 tablet by mouth once daily.      omeprazole (PRILOSEC) 20 MG capsule TAKE 1 CAPSULE BY MOUTH TWICE  DAILY 180 capsule 2    potassium chloride (K-TAB) 20 mEq Take 1 tablet (20 mEq total) by mouth once daily. 90 tablet 3    pramipexole (MIRAPEX) 0.5 MG tablet TAKE 1 TABLET BY MOUTH TWICE  DAILY 180 tablet 3    pravastatin (PRAVACHOL) 40 MG tablet TAKE 1 TABLET BY MOUTH EVERY DAY 90 tablet 3    timolol maleate 0.5% (TIMOPTIC) 0.5 % Drop Place 1 drop into the left eye once daily.   0    vitamin D (VITAMIN D3) 1000 units Tab Take 1,000 Units by mouth once daily.      warfarin (COUMADIN) 6 MG tablet TAKE 6 MG DAILY EXCEPT FOR 9 MG ON FRIDAY TO THIN BLOOD 96 tablet 4    chlorthalidone (HYGROTEN) 50 MG Tab Take 1 tablet (50 mg total) by mouth once daily. 90 tablet 3    mv-mn/om3/dha/epa/fish/lut/geoffrey (OCUVITE ADULT 50 PLUS ORAL) Take 1 tablet by mouth once daily.      needle, disp, 25 gauge 25 gauge x 1" Ndle 1 Needle by Misc.(Non-Drug; Combo Route) route every 30 days. 25 each 0    tirzepatide (MOUNJARO) 5 mg/0.5 mL PnIj Inject 5 mg into the skin every 7 days. (Patient not taking: Reported on 5/29/2024) 4 Pen 11    UNABLE TO FIND Take 1 capsule by mouth 3 (three) times daily with meals. medication name: Golo Release Diet Capsules       Current Facility-Administered Medications on File Prior to Visit   Medication Dose Route Frequency Provider Last Rate Last Admin    lactated ringers infusion   " Intravenous Continuous Don Ruano DNP        LIDOcaine (PF) 10 mg/ml (1%) injection 10 mg  1 mL Intradermal Once Don Ruano DNP         Social History     Socioeconomic History    Marital status:      Spouse name: both   Tobacco Use    Smoking status: Never     Passive exposure: Never    Smokeless tobacco: Never   Substance and Sexual Activity    Alcohol use: Not Currently    Drug use: No    Sexual activity: Not Currently     Partners: Male     Social Determinants of Health     Financial Resource Strain: Low Risk  (5/3/2023)    Overall Financial Resource Strain (CARDIA)     Difficulty of Paying Living Expenses: Not very hard   Food Insecurity: No Food Insecurity (5/3/2023)    Hunger Vital Sign     Worried About Running Out of Food in the Last Year: Never true     Ran Out of Food in the Last Year: Never true   Transportation Needs: No Transportation Needs (5/3/2023)    PRAPARE - Transportation     Lack of Transportation (Medical): No     Lack of Transportation (Non-Medical): No   Physical Activity: Inactive (5/3/2023)    Exercise Vital Sign     Days of Exercise per Week: 0 days     Minutes of Exercise per Session: 0 min   Stress: No Stress Concern Present (5/3/2023)    [employer] - Occupational Stress Questionnaire     Feeling of Stress : Not at all   Housing Stability: Low Risk  (5/3/2023)    Housing Stability Vital Sign     Unable to Pay for Housing in the Last Year: No     Number of Places Lived in the Last Year: 2     Unstable Housing in the Last Year: No     Family History   Problem Relation Name Age of Onset    Aneurysm Mother          AAA    Cancer Father          lung cancer    Lung cancer Father      Cirrhosis Father      Cancer Sister Bernadette         Breast and Brain     Diabetes Sister Bernadette     Breast cancer Sister Bernadette     Hypertension Sister Bernadette     Hyperlipidemia Sister Bernadette     Brain cancer Sister Bernadette     Heart disease Sister Aysha          "Heart valve repair    Atrial fibrillation Sister Aysha     Diabetes Sister Aysha     Heart disease Sister Molly     Heart attack Sister Molly     Bipolar disorder Sister Molly     Depression Sister Molly     Glaucoma Sister Molly     Diabetes Sister Gely Ruiz     Other Sister Gely Ruiz         Pituitary tumor    Arthritis Sister Gely Ruiz     COPD Sister Carito     Heart disease Sister Carito     Kidney disease Sister Carito     Cancer Sister Carito         lung cancer    Diabetes Sister Carito     Lung cancer Sister Carito     Heart attack Sister Carito     Colon polyps Brother Matt     Cancer Brother Matt         lung cancer    Diabetes Brother Matt     Hyperlipidemia Brother Matt     Hypertension Brother Matt     Cancer Brother Luis Antonio         bladder cancer    Diabetes Brother Luis Antonio     Glaucoma Brother Luis Antonio         Review of Systems   All other systems reviewed and are negative.    Objective:   BP (!) 150/70 (BP Location: Left arm, Patient Position: Sitting, BP Method: Large (Automatic))   Pulse 60   Ht 5' 2" (1.575 m)   Wt 95.7 kg (211 lb)   SpO2 97%   BMI 38.59 kg/m²     Physical Exam   Vitals reviewed.  Cardiovascular:  Normal rate.            Pulmonary/Chest: Effort normal. Right breast exhibits mass. Right breast exhibits no inverted nipple, no nipple discharge, no skin change and no tenderness. Left breast exhibits no inverted nipple, no mass, no nipple discharge, no skin change and no tenderness.       Abdominal: Normal appearance.   Musculoskeletal: Lymphadenopathy:      Cervical: No cervical adenopathy.      Upper Body:      Right upper body: No supraclavicular or axillary adenopathy.      Left upper body: No supraclavicular or axillary adenopathy.     Neurological: She is alert.   Skin: Skin is warm and dry.     Psychiatric: Her behavior is normal. Mood normal.       Radiology review: Images personally reviewed by me in the clinic and shown to the patient during the consultation. "     Assessment:       1. Malignant neoplasm of overlapping sites of right breast in female, estrogen receptor positive        Plan:     Multidisciplinary nature of breast cancer care was discussed in detail at today's visit.     Her HER2 testing is pending. We discussed that if this is found to be positive then there may be impacts to her treatment plan. I will call her if the results are positive to discuss. If HER2 testing is negative then we will proceed with the treatment plan as discussed.       We discussed that typically surgical options would include a lumpectomy versus a mastectomy.  We discussed there is no survival benefit to undergoing a mastectomy compared to lumpectomy.  Based on clinical exam and imaging, she would not be a candidate for breast conservation.  We discussed  mastectomy.  We discussed the risks and benefits of mastectomy. Risks include but are not limited to risk of bleeding, infection, poor cosmesis, need for additional surgery, clip placement, scaring, pain including phantom pain, fluid collections, shoulder stiffness, prolonged healing, and recurrence. Reconstruction after mastectomy was discussed.  Reconstructive generally include implant or autologous tissue reconstruction.  She has not interested in reconstruction.  We discussed breast prosthesis.  We discussed the option for contralateral prophylactic mastectomy.  Undergoing a contralateral prophylactic mastectomy does not improve the cure rate for the known cancer or reduce the risk of the that cancer returning.  Overall most women diagnosed with breast cancer have a 2-6% risk of developing a breast cancer in the opposite breast in the next 10 years.  Contralateral prophylactic mastectomy is not 100% protected against development of a new breast cancer.  Undergoing surgery on the contralateral breast has the risk of surgical site complications which could delay cancer treatments.     We also discussed axillary staging using  sentinel node biopsy.  Alsip lymph node biopsies performed utilizing the injection of blue and radioactive dye.  This dye travels to the 1st few lymph nodes that drain the breast.  Lymph nodes that uptake the blue or radioactive dye or are palpable are surgically removed and sent to pathology.  Typically 1-5 lymph nodes are removed during this procedure although exact numbers vary depending on the patient.  This procedure allows sampling of the lymph nodes most at risk for metastasis.  The risks and benefits of the procedure discussed the patient.  Risks include but are not limited to lymphedema, bleeding, infection, poor cosmesis, numbness of the incision site, seroma, failure of dye to map, nerve or vascular injury leading to permanent arm numbness and or muscle weakness, possibility of a false negative finding, blood clots and need for additional surgery.  If metastatic disease is identified on pathology of the lymph nodes been axillary dissection (a second surgery) may be recommended.  Side effects of the blue dye which include: discoloration of the urine, stool and breast and a small risk of anaphylaxis.  There is a possibility that the axilla would not map, which could necessitate another surgery to assess the axilla.  Basics of lymphedema prevention discussed.     Comordities include BMI 38. Based on her medical history she is a low risk of complications. Materials utilized in the surgery include surgical metal clips, suture material, and skin adhesives. These materials can lead to allergic reactions, skin irration, and other complications. Medications utilized in surgery include but are not limited to antibiotics, isosulfan blue dye, and technetium sulfa colloid.  Given the patient's allergy history to adhesive and sulfa the we will avoid these medications.  We will need utilize technetium sulfur colloid for sentinel lymph node biopsy.  Low risk of cross-reactivity with sulfa allergy.  Discussed with the  patient.    Radiation therapy is sometimes recommended after mastectomy depending on final pathology details including cancer size and lymph node status.  We discussed that final recommendations on radiation would be based on final surgical pathology. She'll be referred to radiation oncology post-operatively if needed for further discussion of her options.     We also discussed the role of systemic therapy in the treatment of early stage breast cancer. We discussed that this is based on tumor biology and natali status and will be determined based on final pathology. She will be recommended for anti-estrogen therapy. We discussed that if the cancer is hormone positive, endocrine therapy would be recommended in most cases and its use can reduce the risk of recurrence as well as improve survival. Side effects of treatment were briefly discussed. She'll be referred to medical oncology post-operatively for further discussion of her options.     We discussed genetic testing at length and she will like to proceed.     Following her discussion today, she will be scheduled for right unilateral mastectomy and sentinel lymph node biopsy.  Results still pending include HER2 fish testing.    Surgery will be scheduled. Follow-up in clinic roughly 14 days after surgery.     Patient [facility] information [facility].  All her questions were answered.    Total time spent with the patient: 60 minutes.  45 minutes of face to face consultation and 15 minutes of chart review and coordination of care.

## 2024-05-29 NOTE — PROGRESS NOTES
Genetics Lay Navigator Note:    Nurse Navigator : SOHEILA Motley RN    Met with patient at her consult with Dr. Laird (5/29/2024),  to facilitate genetic testing. Set patient up with [facility] genetic counselor over the phone to complete counseling prior to testing. Patient verbalized understanding to all counseling information. [employer] brochure with number to call with questions or concerns provided to patient as well as my card. Encouraged patient to call me or [facility] at any time.     Lab appointment made and patient escorted with [facility] kit to lab for specimen draw and processing. Patient instructed that results will be provided as soon as they are available. No questions or concerns from patient about plan of care.     [employer] scanned in media and attached to this documentation note.    [employer] Tracking # [id number]

## 2024-05-30 ENCOUNTER — TELEPHONE (OUTPATIENT)
Dept: HEMATOLOGY/ONCOLOGY | Facility: CLINIC | Age: 84
End: 2024-05-30
Payer: MEDICARE

## 2024-05-30 ENCOUNTER — LAB VISIT (OUTPATIENT)
Dept: LAB | Facility: HOSPITAL | Age: 84
End: 2024-05-30
Attending: INTERNAL MEDICINE
Payer: MEDICARE

## 2024-05-30 ENCOUNTER — ANTI-COAG VISIT (OUTPATIENT)
Dept: CARDIOLOGY | Facility: CLINIC | Age: 84
End: 2024-05-30
Payer: MEDICARE

## 2024-05-30 DIAGNOSIS — Z79.01 LONG TERM (CURRENT) USE OF ANTICOAGULANTS: ICD-10-CM

## 2024-05-30 DIAGNOSIS — I48.0 PAROXYSMAL ATRIAL FIBRILLATION: Chronic | ICD-10-CM

## 2024-05-30 DIAGNOSIS — I48.0 PAROXYSMAL ATRIAL FIBRILLATION: Primary | Chronic | ICD-10-CM

## 2024-05-30 LAB
INR PPP: 2.6 (ref 0.8–1.2)
PROTHROMBIN TIME: 27 SEC (ref 9–12.5)

## 2024-05-30 PROCEDURE — 85610 PROTHROMBIN TIME: CPT | Mod: PN | Performed by: INTERNAL MEDICINE

## 2024-05-30 PROCEDURE — 36415 COLL VENOUS BLD VENIPUNCTURE: CPT | Mod: PN | Performed by: INTERNAL MEDICINE

## 2024-05-30 PROCEDURE — 93793 ANTICOAG MGMT PT WARFARIN: CPT | Mod: S$GLB,,, | Performed by: PHARMACIST

## 2024-05-30 NOTE — PROGRESS NOTES
[facility] Virtual Anticoagulation Management Program    2024 2:15 PM    Assessment/Plan:    Patient presents today with therapeutic INR.    Assessment of patient findings and chart review: INR is within range. However, slight vaginal spotting x 1, due to age, recent start of amiodarone and rise in INR from last week, we will lower her warfarin dose slightly.     Recommendation for patient's warfarin regimen: Decrease maintenance dose    Recommend repeat INR in 1 week  _________________________________________________________________    Mis Hughesjuany (83 y.o.) is followed by the [facility].    Anticoagulation Summary  As of 2024      INR goal:  2.0-3.0   TTR:  74.3% (1.6 y)   INR used for dosin.6 (2024)   Warfarin maintenance plan:  3 mg (6 mg x 0.5) every Mon, Thu, Sat; 6 mg (6 mg x 1) all other days   Weekly warfarin total:  33 mg   Plan last modified:  Tino Mendiola, PharmD (2024)   Next INR check:  2024   Target end date:      Indications    Paroxysmal atrial fibrillation [I48.0]  Long term (current) use of anticoagulants [Z79.01]                 Anticoagulation Episode Summary       INR check location:      Preferred lab:      Send INR reminders to:  [facility] COUMADIN MONITORING POOL    Comments:  [facility]          Anticoagulation Care Providers       Provider Role Specialty Phone number    Shamar Owens MD Responsible Electrophysiology [phone]

## 2024-05-30 NOTE — PROGRESS NOTES
Pt advised on dosing and INR testing date. Pt states that she has a small amount of vaginal bleeding, light in color when she used the bathroom. This is her first time noticing the blood. Please advise.

## 2024-05-30 NOTE — NURSING
Patient phoned to discuss referral to Integrative Oncology.     Patient is interested in addressing overall health and well-being prior to surgery.     Low back pain and weakness when standing for prolonged periods.  Uses a cane to walk for prolonged periods of time.     Discontinued Mounjaro as it was too expensive. Lost 11 pounds and A1c dropped from 6.7 to 5.5.     Lives with her sister and brother-in-law who is a diabetic. Sister cooks for her.    Denies difficulty with sleep. Takes Mirapex for RLS. Currently tapering her Gabapentin.     Other sister passed in Feb of 2023 from breast cancer that had metastasized. She has experience from her bilateral mastectomy in post-operative care.     Virtual visit confirmed for 5/31/24 at 0930 with Joanna Dubinsky, PA. Virtual access confirmed. OLEKSANDR Franks provided brief overview of consult and what to expect. Patient appreciative of support. RN Golden contact information provided via portal message, OLEKSANDR Woodson.

## 2024-05-31 ENCOUNTER — OFFICE VISIT (OUTPATIENT)
Dept: HEMATOLOGY/ONCOLOGY | Facility: CLINIC | Age: 84
End: 2024-05-31
Payer: MEDICARE

## 2024-05-31 ENCOUNTER — PATIENT MESSAGE (OUTPATIENT)
Dept: FAMILY MEDICINE | Facility: CLINIC | Age: 84
End: 2024-05-31
Payer: MEDICARE

## 2024-05-31 DIAGNOSIS — C50.911 INFILTRATING DUCTAL CARCINOMA OF RIGHT BREAST: ICD-10-CM

## 2024-05-31 PROCEDURE — 1160F RVW MEDS BY RX/DR IN RCRD: CPT | Mod: CPTII,95,, | Performed by: PHYSICIAN ASSISTANT

## 2024-05-31 PROCEDURE — 1159F MED LIST DOCD IN RCRD: CPT | Mod: CPTII,95,, | Performed by: PHYSICIAN ASSISTANT

## 2024-05-31 PROCEDURE — 99215 OFFICE O/P EST HI 40 MIN: CPT | Mod: 95,,, | Performed by: PHYSICIAN ASSISTANT

## 2024-05-31 NOTE — PROGRESS NOTES
"The patient location is: [place], [place]     Visit type: audiovisual    Face to Face time with patient: 30  45 minutes of total time spent on the encounter, which includes face to face time and non-face to face time preparing to see the patient (eg, review of tests), Obtaining and/or reviewing separately obtained history, Documenting clinical information in the electronic or other health record, Independently interpreting results (not separately reported) and communicating results to the patient/family/caregiver, or Care coordination (not separately reported).         Each patient to whom he or she provides medical services by telemedicine is:  (1) informed of the relationship between the physician and patient and the respective role of any other health care provider with respect to management of the patient; and (2) notified that he or she may decline to receive medical services by telemedicine and may withdraw from such care at any time.    Notes:        [facility] Initial Visit      Chief Complaint:  I want to promote my overall well-being through cancer.    HPI: Mis Ca is an 84 y/o FEMALE with history of afib, HTN, HLD and recently diagnosed with breast cancer and referred by Dr. Laird.  Patient states she sleeps wel and feels well resated.  She does not exercise, but she stays very busy throughout the day.  Her sister, with whom she lives, cooks and she does the cleaning.  She has many hobbies and activities that keep her busy.  She walks around the house but when she walks longer distances, she uses a cane.  She likes to angelita, color, embroider.  She is learning poker at the [employer].  She goes to [employer].  She loves to meet up with friends for a meal.  She states her diet is "not that bad."  Her sister's  is diabetic and patient was also recently diagnosed so her sister is careful in what she cooks.  She lost 10 lbs on Mounjaro but hit "the donut hole" in Medicare " "and could no longer afford to keep getting it.  Her primary care provider, who manages her chronic health issues was prescribing.  Her A1C is currently 5.5, she states.  She states her mood is "always good," "I never get down, I'm too busy to get down."  She states she goes to [employer] on Sunday and brings a friend to [employer] with her.  Many friends have relied on her for rides in past.  She states she has a "trust in God" and is ready to proceed with surgery.      Cancer/Stage/TNM:   Cancer Staging   No matching staging information was found for the patient.       Oncology History    No history exists.         Past Medical History:   Diagnosis Date    Allergy     Anticoagulant long-term use     Anxiety     Arthritis     Atrial fibrillation     Bilateral renal cysts 05/01/2022    Blind right eye     Bradycardia     Cancer     skin cancer left side of face under eye    Hyperlipidemia     Hypertension     PVC (premature ventricular contraction)     RLS (restless legs syndrome)     Sleep apnea     Does not use CPAP machine.    Type 2 diabetes mellitus without complication, without long-term current use of insulin 4/11/2024        Current Outpatient Medications   Medication Instructions    alendronate (FOSAMAX) 70 mg, Oral, Every 7 days    amiodarone (PACERONE) 100 mg, Oral    amitriptyline (ELAVIL) 25 mg, Oral, Nightly    aspirin (ECOTRIN) 81 mg, Oral, Daily,      chlorthalidone (HYGROTEN) 50 mg, Oral, Daily    cyanocobalamin 1,000 mcg/mL injection INJECT 1 ML (1,000 MCG) INTRAMUSCULARLY EVERY 30 DAYS    furosemide (LASIX) 20 mg, Oral, Daily    gabapentin (NEURONTIN) 300 MG capsule One twice a day for one month, then one nightly for one month, then stop    latanoprost 0.005 % ophthalmic solution 1 drop, Both Eyes, Nightly    levocetirizine (XYZAL) 5 mg, Oral, Nightly    losartan (COZAAR) 50 mg, Oral, Daily    MOUNJARO 5 mg, Subcutaneous, Every 7 days    multivit-min-iron-FA-lutein (CENTRUM SILVER WOMEN) 8 mg iron-400 mcg-300 " "mcg Tab 1 tablet, Oral, Daily    needle, disp, 25 gauge 25 gauge x 1" Ndle 1 Needle, Misc.(Non-Drug; Combo Route), Every 30 days    omeprazole (PRILOSEC) 20 mg, Oral, 2 times daily    potassium chloride (K-TAB) 20 mEq 20 mEq, Oral, Daily    pramipexole (MIRAPEX) 0.5 MG tablet TAKE 1 TABLET BY MOUTH TWICE  DAILY    pravastatin (PRAVACHOL) 40 MG tablet TAKE 1 TABLET BY MOUTH EVERY DAY    timolol maleate 0.5% (TIMOPTIC) 0.5 % Drop 1 drop, Left Eye, Daily    UNABLE TO FIND 1 capsule, Oral, 3 times daily with meals, medication name: Golo Release Diet Capsules    vitamin D (VITAMIN D3) 1,000 Units, Oral, Daily    warfarin (COUMADIN) 6 MG tablet TAKE 6 MG DAILY EXCEPT FOR 9 MG ON FRIDAY TO THIN BLOOD        Past Surgical History:   Procedure Laterality Date    ADENOIDECTOMY      PAM OSTEOTOMY Left 10/31/2018    Procedure: OSTEOTOMY, AKIN;  Surgeon: Rudolph Cardozo DPM;  Location: [facility] OR;  Service: Podiatry;  Laterality: Left;    APPENDECTOMY      BREAST BIOPSY Left     x3    BREAST SURGERY Left     breast biopsy x3    CATARACT EXTRACTION BILATERAL W/ ANTERIOR VITRECTOMY      ENDOSCOPIC GASTROCNEMIUS RECESSION Left 10/31/2018    Procedure: RECESSION, GASTROCNEMIUS, ENDOSCOPIC;  Surgeon: Rudolph Cardozo DPM;  Location: [facility] OR;  Service: Podiatry;  Laterality: Left;    ESOPHAGOGASTRODUODENOSCOPY N/A 2/11/2022    Procedure: EGD (ESOPHAGOGASTRODUODENOSCOPY);  Surgeon: Efren Aguilar MD;  Location: [facility];  Service: Endoscopy;  Laterality: N/A;  Patient needs a rapid covid  test done on 12/15/2021. thank you    EYE SURGERY Bilateral     cataracts extraction    EYE SURGERY Right     drainage tube (glaucoma)    FOOT ARTHRODESIS Left 1/15/2020    Procedure: FUSION, FOOT;  Surgeon: Rudolph Cardozo DPM;  Location: [facility] OR;  Service: Podiatry;  Laterality: Left;  mini c-arm, Arthrex screw  for hardware removal (Lydia notified), Orthofix (Niels notified) min ex-fix    FOOT SURGERY Left     lesion " removed from dorsal area    FRACTURE SURGERY Left     arm    HAND TENDON SURGERY Left     HYSTERECTOMY      INCISION AND DRAINAGE FOOT Left 3/11/2020    Procedure: INCISION AND DRAINAGE, FOOT;  Surgeon: Rudolph Cardozo DPM;  Location: [facility] OR;  Service: Podiatry;  Laterality: Left;    LAPIDUS BUNIONECTOMY Left 10/31/2018    Procedure: BUNIONECTOMY, LAPIDUS;  Surgeon: Rudolph Cardozo DPM;  Location: [facility] OR;  Service: Podiatry;  Laterality: Left;  mini c-arm, Arthrex locking plate (Lydia notified)    LAPIDUS BUNIONECTOMY Left 10/31/2018    Dr. Cardozo    Lymph Gland Removed  Right     groin (performed by Dr. Vergara)    NEURECTOMY Left 1/15/2020    Procedure: NEURECTOMY;  Surgeon: Rudolph Cardozo DPM;  Location: [facility] OR;  Service: Podiatry;  Laterality: Left;  bipolar bovie, vessel loop, possible [place] nerve wrap. Moshe with [employer] notified and will be overnighting graft. MK 1/14/2020    OOPHORECTOMY      ORIF FOREARM FRACTURE Left     PALATE SURGERY      Lesion removed    REMOVAL,ORTHOPEDIC HARDWARE,UPPER EXTREMITY Left 6/30/2023    Procedure: REMOVAL,ORTHOPEDIC HARDWARE,UPPER EXTREMITY;  Surgeon: Skyler Oliva Jr., MD;  Location: [facility] OR;  Service: Orthopedics;  Laterality: Left;  [facility] notified cc    REPAIR OF EXTENSOR TENDON Left 6/30/2023    Procedure: REPAIR, TENDON, EXTENSOR;  Surgeon: Skyler Oliva Jr., MD;  Location: [facility];  Service: Orthopedics;  Laterality: Left;  Left Index Digit    TONSILLECTOMY                7 Pillars Assessment      Sleep  How many hours of sleep per night? 8 hours      Resilience  Rate your current level of stress- mod  How do you manage stress?  crafts    Purpose  Do you feel you have a vision or a life purpose? Yes    Environment  Any exposures:no known exposures    Spirituality-  Yarsanism     Nutrition   Food allergies or sensitivities: no    Exercise  How would you describe your physical activity level? mod  Do you work at a sedentary job?  no  What do you do for physical activity? Walk in house      Physical Exam   There were no vitals taken for this visit.   Wt Readings from Last 3 Encounters:   05/29/24 95.7 kg (211 lb)   05/24/24 96.1 kg (211 lb 13.8 oz)   04/24/24 96.6 kg (213 lb)     Temp Readings from Last 3 Encounters:   04/11/24 97.8 °F (36.6 °C) (Oral)   10/09/23 98.4 °F (36.9 °C) (Oral)   06/30/23 98.4 °F (36.9 °C) (Skin)     BP Readings from Last 3 Encounters:   05/29/24 (!) 150/70   05/24/24 123/79   04/11/24 102/66     Pulse Readings from Last 3 Encounters:   05/29/24 60   05/24/24 65   04/11/24 78       Body mass index is There is no height or weight on file to calculate BMI.    Vitals reviewed.   Constitutional:       General: Patient is not in acute distress.     Appearance: Normal appearance.   HENT:      Head: Normocephalic and atraumatic.  Pulmonary:      Effort: Pulmonary effort is normal.       Review of Systems:   Cardiac:           No SOB, chest pain with exertion,edema, orthopnea  Distress:          No excessive sadness, no hopelessness, no anhedonia, no excessive worry or nervousness  Cognitive:        No trouble with memory, no difficulty paying attention, no brain fog, no trouble functioning with work or home life  Fatigue:           Energy level adequate, performing ADL's, no morning fatigue                           Fatigue  1  / 10  ( Scale 0 - 10)   Hormonal:       No hot flashes, no night sweats  Pain:                Has occ pain,  location: low back                           Pain 0   / 10 (Scale 0 - 10)    Neuropathy:    No numbness, no tingling, no paresthesia      Labs:   Lab Results   Component Value Date    WBC 6.10 04/17/2024    HGB 13.5 04/17/2024    HCT 40.6 04/17/2024    MCV 98 04/17/2024     04/17/2024           Hemoglobin A1C   Date Value Ref Range Status   04/08/2024 5.5 4.0 - 5.6 % Final     Comment:     ADA Screening Guidelines:  5.7-6.4%  Consistent with prediabetes  >or=6.5%  Consistent with  diabetes    High levels of fetal hemoglobin interfere with the HbA1C  assay. Heterozygous hemoglobin variants (HbS, HgC, etc)do  not significantly interfere with this assay.   However, presence of multiple variants may affect accuracy.     10/12/2023 5.8 (H) 4.0 - 5.6 % Final     Comment:     ADA Screening Guidelines:  5.7-6.4%  Consistent with prediabetes  >or=6.5%  Consistent with diabetes    High levels of fetal hemoglobin interfere with the HbA1C  assay. Heterozygous hemoglobin variants (HbS, HgC, etc)do  not significantly interfere with this assay.   However, presence of multiple variants may affect accuracy.     09/07/2022 6.7 (H) 4.0 - 5.6 % Final     Comment:     ADA Screening Guidelines:  5.7-6.4%  Consistent with prediabetes  >or=6.5%  Consistent with diabetes    High levels of fetal hemoglobin interfere with the HbA1C  assay. Heterozygous hemoglobin variants (HbS, HgC, etc)do  not significantly interfere with this assay.   However, presence of multiple variants may affect accuracy.              Assessment:   Patient is a 83 y.o.female who arrived to [facility] for consult during breast cancer treatment.       Plan   #Nutrition: Encourage plant-forward anti-inflammatory diet with plenty of protein for maintaining blood glucose  (DM II) and for healing from surgery and maintaining muscle mass.  She will meet with Nutrition.   # Sleep: Recommend 6-8 hours of restful sleep nightly; recommend strong sleep hygiene routine one hour prior to bed (no screens)  # Exercise: Recommend 60 minutes of gentle movement/light activity per day (cleaning, walking, cooking, gardening, stationary bike) at baseline and, if can tolerate, build up to at least 150 minutes (2 ½ hours) of moderate-intensity exercise weekly.  Encouraged patient to keep as active as she can leading up to her surgery.  Encouraged patient to walk, add movement after meals to help control blood glucose (patient is not currently on any treatment  from DM II).  Patient declined PT, OT however, I convinced patient to go to the one pre-hab PT appointment to be evaluated as recommended by Dr. Laird.  Patient had declined this service but I will reach out for  to reach out for patient to schedule.  This is important for her overall strength and also prevention of lymphedema   # Strongly encouraged patient to consider ongoing PT for overall strength and balance and for prevention of falls, given patient's sometime feeling unsteady and sometimes using a cane  # Offered acupuncture for back pain, patient declines for now and will let us know if she is interested in this service  # Offered Psychology and other resources for stress reduction including classes, patient declines  #Follow-Up: PRN

## 2024-06-04 RX ORDER — CLINDAMYCIN PHOSPHATE 900 MG/50ML
900 INJECTION, SOLUTION INTRAVENOUS
OUTPATIENT
Start: 2024-06-04

## 2024-06-04 RX ORDER — SODIUM CHLORIDE 9 MG/ML
INJECTION, SOLUTION INTRAVENOUS CONTINUOUS
OUTPATIENT
Start: 2024-06-04

## 2024-06-05 ENCOUNTER — CLINICAL SUPPORT (OUTPATIENT)
Dept: NUTRITION | Facility: CLINIC | Age: 84
End: 2024-06-05
Payer: MEDICARE

## 2024-06-05 ENCOUNTER — TELEPHONE (OUTPATIENT)
Dept: SURGERY | Facility: CLINIC | Age: 84
End: 2024-06-05
Payer: MEDICARE

## 2024-06-05 DIAGNOSIS — C50.911 INFILTRATING DUCTAL CARCINOMA OF RIGHT BREAST: ICD-10-CM

## 2024-06-05 LAB — INTEGRATED BRAC ANALYSIS: NORMAL

## 2024-06-05 PROCEDURE — 97802 MEDICAL NUTRITION INDIV IN: CPT | Mod: 95,,,

## 2024-06-05 NOTE — PATIENT INSTRUCTIONS
Recommendations:   Focus on protein at all meals and snacks. Examples provided.    Ensure adequate fluid intake of 64 oz.      Be physically active as part of everyday life.    Recommend limiting snacking especially after 8 pm.

## 2024-06-05 NOTE — TELEPHONE ENCOUNTER
Genetic Lay Navigation Note:    Called patient with the results of her genetic testing. Informed patient that results were negative for any notable mutation. Also, explained that the patient is at an elevated risk for Colorectal Cancer due to family history. Instructed patient that we would ensure she received a copy of her results via [employer] or mail, and to call us with any questions or concerns regarding the full report. Patient verbalized understanding to all information, no questions at this time.     Patient's ordering physician made aware of results and that patient was informed of them.

## 2024-06-05 NOTE — PROGRESS NOTES
"Oncology Nutrition Assessment for Medical Nutrition Therapy Initial Visit  Consultation Time: 60 Minutes  Referring Provider:  Ms. Joanna Dubinsky, PA  Reason for Nutrition Consult: Decreased Appetite, New Patient - Nutrition Counseling and Education , Nutrition related questions, and Weight Loss  The patient location is: home. The chief complaint leading to consultation is: breast cancer.   Visit type: audiovisual   Face to Face time with patient: 45 mintues 60 minutes of total time spent on the encounter, which includes face to face time and non-face to face time preparing to see the patient (eg, review of tests), Obtaining and/or reviewing separately obtained history, Documenting clinical information in the electronic or other health record, Independently interpreting results (not separately reported) and communicating results to the patient/family/caregiver, or Care coordination (not separately reported).    Each patient to whom he or she provides medical services by telemedicine is:  (1) informed of the relationship between the physician and patient and the respective role of any other health care provider with respect to management of the patient; and (2) notified that he or she may decline to receive medical services by telemedicine and may withdraw from such care at any time. Note below:     Nutrition Assessment      Patient Information:    Mis Ca  : 1940   83 y.o. female    Allergies/Intolerances: No known food allergies  Social Data: lives with  sister .   Anthropometrics:     Weight:   95.7 kg (211 lb)                                Height:   5'2" (1.58 m)    BMI: 38            Usual BW: 220 lb  Weight Change: Loss of 10 lb with Mounjaro     Supplements/Vitamins:    MVI/Supp: yes : centrum MVI and vitamin D  Drug/Nutrient interactions: No Activity Level:     Low Active     Form of Activity: activities of daily living  and calisthenics- exercise consisting of a variety of movements that " exercise large muscle groups. [facility] on [facility] chair aerobics twice a week.      Malnutrition Assessment:   Nutrition Risk: Patient does not meet at least 2 characteristics of the ASPEN/AND criteria at this time.     Food/Nutrition-related history:    Diet/PO Recall:   Appetite: Good  Fluid Intake: Adequate  Diet Recall:  Breakfast: oatmeal or toast with eggs or boiled egg with fruit and a decaf coffee   Lunch: chicken tenders with potatoes and gravy, peas and carrots, and cake- at [facility] on [employer] meal site  Dinner: chicken or pot roast with vegetables or beef stroganoff - cooked by sister  Snacks: 3-4x/day: popcorn, chips, candy   Drinks: water, decaf coffee  ONS: None    Servings of F/V per day: 2-3x/day  Eating out: 3-4x/week.   Cultural/Spiritual/Personal Preferences: No Preferences     GI Symptoms: early satiety - feeling of fullness after eating a very small amount or weight loss 10 lbs. within the last 6 month(s) on Mounjaro  Oncology Nutrition Symptoms: weight loss, fatigue, and feel full quickly - from Mounjaro but stopped taking it recently             Difficulty chewing or swallowing?  No    Patient Notes/Reports: Pt referred for a Nutrition Consultation related to Decreased Appetite, New Patient - Nutrition Counseling and Education , Nutrition related questions, and Weight Loss. Patient has a medical diagnosis of breast cancer. Currently about to get mastectomy surgery on June 24. She may need radiation therapy after that. Ms. Ca eats 3 solid, balanced meals a day, thanks to her sister cooking dinner and the [facility] [employer] site providing lunch, and she cooks herself breakfast. Her protein and fluid intake is sufficient. Ms. Ca struggles with regular and especially late night snacking and worries about weight gain and increase in Hgb A1c from 5.5% after stopping the Mounjaro. Shared some strategies with her and will call in 2 months to schedule a 3 month follow up.    Medical Tests and  Procedures:  Patient Active Problem List   Diagnosis    Paroxysmal atrial fibrillation    JACQUELYN (obstructive sleep apnea)    Long term (current) use of anticoagulants    Anxiety    Restless leg syndrome    Hyperlipidemia    Essential hypertension    Gastroesophageal reflux disease without esophagitis    History of adenomatous polyp of colon    Diverticulosis of large intestine without hemorrhage    Tortuous aorta    Glaucoma    Blind right eye    Aortic atherosclerosis    Severe obesity with body mass index (BMI) of 35.0 to 39.9 with serious comorbidity    Hallux valgus with bunions, left    Antalgic gait    Multilevel facet arthritis    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    Other specified disorders of adrenal gland    Unspecified inflammatory spondylopathy, 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    Inflammatory polyneuropathy, unspecified    Umbilical hernia without obstruction and without gangrene    Bilateral renal cysts    Chronic kidney disease, stage 3a    Rupture of extensor tendon of left hand    Muscle weakness    Stiffness of left wrist joint    Stiffness of finger joint, left    Type 2 diabetes mellitus without complication, without long-term current use of insulin    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, estrogen receptor positive      Past Medical History:   Diagnosis Date    Allergy     Anticoagulant long-term use     Anxiety     Arthritis     Atrial fibrillation     Bilateral renal cysts 05/01/2022    Blind right eye     Bradycardia     Cancer     skin cancer left side of face under eye    Hyperlipidemia     Hypertension     PVC (premature ventricular contraction)     RLS (restless legs syndrome)     Sleep apnea     Does not use CPAP machine.    Type 2 diabetes mellitus without complication, without long-term current use of insulin 4/11/2024     Past Surgical History:   Procedure Laterality Date    ADENOIDECTOMY      PAM OSTEOTOMY Left 10/31/2018    Procedure: OSTEOTOMY, AKIN;  Surgeon: Rudolph Cardozo DPM;   Location: [facility] OR;  Service: Podiatry;  Laterality: Left;    APPENDECTOMY      BREAST BIOPSY Left     x3    BREAST SURGERY Left     breast biopsy x3    CATARACT EXTRACTION BILATERAL W/ ANTERIOR VITRECTOMY      ENDOSCOPIC GASTROCNEMIUS RECESSION Left 10/31/2018    Procedure: RECESSION, GASTROCNEMIUS, ENDOSCOPIC;  Surgeon: Rudolph Cardozo DPM;  Location: [facility] OR;  Service: Podiatry;  Laterality: Left;    ESOPHAGOGASTRODUODENOSCOPY N/A 2/11/2022    Procedure: EGD (ESOPHAGOGASTRODUODENOSCOPY);  Surgeon: Efren Aguilar MD;  Location: [facility];  Service: Endoscopy;  Laterality: N/A;  Patient needs a rapid covid  test done on 12/15/2021. thank you    EYE SURGERY Bilateral     cataracts extraction    EYE SURGERY Right     drainage tube (glaucoma)    FOOT ARTHRODESIS Left 1/15/2020    Procedure: FUSION, FOOT;  Surgeon: Rudolph Cardozo DPM;  Location: [facility] OR;  Service: Podiatry;  Laterality: Left;  mini c-arm, Arthrex screw  for hardware removal (Lydia notified), Orthofix (Niels notified) min ex-fix    FOOT SURGERY Left     lesion removed from dorsal area    FRACTURE SURGERY Left     arm    HAND TENDON SURGERY Left     HYSTERECTOMY      INCISION AND DRAINAGE FOOT Left 3/11/2020    Procedure: INCISION AND DRAINAGE, FOOT;  Surgeon: Rudolph Cardozo DPM;  Location: [facility] OR;  Service: Podiatry;  Laterality: Left;    LAPIDUS BUNIONECTOMY Left 10/31/2018    Procedure: BUNIONECTOMY, LAPIDUS;  Surgeon: Rudolph Cardozo DPM;  Location: [facility] OR;  Service: Podiatry;  Laterality: Left;  mini c-arm, Arthrex locking plate (Lydia notified)    LAPIDUS BUNIONECTOMY Left 10/31/2018    Dr. Cardozo    Lymph Gland Removed  Right     groin (performed by Dr. Vergara)    NEURECTOMY Left 1/15/2020    Procedure: NEURECTOMY;  Surgeon: Rudolph Cardozo DPM;  Location: [facility] OR;  Service: Podiatry;  Laterality: Left;  bipolar bovie, vessel loop, possible Agnes nerve wrap. Moshe with Agnes notified and will be  "overnighting graft. MK 1/14/2020    OOPHORECTOMY      ORIF FOREARM FRACTURE Left     PALATE SURGERY      Lesion removed    REMOVAL,ORTHOPEDIC HARDWARE,UPPER EXTREMITY Left 6/30/2023    Procedure: REMOVAL,ORTHOPEDIC HARDWARE,UPPER EXTREMITY;  Surgeon: Skyler Oliva Jr., MD;  Location: [facility] OR;  Service: Orthopedics;  Laterality: Left;  [facility] [employer] notified cc    REPAIR OF EXTENSOR TENDON Left 6/30/2023    Procedure: REPAIR, TENDON, EXTENSOR;  Surgeon: Skyler Oliva Jr., MD;  Location: [facility] OR;  Service: Orthopedics;  Laterality: Left;  Left Index Digit    TONSILLECTOMY         Current Outpatient Medications   Medication Instructions    alendronate (FOSAMAX) 70 mg, Oral, Every 7 days    amiodarone (PACERONE) 100 mg, Oral    amitriptyline (ELAVIL) 25 mg, Oral, Nightly    aspirin (ECOTRIN) 81 mg, Oral, Daily,      chlorthalidone (HYGROTEN) 50 mg, Oral, Daily    cyanocobalamin 1,000 mcg/mL injection INJECT 1 ML (1,000 MCG) INTRAMUSCULARLY EVERY 30 DAYS    furosemide (LASIX) 20 mg, Oral, Daily    gabapentin (NEURONTIN) 300 MG capsule One twice a day for one month, then one nightly for one month, then stop    latanoprost 0.005 % ophthalmic solution 1 drop, Both Eyes, Nightly    levocetirizine (XYZAL) 5 mg, Oral, Nightly    losartan (COZAAR) 50 mg, Oral, Daily    multivit-min-iron-FA-lutein (CENTRUM SILVER WOMEN) 8 mg iron-400 mcg-300 mcg Tab 1 tablet, Oral, Daily    needle, disp, 25 gauge 25 gauge x 1" Ndle 1 Needle, Misc.(Non-Drug; Combo Route), Every 30 days    omeprazole (PRILOSEC) 20 mg, Oral, 2 times daily    potassium chloride (K-TAB) 20 mEq 20 mEq, Oral, Daily    pramipexole (MIRAPEX) 0.5 MG tablet TAKE 1 TABLET BY MOUTH TWICE  DAILY    pravastatin (PRAVACHOL) 40 MG tablet TAKE 1 TABLET BY MOUTH EVERY DAY    timolol maleate 0.5% (TIMOPTIC) 0.5 % Drop 1 drop, Left Eye, Daily    vitamin D (VITAMIN D3) 1,000 Units, Oral, Daily    warfarin (COUMADIN) 6 MG tablet TAKE 6 MG DAILY EXCEPT FOR 9 MG ON FRIDAY TO " THIN BLOOD      Labs:  Reviewed - followed by MD ashley        Nutrition Diagnosis    Nutrition Problem: excessive protein/calorie intake   Etiology (related to): nutrition and snacking  Signs/Symptoms (as evidenced by): self reported diet questions/concerns     Nutrition Intervention      Estimated Energy/Fluid Requirements:   Weight used: IBW 50 kg  Calories: 9082-0949 kcal/day (25-30 kcal/kg)  Protein: 60 g/day (1.2 g/kg)  Fluid: 1057-7085 mL/day (1 mL/kcal)   Recommendations:   Focus on protein at all meals and snacks. Examples provided.    Ensure adequate fluid intake of 64 oz.      Be physically active as part of everyday life.    Recommend limiting snacking especially after 8 pm.      Education Needs Satisfied: yes   Education Materials Provided / Reviewed:   Nutrition During Your Cancer Treatment  Healthy Plate Method    Barriers to Learning: none identified  Patient and/ or Family Verbalizes understanding: yes      Nutrition Monitoring and Evaluation    Monitor: energy intake, diet tolerance , and weight    Goals:   1: Appropriate intake of protein and Calories to promote gradual weight loss.   Indicator: Weight/BMI  2: Adherence to nutrition recommendations for improved symptom management  Indicator: Diet Recall     Follow up In 3 months    Communication to referring provider/care team: note available in chart  Signature: Ela Landeros, MPH, RD, LDN

## 2024-06-06 ENCOUNTER — ANTI-COAG VISIT (OUTPATIENT)
Dept: CARDIOLOGY | Facility: CLINIC | Age: 84
End: 2024-06-06
Payer: MEDICARE

## 2024-06-06 ENCOUNTER — LAB VISIT (OUTPATIENT)
Dept: LAB | Facility: HOSPITAL | Age: 84
End: 2024-06-06
Attending: INTERNAL MEDICINE
Payer: MEDICARE

## 2024-06-06 DIAGNOSIS — Z79.01 LONG TERM (CURRENT) USE OF ANTICOAGULANTS: ICD-10-CM

## 2024-06-06 DIAGNOSIS — I48.0 PAROXYSMAL ATRIAL FIBRILLATION: Chronic | ICD-10-CM

## 2024-06-06 DIAGNOSIS — I48.0 PAROXYSMAL ATRIAL FIBRILLATION: Primary | Chronic | ICD-10-CM

## 2024-06-06 LAB
INR PPP: 2.4 (ref 0.8–1.2)
PROTHROMBIN TIME: 24.9 SEC (ref 9–12.5)

## 2024-06-06 PROCEDURE — 93793 ANTICOAG MGMT PT WARFARIN: CPT | Mod: S$GLB,,, | Performed by: PHARMACIST

## 2024-06-06 PROCEDURE — 85610 PROTHROMBIN TIME: CPT | Mod: PN | Performed by: INTERNAL MEDICINE

## 2024-06-06 PROCEDURE — 36415 COLL VENOUS BLD VENIPUNCTURE: CPT | Mod: PN | Performed by: INTERNAL MEDICINE

## 2024-06-06 NOTE — PROGRESS NOTES
[facility]    2024 1:25 PM    Assessment/Plan:    Patient presents today with therapeutic INR.    Assessment of patient findings and chart review: INR at goal. Medications and chart reviewed. No changes noted to necessitate adjustment of warfarin or follow-up plan. See calendar.      Recommendation for patient's warfarin regimen: Continue current maintenance dose    Recommend repeat INR in 1 week  _________________________________________________________________    Mis L Lanny (83 y.o.) is followed by the [facility].    Anticoagulation Summary  As of 2024      INR goal:  2.0-3.0   TTR:  74.7% (1.6 y)   INR used for dosin.4 (2024)   Warfarin maintenance plan:  3 mg (6 mg x 0.5) every Mon, Thu, Sat; 6 mg (6 mg x 1) all other days   Weekly warfarin total:  33 mg   Plan last modified:  Tino Mendiola, PharmD (2024)   Next INR check:  2024   Target end date:      Indications    Paroxysmal atrial fibrillation [I48.0]  Long term (current) use of anticoagulants [Z79.01]                 Anticoagulation Episode Summary       INR check location:      Preferred lab:      Send INR reminders to:  [facility] COUMADIN MONITORING POOL    Comments:  [facility]          Anticoagulation Care Providers       Provider Role Specialty Phone number    Shamar Owens MD Responsible Electrophysiology [phone]

## 2024-06-07 ENCOUNTER — ANESTHESIA EVENT (OUTPATIENT)
Dept: SURGERY | Facility: OTHER | Age: 84
End: 2024-06-07
Payer: MEDICARE

## 2024-06-07 RX ORDER — LIDOCAINE HYDROCHLORIDE 10 MG/ML
0.5 INJECTION, SOLUTION EPIDURAL; INFILTRATION; INTRACAUDAL; PERINEURAL ONCE
OUTPATIENT
Start: 2024-06-07 | End: 2024-06-07

## 2024-06-07 RX ORDER — SODIUM CHLORIDE, SODIUM LACTATE, POTASSIUM CHLORIDE, CALCIUM CHLORIDE 600; 310; 30; 20 MG/100ML; MG/100ML; MG/100ML; MG/100ML
INJECTION, SOLUTION INTRAVENOUS CONTINUOUS
OUTPATIENT
Start: 2024-06-07

## 2024-06-07 RX ORDER — ACETAMINOPHEN 500 MG
1000 TABLET ORAL
OUTPATIENT
Start: 2024-06-07 | End: 2024-06-07

## 2024-06-11 ENCOUNTER — HOSPITAL ENCOUNTER (OUTPATIENT)
Dept: PREADMISSION TESTING | Facility: OTHER | Age: 84
Discharge: HOME | End: 2024-06-11
Attending: SURGERY
Payer: MEDICARE

## 2024-06-11 VITALS
SYSTOLIC BLOOD PRESSURE: 139 MMHG | OXYGEN SATURATION: 96 % | BODY MASS INDEX: 38.83 KG/M2 | HEIGHT: 62 IN | HEART RATE: 60 BPM | DIASTOLIC BLOOD PRESSURE: 66 MMHG | WEIGHT: 211 LBS | TEMPERATURE: 98 F

## 2024-06-11 LAB
COMMENT: ABNORMAL
FINAL PATHOLOGIC DIAGNOSIS: ABNORMAL
GROSS: ABNORMAL
Lab: ABNORMAL
MICROSCOPIC EXAM: ABNORMAL
SUPPLEMENTAL DIAGNOSIS: ABNORMAL

## 2024-06-11 RX ORDER — AZELASTINE 1 MG/ML
1 SPRAY, METERED NASAL 2 TIMES DAILY
COMMUNITY
Start: 2024-04-16

## 2024-06-11 RX ORDER — OXYBUTYNIN CHLORIDE 5 MG/1
TABLET ORAL DAILY
COMMUNITY
Start: 2024-05-08 | End: 2024-06-11

## 2024-06-11 RX ORDER — ACETAMINOPHEN 500 MG
1000 TABLET ORAL EVERY 6 HOURS PRN
COMMUNITY

## 2024-06-11 RX ORDER — AMIODARONE HYDROCHLORIDE 200 MG/1
200 TABLET ORAL DAILY
COMMUNITY

## 2024-06-11 NOTE — ANESTHESIA PREPROCEDURE EVALUATION
06/11/2024  Mis Ca is a 83 y.o., female.      Pre-op Assessment    I have reviewed the Patient Summary Reports.     I have reviewed the Nursing Notes. I have reviewed the NPO Status.   I have reviewed the Medications.     Review of Systems  Anesthesia Hx:  No problems with previous Anesthesia             Denies Family Hx of Anesthesia complications.    Denies Personal Hx of Anesthesia complications.                    Social:  Non-Smoker       Hematology/Oncology:  Hematology Normal   Oncology Normal                                   EENT/Dental:  EENT/Dental Normal           Cardiovascular:     Hypertension    Dysrhythmias           Has a RBBB    History PAF- on Coumadin    ·   The left ventricle is normal in size with normal systolic function.  · The estimated ejection fraction is 55%.  · Atrial fibrillation observed.  · No thrombus is present in the appendage.  · Mild left atrial enlargement.  · Mild right atrial enlargement.  · Mild mitral regurgitation.  · A 200 J synchronized cardioversion was successfully performed with restoration of normal sinus rhythm.                              Pulmonary:        Sleep Apnea, CPAP                Renal/:  Renal/ Normal                 Hepatic/GI:     GERD             Musculoskeletal:  Musculoskeletal Normal                Neurological:  Neurology Normal                                      Endocrine:  Diabetes         Morbid Obesity / BMI > 40  Dermatological:  Skin Normal    Psych:  Psychiatric Normal                    Physical Exam  General: Well nourished and Alert    Airway:  Mallampati: II   Mouth Opening: Normal  Tongue: Normal    Dental:  Intact        Anesthesia Plan  Type of Anesthesia, risks & benefits discussed:    Anesthesia Type: Gen ETT  Intra-op Monitoring Plan: Standard ASA Monitors  Post Op Pain Control Plan: multimodal  analgesia  Induction:  IV  Airway Plan: Video  Informed Consent: Informed consent signed with the Patient and all parties understand the risks and agree with anesthesia plan.  All questions answered.   ASA Score: 3  Anesthesia Plan Notes: BMP  EKG in Epic  Directions from Coumadin clinic to stop before surgery  Note in May from Dr BREEZY Owens EP  Last GLP-1 agonist taken on 5/27- quit due to cost    Ready For Surgery From Anesthesia Perspective.     .

## 2024-06-11 NOTE — DISCHARGE INSTRUCTIONS
Information to Prepare you for your Surgery    PRE-ADMIT TESTING -  [phone]    [address]  [facility]          Your surgery has been scheduled at [facility]. We are pleased to have the opportunity to serve you. For Further Information please call [phone].    On the day of surgery please report to the Information Desk on the 1st floor.    CONTACT YOUR PHYSICIAN'S OFFICE THE DAY PRIOR TO YOUR SURGERY TO OBTAIN YOUR ARRIVAL TIME.     The evening before surgery do not eat anything after 9 p.m. ( this includes hard candy, chewing gum and mints).  You may only have GATORADE, POWERADE AND WATER  from 9 p.m. until you leave your home.   DO NOT DRINK ANY LIQUIDS ON THE WAY TO THE HOSPITAL.      Why does your anesthesiologist allow you to drink Gatorade/Powerade before surgery?  Gatorade/Powerade helps to increase your comfort before surgery and to decrease your nausea after surgery. The carbohydrates in Gatorade/Powerade help reduce your body's stress response to surgery.  If you are a diabetic-drink only water prior to surgery.    Outpatient Surgery- May allow 2 adult (18 and older) Support Persons (1 being the designated ) for all surgical/procedural patients. A breastfeeding mother will be allowed her infant and 2 adult Support Persons. No one under the age of 18 will be allowed in the building. No swapping out of visitors in the [facility].      SPECIAL MEDICATION INSTRUCTIONS: TAKE medications checked off by the Anesthesiologist on your Medication List.    Angiogram Patients: Take medications as instructed by your physician, including aspirin.     Surgery Patients:    If you take ASPIRIN - Your PHYSICIAN/SURGEON will need to inform you IF/OR when you need to stop taking aspirin prior to your surgery.     The week prior to surgery do not ot take any medications containing IBUPROFEN or NSAIDS ( Advil, Motrin, Goodys, BC, Aleve, Naproxen etc)  If you are not sure if you should take a medicine please call your surgeon's office.  Ok to take Tylenol    Do Not Wear any make-up (especially eye make-up) to surgery. Please remove any false eyelashes or eyelash extensions. If you arrive the day of surgery with makeup/eyelashes on you will be required to remove prior to surgery. (There is a risk of corneal abrasions if eye makeup/eyelash extensions are not removed)      Leave all valuables at home.   Do Not wear any jewelry or watches, including any metal in body piercings. Jewelry must be removed prior to coming to the hospital.  There is a possibility that rings that are unable to be removed may be cut off if they are on the surgical extremity.    Please remove all hair extensions, wigs, clips and any other metal accessories/ ornaments from your hair.  These items may pose a flammable/fire risk in Surgery and must be removed.    Do not shave your surgical area at least 5 days prior to your surgery. The surgical prep will be performed at the hospital according to Infection Control regulations.    Contact Lens must be removed before surgery. Either do not wear the contact lens or bring a case and solution for storage.  Please bring a container for eyeglasses or dentures as required.  Bring any paperwork your physician has provided, such as consent forms,  history and physicals, doctor's orders, etc.   Bring comfortable clothes that are loose fitting to wear upon discharge. Take into consideration the type of surgery being performed.  Maintain your diet as advised per your physician the day prior to surgery.      Adequate rest the night before surgery is advised.   Park in the Parking lot behind the hospital or in the [facility] across the street from the parking lot. Parking is complimentary.  If you will be discharged the same day as your procedure, please arrange for a responsible adult to drive you home or to accompany you if traveling by taxi.    YOU WILL NOT BE PERMITTED TO DRIVE OR TO LEAVE THE HOSPITAL ALONE AFTER SURGERY.   If you are being discharged the same day, it is strongly recommended that you arrange for someone to remain with you for the first 24 hrs following your surgery.    The Surgeon will speak to your family/visitor after your surgery regarding the outcome of your surgery and post op care.  The Surgeon may speak to you after your surgery, but there is a possibility you may not remember the details.  Please check with your family members regarding the conversation with the Surgeon.    We strongly recommend whoever is bringing you home be present for discharge instructions.  This will ensure a thorough understanding for your post op home care.          Thank you for your cooperation.  The Staff of [facility].            Bathing Instructions with Hibiclens    Shower the evening before and morning of your procedure with Chlorhexidine (Hibiclens)  do not use Chlorhexidine on your face or genitals. Do not get in your eyes.  Wash your face with water and your regular face wash/soap  Use your regular shampoo  Apply Chlorhexidine (Hibiclens) directly on your skin or on a wet washcloth and wash gently. When showering: Move away from the shower stream when applying Chlorhexidine (Hibiclens) to avoid rinsing off too soon.  Rinse thoroughly with warm water  Do not dilute Chlorhexidine (Hibiclens)   Dry off as usual, do not use any deodorant, powder, body lotions, perfume, after shave or cologne.

## 2024-06-12 ENCOUNTER — CLINICAL SUPPORT (OUTPATIENT)
Dept: REHABILITATION | Facility: HOSPITAL | Age: 84
End: 2024-06-12
Payer: MEDICARE

## 2024-06-12 DIAGNOSIS — Z91.89 AT RISK FOR LYMPHEDEMA: Primary | ICD-10-CM

## 2024-06-12 DIAGNOSIS — C50.911 INFILTRATING DUCTAL CARCINOMA OF RIGHT BREAST: ICD-10-CM

## 2024-06-12 PROBLEM — R26.89 ANTALGIC GAIT: Status: RESOLVED | Noted: 2019-01-08 | Resolved: 2024-06-12

## 2024-06-12 PROCEDURE — 97162 PT EVAL MOD COMPLEX 30 MIN: CPT

## 2024-06-12 PROCEDURE — 97535 SELF CARE MNGMENT TRAINING: CPT

## 2024-06-12 NOTE — PLAN OF CARE
OUTPATIENT PHYSICAL THERAPY   PRE-OP EVALUATION    Name: Mis Ca  [facility] Number: [id number]    Therapy Diagnosis:   Encounter Diagnoses   Name Primary?    Infiltrating ductal carcinoma of right breast     At risk for lymphedema Yes        Physician: SHAHZAD Laird MD    Physician Orders: PT Eval and Treat   Medical Diagnosis from Referral: C50.911 (ICD-10-CM) - Infiltrating ductal carcinoma of right breast   Evaluation Date: 6/12/2024  Authorization Period Expiration: 5/29/2025  Plan of Care Expiration: 6/12/2024  Progress Note Due: N/A  Visit # / Visits authorized: 1/ 1   FOTO: 1/3    Precautions: Standard, Diabetes, blood thinners, and cancer     Time In: 12:00 pm  Time Out: 12:40 pm  Total Appointment Time (timed & untimed codes): 40 minutes    History   History of Present Illness: Mis is a 83 y.o. female that presents to  [facility] at the [facility] clinic secondary to dx of right breast cancer.    Dx:  right breast Invasive Ductal Carcinoma  Surgery date: 6/24/2024 R mastectomy, SLNB      Pt presents today for baseline measurements to aid in the early detection of lymphedema, UE muscle testing, postural and ROM assessment along with education of risk of lymphedema and surgical precautions post surgery. Circumferential measurements will be taken today of BL UEs for early detection of lymphedema post surgery. Pt will also be instructed in exercises to perform pre and post-surgery to insure best outcomes.     Past Medical History:   Past Medical History:   Diagnosis Date    Allergy     Anticoagulant long-term use     Anxiety     Arthritis     Atrial fibrillation     Bilateral renal cysts 05/01/2022    Blind right eye     Bradycardia     Breast cancer     right    Cancer     skin cancer left side of face under eye    CKD (chronic kidney disease) stage 3, GFR 30-59 ml/min     Glaucoma     Hyperlipidemia     Hypertension     PVC (premature ventricular  contraction)     RLS (restless legs syndrome)     Sleep apnea     Does not use CPAP machine.    Type 2 diabetes mellitus without complication, without long-term current use of insulin 04/11/2024       Past Surgical History:   Mis Ca  has a past surgical history that includes Hysterectomy; Appendectomy; Cataract extraction bilateral w/ anterior vitrectomy; Breast biopsy (Left); Fracture surgery (Left); ORIF forearm fracture (Left); Adenoidectomy; Tonsillectomy; Palate surgery; Foot surgery (Left); Hand tendon surgery (Left); Oophorectomy; Lapidus bunionectomy (Left, 10/31/2018); Endoscopic gastrocnemius recession (Left, 10/31/2018); Akin osteotomy (Left, 10/31/2018); Eye surgery (Bilateral); Eye surgery (Right); Breast surgery (Left); Lymph Gland Removed  (Right); Lapidus bunionectomy (Left, 10/31/2018); Foot arthrodesis (Left, 1/15/2020); Neurectomy (Left, 1/15/2020); Incision and drainage foot (Left, 3/11/2020); Esophagogastroduodenoscopy (N/A, 2/11/2022); Repair of extensor tendon (Left, 6/30/2023); and removal,orthopedic hardware,upper extremity (Left, 6/30/2023).    Medications:  Mis has a current medication list which includes the following prescription(s): acetaminophen, alendronate, amiodarone, amitriptyline, aspirin, azelastine, chlorthalidone, cyanocobalamin, furosemide, gabapentin, latanoprost, levocetirizine, losartan, centrum silver women, needle (disp) 25 gauge, omeprazole, potassium chloride, pramipexole, pravastatin, timolol maleate 0.5%, vitamin d, and warfarin, and the following Facility-Administered Medications: lactated ringers and lidocaine (pf) 10 mg/ml (1%).    Allergies:  Review of patient's allergies indicates:   Allergen Reactions    Adhesive     Compazine [prochlorperazine edisylate] Anxiety    Penicillins Rash    Sulfa (sulfonamide antibiotics) Rash    Vancomycin analogues Rash          Hand dominance: right  Prior Therapy: PT previously for foot, OT for wrist/hand    Social  "History: Lives with her sister  Place of Residence (Steps/Adaptations): single story home, 5 steps to enter, handrails on both sides  DME owned: SPC, walker, shower chair, elevated toilet seat  Current functional status:  independent  Exercise routine prior to onset : none  Work:  retired                         Subjective   Pt states: none  Pain: 0/10 on VAS.     Objective   Mental status: alert & oriented x 3    Posture/Alignment   Postural examination/scapular alignment: rounded shoulders    Nutrition:  Obese    Skin integrity: intact  Edema: none noted    Sensation: Light Touch: Intact           Proprioception: Intact  Appearance: well groomed     ROM:   UPPER EXTREMITY--AROM/PROM  (R) UE: WNLs  (L) UE: WNLs     Shoulder Range of Motion:   ACTIVE ROM RIGHT LEFT   Flexion 175 175   Abduction 180 180   Extension 65 65   IR/90deg 90 70   ER/90deg 60 75     Strength: manual muscle test grades below   Upper Extremity Strength   RIGHT UE LEFT UE   Shoulder flexion: 4/5 4/5   Shoulder Abduction: 5/5 5/5   Shoulder IR 5/5 5/5   Shoulder ER 4/5 4+/5   Elbow flexion: 4+/5 4+/5   Elbow extension: 4+/5 4+/5   Wrist flexion: 5/5 5/5   Wrist extension: 5/5 5/5    51.3 lbs 52.4 lbs       Baseline measurements of bilateral upper extremities for early detection of lymphedema:     LANDMARK RIGHT UPPER EXTREMITY LEFT UPPER EXTREMITY DIFFERENCE   E + 8" 44 cm 43 cm 1.0 cm   E + 6" 41 cm 41 cm 0 cm   E + 4" 38.5 cm 39 cm 0.5 cm   E + 2" 35.5 cm 36 cm 0.5 cm   Elbow 28 cm 29.5 cm 1.5 cm   W+ 8" 28.5 cm 29 cm 0.5 cm   W +  6" 28 cm 29 cm 1.0 cm   W + 4" 25 cm 24 cm 1.0 cm   Wrist  17 cm 18 cm 1.0 cm   DPC 20.5 cm 20 cm 0.5 cm   IP Thumb 7.0 cm 6.5 cm 0.5 cm     Coordination:   - fine motor: within functional limits  - UE coordination: intact     - LE coordination:  Not tested     Functional Mobility (Bed mobility, transfers)  Bed mobility: I =  independent   Roll to left: I  Roll to right: I  Supine to prone: I  Scooting to edge " of bed: I  Supine to sit: I  Sit to supine: I  Transfers to bed: I  Transfers to toilet: I  Sit to stand:  I  Stand pivot:  I  Car transfers: I      ADL's:  Feeding: I = independent   Grooming: I  Hygiene: I  UB Dressing: I  LB Dressing: I  Toileting: I  Bathing: I    Gait Assessment:   - Assistive device used: none  - Assistance: independent  - Distance: community distances       Functional Limitations Reporting        Intake Outcome Measure for FOTO Shoulder Survey    Therapist reviewed FOTO scores for Mis Ca on 6/12/2024.   FOTO documents entered into [employer] - see Media section.    Intake Score: 60%           Pt has no cultural, educational or language barriers to learning provided.    Treatment and Patient Education     Total Time Separate from Evaluation: 15 minutes    Patient participated in self care/home management for 10 minutes including the following:      - Role of PT in multi - disciplinary team, goals for PT  - Pt was educated in lymphedema etiology and management plans.    - Pt was provided with written risk reductions and precautions for managing lymphedema.   - Reviewed YANDY drain care instructions.     ROM/lifting Precautions post surgery discussed -  until drains have been removed at your follow up with your surgeon:  - do not lift affected arm above 90 degrees of shoulder flexion on the right side  - do not lift over 5 lbs  - do not pull or push heavy objects  - do not sleep on your stomach or surgery side     Pt was instructed in and performed therapeutic exercise for 5 minutes for postural correction and alignment, stretching and soft tissue mobility.   Exercises included:   - exaggerated deep breathing and relaxation  - scapular retractions  - fist making  - elbow flexion/extension    Pt was able to demonstrate and report understanding and performance    Written Home Exercises Provided: yes.  Exercises were reviewed and Mis was able to demonstrate them prior to the end of the  timmy Abebe demonstrated good  understanding of the education provided.     See EMR under Patient Instructions for exercises provided 6/12/2024.      Assessment   This is a 83 y.o. female referred to outpatient physical therapy and presents with a medical diagnosis of right breast cancer and was seen today pre-operatively to assess strength and ROM of BL UEs, to take baseline circumferential measurements of BL UEs to aid in the early detection of lymphedema and provide pt education on exercises/precations post breast surgery. Pt does not exhibit any ROM impairments  Pt educated in lymphedema risks/precautions as well as ROM/lifting precautions post surgery - pt demonstrated/verbalized understanding. No goals established this visit as goals for PT will be established post surgery at follow up.      Anticipated barriers to physical therapy: none anticipated     Pt's spiritual, cultural and educational needs considered and pt agreeable to plan of care and goals as stated below:     Medical Necessity is demonstrated by the following  History  Co-morbidities and personal factors that may impact the plan of care [] LOW: no personal factors / co-morbidities  [] MODERATE: 1-2 personal factors / co-morbidities  [x] HIGH: 3+ personal factors / co-morbidities    Moderate / High Support Documentation:   Co-morbidities affecting plan of care: history of cancer, DM, anxiety, high BMI    Personal Factors:   no deficits     Examination  Body Structures and Functions, activity limitations and participation restrictions that may impact the plan of care [x] LOW: addressing 1-2 elements  [] MODERATE: 3+ elements  [] HIGH: 4+ elements (please support below)    Moderate / High Support Documentation: N/A     Clinical Presentation [] LOW: stable  [x] MODERATE: Evolving  [] HIGH: Unstable     Decision Making/ Complexity Score: low           Plan   Schedule patient for follow up with Physical therapy post surgery. Goals for therapy  post surgery will be established at that time.     Therapist: Jessica Hughes, PT  6/12/2024

## 2024-06-13 ENCOUNTER — ANTI-COAG VISIT (OUTPATIENT)
Dept: CARDIOLOGY | Facility: CLINIC | Age: 84
End: 2024-06-13
Payer: MEDICARE

## 2024-06-13 ENCOUNTER — LAB VISIT (OUTPATIENT)
Dept: LAB | Facility: HOSPITAL | Age: 84
End: 2024-06-13
Attending: INTERNAL MEDICINE
Payer: MEDICARE

## 2024-06-13 DIAGNOSIS — I48.0 PAROXYSMAL ATRIAL FIBRILLATION: Chronic | ICD-10-CM

## 2024-06-13 DIAGNOSIS — Z79.01 LONG TERM (CURRENT) USE OF ANTICOAGULANTS: ICD-10-CM

## 2024-06-13 DIAGNOSIS — I48.0 PAROXYSMAL ATRIAL FIBRILLATION: Primary | Chronic | ICD-10-CM

## 2024-06-13 LAB
INR PPP: 2.3 (ref 0.8–1.2)
PROTHROMBIN TIME: 24 SEC (ref 9–12.5)

## 2024-06-13 PROCEDURE — 85610 PROTHROMBIN TIME: CPT | Mod: PN | Performed by: INTERNAL MEDICINE

## 2024-06-13 PROCEDURE — 36415 COLL VENOUS BLD VENIPUNCTURE: CPT | Mod: PN | Performed by: INTERNAL MEDICINE

## 2024-06-13 PROCEDURE — 93793 ANTICOAG MGMT PT WARFARIN: CPT | Mod: S$GLB,,, | Performed by: PHARMACIST

## 2024-06-13 NOTE — PROGRESS NOTES
[facility]    2024 1:02 PM    Assessment/Plan:    Patient presents today with therapeutic INR.    Assessment of patient findings and chart review: INR at goal. Medications and chart reviewed. No changes noted to necessitate adjustment of warfarin or follow-up plan. See calendar.      Recommendation for patient's warfarin regimen: Continue current maintenance dose    Recommend repeat INR in 1.5 weeks  _________________________________________________________________    Mis L Lanny (83 y.o.) is followed by the [facility].    Anticoagulation Summary  As of 2024      INR goal:  2.0-3.0   TTR:  75.0% (1.6 y)   INR used for dosin.3 (2024)   Warfarin maintenance plan:  3 mg (6 mg x 0.5) every Mon, Thu, Sat; 6 mg (6 mg x 1) all other days   Weekly warfarin total:  33 mg   Plan last modified:  Tino Mendiola, PharmD (2024)   Next INR check:  2024   Target end date:      Indications    Paroxysmal atrial fibrillation [I48.0]  Long term (current) use of anticoagulants [Z79.01]                 Anticoagulation Episode Summary       INR check location:      Preferred lab:      Send INR reminders to:  [facility] COUMADIN MONITORING POOL    Comments:  [facility]          Anticoagulation Care Providers       Provider Role Specialty Phone number    Shamar Owens MD Responsible Electrophysiology [phone]

## 2024-06-17 ENCOUNTER — PATIENT MESSAGE (OUTPATIENT)
Dept: CARDIOLOGY | Facility: CLINIC | Age: 84
End: 2024-06-17
Payer: MEDICARE

## 2024-06-17 DIAGNOSIS — I48.19 PERSISTENT ATRIAL FIBRILLATION: Primary | ICD-10-CM

## 2024-06-18 ENCOUNTER — HOSPITAL ENCOUNTER (OUTPATIENT)
Dept: CARDIOLOGY | Facility: HOSPITAL | Age: 84
Discharge: HOME | End: 2024-06-18
Attending: INTERNAL MEDICINE
Payer: MEDICARE

## 2024-06-18 DIAGNOSIS — I48.19 PERSISTENT ATRIAL FIBRILLATION: ICD-10-CM

## 2024-06-18 LAB
OHS QRS DURATION: 152 MS
OHS QTC CALCULATION: 501 MS

## 2024-06-18 PROCEDURE — 93010 ELECTROCARDIOGRAM REPORT: CPT | Mod: ,,, | Performed by: INTERNAL MEDICINE

## 2024-06-18 PROCEDURE — 93005 ELECTROCARDIOGRAM TRACING: CPT | Mod: PN

## 2024-06-19 ENCOUNTER — OFFICE VISIT (OUTPATIENT)
Dept: UROLOGY | Facility: CLINIC | Age: 84
End: 2024-06-19
Payer: MEDICARE

## 2024-06-19 VITALS
SYSTOLIC BLOOD PRESSURE: 130 MMHG | HEART RATE: 60 BPM | WEIGHT: 209.44 LBS | HEIGHT: 62 IN | DIASTOLIC BLOOD PRESSURE: 60 MMHG | BODY MASS INDEX: 38.54 KG/M2

## 2024-06-19 DIAGNOSIS — Z53.20 PROCEDURE NOT CARRIED OUT BECAUSE OF PATIENT'S DECISION: Primary | ICD-10-CM

## 2024-06-19 DIAGNOSIS — N28.1 BILATERAL RENAL CYSTS: ICD-10-CM

## 2024-06-19 PROCEDURE — 99499 UNLISTED E&M SERVICE: CPT | Mod: S$GLB,,, | Performed by: UROLOGY

## 2024-06-19 RX ORDER — TIRZEPATIDE 2.5 MG/.5ML
INJECTION, SOLUTION SUBCUTANEOUS
COMMUNITY
Start: 2024-05-31

## 2024-06-20 ENCOUNTER — ANTI-COAG VISIT (OUTPATIENT)
Dept: CARDIOLOGY | Facility: CLINIC | Age: 84
End: 2024-06-20
Payer: MEDICARE

## 2024-06-20 ENCOUNTER — TELEPHONE (OUTPATIENT)
Dept: SURGERY | Facility: CLINIC | Age: 84
End: 2024-06-20
Payer: MEDICARE

## 2024-06-20 DIAGNOSIS — I48.0 PAROXYSMAL ATRIAL FIBRILLATION: Primary | Chronic | ICD-10-CM

## 2024-06-20 DIAGNOSIS — Z79.01 LONG TERM (CURRENT) USE OF ANTICOAGULANTS: ICD-10-CM

## 2024-06-20 PROCEDURE — 93793 ANTICOAG MGMT PT WARFARIN: CPT | Mod: S$GLB,,, | Performed by: PHARMACIST

## 2024-06-20 NOTE — PROGRESS NOTES
[facility]    2024 11:15 AM    Assessment/Plan:    Patient presents today with therapeutic INR.    Assessment of patient findings and chart review: Patient began holding her coumadin 24 for an upcoming procedure. Patient is scheduled for a MASTECTOMY on - hold coumadin -; I will check status on  to determine when MD will allow her to restart. I sent performing MD (Dr Myriam Laird) inquiry about when it will be safe to restart the warfarin post procedure. Awaiting response    Recommendation for patient's warfarin regimen:  per calendar    _________________________________________________________________    Mis CARTER Lanny (83 y.o.) is followed by the [facility].    Anticoagulation Summary  As of 2024      INR goal:  2.0-3.0   TTR:  75.2% (1.6 y)   INR used for dosin.7 (2024)   Warfarin maintenance plan:  3 mg (6 mg x 0.5) every Mon, Thu, Sat; 6 mg (6 mg x 1) all other days   Weekly warfarin total:  33 mg   Plan last modified:  Tino Mendiola, PharmD (2024)   Next INR check:  2024   Target end date:      Indications    Paroxysmal atrial fibrillation [I48.0]  Long term (current) use of anticoagulants [Z79.01]                 Anticoagulation Episode Summary       INR check location:      Preferred lab:      Send INR reminders to:  [facility] COUMADIN MONITORING POOL    Comments:  [facility]          Anticoagulation Care Providers       Provider Role Specialty Phone number    Shamar Owens MD Responsible Electrophysiology [phone]

## 2024-06-20 NOTE — TELEPHONE ENCOUNTER
----- Message from Mary Manzo sent at 6/20/2024 12:16 PM CDT -----  Regarding: Call Back  Contact: [phone]  CONSULT/ADVISORY    Name of Caller:  Gely Joseph ( Sister)    Contact Preference:  [phone]    Nature of Call:  Requesting a nurse to call her back in regards to pt.  Pt is sched for surgery on 06/24/2024.

## 2024-06-21 ENCOUNTER — TELEPHONE (OUTPATIENT)
Dept: SURGERY | Facility: CLINIC | Age: 84
End: 2024-06-21
Payer: MEDICARE

## 2024-06-21 NOTE — TELEPHONE ENCOUNTER
Spoke to patient to give surgery arrival time of 0500 tomorrow at [facility].  Pt verbalized understanding of arrival time and location.  Patient had no other concerns at this time.

## 2024-06-24 ENCOUNTER — ANESTHESIA (OUTPATIENT)
Dept: SURGERY | Facility: OTHER | Age: 84
End: 2024-06-24
Payer: MEDICARE

## 2024-06-24 ENCOUNTER — HOSPITAL ENCOUNTER (OUTPATIENT)
Dept: RADIOLOGY | Facility: OTHER | Age: 84
Discharge: HOME | End: 2024-06-24
Attending: SURGERY
Payer: MEDICARE

## 2024-06-24 ENCOUNTER — HOSPITAL ENCOUNTER (OUTPATIENT)
Facility: OTHER | Age: 84
Discharge: HOME | End: 2024-06-24
Attending: SURGERY | Admitting: SURGERY
Payer: MEDICARE

## 2024-06-24 ENCOUNTER — HOSPITAL ENCOUNTER (OUTPATIENT)
Dept: RADIOLOGY | Facility: OTHER | Age: 84
Discharge: HOME | End: 2024-06-24
Attending: SURGERY | Admitting: SURGERY
Payer: MEDICARE

## 2024-06-24 DIAGNOSIS — Z17.0 MALIGNANT NEOPLASM OF OVERLAPPING SITES OF RIGHT BREAST IN FEMALE, ESTROGEN RECEPTOR POSITIVE: ICD-10-CM

## 2024-06-24 DIAGNOSIS — Z17.0 MALIGNANT NEOPLASM OF OVERLAPPING SITES OF RIGHT BREAST IN FEMALE, ESTROGEN RECEPTOR POSITIVE: Primary | ICD-10-CM

## 2024-06-24 DIAGNOSIS — C50.811 MALIGNANT NEOPLASM OF OVERLAPPING SITES OF RIGHT BREAST IN FEMALE, ESTROGEN RECEPTOR POSITIVE: ICD-10-CM

## 2024-06-24 DIAGNOSIS — C50.811 MALIGNANT NEOPLASM OF OVERLAPPING SITES OF RIGHT BREAST IN FEMALE, ESTROGEN RECEPTOR POSITIVE: Primary | ICD-10-CM

## 2024-06-24 PROCEDURE — 63600175 PHARM REV CODE 636 W HCPCS: Mod: JZ,JG | Performed by: NURSE ANESTHETIST, CERTIFIED REGISTERED

## 2024-06-24 PROCEDURE — 71000015 HC POSTOP RECOV 1ST HR: Performed by: SURGERY

## 2024-06-24 PROCEDURE — 38900 IO MAP OF SENT LYMPH NODE: CPT | Mod: RT,,, | Performed by: SURGERY

## 2024-06-24 PROCEDURE — 88305 TISSUE EXAM BY PATHOLOGIST: CPT | Performed by: PATHOLOGY

## 2024-06-24 PROCEDURE — 36000706: Performed by: SURGERY

## 2024-06-24 PROCEDURE — A9520 TC99 TILMANOCEPT DIAG 0.5MCI: HCPCS | Performed by: SURGERY

## 2024-06-24 PROCEDURE — 63600175 PHARM REV CODE 636 W HCPCS: Performed by: NURSE PRACTITIONER

## 2024-06-24 PROCEDURE — 88342 IMHCHEM/IMCYTCHM 1ST ANTB: CPT | Mod: 59 | Performed by: PATHOLOGY

## 2024-06-24 PROCEDURE — 88341 IMHCHEM/IMCYTCHM EA ADD ANTB: CPT | Mod: 59 | Performed by: PATHOLOGY

## 2024-06-24 PROCEDURE — 25000003 PHARM REV CODE 250: Performed by: ANESTHESIOLOGY

## 2024-06-24 PROCEDURE — 71000016 HC POSTOP RECOV ADDL HR: Performed by: SURGERY

## 2024-06-24 PROCEDURE — 38525 BIOPSY/REMOVAL LYMPH NODES: CPT | Mod: 51,RT,, | Performed by: SURGERY

## 2024-06-24 PROCEDURE — C1729 CATH, DRAINAGE: HCPCS | Performed by: SURGERY

## 2024-06-24 PROCEDURE — 27201423 OPTIME MED/SURG SUP & DEVICES STERILE SUPPLY: Performed by: SURGERY

## 2024-06-24 PROCEDURE — 88307 TISSUE EXAM BY PATHOLOGIST: CPT | Mod: 59 | Performed by: PATHOLOGY

## 2024-06-24 PROCEDURE — 37000009 HC ANESTHESIA EA ADD 15 MINS: Performed by: SURGERY

## 2024-06-24 PROCEDURE — 63600175 PHARM REV CODE 636 W HCPCS: Performed by: SURGERY

## 2024-06-24 PROCEDURE — 63600175 PHARM REV CODE 636 W HCPCS: Mod: JZ,JG | Performed by: SURGERY

## 2024-06-24 PROCEDURE — 25000003 PHARM REV CODE 250: Performed by: NURSE ANESTHETIST, CERTIFIED REGISTERED

## 2024-06-24 PROCEDURE — 71000033 HC RECOVERY, INTIAL HOUR: Performed by: SURGERY

## 2024-06-24 PROCEDURE — 76098 X-RAY EXAM SURGICAL SPECIMEN: CPT | Mod: TC

## 2024-06-24 PROCEDURE — 19303 MAST SIMPLE COMPLETE: CPT | Mod: RT,,, | Performed by: SURGERY

## 2024-06-24 PROCEDURE — 37000008 HC ANESTHESIA 1ST 15 MINUTES: Performed by: SURGERY

## 2024-06-24 PROCEDURE — 36000707: Performed by: SURGERY

## 2024-06-24 PROCEDURE — 82962 GLUCOSE BLOOD TEST: CPT | Performed by: SURGERY

## 2024-06-24 PROCEDURE — C9290 INJ, BUPIVACAINE LIPOSOME: HCPCS | Performed by: SURGERY

## 2024-06-24 RX ORDER — PROCHLORPERAZINE EDISYLATE 5 MG/ML
5 INJECTION INTRAMUSCULAR; INTRAVENOUS EVERY 30 MIN PRN
Status: DISCONTINUED | OUTPATIENT
Start: 2024-06-24 | End: 2024-06-24 | Stop reason: HOSPADM

## 2024-06-24 RX ORDER — SODIUM CHLORIDE 9 MG/ML
INJECTION, SOLUTION INTRAVENOUS CONTINUOUS
Status: DISCONTINUED | OUTPATIENT
Start: 2024-06-24 | End: 2024-06-24 | Stop reason: HOSPADM

## 2024-06-24 RX ORDER — HYDROMORPHONE HYDROCHLORIDE 2 MG/ML
0.4 INJECTION, SOLUTION INTRAMUSCULAR; INTRAVENOUS; SUBCUTANEOUS EVERY 5 MIN PRN
Status: DISCONTINUED | OUTPATIENT
Start: 2024-06-24 | End: 2024-06-24 | Stop reason: HOSPADM

## 2024-06-24 RX ORDER — SODIUM CHLORIDE 0.9 % (FLUSH) 0.9 %
3 SYRINGE (ML) INJECTION
Status: DISCONTINUED | OUTPATIENT
Start: 2024-06-24 | End: 2024-06-24 | Stop reason: HOSPADM

## 2024-06-24 RX ORDER — MEPERIDINE HYDROCHLORIDE 25 MG/ML
12.5 INJECTION INTRAMUSCULAR; INTRAVENOUS; SUBCUTANEOUS ONCE AS NEEDED
Status: DISCONTINUED | OUTPATIENT
Start: 2024-06-24 | End: 2024-06-24 | Stop reason: HOSPADM

## 2024-06-24 RX ORDER — LIDOCAINE HYDROCHLORIDE 20 MG/ML
INJECTION INTRAVENOUS
Status: DISCONTINUED | OUTPATIENT
Start: 2024-06-24 | End: 2024-06-24

## 2024-06-24 RX ORDER — FENTANYL CITRATE 50 UG/ML
INJECTION, SOLUTION INTRAMUSCULAR; INTRAVENOUS
Status: DISCONTINUED | OUTPATIENT
Start: 2024-06-24 | End: 2024-06-24

## 2024-06-24 RX ORDER — WARFARIN 6 MG/1
TABLET ORAL
Qty: 96 TABLET | Refills: 4 | Status: SHIPPED | OUTPATIENT
Start: 2024-06-25

## 2024-06-24 RX ORDER — HYDROCODONE BITARTRATE AND ACETAMINOPHEN 5; 325 MG/1; MG/1
1 TABLET ORAL EVERY 6 HOURS PRN
Qty: 15 TABLET | Refills: 0 | Status: SHIPPED | OUTPATIENT
Start: 2024-06-24

## 2024-06-24 RX ORDER — PROPOFOL 10 MG/ML
VIAL (ML) INTRAVENOUS
Status: DISCONTINUED | OUTPATIENT
Start: 2024-06-24 | End: 2024-06-24

## 2024-06-24 RX ORDER — ONDANSETRON HYDROCHLORIDE 2 MG/ML
INJECTION, SOLUTION INTRAVENOUS
Status: DISCONTINUED | OUTPATIENT
Start: 2024-06-24 | End: 2024-06-24

## 2024-06-24 RX ORDER — DEXAMETHASONE SODIUM PHOSPHATE 4 MG/ML
INJECTION, SOLUTION INTRA-ARTICULAR; INTRALESIONAL; INTRAMUSCULAR; INTRAVENOUS; SOFT TISSUE
Status: DISCONTINUED | OUTPATIENT
Start: 2024-06-24 | End: 2024-06-24

## 2024-06-24 RX ORDER — OXYCODONE HYDROCHLORIDE 5 MG/1
5 TABLET ORAL
Status: DISCONTINUED | OUTPATIENT
Start: 2024-06-24 | End: 2024-06-24 | Stop reason: HOSPADM

## 2024-06-24 RX ORDER — EPHEDRINE SULFATE 50 MG/ML
INJECTION, SOLUTION INTRAVENOUS
Status: DISCONTINUED | OUTPATIENT
Start: 2024-06-24 | End: 2024-06-24

## 2024-06-24 RX ORDER — CLINDAMYCIN PHOSPHATE 900 MG/50ML
900 INJECTION, SOLUTION INTRAVENOUS
Status: COMPLETED | OUTPATIENT
Start: 2024-06-24 | End: 2024-06-24

## 2024-06-24 RX ORDER — SODIUM CHLORIDE, SODIUM LACTATE, POTASSIUM CHLORIDE, CALCIUM CHLORIDE 600; 310; 30; 20 MG/100ML; MG/100ML; MG/100ML; MG/100ML
INJECTION, SOLUTION INTRAVENOUS CONTINUOUS
Status: DISCONTINUED | OUTPATIENT
Start: 2024-06-24 | End: 2024-06-24 | Stop reason: HOSPADM

## 2024-06-24 RX ORDER — ROCURONIUM BROMIDE 10 MG/ML
INJECTION, SOLUTION INTRAVENOUS
Status: DISCONTINUED | OUTPATIENT
Start: 2024-06-24 | End: 2024-06-24

## 2024-06-24 RX ORDER — VASOPRESSIN 20 [USP'U]/ML
INJECTION, SOLUTION INTRAMUSCULAR; SUBCUTANEOUS
Status: DISCONTINUED | OUTPATIENT
Start: 2024-06-24 | End: 2024-06-24

## 2024-06-24 RX ORDER — SUCCINYLCHOLINE CHLORIDE 20 MG/ML
INJECTION INTRAMUSCULAR; INTRAVENOUS
Status: DISCONTINUED | OUTPATIENT
Start: 2024-06-24 | End: 2024-06-24

## 2024-06-24 RX ORDER — LIDOCAINE HYDROCHLORIDE 10 MG/ML
0.5 INJECTION, SOLUTION EPIDURAL; INFILTRATION; INTRACAUDAL; PERINEURAL ONCE
Status: DISCONTINUED | OUTPATIENT
Start: 2024-06-24 | End: 2024-06-24 | Stop reason: HOSPADM

## 2024-06-24 RX ORDER — ACETAMINOPHEN 500 MG
1000 TABLET ORAL
Status: COMPLETED | OUTPATIENT
Start: 2024-06-24 | End: 2024-06-24

## 2024-06-24 RX ADMIN — ONDANSETRON HYDROCHLORIDE 4 MG: 2 INJECTION INTRAMUSCULAR; INTRAVENOUS at 09:06

## 2024-06-24 RX ADMIN — SODIUM CHLORIDE, SODIUM LACTATE, POTASSIUM CHLORIDE, AND CALCIUM CHLORIDE: .6; .31; .03; .02 INJECTION, SOLUTION INTRAVENOUS at 06:06

## 2024-06-24 RX ADMIN — EPHEDRINE SULFATE 10 MG: 50 INJECTION INTRAVENOUS at 10:06

## 2024-06-24 RX ADMIN — ROCURONIUM BROMIDE 20 MG: 10 SOLUTION INTRAVENOUS at 07:06

## 2024-06-24 RX ADMIN — EPHEDRINE SULFATE 5 MG: 50 INJECTION INTRAVENOUS at 10:06

## 2024-06-24 RX ADMIN — SUCCINYLCHOLINE CHLORIDE 120 MG: 20 INJECTION, SOLUTION INTRAMUSCULAR; INTRAVENOUS at 07:06

## 2024-06-24 RX ADMIN — VASOPRESSIN 2 UNITS: 20 INJECTION INTRAVENOUS at 07:06

## 2024-06-24 RX ADMIN — GLYCOPYRROLATE 0.2 MG: 0.2 INJECTION, SOLUTION INTRAMUSCULAR; INTRAVITREAL at 07:06

## 2024-06-24 RX ADMIN — CLINDAMYCIN PHOSPHATE 900 MG: 18 INJECTION, SOLUTION INTRAVENOUS at 07:06

## 2024-06-24 RX ADMIN — EPHEDRINE SULFATE 5 MG: 50 INJECTION INTRAVENOUS at 08:06

## 2024-06-24 RX ADMIN — DEXAMETHASONE SODIUM PHOSPHATE 4 MG: 4 INJECTION, SOLUTION INTRAMUSCULAR; INTRAVENOUS at 07:06

## 2024-06-24 RX ADMIN — TILMANOCEPT 503 MICROCURIE: KIT at 07:06

## 2024-06-24 RX ADMIN — VASOPRESSIN 2 UNITS: 20 INJECTION INTRAVENOUS at 09:06

## 2024-06-24 RX ADMIN — VASOPRESSIN 1 UNITS: 20 INJECTION INTRAVENOUS at 07:06

## 2024-06-24 RX ADMIN — ACETAMINOPHEN 1000 MG: 500 TABLET ORAL at 06:06

## 2024-06-24 RX ADMIN — EPHEDRINE SULFATE 10 MG: 50 INJECTION INTRAVENOUS at 07:06

## 2024-06-24 RX ADMIN — SODIUM CHLORIDE, SODIUM LACTATE, POTASSIUM CHLORIDE, AND CALCIUM CHLORIDE: .6; .31; .03; .02 INJECTION, SOLUTION INTRAVENOUS at 08:06

## 2024-06-24 RX ADMIN — OXYCODONE HYDROCHLORIDE 5 MG: 5 TABLET ORAL at 11:06

## 2024-06-24 RX ADMIN — LIDOCAINE HYDROCHLORIDE 100 MG: 20 INJECTION, SOLUTION INTRAVENOUS at 07:06

## 2024-06-24 RX ADMIN — EPHEDRINE SULFATE 10 MG: 50 INJECTION INTRAVENOUS at 09:06

## 2024-06-24 RX ADMIN — EPHEDRINE SULFATE 5 MG: 50 INJECTION INTRAVENOUS at 09:06

## 2024-06-24 RX ADMIN — FENTANYL CITRATE 25 MCG: 50 INJECTION, SOLUTION INTRAMUSCULAR; INTRAVENOUS at 08:06

## 2024-06-24 RX ADMIN — PROPOFOL 160 MG: 10 INJECTION, EMULSION INTRAVENOUS at 07:06

## 2024-06-24 RX ADMIN — FENTANYL CITRATE 50 MCG: 50 INJECTION, SOLUTION INTRAMUSCULAR; INTRAVENOUS at 08:06

## 2024-06-24 RX ADMIN — FENTANYL CITRATE 100 MCG: 50 INJECTION, SOLUTION INTRAMUSCULAR; INTRAVENOUS at 07:06

## 2024-06-24 RX ADMIN — EPHEDRINE SULFATE 5 MG: 50 INJECTION INTRAVENOUS at 07:06

## 2024-06-24 NOTE — BRIEF OP NOTE
[facility] ([place])  Brief Operative Note    Surgery Date: 6/24/2024     Surgeons and Role:     * SHAHZAD Laird MD - Primary    Assisting Surgeon: Christina Dey MD    Pre-op Diagnosis:  Malignant neoplasm of overlapping sites of right breast in female, estrogen receptor positive [C50.811, Z17.0]    Post-op Diagnosis:  Post-Op Diagnosis Codes:     * Malignant neoplasm of overlapping sites of right breast in female, estrogen receptor positive [C50.811, Z17.0]    Procedure(s) (LRB):  MASTECTOMY RIGHT no recon (Right)  BIOPSY, LYMPH NODE, SENTINEL (Right)  INJECTION, FOR SENTINEL NODE IDENTIFICATION (Right)    Anesthesia: General    Operative Findings: right total mastectomy with SLNB x2. Removal of right axillary fatty mass (lipoma)    Estimated Blood Loss: <5mL         Specimens:   Specimen (24h ago, onward)       Start     Ordered    06/24/24 0951  Specimen to Pathology, Surgery Breast  Once        Comments: Pre-op Diagnosis: Malignant neoplasm of overlapping sites of right breast in female, estrogen receptor positive [C50.811, Z17.0]Procedure(s):MASTECTOMY RIGHT no reconBIOPSY, LYMPH NODE, SENTINELINJECTION, FOR SENTINEL NODE IDENTIFICATION Number of specimens: 5Name of specimens: 1. Right axillary sentinel lymph node #1 hot 7332. Right breast mastectomy short stitch superior long stitch lateral 3. Right axillary sentinel lymph node #2 hot 3724. Right axillary fatty mass5. Sternal fat     References:    Click here for ordering Quick Tip   Question Answer Comment   Procedure Type: Breast    Specimen Class: Known or suspected malignancy    Release to patient Immediate        06/24/24 0952                      Discharge Note    OUTCOME: Patient tolerated treatment/procedure well without complication and is now ready for discharge.    DISPOSITION: Home or Self Care    FINAL DIAGNOSIS:  IDC of right breast     FOLLOWUP: In clinic    DISCHARGE INSTRUCTIONS:    Discharge Procedure Orders   Diet Adult Regular      No dressing needed   Order Comments: WOUND CARE  You have skin glue over your incision(s)  This will slowly flake away in about 10 days  It is okay to shower starting the day after surgery  Can pat your incision dry  Please do not scrub hard over your incisions  Please do not pick the glue off or it will reopen the wound     Notify your health care provider if you experience any of the following:  temperature >100.4     Notify your health care provider if you experience any of the following:  persistent nausea and vomiting or diarrhea     Notify your health care provider if you experience any of the following:  severe uncontrolled pain     Notify your health care provider if you experience any of the following:  redness, tenderness, or signs of infection (pain, swelling, redness, odor or green/yellow discharge around incision site)     Notify your health care provider if you experience any of the following:  difficulty breathing or increased cough     Notify your health care provider if you experience any of the following:  severe persistent headache     Notify your health care provider if you experience any of the following:  worsening rash     Notify your health care provider if you experience any of the following:  persistent dizziness, light-headedness, or visual disturbances     Notify your health care provider if you experience any of the following:  increased confusion or weakness     Activity as tolerated   Order Comments: POSTOPERATIVE INSTRUCTIONS FOLLOWING   MASTECTOMY AND/OR AXILLARY LYMPH NODE DISSECTION    The following are post-operative instructions that will help you to recover from your surgery.  Please read over these instructions carefully and contact us if we can answer any of your questions or concerns.    Dressing/breast binder (surgi-bra)  A surgical bra may be placed around your chest after your surgery.  If you are given the bra, please wear it as close to 24 hours a day as possible  until your post-operative clinic appointment.  If the elastic around the bra irritates your skin, you may wear a soft t-shirt underneath the bra.    You may go without wearing the bra long enough to bath, to launder and dry the bra. If you have fluffy filler placed inside the bra, the filler should be removed whenever the bra is taken off. Please reinsert the fluffy filler, or insert the new soft filler, under the bra when you put the bra back on.  If the bra is uncomfortable, you may wear a supportive sports bra instead.     You may shower. Do not take a tub bath and do not soak the surgical site. Please do not remove the glue that covers your incision.  It may flake off around 2-3 weeks after surgery and if not then it will be removed at your clinic visit.  If the small clear dressing and disc shaped dressing near your drain becomes loose then it is ok to remove this dressing and cover the area with gauze. Just be careful to not remove the drain itself.     Activity   You will be able to do much of your own personal care, such as bathing, dressing, preparing simple meals, etc.  A short walk each day will help with your recovery  You may find that you need to take rest breaks between activities, but you should not need to stay in bed for prolonged periods of time during the day  You may resume light household activities such as simple meal preparation, folding laundry, using your computer, and completing paperwork as you feel ready  Please avoid activities that require moderate to heavy lifting (grocery shopping) or pushing/pulling (vacuuming) and repetitive motions (such as washing windows or long hours on the computer). Do not lift anything heavier than a gallon of milk.  A good rule during this time is to listen to your body, do what is comfortable, and stop and rest when your feel tired.  If it hurts, don't do it.  Following a lymph node dissection, don't avoid using your arm, but don't exercise your arm until  "after your first post-operative visit.  At your first post-op visit, you will be given arm exercises to regain movement and flexibility.  You may be referred to physical therapy.  You may restart driving when you are no longer on narcotics and you feel safe turning the wheel and stopping quickly.  You will need to be out of work approximately 2-6 weeks depending on your particular surgery and how well you are recovering.  We will evaluate how you are doing at the first post-op appointment.  This is a good time to ask when you may return to work and what activities you may do.    Medication for pain  You will be given a prescription for pain medication. You should not drive or operate machinery while taking these.  Please take narcotics with food.  Narcotics can cause, or worse, constipation.  You will need to increase your fluid intake, eat high fiber foods (such as fruits and bran) and make sure that you are up and walking. You may need to take an over the counter stool softener for constipation.  An icepack may be helpful to decrease discomfort and swelling, particularly to the armpit after a lymph node dissection.  A small pillow positioned in the armpit may also decrease discomfort after a lymph node dissection.    How to care for your Drain(s)  Wash hands-STRIP or "milk" the drainage tube as it comes out of your body toward the bulb.   Beginning where the drain comes out of your body, hold drainage tubing with one hand and with the other, stretch and release tubing an inch at time while moving downward with both hands toward the bulb.  Do this 2-3 times before emptying the bulb.  Remove the stopper from the bulb's port  (drainage port)  Pour the drainage in the measuring cup provided by the nurse  Flatten/squeeze the bulb to create a vacuum and replace the stopper before letting go the of the bulb.  Record the date, time and amount of drainage in cc's (not ounces) each time bulb is emptied. If you have more " than one drain, record each separately.  Discard the drainage into the toilet after measuring and then wash hands.  Empty bulbs as needed but at least once daily.   Remember to bring the output record with you to your doctor's appointment.    Please report the following:  Temperature greater than 101 degrees  Discharge or bad odor from the wound  Excessive bleeding, such as bloody dressing or extreme bruising  Redness at incision and/or drain sites  Swelling or buildup of fluid around incision  If blue dye was used to locate your sentinel lymph nodes, your urine and stool may be blue-green in color for 1 or 2 days.    Additional information  I will see you approximately 2 weeks following your surgery.  If this follow-up appointment has not been made, please call the office.    If you have any questions or problems, please call my office or my nurse.  Dr. Lakisha Laird MD  If you have questions, please call my nurse: Shelbi at [phone] or Loni at [phone]    After hours and on weekends, you may call the main [facility] line at [phone].     If directed to the emergency room it would be best to proceed to the [facility] ER. However if very ill or unable to travel safely then please present to your nearest ER.     Lymphedema Risk Reduction    Lymphedema is a swelling of a part of the body, caused by an insufficient lymphatic system and an accumulation of fluid in the body's tissues.  Lymphedema may occur when normal drainage of fluid is disrupted, such as an infection, injury, cancer, scar tissue, or removal of lymph nodes.    If you had a full axillary node dissection procedure, you may be at great risk for lymphedema.     For those patients having a sentinel lymph node biopsy, these risks may be smaller and the recommendations are provided for your review and consideration.    The following list contains recommendations for reducing your risk of developing lymphedema.    Skin Care-avoid  trauma/injury to reduce infection risk  Keep the hand and arm on the side of surgery clean and dry  Pay attention to nail care and do not cut cuticles  Wear gloves while doing activities that may cause skin injury (washing dishes, gardening, etc.)  If scratches or punctures occur, wash area with soap and water, and observe for signs of infections (redness, drainage, swelling)  If a rash, itching, redness, pain, increased skin temperatures, fever, or flu-like symptoms occur, contact your physician immediately for early treatment of a possible infection  Activity/Lifestyle  Gradually build up the duration and intensity of any activity or exercise  Take frequent rest periods during activity to allow for arm recovery  Monitor your arm and upper body during and after activity for any change in size, shape, tissue, texture, soreness, heaviness, or firmness                  PLEASE RECORD ALL AMOUNTS IN CC'S AND BRIND THIS RECORD   WITH YOU TO YOUR RETURN APPOINTMENT  Date Time Drain #1 Drain #2 comment                                                                                                                                                                                                Date Time Drain #1 Drain #2 comment

## 2024-06-24 NOTE — OP NOTE
Operative Note     6/24/2024    PRE-OP DIAGNOSIS: Malignant neoplasm of overlapping sites of right breast in female, estrogen receptor positive [C50.811, Z17.0]      POST-OP DIAGNOSIS: Post-Op Diagnosis Codes:     * Malignant neoplasm of overlapping sites of right breast in female, estrogen receptor positive [C50.811, Z17.0]    Procedure(s):  MASTECTOMY RIGHT no recon  BIOPSY, LYMPH NODE, SENTINEL  INJECTION, FOR SENTINEL NODE IDENTIFICATION     SURGEON: Surgeons and Role:     * SHAHZAD Laird MD - Primary    ANESTHESIA: General     OPERATIVE FINDINGS: 2 sentinel nodes, 2 clips confirmed removed.     INDICATION FOR PROCEDURE: This patient presents with a history of multifocal cancer of the right breast    PROCEDURE IN DETAIL:  Mis Ca is a 83 y.o. female brought to the operating room for definitive surgery of multifocal cancer of the right breast.  The patient has elected to undergo right simple mastectomy with sentinel lymph node biopsy for natali assessment. The patient was informed of the possible risks and complications of the procedure, including but not limited to anesthetic risks, bleeding, infection, and need for additional surgery.  The patient concurred with the proposed plan, and has given informed consent.  The site of surgery was properly noted/marked in the preoperative holding area.    The patient was then brought to the operating room and placed in the supine position with both upper extremities extended.  general anesthesia was administered. Perioperative antibiotics were administered consisting of clindamycin and a time out was performed confirming the patient, site, and procedure.   The patient's right breast was injected with technetium sulfur colloid to facilitate sentinel lymph node identification.The right chest and axilla was then prepped and draped in the usual sterile fashion.    We then turned our attention to the right breast where an elliptical incision was fashioned to  incorporate the nipple areolar complex.  The incision was made with a 15-blade and extended through the subcutaneous tissues with Bovie electrocautery.  Skin flaps were raised to the clavicle superiorly, lateral border of the sternum medially, to the inframammary fold inferiorly, and to the anterior border of the latissimus dorsi muscle laterally. The breast tissue was sharply excised off the chest wall taking care to incorporate the pectoralis fascia while leaving the serratus fascia behind.  The resulting mastectomy specimen was marked using a short stitch superiorly and long stitch laterally.  The breast was sent to pathology for permanent evaluation.       We then  turned our attention to the right axilla. The gamma probe was used to identify an area of increased radioactivity within the lower axilla. The clavipectoral sheath was sharply incised to reveal the level I axillary lymph nodes. The probe was used to identify a single node with increased radioactivity.  This node was brought into the operative field and carefully dissected free of the surrounding lymphovascular structures. The node was then sent to pathology for frozen section evaluation, labeled as sentinel node #1.  A total of 2 axillary sentinel nodes and 0 axillary non-sentinel nodes were identified, excised and submitted to pathology.  Bed counts were obtained to confirm that the 10% rule had not been violated.   The wound was irrigated with normal saline, and all bleeding points were secured with Bovie electrocautery.     The operative field was irrigated with normal saline and all bleeding points were secured with Bovie electrocautery.  A 15 Fr meli drain was placed under the mastectomy flap. Dilan powder was used. PEC II block was performed with Exparel. The incision was closed using an interrupted 3-0 vicryl deep dermal stitch followed by a running 4-0 vicryl subcuticular.      Dermabond was applied. A post surgical bra was placed on the  patient. At the end of the operation, all sponge, instrument, and needle counts x 2 were correct.    ESTIMATED BLOOD LOSS: less than 50 mL    COMPLICATIONS:  None    DISPOSITION: PACU - hemodynamically stable.    ATTESTATION:   I was present and scrubbed for the entire procedure.    Dariela Attestation:  [place] Field Node Biopsy for Breast Cancer  Operation performed with curative intent Yes   Tracer(s) used to identify sentinel nodes in the upfront surgery (non-neoadjuvant) setting Radioactive tracer   Tracer(s) used to identify sentinel nodes in the neoadjuvant setting N/A   All nodes (colored or non-colored) present at the end of a dye-filled lymphatic channel were removed N/A   All significantly radioactive nodes were removed Yes   All palpably suspicious nodes were removed Yes   Biopsy-proven positive nodes marked with clips prior to chemotherapy were identified and removed N/A

## 2024-06-24 NOTE — DISCHARGE INSTRUCTIONS
POSTOPERATIVE INSTRUCTIONS FOLLOWING   MASTECTOMY AND/OR AXILLARY LYMPH NODE DISSECTION    The following are post-operative instructions that will help you to recover from your surgery.  Please read over these instructions carefully and contact us if we can answer any of your questions or concerns.    You may restart your warfarin tomorrow on post op day 1 (6/25/24).    Dressing/breast binder (surgi-bra)  A surgical bra may be placed around your chest after your surgery.  If you are given the bra, please wear it as close to 24 hours a day as possible until your post-operative clinic appointment.  If the elastic around the bra irritates your skin, you may wear a soft t-shirt underneath the bra.    You may go without wearing the bra long enough to bath, to launder and dry the bra. If you have fluffy filler placed inside the bra, the filler should be removed whenever the bra is taken off. Please reinsert the fluffy filler, or insert the new soft filler, under the bra when you put the bra back on.  If the bra is uncomfortable, you may wear a supportive sports bra instead.     You may shower. Do not take a tub bath and do not soak the surgical site. Please do not remove the glue that covers your incision.  It may flake off around 2-3 weeks after surgery and if not then it will be removed at your clinic visit.  If the small clear dressing and disc shaped dressing near your drain becomes loose then it is ok to remove this dressing and cover the area with gauze. Just be careful to not remove the drain itself.     Activity   You will be able to do much of your own personal care, such as bathing, dressing, preparing simple meals, etc.  A short walk each day will help with your recovery  You may find that you need to take rest breaks between activities, but you should not need to stay in bed for prolonged periods of time during the day  You may resume light household activities such as simple meal preparation, folding laundry,  using your computer, and completing paperwork as you feel ready  Please avoid activities that require moderate to heavy lifting (grocery shopping) or pushing/pulling (vacuuming) and repetitive motions (such as washing windows or long hours on the computer). Do not lift anything heavier than a gallon of milk.  A good rule during this time is to listen to your body, do what is comfortable, and stop and rest when your feel tired.  If it hurts, dont do it.  Following a lymph node dissection, dont avoid using your arm, but dont exercise your arm until after your first post-operative visit.  At your first post-op visit, you will be given arm exercises to regain movement and flexibility.  You may be referred to physical therapy.  You may restart driving when you are no longer on narcotics and you feel safe turning the wheel and stopping quickly.  You will need to be out of work approximately 2-6 weeks depending on your particular surgery and how well you are recovering.  We will evaluate how you are doing at the first post-op appointment.  This is a good time to ask when you may return to work and what activities you may do.    Medication for pain  You will be given a prescription for pain medication. You should not drive or operate machinery while taking these.  Please take narcotics with food.  Narcotics can cause, or worse, constipation.  You will need to increase your fluid intake, eat high fiber foods (such as fruits and bran) and make sure that you are up and walking. You may need to take an over the counter stool softener for constipation.  An icepack may be helpful to decrease discomfort and swelling, particularly to the armpit after a lymph node dissection.  A small pillow positioned in the armpit may also decrease discomfort after a lymph node dissection.    How to care for your Drain(s)  Wash hands--STRIP or milk the drainage tube as it comes out of your body toward the bulb.   Beginning where the drain  comes out of your body, hold drainage tubing with one hand and with the other, stretch and release tubing an inch at time while moving downward with both hands toward the bulb.  Do this 2-3 times before emptying the bulb.  Remove the stopper from the bulbs port  (drainage port)  Pour the drainage in the measuring cup provided by the nurse  Flatten/squeeze the bulb to create a vacuum and replace the stopper before letting go the of the bulb.  Record the date, time and amount of drainage in ccs (not ounces) each time bulb is emptied. If you have more than one drain, record each separately.  Discard the drainage into the toilet after measuring and then wash hands.  Empty bulbs as needed but at least once daily.   Remember to bring the output record with you to your doctors appointment.    Please report the following:  Temperature greater than 101 degrees  Discharge or bad odor from the wound  Excessive bleeding, such as bloody dressing or extreme bruising  Redness at incision and/or drain sites  Swelling or buildup of fluid around incision  If blue dye was used to locate your sentinel lymph nodes, your urine and stool may be blue-green in color for 1 or 2 days.    Additional information  I will see you approximately 2 weeks following your surgery.  If this follow-up appointment has not been made, please call the office.    If you have any questions or problems, please call my office or my nurse.  Dr. Lakisha Laird MD  If you have questions, please call my nurse: Shelbi at [phone] or Loni at [phone]    After hours and on weekends, you may call the main [facility] line at [phone].    If directed to the emergency room it would be best to proceed to the [facility] ER. However if very ill or unable to travel safely then please present to your nearest ER.     Lymphedema Risk Reduction    Lymphedema is a swelling of a part of the body, caused by an insufficient lymphatic system and an  accumulation of fluid in the bodys tissues.  Lymphedema may occur when normal drainage of fluid is disrupted, such as an infection, injury, cancer, scar tissue, or removal of lymph nodes.    If you had a full axillary node dissection procedure, you may be at great risk for lymphedema.     For those patients having a sentinel lymph node biopsy, these risks may be smaller and the recommendations are provided for your review and consideration.    The following list contains recommendations for reducing your risk of developing lymphedema.    Skin Care--avoid trauma/injury to reduce infection risk  Keep the hand and arm on the side of surgery clean and dry  Pay attention to nail care and do not cut cuticles  Wear gloves while doing activities that may cause skin injury (washing dishes, gardening, etc.)  If scratches or punctures occur, wash area with soap and water, and observe for signs of infections (redness, drainage, swelling)  If a rash, itching, redness, pain, increased skin temperatures, fever, or flu-like symptoms occur, contact your physician immediately for early treatment of a possible infection  Activity/Lifestyle  Gradually build up the duration and intensity of any activity or exercise  Take frequent rest periods during activity to allow for arm recovery  Monitor your arm and upper body during and after activity for any change in size, shape, tissue, texture, soreness, heaviness, or firmness                  PLEASE RECORD ALL AMOUNTS IN CCS AND BRIND THIS RECORD   WITH YOU TO YOUR RETURN APPOINTMENT  Date Time Drain #1 Drain #2 comment                                                                                                                                                                                                                         Date Time Drain #1 Drain #2 comment

## 2024-06-24 NOTE — ANESTHESIA PROCEDURE NOTES
Intubation    Date/Time: 6/24/2024 7:25 AM    Performed by: Ophelia Segura CRNA  Authorized by: Shamar Ivan MD    Intubation:     Induction:  Intravenous    Intubated:  Postinduction    Mask Ventilation:  Easy with oral airway    Attempts:  1    Attempted By:  CRNA    Method of Intubation:  Video laryngoscopy    Blade:  Fontanez 3    Laryngeal View Grade: Grade I - full view of cords      Difficult Airway Encountered?: No      Complications:  None    Airway Device:  Oral endotracheal tube    Airway Device Size:  7.0    Style/Cuff Inflation:  Cuffed (inflated to minimal occlusive pressure)    Tube secured:  22    Secured at:  The lips    Placement Verified By:  Capnometry    Complicating Factors:  Obesity and short neck    Findings Post-Intubation:  BS equal bilateral and atraumatic/condition of teeth unchanged

## 2024-06-24 NOTE — ANESTHESIA POSTPROCEDURE EVALUATION
Anesthesia Post Evaluation    Patient: Mis Ca    Procedure(s) Performed: Procedure(s) (LRB):  MASTECTOMY RIGHT no recon (Right)  BIOPSY, LYMPH NODE, SENTINEL (Right)  INJECTION, FOR SENTINEL NODE IDENTIFICATION (Right)    Final Anesthesia Type: general      Patient location during evaluation: PACU  Patient participation: Yes- Able to Participate  Level of consciousness: awake and alert  Post-procedure vital signs: reviewed and stable  Pain management: adequate  Airway patency: patent    PONV status at discharge: No PONV  Anesthetic complications: no      Cardiovascular status: blood pressure returned to baseline  Respiratory status: unassisted and spontaneous ventilation  Hydration status: euvolemic  Follow-up not needed.              Vitals Value Taken Time   /55 06/24/24 1146   Temp 36.5 °C (97.7 °F) 06/24/24 1130   Pulse 72 06/24/24 1149   Resp 17 06/24/24 1149   SpO2 94 % 06/24/24 1149   Vitals shown include unfiled device data.      Event Time   Out of Recovery 11:50:01         Pain/Bandar Score: Pain Rating Prior to Med Admin: 4 (6/24/2024 11:25 AM)  Bandar Score: 10 (6/24/2024 11:31 AM)

## 2024-06-24 NOTE — TRANSFER OF CARE
"Anesthesia Transfer of Care Note    Patient: Mis Ca    Procedure(s) Performed: Procedure(s) (LRB):  MASTECTOMY RIGHT no recon (Right)  BIOPSY, LYMPH NODE, SENTINEL (Right)  INJECTION, FOR SENTINEL NODE IDENTIFICATION (Right)    Patient location: PACU    Anesthesia Type: general    Transport from OR: Transported from OR on 6-10 L/min O2 by face mask with adequate spontaneous ventilation    Post pain: adequate analgesia    Post assessment: no apparent anesthetic complications and tolerated procedure well    Post vital signs: stable    Level of consciousness: responds to stimulation    Nausea/Vomiting: no nausea/vomiting    Complications: none    Transfer of care protocol was followed      Last vitals: Visit Vitals  /61 (BP Location: Left arm, Patient Position: Lying)   Pulse 77   Temp 36.6 °C (97.8 °F) (Temporal)   Resp 18   Ht 5' 2" (1.575 m)   Wt 94.8 kg (209 lb)   SpO2 97%   Breastfeeding No   BMI 38.23 kg/m²     "

## 2024-06-25 VITALS
SYSTOLIC BLOOD PRESSURE: 115 MMHG | WEIGHT: 209 LBS | BODY MASS INDEX: 38.46 KG/M2 | TEMPERATURE: 98 F | OXYGEN SATURATION: 95 % | HEART RATE: 68 BPM | DIASTOLIC BLOOD PRESSURE: 57 MMHG | HEIGHT: 62 IN | RESPIRATION RATE: 18 BRPM

## 2024-06-25 LAB — POCT GLUCOSE: 108 MG/DL (ref 70–110)

## 2024-07-01 ENCOUNTER — ANTI-COAG VISIT (OUTPATIENT)
Dept: CARDIOLOGY | Facility: CLINIC | Age: 84
End: 2024-07-01
Payer: MEDICARE

## 2024-07-01 ENCOUNTER — LAB VISIT (OUTPATIENT)
Dept: LAB | Facility: HOSPITAL | Age: 84
End: 2024-07-01
Attending: INTERNAL MEDICINE
Payer: MEDICARE

## 2024-07-01 DIAGNOSIS — Z79.01 LONG TERM (CURRENT) USE OF ANTICOAGULANTS: ICD-10-CM

## 2024-07-01 DIAGNOSIS — I48.0 PAROXYSMAL ATRIAL FIBRILLATION: Chronic | ICD-10-CM

## 2024-07-01 DIAGNOSIS — I48.0 PAROXYSMAL ATRIAL FIBRILLATION: Primary | Chronic | ICD-10-CM

## 2024-07-01 LAB
INR PPP: 1.1 (ref 0.8–1.2)
PROTHROMBIN TIME: 11.9 SEC (ref 9–12.5)

## 2024-07-01 PROCEDURE — 36415 COLL VENOUS BLD VENIPUNCTURE: CPT | Mod: PN | Performed by: INTERNAL MEDICINE

## 2024-07-01 PROCEDURE — 85610 PROTHROMBIN TIME: CPT | Mod: PN | Performed by: INTERNAL MEDICINE

## 2024-07-01 PROCEDURE — 93793 ANTICOAG MGMT PT WARFARIN: CPT | Mod: S$GLB,,, | Performed by: PHARMACIST

## 2024-07-01 NOTE — PROGRESS NOTES
[facility]    2024 2:08 PM    Assessment/Plan:    Patient presents today with subtherapeutic  INR.    Assessment of patient findings and chart review: INR not at goal. Medications, chart, and patient findings reviewed. See calendar for adjustments to dose and follow up plan.  Held for recent surgery. Close watch required    Recommendation for patient's warfarin regimen:  per calendar    Recommend repeat INR in 1 week  _________________________________________________________________    Mis Ca (83 y.o.) is followed by the [facility].    Anticoagulation Summary  As of 2024      INR goal:  2.0-3.0   TTR:  74.6% (1.6 y)   INR used for dosin.1 (2024)   Warfarin maintenance plan:  3 mg (6 mg x 0.5) every Tue, Thu, Sat; 6 mg (6 mg x 1) all other days   Weekly warfarin total:  33 mg   Plan last modified:  Tino Mendiola, PharmD (2024)   Next INR check:  2024   Target end date:      Indications    Paroxysmal atrial fibrillation [I48.0]  Long term (current) use of anticoagulants [Z79.01]                 Anticoagulation Episode Summary       INR check location:      Preferred lab:      Send INR reminders to:  [facility] COUMADIN MONITORING POOL    Comments:  [facility]          Anticoagulation Care Providers       Provider Role Specialty Phone number    Shamar Owens MD Responsible Electrophysiology [phone]

## 2024-07-05 LAB
FINAL PATHOLOGIC DIAGNOSIS: NORMAL
GROSS: NORMAL
Lab: NORMAL
MICROSCOPIC EXAM: NORMAL

## 2024-07-08 ENCOUNTER — ANTI-COAG VISIT (OUTPATIENT)
Dept: CARDIOLOGY | Facility: CLINIC | Age: 84
End: 2024-07-08
Payer: MEDICARE

## 2024-07-08 ENCOUNTER — OFFICE VISIT (OUTPATIENT)
Dept: SURGERY | Facility: CLINIC | Age: 84
End: 2024-07-08
Payer: MEDICARE

## 2024-07-08 ENCOUNTER — LAB VISIT (OUTPATIENT)
Dept: LAB | Facility: HOSPITAL | Age: 84
End: 2024-07-08
Attending: INTERNAL MEDICINE
Payer: MEDICARE

## 2024-07-08 VITALS
HEIGHT: 62 IN | WEIGHT: 209 LBS | SYSTOLIC BLOOD PRESSURE: 174 MMHG | BODY MASS INDEX: 38.46 KG/M2 | DIASTOLIC BLOOD PRESSURE: 77 MMHG | HEART RATE: 66 BPM

## 2024-07-08 DIAGNOSIS — C50.811 MALIGNANT NEOPLASM OF OVERLAPPING SITES OF RIGHT BREAST IN FEMALE, ESTROGEN RECEPTOR POSITIVE: Primary | ICD-10-CM

## 2024-07-08 DIAGNOSIS — I48.0 PAROXYSMAL ATRIAL FIBRILLATION: Chronic | ICD-10-CM

## 2024-07-08 DIAGNOSIS — Z79.01 LONG TERM (CURRENT) USE OF ANTICOAGULANTS: ICD-10-CM

## 2024-07-08 DIAGNOSIS — I48.0 PAROXYSMAL ATRIAL FIBRILLATION: Primary | Chronic | ICD-10-CM

## 2024-07-08 DIAGNOSIS — Z12.31 SCREENING MAMMOGRAM FOR BREAST CANCER: ICD-10-CM

## 2024-07-08 DIAGNOSIS — C50.911 INFILTRATING DUCTAL CARCINOMA OF RIGHT BREAST: ICD-10-CM

## 2024-07-08 DIAGNOSIS — Z17.0 MALIGNANT NEOPLASM OF OVERLAPPING SITES OF RIGHT BREAST IN FEMALE, ESTROGEN RECEPTOR POSITIVE: Primary | ICD-10-CM

## 2024-07-08 DIAGNOSIS — Z90.11 S/P RIGHT MASTECTOMY: ICD-10-CM

## 2024-07-08 LAB
INR PPP: 1.6 (ref 0.8–1.2)
PROTHROMBIN TIME: 17.2 SEC (ref 9–12.5)

## 2024-07-08 PROCEDURE — 3288F FALL RISK ASSESSMENT DOCD: CPT | Mod: CPTII,S$GLB,, | Performed by: SURGERY

## 2024-07-08 PROCEDURE — 99024 POSTOP FOLLOW-UP VISIT: CPT | Mod: S$GLB,,, | Performed by: SURGERY

## 2024-07-08 PROCEDURE — 1159F MED LIST DOCD IN RCRD: CPT | Mod: CPTII,S$GLB,, | Performed by: SURGERY

## 2024-07-08 PROCEDURE — 99999 PR PBB SHADOW E&M-EST. PATIENT-LVL IV: CPT | Mod: PBBFAC,,, | Performed by: SURGERY

## 2024-07-08 PROCEDURE — 3077F SYST BP >= 140 MM HG: CPT | Mod: CPTII,S$GLB,, | Performed by: SURGERY

## 2024-07-08 PROCEDURE — 3078F DIAST BP <80 MM HG: CPT | Mod: CPTII,S$GLB,, | Performed by: SURGERY

## 2024-07-08 PROCEDURE — 1160F RVW MEDS BY RX/DR IN RCRD: CPT | Mod: CPTII,S$GLB,, | Performed by: SURGERY

## 2024-07-08 PROCEDURE — 1126F AMNT PAIN NOTED NONE PRSNT: CPT | Mod: CPTII,S$GLB,, | Performed by: SURGERY

## 2024-07-08 PROCEDURE — 36415 COLL VENOUS BLD VENIPUNCTURE: CPT | Mod: PN | Performed by: INTERNAL MEDICINE

## 2024-07-08 PROCEDURE — 85610 PROTHROMBIN TIME: CPT | Mod: PN | Performed by: INTERNAL MEDICINE

## 2024-07-08 PROCEDURE — 93793 ANTICOAG MGMT PT WARFARIN: CPT | Mod: S$GLB,,, | Performed by: PHARMACIST

## 2024-07-08 PROCEDURE — 1101F PT FALLS ASSESS-DOCD LE1/YR: CPT | Mod: CPTII,S$GLB,, | Performed by: SURGERY

## 2024-07-08 NOTE — PROGRESS NOTES
[facility]    2024 1:58 PM    Assessment/Plan:    Patient presents today with subtherapeutic  INR.    Assessment of patient findings and chart review: Patient confirmed correct dosing. Pt stated that she had a mastectomy two weeks ago and is getting drainage done today on a tooth. Pt also stated that she is taking Oxybutynin and had some broccoli once. Pt denies all other changes.    Recommendation for patient's warfarin regimen: Increase maintenance dose    Recommend repeat INR in 1 week  _________________________________________________________________    Mis Ca (83 y.o.) is followed by the [facility].    Anticoagulation Summary  As of 2024      INR goal:  2.0-3.0   TTR:  73.7% (1.7 y)   INR used for dosin.6 (2024)   Warfarin maintenance plan:  3 mg (6 mg x 0.5) every Thu, Sat; 6 mg (6 mg x 1) all other days   Weekly warfarin total:  36 mg   Plan last modified:  Tino Mendiola, PharmD (2024)   Next INR check:  7/15/2024   Target end date:      Indications    Paroxysmal atrial fibrillation [I48.0]  Long term (current) use of anticoagulants [Z79.01]                 Anticoagulation Episode Summary       INR check location:      Preferred lab:      Send INR reminders to:  [facility] COUMADIN MONITORING POOL    Comments:  [facility]          Anticoagulation Care Providers       Provider Role Specialty Phone number    Shamar Owens MD Responsible Electrophysiology [phone]

## 2024-07-08 NOTE — PROGRESS NOTES
[facility]       Post-Op        REFERRING PHYSICIAN:  No referring provider defined for this encounter.       Scott Dillard MD    MEDICAL ONCOLOGIST:    Referral   RADIATION ONCOLOGIST:   Referral     DIAGNOSIS:    This is a 83 y.o. 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    TREATMENT SUMMARY:  The patient is status post right total mastectomy and sentinel node biopsy on 6/24/2024.  Final pathology showed: \      1. RIGHT AXILLARY SENTINEL LYMPH NODE #1 (1), EXCISIONAL BIOPSY:  -  1 out of 1 lymph node demonstrates metastatic ductal carcinoma of the breast on routine and immunohistochemistry stains; size of metastatic tumor deposit = 20 mm.  There is no evidence of extranodal extension.      2. RIGHT BREAST TO INCLUDE SEGMENT OF SKIN WITH NIPPLE, TOTAL MASTECTOMY:    -  Multifocal invasive ductal carcinoma, with focal micropapillary features and focal mucinous features, Elidia histologic grade 3;  tumor sizes = 36 mm, 28 mm, and 17 mm.  There is multifocal lymphovascular invasion.  The nipple, skin, skeletal  muscle, and all surgical margins are free of invasive carcinoma.  However, the invasive carcinoma is 1.1 mm from the posterior margin at its closest approach.  -  High-grade ductal carcinoma in situ with lobular extension and focal extension into nipple ducts.  The skin and all surgical margins are free of DCIS; however, the DCIS is 6 mm from the posterior margin at its closest approach..  -  Complex sclerosing lesion with intraductal micropapilloma.  -  Fibrocystic changes with columnar cell change, columnar cell hyperplasia, usual ductal hyperplasia, blunt duct adenosis, apocrine metaplasia, microcysts, stromal fibrosis, and focal microcalcifications.  -  Focal duct ectasia.    -  Arteriosclerosis.    -  Organizing biopsy sites with fibrosis, fat necrosis, and chronic inflammation.  A spring-shaped biopsy clip is grossly  identified near mass 1 and a coil-shaped shaped biopsy clip is grossly identified within mass 2.    LOW RIGHT AXILLARY LYMPH NODE (1):  -  1 out of 1 lymph node is negative for malignancy.    -  Mild reactive lymphoid hyperplasia, mixed pattern.    3. RIGHT AXILLARY SENTINEL LYMPH NODE #2 (1), EXCISIONAL BIOPSY:  -  1 out of 1 lymph node is negative for malignancy on routine and immunohistochemistry staining; there is no evidence of metastatic carcinoma.  -  Mild reactive lymphoid hyperplasia, mixed pattern.     4. RIGHT AXILLARY MASS, EXCISION:  -  Lipoma.    -  No evidence of malignancy.      5. STERNAL FAT, EXCISION:  -  Benign adipose tissue with no focal lesions.    -  No evidence of malignancy.        INVASIVE CARCINOMA OF THE BREAST, RESECTION, CAP CANCER SYNOPTIC REPORT:    PROCEDURE:  Mastectomy to include segment of skin with nipple, right axillary sentinel lymph node biopsies, and axillary mass excision.  SPECIMEN LATERALITY:  Right.  TUMOR SITES:  Lower inner quadrant at 4:00 o'clock, 2 cm from the nipple and 5:00 o'clock, 6 cm from the nipple, and retroareolar.  TUMOR, HISTOLOGIC TYPE:  Invasive ductal carcinoma with focal micropapillary and focal mucinous features.  TUMOR, HISTOLOGIC GRADE (MEDINA HISTOLOGIC SCORE):     Glandular (acinar)/tubular differentiation:  Score 3.       Nuclear pleomorphism: Score 3.       Mitotic rate: Score 3.       Overall grade:  Grade 3 (score 9).  TUMOR SIZE, GREATEST DIMENSION OF LARGEST INVASIVE FOCUS IN MM:  36 mm.  TUMOR FOCALITY:  Multiple foci of invasive carcinoma (at least 3).    DUCTAL CARCINOMA IN SITU (DCIS):  Present.       Negative for extensive intraductal component (EIC).       Size (extent) of DCIS:  Estimated size (extent) of DCIS is at least 50 mm.     Architectural patterns:  Solid, cribriform, and flat.       Nuclear grade:  Grade III (high).       Necrosis:  Not identified.  LOBULAR CARCINOMA IN SITU (LCIS):  Not identified.    TUMOR EXTENT:        Skin:  Uninvolved by invasive carcinoma.       Nipple:  Uninvolved by invasive carcinoma but nipple ducts are involved by DCIS.  DCIS does not involve the nipple epidermis.     Skeletal muscle:  Uninvolved by invasive carcinoma.  LYMPHATIC AND / OR VASCULAR INVASION:  Present, extensive.  MICROCALCIFICATIONS:  Present in non-neoplastic tissue.    TREATMENT EFFECT IN THE BREAST:  No known presurgical therapy.  RESIDUAL CANCER BURDEN (RCB) PARAMETERS:  Not applicable.    MARGINS:     MARGIN STATUS FOR INVASIVE CARCINOMA:  All margins negative for invasive carcinoma.            Distance from invasive carcinoma to posterior (closest) margin:  1.1 mm.            Distance from invasive carcinoma to all remaining surgical margins:  Greater than 10 mm.     MARGIN STATUS FOR DCIS:  All margins negative for DCIS.            Distance from DCIS to posterior (closest) margin: 6 mm.            Distance from DCIS to all remaining surgical margins:  Greater than 10 mm.  REGIONAL LYMPH NODES:  Regional lymph nodes present; tumor present in regional lymph nodes.          Number of lymph nodes with macrometastasis:  1.          Number of lymph nodes with micrometastasis:  0.            Number of lymph nodes with isolated tumor cells: 0.          Size of largest natali metastatic deposit:  20 mm.            Extranodal extension:  Not identified.            Total number of lymph nodes examined (sentinel and nonsentinel): 3.            Number of sentinel nodes examined: 2.  DISTANT METASTASIS:  Not applicable.      pTNM CLASSIFICATION (AJCC 8TH EDITION):  pT2(m):  Tumor size = 36 mm (tumor size > 20 mm but < or = 50 mm in greatest dimension).  pN1a (sn):  Metastasis in 1 axillary lymph node, metastatic tumor size = 20 mm.  pM: Not applicable.     INTERVAL HISTORY:   Mis Ca comes in for a post-op check. Her pain is well controlled. Drain put out > 60 cc in the last 24 hours.     MEDICATIONS:  Current Outpatient Medications  "  Medication Sig Dispense Refill    acetaminophen (TYLENOL) 500 MG tablet Take 1,000 mg by mouth every 6 (six) hours as needed for Pain.      alendronate (FOSAMAX) 70 MG tablet TAKE 1 TABLET (70 MG TOTAL) BY MOUTH EVERY 7 DAYS 12 tablet 3    amiodarone (PACERONE) 200 MG Tab Take 200 mg by mouth once daily.      amitriptyline (ELAVIL) 25 MG tablet TAKE 1 TABLET BY MOUTH EVERY EVENING (Patient taking differently: Take 2,000 mg by mouth every evening.) 90 tablet 1    aspirin (ECOTRIN) 81 MG EC tablet Take 81 mg by mouth once daily.      azelastine (ASTELIN) 137 mcg (0.1 %) nasal spray 1 spray by Nasal route 2 (two) times daily.      chlorthalidone (HYGROTEN) 50 MG Tab Take 1 tablet (50 mg total) by mouth once daily. 90 tablet 3    cyanocobalamin 1,000 mcg/mL injection INJECT 1 ML (1,000 MCG) INTRAMUSCULARLY EVERY 30 DAYS 3 mL 19    furosemide (LASIX) 20 MG tablet Take 1 tablet (20 mg total) by mouth once daily. 90 tablet 3    gabapentin (NEURONTIN) 300 MG capsule One twice a day for one month, then one nightly for one month, then stop (Patient taking differently: 2 (two) times daily. One twice a day for one month, then one nightly for one month, then stop) 90 capsule 0    HYDROcodone-acetaminophen (NORCO) 5-325 mg per tablet Take 1 tablet by mouth every 6 (six) hours as needed for Pain. 15 tablet 0    latanoprost 0.005 % ophthalmic solution Place 1 drop into the left eye every evening.      levocetirizine (XYZAL) 5 MG tablet TAKE 1 TABLET BY MOUTH EVERY DAY IN THE EVENING 90 tablet 1    losartan (COZAAR) 100 MG tablet Take 0.5 tablets (50 mg total) by mouth once daily. 45 tablet 3    MOUNJARO 2.5 mg/0.5 mL PnIj Inject into the skin.      multivit-min-iron-FA-lutein (CENTRUM SILVER WOMEN) 8 mg iron-400 mcg-300 mcg Tab Take 1 tablet by mouth once daily.      needle, disp, 25 gauge 25 gauge x 1" Ndle 1 Needle by Misc.(Non-Drug; Combo Route) route every 30 days. 25 each 0    omeprazole (PRILOSEC) 20 MG capsule TAKE 1 " CAPSULE BY MOUTH TWICE  DAILY 180 capsule 2    potassium chloride (K-TAB) 20 mEq Take 1 tablet (20 mEq total) by mouth once daily. 90 tablet 3    pramipexole (MIRAPEX) 0.5 MG tablet TAKE 1 TABLET BY MOUTH TWICE  DAILY 180 tablet 3    pravastatin (PRAVACHOL) 40 MG tablet TAKE 1 TABLET BY MOUTH EVERY DAY 90 tablet 3    timolol maleate 0.5% (TIMOPTIC) 0.5 % Drop Place 1 drop into the left eye once daily.   0    vitamin D (VITAMIN D3) 1000 units Tab Take 1,000 Units by mouth once daily.      warfarin (COUMADIN) 6 MG tablet TAKE 1 tablet DAILY EXCEPT FOR 1 and 1/2 tablet ON FRIDAY TO THIN BLOOD 96 tablet 4     No current facility-administered medications for this visit.     Facility-Administered Medications Ordered in Other Visits   Medication Dose Route Frequency Provider Last Rate Last Admin    lactated ringers infusion   Intravenous Continuous Don Ruano DNP   New Bag at 06/24/24 0832    LIDOcaine (PF) 10 mg/ml (1%) injection 10 mg  1 mL Intradermal Once Don Ruano DNP           ALLERGIES:   Review of patient's allergies indicates:   Allergen Reactions    Adhesive     Compazine [prochlorperazine edisylate] Anxiety    Penicillins Rash    Sulfa (sulfonamide antibiotics) Rash    Vancomycin analogues Rash       PHYSICAL EXAMINATION:   General:  This is a well appearing female with appropriate speech, affect and gait.     Breast:  Incision clean, dry, and intact. Drain site mildly erythematous, not TTP.     IMPRESSION:   The patient has had an uneventful postoperative course.    PLAN:   1. Call the office when drain puts out <30 cc for 2 days consecutively to come in for drain removal  2.Return in April for a follow up office visit, breast exam, and left breast mammogram  2. The patient is advised in continued exam of the breast chest wall and to report to this office sooner should she note any areas of abnormality or concern.   3.  She has been instructed to meet with medical oncology and radiation oncology  for discussion of adjuvant treatment recommendations.  5. Referral for breast prosthesis

## 2024-07-09 ENCOUNTER — TELEPHONE (OUTPATIENT)
Dept: SURGERY | Facility: CLINIC | Age: 84
End: 2024-07-09
Payer: MEDICARE

## 2024-07-09 ENCOUNTER — TELEPHONE (OUTPATIENT)
Dept: HEMATOLOGY/ONCOLOGY | Facility: CLINIC | Age: 84
End: 2024-07-09
Payer: MEDICARE

## 2024-07-09 NOTE — TELEPHONE ENCOUNTER
Pt returned call and scheduled appointments with Dr. Shaw and Dr. Vasquez on 7/18/24. Directions given to patient. Pt verbalized understanding.

## 2024-07-09 NOTE — TELEPHONE ENCOUNTER
----- Message from Loni Odom RN sent at 7/8/2024  4:38 PM CDT -----  Regarding: mmg and follow up  Mammo in April and cbe with Dr. Arambula

## 2024-07-12 RX ORDER — AMIODARONE HYDROCHLORIDE 200 MG/1
TABLET ORAL
Qty: 100 TABLET | OUTPATIENT
Start: 2024-07-12

## 2024-07-12 RX ORDER — AMIODARONE HYDROCHLORIDE 200 MG/1
200 TABLET ORAL DAILY
Qty: 90 TABLET | Refills: 3 | Status: SHIPPED | OUTPATIENT
Start: 2024-07-12

## 2024-07-15 ENCOUNTER — OFFICE VISIT (OUTPATIENT)
Dept: SURGERY | Facility: CLINIC | Age: 84
End: 2024-07-15
Payer: MEDICARE

## 2024-07-15 ENCOUNTER — LAB VISIT (OUTPATIENT)
Dept: LAB | Facility: HOSPITAL | Age: 84
End: 2024-07-15
Attending: INTERNAL MEDICINE
Payer: MEDICARE

## 2024-07-15 ENCOUNTER — ANTI-COAG VISIT (OUTPATIENT)
Dept: CARDIOLOGY | Facility: CLINIC | Age: 84
End: 2024-07-15
Payer: MEDICARE

## 2024-07-15 ENCOUNTER — PATIENT MESSAGE (OUTPATIENT)
Dept: SURGERY | Facility: CLINIC | Age: 84
End: 2024-07-15

## 2024-07-15 VITALS
HEART RATE: 72 BPM | WEIGHT: 209 LBS | BODY MASS INDEX: 38.46 KG/M2 | HEIGHT: 62 IN | SYSTOLIC BLOOD PRESSURE: 153 MMHG | DIASTOLIC BLOOD PRESSURE: 69 MMHG

## 2024-07-15 DIAGNOSIS — C50.811 MALIGNANT NEOPLASM OF OVERLAPPING SITES OF RIGHT BREAST IN FEMALE, ESTROGEN RECEPTOR POSITIVE: ICD-10-CM

## 2024-07-15 DIAGNOSIS — Z79.01 LONG TERM (CURRENT) USE OF ANTICOAGULANTS: ICD-10-CM

## 2024-07-15 DIAGNOSIS — I48.0 PAROXYSMAL ATRIAL FIBRILLATION: Chronic | ICD-10-CM

## 2024-07-15 DIAGNOSIS — Z17.0 MALIGNANT NEOPLASM OF OVERLAPPING SITES OF RIGHT BREAST IN FEMALE, ESTROGEN RECEPTOR POSITIVE: ICD-10-CM

## 2024-07-15 DIAGNOSIS — I48.0 PAROXYSMAL ATRIAL FIBRILLATION: Primary | Chronic | ICD-10-CM

## 2024-07-15 DIAGNOSIS — Z90.11 S/P RIGHT MASTECTOMY: Primary | ICD-10-CM

## 2024-07-15 LAB
INR PPP: 2.8 (ref 0.8–1.2)
PROTHROMBIN TIME: 28.9 SEC (ref 9–12.5)

## 2024-07-15 PROCEDURE — 36415 COLL VENOUS BLD VENIPUNCTURE: CPT | Mod: PN | Performed by: INTERNAL MEDICINE

## 2024-07-15 PROCEDURE — 1125F AMNT PAIN NOTED PAIN PRSNT: CPT | Mod: CPTII,S$GLB,, | Performed by: SURGERY

## 2024-07-15 PROCEDURE — 3077F SYST BP >= 140 MM HG: CPT | Mod: CPTII,S$GLB,, | Performed by: SURGERY

## 2024-07-15 PROCEDURE — 3078F DIAST BP <80 MM HG: CPT | Mod: CPTII,S$GLB,, | Performed by: SURGERY

## 2024-07-15 PROCEDURE — 85610 PROTHROMBIN TIME: CPT | Mod: PN | Performed by: INTERNAL MEDICINE

## 2024-07-15 PROCEDURE — 99024 POSTOP FOLLOW-UP VISIT: CPT | Mod: S$GLB,,, | Performed by: SURGERY

## 2024-07-15 PROCEDURE — 1159F MED LIST DOCD IN RCRD: CPT | Mod: CPTII,S$GLB,, | Performed by: SURGERY

## 2024-07-15 PROCEDURE — 1160F RVW MEDS BY RX/DR IN RCRD: CPT | Mod: CPTII,S$GLB,, | Performed by: SURGERY

## 2024-07-15 PROCEDURE — 99999 PR PBB SHADOW E&M-EST. PATIENT-LVL IV: CPT | Mod: PBBFAC,,, | Performed by: SURGERY

## 2024-07-15 PROCEDURE — 93793 ANTICOAG MGMT PT WARFARIN: CPT | Mod: S$GLB,,, | Performed by: PHARMACIST

## 2024-07-15 NOTE — PROGRESS NOTES
[facility]    07/15/2024 1:48 PM    Assessment/Plan:    Patient presents today with therapeutic INR.    Assessment of patient findings and chart review: INR is therapeutic but increased rapidly since last warfarin dose increase. Patient is elderly. We will lower warfarin dose slightly.     Recommendation for patient's warfarin regimen: Decrease maintenance dose    Recommend repeat INR in 1 week  _________________________________________________________________    Mis Ca (83 y.o.) is followed by the [facility].    Anticoagulation Summary  As of 7/15/2024      INR goal:  2.0-3.0   TTR:  73.7% (1.7 y)   INR used for dosin.8 (7/15/2024)   Warfarin maintenance plan:  3 mg (6 mg x 0.5) every e, Thu, Sat; 6 mg (6 mg x 1) all other days   Weekly warfarin total:  33 mg   Plan last modified:  Tino Mendiola, PharmD (7/15/2024)   Next INR check:  2024   Target end date:      Indications    Paroxysmal atrial fibrillation [I48.0]  Long term (current) use of anticoagulants [Z79.01]                 Anticoagulation Episode Summary       INR check location:      Preferred lab:      Send INR reminders to:  [facility] COUMADIN MONITORING POOL    Comments:  [facility]          Anticoagulation Care Providers       Provider Role Specialty Phone number    Shamar Owens MD Responsible Electrophysiology [phone]

## 2024-07-17 NOTE — PROGRESS NOTES
PATIENT IDENTIFICATION:  Patient Name: Mis Ca  MRN: [id number]  : 1940    DIAGNOSIS:  Cancer Staging   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, estrogen receptor positive  Staging form: Breast, AJCC 8th Edition  - Pathologic: Stage IIA (pT2, pN1a(sn), cM0, G3, ER+, WA+, HER2-) - Signed by Betzy Shaw MD on 2024      HISTORY OF PRESENT ILLNESS:   The patient is a an 83-year-old woman with a locally advanced right breast cancer.  She was status post mastectomy has been referred for consideration of postmastectomy radiation therapy.      The patient presented with a palpable mass in the right breast.    Diagnostic mammogram performed 5/3/24:   In the right 6 o'clock position just posterior to the nipple there is a infiltrative appearing hypoechoic mass measuring 1.9 x 1.6 cm in diameter.  Ultrasound-guided biopsy recommended.       In the 4 o'clock position 2 cm from the nipple there is a 1.1 x 0.9 x 0.7 cm suspicious mass.  Ultrasound biopsy of this finding is also recommended.        In the 5 o'clock position 6 cm from the nipple there is a 3.1 x 2.2 x 3.0 cm lobular hypoechoic mass with shadowing.  Biopsy of the lesion at the 6 o'clock position in the right breast revealed an invasive ductal carcinoma with a focal micro papillary features.  On immunohistochemistry, tumor cells were ER strongly positive, WA weakly positive in 1-5% of cells, HER2 Ryne equivocal and negative by fish.    1. RIGHT AXILLARY SENTINEL LYMPH NODE #1 (1), EXCISIONAL BIOPSY:  -  1 out of 1 lymph node demonstrates metastatic ductal carcinoma of the breast on routine and immunohistochemistry stains; size of metastatic tumor deposit = 20 mm.  There is no evidence of extranodal extension.      2. RIGHT BREAST TO INCLUDE SEGMENT OF SKIN WITH NIPPLE, TOTAL MASTECTOMY:    -  Multifocal invasive ductal carcinoma, with focal micropapillary features and focal mucinous features, Elidia histologic grade 3;  tumor sizes = 36  mm, 28 mm, and 17 mm.  There is multifocal lymphovascular invasion.  The nipple, skin, skeletal  muscle, and all surgical margins are free of invasive carcinoma.  However, the invasive carcinoma is 1.1 mm from the posterior margin at its closest approach.  -  High-grade ductal carcinoma in situ with lobular extension and focal extension into nipple ducts.  The skin and all surgical margins are free of DCIS; however, the DCIS is 6 mm from the posterior margin at its closest approach..     pTNM CLASSIFICATION (AJCC 8TH EDITION):  pT2(m):  Tumor size = 36 mm (tumor size > 20 mm but < or = 50 mm in greatest dimension).  pN1a (sn):  Metastasis in 1 axillary lymph node, metastatic tumor size = 20 mm.  pM: Not applicable.    Oncology History   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, estrogen receptor positive   5/19/2024 Initial Diagnosis    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, estrogen receptor positive     7/18/2024 Cancer Staged    Staging form: Breast, AJCC 8th Edition  - Pathologic: Stage IIA (pT2, pN1a(sn), cM0, G3, ER+, AK+, HER2-)          REVIEW OF SYSTEMS:   Review of Systems   Constitutional:  Positive for malaise/fatigue.   HENT:  Positive for tinnitus.        PAST MEDICAL HISTORY:  Past Medical History:   Diagnosis Date    Allergy     Anticoagulant long-term use     Anxiety     Arthritis     Atrial fibrillation     Bilateral renal cysts 05/01/2022    Blind right eye     Bradycardia     Breast cancer     right    Cancer     skin cancer left side of face under eye    CKD (chronic kidney disease) stage 3, GFR 30-59 ml/min     Glaucoma     Hyperlipidemia     Hypertension     PVC (premature ventricular contraction)     RLS (restless legs syndrome)     Sleep apnea     Does not use CPAP machine.    Type 2 diabetes mellitus without complication, without long-term current use of insulin 04/11/2024       PAST SURGICAL HISTORY:  Past Surgical History:   Procedure Laterality Date    ADENOIDECTOMY      PAM OSTEOTOMY  Left 10/31/2018    Procedure: OSTEOTOMY, AKIN;  Surgeon: Rudolph Cardozo DPM;  Location: [facility] OR;  Service: Podiatry;  Laterality: Left;    APPENDECTOMY      BREAST BIOPSY Left     x3    BREAST SURGERY Left     breast biopsy x3    CATARACT EXTRACTION BILATERAL W/ ANTERIOR VITRECTOMY      ENDOSCOPIC GASTROCNEMIUS RECESSION Left 10/31/2018    Procedure: RECESSION, GASTROCNEMIUS, ENDOSCOPIC;  Surgeon: Rudolph Cardozo DPM;  Location: [facility] OR;  Service: Podiatry;  Laterality: Left;    ESOPHAGOGASTRODUODENOSCOPY N/A 2/11/2022    Procedure: EGD (ESOPHAGOGASTRODUODENOSCOPY);  Surgeon: Efren Aguilar MD;  Location: [facility];  Service: Endoscopy;  Laterality: N/A;  Patient needs a rapid covid  test done on 12/15/2021. thank you    EYE SURGERY Bilateral     cataracts extraction    EYE SURGERY Right     drainage tube (glaucoma)    FOOT ARTHRODESIS Left 1/15/2020    Procedure: FUSION, FOOT;  Surgeon: Rudolph Cardozo DPM;  Location: [facility] OR;  Service: Podiatry;  Laterality: Left;  mini c-arm, Arthrex screw  for hardware removal (Lydia notified), Orthofix (Niels notified) min ex-fix    FOOT SURGERY Left     lesion removed from dorsal area    FRACTURE SURGERY Left     arm    HAND TENDON SURGERY Left     HYSTERECTOMY      INCISION AND DRAINAGE FOOT Left 3/11/2020    Procedure: INCISION AND DRAINAGE, FOOT;  Surgeon: Rudolph Cardozo DPM;  Location: [facility] OR;  Service: Podiatry;  Laterality: Left;    INJECTION FOR SENTINEL NODE IDENTIFICATION Right 6/24/2024    Procedure: INJECTION, FOR SENTINEL NODE IDENTIFICATION;  Surgeon: SHAHZAD Laird MD;  Location: [facility] OR;  Service: General;  Laterality: Right;    LAPIDUS BUNIONECTOMY Left 10/31/2018    Procedure: BUNIONECTOMY, LAPIDUS;  Surgeon: Rudolph Cardozo DPM;  Location: [facility] OR;  Service: Podiatry;  Laterality: Left;  mini c-arm, Arthrex locking plate (Lydia notified)    LAPIDUS BUNIONECTOMY Left 10/31/2018    Dr. Cardozo    Lymph Gland Removed   Right     groin (performed by Dr. Vergara)    MASTECTOMY Right 6/24/2024    Procedure: MASTECTOMY RIGHT no recon;  Surgeon: SHAHZAD Laird MD;  Location: [facility];  Service: General;  Laterality: Right;  3.5 HRS    NEURECTOMY Left 1/15/2020    Procedure: NEURECTOMY;  Surgeon: Rudolph Cardozo DPM;  Location: [facility] OR;  Service: Podiatry;  Laterality: Left;  bipolar bovie, vessel loop, possible Agnes nerve wrap. Moshe with Agnes notified and will be overnighting graft. MK 1/14/2020    OOPHORECTOMY      ORIF FOREARM FRACTURE Left     PALATE SURGERY      Lesion removed    REMOVAL,ORTHOPEDIC HARDWARE,UPPER EXTREMITY Left 6/30/2023    Procedure: REMOVAL,ORTHOPEDIC HARDWARE,UPPER EXTREMITY;  Surgeon: Skyler Oliva Jr., MD;  Location: [facility] OR;  Service: Orthopedics;  Laterality: Left;  César- Michael Biomet notified cc    REPAIR OF EXTENSOR TENDON Left 6/30/2023    Procedure: REPAIR, TENDON, EXTENSOR;  Surgeon: Skyler Oliva Jr., MD;  Location: [facility] OR;  Service: Orthopedics;  Laterality: Left;  Left Index Digit    SENTINEL LYMPH NODE BIOPSY Right 6/24/2024    Procedure: BIOPSY, LYMPH NODE, SENTINEL;  Surgeon: SHAHZAD Laird MD;  Location: [facility];  Service: General;  Laterality: Right;    TONSILLECTOMY         ALLERGIES:   Review of patient's allergies indicates:   Allergen Reactions    Adhesive     Compazine [prochlorperazine edisylate] Anxiety    Penicillins Rash    Sulfa (sulfonamide antibiotics) Rash    Vancomycin analogues Rash       MEDICATIONS:  Current Outpatient Medications   Medication Sig    acetaminophen (TYLENOL) 500 MG tablet Take 1,000 mg by mouth every 6 (six) hours as needed for Pain.    alendronate (FOSAMAX) 70 MG tablet TAKE 1 TABLET (70 MG TOTAL) BY MOUTH EVERY 7 DAYS    amiodarone (PACERONE) 200 MG Tab Take 1 tablet (200 mg total) by mouth once daily.    amitriptyline (ELAVIL) 25 MG tablet TAKE 1 TABLET BY MOUTH EVERY EVENING (Patient taking differently: Take 2,000 mg by mouth every  "evening.)    aspirin (ECOTRIN) 81 MG EC tablet Take 81 mg by mouth once daily.    azelastine (ASTELIN) 137 mcg (0.1 %) nasal spray 1 spray by Nasal route 2 (two) times daily.    chlorthalidone (HYGROTEN) 50 MG Tab Take 1 tablet (50 mg total) by mouth once daily.    cyanocobalamin 1,000 mcg/mL injection INJECT 1 ML (1,000 MCG) INTRAMUSCULARLY EVERY 30 DAYS    furosemide (LASIX) 20 MG tablet Take 1 tablet (20 mg total) by mouth once daily.    gabapentin (NEURONTIN) 300 MG capsule One twice a day for one month, then one nightly for one month, then stop (Patient taking differently: 2 (two) times daily. One twice a day for one month, then one nightly for one month, then stop)    latanoprost 0.005 % ophthalmic solution Place 1 drop into the left eye every evening.    levocetirizine (XYZAL) 5 MG tablet TAKE 1 TABLET BY MOUTH EVERY DAY IN THE EVENING    losartan (COZAAR) 100 MG tablet Take 0.5 tablets (50 mg total) by mouth once daily.    multivit-min-iron-FA-lutein (CENTRUM SILVER WOMEN) 8 mg iron-400 mcg-300 mcg Tab Take 1 tablet by mouth once daily.    needle, disp, 25 gauge 25 gauge x 1" Ndle 1 Needle by Misc.(Non-Drug; Combo Route) route every 30 days.    omeprazole (PRILOSEC) 20 MG capsule TAKE 1 CAPSULE BY MOUTH TWICE  DAILY    potassium chloride (K-TAB) 20 mEq Take 1 tablet (20 mEq total) by mouth once daily.    pramipexole (MIRAPEX) 0.5 MG tablet TAKE 1 TABLET BY MOUTH TWICE  DAILY    pravastatin (PRAVACHOL) 40 MG tablet TAKE 1 TABLET BY MOUTH EVERY DAY    timolol maleate 0.5% (TIMOPTIC) 0.5 % Drop Place 1 drop into the left eye once daily.     vitamin D (VITAMIN D3) 1000 units Tab Take 1,000 Units by mouth once daily.    warfarin (COUMADIN) 6 MG tablet TAKE 1 tablet DAILY EXCEPT FOR 1 and 1/2 tablet ON FRIDAY TO THIN BLOOD     No current facility-administered medications for this visit.     Facility-Administered Medications Ordered in Other Visits   Medication    lactated ringers infusion    LIDOcaine (PF) 10 " mg/ml (1%) injection 10 mg       SOCIAL HISTORY:  Social History     Socioeconomic History    Marital status:      Spouse name: both   Tobacco Use    Smoking status: Never     Passive exposure: Never    Smokeless tobacco: Never   Substance and Sexual Activity    Alcohol use: Not Currently    Drug use: No    Sexual activity: Not Currently     Partners: Male     Social Determinants of Health     Financial Resource Strain: Low Risk  (5/31/2024)    Overall Financial Resource Strain (CARDIA)     Difficulty of Paying Living Expenses: Not hard at all   Food Insecurity: No Food Insecurity (5/31/2024)    Hunger Vital Sign     Worried About Running Out of Food in the Last Year: Never true     Ran Out of Food in the Last Year: Never true   Transportation Needs: No Transportation Needs (5/3/2023)    PRAPARE - Transportation     Lack of Transportation (Medical): No     Lack of Transportation (Non-Medical): No   Physical Activity: Inactive (5/31/2024)    Exercise Vital Sign     Days of Exercise per Week: 0 days     Minutes of Exercise per Session: 0 min   Stress: No Stress Concern Present (5/31/2024)    [employer] - Occupational Stress Questionnaire     Feeling of Stress : Not at all   Housing Stability: Low Risk  (5/3/2023)    Housing Stability Vital Sign     Unable to Pay for Housing in the Last Year: No     Number of Places Lived in the Last Year: 2     Unstable Housing in the Last Year: No       FAMILY HISTORY:  Family History   Problem Relation Name Age of Onset    Aneurysm Mother          AAA    Cancer Father          lung cancer    Lung cancer Father      Cirrhosis Father      Cancer Sister Bernadette         Breast and Brain     Diabetes Sister Bernadette     Breast cancer Sister Bernadette     Hypertension Sister Bernadette     Hyperlipidemia Sister Bernadette     Brain cancer Sister Bernadette     Heart disease Sister Aysha         Heart valve repair    Atrial fibrillation Sister Aysha     Diabetes  Sister Aysha     Heart disease Sister Molly     Heart attack Sister Molly     Bipolar disorder Sister Molly     Depression Sister Molly     Glaucoma Sister Molly     Diabetes Sister Gely Ruiz     Other Sister Gely Ruiz         Pituitary tumor    Arthritis Sister Gely Ruiz     COPD Sister Carito     Heart disease Sister Carito     Kidney disease Sister Carito     Cancer Sister Carito         lung cancer    Diabetes Sister Carito     Lung cancer Sister Carito     Heart attack Sister Acrito     Colon polyps Brother Matt     Cancer Brother Matt         lung cancer    Diabetes Brother Matt     Hyperlipidemia Brother Matt     Hypertension Brother Matt     Cancer Brother Luis Antonio         bladder cancer    Diabetes Brother Luis Antonio     Glaucoma Brother Luis Antonio          PHYSICAL EXAMINATION:  Vitals:    24 0810   BP: 119/71   Pulse: 62   Resp: 19   Temp: 97.1 °F (36.2 °C)     Body mass index is 38.97 kg/m².    ECO  Physical Exam  Constitutional:       Appearance: Normal appearance.   HENT:      Head: Normocephalic and atraumatic.      Nose: Nose normal.      Mouth/Throat:      Mouth: Mucous membranes are moist.   Cardiovascular:      Rate and Rhythm: Normal rate.   Pulmonary:      Effort: Pulmonary effort is normal.   Chest:       Abdominal:      General: Abdomen is flat.      Palpations: Abdomen is soft.   Musculoskeletal:         General: Normal range of motion.      Cervical back: Normal range of motion.   Skin:     General: Skin is warm and dry.   Neurological:      General: No focal deficit present.      Mental Status: She is alert. Mental status is at baseline.             ASSESSMENT/PLAN:  Diagnoses and all orders for this visit:    Malignant neoplasm of central portion of right breast in female, estrogen receptor positive      The patient is an 83 year old woman with locally advanced (LN positive) invasive ductal carcinoma of the right breast.  She is recommended PMRT to reduce risk of recurrence  and improve breast cancer specific survival.      The risks and benefits of treatment have been discussed with the patient and she expressed full understanding. she understands the treatment plan and willing to proceed accordingly.    I spent approximately 60 minutes reviewing the available records and evaluating the patient, out of which over 50% of the time was spent face to face with the patient in counseling and coordinating this patient's care.

## 2024-07-17 NOTE — PROGRESS NOTES
[facility]/ [facility] at [facility]      Chief Complaint: HR+ breast cancer      Cancer Staging   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, estrogen receptor positive  Staging form: Breast, AJCC 8th Edition  - Pathologic: Stage IIA (pT2, pN1a(sn), cM0, G3, ER+, NC+, HER2-) - Signed by Betzy Shaw MD on 2024      HPI:  Mis Ca is a 83 y.o. female who presents today for evaluation of newly diagnosed breast cancer. Her oncologic history is as follows:    -screening MMG 24 showing Rt breast focal asymmetries. Diagnostic imaging on 5/3/24 showing a Rt breast mass at 6oclock measuring 1.9 x 1.6cm, a mass at 3oclock measuring 1.1 x 0.9 x 0.7cm and a mass at 5oclock measuring 3.1 x 2.2 x 3.0cm, no enlarged axillary LN.   -biopsy on 24 showed the followinoclock mass: IDC, grade 3. ER %, NC 1-5%, Her2 2+, FISH negative. Ki67 70-80%. 5oclock mass: IDC, grade 3. ER %, NC <10%, Her2 2+, FISH negative. Ki67 90%.   -24 she underwent Rt mastectomy showing multifocal IDC, measuring 3.6cm, 2.8cm and 1.7cm. Multifocal LVI present. Posterior margin close at 1.1mm.High grade DCIS, negative margins. 1/3 LN positive for carcinoma, metastatic deposit measuring 2.0cm. [id number]    She presents today for evaluation accompanied by her sister. Reports that she has been recovering very well since surgery. Denies post-op pain and has had good ROM. Her medical history is notable for afib on AC, hld, htn and T2DM.  FH significant for a sister with breast cancer at age 71yo, father with prostate and lung cancer, brother with bladder cancer and brother with lung cancer.    Gyn History:   Menarche: 13yo  Menopause: 40yo    HRT: No    Social History     Tobacco Use    Smoking status: Never     Passive exposure: Never    Smokeless tobacco: Never   Substance Use Topics    Alcohol use: Not Currently    Drug use: No     Family History   Problem  Relation Name Age of Onset    Aneurysm Mother          AAA    Cancer Father          lung cancer    Lung cancer Father      Cirrhosis Father      Cancer Sister Bernadette         Breast and Brain     Diabetes Sister Bernadette     Breast cancer Sister Bernadette     Hypertension Sister Bernadette     Hyperlipidemia Sister Bernadette     Brain cancer Sister Bernadette     Heart disease Sister Aysha         Heart valve repair    Atrial fibrillation Sister Aysha     Diabetes Sister Aysha     Heart disease Sister Molly     Heart attack Sister Molly     Bipolar disorder Sister Molly     Depression Sister Molly     Glaucoma Sister Molly     Diabetes Sister Gely Ruiz     Other Sister Gely Ruiz         Pituitary tumor    Arthritis Sister Gely Joseph     COPD Sister Carito     Heart disease Sister Carito     Kidney disease Sister Carito     Cancer Sister Carito         lung cancer    Diabetes Sister Carito     Lung cancer Sister Carito     Heart attack Sister Carito     Colon polyps Brother Matt     Cancer Brother Matt         lung cancer    Diabetes Brother Matt     Hyperlipidemia Brother Matt     Hypertension Brother Matt     Cancer Brother Luis Antonio         bladder cancer    Diabetes Brother Luis Antonio     Glaucoma Brother Luis Antonio      Past Medical History:   Diagnosis Date    Allergy     Anticoagulant long-term use     Anxiety     Arthritis     Atrial fibrillation     Bilateral renal cysts 05/01/2022    Blind right eye     Bradycardia     Breast cancer     right    Cancer     skin cancer left side of face under eye    CKD (chronic kidney disease) stage 3, GFR 30-59 ml/min     Glaucoma     Hyperlipidemia     Hypertension     PVC (premature ventricular contraction)     RLS (restless legs syndrome)     Sleep apnea     Does not use CPAP machine.    Type 2 diabetes mellitus without complication, without long-term current use of insulin 04/11/2024     Past Surgical History:   Procedure Laterality Date    ADENOIDECTOMY      AKIN OSTEOTOMY Left  10/31/2018    Procedure: OSTEOTOMY, AKIN;  Surgeon: Rudolph Cardozo DPM;  Location: [facility] OR;  Service: Podiatry;  Laterality: Left;    APPENDECTOMY      BREAST BIOPSY Left     x3    BREAST SURGERY Left     breast biopsy x3    CATARACT EXTRACTION BILATERAL W/ ANTERIOR VITRECTOMY      ENDOSCOPIC GASTROCNEMIUS RECESSION Left 10/31/2018    Procedure: RECESSION, GASTROCNEMIUS, ENDOSCOPIC;  Surgeon: Rudolph Cardozo DPM;  Location: [facility] OR;  Service: Podiatry;  Laterality: Left;    ESOPHAGOGASTRODUODENOSCOPY N/A 2/11/2022    Procedure: EGD (ESOPHAGOGASTRODUODENOSCOPY);  Surgeon: Efren Aguilar MD;  Location: [facility];  Service: Endoscopy;  Laterality: N/A;  Patient needs a rapid covid  test done on 12/15/2021. thank you    EYE SURGERY Bilateral     cataracts extraction    EYE SURGERY Right     drainage tube (glaucoma)    FOOT ARTHRODESIS Left 1/15/2020    Procedure: FUSION, FOOT;  Surgeon: Rudolph Cardozo DPM;  Location: [facility] OR;  Service: Podiatry;  Laterality: Left;  mini c-arm, Arthrex screw  for hardware removal (Lydia notified), Orthofix (Niels notified) min ex-fix    FOOT SURGERY Left     lesion removed from dorsal area    FRACTURE SURGERY Left     arm    HAND TENDON SURGERY Left     HYSTERECTOMY      INCISION AND DRAINAGE FOOT Left 3/11/2020    Procedure: INCISION AND DRAINAGE, FOOT;  Surgeon: Rudolph Cardozo DPM;  Location: [facility] OR;  Service: Podiatry;  Laterality: Left;    INJECTION FOR SENTINEL NODE IDENTIFICATION Right 6/24/2024    Procedure: INJECTION, FOR SENTINEL NODE IDENTIFICATION;  Surgeon: SHAHZAD Laird MD;  Location: [facility] OR;  Service: General;  Laterality: Right;    LAPIDUS BUNIONECTOMY Left 10/31/2018    Procedure: BUNIONECTOMY, LAPIDUS;  Surgeon: Rudolph Cardozo DPM;  Location: [facility] OR;  Service: Podiatry;  Laterality: Left;  mini c-arm, Arthrex locking plate (Lydia notified)    LAPIDUS BUNIONECTOMY Left 10/31/2018    Dr. Cardozo    Lymph Gland Removed  Right      groin (performed by Dr. Vergara)    MASTECTOMY Right 6/24/2024    Procedure: MASTECTOMY RIGHT no recon;  Surgeon: SHAHZAD Laird MD;  Location: [facility] OR;  Service: General;  Laterality: Right;  3.5 HRS    NEURECTOMY Left 1/15/2020    Procedure: NEURECTOMY;  Surgeon: Rudolph Cardozo DPM;  Location: [facility] OR;  Service: Podiatry;  Laterality: Left;  bipolar bovie, vessel loop, possible Agnes nerve wrap. Moshe with [facility] notified and will be overnighting graft. MK 1/14/2020    OOPHORECTOMY      ORIF FOREARM FRACTURE Left     PALATE SURGERY      Lesion removed    REMOVAL,ORTHOPEDIC HARDWARE,UPPER EXTREMITY Left 6/30/2023    Procedure: REMOVAL,ORTHOPEDIC HARDWARE,UPPER EXTREMITY;  Surgeon: Skyler Oliva Jr., MD;  Location: [facility] OR;  Service: Orthopedics;  Laterality: Left;  [employer] notified cc    REPAIR OF EXTENSOR TENDON Left 6/30/2023    Procedure: REPAIR, TENDON, EXTENSOR;  Surgeon: Skyler Oliva Jr., MD;  Location: [facility] OR;  Service: Orthopedics;  Laterality: Left;  Left Index Digit    SENTINEL LYMPH NODE BIOPSY Right 6/24/2024    Procedure: BIOPSY, LYMPH NODE, SENTINEL;  Surgeon: SHAHZAD Laird MD;  Location: [facility];  Service: General;  Laterality: Right;    TONSILLECTOMY         Patient Active Problem List   Diagnosis    Paroxysmal atrial fibrillation    JACQUELYN (obstructive sleep apnea)    Long term (current) use of anticoagulants    Anxiety    Restless leg syndrome    Hyperlipidemia    Essential hypertension    Gastroesophageal reflux disease without esophagitis    History of adenomatous polyp of colon    Diverticulosis of large intestine without hemorrhage    Tortuous aorta    Glaucoma    Blind right eye    Aortic atherosclerosis    Severe obesity with body mass index (BMI) of 35.0 to 39.9 with serious comorbidity    Hallux valgus with bunions, left    Multilevel facet arthritis    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    Other specified disorders of adrenal gland    Unspecified  "inflammatory spondylopathy, 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    Inflammatory polyneuropathy, unspecified    Umbilical hernia without obstruction and without gangrene    Bilateral renal cysts    Chronic kidney disease, stage 3a    Rupture of extensor tendon of left hand    Muscle weakness    Stiffness of left wrist joint    Stiffness of finger joint, left    Type 2 diabetes mellitus without complication, without long-term current use of insulin    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, estrogen receptor positive    At risk for lymphedema       Current Outpatient Medications   Medication Instructions    acetaminophen (TYLENOL) 1,000 mg, Oral, Every 6 hours PRN    alendronate (FOSAMAX) 70 mg, Oral, Every 7 days    amiodarone (PACERONE) 200 mg, Oral, Daily    amitriptyline (ELAVIL) 25 mg, Oral, Nightly    aspirin (ECOTRIN) 81 mg, Oral, Daily,      azelastine (ASTELIN) 137 mcg (0.1 %) nasal spray 1 spray, Nasal, 2 times daily    chlorthalidone (HYGROTEN) 50 mg, Oral, Daily    cyanocobalamin 1,000 mcg/mL injection INJECT 1 ML (1,000 MCG) INTRAMUSCULARLY EVERY 30 DAYS    furosemide (LASIX) 20 mg, Oral, Daily    gabapentin (NEURONTIN) 300 MG capsule One twice a day for one month, then one nightly for one month, then stop    latanoprost 0.005 % ophthalmic solution 1 drop, Left Eye, Nightly    levocetirizine (XYZAL) 5 mg, Oral, Nightly    losartan (COZAAR) 50 mg, Oral, Daily    multivit-min-iron-FA-lutein (CENTRUM SILVER WOMEN) 8 mg iron-400 mcg-300 mcg Tab 1 tablet, Oral, Daily    needle, disp, 25 gauge 25 gauge x 1" Ndle 1 Needle, Misc.(Non-Drug; Combo Route), Every 30 days    omeprazole (PRILOSEC) 20 mg, Oral, 2 times daily    potassium chloride (K-TAB) 20 mEq 20 mEq, Oral, Daily    pramipexole (MIRAPEX) 0.5 MG tablet TAKE 1 TABLET BY MOUTH TWICE  DAILY    pravastatin (PRAVACHOL) 40 MG tablet TAKE 1 TABLET BY MOUTH EVERY DAY    timolol maleate 0.5% (TIMOPTIC) 0.5 % Drop 1 drop, Left Eye, Daily    vitamin D (VITAMIN D3) 1,000 " "Units, Oral, Daily    warfarin (COUMADIN) 6 MG tablet TAKE 1 tablet DAILY EXCEPT FOR 1 and 1/2 tablet ON FRIDAY TO THIN BLOOD       Review of Systems:   Answers submitted by the patient for this visit:  Review of Systems Questionnaire (Submitted on 7/16/2024)  appetite change : No  unexpected weight change: No  mouth sores: No  visual disturbance: No  cough: No  shortness of breath: No  chest pain: No  abdominal pain: No  diarrhea: No  frequency: No  back pain: No  rash: No  headaches: No  adenopathy: No  nervous/ anxious: No      PHYSICAL EXAM:  /71   Pulse 62   Temp 97.1 °F (36.2 °C) (Oral)   Ht 5' 2" (1.575 m)   Wt 96.6 kg (212 lb 15.4 oz)   SpO2 95%   BMI 38.95 kg/m²     ECOG 1  General: well appearing, in no apparent distress  HEENT: Normocephalic, EOMI, anicteric sclerae, MMM  Neck: supple, without cervical or supraclavicular lymphadenopathy.  Heart: regular rate and rhythm, normal S1 and S2, no murmurs, gallops or rubs.  Lungs: Clear to auscultation bilaterally, no increased wob  Breast: s/p Rt mastectomy with well-healing incision, no appreciable L breast masses or axillary LAD  Abdomen: Soft, nontender, nondistended with normal bowel sounds. No hepatosplenomegaly.  Extremities: No LE edema or joint effusion  Skin: warm, well-perfused, no rash  Neurologic: Alert and oriented x 4, normal speech and gait   Psychiatric: Conversing appropriately with providers throughout today's encounter.      Pertinent Labs & Imaging:  Pathology Results  (Last 30 days)                 06/24/24 [id number]  Specimen to Pathology, Surgery Breast Final result    Narrative:  Pre-op Diagnosis: Malignant neoplasm of overlapping sites of right    breast in female, estrogen receptor positive [C50.811,   Z17.0]   Procedure(s):   MASTECTOMY RIGHT no recon   BIOPSY, LYMPH NODE, SENTINEL   INJECTION, FOR SENTINEL NODE IDENTIFICATION   Number of specimens: 5   Name of specimens:   1. Right axillary sentinel lymph node #1 hot [id number]   2. " Right breast mastectomy short stitch superior long stitch   lateral   3. Right axillary sentinel lymph node #2 hot [id number]   4. Right axillary fatty mass   5. Sternal fat   Release to patient->Immediate   Specimen total (fresh, frozen, permanent):->5               Assessment & Plan:  Ms. Ca is a susie 84yo woman with recently diagnosed HR+ breast cancer who presents today for evaluation.    She is now s/p Rt mastectomy with final pathology showing multifocal IDC measuring 3.6cm, 2.8cm, 1.7cm, one near margin (posterior, 1.1mm), remainder of margins negative. 1/3 LN positive. [id number].    Today we discussed her final pathology. I explained that the standard of care for early stage HR+ breast cancer is to send genomic assay to further evaluate recurrence risk and potential benefit of chemotherapy; however, given her age and comorbidities would not recommend chemotherapy and Oncotype would not change treatment plan.    Recommend proceeding with adjuvant radiation and ET. We discussed that endocrine therapy with aromatase inhibitors are the optimal treatment for estrogen positive breast cancer in postmenopausal women. I reviewed common side effects of AI including arthralgias, hot flashes and vaginal dryness. AI-associated arthralgias can range from mild discomfort that goes away by itself, to severe achiness that may require medication with over-the-counter, non-steroidal anti-inflammatory medications like Ibuprofen.  I also discussed the possible side effect of bone loss or osteoporosis. To help prevent this side effect, we advised that she take daily supplementation with Calcium 1200mg daily and Vitamin D 1000IU daily. She can buy these supplements, such as Oscal-D® or Caltrate®, at most local stores. If she has osteopenia or osteoporosis on bone density, we will discuss Prolia in the near future. She was in agreement with the above plan.    #HR+ breast cancer:  --proceed with adjuvant radiation as scheduled  --will  start anastrozole 1mg daily, instructed to start one week following completion of radiation  --encouraged Ca/VitD as above  --due for Rt breast screening MMG in 4/2025    #Osteopenia:  --known osteopenia on DEXA in 9/2022; repeat due 9/2024  --cont fosamax      Reviewed patients referring notes, imaging and pathology. Discussed diagnosis, staging, and treatment in detail with patient. All questions were answered to her apparent satisfaction. Will see her back in 3 months or sooner should the need arise.        Route Chart for Scheduling    Med Onc Chart Routing      Follow up with physician 3 months.   Follow up with AMARI    Infusion scheduling note    Injection scheduling note    Labs    Imaging None      Pharmacy appointment    Other referrals                       MDM includes  :    - Acute or chronic illness or injury that poses a threat to life or bodily function  - Review of prior external notes from unique source  - Independent review and explanation of 3+ results from unique tests  - Discussion of management and ordering 3+ unique tests  - Extensive discussion of treatment and management  - Prescription drug management  - Drug therapy requiring intensive monitoring for toxicity      Monik Vasquez

## 2024-07-18 ENCOUNTER — OFFICE VISIT (OUTPATIENT)
Dept: RADIATION ONCOLOGY | Facility: CLINIC | Age: 84
End: 2024-07-18
Payer: MEDICARE

## 2024-07-18 ENCOUNTER — OFFICE VISIT (OUTPATIENT)
Dept: HEMATOLOGY/ONCOLOGY | Facility: CLINIC | Age: 84
End: 2024-07-18
Payer: MEDICARE

## 2024-07-18 VITALS
HEART RATE: 62 BPM | OXYGEN SATURATION: 95 % | WEIGHT: 212.94 LBS | TEMPERATURE: 97 F | BODY MASS INDEX: 39.21 KG/M2 | BODY MASS INDEX: 39.19 KG/M2 | HEART RATE: 62 BPM | WEIGHT: 213.06 LBS | OXYGEN SATURATION: 95 % | SYSTOLIC BLOOD PRESSURE: 119 MMHG | RESPIRATION RATE: 19 BRPM | HEIGHT: 62 IN | SYSTOLIC BLOOD PRESSURE: 119 MMHG | DIASTOLIC BLOOD PRESSURE: 71 MMHG | HEIGHT: 62 IN | TEMPERATURE: 97 F | DIASTOLIC BLOOD PRESSURE: 71 MMHG

## 2024-07-18 DIAGNOSIS — C50.111 MALIGNANT NEOPLASM OF CENTRAL PORTION OF RIGHT BREAST IN FEMALE, ESTROGEN RECEPTOR POSITIVE: Primary | ICD-10-CM

## 2024-07-18 DIAGNOSIS — M85.80 OSTEOPENIA, UNSPECIFIED LOCATION: ICD-10-CM

## 2024-07-18 DIAGNOSIS — C50.911 INFILTRATING DUCTAL CARCINOMA OF RIGHT BREAST: Primary | ICD-10-CM

## 2024-07-18 DIAGNOSIS — Z17.0 MALIGNANT NEOPLASM OF CENTRAL PORTION OF RIGHT BREAST IN FEMALE, ESTROGEN RECEPTOR POSITIVE: Primary | ICD-10-CM

## 2024-07-18 PROCEDURE — 1101F PT FALLS ASSESS-DOCD LE1/YR: CPT | Mod: CPTII,S$GLB,, | Performed by: STUDENT IN AN ORGANIZED HEALTH CARE EDUCATION/TRAINING PROGRAM

## 2024-07-18 PROCEDURE — 1159F MED LIST DOCD IN RCRD: CPT | Mod: CPTII,S$GLB,, | Performed by: STUDENT IN AN ORGANIZED HEALTH CARE EDUCATION/TRAINING PROGRAM

## 2024-07-18 PROCEDURE — 99205 OFFICE O/P NEW HI 60 MIN: CPT | Mod: S$GLB,,, | Performed by: RADIOLOGY

## 2024-07-18 PROCEDURE — 3074F SYST BP LT 130 MM HG: CPT | Mod: CPTII,S$GLB,, | Performed by: RADIOLOGY

## 2024-07-18 PROCEDURE — 1101F PT FALLS ASSESS-DOCD LE1/YR: CPT | Mod: CPTII,S$GLB,, | Performed by: RADIOLOGY

## 2024-07-18 PROCEDURE — 1126F AMNT PAIN NOTED NONE PRSNT: CPT | Mod: CPTII,S$GLB,, | Performed by: RADIOLOGY

## 2024-07-18 PROCEDURE — G2211 COMPLEX E/M VISIT ADD ON: HCPCS | Mod: S$GLB,,, | Performed by: STUDENT IN AN ORGANIZED HEALTH CARE EDUCATION/TRAINING PROGRAM

## 2024-07-18 PROCEDURE — 3078F DIAST BP <80 MM HG: CPT | Mod: CPTII,S$GLB,, | Performed by: STUDENT IN AN ORGANIZED HEALTH CARE EDUCATION/TRAINING PROGRAM

## 2024-07-18 PROCEDURE — 1126F AMNT PAIN NOTED NONE PRSNT: CPT | Mod: CPTII,S$GLB,, | Performed by: STUDENT IN AN ORGANIZED HEALTH CARE EDUCATION/TRAINING PROGRAM

## 2024-07-18 PROCEDURE — 99215 OFFICE O/P EST HI 40 MIN: CPT | Mod: S$GLB,,, | Performed by: STUDENT IN AN ORGANIZED HEALTH CARE EDUCATION/TRAINING PROGRAM

## 2024-07-18 PROCEDURE — 3288F FALL RISK ASSESSMENT DOCD: CPT | Mod: CPTII,S$GLB,, | Performed by: STUDENT IN AN ORGANIZED HEALTH CARE EDUCATION/TRAINING PROGRAM

## 2024-07-18 PROCEDURE — 99999 PR PBB SHADOW E&M-EST. PATIENT-LVL IV: CPT | Mod: PBBFAC,,, | Performed by: RADIOLOGY

## 2024-07-18 PROCEDURE — 3288F FALL RISK ASSESSMENT DOCD: CPT | Mod: CPTII,S$GLB,, | Performed by: RADIOLOGY

## 2024-07-18 PROCEDURE — 99999 PR PBB SHADOW E&M-EST. PATIENT-LVL IV: CPT | Mod: PBBFAC,,, | Performed by: STUDENT IN AN ORGANIZED HEALTH CARE EDUCATION/TRAINING PROGRAM

## 2024-07-18 PROCEDURE — 3074F SYST BP LT 130 MM HG: CPT | Mod: CPTII,S$GLB,, | Performed by: STUDENT IN AN ORGANIZED HEALTH CARE EDUCATION/TRAINING PROGRAM

## 2024-07-18 PROCEDURE — 3078F DIAST BP <80 MM HG: CPT | Mod: CPTII,S$GLB,, | Performed by: RADIOLOGY

## 2024-07-18 RX ORDER — ANASTROZOLE 1 MG/1
1 TABLET ORAL DAILY
Qty: 90 TABLET | Refills: 3 | Status: SHIPPED | OUTPATIENT
Start: 2024-07-18 | End: 2025-07-18

## 2024-07-22 ENCOUNTER — ANTI-COAG VISIT (OUTPATIENT)
Dept: CARDIOLOGY | Facility: CLINIC | Age: 84
End: 2024-07-22
Payer: MEDICARE

## 2024-07-22 ENCOUNTER — LAB VISIT (OUTPATIENT)
Dept: LAB | Facility: HOSPITAL | Age: 84
End: 2024-07-22
Attending: INTERNAL MEDICINE
Payer: MEDICARE

## 2024-07-22 DIAGNOSIS — Z79.01 LONG TERM (CURRENT) USE OF ANTICOAGULANTS: ICD-10-CM

## 2024-07-22 DIAGNOSIS — I48.0 PAROXYSMAL ATRIAL FIBRILLATION: Primary | Chronic | ICD-10-CM

## 2024-07-22 DIAGNOSIS — I48.0 PAROXYSMAL ATRIAL FIBRILLATION: Chronic | ICD-10-CM

## 2024-07-22 LAB
INR PPP: 2.4 (ref 0.8–1.2)
PROTHROMBIN TIME: 24.9 SEC (ref 9–12.5)

## 2024-07-22 PROCEDURE — 93793 ANTICOAG MGMT PT WARFARIN: CPT | Mod: S$GLB,,, | Performed by: PHARMACIST

## 2024-07-22 PROCEDURE — 85610 PROTHROMBIN TIME: CPT | Mod: PN | Performed by: INTERNAL MEDICINE

## 2024-07-22 PROCEDURE — 36415 COLL VENOUS BLD VENIPUNCTURE: CPT | Mod: PN | Performed by: INTERNAL MEDICINE

## 2024-07-22 NOTE — PROGRESS NOTES
[facility]    2024 10:43 AM    Assessment/Plan:    Patient presents today with therapeutic INR.    Assessment of patient findings and chart review: INR at goal. Medications and chart reviewed. No changes noted to necessitate adjustment of warfarin or follow-up plan. See calendar.      Recommendation for patient's warfarin regimen: Continue current maintenance dose    Recommend repeat INR in 2 weeks  _________________________________________________________________    Mis RAUL Lanny (83 y.o.) is followed by the [facility].    Anticoagulation Summary  As of 2024      INR goal:  2.0-3.0   TTR:  74.0% (1.7 y)   INR used for dosin.4 (2024)   Warfarin maintenance plan:  3 mg (6 mg x 0.5) every Tue, Thu, Sat; 6 mg (6 mg x 1) all other days   Weekly warfarin total:  33 mg   Plan last modified:  Tino Mendiola, PharmD (7/15/2024)   Next INR check:  2024   Target end date:      Indications    Paroxysmal atrial fibrillation [I48.0]  Long term (current) use of anticoagulants [Z79.01]                 Anticoagulation Episode Summary       INR check location:      Preferred lab:      Send INR reminders to:  [facility] COUMADIN MONITORING POOL    Comments:  [facility]          Anticoagulation Care Providers       Provider Role Specialty Phone number    Shamar Owens MD Responsible Electrophysiology [phone]

## 2024-07-24 ENCOUNTER — HOSPITAL ENCOUNTER (OUTPATIENT)
Dept: RADIATION THERAPY | Facility: HOSPITAL | Age: 84
Discharge: HOME | End: 2024-07-24
Attending: FAMILY MEDICINE
Payer: MEDICARE

## 2024-07-24 PROCEDURE — 77263 THER RADIOLOGY TX PLNG CPLX: CPT | Mod: ,,, | Performed by: RADIOLOGY

## 2024-07-24 PROCEDURE — 77014 HC CT GUIDANCE RADIATION THERAPY FLDS PLACEMENT: CPT | Mod: TC | Performed by: RADIOLOGY

## 2024-07-24 PROCEDURE — 77290 THER RAD SIMULAJ FIELD CPLX: CPT | Mod: 26,,, | Performed by: RADIOLOGY

## 2024-07-24 PROCEDURE — 77332 RADIATION TREATMENT AID(S): CPT | Mod: TC | Performed by: RADIOLOGY

## 2024-07-24 PROCEDURE — 77332 RADIATION TREATMENT AID(S): CPT | Mod: 26,,, | Performed by: RADIOLOGY

## 2024-07-24 PROCEDURE — 77290 THER RAD SIMULAJ FIELD CPLX: CPT | Mod: TC | Performed by: RADIOLOGY

## 2024-07-25 ENCOUNTER — TELEPHONE (OUTPATIENT)
Dept: HEMATOLOGY/ONCOLOGY | Facility: CLINIC | Age: 84
End: 2024-07-25
Payer: MEDICARE

## 2024-07-25 ENCOUNTER — PATIENT MESSAGE (OUTPATIENT)
Dept: CARDIOLOGY | Facility: CLINIC | Age: 84
End: 2024-07-25
Payer: MEDICARE

## 2024-07-25 DIAGNOSIS — I10 ESSENTIAL HYPERTENSION: ICD-10-CM

## 2024-07-25 RX ORDER — LOSARTAN POTASSIUM 100 MG/1
50 TABLET ORAL DAILY
Qty: 45 TABLET | Refills: 3 | Status: SHIPPED | OUTPATIENT
Start: 2024-07-25

## 2024-07-25 NOTE — TELEPHONE ENCOUNTER
Care Due:                  Date            Visit Type   Department     Provider  --------------------------------------------------------------------------------                                EP -                              [facility] FAMILY  Last Visit: 04-      CARE ([facility])   JOSY Dillard                               -                              [facility]  Next Visit: 10-      CARE ([facility])   JOSY Dillard                                                            Last  Test          Frequency    Reason                     Performed    Due Date  --------------------------------------------------------------------------------    Mg Level....  12 months..  alendronate..............  Not Found    Overdue    Phosphate...  12 months..  alendronate..............  Not Found    Overdue    Vitamin D...  12 months..  alendronate..............  Not Found    Overdue    [employer] Embedded Care Due Messages. Reference number: [id number].   7/24/2024 9:52:45 PM CDT

## 2024-07-30 RX ORDER — GABAPENTIN 300 MG/1
CAPSULE ORAL
Qty: 360 CAPSULE | Refills: 3 | Status: SHIPPED | OUTPATIENT
Start: 2024-07-30

## 2024-08-01 ENCOUNTER — HOSPITAL ENCOUNTER (OUTPATIENT)
Dept: RADIATION THERAPY | Facility: HOSPITAL | Age: 84
Discharge: HOME | End: 2024-08-01
Attending: RADIOLOGY
Payer: MEDICARE

## 2024-08-05 ENCOUNTER — ANTI-COAG VISIT (OUTPATIENT)
Dept: CARDIOLOGY | Facility: CLINIC | Age: 84
End: 2024-08-05
Payer: MEDICARE

## 2024-08-05 ENCOUNTER — LAB VISIT (OUTPATIENT)
Dept: LAB | Facility: HOSPITAL | Age: 84
End: 2024-08-05
Attending: INTERNAL MEDICINE
Payer: MEDICARE

## 2024-08-05 DIAGNOSIS — I48.0 PAROXYSMAL ATRIAL FIBRILLATION: Chronic | ICD-10-CM

## 2024-08-05 DIAGNOSIS — I48.0 PAROXYSMAL ATRIAL FIBRILLATION: Primary | Chronic | ICD-10-CM

## 2024-08-05 DIAGNOSIS — Z79.01 LONG TERM (CURRENT) USE OF ANTICOAGULANTS: ICD-10-CM

## 2024-08-05 LAB
INR PPP: 1.7 (ref 0.8–1.2)
PROTHROMBIN TIME: 17.6 SEC (ref 9–12.5)

## 2024-08-05 PROCEDURE — 93793 ANTICOAG MGMT PT WARFARIN: CPT | Mod: S$GLB,,, | Performed by: PHARMACIST

## 2024-08-05 PROCEDURE — 77417 THER RADIOLOGY PORT IMAGE(S): CPT | Performed by: RADIOLOGY

## 2024-08-05 PROCEDURE — 36415 COLL VENOUS BLD VENIPUNCTURE: CPT | Mod: PN | Performed by: INTERNAL MEDICINE

## 2024-08-05 PROCEDURE — 77385 HC IMRT, SIMPLE: CPT | Performed by: RADIOLOGY

## 2024-08-05 PROCEDURE — G6002 STEREOSCOPIC X-RAY GUIDANCE: HCPCS | Mod: 26,,, | Performed by: RADIOLOGY

## 2024-08-05 PROCEDURE — 85610 PROTHROMBIN TIME: CPT | Mod: PN | Performed by: INTERNAL MEDICINE

## 2024-08-06 PROCEDURE — G6002 STEREOSCOPIC X-RAY GUIDANCE: HCPCS | Mod: 26,,, | Performed by: RADIOLOGY

## 2024-08-06 PROCEDURE — 77385 HC IMRT, SIMPLE: CPT | Performed by: RADIOLOGY

## 2024-08-07 ENCOUNTER — DOCUMENTATION ONLY (OUTPATIENT)
Dept: RADIATION ONCOLOGY | Facility: CLINIC | Age: 84
End: 2024-08-07
Payer: MEDICARE

## 2024-08-07 PROCEDURE — G6002 STEREOSCOPIC X-RAY GUIDANCE: HCPCS | Mod: 26,,, | Performed by: RADIOLOGY

## 2024-08-07 PROCEDURE — 77385 HC IMRT, SIMPLE: CPT | Performed by: RADIOLOGY

## 2024-08-08 ENCOUNTER — ANTI-COAG VISIT (OUTPATIENT)
Dept: CARDIOLOGY | Facility: CLINIC | Age: 84
End: 2024-08-08
Payer: MEDICARE

## 2024-08-08 ENCOUNTER — LAB VISIT (OUTPATIENT)
Dept: LAB | Facility: HOSPITAL | Age: 84
End: 2024-08-08
Attending: INTERNAL MEDICINE
Payer: MEDICARE

## 2024-08-08 DIAGNOSIS — I48.0 PAROXYSMAL ATRIAL FIBRILLATION: Chronic | ICD-10-CM

## 2024-08-08 DIAGNOSIS — I48.0 PAROXYSMAL ATRIAL FIBRILLATION: Primary | Chronic | ICD-10-CM

## 2024-08-08 DIAGNOSIS — Z79.01 LONG TERM (CURRENT) USE OF ANTICOAGULANTS: ICD-10-CM

## 2024-08-08 LAB
INR PPP: 2.1 (ref 0.8–1.2)
PROTHROMBIN TIME: 21.7 SEC (ref 9–12.5)

## 2024-08-08 PROCEDURE — 36415 COLL VENOUS BLD VENIPUNCTURE: CPT | Mod: PN | Performed by: INTERNAL MEDICINE

## 2024-08-08 PROCEDURE — 85610 PROTHROMBIN TIME: CPT | Mod: PN | Performed by: INTERNAL MEDICINE

## 2024-08-08 PROCEDURE — 93793 ANTICOAG MGMT PT WARFARIN: CPT | Mod: S$GLB,,, | Performed by: PHARMACIST

## 2024-08-14 ENCOUNTER — DOCUMENTATION ONLY (OUTPATIENT)
Dept: RADIATION ONCOLOGY | Facility: CLINIC | Age: 84
End: 2024-08-14
Payer: MEDICARE

## 2024-08-14 ENCOUNTER — LAB VISIT (OUTPATIENT)
Dept: LAB | Facility: HOSPITAL | Age: 84
End: 2024-08-14
Attending: INTERNAL MEDICINE
Payer: MEDICARE

## 2024-08-14 ENCOUNTER — ANTI-COAG VISIT (OUTPATIENT)
Dept: CARDIOLOGY | Facility: CLINIC | Age: 84
End: 2024-08-14
Payer: MEDICARE

## 2024-08-14 DIAGNOSIS — I48.0 PAROXYSMAL ATRIAL FIBRILLATION: Primary | Chronic | ICD-10-CM

## 2024-08-14 DIAGNOSIS — Z79.01 LONG TERM (CURRENT) USE OF ANTICOAGULANTS: ICD-10-CM

## 2024-08-14 DIAGNOSIS — I48.0 PAROXYSMAL ATRIAL FIBRILLATION: Chronic | ICD-10-CM

## 2024-08-14 LAB
INR PPP: 2.6 (ref 0.8–1.2)
PROTHROMBIN TIME: 26.9 SEC (ref 9–12.5)

## 2024-08-14 PROCEDURE — 93793 ANTICOAG MGMT PT WARFARIN: CPT | Mod: S$GLB,,, | Performed by: PHARMACIST

## 2024-08-14 PROCEDURE — 85610 PROTHROMBIN TIME: CPT | Mod: PN | Performed by: INTERNAL MEDICINE

## 2024-08-14 PROCEDURE — 36415 COLL VENOUS BLD VENIPUNCTURE: CPT | Mod: PN | Performed by: INTERNAL MEDICINE

## 2024-08-14 NOTE — PLAN OF CARE
Day 8 of outpatient to the right chest wall. Tolerating treatment well. No skin issues seen. Reports that skin feels tight after treatment. Using Miaderm cream.

## 2024-08-21 ENCOUNTER — DOCUMENTATION ONLY (OUTPATIENT)
Dept: RADIATION ONCOLOGY | Facility: CLINIC | Age: 84
End: 2024-08-21
Payer: MEDICARE

## 2024-08-21 ENCOUNTER — PATIENT MESSAGE (OUTPATIENT)
Dept: HEMATOLOGY/ONCOLOGY | Facility: CLINIC | Age: 84
End: 2024-08-21
Payer: MEDICARE

## 2024-08-21 ENCOUNTER — LAB VISIT (OUTPATIENT)
Dept: LAB | Facility: HOSPITAL | Age: 84
End: 2024-08-21
Attending: INTERNAL MEDICINE
Payer: MEDICARE

## 2024-08-21 ENCOUNTER — ANTI-COAG VISIT (OUTPATIENT)
Dept: CARDIOLOGY | Facility: CLINIC | Age: 84
End: 2024-08-21
Payer: MEDICARE

## 2024-08-21 DIAGNOSIS — I48.0 PAROXYSMAL ATRIAL FIBRILLATION: Chronic | ICD-10-CM

## 2024-08-21 DIAGNOSIS — Z79.01 LONG TERM (CURRENT) USE OF ANTICOAGULANTS: ICD-10-CM

## 2024-08-21 DIAGNOSIS — I48.0 PAROXYSMAL ATRIAL FIBRILLATION: Primary | Chronic | ICD-10-CM

## 2024-08-21 LAB
INR PPP: 1.7 (ref 0.8–1.2)
PROTHROMBIN TIME: 18.2 SEC (ref 9–12.5)

## 2024-08-21 PROCEDURE — 36415 COLL VENOUS BLD VENIPUNCTURE: CPT | Mod: PN | Performed by: INTERNAL MEDICINE

## 2024-08-21 PROCEDURE — 93793 ANTICOAG MGMT PT WARFARIN: CPT | Mod: S$GLB,,, | Performed by: PHARMACIST

## 2024-08-21 PROCEDURE — 85610 PROTHROMBIN TIME: CPT | Mod: PN | Performed by: INTERNAL MEDICINE

## 2024-08-21 NOTE — PLAN OF CARE
Day 13 of outpatient radiation to the right chest wall. Tolerating treatment well. No skin issues noted. Using Miaderm cream BID. Due to complete treatment 8/26/24. Appt f/u made, pt informed.

## 2024-08-21 NOTE — PROGRESS NOTES
Patient missed 9/19 warfarin dose, had cabbage in an egg roll, doing radiation treatments Mon-Fri, has 3 treatments left.

## 2024-08-22 DIAGNOSIS — C50.911 INFILTRATING DUCTAL CARCINOMA OF RIGHT BREAST: ICD-10-CM

## 2024-08-22 RX ORDER — ANASTROZOLE 1 MG/1
1 TABLET ORAL DAILY
Qty: 90 TABLET | Refills: 3 | Status: SHIPPED | OUTPATIENT
Start: 2024-08-22 | End: 2025-08-22

## 2024-08-27 ENCOUNTER — DOCUMENTATION ONLY (OUTPATIENT)
Dept: RADIATION ONCOLOGY | Facility: CLINIC | Age: 84
End: 2024-08-27
Payer: MEDICARE

## 2024-08-27 NOTE — PLAN OF CARE
Outpatient radiation to the right chest wall completed 8/26/24. Pt to f/u with Dr Shaw in 6 weeks.

## 2024-08-28 ENCOUNTER — LAB VISIT (OUTPATIENT)
Dept: LAB | Facility: HOSPITAL | Age: 84
End: 2024-08-28
Attending: INTERNAL MEDICINE
Payer: MEDICARE

## 2024-08-28 ENCOUNTER — ANTI-COAG VISIT (OUTPATIENT)
Dept: CARDIOLOGY | Facility: CLINIC | Age: 84
End: 2024-08-28
Payer: MEDICARE

## 2024-08-28 DIAGNOSIS — Z79.01 LONG TERM (CURRENT) USE OF ANTICOAGULANTS: ICD-10-CM

## 2024-08-28 DIAGNOSIS — I48.0 PAROXYSMAL ATRIAL FIBRILLATION: Chronic | ICD-10-CM

## 2024-08-28 DIAGNOSIS — I48.0 PAROXYSMAL ATRIAL FIBRILLATION: Primary | Chronic | ICD-10-CM

## 2024-08-28 LAB
INR PPP: 3.2 (ref 0.8–1.2)
PROTHROMBIN TIME: 32.3 SEC (ref 9–12.5)

## 2024-08-28 PROCEDURE — 36415 COLL VENOUS BLD VENIPUNCTURE: CPT | Mod: PN | Performed by: INTERNAL MEDICINE

## 2024-08-28 PROCEDURE — 85610 PROTHROMBIN TIME: CPT | Mod: PN | Performed by: INTERNAL MEDICINE

## 2024-08-28 PROCEDURE — 93793 ANTICOAG MGMT PT WARFARIN: CPT | Mod: S$GLB,,, | Performed by: PHARMACIST

## 2024-08-28 NOTE — PROGRESS NOTES
[facility]    08/28/2024 3:47 PM    Assessment/Plan:    Patient presents today with supratherapeutic INR.    Assessment of patient findings and chart review: Patient questioned and confirmed correct dose, but could not remember what she took on 8/22. Pt reports bruising on her knees and stopped radiation on 8/26. She also did not eat any greens and denies all other changes.     Recommendation for patient's warfarin regimen: Lower dose today to 3mg then resume current maintenance dose    Recommend repeat INR in 1 week  _________________________________________________________________    Mis Ca (83 y.o.) is followed by the [facility].    Anticoagulation Summary  As of 8/28/2024      INR goal:  2.0-3.0   TTR:  73.5% (1.8 y)   INR used for dosing:  3.2 (8/28/2024)   Warfarin maintenance plan:  3 mg (6 mg x 0.5) every Tue, Thu, Sat; 6 mg (6 mg x 1) all other days   Weekly warfarin total:  33 mg   Plan last modified:  Tino Mendiola, PharmD (7/15/2024)   Next INR check:     Target end date:      Indications    Paroxysmal atrial fibrillation [I48.0]  Long term (current) use of anticoagulants [Z79.01]                 Anticoagulation Episode Summary       INR check location:      Preferred lab:      Send INR reminders to:  [facility] COUMADIN MONITORING POOL    Comments:  [facility]          Anticoagulation Care Providers       Provider Role Specialty Phone number    Shamar Owens MD Responsible Electrophysiology [phone]

## 2024-09-03 ENCOUNTER — LAB VISIT (OUTPATIENT)
Dept: LAB | Facility: HOSPITAL | Age: 84
End: 2024-09-03
Attending: INTERNAL MEDICINE
Payer: MEDICARE

## 2024-09-03 ENCOUNTER — ANTI-COAG VISIT (OUTPATIENT)
Dept: CARDIOLOGY | Facility: CLINIC | Age: 84
End: 2024-09-03
Payer: MEDICARE

## 2024-09-03 DIAGNOSIS — Z79.01 LONG TERM (CURRENT) USE OF ANTICOAGULANTS: ICD-10-CM

## 2024-09-03 DIAGNOSIS — I48.0 PAROXYSMAL ATRIAL FIBRILLATION: Chronic | ICD-10-CM

## 2024-09-03 DIAGNOSIS — I48.0 PAROXYSMAL ATRIAL FIBRILLATION: Primary | Chronic | ICD-10-CM

## 2024-09-03 LAB
INR PPP: 2.6 (ref 0.8–1.2)
PROTHROMBIN TIME: 26.5 SEC (ref 9–12.5)

## 2024-09-03 PROCEDURE — 93793 ANTICOAG MGMT PT WARFARIN: CPT | Mod: S$GLB,,, | Performed by: PHARMACIST

## 2024-09-03 PROCEDURE — 36415 COLL VENOUS BLD VENIPUNCTURE: CPT | Mod: PN | Performed by: INTERNAL MEDICINE

## 2024-09-03 PROCEDURE — 85610 PROTHROMBIN TIME: CPT | Mod: PN | Performed by: INTERNAL MEDICINE

## 2024-09-13 ENCOUNTER — LAB VISIT (OUTPATIENT)
Dept: LAB | Facility: HOSPITAL | Age: 84
End: 2024-09-13
Attending: INTERNAL MEDICINE
Payer: MEDICARE

## 2024-09-13 ENCOUNTER — ANTI-COAG VISIT (OUTPATIENT)
Dept: CARDIOLOGY | Facility: CLINIC | Age: 84
End: 2024-09-13
Payer: MEDICARE

## 2024-09-13 DIAGNOSIS — I48.0 PAROXYSMAL ATRIAL FIBRILLATION: Primary | Chronic | ICD-10-CM

## 2024-09-13 DIAGNOSIS — I48.0 PAROXYSMAL ATRIAL FIBRILLATION: Chronic | ICD-10-CM

## 2024-09-13 DIAGNOSIS — Z79.01 LONG TERM (CURRENT) USE OF ANTICOAGULANTS: ICD-10-CM

## 2024-09-13 LAB
INR PPP: 2.8 (ref 0.8–1.2)
PROTHROMBIN TIME: 28.2 SEC (ref 9–12.5)

## 2024-09-13 PROCEDURE — 85610 PROTHROMBIN TIME: CPT | Mod: PN | Performed by: INTERNAL MEDICINE

## 2024-09-13 PROCEDURE — 36415 COLL VENOUS BLD VENIPUNCTURE: CPT | Mod: PN | Performed by: INTERNAL MEDICINE

## 2024-09-16 ENCOUNTER — HOSPITAL ENCOUNTER (OUTPATIENT)
Dept: RADIOLOGY | Facility: HOSPITAL | Age: 84
Discharge: HOME | End: 2024-09-16
Attending: FAMILY MEDICINE
Payer: MEDICARE

## 2024-09-16 DIAGNOSIS — Z78.0 MENOPAUSE: ICD-10-CM

## 2024-09-16 PROCEDURE — 77080 DXA BONE DENSITY AXIAL: CPT | Mod: TC,PN

## 2024-09-16 PROCEDURE — 77080 DXA BONE DENSITY AXIAL: CPT | Mod: 26,,, | Performed by: RADIOLOGY

## 2024-09-26 ENCOUNTER — ANTI-COAG VISIT (OUTPATIENT)
Dept: CARDIOLOGY | Facility: CLINIC | Age: 84
End: 2024-09-26
Payer: MEDICARE

## 2024-09-26 ENCOUNTER — LAB VISIT (OUTPATIENT)
Dept: LAB | Facility: HOSPITAL | Age: 84
End: 2024-09-26
Attending: INTERNAL MEDICINE
Payer: MEDICARE

## 2024-09-26 DIAGNOSIS — I48.0 PAROXYSMAL ATRIAL FIBRILLATION: Primary | Chronic | ICD-10-CM

## 2024-09-26 DIAGNOSIS — I48.0 PAROXYSMAL ATRIAL FIBRILLATION: Chronic | ICD-10-CM

## 2024-09-26 DIAGNOSIS — Z79.01 LONG TERM (CURRENT) USE OF ANTICOAGULANTS: ICD-10-CM

## 2024-09-26 LAB
INR PPP: 3 (ref 0.8–1.2)
PROTHROMBIN TIME: 30.5 SEC (ref 9–12.5)

## 2024-09-26 PROCEDURE — 85610 PROTHROMBIN TIME: CPT | Mod: PN | Performed by: INTERNAL MEDICINE

## 2024-09-26 PROCEDURE — 93793 ANTICOAG MGMT PT WARFARIN: CPT | Mod: S$GLB,,, | Performed by: PHARMACIST

## 2024-09-26 PROCEDURE — 36415 COLL VENOUS BLD VENIPUNCTURE: CPT | Mod: PN | Performed by: INTERNAL MEDICINE

## 2024-09-26 NOTE — PROGRESS NOTES
[facility]    09/26/2024 12:45 PM    Assessment/Plan:    Patient presents today with therapeutic INR.    Assessment of patient findings and chart review: INR at goal. Medications and chart reviewed. No changes noted to necessitate adjustment of warfarin or follow-up plan. See calendar.      Recommendation for patient's warfarin regimen: Continue current maintenance dose    Recommend repeat INR in 2 weeks  _________________________________________________________________    Mis RAUL Lanny (83 y.o.) is followed by the [facility].    Anticoagulation Summary  As of 9/26/2024      INR goal:  2.0-3.0   TTR:  74.3% (1.9 y)   INR used for dosing:  3.0 (9/26/2024)   Warfarin maintenance plan:  3 mg (6 mg x 0.5) every Tue, Thu, Sat; 6 mg (6 mg x 1) all other days   Weekly warfarin total:  33 mg   Plan last modified:  Tino Mendiola, PharmD (7/15/2024)   Next INR check:  10/10/2024   Target end date:      Indications    Paroxysmal atrial fibrillation [I48.0]  Long term (current) use of anticoagulants [Z79.01]                 Anticoagulation Episode Summary       INR check location:      Preferred lab:      Send INR reminders to:  [facility] COUMADIN MONITORING POOL    Comments:  [facility]          Anticoagulation Care Providers       Provider Role Specialty Phone number    Shamar Owens MD Responsible Electrophysiology [phone]

## 2024-09-26 NOTE — PROGRESS NOTES
9/26- Pt began taking Arimidex and believes she forgot to notify the CC. She wanted to have it noted just in case ana adjustments needed to be made.

## 2024-10-09 ENCOUNTER — OFFICE VISIT (OUTPATIENT)
Dept: RADIATION ONCOLOGY | Facility: CLINIC | Age: 84
End: 2024-10-09
Payer: MEDICARE

## 2024-10-09 VITALS
HEART RATE: 66 BPM | DIASTOLIC BLOOD PRESSURE: 70 MMHG | SYSTOLIC BLOOD PRESSURE: 149 MMHG | BODY MASS INDEX: 39.35 KG/M2 | TEMPERATURE: 98 F | OXYGEN SATURATION: 95 % | RESPIRATION RATE: 13 BRPM | WEIGHT: 215.19 LBS

## 2024-10-09 DIAGNOSIS — Z17.0 MALIGNANT NEOPLASM OF CENTRAL PORTION OF RIGHT BREAST IN FEMALE, ESTROGEN RECEPTOR POSITIVE: Primary | ICD-10-CM

## 2024-10-09 DIAGNOSIS — C50.111 MALIGNANT NEOPLASM OF CENTRAL PORTION OF RIGHT BREAST IN FEMALE, ESTROGEN RECEPTOR POSITIVE: Primary | ICD-10-CM

## 2024-10-09 PROCEDURE — 1126F AMNT PAIN NOTED NONE PRSNT: CPT | Mod: CPTII,S$GLB,, | Performed by: RADIOLOGY

## 2024-10-09 PROCEDURE — 3288F FALL RISK ASSESSMENT DOCD: CPT | Mod: CPTII,S$GLB,, | Performed by: RADIOLOGY

## 2024-10-09 PROCEDURE — 3077F SYST BP >= 140 MM HG: CPT | Mod: CPTII,S$GLB,, | Performed by: RADIOLOGY

## 2024-10-09 PROCEDURE — 1101F PT FALLS ASSESS-DOCD LE1/YR: CPT | Mod: CPTII,S$GLB,, | Performed by: RADIOLOGY

## 2024-10-09 PROCEDURE — 3078F DIAST BP <80 MM HG: CPT | Mod: CPTII,S$GLB,, | Performed by: RADIOLOGY

## 2024-10-09 PROCEDURE — 1159F MED LIST DOCD IN RCRD: CPT | Mod: CPTII,S$GLB,, | Performed by: RADIOLOGY

## 2024-10-09 PROCEDURE — 99024 POSTOP FOLLOW-UP VISIT: CPT | Mod: S$GLB,,, | Performed by: RADIOLOGY

## 2024-10-09 PROCEDURE — 99999 PR PBB SHADOW E&M-EST. PATIENT-LVL III: CPT | Mod: PBBFAC,,, | Performed by: RADIOLOGY

## 2024-10-09 NOTE — PROGRESS NOTES
DEPARTMENT OF RADIATION ONCOLOGY  FOLLOW-UP NOTE        Patient Name: Mis Ca  MRN: [id number]  : 1940    DIAGNOSIS: Cancer Staging   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, estrogen receptor positive  Staging form: Breast, AJCC 8th Edition  - Pathologic: Stage IIA (pT2, pN1a(sn), cM0, G3, ER+, DC+, HER2-) - Signed by Betzy Shaw MD on 2024      PATIENT IDENTIFICATION:  The patient is a an 83-year-old woman with a locally advanced right breast cancer.  She was status post mastectomy has been referred for consideration of postmastectomy radiation therapy.       The patient presented with a palpable mass in the right breast.     Diagnostic mammogram performed 5/3/24:   In the right 6 o'clock position just posterior to the nipple there is a infiltrative appearing hypoechoic mass measuring 1.9 x 1.6 cm in diameter.  Ultrasound-guided biopsy recommended.       In the 4 o'clock position 2 cm from the nipple there is a 1.1 x 0.9 x 0.7 cm suspicious mass.  Ultrasound biopsy of this finding is also recommended.        In the 5 o'clock position 6 cm from the nipple there is a 3.1 x 2.2 x 3.0 cm lobular hypoechoic mass with shadowing.  Biopsy of the lesion at the 6 o'clock position in the right breast revealed an invasive ductal carcinoma with a focal micro papillary features.  On immunohistochemistry, tumor cells were ER strongly positive, DC weakly positive in 1-5% of cells, HER2 Ryne equivocal and negative by fish.     1. RIGHT AXILLARY SENTINEL LYMPH NODE #1 (1), EXCISIONAL BIOPSY:  -  1 out of 1 lymph node demonstrates metastatic ductal carcinoma of the breast on routine and immunohistochemistry stains; size of metastatic tumor deposit = 20 mm.  There is no evidence of extranodal extension.      2. RIGHT BREAST TO INCLUDE SEGMENT OF SKIN WITH NIPPLE, TOTAL MASTECTOMY:    -  Multifocal invasive ductal carcinoma, with focal micropapillary features and focal mucinous features, Lequire histologic  grade 3;  tumor sizes = 36 mm, 28 mm, and 17 mm.  There is multifocal lymphovascular invasion.  The nipple, skin, skeletal  muscle, and all surgical margins are free of invasive carcinoma.  However, the invasive carcinoma is 1.1 mm from the posterior margin at its closest approach.  -  High-grade ductal carcinoma in situ with lobular extension and focal extension into nipple ducts.  The skin and all surgical margins are free of DCIS; however, the DCIS is 6 mm from the posterior margin at its closest approach..      pTNM CLASSIFICATION (AJCC 8TH EDITION):  pT2(m):  Tumor size = 36 mm (tumor size > 20 mm but < or = 50 mm in greatest dimension).  pN1a (sn):  Metastasis in 1 axillary lymph node, metastatic tumor size = 20 mm.  pM: Not applicable.     Oncology History   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, estrogen receptor positive   5/19/2024 Initial Diagnosis    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, estrogen receptor positive     7/18/2024 Cancer Staged    Staging form: Breast, AJCC 8th Edition  - Pathologic: Stage IIA (pT2, pN1a(sn), cM0, G3, ER+, NC+, HER2-)     8/5/2024 - 8/26/2024 Radiation Therapy    Treating physician: Dr. Betzy Shaw  Total Dose: 42.4 Gy  Fractions: 16         RADIATION:      HISTORY OF PRESENT ILLNESS: Ms. Ca returns to clinic today for routine post-radiation follow-up.  The patient reports doing well post treatment.  She does report mild dry cough. No shortness of breath or fever.    REVIEW OF SYSTEMS:   Review of Systems   Constitutional:  Negative for fever, malaise/fatigue and weight loss.   HENT:  Negative for ear pain, hearing loss, sinus pain and sore throat.    Eyes:  Negative for blurred vision, double vision and pain.   Respiratory:  Positive for cough. Negative for hemoptysis, shortness of breath and wheezing.    Cardiovascular:  Negative for chest pain, palpitations and leg swelling.   Gastrointestinal:  Positive for constipation. Negative for abdominal pain, blood in  stool, diarrhea, heartburn, nausea and vomiting.   Genitourinary:  Negative for dysuria, frequency, hematuria and urgency.   Musculoskeletal:  Negative for back pain and joint pain.   Skin:  Negative for itching and rash.   Neurological:  Negative for tingling, focal weakness, seizures and headaches.   Psychiatric/Behavioral:  Negative for depression. The patient is not nervous/anxious.          ALLERGIES:   Review of patient's allergies indicates:   Allergen Reactions    Adhesive     Compazine [prochlorperazine edisylate] Anxiety    Penicillins Rash    Sulfa (sulfonamide antibiotics) Rash    Vancomycin analogues Rash       MEDICATIONS:  Current Outpatient Medications   Medication Sig    acetaminophen (TYLENOL) 500 MG tablet Take 1,000 mg by mouth every 6 (six) hours as needed for Pain.    alendronate (FOSAMAX) 70 MG tablet TAKE 1 TABLET (70 MG TOTAL) BY MOUTH EVERY 7 DAYS    amiodarone (PACERONE) 200 MG Tab Take 1 tablet (200 mg total) by mouth once daily.    amitriptyline (ELAVIL) 25 MG tablet TAKE 1 TABLET BY MOUTH EVERY EVENING (Patient taking differently: Take 2,000 mg by mouth every evening.)    anastrozole (ARIMIDEX) 1 mg Tab Take 1 tablet (1 mg total) by mouth once daily.    aspirin (ECOTRIN) 81 MG EC tablet Take 81 mg by mouth once daily.    azelastine (ASTELIN) 137 mcg (0.1 %) nasal spray 1 spray by Nasal route 2 (two) times daily.    chlorthalidone (HYGROTEN) 50 MG Tab Take 1 tablet (50 mg total) by mouth once daily.    cyanocobalamin 1,000 mcg/mL injection INJECT 1 ML (1,000 MCG) INTRAMUSCULARLY EVERY 30 DAYS    furosemide (LASIX) 20 MG tablet Take 1 tablet (20 mg total) by mouth once daily.    gabapentin (NEURONTIN) 300 MG capsule TAKE 2 CAPSULES BY MOUTH TWICE  DAILY    latanoprost 0.005 % ophthalmic solution Place 1 drop into the left eye every evening.    levocetirizine (XYZAL) 5 MG tablet TAKE 1 TABLET BY MOUTH EVERY DAY IN THE EVENING    losartan (COZAAR) 100 MG tablet Take 0.5 tablets (50 mg  "total) by mouth once daily.    multivit-min-iron-FA-lutein (CENTRUM SILVER WOMEN) 8 mg iron-400 mcg-300 mcg Tab Take 1 tablet by mouth once daily.    needle, disp, 25 gauge 25 gauge x 1" Ndle 1 Needle by Misc.(Non-Drug; Combo Route) route every 30 days.    omeprazole (PRILOSEC) 20 MG capsule TAKE 1 CAPSULE BY MOUTH TWICE  DAILY    potassium chloride (K-TAB) 20 mEq Take 1 tablet (20 mEq total) by mouth once daily.    pramipexole (MIRAPEX) 0.5 MG tablet TAKE 1 TABLET BY MOUTH TWICE  DAILY    pravastatin (PRAVACHOL) 40 MG tablet TAKE 1 TABLET BY MOUTH EVERY DAY    timolol maleate 0.5% (TIMOPTIC) 0.5 % Drop Place 1 drop into the left eye once daily.     vitamin D (VITAMIN D3) 1000 units Tab Take 1,000 Units by mouth once daily.    warfarin (COUMADIN) 6 MG tablet TAKE 1 tablet DAILY EXCEPT FOR 1 and 1/2 tablet ON FRIDAY TO THIN BLOOD     No current facility-administered medications for this visit.     Facility-Administered Medications Ordered in Other Visits   Medication    lactated ringers infusion    LIDOcaine (PF) 10 mg/ml (1%) injection 10 mg           PHYSICAL EXAMINATION:  Vitals:    10/09/24 1311   BP: (!) 149/70   Pulse: 66   Resp: 13   Temp: 97.9 °F (36.6 °C)     ECO  Body mass index is 39.35 kg/m².   Physical Exam  Constitutional:       Appearance: She is well-developed.   HENT:      Head: Normocephalic and atraumatic.   Eyes:      Conjunctiva/sclera: Conjunctivae normal.   Cardiovascular:      Rate and Rhythm: Normal rate.   Pulmonary:      Effort: Pulmonary effort is normal.   Chest:       Abdominal:      Palpations: Abdomen is soft.   Musculoskeletal:         General: Normal range of motion.      Cervical back: Normal range of motion and neck supple.   Skin:     General: Skin is warm and dry.   Neurological:      Mental Status: She is alert and oriented to person, place, and time.   Psychiatric:         Behavior: Behavior normal.         Thought Content: Thought content normal.      "     ASSESSMENT/PLAN:  Diagnoses and all orders for this visit:    Malignant neoplasm of central portion of right breast in female, estrogen receptor positive    The patient tolerated PMRT well.  She was advised on skin care with a mild moisturizer such as eucerin.  MMG on the contralateral breast per routine.

## 2024-10-10 ENCOUNTER — LAB VISIT (OUTPATIENT)
Dept: LAB | Facility: HOSPITAL | Age: 84
End: 2024-10-10
Attending: INTERNAL MEDICINE
Payer: MEDICARE

## 2024-10-10 ENCOUNTER — ANTI-COAG VISIT (OUTPATIENT)
Dept: CARDIOLOGY | Facility: CLINIC | Age: 84
End: 2024-10-10
Payer: MEDICARE

## 2024-10-10 DIAGNOSIS — Z79.01 LONG TERM (CURRENT) USE OF ANTICOAGULANTS: ICD-10-CM

## 2024-10-10 DIAGNOSIS — I48.0 PAROXYSMAL ATRIAL FIBRILLATION: Primary | Chronic | ICD-10-CM

## 2024-10-10 DIAGNOSIS — I48.0 PAROXYSMAL ATRIAL FIBRILLATION: Chronic | ICD-10-CM

## 2024-10-10 LAB
INR PPP: 2.4 (ref 0.8–1.2)
PROTHROMBIN TIME: 24.8 SEC (ref 9–12.5)

## 2024-10-10 PROCEDURE — 36415 COLL VENOUS BLD VENIPUNCTURE: CPT | Mod: PN | Performed by: INTERNAL MEDICINE

## 2024-10-10 PROCEDURE — 85610 PROTHROMBIN TIME: CPT | Mod: PN | Performed by: INTERNAL MEDICINE

## 2024-10-10 PROCEDURE — 93793 ANTICOAG MGMT PT WARFARIN: CPT | Mod: S$GLB,,, | Performed by: PHARMACIST

## 2024-10-10 NOTE — PROGRESS NOTES
[facility]    10/10/2024 1:46 PM    Assessment/Plan:    Patient presents today with therapeutic INR.    Assessment of patient findings and chart review: INR at goal. Medications and chart reviewed. No changes noted to necessitate adjustment of warfarin or follow-up plan. See calendar.      Recommendation for patient's warfarin regimen: Continue current maintenance dose    Recommend repeat INR in 2 weeks  _________________________________________________________________    Mis RAUL Lanny (83 y.o.) is followed by the [facility].    Anticoagulation Summary  As of 10/10/2024      INR goal:  2.0-3.0   TTR:  74.8% (1.9 y)   INR used for dosin.4 (10/10/2024)   Warfarin maintenance plan:  3 mg (6 mg x 0.5) every Tue, Thu, Sat; 6 mg (6 mg x 1) all other days   Weekly warfarin total:  33 mg   Plan last modified:  Tino Mendiola, PharmD (7/15/2024)   Next INR check:  10/24/2024   Target end date:  --    Indications    Paroxysmal atrial fibrillation [I48.0]  Long term (current) use of anticoagulants [Z79.01]                 Anticoagulation Episode Summary       INR check location:  --    Preferred lab:  --    Send INR reminders to:  [facility] COUMADIN MONITORING POOL    Comments:  [facility]          Anticoagulation Care Providers       Provider Role Specialty Phone number    Shamar Owens MD Responsible Electrophysiology [phone]

## 2024-10-19 DIAGNOSIS — K22.4 ESOPHAGEAL DYSMOTILITY: ICD-10-CM

## 2024-10-19 DIAGNOSIS — R13.19 ESOPHAGEAL DYSPHAGIA: ICD-10-CM

## 2024-10-20 RX ORDER — WARFARIN 6 MG/1
TABLET ORAL
Qty: 96 TABLET | Refills: 3 | Status: SHIPPED | OUTPATIENT
Start: 2024-10-20

## 2024-10-21 RX ORDER — AMITRIPTYLINE HYDROCHLORIDE 25 MG/1
25 TABLET, FILM COATED ORAL NIGHTLY
Qty: 90 TABLET | Refills: 1 | Status: SHIPPED | OUTPATIENT
Start: 2024-10-21

## 2024-10-23 ENCOUNTER — OFFICE VISIT (OUTPATIENT)
Dept: HEMATOLOGY/ONCOLOGY | Facility: CLINIC | Age: 84
End: 2024-10-23
Payer: MEDICARE

## 2024-10-23 VITALS
WEIGHT: 215.63 LBS | RESPIRATION RATE: 20 BRPM | HEART RATE: 62 BPM | TEMPERATURE: 98 F | HEIGHT: 62 IN | DIASTOLIC BLOOD PRESSURE: 77 MMHG | SYSTOLIC BLOOD PRESSURE: 127 MMHG | BODY MASS INDEX: 39.68 KG/M2 | OXYGEN SATURATION: 97 %

## 2024-10-23 DIAGNOSIS — C50.911 INFILTRATING DUCTAL CARCINOMA OF RIGHT BREAST: Primary | ICD-10-CM

## 2024-10-23 DIAGNOSIS — M85.80 OSTEOPENIA, UNSPECIFIED LOCATION: ICD-10-CM

## 2024-10-23 PROCEDURE — 99214 OFFICE O/P EST MOD 30 MIN: CPT | Mod: S$GLB,,, | Performed by: STUDENT IN AN ORGANIZED HEALTH CARE EDUCATION/TRAINING PROGRAM

## 2024-10-23 PROCEDURE — 99999 PR PBB SHADOW E&M-EST. PATIENT-LVL IV: CPT | Mod: PBBFAC,,, | Performed by: STUDENT IN AN ORGANIZED HEALTH CARE EDUCATION/TRAINING PROGRAM

## 2024-10-23 PROCEDURE — 3074F SYST BP LT 130 MM HG: CPT | Mod: CPTII,S$GLB,, | Performed by: STUDENT IN AN ORGANIZED HEALTH CARE EDUCATION/TRAINING PROGRAM

## 2024-10-23 PROCEDURE — G2211 COMPLEX E/M VISIT ADD ON: HCPCS | Mod: S$GLB,,, | Performed by: STUDENT IN AN ORGANIZED HEALTH CARE EDUCATION/TRAINING PROGRAM

## 2024-10-23 PROCEDURE — 3288F FALL RISK ASSESSMENT DOCD: CPT | Mod: CPTII,S$GLB,, | Performed by: STUDENT IN AN ORGANIZED HEALTH CARE EDUCATION/TRAINING PROGRAM

## 2024-10-23 PROCEDURE — 1101F PT FALLS ASSESS-DOCD LE1/YR: CPT | Mod: CPTII,S$GLB,, | Performed by: STUDENT IN AN ORGANIZED HEALTH CARE EDUCATION/TRAINING PROGRAM

## 2024-10-23 PROCEDURE — 1126F AMNT PAIN NOTED NONE PRSNT: CPT | Mod: CPTII,S$GLB,, | Performed by: STUDENT IN AN ORGANIZED HEALTH CARE EDUCATION/TRAINING PROGRAM

## 2024-10-23 PROCEDURE — 3078F DIAST BP <80 MM HG: CPT | Mod: CPTII,S$GLB,, | Performed by: STUDENT IN AN ORGANIZED HEALTH CARE EDUCATION/TRAINING PROGRAM

## 2024-10-23 PROCEDURE — 1159F MED LIST DOCD IN RCRD: CPT | Mod: CPTII,S$GLB,, | Performed by: STUDENT IN AN ORGANIZED HEALTH CARE EDUCATION/TRAINING PROGRAM

## 2024-10-23 RX ORDER — LEVOCETIRIZINE DIHYDROCHLORIDE 5 MG/1
5 TABLET, FILM COATED ORAL NIGHTLY
Qty: 90 TABLET | Refills: 1 | Status: SHIPPED | OUTPATIENT
Start: 2024-10-23

## 2024-10-23 RX ORDER — OXYBUTYNIN CHLORIDE 5 MG/1
TABLET ORAL
COMMUNITY
Start: 2024-08-01

## 2024-10-23 NOTE — TELEPHONE ENCOUNTER
Care Due:                  Date            Visit Type   Department     Provider  --------------------------------------------------------------------------------                                EP -                              PRIMARY      [facility] FAMILY  Last Visit: 04-      CARE ([facility])   JOYS Dillard                               -                              [facility]  Next Visit: 10-      CARE ([facility])   JOSY Dillard                                                            Last  Test          Frequency    Reason                     Performed    Due Date  --------------------------------------------------------------------------------    Mg Level....  12 months..  alendronate..............  Not Found    Overdue    Phosphate...  12 months..  alendronate..............  Not Found    Overdue    [employer] Embedded Care Due Messages. Reference number: [id number].   10/23/2024 12:27:26 AM CDT

## 2024-10-23 NOTE — TELEPHONE ENCOUNTER
Refill Decision Note   Mis Ca  is requesting a refill authorization.  Brief Assessment and Rationale for Refill:  Approve     Medication Therapy Plan:         Pharmacist review requested: Yes   Extended chart review required: Yes   Comments:     Note composed:5:28 PM 10/23/2024

## 2024-10-23 NOTE — TELEPHONE ENCOUNTER
Refill Routing Note   Medication(s) are not appropriate for processing by [facility] for the following reason(s):        Drug-disease interaction    [facility] action(s):  Defer     Requires labs : Yes      Medication Therapy Plan: FOVS; levocetirizine and Chronic kidney disease, stage 3a    Pharmacist review requested: Yes     Appointments  past 12m or future 3m with PCP    Date Provider   Last Visit   4/11/2024 Scott Dillard MD   Next Visit   10/28/2024 Scott Dillard MD   ED visits in past 90 days: 0        Note composed:8:40 AM 10/23/2024

## 2024-10-23 NOTE — PROGRESS NOTES
[facility]/ [facility]      Chief Complaint: HR+ breast cancer      Cancer Staging   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, estrogen receptor positive  Staging form: Breast, AJCC 8th Edition  - Pathologic: Stage IIA (pT2, pN1a(sn), cM0, G3, ER+, CO+, HER2-) - Signed by Betzy Shaw MD on 2024      HPI:  Mis Ca is a 83 y.o. female who presents today for evaluation of newly diagnosed breast cancer. Her oncologic history is as follows:    -screening MMG 24 showing Rt breast focal asymmetries. Diagnostic imaging on 5/3/24 showing a Rt breast mass at 6oclock measuring 1.9 x 1.6cm, a mass at 3oclock measuring 1.1 x 0.9 x 0.7cm and a mass at 5oclock measuring 3.1 x 2.2 x 3.0cm, no enlarged axillary LN.   -biopsy on 24 showed the followinoclock mass: IDC, grade 3. ER %, CO 1-5%, Her2 2+, FISH negative. Ki67 70-80%. 5oclock mass: IDC, grade 3. ER %, CO <10%, Her2 2+, FISH negative. Ki67 90%.   -24 she underwent Rt mastectomy showing multifocal IDC, measuring 3.6cm, 2.8cm and 1.7cm. Multifocal LVI present. Posterior margin close at 1.1mm.High grade DCIS, negative margins. 1/3 LN positive for carcinoma, metastatic deposit measuring 2.0cm. [id number]  -completed radiation 24  -started anastrozole 2024    She presents today for evaluation accompanied by her sister. Reports that she has been recovering very well since surgery. Denies post-op pain and has had good ROM. Her medical history is notable for afib on AC, hld, htn and T2DM.  FH significant for a sister with breast cancer at age 69yo, father with prostate and lung cancer, brother with bladder cancer and brother with lung cancer.    Gyn History:   Menarche: 13yo  Menopause: 42yo    HRT: No    Interval History:  Ms. Ca returns today for follow up.  She has been doing well since her last visit.  Completed radiation at the end of August and  notes minimal chest wall stiffness following this.  Has continued doing range-of-motion exercises which has helped.  She started anastrozole in September and is tolerating well thus far.  Notes mild hot flashes, but feels like these are not interfering with her quality of life.  No new concerns today otherwise.    Patient Active Problem List   Diagnosis    Paroxysmal atrial fibrillation    JACQUELYN (obstructive sleep apnea)    Long term (current) use of anticoagulants    Anxiety    Restless leg syndrome    Hyperlipidemia    Essential hypertension    Gastroesophageal reflux disease without esophagitis    History of adenomatous polyp of colon    Diverticulosis of large intestine without hemorrhage    Tortuous aorta    Glaucoma    Blind right eye    Aortic atherosclerosis    Severe obesity with body mass index (BMI) of 35.0 to 39.9 with serious comorbidity    Hallux valgus with bunions, left    Multilevel facet arthritis    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    Other specified disorders of adrenal gland    Unspecified inflammatory spondylopathy, 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    Inflammatory polyneuropathy, unspecified    Umbilical hernia without obstruction and without gangrene    Bilateral renal cysts    Chronic kidney disease, stage 3a    Rupture of extensor tendon of left hand    Muscle weakness    Stiffness of left wrist joint    Stiffness of finger joint, left    Type 2 diabetes mellitus without complication, without long-term current use of insulin    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, estrogen receptor positive    At risk for lymphedema       Current Outpatient Medications   Medication Instructions    acetaminophen (TYLENOL) 1,000 mg, Every 6 hours PRN    alendronate (FOSAMAX) 70 mg, Oral, Every 7 days    amiodarone (PACERONE) 200 mg, Oral, Daily    amitriptyline (ELAVIL) 25 mg, Oral, Nightly    anastrozole (ARIMIDEX) 1 mg, Oral, Daily    aspirin (ECOTRIN) 81 mg, Daily    azelastine (ASTELIN) 137 mcg (0.1 %) nasal spray  "1 spray, 2 times daily    chlorthalidone (HYGROTEN) 50 mg, Oral, Daily    cyanocobalamin 1,000 mcg/mL injection INJECT 1 ML (1,000 MCG) INTRAMUSCULARLY EVERY 30 DAYS    furosemide (LASIX) 20 mg, Oral, Daily    gabapentin (NEURONTIN) 300 MG capsule TAKE 2 CAPSULES BY MOUTH TWICE  DAILY    latanoprost 0.005 % ophthalmic solution 1 drop, Nightly    levocetirizine (XYZAL) 5 mg, Oral, Nightly    losartan (COZAAR) 50 mg, Oral, Daily    multivit-min-iron-FA-lutein (CENTRUM SILVER WOMEN) 8 mg iron-400 mcg-300 mcg Tab 1 tablet, Daily    needle, disp, 25 gauge 25 gauge x 1" Ndle 1 Needle, Misc.(Non-Drug; Combo Route), Every 30 days    omeprazole (PRILOSEC) 20 mg, Oral, 2 times daily    oxybutynin (DITROPAN) 5 MG Tab     potassium chloride (K-TAB) 20 mEq 20 mEq, Oral, Daily    pramipexole (MIRAPEX) 0.5 MG tablet TAKE 1 TABLET BY MOUTH TWICE  DAILY    pravastatin (PRAVACHOL) 40 MG tablet TAKE 1 TABLET BY MOUTH EVERY DAY    timolol maleate 0.5% (TIMOPTIC) 0.5 % Drop 1 drop, Daily    vitamin D (VITAMIN D3) 1,000 Units, Daily    warfarin (COUMADIN) 6 MG tablet TAKE 6 MG DAILY EXCEPT FOR 9 MG ON FRIDAY TO THIN BLOOD       Review of Systems:   12pt ROS negative except as noted above       PHYSICAL EXAM:  /77   Pulse 62   Temp 97.8 °F (36.6 °C) (Oral)   Resp 20   Ht 5' 2" (1.575 m)   Wt 97.8 kg (215 lb 9.8 oz)   SpO2 97%   BMI 39.44 kg/m²     ECOG 1  General: well appearing, in no apparent distress  HEENT: Normocephalic, EOMI, anicteric sclerae, MMM  Neck: supple, without cervical or supraclavicular lymphadenopathy.  Heart: regular rate and rhythm, normal S1 and S2, no murmurs, gallops or rubs.  Lungs: Clear to auscultation bilaterally, no increased wob  Breast: s/p Rt mastectomy with well-healing incision, no appreciable L breast masses or axillary LAD  Abdomen: Soft, nontender, nondistended with normal bowel sounds. No hepatosplenomegaly.  Extremities: No LE edema or joint effusion  Skin: warm, well-perfused, no " rash  Neurologic: Alert and oriented x 4, normal speech and gait   Psychiatric: Conversing appropriately with providers throughout today's encounter.      Pertinent Labs & Imaging:  Reviewed recent labs, imaging and pathology.       Assessment & Plan:  Ms. Ca is a susie 84yo woman with recently diagnosed HR+ breast cancer who presents today for evaluation.    She is now s/p Rt mastectomy with final pathology showing multifocal IDC measuring 3.6cm, 2.8cm, 1.7cm, one near margin (posterior, 1.1mm), remainder of margins negative. 1/3 LN positive. [id number].    Given her age and comorbidities would not recommend chemotherapy as Oncotype would not change treatment plan.  Recommend proceeding with adjuvant radiation and ET. She has since completed radiation and started on anastrozole which she is tolerating well thus far.    #HR+ breast cancer:  --cont anastrozole 1mg daily, (SOT 9/2024, goal for 5 years of treatment)   --encouraged Ca/VitD as above  --due for Rt breast screening MMG in 4/2025  --RTC 6mo    #Hot flashes: mild for now  --she will monitor and reach out if these worsen     #Osteopenia:  --known osteopenia on DEXA in 9/2024, slightly better than prior. Repeat 9/2026  --cont fosamax      Reviewed patients referring notes, imaging and pathology. Discussed diagnosis, staging, and treatment in detail with patient. All questions were answered to her apparent satisfaction. Will see her back in 6 months or sooner should the need arise.        Route Chart for Scheduling    Med Onc Chart Routing      Follow up with physician 1 year.   Follow up with AMARI 6 months.   Infusion scheduling note    Injection scheduling note    Labs    Imaging    Pharmacy appointment    Other referrals                       Total time of this visit, including time spent face to face with patient and/or via video/audio, and also in preparing for today's visit for MDM and documentation. (Medical Decision Making, including consideration of  possible diagnoses, management options, complex medical record review, review of diagnostic tests and information, consideration and discussion of significant complications based on comorbidities, and discussion with providers involved with the care of the patient) 30 minutes. Greater than 50% was spent face to face with the patient counseling and coordinating care.      Monik Vasquez

## 2024-10-24 ENCOUNTER — LAB VISIT (OUTPATIENT)
Dept: LAB | Facility: HOSPITAL | Age: 84
End: 2024-10-24
Attending: INTERNAL MEDICINE
Payer: MEDICARE

## 2024-10-24 ENCOUNTER — ANTI-COAG VISIT (OUTPATIENT)
Dept: CARDIOLOGY | Facility: CLINIC | Age: 84
End: 2024-10-24
Payer: MEDICARE

## 2024-10-24 DIAGNOSIS — I48.0 PAROXYSMAL ATRIAL FIBRILLATION: Chronic | ICD-10-CM

## 2024-10-24 DIAGNOSIS — I48.0 PAROXYSMAL ATRIAL FIBRILLATION: Primary | Chronic | ICD-10-CM

## 2024-10-24 DIAGNOSIS — Z79.01 LONG TERM (CURRENT) USE OF ANTICOAGULANTS: ICD-10-CM

## 2024-10-24 LAB
INR PPP: 2.4 (ref 0.8–1.2)
PROTHROMBIN TIME: 25.1 SEC (ref 9–12.5)

## 2024-10-24 PROCEDURE — 93793 ANTICOAG MGMT PT WARFARIN: CPT | Mod: S$GLB,,, | Performed by: PHARMACIST

## 2024-10-24 PROCEDURE — 36415 COLL VENOUS BLD VENIPUNCTURE: CPT | Mod: PN | Performed by: INTERNAL MEDICINE

## 2024-10-24 PROCEDURE — 85610 PROTHROMBIN TIME: CPT | Mod: PN | Performed by: INTERNAL MEDICINE

## 2024-10-28 ENCOUNTER — OFFICE VISIT (OUTPATIENT)
Dept: FAMILY MEDICINE | Facility: CLINIC | Age: 84
End: 2024-10-28
Payer: MEDICARE

## 2024-10-28 VITALS
OXYGEN SATURATION: 95 % | HEIGHT: 62 IN | TEMPERATURE: 98 F | BODY MASS INDEX: 38.28 KG/M2 | DIASTOLIC BLOOD PRESSURE: 64 MMHG | SYSTOLIC BLOOD PRESSURE: 122 MMHG | HEART RATE: 66 BPM | WEIGHT: 208 LBS

## 2024-10-28 DIAGNOSIS — H54.10 BLINDNESS OF RIGHT EYE WITH LOW VISION IN CONTRALATERAL EYE: ICD-10-CM

## 2024-10-28 DIAGNOSIS — N28.1 BILATERAL RENAL CYSTS: ICD-10-CM

## 2024-10-28 DIAGNOSIS — Z79.01 LONG TERM (CURRENT) USE OF ANTICOAGULANTS: ICD-10-CM

## 2024-10-28 DIAGNOSIS — I48.0 PAROXYSMAL ATRIAL FIBRILLATION: Chronic | ICD-10-CM

## 2024-10-28 DIAGNOSIS — E66.01 SEVERE OBESITY WITH BODY MASS INDEX (BMI) OF 35.0 TO 39.9 WITH SERIOUS COMORBIDITY: ICD-10-CM

## 2024-10-28 DIAGNOSIS — Z17.0 MALIGNANT NEOPLASM OF CENTRAL PORTION OF RIGHT BREAST IN FEMALE, ESTROGEN RECEPTOR POSITIVE: ICD-10-CM

## 2024-10-28 DIAGNOSIS — E78.2 MIXED HYPERLIPIDEMIA: ICD-10-CM

## 2024-10-28 DIAGNOSIS — C50.111 MALIGNANT NEOPLASM OF CENTRAL PORTION OF RIGHT BREAST IN FEMALE, ESTROGEN RECEPTOR POSITIVE: ICD-10-CM

## 2024-10-28 DIAGNOSIS — E11.9 TYPE 2 DIABETES MELLITUS WITHOUT COMPLICATION, WITHOUT LONG-TERM CURRENT USE OF INSULIN: Primary | ICD-10-CM

## 2024-10-28 DIAGNOSIS — N18.31 CHRONIC KIDNEY DISEASE, STAGE 3A: ICD-10-CM

## 2024-10-28 DIAGNOSIS — M47.819 MULTILEVEL FACET ARTHRITIS: ICD-10-CM

## 2024-10-28 PROCEDURE — 1101F PT FALLS ASSESS-DOCD LE1/YR: CPT | Mod: CPTII,S$GLB,, | Performed by: FAMILY MEDICINE

## 2024-10-28 PROCEDURE — 3288F FALL RISK ASSESSMENT DOCD: CPT | Mod: CPTII,S$GLB,, | Performed by: FAMILY MEDICINE

## 2024-10-28 PROCEDURE — 3074F SYST BP LT 130 MM HG: CPT | Mod: CPTII,S$GLB,, | Performed by: FAMILY MEDICINE

## 2024-10-28 PROCEDURE — 3078F DIAST BP <80 MM HG: CPT | Mod: CPTII,S$GLB,, | Performed by: FAMILY MEDICINE

## 2024-10-28 PROCEDURE — 99214 OFFICE O/P EST MOD 30 MIN: CPT | Mod: S$GLB,,, | Performed by: FAMILY MEDICINE

## 2024-10-28 PROCEDURE — 1159F MED LIST DOCD IN RCRD: CPT | Mod: CPTII,S$GLB,, | Performed by: FAMILY MEDICINE

## 2024-10-28 PROCEDURE — 1126F AMNT PAIN NOTED NONE PRSNT: CPT | Mod: CPTII,S$GLB,, | Performed by: FAMILY MEDICINE

## 2024-10-28 PROCEDURE — 1160F RVW MEDS BY RX/DR IN RCRD: CPT | Mod: CPTII,S$GLB,, | Performed by: FAMILY MEDICINE

## 2024-10-29 ENCOUNTER — LAB VISIT (OUTPATIENT)
Dept: LAB | Facility: HOSPITAL | Age: 84
End: 2024-10-29
Attending: FAMILY MEDICINE
Payer: MEDICARE

## 2024-10-29 DIAGNOSIS — M47.819 MULTILEVEL FACET ARTHRITIS: ICD-10-CM

## 2024-10-29 DIAGNOSIS — N28.1 BILATERAL RENAL CYSTS: ICD-10-CM

## 2024-10-29 DIAGNOSIS — Z17.0 MALIGNANT NEOPLASM OF CENTRAL PORTION OF RIGHT BREAST IN FEMALE, ESTROGEN RECEPTOR POSITIVE: ICD-10-CM

## 2024-10-29 DIAGNOSIS — E11.9 TYPE 2 DIABETES MELLITUS WITHOUT COMPLICATION, WITHOUT LONG-TERM CURRENT USE OF INSULIN: ICD-10-CM

## 2024-10-29 DIAGNOSIS — H54.10 BLINDNESS OF RIGHT EYE WITH LOW VISION IN CONTRALATERAL EYE: ICD-10-CM

## 2024-10-29 DIAGNOSIS — N18.31 CHRONIC KIDNEY DISEASE, STAGE 3A: ICD-10-CM

## 2024-10-29 DIAGNOSIS — C50.111 MALIGNANT NEOPLASM OF CENTRAL PORTION OF RIGHT BREAST IN FEMALE, ESTROGEN RECEPTOR POSITIVE: ICD-10-CM

## 2024-10-29 DIAGNOSIS — E66.01 SEVERE OBESITY WITH BODY MASS INDEX (BMI) OF 35.0 TO 39.9 WITH SERIOUS COMORBIDITY: ICD-10-CM

## 2024-10-29 DIAGNOSIS — I48.0 PAROXYSMAL ATRIAL FIBRILLATION: Chronic | ICD-10-CM

## 2024-10-29 DIAGNOSIS — E78.2 MIXED HYPERLIPIDEMIA: ICD-10-CM

## 2024-10-29 DIAGNOSIS — Z79.01 LONG TERM (CURRENT) USE OF ANTICOAGULANTS: ICD-10-CM

## 2024-10-29 LAB
ALBUMIN SERPL BCP-MCNC: 4.3 G/DL (ref 3.5–5.2)
ALP SERPL-CCNC: 70 U/L (ref 38–126)
ALT SERPL W/O P-5'-P-CCNC: 26 U/L (ref 10–44)
ANION GAP SERPL CALC-SCNC: 6 MMOL/L (ref 8–16)
AST SERPL-CCNC: 36 U/L (ref 15–46)
BASOPHILS # BLD AUTO: 0.03 K/UL (ref 0–0.2)
BASOPHILS NFR BLD: 0.6 % (ref 0–1.9)
BILIRUB SERPL-MCNC: 0.5 MG/DL (ref 0.1–1)
CALCIUM SERPL-MCNC: 9.7 MG/DL (ref 8.7–10.5)
CHLORIDE SERPL-SCNC: 98 MMOL/L (ref 95–110)
CHOLEST SERPL-MCNC: 210 MG/DL (ref 120–199)
CHOLEST/HDLC SERPL: 4.7 {RATIO} (ref 2–5)
CO2 SERPL-SCNC: 35 MMOL/L (ref 23–29)
CREAT SERPL-MCNC: 1.14 MG/DL (ref 0.5–1.4)
DIFFERENTIAL METHOD BLD: ABNORMAL
EOSINOPHIL # BLD AUTO: 0.2 K/UL (ref 0–0.5)
EOSINOPHIL NFR BLD: 4.4 % (ref 0–8)
ERYTHROCYTE [DISTWIDTH] IN BLOOD BY AUTOMATED COUNT: 13.2 % (ref 11.5–14.5)
EST. GFR  (NO RACE VARIABLE): 48.1 ML/MIN/1.73 M^2
ESTIMATED AVG GLUCOSE: 120 MG/DL (ref 68–131)
GLUCOSE SERPL-MCNC: 107 MG/DL (ref 70–110)
HBA1C MFR BLD: 5.8 % (ref 4–5.6)
HCT VFR BLD AUTO: 39.1 % (ref 37–48.5)
HDLC SERPL-MCNC: 45 MG/DL (ref 40–75)
HDLC SERPL: 21.4 % (ref 20–50)
HGB BLD-MCNC: 13.3 G/DL (ref 12–16)
IMM GRANULOCYTES # BLD AUTO: 0.03 K/UL (ref 0–0.04)
IMM GRANULOCYTES NFR BLD AUTO: 0.6 % (ref 0–0.5)
LDLC SERPL CALC-MCNC: 123.2 MG/DL (ref 63–159)
LYMPHOCYTES # BLD AUTO: 0.9 K/UL (ref 1–4.8)
LYMPHOCYTES NFR BLD: 16.6 % (ref 18–48)
MCH RBC QN AUTO: 33.8 PG (ref 27–31)
MCHC RBC AUTO-ENTMCNC: 34 G/DL (ref 32–36)
MCV RBC AUTO: 100 FL (ref 82–98)
MONOCYTES # BLD AUTO: 0.6 K/UL (ref 0.3–1)
MONOCYTES NFR BLD: 12.2 % (ref 4–15)
NEUTROPHILS # BLD AUTO: 3.4 K/UL (ref 1.8–7.7)
NEUTROPHILS NFR BLD: 65.6 % (ref 38–73)
NONHDLC SERPL-MCNC: 165 MG/DL
NRBC BLD-RTO: 0 /100 WBC
PLATELET # BLD AUTO: 188 K/UL (ref 150–450)
PMV BLD AUTO: 10.1 FL (ref 9.2–12.9)
POTASSIUM SERPL-SCNC: 3.7 MMOL/L (ref 3.5–5.1)
PROT SERPL-MCNC: 7.3 G/DL (ref 6–8.4)
RBC # BLD AUTO: 3.93 M/UL (ref 4–5.4)
SODIUM SERPL-SCNC: 139 MMOL/L (ref 136–145)
TRIGL SERPL-MCNC: 209 MG/DL (ref 30–150)
UUN UR-MCNC: 25 MG/DL (ref 7–17)
WBC # BLD AUTO: 5.18 K/UL (ref 3.9–12.7)

## 2024-10-29 PROCEDURE — 36415 COLL VENOUS BLD VENIPUNCTURE: CPT | Mod: PN | Performed by: FAMILY MEDICINE

## 2024-10-29 PROCEDURE — 80053 COMPREHEN METABOLIC PANEL: CPT | Mod: PN | Performed by: FAMILY MEDICINE

## 2024-10-29 PROCEDURE — 80061 LIPID PANEL: CPT | Performed by: FAMILY MEDICINE

## 2024-10-29 PROCEDURE — 85025 COMPLETE CBC W/AUTO DIFF WBC: CPT | Mod: PN | Performed by: FAMILY MEDICINE

## 2024-10-29 PROCEDURE — 83036 HEMOGLOBIN GLYCOSYLATED A1C: CPT | Performed by: FAMILY MEDICINE

## 2024-10-30 ENCOUNTER — PATIENT OUTREACH (OUTPATIENT)
Dept: ADMINISTRATIVE | Facility: HOSPITAL | Age: 84
End: 2024-10-30
Payer: MEDICARE

## 2024-10-31 ENCOUNTER — PATIENT OUTREACH (OUTPATIENT)
Dept: ADMINISTRATIVE | Facility: HOSPITAL | Age: 84
End: 2024-10-31
Payer: MEDICARE

## 2024-11-04 DIAGNOSIS — E78.2 MIXED HYPERLIPIDEMIA: ICD-10-CM

## 2024-11-04 DIAGNOSIS — E11.9 TYPE 2 DIABETES MELLITUS WITHOUT COMPLICATION, WITHOUT LONG-TERM CURRENT USE OF INSULIN: Primary | ICD-10-CM

## 2024-11-07 ENCOUNTER — LAB VISIT (OUTPATIENT)
Dept: LAB | Facility: HOSPITAL | Age: 84
End: 2024-11-07
Attending: INTERNAL MEDICINE
Payer: MEDICARE

## 2024-11-07 ENCOUNTER — ANTI-COAG VISIT (OUTPATIENT)
Dept: CARDIOLOGY | Facility: CLINIC | Age: 84
End: 2024-11-07
Payer: MEDICARE

## 2024-11-07 DIAGNOSIS — I48.0 PAROXYSMAL ATRIAL FIBRILLATION: Primary | Chronic | ICD-10-CM

## 2024-11-07 DIAGNOSIS — Z79.01 LONG TERM (CURRENT) USE OF ANTICOAGULANTS: ICD-10-CM

## 2024-11-07 DIAGNOSIS — I48.0 PAROXYSMAL ATRIAL FIBRILLATION: Chronic | ICD-10-CM

## 2024-11-07 LAB
INR PPP: 2.4 (ref 0.8–1.2)
PROTHROMBIN TIME: 24.6 SEC (ref 9–12.5)

## 2024-11-07 PROCEDURE — 93793 ANTICOAG MGMT PT WARFARIN: CPT | Mod: S$GLB,,, | Performed by: PHARMACIST

## 2024-11-07 PROCEDURE — 36415 COLL VENOUS BLD VENIPUNCTURE: CPT | Mod: PN | Performed by: INTERNAL MEDICINE

## 2024-11-07 PROCEDURE — 85610 PROTHROMBIN TIME: CPT | Mod: PN | Performed by: INTERNAL MEDICINE

## 2024-11-07 NOTE — PROGRESS NOTES
[facility]    2024 1:08 PM    Assessment/Plan:    Patient presents today with therapeutic INR.    Assessment of patient findings and chart review: INR at goal. Medications and chart reviewed. No changes noted to necessitate adjustment of warfarin or follow-up plan. See calendar.      Recommendation for patient's warfarin regimen: Continue current maintenance dose    Recommend repeat INR in 2 weeks  _________________________________________________________________    Mis L Lanny (83 y.o.) is followed by the [facility].    Anticoagulation Summary  As of 2024      INR goal:  2.0-3.0   TTR:  75.8% (2 y)   INR used for dosin.4 (2024)   Warfarin maintenance plan:  3 mg (6 mg x 0.5) every e, Thu, Sat; 6 mg (6 mg x 1) all other days   Weekly warfarin total:  33 mg   Plan last modified:  Tino Mendiola, PharmD (7/15/2024)   Next INR check:  2024   Target end date:  --    Indications    Paroxysmal atrial fibrillation [I48.0]  Long term (current) use of anticoagulants [Z79.01]                 Anticoagulation Episode Summary       INR check location:  --    Preferred lab:  --    Send INR reminders to:  [facility] COUMADIN MONITORING POOL    Comments:  [facility]          Anticoagulation Care Providers       Provider Role Specialty Phone number    Shamar Owens MD Responsible Electrophysiology [phone]

## 2024-11-11 ENCOUNTER — OFFICE VISIT (OUTPATIENT)
Dept: FAMILY MEDICINE | Facility: CLINIC | Age: 84
End: 2024-11-11
Payer: MEDICARE

## 2024-11-11 VITALS
SYSTOLIC BLOOD PRESSURE: 130 MMHG | HEIGHT: 62 IN | BODY MASS INDEX: 39.71 KG/M2 | HEART RATE: 70 BPM | WEIGHT: 215.81 LBS | OXYGEN SATURATION: 96 % | TEMPERATURE: 98 F | DIASTOLIC BLOOD PRESSURE: 68 MMHG

## 2024-11-11 DIAGNOSIS — G25.81 RESTLESS LEG SYNDROME: ICD-10-CM

## 2024-11-11 DIAGNOSIS — H40.9 GLAUCOMA, UNSPECIFIED GLAUCOMA TYPE, UNSPECIFIED LATERALITY: ICD-10-CM

## 2024-11-11 DIAGNOSIS — K21.9 GASTROESOPHAGEAL REFLUX DISEASE WITHOUT ESOPHAGITIS: ICD-10-CM

## 2024-11-11 DIAGNOSIS — E66.01 SEVERE OBESITY WITH BODY MASS INDEX (BMI) OF 35.0 TO 39.9 WITH SERIOUS COMORBIDITY: ICD-10-CM

## 2024-11-11 DIAGNOSIS — F41.9 ANXIETY: ICD-10-CM

## 2024-11-11 DIAGNOSIS — I70.0 AORTIC ATHEROSCLEROSIS: ICD-10-CM

## 2024-11-11 DIAGNOSIS — H54.10 BLINDNESS OF RIGHT EYE WITH LOW VISION IN CONTRALATERAL EYE: ICD-10-CM

## 2024-11-11 DIAGNOSIS — I77.1 TORTUOUS AORTA: ICD-10-CM

## 2024-11-11 DIAGNOSIS — C50.111 MALIGNANT NEOPLASM OF CENTRAL PORTION OF RIGHT BREAST IN FEMALE, ESTROGEN RECEPTOR POSITIVE: ICD-10-CM

## 2024-11-11 DIAGNOSIS — Z17.0 MALIGNANT NEOPLASM OF CENTRAL PORTION OF RIGHT BREAST IN FEMALE, ESTROGEN RECEPTOR POSITIVE: ICD-10-CM

## 2024-11-11 DIAGNOSIS — I48.0 PAROXYSMAL ATRIAL FIBRILLATION: Chronic | ICD-10-CM

## 2024-11-11 DIAGNOSIS — I10 ESSENTIAL HYPERTENSION: ICD-10-CM

## 2024-11-11 DIAGNOSIS — K57.30 DIVERTICULOSIS OF LARGE INTESTINE WITHOUT HEMORRHAGE: Chronic | ICD-10-CM

## 2024-11-11 DIAGNOSIS — G61.9 INFLAMMATORY POLYNEUROPATHY, UNSPECIFIED: ICD-10-CM

## 2024-11-11 DIAGNOSIS — N18.31 CHRONIC KIDNEY DISEASE, STAGE 3A: ICD-10-CM

## 2024-11-11 DIAGNOSIS — Z79.01 LONG TERM (CURRENT) USE OF ANTICOAGULANTS: ICD-10-CM

## 2024-11-11 DIAGNOSIS — Z00.00 ENCOUNTER FOR PREVENTIVE HEALTH EXAMINATION: ICD-10-CM

## 2024-11-11 DIAGNOSIS — E11.9 TYPE 2 DIABETES MELLITUS WITHOUT COMPLICATION, WITHOUT LONG-TERM CURRENT USE OF INSULIN: ICD-10-CM

## 2024-11-11 DIAGNOSIS — E78.2 MIXED HYPERLIPIDEMIA: ICD-10-CM

## 2024-11-11 DIAGNOSIS — G47.33 OSA (OBSTRUCTIVE SLEEP APNEA): ICD-10-CM

## 2024-11-11 DIAGNOSIS — Z00.00 ENCOUNTER FOR MEDICARE ANNUAL WELLNESS EXAM: Primary | ICD-10-CM

## 2024-11-11 DIAGNOSIS — M47.819 MULTILEVEL FACET ARTHRITIS: ICD-10-CM

## 2024-11-11 PROCEDURE — 1126F AMNT PAIN NOTED NONE PRSNT: CPT | Mod: CPTII,S$GLB,, | Performed by: PHYSICIAN ASSISTANT

## 2024-11-11 PROCEDURE — 1159F MED LIST DOCD IN RCRD: CPT | Mod: CPTII,S$GLB,, | Performed by: PHYSICIAN ASSISTANT

## 2024-11-11 PROCEDURE — 3288F FALL RISK ASSESSMENT DOCD: CPT | Mod: CPTII,S$GLB,, | Performed by: PHYSICIAN ASSISTANT

## 2024-11-11 PROCEDURE — 3078F DIAST BP <80 MM HG: CPT | Mod: CPTII,S$GLB,, | Performed by: PHYSICIAN ASSISTANT

## 2024-11-11 PROCEDURE — 1160F RVW MEDS BY RX/DR IN RCRD: CPT | Mod: CPTII,S$GLB,, | Performed by: PHYSICIAN ASSISTANT

## 2024-11-11 PROCEDURE — 1158F ADVNC CARE PLAN TLK DOCD: CPT | Mod: CPTII,S$GLB,, | Performed by: PHYSICIAN ASSISTANT

## 2024-11-11 PROCEDURE — 3075F SYST BP GE 130 - 139MM HG: CPT | Mod: CPTII,S$GLB,, | Performed by: PHYSICIAN ASSISTANT

## 2024-11-11 PROCEDURE — 1101F PT FALLS ASSESS-DOCD LE1/YR: CPT | Mod: CPTII,S$GLB,, | Performed by: PHYSICIAN ASSISTANT

## 2024-11-11 PROCEDURE — 1170F FXNL STATUS ASSESSED: CPT | Mod: CPTII,S$GLB,, | Performed by: PHYSICIAN ASSISTANT

## 2024-11-11 PROCEDURE — G0439 PPPS, SUBSEQ VISIT: HCPCS | Mod: S$GLB,,, | Performed by: PHYSICIAN ASSISTANT

## 2024-11-11 NOTE — PROGRESS NOTES
"  Mis Ca presented for a follow-up Medicare AWV today. The following components were reviewed and updated:    Medical history  Family History  Social history  Allergies and Current Medications  Health Risk Assessment  Health Maintenance  Care Team    **See Completed Assessments for Annual Wellness visit with in the encounter summary    The following assessments were completed:  Depression Screening  Cognitive function Screening    Timed Get Up Test  Whisper Test      Opioid documentation:      Patient does not have a current opioid prescription.          Vitals:    11/11/24 1317   BP: 130/68   BP Location: Left arm   Patient Position: Sitting   Pulse: 70   Temp: 98.4 °F (36.9 °C)   TempSrc: Oral   SpO2: 96%   Weight: 97.9 kg (215 lb 13.3 oz)   Height: 5' 2" (1.575 m)     Body mass index is 39.48 kg/m².       Physical Exam  Constitutional:       General: She is not in acute distress.     Appearance: Normal appearance. She is obese.   HENT:      Head: Normocephalic and atraumatic.   Eyes:      General: Lids are normal. Gaze aligned appropriately.      Pupils: Pupils are equal, round, and reactive to light.   Neck:      Trachea: Trachea normal.   Cardiovascular:      Rate and Rhythm: Normal rate and regular rhythm.      Heart sounds: S1 normal and S2 normal.   Pulmonary:      Effort: Pulmonary effort is normal.      Breath sounds: Normal breath sounds.   Abdominal:      General: Bowel sounds are normal. There is no distension.      Palpations: Abdomen is soft.      Tenderness: There is no abdominal tenderness.   Musculoskeletal:      Cervical back: Neck supple.      Right lower leg: No edema.      Left lower leg: No edema.   Lymphadenopathy:      Cervical: No cervical adenopathy.   Skin:     General: Skin is cool and dry.   Neurological:      Mental Status: She is alert and oriented to person, place, and time.   Psychiatric:         Attention and Perception: Attention normal.         Mood and Affect: Mood " normal.         Behavior: Behavior is cooperative.         Thought Content: Thought content normal.           Diagnoses and health risks identified today and associated recommendations/orders:    1. Encounter for Medicare annual wellness exam  - Ambulatory Referral/Consult to Enhanced Annual Wellness Visit (eAWV)  - Chart reviewed. Problem list updated. Discussed current medical diagnosis, current medications, medical/surgical/family/social history; updated provider list; documented vital signs; identified any cognitive impairment; and updated risk factor list. Addressed any outstanding health maintenance. Provided patient with personalized health advice. Continue to follow up with PCP and any specialists.     2. Encounter for preventive health examination  - Chart reviewed. Problem list updated. Discussed current medical diagnosis, current medications, medical/surgical/family/social history; updated provider list; documented vital signs; identified any cognitive impairment; and updated risk factor list. Addressed any outstanding health maintenance. Provided patient with personalized health advice. Continue to follow up with PCP and any specialists.     3. Inflammatory polyneuropathy, unspecified  - Chronic, stable  - Continue gabapentin  - Follow with PCP    4. Multilevel facet arthritis  - Chronic, stable  - Continue gabapentin  - Follow with PCP    5. Restless leg syndrome  - Chronic, stable  - Continue mirapex  - Follow with PCP    6. Anxiety  - Chronic, stable  - Continue amitriptyline  - Follow with PCP    7. Blindness of right eye with low vision in contralateral eye  - Chronic, stable  - Continue eye drops  - Follow with ophthalmology    8. Glaucoma, unspecified glaucoma type, unspecified laterality  - Chronic, stable  - Continue eye drops  - Follow with ophthalmology    9. Aortic atherosclerosis  - Chronic, stable  - Continue aspirin and pravastatin  - Follow with PCP    10. Essential hypertension  -  Chronic, stable  - Continue losartan  - Follow with PCP    11. Mixed hyperlipidemia  - Chronic, stable  - Continue pravastatin  - Follow with PCP    12. Paroxysmal atrial fibrillation  - Chronic, stable  - Continue warfarin  - Follow with PCP    13. Tortuous aorta  - Chronic, stable  - Continue aspirin and pravstatin  - Follow with PCP    14. Chronic kidney disease, stage 3a  - Chronic, stable  - Continue to monitor  - Follow with PCP    15. Long term (current) use of anticoagulants  - Chronic, stable  - Continue medications  - Follow with PCP    16. Malignant neoplasm of central portion of right breast in female, estrogen receptor positive  - Chronic, stable  - Continue anastrozole  - Follow with PCP    17. Type 2 diabetes mellitus without complication, without long-term current use of insulin  - Chronic, stable  - No med management at this time  - Follow with PCP    18. Diverticulosis of large intestine without hemorrhage  - Chronic, stable  - Continue to monitor  - Follow with PCP    19. Gastroesophageal reflux disease without esophagitis  - Chronic, stable  - Continue omeprazole  - Follow with PCP    20. JACQUELYN (obstructive sleep apnea)  - Chronic, stable  - Continue cpap  - Follow with PCP    21. Severe obesity with body mass index (BMI) of 35.0 to 39.9 with serious comorbidity  - Body mass index is 39.48 kg/m².  - Recommendation for healthy diet and increasing exercise as tolerated with goal of 150min/week. Recommend weight loss.      Provided Mis with a 5-10 year written screening schedule and personal prevention plan. Recommendations were developed using the USPSTF age appropriate recommendations. Education, counseling, and referrals were provided as needed.  After Visit Summary printed and given to patient which includes a list of additional screenings\tests needed.    No follow-ups on file.      Mariah García PA-C      Advance Care Planning   I offered to discuss advanced care planning, including how to  pick a person who would make decisions for you if you were unable to make them for yourself, called a health care power of , and what kind of decisions you might make such as use of life sustaining treatments such as ventilators and tube feeding when faced with a life limiting illness recorded on a living will that they will need to know. (How you want to be cared for as you near the end of your natural life)     X Patient is interested in learning more about how to make advanced directives.  I provided them paperwork and offered to discuss this with them.

## 2024-11-11 NOTE — PATIENT INSTRUCTIONS
Counseling and Referral of Other Preventative  (Italic type indicates deductible and co-insurance are waived)    Patient Name: Mis Ca  Today's Date: 11/11/2024    Health Maintenance       Date Due Completion Date    COVID-19 Vaccine (1) Never done ---    RSV Vaccine (Age 60+ and Pregnant patients) (1 - 1-dose 75+ series) Never done ---    Eye Exam 09/02/2023 9/2/2022    Hemoglobin A1c 04/29/2025 10/29/2024    DEXA Scan 09/16/2025 9/16/2024    Override on 6/9/2016: Done    Diabetes Urine Screening 10/29/2025 10/29/2024    Lipid Panel 10/29/2025 10/29/2024    TETANUS VACCINE 12/13/2026 12/13/2016    Override on 12/2/2016: Done        No orders of the defined types were placed in this encounter.    The following information is provided to all patients.  This information is to help you find resources for any of the problems found today that may be affecting your health:                  Living healthy guide: www.[facility].[place].gov      Understanding Diabetes: www.diabetes.org      Eating healthy: www.cdc.gov/healthyweight      CDC home safety checklist: www.cdc.gov/steadi/patient.html      Agency on Aging: www.goea.[place].gov      Alcoholics anonymous (AA): www.aa.org      Physical Activity: www.delfino.nih.gov/sm6rfti      Tobacco use: www.quitwithusla.org

## 2024-11-21 ENCOUNTER — LAB VISIT (OUTPATIENT)
Dept: LAB | Facility: HOSPITAL | Age: 84
End: 2024-11-21
Attending: INTERNAL MEDICINE
Payer: MEDICARE

## 2024-11-21 ENCOUNTER — ANTI-COAG VISIT (OUTPATIENT)
Dept: CARDIOLOGY | Facility: CLINIC | Age: 84
End: 2024-11-21
Payer: MEDICARE

## 2024-11-21 DIAGNOSIS — I48.0 PAROXYSMAL ATRIAL FIBRILLATION: Primary | Chronic | ICD-10-CM

## 2024-11-21 DIAGNOSIS — I48.0 PAROXYSMAL ATRIAL FIBRILLATION: Chronic | ICD-10-CM

## 2024-11-21 DIAGNOSIS — Z79.01 LONG TERM (CURRENT) USE OF ANTICOAGULANTS: ICD-10-CM

## 2024-11-21 LAB
ALBUMIN SERPL BCP-MCNC: 4.2 G/DL (ref 3.5–5.2)
ALP SERPL-CCNC: 61 U/L (ref 38–126)
ALT SERPL W/O P-5'-P-CCNC: 23 U/L (ref 10–44)
ANION GAP SERPL CALC-SCNC: 9 MMOL/L (ref 8–16)
AST SERPL-CCNC: 30 U/L (ref 15–46)
BILIRUB SERPL-MCNC: 0.5 MG/DL (ref 0.1–1)
CALCIUM SERPL-MCNC: 9.3 MG/DL (ref 8.7–10.5)
CHLORIDE SERPL-SCNC: 95 MMOL/L (ref 95–110)
CO2 SERPL-SCNC: 33 MMOL/L (ref 23–29)
CREAT SERPL-MCNC: 1.28 MG/DL (ref 0.5–1.4)
EST. GFR  (NO RACE VARIABLE): 41.6 ML/MIN/1.73 M^2
GLUCOSE SERPL-MCNC: 143 MG/DL (ref 70–110)
INR PPP: 2.6 (ref 0.8–1.2)
POTASSIUM SERPL-SCNC: 3.5 MMOL/L (ref 3.5–5.1)
PROT SERPL-MCNC: 7 G/DL (ref 6–8.4)
PROTHROMBIN TIME: 26.4 SEC (ref 9–12.5)
SODIUM SERPL-SCNC: 137 MMOL/L (ref 136–145)
TSH SERPL DL<=0.005 MIU/L-ACNC: 1.42 UIU/ML (ref 0.4–4)
UUN UR-MCNC: 25 MG/DL (ref 7–17)

## 2024-11-21 PROCEDURE — 36415 COLL VENOUS BLD VENIPUNCTURE: CPT | Mod: PN | Performed by: INTERNAL MEDICINE

## 2024-11-21 PROCEDURE — 80053 COMPREHEN METABOLIC PANEL: CPT | Mod: PN | Performed by: INTERNAL MEDICINE

## 2024-11-21 PROCEDURE — 93793 ANTICOAG MGMT PT WARFARIN: CPT | Mod: S$GLB,,, | Performed by: PHARMACIST

## 2024-11-21 PROCEDURE — 85610 PROTHROMBIN TIME: CPT | Mod: PN | Performed by: INTERNAL MEDICINE

## 2024-11-21 PROCEDURE — 84443 ASSAY THYROID STIM HORMONE: CPT | Mod: PN | Performed by: INTERNAL MEDICINE

## 2024-11-21 NOTE — PROGRESS NOTES
[facility]    2024 2:47 PM    Assessment/Plan:    Patient presents today with therapeutic INR.    Assessment of patient findings and chart review: INR at goal. Medications and chart reviewed. No changes noted to necessitate adjustment of warfarin or follow-up plan. See calendar.      Recommendation for patient's warfarin regimen: Continue current maintenance dose    Recommend repeat INR in 3 weeks  _________________________________________________________________    Mis L Lanny (84 y.o.) is followed by the [facility].    Anticoagulation Summary  As of 2024      INR goal:  2.0-3.0   TTR:  76.2% (2 y)   INR used for dosin.6 (2024)   Warfarin maintenance plan:  3 mg (6 mg x 0.5) every e, Thu, Sat; 6 mg (6 mg x 1) all other days   Weekly warfarin total:  33 mg   Plan last modified:  Tino Mendiola, PharmD (7/15/2024)   Next INR check:  2024   Target end date:  --    Indications    Paroxysmal atrial fibrillation [I48.0]  Long term (current) use of anticoagulants [Z79.01]                 Anticoagulation Episode Summary       INR check location:  --    Preferred lab:  --    Send INR reminders to:  [facility] COUMADIN MONITORING POOL    Comments:  [facility]          Anticoagulation Care Providers       Provider Role Specialty Phone number    Shamar Owens MD Responsible Electrophysiology [phone]

## 2024-11-22 ENCOUNTER — PATIENT MESSAGE (OUTPATIENT)
Dept: CARDIOLOGY | Facility: CLINIC | Age: 84
End: 2024-11-22
Payer: MEDICARE

## 2024-12-06 ENCOUNTER — OFFICE VISIT (OUTPATIENT)
Dept: UROLOGY | Facility: CLINIC | Age: 84
End: 2024-12-06
Payer: MEDICARE

## 2024-12-06 VITALS
WEIGHT: 215.81 LBS | BODY MASS INDEX: 39.71 KG/M2 | SYSTOLIC BLOOD PRESSURE: 145 MMHG | HEART RATE: 66 BPM | DIASTOLIC BLOOD PRESSURE: 82 MMHG | HEIGHT: 62 IN

## 2024-12-06 DIAGNOSIS — N28.1 RENAL CYST, ACQUIRED, LEFT: Primary | ICD-10-CM

## 2024-12-06 PROCEDURE — 99999 PR PBB SHADOW E&M-EST. PATIENT-LVL IV: CPT | Mod: PBBFAC,,, | Performed by: UROLOGY

## 2024-12-06 NOTE — PATIENT INSTRUCTIONS
Renal ultrasound will notify patient of results.  If extensive change noted to cystic lesion will need to imaged with MRI or CT scan.  Patient aware.  Will notify patient as soon as results available and patient will follow-up yearly with renal ultrasound.

## 2024-12-06 NOTE — LETTER
December 6, 2024        Scott Dillard MD  [address] [place] [facility] [address]             [place] [facility]  [address]  [address]  [facility] [address]  Phone: [phone]  Fax: [phone]   Patient: Mis Ca   MR Number: [id number]   YOB: 1940   Date of Visit: 12/6/2024       Dear Dr. Dillard:    Thank you for referring Mis Ca to me for evaluation. Below are the relevant portions of my assessment and plan of care.            If you have questions, please do not hesitate to call me. I look forward to following Mis along with you.    Sincerely,      Osmar Martin MD           CC  No Recipients

## 2024-12-06 NOTE — PROGRESS NOTES
Subjective:      Patient ID: Mis Ca is a 84 y.o. female.    Chief Complaint: Cyst    Mrs Ca is An 84-year-old female with a history of left complex renal cyst measuring 4.8 cm on ultrasound.  Last ultrasound done in 2021.  Patient has been followed by Dr. Edge for this however Dr. Edge has left [facility] and is no longer seeing patients and David.  Patient was referred by Dr. Dillard for follow-up of renal cyst.  The history says that the cyst were bilaterally even though not seen on the ultrasound in 2021.  Will order renal ultrasound if there is significant change in the cyst will order CT scan or MRI to evaluate otherwise will have patient follow-up yearly with renal ultrasound.  Since that as visit with urologist the patient has developed breast cancer and been treated for this with mastectomy and radiation.  And is being treated with oral anastrozole presently.  Patient has no evidence  of disease at this time.    Cyst  Pertinent negatives include no abdominal pain, anorexia, arthralgias, change in bowel habit, chest pain, chills, congestion, coughing, diaphoresis, fatigue, fever, headaches, joint swelling, myalgias, nausea, neck pain, numbness, rash, sore throat, swollen glands, urinary symptoms, vertigo, visual change, vomiting or weakness.   Follow-up  This is a chronic problem. The current episode started more than 1 year ago. The problem occurs constantly. The problem has been unchanged. Pertinent negatives include no abdominal pain, anorexia, arthralgias, change in bowel habit, chest pain, chills, congestion, coughing, diaphoresis, fatigue, fever, headaches, joint swelling, myalgias, nausea, neck pain, numbness, rash, sore throat, swollen glands, urinary symptoms, vertigo, visual change, vomiting or weakness. Nothing aggravates the symptoms. She has tried nothing for the symptoms.   Review of Systems   Constitutional:  Negative for activity change, appetite change, chills, diaphoresis, fatigue  and fever.   HENT:  Negative for congestion, ear pain, hearing loss, nosebleeds, sinus pressure, sore throat and trouble swallowing.    Eyes:  Negative for pain and visual disturbance.   Respiratory:  Negative for apnea, cough and shortness of breath.    Cardiovascular:  Negative for chest pain and leg swelling.   Gastrointestinal:  Negative for abdominal distention, abdominal pain, anal bleeding, anorexia, blood in stool, change in bowel habit, constipation, diarrhea, nausea, rectal pain and vomiting.   Endocrine: Negative for cold intolerance, heat intolerance, polydipsia, polyphagia and polyuria.   Genitourinary:  Negative for decreased urine volume, difficulty urinating, dyspareunia, dysuria, enuresis, flank pain, frequency, genital sores, hematuria, menstrual problem, pelvic pain, urgency, vaginal bleeding, vaginal discharge and vaginal pain.   Musculoskeletal:  Negative for arthralgias, back pain, joint swelling, myalgias and neck pain.   Skin:  Negative for color change, pallor and rash.   Allergic/Immunologic: Negative for environmental allergies, food allergies and immunocompromised state.   Neurological:  Negative for dizziness, vertigo, speech difficulty, weakness, numbness and headaches.   Hematological:  Negative for adenopathy. Does not bruise/bleed easily.   Psychiatric/Behavioral: Negative.      Objective:     Physical Exam  Vitals and nursing note reviewed.   Constitutional:       Appearance: Normal appearance. She is well-developed and normal weight.   HENT:      Head: Normocephalic and atraumatic.      Right Ear: External ear normal. There is no impacted cerumen.      Left Ear: External ear normal. There is no impacted cerumen.      Nose: Nose normal. No congestion or rhinorrhea.      Mouth/Throat:      Mouth: Mucous membranes are moist.      Pharynx: Oropharynx is clear. No oropharyngeal exudate or posterior oropharyngeal erythema.   Eyes:      General: No scleral icterus.        Right eye: No  discharge.         Left eye: No discharge.      Conjunctiva/sclera: Conjunctivae normal.      Pupils: Pupils are equal, round, and reactive to light.   Cardiovascular:      Rate and Rhythm: Normal rate and regular rhythm.      Heart sounds: Normal heart sounds.   Pulmonary:      Effort: Pulmonary effort is normal.      Breath sounds: No rales.   Abdominal:      General: Bowel sounds are normal. There is no distension.      Palpations: Abdomen is soft. There is no mass.      Tenderness: There is no abdominal tenderness. There is no right CVA tenderness, left CVA tenderness, guarding or rebound.      Hernia: No hernia is present.   Genitourinary:     Comments: Patient with no CVA tenderness, normal penis and scrotum.  Bladder nontender.  Musculoskeletal:         General: Normal range of motion.      Cervical back: Normal range of motion and neck supple.      Right lower leg: No edema.      Left lower leg: No edema.   Skin:     General: Skin is warm and dry.      Capillary Refill: Capillary refill takes 2 to 3 seconds.      Findings: No lesion or rash.   Neurological:      General: No focal deficit present.      Mental Status: She is alert and oriented to person, place, and time.      Deep Tendon Reflexes: Reflexes are normal and symmetric.   Psychiatric:         Mood and Affect: Mood normal.         Behavior: Behavior normal.         Thought Content: Thought content normal.         Judgment: Judgment normal.      Assessment:      1. Renal cyst, acquired, left      Plan:     Patient Instructions    Renal ultrasound will notify patient of results.  If extensive change noted to cystic lesion will need to imaged with MRI or CT scan.  Patient aware.  Will notify patient as soon as results available and patient will follow-up yearly with renal ultrasound.

## 2024-12-09 ENCOUNTER — PATIENT MESSAGE (OUTPATIENT)
Dept: UROLOGY | Facility: CLINIC | Age: 84
End: 2024-12-09
Payer: MEDICARE

## 2024-12-09 ENCOUNTER — HOSPITAL ENCOUNTER (OUTPATIENT)
Dept: RADIOLOGY | Facility: HOSPITAL | Age: 84
Discharge: HOME | End: 2024-12-09
Attending: UROLOGY
Payer: MEDICARE

## 2024-12-09 DIAGNOSIS — N28.1 RENAL CYST, ACQUIRED, LEFT: ICD-10-CM

## 2024-12-09 PROCEDURE — 76770 US EXAM ABDO BACK WALL COMP: CPT | Mod: 26,,, | Performed by: RADIOLOGY

## 2024-12-09 PROCEDURE — 76770 US EXAM ABDO BACK WALL COMP: CPT | Mod: TC,PN

## 2024-12-11 ENCOUNTER — ANTI-COAG VISIT (OUTPATIENT)
Dept: CARDIOLOGY | Facility: CLINIC | Age: 84
End: 2024-12-11
Payer: MEDICARE

## 2024-12-11 ENCOUNTER — LAB VISIT (OUTPATIENT)
Dept: LAB | Facility: HOSPITAL | Age: 84
End: 2024-12-11
Attending: INTERNAL MEDICINE
Payer: MEDICARE

## 2024-12-11 DIAGNOSIS — Z79.01 LONG TERM (CURRENT) USE OF ANTICOAGULANTS: ICD-10-CM

## 2024-12-11 DIAGNOSIS — I48.0 PAROXYSMAL ATRIAL FIBRILLATION: Chronic | ICD-10-CM

## 2024-12-11 DIAGNOSIS — I48.0 PAROXYSMAL ATRIAL FIBRILLATION: Primary | Chronic | ICD-10-CM

## 2024-12-11 LAB
INR PPP: 2.9 (ref 0.8–1.2)
PROTHROMBIN TIME: 29.9 SEC (ref 9–12.5)

## 2024-12-11 PROCEDURE — 36415 COLL VENOUS BLD VENIPUNCTURE: CPT | Mod: PN | Performed by: INTERNAL MEDICINE

## 2024-12-11 PROCEDURE — 85610 PROTHROMBIN TIME: CPT | Mod: PN | Performed by: INTERNAL MEDICINE

## 2024-12-11 PROCEDURE — 93793 ANTICOAG MGMT PT WARFARIN: CPT | Mod: S$GLB,,, | Performed by: PHARMACIST

## 2025-01-02 ENCOUNTER — ANTI-COAG VISIT (OUTPATIENT)
Dept: CARDIOLOGY | Facility: CLINIC | Age: 85
End: 2025-01-02
Payer: COMMERCIAL

## 2025-01-02 ENCOUNTER — LAB VISIT (OUTPATIENT)
Dept: LAB | Facility: HOSPITAL | Age: 85
End: 2025-01-02
Attending: INTERNAL MEDICINE
Payer: COMMERCIAL

## 2025-01-02 DIAGNOSIS — I48.0 PAROXYSMAL ATRIAL FIBRILLATION: Primary | Chronic | ICD-10-CM

## 2025-01-02 DIAGNOSIS — I48.0 PAROXYSMAL ATRIAL FIBRILLATION: Chronic | ICD-10-CM

## 2025-01-02 DIAGNOSIS — Z79.01 LONG TERM (CURRENT) USE OF ANTICOAGULANTS: ICD-10-CM

## 2025-01-02 LAB
INR PPP: 2.7 (ref 0.8–1.2)
PROTHROMBIN TIME: 27.9 SEC (ref 9–12.5)

## 2025-01-02 PROCEDURE — 85610 PROTHROMBIN TIME: CPT | Mod: PN | Performed by: INTERNAL MEDICINE

## 2025-01-02 PROCEDURE — 36415 COLL VENOUS BLD VENIPUNCTURE: CPT | Mod: PN | Performed by: INTERNAL MEDICINE

## 2025-01-02 PROCEDURE — 93793 ANTICOAG MGMT PT WARFARIN: CPT | Mod: S$GLB,,, | Performed by: PHARMACIST

## 2025-01-27 ENCOUNTER — LAB VISIT (OUTPATIENT)
Dept: LAB | Facility: HOSPITAL | Age: 85
End: 2025-01-27
Attending: INTERNAL MEDICINE
Payer: COMMERCIAL

## 2025-01-27 ENCOUNTER — ANTI-COAG VISIT (OUTPATIENT)
Dept: CARDIOLOGY | Facility: CLINIC | Age: 85
End: 2025-01-27
Payer: COMMERCIAL

## 2025-01-27 DIAGNOSIS — Z79.01 LONG TERM (CURRENT) USE OF ANTICOAGULANTS: ICD-10-CM

## 2025-01-27 DIAGNOSIS — I48.0 PAROXYSMAL ATRIAL FIBRILLATION: Primary | Chronic | ICD-10-CM

## 2025-01-27 DIAGNOSIS — I48.0 PAROXYSMAL ATRIAL FIBRILLATION: Chronic | ICD-10-CM

## 2025-01-27 LAB
INR PPP: 2.7 (ref 0.8–1.2)
PROTHROMBIN TIME: 27.9 SEC (ref 9–12.5)

## 2025-01-27 PROCEDURE — 85610 PROTHROMBIN TIME: CPT | Mod: PN | Performed by: INTERNAL MEDICINE

## 2025-01-27 PROCEDURE — 93793 ANTICOAG MGMT PT WARFARIN: CPT | Mod: S$GLB,,, | Performed by: PHARMACIST

## 2025-01-27 PROCEDURE — 36415 COLL VENOUS BLD VENIPUNCTURE: CPT | Mod: PN | Performed by: INTERNAL MEDICINE

## 2025-01-27 NOTE — PROGRESS NOTES
[facility]    2025 2:36 PM    Assessment/Plan:    Patient presents today with subtherapeutic  INR.    Assessment of patient findings and chart review: Patient denies any changes in diet, medications, or health that would effect warfarin therapy.      Recommendation for patient's warfarin regimen: Continue current maintenance dose    Recommend repeat INR in 4 weeks  _________________________________________________________________    Mis L Lanny (84 y.o.) is followed by the [facility].    Anticoagulation Summary  As of 2025      INR goal:  2.0-3.0   TTR:  78.2% (2.2 y)   INR used for dosin.7 (2025)   Warfarin maintenance plan:  3 mg (6 mg x 0.5) every e, Thu, Sat; 6 mg (6 mg x 1) all other days   Weekly warfarin total:  33 mg   Plan last modified:  Tino Mendiola, PharmD (7/15/2024)   Next INR check:  2025   Target end date:  --    Indications    Paroxysmal atrial fibrillation [I48.0]  Long term (current) use of anticoagulants [Z79.01]                 Anticoagulation Episode Summary       INR check location:  --    Preferred lab:  --    Send INR reminders to:  [facility] COUMADIN MONITORING POOL    Comments:  [facility]          Anticoagulation Care Providers       Provider Role Specialty Phone number    Shamar Owens MD Responsible Electrophysiology [phone]

## 2025-01-30 ENCOUNTER — OFFICE VISIT (OUTPATIENT)
Dept: CARDIOLOGY | Facility: CLINIC | Age: 85
End: 2025-01-30
Payer: COMMERCIAL

## 2025-01-30 VITALS
SYSTOLIC BLOOD PRESSURE: 121 MMHG | WEIGHT: 216 LBS | HEIGHT: 62 IN | OXYGEN SATURATION: 90 % | HEART RATE: 65 BPM | DIASTOLIC BLOOD PRESSURE: 65 MMHG | BODY MASS INDEX: 39.75 KG/M2

## 2025-01-30 DIAGNOSIS — R60.9 SWELLING: ICD-10-CM

## 2025-01-30 DIAGNOSIS — I48.0 PAROXYSMAL ATRIAL FIBRILLATION: ICD-10-CM

## 2025-01-30 DIAGNOSIS — E66.01 SEVERE OBESITY WITH BODY MASS INDEX (BMI) OF 35.0 TO 39.9 WITH SERIOUS COMORBIDITY: ICD-10-CM

## 2025-01-30 DIAGNOSIS — I77.1 TORTUOUS AORTA: ICD-10-CM

## 2025-01-30 DIAGNOSIS — E78.2 MIXED HYPERLIPIDEMIA: ICD-10-CM

## 2025-01-30 DIAGNOSIS — I70.0 AORTIC ATHEROSCLEROSIS: Primary | ICD-10-CM

## 2025-01-30 DIAGNOSIS — I10 ESSENTIAL HYPERTENSION: ICD-10-CM

## 2025-01-30 PROCEDURE — 1101F PT FALLS ASSESS-DOCD LE1/YR: CPT | Mod: CPTII,S$GLB,, | Performed by: INTERNAL MEDICINE

## 2025-01-30 PROCEDURE — 99214 OFFICE O/P EST MOD 30 MIN: CPT | Mod: S$GLB,,, | Performed by: INTERNAL MEDICINE

## 2025-01-30 PROCEDURE — 3074F SYST BP LT 130 MM HG: CPT | Mod: CPTII,S$GLB,, | Performed by: INTERNAL MEDICINE

## 2025-01-30 PROCEDURE — 99999 PR PBB SHADOW E&M-EST. PATIENT-LVL IV: CPT | Mod: PBBFAC,,, | Performed by: INTERNAL MEDICINE

## 2025-01-30 PROCEDURE — 3288F FALL RISK ASSESSMENT DOCD: CPT | Mod: CPTII,S$GLB,, | Performed by: INTERNAL MEDICINE

## 2025-01-30 PROCEDURE — 1159F MED LIST DOCD IN RCRD: CPT | Mod: CPTII,S$GLB,, | Performed by: INTERNAL MEDICINE

## 2025-01-30 PROCEDURE — 3078F DIAST BP <80 MM HG: CPT | Mod: CPTII,S$GLB,, | Performed by: INTERNAL MEDICINE

## 2025-01-30 RX ORDER — CHLORTHALIDONE 50 MG/1
50 TABLET ORAL DAILY
Qty: 90 TABLET | Refills: 3 | Status: SHIPPED | OUTPATIENT
Start: 2025-01-30

## 2025-01-30 RX ORDER — FUROSEMIDE 20 MG/1
20 TABLET ORAL DAILY
Qty: 90 TABLET | Refills: 3 | Status: SHIPPED | OUTPATIENT
Start: 2025-01-30

## 2025-01-30 RX ORDER — PRAVASTATIN SODIUM 80 MG/1
80 TABLET ORAL DAILY
Qty: 90 TABLET | Refills: 3 | Status: SHIPPED | OUTPATIENT
Start: 2025-01-30 | End: 2026-01-30

## 2025-01-30 NOTE — PROGRESS NOTES
Subjective:   @Patient ID:  Mis Ca is a 84 y.o. female who presents for follow-up of PAF, Hyperlipidemia, HTN, DD      HPI:   1/2025:  Follow up.  She is doing well.  Occasional shortness of breath.  On amiodarone 200 mg daily.  Remains sinus rhythm by physical examination.  BP well-controlled    6/22/2023: Here for f/u. She is doing very well. No chest pain, no significant shortness of breath or LE edema. She has been compliant with her medications. Remains on amio 100 mg daily and maintaining SR.   Compliant with lasix. Blood pressure is well controlled. Plan to have left wrist surgery.         Back to coumadin due to cost with xarelto   Night cramps better after Lipitor 40 mg qhs was switched to Pravastatin      JACQUELYN : has been off the CPAP since it was recalled        Prior cardiovascular  Hx  --------------------------------  - s/p DCCV        - ECHO    9/2019 Normal left ventricular systolic function. The estimated ejection fraction is 65%  Normal LV diastolic function.  Concentric left ventricular hypertrophy.  Normal right ventricular systolic function.             12/2017  Normal left ventricular systolic function (EF 60-65%).     2 - Impaired LV relaxation, normal LAP (grade 1 diastolic dysfunction).     3 - Normal right ventricular systolic function .     4 - The estimated PA systolic pressure is 28 mmHg.     5 - Mild left atrial enlargement.         Patient Active Problem List    Diagnosis Date Noted    Renal cyst, acquired, left 12/06/2024    At risk for lymphedema 06/12/2024    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, estrogen receptor positive 05/19/2024    Type 2 diabetes mellitus without complication, without long-term current use of insulin 04/11/2024    Muscle weakness 07/26/2023    Stiffness of left wrist joint 07/26/2023    Stiffness of finger joint, left 07/26/2023    Rupture of extensor tendon of left hand 06/30/2023    Chronic kidney disease, stage 3a 05/03/2023    Bilateral renal  cysts 05/01/2022     Noted on U/S retroperitoneal complete dated 7/6/2021.      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       Hallux valgus with bunions, left 10/31/2018    Severe obesity with body mass index (BMI) of 35.0 to 39.9 with serious comorbidity 12/21/2017    Aortic atherosclerosis 09/04/2017     Seen on chest xray dated 09/04/17      Blind right eye 07/10/2017    Glaucoma 04/06/2017    Tortuous aorta 03/08/2017    History of adenomatous polyp of colon 12/09/2016    Diverticulosis of large intestine without hemorrhage 12/09/2016    JACQUELYN (obstructive sleep apnea) 02/17/2016    Long term (current) use of anticoagulants 02/17/2016    Anxiety 02/17/2016    Restless leg syndrome 02/17/2016    Hyperlipidemia 02/17/2016    Essential hypertension 02/17/2016    Gastroesophageal reflux disease without esophagitis 02/17/2016    Paroxysmal atrial fibrillation 12/16/2014                    LAST HbA1c  Lab Results   Component Value Date    HGBA1C 5.8 (H) 10/29/2024       Lipid panel  Lab Results   Component Value Date    CHOL 210 (H) 10/29/2024    CHOL 193 09/07/2022    CHOL 148 09/17/2021     Lab Results   Component Value Date    HDL 45 10/29/2024    HDL 39 (L) 09/07/2022    HDL 38 (L) 09/17/2021     Lab Results   Component Value Date    LDLCALC 123.2 10/29/2024    LDLCALC 106.2 09/07/2022    LDLCALC 73.8 09/17/2021     Lab Results   Component Value Date    TRIG 209 (H) 10/29/2024    TRIG 239 (H) 09/07/2022    TRIG 181 (H) 09/17/2021     Lab Results   Component Value Date    CHOLHDL 21.4 10/29/2024    CHOLHDL 20.2 09/07/2022     CHOLHDL 25.7 09/17/2021            Review of Systems   Constitutional: Negative for chills and fever.   HENT:  Negative for hearing loss and nosebleeds.    Eyes:  Negative for blurred vision.   Cardiovascular:  Negative for chest pain and palpitations.        As in HPI    Respiratory:  Negative for hemoptysis and shortness of breath.    Hematologic/Lymphatic: Negative for bleeding problem.   Skin:  Negative for itching.   Musculoskeletal:         As in HPI    Gastrointestinal:  Negative for abdominal pain and hematochezia.   Genitourinary:  Negative for hematuria.   Neurological:  Negative for dizziness and loss of balance.   Psychiatric/Behavioral:  Negative for altered mental status and depression.        Objective:   Physical Exam  Constitutional:       Appearance: She is well-developed.   HENT:      Head: Normocephalic and atraumatic.   Eyes:      Conjunctiva/sclera: Conjunctivae normal.   Neck:      Vascular: No JVD.   Cardiovascular:      Rate and Rhythm: Normal rate and regular rhythm.      Heart sounds: Normal heart sounds. No murmur heard.     No friction rub. No gallop.   Pulmonary:      Effort: Pulmonary effort is normal. No respiratory distress.      Breath sounds: Normal breath sounds. No stridor. No wheezing.   Musculoskeletal:      Cervical back: Neck supple.   Skin:     General: Skin is warm and dry.   Neurological:      Mental Status: She is alert and oriented to person, place, and time.   Psychiatric:         Behavior: Behavior normal.         Assessment:     1. Aortic atherosclerosis    2. Severe obesity with body mass index (BMI) of 35.0 to 39.9 with serious comorbidity    3. Paroxysmal atrial fibrillation    4. Essential hypertension    5. Mixed hyperlipidemia    6. Tortuous aorta          Plan:   Continue current treatment   Currently remains in sinus rhythm with amiodarone 200 mg daily.  We will continue losartan 50 mg daily   Continue chlorthalidone  Continue Lasix  Needs JACQUELYN appropriate ttt.   Follow-up with sleep clinic  Continue Coumadin   Increase pravastatin to 80 mg  2D echocardiogram       I spent 5-10 minutes asking, assessing, assisting, arranging and advising heart healthy diet improvements. This included low-salt meals, portion control and health food alternatives. I also encourage 30 minutes of moderate exercise 3-4x a week.       Pertinent cardiac images and EKG reviewed independently.    Continue with current medical plan and lifestyle changes.  Return sooner for concerns or questions. If symptoms persist go to the ED  I have reviewed all pertinent data including patient's medical history in detail and updated the computerized patient record.     No orders of the defined types were placed in this encounter.        Follow up as scheduled.     She expressed verbal understanding and agreed with the plan    Patient's Medications   New Prescriptions    No medications on file   Previous Medications    ACETAMINOPHEN (TYLENOL) 500 MG TABLET    Take 1,000 mg by mouth every 6 (six) hours as needed for Pain.    ALENDRONATE (FOSAMAX) 70 MG TABLET    TAKE 1 TABLET (70 MG TOTAL) BY MOUTH EVERY 7 DAYS    AMIODARONE (PACERONE) 200 MG TAB    Take 1 tablet (200 mg total) by mouth once daily.    AMITRIPTYLINE (ELAVIL) 25 MG TABLET    TAKE 1 TABLET BY MOUTH EVERY DAY IN THE EVENING    ANASTROZOLE (ARIMIDEX) 1 MG TAB    Take 1 tablet (1 mg total) by mouth once daily.    ASPIRIN (ECOTRIN) 81 MG EC TABLET    Take 81 mg by mouth once daily.    AZELASTINE (ASTELIN) 137 MCG (0.1 %) NASAL SPRAY    1 spray by Nasal route 2 (two) times daily.    CHLORTHALIDONE (HYGROTEN) 50 MG TAB    Take 1 tablet (50 mg total) by mouth once daily.    CYANOCOBALAMIN 1,000 MCG/ML INJECTION    INJECT 1 ML (1,000 MCG) INTRAMUSCULARLY EVERY 30 DAYS    FUROSEMIDE (LASIX) 20 MG TABLET    Take 1 tablet (20 mg total) by mouth once daily.    GABAPENTIN (NEURONTIN) 300 MG CAPSULE    TAKE 2 CAPSULES BY MOUTH TWICE  DAILY    LATANOPROST 0.005 %  "OPHTHALMIC SOLUTION    Place 1 drop into the left eye every evening.    LEVOCETIRIZINE (XYZAL) 5 MG TABLET    TAKE 1 TABLET BY MOUTH EVERY DAY IN THE EVENING    LOSARTAN (COZAAR) 100 MG TABLET    Take 0.5 tablets (50 mg total) by mouth once daily.    MULTIVIT-MIN-IRON-FA-LUTEIN (CENTRUM SILVER WOMEN) 8 MG IRON-400 MCG-300 MCG TAB    Take 1 tablet by mouth once daily.    NEEDLE, DISP, 25 GAUGE 25 GAUGE X 1" NDLE    1 Needle by Misc.(Non-Drug; Combo Route) route every 30 days.    OMEPRAZOLE (PRILOSEC) 20 MG CAPSULE    TAKE 1 CAPSULE BY MOUTH TWICE  DAILY    OXYBUTYNIN (DITROPAN) 5 MG TAB        POTASSIUM CHLORIDE (K-TAB) 20 MEQ    Take 1 tablet (20 mEq total) by mouth once daily.    PRAMIPEXOLE (MIRAPEX) 0.5 MG TABLET    TAKE 1 TABLET BY MOUTH TWICE  DAILY    PRAVASTATIN (PRAVACHOL) 40 MG TABLET    TAKE 1 TABLET BY MOUTH EVERY DAY    TIMOLOL MALEATE 0.5% (TIMOPTIC) 0.5 % DROP    Place 1 drop into the left eye once daily.     VITAMIN D (VITAMIN D3) 1000 UNITS TAB    Take 1,000 Units by mouth once daily.    WARFARIN (COUMADIN) 6 MG TABLET    TAKE 6 MG DAILY EXCEPT FOR 9 MG ON FRIDAY TO THIN BLOOD   Modified Medications    No medications on file   Discontinued Medications    No medications on file                    "

## 2025-02-03 DIAGNOSIS — E87.6 HYPOKALEMIA: ICD-10-CM

## 2025-02-05 RX ORDER — POTASSIUM CHLORIDE 1500 MG/1
20 TABLET, EXTENDED RELEASE ORAL
Qty: 90 TABLET | Refills: 3 | Status: SHIPPED | OUTPATIENT
Start: 2025-02-05

## 2025-02-06 ENCOUNTER — OFFICE VISIT (OUTPATIENT)
Dept: FAMILY MEDICINE | Facility: CLINIC | Age: 85
End: 2025-02-06
Payer: MEDICARE

## 2025-02-06 VITALS
HEART RATE: 87 BPM | WEIGHT: 215.38 LBS | BODY MASS INDEX: 39.63 KG/M2 | OXYGEN SATURATION: 97 % | TEMPERATURE: 98 F | DIASTOLIC BLOOD PRESSURE: 68 MMHG | HEIGHT: 62 IN | SYSTOLIC BLOOD PRESSURE: 122 MMHG

## 2025-02-06 DIAGNOSIS — M75.41 IMPINGEMENT SYNDROME OF RIGHT SHOULDER: ICD-10-CM

## 2025-02-06 DIAGNOSIS — Z17.0 MALIGNANT NEOPLASM OF CENTRAL PORTION OF RIGHT BREAST IN FEMALE, ESTROGEN RECEPTOR POSITIVE: ICD-10-CM

## 2025-02-06 DIAGNOSIS — G89.29 CHRONIC RIGHT SHOULDER PAIN: Primary | ICD-10-CM

## 2025-02-06 DIAGNOSIS — E11.9 TYPE 2 DIABETES MELLITUS WITHOUT COMPLICATION, WITHOUT LONG-TERM CURRENT USE OF INSULIN: ICD-10-CM

## 2025-02-06 DIAGNOSIS — C50.111 MALIGNANT NEOPLASM OF CENTRAL PORTION OF RIGHT BREAST IN FEMALE, ESTROGEN RECEPTOR POSITIVE: ICD-10-CM

## 2025-02-06 DIAGNOSIS — N18.31 CHRONIC KIDNEY DISEASE, STAGE 3A: ICD-10-CM

## 2025-02-06 DIAGNOSIS — M25.511 CHRONIC RIGHT SHOULDER PAIN: Primary | ICD-10-CM

## 2025-02-06 DIAGNOSIS — N18.32 STAGE 3B CHRONIC KIDNEY DISEASE: ICD-10-CM

## 2025-02-06 PROCEDURE — 1159F MED LIST DOCD IN RCRD: CPT | Mod: CPTII,S$GLB,, | Performed by: PHYSICIAN ASSISTANT

## 2025-02-06 PROCEDURE — 1125F AMNT PAIN NOTED PAIN PRSNT: CPT | Mod: CPTII,S$GLB,, | Performed by: PHYSICIAN ASSISTANT

## 2025-02-06 PROCEDURE — 3078F DIAST BP <80 MM HG: CPT | Mod: CPTII,S$GLB,, | Performed by: PHYSICIAN ASSISTANT

## 2025-02-06 PROCEDURE — 1160F RVW MEDS BY RX/DR IN RCRD: CPT | Mod: CPTII,S$GLB,, | Performed by: PHYSICIAN ASSISTANT

## 2025-02-06 PROCEDURE — 3074F SYST BP LT 130 MM HG: CPT | Mod: CPTII,S$GLB,, | Performed by: PHYSICIAN ASSISTANT

## 2025-02-06 PROCEDURE — 3288F FALL RISK ASSESSMENT DOCD: CPT | Mod: CPTII,S$GLB,, | Performed by: PHYSICIAN ASSISTANT

## 2025-02-06 PROCEDURE — 1101F PT FALLS ASSESS-DOCD LE1/YR: CPT | Mod: CPTII,S$GLB,, | Performed by: PHYSICIAN ASSISTANT

## 2025-02-06 PROCEDURE — 99214 OFFICE O/P EST MOD 30 MIN: CPT | Mod: S$GLB,,, | Performed by: PHYSICIAN ASSISTANT

## 2025-02-06 RX ORDER — METHYLPREDNISOLONE 4 MG/1
TABLET ORAL
Qty: 21 EACH | Refills: 0 | Status: SHIPPED | OUTPATIENT
Start: 2025-02-06 | End: 2025-02-27

## 2025-02-06 NOTE — PROGRESS NOTES
Patient ID: Mis Ca is a 84 y.o. female.     Chief Complaint: Shoulder Pain and Arm Pain    85 y/o female with PMHx HTN, HLD, CKD3, breast cancer presents to the office with a chief complaint of right shoulder pain. Patient reports mastecomy performed in June of 2024. She states she wears a bra prosthesis and reports that it has been pushing up against her underarm. She reports pain and bruising associated with bra. She has since bought extenders to provide more comfort. Patient states she has limited ROM due to pain and has not tried any OTC medications for relief.     Shoulder Pain   Pertinent negatives include no fever or headaches.   Arm Pain   Pertinent negatives include no chest pain.       Review of Systems  Review of Systems   Constitutional:  Negative for chills and fever.   HENT:  Negative for ear pain and sinus pain.    Eyes:  Negative for blurred vision, double vision and discharge.   Respiratory:  Negative for cough, shortness of breath and wheezing.    Cardiovascular:  Negative for chest pain and leg swelling.   Gastrointestinal:  Negative for abdominal pain, diarrhea, nausea and vomiting.   Genitourinary:  Negative for urgency.   Musculoskeletal:  Positive for myalgias (right axilla).   Skin:  Negative for rash.   Neurological:  Negative for dizziness, weakness and headaches.   Psychiatric/Behavioral:  Negative for depression.        Currently Medications  Current Outpatient Medications on File Prior to Visit   Medication Sig Dispense Refill    acetaminophen (TYLENOL) 500 MG tablet Take 1,000 mg by mouth every 6 (six) hours as needed for Pain.      alendronate (FOSAMAX) 70 MG tablet TAKE 1 TABLET (70 MG TOTAL) BY MOUTH EVERY 7 DAYS 12 tablet 3    amiodarone (PACERONE) 200 MG Tab Take 1 tablet (200 mg total) by mouth once daily. 90 tablet 3    amitriptyline (ELAVIL) 25 MG tablet TAKE 1 TABLET BY MOUTH EVERY DAY IN THE EVENING 90 tablet 1    anastrozole (ARIMIDEX) 1 mg Tab Take 1 tablet (1 mg  "total) by mouth once daily. 90 tablet 3    aspirin (ECOTRIN) 81 MG EC tablet Take 81 mg by mouth once daily.      chlorthalidone (HYGROTEN) 50 MG Tab Take 1 tablet (50 mg total) by mouth once daily. 90 tablet 3    cyanocobalamin 1,000 mcg/mL injection INJECT 1 ML (1,000 MCG) INTRAMUSCULARLY EVERY 30 DAYS 3 mL 19    furosemide (LASIX) 20 MG tablet Take 1 tablet (20 mg total) by mouth once daily. 90 tablet 3    gabapentin (NEURONTIN) 300 MG capsule TAKE 2 CAPSULES BY MOUTH TWICE  DAILY 360 capsule 3    latanoprost 0.005 % ophthalmic solution Place 1 drop into the left eye every evening.      levocetirizine (XYZAL) 5 MG tablet TAKE 1 TABLET BY MOUTH EVERY DAY IN THE EVENING 90 tablet 1    losartan (COZAAR) 100 MG tablet Take 0.5 tablets (50 mg total) by mouth once daily. 45 tablet 3    multivit-min-iron-FA-lutein (CENTRUM SILVER WOMEN) 8 mg iron-400 mcg-300 mcg Tab Take 1 tablet by mouth once daily.      needle, disp, 25 gauge 25 gauge x 1" Ndle 1 Needle by Misc.(Non-Drug; Combo Route) route every 30 days. 25 each 0    omeprazole (PRILOSEC) 20 MG capsule TAKE 1 CAPSULE BY MOUTH TWICE  DAILY 180 capsule 2    oxybutynin (DITROPAN) 5 MG Tab       potassium chloride (K-TAB) 20 mEq TAKE 1 TABLET BY MOUTH ONCE DAILY 90 tablet 3    pramipexole (MIRAPEX) 0.5 MG tablet TAKE 1 TABLET BY MOUTH TWICE  DAILY 180 tablet 3    pravastatin (PRAVACHOL) 80 MG tablet Take 1 tablet (80 mg total) by mouth once daily. 90 tablet 3    timolol maleate 0.5% (TIMOPTIC) 0.5 % Drop Place 1 drop into the left eye once daily.   0    vitamin D (VITAMIN D3) 1000 units Tab Take 1,000 Units by mouth once daily.      warfarin (COUMADIN) 6 MG tablet TAKE 6 MG DAILY EXCEPT FOR 9 MG ON FRIDAY TO THIN BLOOD 96 tablet 3    azelastine (ASTELIN) 137 mcg (0.1 %) nasal spray 1 spray by Nasal route 2 (two) times daily. (Patient not taking: Reported on 2/6/2025)       Current Facility-Administered Medications on File Prior to Visit   Medication Dose Route Frequency " "Provider Last Rate Last Admin    lactated ringers infusion   Intravenous Continuous Don Ruano DNP   New Bag at 06/24/24 0832    LIDOcaine (PF) 10 mg/ml (1%) injection 10 mg  1 mL Intradermal Once Don Ruano DNP           Physical  Exam  Vitals:    02/06/25 1107   BP: 122/68   BP Location: Left arm   Patient Position: Sitting   Pulse: 87   Temp: 98.2 °F (36.8 °C)   SpO2: 97%   Weight: 97.7 kg (215 lb 6.2 oz)   Height: 5' 2" (1.575 m)      Body mass index is 39.4 kg/m².  Wt Readings from Last 3 Encounters:   02/06/25 97.7 kg (215 lb 6.2 oz)   01/30/25 98 kg (216 lb)   12/06/24 97.9 kg (215 lb 13.3 oz)       Physical Exam  Vitals and nursing note reviewed.   Constitutional:       General: She is not in acute distress.     Appearance: She is obese. She is not ill-appearing.   HENT:      Head: Normocephalic and atraumatic.      Right Ear: External ear normal.      Left Ear: External ear normal.      Nose: Nose normal.      Mouth/Throat:      Mouth: Mucous membranes are moist.   Eyes:      Extraocular Movements: Extraocular movements intact.      Conjunctiva/sclera: Conjunctivae normal.   Cardiovascular:      Rate and Rhythm: Normal rate and regular rhythm.      Pulses: Normal pulses.      Heart sounds: No murmur heard.  Pulmonary:      Effort: Pulmonary effort is normal. No respiratory distress.      Breath sounds: No wheezing.   Chest:   Breasts:     Right: Skin change and tenderness present.          Comments: History of right mastectomy; noted bruising   Abdominal:      General: There is no distension.      Palpations: Abdomen is soft. There is no mass.      Tenderness: There is no abdominal tenderness.   Musculoskeletal:         General: No swelling.      Cervical back: Normal range of motion.   Skin:     Coloration: Skin is not jaundiced.      Findings: No rash.   Neurological:      General: No focal deficit present.      Mental Status: She is alert and oriented to person, place, and time. "   Psychiatric:         Mood and Affect: Mood normal.         Thought Content: Thought content normal.       Labs:    Complete Blood Count  Lab Results   Component Value Date    RBC 3.93 (L) 10/29/2024    HGB 13.3 10/29/2024    HCT 39.1 10/29/2024     (H) 10/29/2024    MCH 33.8 (H) 10/29/2024    MCHC 34.0 10/29/2024    RDW 13.2 10/29/2024     10/29/2024    MPV 10.1 10/29/2024    GRAN 3.4 10/29/2024    GRAN 65.6 10/29/2024    LYMPH 0.9 (L) 10/29/2024    LYMPH 16.6 (L) 10/29/2024    MONO 0.6 10/29/2024    MONO 12.2 10/29/2024    EOS 0.2 10/29/2024    BASO 0.03 10/29/2024    EOSINOPHIL 4.4 10/29/2024    BASOPHIL 0.6 10/29/2024    DIFFMETHOD Automated 10/29/2024       Comprehensive Metabolic Panel  Lab Results   Component Value Date     (H) 11/21/2024    BUN 25 (H) 11/21/2024    CREATININE 1.28 11/21/2024     11/21/2024    K 3.5 11/21/2024    CL 95 11/21/2024    PROT 7.0 11/21/2024    ALBUMIN 4.2 11/21/2024    BILITOT 0.5 11/21/2024    AST 30 11/21/2024    ALKPHOS 61 11/21/2024    CO2 33 (H) 11/21/2024    ALT 23 11/21/2024    ANIONGAP 9 11/21/2024       TSH  Lab Results   Component Value Date    TSH 1.420 11/21/2024       Imaging:  US Retroperitoneal Complete  Narrative: EXAMINATION:  US RETROPERITONEAL COMPLETE    CLINICAL HISTORY:  Cyst of kidney, acquired    TECHNIQUE:  Ultrasound of the kidneys and urinary bladder was performed including color flow and Doppler evaluation of the kidneys.    COMPARISON:  04/24/2024    FINDINGS:  Right kidney: The right kidney measures 11.8 cm. Mild cortical thinning. No loss of corticomedullary distinction.  Normal perfusion.  1.7 cm cyst lower pole.  No mass. No renal stone. No hydronephrosis.    Left kidney: The left kidney measures 11.4 cm. Mild cortical thinning. No loss of corticomedullary distinction.  Normal perfusion. No mass.  2.6 cm cyst upper pole.  Large 15 cm cyst exophytic to the mid and lower pole.  2.1 cm nonobstructing stones suspected within  the upper pole.  No hydronephrosis.    The bladder is partially distended at the time of scanning and has an unremarkable appearance.  Bladder volume 84 cc.  Impression: No suspicious abnormalities.  Overall stable exam..    Electronically signed by: Mo Anguiano MD  Date:    12/09/2024  Time:    10:23      Assessment/Plan:    1. Chronic right shoulder pain  -     methylPREDNISolone (MEDROL DOSEPACK) 4 mg tablet; use as directed  Dispense: 21 each; Refill: 0  -     Ambulatory referral/consult to Physical/Occupational Therapy; Future; Expected date: 02/13/2025    2. Impingement syndrome of right shoulder  Comments:  - Referral sent to PT to help with ROM  - Advised patient to take medication as prescribed  - Advised patient to reach out with any questions or concerns  Orders:  -     methylPREDNISolone (MEDROL DOSEPACK) 4 mg tablet; use as directed  Dispense: 21 each; Refill: 0  -     Ambulatory referral/consult to Physical/Occupational Therapy; Future; Expected date: 02/13/2025    3. Stage 3b chronic kidney disease    4. Type 2 diabetes mellitus without complication, without long-term current use of insulin    5. Malignant neoplasm of central portion of right breast in female, estrogen receptor positive    6. Chronic kidney disease, stage 3a            Discussed how to stay healthy including: diet, exercise, refraining from smoking and discussed screening exams / tests needed for age, sex and family Hx.         RTC every 6-12 months with PCP, or NEVILLE García PA-C

## 2025-02-10 ENCOUNTER — ANTI-COAG VISIT (OUTPATIENT)
Dept: CARDIOLOGY | Facility: CLINIC | Age: 85
End: 2025-02-10
Payer: MEDICARE

## 2025-02-10 ENCOUNTER — LAB VISIT (OUTPATIENT)
Dept: LAB | Facility: HOSPITAL | Age: 85
End: 2025-02-10
Attending: INTERNAL MEDICINE
Payer: MEDICARE

## 2025-02-10 DIAGNOSIS — I48.0 PAROXYSMAL ATRIAL FIBRILLATION: Chronic | ICD-10-CM

## 2025-02-10 DIAGNOSIS — Z79.01 LONG TERM (CURRENT) USE OF ANTICOAGULANTS: ICD-10-CM

## 2025-02-10 DIAGNOSIS — I48.0 PAROXYSMAL ATRIAL FIBRILLATION: Primary | Chronic | ICD-10-CM

## 2025-02-10 LAB
INR PPP: 2.8 (ref 0.8–1.2)
PROTHROMBIN TIME: 28.2 SEC (ref 9–12.5)

## 2025-02-10 PROCEDURE — 93793 ANTICOAG MGMT PT WARFARIN: CPT | Mod: S$GLB,,, | Performed by: PHARMACIST

## 2025-02-10 PROCEDURE — 85610 PROTHROMBIN TIME: CPT | Mod: PN | Performed by: INTERNAL MEDICINE

## 2025-02-10 PROCEDURE — 36415 COLL VENOUS BLD VENIPUNCTURE: CPT | Mod: PN | Performed by: INTERNAL MEDICINE

## 2025-02-10 NOTE — PROGRESS NOTES
[facility]    02/10/2025 2:05 PM    Assessment/Plan:    Patient presents today with therapeutic INR.    Assessment of patient findings and chart review: INR at goal. Medications and chart reviewed. No changes noted to necessitate adjustment of warfarin or follow-up plan. See calendar.  INR is at goal but close watch needed for  possible interaction with medrol.     Recommendation for patient's warfarin regimen:  per calendar    Recommend repeat INR in 4 days  _________________________________________________________________    Mis Ca (84 y.o.) is followed by the [facility].    Anticoagulation Summary  As of 2/10/2025      INR goal:  2.0-3.0   TTR:  78.6% (2.3 y)   INR used for dosin.8 (2/10/2025)   Warfarin maintenance plan:  3 mg (6 mg x 0.5) every Tue, Thu, Sat; 6 mg (6 mg x 1) all other days   Weekly warfarin total:  33 mg   Plan last modified:  Tino Mendiola, PharmD (7/15/2024)   Next INR check:  2025   Target end date:  --    Indications    Paroxysmal atrial fibrillation [I48.0]  Long term (current) use of anticoagulants [Z79.01]                 Anticoagulation Episode Summary       INR check location:  --    Preferred lab:  --    Send INR reminders to:  [facility] COUMADIN MONITORING POOL    Comments:  [facility]          Anticoagulation Care Providers       Provider Role Specialty Phone number    Shamar Owens MD Responsible Electrophysiology [phone]

## 2025-02-13 ENCOUNTER — TELEPHONE (OUTPATIENT)
Dept: ELECTROPHYSIOLOGY | Facility: CLINIC | Age: 85
End: 2025-02-13
Payer: MEDICARE

## 2025-02-13 ENCOUNTER — LAB VISIT (OUTPATIENT)
Dept: LAB | Facility: HOSPITAL | Age: 85
End: 2025-02-13
Attending: INTERNAL MEDICINE
Payer: MEDICARE

## 2025-02-13 ENCOUNTER — ANTI-COAG VISIT (OUTPATIENT)
Dept: CARDIOLOGY | Facility: CLINIC | Age: 85
End: 2025-02-13
Payer: MEDICARE

## 2025-02-13 DIAGNOSIS — I48.0 PAROXYSMAL ATRIAL FIBRILLATION: Primary | Chronic | ICD-10-CM

## 2025-02-13 DIAGNOSIS — I48.0 PAROXYSMAL ATRIAL FIBRILLATION: Chronic | ICD-10-CM

## 2025-02-13 DIAGNOSIS — Z79.01 LONG TERM (CURRENT) USE OF ANTICOAGULANTS: ICD-10-CM

## 2025-02-13 LAB
INR PPP: 2.9 (ref 0.8–1.2)
PROTHROMBIN TIME: 29.3 SEC (ref 9–12.5)

## 2025-02-13 PROCEDURE — 36415 COLL VENOUS BLD VENIPUNCTURE: CPT | Mod: PN | Performed by: INTERNAL MEDICINE

## 2025-02-13 PROCEDURE — 85610 PROTHROMBIN TIME: CPT | Mod: PN | Performed by: INTERNAL MEDICINE

## 2025-02-13 PROCEDURE — 93793 ANTICOAG MGMT PT WARFARIN: CPT | Mod: S$GLB,,, | Performed by: PHARMACIST

## 2025-02-13 NOTE — PROGRESS NOTES
[facility]    2025 1:34 PM    Assessment/Plan:    Patient presents today with therapeutic INR.    Assessment of patient findings and chart review: INR at goal. Medications and chart reviewed. No changes noted to necessitate adjustment of warfarin or follow-up plan. See calendar. Medrol completed      Recommendation for patient's warfarin regimen: Continue current maintenance dose    Recommend repeat INR in 2 weeks  _________________________________________________________________    Mis Hughesjuany (84 y.o.) is followed by the [facility].    Anticoagulation Summary  As of 2025      INR goal:  2.0-3.0   TTR:  78.6% (2.3 y)   INR used for dosin.9 (2025)   Warfarin maintenance plan:  3 mg (6 mg x 0.5) every e, Thu, Sat; 6 mg (6 mg x 1) all other days   Weekly warfarin total:  33 mg   Plan last modified:  Tino Mendiola, PharmD (7/15/2024)   Next INR check:  2025   Target end date:  --    Indications    Paroxysmal atrial fibrillation [I48.0]  Long term (current) use of anticoagulants [Z79.01]                 Anticoagulation Episode Summary       INR check location:  --    Preferred lab:  --    Send INR reminders to:  [facility] COUMADIN MONITORING POOL    Comments:  [facility]          Anticoagulation Care Providers       Provider Role Specialty Phone number    Shamar Owens MD Responsible Electrophysiology [phone]

## 2025-02-14 ENCOUNTER — OFFICE VISIT (OUTPATIENT)
Dept: CARDIOLOGY | Facility: CLINIC | Age: 85
End: 2025-02-14
Payer: MEDICARE

## 2025-02-14 VITALS
DIASTOLIC BLOOD PRESSURE: 61 MMHG | HEIGHT: 62 IN | BODY MASS INDEX: 38.64 KG/M2 | WEIGHT: 210 LBS | SYSTOLIC BLOOD PRESSURE: 117 MMHG | HEART RATE: 56 BPM

## 2025-02-14 DIAGNOSIS — I10 ESSENTIAL HYPERTENSION: ICD-10-CM

## 2025-02-14 DIAGNOSIS — N18.32 STAGE 3B CHRONIC KIDNEY DISEASE: ICD-10-CM

## 2025-02-14 DIAGNOSIS — E11.9 TYPE 2 DIABETES MELLITUS WITHOUT COMPLICATION, WITHOUT LONG-TERM CURRENT USE OF INSULIN: ICD-10-CM

## 2025-02-14 DIAGNOSIS — G47.33 OSA (OBSTRUCTIVE SLEEP APNEA): ICD-10-CM

## 2025-02-14 DIAGNOSIS — I48.0 PAROXYSMAL ATRIAL FIBRILLATION: Primary | Chronic | ICD-10-CM

## 2025-02-14 DIAGNOSIS — I70.0 AORTIC ATHEROSCLEROSIS: ICD-10-CM

## 2025-02-14 PROCEDURE — 99999 PR PBB SHADOW E&M-EST. PATIENT-LVL IV: CPT | Mod: PBBFAC,,, | Performed by: INTERNAL MEDICINE

## 2025-02-14 NOTE — PROGRESS NOTES
Subjective:    Patient ID:  Mis Ca is a 84 y.o. female who presents for evaluation of Atrial Fibrillation      Referring Cardiologist: Portillo Luis MD  Primary Care Physician: Scott Dillard MD    HPI  Prior Hx:  I had the pleasure of seeing Mrs. Ca in our electrophysiology clinic in follow-up for her atrial arrhythmia. As you are aware she is a pleasant 84 year-old woman with paroxysmal atrial fibrillation, hypertension, obstructive sleep apnea, aortic atherosclerosis and obesity. She reported palpitations for over 10 years. Ultimately diagnosed with pAF in 2014, at which time she was hospitalized in the ICU and treated with a diltiazem drip. She had several paroxysms and hospitalizations for pAF however was not referred to EP until 10/2022. Had recently been admitted for AF with RVR, rate controlled and discharged. She reported ongoing symptoms of dyspnea, palpitations, fatigue and at times hypotension. She is on coumadin for anticoagulation (DOACs are cost prohibitive). We discussed how rhythm control strategies utilizing anti-arrhythmic drugs and/or ablation strategies have lowered efficacy once in the persistent phase compared to paroxysmal phase. Due to LVH noted on ECHO (posterior wall 1.6cm), amiodarone is the only recommended drug. Discussed long-term toxicities related to amiodarone. She elected to proceed.    ECHO 11/2020: normal LV function with moderate LVH, normal mitral valve and mild left atrial enlargement.    I have reviewed all available electrocardiograms in Epic which show either sinus rhythm/bradycardia with left anterior fascicular block or AF, at times with RVR (11/2014, 9/2017, 10/2022).    11/4/2022: PAUL/DCCV and initiated on amiodarone    12/2022: Mrs. Ca returns for follow-up. She feels much better in sinus rhythm. Notes chronic leg edema. Has compression stockings but doesn't wear them. We reduced amiodarone to 100mg daily.    5/2023: Mrs. Ca returned for  follow-up. LFTs 3/2023 and TSH 12/2022 were normal. She felt well.    12/2023: Mrs. Ca returned for follow-up. Feels well. No symptomatic AF. No bleeding issues on warfarin. Recent LFTs/TSH normal.    5/2024: Mrs. Ca returned for follow-up. LFTs/TSH 4/2024 were normal. Recently diagnosed with breast cancer. Awaiting consultation with breast oncology. Notes some mild shortness of breath with exertion.    Interim Hx:  Mrs. Ca returns for follow-up. No symptomatic AF. LFTs/TSH 11/2024 normal.    My interpretation of today's in clinic ECG is sinus rhythm with a rate of sinus rhythm with RBBB/LAFB      Review of Systems   Constitutional: Negative for fever and malaise/fatigue.   HENT:  Negative for congestion and sore throat.    Eyes:  Negative for blurred vision and visual disturbance.   Cardiovascular:  Positive for leg swelling. Negative for chest pain, dyspnea on exertion, irregular heartbeat, near-syncope, palpitations and syncope.   Respiratory:  Negative for cough and shortness of breath.    Hematologic/Lymphatic: Negative for bleeding problem. Does not bruise/bleed easily.   Skin: Negative.    Musculoskeletal: Negative.    Gastrointestinal:  Negative for bloating, abdominal pain, hematochezia and melena.   Neurological:  Negative for focal weakness and weakness.   Psychiatric/Behavioral: Negative.          Objective:    Physical Exam  Vitals reviewed.   Constitutional:       General: She is not in acute distress.     Appearance: She is well-developed. She is not diaphoretic.   HENT:      Head: Normocephalic and atraumatic.   Eyes:      General:         Right eye: No discharge.         Left eye: No discharge.      Conjunctiva/sclera: Conjunctivae normal.   Cardiovascular:      Rate and Rhythm: Normal rate and regular rhythm.      Heart sounds: No murmur heard.     No friction rub. No gallop.   Pulmonary:      Effort: Pulmonary effort is normal. No respiratory distress.      Breath sounds: Normal breath  sounds. No wheezing or rales.   Abdominal:      General: Bowel sounds are normal. There is no distension.      Palpations: Abdomen is soft.      Tenderness: There is no abdominal tenderness.   Musculoskeletal:      Cervical back: Neck supple.   Skin:     General: Skin is warm and dry.   Neurological:      Mental Status: She is alert and oriented to person, place, and time.   Psychiatric:         Behavior: Behavior normal.         Thought Content: Thought content normal.         Judgment: Judgment normal.           Assessment:       1. Paroxysmal atrial fibrillation    2. Essential hypertension    3. Aortic atherosclerosis    4. Stage 3b chronic kidney disease    5. Type 2 diabetes mellitus without complication, without long-term current use of insulin    6. JACQUELYN (obstructive sleep apnea)         Plan:       In summary, Mrs. Ca is a pleasant 84 year-old woman with paroxysmal atrial fibrillation which is now persistent, hypertension, obstructive sleep apnea, aortic atherosclerosis and obesity. Her [id number] score is 5 and indefinite anticoagulation is indicated. She has elected for rhythm control. Due to LVH noted on ECHO (posterior wall 1.6cm), amiodarone is the only recommended drug. Discussed long-term toxicities. She failed 100mg daily and is maintaining sinus on 200mg daily. Check CMP/TSH with next visit.    RTC in 6 months, sooner if needed.    Thank you for allowing me to participate in the care of this patient. Please do not hesitate to call me with any questions or concerns.    Shamar Owens MD, PhD  Cardiac Electrophysiology

## 2025-02-18 DIAGNOSIS — I48.19 PERSISTENT ATRIAL FIBRILLATION: Primary | ICD-10-CM

## 2025-02-24 ENCOUNTER — ANTI-COAG VISIT (OUTPATIENT)
Dept: CARDIOLOGY | Facility: CLINIC | Age: 85
End: 2025-02-24
Payer: MEDICARE

## 2025-02-24 ENCOUNTER — LAB VISIT (OUTPATIENT)
Dept: LAB | Facility: HOSPITAL | Age: 85
End: 2025-02-24
Attending: INTERNAL MEDICINE
Payer: MEDICARE

## 2025-02-24 DIAGNOSIS — I48.0 PAROXYSMAL ATRIAL FIBRILLATION: Chronic | ICD-10-CM

## 2025-02-24 DIAGNOSIS — Z79.01 LONG TERM (CURRENT) USE OF ANTICOAGULANTS: ICD-10-CM

## 2025-02-24 DIAGNOSIS — I48.0 PAROXYSMAL ATRIAL FIBRILLATION: Primary | Chronic | ICD-10-CM

## 2025-02-24 LAB
INR PPP: 4.2 (ref 0.8–1.2)
PROTHROMBIN TIME: 41.9 SEC (ref 9–12.5)

## 2025-02-24 PROCEDURE — 93793 ANTICOAG MGMT PT WARFARIN: CPT | Mod: S$GLB,,, | Performed by: PHARMACIST

## 2025-02-24 PROCEDURE — 36415 COLL VENOUS BLD VENIPUNCTURE: CPT | Mod: PN | Performed by: INTERNAL MEDICINE

## 2025-02-24 PROCEDURE — 85610 PROTHROMBIN TIME: CPT | Mod: PN | Performed by: INTERNAL MEDICINE

## 2025-03-03 ENCOUNTER — PATIENT MESSAGE (OUTPATIENT)
Dept: SURGERY | Facility: CLINIC | Age: 85
End: 2025-03-03
Payer: MEDICARE

## 2025-03-05 ENCOUNTER — ANTI-COAG VISIT (OUTPATIENT)
Dept: CARDIOLOGY | Facility: CLINIC | Age: 85
End: 2025-03-05
Payer: MEDICARE

## 2025-03-05 ENCOUNTER — LAB VISIT (OUTPATIENT)
Dept: LAB | Facility: HOSPITAL | Age: 85
End: 2025-03-05
Attending: INTERNAL MEDICINE
Payer: MEDICARE

## 2025-03-05 DIAGNOSIS — Z79.01 LONG TERM (CURRENT) USE OF ANTICOAGULANTS: ICD-10-CM

## 2025-03-05 DIAGNOSIS — I48.0 PAROXYSMAL ATRIAL FIBRILLATION: Chronic | ICD-10-CM

## 2025-03-05 DIAGNOSIS — I48.0 PAROXYSMAL ATRIAL FIBRILLATION: Primary | Chronic | ICD-10-CM

## 2025-03-05 LAB
INR PPP: 3.2 (ref 0.8–1.2)
PROTHROMBIN TIME: 32.6 SEC (ref 9–12.5)

## 2025-03-05 PROCEDURE — 93793 ANTICOAG MGMT PT WARFARIN: CPT | Mod: S$GLB,,, | Performed by: PHARMACIST

## 2025-03-05 PROCEDURE — 36415 COLL VENOUS BLD VENIPUNCTURE: CPT | Mod: PN | Performed by: INTERNAL MEDICINE

## 2025-03-05 PROCEDURE — 85610 PROTHROMBIN TIME: CPT | Mod: PN | Performed by: INTERNAL MEDICINE

## 2025-03-05 NOTE — PROGRESS NOTES
[facility]    03/05/2025 3:23 PM    Assessment/Plan:    Patient presents today with supratherapeutic INR.    Assessment of patient findings and chart review: INR not at goal. Medications, chart, and patient findings reviewed. See calendar for adjustments to dose and follow up plan.      Recommendation for patient's warfarin regimen: Hold dose today then decrease maintenance dose    Recommend repeat INR in 1 week  _________________________________________________________________    Mis Ca (84 y.o.) is followed by the [facility].    Anticoagulation Summary  As of 3/5/2025      INR goal:  2.0-3.0   TTR:  76.9% (2.3 y)   INR used for dosing:  3.2 (3/5/2025)   Warfarin maintenance plan:  6 mg (6 mg x 1) every Mon, Sat; 3 mg (6 mg x 0.5) all other days   Weekly warfarin total:  27 mg   Plan last modified:  Tino Mendiola, PharmD (3/5/2025)   Next INR check:  3/13/2025   Target end date:  --    Indications    Paroxysmal atrial fibrillation [I48.0]  Long term (current) use of anticoagulants [Z79.01]                 Anticoagulation Episode Summary       INR check location:  --    Preferred lab:  --    Send INR reminders to:  [facility] COUMADIN MONITORING POOL    Comments:  [facility]          Anticoagulation Care Providers       Provider Role Specialty Phone number    Shamar Owens MD Responsible Electrophysiology [phone]

## 2025-03-11 ENCOUNTER — CLINICAL SUPPORT (OUTPATIENT)
Dept: REHABILITATION | Facility: HOSPITAL | Age: 85
End: 2025-03-11
Attending: PHYSICIAN ASSISTANT
Payer: MEDICARE

## 2025-03-11 DIAGNOSIS — M25.511 CHRONIC RIGHT SHOULDER PAIN: ICD-10-CM

## 2025-03-11 DIAGNOSIS — G89.29 CHRONIC RIGHT SHOULDER PAIN: ICD-10-CM

## 2025-03-11 DIAGNOSIS — M75.41 IMPINGEMENT SYNDROME OF RIGHT SHOULDER: Primary | ICD-10-CM

## 2025-03-11 PROCEDURE — 97162 PT EVAL MOD COMPLEX 30 MIN: CPT | Mod: PO

## 2025-03-11 PROCEDURE — 97140 MANUAL THERAPY 1/> REGIONS: CPT | Mod: PO

## 2025-03-11 PROCEDURE — 97112 NEUROMUSCULAR REEDUCATION: CPT | Mod: PO

## 2025-03-11 PROCEDURE — 97110 THERAPEUTIC EXERCISES: CPT | Mod: PO

## 2025-03-11 NOTE — PROGRESS NOTES
Outpatient Rehab    Physical Therapy Evaluation    Patient Name: Mis Ca  MRN: [id number]  YOB: 1940  Encounter Date: 3/11/2025    Therapy Diagnosis:   Encounter Diagnoses   Name Primary?    Chronic right shoulder pain     Impingement syndrome of right shoulder Yes     Physician: Mariah García PA-C    Physician Orders: Eval and Treat  Medical Diagnosis:   M25.511,G89.29 (ICD-10-CM) - Chronic right shoulder pain   M75.41 (ICD-10-CM) - Impingement syndrome of right shoulder     Visit # / Visits Authorized:  1 / 1   Date of Evaluation:  3/11/2025   Insurance Authorization Period: 1/1/2025 to 12/31/2025  Plan of Care Certification:  3/11/2025 to 6/11/2025      Time In:     Time Out:    Total Time:     Total Billable Time: 60    Intake Outcome Measure for FOTO Survey    Therapist reviewed FOTO scores for Mis Ca on 3/11/2025.   FOTO report - see Media section or FOTO account episode details.     Intake Score: 54%         Subjective   History of Present Illness  Mis is a 84 y.o. female who reports to physical therapy with a chief concern of M25.511,G89.29 (ICD-10-CM) - Chronic right shoulder pain  M75.41 (ICD-10-CM) - Impingement syndrome of right shoulder.     The patient reports a medical diagnosis of M25.511,G89.29 (ICD-10-CM) - Chronic right shoulder pain  M75.41 (ICD-10-CM) - Impingement syndrome of right shoulder.  Patient reports a surgery of Dx: Malignant neoplasm of overlapping sites of right breast in female, estrogen receptor positive (C50.811, Z17.0 (ICD-10-CM)) - Mastectomy.  Diagnostic tests related to this condition: Other (Comment).   Other Diagnostic Test Details: Dx: Malignant neoplasm of overlapping sites of right breast in female, estrogen receptor positive (C50.811, Z17.0 (ICD-10-CM))    Dominant Hand: Right  History of Present Condition/Illness: Patient presents in the clinic with history of Right Shoulder Pain which has been present since February 25,  approximately 1 month. Onset developed after sleeping in a sleep number bed. Overall fewer symptoms since onset. Better overall. More improvement with a medrol pack. Better while on medicine, started getting worse since then. Patient also reports that she was fitted for a RT Breast prosthesis in November, 24 and found it to be poorly fitting. She has had significant intermittent skin pressure and redness that persists most of the day. Patient wears the prosthesis everyday. She has not contacted the supplier to have this issue evaluated. Her mastectomy was 6/4/2024. Patient also has significant cardiac issues for which she is receiving care at [facility]. Past medical history os significant for the following: Paroxysmal atrial fibrillation  2. Essential hypertension  3. Aortic atherosclerosis  4. Stage 3b chronic kidney disease  5. Type 2 diabetes mellitus without complication, without long-term current use of insulin  6. JACQUELYN (obstructive sleep apnea). Also has a significant cardiac history. See notes from cardiology. (Mrs. Ca is a pleasant 84 year-old woman with paroxysmal atrial fibrillation which is now persistent, hypertension, obstructive sleep apnea, aortic atherosclerosis and obesity. Her [id number] score is 5 and indefinite anticoagulation is indicated. She has elected for rhythm control. Due to LVH noted on ECHO (posterior wall 1.6cm), amiodarone is the only recommended drug. Discussed long-term toxicities. She failed 100mg daily and is maintaining sinus on 200mg daily. Check CMP/TSH with next visit.)    Pain     Patient reports a current pain level of 6/10. Pain at best is reported as 4/10. Pain at worst is reported as 7/10.   Location: Right shoulder, All motion above 90 degrees, into ER/AD with IR.  Clinical Progression (since onset): Worsening  Pain Qualities: Aching, Tenderness  Pain-Relieving Factors: Heat, Ice, Rest  Pain-Aggravating Factors: Bending, Head movements, Lifting, Rotation, Standing,  Straightening, Stress, Stretching         Living Arrangements  Living Situation  Housing: Home independently  Living Arrangements: Family members          Past Medical History/Physical Systems Review:   Mis Ca  has a past medical history of Allergy, Anticoagulant long-term use, Anxiety, Arthritis, Atrial fibrillation, Bilateral renal cysts, Blind right eye, Bradycardia, Breast cancer, Cancer, CKD (chronic kidney disease) stage 3, GFR 30-59 ml/min, Glaucoma, Hyperlipidemia, Hypertension, PVC (premature ventricular contraction), RLS (restless legs syndrome), Sleep apnea, and Type 2 diabetes mellitus without complication, without long-term current use of insulin.    Mis Ca  has a past surgical history that includes Hysterectomy; Appendectomy; Cataract extraction bilateral w/ anterior vitrectomy (Bilateral); Breast biopsy (Left); Fracture surgery (Left); ORIF forearm fracture (Left); Adenoidectomy; Tonsillectomy; Palate surgery; Foot surgery (Left); Hand tendon surgery (Left); Oophorectomy; Lapidus bunionectomy (Left, 10/31/2018); Endoscopic gastrocnemius recession (Left, 10/31/2018); Akin osteotomy (Left, 10/31/2018); Eye surgery (Bilateral); Eye surgery (Right); Breast surgery (Left); Lymph Gland Removed  (Right); Lapidus bunionectomy (Left, 10/31/2018); Foot arthrodesis (Left, 01/15/2020); Neurectomy (Left, 01/15/2020); Incision and drainage foot (Left, 03/11/2020); Esophagogastroduodenoscopy (N/A, 02/11/2022); Repair of extensor tendon (Left, 06/30/2023); removal,orthopedic hardware,upper extremity (Left, 06/30/2023); Mastectomy (Right, 06/24/2024); [place] lymph node biopsy (Right, 06/24/2024); and Injection for sentinel node identification (Right, 06/24/2024).    Mis has a current medication list which includes the following prescription(s): acetaminophen, alendronate, amiodarone, amitriptyline, anastrozole, aspirin, azelastine, chlorthalidone, cyanocobalamin, furosemide, gabapentin,  latanoprost, levocetirizine, losartan, centrum silver women, needle (disp) 25 gauge, omeprazole, oxybutynin, potassium chloride, pramipexole, pravastatin, timolol maleate 0.5%, vitamin d, and warfarin, and the following Facility-Administered Medications: lactated ringers and lidocaine (pf) 10 mg/ml (1%).    Review of patient's allergies indicates:   Allergen Reactions    Adhesive     Compazine [prochlorperazine edisylate] Anxiety    Penicillins Rash    Sulfa (sulfonamide antibiotics) Rash    Vancomycin analogues Rash        Objective   Posture  Patient presents with a Forward head position. Flat cervical spine, Increased thoracic kyphosis, and Flat lumbar spine is observed.   Shoulders are Rounded. Bilateral scapulae are: Elevated, Protracted, and Downwardly Rotated  Pelvic tilt observed: Posterior              Right Dermatomes  Right Cervical Dermatome Light Touch  Hypersensitive: C4, C5, C6, C7, and C8  Right Upper Thoracic Dermatome Light Touch  Hypersensitive: T1           Shoulder Palpation  Right Shoulder Palpation  Abnormal: Muscle, Bony Prominence/Bursa, and Tendon/Ligament  Right Shoulder Muscle Palpation Observations: Tender +  Right Shoulder Bony Prominence/Bursa Palpation Observations: Tender +  Right Shoulder Tendon/Ligament Palpation Observations: Tender +                   Subcranial Range of Motion   Active Restricted? Passive Restricted? Pain   Flexion         Protraction         Retraction                  Thoracic Range of Motion              Shoulder Range of Motion  Right Shoulder   Active (deg) Passive (deg) Pain   Flexion 85   Yes (Limited)   Extension 20   Yes (Limited)   Scaption         ABduction 60    (Limited)   ADduction 15    (Limited)   Horizontal ABduction         Horizontal ADduction         External Rotation (Shoulder ABducted 0 degrees)         External Rotation (Shoulder ABducted 45 degrees)         External Rotation (Shoulder ABducted 90 degrees)         Internal Rotation  (Shoulder ABducted 0 degrees)         Internal Rotation (Shoulder ABducted 45 degrees)         Internal Rotation (Shoulder ABducted 90 degrees)                       Cervical Strength   Strength Pain   Flexion (C1/C2) 4-     Extension 4-     Right Lateral Flexion (C3) 4-     Left Lateral Flexion (C3)       Right Rotation 4-     Left Rotation           Shoulder Strength - Planes of Motion   Right Strength Right Pain Left Strength Left  Pain   Flexion 3+   4+     Extension 3+   4+     ABduction 3+   4+     ADduction 3+   4+     Horizontal ABduction           Horizontal ADduction           Internal Rotation 0° 3+   4+     Internal Rotation 90°           External Rotation 0° 3+   4+     External Rotation 90°               Elbow Strength   Right Strength Right Pain Left Strength Left  Pain   Flexion (C6) 4-   4+     Extension (C7) 4-   4+          Forearm Strength   Right Strength Right Pain Left Strength Left  Pain   Pronation 4-   4+     Supination 4-   4+                      Cervical Screen  Tests  Positive: Right ULTT2a (Median Variation)  Cervical Range of Motion  Less than 60 degrees rotation to involved side? Yes  Flexion WNL? No  Extension WNL? No  Thoracic Range of Motion  Flexion WNL? No  Extension WNL? No       Cervical/Thoracic Special Tests            Positive: Right ULTT2a (Median Variation)           Shoulder Special Tests  Shoulder Neural Tension Tests  Positive: Right ULTT2a (Median Variation)       Elbow Special Tests  Elbow Neural Tension Tests  Positive: Right ULTT2a (Median Variation)       Wrist/Hand Special Tests  Upper Limb Tension Tests  Positive: Right ULTT2a (Median Variation)             Shoulder Joint Mobility       Right Scapular Mobility  Hypomobile: Superior, Medial, and Distraction  Left Scapular Mobility  Normal: Superior, Medial, and Distraction       Right Spinal Mobility  Hypomobile: Cervical Mobility and Thoracic Mobility  Left Spinal Mobility  Normal: Left Cervical Spine  Mobility  Hypomobile: Left Thoracic Spine Mobility              Treatment:  Therapeutic Exercise  Therapeutic Exercise Activity 1: Scapular retraction (1 sets x 10 reps).  Therapeutic Exercise Activity 2: Modified shoulder pendulum exercises (1 sets x 10 reps).  Therapeutic Exercise Activity 3: Active-assisted arm raises progressing to active ROM as tolerated (3 sets x 10 reps).  Therapeutic Exercise Activity 5: External Rotation (at Neutral Position): (1 sets x 10 reps)  Therapeutic Exercise Activity 6: Supine Serratus Punches: (1 sets x 10 reps)    Manual Therapy  Manual Therapy Activity 1: Joint mobilizations with Grade II-III anterior-posterior glides (5 minutes x 1 sets).  Manual Therapy Activity 3: Manual traction with gradual increase in intensity (1 reps x 30 seconds).    Balance/Neuromuscular Re-Education  Balance/Neuromuscular Re-Education Activity 2: Unilateral Weight-Bearing with Proprioception Focus: (1 sets x 10 reps)  Perform weight shifts while maintaining stability on a chair.  Balance/Neuromuscular Re-Education Activity 3: Rhythmic Stabilization with Wall Pushes: (1 sets x 10 reps)  Balance/Neuromuscular Re-Education Activity 4: Closed Chain Shoulder Stability Rolls: (1 sets x 5 reps)  Use a stability ball against a wall for small circular motions.  Balance/Neuromuscular Re-Education Activity 5: Scapular Stabilization with Plank Variations: (1 sets x 5 reps)  Modified side planks or wall planks with controlled scapular movement.  Balance/Neuromuscular Re-Education Activity 6: Pull-Aparts for Scapular Control: (1 sets x 5 reps)  Perform in standing, keeping shoulders in neutral and focusing on smooth scapular retraction.    Therapeutic Activity  Therapeutic Activity 1: Reach-and-retrieval tasks at overhead angles (3 sets x 10 reps).  Therapeutic Activity 2: Progressive lifting tasks with objects up to 5 lbs (3 sets x 8 reps).  Therapeutic Activity 3: Simulated occupational tasks focusing on shoulder  mechanics (3 sets x 10 reps).  Therapeutic Activity 4: Overhead reaching with a dowel or lightweight stick (3 sets x 12 reps).  Therapeutic Activity 5: Assisted pushing tasks (e.g., sliding a light object across a table) (3 sets x 10 reps).  Therapeutic Activity 6: Diagonal reaching patterns (e.g., reaching across body to opposite shoulder height)    Assessment & Plan   Assessment  Mis presents with a condition of Moderate complexity.   Presentation of Symptoms: Evolving       Functional Limitations: Activity tolerance, Completing self-care activities, Decreased ambulation distance/endurance, Disrupted sleep pattern, Functional mobility, Getting off the floor, Pain when reaching, Pain with ADLs/IADLs, Reaching  Impairments: Abnormal or restricted range of motion, Activity intolerance, Impaired physical strength  Personal Factors Affecting Prognosis: Pain    Patient Goal for Therapy (PT): Improve Right shoulder ROM and function.  Prognosis: Fair    Plan  From a physical therapy perspective, the patient would benefit from: Skilled Rehab Services    Planned therapy interventions include: Therapeutic exercise, Therapeutic activities, Neuromuscular re-education, and Manual therapy.    Planned modalities to include: Biofeedback, Cryotherapy (cold pack), and Thermotherapy (hot pack).        Visit Frequency: 3 times Per Week for 12 Weeks.       This plan was discussed with Patient.   Discussion participants: Agreed Upon Plan of Care  Plan details: Plan of Care: 3/11/2025. physical therapy 2-3 times per week for 8-12 weeks as needed. Progress therapeutic exercises and manual therapy techniques as tolerated. Address pain management strategies as necessary, including modifications to activity and education on posture and body mechanics. Re-evaluate progress at 4 and 8 weeks to assess improvements and adjust treatment plan accordingly. Discharge when functional goals are met or upon plateau in progress. Treatment:  Therapeutic Exercise Therapeutic Exercise Activity 1: Scapular retraction (3 sets x 10 reps). Therapeutic Exercise Activity 2: Vs and Ts (2 sets x 10 reps). Therapeutic Exercise Activity 3: Resisted Supine Arm stabilization exercises as tolerated (2 sets x 10 reps). Therapeutic Exercise Activity 4: Wall Slides with Support for Shoulder Flexion and Abduction: (3 sets x 10 reps) Therapeutic Exercise Activity 5: External Rotation with MT (at Neutral Position): (2 sets x 10 reps) Therapeutic Exercise Activity 6: Downword reach stabilization reach drills. (2x10 Reps) Therapeutic Exercise Activity 7: Cervical Retraction Drills in Sitting (2x10 Reps) Manual Therapy Manual Therapy Activity 1: Left Shoulder Joint mobilizations with Grade II-III anterior-posterior glides (5 minutes x 2 sets). Manual Therapy Activity 2: CErvical Retraction mobilizations (2x10 Reps) Balance/Neuromuscular Re-Education Balance/Neuromuscular Re-Education Activity 1: Dynamic Stability: (3 sets x 10 reps) Perform diagonal resistance pulls in both directions (D1/D2 patterns). Balance/Neuromuscular Re-Education Activity 2: Unilateral Weight-Bearing with Proprioception Focus: (3 sets x 10 reps) Perform weight shifts while maintaining stability on an exercise ball or wobble board. Therapeutic Activity Therapeutic Activity 1: Reach-and-retrieval tasks at overhead angles (3 sets x 10 reps). Therapeutic Activity 3: Simulated occupational tasks focusing on shoulder mechanics (3 sets x 10 reps). Therapeutic Activity 5: Assisted pushing tasks (e.g., sliding a light object across a table) (3 sets x 10 reps). Therapeutic Activity 6: Diagonal reaching patterns (e.g., reaching across body to opposite shoulder height) (3 sets x 10 reps).           Patient's spiritual, cultural, and educational needs considered and patient agreeable to plan of care and goals.           Goals:   Active       Long Term Goals 12 Weeks       LTG (Progressing)       Start:  03/11/25     Expected End:  06/11/25       Reduce pain levels to 2/10 or lower at worst with all activities.  Restore full pain-free AROM in the left shoulder.  Achieve 5/5 strength in rotator cuff and scapular stabilizers to support functional activities.  Demonstrate improved dynamic stability and neuromuscular control with weight-bearing and functional reaching tasks.  Return to prior level of function with work, household, and recreational activities without limitations.              Short Term Goals 5 Weeks       STG (Progressing)       Start:  03/11/25    Expected End:  06/11/25       Reduce pain levels to 4/10 or lower with daily activities.  Improve left shoulder active range of motion (AROM) to at least 75% of normal in flexion and abduction without significant pain.  Increase strength in scapular stabilizers and rotator cuff muscles to at least 4/5 on manual muscle testing (MMT).  Enhance cervical stability and reduce radicular symptoms with cervical retraction exercises.  Improve ability to perform overhead reaching tasks with minimal discomfort.               Sandra Doshi, PT

## 2025-03-12 ENCOUNTER — CLINICAL SUPPORT (OUTPATIENT)
Dept: REHABILITATION | Facility: HOSPITAL | Age: 85
End: 2025-03-12
Attending: PHYSICIAN ASSISTANT
Payer: MEDICARE

## 2025-03-12 DIAGNOSIS — M75.41 IMPINGEMENT SYNDROME OF RIGHT SHOULDER: ICD-10-CM

## 2025-03-12 DIAGNOSIS — G89.29 CHRONIC RIGHT SHOULDER PAIN: Primary | ICD-10-CM

## 2025-03-12 DIAGNOSIS — M25.511 CHRONIC RIGHT SHOULDER PAIN: Primary | ICD-10-CM

## 2025-03-12 PROCEDURE — 97110 THERAPEUTIC EXERCISES: CPT | Mod: PO

## 2025-03-12 PROCEDURE — 97530 THERAPEUTIC ACTIVITIES: CPT | Mod: PO

## 2025-03-13 ENCOUNTER — ANTI-COAG VISIT (OUTPATIENT)
Dept: CARDIOLOGY | Facility: CLINIC | Age: 85
End: 2025-03-13
Payer: MEDICARE

## 2025-03-13 ENCOUNTER — LAB VISIT (OUTPATIENT)
Dept: LAB | Facility: HOSPITAL | Age: 85
End: 2025-03-13
Attending: INTERNAL MEDICINE
Payer: MEDICARE

## 2025-03-13 DIAGNOSIS — I48.0 PAROXYSMAL ATRIAL FIBRILLATION: Chronic | ICD-10-CM

## 2025-03-13 DIAGNOSIS — Z79.01 LONG TERM (CURRENT) USE OF ANTICOAGULANTS: ICD-10-CM

## 2025-03-13 DIAGNOSIS — I48.0 PAROXYSMAL ATRIAL FIBRILLATION: Primary | Chronic | ICD-10-CM

## 2025-03-13 LAB
INR PPP: 2.3 (ref 0.8–1.2)
PROTHROMBIN TIME: 23.4 SEC (ref 9–12.5)

## 2025-03-13 PROCEDURE — 36415 COLL VENOUS BLD VENIPUNCTURE: CPT | Mod: PN | Performed by: INTERNAL MEDICINE

## 2025-03-13 PROCEDURE — 93793 ANTICOAG MGMT PT WARFARIN: CPT | Mod: S$GLB,,, | Performed by: PHARMACIST

## 2025-03-13 PROCEDURE — 85610 PROTHROMBIN TIME: CPT | Mod: PN | Performed by: INTERNAL MEDICINE

## 2025-03-13 NOTE — PROGRESS NOTES
[facility]    2025 10:50 AM    Assessment/Plan:    Patient presents today with therapeutic INR.    Assessment of patient findings and chart review: INR at goal. Medications and chart reviewed. No changes noted to necessitate adjustment of warfarin or follow-up plan. See calendar.      Recommendation for patient's warfarin regimen: Continue current maintenance dose    Recommend repeat INR in 1 week  _________________________________________________________________    Mis L Lanny (84 y.o.) is followed by the "[facility] [facility] Anticoagulation Management Program.    Anticoagulation Summary  As of 3/13/2025      INR goal:  2.0-3.0   TTR:  76.9% (2.3 y)   INR used for dosin.3 (3/13/2025)   Warfarin maintenance plan:  6 mg (6 mg x 1) every Mon, Sat; 3 mg (6 mg x 0.5) all other days   Weekly warfarin total:  27 mg   Plan last modified:  Tino Mendiola, PharmD (3/5/2025)   Next INR check:  3/20/2025   Target end date:  --    Indications    Paroxysmal atrial fibrillation [I48.0]  Long term (current) use of anticoagulants [Z79.01]                 Anticoagulation Episode Summary       INR check location:  --    Preferred lab:  --    Send INR reminders to:  [facility] COUMADIN MONITORING POOL    Comments:  [facility]          Anticoagulation Care Providers       Provider Role Specialty Phone number    Shamar Owens MD Responsible Electrophysiology [phone]

## 2025-03-20 ENCOUNTER — LAB VISIT (OUTPATIENT)
Dept: LAB | Facility: HOSPITAL | Age: 85
End: 2025-03-20
Attending: INTERNAL MEDICINE
Payer: MEDICARE

## 2025-03-20 ENCOUNTER — ANTI-COAG VISIT (OUTPATIENT)
Dept: CARDIOLOGY | Facility: CLINIC | Age: 85
End: 2025-03-20
Payer: MEDICARE

## 2025-03-20 DIAGNOSIS — I48.0 PAROXYSMAL ATRIAL FIBRILLATION: Chronic | ICD-10-CM

## 2025-03-20 DIAGNOSIS — I48.0 PAROXYSMAL ATRIAL FIBRILLATION: Primary | Chronic | ICD-10-CM

## 2025-03-20 DIAGNOSIS — Z79.01 LONG TERM (CURRENT) USE OF ANTICOAGULANTS: ICD-10-CM

## 2025-03-20 LAB
INR PPP: 2.3 (ref 0.8–1.2)
PROTHROMBIN TIME: 23.9 SEC (ref 9–12.5)

## 2025-03-20 PROCEDURE — 93793 ANTICOAG MGMT PT WARFARIN: CPT | Mod: S$GLB,,, | Performed by: PHARMACIST

## 2025-03-20 PROCEDURE — 85610 PROTHROMBIN TIME: CPT | Mod: PN | Performed by: INTERNAL MEDICINE

## 2025-03-20 PROCEDURE — 36415 COLL VENOUS BLD VENIPUNCTURE: CPT | Mod: PN | Performed by: INTERNAL MEDICINE

## 2025-03-25 ENCOUNTER — CLINICAL SUPPORT (OUTPATIENT)
Dept: REHABILITATION | Facility: HOSPITAL | Age: 85
End: 2025-03-25
Payer: MEDICARE

## 2025-03-25 DIAGNOSIS — M75.41 IMPINGEMENT SYNDROME OF RIGHT SHOULDER: Chronic | ICD-10-CM

## 2025-03-25 DIAGNOSIS — G89.29 CHRONIC RIGHT SHOULDER PAIN: Primary | ICD-10-CM

## 2025-03-25 DIAGNOSIS — M25.511 CHRONIC RIGHT SHOULDER PAIN: Primary | ICD-10-CM

## 2025-03-25 PROCEDURE — 97110 THERAPEUTIC EXERCISES: CPT | Mod: PO

## 2025-03-25 PROCEDURE — 97140 MANUAL THERAPY 1/> REGIONS: CPT | Mod: PO

## 2025-03-25 NOTE — PROGRESS NOTES
Outpatient Rehab    Physical Therapy Visit    Patient Name: Mis Ca  MRN: [id number]  YOB: 1940  Encounter Date: 3/25/2025    Therapy Diagnosis:   Encounter Diagnoses   Name Primary?    Chronic right shoulder pain Yes    Impingement syndrome of right shoulder      Physician: Mariah García PA-C    Physician Orders: Eval and Treat  Medical Diagnosis: Chronic right shoulder pain  Impingement syndrome of right shoulder    Visit # / Visits Authorized:  4 / 20  Date of Evaluation:  3/11/2025   Insurance Authorization Period: 1/1/2025 to 12/31/2025  Plan of Care Certification:  3/11/2025 to 6/11/2025      PT/PTA:     Number of PTA visits since last PT visit:   Time In:     Time Out:    Total Time:     Total Billable Time:      FOTO:  Intake Score:  %  Survey Score 1:  %  Survey Score 2:  %         Subjective   Patietn reports moderate improvement in right shoulder mobility since therapy began on 3/11/2025. Pain remains consistent at 6/10 with activities above shoulder height. Describes continued ache and tenderness, especially with dressing and reaching tasks. Denies new symptoms. Reports compliance with home exercise program and using heat prior to sessions to reduce stiffness.  Pain reported as 3/10.      Objective      Shoulder Range of Motion     Shoulder, Elbow, or Forearm Range of Motion Details: Posture: Forward head, increased thoracic kyphosis, rounded shoulders Palpation: Right shoulder remains tender over bursa, muscle belly, and tendon insertions ROM: Right shoulder active flexion at 85°, abduction at 60°, limited due to pain Strength: Right shoulder 3+ across tested planes; Left shoulder 4+ Special Tests: Positive Right ULTT2a indicating neural tension Scapular mobility: Hypomobile superiorly and medially on right            Treatment:  Therapeutic Exercise  TE 1: Bialateral Scapular retraction (1 sets x 10 reps).  TE 2: Bilateral Shoulders - Seated weight shifts with contralateral limb  lifts 10 reps  TE 3: RT Shoulder - Active-assisted arm raises progressing to active ROM as tolerated (3 sets x 10 reps).  TE 4: RT Shoulder Wall slides with towel for scapular mobility 2 x 10 Reps  TE 5: RT Shoulder External Rotation (at Neutral Position): (2 sets x 10 reps)  TE 6: RT Shoulder Supine Serratus Punches: (2 sets x 10 reps)  Manual Therapy  MT 1: Cervical mobilizations into retraction with sustained pressure  MT 2: RT Shoulder Pectoralis minor stretch and release 5 minutes  Balance/Neuromuscular Re-Education  NMR 1: Bilateral Shoulder Girdle - Scapular clocks with wall feedback  NMR 2: RT Shoulder Diagonal pulls in standing (D1/D2) 2x10 Reps  NMR 4: Closed Chain Shoulder Stability Rolls: (1 sets x 5 reps)  Use a stability ball against a wall for small circular motions.  Therapeutic Activity  TA 1: Simulated ADLs (Reaching for towel on level surface) 2 x 10 Reps    Time Entry(in minutes):       Assessment & Plan   Assessment: Patient continues to demonstrate clinical signs of impingement with improvement in pain modulation, postural awareness, and tolerance to therapeutic interventions. Functional use of the arm has improved for daily tasks though overhead activities remain limited. Prognosis Fair to good with consistent therapy, compliance with HEP, and adaptive strategies to minimize aggravating factors. Progress contingent upon continued shoulder and cervical mobility improvement, and tolerance to load.  Evaluation/Treatment Tolerance: Patient tolerated treatment well    Patient will continue to benefit from skilled outpatient physical therapy to address the deficits listed in the problem list box on initial evaluation, provide pt/family education and to maximize pt's level of independence in the home and community environment.     Patient's spiritual, cultural, and educational needs considered and patient agreeable to plan of care and goals.           Plan: Plan of Care Note Typically continue  current POC. 3x/week for 12 weeks.  Diagnosis-Specific Plan of Care Progress therapeutic exercises and neuromuscular re-education weekly based on patient response. Integrate scapular and cervical stabilization strategies. Introduce functional task simulation for overhead and lifting activities. Re-evaluate every 4 weeks and modify based on patients subjective and objective progression. Discharge when goals met or progress plateaus.    Goals:   Active       Long Term Goals 12 Weeks       LTG (Progressing)       Start:  03/11/25    Expected End:  06/11/25       Reduce pain levels to 2/10 or lower at worst with all activities.  Restore full pain-free AROM in the left shoulder.  Achieve 5/5 strength in rotator cuff and scapular stabilizers to support functional activities.  Demonstrate improved dynamic stability and neuromuscular control with weight-bearing and functional reaching tasks.  Return to prior level of function with work, household, and recreational activities without limitations.              Short Term Goals 5 Weeks       STG (Progressing)       Start:  03/11/25    Expected End:  06/11/25       Reduce pain levels to 4/10 or lower with daily activities.  Improve left shoulder active range of motion (AROM) to at least 75% of normal in flexion and abduction without significant pain.  Increase strength in scapular stabilizers and rotator cuff muscles to at least 4/5 on manual muscle testing (MMT).  Enhance cervical stability and reduce radicular symptoms with cervical retraction exercises.  Improve ability to perform overhead reaching tasks with minimal discomfort.               Sandra Doshi, PT

## 2025-03-27 ENCOUNTER — CLINICAL SUPPORT (OUTPATIENT)
Dept: REHABILITATION | Facility: HOSPITAL | Age: 85
End: 2025-03-27
Payer: MEDICARE

## 2025-03-27 DIAGNOSIS — M25.511 CHRONIC RIGHT SHOULDER PAIN: ICD-10-CM

## 2025-03-27 DIAGNOSIS — M75.41 IMPINGEMENT SYNDROME OF RIGHT SHOULDER: Primary | ICD-10-CM

## 2025-03-27 DIAGNOSIS — G89.29 CHRONIC RIGHT SHOULDER PAIN: ICD-10-CM

## 2025-03-27 PROCEDURE — 97112 NEUROMUSCULAR REEDUCATION: CPT | Mod: PO

## 2025-03-27 PROCEDURE — 97110 THERAPEUTIC EXERCISES: CPT | Mod: PO

## 2025-03-31 ENCOUNTER — CLINICAL SUPPORT (OUTPATIENT)
Dept: REHABILITATION | Facility: HOSPITAL | Age: 85
End: 2025-03-31
Payer: MEDICARE

## 2025-03-31 DIAGNOSIS — M75.41 IMPINGEMENT SYNDROME OF RIGHT SHOULDER: Primary | ICD-10-CM

## 2025-03-31 PROCEDURE — 97110 THERAPEUTIC EXERCISES: CPT | Mod: PO

## 2025-03-31 PROCEDURE — 97530 THERAPEUTIC ACTIVITIES: CPT | Mod: PO

## 2025-04-01 ENCOUNTER — ANTI-COAG VISIT (OUTPATIENT)
Dept: CARDIOLOGY | Facility: CLINIC | Age: 85
End: 2025-04-01
Payer: MEDICARE

## 2025-04-01 ENCOUNTER — LAB VISIT (OUTPATIENT)
Dept: LAB | Facility: HOSPITAL | Age: 85
End: 2025-04-01
Attending: INTERNAL MEDICINE
Payer: MEDICARE

## 2025-04-01 DIAGNOSIS — I48.0 PAROXYSMAL ATRIAL FIBRILLATION: Primary | Chronic | ICD-10-CM

## 2025-04-01 DIAGNOSIS — Z79.01 LONG TERM (CURRENT) USE OF ANTICOAGULANTS: ICD-10-CM

## 2025-04-01 DIAGNOSIS — I48.0 PAROXYSMAL ATRIAL FIBRILLATION: Chronic | ICD-10-CM

## 2025-04-01 LAB
INR PPP: 2 (ref 0.8–1.2)
PROTHROMBIN TIME: 20.6 SECONDS (ref 9–12.5)

## 2025-04-01 PROCEDURE — 85610 PROTHROMBIN TIME: CPT | Mod: PN

## 2025-04-01 PROCEDURE — 93793 ANTICOAG MGMT PT WARFARIN: CPT | Mod: S$GLB,,, | Performed by: PHARMACIST

## 2025-04-01 PROCEDURE — 36415 COLL VENOUS BLD VENIPUNCTURE: CPT | Mod: PN

## 2025-04-03 ENCOUNTER — CLINICAL SUPPORT (OUTPATIENT)
Dept: REHABILITATION | Facility: HOSPITAL | Age: 85
End: 2025-04-03
Payer: MEDICARE

## 2025-04-03 DIAGNOSIS — M25.511 CHRONIC RIGHT SHOULDER PAIN: ICD-10-CM

## 2025-04-03 DIAGNOSIS — M75.41 IMPINGEMENT SYNDROME OF RIGHT SHOULDER: Primary | ICD-10-CM

## 2025-04-03 DIAGNOSIS — G89.29 CHRONIC RIGHT SHOULDER PAIN: ICD-10-CM

## 2025-04-03 PROCEDURE — 97110 THERAPEUTIC EXERCISES: CPT | Mod: PO

## 2025-04-03 PROCEDURE — 97530 THERAPEUTIC ACTIVITIES: CPT | Mod: PO

## 2025-04-03 NOTE — PROGRESS NOTES
Outpatient Rehab    Physical Therapy Visit    Patient Name: Mis Ca  MRN: [id number]  YOB: 1940  Encounter Date: 4/3/2025    Therapy Diagnosis:   Encounter Diagnoses   Name Primary?    Impingement syndrome of right shoulder Yes    Chronic right shoulder pain      Physician: Mariah García PA-C    Physician Orders: Eval and Treat  Medical Diagnosis: Chronic right shoulder pain  Impingement syndrome of right shoulder    Visit # / Visits Authorized:  9 / 20  IDate of Evaluation:  3/11/2025   Insurance Authorization Period: 1/1/2025 to 12/31/2025  Plan of Care Certification:  3/11/2025 to 6/11/2025     PT/PTA: PT    Number of PTA visits since last PT visit:   Time In: 1300   Time Out: 1353  Total Time: 53   Total Billable Time: 45    FOTO:  Intake Score:  %  Survey Score 1:  %  Survey Score 2:  %       Subjective:  Mis returns for her third physical therapy session reporting slight improvements in right shoulder mobility. She describes pain today as 5/10, primarily with overhead reaching and prolonged activity. Morning stiffness is reduced compared to last week, and she notes better tolerance to daily tasks such as grooming and light cooking. She remains adherent to her home exercise program and expresses continued motivation for improving function and reducing pain.    Objective:  ROM (Right Shoulder):  Flexion: AROM 100° (? from 85°), PROM 145°  Abduction: AROM 80° (? from 60°), PROM 120°  Extension: AROM 25°, PROM 40°  Adduction: AROM 20°, PROM 40°  Strength (Right UE):  Flexion, Extension, Abduction, ER, IR: 3+/5  Scapular Stabilizers: 4-/5  Palpation:  Persistent mild tenderness at anterior deltoid and AC joint  Bicipital groove tenderness rated 1+  Neuromuscular Control:  Improved postural alignment and scapular stability with cueing  Wall plank and reaching patterns performed with reduced compensations  Manual Therapy Response:  Patient tolerated Grade II-III AP and inferior glides with  reported mild relief  Subacromial decompression via manual traction produced temporary symptom reduction    Treatment:  Therapeutic Exercise:  Active-assisted ROM (shoulder flexion/abduction): 3x10  Scapular retraction w/ band: 3x12  Supine serratus punches (1 lb): 3x12  External rotation at neutral: 3x10  Wall slides (3-sec holds): 3x10  Manual Therapy:  AP and inferior glides (Grade II-III)  Subacromial traction: 3 reps x 30 sec  Scapular mobilizations  Neuromuscular Re-Ed:  D1/D2 banded drills: 3x10  Wall circles (ball): 3x10  Rhythmic stabilization: 3x10  Therapeutic Activity:  Overhead dowel reaches: 3x12  Simulated ADLs and reaching: 3x10  Pushing task (object across table): 3x10    Assessment:  Mis is progressing steadily, showing increased ROM and strength in the right shoulder with decreased pain during activities of daily living. Neuromuscular control and postural corrections are improving with reduced cueing. While some hypersensitivity and weakness persist, particularly during overhead movements, the patients functional capacity is improving. Prognosis remains fair to good with continued therapy compliance.    Plan:  Continue skilled PT 2-3x/week  Progress resistance during ER and scapular drills  Emphasize improved movement coordination and endurance  Reassess ROM and strength benchmarks at the 4-week jean claude  Review prosthesis-related skin pressure concerns with physician if symptoms persist    Goals Update:  Short-Term Goals: Progressing  Pain: ? to 5/10 (goal: <=4/10)  ROM: Flexion and abduction improved toward 75% of normal  Strength: Scapular control now 4-/5  Function: Overhead tasks more tolerable    Goals:   Active       Long Term Goals 12 Weeks       LTG (Progressing)       Start:  03/11/25    Expected End:  06/11/25       Reduce pain levels to 2/10 or lower at worst with all activities.  Restore full pain-free AROM in the left shoulder.  Achieve 5/5 strength in rotator cuff and scapular  stabilizers to support functional activities.  Demonstrate improved dynamic stability and neuromuscular control with weight-bearing and functional reaching tasks.  Return to prior level of function with work, household, and recreational activities without limitations.              Short Term Goals 5 Weeks       STG (Progressing)       Start:  03/11/25    Expected End:  06/11/25       Reduce pain levels to 4/10 or lower with daily activities.  Improve left shoulder active range of motion (AROM) to at least 75% of normal in flexion and abduction without significant pain.  Increase strength in scapular stabilizers and rotator cuff muscles to at least 4/5 on manual muscle testing (MMT).  Enhance cervical stability and reduce radicular symptoms with cervical retraction exercises.  Improve ability to perform overhead reaching tasks with minimal discomfort.               Sandra Doshi, PT

## 2025-04-07 ENCOUNTER — CLINICAL SUPPORT (OUTPATIENT)
Dept: REHABILITATION | Facility: HOSPITAL | Age: 85
End: 2025-04-07
Payer: MEDICARE

## 2025-04-07 DIAGNOSIS — M75.41 IMPINGEMENT SYNDROME OF RIGHT SHOULDER: Primary | ICD-10-CM

## 2025-04-07 DIAGNOSIS — G89.29 CHRONIC RIGHT SHOULDER PAIN: ICD-10-CM

## 2025-04-07 DIAGNOSIS — M25.511 CHRONIC RIGHT SHOULDER PAIN: ICD-10-CM

## 2025-04-07 PROCEDURE — 97110 THERAPEUTIC EXERCISES: CPT | Mod: PO

## 2025-04-07 PROCEDURE — 97112 NEUROMUSCULAR REEDUCATION: CPT | Mod: PO

## 2025-04-07 PROCEDURE — 97530 THERAPEUTIC ACTIVITIES: CPT | Mod: PO

## 2025-04-07 NOTE — PROGRESS NOTES
Outpatient Rehab    Physical Therapy Visit    Patient Name: Mis Ca  MRN: [id number]  YOB: 1940  Encounter Date: 4/7/2025    Therapy Diagnosis:   Encounter Diagnoses   Name Primary?    Impingement syndrome of right shoulder Yes    Chronic right shoulder pain      Physician: Mariah García PA-C    Physician Orders: Eval and Treat  Medical Diagnosis: Chronic right shoulder pain  Impingement syndrome of right shoulder    Visit # / Visits Authorized:  9 / 20  Insurance Authorization Period: 3/11/2025 to 12/31/2025  Date of Evaluation:   Plan of Care Certification:      PT/PTA:     Number of PTA visits since last PT visit:   Time In: 1400   Time Out: 1453  Total Time: 53   Total Billable Time: 45    FOTO:  Intake Score:  %  Survey Score 1:  %  Survey Score 2:  %       Subjective:  Mis reports slight improvement in right shoulder mobility, reduced morning stiffness, and better tolerance of daily tasks (e.g., grooming, light cooking). Pain today rated 5/10, primarily with overhead reaching and prolonged activity. Continues HEP with motivation for recovery.    Objective:  ROM (Right Shoulder):  Flexion: AROM 100° (improved from 85°), PROM 145°  Abduction: AROM 80° (improved from 60°), PROM 120°  Extension: AROM 25°, PROM 40°  Adduction: AROM 20°, PROM 40°  Strength (Right UE):  Flexion, Extension, Abduction, ER, IR: 3+/5  Scapular Stabilizers: 4-/5  Palpation:  Mild tenderness at anterior deltoid, AC joint  Bicipital groove tenderness 1+  Neuromuscular Control:  Improved scapular stability, reduced compensations with wall plank and reaching patterns  Manual Therapy Response:  Tolerated Grade II-III AP and inferior glides with mild relief  Subacromial traction reduced symptoms    Treatment:  Therapeutic Exercise:  Active-assisted ROM (flexion/abduction): 3x10  Scapular retraction with resistance band: 3x12  Supine serratus punches (1 lb): 3x12  External rotation at neutral: 3x10  Wall slides (3-sec  holds): 3x10  Neuromuscular Re-Ed:  D1/D2 banded drills: 3x10  Wall circles (ball): 3x10  Rhythmic stabilization: 3x10  Therapeutic Activities:  Overhead dowel reaches: 3x12  Simulated ADLs and reaching: 3x10  Pushing task (object across table): 3x10    Patient Tolerance: Tolerated interventions with mild soreness and symptom relief.    Plan of Care (3 Months: 4/3/2025 - 7/3/2025):  Frequency: 2-3x/week  Therapeutic Exercises: Progress to active ROM and resistance training (bands, weights); introduce closed-chain and functional patterns (e.g., body blade, weighted ball tosses).  Neuromuscular/Balancing: Include unstable surface tasks (foam pad) with reaching, perturbation challenges, and rhythmic stabilization.  Manual Therapy: Continue joint mobilizations, scapular mobilizations, and myofascial release for anterior deltoid and biceps tendon; Grade III-IV glides as tolerated.  Therapeutic Activities: Advance to dynamic overhead and rotational tasks simulating ADLs and work-related movements.  Modalities: Continue cryotherapy post-session; consider adding moist heat pre-treatment and NMES if strength gains plateau.  HEP: Home progression with theraband ER/IR, scapular stabilization, doorway stretches, wall angels, prone Y/T/I, and postural correction cues.  Reassessment: Progress checks at 4 weeks (early May) and 12 weeks (early July) with FOTO updates.    Goals:   Active       Long Term Goals 12 Weeks       LTG (Progressing)       Start:  03/11/25    Expected End:  06/11/25       Reduce pain levels to 2/10 or lower at worst with all activities.  Restore full pain-free AROM in the left shoulder.  Achieve 5/5 strength in rotator cuff and scapular stabilizers to support functional activities.  Demonstrate improved dynamic stability and neuromuscular control with weight-bearing and functional reaching tasks.  Return to prior level of function with work, household, and recreational activities without limitations.    LTG  updated 3/31/2025  · Decrease pain to <=2/10 with all activities  · Restore full pain-free AROM in right shoulder  · Achieve 5/5 strength in rotator cuff and scapular stabilizers  · Demonstrate improved dynamic shoulder stability with functional tasks  · Return to prior functional status for work, home, and leisure                Short Term Goals 5 Weeks       STG (Progressing)       Start:  03/11/25    Expected End:  06/11/25       Reduce pain levels to 4/10 or lower with daily activities.  Improve left shoulder active range of motion (AROM) to at least 75% of normal in flexion and abduction without significant pain.  Increase strength in scapular stabilizers and rotator cuff muscles to at least 4/5 on manual muscle testing (MMT).  Enhance cervical stability and reduce radicular symptoms with cervical retraction exercises.  Improve ability to perform overhead reaching tasks with minimal discomfort.    STG updated 3/31/2025  · Reduce pain to <=4/10 with daily tasks  · Improve right shoulder AROM to >=75% of normal without significant pain  · Strengthen rotator cuff and scapular stabilizers to >=4/5  · Enhance cervical stability and manage radicular symptoms  · Improve tolerance to overhead reaching                 Sandra Doshi, PT

## 2025-04-09 ENCOUNTER — CLINICAL SUPPORT (OUTPATIENT)
Dept: REHABILITATION | Facility: HOSPITAL | Age: 85
End: 2025-04-09
Payer: MEDICARE

## 2025-04-09 DIAGNOSIS — M75.41 IMPINGEMENT SYNDROME OF RIGHT SHOULDER: Primary | ICD-10-CM

## 2025-04-09 DIAGNOSIS — M25.511 CHRONIC RIGHT SHOULDER PAIN: ICD-10-CM

## 2025-04-09 DIAGNOSIS — G89.29 CHRONIC RIGHT SHOULDER PAIN: ICD-10-CM

## 2025-04-09 PROCEDURE — 97530 THERAPEUTIC ACTIVITIES: CPT | Mod: PO

## 2025-04-09 PROCEDURE — 97112 NEUROMUSCULAR REEDUCATION: CPT | Mod: PO

## 2025-04-09 PROCEDURE — 97140 MANUAL THERAPY 1/> REGIONS: CPT | Mod: PO

## 2025-04-09 PROCEDURE — 97110 THERAPEUTIC EXERCISES: CPT | Mod: PO

## 2025-04-09 NOTE — TELEPHONE ENCOUNTER
Linda Peterson Staff  Caller: [phone] (Today, 10:05 AM)  Type:  RX Refill Request    1.  Who Called: PT  Refill or New Rx: Refills  RX Name and Strength: gabapentin (NEURONTIN) 300 MG capsule 360 capsule  How is the patient currently taking it? (ex. 1XDay): 2 x a day  Is this a 30 day or 90 day RX: 90  Preferred Pharmacy with phone number: [facility] #[id number] - [place] - [address] Phone: [phone] Fax: [phone]    2. pramipexole (MIRAPEX) 0.5 MG tablet 180 tablet

## 2025-04-09 NOTE — TELEPHONE ENCOUNTER
Care Due:                  Date            Visit Type   Department     Provider  --------------------------------------------------------------------------------                                EP -                              PRIMARY      [facility] FAM[facility]  Last Visit: 10-      CARE ([facility])   JOSY Dillard                               -                              PRIMARY      [facility]  Next Visit: 05-      CARE ([facility])   JOSY Dillard                                                            Last  Test          Frequency    Reason                     Performed    Due Date  --------------------------------------------------------------------------------    Mg Level....  12 months..  alendronate..............  Not Found    Overdue    Phosphate...  12 months..  alendronate..............  Not Found    Overdue    [employer] Embedded Care Due Messages. Reference number: [id number].   4/09/2025 10:14:06 AM CDT

## 2025-04-10 RX ORDER — GABAPENTIN 300 MG/1
CAPSULE ORAL
Qty: 360 CAPSULE | Refills: 3 | Status: SHIPPED | OUTPATIENT
Start: 2025-04-10

## 2025-04-10 RX ORDER — PRAMIPEXOLE DIHYDROCHLORIDE 0.5 MG/1
0.5 TABLET ORAL 2 TIMES DAILY
Qty: 180 TABLET | Refills: 3 | Status: SHIPPED | OUTPATIENT
Start: 2025-04-10

## 2025-04-11 ENCOUNTER — CLINICAL SUPPORT (OUTPATIENT)
Dept: REHABILITATION | Facility: HOSPITAL | Age: 85
End: 2025-04-11
Payer: MEDICARE

## 2025-04-11 DIAGNOSIS — M25.511 CHRONIC RIGHT SHOULDER PAIN: ICD-10-CM

## 2025-04-11 DIAGNOSIS — M75.41 IMPINGEMENT SYNDROME OF RIGHT SHOULDER: Primary | ICD-10-CM

## 2025-04-11 DIAGNOSIS — G89.29 CHRONIC RIGHT SHOULDER PAIN: ICD-10-CM

## 2025-04-11 PROCEDURE — 97110 THERAPEUTIC EXERCISES: CPT | Mod: PO

## 2025-04-11 PROCEDURE — 97140 MANUAL THERAPY 1/> REGIONS: CPT | Mod: PO

## 2025-04-11 PROCEDURE — 97112 NEUROMUSCULAR REEDUCATION: CPT | Mod: PO

## 2025-04-15 ENCOUNTER — LAB VISIT (OUTPATIENT)
Dept: LAB | Facility: HOSPITAL | Age: 85
End: 2025-04-15
Attending: INTERNAL MEDICINE
Payer: MEDICARE

## 2025-04-15 ENCOUNTER — CLINICAL SUPPORT (OUTPATIENT)
Dept: REHABILITATION | Facility: HOSPITAL | Age: 85
End: 2025-04-15
Payer: MEDICARE

## 2025-04-15 ENCOUNTER — ANTI-COAG VISIT (OUTPATIENT)
Dept: CARDIOLOGY | Facility: CLINIC | Age: 85
End: 2025-04-15
Payer: MEDICARE

## 2025-04-15 DIAGNOSIS — Z79.01 LONG TERM (CURRENT) USE OF ANTICOAGULANTS: ICD-10-CM

## 2025-04-15 DIAGNOSIS — I48.0 PAROXYSMAL ATRIAL FIBRILLATION: Primary | Chronic | ICD-10-CM

## 2025-04-15 DIAGNOSIS — G89.29 CHRONIC RIGHT SHOULDER PAIN: ICD-10-CM

## 2025-04-15 DIAGNOSIS — M75.41 IMPINGEMENT SYNDROME OF RIGHT SHOULDER: Primary | ICD-10-CM

## 2025-04-15 DIAGNOSIS — I48.0 PAROXYSMAL ATRIAL FIBRILLATION: Chronic | ICD-10-CM

## 2025-04-15 DIAGNOSIS — M25.511 CHRONIC RIGHT SHOULDER PAIN: ICD-10-CM

## 2025-04-15 LAB
INR PPP: 2 (ref 0.8–1.2)
PROTHROMBIN TIME: 20.9 SECONDS (ref 9–12.5)

## 2025-04-15 PROCEDURE — 93793 ANTICOAG MGMT PT WARFARIN: CPT | Mod: S$GLB,,, | Performed by: PHARMACIST

## 2025-04-15 PROCEDURE — 85610 PROTHROMBIN TIME: CPT | Mod: PN

## 2025-04-15 PROCEDURE — 36415 COLL VENOUS BLD VENIPUNCTURE: CPT | Mod: PN

## 2025-04-15 PROCEDURE — 97110 THERAPEUTIC EXERCISES: CPT | Mod: PO

## 2025-04-15 NOTE — PROGRESS NOTES
4/15- Pt r/s her 4/29/25 lab to 4/25/25 because she will be out of town. She verbalized understanding she must attend before 12pm.

## 2025-04-15 NOTE — PROGRESS NOTES
Outpatient Rehab    Physical Therapy Visit    Patient Name: Mis Ca  MRN: [id number]  YOB: 1940  Encounter Date: 3/12/2025    Therapy Diagnosis:   Encounter Diagnoses   Name Primary?    Chronic right shoulder pain Yes    Impingement syndrome of right shoulder      Physician: Mariah García PA-C    Physician Orders: Eval and Treat  Medical Diagnosis: Chronic right shoulder pain  Impingement syndrome of right shoulder    Visit # / Visits Authorized:  8 / 20  Date of Evaluation:  3/11/2025   Insurance Authorization Period: 1/1/2025 to 12/31/2025  Plan of Care Certification:  3/11/2025 to 6/11/2025      PT/PTA:     Number of PTA visits since last PT visit:   Time In: 1000   Time Out: 1053  Total Time: 53   Total Billable Time:      FOTO:  Intake Score:  %  Survey Score 1:  %  Survey Score 2:  %       Subjective:  Patient returns for follow-up reporting moderate improvement in right shoulder mobility since her last session. She continues to have consistent pain with overhead tasks, particularly dressing, but now describes discomfort as less sharp and more manageable. She reports being compliant with STM of right axilla in shower and before exercises to reduce stiffness and continues full adherence to the home program. Pain today is 3/10 at rest, rising to 6/10 with overhead activity. No new complaints or adverse symptoms reported.    Objective:  Posture: Forward head, rounded shoulders, and thoracic kyphosis persist with some improved scapular awareness.  Palpation: Continued tenderness in anterior shoulder, particularly over subacromial space and biceps tendon.  ROM (Right Shoulder):  Active Flexion: 90°  Abduction: 65°  Extension: 20°  ER (neutral): 35°  IR: 40°  Strength (MMT):  Right shoulder: 3+ in all planes  Cervical: 4- in all tested directions  Special Tests: Positive ULTT2a, consistent with neural tension.  Balance/Scapular Mobility: Scapular control improving with fewer compensations  during functional reaching.    Treatment:  Therapeutic Exercise:  Scapular Retraction - 1x10 reps  Seated Contralateral Limb Shifts - 10 reps  Active-Assisted Arm Raises - 3x10 reps  Wall Slides - 2x10 reps  External Rotation with Band - 2x10 reps  Supine Serratus Punches - 2x10 reps  Manual Therapy:  RT Shoulder: Pectoralis Minor Stretch and Soft Tissue Mobilization - 5 min  Cervical Mobilization into Retraction - 3x30 sec sustained holds  Neuromuscular Re-education:  Scapular Clocks - 1x10 reps  Standing Diagonal Resistance Pulls (D1/D2) - 2x10  Closed Chain Shoulder Rolls - 1x5 reps  Therapeutic Activity:  Simulated Reaching for Towel - 2x10 reps    Assessment:  Patient demonstrates gradual improvements in right shoulder mobility and scapular mechanics. Postural training and manual therapy continue to reduce tissue irritability. Pain remains a barrier with overhead activities but is improving with consistent interventions. Neuromuscular control in scapular stabilizers is increasing, enabling better form during ADLs. Patient is compliant, motivated, and progressing toward short-term goals.    Plan:  Continue current POC at 3x/week. Progress scapular loading and introduce isometric shoulder control under mild resistance. Emphasize cervical mobility drills and functional activity simulations. Reassess strength and ROM next week. Maintain education on joint protection and home program compliance.    Goals:  Active - No changes to STG/LTG at this time.    Goals:   Active       Long Term Goals 12 Weeks       LTG (Progressing)       Start:  03/11/25    Expected End:  06/11/25       Reduce pain levels to 2/10 or lower at worst with all activities.  Restore full pain-free AROM in the left shoulder.  Achieve 5/5 strength in rotator cuff and scapular stabilizers to support functional activities.  Demonstrate improved dynamic stability and neuromuscular control with weight-bearing and functional reaching tasks.  Return to  prior level of function with work, household, and recreational activities without limitations.              Short Term Goals 5 Weeks       STG (Progressing)       Start:  03/11/25    Expected End:  06/11/25       Reduce pain levels to 4/10 or lower with daily activities.  Improve left shoulder active range of motion (AROM) to at least 75% of normal in flexion and abduction without significant pain.  Increase strength in scapular stabilizers and rotator cuff muscles to at least 4/5 on manual muscle testing (MMT).  Enhance cervical stability and reduce radicular symptoms with cervical retraction exercises.  Improve ability to perform overhead reaching tasks with minimal discomfort.               Sandra Doshi, PT

## 2025-04-16 ENCOUNTER — PATIENT MESSAGE (OUTPATIENT)
Dept: FAMILY MEDICINE | Facility: CLINIC | Age: 85
End: 2025-04-16
Payer: MEDICARE

## 2025-04-18 RX ORDER — CYANOCOBALAMIN 1000 UG/ML
INJECTION, SOLUTION INTRAMUSCULAR; SUBCUTANEOUS
Qty: 3 ML | Refills: 18 | Status: SHIPPED | OUTPATIENT
Start: 2025-04-18

## 2025-04-20 NOTE — TELEPHONE ENCOUNTER
No care due was identified.  [facility] Embedded Care Due Messages. Reference number: [id number].   4/20/2025 12:21:49 AM CDT

## 2025-04-21 RX ORDER — LEVOCETIRIZINE DIHYDROCHLORIDE 5 MG/1
5 TABLET, FILM COATED ORAL NIGHTLY
Qty: 90 TABLET | Refills: 1 | Status: SHIPPED | OUTPATIENT
Start: 2025-04-21

## 2025-04-21 NOTE — TELEPHONE ENCOUNTER
Mis Ca  is requesting a refill authorization.  Brief Assessment and Rationale for Refill:  Approve     Medication Therapy Plan:         Comments:     Note composed:12:30 PM 04/21/2025

## 2025-04-22 ENCOUNTER — LAB VISIT (OUTPATIENT)
Dept: LAB | Facility: HOSPITAL | Age: 85
End: 2025-04-22
Attending: FAMILY MEDICINE
Payer: MEDICARE

## 2025-04-22 DIAGNOSIS — I10 ESSENTIAL HYPERTENSION: ICD-10-CM

## 2025-04-22 DIAGNOSIS — E11.9 TYPE 2 DIABETES MELLITUS WITHOUT COMPLICATION, WITHOUT LONG-TERM CURRENT USE OF INSULIN: ICD-10-CM

## 2025-04-22 DIAGNOSIS — E78.2 MIXED HYPERLIPIDEMIA: ICD-10-CM

## 2025-04-22 LAB
ABSOLUTE EOSINOPHIL (OHS): 0.21 K/UL
ABSOLUTE MONOCYTE (OHS): 0.49 K/UL (ref 0.3–1)
ABSOLUTE NEUTROPHIL COUNT (OHS): 3.56 K/UL (ref 1.8–7.7)
ALBUMIN SERPL BCP-MCNC: 4.2 G/DL (ref 3.5–5.2)
ALP SERPL-CCNC: 65 UNIT/L (ref 38–126)
ALT SERPL W/O P-5'-P-CCNC: 27 UNIT/L (ref 10–44)
ANION GAP (OHS): 10 MMOL/L (ref 8–16)
AST SERPL-CCNC: 24 UNIT/L (ref 15–46)
BASOPHILS # BLD AUTO: 0.04 K/UL
BASOPHILS NFR BLD AUTO: 0.7 %
BILIRUB SERPL-MCNC: 0.4 MG/DL (ref 0.1–1)
BUN SERPL-MCNC: 24 MG/DL (ref 7–17)
CALCIUM SERPL-MCNC: 10 MG/DL (ref 8.7–10.5)
CHLORIDE SERPL-SCNC: 97 MMOL/L (ref 95–110)
CHOLEST SERPL-MCNC: 175 MG/DL (ref 120–199)
CHOLEST/HDLC SERPL: 3.6 {RATIO} (ref 2–5)
CO2 SERPL-SCNC: 33 MMOL/L (ref 23–29)
CREAT SERPL-MCNC: 1.1 MG/DL (ref 0.5–1.4)
EAG (OHS): 123 MG/DL (ref 68–131)
ERYTHROCYTE [DISTWIDTH] IN BLOOD BY AUTOMATED COUNT: 13.1 % (ref 11.5–14.5)
GFR SERPLBLD CREATININE-BSD FMLA CKD-EPI: 50 ML/MIN/1.73/M2
GLUCOSE SERPL-MCNC: 110 MG/DL (ref 70–110)
HBA1C MFR BLD: 5.9 % (ref 4–5.6)
HCT VFR BLD AUTO: 38 % (ref 37–48.5)
HDLC SERPL-MCNC: 48 MG/DL (ref 40–75)
HDLC SERPL: 27.4 % (ref 20–50)
HGB BLD-MCNC: 12.7 GM/DL (ref 12–16)
IMM GRANULOCYTES # BLD AUTO: 0.03 K/UL (ref 0–0.04)
IMM GRANULOCYTES NFR BLD AUTO: 0.6 % (ref 0–0.5)
LDLC SERPL CALC-MCNC: 96.4 MG/DL (ref 63–159)
LYMPHOCYTES # BLD AUTO: 1.1 K/UL (ref 1–4.8)
MCH RBC QN AUTO: 34 PG (ref 27–31)
MCHC RBC AUTO-ENTMCNC: 33.4 G/DL (ref 32–36)
MCV RBC AUTO: 102 FL (ref 82–98)
NONHDLC SERPL-MCNC: 127 MG/DL
NUCLEATED RBC (/100WBC) (OHS): 0 /100 WBC
PLATELET # BLD AUTO: 180 K/UL (ref 150–450)
PMV BLD AUTO: 10.2 FL (ref 9.2–12.9)
POTASSIUM SERPL-SCNC: 3.8 MMOL/L (ref 3.5–5.1)
PROT SERPL-MCNC: 6.7 GM/DL (ref 6–8.4)
RBC # BLD AUTO: 3.73 M/UL (ref 4–5.4)
RELATIVE EOSINOPHIL (OHS): 3.9 %
RELATIVE LYMPHOCYTE (OHS): 20.3 % (ref 18–48)
RELATIVE MONOCYTE (OHS): 9 % (ref 4–15)
RELATIVE NEUTROPHIL (OHS): 65.5 % (ref 38–73)
SODIUM SERPL-SCNC: 140 MMOL/L (ref 136–145)
TRIGL SERPL-MCNC: 153 MG/DL (ref 30–150)
WBC # BLD AUTO: 5.43 K/UL (ref 3.9–12.7)

## 2025-04-22 PROCEDURE — 85025 COMPLETE CBC W/AUTO DIFF WBC: CPT | Mod: PN

## 2025-04-22 PROCEDURE — 80053 COMPREHEN METABOLIC PANEL: CPT | Mod: PN

## 2025-04-22 PROCEDURE — 36415 COLL VENOUS BLD VENIPUNCTURE: CPT | Mod: PN

## 2025-04-22 PROCEDURE — 83036 HEMOGLOBIN GLYCOSYLATED A1C: CPT | Mod: PN

## 2025-04-22 PROCEDURE — 82465 ASSAY BLD/SERUM CHOLESTEROL: CPT | Mod: PN

## 2025-04-22 RX ORDER — LOSARTAN POTASSIUM 100 MG/1
50 TABLET ORAL DAILY
Qty: 45 TABLET | Refills: 1 | Status: SHIPPED | OUTPATIENT
Start: 2025-04-22

## 2025-04-22 NOTE — TELEPHONE ENCOUNTER
No care due was identified.  [facility] Embedded Care Due Messages. Reference number: [id number].   4/22/2025 12:18:35 AM CDT

## 2025-04-23 ENCOUNTER — OFFICE VISIT (OUTPATIENT)
Dept: FAMILY MEDICINE | Facility: CLINIC | Age: 85
End: 2025-04-23
Payer: MEDICARE

## 2025-04-23 DIAGNOSIS — C77.3 SECONDARY AND UNSPECIFIED MALIGNANT NEOPLASM OF AXILLA AND UPPER LIMB LYMPH NODES: ICD-10-CM

## 2025-04-23 DIAGNOSIS — E78.2 MIXED HYPERLIPIDEMIA: ICD-10-CM

## 2025-04-23 DIAGNOSIS — E11.22 TYPE 2 DIABETES MELLITUS WITH CHRONIC KIDNEY DISEASE, WITHOUT LONG-TERM CURRENT USE OF INSULIN, UNSPECIFIED CKD STAGE: Primary | ICD-10-CM

## 2025-04-23 DIAGNOSIS — I70.0 AORTIC ATHEROSCLEROSIS: ICD-10-CM

## 2025-04-23 DIAGNOSIS — K22.4 ESOPHAGEAL DYSMOTILITY: ICD-10-CM

## 2025-04-23 DIAGNOSIS — G47.33 OSA (OBSTRUCTIVE SLEEP APNEA): ICD-10-CM

## 2025-04-23 DIAGNOSIS — R13.19 ESOPHAGEAL DYSPHAGIA: ICD-10-CM

## 2025-04-23 DIAGNOSIS — I48.0 PAROXYSMAL ATRIAL FIBRILLATION: ICD-10-CM

## 2025-04-23 DIAGNOSIS — E43 UNSPECIFIED SEVERE PROTEIN-CALORIE MALNUTRITION: ICD-10-CM

## 2025-04-23 DIAGNOSIS — G25.81 RESTLESS LEG SYNDROME: ICD-10-CM

## 2025-04-23 DIAGNOSIS — I10 ESSENTIAL HYPERTENSION: ICD-10-CM

## 2025-04-23 PROCEDURE — 98016 BRIEF COMUNICAJ TECH-BSD SVC: CPT | Mod: 95,,, | Performed by: FAMILY MEDICINE

## 2025-04-23 RX ORDER — NITROGLYCERIN 0.4 MG/1
0.4 TABLET SUBLINGUAL EVERY 5 MIN PRN
Qty: 25 TABLET | Refills: 0 | Status: SHIPPED | OUTPATIENT
Start: 2025-04-23 | End: 2026-04-23

## 2025-04-23 RX ORDER — AMITRIPTYLINE HYDROCHLORIDE 25 MG/1
25 TABLET, FILM COATED ORAL NIGHTLY
Qty: 90 TABLET | Refills: 1 | Status: SHIPPED | OUTPATIENT
Start: 2025-04-23

## 2025-04-23 RX ORDER — ALPRAZOLAM 0.25 MG/1
0.25 TABLET ORAL 2 TIMES DAILY
Qty: 60 TABLET | Refills: 0 | Status: SHIPPED | OUTPATIENT
Start: 2025-04-23

## 2025-04-23 NOTE — PROGRESS NOTES
Subjective:      Patient ID: Mis Ca is a 84 y.o. female.    Chief Complaint: No chief complaint on file.      There were no vitals filed for this visit.     HPI   The patient location is: home  The chief complaint leading to consultation is: labs, refills, CP, SOB    Visit type: audio only    Face to Face time with patient: 10  20 minutes of total time spent on the encounter, which includes face to face time and non-face to face time preparing to see the patient (eg, review of tests), Obtaining and/or reviewing separately obtained history, Documenting clinical information in the electronic or other health record, Independently interpreting results (not separately reported) and communicating results to the patient/family/caregiver, or Care coordination (not separately reported).         Each patient to whom he or she provides medical services by telemedicine is:  (1) informed of the relationship between the physician and patient and the respective role of any other health care provider with respect to management of the patient; and (2) notified that he or she may decline to receive medical services by telemedicine and may withdraw from such care at any time.    Notes:   Can't make appt; going to [place]  Feels fine; had labs  All good, mcv up a little, on b12  Repeat labs in 6 months: CBC, A1C and B12  Restless legs again and feels it in her chest  And then gets anxious, can't lay, sit, has to get up  Has CARDOSO  Nervousness; on mirapex bid; during day and night, when awake and not trying to sleep  Sees Dr Owens, saw recently  Sob and weak and heavy arms with activity  BP is low when checked with these symptoms  Xanax helped her  Has no NTG    Problem List  Problem List[1]     ALLERGIES:   Review of patient's allergies indicates:   Allergen Reactions    Adhesive     Compazine [prochlorperazine edisylate] Anxiety    Penicillins Rash    Sulfa (sulfonamide antibiotics) Rash    Vancomycin analogues Rash        MEDS: Current Medications[2]      History:  Current Providers as of 4/23/2025  PCP: Scott Dillard MD  Care Team Provider: Sudhir Coles MD  Care Team Provider: Jolanta Burnett MD  Care Team Provider: Lenny Burroughs LPN  Care Team Provider: Portillo Luis MD  Care Team Provider: Efren Aguilar MD  Care Team Provider: Roderick Echevarria MD  Care Team Provider: Scott Dillard MD  Care Team Provider: Tino Mendiola, PharmD  Care Team Provider: SHAHZAD Laird MD  Care Team Provider: Yvette Motley RN  Care Team Provider: Caterina Muniz RN  Care Team Provider: Monik Vasquez MD  Care Team Provider: Sudhir Coles MD  Encounter Provider: Scott Dillard MD, starting on Wed Apr 23, 2025 12:00 AM  Referring Provider: not found, starting on Wed Apr 23, 2025 12:00 AM  Consulting Physician: Scott Dillard MD, starting on Wed Apr 23, 2025  7:46 AM (Active)   Past Medical History:   Diagnosis Date    Allergy     Anticoagulant long-term use     Anxiety     Arthritis     Atrial fibrillation     Bilateral renal cysts 05/01/2022    Blind right eye     Bradycardia     Breast cancer     right    Cancer     skin cancer left side of face under eye    CKD (chronic kidney disease) stage 3, GFR 30-59 ml/min     Glaucoma     Hyperlipidemia     Hypertension     PVC (premature ventricular contraction)     RLS (restless legs syndrome)     Sleep apnea     Does not use CPAP machine.    Type 2 diabetes mellitus without complication, without long-term current use of insulin 04/11/2024     Past Surgical History:   Procedure Laterality Date    ADENOIDECTOMY      PAM OSTEOTOMY Left 10/31/2018    Procedure: OSTEOTOMY, AKIN;  Surgeon: Rudolph Cardozo DPM;  Location: [facility] OR;  Service: Podiatry;  Laterality: Left;    APPENDECTOMY      BREAST BIOPSY Left     x3    BREAST SURGERY Left     breast biopsy x3    CATARACT EXTRACTION BILATERAL W/ ANTERIOR VITRECTOMY Bilateral     ENDOSCOPIC  GASTROCNEMIUS RECESSION Left 10/31/2018    Procedure: RECESSION, GASTROCNEMIUS, ENDOSCOPIC;  Surgeon: Rudolph Cardozo DPM;  Location: [facility] OR;  Service: Podiatry;  Laterality: Left;    ESOPHAGOGASTRODUODENOSCOPY N/A 02/11/2022    Procedure: EGD (ESOPHAGOGASTRODUODENOSCOPY);  Surgeon: Efren Aguilar MD;  Location: [facility] ENDO;  Service: Endoscopy;  Laterality: N/A;  Patient needs a rapid covid  test done on 12/15/2021. thank you    EYE SURGERY Bilateral     cataracts extraction    EYE SURGERY Right     drainage tube (glaucoma)    FOOT ARTHRODESIS Left 01/15/2020    Procedure: FUSION, FOOT;  Surgeon: Rudolph Cardozo DPM;  Location: [facility] OR;  Service: Podiatry;  Laterality: Left;  mini c-arm, Arthrex screw  for hardware removal (Yldia notified), Orthofix (Niels notified) min ex-fix    FOOT SURGERY Left     lesion removed from dorsal area    FRACTURE SURGERY Left     arm    HAND TENDON SURGERY Left     HYSTERECTOMY      INCISION AND DRAINAGE FOOT Left 03/11/2020    Procedure: INCISION AND DRAINAGE, FOOT;  Surgeon: Rudolph Cardozo DPM;  Location: [facility] OR;  Service: Podiatry;  Laterality: Left;    INJECTION FOR SENTINEL NODE IDENTIFICATION Right 06/24/2024    Procedure: INJECTION, FOR SENTINEL NODE IDENTIFICATION;  Surgeon: SHAHZAD Laird MD;  Location: [facility] OR;  Service: General;  Laterality: Right;    LAPIDUS BUNIONECTOMY Left 10/31/2018    Procedure: BUNIONECTOMY, LAPIDUS;  Surgeon: Rudolph Cardozo DPM;  Location: [facility] OR;  Service: Podiatry;  Laterality: Left;  mini c-arm, Arthrex locking plate (Lydia notified)    LAPIDUS BUNIONECTOMY Left 10/31/2018    Dr. Cardozo    Lymph Gland Removed  Right     groin (performed by Dr. Vergara)    MASTECTOMY Right 06/24/2024    Procedure: MASTECTOMY RIGHT no recon;  Surgeon: SHAHZAD Laird MD;  Location: [facility];  Service: General;  Laterality: Right;  3.5 HRS    NEURECTOMY Left 01/15/2020    Procedure: NEURECTOMY;  Surgeon: Rudolph Cardozo  DPM;  Location: [facility] OR;  Service: Podiatry;  Laterality: Left;  bipolar bovie, vessel loop, possible Agnes nerve wrap. Moshe with Agnes notified and will be overnighting graft. MK 1/14/2020    OOPHORECTOMY      ORIF FOREARM FRACTURE Left     PALATE SURGERY      Lesion removed    REMOVAL,ORTHOPEDIC HARDWARE,UPPER EXTREMITY Left 06/30/2023    Procedure: REMOVAL,ORTHOPEDIC HARDWARE,UPPER EXTREMITY;  Surgeon: Skyler Oliva Jr., MD;  Location: [facility] OR;  Service: Orthopedics;  Laterality: Left;  César[employer] notified cc    REPAIR OF EXTENSOR TENDON Left 06/30/2023    Procedure: REPAIR, TENDON, EXTENSOR;  Surgeon: Skyler Oliva Jr., MD;  Location: [facility] OR;  Service: Orthopedics;  Laterality: Left;  Left Index Digit    SENTINEL LYMPH NODE BIOPSY Right 06/24/2024    Procedure: BIOPSY, LYMPH NODE, SENTINEL;  Surgeon: SHAHZAD Laird MD;  Location: [facility] OR;  Service: General;  Laterality: Right;    TONSILLECTOMY       Social History[3]      Review of Systems   Constitutional:  Negative for activity change and unexpected weight change.   HENT:  Positive for hearing loss, rhinorrhea and trouble swallowing.    Eyes:  Positive for visual disturbance. Negative for discharge.   Respiratory:  Positive for chest tightness and shortness of breath. Negative for wheezing.    Cardiovascular:  Negative for chest pain and palpitations.   Gastrointestinal:  Positive for constipation. Negative for blood in stool, diarrhea and vomiting.   Endocrine: Negative for polydipsia and polyuria.   Genitourinary:  Negative for difficulty urinating, dysuria, hematuria and menstrual problem.   Musculoskeletal:  Positive for arthralgias and joint swelling. Negative for neck pain.   Neurological:  Positive for weakness. Negative for headaches.   Psychiatric/Behavioral:  Positive for dysphoric mood. Negative for confusion.    All other systems reviewed and are negative.    Objective:     Physical Exam  Psychiatric:         Mood and Affect:  Mood normal.         Behavior: Behavior normal.         Thought Content: Thought content normal.         Judgment: Judgment normal.             Assessment:     1. Type 2 diabetes mellitus with chronic kidney disease, without long-term current use of insulin, unspecified CKD stage    2. Secondary and unspecified malignant neoplasm of axilla and upper limb lymph nodes    3. Essential hypertension    4. Aortic atherosclerosis    5. Restless leg syndrome    6. Mixed hyperlipidemia    7. Paroxysmal atrial fibrillation    8. JACQUELYN (obstructive sleep apnea)    9. Unspecified severe protein-calorie malnutrition    10. Esophageal dysmotility    11. Esophageal dysphagia      Plan:        Medication List            Accurate as of April 23, 2025  8:10 AM. If you have any questions, ask your nurse or doctor.                START taking these medications      ALPRAZolam 0.25 MG tablet  Commonly known as: XANAX  Take 1 tablet (0.25 mg total) by mouth 2 (two) times daily.  Started by: Scott Dillard MD     nitroGLYCERIN 0.4 MG SL tablet  Commonly known as: NITROSTAT  Place 1 tablet (0.4 mg total) under the tongue every 5 (five) minutes as needed for Chest pain.  Started by: Scott Dillard MD            CONTINUE taking these medications      acetaminophen 500 MG tablet  Commonly known as: TYLENOL     alendronate 70 MG tablet  Commonly known as: FOSAMAX  TAKE 1 TABLET (70 MG TOTAL) BY MOUTH EVERY 7 DAYS     amiodarone 200 MG Tab  Commonly known as: PACERONE  Take 1 tablet (200 mg total) by mouth once daily.     amitriptyline 25 MG tablet  Commonly known as: ELAVIL  Take 1 tablet (25 mg total) by mouth every evening.     anastrozole 1 mg Tab  Commonly known as: ARIMIDEX  Take 1 tablet (1 mg total) by mouth once daily.     aspirin 81 MG EC tablet  Commonly known as: ECOTRIN     azelastine 137 mcg (0.1 %) nasal spray  Commonly known as: ASTELIN     CENTRUM SILVER WOMEN 8 mg iron-400 mcg-50 mcg Tab  Generic drug: multivit-min-iron-FA-vit  "K-lut     chlorthalidone 50 MG Tab  Commonly known as: HYGROTEN  Take 1 tablet (50 mg total) by mouth once daily.     cyanocobalamin 1,000 mcg/mL injection  INJECT 1 ML (1,000 MCG) INTRAMUSCULARLY EVERY 30 DAYS     furosemide 20 MG tablet  Commonly known as: LASIX  Take 1 tablet (20 mg total) by mouth once daily.     gabapentin 300 MG capsule  Commonly known as: NEURONTIN  TAKE 2 CAPSULES BY MOUTH TWICE  DAILY     latanoprost 0.005 % ophthalmic solution     levocetirizine 5 MG tablet  Commonly known as: XYZAL  TAKE 1 TABLET BY MOUTH EVERY DAY IN THE EVENING     losartan 100 MG tablet  Commonly known as: COZAAR  TAKE 0.5 TABLETS (50 MG TOTAL) BY MOUTH ONCE DAILY     needle (disp) 25 gauge 25 gauge x 1" Ndle  1 Needle by Misc.(Non-Drug; Combo Route) route every 30 days.     omeprazole 20 MG capsule  Commonly known as: PRILOSEC  TAKE 1 CAPSULE BY MOUTH TWICE  DAILY     oxybutynin 5 MG Tab  Commonly known as: DITROPAN     potassium chloride 20 mEq  Commonly known as: K-TAB  TAKE 1 TABLET BY MOUTH ONCE DAILY     pramipexole 0.5 MG tablet  Commonly known as: MIRAPEX  Take 1 tablet (0.5 mg total) by mouth 2 (two) times daily.     pravastatin 80 MG tablet  Commonly known as: PRAVACHOL  Take 1 tablet (80 mg total) by mouth once daily.     timolol maleate 0.5% 0.5 % Drop  Commonly known as: TIMOPTIC     vitamin D 1000 units Tab  Commonly known as: VITAMIN D3     warfarin 6 MG tablet  Commonly known as: COUMADIN  TAKE 6 MG DAILY EXCEPT FOR 9 MG ON FRIDAY TO THIN BLOOD               Where to Get Your Medications        These medications were sent to [facility]/pharmacy #[id number] - [place] - [address] [facility]  [address] [place] [address]      Phone: [phone]   ALPRAZolam 0.25 MG tablet  amitriptyline 25 MG tablet  nitroGLYCERIN 0.4 MG SL tablet       Type 2 diabetes mellitus with chronic kidney disease, without long-term current use of insulin, unspecified CKD stage  -     Hemoglobin A1C; Future; Expected date: " 10/23/2025  -     CBC Auto Differential; Future; Expected date: 10/23/2025  -     Vitamin B12; Future; Expected date: 10/23/2025    Secondary and unspecified malignant neoplasm of axilla and upper limb lymph nodes  -     Hemoglobin A1C; Future; Expected date: 10/23/2025  -     CBC Auto Differential; Future; Expected date: 10/23/2025  -     Vitamin B12; Future; Expected date: 10/23/2025    Essential hypertension  -     Hemoglobin A1C; Future; Expected date: 10/23/2025  -     CBC Auto Differential; Future; Expected date: 10/23/2025  -     Vitamin B12; Future; Expected date: 10/23/2025    Aortic atherosclerosis  -     Hemoglobin A1C; Future; Expected date: 10/23/2025  -     CBC Auto Differential; Future; Expected date: 10/23/2025  -     Vitamin B12; Future; Expected date: 10/23/2025    Restless leg syndrome  -     Hemoglobin A1C; Future; Expected date: 10/23/2025  -     CBC Auto Differential; Future; Expected date: 10/23/2025  -     Vitamin B12; Future; Expected date: 10/23/2025    Mixed hyperlipidemia  -     Hemoglobin A1C; Future; Expected date: 10/23/2025  -     CBC Auto Differential; Future; Expected date: 10/23/2025  -     Vitamin B12; Future; Expected date: 10/23/2025    Paroxysmal atrial fibrillation  -     Hemoglobin A1C; Future; Expected date: 10/23/2025  -     CBC Auto Differential; Future; Expected date: 10/23/2025  -     Vitamin B12; Future; Expected date: 10/23/2025    JACQUELYN (obstructive sleep apnea)  -     Hemoglobin A1C; Future; Expected date: 10/23/2025  -     CBC Auto Differential; Future; Expected date: 10/23/2025  -     Vitamin B12; Future; Expected date: 10/23/2025  -     ALPRAZolam (XANAX) 0.25 MG tablet; Take 1 tablet (0.25 mg total) by mouth 2 (two) times daily.  Dispense: 60 tablet; Refill: 0    Unspecified severe protein-calorie malnutrition  -     Vitamin B12; Future; Expected date: 10/23/2025    Esophageal dysmotility  -     amitriptyline (ELAVIL) 25 MG tablet; Take 1 tablet (25 mg total) by  mouth every evening.  Dispense: 90 tablet; Refill: 1    Esophageal dysphagia  -     amitriptyline (ELAVIL) 25 MG tablet; Take 1 tablet (25 mg total) by mouth every evening.  Dispense: 90 tablet; Refill: 1    Other orders  -     nitroGLYCERIN (NITROSTAT) 0.4 MG SL tablet; Place 1 tablet (0.4 mg total) under the tongue every 5 (five) minutes as needed for Chest pain.  Dispense: 25 tablet; Refill: 0    Trial of xanax and or NTg for sob and CP  Repeat labs 6 months  If NTG helps, to Dr Luis  Told to sit to use ntg  Took xanax last night and slept all night           [1]   Patient Active Problem List  Diagnosis    Paroxysmal atrial fibrillation    JACQUELYN (obstructive sleep apnea)    Long term (current) use of anticoagulants    Anxiety    Restless leg syndrome    Hyperlipidemia    Essential hypertension    Gastroesophageal reflux disease without esophagitis    History of adenomatous polyp of colon    Diverticulosis of large intestine without hemorrhage    Tortuous aorta    Glaucoma    Blind right eye    Aortic atherosclerosis    Severe obesity with body mass index (BMI) of 35.0 to 39.9 with serious comorbidity    Hallux valgus with bunions, left    Multilevel facet arthritis    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    Other specified disorders of adrenal gland    Unspecified inflammatory spondylopathy, 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    Inflammatory polyneuropathy, unspecified    Umbilical hernia without obstruction and without gangrene    Bilateral renal cysts    Chronic kidney disease, stage 3a    Rupture of extensor tendon of left hand    Muscle weakness    Stiffness of left wrist joint    Stiffness of finger joint, left    Type 2 diabetes mellitus with chronic kidney disease, without long-term current use of insulin, unspecified CKD stage    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, estrogen receptor positive    At risk for lymphedema    Renal cyst, acquired, left    Stage 3b chronic kidney disease    Impingement syndrome of right  shoulder    Chronic right shoulder pain    Secondary and unspecified malignant neoplasm of axilla and upper limb lymph nodes   [2]   Current Outpatient Medications:     acetaminophen (TYLENOL) 500 MG tablet, Take 1,000 mg by mouth every 6 (six) hours as needed for Pain., Disp: , Rfl:     alendronate (FOSAMAX) 70 MG tablet, TAKE 1 TABLET (70 MG TOTAL) BY MOUTH EVERY 7 DAYS, Disp: 12 tablet, Rfl: 3    ALPRAZolam (XANAX) 0.25 MG tablet, Take 1 tablet (0.25 mg total) by mouth 2 (two) times daily., Disp: 60 tablet, Rfl: 0    amiodarone (PACERONE) 200 MG Tab, Take 1 tablet (200 mg total) by mouth once daily., Disp: 90 tablet, Rfl: 3    amitriptyline (ELAVIL) 25 MG tablet, Take 1 tablet (25 mg total) by mouth every evening., Disp: 90 tablet, Rfl: 1    anastrozole (ARIMIDEX) 1 mg Tab, Take 1 tablet (1 mg total) by mouth once daily., Disp: 90 tablet, Rfl: 3    aspirin (ECOTRIN) 81 MG EC tablet, Take 81 mg by mouth once daily., Disp: , Rfl:     azelastine (ASTELIN) 137 mcg (0.1 %) nasal spray, 1 spray by Nasal route 2 (two) times daily., Disp: , Rfl:     chlorthalidone (HYGROTEN) 50 MG Tab, Take 1 tablet (50 mg total) by mouth once daily., Disp: 90 tablet, Rfl: 3    cyanocobalamin 1,000 mcg/mL injection, INJECT 1 ML (1,000 MCG) INTRAMUSCULARLY EVERY 30 DAYS, Disp: 3 mL, Rfl: 18    furosemide (LASIX) 20 MG tablet, Take 1 tablet (20 mg total) by mouth once daily., Disp: 90 tablet, Rfl: 3    gabapentin (NEURONTIN) 300 MG capsule, TAKE 2 CAPSULES BY MOUTH TWICE  DAILY, Disp: 360 capsule, Rfl: 3    latanoprost 0.005 % ophthalmic solution, Place 1 drop into the left eye every evening., Disp: , Rfl:     levocetirizine (XYZAL) 5 MG tablet, TAKE 1 TABLET BY MOUTH EVERY DAY IN THE EVENING, Disp: 90 tablet, Rfl: 1    losartan (COZAAR) 100 MG tablet, TAKE 0.5 TABLETS (50 MG TOTAL) BY MOUTH ONCE DAILY, Disp: 45 tablet, Rfl: 1    multivit-min-iron-FA-lutein (CENTRUM SILVER WOMEN) 8 mg iron-400 mcg-300 mcg Tab, Take 1 tablet by mouth once  "daily., Disp: , Rfl:     needle, disp, 25 gauge 25 gauge x 1" Ndle, 1 Needle by Misc.(Non-Drug; Combo Route) route every 30 days., Disp: 25 each, Rfl: 0    nitroGLYCERIN (NITROSTAT) 0.4 MG SL tablet, Place 1 tablet (0.4 mg total) under the tongue every 5 (five) minutes as needed for Chest pain., Disp: 25 tablet, Rfl: 0    omeprazole (PRILOSEC) 20 MG capsule, TAKE 1 CAPSULE BY MOUTH TWICE  DAILY, Disp: 180 capsule, Rfl: 2    oxybutynin (DITROPAN) 5 MG Tab, , Disp: , Rfl:     potassium chloride (K-TAB) 20 mEq, TAKE 1 TABLET BY MOUTH ONCE DAILY, Disp: 90 tablet, Rfl: 3    pramipexole (MIRAPEX) 0.5 MG tablet, Take 1 tablet (0.5 mg total) by mouth 2 (two) times daily., Disp: 180 tablet, Rfl: 3    pravastatin (PRAVACHOL) 80 MG tablet, Take 1 tablet (80 mg total) by mouth once daily., Disp: 90 tablet, Rfl: 3    timolol maleate 0.5% (TIMOPTIC) 0.5 % Drop, Place 1 drop into the left eye once daily. , Disp: , Rfl: 0    vitamin D (VITAMIN D3) 1000 units Tab, Take 1,000 Units by mouth once daily., Disp: , Rfl:     warfarin (COUMADIN) 6 MG tablet, TAKE 6 MG DAILY EXCEPT FOR 9 MG ON FRIDAY TO THIN BLOOD, Disp: 96 tablet, Rfl: 3  No current facility-administered medications for this visit.    Facility-Administered Medications Ordered in Other Visits:     lactated ringers infusion, , Intravenous, Continuous, Don Ruano DNP, New Bag at 06/24/24 0832    LIDOcaine (PF) 10 mg/ml (1%) injection 10 mg, 1 mL, Intradermal, Once, Don Ruano DNP  [3]   Social History  Tobacco Use    Smoking status: Never     Passive exposure: Never    Smokeless tobacco: Never   Substance Use Topics    Alcohol use: Not Currently    Drug use: No     "

## 2025-04-25 ENCOUNTER — LAB VISIT (OUTPATIENT)
Dept: LAB | Facility: HOSPITAL | Age: 85
End: 2025-04-25
Attending: INTERNAL MEDICINE
Payer: MEDICARE

## 2025-04-25 ENCOUNTER — ANTI-COAG VISIT (OUTPATIENT)
Dept: CARDIOLOGY | Facility: CLINIC | Age: 85
End: 2025-04-25
Payer: MEDICARE

## 2025-04-25 DIAGNOSIS — I10 ESSENTIAL HYPERTENSION: ICD-10-CM

## 2025-04-25 DIAGNOSIS — I48.0 PAROXYSMAL ATRIAL FIBRILLATION: ICD-10-CM

## 2025-04-25 DIAGNOSIS — G25.81 RESTLESS LEG SYNDROME: ICD-10-CM

## 2025-04-25 DIAGNOSIS — C77.3 SECONDARY AND UNSPECIFIED MALIGNANT NEOPLASM OF AXILLA AND UPPER LIMB LYMPH NODES: ICD-10-CM

## 2025-04-25 DIAGNOSIS — Z79.01 LONG TERM (CURRENT) USE OF ANTICOAGULANTS: ICD-10-CM

## 2025-04-25 DIAGNOSIS — E43 UNSPECIFIED SEVERE PROTEIN-CALORIE MALNUTRITION: ICD-10-CM

## 2025-04-25 DIAGNOSIS — I70.0 AORTIC ATHEROSCLEROSIS: ICD-10-CM

## 2025-04-25 DIAGNOSIS — E11.22 TYPE 2 DIABETES MELLITUS WITH CHRONIC KIDNEY DISEASE, WITHOUT LONG-TERM CURRENT USE OF INSULIN, UNSPECIFIED CKD STAGE: ICD-10-CM

## 2025-04-25 DIAGNOSIS — I48.0 PAROXYSMAL ATRIAL FIBRILLATION: Primary | Chronic | ICD-10-CM

## 2025-04-25 DIAGNOSIS — G47.33 OSA (OBSTRUCTIVE SLEEP APNEA): ICD-10-CM

## 2025-04-25 DIAGNOSIS — E78.2 MIXED HYPERLIPIDEMIA: ICD-10-CM

## 2025-04-25 LAB
HOLD SPECIMEN: NORMAL
INR PPP: 1.5 (ref 0.8–1.2)
PROTHROMBIN TIME: 15.5 SECONDS (ref 9–12.5)
VIT B12 SERPL-MCNC: 683 PG/ML (ref 210–950)

## 2025-04-25 PROCEDURE — 36415 COLL VENOUS BLD VENIPUNCTURE: CPT | Mod: PN

## 2025-04-25 PROCEDURE — 85610 PROTHROMBIN TIME: CPT | Mod: PN

## 2025-04-25 PROCEDURE — 82607 VITAMIN B-12: CPT | Mod: PN

## 2025-04-25 NOTE — PROGRESS NOTES
[facility]    2025 2:36 PM    Assessment/Plan:    Patient presents today with subtherapeutic  INR.    Assessment of patient findings and chart review: INR below goal and patient reports recent increase in vit K food intake    Recommendation for patient's warfarin regimen: Boost dose today to 6mg then resume current maintenance dose    Recommend repeat INR in 2 weeks  _________________________________________________________________    Mis RAUL Lanny (84 y.o.) is followed by the [facility].    Anticoagulation Summary  As of 2025      INR goal:  2.0-3.0   TTR:  76.9% (2.5 y)   INR used for dosin.5 (2025)   Warfarin maintenance plan:  6 mg (6 mg x 1) every Mon, Sat; 3 mg (6 mg x 0.5) all other days   Weekly warfarin total:  27 mg   Plan last modified:  Tino Mendiola, PharmD (3/5/2025)   Next INR check:  2025   Target end date:  --    Indications    Paroxysmal atrial fibrillation [I48.0]  Long term (current) use of anticoagulants [Z79.01]                 Anticoagulation Episode Summary       INR check location:  --    Preferred lab:  --    Send INR reminders to:  [facility] COUMADIN MONITORING POOL    Comments:  [facility]          Anticoagulation Care Providers       Provider Role Specialty Phone number    Shamar Owens MD Responsible Electrophysiology [phone]

## 2025-04-28 ENCOUNTER — OFFICE VISIT (OUTPATIENT)
Dept: SURGERY | Facility: CLINIC | Age: 85
End: 2025-04-28
Attending: SURGERY
Payer: MEDICARE

## 2025-04-28 ENCOUNTER — HOSPITAL ENCOUNTER (OUTPATIENT)
Dept: CARDIOLOGY | Facility: HOSPITAL | Age: 85
Discharge: HOME | End: 2025-04-28
Attending: INTERNAL MEDICINE
Payer: MEDICARE

## 2025-04-28 ENCOUNTER — HOSPITAL ENCOUNTER (OUTPATIENT)
Dept: RADIOLOGY | Facility: HOSPITAL | Age: 85
Discharge: HOME | End: 2025-04-28
Attending: SURGERY
Payer: MEDICARE

## 2025-04-28 VITALS — WEIGHT: 210 LBS | BODY MASS INDEX: 38.64 KG/M2 | HEIGHT: 62 IN

## 2025-04-28 VITALS
OXYGEN SATURATION: 94 % | BODY MASS INDEX: 38.64 KG/M2 | HEART RATE: 63 BPM | SYSTOLIC BLOOD PRESSURE: 115 MMHG | HEIGHT: 62 IN | DIASTOLIC BLOOD PRESSURE: 72 MMHG | WEIGHT: 210 LBS

## 2025-04-28 DIAGNOSIS — I70.0 AORTIC ATHEROSCLEROSIS: ICD-10-CM

## 2025-04-28 DIAGNOSIS — I10 ESSENTIAL HYPERTENSION: ICD-10-CM

## 2025-04-28 DIAGNOSIS — M25.561 ACUTE PAIN OF RIGHT KNEE: ICD-10-CM

## 2025-04-28 DIAGNOSIS — Z12.31 SCREENING MAMMOGRAM FOR BREAST CANCER: ICD-10-CM

## 2025-04-28 DIAGNOSIS — I48.0 PAROXYSMAL ATRIAL FIBRILLATION: ICD-10-CM

## 2025-04-28 DIAGNOSIS — Z90.11 S/P RIGHT MASTECTOMY: Primary | ICD-10-CM

## 2025-04-28 PROCEDURE — 1101F PT FALLS ASSESS-DOCD LE1/YR: CPT | Mod: CPTII,S$GLB,, | Performed by: ORTHOPAEDIC SURGERY

## 2025-04-28 PROCEDURE — 77063 BREAST TOMOSYNTHESIS BI: CPT | Mod: 26,52,, | Performed by: RADIOLOGY

## 2025-04-28 PROCEDURE — 3288F FALL RISK ASSESSMENT DOCD: CPT | Mod: CPTII,S$GLB,, | Performed by: ORTHOPAEDIC SURGERY

## 2025-04-28 PROCEDURE — 1159F MED LIST DOCD IN RCRD: CPT | Mod: CPTII,S$GLB,, | Performed by: ORTHOPAEDIC SURGERY

## 2025-04-28 PROCEDURE — 99213 OFFICE O/P EST LOW 20 MIN: CPT | Mod: S$GLB,,, | Performed by: ORTHOPAEDIC SURGERY

## 2025-04-28 PROCEDURE — 77067 SCR MAMMO BI INCL CAD: CPT | Mod: 26,52,, | Performed by: RADIOLOGY

## 2025-04-28 PROCEDURE — 3074F SYST BP LT 130 MM HG: CPT | Mod: CPTII,S$GLB,, | Performed by: ORTHOPAEDIC SURGERY

## 2025-04-28 PROCEDURE — 77067 SCR MAMMO BI INCL CAD: CPT | Mod: TC,52

## 2025-04-28 PROCEDURE — 3078F DIAST BP <80 MM HG: CPT | Mod: CPTII,S$GLB,, | Performed by: ORTHOPAEDIC SURGERY

## 2025-04-28 PROCEDURE — 99999 PR PBB SHADOW E&M-EST. PATIENT-LVL V: CPT | Mod: PBBFAC,,, | Performed by: ORTHOPAEDIC SURGERY

## 2025-04-28 PROCEDURE — 93306 TTE W/DOPPLER COMPLETE: CPT

## 2025-04-28 PROCEDURE — 93306 TTE W/DOPPLER COMPLETE: CPT | Mod: 26,,, | Performed by: INTERNAL MEDICINE

## 2025-04-28 NOTE — PROGRESS NOTES
[facility] [place]  Department of Surgery    REFERRING PROVIDER: SHAHZAD Laird Md  [address] [place] [address] Scott Dillard MD  CHIEF COMPLAINT:   Chief Complaint   Patient presents with    Follow-up     CBE & MMG Rt - Mastectomy.       DIAGNOSIS:   This is a 84 y.o. 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     Initial presentation:     TREATMENT:   1. right total mastectomy with sentinel node biopsy on 6/24/2024. SOHEILA Laird M.D. Surgical Oncology. Final pathology showed multifocal IDC measuring 36 mm, 28 mm, and 17 mm. 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 Invasive Margins are clear, closest is 1.1 mm posterior. 1/3 Ln with micrometastasis 20 mm deposit.   2. Chemotherapy not recommended  3. Radiation therapy completed 08/26/24. SOHEILA Shaw M.D. Radiation Oncology   4. Adjuvant endocrine therapy started, anastrozole, 09/2024. JESE Vasquez M.D. Medical Oncology     HISTORY OF PRESENT ILLNESS:   Mis Ca is a 84 y.o. female comes in for oncological follow up.  She denies change in her breast self-exam specifically denying new masses, skin or nipple changes, or nipple discharge. Past medical and surgical history is updated with new changes. There have been no changes to family history. The patient denies constitutional symptoms of night sweats, chills, weight loss, new headaches, visual changes, new back or bony pain, chest pain, or shortness of breath.      Tolerating anastrozole well. She would like a new size post mastectomy bra. She complains of tightness in the right chest with full arm flexion, she is currently in PT for this. Her only other complaint today is new onset right knee pain requiring cane for assistance.     Review of Systems: See HPI/Interval History for other systems reviewed.     IMAGING:   Result:  Mammo Digital Screening Left with Ed     History:  Patient is 84 y.o. and is seen for a screening  mammogram.  History of contralateral mastectomy.     Films Compared:  Prior images (if available) were compared.      Findings:  This procedure was performed using tomosynthesis.   Computer-aided detection was utilized in the interpretation of this examination.     The breasts are heterogeneously dense, which may obscure small masses. There is no evidence of suspicious masses, microcalcifications or architectural distortion.     Impression:   No mammographic evidence of malignancy.     BI-RADS Category 1: Negative     Recommendation:  Routine screening mammogram in 1 year, or as clinically indicated.       MEDICATIONS/ALLERGIES:     Current Outpatient Medications   Medication Sig    acetaminophen (TYLENOL) 500 MG tablet Take 1,000 mg by mouth every 6 (six) hours as needed for Pain.    alendronate (FOSAMAX) 70 MG tablet TAKE 1 TABLET (70 MG TOTAL) BY MOUTH EVERY 7 DAYS    ALPRAZolam (XANAX) 0.25 MG tablet Take 1 tablet (0.25 mg total) by mouth 2 (two) times daily.    amiodarone (PACERONE) 200 MG Tab Take 1 tablet (200 mg total) by mouth once daily.    amitriptyline (ELAVIL) 25 MG tablet Take 1 tablet (25 mg total) by mouth every evening.    anastrozole (ARIMIDEX) 1 mg Tab Take 1 tablet (1 mg total) by mouth once daily.    aspirin (ECOTRIN) 81 MG EC tablet Take 81 mg by mouth once daily.    chlorthalidone (HYGROTEN) 50 MG Tab Take 1 tablet (50 mg total) by mouth once daily.    cyanocobalamin 1,000 mcg/mL injection INJECT 1 ML (1,000 MCG) INTRAMUSCULARLY EVERY 30 DAYS    furosemide (LASIX) 20 MG tablet Take 1 tablet (20 mg total) by mouth once daily.    gabapentin (NEURONTIN) 300 MG capsule TAKE 2 CAPSULES BY MOUTH TWICE  DAILY    latanoprost 0.005 % ophthalmic solution Place 1 drop into the left eye every evening.    levocetirizine (XYZAL) 5 MG tablet TAKE 1 TABLET BY MOUTH EVERY DAY IN THE EVENING    losartan (COZAAR) 100 MG tablet TAKE 0.5 TABLETS (50 MG TOTAL) BY MOUTH ONCE DAILY    multivit-min-iron-FA-lutein  "(CENTRUM SILVER WOMEN) 8 mg iron-400 mcg-300 mcg Tab Take 1 tablet by mouth once daily.    needle, disp, 25 gauge 25 gauge x 1" Ndle 1 Needle by Misc.(Non-Drug; Combo Route) route every 30 days.    nitroGLYCERIN (NITROSTAT) 0.4 MG SL tablet Place 1 tablet (0.4 mg total) under the tongue every 5 (five) minutes as needed for Chest pain.    omeprazole (PRILOSEC) 20 MG capsule TAKE 1 CAPSULE BY MOUTH TWICE  DAILY    oxybutynin (DITROPAN) 5 MG Tab     potassium chloride (K-TAB) 20 mEq TAKE 1 TABLET BY MOUTH ONCE DAILY    pramipexole (MIRAPEX) 0.5 MG tablet Take 1 tablet (0.5 mg total) by mouth 2 (two) times daily.    pravastatin (PRAVACHOL) 80 MG tablet Take 1 tablet (80 mg total) by mouth once daily.    timolol maleate 0.5% (TIMOPTIC) 0.5 % Drop Place 1 drop into the left eye once daily.     vitamin D (VITAMIN D3) 1000 units Tab Take 1,000 Units by mouth once daily.    warfarin (COUMADIN) 6 MG tablet TAKE 6 MG DAILY EXCEPT FOR 9 MG ON FRIDAY TO THIN BLOOD    azelastine (ASTELIN) 137 mcg (0.1 %) nasal spray 1 spray by Nasal route 2 (two) times daily.     No current facility-administered medications for this visit.     Facility-Administered Medications Ordered in Other Visits   Medication    lactated ringers infusion    LIDOcaine (PF) 10 mg/ml (1%) injection 10 mg      Review of patient's allergies indicates:   Allergen Reactions    Adhesive     Compazine [prochlorperazine edisylate] Anxiety    Penicillins Rash    Sulfa (sulfonamide antibiotics) Rash    Vancomycin analogues Rash       PHYSICAL EXAM:   /72 (BP Location: Left arm, Patient Position: Sitting)   Pulse 63   Ht 5' 2" (1.575 m)   Wt 95.3 kg (210 lb)   SpO2 (!) 94%   BMI 38.41 kg/m²     Physical Exam   Constitutional: She is oriented to person, place, and time.   HENT:   Head: Normocephalic.   Eyes: Pupils are equal, round, and reactive to light.   Cardiovascular:  Normal rate.            Pulmonary/Chest: Effort normal. Right breast exhibits no inverted " nipple, no mass, no nipple discharge, no skin change and no tenderness. Left breast exhibits inverted nipple (partially). Left breast exhibits no mass, no nipple discharge, no skin change and no tenderness.       Abdominal: Normal appearance.   Neurological: She is alert and oriented to person, place, and time.   Psychiatric: Her behavior is normal. Mood normal.       ASSESSMENT:   This is a 84 y.o. female without evidence of recurrence by exam, history or imaging.       PLAN:   1. Continue to follow up with Dr. Vasquez in Medical Oncology  2. Continue monthly self breast exams and call the clinic with any changes or problems.  3. Mammogram in 1 year .  4. Return to clinic in 1 year .    The patient is in agreement with the plan. Questions were encouraged and answered to patient's satisfaction. Mis will call our office with any questions or concerns.

## 2025-04-29 LAB
APICAL FOUR CHAMBER EJECTION FRACTION: 76 %
ASCENDING AORTA: 3 CM
AV INDEX (PROSTH): 0.72
AV MEAN GRADIENT: 10 MMHG
AV PEAK GRADIENT: 18 MMHG
AV VALVE AREA BY VELOCITY RATIO: 2.8 CM²
AV VALVE AREA: 2.5 CM²
AV VELOCITY RATIO: 0.81
BSA FOR ECHO PROCEDURE: 2.04 M2
CV ECHO LV RWT: 0.4 CM
DOP CALC AO PEAK VEL: 2.1 M/S
DOP CALC AO VTI: 54.2 CM
DOP CALC LVOT AREA: 3.5 CM2
DOP CALC LVOT DIAMETER: 2.1 CM
DOP CALC LVOT PEAK VEL: 1.7 M/S
DOP CALC LVOT STROKE VOLUME: 135.4 CM3
DOP CALC MV VTI: 34.5 CM
DOP CALCLVOT PEAK VEL VTI: 39.1 CM
E WAVE DECELERATION TIME: 276 MSEC
E/A RATIO: 0.92
E/E' RATIO: 16 M/S
ECHO LV POSTERIOR WALL: 1 CM (ref 0.6–1.1)
FRACTIONAL SHORTENING: 26 % (ref 28–44)
INTERVENTRICULAR SEPTUM: 1.3 CM (ref 0.6–1.1)
IVC DIAMETER: 1.6 CM
LEFT ATRIUM AREA SYSTOLIC (APICAL 2 CHAMBER): 16.46 CM2
LEFT ATRIUM AREA SYSTOLIC (APICAL 4 CHAMBER): 20.94 CM2
LEFT ATRIUM VOLUME INDEX MOD: 26 ML/M2
LEFT ATRIUM VOLUME MOD: 51 ML
LEFT INTERNAL DIMENSION IN SYSTOLE: 3.7 CM (ref 2.1–4)
LEFT VENTRICLE DIASTOLIC VOLUME INDEX: 59.49 ML/M2
LEFT VENTRICLE DIASTOLIC VOLUME: 116 ML
LEFT VENTRICLE END DIASTOLIC VOLUME APICAL 4 CHAMBER: 58.15 ML
LEFT VENTRICLE END SYSTOLIC VOLUME APICAL 2 CHAMBER: 37.81 ML
LEFT VENTRICLE END SYSTOLIC VOLUME APICAL 4 CHAMBER: 64.69 ML
LEFT VENTRICLE MASS INDEX: 113 G/M2
LEFT VENTRICLE SYSTOLIC VOLUME INDEX: 28.7 ML/M2
LEFT VENTRICLE SYSTOLIC VOLUME: 56 ML
LEFT VENTRICULAR INTERNAL DIMENSION IN DIASTOLE: 5 CM (ref 3.5–6)
LEFT VENTRICULAR MASS: 220.3 G
LV LATERAL E/E' RATIO: 19.6 M/S
LV SEPTAL E/E' RATIO: 14 M/S
LVED V (TEICH): 116.49 ML
LVES V (TEICH): 56.31 ML
LVOT MG: 6.79 MMHG
LVOT MV: 1.24 CM/S
MV MEAN GRADIENT: 2 MMHG
MV PEAK A VEL: 1.07 M/S
MV PEAK E VEL: 0.98 M/S
MV PEAK GRADIENT: 4 MMHG
MV STENOSIS PRESSURE HALF TIME: 80.06 MS
MV VALVE AREA BY CONTINUITY EQUATION: 3.92 CM2
MV VALVE AREA P 1/2 METHOD: 2.75 CM2
OHS CV RV/LV RATIO: 0.72 CM
PISA MRMAX VEL: 5.45 M/S
PISA TR MAX VEL: 1.5 M/S
PV PEAK GRADIENT: 4 MMHG
PV PEAK VELOCITY: 1 M/S
RA PRESSURE ESTIMATED: 3 MMHG
RA VOL SYS: 33.94 ML
RIGHT ATRIAL AREA: 14.8 CM2
RIGHT ATRIUM VOLUME AREA LENGTH APICAL 4 CHAMBER: 33.56 ML
RIGHT VENTRICLE DIASTOLIC BASEL DIMENSION: 3.6 CM
RV TB RVSP: 5 MMHG
RV TISSUE DOPPLER FREE WALL SYSTOLIC VELOCITY 1 (APICAL 4 CHAMBER VIEW): 15.19 CM/S
SINUS: 3.12 CM
STJ: 2.4 CM
TDI LATERAL: 0.05 M/S
TDI SEPTAL: 0.07 M/S
TDI: 0.06 M/S
TR MAX PG: 107 MMHG
TRICUSPID ANNULAR PLANE SYSTOLIC EXCURSION: 2.3 CM
TV REST PULMONARY ARTERY PRESSURE: 12 MMHG
Z-SCORE OF LEFT VENTRICULAR DIMENSION IN END DIASTOLE: -1.06
Z-SCORE OF LEFT VENTRICULAR DIMENSION IN END SYSTOLE: 0.62

## 2025-04-30 ENCOUNTER — RESULTS FOLLOW-UP (OUTPATIENT)
Dept: CARDIOLOGY | Facility: CLINIC | Age: 85
End: 2025-04-30

## 2025-04-30 NOTE — PROGRESS NOTES
Your echocardiogram results is okay.  Normal heart pumping function.  Overall no major abnormalities.  We will discuss in detail during next appointment    Sincerely,  Portillo Luis MD.   Interventional Cardiologist  [facility] [place]

## 2025-05-05 ENCOUNTER — TELEPHONE (OUTPATIENT)
Dept: SPORTS MEDICINE | Facility: CLINIC | Age: 85
End: 2025-05-05
Payer: MEDICARE

## 2025-05-05 DIAGNOSIS — M25.561 RIGHT KNEE PAIN, UNSPECIFIED CHRONICITY: Primary | ICD-10-CM

## 2025-05-12 ENCOUNTER — ANTI-COAG VISIT (OUTPATIENT)
Dept: CARDIOLOGY | Facility: CLINIC | Age: 85
End: 2025-05-12
Payer: MEDICARE

## 2025-05-12 ENCOUNTER — LAB VISIT (OUTPATIENT)
Dept: LAB | Facility: HOSPITAL | Age: 85
End: 2025-05-12
Attending: INTERNAL MEDICINE
Payer: MEDICARE

## 2025-05-12 DIAGNOSIS — Z79.01 LONG TERM (CURRENT) USE OF ANTICOAGULANTS: ICD-10-CM

## 2025-05-12 DIAGNOSIS — I48.0 PAROXYSMAL ATRIAL FIBRILLATION: Chronic | ICD-10-CM

## 2025-05-12 DIAGNOSIS — I48.0 PAROXYSMAL ATRIAL FIBRILLATION: Primary | Chronic | ICD-10-CM

## 2025-05-12 LAB
INR PPP: 2.1 (ref 0.8–1.2)
PROTHROMBIN TIME: 21.6 SECONDS (ref 9–12.5)

## 2025-05-12 PROCEDURE — 36415 COLL VENOUS BLD VENIPUNCTURE: CPT | Mod: PN

## 2025-05-12 PROCEDURE — 85610 PROTHROMBIN TIME: CPT | Mod: PN

## 2025-05-12 PROCEDURE — 93793 ANTICOAG MGMT PT WARFARIN: CPT | Mod: S$GLB,,,

## 2025-05-12 NOTE — PROGRESS NOTES
I have reviewed the notes and assessments performed by pharmacy resident Krys Zelaya, PharmD.  I concur with her/his documentation of Mis Ca.

## 2025-05-12 NOTE — PROGRESS NOTES
[facility]    05/12/2025    Mis Ca (84 y.o.) is followed by the [facility].      Assessment/Plan:    Mis Ca presents with a therapeutic INR. Goal INR: 2.0-3.0    Lab Results   Component Value Date    INR 2.1 (H) 05/12/2025    INR 1.5 (H) 04/25/2025    INR 2.0 (H) 04/15/2025    PROTIME 21.6 (H) 05/12/2025    PROTIME 15.5 (H) 04/25/2025    PROTIME 20.9 (H) 04/15/2025       Assessment of patient findings per MA/LPN and chart review:   The following significant findings were found:   None    Recommendation for patient's warfarin regimen:   No change was made to warfarin therapy during this visit and patient has been instructed to continue their current warfarin regimen.    Recommended repeat INR in 2 weeks      Krys Zelaya, PharmD  Preferred Contact: Secure Messaging or In Basket Message

## 2025-05-14 ENCOUNTER — RESULTS FOLLOW-UP (OUTPATIENT)
Dept: FAMILY MEDICINE | Facility: CLINIC | Age: 85
End: 2025-05-14

## 2025-05-14 NOTE — TELEPHONE ENCOUNTER
Refill Routing Note   Medication(s) are not appropriate for processing by [facility] for the following reason(s):        New or recently adjusted medication    [facility] action(s):  Defer               Appointments  past 12m or future 3m with PCP    Date Provider   Last Visit   10/28/2024 Scott Dillard MD   Next Visit   Visit date not found Scott Dillard MD   ED visits in past 90 days: 0        Note composed:3:03 PM 05/14/2025

## 2025-05-14 NOTE — TELEPHONE ENCOUNTER
No care due was identified.  [facility] Embedded Care Due Messages. Reference number: [id number].   5/14/2025 1:44:27 PM CDT

## 2025-05-15 RX ORDER — NITROGLYCERIN 0.4 MG/1
0.4 TABLET SUBLINGUAL EVERY 5 MIN PRN
Qty: 25 TABLET | Refills: 11 | Status: SHIPPED | OUTPATIENT
Start: 2025-05-15

## 2025-05-19 ENCOUNTER — TELEPHONE (OUTPATIENT)
Dept: UROLOGY | Facility: CLINIC | Age: 85
End: 2025-05-19
Payer: MEDICARE

## 2025-05-19 ENCOUNTER — OFFICE VISIT (OUTPATIENT)
Dept: SPORTS MEDICINE | Facility: CLINIC | Age: 85
End: 2025-05-19
Payer: MEDICARE

## 2025-05-19 VITALS — WEIGHT: 220.44 LBS | HEIGHT: 62 IN | BODY MASS INDEX: 40.57 KG/M2

## 2025-05-19 DIAGNOSIS — M25.561 ACUTE PAIN OF RIGHT KNEE: ICD-10-CM

## 2025-05-19 PROCEDURE — 1159F MED LIST DOCD IN RCRD: CPT | Mod: CPTII,S$GLB,, | Performed by: ORTHOPAEDIC SURGERY

## 2025-05-19 PROCEDURE — 1160F RVW MEDS BY RX/DR IN RCRD: CPT | Mod: CPTII,S$GLB,, | Performed by: ORTHOPAEDIC SURGERY

## 2025-05-19 PROCEDURE — 99999 PR PBB SHADOW E&M-EST. PATIENT-LVL III: CPT | Mod: PBBFAC,,, | Performed by: ORTHOPAEDIC SURGERY

## 2025-05-19 PROCEDURE — 99214 OFFICE O/P EST MOD 30 MIN: CPT | Mod: S$GLB,,, | Performed by: ORTHOPAEDIC SURGERY

## 2025-05-19 PROCEDURE — 1101F PT FALLS ASSESS-DOCD LE1/YR: CPT | Mod: CPTII,S$GLB,, | Performed by: ORTHOPAEDIC SURGERY

## 2025-05-19 PROCEDURE — 1125F AMNT PAIN NOTED PAIN PRSNT: CPT | Mod: CPTII,S$GLB,, | Performed by: ORTHOPAEDIC SURGERY

## 2025-05-19 PROCEDURE — 3288F FALL RISK ASSESSMENT DOCD: CPT | Mod: CPTII,S$GLB,, | Performed by: ORTHOPAEDIC SURGERY

## 2025-05-19 NOTE — TELEPHONE ENCOUNTER
Patient thought she had received a call from Dr. Martin office but in the chart there is no message in there stating anyone tried to call her. She said the message stated to call Dr. Martin cell phone and she found that strange and weird.

## 2025-05-19 NOTE — TELEPHONE ENCOUNTER
----- Message from Alejandro sent at 5/19/2025  2:53 PM CDT -----  Contact: pt  Type:  Patient Returning CallWho Called:ptWho Left Message for Patient:office Does the patient know what this is regarding?:noWould the patient rather a call back or a response via [facility]? [facility] Call Back Number:[phone] Additional Information:

## 2025-05-19 NOTE — PROGRESS NOTES
Subjective:     Chief Complaint: Mis Ca is a 84 y.o. female who had concerns including Pain of the Right Knee.    HPI    Patient presents to clinic with acute right knee pain and swelling x several weeks. Patient states the pain began gradually and is localized lateral aspect and peripatellar aspect of the knee. She denies any GARCÍA or traumatic event.  Pain is made worse following periods of inactivity.  She rates the pain as 7/10 today.  She has attempted multiple conservative measures that include activity modification, ice & elevation, and oral medications (Tylenol).  Patient can not take anti-inflammatories due to current blood thinners.  She denies any mechanical symptoms to include locking and catching or instability.  Is affecting ADLs and limiting desired level of activity. Denies numbness or tingling. She is here today to discuss treatment options.    No previous surgeries or trauma on bilateral knees      Review of Systems   Constitutional: Negative.   HENT: Negative.     Eyes: Negative.    Cardiovascular: Negative.    Respiratory: Negative.     Endocrine: Negative.    Hematologic/Lymphatic: Negative.    Skin: Negative.    Musculoskeletal:  Positive for joint pain, joint swelling, muscle weakness and stiffness. Negative for falls.   Neurological: Negative.    Psychiatric/Behavioral: Negative.     Allergic/Immunologic: Negative.                Objective:     General: Mis is well-developed, well-nourished, appears stated age, in no acute distress, alert and oriented to time, place and person.     General    Nursing note and vitals reviewed.  Constitutional: She is oriented to person, place, and time. She appears well-developed and well-nourished. No distress.   HENT:   Head: Normocephalic and atraumatic.   Nose: Nose normal.   Eyes: EOM are normal.   Cardiovascular:  Intact distal pulses.            Pulmonary/Chest: Effort normal. No respiratory distress.   Neurological: She is alert and  oriented to person, place, and time.   Psychiatric: She has a normal mood and affect. Her behavior is normal. Judgment and thought content normal.           Right Knee Exam     Inspection   Erythema: absent  Scars: absent  Swelling: absent  Effusion: present  Deformity: absent  Bruising: absent    Tenderness   The patient is tender to palpation of the medial joint line.    Range of Motion   Extension:  -5   Flexion:  140     Tests   Meniscus   Harjinder:  Medial - negative Lateral - negative  Ligament Examination   Lachman: normal (-1 to 2mm)   PCL-Posterior Drawer: normal (0 to 2mm)     MCL - Valgus: normal (0 to 2mm)  LCL - Varus: normal  Patella   Patellar apprehension: negative  Passive Patellar Tilt: neutral  Patellar Tracking: normal  Patellar Grind: negative    Other   Sensation: normal    Left Knee Exam     Inspection   Erythema: absent  Scars: absent  Swelling: absent  Effusion: absent  Deformity: absent  Bruising: absent    Tenderness   The patient is experiencing no tenderness.     Range of Motion   Extension:  -5   Flexion:  140     Tests   Meniscus   Harjinder:  Medial - negative Lateral - negative  Stability   Lachman: normal (-1 to 2mm)   PCL-Posterior Drawer: normal (0 to 2mm)  MCL - Valgus: normal (0 to 2mm)  LCL - Varus: normal (0 to 2mm)  Patella   Patellar apprehension: negative  Passive Patellar Tilt: neutral  Patellar Tracking: normal  Patellar Grind: negative    Other   Sensation: normal    Muscle Strength   Right Lower Extremity   Quadriceps:  5/5   Hamstrin/5   Left Lower Extremity   Quadriceps:  5/5   Hamstrin/5     Vascular Exam     Right Pulses  Dorsalis Pedis:      2+  Posterior Tibial:      2+        Left Pulses  Dorsalis Pedis:      2+  Posterior Tibial:      2+        Edema  Left Lower Leg: absent    Radiographs bilateral knees     My interpretation:     Joint space narrowing seen in bilateral medial compartments with osteophyte formation    Kellgren Terry grade  3      Assessment:     Encounter Diagnosis   Name Primary?    Acute pain of right knee         Plan:     1. I made the decision to order new imaging of the extremity or extremities evaluated. I independently reviewed and interpreted the radiographs and/or MRIs today. These images were shown to the patient where I then discussed my findings in detail.    2. We discussed at length different treatment options including conservative vs surgical management. These include anti-inflammatories, acetaminophen, rest, ice, heat, formal physical therapy including strengthening and stretching exercises, home exercise programs, dry needling, and finally surgical intervention.  After explained diagnosis we discussed the multiple conservative treatment options.  I recommended knee aspiration, which I think would help give her some relief.  This would consist of aspiration of both of the knee joint and possible Baker cyst.  Not get adequate relief from this, we will schedule formal physical therapy for her knee after she completes PT for her shoulder.    3. Future appointments scheduled with Mo Hutton MD for aspiration of knee joint and Baker cyst.    4. RTC to see Dimas Briggs PA-C in p.r.n. Patient will contact our clinic when she has finished physical therapy for shoulder to request PT for her knees.      All of the patient's questions were answered. Patient was advised to call the clinic or contact me through the patient portal for any questions or concerns.       Medical Dictation software was used during the dictation of portions or the entirety of this medical record.  Phonetic or grammatic errors may exist due to the use of this software. For clarification, refer to the author of the document.        Patient questionnaires may have been collected.

## 2025-05-20 ENCOUNTER — TELEPHONE (OUTPATIENT)
Dept: SPORTS MEDICINE | Facility: CLINIC | Age: 85
End: 2025-05-20
Payer: MEDICARE

## 2025-05-20 NOTE — PROGRESS NOTES
Outpatient Rehab    Physical Therapy Visit    Patient Name: Mis Ca  MRN: [id number]  YOB: 1940  Encounter Date: 3/27/2025    Therapy Diagnosis:   Encounter Diagnoses   Name Primary?    Impingement syndrome of right shoulder Yes    Chronic right shoulder pain      Physician: Mariah García PA-C    Physician Orders: Eval and Treat  Medical Diagnosis: Chronic right shoulder pain  Impingement syndrome of right shoulder    Visit # / Visits Authorized:  9 / 20  Insurance Authorization Period: 3/11/2025 to 12/31/2025  Date of Evaluation: 3/11/2025  Plan of Care Certification:       PT/PTA: PT   Number of PTA visits since last PT visit:0  Time In: 1100   Time Out: 1153  Total Time (in minutes): 53   Total Billable Time (in minutes): 45    FOTO:  Intake Score:  %  Survey Score 2:  %  Survey Score 3:  %    Precautions:       Subjective   Patient reports gradual improvement in right shoulder motion and moderate reduction in resting stiffness since starting therapy. Pain is now consistently 3/10, down from 6/10 during initial sessions. Discomfort remains during overhead movements and extended reaching (e.g., dressing), but reports better tolerance with grooming and meal prep. Patient continues to use a heating pad before sessions and remains compliant with home exercise program..  Pain reported as 3/10. Issolated to the anterior proximal part of the shoulder.    Objective            Treatment:  Therapeutic Exercise  TE 1: Scapular retraction (3 sets x 10 reps).  TE 2: Modified shoulder pendulum exercises (3 sets x 15 reps).  TE 3: Active-assisted arm raises progressing to active ROM as tolerated (3 sets x 10 reps).  TE 4: Wall Slides with Support for Shoulder Flexion and Abduction: (3 sets x 12 reps)  TE 5: External Rotation with Resistance Band (at Neutral Position): (3 sets x 10 reps)  TE 6: Supine Serratus Punches with Light Dumbbells (1-2 lbs): (3 sets x 12 reps)  Balance/Neuromuscular  Re-Education  NMR 5: Scapular Stabilization In sitting: (3 sets x 10 reps)  NMR 6: Theraband Pull-Aparts for Scapular Control: (3 sets x 12 reps)  Perform in standing, keeping shoulders in neutral and focusing on smooth scapular retraction.    Time Entry(in minutes):       Assessment & Plan   Assessment: The patient is demonstrating steady progress with improved range of motion, strength, and tolerance to therapeutic tasks. Postural alignment remains a limiting factor, and scapular mobility is still moderately impaired. Radicular symptoms from neural tension are present but not worsening. Prognosis remains fair to good, with potential for full recovery of function pending continued adherence to therapy and posture retraining.  Evaluation/Treatment Tolerance: Patient tolerated treatment well    Patient will continue to benefit from skilled outpatient physical therapy to address the deficits listed in the problem list box on initial evaluation, provide pt/family education and to maximize pt's level of independence in the home and community environment.     Patient's spiritual, cultural, and educational needs considered and patient agreeable to plan of care and goals.           Plan: Continue current POC at frequency of 3x/week for 12 weeks. Focus on:  Progressive scapular and rotator cuff strengthening  Postural correction strategies  Neural mobilization for radial nerve  Functional reaching and task simulation Next reassessment planned around 4/10/2025. Discharge when goals met or progress plateaus.    Goals:   Active       Long Term Goals 12 Weeks       LTG (Progressing)       Start:  03/11/25    Expected End:  06/11/25       Reduce pain levels to 2/10 or lower at worst with all activities.  Restore full pain-free AROM in the left shoulder.  Achieve 5/5 strength in rotator cuff and scapular stabilizers to support functional activities.  Demonstrate improved dynamic stability and neuromuscular control with  weight-bearing and functional reaching tasks.  Return to prior level of function with work, household, and recreational activities without limitations.              Short Term Goals 5 Weeks       STG (Progressing)       Start:  03/11/25    Expected End:  06/11/25       Reduce pain levels to 4/10 or lower with daily activities.  Improve left shoulder active range of motion (AROM) to at least 75% of normal in flexion and abduction without significant pain.  Increase strength in scapular stabilizers and rotator cuff muscles to at least 4/5 on manual muscle testing (MMT).  Enhance cervical stability and reduce radicular symptoms with cervical retraction exercises.  Improve ability to perform overhead reaching tasks with minimal discomfort.               Sandra Doshi, PT

## 2025-05-20 NOTE — TELEPHONE ENCOUNTER
Spoke w/ pt to ask if 5/30 at 10:20 AM was a good time for her appt w/ Dr. KIMBERLY Hutton. Pt said that was okay. She asked me what a baker's cyst was and I told her that it is a fluid filled lump on the back of the knee. Pt understood              ----- Message from Med Assistant Saldaña sent at 5/20/2025  9:46 AM CDT -----  Regarding: FW: returning missed call  Contact: Pt @ [phone]  For you ma'am  ----- Message -----  From: Yanet Rodgers  Sent: 5/20/2025   9:33 AM CDT  To: Anni Hassan Staff  Subject: returning missed call                            Pt is returning a missed call from someone in the office and is asking for a return call back soon. Thanks.

## 2025-05-22 NOTE — PROGRESS NOTES
Outpatient Rehab    Physical Therapy Progress Note    Patient Name: Mis Ca  MRN: [id number]  YOB: 1940  Encounter Date: 3/31/2025    Therapy Diagnosis:   Encounter Diagnosis   Name Primary?    Impingement syndrome of right shoulder Yes     Physician: Mariah García PA-C    Physician Orders: Eval and Treat  Medical Diagnosis: Chronic right shoulder pain  Impingement syndrome of right shoulder    Visit # / Visits Authorized:  9 / 20  Insurance Authorization Period: 3/11/2025 to 12/31/2025  Date of Evaluation: 3/11/2025  Plan of Care Certification: 3/12/2025 to 6/11/2025      PT/PTA: PT   Number of PTA visits since last PT visit:0  Time In: 1500   Time Out: 1545  Total Time (in minutes): 45   Total Billable Time (in minutes): 45    FOTO:  Intake Score:  %  Survey Score 2:  %  Survey Score 3:  %    Precautions:       Subjective   Patient reports steady improvement in right shoulder mobility and moderate reduction in baseline stiffness since beginning therapy. Current pain levels are consistently 3/10 (initially 6/10), primarily localized to the anterior proximal shoulder. Patient notes difficulty with overhead activities and extended reaching but demonstrates improved tolerance during grooming and meal prep. Continues using a heating pad pre-session and is compliant with home exercise program..  Pain reported as 3/10.      Objective      Shoulder Range of Motion     Shoulder, Elbow, or Forearm Range of Motion Details: Posture: Forward head posture, rounded shoulders noted with mild thoracic kyphosis. AROM (Right Shoulder): Flexion: 145°, Abduction: 135°, ER at side: 40°, IR: 55° (all with mild discomfort at end range). Strength: Rotator cuff strength 4/5; scapular stabilizers 4-/5 on MMT. Palpation: Tenderness at anterior glenohumeral joint; no swelling or discoloration noted. Special Tests: Positive Neer and Feliciano-Jerald impingement signs; negative empty can and Speed's tests.             Treatment:  Therapeutic Exercise  TE 1: Scapular retraction (3 sets x 10 reps).  TE 2: Modified shoulder pendulum exercises (3 sets x 15 reps).  TE 3: Active-assisted arm raises progressing to active ROM as tolerated (3 sets x 10 reps).  TE 4: Wall Slides with Support for Shoulder Flexion and Abduction: (3 sets x 12 reps)  TE 5: External Rotation with Resistance Band (at Neutral Position): (3 sets x 10 reps)  TE 6: Supine Serratus Punches with Light Dumbbells (1-2 lbs): (3 sets x 12 reps)  Balance/Neuromuscular Re-Education  NMR 5: Scapular Stabilization In sitting: (3 sets x 10 reps)  NMR 6: Theraband Pull-Aparts for Scapular Control: (3 sets x 12 reps)  Perform in standing, keeping shoulders in neutral and focusing on smooth scapular retraction.    Time Entry(in minutes):       Assessment & Plan   Assessment: Patient is progressing well, with gains in AROM, muscular endurance, and pain management. Postural alignment continues to be a challenge, with scapular mobility moderately limited. Neural tension symptoms persist but remain stable. Prognosis remains fair to good for full recovery pending continued compliance and correction of biomechanical deficits.  Evaluation/Treatment Tolerance: Patient tolerated treatment well    The patient will continue to benefit from skilled outpatient physical therapy in order to address the deficits listed in the problem list on the initial evaluation, provide patient and family education, and maximize the patients level of independence in the home and community environments.     The patient's spiritual, cultural, and educational needs were considered, and the patient is agreeable to the plan of care and goals.           Plan: Continue therapy 3x/week for 12 weeks. Treatment focus:  Progressive scapular and rotator cuff strengthening  Postural retraining and neural mobilization  Functional task simulation for overhead movementNext reassessment scheduled for 04/10/2025.  Discharge when goals are met or progress plateaus.    Goals:   Active       Long Term Goals 12 Weeks       LTG (Progressing)       Start:  03/11/25    Expected End:  06/11/25       Reduce pain levels to 2/10 or lower at worst with all activities.  Restore full pain-free AROM in the left shoulder.  Achieve 5/5 strength in rotator cuff and scapular stabilizers to support functional activities.  Demonstrate improved dynamic stability and neuromuscular control with weight-bearing and functional reaching tasks.  Return to prior level of function with work, household, and recreational activities without limitations.    LTG updated 3/31/2025  · Decrease pain to <=2/10 with all activities  · Restore full pain-free AROM in right shoulder  · Achieve 5/5 strength in rotator cuff and scapular stabilizers  · Demonstrate improved dynamic shoulder stability with functional tasks  · Return to prior functional status for work, home, and leisure                Short Term Goals 5 Weeks       STG (Progressing)       Start:  03/11/25    Expected End:  06/11/25       Reduce pain levels to 4/10 or lower with daily activities.  Improve left shoulder active range of motion (AROM) to at least 75% of normal in flexion and abduction without significant pain.  Increase strength in scapular stabilizers and rotator cuff muscles to at least 4/5 on manual muscle testing (MMT).  Enhance cervical stability and reduce radicular symptoms with cervical retraction exercises.  Improve ability to perform overhead reaching tasks with minimal discomfort.    STG updated 3/31/2025  · Reduce pain to <=4/10 with daily tasks  · Improve right shoulder AROM to >=75% of normal without significant pain  · Strengthen rotator cuff and scapular stabilizers to >=4/5  · Enhance cervical stability and manage radicular symptoms  · Improve tolerance to overhead reaching                 Sandra Doshi, PT

## 2025-05-26 ENCOUNTER — ANTI-COAG VISIT (OUTPATIENT)
Dept: CARDIOLOGY | Facility: CLINIC | Age: 85
End: 2025-05-26
Payer: MEDICARE

## 2025-05-26 ENCOUNTER — LAB VISIT (OUTPATIENT)
Dept: LAB | Facility: HOSPITAL | Age: 85
End: 2025-05-26
Attending: INTERNAL MEDICINE
Payer: MEDICARE

## 2025-05-26 DIAGNOSIS — Z79.01 LONG TERM (CURRENT) USE OF ANTICOAGULANTS: ICD-10-CM

## 2025-05-26 DIAGNOSIS — I48.0 PAROXYSMAL ATRIAL FIBRILLATION: Primary | Chronic | ICD-10-CM

## 2025-05-26 DIAGNOSIS — I48.0 PAROXYSMAL ATRIAL FIBRILLATION: Chronic | ICD-10-CM

## 2025-05-26 LAB
INR PPP: 1.8 (ref 0.8–1.2)
PROTHROMBIN TIME: 18.5 SECONDS (ref 9–12.5)

## 2025-05-26 PROCEDURE — 36415 COLL VENOUS BLD VENIPUNCTURE: CPT | Mod: PN

## 2025-05-26 PROCEDURE — 93793 ANTICOAG MGMT PT WARFARIN: CPT | Mod: S$GLB,,, | Performed by: PHARMACIST

## 2025-05-26 PROCEDURE — 85610 PROTHROMBIN TIME: CPT | Mod: PN

## 2025-05-30 ENCOUNTER — OFFICE VISIT (OUTPATIENT)
Dept: SPORTS MEDICINE | Facility: CLINIC | Age: 85
End: 2025-05-30
Payer: MEDICARE

## 2025-05-30 VITALS
WEIGHT: 220.81 LBS | HEART RATE: 64 BPM | SYSTOLIC BLOOD PRESSURE: 147 MMHG | DIASTOLIC BLOOD PRESSURE: 86 MMHG | HEIGHT: 62 IN | BODY MASS INDEX: 40.63 KG/M2

## 2025-05-30 DIAGNOSIS — M17.11 PRIMARY OSTEOARTHRITIS OF RIGHT KNEE: ICD-10-CM

## 2025-05-30 DIAGNOSIS — M25.561 ACUTE PAIN OF RIGHT KNEE: Primary | ICD-10-CM

## 2025-05-30 PROCEDURE — 3079F DIAST BP 80-89 MM HG: CPT | Mod: CPTII,S$GLB,, | Performed by: ORTHOPAEDIC SURGERY

## 2025-05-30 PROCEDURE — 20611 DRAIN/INJ JOINT/BURSA W/US: CPT | Mod: RT,S$GLB,, | Performed by: ORTHOPAEDIC SURGERY

## 2025-05-30 PROCEDURE — 1160F RVW MEDS BY RX/DR IN RCRD: CPT | Mod: CPTII,S$GLB,, | Performed by: ORTHOPAEDIC SURGERY

## 2025-05-30 PROCEDURE — 99999 PR PBB SHADOW E&M-EST. PATIENT-LVL IV: CPT | Mod: PBBFAC,,, | Performed by: ORTHOPAEDIC SURGERY

## 2025-05-30 PROCEDURE — 1125F AMNT PAIN NOTED PAIN PRSNT: CPT | Mod: CPTII,S$GLB,, | Performed by: ORTHOPAEDIC SURGERY

## 2025-05-30 PROCEDURE — 3077F SYST BP >= 140 MM HG: CPT | Mod: CPTII,S$GLB,, | Performed by: ORTHOPAEDIC SURGERY

## 2025-05-30 PROCEDURE — 1159F MED LIST DOCD IN RCRD: CPT | Mod: CPTII,S$GLB,, | Performed by: ORTHOPAEDIC SURGERY

## 2025-05-30 PROCEDURE — 1101F PT FALLS ASSESS-DOCD LE1/YR: CPT | Mod: CPTII,S$GLB,, | Performed by: ORTHOPAEDIC SURGERY

## 2025-05-30 PROCEDURE — 3288F FALL RISK ASSESSMENT DOCD: CPT | Mod: CPTII,S$GLB,, | Performed by: ORTHOPAEDIC SURGERY

## 2025-05-30 PROCEDURE — 99214 OFFICE O/P EST MOD 30 MIN: CPT | Mod: 25,S$GLB,, | Performed by: ORTHOPAEDIC SURGERY

## 2025-06-02 ENCOUNTER — LAB VISIT (OUTPATIENT)
Dept: LAB | Facility: HOSPITAL | Age: 85
End: 2025-06-02
Attending: INTERNAL MEDICINE
Payer: MEDICARE

## 2025-06-02 ENCOUNTER — ANTI-COAG VISIT (OUTPATIENT)
Dept: CARDIOLOGY | Facility: CLINIC | Age: 85
End: 2025-06-02
Payer: MEDICARE

## 2025-06-02 DIAGNOSIS — Z79.01 LONG TERM (CURRENT) USE OF ANTICOAGULANTS: ICD-10-CM

## 2025-06-02 DIAGNOSIS — I48.0 PAROXYSMAL ATRIAL FIBRILLATION: Primary | Chronic | ICD-10-CM

## 2025-06-02 DIAGNOSIS — I48.0 PAROXYSMAL ATRIAL FIBRILLATION: Chronic | ICD-10-CM

## 2025-06-02 LAB
INR PPP: 1.9 (ref 0.8–1.2)
PROTHROMBIN TIME: 20.1 SECONDS (ref 9–12.5)

## 2025-06-02 PROCEDURE — 93793 ANTICOAG MGMT PT WARFARIN: CPT | Mod: S$GLB,,, | Performed by: PHARMACIST

## 2025-06-02 PROCEDURE — 85610 PROTHROMBIN TIME: CPT | Mod: PN

## 2025-06-02 PROCEDURE — 36415 COLL VENOUS BLD VENIPUNCTURE: CPT | Mod: PN

## 2025-06-02 RX ORDER — TRIAMCINOLONE ACETONIDE 40 MG/ML
40 INJECTION, SUSPENSION INTRA-ARTICULAR; INTRAMUSCULAR
Status: COMPLETED | OUTPATIENT
Start: 2025-06-02 | End: 2025-06-02

## 2025-06-02 RX ADMIN — TRIAMCINOLONE ACETONIDE 40 MG: 40 INJECTION, SUSPENSION INTRA-ARTICULAR; INTRAMUSCULAR at 09:06

## 2025-06-09 ENCOUNTER — LAB VISIT (OUTPATIENT)
Dept: LAB | Facility: HOSPITAL | Age: 85
End: 2025-06-09
Attending: INTERNAL MEDICINE
Payer: MEDICARE

## 2025-06-09 ENCOUNTER — ANTI-COAG VISIT (OUTPATIENT)
Dept: CARDIOLOGY | Facility: CLINIC | Age: 85
End: 2025-06-09
Payer: MEDICARE

## 2025-06-09 DIAGNOSIS — I48.0 PAROXYSMAL ATRIAL FIBRILLATION: Primary | Chronic | ICD-10-CM

## 2025-06-09 DIAGNOSIS — Z79.01 LONG TERM (CURRENT) USE OF ANTICOAGULANTS: ICD-10-CM

## 2025-06-09 DIAGNOSIS — I48.0 PAROXYSMAL ATRIAL FIBRILLATION: Chronic | ICD-10-CM

## 2025-06-09 LAB
INR PPP: 1.6 (ref 0.8–1.2)
PROTHROMBIN TIME: 17.2 SECONDS (ref 9–12.5)

## 2025-06-09 PROCEDURE — 36415 COLL VENOUS BLD VENIPUNCTURE: CPT | Mod: PN

## 2025-06-09 PROCEDURE — 93793 ANTICOAG MGMT PT WARFARIN: CPT | Mod: S$GLB,,, | Performed by: PHARMACIST

## 2025-06-09 PROCEDURE — 85610 PROTHROMBIN TIME: CPT | Mod: PN

## 2025-06-09 NOTE — PROGRESS NOTES
[facility]    2025 4:30 PM    Assessment/Plan:    Patient presents today with subtherapeutic  INR.    Assessment of patient findings and chart review: INR not at goal. Medications, chart, and patient findings reviewed. See calendar for adjustments to dose and follow up plan.      Recommendation for patient's warfarin regimen: Increase maintenance dose    Recommend repeat INR in 1 week  _________________________________________________________________    Mis Ca (84 y.o.) is followed by the [facility].    Anticoagulation Summary  As of 2025      INR goal:  2.0-3.0   TTR:  74.0% (2.6 y)   INR used for dosin.6 (2025)   Warfarin maintenance plan:  3 mg (6 mg x 0.5) every Sun, Wed, Fri; 6 mg (6 mg x 1) all other days   Weekly warfarin total:  33 mg   Plan last modified:  Tino Mendiola, PharmD (2025)   Next INR check:  2025   Target end date:  --    Indications    Paroxysmal atrial fibrillation [I48.0]  Long term (current) use of anticoagulants [Z79.01]                 Anticoagulation Episode Summary       INR check location:  --    Preferred lab:  --    Send INR reminders to:  [facility] COUMADIN MONITORING POOL    Comments:  [facility]          Anticoagulation Care Providers       Provider Role Specialty Phone number    Shamar Owens MD Responsible Electrophysiology [phone]

## 2025-06-12 ENCOUNTER — PATIENT MESSAGE (OUTPATIENT)
Dept: SPORTS MEDICINE | Facility: CLINIC | Age: 85
End: 2025-06-12
Payer: MEDICARE

## 2025-06-16 ENCOUNTER — LAB VISIT (OUTPATIENT)
Dept: LAB | Facility: HOSPITAL | Age: 85
End: 2025-06-16
Attending: INTERNAL MEDICINE
Payer: MEDICARE

## 2025-06-16 ENCOUNTER — ANTI-COAG VISIT (OUTPATIENT)
Dept: CARDIOLOGY | Facility: CLINIC | Age: 85
End: 2025-06-16
Payer: MEDICARE

## 2025-06-16 DIAGNOSIS — Z79.01 LONG TERM (CURRENT) USE OF ANTICOAGULANTS: ICD-10-CM

## 2025-06-16 DIAGNOSIS — I48.0 PAROXYSMAL ATRIAL FIBRILLATION: Chronic | ICD-10-CM

## 2025-06-16 DIAGNOSIS — I48.0 PAROXYSMAL ATRIAL FIBRILLATION: Primary | Chronic | ICD-10-CM

## 2025-06-16 LAB
INR PPP: 2.4 (ref 0.8–1.2)
PROTHROMBIN TIME: 24.3 SECONDS (ref 9–12.5)

## 2025-06-16 PROCEDURE — 85610 PROTHROMBIN TIME: CPT | Mod: PN

## 2025-06-16 PROCEDURE — 36415 COLL VENOUS BLD VENIPUNCTURE: CPT | Mod: PN

## 2025-06-16 PROCEDURE — 93793 ANTICOAG MGMT PT WARFARIN: CPT | Mod: S$GLB,,, | Performed by: PHARMACIST

## 2025-06-17 ENCOUNTER — OFFICE VISIT (OUTPATIENT)
Dept: HEMATOLOGY/ONCOLOGY | Facility: CLINIC | Age: 85
End: 2025-06-17
Payer: MEDICARE

## 2025-06-17 VITALS
BODY MASS INDEX: 40.53 KG/M2 | DIASTOLIC BLOOD PRESSURE: 84 MMHG | RESPIRATION RATE: 16 BRPM | WEIGHT: 220.25 LBS | SYSTOLIC BLOOD PRESSURE: 141 MMHG | TEMPERATURE: 98 F | OXYGEN SATURATION: 93 % | HEART RATE: 66 BPM | HEIGHT: 62 IN

## 2025-06-17 DIAGNOSIS — M85.80 OSTEOPENIA, UNSPECIFIED LOCATION: ICD-10-CM

## 2025-06-17 DIAGNOSIS — R23.2 HOT FLASHES: ICD-10-CM

## 2025-06-17 DIAGNOSIS — C50.911 INFILTRATING DUCTAL CARCINOMA OF RIGHT BREAST: Primary | ICD-10-CM

## 2025-06-17 PROCEDURE — 99214 OFFICE O/P EST MOD 30 MIN: CPT | Mod: S$GLB,,, | Performed by: NURSE PRACTITIONER

## 2025-06-17 PROCEDURE — 3079F DIAST BP 80-89 MM HG: CPT | Mod: CPTII,S$GLB,, | Performed by: NURSE PRACTITIONER

## 2025-06-17 PROCEDURE — 1125F AMNT PAIN NOTED PAIN PRSNT: CPT | Mod: CPTII,S$GLB,, | Performed by: NURSE PRACTITIONER

## 2025-06-17 PROCEDURE — 99999 PR PBB SHADOW E&M-EST. PATIENT-LVL IV: CPT | Mod: PBBFAC,,, | Performed by: NURSE PRACTITIONER

## 2025-06-17 PROCEDURE — 3077F SYST BP >= 140 MM HG: CPT | Mod: CPTII,S$GLB,, | Performed by: NURSE PRACTITIONER

## 2025-06-17 PROCEDURE — 1101F PT FALLS ASSESS-DOCD LE1/YR: CPT | Mod: CPTII,S$GLB,, | Performed by: NURSE PRACTITIONER

## 2025-06-17 PROCEDURE — G2211 COMPLEX E/M VISIT ADD ON: HCPCS | Mod: S$GLB,,, | Performed by: NURSE PRACTITIONER

## 2025-06-17 PROCEDURE — 3288F FALL RISK ASSESSMENT DOCD: CPT | Mod: CPTII,S$GLB,, | Performed by: NURSE PRACTITIONER

## 2025-06-17 PROCEDURE — 1159F MED LIST DOCD IN RCRD: CPT | Mod: CPTII,S$GLB,, | Performed by: NURSE PRACTITIONER

## 2025-06-17 RX ORDER — ANASTROZOLE 1 MG/1
1 TABLET ORAL DAILY
Qty: 90 TABLET | Refills: 3 | Status: SHIPPED | OUTPATIENT
Start: 2025-06-17 | End: 2026-06-17

## 2025-06-17 NOTE — PROGRESS NOTES
[facility]/ [facility]      Chief Complaint: HR+ breast cancer      Cancer Staging   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, estrogen receptor positive  Staging form: Breast, AJCC 8th Edition  - Pathologic: Stage IIA (pT2, pN1a(sn), cM0, G3, ER+, FL+, HER2-) - Signed by Betzy Shaw MD on 2024      HPI:  Mis Ca is a 84 y.o. female who presents today for evaluation of newly diagnosed breast cancer. Her oncologic history is as follows:    Per Dr. Vasquez's note:   -screening MMG 24 showing Rt breast focal asymmetries. Diagnostic imaging on 5/3/24 showing a Rt breast mass at 6oclock measuring 1.9 x 1.6cm, a mass at 3oclock measuring 1.1 x 0.9 x 0.7cm and a mass at 5oclock measuring 3.1 x 2.2 x 3.0cm, no enlarged axillary LN.   -biopsy on 24 showed the followinoclock mass: IDC, grade 3. ER %, FL 1-5%, Her2 2+, FISH negative. Ki67 70-80%. 5oclock mass: IDC, grade 3. ER %, FL <10%, Her2 2+, FISH negative. Ki67 90%.   -24 she underwent Rt mastectomy showing multifocal IDC, measuring 3.6cm, 2.8cm and 1.7cm. Multifocal LVI present. Posterior margin close at 1.1mm.High grade DCIS, negative margins. 1/3 LN positive for carcinoma, metastatic deposit measuring 2.0cm. [id number]  -completed radiation 24  -started anastrozole 2024    She presents today for evaluation accompanied by her sister. Reports that she has been recovering very well since surgery. Denies post-op pain and has had good ROM. Her medical history is notable for afib on AC, hld, htn and T2DM.  FH significant for a sister with breast cancer at age 71yo, father with prostate and lung cancer, brother with bladder cancer and brother with lung cancer.    Gyn History:   Menarche: 11yo  Menopause: 42yo    HRT: No    Interval History:  Ms. Ca returns today for follow up.  She has been doing well since her last visit.  Completed radiation at  the end of August and notes minimal chest wall stiffness following this.  Has continued doing range-of-motion exercises which has helped.  She started anastrozole in September 2024 and is tolerating well thus far.  Notes mild hot flashes, but feels like these are not interfering with her quality of life.     Appetite is good  Has constipation.  Uses colace as needed  Having right knee pain.  Saw sports medicine and had an xray.  Had fluid aspirated and a CSI injection.  Helped for about one week, but then pain returned  No new breast concerns  Right shoulder pain/stiffness improved with therapy  Taking vit D3, not taking calcium    Patient Active Problem List   Diagnosis    Paroxysmal atrial fibrillation    JACQUELYN (obstructive sleep apnea)    Long term (current) use of anticoagulants    Anxiety    Restless leg syndrome    Hyperlipidemia    Essential hypertension    Gastroesophageal reflux disease without esophagitis    History of adenomatous polyp of colon    Diverticulosis of large intestine without hemorrhage    Tortuous aorta    Glaucoma    Blind right eye    Aortic atherosclerosis    Severe obesity with body mass index (BMI) of 35.0 to 39.9 with serious comorbidity    Hallux valgus with bunions, left    Multilevel facet arthritis    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    Other specified disorders of adrenal gland    Unspecified inflammatory spondylopathy, 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    Inflammatory polyneuropathy, unspecified    Umbilical hernia without obstruction and without gangrene    Bilateral renal cysts    Chronic kidney disease, stage 3a    Rupture of extensor tendon of left hand    Muscle weakness    Stiffness of left wrist joint    Stiffness of finger joint, left    Type 2 diabetes mellitus with chronic kidney disease, without long-term current use of insulin, unspecified CKD stage    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, estrogen receptor positive    At risk for lymphedema    Renal cyst, acquired, left     "Stage 3b chronic kidney disease    Impingement syndrome of right shoulder    Chronic right shoulder pain    Secondary and unspecified malignant neoplasm of axilla and upper limb lymph nodes       Current Outpatient Medications   Medication Instructions    acetaminophen (TYLENOL) 1,000 mg, Every 6 hours PRN    alendronate (FOSAMAX) 70 mg, Oral, Every 7 days    ALPRAZolam (XANAX) 0.25 mg, Oral, 2 times daily    amiodarone (PACERONE) 200 mg, Oral, Daily    amitriptyline (ELAVIL) 25 mg, Oral, Nightly    anastrozole (ARIMIDEX) 1 mg, Oral, Daily    aspirin (ECOTRIN) 81 mg, Daily    azelastine (ASTELIN) 137 mcg (0.1 %) nasal spray 1 spray, 2 times daily    chlorthalidone (HYGROTEN) 50 mg, Oral, Daily    cyanocobalamin 1,000 mcg/mL injection INJECT 1 ML (1,000 MCG) INTRAMUSCULARLY EVERY 30 DAYS    furosemide (LASIX) 20 mg, Oral, Daily    gabapentin (NEURONTIN) 300 MG capsule TAKE 2 CAPSULES BY MOUTH TWICE  DAILY    latanoprost 0.005 % ophthalmic solution 1 drop, Nightly    levocetirizine (XYZAL) 5 mg, Oral, Nightly    losartan (COZAAR) 50 mg, Oral, Daily    multivit-min-iron-FA-lutein (CENTRUM SILVER WOMEN) 8 mg iron-400 mcg-300 mcg Tab 1 tablet, Daily    needle, disp, 25 gauge 25 gauge x 1" Ndle 1 Needle, Misc.(Non-Drug; Combo Route), Every 30 days    nitroGLYCERIN (NITROSTAT) 0.4 mg, Sublingual, Every 5 min PRN    omeprazole (PRILOSEC) 20 mg, Oral, 2 times daily    oxybutynin (DITROPAN) 5 MG Tab     potassium chloride (K-TAB) 20 mEq 20 mEq, Oral    pramipexole (MIRAPEX) 0.5 mg, Oral, 2 times daily    pravastatin (PRAVACHOL) 80 mg, Oral, Daily    timolol maleate 0.5% (TIMOPTIC) 0.5 % Drop 1 drop, Daily    vitamin D (VITAMIN D3) 1,000 Units, Daily    warfarin (COUMADIN) 6 MG tablet TAKE 6 MG DAILY EXCEPT FOR 9 MG ON FRIDAY TO THIN BLOOD       Review of Systems:   12pt ROS negative except as noted above     PHYSICAL EXAM:  BP (!) 141/84 (BP Location: Left arm, Patient Position: Sitting)   Pulse 66   Temp 97.9 °F (36.6 °C) " "(Oral)   Resp 16   Ht 5' 2" (1.575 m)   Wt 99.9 kg (220 lb 3.8 oz)   SpO2 (!) 93% Comment: pt is SOB while walking  BMI 40.28 kg/m²     ECOG 1  General: well appearing, in no apparent distress  HEENT: Normocephalic, EOMI, anicteric sclerae, MMM  Neck: supple, without cervical or supraclavicular lymphadenopathy.  Heart: regular rate and rhythm, normal S1 and S2, no murmurs, gallops or rubs.  Lungs: Clear to auscultation bilaterally, no increased wob  Breast: s/p Rt mastectomy with well-healing incision, no appreciable L breast masses or axillary LAD  Abdomen: Soft, nontender, nondistended with normal bowel sounds. No hepatosplenomegaly.  Extremities: No LE edema or joint effusion  Skin: warm, well-perfused, no rash  Neurologic: Alert and oriented x 4, normal speech and gait   Psychiatric: Conversing appropriately with providers throughout today's encounter.    Pertinent Labs & Imaging:  Reviewed recent labs, imaging and pathology.       Assessment & Plan:  Ms. Ca is a susie 84yo woman with recently diagnosed HR+ breast cancer who presents today for evaluation.    She is now s/p Rt mastectomy with final pathology showing multifocal IDC measuring 3.6cm, 2.8cm, 1.7cm, one near margin (posterior, 1.1mm), remainder of margins negative. 1/3 LN positive. [id number].    Given her age and comorbidities would not recommend chemotherapy as Oncotype would not change treatment plan.  Recommend proceeding with adjuvant radiation and ET. She has since completed radiation and started on anastrozole which she is tolerating well thus far.    #HR+ breast cancer:  --cont anastrozole 1mg daily, (SOT 9/2024, goal for 5 years of treatment)   --encouraged Ca/VitD as above  --due for Rt breast screening MMG in 4/2026  --RTC 6mo    #Hot flashes: mild for now  --she will monitor and reach out if these worsen     #Osteopenia:  --known osteopenia on DEXA in 9/2024, slightly better than prior. Repeat 9/2026  --cont fosamax    Reviewed " patients referring notes, imaging and pathology. Discussed diagnosis, staging, and treatment in detail with patient. All questions were answered to her apparent satisfaction. Will see her back in 6 months or sooner should the need arise.    Route Chart for Scheduling    Med Onc Chart Routing      Follow up with physician 6 months. with Dr. Vasquez   Follow up with AMARI    Infusion scheduling note    Injection scheduling note    Labs    Imaging    Pharmacy appointment    Other referrals                     Total time of this visit, including time spent face to face with patient and/or via video/audio, and also in preparing for today's visit for MDM and documentation. (Medical Decision Making, including consideration of possible diagnoses, management options, complex medical record review, review of diagnostic tests and information, consideration and discussion of significant complications based on comorbidities, and discussion with providers involved with the care of the patient) 30 minutes. Greater than 50% was spent face to face with the patient counseling and coordinating care.     code applied: patient requires or will require a continuous, longitudinal, and active collaborative plan of care related to this patient's health condition, breast cancer -the management of which requires the direction of a practitioner with specialized clinical knowledge, skill, and expertise.     Piper Vásquez, NP-C    Piper Vásquez

## 2025-06-23 ENCOUNTER — LAB VISIT (OUTPATIENT)
Dept: LAB | Facility: HOSPITAL | Age: 85
End: 2025-06-23
Attending: INTERNAL MEDICINE
Payer: MEDICARE

## 2025-06-23 ENCOUNTER — ANTI-COAG VISIT (OUTPATIENT)
Dept: CARDIOLOGY | Facility: CLINIC | Age: 85
End: 2025-06-23
Payer: MEDICARE

## 2025-06-23 DIAGNOSIS — I48.0 PAROXYSMAL ATRIAL FIBRILLATION: Chronic | ICD-10-CM

## 2025-06-23 DIAGNOSIS — I48.0 PAROXYSMAL ATRIAL FIBRILLATION: Primary | Chronic | ICD-10-CM

## 2025-06-23 DIAGNOSIS — Z79.01 LONG TERM (CURRENT) USE OF ANTICOAGULANTS: ICD-10-CM

## 2025-06-23 LAB
INR PPP: 2.2 (ref 0.8–1.2)
PROTHROMBIN TIME: 23.1 SECONDS (ref 9–12.5)

## 2025-06-23 PROCEDURE — 85610 PROTHROMBIN TIME: CPT | Mod: PN

## 2025-06-23 PROCEDURE — 36415 COLL VENOUS BLD VENIPUNCTURE: CPT | Mod: PN

## 2025-06-23 PROCEDURE — 93793 ANTICOAG MGMT PT WARFARIN: CPT | Mod: S$GLB,,, | Performed by: PHARMACIST

## 2025-06-23 NOTE — PROGRESS NOTES
[facility]    2025 3:31 PM    Assessment/Plan:    Patient presents today with therapeutic INR.    Assessment of patient findings and chart review: INR at goal. Medications and chart reviewed. No changes noted to necessitate adjustment of warfarin or follow-up plan. See calendar.      Recommendation for patient's warfarin regimen: Continue current maintenance dose    Recommend repeat INR in 1 week  _________________________________________________________________    Mis L Lanny (84 y.o.) is followed by the [facility].    Anticoagulation Summary  As of 2025      INR goal:  2.0-3.0   TTR:  74.0% (2.6 y)   INR used for dosin.2 (2025)   Warfarin maintenance plan:  3 mg (6 mg x 0.5) every Sun, Wed, Fri; 6 mg (6 mg x 1) all other days   Weekly warfarin total:  33 mg   Plan last modified:  Tino Mendiola, PharmD (2025)   Next INR check:  2025   Target end date:  --    Indications    Paroxysmal atrial fibrillation [I48.0]  Long term (current) use of anticoagulants [Z79.01]                 Anticoagulation Episode Summary       INR check location:  --    Preferred lab:  --    Send INR reminders to:  [facility] COUMADIN MONITORING POOL    Comments:  [facility]          Anticoagulation Care Providers       Provider Role Specialty Phone number    Shamar Owens MD Responsible Electrophysiology [phone]

## 2025-06-30 ENCOUNTER — LAB VISIT (OUTPATIENT)
Dept: LAB | Facility: HOSPITAL | Age: 85
End: 2025-06-30
Attending: INTERNAL MEDICINE
Payer: MEDICARE

## 2025-06-30 ENCOUNTER — ANTI-COAG VISIT (OUTPATIENT)
Dept: CARDIOLOGY | Facility: CLINIC | Age: 85
End: 2025-06-30
Payer: MEDICARE

## 2025-06-30 DIAGNOSIS — I48.0 PAROXYSMAL ATRIAL FIBRILLATION: Chronic | ICD-10-CM

## 2025-06-30 DIAGNOSIS — Z79.01 LONG TERM (CURRENT) USE OF ANTICOAGULANTS: ICD-10-CM

## 2025-06-30 DIAGNOSIS — I48.0 PAROXYSMAL ATRIAL FIBRILLATION: Primary | Chronic | ICD-10-CM

## 2025-06-30 LAB
INR PPP: 2.7 (ref 0.8–1.2)
PROTHROMBIN TIME: 27.7 SECONDS (ref 9–12.5)

## 2025-06-30 PROCEDURE — 85610 PROTHROMBIN TIME: CPT | Mod: PN

## 2025-06-30 PROCEDURE — 36415 COLL VENOUS BLD VENIPUNCTURE: CPT | Mod: PN

## 2025-06-30 PROCEDURE — 93793 ANTICOAG MGMT PT WARFARIN: CPT | Mod: S$GLB,,, | Performed by: PHARMACIST

## 2025-07-01 NOTE — PROGRESS NOTES
[facility]    2025 6:59 AM    Assessment/Plan:    Patient presents today with therapeutic INR.    Assessment of patient findings and chart review: INR at goal. Medications and chart reviewed. No changes noted to necessitate adjustment of warfarin or follow-up plan. See calendar.      Recommendation for patient's warfarin regimen: Continue current maintenance dose    Recommend repeat INR in 1 week  _________________________________________________________________    Mis L Lanny (84 y.o.) is followed by the [email or web address].    Anticoagulation Summary  As of 2025      INR goal:  2.0-3.0   TTR:  74.2% (2.6 y)   INR used for dosin.7 (2025)   Warfarin maintenance plan:  3 mg (6 mg x 0.5) every Sun, Wed, Fri; 6 mg (6 mg x 1) all other days   Weekly warfarin total:  33 mg   Plan last modified:  Tino Mendiola, PharmD (2025)   Next INR check:  2025   Target end date:  --    Indications    Paroxysmal atrial fibrillation [I48.0]  Long term (current) use of anticoagulants [Z79.01]                 Anticoagulation Episode Summary       INR check location:  --    Preferred lab:  --    Send INR reminders to:  [facility] COUMADIN MONITORING POOL    Comments:  [facility]          Anticoagulation Care Providers       Provider Role Specialty Phone number    Shamar Owens MD Responsible Electrophysiology [phone]

## 2025-07-07 ENCOUNTER — LAB VISIT (OUTPATIENT)
Dept: LAB | Facility: HOSPITAL | Age: 85
End: 2025-07-07
Attending: INTERNAL MEDICINE
Payer: MEDICARE

## 2025-07-07 ENCOUNTER — ANTI-COAG VISIT (OUTPATIENT)
Dept: CARDIOLOGY | Facility: CLINIC | Age: 85
End: 2025-07-07
Payer: MEDICARE

## 2025-07-07 DIAGNOSIS — Z79.01 LONG TERM (CURRENT) USE OF ANTICOAGULANTS: ICD-10-CM

## 2025-07-07 DIAGNOSIS — I48.0 PAROXYSMAL ATRIAL FIBRILLATION: Chronic | ICD-10-CM

## 2025-07-07 DIAGNOSIS — I48.0 PAROXYSMAL ATRIAL FIBRILLATION: Primary | Chronic | ICD-10-CM

## 2025-07-07 LAB
INR PPP: 2.5 (ref 0.8–1.2)
PROTHROMBIN TIME: 26.1 SECONDS (ref 9–12.5)

## 2025-07-07 PROCEDURE — 36415 COLL VENOUS BLD VENIPUNCTURE: CPT | Mod: PN

## 2025-07-07 PROCEDURE — 85610 PROTHROMBIN TIME: CPT | Mod: PN

## 2025-07-07 PROCEDURE — 93793 ANTICOAG MGMT PT WARFARIN: CPT | Mod: S$GLB,,,

## 2025-07-07 NOTE — PROGRESS NOTES
[facility]    2025 3:39 PM    Assessment/Plan:    Patient presents today with therapeutic INR.    Assessment of patient findings and chart review: INR at goal.     Recommendation for patient's warfarin regimen: Continue current maintenance dose    Recommend repeat INR in 2 weeks  _________________________________________________________________    Mis L Lanny (84 y.o.) is followed by the [facility].    Anticoagulation Summary  As of 2025      INR goal:  2.0-3.0   TTR:  74.4% (2.7 y)   INR used for dosin.5 (2025)   Warfarin maintenance plan:  3 mg (6 mg x 0.5) every Sun, Wed, Fri; 6 mg (6 mg x 1) all other days   Weekly warfarin total:  33 mg   Plan last modified:  Tino Mendiola, PharmD (2025)   Next INR check:  2025   Target end date:  --    Indications    Paroxysmal atrial fibrillation [I48.0]  Long term (current) use of anticoagulants [Z79.01]                 Anticoagulation Episode Summary       INR check location:  --    Preferred lab:  --    Send INR reminders to:  [facility] COUMADIN MONITORING POOL    Comments:  [facility]          Anticoagulation Care Providers       Provider Role Specialty Phone number    Shamar Owens MD [facility] Electrophysiology [phone]

## 2025-07-09 ENCOUNTER — PATIENT MESSAGE (OUTPATIENT)
Dept: FAMILY MEDICINE | Facility: CLINIC | Age: 85
End: 2025-07-09
Payer: MEDICARE

## 2025-07-09 RX ORDER — AMIODARONE HYDROCHLORIDE 200 MG/1
200 TABLET ORAL
Qty: 90 TABLET | Refills: 3 | Status: SHIPPED | OUTPATIENT
Start: 2025-07-09

## 2025-07-09 NOTE — TELEPHONE ENCOUNTER
Care Due:                  Date            Visit Type   Department     Provider  --------------------------------------------------------------------------------                                EP -                              PRIMARY      [facility] FAMILY  Last Visit: 10-      CARE ([facility])   MEDICINE       Scott Dillard  Next Visit: None Scheduled  None         None Found                                                            Last  Test          Frequency    Reason                     Performed    Due Date  --------------------------------------------------------------------------------    Mg Level....  12 months..  alendronate..............  Not Found    Overdue    Phosphate...  12 months..  alendronate..............  Not Found    Overdue    [employer] Embedded Care Due Messages. Reference number: [id number].   7/09/2025 2:39:37 PM CDT

## 2025-07-10 RX ORDER — OMEPRAZOLE 20 MG/1
20 CAPSULE, DELAYED RELEASE ORAL 2 TIMES DAILY
Qty: 180 CAPSULE | Refills: 2 | Status: SHIPPED | OUTPATIENT
Start: 2025-07-10

## 2025-07-10 RX ORDER — OXYBUTYNIN CHLORIDE 5 MG/1
5 TABLET ORAL DAILY
Qty: 90 TABLET | Refills: 3 | Status: SHIPPED | OUTPATIENT
Start: 2025-07-10

## 2025-07-17 ENCOUNTER — HOSPITAL ENCOUNTER (EMERGENCY)
Facility: HOSPITAL | Age: 85
Discharge: HOME | End: 2025-07-17
Attending: STUDENT IN AN ORGANIZED HEALTH CARE EDUCATION/TRAINING PROGRAM
Payer: MEDICARE

## 2025-07-17 VITALS
DIASTOLIC BLOOD PRESSURE: 83 MMHG | SYSTOLIC BLOOD PRESSURE: 141 MMHG | RESPIRATION RATE: 24 BRPM | OXYGEN SATURATION: 98 % | HEIGHT: 62 IN | BODY MASS INDEX: 40.48 KG/M2 | TEMPERATURE: 98 F | WEIGHT: 220 LBS | HEART RATE: 88 BPM

## 2025-07-17 DIAGNOSIS — M25.551 RIGHT HIP PAIN: ICD-10-CM
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
## 2025-07-17 DIAGNOSIS — M25.531 RIGHT WRIST PAIN: ICD-10-CM

## 2025-07-17 DIAGNOSIS — M79.651 RIGHT THIGH PAIN: ICD-10-CM

## 2025-07-17 DIAGNOSIS — M25.521 RIGHT ELBOW PAIN: ICD-10-CM

## 2025-07-17 DIAGNOSIS — M18.11 ARTHRITIS OF CARPOMETACARPAL (CMC) JOINT OF RIGHT THUMB: ICD-10-CM

## 2025-07-17 PROCEDURE — 25000003 PHARM REV CODE 250: Mod: ER

## 2025-07-17 PROCEDURE — 99284 EMERGENCY DEPT VISIT MOD MDM: CPT | Mod: 25,ER

## 2025-07-17 RX ORDER — ACETAMINOPHEN 500 MG
1000 TABLET ORAL 4 TIMES DAILY
Qty: 112 TABLET | Refills: 0 | Status: SHIPPED | OUTPATIENT
Start: 2025-07-17 | End: 2025-07-31

## 2025-07-17 RX ORDER — LIDOCAINE 50 MG/G
1 PATCH TOPICAL DAILY
Qty: 7 PATCH | Refills: 0 | Status: SHIPPED | OUTPATIENT
Start: 2025-07-17 | End: 2025-07-24

## 2025-07-17 RX ORDER — ACETAMINOPHEN 500 MG
1000 TABLET ORAL
Status: COMPLETED | OUTPATIENT
Start: 2025-07-17 | End: 2025-07-17

## 2025-07-17 RX ADMIN — ACETAMINOPHEN 1000 MG: 500 TABLET ORAL at 04:07

## 2025-07-17 NOTE — ED NOTES
Assumed care of pt who came in reporting R-elbow, R-wrist, R-hip / buttock / hip pain now 4/10 after she was assisting a friend who fell and also pulled her down during the fall. She denies any head trauma or LOC and there is not gross deformity to the sites of where she reports pain and is sitting quietly appearing to be in no acute distress. Denies any dizziness or head trauma / LOC

## 2025-07-17 NOTE — ED PROVIDER NOTES
Encounter Date: 7/17/2025       History     Chief Complaint   Patient presents with    Fall     Pt had a fall in the parking lot of the [employer]. C/O Right hip and leg pain and right wrist and elbow pain.      Mis Ca is a 84 y.o. female  has a past medical history of Allergy, Anticoagulant long-term use, Anxiety, Arthritis, Atrial fibrillation, Bilateral renal cysts, Blind right eye, Bradycardia, Breast cancer, Cancer, CKD (chronic kidney disease) stage 3, GFR 30-59 ml/min, Glaucoma, Hyperlipidemia, Hypertension, PVC (premature ventricular contraction), RLS (restless legs syndrome), Sleep apnea, and Type 2 diabetes mellitus without complication, without long-term current use of insulin. presenting to the Emergency Department for mechanical fall today. Reports right elbow, right hip/thigh pain, right wrist pain. Denies head injury or LOC. Pt is on blood thinners. No pain medication prior to arrival. No numbness or paresthesias. No other complaints at this time.         The history is provided by the patient.     Review of patient's allergies indicates:   Allergen Reactions    Adhesive     Compazine [prochlorperazine edisylate] Anxiety    Penicillins Rash    Sulfa (sulfonamide antibiotics) Rash    Vancomycin analogues Rash     Past Medical History:   Diagnosis Date    Allergy     Anticoagulant long-term use     Anxiety     Arthritis     Atrial fibrillation     Bilateral renal cysts 05/01/2022    Blind right eye     Bradycardia     Breast cancer     right    Cancer     skin cancer left side of face under eye    CKD (chronic kidney disease) stage 3, GFR 30-59 ml/min     Glaucoma     Hyperlipidemia     Hypertension     PVC (premature ventricular contraction)     RLS (restless legs syndrome)     Sleep apnea     Does not use CPAP machine.    Type 2 diabetes mellitus without complication, without long-term current use of insulin 04/11/2024     Past Surgical History:   Procedure Laterality Date    ADENOIDECTOMY       AKIN OSTEOTOMY Left 10/31/2018    Procedure: OSTEOTOMY, AKIN;  Surgeon: Rudolph Cardozo DPM;  Location: [facility] OR;  Service: Podiatry;  Laterality: Left;    APPENDECTOMY      BREAST BIOPSY Left     x3    BREAST SURGERY Left     breast biopsy x3    CATARACT EXTRACTION BILATERAL W/ ANTERIOR VITRECTOMY Bilateral     ENDOSCOPIC GASTROCNEMIUS RECESSION Left 10/31/2018    Procedure: RECESSION, GASTROCNEMIUS, ENDOSCOPIC;  Surgeon: Rudolph Cardozo DPM;  Location: [facility] OR;  Service: Podiatry;  Laterality: Left;    ESOPHAGOGASTRODUODENOSCOPY N/A 02/11/2022    Procedure: EGD (ESOPHAGOGASTRODUODENOSCOPY);  Surgeon: Efren Aguilar MD;  Location: [facility];  Service: Endoscopy;  Laterality: N/A;  Patient needs a rapid covid  test done on 12/15/2021. thank you    EYE SURGERY Bilateral     cataracts extraction    EYE SURGERY Right     drainage tube (glaucoma)    FOOT ARTHRODESIS Left 01/15/2020    Procedure: FUSION, FOOT;  Surgeon: Rudolph Cardozo DPM;  Location: [facility] OR;  Service: Podiatry;  Laterality: Left;  mini c-arm, Arthrex screw  for hardware removal (Lydia notified), Orthofix (Niels notified) min ex-fix    FOOT SURGERY Left     lesion removed from dorsal area    FRACTURE SURGERY Left     arm    HAND TENDON SURGERY Left     HYSTERECTOMY      INCISION AND DRAINAGE FOOT Left 03/11/2020    Procedure: INCISION AND DRAINAGE, FOOT;  Surgeon: Rudolph Cardozo DPM;  Location: [facility] OR;  Service: Podiatry;  Laterality: Left;    INJECTION FOR SENTINEL NODE IDENTIFICATION Right 06/24/2024    Procedure: INJECTION, FOR SENTINEL NODE IDENTIFICATION;  Surgeon: SHAHZAD Laird MD;  Location: [facility] OR;  Service: General;  Laterality: Right;    LAPIDUS BUNIONECTOMY Left 10/31/2018    Procedure: BUNIONECTOMY, LAPIDUS;  Surgeon: Rudolph Cardozo DPM;  Location: [facility] OR;  Service: Podiatry;  Laterality: Left;  mini c-arm, Arthrex locking plate (Lydia notified)    LAPIDUS BUNIONECTOMY Left 10/31/2018      Juliane    Lymph Gland Removed  Right     groin (performed by Dr. Vergara)    MASTECTOMY Right 06/24/2024    Procedure: MASTECTOMY RIGHT no recon;  Surgeon: SHAHZAD Laird MD;  Location: [facility] OR;  Service: General;  Laterality: Right;  3.5 HRS    NEURECTOMY Left 01/15/2020    Procedure: NEURECTOMY;  Surgeon: Rudolph Cardozo DPM;  Location: [facility] OR;  Service: Podiatry;  Laterality: Left;  bipolar bovie, vessel loop, possible Agnes nerve wrap. Moshe with [place] notified and will be overnighting graft. MK 1/14/2020    OOPHORECTOMY      ORIF FOREARM FRACTURE Left     PALATE SURGERY      Lesion removed    REMOVAL,ORTHOPEDIC HARDWARE,UPPER EXTREMITY Left 06/30/2023    Procedure: REMOVAL,ORTHOPEDIC HARDWARE,UPPER EXTREMITY;  Surgeon: Skyler Oliva Jr., MD;  Location: [facility] OR;  Service: Orthopedics;  Laterality: Left;  C[employer] notified cc    REPAIR OF EXTENSOR TENDON Left 06/30/2023    Procedure: REPAIR, TENDON, EXTENSOR;  Surgeon: Skyler Oliva Jr., MD;  Location: [facility] OR;  Service: Orthopedics;  Laterality: Left;  Left Index Digit    SENTINEL LYMPH NODE BIOPSY Right 06/24/2024    Procedure: BIOPSY, LYMPH NODE, SENTINEL;  Surgeon: SHAHZAD Laird MD;  Location: [facility];  Service: General;  Laterality: Right;    TONSILLECTOMY       Family History   Problem Relation Name Age of Onset    Aneurysm Mother          AAA    Cancer Father          lung cancer    Lung cancer Father      Cirrhosis Father      Cancer Sister Bernadette         metastatic breast and brain    Diabetes Sister Bernadette     Breast cancer Sister Bernadette     Hypertension Sister Bernadette     Hyperlipidemia Sister Bernadette     Brain cancer Sister Bernadette     Heart disease Sister Aysha         Heart valve repair    Atrial fibrillation Sister Aysha     Diabetes Sister Aysha     COPD Sister Molly     Heart disease Sister Molly     Heart attack Sister Molly     Bipolar disorder Sister Molly     Depression Sister Molly     Glaucoma Sister Molly      Diabetes Sister Gely Ruiz     Arthritis Sister Gely Ruiz     COPD Sister Carito     Heart disease Sister Carito     Kidney disease Sister Carito     Cancer Sister Carito         lung cancer    Diabetes Sister Carito     Lung cancer Sister Carito     Heart attack Sister Carito     Colon polyps Brother Matt     Cancer Brother Matt         lung cancer    Diabetes Brother Matt     Hyperlipidemia Brother Matt     Hypertension Brother Matt     Cancer Brother Luis Antonio         bladder cancer    Diabetes Brother Luis Antonio     Glaucoma Brother Luis Antonio      Social History[1]  Review of Systems   Constitutional:  Negative for fever.   HENT:  Negative for sore throat.    Respiratory:  Negative for shortness of breath.    Cardiovascular:  Negative for chest pain.   Gastrointestinal:  Negative for nausea.   Genitourinary:  Negative for dysuria.   Musculoskeletal:  Positive for arthralgias. Negative for back pain.   Skin:  Negative for rash.   Neurological:  Negative for weakness.   Hematological:  Does not bruise/bleed easily.   All other systems reviewed and are negative.      Physical Exam     Initial Vitals [07/17/25 1518]   BP Pulse Resp Temp SpO2   128/78 68 18 98.1 °F (36.7 °C) (!) 93 %      MAP       --         Physical Exam    Nursing note and vitals reviewed.  Constitutional: She appears well-developed and well-nourished. She is not diaphoretic.  Non-toxic appearance. No distress.   HENT:   Head: Normocephalic and atraumatic.   Right Ear: Hearing and external ear normal.   Left Ear: Hearing and external ear normal.   Eyes: EOM are normal.   Neck: Neck supple.   Normal range of motion.  Cardiovascular:  Normal rate, regular rhythm, normal heart sounds and intact distal pulses.           Pulmonary/Chest: Breath sounds normal. No respiratory distress. She has no wheezes. She has no rhonchi. She has no rales.   Abdominal: She exhibits no distension.   Musculoskeletal:         General: Normal range of motion.      Right  elbow: Laceration (abrasion) present. No swelling. Normal range of motion. Tenderness present.      Right wrist: Bony tenderness present. No swelling, tenderness or snuff box tenderness. Normal range of motion. Normal pulse.      Cervical back: Normal range of motion and neck supple. Normal range of motion.      Right hip: Tenderness and bony tenderness present. No deformity. Normal range of motion.      Right upper leg: Tenderness and bony tenderness present. No swelling.     Neurological: She is alert and oriented to person, place, and time. GCS score is 15. GCS eye subscore is 4. GCS verbal subscore is 5. GCS motor subscore is 6.   Skin: Skin is warm and dry.   Psychiatric: She has a normal mood and affect. Her behavior is normal. Judgment and thought content normal.         ED Course   Procedures  Labs Reviewed - No data to display       Imaging Results              X-Ray Wrist Complete Right (Final result)  Result time 07/17/25 17:14:15      Final result by Dat Orellana DO (07/17/25 17:14:15)                   Narrative:    Exam: XR WRIST COMPLETE 3 VIEWS RIGHT    Comparison: None    Clinical Indication:  Pain    Findings: No acute bone or joint abnormality.  Moderate degenerative change at the first carpal metacarpal joint.    Finalized on: 7/17/2025 5:14 PM By:  Dat Orellana  [facility]# [id number]      2025-07-17 17:16:22.927     [facility]                                     X-Ray Hip 2 or 3 views Right with Pelvis when performed (Final result)  Result time 07/17/25 17:13:03      Final result by Juan Mon MD (07/17/25 17:13:03)                   Impression:      No acute radiographic abnormality of the right hip.    Finalized on: 7/17/2025 5:13 PM By:  Juan Mon MD  [facility]# [id number]      2025-07-17 17:15:12.827     [facility]               Narrative:    EXAM: XR HIP WITH PELVIS WHEN PERFORMED 2 OR 3 VIEWS RIGHT    CLINICAL HISTORY: [M25.551]-Pain in right hip.    FINDINGS: 3 views of the right hip.  No acute  fracture is evident.  Joint alignment is anatomic.  The joint space of the right hip is well-maintained.  The sacroiliac joints and pubic symphysis are not unusual in appearance.                                         X-Ray Elbow Complete Right (Final result)  Result time 07/17/25 17:02:21      Final result by Dat Orellana DO (07/17/25 17:02:21)                   Narrative:    Exam: XR ELBOW COMPLETE 3 VIEW RIGHT    Comparison: None    Clinical Indication:  Fall    Findings: No acute bone or joint abnormality.  No joint effusion.    Finalized on: 7/17/2025 5:02 PM By:  Dat Orellana  [facility]# [id number]      2025-07-17 17:04:22.244     [facility]                                     X-Ray Femur 2 AP/LAT Right (Final result)  Result time 07/17/25 17:12:27      Final result by Juan Mon MD (07/17/25 17:12:27)                   Impression:      No acute radiographic abnormality of the right femur.    Finalized on: 7/17/2025 5:12 PM By:  Juan Mon MD  [facility]# [id number]      2025-07-17 17:14:32.819     [facility]               Narrative:    EXAM: XR FEMUR 2 VIEW RIGHT    TECHNIQUE: 4 views of the right femur    CLINICAL HISTORY: [M79.651]-Pain in right thigh.    FINDINGS: No evidence of fracture.  No lytic or blastic lesions.  Joint alignment is anatomic.                                         Medications   acetaminophen tablet 1,000 mg (1,000 mg Oral Given 7/17/25 1602)     Medical Decision Making  This is an emergent evaluation of 84 y.o. female in the ED presenting for orthopedic complaint. Physical exam reveals a non-toxic, afebrile, and well-appearing female in no apparent respiratory distress. Pertinent physical exam findings above. Vital signs stable. If available, previous records reviewed.    My overall impression is :  Fall (Primary)  Right elbow pain  Right hip pain  Right thigh pain  Right wrist pain  Arthritis of carpometacarpal (CMC) joint of right thumb  . Differential Diagnoses: Including but not limited  to Sprain/strain, fracture, contusion, dislocation, gout, septic arthritis    Discharge Meds/Instructions: RICE method. Meds below. Outpatient f/u.       There does not appear to be any indication for further emergent testing, observation, or hospitalization at this time. A mutual shared decision making discussion was had with the patient. Patient appears stable for and is comfortable with discharge home. The diagnosis, treatment plan, instructions for follow-up as well as ED return precautions were discussed. Advised to follow-up with PCP for outpatient follow-up in 2-3 days. Signs and symptoms that would warrant immediate return to ED were reviewed prior to discharge. All questions and concerns were asked, answered, and addressed. Patient expressed understanding and agreement with the plan.     Amount and/or Complexity of Data Reviewed  Radiology: ordered and independent interpretation performed. Decision-making details documented in ED Course.    Risk  OTC drugs.  Prescription drug management.               ED Course as of 07/17/25 1733   Thu Jul 17, 2025   1556 SpO2(!): 93 %  Pt's baseline per previous recorded vitals at other visits per chart review.  [LH]   1713 X-Ray Elbow Complete Right  Independent interpretation by me: no acute fracture. I have read the radiologist's report and agree with their findings.   [LH]   1715 X-Ray Femur 2 AP/LAT Right  Independent interpretation by me: no acute fracture. I have read the radiologist's report and agree with their findings.   [LH]   1716 X-Ray Hip 2 or 3 views Right with Pelvis when performed  Independent interpretation by me: no acute fracture. I have read the radiologist's report and agree with their findings.   [LH]   1717 X-Ray Wrist Complete Right  Independent interpretation by me: no acute fracture. I have read the radiologist's report and agree with their findings.   [LH]      ED Course User Index  [LH] Jill Figueroa PA-C                               Clinical  Impression:  Final diagnoses:  [W19.XXXA] Fall (Primary)  [M25.521] Right elbow pain  [M25.551] Right hip pain  [M79.651] Right thigh pain  [M25.531] Right wrist pain  [M18.11] Arthritis of carpometacarpal (CMC) joint of right thumb          ED Disposition Condition    Discharge Stable          ED Prescriptions       Medication Sig Dispense Start Date End Date Auth. Provider    acetaminophen (TYLENOL) 500 MG tablet Take 2 tablets (1,000 mg total) by mouth 4 (four) times daily. for 14 days 112 tablet 7/17/2025 7/31/2025 Jill Figueroa PA-C    LIDOcaine (LIDODERM) 5 % Place 1 patch onto the skin once daily. Remove & Discard patch within 12 hours or as directed by MD for 7 days 7 patch 7/17/2025 7/24/2025 Jill Figueroa PA-C          Follow-up Information    None                [1]   Social History  Tobacco Use    Smoking status: Never     Passive exposure: Never    Smokeless tobacco: Never   Substance Use Topics    Alcohol use: Not Currently    Drug use: No        Jill Figueroa PA-C  07/17/25 [id number]

## 2025-07-18 ENCOUNTER — PATIENT OUTREACH (OUTPATIENT)
Facility: OTHER | Age: 85
End: 2025-07-18
Payer: MEDICARE

## 2025-07-18 ENCOUNTER — TELEPHONE (OUTPATIENT)
Dept: FAMILY MEDICINE | Facility: CLINIC | Age: 85
End: 2025-07-18
Payer: MEDICARE

## 2025-07-18 NOTE — PROGRESS NOTES
Patient was seen in the ED on 7/17/25. Phoned patient patient to assist with Post ED Discharge Navigation. Patient agreed to assistance scheduling a PCP follow-up appointment. In Basket message sent to PCP staff for scheduling assistance.  Keyon Loya

## 2025-07-18 NOTE — TELEPHONE ENCOUNTER
Message  Received: Today  Keyon Loya Staff  Good morning,  This pt was discharged from the ED on 7/17/25 and has orders to follow up with Dr Dillard. In compliance with Medicare 2+ Chronic Condition patient measure mandates, this patient requires a follow up appt within 7 days of ED visit d/c date. I am requesting scheduling assistance as you are booked, and I am unable to schedule pt an appt within 7 days. Thank you for your assistance. Please advise of appt date and time so that I can set an appt reminder and confirm with patient.  Keyon Loya

## 2025-07-18 NOTE — TELEPHONE ENCOUNTER
I have scheduled the pt to see Dr Dillard on Friday July 25 at 130 pm    The phone number in file is not a working number   I also sent a portal message and will follow up to ensure she sees the message

## 2025-07-21 ENCOUNTER — ANTI-COAG VISIT (OUTPATIENT)
Dept: CARDIOLOGY | Facility: CLINIC | Age: 85
End: 2025-07-21
Payer: MEDICARE

## 2025-07-21 ENCOUNTER — LAB VISIT (OUTPATIENT)
Dept: LAB | Facility: HOSPITAL | Age: 85
End: 2025-07-21
Attending: INTERNAL MEDICINE
Payer: MEDICARE

## 2025-07-21 DIAGNOSIS — I48.0 PAROXYSMAL ATRIAL FIBRILLATION: Primary | Chronic | ICD-10-CM

## 2025-07-21 DIAGNOSIS — I48.0 PAROXYSMAL ATRIAL FIBRILLATION: Chronic | ICD-10-CM

## 2025-07-21 DIAGNOSIS — Z79.01 LONG TERM (CURRENT) USE OF ANTICOAGULANTS: ICD-10-CM

## 2025-07-21 LAB
INR PPP: 2.5 (ref 0.8–1.2)
PROTHROMBIN TIME: 25.4 SECONDS (ref 9–12.5)

## 2025-07-21 PROCEDURE — 36415 COLL VENOUS BLD VENIPUNCTURE: CPT | Mod: PN

## 2025-07-21 PROCEDURE — 93793 ANTICOAG MGMT PT WARFARIN: CPT | Mod: S$GLB,,, | Performed by: PHARMACIST

## 2025-07-21 PROCEDURE — 85610 PROTHROMBIN TIME: CPT | Mod: PN

## 2025-07-21 NOTE — PROGRESS NOTES
[facility]    2025 2:19 PM    Assessment/Plan:    Patient presents today with therapeutic INR.    Assessment of patient findings and chart review: INR at goal. Medications and chart reviewed. No changes noted to necessitate adjustment of warfarin or follow-up plan. See calendar.      Recommendation for patient's warfarin regimen: Continue current maintenance dose    Recommend repeat INR in 2 weeks  _________________________________________________________________    Mis L Lanny (84 y.o.) is followed by the [facility].    Anticoagulation Summary  As of 2025      INR goal:  2.0-3.0   TTR:  74.8% (2.7 y)   INR used for dosin.5 (2025)   Warfarin maintenance plan:  3 mg (6 mg x 0.5) every Sun, Wed, Fri; 6 mg (6 mg x 1) all other days   Weekly warfarin total:  33 mg   Plan last modified:  Tino Mendiola, PharmD (2025)   Next INR check:  2025   Target end date:  --    Indications    Paroxysmal atrial fibrillation [I48.0]  Long term (current) use of anticoagulants [Z79.01]                 Anticoagulation Episode Summary       INR check location:  --    Preferred lab:  --    Send INR reminders to:  [facility] COUMADIN MONITORING POOL    Comments:  [facility]          Anticoagulation Care Providers       Provider Role Specialty Phone number    Shamar Owens MD Responsible Electrophysiology [phone]

## 2025-07-22 ENCOUNTER — TELEPHONE (OUTPATIENT)
Dept: FAMILY MEDICINE | Facility: CLINIC | Age: 85
End: 2025-07-22
Payer: MEDICARE

## 2025-07-22 ENCOUNTER — OFFICE VISIT (OUTPATIENT)
Dept: SPORTS MEDICINE | Facility: CLINIC | Age: 85
End: 2025-07-22
Payer: MEDICARE

## 2025-07-22 VITALS
DIASTOLIC BLOOD PRESSURE: 90 MMHG | SYSTOLIC BLOOD PRESSURE: 131 MMHG | WEIGHT: 220.44 LBS | BODY MASS INDEX: 40.32 KG/M2 | HEART RATE: 67 BPM

## 2025-07-22 DIAGNOSIS — M17.11 PRIMARY OSTEOARTHRITIS OF RIGHT KNEE: ICD-10-CM

## 2025-07-22 DIAGNOSIS — M25.561 ACUTE PAIN OF RIGHT KNEE: Primary | ICD-10-CM

## 2025-07-22 DIAGNOSIS — T14.8XXA CONTUSION OF SOFT TISSUE: ICD-10-CM

## 2025-07-22 PROCEDURE — 3080F DIAST BP >= 90 MM HG: CPT | Mod: CPTII,S$GLB,, | Performed by: ORTHOPAEDIC SURGERY

## 2025-07-22 PROCEDURE — 1159F MED LIST DOCD IN RCRD: CPT | Mod: CPTII,S$GLB,, | Performed by: ORTHOPAEDIC SURGERY

## 2025-07-22 PROCEDURE — 3288F FALL RISK ASSESSMENT DOCD: CPT | Mod: CPTII,S$GLB,, | Performed by: ORTHOPAEDIC SURGERY

## 2025-07-22 PROCEDURE — 99214 OFFICE O/P EST MOD 30 MIN: CPT | Mod: S$GLB,,, | Performed by: ORTHOPAEDIC SURGERY

## 2025-07-22 PROCEDURE — 99999 PR PBB SHADOW E&M-EST. PATIENT-LVL IV: CPT | Mod: PBBFAC,,, | Performed by: ORTHOPAEDIC SURGERY

## 2025-07-22 PROCEDURE — 1160F RVW MEDS BY RX/DR IN RCRD: CPT | Mod: CPTII,S$GLB,, | Performed by: ORTHOPAEDIC SURGERY

## 2025-07-22 PROCEDURE — 3075F SYST BP GE 130 - 139MM HG: CPT | Mod: CPTII,S$GLB,, | Performed by: ORTHOPAEDIC SURGERY

## 2025-07-22 PROCEDURE — 1100F PTFALLS ASSESS-DOCD GE2>/YR: CPT | Mod: CPTII,S$GLB,, | Performed by: ORTHOPAEDIC SURGERY

## 2025-07-22 PROCEDURE — 1125F AMNT PAIN NOTED PAIN PRSNT: CPT | Mod: CPTII,S$GLB,, | Performed by: ORTHOPAEDIC SURGERY

## 2025-07-22 NOTE — TELEPHONE ENCOUNTER
I spoke with pt. Appt has been rescheduled due to pt going out of town to be with her sister for a while. Pt's brother-in-law is dying.

## 2025-07-22 NOTE — PROGRESS NOTES
"CC: right knee pain    Mis is here today for follow up evaluation of her right knee pain. Patient reports her pain is 3/10 today. She had right knee aspiration and CSI at visit 05/30/2025 and admits to appreciating nearly 100% improvement after about 2 weeks. Of note, she states she suffered a new fall 07/17/2025 onto her right side resulting in diffuse bruising along that side. She was seen and evaluated in the ED on that day with imaging negative for acute fractures of right wrist, elbow, hip, femur, knee. She now has lateral right knee pain that she describes as a "soreness like tight muscle". She notes that this is different that her previous knee pain at initial visit. Denies medial knee pain. She is taking Tylenol for pain and is unable to take NSAIDs due to being on Coumadin.     Recall from visit on 05/30/2025  84 y.o. Female presents today for evaluation of her right knee pain. She admits to right lateral and medial knee pain beginning in April of 2025 without mechanism of injury. She was referred for possible knee aspiration and CSI by Dimas Briggs PA-C.     How long: Beginning in April 2025  What makes it better: Rest  What makes it worse: Walking for long periods of time  Does it radiate: 7/10  Attempted treatments: Rest, activity modification   Pain score:  Any mechanical symptoms: Yes   Feelings of instability: Denies  Affect on ADLs: Yes - walking    Occupation: Retired    PAST MEDICAL HISTORY:   Past Medical History:   Diagnosis Date    Allergy     Anticoagulant long-term use     Anxiety     Arthritis     Atrial fibrillation     Bilateral renal cysts 05/01/2022    Blind right eye     Bradycardia     Breast cancer     right    Cancer     skin cancer left side of face under eye    CKD (chronic kidney disease) stage 3, GFR 30-59 ml/min     Glaucoma     Hyperlipidemia     Hypertension     PVC (premature ventricular contraction)     RLS (restless legs syndrome)     Sleep apnea     Does not use " CPAP machine.    Type 2 diabetes mellitus without complication, without long-term current use of insulin 04/11/2024       PAST SURGICAL HISTORY:   Past Surgical History:   Procedure Laterality Date    ADENOIDECTOMY      PAM OSTEOTOMY Left 10/31/2018    Procedure: OSTEOTOMY, AKIN;  Surgeon: Rudolph Cardozo DPM;  Location: [facility];  Service: Podiatry;  Laterality: Left;    APPENDECTOMY      BREAST BIOPSY Left     x3    BREAST SURGERY Left     breast biopsy x3    CATARACT EXTRACTION BILATERAL W/ ANTERIOR VITRECTOMY Bilateral     ENDOSCOPIC GASTROCNEMIUS RECESSION Left 10/31/2018    Procedure: RECESSION, GASTROCNEMIUS, ENDOSCOPIC;  Surgeon: Rudolph Cardozo DPM;  Location: [facility] OR;  Service: Podiatry;  Laterality: Left;    ESOPHAGOGASTRODUODENOSCOPY N/A 02/11/2022    Procedure: EGD (ESOPHAGOGASTRODUODENOSCOPY);  Surgeon: Efren Aguilar MD;  Location: [facility];  Service: Endoscopy;  Laterality: N/A;  Patient needs a rapid covid  test done on 12/15/2021. thank you    EYE SURGERY Bilateral     cataracts extraction    EYE SURGERY Right     drainage tube (glaucoma)    FOOT ARTHRODESIS Left 01/15/2020    Procedure: FUSION, FOOT;  Surgeon: Rudolph Cardozo DPM;  Location: [facility] OR;  Service: Podiatry;  Laterality: Left;  mini c-arm, [employer] screw  for hardware removal (Lydia notified), Orthofix (Niels notified) min ex-fix    FOOT SURGERY Left     lesion removed from dorsal area    FRACTURE SURGERY Left     arm    HAND TENDON SURGERY Left     HYSTERECTOMY      INCISION AND DRAINAGE FOOT Left 03/11/2020    Procedure: INCISION AND DRAINAGE, FOOT;  Surgeon: Rudolph Cardozo DPM;  Location: [facility] OR;  Service: Podiatry;  Laterality: Left;    INJECTION FOR SENTINEL NODE IDENTIFICATION Right 06/24/2024    Procedure: INJECTION, FOR SENTINEL NODE IDENTIFICATION;  Surgeon: SHAHZAD Laird MD;  Location: [facility] OR;  Service: General;  Laterality: Right;    LAPIDUS BUNIONECTOMY Left 10/31/2018    Procedure:  BUNIONECTOMY, LAPIDUS;  Surgeon: Rudolph Cardozo DPM;  Location: [facility] OR;  Service: Podiatry;  Laterality: Left;  mini c-arm, Arthrex locking plate (Lydia notified)    LAPIDUS BUNIONECTOMY Left 10/31/2018    Dr. Cardozo    Lymph Gland Removed  Right     groin (performed by Dr. Vergara)    MASTECTOMY Right 06/24/2024    Procedure: MASTECTOMY RIGHT no recon;  Surgeon: SHAHZAD Laird MD;  Location: [facility] OR;  Service: General;  Laterality: Right;  3.5 HRS    NEURECTOMY Left 01/15/2020    Procedure: NEURECTOMY;  Surgeon: Rudolph Cardozo DPM;  Location: [facility] OR;  Service: Podiatry;  Laterality: Left;  bipolar bovie, vessel loop, possible [place] nerve wrap. Moshe with [employer] notified and will be overnighting graft. MK 1/14/2020    OOPHORECTOMY      ORIF FOREARM FRACTURE Left     PALATE SURGERY      Lesion removed    REMOVAL,ORTHOPEDIC HARDWARE,UPPER EXTREMITY Left 06/30/2023    Procedure: REMOVAL,ORTHOPEDIC HARDWARE,UPPER EXTREMITY;  Surgeon: Skyler Oliva Jr., MD;  Location: [facility] OR;  Service: Orthopedics;  Laterality: Left;  C[employer] notified cc    REPAIR OF EXTENSOR TENDON Left 06/30/2023    Procedure: REPAIR, TENDON, EXTENSOR;  Surgeon: Skyler Oliva Jr., MD;  Location: [facility] OR;  Service: Orthopedics;  Laterality: Left;  Left Index Digit    SENTINEL LYMPH NODE BIOPSY Right 06/24/2024    Procedure: BIOPSY, LYMPH NODE, SENTINEL;  Surgeon: SHAHZAD Laird MD;  Location: [facility] OR;  Service: General;  Laterality: Right;    TONSILLECTOMY         FAMILY HISTORY:   Family History   Problem Relation Name Age of Onset    Aneurysm Mother          AAA    Cancer Father          lung cancer    Lung cancer Father      Cirrhosis Father      Cancer Sister Bernadette         metastatic breast and brain    Diabetes Sister Bernadette     Breast cancer Sister Bernadette     Hypertension Sister Bernadette     Hyperlipidemia Sister Bernadette     Brain cancer Sister Bernadette     Heart disease Sister Aysha         Heart valve repair     Atrial fibrillation Sister Aysha     Diabetes Sister Aysha     COPD Sister Molly     Heart disease Sister Molly     Heart attack Sister Molly     Bipolar disorder Sister Molly     Depression Sister Molly     Glaucoma Sister Molly     Diabetes Sister Gely Joseph     Arthritis Sister Gely Ruiz     COPD Sister Carito     Heart disease Sister Carito     Kidney disease Sister Carito     Cancer Sister Carito         lung cancer    Diabetes Sister Carito     Lung cancer Sister Carito     Heart attack Sister Carito     Colon polyps Brother Matt     Cancer Brother Matt         lung cancer    Diabetes Brother Matt     Hyperlipidemia Brother Matt     Hypertension Brother Matt     Cancer Brother Luis Antonio         bladder cancer    Diabetes Brother Luis Antonio     Glaucoma Brother Luis Antonio        SOCIAL HISTORY:   Social History[1]    MEDICATIONS:   Current Medications[2]    ALLERGIES:   Review of patient's allergies indicates:   Allergen Reactions    Adhesive     Compazine [prochlorperazine edisylate] Anxiety    Penicillins Rash    Sulfa (sulfonamide antibiotics) Rash    Vancomycin analogues Rash        PHYSICAL EXAMINATION:  BP (!) 131/90 (BP Location: Right arm)   Pulse 67   Wt 100 kg (220 lb 7.4 oz)   BMI 40.32 kg/m²   Vitals signs and nursing note have been reviewed.  General: In no acute distress, well developed, well nourished, no diaphoresis  Eyes: EOM full and smooth, no eye redness or discharge  HENT: normocephalic and atraumatic, neck supple, trachea midline, no nasal discharge, no external ear redness or discharge  Cardiovascular: 2+ and symmetric DP pulses bilaterally, no LE edema  Lungs: respirations non-labored, no conversational dyspnea   Abd: non-distended, no rigidity  MSK: no amputation or deformity, no swelling of extremities  Neuro: AAOx3, CN2-12 grossly intact  Skin: No rashes, warm and dry  Psychiatric: cooperative, pleasant, mood and affect appropriate for age    MUSCULOSKELETAL EXAM:    RIGHT KNEE  EXAMINATION   Affected side is compared to contralateral knee     Observation:  Diffuse bruising of the right side including right forearm, right lateral thigh to below the right lateral knee  No edema, erythema, or effusion noted.  No muscle atrophy of the thighs and calves noted.  No obvious bony deformities noted.   No genu valgus/varum noted. No recurvatum noted.      Tenderness:   TTP of the bruised areas of the left lateral knee, no tissue tightness or palpable masses that would be concerning for hematoma or increased compartment pressures  Patella - none    Lateral joint line - negative   Quad tendon - none   Medial joint line - negative  Patellar tendon - none  Medial plica - none  Tibial tubercle - none   Lateral plica - none  Pes anserine - none   MCL prox - none  Distal ITB - none   MCL distal - none  MFC - none    LCL prox - none  LFC - none    LCL distal - none  Tibia - none    Fibula - none    No obvious bursae, plicae, popliteal cysts, or tendon derangement palpated.          ROM:  Active extension to 0° on left without hyperextension, lag, crepitus, or patellar J sign.   Active extension to 0° on right without hyperextension, lag, or patellar J sign. + crepitus with extension  Active flexion to 135° on left and 135° on right.    Strength: (bilaterally) (*pain)  Knee Flexion - 5/5 on left and 5/5 on right  Knee Extension - 5/5 on left and 5/5 on right  Hip Flexion - 5/5 on left and 5/5 on right  Hip Extension - 5/5 on left and 5/5 on right  Ankle dorsiflexion - 5/5 on left and 5/5 on right  Ankle Plantarflexion - 5/5 on left and 5/5 on right    Patellofemoral Exam:  Patellar ballottement - negative  Bulge sign - negative  Patellar grind - negative  No patellar laxity with medial and lateral translation   No apprehension with medial and lateral patellar translation.     Neurovascular Examination:   Antalgic gait using a cane for balance  Sensation intact to light touch in the obturator,  "lateral/intermediate/medial/posterior femoral cutaneous, saphenous, and common peroneal nerves bilaterally.  Pulses intact at the DP and PT arteries bilaterally.    Capillary refill intact <2 seconds in all toes bilaterally.      IMAGIN. X-ray ordered 25 due to right knee pain   2. X-ray images were reviewed personally by me and then directly with patient.  3. FINDINGS: X-ray images obtained demonstrate bilateral multi compartment degenerative findings present including right knee mild medial compartment joint space narrowing. Faint chondrocalcinosis suspected. Small bilateral suprapatellar joint effusions possible. No acute osseous abnormality   4. IMPRESSION: As above.    X-rays ordered 25 of right wrist, elbow, hip, and femur due to fall onto right side  X-ray images were reviewed personally by me and then directly with patient.   FINDINGS: No acute fractures, dislocation, or bony destruction.   IMPRESSION: As above.        ASSESSMENT:      ICD-10-CM ICD-9-CM   1. Acute pain of right knee  M25.561 719.46   2. Primary osteoarthritis of right knee  M17.11 715.16   3. Contusion of soft tissue  T14.8XXA 924.9       PLAN:  1-2. Acute right knee pain/OA - improved then deteriorated     3. Soft tissue contusions    - Mis admits to generalized right knee pain beginning in 2025 without mechanism of injury. She was referred by Dimas Briggs PA-C for possible aspiration and CSI. Patient had CSI with aspiration on  and noted nearly complete relief of right knee pain about 2 weeks after. Pain deteriorated after fall onto right side on  resulting in significant soft tissue contusion of right lateral lower extremity. She states this pain is "different", but her knee pain increased following the fall as well.    - XRs obtained 2025 and images were personally reviewed with the patient. See above for further detail.    - XRs obtained 2025 in ED after fall onto right side. Images " were personally reviewed. No acute fractures.     - We reviewed the natural history of soft tissue contusion/hematomas and treatment approach. We reviewed importance of controlling swelling and pain with icing, Tylenol, and topical medications for pain relief. Patient is not able to take NSAIDs due to currently being on Coumadin. Advised giving this 4-6 weeks as symptoms can take that long to fully recover.    - We reviewed the natural history of osteoarthritis and a multipronged treatment approach. We reviewed the importance of addressing three different aspects to best manage this condition. Controlling the intra-articular immune reaction through medications and/or injections, improving local stability through bracing and/or injection, and improving functional stability through hip, core, and ankle strength, stability and mobility which may benefit from formal physical therapy.    - After discussing options she elects to proceed with ultrasound guided right knee viscosupplementation.   MEDICAL NECESSITY FOR VISCOSUPPLEMENTATION: After thorough evaluation of the patient, I have determined that visco-supplementation is medically necessary. The patient has painful DJD of the knee with failure of conservative treatments including lifestyle modifications and rehabilitation exercises.  Oral analgesics/NSAIDs have not adequately controlled symptoms and there is radiographic evidence of Kellgren Terry grade 2 or greater OA changes, or in lack of radiographic evidence, there is arthroscopic or other evidence of chondrosis.     - Continue HEP for knee OA as issued at initial visit.       Future planning includes - viscosupplementation for right knee, possible repeat CSI in early September if knee pain continues     All questions were answered to the best of my ability and all concerns were addressed at this time.    Follow up in clinic in 2 weeks for right knee viscosupplementation.       This note is dictated using the  M*Modal Fluency Direct word recognition program. There are word recognition mistakes that are occasionally missed on review.           [1]   Social History  Socioeconomic History    Marital status:      Spouse name: both   Tobacco Use    Smoking status: Never     Passive exposure: Never    Smokeless tobacco: Never   Substance and Sexual Activity    Alcohol use: Not Currently    Drug use: No    Sexual activity: Not Currently     Partners: Male     Social Drivers of Health     Financial Resource Strain: Low Risk  (7/18/2025)    Overall Financial Resource Strain (CARDIA)     Difficulty of Paying Living Expenses: Not hard at all   Food Insecurity: No Food Insecurity (7/18/2025)    Hunger Vital Sign     Worried About Running Out of Food in the Last Year: Never true     Ran Out of Food in the Last Year: Never true   Transportation Needs: No Transportation Needs (7/18/2025)    PRAPARE - Transportation     Lack of Transportation (Medical): No     Lack of Transportation (Non-Medical): No   Physical Activity: Inactive (4/22/2025)    Exercise Vital Sign     Days of Exercise per Week: 0 days     Minutes of Exercise per Session: 0 min   Stress: Stress Concern Present (4/22/2025)    [employer] - Occupational Stress Questionnaire     Feeling of Stress : To some extent   Housing Stability: Low Risk  (7/18/2025)    Housing Stability Vital Sign     Unable to Pay for Housing in the Last Year: No     Number of Times Moved in the Last Year: 0     Homeless in the Last Year: No   [2]   Current Outpatient Medications:     acetaminophen (TYLENOL) 500 MG tablet, Take 2 tablets (1,000 mg total) by mouth 4 (four) times daily. for 14 days, Disp: 112 tablet, Rfl: 0    alendronate (FOSAMAX) 70 MG tablet, TAKE 1 TABLET (70 MG TOTAL) BY MOUTH EVERY 7 DAYS, Disp: 12 tablet, Rfl: 3    ALPRAZolam (XANAX) 0.25 MG tablet, Take 1 tablet (0.25 mg total) by mouth 2 (two) times daily., Disp: 60 tablet, Rfl: 0     "amiodarone (PACERONE) 200 MG Tab, TAKE 1 TABLET BY MOUTH EVERY DAY, Disp: 90 tablet, Rfl: 3    amitriptyline (ELAVIL) 25 MG tablet, Take 1 tablet (25 mg total) by mouth every evening., Disp: 90 tablet, Rfl: 1    anastrozole (ARIMIDEX) 1 mg Tab, Take 1 tablet (1 mg total) by mouth once daily., Disp: 90 tablet, Rfl: 3    aspirin (ECOTRIN) 81 MG EC tablet, Take 81 mg by mouth once daily., Disp: , Rfl:     chlorthalidone (HYGROTEN) 50 MG Tab, Take 1 tablet (50 mg total) by mouth once daily., Disp: 90 tablet, Rfl: 3    cyanocobalamin 1,000 mcg/mL injection, INJECT 1 ML (1,000 MCG) INTRAMUSCULARLY EVERY 30 DAYS, Disp: 3 mL, Rfl: 18    furosemide (LASIX) 20 MG tablet, Take 1 tablet (20 mg total) by mouth once daily., Disp: 90 tablet, Rfl: 3    gabapentin (NEURONTIN) 300 MG capsule, TAKE 2 CAPSULES BY MOUTH TWICE  DAILY, Disp: 360 capsule, Rfl: 3    latanoprost 0.005 % ophthalmic solution, Place 1 drop into the left eye every evening., Disp: , Rfl:     levocetirizine (XYZAL) 5 MG tablet, TAKE 1 TABLET BY MOUTH EVERY DAY IN THE EVENING, Disp: 90 tablet, Rfl: 1    LIDOcaine (LIDODERM) 5 %, Place 1 patch onto the skin once daily. Remove & Discard patch within 12 hours or as directed by MD for 7 days, Disp: 7 patch, Rfl: 0    losartan (COZAAR) 100 MG tablet, TAKE 0.5 TABLETS (50 MG TOTAL) BY MOUTH ONCE DAILY, Disp: 45 tablet, Rfl: 1    multivit-min-iron-FA-lutein (CENTRUM SILVER WOMEN) 8 mg iron-400 mcg-300 mcg Tab, Take 1 tablet by mouth once daily., Disp: , Rfl:     needle, disp, 25 gauge 25 gauge x 1" Ndle, 1 Needle by Misc.(Non-Drug; Combo Route) route every 30 days., Disp: 25 each, Rfl: 0    nitroGLYCERIN (NITROSTAT) 0.4 MG SL tablet, PLACE 1 TABLET UNDER THE TONGUE EVERY 5 MINUTES AS NEEDED FOR CHEST PAIN., Disp: 25 tablet, Rfl: 11    omeprazole (PRILOSEC) 20 MG capsule, Take 1 capsule (20 mg total) by mouth 2 (two) times daily., Disp: 180 capsule, Rfl: 2    oxybutynin (DITROPAN) 5 MG Tab, Take 1 tablet (5 mg total) by " mouth Daily., Disp: 90 tablet, Rfl: 3    potassium chloride (K-TAB) 20 mEq, TAKE 1 TABLET BY MOUTH ONCE DAILY, Disp: 90 tablet, Rfl: 3    pramipexole (MIRAPEX) 0.5 MG tablet, Take 1 tablet (0.5 mg total) by mouth 2 (two) times daily., Disp: 180 tablet, Rfl: 3    pravastatin (PRAVACHOL) 80 MG tablet, Take 1 tablet (80 mg total) by mouth once daily., Disp: 90 tablet, Rfl: 3    timolol maleate 0.5% (TIMOPTIC) 0.5 % Drop, Place 1 drop into the left eye once daily. , Disp: , Rfl: 0    vitamin D (VITAMIN D3) 1000 units Tab, Take 1,000 Units by mouth once daily., Disp: , Rfl:     warfarin (COUMADIN) 6 MG tablet, TAKE 6 MG DAILY EXCEPT FOR 9 MG ON FRIDAY TO THIN BLOOD, Disp: 96 tablet, Rfl: 3  No current facility-administered medications for this visit.    Facility-Administered Medications Ordered in Other Visits:     lactated ringers infusion, , Intravenous, Continuous, Don Ruano DNP, New Bag at 06/24/24 0832    LIDOcaine (PF) 10 mg/ml (1%) injection 10 mg, 1 mL, Intradermal, Once, Don Ruano DNP

## 2025-07-22 NOTE — TELEPHONE ENCOUNTER
Copied from CRM #[id number]. Topic: Appointments - Appointment Rescheduling  >> Jul 22, 2025  2:16 PM Alejandro wrote:    Type: Requesting to speak with nurse        Who Called: PT  Regarding: reschedule 7/25 appointment. Pt has family emergency   Would the patient rather a call back or a response via [facility]? Call back  Best Call Back Number:  [phone]   Additional Information:

## 2025-08-04 ENCOUNTER — ANTI-COAG VISIT (OUTPATIENT)
Dept: CARDIOLOGY | Facility: CLINIC | Age: 85
End: 2025-08-04
Payer: MEDICARE

## 2025-08-04 ENCOUNTER — LAB VISIT (OUTPATIENT)
Dept: LAB | Facility: HOSPITAL | Age: 85
End: 2025-08-04
Attending: INTERNAL MEDICINE
Payer: MEDICARE

## 2025-08-04 ENCOUNTER — OFFICE VISIT (OUTPATIENT)
Dept: SPORTS MEDICINE | Facility: CLINIC | Age: 85
End: 2025-08-04
Payer: MEDICARE

## 2025-08-04 VITALS
BODY MASS INDEX: 40.32 KG/M2 | SYSTOLIC BLOOD PRESSURE: 130 MMHG | WEIGHT: 220.44 LBS | DIASTOLIC BLOOD PRESSURE: 82 MMHG | HEART RATE: 58 BPM

## 2025-08-04 DIAGNOSIS — Z79.01 LONG TERM (CURRENT) USE OF ANTICOAGULANTS: ICD-10-CM

## 2025-08-04 DIAGNOSIS — I48.0 PAROXYSMAL ATRIAL FIBRILLATION: Primary | Chronic | ICD-10-CM

## 2025-08-04 DIAGNOSIS — M25.561 ACUTE PAIN OF RIGHT KNEE: ICD-10-CM

## 2025-08-04 DIAGNOSIS — M17.11 PRIMARY OSTEOARTHRITIS OF RIGHT KNEE: Primary | ICD-10-CM

## 2025-08-04 DIAGNOSIS — I48.0 PAROXYSMAL ATRIAL FIBRILLATION: Chronic | ICD-10-CM

## 2025-08-04 LAB
INR PPP: 2.7 (ref 0.8–1.2)
PROTHROMBIN TIME: 27.2 SECONDS (ref 9–12.5)

## 2025-08-04 PROCEDURE — 36415 COLL VENOUS BLD VENIPUNCTURE: CPT | Mod: PN

## 2025-08-04 PROCEDURE — 99499 UNLISTED E&M SERVICE: CPT | Mod: S$GLB,,, | Performed by: ORTHOPAEDIC SURGERY

## 2025-08-04 PROCEDURE — 93793 ANTICOAG MGMT PT WARFARIN: CPT | Mod: S$GLB,,, | Performed by: PHARMACIST

## 2025-08-04 PROCEDURE — 85610 PROTHROMBIN TIME: CPT | Mod: PN

## 2025-08-04 PROCEDURE — 20611 DRAIN/INJ JOINT/BURSA W/US: CPT | Mod: RT,S$GLB,, | Performed by: ORTHOPAEDIC SURGERY

## 2025-08-04 PROCEDURE — 99999 PR PBB SHADOW E&M-EST. PATIENT-LVL IV: CPT | Mod: PBBFAC,,, | Performed by: ORTHOPAEDIC SURGERY

## 2025-08-04 NOTE — PROGRESS NOTES
"Subjective:     CC: right knee pain/osteoarthritis    Mis is a 84 y.o. female coming in today for their Synvisc-One injection to the right knee. The patient reports their pain is 0/10 today.     Objective:     VITAL SIGNS: /82 (BP Location: Right arm)   Pulse (!) 58   Wt 100 kg (220 lb 7.4 oz)   BMI 40.32 kg/m²      Synvisc-One Injection Procedure     A time out was performed, including verification of patient ID, procedure, site and side, availability of information and equipment, review of safety issues, and agreement with consent, the procedure site was marked.    Location: Knee joint, right     Procedure: The patient was prepped aseptically with alcohol and chlorhexidine. Ethyl Chloride spray was used prior to skin puncture to help numb the superficial skin. After cold spray was applied, 2-4 cc's of 0.2% ropivacaine was injected into the skin and superficial tissue at the injection site using a 22G, 1.5" needle to form an anesthetic tunnel and ensure proper needle placement into the knee joint space. Using a hemostat, the syringe was exchanged with the Synvisc-One syringe, and 6 mL of Synvisc-One was injected into the knee joint. The patient was in the supine position during the duration of this procedure and the injection approach was from the superolateral aspect.     Ultrasound guidance was used for needle localization with GE Logic , GE - 6-15 MHz (L) probe(s). Images were saved and stored for documentation. The knee joint was well visualized. Dynamic visualization of the 22G x 1.5 needles was continuous throughout the procedure and maintained good position and correct needle placement.        Patient tolerance: The patient tolerated the procedure well with no immediate complications. There were no adverse reactions to the medication. Patient was instructed to apply ice to the joint for up to 20 minutes at a time and avoid strenuous activities for 24-36 hours following the injection. Following " that time, the patient can resume activities as prior to the injection.     The patient was reminded to call the clinic immediately for any adverse side effects as explained in clinic today.     Synvisc-One:  NDC: [address]-30  Product Code: [id number]  Lot: [id number]  Exp: 2028-02-29      Assessment:      Encounter Diagnoses   Name Primary?    Acute pain of right knee     Primary osteoarthritis of right knee Yes          Plan:     1. Synvisc-One injection of right knee received today (see procedure note above)  2. Follow-up as needed  3. Patient agreeable to today's plan and all questions were answered      This note is dictated using the M*Modal Fluency Direct word recognition program. There are word recognition mistakes that are occasionally missed on review.

## 2025-08-05 NOTE — PROGRESS NOTES
[facility]    2025 8:57 AM    Assessment/Plan:    Patient presents today with therapeutic INR.    Assessment of patient findings and chart review: INR at goal. Medications and chart reviewed. No changes noted to necessitate adjustment of warfarin or follow-up plan. See calendar.      Recommendation for patient's warfarin regimen: Continue current maintenance dose    Recommend repeat INR in 2 weeks  _________________________________________________________________    Mis L Lanny (84 y.o.) is followed by the [facility].    Anticoagulation Summary  As of 2025      INR goal:  2.0-3.0   TTR:  75.1% (2.7 y)   INR used for dosin.7 (2025)   Warfarin maintenance plan:  3 mg (6 mg x 0.5) every Sun, Wed, Fri; 6 mg (6 mg x 1) all other days   Weekly warfarin total:  33 mg   Plan last modified:  Tino Mendiola, PharmD (2025)   Next INR check:  2025   Target end date:  --    Indications    Paroxysmal atrial fibrillation [I48.0]  Long term (current) use of anticoagulants [Z79.01]                 Anticoagulation Episode Summary       INR check location:  --    Preferred lab:  --    Send INR reminders to:  [facility] COUMADIN MONITORING POOL    Comments:  [facility]          Anticoagulation Care Providers       Provider Role Specialty Phone number    Shamar Owens MD Responsible Electrophysiology [phone]

## 2025-08-13 ENCOUNTER — PATIENT MESSAGE (OUTPATIENT)
Dept: FAMILY MEDICINE | Facility: CLINIC | Age: 85
End: 2025-08-13
Payer: MEDICARE

## 2025-08-13 DIAGNOSIS — E11.22 TYPE 2 DIABETES MELLITUS WITH CHRONIC KIDNEY DISEASE, WITHOUT LONG-TERM CURRENT USE OF INSULIN, UNSPECIFIED CKD STAGE: ICD-10-CM

## 2025-08-13 DIAGNOSIS — I10 ESSENTIAL HYPERTENSION: ICD-10-CM

## 2025-08-13 DIAGNOSIS — E78.2 MIXED HYPERLIPIDEMIA: ICD-10-CM

## 2025-08-13 DIAGNOSIS — Z79.01 LONG TERM (CURRENT) USE OF ANTICOAGULANTS: ICD-10-CM

## 2025-08-13 DIAGNOSIS — Z00.00 ANNUAL PHYSICAL EXAM: Primary | ICD-10-CM

## 2025-08-13 DIAGNOSIS — N18.32 STAGE 3B CHRONIC KIDNEY DISEASE: ICD-10-CM

## 2025-08-13 DIAGNOSIS — I70.0 AORTIC ATHEROSCLEROSIS: ICD-10-CM

## 2025-08-18 ENCOUNTER — PATIENT OUTREACH (OUTPATIENT)
Facility: OTHER | Age: 85
End: 2025-08-18
Payer: MEDICARE

## 2025-08-18 ENCOUNTER — ANTI-COAG VISIT (OUTPATIENT)
Dept: CARDIOLOGY | Facility: CLINIC | Age: 85
End: 2025-08-18
Payer: MEDICARE

## 2025-08-18 ENCOUNTER — APPOINTMENT (OUTPATIENT)
Dept: LAB | Facility: HOSPITAL | Age: 85
End: 2025-08-18
Attending: INTERNAL MEDICINE
Payer: MEDICARE

## 2025-08-18 DIAGNOSIS — Z79.01 LONG TERM (CURRENT) USE OF ANTICOAGULANTS: ICD-10-CM

## 2025-08-18 DIAGNOSIS — I48.0 PAROXYSMAL ATRIAL FIBRILLATION: Primary | Chronic | ICD-10-CM

## 2025-08-18 PROCEDURE — 93793 ANTICOAG MGMT PT WARFARIN: CPT | Mod: S$GLB,,, | Performed by: PHARMACIST

## 2025-08-20 ENCOUNTER — OFFICE VISIT (OUTPATIENT)
Dept: FAMILY MEDICINE | Facility: CLINIC | Age: 85
End: 2025-08-20
Payer: MEDICARE

## 2025-08-20 VITALS
OXYGEN SATURATION: 96 % | HEART RATE: 61 BPM | BODY MASS INDEX: 40.4 KG/M2 | SYSTOLIC BLOOD PRESSURE: 114 MMHG | TEMPERATURE: 98 F | WEIGHT: 219.56 LBS | DIASTOLIC BLOOD PRESSURE: 68 MMHG | HEIGHT: 62 IN

## 2025-08-20 DIAGNOSIS — Z78.0 MENOPAUSE: ICD-10-CM

## 2025-08-20 DIAGNOSIS — I10 ESSENTIAL HYPERTENSION: ICD-10-CM

## 2025-08-20 DIAGNOSIS — G47.33 OSA (OBSTRUCTIVE SLEEP APNEA): ICD-10-CM

## 2025-08-20 DIAGNOSIS — H54.10 BLINDNESS OF RIGHT EYE WITH LOW VISION IN CONTRALATERAL EYE: ICD-10-CM

## 2025-08-20 DIAGNOSIS — I48.0 PAROXYSMAL ATRIAL FIBRILLATION: Chronic | ICD-10-CM

## 2025-08-20 DIAGNOSIS — E66.01 SEVERE OBESITY WITH BODY MASS INDEX (BMI) OF 35.0 TO 39.9 WITH SERIOUS COMORBIDITY: ICD-10-CM

## 2025-08-20 DIAGNOSIS — I70.0 AORTIC ATHEROSCLEROSIS: ICD-10-CM

## 2025-08-20 DIAGNOSIS — N18.31 CHRONIC KIDNEY DISEASE, STAGE 3A: ICD-10-CM

## 2025-08-20 DIAGNOSIS — E78.2 MIXED HYPERLIPIDEMIA: ICD-10-CM

## 2025-08-20 DIAGNOSIS — G61.9 INFLAMMATORY POLYNEUROPATHY, UNSPECIFIED: ICD-10-CM

## 2025-08-20 DIAGNOSIS — C77.3 SECONDARY AND UNSPECIFIED MALIGNANT NEOPLASM OF AXILLA AND UPPER LIMB LYMPH NODES: ICD-10-CM

## 2025-08-20 DIAGNOSIS — E11.22 TYPE 2 DIABETES MELLITUS WITH CHRONIC KIDNEY DISEASE, WITHOUT LONG-TERM CURRENT USE OF INSULIN, UNSPECIFIED CKD STAGE: ICD-10-CM

## 2025-08-20 DIAGNOSIS — Z00.00 ANNUAL PHYSICAL EXAM: Primary | ICD-10-CM

## 2025-08-20 DIAGNOSIS — Z79.01 LONG TERM (CURRENT) USE OF ANTICOAGULANTS: ICD-10-CM

## 2025-08-20 DIAGNOSIS — G25.81 RESTLESS LEG SYNDROME: ICD-10-CM

## 2025-08-20 PROCEDURE — 3078F DIAST BP <80 MM HG: CPT | Mod: CPTII,S$GLB,, | Performed by: FAMILY MEDICINE

## 2025-08-20 PROCEDURE — 3288F FALL RISK ASSESSMENT DOCD: CPT | Mod: CPTII,S$GLB,, | Performed by: FAMILY MEDICINE

## 2025-08-20 PROCEDURE — 1126F AMNT PAIN NOTED NONE PRSNT: CPT | Mod: CPTII,S$GLB,, | Performed by: FAMILY MEDICINE

## 2025-08-20 PROCEDURE — 1159F MED LIST DOCD IN RCRD: CPT | Mod: CPTII,S$GLB,, | Performed by: FAMILY MEDICINE

## 2025-08-20 PROCEDURE — 99215 OFFICE O/P EST HI 40 MIN: CPT | Mod: S$GLB,,, | Performed by: FAMILY MEDICINE

## 2025-08-20 PROCEDURE — 1160F RVW MEDS BY RX/DR IN RCRD: CPT | Mod: CPTII,S$GLB,, | Performed by: FAMILY MEDICINE

## 2025-08-20 PROCEDURE — 1100F PTFALLS ASSESS-DOCD GE2>/YR: CPT | Mod: CPTII,S$GLB,, | Performed by: FAMILY MEDICINE

## 2025-08-20 PROCEDURE — 3074F SYST BP LT 130 MM HG: CPT | Mod: CPTII,S$GLB,, | Performed by: FAMILY MEDICINE

## 2025-08-21 DIAGNOSIS — G47.33 OSA (OBSTRUCTIVE SLEEP APNEA): ICD-10-CM

## 2025-08-21 RX ORDER — ALPRAZOLAM 0.25 MG/1
0.25 TABLET ORAL 2 TIMES DAILY
Qty: 60 TABLET | Refills: 0 | Status: SHIPPED | OUTPATIENT
Start: 2025-08-21

## 2025-08-25 ENCOUNTER — LAB VISIT (OUTPATIENT)
Dept: LAB | Facility: HOSPITAL | Age: 85
End: 2025-08-25
Attending: INTERNAL MEDICINE
Payer: MEDICARE

## 2025-08-25 DIAGNOSIS — Z79.01 LONG TERM (CURRENT) USE OF ANTICOAGULANTS: ICD-10-CM

## 2025-08-25 DIAGNOSIS — I48.0 PAROXYSMAL ATRIAL FIBRILLATION: Chronic | ICD-10-CM

## 2025-08-25 LAB
INR PPP: 2.8 (ref 0.8–1.2)
PROTHROMBIN TIME: 28.2 SECONDS (ref 9–12.5)

## 2025-08-25 PROCEDURE — 36415 COLL VENOUS BLD VENIPUNCTURE: CPT | Mod: PN

## 2025-08-25 PROCEDURE — 85610 PROTHROMBIN TIME: CPT | Mod: PN

## 2025-08-26 ENCOUNTER — OFFICE VISIT (OUTPATIENT)
Facility: CLINIC | Age: 85
End: 2025-08-26
Attending: PSYCHIATRY & NEUROLOGY
Payer: MEDICARE

## 2025-08-26 ENCOUNTER — ANTI-COAG VISIT (OUTPATIENT)
Dept: CARDIOLOGY | Facility: CLINIC | Age: 85
End: 2025-08-26
Payer: MEDICARE

## 2025-08-26 VITALS
HEART RATE: 66 BPM | HEIGHT: 62 IN | BODY MASS INDEX: 40.74 KG/M2 | DIASTOLIC BLOOD PRESSURE: 78 MMHG | SYSTOLIC BLOOD PRESSURE: 139 MMHG | WEIGHT: 221.38 LBS

## 2025-08-26 DIAGNOSIS — G47.30 SLEEP APNEA, UNSPECIFIED TYPE: Primary | ICD-10-CM

## 2025-08-26 DIAGNOSIS — I48.0 PAROXYSMAL ATRIAL FIBRILLATION: Primary | Chronic | ICD-10-CM

## 2025-08-26 DIAGNOSIS — Z79.01 LONG TERM (CURRENT) USE OF ANTICOAGULANTS: ICD-10-CM

## 2025-08-26 DIAGNOSIS — G47.33 OSA (OBSTRUCTIVE SLEEP APNEA): ICD-10-CM

## 2025-08-26 PROCEDURE — 99214 OFFICE O/P EST MOD 30 MIN: CPT | Mod: S$GLB,,, | Performed by: PSYCHIATRY & NEUROLOGY

## 2025-08-26 PROCEDURE — 3078F DIAST BP <80 MM HG: CPT | Mod: CPTII,S$GLB,, | Performed by: PSYCHIATRY & NEUROLOGY

## 2025-08-26 PROCEDURE — 1100F PTFALLS ASSESS-DOCD GE2>/YR: CPT | Mod: CPTII,S$GLB,, | Performed by: PSYCHIATRY & NEUROLOGY

## 2025-08-26 PROCEDURE — 1126F AMNT PAIN NOTED NONE PRSNT: CPT | Mod: CPTII,S$GLB,, | Performed by: PSYCHIATRY & NEUROLOGY

## 2025-08-26 PROCEDURE — 99999 PR PBB SHADOW E&M-EST. PATIENT-LVL III: CPT | Mod: PBBFAC,,, | Performed by: PSYCHIATRY & NEUROLOGY

## 2025-08-26 PROCEDURE — 3288F FALL RISK ASSESSMENT DOCD: CPT | Mod: CPTII,S$GLB,, | Performed by: PSYCHIATRY & NEUROLOGY

## 2025-08-26 PROCEDURE — 3075F SYST BP GE 130 - 139MM HG: CPT | Mod: CPTII,S$GLB,, | Performed by: PSYCHIATRY & NEUROLOGY

## 2025-08-27 ENCOUNTER — TELEPHONE (OUTPATIENT)
Facility: CLINIC | Age: 85
End: 2025-08-27
Payer: MEDICARE

## 2025-08-27 DIAGNOSIS — G47.33 OSA (OBSTRUCTIVE SLEEP APNEA): Primary | ICD-10-CM

## 2025-09-03 ENCOUNTER — TELEPHONE (OUTPATIENT)
Facility: CLINIC | Age: 85
End: 2025-09-03
Payer: MEDICARE

## 2025-09-03 ENCOUNTER — TELEPHONE (OUTPATIENT)
Dept: SLEEP MEDICINE | Facility: OTHER | Age: 85
End: 2025-09-03
Payer: MEDICARE

## (undated) DEVICE — GLOVE PROTEXIS HYDROGEL SZ7.5

## (undated) DEVICE — SEE MEDLINE ITEM 152522

## (undated) DEVICE — TOURNIQUET SB QC DP 18X4IN

## (undated) DEVICE — STOCKINET 4INX48

## (undated) DEVICE — SUT 4-0 VICRYL / P-3

## (undated) DEVICE — BLADE SURG #15 CARBON STEEL

## (undated) DEVICE — GLOVE BIOGEL SKINSENSE PI 6.5

## (undated) DEVICE — STRIP PACKING ANTIMIC 1/4X1

## (undated) DEVICE — PAD CAST SPECIALIST STRL 4

## (undated) DEVICE — GLOVE 8 PROTEXIS PI BLUE

## (undated) DEVICE — SEE MEDLINE ITEM 156953

## (undated) DEVICE — DRESSING XEROFORM FOIL PK 1X8

## (undated) DEVICE — COVER OVERHEAD SURG LT BLUE

## (undated) DEVICE — PAD PREP 50/CA

## (undated) DEVICE — NDL HYPO REG 25G X 1 1/2

## (undated) DEVICE — DRESSING XEROFORM NONADH 1X8IN

## (undated) DEVICE — SEE L#120831

## (undated) DEVICE — BLADE SCALP OPHTL BEVEL STR

## (undated) DEVICE — KIT ENDO CARPEL TUNNAL SINGLE

## (undated) DEVICE — SUT MONOCRYL 5-0 P-3 UND 18

## (undated) DEVICE — UNDERGLOVE BIOGEL PI SZ 6.5 LF

## (undated) DEVICE — BANDAGE ELASTIC 2X5 VELCRO ST

## (undated) DEVICE — CONTAINER MULTIPURP W/LID 16OZ

## (undated) DEVICE — GAUZE PACKING STRIP PLN 1/2X5

## (undated) DEVICE — PACK BASIC

## (undated) DEVICE — GAUZE SPONGE 4X4 12 PLY NS

## (undated) DEVICE — GAUZE SPONGE 4X4 12PLY

## (undated) DEVICE — SUT MONOCRYL 3-0 PS-2 UND

## (undated) DEVICE — PACK SURGERY START

## (undated) DEVICE — DRESSING XEROFORM 1X8IN

## (undated) DEVICE — BANDAGE CONFORM 3IN STRL

## (undated) DEVICE — SEE MEDLINE ITEM 157116

## (undated) DEVICE — GLOVE BIOGEL ECLIPSE SZ 8

## (undated) DEVICE — BANDAGE SOFFORM STER 2IN

## (undated) DEVICE — STAPLER SKIN PROXIMATE WIDE

## (undated) DEVICE — BANDAGE BULKEE II 2.25INX3YD

## (undated) DEVICE — BANDAGE DERMACEA STRETCH 4X1IN

## (undated) DEVICE — COVER PROBE US 5.5X58L NON LTX

## (undated) DEVICE — SUT PROLENE 4-0 PS2 18 BLUE

## (undated) DEVICE — SEE MEDLINE ITEM 157117

## (undated) DEVICE — MANIFOLD 4 PORT

## (undated) DEVICE — SUT PROLENE 4-0 MONO 18IN

## (undated) DEVICE — Device

## (undated) DEVICE — GOWN POLY REINF BRTH SLV XL

## (undated) DEVICE — ADHESIVE MASTISOL VIAL 48/BX

## (undated) DEVICE — BIT DRILL CANN 2.5MM

## (undated) DEVICE — SEE MEDLINE ITEM 154981

## (undated) DEVICE — APPLIER CLIP LIAGCLIP 9.375IN

## (undated) DEVICE — DRAIN CHANNEL ROUND 15FR

## (undated) DEVICE — BANDAGE MATRIX HK LOOP 3IN 5YD

## (undated) DEVICE — SUT VICRYL CTD 2-0 GI 27 SH

## (undated) DEVICE — SEE MEDLINE ITEM 146308

## (undated) DEVICE — DRAPE HAND STERILE

## (undated) DEVICE — SPONGE DERMACEA GAUZE 4X4

## (undated) DEVICE — SUT ETHILON 5-0 PS-2 18IN

## (undated) DEVICE — GAUZE CURITY DRAIN 6 PLY 4X4IN

## (undated) DEVICE — SYR 10CC LUER LOCK

## (undated) DEVICE — STAPLER SKIN ROTATING HEAD

## (undated) DEVICE — SUT MCRYL PLUS 4-0 PS2 27IN

## (undated) DEVICE — WALKER BOOT SHORT UNIV MED

## (undated) DEVICE — ELECTRODE REM PLYHSV RETURN 9

## (undated) DEVICE — EVACUATOR WOUND BULB 100CC

## (undated) DEVICE — PAD PREP CUFFED NS 24X48IN

## (undated) DEVICE — SEE MEDLINE ITEM 152523

## (undated) DEVICE — SEE MEDLINE ITEM 152515

## (undated) DEVICE — DRAPE MINI C-ARM 54 X 64

## (undated) DEVICE — CLOSURE SKIN STERI STRIP 1/2X4

## (undated) DEVICE — BANDAGE ESMARK ELASTIC ST 4X9

## (undated) DEVICE — SEE MEDLINE ITEM 146372

## (undated) DEVICE — POWDER ARISTA AH 3G

## (undated) DEVICE — APPLICATOR CHLORAPREP ORN 26ML

## (undated) DEVICE — GOWN ECLIPSE REINF LV4 XLNG XL

## (undated) DEVICE — PAD ABDOMINAL STERILE 8X10IN

## (undated) DEVICE — SUT 2-0 ETHILON 18 FS

## (undated) DEVICE — DRAPE STERI INSTRUMENT 1018

## (undated) DEVICE — SUT VICRYL 3-0 27 SH

## (undated) DEVICE — INSTRUMENT SUCTION FRAZIER 12F

## (undated) DEVICE — SPONGE LAP 18X18 PREWASHED

## (undated) DEVICE — BLADE SAGITTA 5/BX

## (undated) DEVICE — SEE MEDLINE ITEM 146268

## (undated) DEVICE — STOCKINET TUBULAR 1 PLY 6X60IN

## (undated) DEVICE — GLOVE BIOGEL PI MICRO INDIC 8

## (undated) DEVICE — GAUZE VERSALON 2X2

## (undated) DEVICE — ADHESIVE DERMABOND ADVANCED

## (undated) DEVICE — PADDING CAST 4IN SPECIALIST

## (undated) DEVICE — DRESSING AQUACEL SACRAL 9 X 9

## (undated) DEVICE — IMPLANTABLE DEVICE
Type: IMPLANTABLE DEVICE | Site: FOOT | Status: NON-FUNCTIONAL
Removed: 2018-10-31

## (undated) DEVICE — GAUZE FLUFF XXLG 36X36 2 PLY

## (undated) DEVICE — BANDAGE MATRIX HK LOOP 2IN 5YD

## (undated) DEVICE — ALCOHOL 70% ISOP W/GREEN 16OZ

## (undated) DEVICE — CONTAINER SPEC OR STRL 4.5OZ

## (undated) DEVICE — SUT VICRYL PLUS 4-0 P3 18IN

## (undated) DEVICE — EVACUATOR PENCIL SMOKE NEPTUNE

## (undated) DEVICE — GLOVE SURG BIOGEL LATEX SZ 7.5

## (undated) DEVICE — PAD CAST SPECIALIST STRL 6

## (undated) DEVICE — SUT CTD VICRYL 2.0

## (undated) DEVICE — PAD CAST 2 IN X 4YDS STERILE

## (undated) DEVICE — SCREW LOCKING LP 3.5X22MM
Type: IMPLANTABLE DEVICE | Site: FOOT | Status: NON-FUNCTIONAL
Removed: 2018-10-31

## (undated) DEVICE — SUT 2/0 30IN SILK BLK BRAI

## (undated) DEVICE — GOWN POLY REINF BRTH SLV LG

## (undated) DEVICE — SLING ARM COMFT NAVY BLU LG

## (undated) DEVICE — ELECTRODE BLADE INSULATED 1 IN

## (undated) DEVICE — SOL IRRI STRL WATER 1000ML

## (undated) DEVICE — PADDING CAST  4X4 YD NON ST

## (undated) DEVICE — SEE MEDLINE ITEM 152622

## (undated) DEVICE — SYR B-D DISP CONTROL 10CC100/C

## (undated) DEVICE — DRAPE THREE-QTR REINF 53X77IN

## (undated) DEVICE — SEE MEDLINE ITEM 146270

## (undated) DEVICE — TOWEL OR DISP STRL BLUE 4/PK